# Patient Record
Sex: FEMALE | Race: WHITE | Employment: OTHER | URBAN - METROPOLITAN AREA
[De-identification: names, ages, dates, MRNs, and addresses within clinical notes are randomized per-mention and may not be internally consistent; named-entity substitution may affect disease eponyms.]

---

## 2017-01-04 ENCOUNTER — GENERIC CONVERSION - ENCOUNTER (OUTPATIENT)
Dept: OTHER | Facility: OTHER | Age: 77
End: 2017-01-04

## 2017-03-28 ENCOUNTER — GENERIC CONVERSION - ENCOUNTER (OUTPATIENT)
Dept: OTHER | Facility: OTHER | Age: 77
End: 2017-03-28

## 2018-01-15 NOTE — MISCELLANEOUS
Chief Complaint  Chief Complaint Free Text Note Form: MELA      History of Present Illness  TCM Communication [facility]: The patient is being contacted for follow-up after hospitalization  She was hospitalized at [facility]  The date of admission: 16, date of discharge: 16  Diagnosis: a-fib  She was discharged to home  Medications were not reviewed today  The patient is currently asymptomatic  Counseling was provided to the patient  Communication performed and completed by Gabriela Cruz  Active Problems    1  Atrial fibrillation (427 31) (I48 91)   2  Cardiac arrhythmia (427 9) (I49 9)   3  Carpal tunnel syndrome, unspecified laterality (354 0) (G56 00)   4  Depression (311) (F32 9)   5  Disc disorder of cervical region (722 91) (M50 90)   6  Gout (274 9) (M10 9)   7  Hospital-acquired pneumonia (486) (J18 9)   8  Hyperlipidemia (272 4) (E78 5)   9  Hypertension (401 9) (I10)   10  Insomnia (780 52) (G47 00)   11  Osteoarthritis (715 90) (M19 90)   12  Pain syndrome, chronic (338 4) (G89 4)   13  Pancreatitis (577 0)    Surgical History    1  History of Knee Replacement   2  History of Shoulder Surgery   3  History of Tonsillectomy   4  History of Total Abdominal Hysterectomy With Removal Of Both Ovaries    Family History  Father    1  Family history of Father  At Age ___  Maternal Grandmother    2  Family history of Coronary Artery Disease (V17 49)  Paternal Grandmother    3  Family history of Colon Cancer (V16 0)   4  Family history of Diabetes Mellitus (V18 0)   5  Family history of Father  At Age 68  Paternal Aunt    10  Family history of Breast Cancer (V16 3)  Paternal Uncle    9  Family history of Colon Cancer (V16 0)    Social History    · Caffeine Use   · Denied: History of Former Smoker   · Smoker, current status unknown (305 1) (F17 200)   · Stopped Drinking Alcohol    Current Meds   1  Amitriptyline HCl - 25 MG Oral Tablet; Take 1 tablet by mouth at bedtime;    Therapy: 00PMA0504 to (Evaluate:13Jan2016); Last Rx:17Jun2015 Ordered   2  Atorvastatin Calcium 20 MG Oral Tablet; TAKE 1 TABLET DAILY; Therapy: 35Ing5860 to (Evaluate:63Gar4999)  Requested for: 05SQK0993; Last   Rx:17Jun2015 Ordered   3  Buffered Vitamin C CAPS; Therapy: (Recorded:16Nov2012) to Recorded   4  Calcium + D TABS; Therapy: (Recorded:16Nov2012) to Recorded   5  [employer] Daily Multiple Plus Iron TABS; Therapy: (Recorded:16Nov2012) to Recorded   6  [employer] Fish Oil 1200 MG Oral Capsule; Therapy: (Recorded:16Nov2012) to Recorded   7  DULoxetine HCl - 30 MG Oral Capsule Delayed Release Particles; TAKE 1 CAPSULE   DAILY; Therapy: 85JQL5049 to (Evaluate:15Oct2015); Last Rx:17Jun2015 Ordered   8  Metoprolol Tartrate 50 MG Oral Tablet; TAKE 1 TABLET TWICE DAILY; Therapy: 80FUK7725 to (Evaluate:46Icu4051) Recorded   9  OxyCODONE HCl - 15 MG Oral Tablet; Therapy: (Recorded:17Jun2015) to Recorded    Allergies    1  No Known Drug Allergies    Health Management  Hyperlipidemia   (1) HEPATIC FUNCTION PANEL; every 6 months; Last 32KHQ3631; Next Due: 39FLM2346; Overdue  (1) LIPID PANEL, FASTING; every 1 year; Last 49NEB4639; Next Due: 53TAK8061;  Overdue    Signatures   Electronically signed by : Amy Felix DO; Jun 24 2016  2:51PM EST                       (Author)

## 2018-01-17 NOTE — MISCELLANEOUS
History of Present Illness  TCM Communication [facility]: She was hospitalized at [facility]  The dates of hospitalization:, 2016 through 2016  Diagnosis: A-Fib  She was discharged to home  Medications were not reviewed today  She did not schedule a follow up appointment  Follow-up appointments with other specialists: unable to reach the patient  Topics counseled included importance of compliance with treatment  Communication performed and completed by Cuong Client      Active Problems    1  Atrial fibrillation (427 31) (I48 91)   2  Cardiac arrhythmia (427 9) (I49 9)   3  Carpal tunnel syndrome, unspecified laterality (354 0) (G56 00)   4  CHF (congestive heart failure) (428 0) (I50 9)   5  Depression (311) (F32 9)   6  Disc disorder of cervical region (722 91) (M50 90)   7  Gout (274 9) (M10 9)   8  Hospital-acquired pneumonia (486) (J18 9)   9  Hyperlipidemia (272 4) (E78 5)   10  Hypertension (401 9) (I10)   11  Insomnia (780 52) (G47 00)   12  Osteoarthritis (715 90) (M19 90)   13  Pain syndrome, chronic (338 4) (G89 4)   14  Pancreatitis (577 0)    Past Medical History    1  History of atrial fibrillation (V12 59) (Z86 79)    Surgical History    1  History of Knee Replacement   2  History of Shoulder Surgery   3  History of Tonsillectomy   4  History of Total Abdominal Hysterectomy With Removal Of Both Ovaries    Family History  Father    1  Family history of Father  At Age ___  Maternal Grandmother    2  Family history of Coronary Artery Disease (V17 49)  Paternal Grandmother    3  Family history of Colon Cancer (V16 0)   4  Family history of Diabetes Mellitus (V18 0)   5  Family history of Father  At Age 68  Paternal Aunt    10  Family history of Breast Cancer (V16 3)  Paternal Uncle    9   Family history of Colon Cancer (V16 0)    Social History    · Caffeine Use   · Denied: History of Former Smoker   · Smoker, current status unknown (305 1) (F17 200)   · Stopped Drinking Alcohol    Current Meds   1  Amitriptyline HCl - 25 MG Oral Tablet; Take 1 tablet by mouth at bedtime; Therapy: 48QEQ0425 to (Evaluate:20Jan2017); Last Rx:24Jun2016 Ordered   2  Eliquis 5 MG Oral Tablet; Therapy: (Recorded:24Jun2016) to Recorded   3  FLUoxetine HCl - 20 MG Oral Capsule; TAKE 1 CAPSULE DAILY; Therapy: 91XTT2702 to (Evaluate:26Oct2016); Last Rx:28Jun2016 Ordered   4  Metoprolol Tartrate 100 MG Oral Tablet; Therapy: (Recorded:24Jun2016) to Recorded   5  OxyCODONE HCl - 15 MG Oral Tablet; Therapy: (Recorded:17Jun2015) to Recorded    Allergies    1  No Known Drug Allergies    Health Management  Hyperlipidemia   (1) HEPATIC FUNCTION PANEL; every 6 months; Last 94JTF2646; Next Due: 21XMF5850; Overdue  (1) LIPID PANEL, FASTING; every 1 year; Last 24UGT8507; Next Due: 72UFF0592;  Overdue    Future Appointments    Date/Time Provider Specialty Site   09/28/2016 12:00 PM Janny Solis, [address] [place] [address] 12     Signatures   Electronically signed by : Holden Viramontes DO; Jul 19 2016 12:31PM EST                       (Author)

## 2018-05-09 ENCOUNTER — TELEPHONE (OUTPATIENT)
Dept: FAMILY MEDICINE CLINIC | Facility: CLINIC | Age: 78
End: 2018-05-09

## 2018-05-13 ENCOUNTER — HOSPITAL ENCOUNTER (INPATIENT)
Facility: HOSPITAL | Age: 78
LOS: 7 days | Discharge: HOME HEALTH | DRG: 919 | End: 2018-05-21
Attending: EMERGENCY MEDICINE | Admitting: HOSPITALIST
Payer: MEDICARE

## 2018-05-13 ENCOUNTER — APPOINTMENT (EMERGENCY)
Dept: RADIOLOGY | Facility: HOSPITAL | Age: 78
DRG: 919 | End: 2018-05-13
Payer: MEDICARE

## 2018-05-13 DIAGNOSIS — T85.590A: ICD-10-CM

## 2018-05-13 DIAGNOSIS — R79.89 ELEVATED BRAIN NATRIURETIC PEPTIDE (BNP) LEVEL: ICD-10-CM

## 2018-05-13 DIAGNOSIS — N39.0 UTI (URINARY TRACT INFECTION): ICD-10-CM

## 2018-05-13 DIAGNOSIS — F32.89 OTHER DEPRESSION: Chronic | ICD-10-CM

## 2018-05-13 DIAGNOSIS — A41.9 SEVERE SEPSIS (HCC): ICD-10-CM

## 2018-05-13 DIAGNOSIS — I50.22 CHRONIC SYSTOLIC HEART FAILURE (HCC): Chronic | ICD-10-CM

## 2018-05-13 DIAGNOSIS — K92.1 MELENA: ICD-10-CM

## 2018-05-13 DIAGNOSIS — R93.5 ABNORMAL CT OF THE ABDOMEN: ICD-10-CM

## 2018-05-13 DIAGNOSIS — H54.7 DECREASED VISION: ICD-10-CM

## 2018-05-13 DIAGNOSIS — R77.8 ELEVATED TROPONIN: ICD-10-CM

## 2018-05-13 DIAGNOSIS — R65.20 SEVERE SEPSIS (HCC): ICD-10-CM
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
## 2018-05-13 DIAGNOSIS — R74.8 ELEVATED LIVER ENZYMES: ICD-10-CM

## 2018-05-13 DIAGNOSIS — A41.9 SEPSIS (HCC): Primary | ICD-10-CM

## 2018-05-13 DIAGNOSIS — D72.829 LEUKOCYTOSIS: ICD-10-CM

## 2018-05-13 DIAGNOSIS — Q84.6 ANOMALIES OF NAILS: Chronic | ICD-10-CM

## 2018-05-13 LAB
ABO GROUP BLD: NORMAL
ALBUMIN SERPL BCP-MCNC: 2.9 G/DL (ref 3.5–5)
ALP SERPL-CCNC: 202 U/L (ref 46–116)
ALT SERPL W P-5'-P-CCNC: 692 U/L (ref 12–78)
ANION GAP SERPL CALCULATED.3IONS-SCNC: 7 MMOL/L (ref 4–13)
APTT PPP: 30 SECONDS (ref 23–35)
AST SERPL W P-5'-P-CCNC: 658 U/L (ref 5–45)
BACTERIA UR QL AUTO: ABNORMAL /HPF
BASOPHILS # BLD MANUAL: 0 THOUSAND/UL (ref 0–0.1)
BASOPHILS NFR MAR MANUAL: 0 % (ref 0–1)
BILIRUB SERPL-MCNC: 3.48 MG/DL (ref 0.2–1)
BILIRUB UR QL STRIP: ABNORMAL
BLD GP AB SCN SERPL QL: NEGATIVE
BUN SERPL-MCNC: 14 MG/DL (ref 5–25)
CALCIUM SERPL-MCNC: 7.8 MG/DL (ref 8.3–10.1)
CHLORIDE SERPL-SCNC: 109 MMOL/L (ref 100–108)
CK SERPL-CCNC: 76 U/L (ref 26–192)
CLARITY UR: ABNORMAL
CO2 SERPL-SCNC: 26 MMOL/L (ref 21–32)
COLOR UR: ABNORMAL
CREAT SERPL-MCNC: 0.75 MG/DL (ref 0.6–1.3)
EOSINOPHIL # BLD MANUAL: 0 THOUSAND/UL (ref 0–0.4)
EOSINOPHIL NFR BLD MANUAL: 0 % (ref 0–6)
ERYTHROCYTE [DISTWIDTH] IN BLOOD BY AUTOMATED COUNT: 15.4 % (ref 11.6–15.1)
GFR SERPL CREATININE-BSD FRML MDRD: 77 ML/MIN/1.73SQ M
GLUCOSE SERPL-MCNC: 116 MG/DL (ref 65–140)
GLUCOSE UR STRIP-MCNC: NEGATIVE MG/DL
HCT VFR BLD AUTO: 41.6 % (ref 34.8–46.1)
HGB BLD-MCNC: 13.6 G/DL (ref 11.5–15.4)
HGB UR QL STRIP.AUTO: ABNORMAL
INR PPP: 1.54 (ref 0.86–1.16)
KETONES UR STRIP-MCNC: NEGATIVE MG/DL
LACTATE SERPL-SCNC: 2.7 MMOL/L (ref 0.5–2)
LACTATE SERPL-SCNC: 3.5 MMOL/L (ref 0.5–2)
LEUKOCYTE ESTERASE UR QL STRIP: ABNORMAL
LYMPHOCYTES # BLD AUTO: 0.48 THOUSAND/UL (ref 0.6–4.47)
LYMPHOCYTES # BLD AUTO: 2 % (ref 14–44)
MCH RBC QN AUTO: 30.9 PG (ref 26.8–34.3)
MCHC RBC AUTO-ENTMCNC: 32.7 G/DL (ref 31.4–37.4)
MCV RBC AUTO: 95 FL (ref 82–98)
MONOCYTES # BLD AUTO: 0.24 THOUSAND/UL (ref 0–1.22)
MONOCYTES NFR BLD: 1 % (ref 4–12)
NEUTROPHILS # BLD MANUAL: 23.06 THOUSAND/UL (ref 1.85–7.62)
NEUTS BAND NFR BLD MANUAL: 4 % (ref 0–8)
NEUTS SEG NFR BLD AUTO: 93 % (ref 43–75)
NITRITE UR QL STRIP: POSITIVE
NON-SQ EPI CELLS URNS QL MICRO: ABNORMAL /HPF
NRBC BLD AUTO-RTO: 0 /100 WBCS
NT-PROBNP SERPL-MCNC: ABNORMAL PG/ML
PH UR STRIP.AUTO: 6 [PH] (ref 4.5–8)
PLATELET # BLD AUTO: 204 THOUSANDS/UL (ref 149–390)
PLATELET BLD QL SMEAR: ADEQUATE
PMV BLD AUTO: 10.4 FL (ref 8.9–12.7)
POTASSIUM SERPL-SCNC: 3.7 MMOL/L (ref 3.5–5.3)
PROT SERPL-MCNC: 5.9 G/DL (ref 6.4–8.2)
PROT UR STRIP-MCNC: ABNORMAL MG/DL
PROTHROMBIN TIME: 18.6 SECONDS (ref 12.1–14.4)
RBC # BLD AUTO: 4.4 MILLION/UL (ref 3.81–5.12)
RBC #/AREA URNS AUTO: ABNORMAL /HPF
RBC MORPH BLD: NORMAL
RH BLD: POSITIVE
SODIUM SERPL-SCNC: 142 MMOL/L (ref 136–145)
SP GR UR STRIP.AUTO: 1.03 (ref 1–1.03)
SPECIMEN EXPIRATION DATE: NORMAL
TROPONIN I SERPL-MCNC: 0.08 NG/ML
TROPONIN I SERPL-MCNC: 0.09 NG/ML
UROBILINOGEN UR QL STRIP.AUTO: 4 E.U./DL
WBC # BLD AUTO: 23.77 THOUSAND/UL (ref 4.31–10.16)
WBC #/AREA URNS AUTO: ABNORMAL /HPF

## 2018-05-13 PROCEDURE — 70450 CT HEAD/BRAIN W/O DYE: CPT

## 2018-05-13 PROCEDURE — 83605 ASSAY OF LACTIC ACID: CPT | Performed by: EMERGENCY MEDICINE

## 2018-05-13 PROCEDURE — 84484 ASSAY OF TROPONIN QUANT: CPT | Performed by: EMERGENCY MEDICINE

## 2018-05-13 PROCEDURE — 74178 CT ABD&PLV WO CNTR FLWD CNTR: CPT

## 2018-05-13 PROCEDURE — 87040 BLOOD CULTURE FOR BACTERIA: CPT | Performed by: STUDENT IN AN ORGANIZED HEALTH CARE EDUCATION/TRAINING PROGRAM

## 2018-05-13 PROCEDURE — 85610 PROTHROMBIN TIME: CPT | Performed by: EMERGENCY MEDICINE

## 2018-05-13 PROCEDURE — 85027 COMPLETE CBC AUTOMATED: CPT | Performed by: EMERGENCY MEDICINE

## 2018-05-13 PROCEDURE — 86901 BLOOD TYPING SEROLOGIC RH(D): CPT | Performed by: EMERGENCY MEDICINE

## 2018-05-13 PROCEDURE — 81001 URINALYSIS AUTO W/SCOPE: CPT | Performed by: EMERGENCY MEDICINE

## 2018-05-13 PROCEDURE — 96361 HYDRATE IV INFUSION ADD-ON: CPT

## 2018-05-13 PROCEDURE — 85007 BL SMEAR W/DIFF WBC COUNT: CPT | Performed by: EMERGENCY MEDICINE

## 2018-05-13 PROCEDURE — 96365 THER/PROPH/DIAG IV INF INIT: CPT

## 2018-05-13 PROCEDURE — 83880 ASSAY OF NATRIURETIC PEPTIDE: CPT | Performed by: EMERGENCY MEDICINE

## 2018-05-13 PROCEDURE — 87147 CULTURE TYPE IMMUNOLOGIC: CPT | Performed by: STUDENT IN AN ORGANIZED HEALTH CARE EDUCATION/TRAINING PROGRAM

## 2018-05-13 PROCEDURE — 72125 CT NECK SPINE W/O DYE: CPT

## 2018-05-13 PROCEDURE — 93005 ELECTROCARDIOGRAM TRACING: CPT

## 2018-05-13 PROCEDURE — 86900 BLOOD TYPING SEROLOGIC ABO: CPT | Performed by: EMERGENCY MEDICINE

## 2018-05-13 PROCEDURE — 71046 X-RAY EXAM CHEST 2 VIEWS: CPT

## 2018-05-13 PROCEDURE — 36415 COLL VENOUS BLD VENIPUNCTURE: CPT

## 2018-05-13 PROCEDURE — 96375 TX/PRO/DX INJ NEW DRUG ADDON: CPT

## 2018-05-13 PROCEDURE — 99203 OFFICE O/P NEW LOW 30 MIN: CPT | Performed by: SURGERY

## 2018-05-13 PROCEDURE — 80053 COMPREHEN METABOLIC PANEL: CPT | Performed by: EMERGENCY MEDICINE

## 2018-05-13 PROCEDURE — 82550 ASSAY OF CK (CPK): CPT | Performed by: EMERGENCY MEDICINE

## 2018-05-13 PROCEDURE — 85730 THROMBOPLASTIN TIME PARTIAL: CPT | Performed by: EMERGENCY MEDICINE

## 2018-05-13 PROCEDURE — 86850 RBC ANTIBODY SCREEN: CPT | Performed by: EMERGENCY MEDICINE

## 2018-05-13 RX ORDER — POLYETHYLENE GLYCOL 3350 17 G/17G
17 POWDER, FOR SOLUTION ORAL DAILY
COMMUNITY
End: 2019-01-12

## 2018-05-13 RX ORDER — LISINOPRIL 5 MG/1
5 TABLET ORAL DAILY
COMMUNITY
End: 2018-06-05 | Stop reason: SDUPTHER

## 2018-05-13 RX ORDER — OXYCODONE HYDROCHLORIDE 15 MG/1
10 TABLET, FILM COATED, EXTENDED RELEASE ORAL EVERY 6 HOURS PRN
Status: ON HOLD | COMMUNITY
End: 2018-05-21

## 2018-05-13 RX ORDER — METHOCARBAMOL 500 MG/1
500 TABLET, FILM COATED ORAL
COMMUNITY
End: 2018-07-24

## 2018-05-13 RX ORDER — ALPRAZOLAM 0.25 MG/1
0.25 TABLET, ORALLY DISINTEGRATING ORAL
COMMUNITY
End: 2018-07-25 | Stop reason: SDUPTHER

## 2018-05-13 RX ORDER — MORPHINE SULFATE 10 MG/ML
6 INJECTION, SOLUTION INTRAMUSCULAR; INTRAVENOUS ONCE
Status: COMPLETED | OUTPATIENT
Start: 2018-05-13 | End: 2018-05-13

## 2018-05-13 RX ORDER — ASPIRIN 81 MG/1
81 TABLET ORAL DAILY
COMMUNITY
End: 2018-09-01 | Stop reason: HOSPADM

## 2018-05-13 RX ORDER — POTASSIUM CHLORIDE 750 MG/1
20 CAPSULE, EXTENDED RELEASE ORAL 2 TIMES DAILY
COMMUNITY
End: 2018-05-24 | Stop reason: HOSPADM

## 2018-05-13 RX ORDER — SODIUM CHLORIDE, SODIUM GLUCONATE, SODIUM ACETATE, POTASSIUM CHLORIDE, MAGNESIUM CHLORIDE, SODIUM PHOSPHATE, DIBASIC, AND POTASSIUM PHOSPHATE .53; .5; .37; .037; .03; .012; .00082 G/100ML; G/100ML; G/100ML; G/100ML; G/100ML; G/100ML; G/100ML
1000 INJECTION, SOLUTION INTRAVENOUS ONCE
Status: COMPLETED | OUTPATIENT
Start: 2018-05-13 | End: 2018-05-14

## 2018-05-13 RX ORDER — METOPROLOL TARTRATE 100 MG/1
100 TABLET ORAL EVERY 12 HOURS SCHEDULED
COMMUNITY
End: 2018-06-05 | Stop reason: SDUPTHER

## 2018-05-13 RX ADMIN — CEFTRIAXONE 1000 MG: 1 INJECTION, SOLUTION INTRAVENOUS at 23:06

## 2018-05-13 RX ADMIN — SODIUM CHLORIDE, SODIUM GLUCONATE, SODIUM ACETATE, POTASSIUM CHLORIDE, MAGNESIUM CHLORIDE, SODIUM PHOSPHATE, DIBASIC, AND POTASSIUM PHOSPHATE 1000 ML: .53; .5; .37; .037; .03; .012; .00082 INJECTION, SOLUTION INTRAVENOUS at 23:09

## 2018-05-13 RX ADMIN — SODIUM CHLORIDE 1000 ML: 0.9 INJECTION, SOLUTION INTRAVENOUS at 20:30

## 2018-05-13 RX ADMIN — MORPHINE SULFATE 6 MG: 10 INJECTION, SOLUTION INTRAMUSCULAR; INTRAVENOUS at 23:12

## 2018-05-13 RX ADMIN — METRONIDAZOLE 500 MG: 500 INJECTION, SOLUTION INTRAVENOUS at 23:54

## 2018-05-13 RX ADMIN — IOHEXOL 100 ML: 350 INJECTION, SOLUTION INTRAVENOUS at 21:27

## 2018-05-14 ENCOUNTER — ANESTHESIA EVENT (INPATIENT)
Dept: PERIOP | Facility: HOSPITAL | Age: 78
DRG: 919 | End: 2018-05-14
Payer: MEDICARE

## 2018-05-14 ENCOUNTER — APPOINTMENT (INPATIENT)
Dept: RADIOLOGY | Facility: HOSPITAL | Age: 78
DRG: 919 | End: 2018-05-14
Payer: MEDICARE

## 2018-05-14 ENCOUNTER — ANESTHESIA EVENT (OUTPATIENT)
Dept: GASTROENTEROLOGY | Facility: HOSPITAL | Age: 78
End: 2018-05-14

## 2018-05-14 ENCOUNTER — ANESTHESIA (INPATIENT)
Dept: PERIOP | Facility: HOSPITAL | Age: 78
DRG: 919 | End: 2018-05-14
Payer: MEDICARE
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
## 2018-05-14 PROBLEM — I50.22 CHRONIC SYSTOLIC HEART FAILURE (HCC): Status: ACTIVE | Noted: 2018-05-14

## 2018-05-14 PROBLEM — I48.91 ATRIAL FIBRILLATION (HCC): Status: ACTIVE | Noted: 2018-05-14

## 2018-05-14 PROBLEM — R65.20 SEVERE SEPSIS (HCC): Status: ACTIVE | Noted: 2018-05-14

## 2018-05-14 PROBLEM — Z79.01 CHRONIC ANTICOAGULATION: Status: ACTIVE | Noted: 2018-05-14

## 2018-05-14 PROBLEM — Z94.4: Status: ACTIVE | Noted: 2018-05-14

## 2018-05-14 PROBLEM — R74.8 ELEVATED LIVER ENZYMES: Status: ACTIVE | Noted: 2018-05-14

## 2018-05-14 PROBLEM — I50.22 CHRONIC SYSTOLIC HEART FAILURE (HCC): Chronic | Status: ACTIVE | Noted: 2018-05-14

## 2018-05-14 PROBLEM — R93.5 ABNORMAL CT OF THE ABDOMEN: Status: ACTIVE | Noted: 2018-05-14

## 2018-05-14 PROBLEM — A41.9 SEPSIS (HCC): Status: ACTIVE | Noted: 2018-05-14

## 2018-05-14 PROBLEM — R77.8 ELEVATED TROPONIN: Status: ACTIVE | Noted: 2018-05-14

## 2018-05-14 PROBLEM — Z98.890: Status: ACTIVE | Noted: 2018-05-14

## 2018-05-14 PROBLEM — I48.91 ATRIAL FIBRILLATION (HCC): Chronic | Status: ACTIVE | Noted: 2018-05-14

## 2018-05-14 PROBLEM — A41.9 SEVERE SEPSIS (HCC): Status: ACTIVE | Noted: 2018-05-14

## 2018-05-14 PROBLEM — Z79.01 CHRONIC ANTICOAGULATION: Chronic | Status: ACTIVE | Noted: 2018-05-14

## 2018-05-14 PROBLEM — T85.590A: Status: ACTIVE | Noted: 2018-05-13

## 2018-05-14 LAB
ALBUMIN SERPL BCP-MCNC: 2.9 G/DL (ref 3.5–5)
ALP SERPL-CCNC: 195 U/L (ref 46–116)
ALT SERPL W P-5'-P-CCNC: 597 U/L (ref 12–78)
ANION GAP SERPL CALCULATED.3IONS-SCNC: 5 MMOL/L (ref 4–13)
APTT PPP: 34 SECONDS (ref 23–35)
APTT PPP: 56 SECONDS (ref 23–35)
AST SERPL W P-5'-P-CCNC: 416 U/L (ref 5–45)
ATRIAL RATE: 144 BPM
ATRIAL RATE: 89 BPM
BILIRUB SERPL-MCNC: 5.76 MG/DL (ref 0.2–1)
BUN SERPL-MCNC: 12 MG/DL (ref 5–25)
CALCIUM SERPL-MCNC: 8.3 MG/DL (ref 8.3–10.1)
CHLORIDE SERPL-SCNC: 110 MMOL/L (ref 100–108)
CO2 SERPL-SCNC: 27 MMOL/L (ref 21–32)
CREAT SERPL-MCNC: 0.73 MG/DL (ref 0.6–1.3)
ERYTHROCYTE [DISTWIDTH] IN BLOOD BY AUTOMATED COUNT: 15.8 % (ref 11.6–15.1)
ERYTHROCYTE [DISTWIDTH] IN BLOOD BY AUTOMATED COUNT: 15.9 % (ref 11.6–15.1)
GFR SERPL CREATININE-BSD FRML MDRD: 80 ML/MIN/1.73SQ M
GLUCOSE SERPL-MCNC: 113 MG/DL (ref 65–140)
HCT VFR BLD AUTO: 40 % (ref 34.8–46.1)
HCT VFR BLD AUTO: 42.7 % (ref 34.8–46.1)
HGB BLD-MCNC: 12.6 G/DL (ref 11.5–15.4)
HGB BLD-MCNC: 14.1 G/DL (ref 11.5–15.4)
INR PPP: 1.44 (ref 0.86–1.16)
LACTATE SERPL-SCNC: 1.8 MMOL/L (ref 0.5–2)
LACTATE SERPL-SCNC: 2 MMOL/L (ref 0.5–2)
MAGNESIUM SERPL-MCNC: 2.5 MG/DL (ref 1.6–2.6)
MCH RBC QN AUTO: 30.2 PG (ref 26.8–34.3)
MCH RBC QN AUTO: 31.1 PG (ref 26.8–34.3)
MCHC RBC AUTO-ENTMCNC: 31.5 G/DL (ref 31.4–37.4)
MCHC RBC AUTO-ENTMCNC: 33 G/DL (ref 31.4–37.4)
MCV RBC AUTO: 94 FL (ref 82–98)
MCV RBC AUTO: 96 FL (ref 82–98)
PLATELET # BLD AUTO: 168 THOUSANDS/UL (ref 149–390)
PLATELET # BLD AUTO: 201 THOUSANDS/UL (ref 149–390)
PMV BLD AUTO: 10.2 FL (ref 8.9–12.7)
PMV BLD AUTO: 10.7 FL (ref 8.9–12.7)
POTASSIUM SERPL-SCNC: 3.9 MMOL/L (ref 3.5–5.3)
PROT SERPL-MCNC: 6.1 G/DL (ref 6.4–8.2)
PROTHROMBIN TIME: 17.6 SECONDS (ref 12.1–14.4)
QRS AXIS: 34 DEGREES
QRS AXIS: 5 DEGREES
QRSD INTERVAL: 104 MS
QRSD INTERVAL: 92 MS
QT INTERVAL: 382 MS
QT INTERVAL: 384 MS
QTC INTERVAL: 472 MS
QTC INTERVAL: 474 MS
RBC # BLD AUTO: 4.17 MILLION/UL (ref 3.81–5.12)
RBC # BLD AUTO: 4.53 MILLION/UL (ref 3.81–5.12)
SODIUM SERPL-SCNC: 142 MMOL/L (ref 136–145)
T WAVE AXIS: 196 DEGREES
T WAVE AXIS: 216 DEGREES
TROPONIN I SERPL-MCNC: 0.08 NG/ML
VENTRICULAR RATE: 92 BPM
VENTRICULAR RATE: 92 BPM
WBC # BLD AUTO: 16.52 THOUSAND/UL (ref 4.31–10.16)
WBC # BLD AUTO: 9.95 THOUSAND/UL (ref 4.31–10.16)

## 2018-05-14 PROCEDURE — 43264 ERCP REMOVE DUCT CALCULI: CPT | Performed by: INTERNAL MEDICINE

## 2018-05-14 PROCEDURE — 0FC98ZZ EXTIRPATION OF MATTER FROM COMMON BILE DUCT, VIA NATURAL OR ARTIFICIAL OPENING ENDOSCOPIC: ICD-10-PCS | Performed by: INTERNAL MEDICINE

## 2018-05-14 PROCEDURE — 85730 THROMBOPLASTIN TIME PARTIAL: CPT | Performed by: GENERAL PRACTICE

## 2018-05-14 PROCEDURE — 36415 COLL VENOUS BLD VENIPUNCTURE: CPT | Performed by: EMERGENCY MEDICINE

## 2018-05-14 PROCEDURE — 99232 SBSQ HOSP IP/OBS MODERATE 35: CPT | Performed by: SURGERY

## 2018-05-14 PROCEDURE — C9113 INJ PANTOPRAZOLE SODIUM, VIA: HCPCS | Performed by: HOSPITALIST

## 2018-05-14 PROCEDURE — 93010 ELECTROCARDIOGRAM REPORT: CPT | Performed by: INTERNAL MEDICINE

## 2018-05-14 PROCEDURE — 85027 COMPLETE CBC AUTOMATED: CPT | Performed by: HOSPITALIST

## 2018-05-14 PROCEDURE — C1769 GUIDE WIRE: HCPCS | Performed by: INTERNAL MEDICINE

## 2018-05-14 PROCEDURE — G8979 MOBILITY GOAL STATUS: HCPCS

## 2018-05-14 PROCEDURE — 76705 ECHO EXAM OF ABDOMEN: CPT

## 2018-05-14 PROCEDURE — 99223 1ST HOSP IP/OBS HIGH 75: CPT | Performed by: HOSPITALIST

## 2018-05-14 PROCEDURE — 93005 ELECTROCARDIOGRAM TRACING: CPT

## 2018-05-14 PROCEDURE — 80053 COMPREHEN METABOLIC PANEL: CPT | Performed by: HOSPITALIST

## 2018-05-14 PROCEDURE — 74328 X-RAY BILE DUCT ENDOSCOPY: CPT

## 2018-05-14 PROCEDURE — C2625 STENT, NON-COR, TEM W/DEL SY: HCPCS | Performed by: INTERNAL MEDICINE

## 2018-05-14 PROCEDURE — 85610 PROTHROMBIN TIME: CPT | Performed by: HOSPITALIST

## 2018-05-14 PROCEDURE — 99285 EMERGENCY DEPT VISIT HI MDM: CPT

## 2018-05-14 PROCEDURE — 97167 OT EVAL HIGH COMPLEX 60 MIN: CPT

## 2018-05-14 PROCEDURE — 83605 ASSAY OF LACTIC ACID: CPT | Performed by: EMERGENCY MEDICINE

## 2018-05-14 PROCEDURE — 99223 1ST HOSP IP/OBS HIGH 75: CPT | Performed by: INTERNAL MEDICINE

## 2018-05-14 PROCEDURE — 85027 COMPLETE CBC AUTOMATED: CPT | Performed by: PHYSICIAN ASSISTANT

## 2018-05-14 PROCEDURE — G8978 MOBILITY CURRENT STATUS: HCPCS

## 2018-05-14 PROCEDURE — 97163 PT EVAL HIGH COMPLEX 45 MIN: CPT

## 2018-05-14 PROCEDURE — 83605 ASSAY OF LACTIC ACID: CPT | Performed by: HOSPITALIST

## 2018-05-14 PROCEDURE — 43276 ERCP STENT EXCHANGE W/DILATE: CPT | Performed by: INTERNAL MEDICINE

## 2018-05-14 PROCEDURE — C2617 STENT, NON-COR, TEM W/O DEL: HCPCS | Performed by: INTERNAL MEDICINE

## 2018-05-14 PROCEDURE — 0FPB8DZ REMOVAL OF INTRALUMINAL DEVICE FROM HEPATOBILIARY DUCT, VIA NATURAL OR ARTIFICIAL OPENING ENDOSCOPIC: ICD-10-PCS | Performed by: INTERNAL MEDICINE

## 2018-05-14 PROCEDURE — 0F798DZ DILATION OF COMMON BILE DUCT WITH INTRALUMINAL DEVICE, VIA NATURAL OR ARTIFICIAL OPENING ENDOSCOPIC: ICD-10-PCS | Performed by: INTERNAL MEDICINE

## 2018-05-14 PROCEDURE — G8987 SELF CARE CURRENT STATUS: HCPCS

## 2018-05-14 PROCEDURE — 83735 ASSAY OF MAGNESIUM: CPT | Performed by: HOSPITALIST

## 2018-05-14 PROCEDURE — 99231 SBSQ HOSP IP/OBS SF/LOW 25: CPT | Performed by: INTERNAL MEDICINE

## 2018-05-14 PROCEDURE — G8988 SELF CARE GOAL STATUS: HCPCS

## 2018-05-14 PROCEDURE — 99221 1ST HOSP IP/OBS SF/LOW 40: CPT | Performed by: INTERNAL MEDICINE

## 2018-05-14 PROCEDURE — 84484 ASSAY OF TROPONIN QUANT: CPT | Performed by: HOSPITALIST

## 2018-05-14 DEVICE — BILIARY STENT
Type: IMPLANTABLE DEVICE | Site: BILE DUCT | Status: NON-FUNCTIONAL
Brand: ADVANIX™ BILIARY
Removed: 2018-08-28

## 2018-05-14 RX ORDER — FENTANYL CITRATE 50 UG/ML
INJECTION, SOLUTION INTRAMUSCULAR; INTRAVENOUS AS NEEDED
Status: DISCONTINUED | OUTPATIENT
Start: 2018-05-14 | End: 2018-05-14 | Stop reason: SURG

## 2018-05-14 RX ORDER — METOPROLOL TARTRATE 5 MG/5ML
INJECTION INTRAVENOUS AS NEEDED
Status: DISCONTINUED | OUTPATIENT
Start: 2018-05-14 | End: 2018-05-14 | Stop reason: SURG

## 2018-05-14 RX ORDER — FUROSEMIDE 10 MG/ML
20 INJECTION INTRAMUSCULAR; INTRAVENOUS ONCE
Status: COMPLETED | OUTPATIENT
Start: 2018-05-14 | End: 2018-05-14

## 2018-05-14 RX ORDER — PANTOPRAZOLE SODIUM 40 MG/1
40 INJECTION, POWDER, FOR SOLUTION INTRAVENOUS
Status: DISCONTINUED | OUTPATIENT
Start: 2018-05-14 | End: 2018-05-15

## 2018-05-14 RX ORDER — METHOCARBAMOL 500 MG/1
500 TABLET, FILM COATED ORAL
Status: DISCONTINUED | OUTPATIENT
Start: 2018-05-14 | End: 2018-05-21 | Stop reason: HOSPADM

## 2018-05-14 RX ORDER — ROCURONIUM BROMIDE 10 MG/ML
INJECTION, SOLUTION INTRAVENOUS AS NEEDED
Status: DISCONTINUED | OUTPATIENT
Start: 2018-05-14 | End: 2018-05-14 | Stop reason: SURG

## 2018-05-14 RX ORDER — LISINOPRIL 2.5 MG/1
5 TABLET ORAL DAILY
Status: DISCONTINUED | OUTPATIENT
Start: 2018-05-14 | End: 2018-05-21 | Stop reason: HOSPADM

## 2018-05-14 RX ORDER — METOPROLOL TARTRATE 50 MG/1
150 TABLET, FILM COATED ORAL EVERY 12 HOURS SCHEDULED
Status: DISCONTINUED | OUTPATIENT
Start: 2018-05-14 | End: 2018-05-21 | Stop reason: HOSPADM

## 2018-05-14 RX ORDER — CALCIUM CARBONATE 200(500)MG
500 TABLET,CHEWABLE ORAL DAILY PRN
Status: DISCONTINUED | OUTPATIENT
Start: 2018-05-14 | End: 2018-05-18

## 2018-05-14 RX ORDER — SODIUM CHLORIDE, SODIUM LACTATE, POTASSIUM CHLORIDE, CALCIUM CHLORIDE 600; 310; 30; 20 MG/100ML; MG/100ML; MG/100ML; MG/100ML
INJECTION, SOLUTION INTRAVENOUS CONTINUOUS PRN
Status: DISCONTINUED | OUTPATIENT
Start: 2018-05-14 | End: 2018-05-14

## 2018-05-14 RX ORDER — ONDANSETRON 2 MG/ML
INJECTION INTRAMUSCULAR; INTRAVENOUS AS NEEDED
Status: DISCONTINUED | OUTPATIENT
Start: 2018-05-14 | End: 2018-05-14 | Stop reason: SURG

## 2018-05-14 RX ORDER — EPHEDRINE SULFATE 50 MG/ML
INJECTION, SOLUTION INTRAVENOUS AS NEEDED
Status: DISCONTINUED | OUTPATIENT
Start: 2018-05-14 | End: 2018-05-14 | Stop reason: SURG

## 2018-05-14 RX ORDER — METOPROLOL TARTRATE 5 MG/5ML
5 INJECTION INTRAVENOUS
Status: DISCONTINUED | OUTPATIENT
Start: 2018-05-14 | End: 2018-05-14 | Stop reason: HOSPADM

## 2018-05-14 RX ORDER — PROPOFOL 10 MG/ML
INJECTION, EMULSION INTRAVENOUS AS NEEDED
Status: DISCONTINUED | OUTPATIENT
Start: 2018-05-14 | End: 2018-05-14 | Stop reason: SURG

## 2018-05-14 RX ORDER — DIPHENHYDRAMINE HYDROCHLORIDE 50 MG/ML
12.5 INJECTION INTRAMUSCULAR; INTRAVENOUS ONCE AS NEEDED
Status: DISCONTINUED | OUTPATIENT
Start: 2018-05-14 | End: 2018-05-14 | Stop reason: HOSPADM

## 2018-05-14 RX ORDER — SODIUM CHLORIDE 9 MG/ML
60 INJECTION, SOLUTION INTRAVENOUS CONTINUOUS
Status: DISPENSED | OUTPATIENT
Start: 2018-05-14 | End: 2018-05-14

## 2018-05-14 RX ORDER — ACETAMINOPHEN 325 MG/1
650 TABLET ORAL EVERY 6 HOURS PRN
Status: DISCONTINUED | OUTPATIENT
Start: 2018-05-14 | End: 2018-05-21 | Stop reason: HOSPADM

## 2018-05-14 RX ORDER — HEPARIN SODIUM 10000 [USP'U]/100ML
3-20 INJECTION, SOLUTION INTRAVENOUS
Status: DISCONTINUED | OUTPATIENT
Start: 2018-05-14 | End: 2018-05-14

## 2018-05-14 RX ORDER — SODIUM CHLORIDE 9 MG/ML
INJECTION, SOLUTION INTRAVENOUS CONTINUOUS PRN
Status: DISCONTINUED | OUTPATIENT
Start: 2018-05-14 | End: 2018-05-14

## 2018-05-14 RX ORDER — POTASSIUM CHLORIDE 20 MEQ/1
20 TABLET, EXTENDED RELEASE ORAL ONCE
Status: COMPLETED | OUTPATIENT
Start: 2018-05-14 | End: 2018-05-14

## 2018-05-14 RX ORDER — ONDANSETRON 2 MG/ML
4 INJECTION INTRAMUSCULAR; INTRAVENOUS EVERY 6 HOURS PRN
Status: DISCONTINUED | OUTPATIENT
Start: 2018-05-14 | End: 2018-05-21 | Stop reason: HOSPADM

## 2018-05-14 RX ORDER — FENTANYL CITRATE/PF 50 MCG/ML
25 SYRINGE (ML) INJECTION
Status: DISCONTINUED | OUTPATIENT
Start: 2018-05-14 | End: 2018-05-14 | Stop reason: HOSPADM

## 2018-05-14 RX ORDER — ESMOLOL HYDROCHLORIDE 10 MG/ML
INJECTION INTRAVENOUS AS NEEDED
Status: DISCONTINUED | OUTPATIENT
Start: 2018-05-14 | End: 2018-05-14 | Stop reason: SURG

## 2018-05-14 RX ORDER — GLYCOPYRROLATE 0.2 MG/ML
INJECTION INTRAMUSCULAR; INTRAVENOUS AS NEEDED
Status: DISCONTINUED | OUTPATIENT
Start: 2018-05-14 | End: 2018-05-14 | Stop reason: SURG

## 2018-05-14 RX ORDER — OXYCODONE HYDROCHLORIDE 5 MG/1
5 TABLET ORAL EVERY 6 HOURS PRN
Status: DISCONTINUED | OUTPATIENT
Start: 2018-05-14 | End: 2018-05-14

## 2018-05-14 RX ORDER — SODIUM CHLORIDE, SODIUM LACTATE, POTASSIUM CHLORIDE, CALCIUM CHLORIDE 600; 310; 30; 20 MG/100ML; MG/100ML; MG/100ML; MG/100ML
50 INJECTION, SOLUTION INTRAVENOUS CONTINUOUS
Status: DISCONTINUED | OUTPATIENT
Start: 2018-05-14 | End: 2018-05-18

## 2018-05-14 RX ORDER — ONDANSETRON 2 MG/ML
4 INJECTION INTRAMUSCULAR; INTRAVENOUS ONCE AS NEEDED
Status: DISCONTINUED | OUTPATIENT
Start: 2018-05-14 | End: 2018-05-14 | Stop reason: HOSPADM

## 2018-05-14 RX ADMIN — PANTOPRAZOLE SODIUM 40 MG: 40 INJECTION, POWDER, FOR SOLUTION INTRAVENOUS at 05:57

## 2018-05-14 RX ADMIN — PIPERACILLIN SODIUM AND TAZOBACTAM SODIUM 3.38 G: 36; 4.5 INJECTION, POWDER, FOR SOLUTION INTRAVENOUS at 00:32

## 2018-05-14 RX ADMIN — APIXABAN 5 MG: 5 TABLET, FILM COATED ORAL at 22:30

## 2018-05-14 RX ADMIN — EPHEDRINE SULFATE 5 MG: 50 INJECTION, SOLUTION INTRAMUSCULAR; INTRAVENOUS; SUBCUTANEOUS at 18:46

## 2018-05-14 RX ADMIN — POTASSIUM CHLORIDE 20 MEQ: 1500 TABLET, EXTENDED RELEASE ORAL at 13:30

## 2018-05-14 RX ADMIN — ESMOLOL HYDROCHLORIDE 50 MG: 100 INJECTION, SOLUTION INTRAVENOUS at 19:15

## 2018-05-14 RX ADMIN — PIPERACILLIN SODIUM AND TAZOBACTAM SODIUM 3.38 G: 36; 4.5 INJECTION, POWDER, FOR SOLUTION INTRAVENOUS at 12:45

## 2018-05-14 RX ADMIN — Medication 500 MG: at 07:38

## 2018-05-14 RX ADMIN — HEPARIN SODIUM AND DEXTROSE 12 UNITS/KG/HR: 10000; 5 INJECTION INTRAVENOUS at 10:43

## 2018-05-14 RX ADMIN — OXYCODONE HYDROCHLORIDE 15 MG: 10 TABLET ORAL at 13:44

## 2018-05-14 RX ADMIN — HYDROMORPHONE HYDROCHLORIDE 0.5 MG: 1 INJECTION, SOLUTION INTRAMUSCULAR; INTRAVENOUS; SUBCUTANEOUS at 04:32

## 2018-05-14 RX ADMIN — METOPROLOL TARTRATE 5 MG: 5 INJECTION, SOLUTION INTRAVENOUS at 20:21

## 2018-05-14 RX ADMIN — NEOSTIGMINE METHYLSULFATE 3 MG: 1 INJECTION, SOLUTION INTRAMUSCULAR; INTRAVENOUS; SUBCUTANEOUS at 19:57

## 2018-05-14 RX ADMIN — EPHEDRINE SULFATE 5 MG: 50 INJECTION, SOLUTION INTRAMUSCULAR; INTRAVENOUS; SUBCUTANEOUS at 18:51

## 2018-05-14 RX ADMIN — FUROSEMIDE 20 MG: 10 INJECTION, SOLUTION INTRAVENOUS at 03:15

## 2018-05-14 RX ADMIN — PIPERACILLIN SODIUM AND TAZOBACTAM SODIUM 3.38 G: 36; 4.5 INJECTION, POWDER, FOR SOLUTION INTRAVENOUS at 05:55

## 2018-05-14 RX ADMIN — SODIUM CHLORIDE 60 ML/HR: 0.9 INJECTION, SOLUTION INTRAVENOUS at 03:13

## 2018-05-14 RX ADMIN — OXYCODONE HYDROCHLORIDE 15 MG: 5 TABLET ORAL at 08:44

## 2018-05-14 RX ADMIN — FENTANYL CITRATE 100 MCG: 50 INJECTION, SOLUTION INTRAMUSCULAR; INTRAVENOUS at 18:35

## 2018-05-14 RX ADMIN — VANCOMYCIN HYDROCHLORIDE 1500 MG: 10 INJECTION, POWDER, LYOPHILIZED, FOR SOLUTION INTRAVENOUS at 01:20

## 2018-05-14 RX ADMIN — SODIUM CHLORIDE, SODIUM LACTATE, POTASSIUM CHLORIDE, AND CALCIUM CHLORIDE: .6; .31; .03; .02 INJECTION, SOLUTION INTRAVENOUS at 18:30

## 2018-05-14 RX ADMIN — SODIUM CHLORIDE: 0.9 INJECTION, SOLUTION INTRAVENOUS at 18:55

## 2018-05-14 RX ADMIN — ACETAMINOPHEN 650 MG: 325 TABLET, FILM COATED ORAL at 06:53

## 2018-05-14 RX ADMIN — EPHEDRINE SULFATE 5 MG: 50 INJECTION, SOLUTION INTRAMUSCULAR; INTRAVENOUS; SUBCUTANEOUS at 18:48

## 2018-05-14 RX ADMIN — LISINOPRIL 5 MG: 5 TABLET ORAL at 08:44

## 2018-05-14 RX ADMIN — METOPROLOL TARTRATE 2.5 MG: 1 INJECTION, SOLUTION INTRAVENOUS at 20:00

## 2018-05-14 RX ADMIN — ROCURONIUM BROMIDE 50 MG: 10 INJECTION INTRAVENOUS at 18:46

## 2018-05-14 RX ADMIN — GLYCOPYRROLATE 0.4 MG: 0.2 INJECTION, SOLUTION INTRAMUSCULAR; INTRAVENOUS at 19:57

## 2018-05-14 RX ADMIN — METOPROLOL TARTRATE 2.5 MG: 1 INJECTION, SOLUTION INTRAVENOUS at 20:14

## 2018-05-14 RX ADMIN — ONDANSETRON 4 MG: 2 INJECTION INTRAMUSCULAR; INTRAVENOUS at 20:17

## 2018-05-14 RX ADMIN — METOPROLOL TARTRATE 100 MG: 25 TABLET, FILM COATED ORAL at 08:44

## 2018-05-14 RX ADMIN — PIPERACILLIN SODIUM AND TAZOBACTAM SODIUM 3.38 G: 36; 4.5 INJECTION, POWDER, FOR SOLUTION INTRAVENOUS at 23:32

## 2018-05-14 RX ADMIN — HYDROMORPHONE HYDROCHLORIDE 0.5 MG: 1 INJECTION, SOLUTION INTRAMUSCULAR; INTRAVENOUS; SUBCUTANEOUS at 00:32

## 2018-05-14 RX ADMIN — METOPROLOL TARTRATE 100 MG: 25 TABLET, FILM COATED ORAL at 02:25

## 2018-05-14 RX ADMIN — OXYCODONE HYDROCHLORIDE 15 MG: 10 TABLET ORAL at 22:36

## 2018-05-14 RX ADMIN — PROPOFOL 80 MG: 10 INJECTION, EMULSION INTRAVENOUS at 18:46

## 2018-05-14 NOTE — ED ATTENDING ATTESTATION
Christianne Girard MD, saw and evaluated the patient  All available labs and X-rays were ordered by me or the resident and have been reviewed by myself  I discussed the patient with the resident / non-physician and agree with the resident's / non-physician practitioner's findings and plan as documented in the resident's / non-physician practicitioner's note, except where noted  At this point, I agree with the current assessment done in the ED  Chief Complaint   Patient presents with    Fall     Per EMS, patient fell earlier today or last night and doesn't remember the fall  Patient came to sometime this afternoon, believes she spent approx half of the day trying to get herself off of the floor  Patient c/o back pain, patient takes eliquis  This is a 69year old female presenting for fall  The patient is a poor historian  She doesn't remember why she fell but was found down throughout the day  She had a cell phone with her but for unclear reasons she didn't call for help  Patient, of note, is on chronic narcotics + benzos + muscle relaxants + blood thinner  Unclear if she hit her head  No reported fevers, chills  No nausea/vomiting  Of note, covered in poop + urine  Unclear why she called the family later in the evening  PMH:  - HTN  - Afib  - HF  - Narcotic dependence  PSH:  - ? Former smoker  No alcohol/drugs  PE:  Vitals:    05/16/18 0254 05/16/18 0600 05/16/18 0800 05/16/18 1150   BP: 122/80  134/65 119/84   BP Location: Right arm  Right arm Left arm   Pulse: 88  97 94   Resp: 18  18 20   Temp: 97 8 °F (36 6 °C)  97 5 °F (36 4 °C) 97 9 °F (36 6 °C)   TempSrc: Oral  Oral Oral   SpO2: 98%  95% 94%   Weight:  83 2 kg (183 lb 6 8 oz)     Height:       General: VS reviewed  Appears ill awake, alert  Well-nourished, well-developed  Appears stated age  Head: Normocephalic, atraumatic, nontender  Eyes: PERRL, EOM-I  No diplopia  No hyphema  No subconjunctival hemorrhages    Symmetrical lids    ENT: Atraumatic external nose and ears  Dry MM  No malocclusion  No stridor  Normal phonation  No drooling  Normal swallowing  Neck: Symmetric, trachea midline  No JVD  CV: Irregular but not very tachycardic  +S1/S2  No murmurs or gallops  Peripheral pulses +2 throughout  No chest wall tenderness  Lungs:   Unlabored No retractions  CTAB, lungs sounds equal bilateral    No crepitus  No tachypnea  No paradoxical motion  Abd: +BS, soft, diffuse tenderness  +peritonitis  ND   +guarding  No rigidity  No rebound  No hepatosplenomegaly noted  +peritoneal signs  Hemoccult positive, melena  MSK:   FROM   No lower extremity edema  Back:   No CVAT  Skin: Dry, intact  Neuro: Awake but altered, slightly confused  GCS 15, CN II-XII grossly intact  Motor grossly intact  Psychiatric/Behavioral: Appropriate mood and affect   Exam: deferred  A:  - Fall  - downtime  - hemoccult positive  P:  - CPK  - cardiac workup  - Seen immediately b/c her EKG is very concerning in appearance  - Admit  - Needs geriatric consult; she needs to get off of all of these medications that are sedatives, especially b/c she lives alone    - CTH for bleed, imaging of abdomen given how tender she is  - 13 point ROS was performed and all are normal unless stated in the history above  - Nursing note reviewed  Vitals reviewed  - Orders placed by myself and/or advanced practitioner / resident     - Previous chart was reviewed  - No language barrier    - History obtained from patient  - There are limitations to the history obtained  Reasons ROS could not be obtained: AMS  - Critical care time: 33 minutes  - Critical care time was exclusive of seperately bilable procedures and treating other patients as well as teaching time  - Critical care was necessary to treat or prevent imminent or life-threatening deterioration of the following condition: cardiac strain, down time, encephalopathy    - Critical care time was spent personally by me on the following activities as well as the above as per the ED course and rest of chart: blood draw for specimens, obtaining history from patient / surrogate, developement of a treatment plan, discussions with consultants, evaluation of patient's response to the treatment, examination of the patient, ordering/performing treatements and interventions, re-evaluation of the patient's condition, review of old charts, ordering/reviewing laboratory studies, ordering/reviewing of radiographic studies    Final Diagnosis:  1  Sepsis ([facility] )    2  Biliary stent obstruction, initial encounter    3  UTI (urinary tract infection)    4  Leukocytosis    5  Elevated brain natriuretic peptide (BNP) level    6  Melena    7  Chronic systolic heart failure ([facility] )    8  Elevated troponin    9  Abnormal CT of the abdomen    10  Elevated liver enzymes    11  Severe [facility] ([facility] )    12  Decreased vision    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      ED Course as of May 16 1324   Sun May 13, 2018   2054 Troponin I: (!) 0 08   2054 INR: (!) 1 54   2054 AST: (!) 658   2054 ALT: (!) 692   2054 Alkaline Phosphatase: (!) 202   2055 WBC: (!) 23 77   2206 LACTIC ACID: (!!) 3 5   2206 NT-proBNP: (!) 22,029   2206 "CBD stent is in place however there is intra and extra hepatic biliary ductal dilatation and gallbladder hydrops" --> consult GI for stent change out      2208 NT-proBNP: (!) 22,029   2208 LACTIC ACID: (!!) 3 5   2305 Bacteria, UA: (!) Innumerable   2305 WBC, UA: (!) 2-4   2305 Leukocytes, UA: (!) Small   2305 Nitrite, UA: (!) Positive     Medications   piperacillin-tazobactam (ZOSYN) 3 375 g in sodium chloride 0 9 % 50 mL IVPB (3 375 g Intravenous New Bag 5/16/18 1209)   lisinopril (ZESTRIL) tablet 5 mg (5 mg Oral Given 5/16/18 0940)   methocarbamol (ROBAXIN) tablet 500 mg (not administered)   sodium chloride 0 9 % infusion (60 mL/hr Intravenous Rate/Dose Verify 5/14/18 0600)   ondansetron (ZOFRAN) injection 4 mg ( Intravenous MAR Unhold 5/14/18 2108)   acetaminophen (TYLENOL) tablet 650 mg ( Oral MAR Unhold 5/14/18 2108)   calcium carbonate (TUMS) chewable tablet 500 mg ( Oral MAR Unhold 5/14/18 2108)   HYDROmorphone (DILAUDID) injection 1 mg ( Intravenous MAR Unhold 5/14/18 2108)   metoprolol tartrate (LOPRESSOR) tablet 150 mg (150 mg Oral Given 5/16/18 0939)   lactated ringers infusion (0 mL/hr Intravenous Stopped 5/14/18 2100)   apixaban (ELIQUIS) tablet 5 mg (5 mg Oral Given 5/16/18 0939)   pantoprazole (PROTONIX) EC tablet 40 mg (40 mg Oral Given 5/16/18 0500)   melatonin tablet 3 mg (3 mg Oral Given 5/15/18 2105)   oxyCODONE (ROXICODONE) IR tablet 15 mg (15 mg Oral Given 5/16/18 1207)   potassium phosphate 12 mmol in sodium chloride 0 9 % 250 mL infusion (not administered)   potassium-sodium phosphateS (K-PHOS,PHOSPHA 250) -250 mg tablet 1 tablet (1 tablet Oral Given 5/16/18 1207)   sodium chloride 0 9 % bolus 1,000 mL (0 mL Intravenous Stopped 5/13/18 2130)   iohexol (OMNIPAQUE) 350 MG/ML injection (MULTI-DOSE) 100 mL (100 mL Intravenous Given 5/13/18 2127)   multi-electrolyte (ISOLYTE-S PH 7 4) bolus 1,000 mL (0 mL Intravenous Stopped 5/14/18 0009)   cefTRIAXone (ROCEPHIN) IVPB (premix) 1,000 mg (0 mg Intravenous Stopped 5/13/18 2336)   metroNIDAZOLE (FLAGYL) IVPB (premix) 500 mg (0 mg Intravenous Stopped 5/14/18 0024)   morphine (PF) 10 mg/mL injection 6 mg (6 mg Intravenous Given 5/13/18 2312)   vancomycin (VANCOCIN) 1,500 mg in sodium chloride 0 9 % 250 mL IVPB (0 mg Intravenous Stopped 5/14/18 0313)   HYDROmorphone (DILAUDID) injection 0 5 mg (0 5 mg Intravenous Given 5/14/18 0032)   furosemide (LASIX) injection 20 mg (20 mg Intravenous Given 5/14/18 0315)   oxyCODONE (ROXICODONE) IR tablet 15 mg (15 mg Oral Given 5/15/18 1722)   potassium chloride (K-DUR,KLOR-CON) CR tablet 20 mEq (20 mEq Oral Given 5/14/18 1330)   potassium chloride (K-DUR,KLOR-CON) CR tablet 40 mEq (40 mEq Oral Given 5/15/18 1356)   potassium phosphate 9 mmol in sodium chloride 0 9 % 100 mL Infusion (9 mmol Intravenous New Bag 5/15/18 1352)   ALPRAZolam (XANAX) tablet 0 25 mg (0 25 mg Oral Given 5/16/18 0230)     FL ERCP biliary only   Final Result      Choledocholithiasis with stent exchange though the stent is not well visualized on the final image  Workstation performed: [id number]         US gallbladder   Final Result      Biliary dilatation and gallbladder distention again identified as seen on previous CT  Cholelithiasis and gallbladder sludge  Workstation performed: [id number]         XR chest 2 views   ED Interpretation   The CXR was ordered by resident and interpreted by me independently  On my read, it appears:    - no pna   - grossly normal      Final Result      No acute cardiopulmonary disease  Workstation performed: [id number]         CT head without contrast   ED Interpretation   CT ordered by my resident and the report from radiologist has been reviewed  Final Result      No acute intracranial abnormality  Workstation performed: [id number]         CT cervical spine without contrast   ED Interpretation   CT ordered by my resident and the report from radiologist has been reviewed  Final Result      No cervical spine fracture or traumatic malalignment  Workstation performed: [id number]         CT high volume lower GI bleed   ED Interpretation   Abnormal   CT ordered by my resident and the report from radiologist has been reviewed  Final Result   CBD stent is in place however there is intra and extra hepatic biliary ductal dilatation and gallbladder hydrops  A stone is seen in the gallbladder  I suspect the stent is obstructed  Recommend GI consultation  No active GI bleed visualized however consider nuclear medicine GI bleeding scan if indicated  Other nonacute findings as above        Workstation performed: [id number]           Orders Placed This Encounter Procedures    ED ECG Documentation Only    Blood culture    Blood culture    Occult blood 1-3, stool    XR chest 2 views    CT cervical spine without contrast    CT head without contrast    CT high volume lower GI bleed    US gallbladder    FL ERCP biliary only    Comprehensive metabolic panel    CBC and differential    Troponin I    Protime-INR    APTT    CK (with reflex to MB)    Lactic acid, plasma    UA w Reflex to Microscopic w Reflex to Culture    B-type natriuretic peptide    Lactic acid, plasma    Urine Microscopic    Troponin I    Lactic acid, plasma    Comprehensive metabolic panel    Magnesium    CBC (With Platelets)    Protime-INR    Lactic acid, plasma    Troponin I    CBC    Comprehensive metabolic panel    Magnesium    Phosphorus    APTT six (6) hours after Heparin bolus/drip initiation or dosing change    APTT    APTT    CBC    APTT    CBC    Basic metabolic panel    Hepatic function panel    Phosphorus    Comprehensive metabolic panel    Phosphorus    Hepatic function panel    Diet GI; Lo Fat    Insert peripheral IV    Continuous cardiac monitoring    Continuous pulse oximetry    Nursing communcation Continue IV as ordered     [place] January Notify admitting physician    Notify admitting physician on arrival    Vital Signs per unit routine    Cardio-Pulmonary Monitoring (Critical Care & Step Down Only)    Up with assistance    Nasal cannula oxygen    Place sequential compression device    Intake and Output    Daily weights    Heparin Infusion and Platelet Monitoring Per Protocol    Heparin Infusion and Platelet Monitoring Per Protocol    Discontinue arterial line    Level 2- DNAR (Do Not Attempt Resuscitation) but accepts endotracheal intubation    Consult to critical care    Inpatient consult to gastroenterology    Consult to surgery general    Inpatient consult to Cardiology    Inpatient consult to gastroenterology    Inpatient consult to Case Management    Inpatient consult to Infectious Diseases    Inpatient consult to Ophthalmology    Inpatient consult to Podiatry    OT eval and treat    PT eval and treat    EKG RESULTS    ECG 12 lead    ECG 12 lead    ECG 12 lead    ECG 12 lead    Echo complete with contrast if indicated    Type and screen    Inpatient Admission (expected length of stay for this patient is greater than two midnights)    Transfer patient     Labs Reviewed   COMPREHENSIVE METABOLIC PANEL - Abnormal        Result Value Ref Range Status    Sodium 142  136 - 145 mmol/L Final    Potassium 3 7  3 5 - 5 3 mmol/L Final    Chloride 109 (*) 100 - 108 mmol/L Final    CO2 26  21 - 32 mmol/L Final    Anion Gap 7  4 - 13 mmol/L Final    BUN 14  5 - 25 mg/dL Final    Creatinine 0 75  0 60 - 1 30 mg/dL Final    Comment: Standardized to IDMS reference method    Glucose 116  65 - 140 mg/dL Final    Comment:   If the patient is fasting, the ADA then defines impaired fasting glucose as > 100 mg/dL and diabetes as > or equal to 123 mg/dL  Specimen collection should occur prior to Sulfasalazine administration due to the potential for falsely depressed results  Specimen collection should occur prior to Sulfapyridine administration due to the potential for falsely elevated results  Calcium 7 8 (*) 8 3 - 10 1 mg/dL Final     (*) 5 - 45 U/L Final    Comment:   Specimen collection should occur prior to Sulfasalazine administration due to the potential for falsely depressed results   (*) 12 - 78 U/L Final    Comment:   Specimen collection should occur prior to Sulfasalazine and/or Sulfapyridine administration due to the potential for falsely depressed results       Alkaline Phosphatase 202 (*) 46 - 116 U/L Final    Total Protein 5 9 (*) 6 4 - 8 2 g/dL Final    Albumin 2 9 (*) 3 5 - 5 0 g/dL Final    Total Bilirubin 3 48 (*) 0 20 - 1 00 mg/dL Final    eGFR 77  ml/min/1 73sq m Final    Narrative:     National Kidney Disease Education Program recommendations are as follows:  GFR calculation is accurate only with a steady state creatinine  Chronic Kidney disease less than 60 ml/min/1 73 sq  meters  Kidney failure less than 15 ml/min/1 73 sq  meters  CBC AND DIFFERENTIAL - Abnormal     WBC 23 77 (*) 4 31 - 10 16 Thousand/uL Final    RBC 4 40  3 81 - 5 12 Million/uL Final    Hemoglobin 13 6  11 5 - 15 4 g/dL Final    Hematocrit 41 6  34 8 - 46 1 % Final    MCV 95  82 - 98 fL Final    MCH 30 9  26 8 - 34 3 pg Final    MCHC 32 7  31 4 - 37 4 g/dL Final    RDW 15 4 (*) 11 6 - 15 1 % Final    MPV 10 4  8 9 - 12 7 fL Final    Platelets 387  057 - 390 Thousands/uL Final    nRBC 0  /100 WBCs Final    Comment: This is an appended report  These results have been appended to a previously preliminary verified report  Narrative: This is an appended report  These results have been appended to a previously verified report  TROPONIN I - Abnormal     Troponin I 0 08 (*) <=0 04 ng/mL Final    Comment:  Results indicate test should be repeated on new specimen collected within 4-6 hours of the original     Narrative:     [employer] Chemistry analyzer 99% cutoff is > 0 04 ng/mL in network labs    o cTnI 99% cutoff is useful only when applied to patients in the clinical setting of myocardial ischemia  o cTnI 99% cutoff should be interpreted in the context of clinical history, ECG findings and possibly cardiac imaging to establish correct diagnosis  o cTnI 99% cutoff may be suggestive but clearly not indicative of a coronary event without the clinical setting of myocardial ischemia  PROTIME-INR - Abnormal     Protime 18 6 (*) 12 1 - 14 4 seconds Final    INR 1 54 (*) 0 86 - 1 16 Final   LACTIC ACID, PLASMA - Abnormal     LACTIC ACID 3 5 (*) 0 5 - 2 0 mmol/L Final    Narrative:     Result may be elevated if tourniquet was used during collection     UA W REFLEX TO MICROSCOPIC WITH REFLEX TO CULTURE - Abnormal     Color, UA Jillian   Final    Clarity, UA Cloudy   Final    Specific Gravity, UA 1 028  1 003 - 1 030 Final    pH, UA 6 0  4 5 - 8 0 Final    Leukocytes, UA Small (*) Negative Final    Nitrite, UA Positive (*) Negative Final    Protein,  (2+) (*) Negative mg/dl Final    Glucose, UA Negative  Negative mg/dl Final    Ketones, UA Negative  Negative mg/dl Final    Urobilinogen, UA 4 0 (*) 0 2, 1 0 E U /dl E U /dl Final    Bilirubin, UA Interference- unable to analyze (*) Negative Final    Comment: The dipstick result may be falsely positive do to interfering substances  We recommend reliance upon serum bilirubin, liver & kidney function tests to guide patient care if clinically indicated  Blood, UA Moderate (*) Negative Final   NT-BNP PRO (BRAIN NATRIURETIC PEPTIDE) - Abnormal     NT-proBNP 22,029 (*) <450 pg/mL Final   LACTIC ACID, PLASMA - Abnormal     LACTIC ACID 2 7 (*) 0 5 - 2 0 mmol/L Final    Narrative:     Result may be elevated if tourniquet was used during collection  URINE MICROSCOPIC - Abnormal     RBC, UA None Seen  None Seen, 0-5 /hpf Final    WBC, UA 2-4 (*) None Seen, 0-5, 5-55, 5-65 /hpf Final    Epithelial Cells Occasional  None Seen, Occasional /hpf Final    Bacteria, UA Innumerable (*) None Seen, Occasional /hpf Final   TROPONIN I - Abnormal     Troponin I 0 09 (*) <=0 04 ng/mL Final    Comment:  Results indicate test should be repeated on new specimen collected within 4-6 hours of the original     Narrative:     [employer] Chemistry analyzer 99% cutoff is > 0 04 ng/mL in network labs    o cTnI 99% cutoff is useful only when applied to patients in the clinical setting of myocardial ischemia  o cTnI 99% cutoff should be interpreted in the context of clinical history, ECG findings and possibly cardiac imaging to establish correct diagnosis  o cTnI 99% cutoff may be suggestive but clearly not indicative of a coronary event without the clinical setting of myocardial ischemia     MANUAL DIFFERENTIAL(PHLEBS DO NOT ORDER) - Abnormal Segmented % 93 (*) 43 - 75 % Final    Bands % 4  0 - 8 % Final    Lymphocytes % 2 (*) 14 - 44 % Final    Monocytes % 1 (*) 4 - 12 % Final    Eosinophils % 0  0 - 6 % Final    Basophils % 0  0 - 1 % Final    Absolute Neutrophils 23 06 (*) 1 85 - 7 62 Thousand/uL Final    Lymphocytes Absolute 0 48 (*) 0 60 - 4 47 Thousand/uL Final    Monocytes Absolute 0 24  0 00 - 1 22 Thousand/uL Final    Eosinophils Absolute 0 00  0 00 - 0 40 Thousand/uL Final    Basophils Absolute 0 00  0 00 - 0 10 Thousand/uL Final    Total Counted     Final    RBC Morphology Normal   Final    Platelet Estimate Adequate  Adequate Final   APTT - Normal    PTT 30  23 - 35 seconds Final    Comment: Therapeutic Heparin Range =  60-90 seconds   CK - Normal    Total CK 76  26 - 192 U/L Final   LACTIC ACID, PLASMA - Normal    LACTIC ACID 2 0  0 5 - 2 0 mmol/L Final    Narrative:     Result may be elevated if tourniquet was used during collection     OCCULT BLOOD 1-3, STOOL (DIAGNOSTIC)   TYPE AND SCREEN    ABO Grouping O   Final    Rh Factor Positive   Final    Antibody Screen Negative   Final    Specimen Expiration Date [id number]   Final     Time reflects when diagnosis was documented in both MDM as applicable and the Disposition within this note     Time User Action Codes Description Comment    5/14/2018 12:05 AM Midland Pen Add [A41 9] Sepsis ([place] [address] )     5/14/2018 12:05 AM Midland Pen Add [U80 398T] Biliary stent obstruction, initial encounter     5/14/2018 12:06 AM Midland Pen Add [N39 0] UTI (urinary tract infection)     5/14/2018 12:06 AM Midland Pen Add [D72 829] Leukocytosis     5/14/2018 12:06 AM Midland Pen Add [R79 89] Elevated brain natriuretic peptide (BNP) level     5/14/2018 12:06 AM Midland Pen Add [K92 1] Melena     5/14/2018 12:49 AM Mike Zuniga Add [N07 27] Chronic systolic heart failure ([facility] )     5/14/2018 12:49 AM Erum Larry Modify [N73 91] Chronic systolic heart failure ([place] [address] )     5/14/2018 12:49 AM Erum Larry Modify [M32 03] [facility] systolic [facility] ([facility] )     5/14/2018 12:49 AM Katwinsome Davidson Add [R74 8] Elevated troponin     5/14/2018 12:49 AM Sarah Garcia Modify [R74 8] Elevated troponin     5/14/2018 12:49 AM Hawa Jimenez Add [R93 5] Abnormal CT of the abdomen     5/14/2018 12:49 AM KevJaspreet mcclureenia Modify [R93 5] Abnormal CT of the abdomen     5/14/2018 12:49 AM Kathye Stapler Add [R74 8] Elevated liver enzymes     5/14/2018 12:49 AM Kathye Stapler Modify [R74 8] Elevated liver enzymes     5/14/2018 12:49 AM KevSarah mcclure Add [A41 9,  R65 20] Severe sepsis ([facility] )     5/14/2018 12:49 AM Kathye Stapler Modify [A41 9,  R65 20] Severe sepsis ([facility] )     5/15/2018 11:41 AM Wanna Donato Add [H54 7] Decreased vision     5/15/2018 11:41 AM Carmine Monet Remove [H54 7] Decreased vision     5/15/2018 11:41 AM Wanna Donato Add [H54 7] Decreased vision     5/15/2018 11:41 AM Carmine Monet Add [Q84 6] Anomalies of nails     5/15/2018 11:41 AM Wanna Donato Modify [Q84 6] Anomalies of nails       ED Disposition     ED Disposition Condition Comment    Admit  Case was discussed with [facility] admitting attending and the patient's admission status was agreed to be Admission Status: inpatient status to the service of Dr Alexey Perry           Follow-up Information     Follow up With Specialties Details Why Contact Info    Kim Wylie DPM Podiatry Follow up  [address] [place] [address] [address] [place] [address] Drive  [phone]          Current Discharge Medication List      CONTINUE these medications which have NOT CHANGED    Details   ALPRAZolam (NIRAVAM) 0 25 MG dissolvable tablet Take 0 25 mg by mouth daily at bedtime as needed for anxiety      apixaban (ELIQUIS) 5 mg Take 5 mg by mouth 2 (two) times a day      aspirin (ECOTRIN LOW STRENGTH) 81 mg EC tablet Take 81 mg by mouth daily      lisinopril (ZESTRIL) 5 mg tablet Take 5 mg by mouth daily      methocarbamol (ROBAXIN) 500 mg tablet Take 500 mg by mouth daily at bedtime as needed for muscle spasms      metoprolol tartrate (LOPRESSOR) 100 mg tablet Take 100 mg by mouth every 12 (twelve) hours      oxyCODONE (OxyCONTIN) 15 mg 12 hr tablet Take 10 mg by mouth every 6 (six) hours as needed for moderate pain      polyethylene glycol (MIRALAX) 17 g packet Take 17 g by mouth daily      potassium chloride (MICRO-K) 10 MEQ CR capsule Take 20 mEq by mouth 2 (two) times a day           No discharge procedures on file  Prior to Admission Medications   Prescriptions Last Dose Informant Patient Reported? Taking? ALPRAZolam (NIRAVAM) 0 25 MG dissolvable tablet   Yes Yes   Sig: Take 0 25 mg by mouth daily at bedtime as needed for anxiety   apixaban (ELIQUIS) 5 mg   Yes Yes   Sig: Take 5 mg by mouth 2 (two) times a day   aspirin (ECOTRIN LOW STRENGTH) 81 mg EC tablet   Yes Yes   Sig: Take 81 mg by mouth daily   lisinopril (ZESTRIL) 5 mg tablet   Yes Yes   Sig: Take 5 mg by mouth daily   methocarbamol (ROBAXIN) 500 mg tablet   Yes Yes   Sig: Take 500 mg by mouth daily at bedtime as needed for muscle spasms   metoprolol tartrate (LOPRESSOR) 100 mg tablet   Yes Yes   Sig: Take 100 mg by mouth every 12 (twelve) hours   oxyCODONE (OxyCONTIN) 15 mg 12 hr tablet   Yes Yes   Sig: Take 10 mg by mouth every 6 (six) hours as needed for moderate pain   polyethylene glycol (MIRALAX) 17 g packet   Yes Yes   Sig: Take 17 g by mouth daily   potassium chloride (MICRO-K) 10 MEQ CR capsule   Yes Yes   Sig: Take 20 mEq by mouth 2 (two) times a day      Facility-Administered Medications: None       Portions of the record may have been created with voice recognition software  Occasional wrong word or "sound a like" substitutions may have occurred due to the inherent limitations of voice recognition software  Read the chart carefully and recognize, using context, where substitutions have occurred      Electronically signed by:  Dread Gallardo

## 2018-05-14 NOTE — RESTORATIVE TECHNICIAN NOTE
Restorative Specialist Mobility Note       Activity: Ambulate in agee, Ambulate in room     Assistive Device: Front wheel walker

## 2018-05-14 NOTE — PLAN OF CARE
Problem: DISCHARGE PLANNING - CARE MANAGEMENT  Goal: Discharge to post-acute care or home with appropriate resources  INTERVENTIONS:  - Conduct assessment to determine patient/family and health care team treatment goals, and need for post-acute services based on payer coverage, community resources, and patient preferences, and barriers to discharge  - Address psychosocial, clinical, and financial barriers to discharge as identified in assessment in conjunction with the patient/family and health care team  - Arrange appropriate level of post-acute services according to patient's   needs and preference and payer coverage in collaboration with the physician and health care team  - Communicate with and update the patient/family, physician, and health care team regarding progress on the discharge plan  - Arrange appropriate transportation to post-acute venues  - To rehab when medically cleared  Outcome: Progressing

## 2018-05-14 NOTE — CONSULTS
Consultation - [address] [facility]  Kearny Brayan 69 y o  female MRN: [id number]  Unit/Bed#: [facility] 422-01 Encounter: [id number]    ADDENDUM 2:17PM heparin was started later this morning by SLIM, recommend discontinuing this in light of possible procedure  Called SUSANNAH Marvin to let her know  Will check PTT prior to procedure  ADDENDUM: Diet changed to NPO in case of need for biliary drain placed by IR today vs ERCP  Will discuss case with Dr Makenzie Josue  She last had clear liquids at 12:30pm per SUSANNAH Marvin    ASSESSMENT/PLAN:   1  Severe sepsis secondary to cholangitis   -she was found down at home for an unspecified period of time, she was found to have elevated LFTs in a mixed pattern, leukocytosis, abdominal pain and possible altered mental status  She appears to be a poor historian but is unclear if this is her baseline or if this represents acute worsening due to infection    -she had ERCP with sphincterotomy and stent placement in 2014 for biliary dilatation, no stones or sludge were noted on this and malignancy was considered however brush things were negative for malignancy and CA 19 9 was within normal limits  She reports states that she was told to have the stent taken out and she is unsure why it was not    -she had a CT on admission that showed intra and extrahepatic biliary dilatation, the hepatic duct measuring 2 centimeters and gallbladder hydrops  Biliary stent was noted but findings are suspicious for stent obstruction    -Ultrasound is pending   -Recommend ERCP for further evaluation  She reports that she took her Eliquis yesterday morning  Currently she is stable so would recommend ERCP tomorrow to decrease bleeding risk    Her LFTs also appear to be down trending, her total bilirubin trended up from 3 48 to 5 76 but this may represent lag    - continue antibiotics with vancomycin and Zosyn   -continue to monitor vitals and mental status closely    -clear liquid diet for now in case of need for urgent procedure  NPO after midnight  Inpatient consult to gastroenterology  Consult performed by: Santos Laird  Consult ordered by: Candy Ro    Inpatient consult to gastroenterology  Consult performed by: Santos Laird  Consult ordered by: Luis Armando Rosado          Reason for Consult / Principal Problem: Severe sepsis [facility])    HPI: Enzo Stoll is a 69y o  year old female with a PMH of AFib on Eliquis, CHF, biliary dilatation status post ERCP with sphincterotomy and stent placement in  presented to the hospital after a fall  She does not recall why she fell and does not know how long she was down for  She states that when EMS arrived she asked him to bring her medication and so she took her last dose of Eliquis yesterday  She reports that she has abdominal pain and nausea but denies any fevers, chills, difficulty swallowing, change in bowel habits, hematochezia, melena, diarrhea, constipation, weight loss or change in appetite  She states the abdominal pain is primarily epigastric  She noticed that her urine was dark but believes this has been going on for more than few weeks  She is somewhat of a poor historian due to underlying memory impairment  Review of the chart shows that she had an ERCP at [facility], no sludge or stones were noted within the bile duct however stent was placed and brushings were negative for malignancy, CA 19 9 was normal at that time  She states that she was supposed to have the stent out but is unsure why she never did  Review of Systems: as per HPI  Review of Systems   Constitutional: Negative for activity change, appetite change, chills, fatigue, fever and unexpected weight change  HENT: Negative for mouth sores, sore throat and trouble swallowing  Respiratory: Negative for shortness of breath  Cardiovascular: Negative for chest pain  Gastrointestinal: Positive for abdominal pain   Negative for abdominal distention, blood in stool, constipation, diarrhea, nausea and vomiting  Skin: Negative for color change, pallor, rash and wound  Neurological: Negative for tremors and syncope  All other systems reviewed and are negative  Historical Information   Past Medical History:   Diagnosis Date    A-fib [facility])     Acute respiratory disease     CHF (congestive heart failure) (HCC)     Chronic pain     Heart failure (HCC)     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[facility] [address] )     Hypertension     Narcotic dependence ([facility] )      History reviewed  No pertinent surgical history  Social History   History   Alcohol Use No     History   Drug Use No     History   Smoking Status    Former Smoker   Smokeless Tobacco    Never Used     History reviewed  No pertinent family history      Meds/Allergies     Prescriptions Prior to Admission   Medication    ALPRAZolam (NIRAVAM) 0 25 MG dissolvable tablet    apixaban (ELIQUIS) 5 mg    aspirin (ECOTRIN LOW STRENGTH) 81 mg EC tablet    lisinopril (ZESTRIL) 5 mg tablet    methocarbamol (ROBAXIN) 500 mg tablet    metoprolol tartrate (LOPRESSOR) 100 mg tablet    oxyCODONE (OxyCONTIN) 15 mg 12 hr tablet    polyethylene glycol (MIRALAX) 17 g packet    potassium chloride (MICRO-K) 10 MEQ CR capsule     Current Facility-Administered Medications   Medication Dose Route Frequency    acetaminophen (TYLENOL) tablet 650 mg  650 mg Oral Q6H PRN    calcium carbonate (TUMS) chewable tablet 500 mg  500 mg Oral Daily PRN    heparin (porcine) 25,000 units in 250 mL infusion (premix)  3-20 Units/kg/hr (Order-Specific) Intravenous Titrated    HYDROmorphone (DILAUDID) injection 1 mg  1 mg Intravenous Q3H PRN    lisinopril (ZESTRIL) tablet 5 mg  5 mg Oral Daily    methocarbamol (ROBAXIN) tablet 500 mg  500 mg Oral HS PRN    metoprolol tartrate (LOPRESSOR) tablet 150 mg  150 mg Oral Q12H KARELY    ondansetron (ZOFRAN) injection 4 mg  4 mg Intravenous Q6H PRN    oxyCODONE (ROXICODONE) IR tablet 15 mg  15 mg Oral Q6H PRN    pantoprazole (PROTONIX) injection 40 mg  40 mg Intravenous Q24H KARELY    piperacillin-tazobactam (ZOSYN) 3 375 g in sodium chloride 0 9 % 50 mL IVPB  3 375 g Intravenous Q6H    potassium chloride (K-DUR,KLOR-CON) CR tablet 20 mEq  20 mEq Oral Once    sodium chloride 0 9 % infusion  60 mL/hr Intravenous Continuous       No Known Allergies    Objective     Blood pressure 144/79, pulse 97, temperature 98 3 °F (36 8 °C), temperature source Oral, resp  rate 18, height 5' 5" (1 651 m), weight 81 5 kg (179 lb 10 8 oz), SpO2 98 %  Intake/Output Summary (Last 24 hours) at 05/14/18 1133  Last data filed at 05/14/18 0639   Gross per 24 hour   Intake             3317 ml   Output             1141 ml   Net             2176 ml       PHYSICAL EXAM     Physical Exam    Lab Results:   CBC: Lab Results   Component Value Date    WBC 16 52 (H) 05/14/2018    HGB 14 1 05/14/2018    HCT 42 7 05/14/2018    MCV 94 05/14/2018     05/14/2018    MCH 31 1 05/14/2018    MCHC 33 0 05/14/2018    RDW 15 8 (H) 05/14/2018    MPV 10 7 05/14/2018    NRBC 0 05/13/2018   ,   CMP: Lab Results   Component Value Date     05/14/2018    K 3 9 05/14/2018     (H) 05/14/2018    CO2 27 05/14/2018    ANIONGAP 5 05/14/2018    BUN 12 05/14/2018    CREATININE 0 73 05/14/2018    GLUCOSE 113 05/14/2018    CALCIUM 8 3 05/14/2018     (H) 05/14/2018     (H) 05/14/2018    ALKPHOS 195 (H) 05/14/2018    PROT 6 1 (L) 05/14/2018    BILITOT 5 76 (H) 05/14/2018    EGFR 80 05/14/2018   ,   Lipase: No results found for: LIPASE,  PT/INR:   Lab Results   Component Value Date    INR 1 54 (H) 05/13/2018   ,   Troponin:   Lab Results   Component Value Date    TROPONINI 0 08 (H) 05/14/2018   Imaging Studies: I have personally reviewed pertinent reports  CT ABDOMEN AND PELVIS - WITHOUT AND WITH IV CONTRAST     INDICATION:   Peritonitis, GI bleed    Melena      COMPARISON: None      TECHNIQUE: CT examination of the abdomen and pelvis was performed both prior to and after the administration of intravenous contrast  Axial, sagittal, and coronal 2D reformatted images were created from the source data and submitted for interpretation      Radiation dose length product (DLP) for this visit:  8425 mGy-cm   This examination, like all CT scans performed in the [facility], was performed utilizing techniques to minimize radiation dose exposure, including the use of iterative   reconstruction and automated exposure control      IV Contrast:  100 mL of iohexol (OMNIPAQUE)  Enteric Contrast:  Enteric contrast was not administered      FINDINGS:     ABDOMEN     BOWEL:  There is no active extravasation of intravenous contrast into the lumen of stomach, small bowel, or large bowel loops  There is no abnormal bowel wall thickening or pathologic bowel wall enhancement       Diverticulosis without evidence for diverticulitis  Small to moderate sliding-type hiatal hernia         LOWER CHEST:  No significant abnormality in the lung bases      LIVER/BILIARY TREE:  There are one or more hepatic simple cyst(s) present  No CT evidence of suspicious solid hepatic mass  Normal hepatic contours  No biliary dilatation  There is intrahepatic and extrahepatic biliary ductal dilatation with the   maximum diameter of the hepatic duct measured 2 cm  A stent is present however its patency is not assessed; recommend GI consultation        GALLBLADDER:  There are gallstone(s) within the gallbladder, without pericholecystic inflammatory changes      SPLEEN:  Unremarkable      PANCREAS:  Unremarkable      ADRENAL GLANDS:  Unremarkable      KIDNEYS/URETERS:  Unremarkable   No hydronephrosis      PELVIS     REPRODUCTIVE ORGANS:  Patient is status post hysterectomy      URINARY BLADDER:  Unremarkable      APPENDIX: No findings to suggest appendicitis      ADDITIONAL ABDOMINAL AND PELVIC STRUCTURES     ABDOMINOPELVIC CAVITY: No ascites or free intraperitoneal air  No lymphadenopathy  ABDOMINAL WALL/INGUINAL REGIONS:  Unremarkable      OSSEOUS STRUCTURES:  No acute fracture or destructive osseous lesion      IMPRESSION:  CBD stent is in place however there is intra and extra hepatic biliary ductal dilatation and gallbladder hydrops  A stone is seen in the gallbladder  I suspect the stent is obstructed  Recommend GI consultation      No active GI bleed visualized however consider nuclear medicine GI bleeding scan if indicated      Other nonacute findings as above  Patient was seen and examined by Dr Ardie Lesches  All troncoso medical decisions were made by Dr Ardie Lesches  Thank you for allowing us to participate in the care of this present patient  We will follow-up with you closely

## 2018-05-14 NOTE — CASE MANAGEMENT
Initial Clinical Review    ED  TREATMENT  TIME    5/13  @     1950    Admission: Date/Time/Statement: 5/14/18 @ 0005     Orders Placed This Encounter   Procedures    Inpatient Admission (expected length of stay for this patient is greater than two midnights)     Standing Status:   Standing     Number of Occurrences:   1     Order Specific Question:   Admitting Physician     Answer:   Guy Lesch     Order Specific Question:   Level of Care     Answer:   Level 1 Stepdown [13]     Order Specific Question:   Estimated length of stay     Answer:   More than 2 Midnights     Order Specific Question:   Certification     Answer:   I certify that inpatient services are medically necessary for this patient for a duration of greater than two midnights  See H&P and MD Progress Notes for additional information about the patient's course of treatment  ED: Date/Time/Mode of Arrival:   ED Arrival Information     Expected Arrival Acuity Means of Arrival Escorted By Service Admission Type    - 5/13/2018 19:47 Emergent Ambulance [address] Emergency    Arrival Complaint    fall          Chief Complaint:   Chief Complaint   Patient presents with    Fall     Per EMS, patient fell earlier today or last night and doesn't remember the fall  Patient came to sometime this afternoon, believes she spent approx half of the day trying to get herself off of the floor  Patient c/o back pain, patient takes eliquis  History of Illness:    Ryder Stapleton is a 69 y o  female with history of atrial fibrillation on Eliquis ,systolic heart failure ejection fraction 40%, biliary dilatation status post ERCP with sphincterectomy and stent placement in 2014 who presented from home status post fall and being on ground X ?10 hrs   Patient is not a good historian due to underlying memory impairment    She reported ER resident that she fell at home, denied loss of consciousness or head injury, she called her family members and landlord who apparently found her laying down and brought her to the ER  Upon my assessment she mostly complained on epigastric nonradiating aching abdominal pain, chills and nausea   denies diarrhea hematemesis ,chest pain, significant weight loss  Further workup in the emergency room revealed lactic acid 3 5 ,WBC 97972, troponin 0 08--->0  09  Significantly elevated AST/ ALT ,TB 3 5  ProBNP 07875, Urinalysis was grossly positive for bacteriuria nitrates   Patient had a dark stool in the ER  CT high volume low GI bleed reported"CBD stent is in place however there is intra and extra hepatic biliary ductal dilatation and gallbladder hydrops   A stone is seen in the gallbladder   Stent obstruction suspected   No active GI bleed visualized"  CT of the head C-spine negative for acute intracranial pathology or fracture  Patient remains afebrile, hemodynamically stable, given criteria of severe sepsis and lab abnormalities patient admitted to step-down 1     ED Vital Signs:   ED Triage Vitals [05/13/18 1951]   Temperature Pulse Respirations Blood Pressure SpO2   97 6 °F (36 4 °C) 84 20 125/66 98 %      Temp Source Heart Rate Source Patient Position - Orthostatic VS BP Location FiO2 (%)   Oral Monitor Sitting Right arm --      Pain Score       8        Wt Readings from Last 1 Encounters:   05/14/18 81 5 kg (179 lb 10 8 oz)       Vital Signs (abnormal):    above    Abnormal Labs/Diagnostic Test Results:    Lactic  Acid   3 5  BNP    22,029  WBC   23 77  Troponin  0 08  PT    18 6       INR    1 54  AST    658  ALT     692  Alk phos   202  Albumin   2 9  Total  Bili     3 48  Ct  c/spine:    No fracture  Ct  Head:    No acute intracranial abnormality  CXR:  NAD  u/A     sm leukocyte     2+ protein     +  Nitrite      4 0  urobilinogen    ED Treatment:   Medication Administration from 05/13/2018 1947 to 05/14/2018 0149       Date/Time Order Dose Route Action Action by Comments     05/13/2018 9484 sodium chloride 0 9 % bolus 1,000 mL 0 mL Intravenous Stopped Jeff Zaragoza RN      05/13/2018 2030 sodium chloride 0 9 % bolus 1,000 mL 1,000 mL Intravenous [address] Jeff Zaragoza, RN      05/13/2018 2127 iohexol (OMNIPAQUE) 350 MG/ML injection (MULTI-DOSE) 100 mL 100 mL Intravenous Given Jeramie Camejo      05/14/2018 0009 multi-electrolyte (ISOLYTE-S PH 7 4) bolus 1,000 mL 0 mL Intravenous Stopped Jeff Zaragoza RN      05/13/2018 2309 multi-electrolyte (ISOLYTE-S PH 7 4) bolus 1,000 mL 1,000 mL Intravenous [address] Jeff Zaragoza, RN      05/13/2018 2336 cefTRIAXone (ROCEPHIN) IVPB (premix) 1,000 mg 0 mg Intravenous Stopped Jeff Zaragoza RN      05/13/2018 2306 cefTRIAXone (ROCEPHIN) IVPB (premix) 1,000 mg 1,000 mg Intravenous [address] [facility] Jeff Zaragoza RN      05/14/2018 0024 metroNIDAZOLE (FLAGYL) IVPB (premix) 500 mg 0 mg Intravenous Stopped Jeff Zaragoza RN      05/13/2018 2354 metroNIDAZOLE (FLAGYL) IVPB (premix) 500 mg 500 mg Intravenous [address] Jeff Zaragoza RN      05/13/2018 2312 morphine (PF) 10 mg/mL injection 6 mg 6 mg Intravenous Given Jeff Zaragoza RN      05/14/2018 0032 piperacillin-tazobactam (ZOSYN) 3 375 g in sodium chloride 0 9 % 50 mL IVPB 3 375 g Intravenous [address] Jeff Zaragoza RN      05/14/2018 0120 vancomycin (VANCOCIN) 1,500 mg in sodium chloride 0 9 % 250 mL IVPB 1,500 mg Intravenous [address] Jeff Zaragoza RN      05/14/2018 0032 HYDROmorphone (DILAUDID) injection 0 5 mg 0 5 mg Intravenous Given Jeff Zaragoza RN           Past Medical/Surgical History:    Active Ambulatory Problems     Diagnosis Date Noted    No Active Ambulatory Problems     Resolved Ambulatory Problems     Diagnosis Date Noted    No Resolved Ambulatory Problems     Past Medical History:   Diagnosis Date    [facility]     Acute respiratory disease     CHF (congestive heart failure) (HCC)     Chronic pain     Heart failure (HCC)     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[place] [address] )     Hypertension     Narcotic dependence ([place] [address] )        Admitting Diagnosis: Chronic systolic heart failure (HCC) [I50 22]  Melena [K92 1]  Leukocytosis [D72 829]  UTI (urinary tract infection) [N39 0]  Elevated liver enzymes [R74 8]  Abnormal CT of the abdomen [R93 5]  Elevated troponin [R74 8]  Elevated brain natriuretic peptide (BNP) level [R79 89]  Biliary stent obstruction, initial encounter [T85 590A]  Sepsis ([place] [address] ) [A41 9]  Severe sepsis ([place] [address] ) [A41 9, R65 20]  Unspecified multiple injuries, initial encounter [T07  XXXA]    Age/Sex: 69 y o  female    Assessment/Plan:      Severe sepsis ([place] [address] )   Assessment & Plan     Patient presented status post fall and found significant CT scan of the abdomen findings of intrahepatic and extrahepatic biliary ducts dilatation , lab abnormalities: WBC 03615 ,significantly elevated lactic acid and liver enzymes on admission  Cholangitis and UTI most possible sources of infection  General surgeon will follow  GI consult requested for possible ERCP and reported melanotic stool by ER resident  Check FOBT, monitor hemoglobin/hematocrit, transfuse as needed  Hold Eliquis for possible intervention in a m  and episode of melanotic stool   ICU attending revised the case and recommended step-down 1 level  The patient p o  gentle IV hydration to avoid fluid overload ( LVEF 40-45%)  Trend lactic acid  Follow-up urine and blood culture final report, adjust antibiotic accordingly  Continue broad-spectrum antibiotic including gram-negative coverage  Consider ID evaluation  Monitor temperature WBC  Antiemetic ppi Tylenol        Abnormal CT of the abdomen   Assessment & Plan     CT of abdomen impression"CBD stent is in place however there is intra and extra hepatic biliary ductal dilatation and gallbladder hydrops   A stone is seen in the gallbladder     possible stent obstruction  No active GI bleed visualized however consider nuclear medicine GI bleeding scan if indicated "  Management as above          Elevated liver enzymes   Assessment & Plan     Secondary to #2  Management as above  Monitor liver enzymes  NPO          Elevated troponin   Assessment & Plan     Most likely due to chronic systolic heart failure  Will monitor trend patient admitted to step-down 1  Will continue gentle IV hydration  Cardiology consult      Chronic systolic heart failure ([address] )   Assessment & Plan     Management as above  Daily weigh   intake output          Atrial fibrillation (HCC)   Assessment & Plan     Continue metoprolol  Hold Eliquis          Chronic anticoagulation   Assessment & Plan     Hold Eliquis          Fall   Assessment & Plan     Secondary to # 1  General deconditioning  Management as above  PT OT evaluation, discharge planning     Anticipated Length of Stay:  Patient will be admitted on an Inpatient basis with an anticipated length of stay of  >2 midnights     Justification for Hospital Stay:  IV antibiotic GI Cardiology general surgeon consult        Admission Orders:    IP     5/14   @   0005  Scheduled Meds:   Current Facility-Administered Medications:  acetaminophen 650 mg Oral Q6H PRN Farrah Page MD    calcium carbonate 500 mg Oral Daily PRN CHANDRIKA Dawn    HYDROmorphone 1 mg Intravenous Q3H PRN Rusty Johnson,     lisinopril 5 mg Oral Daily Farrah Page MD    methocarbamol 500 mg Oral HS PRN Farrah Page MD    metoprolol tartrate 150 mg Oral Q12H [facility] Neftali Mar MD    ondansetron 4 mg Intravenous Q6H PRN Farrah Page MD    oxyCODONE 15 mg Oral Q6H PRN Rusty Johnson DO    pantoprazole 40 mg Intravenous Q24H [facility] Farrah Page MD    piperacillin-tazobactam 3 375 g Intravenous Q6H Eric Macdonald MD Last Rate: 3 375 g (05/14/18 1245)   sodium chloride 60 mL/hr Intravenous Continuous Farrah Page MD Last Rate: 60 mL/hr (05/14/18 0600)     Continuous Infusions:   sodium chloride 60 mL/hr Last Rate: 60 mL/hr (05/14/18 0600) PRN Meds:   acetaminophen    calcium carbonate    HYDROmorphone    methocarbamol    ondansetron    oxyCODONE     Cons  GI  Cons   Critical care  Cons general surgery  Cons cardiology  O2  2L  BC  PT/OT  2 DE  Cons  ID

## 2018-05-14 NOTE — ASSESSMENT & PLAN NOTE
Patient presented status post fall and found significant CT scan of the abdomen findings of intrahepatic and extrahepatic biliary ducts dilatation , lab abnormalities: WBC 18805 ,significantly elevated lactic acid and liver enzymes on admission  Cholangitis and UTI most possible sources of infection  General surgeon will follow  GI consult requested for possible ERCP and reported melanotic stool by ER resident  Check FOBT, monitor hemoglobin/hematocrit, transfuse as needed  Hold Eliquis for possible intervention in a m  and episode of melanotic stool   ICU attending revised the case and recommended step-down 1 level  The patient p o  gentle IV hydration to avoid fluid overload ( LVEF 40-45%)  Trend lactic acid  Follow-up urine and blood culture final report, adjust antibiotic accordingly    Continue broad-spectrum antibiotic including gram-negative coverage  Consider ID evaluation  Monitor temperature WBC  Antiemetic ppi Tylenol

## 2018-05-14 NOTE — ED NOTES
Home medications taken to pharmacy at this time  No  [id number] and No  [id number]       Alisa Faust RN  05/14/18 6891

## 2018-05-14 NOTE — H&P
H&P- Dilshad Matthews [birth year], 69 y o  female MRN: [id number]    Unit/Bed#: [facility] 422-01 Encounter: [id number]    Primary Care Provider: Myriam Campa DO   Date and time admitted to hospital: 5/13/2018  7:47 PM        * Severe sepsis [facility])   Assessment & Plan    Patient presented status post fall and found significant CT scan of the abdomen findings of intrahepatic and extrahepatic biliary ducts dilatation , lab abnormalities: WBC 42399 ,significantly elevated lactic acid and liver enzymes on admission  Cholangitis and UTI most possible sources of infection  General surgeon will follow  GI consult requested for possible ERCP and reported melanotic stool by ER resident  Check FOBT, monitor hemoglobin/hematocrit, transfuse as needed  Hold Eliquis for possible intervention in a m  and episode of melanotic stool   ICU attending revised the case and recommended step-down 1 level  The patient p o  gentle IV hydration to avoid fluid overload ( LVEF 40-45%)  Trend lactic acid  Follow-up urine and blood culture final report, adjust antibiotic accordingly  Continue broad-spectrum antibiotic including gram-negative coverage  Consider ID evaluation  Monitor temperature WBC  Antiemetic ppi Tylenol        Abnormal CT of the abdomen   Assessment & Plan    CT of abdomen impression"CBD stent is in place however there is intra and extra hepatic biliary ductal dilatation and gallbladder hydrops  A stone is seen in the gallbladder  possible stent obstruction  No active GI bleed visualized however consider nuclear medicine GI bleeding scan if indicated "  Management as above        Elevated liver enzymes   Assessment & Plan    Secondary to #2  Management as above  Monitor liver enzymes  NPO        Elevated troponin   Assessment & Plan    Most likely due to chronic systolic heart failure  Will monitor trend patient admitted to step-down 1  Will continue gentle IV hydration  Cardiology consult         Chronic systolic heart failure [facility])   Assessment & Plan    Management as above  Daily weigh   intake output        Atrial fibrillation (HCC)   Assessment & Plan    Continue metoprolol  Hold Eliquis        Chronic anticoagulation   Assessment & Plan    Hold Eliquis        Fall   Assessment & Plan    Secondary to # 1  General deconditioning  Management as above  PT OT evaluation, discharge planning              VTE Prophylaxis: Apixaban (Eliquis)  / sequential compression device   Code Status: DNR, accepts intubation  POLST: There is no POLST form on file for this patient (pre-hospital)      Anticipated Length of Stay:  Patient will be admitted on an Inpatient basis with an anticipated length of stay of  >2 midnights  Justification for Hospital Stay:  IV antibiotic GI Cardiology general surgeon consult    Total Time for Visit, including Counseling / Coordination of Care: 45 minutes  Greater than 50% of this total time spent on direct patient counseling and coordination of care  Chief Complaint:   Epigastric pain, chills    History of Present Illness:    Christin Johnston is a 69 y o  female with history of atrial fibrillation on Eliquis ,systolic heart failure ejection fraction 40%, biliary dilatation status post ERCP with sphincterectomy and stent placement in 2014 who presented from home status post fall and being on ground X ?10 hrs   Patient is not a good historian due to underlying memory impairment  She reported ER resident that she fell at home, denied loss of consciousness or head injury, she called her family members and landlord who apparently found her laying down and brought her to the ER  Upon my assessment she mostly complained on epigastric nonradiating aching abdominal pain, chills and nausea   denies diarrhea hematemesis ,chest pain, significant weight loss  Further workup in the emergency room revealed lactic acid 3 5 ,WBC 48817, troponin 0 08--->0  09  Significantly elevated AST/ ALT ,TB 3 5    ProBNP 46520, Urinalysis was grossly positive for bacteriuria nitrates  Patient had a dark stool in the ER  CT high volume low GI bleed reported"CBD stent is in place however there is intra and extra hepatic biliary ductal dilatation and gallbladder hydrops  A stone is seen in the gallbladder  Stent obstruction suspected    No active GI bleed visualized"  CT of the head C-spine negative for acute intracranial pathology or fracture  Patient remains afebrile, hemodynamically stable, given criteria of severe sepsis and lab abnormalities patient admitted to step-down 1       Review of Systems:    Review of Systems   Constitutional: Positive for activity change and chills  Gastrointestinal: Positive for abdominal pain  Endocrine: Negative for polyphagia  Past Medical and Surgical History:     Past Medical History:   Diagnosis Date    A-fib [facility])     Acute respiratory disease     [facility]congestive [facility] failure) [facility])     Chronic pain     Heart failure ([facility] )     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  Authorizing Provider   ALPRAZolam (NIRAVAM) 0 25 MG dissolvable tablet Take 0 25 mg by mouth daily at bedtime as needed for anxiety   Yes Historical Provider, MD   apixaban (ELIQUIS) 5 mg Take 5 mg by mouth 2 (two) times a day   Yes Historical Provider, MD   aspirin (ECOTRIN LOW STRENGTH) 81 mg EC tablet Take 81 mg by mouth daily   Yes Historical Provider, MD   lisinopril (ZESTRIL) 5 mg tablet Take 5 mg by mouth daily   Yes Historical Provider, MD   methocarbamol (ROBAXIN) 500 mg tablet Take 500 mg by mouth daily at bedtime as needed for muscle spasms   Yes Historical Provider, MD   metoprolol tartrate (LOPRESSOR) 100 mg tablet Take 100 mg by mouth every 12 (twelve) hours   Yes Historical Provider, MD   oxyCODONE (OxyCONTIN) 15 mg 12 hr tablet Take 10 mg by mouth every 6 (six) hours as needed for moderate pain   Yes Historical Provider, MD   polyethylene glycol (MIRALAX) 17 g packet Take 17 g by mouth daily   Yes Historical Provider, MD   potassium chloride (MICRO-K) 10 MEQ CR capsule Take 20 mEq by mouth 2 (two) times a day   Yes Historical Provider, MD     I have reviewed home medications with a medical source (PCP, Pharmacy, other)  Allergies: No Known Allergies    Social History:     Marital Status:    Occupation: none  Patient Pre-hospital Living Situation: home  Patient Pre-hospital Level of Mobility: reg  Patient Pre-hospital Diet Restrictions: reg  Substance Use History:   History   Alcohol Use No     History   Smoking Status    Former Smoker   Smokeless Tobacco    Never Used     History   Drug Use No       Family History:    non-contributory    Physical Exam:     Vitals:   Blood Pressure: 122/77 (05/14/18 0222)  Pulse: 103 (05/14/18 0222)  Temperature: 98 5 °F (36 9 °C) (05/14/18 0222)  Temp Source: Oral (05/14/18 0222)  Respirations: 18 (05/14/18 0222)  Height: 5' 5" (165 1 cm) (05/14/18 0222)  Weight - Scale: 81 5 kg (179 lb 10 8 oz) (05/14/18 0222)  SpO2: 97 % (05/14/18 0222)    Physical Exam   Constitutional: No distress  HENT:   Head: Normocephalic  Eyes: Pupils are equal, round, and reactive to light  Cardiovascular:    irregular heart rate   Pulmonary/Chest: She has rales  Abdominal: She exhibits distension  Epigastric tenderness   Musculoskeletal: She exhibits edema  Neurological: She is alert  Skin: Skin is warm  Psychiatric: She has a normal mood and affect  Additional Data:     Lab Results: I have personally reviewed pertinent reports          Results from last 7 days  Lab Units 05/13/18 2009   WBC Thousand/uL 23 77*   HEMOGLOBIN g/dL 13 6   HEMATOCRIT % 41 6   PLATELETS Thousands/uL 204   LYMPHO PCT % 2*   MONO PCT MAN % 1*   EOSINO PCT MANUAL % 0 Results from last 7 days  Lab Units 05/13/18 2009   SODIUM mmol/L 142   POTASSIUM mmol/L 3 7   CHLORIDE mmol/L 109*   CO2 mmol/L 26   BUN mg/dL 14   CREATININE mg/dL 0 75   CALCIUM mg/dL 7 8*   TOTAL PROTEIN g/dL 5 9*   BILIRUBIN TOTAL mg/dL 3 48*   ALK PHOS U/L 202*   ALT U/L 692*   AST U/L 658*   GLUCOSE RANDOM mg/dL 116       Results from last 7 days  Lab Units 05/13/18 2009   INR  1 54*               Imaging: I have personally reviewed pertinent reports  XR chest 2 views   ED Interpretation by Krista Garza MD (05/13 2208)   The CXR was ordered by resident and interpreted by me independently  On my read, it appears:    - no pna   - grossly normal      CT head without contrast   ED Interpretation by Krista Garza MD (05/13 2207)   CT ordered by my resident and the report from radiologist has been reviewed  Final Result by Symone Wright MD (05/13 2138)      No acute intracranial abnormality  Workstation performed: [id number]         CT cervical spine without contrast   ED Interpretation by Krista Garza MD (05/13 2207)   CT ordered by my resident and the report from radiologist has been reviewed  Final Result by Symone Wright MD (05/13 2143)      No cervical spine fracture or traumatic malalignment  Workstation performed: [id number]         CT high volume lower GI bleed   ED Interpretation by Krista Garza MD (05/13 2207)   Abnormal   CT ordered by my resident and the report from radiologist has been reviewed  Final Result by Symone Wright MD (05/13 2156)   CBD stent is in place however there is intra and extra hepatic biliary ductal dilatation and gallbladder hydrops  A stone is seen in the gallbladder  I suspect the stent is obstructed  Recommend GI consultation  No active GI bleed visualized however consider nuclear medicine GI bleeding scan if indicated  Other nonacute findings as above        Workstation performed: [id number]                 [facility] Records Reviewed: Yes     ** Please Note: This note has been constructed using a voice recognition system   **

## 2018-05-14 NOTE — PHYSICAL THERAPY NOTE
Physical Therapy Evaluation     Patient's Name: Iza Ball    Admitting Diagnosis  Chronic systolic heart failure ([facility] ) [I50 22]  Melena [K92 1]  Leukocytosis [D72 829]  UTI (urinary tract infection) [N39 0]  Elevated liver enzymes [R74 8]  Abnormal CT of the abdomen [R93 5]  Elevated troponin [R74 8]  Elevated brain natriuretic peptide (BNP) level [R79 89]  Biliary stent obstruction, initial encounter [T85 590A]  Sepsis ([facility] ) [A41 9]  Severe sepsis ([facility] ) [A41 9, R65 20]  Unspecified multiple injuries, initial encounter [T07  XXXA]    Problem List  Patient Active Problem List   Diagnosis    Severe sepsis (HCC)    Chronic systolic heart failure (HCC)    Elevated liver enzymes    Abnormal CT of the abdomen    Elevated troponin    Atrial fibrillation (HCC)    Chronic anticoagulation    Fall       Past Medical History  Past Medical History:   Diagnosis Date    [facility]fib [facility])     Acute respiratory disease     [facility]     Chronic pain     [facility]     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 )     Hypertension     [facility]otic dependence ([facility] )        Past Surgical History  History reviewed  No pertinent surgical history  05/14/18 0850   Pain Assessment   Pain Assessment 0-10   Pain Score 9   Pain Type Acute pain   Pain Location Back;Generalized  (RN is aware)   Pain Descriptors Aching   Pain Frequency Intermittent   Pain Onset Ongoing   Clinical Progression Not changed   Effect of Pain on Daily Activities min guarding but no increased discomfort during the session   Patient's Stated Pain Goal No pain   Hospital Pain Intervention(s) Medication (See MAR); Repositioned; Ambulation/increased activity; Distraction; Emotional support   Response to Interventions appears to be more comfortable post session   Pain Rating: FLACC (Rest) - Face 0   Pain Rating: FLACC (Rest) - Legs 0   Pain Rating: FLACC (Rest) - Activity 0   Pain Rating: FLACC (Rest) - Cry 0 Pain Rating: FLACC (Rest) - Consolability 0   Score: FLACC (Rest) 0   Pain Rating: FLACC (Activity) - Face 1   Pain Rating: FLACC (Activity) - Legs 0   Pain Rating: FLACC (Activity) - Activity 0   Pain Rating: FLACC (Activity) - Cry 0   Pain Rating: FLACC (Activity) - Consolability 1   Score: FLACC (Activity) 2   Home Living   Type of Home House   Home Layout Two level  (1st floor set-up w/ 2 CASSIA w/ hand rail)   Home Equipment Walker;Cane   Prior Function   Level of [place] Independent with ADLs and functional mobility  (amb w/o AD in the house; rw outside depending on distances)   Lives With Alone   Restrictions/Precautions   Braces or Orthoses (denies)   Other Precautions Telemetry; Fall Risk;Pain  (chair alarm activated at the end of session)   General   Additional Pertinent History cleared for assessment (spoke to nsg)   Cognition   Overall Cognitive Status [facility]   Arousal/Participation Alert   Orientation Level Oriented to person;Oriented to place;Oriented to situation   Memory Decreased recall of recent events   Following Commands Follows one step commands without difficulty   Comments Pt is observed in bed; somewhat flat affect; agreeable and eager to mobilize OOB; reports persistent back discomfort since the fall; also reports generalized discomfort; states that the pain was so severe at some point she "did not want to live"; RN, CM and MD informed;   RUE Assessment   RUE Assessment WFL  (AROM)   LUE Assessment   LUE Assessment WFL  (AROM)   RLE Assessment   RLE Assessment WFL  (AROM)   Strength RLE   RLE Overall Strength (fair +/ good - (grossly))   LLE Assessment   LLE Assessment WFL  (AROM)   Strength LLE   LLE Overall Strength (fair +/ good - (grossly))   Bed Mobility   Supine to Sit 5  Supervision   Additional items Assist x 1;Verbal cues   Transfers   Sit to Stand 4  Minimal assistance   Additional items Assist x 1;Verbal cues   Stand to Sit 4  Minimal assistance   Additional items Assist x 1;Verbal cues   Stand pivot 4  Minimal assistance   Additional items Assist x 1;Verbal cues  (took a few steps from bed to chair to the (R) side)   Additional Comments Pillows placed for back and UE support; SCDs kept off to air out skin (RN informed); call bell placed w/in reach; chair alarm on;   Ambulation/Elevation   Gait pattern Not appropriate; Not tested  (due to general discomfort and additional procedures pending)   Assistive Device None  (r/o rw next visit)   Balance   Static Sitting Fair +   Static Standing Fair -   Dynamic Standing Poor +   Activity Tolerance   Activity Tolerance Patient limited by fatigue;Patient limited by pain;Treatment limited secondary to medical complications (Comment)   Medical Staff Made Aware sopke to Dr Salvador Floyd spoke to [facility], RN  (spoke to Shaw [place])   Assessment   Prognosis Good   Problem List Decreased strength;Decreased endurance; Impaired balance;Decreased mobility;Pain   Assessment Pt is 69 y o  admitted with hx of fall at home and c/o aching abdominal pain, chills and nausea  Impressions include: aevere sepsis, elevated troponin, cholangitis and UTI  Pt 's comorbidities affecting POC include: a-fib, CHF, HTN, narcotic dependence and personal factors of: living alone, CASSIA  Pt's clinical presentation is currently  unstable/unpredictable which is evident in abn lab values being monitored/trending, telem monitoring, additional consults/procedures/testing pending (GI)  Pt presents w/ back and generalized pain w/ associated min guarding, generalized weakness incl decreased overall LE strength, decreased functional endurance, min impaired standing balance, inability to progress further w/ mobilization at this time and fall risk  Will cont to follow pt in PT for progressive mobilization to address above functional deficits and to max level of (I), endurance, and safety  Currently recommend rehab upon D/C when medically cleared  Will cont to follow until then  Barriers to Discharge Inaccessible home environment;Decreased caregiver support   Goals   Patient Goals to have something for pain   LTG Expiration Date 05/28/18   Long Term Goal #1 10-14 days  Pt will amb 2x 150 ft w/ rw <--> SPC, mod (I)  Pt will negotiate 2 steps w/ SPC, mod (I)  Pt will achieve (I) level w/ bed mob  Pt will perform transfers w/ mod (I)  Plan   Treatment/Interventions Functional transfer training;LE strengthening/ROM; Elevations; Therapeutic exercise; Endurance training;Bed mobility;Gait training;Equipment eval/education;Spoke to nursing;Spoke to MD;Spoke to case management   PT Frequency 5x/wk   Recommendation   Recommendation Post acute IP rehab   Equipment Recommended (TBD)   Modified Gray Scale   Modified Gray Scale 4   Barthel Index   Feeding 0  (NPO)   Bathing 5   Grooming Score 0   Dressing Score 10   Bladder Score 10   Bowels Score 10   Toilet Use Score 10   Transfers (Bed/Chair) Score 10   Mobility (Level Surface) Score 0   Stairs Score 0   Barthel Index Score 55     Andrew Vila, PT

## 2018-05-14 NOTE — SOCIAL WORK
Met w pt today & explained Cm role  Pt lives alone in 2 st home w 2ste & stays on 1st flr only  Has bedroom & bath on 1st flr  Was IPTA w help from friends & family as needed  DME cane, rw  Does not drive  Friends & family transport or bring things to her  No h/o vna or rhb  Denies any MH, D&A tx  Uses [place] [facility]  PCP Dr Francine Law  No POA  Emergency contact, sister Kash Och [phone]  S/w PT whom is recommending rehab  Discussed w pt & provided Snf list  Pt requested referral to [address] will review list for additional choices  Patient/caregiver received discharge checklist  Content reviewed  Patient/caregiver encouraged to participate in discharge plan of care prior to discharge home  CM reviewed d/c planning process including the following: identifying help at home, patient preference for d/c planning needs, Discharge Lounge, [facility] Meds to Bed program, availability of treatment team to discuss questions or concerns patient and/or family may have regarding understanding medications and recognizing signs and symptoms once discharged  CM also encouraged patient to follow up with all recommended appointments after discharge  Patient advised of importance for patient and family to participate in managing patients medical well being

## 2018-05-14 NOTE — PLAN OF CARE
GASTROINTESTINAL - ADULT     Minimal or absence of nausea and/or vomiting Progressing     Maintains or returns to baseline bowel function Progressing     Maintains adequate nutritional intake Progressing        INFECTION - ADULT     Absence or prevention of progression during hospitalization Progressing     Absence of fever/infection during neutropenic period Progressing        METABOLIC, FLUID AND ELECTROLYTES - ADULT     Fluid balance maintained Progressing        Nutrition/Hydration-ADULT     Nutrient/Hydration intake appropriate for improving, restoring or maintaining nutritional needs Progressing        PAIN - ADULT     Verbalizes/displays adequate comfort level or baseline comfort level Progressing        Potential for Falls     Patient will remain free of falls Progressing        Prexisting or High Potential for Compromised Skin Integrity     Skin integrity is maintained or improved Progressing        SAFETY ADULT     Maintain or return to baseline ADL function Progressing     Maintain or return mobility status to optimal level Progressing

## 2018-05-14 NOTE — PLAN OF CARE
Problem: OCCUPATIONAL THERAPY ADULT  Goal: Performs self-care activities at highest level of function for planned discharge setting  See evaluation for individualized goals  Treatment Interventions: ADL retraining, Functional transfer training, Endurance training, Cognitive reorientation, Equipment evaluation/education, Patient/family training, Compensatory technique education, Energy conservation          See flowsheet documentation for full assessment, interventions and recommendations  Limitation: Decreased ADL status, Decreased Safe judgement during ADL, Decreased endurance, Decreased self-care trans, Decreased high-level ADLs, Decreased cognition  Prognosis: Fair  Assessment: Pt is a 69 y o  female who was admitted to [facility] on 5/13/2018 with Severe sepsis ([facility] )  Pt s/p a fall at home- she was able to call her landlord who found her laying down and brought her to the ER  Pt denies LOC and CT of head and C-spine (-) for acute intracranial pathology or fx  Pt's active problems upon admission include abnormal labs, CHF, AFIB, and chronic anticoagulation  Pt's PMH significant for cog/memory impairment, ERCP with sphincterectomy and stent placement in 2014, chronic pain, HTN, narcotic dependence, and atrial thrombus  At baseline pt was I with ADLs and IADLs  Reports that a HHA was supposed to start this week  Pt lives alone in 2 story home w/ 1st floor s/u  Currently pt requires min A for UB ADLs, max A for LB ADLs, and min/mod A for functional mobility/transfers w/ RW  Pt's limited by pain t/o session and required vc for safety of lines  Pt currently presents with impairments in the following categories -steps to enter environment, limited home support, difficulty performing ADLS, difficulty performing IADLS, limited insight into deficits, activity tolerance, endurance, standing balance/tolerance, sitting balance/tolerance, memory, insight, safety  and judgement   These impairments, as well as pt's fatigue, pain, impulsivity, decreased caregiver support and risk for falls limit pt's ability to safely engage in all baseline areas of occupation, including grooming, bathing, dressing, toileting, functional mobility/transfers, house maintenance, medication management and leisure activities  From OT standpoint, recommend inpatient rehab upon D/C  OT will continue to follow to address the below stated goals        OT Discharge Recommendation: Short Term Rehab  OT - OK to Discharge: Yes (when medically stable to inpatient rehab)

## 2018-05-14 NOTE — ASSESSMENT & PLAN NOTE
Most likely due to chronic systolic heart failure  Will monitor trend patient admitted to step-down 1  Will continue gentle IV hydration  Cardiology consult

## 2018-05-14 NOTE — PROGRESS NOTES
Post-admit check  Pt c/o LBP and abd pain  CP resolved  RUQ TTP  Pos Desai's sign  A/P: Severe sepsis 2/2 cholangitis/obstructed CBD stent: On Zosyn  Given 1 dose Vanco   ID and GI consults pending  US pending  Chronic Afib w/ hx atrial clot and TIA: Heparin GTT bridge ordered  CHADS-VASc 7 and hx TIA and atrial clot  NSTEMI: trops indeterminate  Likely type 2  Cardio consult  Check echo to assess EF (last EF 40-45% in April) and for RWMA  Chronic pain: per PDMP: pt takes oxy IR 15 mg - q6h prn  Ordered 1 mg IV dilaudid intractable pain - pt is opioid tolerant    If this does not help with pain, would consult APS

## 2018-05-14 NOTE — CONSULTS
Consultation - Infectious Disease   Mary Jo Shelton 69 y o  female MRN: [id number]  Unit/Bed#: [facility] 422-01 Encounter: [id number]      IMPRESSION & RECOMMENDATIONS:   1  Sepsis-POA  Leukocytosis and tachycardia with lactic acidosis  Suspect secondary to cholangitis  No other clear sources appreciated  Consideration for the possibility of bacteremia  The patient remains hemodynamically stable despite her systemic illness  She seems to be tolerating the antibiotics without difficulty thus far  The white cell count has begun to fall, and a lactic acidosis seems to have resolved  -continue Zosyn for now at current dose  -follow-up blood cultures  -monitor CBC with diff and CMP  -supportive care    2  Presumptive cholangitis-in the setting of a previous ERCP and stent placement  The patient does have right upper quadrant tenderness on exam, liver function test abnormalities, and biliary ductal dilatation noted on imaging   -antibiotics as above  -await repeat ERCP which will be done tomorrow  -monitor liver function test  -close GI follow-up  -await right upper quadrant ultrasound    3  Abnormal liver function tests-likely secondary to biliary obstruction  The total bilirubin continues to increase although the transaminases have decreased slightly  No other clear source appreciated  -monitor liver function test  -await ERCP  -follow-up ultrasound resolved  -GI follow-up  -no additional ID workup for now    4  Atrial fibrillation-with mild tachycardia  Relatively asymptomatic  -holding anticoagulation anticipation of ERCP  -rate control    HISTORY OF PRESENT ILLNESS:  Reason for Consult:  Sepsis  HPI: Mary Jo Shelton is a 69y o  year old female with a history of atrial fibrillation and congestive heart failure as well as a previous history of ERCP/sphincterotomy and stent placement 2014 admitted with global weakness after being found down, who I am asked to assist with management of sepsis    Patient apparently had undergone ERCP at [facility] in 2014 and at that time was found to have no sludge or stones  She was supposed to have the stent removed at some point after the ERCP, however this apparently was never done  She has suffered from global chronic pains and dyspnea on exertion  However in recent days she has become more globally weak and developed some right-sided abdominal pain  She apparently became so weak that she fell and was unable to get up and she was on the ground for quite some time  She was eventually able to call for help and was brought to the ER via EMS for further evaluation  In the emergency department the patient was found to be afebrile and hemodynamically stable but with a brisk leukocytosis and lactic acidosis  She was also found to have significant liver function test abnormalities  CT of the abdomen and pelvis that revealed biliary ductal dilatation  She had blood cultures obtained, was given a dose of vancomycin, ceftriaxone, metronidazole, and admitted for further management  Her antibiotics were changed to piperacillin tazobactam   She has continued to have some right-sided abdominal pain and global weakness  She denies any headache or stiff neck, denies any sore throat or rhinorrhea or nasal congestion, denies any cough or any worsening of her baseline dyspnea on exertion, denies any dysuria or hematuria, denies any new rash or skin lesions, denies any new joint or muscle pains other than her chronic pains  REVIEW OF SYSTEMS:  A complete 12 point system-based review of systems is otherwise negative  PAST MEDICAL HISTORY:  Past Medical History:   Diagnosis Date    A-fib 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[facility] [address] )     Hypertension     [facility] )      History reviewed   No pertinent surgical history  FAMILY HISTORY:  Non-contributory    SOCIAL HISTORY:  Social History   History   Alcohol Use No     History   Drug Use No     History   Smoking Status    Former Smoker   Smokeless Tobacco    Never Used       ALLERGIES:  No Known Allergies    MEDICATIONS:  All current active medications have been reviewed  Antibiotics:  Zosyn 2    PHYSICAL EXAM:  HR:  [] 97  Resp:  [14-20] 18  BP: (112-144)/(63-81) 144/79  SpO2:  [94 %-98 %] 98 %  Temp (24hrs), Av 1 °F (36 7 °C), Min:97 6 °F (36 4 °C), Max:98 5 °F (36 9 °C)  Current: Temperature: 98 3 °F (36 8 °C)    Intake/Output Summary (Last 24 hours) at 18 1225  Last data filed at 18 0850   Gross per 24 hour   Intake             3317 ml   Output             1141 ml   Net             2176 ml       General Appearance:  Appearing well, nontoxic, and in no distress   Head:  Normocephalic, without obvious abnormality, atraumatic   Eyes:  Conjunctiva pale and sclera icteric, both eyes   Nose: Nares normal, mucosa normal, no drainage   Throat: Oropharynx moist without lesions   Neck: Supple, symmetrical, no adenopathy, no tenderness/mass/nodules   Back:   Symmetric, no curvature, ROM normal, no CVA tenderness   Lungs:   Decreased breath sounds bilaterally, respirations unlabored   Chest Wall:  No tenderness or deformity   Heart:  Tachycardic; no murmur, rub or gallop   Abdomen:   Soft, right upper quadrant tenderness to palpation, non-distended, positive bowel sounds    Extremities: No cyanosis, clubbing or edema   Skin: No rashes or lesions  No draining wounds noted  Lymph nodes: Cervical, supraclavicular nodes normal   Neurologic: Alert, answer simple questions appropriately, able to move all 4 extremities       LABS, IMAGING, & OTHER STUDIES:  Lab Results:  I have personally reviewed pertinent labs      Results from last 7 days  Lab Units 18  0440 18   WBC Thousand/uL 16 52* 23 77*   HEMOGLOBIN g/dL 14 1 13 6   PLATELETS Thousands/uL 201 204       Results from last 7 days  Lab Units 05/14/18  0440 05/13/18 2009   SODIUM mmol/L 142 142   POTASSIUM mmol/L 3 9 3 7   CHLORIDE mmol/L 110* 109*   CO2 mmol/L 27 26   ANION GAP mmol/L 5 7   BUN mg/dL 12 14   CREATININE mg/dL 0 73 0 75   EGFR ml/min/1 73sq m 80 77   GLUCOSE RANDOM mg/dL 113 116   CALCIUM mg/dL 8 3 7 8*   AST U/L 416* 658*   ALT U/L 597* 692*   ALK PHOS U/L 195* 202*   TOTAL PROTEIN g/dL 6 1* 5 9*   BILIRUBIN TOTAL mg/dL 5 76* 3 48*        blood cultures x2 sets pending    Imaging Studies:   I have personally reviewed pertinent imaging study reports and images in PACS  CT abdomen and pelvis-CBD stent is in place however there is intra and extra hepatic biliary ductal dilatation and gallbladder hydrops   A stone is seen in the gallbladder   I suspect the stent is obstructed   Recommend GI consultation  No active GI bleed visualized however consider nuclear medicine GI bleeding scan if indicated  Other nonacute findings as above      Chest x-ray-no acute cardiopulmonary disease

## 2018-05-14 NOTE — CONSULTS
Consultation - General Surgery   Fly Warren 69 y o  female MRN: [id number]  Unit/Bed#: [facility] Encounter: [id number]    Assessment/Plan     Assessment:  68 yo F with history of heart failure, a fib, atrial clot presentign found down with   1  Cholangitis   2  UTI     Plan:  1  Cholangitis   - continue resuscitation, given heart fialure and elevated BNP need to be mindful of fluid resuscitation and fluid overload   - broad spectrum antibiotics: vanc/zosyn   - HOLD A/C for now given need for ERCP, biliary interrogation possible stent replacement   - GI consult   - blood cultures   - type and screen    2  UTI   - broad spectrum antibiotics     3  Sepsis   - resuscitate  Check q2 hour lactics til resolves    4  History of A fib   - care per medical team   - keep rate controlled   - hold A/C for now given need for ERCP     5  Elevated LFTs  Related to #1    6  Luekoctysosis   - likely related to #1 and #2  - will trend     7  Acute on chronic CHF   - monitor fluid resuscitation and overload   - recent echo with evidence of EF 40%    8  Atrial clot   - hold Eliquis until ERCP completed   - then will likely require heparin gtt or placement back on eliquis     9  GI Bleed   - trend hemoglobins   - type and screen   - gi consult   - hold eliquis     History of Present Illness     HPI:  Fly Warren is a 69 y o  female who presents after a fall at home  Patient is a poor historian although she is oriented to person place and time  She reports that she fell at home, does remember fall, did not hit head but was unable to get up  She did have her phone with and called her family rauls and miranda who eventualy found her and took her to ED  Patient with complex past medical history including chronic CHF, a fib, atrial thrombus, and in 2014 dilated biliary duct s/p ERCP sphincertotomy and stent placement  CA 19-9 was completed at that time and was negative (7)  She has had her stent in place since that time   This data comes from the chart as the patient is unabel to provide history regarding this care  When asked directly what she has had done for her at the [facility] she says "those interns treat me like a lab rat, I leave worse than I came in "     Currently patients biggest complaint is back pain, which she does admit to having chronically but is worse today  Denies any fever or chills and denies any dysuria         Consults    Review of Systems   Constitutional: Negative for chills and fever  Respiratory: Negative for cough and shortness of breath  Cardiovascular: Positive for chest pain  Gastrointestinal: Positive for abdominal pain  Negative for constipation and diarrhea  Genitourinary: Negative for dysuria  Musculoskeletal: Positive for back pain  Neurological: Negative for numbness  Historical Information   Past Medical History:   Diagnosis Date    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 [address] )     Hypertension     Narcotic dependence ([facility] )      History reviewed  No pertinent surgical history    Social History   History   Alcohol Use No     History   Drug Use No     History   Smoking Status    Former Smoker   Smokeless Tobacco    Never Used     Family History: non-contributory    Meds/Allergies   all current active meds have been reviewed and current meds:   Current Facility-Administered Medications   Medication Dose Route Frequency    metroNIDAZOLE (FLAGYL) IVPB (premix) 500 mg  500 mg Intravenous Once    multi-electrolyte (ISOLYTE-S PH 7 4) bolus 1,000 mL  1,000 mL Intravenous Once    piperacillin-tazobactam (ZOSYN) 3 375 g in sodium chloride 0 9 % 50 mL IVPB  3 375 g Intravenous Q6H    vancomycin (VANCOCIN) 1,500 mg in sodium chloride 0 9 % 250 mL IVPB  20 mg/kg Intravenous Once     No Known Allergies    Objective   First Vitals:   Blood Pressure: 125/66 (05/13/18 1951)  Pulse: 84 (05/13/18 1951)  Temperature: 97 6 °F (36 4 °C) (05/13/18 1951)  Temp Source: Oral (05/13/18 1951)  Respirations: 20 (05/13/18 1951)  Height: 5' 5" (165 1 cm) (05/13/18 1951)  Weight - Scale: 74 8 kg (165 lb) (05/13/18 1951)  SpO2: 98 % (05/13/18 1951)    Current Vitals:   Blood Pressure: 122/72 (05/13/18 2308)  Pulse: 99 (05/13/18 2308)  Temperature: 97 6 °F (36 4 °C) (05/13/18 1951)  Temp Source: Oral (05/13/18 1951)  Respirations: 18 (05/13/18 2308)  Height: 5' 5" (165 1 cm) (05/13/18 1951)  Weight - Scale: 74 8 kg (165 lb) (05/13/18 1951)  SpO2: 98 % (05/13/18 2308)      Intake/Output Summary (Last 24 hours) at 05/13/18 2342  Last data filed at 05/13/18 2336   Gross per 24 hour   Intake             2000 ml   Output                0 ml   Net             2000 ml       Invasive Devices     Peripheral Intravenous Line            Peripheral IV 05/13/18 Left Antecubital less than 1 day    Peripheral IV 05/13/18 Right Forearm less than 1 day                Physical Exam   Constitutional: She appears well-developed and well-nourished  HENT:   Head: Normocephalic and atraumatic  Eyes: Pupils are equal, round, and reactive to light  Neck: Normal range of motion  Neck supple  Cardiovascular: Normal rate and regular rhythm  Pulmonary/Chest: Effort normal    Abdominal: Soft  When distratcted abdomen soft, nontender to deep palpation  NO peritonitis  When patient is paying attention voluntary guarding in upper quadrants  Neurological: She is alert  Skin: Skin is warm and dry  Lab Results:   I have personally reviewed pertinent lab results    , CBC:   Lab Results   Component Value Date    WBC 23 77 (H) 05/13/2018    HGB 13 6 05/13/2018    HCT 41 6 05/13/2018    MCV 95 05/13/2018     05/13/2018    MCH 30 9 05/13/2018    MCHC 32 7 05/13/2018    RDW 15 4 (H) 05/13/2018    MPV 10 4 05/13/2018    NRBC 0 05/13/2018   , CMP:   Lab Results   Component Value Date     05/13/2018    K 3 7 05/13/2018     (H) 05/13/2018    CO2 26 05/13/2018    ANIONGAP 7 05/13/2018    BUN 14 05/13/2018    CREATININE 0 75 05/13/2018    GLUCOSE 116 05/13/2018    CALCIUM 7 8 (L) 05/13/2018     (H) 05/13/2018     (H) 05/13/2018    ALKPHOS 202 (H) 05/13/2018    PROT 5 9 (L) 05/13/2018    BILITOT 3 48 (H) 05/13/2018    EGFR 77 05/13/2018   , Coagulation:   Lab Results   Component Value Date    INR 1 54 (H) 05/13/2018     Imaging: I have personally reviewed pertinent reports  EKG, Pathology, and Other Studies: I have personally reviewed pertinent reports  Counseling / Coordination of Care  Total floor / unit time spent today 30 minutes  Greater than 50% of total time was spent with the patient and / or family counseling and / or coordination of care  A description of the counseling / coordination of care: 30

## 2018-05-14 NOTE — SOCIAL WORK
MCG Guide Used for Initial Round: gallbladder and duct inflammation   Optimal GLOS: 2  Hospital Day: 0 days  DC Readiness:   Goal Length of Stay: Ambulatory or 2 days   Note: Goal Length of Stay assumes optimal recovery, decision making, and care  Patients may be discharged to a lower level of care (either later than or sooner than the goal) when it is appropriate for their clinical status and care needs  Discharge Readiness  Return to top of Gallbladder or Bile Duct Inflammation or Stone RRG - ISC   Discharge readiness is indicated by patient meeting Recovery Milestones, including ALL of the following:   Pain absent or managed   Afebrile or temperature acceptable for next level of care   Hemodynamic stability   Nausea and vomiting absent or controlled and acceptable for next level of care   Laboratory test results normal or acceptable for next level of care   Oral hydration, medications, and diet   Antibiotics absent or administration arranged for next level of care   Ambulatory[L]   Discharge plans and education understood    Identified Barriers: hx ERCP and has stent, IV abx, abnormal LFTs, atrial fib  Sepsis, hx CHF, s/p fall    Percutaneous, ERCP-guided, or endoscopic ultrasound-guided cholecystostomy or gallbladder drainage procedure   Nonsurgical gallbladder drainage is indicated for patient with acute cholecystitis who has medical conditions precluding surgical intervention  Anticipate interval cholecystectomy  Expect moderate stay extension  Arrhythmia   Extended stay beyond goal length of stay for primary condition may be needed until ALL of the following are present:   Hemodynamic stability   Arrhythmia evaluation completed   Reversible causes of arrhythmia eliminated or mitigated   Specific antiarrhythmia treatment initiated as appropriate   Anticoagulation requirements addressed (or anticoagulation not required)   See Anticoagulation Requirement: Common Complications and ConditionsISC for further information     Medical comorbidities manageable at lower level of care    Discussion Date (Time): 05/14/18 with Dr Carl Garcia

## 2018-05-14 NOTE — ASSESSMENT & PLAN NOTE
CT of abdomen impression"CBD stent is in place however there is intra and extra hepatic biliary ductal dilatation and gallbladder hydrops  A stone is seen in the gallbladder  possible stent obstruction  No active GI bleed visualized however consider nuclear medicine GI bleeding scan if indicated "  Management as above

## 2018-05-14 NOTE — ASSESSMENT & PLAN NOTE
Secondary to # 1    General deconditioning  Management as above  PT OT evaluation, discharge planning

## 2018-05-14 NOTE — ANESTHESIA PREPROCEDURE EVALUATION
Review of Systems/Medical History      No history of anesthetic complications     Cardiovascular  Hypertension , CHF ,   Comment: Per [facility] echo 4/2018: ef 40, RV with reduced systolic function  afib,  Pulmonary       GI/Hepatic  Negative GI/hepatic ROS            Comment: Biliary sludge     Endo/Other  Negative endo/other ROS      GYN       Hematology   Musculoskeletal       Neurology   Psychology           Physical Exam    Airway    Mallampati score: II  TM Distance: >3 FB  Neck ROM: full     Dental   upper dentures,     Cardiovascular      Pulmonary      Other Findings  Missing most of the lower teeth, none loose      Anesthesia Plan  ASA Score- 4 Emergent    Anesthesia Type- general with ASA Monitors  Additional Monitors: arterial line  Airway Plan: ETT  Comment: General anesthesia, endotracheal tube; standard ASA monitors  Risks and benefits discussed with patient; patient consented and agrees to proceed  I saw and evaluated the patient  If seen with CRNA, we have discussed the anesthetic plan and I am in agreement that the plan is appropriate for the patient  Prone  [place] pre-induction  Plan Factors-    Induction- intravenous  Postoperative Plan- Plan for postoperative opioid use  Planned trial extubation    Informed Consent- Anesthetic plan and risks discussed with patient  I personally reviewed this patient with the CRNA  Discussed and agreed on the Anesthesia Plan with the CRNA  Primo Mantilla

## 2018-05-14 NOTE — CONSULTS
Consultation - Cardiology   Radha Marie 69 y o  female MRN: [id number]  Unit/Bed#: [place] 782-78 Encounter: [id number]         Inpatient consult to Cardiology     Performed by  King Reeder by Javad Schaefer                Physician Requesting Consult: Lucy Knight DO  Reason for Consult / Principal Problem: Elevated troponin    Assessment:  Principal Problem:    Severe sepsis [facility])  Active Problems:    Chronic systolic heart failure (HCC)    Elevated liver enzymes    Abnormal CT of the abdomen    Elevated troponin    Atrial fibrillation (HCC)    Chronic anticoagulation    Fall      Plan  1  NSTEMI Type 2 sec to sepsis sec to possible  Cholangitis, obstructed CBD stent  Troponin stable at 0 08    2  Hx chronic systolic heart failure, tachycardia mediated cardiomyopathy - patient states leg swelling at baseline  Lungs are clear  Be careful with IV hydration  Received a dose of 20 mg IV Lasix yesterday  Currently on some IV fluids as pt NPO  Give IV lasix as needed  Last echo as per [facility] records in April 2018 showed LVEF 40-44%, LAD regional wall motion abnormality with anterior and anteroseptal wall hypokinesis, enlarged right ventricular size, reduced right ventricular function, moderately dilated left atrium and dilated right atria  Mild MR, mild TR  Recommend an outpatient stress test  Keep K>4, Mag>2  3  Chronic atrial fibrillation - on Eliquis at home  Was started on heparin drip given her history of atrial clot, TIA as patient is planned for possible GI procedure  Unclear if patient had an episode of melanotic stool, hemoglobin stable  Continue Lopressor  Patient is supposed to be on Lopressor 150 mg b i d  as per [facility] records      History of Present Illness   HPI: Radha Marie is a 69y o  year old female who has a history of chronic systolic heart failure, chronic atrial fibrillation on Eliquis, tachycardia mediated cardiomyopathy, hypertension, history of atrial clot in past, TIA, history of biliary dilation status post ERCP with sphincterotomy and biliary stent placement in 2014 follows with cardiologist at [facility]  Patient is not a good historian due to underlying memory impairment  She reported to the emergency department that she had a fall at home, denies any loss of consciousness, but she is not clear about her events  She said she was on the floor for ? unknown duration  She has been having complaints of generalized weakness, she said when she woke up on the floor she found it difficult to stand up and was lying there for a while  Patient was found to have elevated LFTs, leukocytosis, lactic acidosis  Patient was found to be in severe sepsis likely secondary to obstructed biliary stent  As per ER records patient also had a dark stool in the emergency department  Patient denies any abdominal pain, nausea, vomiting, diarrhea, hematemesis, melena  Cardiology consulted for elevated troponins  Patient denies any worsening shortness of breath from her baseline  Can only do her normal activities at home  She does have complaints of intermittent episodes of chest pain, not related with exertion and these have been occuring for a long while now  She was admitted a week ago at [facility] for fluid overload secondary to atrial fibrillation with RVR  She was diuresed and rate controlled and was discharged  Review of Systems: as per HPI      Historical Information   Past Medical History:   Diagnosis Date    [facility]fib [facility])     Acute respiratory disease     [facility]     Chronic pain     [facility] [facility]     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[facility] [address] )     Hypertension     [facility]      History reviewed  No pertinent surgical history    History   Alcohol Use No     History   Drug Use No     History   Smoking Status    Former Smoker   Smokeless Tobacco    Never Used     Family History: History reviewed  No pertinent family history  Meds/Allergies   current meds:   Current Facility-Administered Medications   Medication Dose Route Frequency    acetaminophen (TYLENOL) tablet 650 mg  650 mg Oral Q6H PRN    calcium carbonate (TUMS) chewable tablet 500 mg  500 mg Oral Daily PRN    heparin (porcine) 25,000 units in 250 mL infusion (premix)  3-20 Units/kg/hr (Order-Specific) Intravenous Titrated    HYDROmorphone (DILAUDID) injection 1 mg  1 mg Intravenous Q3H PRN    lisinopril (ZESTRIL) tablet 5 mg  5 mg Oral Daily    methocarbamol (ROBAXIN) tablet 500 mg  500 mg Oral HS PRN    metoprolol tartrate (LOPRESSOR) tablet 150 mg  150 mg Oral Q12H [facility]    ondansetron (ZOFRAN) injection 4 mg  4 mg Intravenous Q6H PRN    oxyCODONE (ROXICODONE) IR tablet 15 mg  15 mg Oral Q6H PRN    pantoprazole (PROTONIX) injection 40 mg  40 mg Intravenous Q24H KARELY    piperacillin-tazobactam (ZOSYN) 3 375 g in sodium chloride 0 9 % 50 mL IVPB  3 375 g Intravenous Q6H    sodium chloride 0 9 % infusion  60 mL/hr Intravenous Continuous       heparin (porcine) 3-20 Units/kg/hr (Order-Specific)    sodium chloride 60 mL/hr Last Rate: 60 mL/hr (05/14/18 0600)       No Known Allergies    Objective   Vitals: Blood pressure 144/79, pulse 97, temperature 98 3 °F (36 8 °C), temperature source Oral, resp  rate 18, height 5' 5" (1 651 m), weight 81 5 kg (179 lb 10 8 oz), SpO2 98 %  , Body mass index is 29 9 kg/m² , Orthostatic Blood Pressures      Most Recent Value   Blood Pressure  144/79 [[id number]] filed at 05/14/2018 0724   Patient Position - Orthostatic VS  Sitting filed at 05/14/2018 4765        Systolic (25DAV), PJB:434 , Min:112 , PLD:768     Diastolic (07CCN), BOY:17, Min:63, Max:81      Intake/Output Summary (Last 24 hours) at 05/14/18 1018  Last data filed at 05/14/18 0639   Gross per 24 hour   Intake             3317 ml   Output             1141 ml   Net             2176 ml Weight (last 2 days)     Date/Time   Weight    05/14/18 0222  81 5 (179 68)    05/13/18 1951  74 8 (165)              Invasive Devices     Peripheral Intravenous Line            Peripheral IV 05/13/18 Left Antecubital less than 1 day                  Physical Exam   Constitutional: She is oriented to person, place, and time  She appears well-developed  No distress  HENT:   Head: Normocephalic and atraumatic  Eyes: EOM are normal  Pupils are equal, round, and reactive to light  Neck: No thyromegaly present  Cardiovascular: Normal rate and regular rhythm  No murmur heard  Pulmonary/Chest: She has no wheezes  She has no rales  Abdominal: Soft  Musculoskeletal: She exhibits edema  She exhibits no tenderness  1+ edema   Neurological: She is alert and oriented to person, place, and time  Skin: She is not diaphoretic  Laboratory Results:    Results from last 7 days  Lab Units 05/14/18  0440 05/13/18  2305 05/13/18 2009   CK TOTAL U/L  --   --  76   TROPONIN I ng/mL 0 08* 0 09* 0 08*       CBC with diff:   Results from last 7 days  Lab Units 05/14/18 0440 05/13/18 2009   WBC Thousand/uL 16 52* 23 77*   HEMOGLOBIN g/dL 14 1 13 6   HEMATOCRIT % 42 7 41 6   MCV fL 94 95   PLATELETS Thousands/uL 201 204   MCH pg 31 1 30 9   MCHC g/dL 33 0 32 7   RDW % 15 8* 15 4*   MPV fL 10 7 10 4   NRBC AUTO /100 WBCs  --  0         CMP:  Results from last 7 days  Lab Units 05/14/18 0440 05/13/18 2009   SODIUM mmol/L 142 142   POTASSIUM mmol/L 3 9 3 7   CHLORIDE mmol/L 110* 109*   CO2 mmol/L 27 26   ANION GAP mmol/L 5 7   BUN mg/dL 12 14   CREATININE mg/dL 0 73 0 75   GLUCOSE RANDOM mg/dL 113 116   CALCIUM mg/dL 8 3 7 8*   AST U/L 416* 658*   ALT U/L 597* 692*   ALK PHOS U/L 195* 202*   TOTAL PROTEIN g/dL 6 1* 5 9*   BILIRUBIN TOTAL mg/dL 5 76* 3 48*   EGFR ml/min/1 73sq m 80 77         BNP:  No results for input(s): BNP in the last 72 hours      Magnesium:   Results from last 7 days  Lab Units 05/14/18  0440   MAGNESIUM mg/dL 2 5       Coags:   Results from last 7 days  Lab Units 05/13/18 2009   PTT seconds 30   INR  1 54*       TSH: No results found for: TSH  No results found for: TSH, Y3KBCDB, E3VCAWU, THYROIDAB  Hemoglobin A1C       Lipid Profile:   Lab Results   Component Value Date    CHOL 218 06/22/2015    CHOL 151 03/17/2014    CHOL 144 08/22/2013     Lab Results   Component Value Date    HDL 47 06/22/2015    HDL 41 03/17/2014    HDL 48 08/22/2013     Lab Results   Component Value Date    LDLCALC 140 (H) 06/22/2015    LDLCALC 87 03/17/2014    LDLCALC 73 08/22/2013     Lab Results   Component Value Date    TRIG 155 06/22/2015    TRIG 117 03/17/2014    TRIG 115 08/22/2013     No components found for: CHOLHDL    Cardiac testing:               Imaging: I have personally reviewed pertinent reports  Xr Chest 2 Views    Result Date: 5/14/2018  Narrative: CHEST INDICATION:   Status post fall  COMPARISON:  4/25/2013 EXAM PERFORMED/VIEWS:  XR CHEST PA & LATERAL FINDINGS: Cardiomediastinal silhouette appears unremarkable  No infiltrates  There is left basilar subsegmental atelectasis or scarring  No pneumothorax or pleural effusion  Osseous structures appear within normal limits for patient age  Impression: No acute cardiopulmonary disease  Workstation performed: [id number]     Ct Head Without Contrast    Result Date: 5/13/2018  Narrative: CT BRAIN - WITHOUT CONTRAST INDICATION:   Status post fall  COMPARISON:  None  TECHNIQUE:  CT examination of the brain was performed  In addition to axial images, coronal 2D reformatted images were created and submitted for interpretation  Radiation dose length product (DLP) for this visit:  974 mGy-cm   This examination, like all CT scans performed in the [facility], was performed utilizing techniques to minimize radiation dose exposure, including the use of iterative reconstruction and automated exposure control  IMAGE QUALITY:  Diagnostic  FINDINGS: PARENCHYMA: Decreased attenuation is noted in periventricular and subcortical white matter demonstrating an appearance that is statistically most likely to represent mild microangiopathic change  No CT signs of acute infarction  No intracranial mass, mass effect or midline shift  No acute parenchymal hemorrhage  VENTRICLES AND EXTRA-AXIAL SPACES:  Normal for the patient's age  VISUALIZED ORBITS AND PARANASAL SINUSES:  Unremarkable  CALVARIUM AND EXTRACRANIAL SOFT TISSUES:  Normal      Impression: No acute intracranial abnormality  Workstation performed: [id number]     Ct Cervical Spine Without Contrast    Result Date: 5/13/2018  Narrative: CT CERVICAL SPINE - WITHOUT CONTRAST INDICATION:   Status post fall  COMPARISON: None  TECHNIQUE:  CT examination of the cervical spine was performed without intravenous contrast   Contiguous axial images were obtained  Sagittal and coronal reconstructions were performed  Radiation dose length product (DLP) for this visit:  275 mGy-cm   This examination, like all CT scans performed in the [facility], was performed utilizing techniques to minimize radiation dose exposure, including the use of iterative reconstruction and automated exposure control  IMAGE QUALITY:  Diagnostic  FINDINGS: ALIGNMENT:  There is reversal of normal cervical lordosis  There is no significant subluxation  No compression deformity  VERTEBRAL BODIES:  No fracture  DEGENERATIVE CHANGES: C3-C5 facet fusion degenerative in origin  Mild multilevel cervical degenerative changes are noted without critical central canal stenosis  PREVERTEBRAL AND PARASPINAL SOFT TISSUES:  Unremarkable  THORACIC INLET:  Normal      Impression: No cervical spine fracture or traumatic malalignment  Workstation performed: [id number]     Ct High Volume Lower Gi Bleed    Result Date: 5/13/2018  Narrative: CT ABDOMEN AND PELVIS - WITHOUT AND WITH IV CONTRAST INDICATION:   Peritonitis, GI bleed  Melena  COMPARISON: None  TECHNIQUE:  CT examination of the abdomen and pelvis was performed both prior to and after the administration of intravenous contrast  Axial, sagittal, and coronal 2D reformatted images were created from the source data and submitted for interpretation  Radiation dose length product (DLP) for this visit:  2912 mGy-cm   This examination, like all CT scans performed in the [facility], was performed utilizing techniques to minimize radiation dose exposure, including the use of iterative reconstruction and automated exposure control  IV Contrast:  100 mL of iohexol (OMNIPAQUE) Enteric Contrast:  Enteric contrast was not administered  FINDINGS: ABDOMEN  BOWEL:  There is no active extravasation of intravenous contrast into the lumen of stomach, small bowel, or large bowel loops  There is no abnormal bowel wall thickening or pathologic bowel wall enhancement  Diverticulosis without evidence for diverticulitis  Small to moderate sliding-type hiatal hernia  LOWER CHEST:  No significant abnormality in the lung bases  LIVER/BILIARY TREE:  There are one or more hepatic simple cyst(s) present  No CT evidence of suspicious solid hepatic mass  Normal hepatic contours  No biliary dilatation  There is intrahepatic and extrahepatic biliary ductal dilatation with the maximum diameter of the hepatic duct measured 2 cm  A stent is present however its patency is not assessed; recommend GI consultation  Luis Fernando Purdy GALLBLADDER:  There are gallstone(s) within the gallbladder, without pericholecystic inflammatory changes  SPLEEN:  Unremarkable  PANCREAS:  Unremarkable  ADRENAL GLANDS:  Unremarkable  KIDNEYS/URETERS:  Unremarkable  No hydronephrosis  PELVIS REPRODUCTIVE ORGANS:  Patient is status post hysterectomy  URINARY BLADDER:  Unremarkable  APPENDIX: No findings to suggest appendicitis  ADDITIONAL ABDOMINAL AND PELVIC STRUCTURES ABDOMINOPELVIC CAVITY:  No ascites or free intraperitoneal air   No lymphadenopathy  ABDOMINAL WALL/INGUINAL REGIONS:  Unremarkable  OSSEOUS STRUCTURES:  No acute fracture or destructive osseous lesion  Impression: CBD stent is in place however there is intra and extra hepatic biliary ductal dilatation and gallbladder hydrops  A stone is seen in the gallbladder  I suspect the stent is obstructed  Recommend GI consultation  No active GI bleed visualized however consider nuclear medicine GI bleeding scan if indicated  Other nonacute findings as above  Workstation performed: [id number]       EKG reviewed personally: Afib w/ PVCs  T-wave inversions in inferolateral leads  Possible septal infarct  His EKG report from [facility] also showed the same changes  Telemetry reviewed personally: Afib, PVCs, short runs of NSVT overnight  Counseling / Coordination of Care  Total floor / unit time spent today 45 minutes  Greater than 50% of total time was spent with the patient and / or family counseling and / or coordination of care

## 2018-05-14 NOTE — ANESTHESIA PROCEDURE NOTES
Arterial Line Insertion  Date/Time: 5/14/2018 6:44 PM  Performed by: Felicita Emmanuel  Authorized by: Felicita Emmanuel   Consent: Verbal consent obtained  Written consent obtained  Risks and benefits: risks, benefits and alternatives were discussed  Consent given by: patient  Patient understanding: patient states understanding of the procedure being performed  Patient consent: the patient's understanding of the procedure matches consent given  Required items: required blood products, implants, devices, and special equipment available  Patient identity confirmed: verbally with patient and arm band  Time out: Immediately prior to procedure a "time out" was called to verify the correct patient, procedure, equipment, support staff and site/side marked as required  Preparation: Patient was prepped and draped in the usual sterile fashion    Indications: hemodynamic monitoring  Orientation:  RightLocation: radial artery  Anesthesia: local infiltration    Anesthesia:  Local Anesthetic: lidocaine 1% without epinephrine  Needle gauge: 20  Seldinger technique: Seldinger technique used  Number of attempts: 1  Post-procedure: dressing applied  Post-procedure CNS: normal and unchanged  Patient tolerance: Patient tolerated the procedure well with no immediate complications  Comments: [id number]  Pre-induction

## 2018-05-14 NOTE — PROGRESS NOTES
Patient having complaints of 10/10 CP and a headache  Vital signs are stable with a HR of 93 and /70  Patient is sitting up in the chair and frequently burping  States that pain is residing  Gave tylenol for headache  Spoke to Kate Florian  EKG performed and will administer tums  Will continue to monitor the patient

## 2018-05-14 NOTE — PHYSICIAN ADVISOR
Current patient class: Inpatient  The patient is currently on Hospital Day: 2      The patient was admitted to the hospital at [address] [facility] on 5/14/18 for the following diagnosis:  Chronic systolic heart failure ([address] ) [I50 22]  Melena [K92 1]  Leukocytosis [D72 829]  UTI (urinary tract infection) [N39 0]  Elevated liver enzymes [R74 8]  Abnormal CT of the abdomen [R93 5]  Elevated troponin [R74 8]  Elevated brain natriuretic peptide (BNP) level [R79 89]  Biliary stent obstruction, initial encounter [T85 590A]  Sepsis ([address] ) [A41 9]  Severe sepsis ([facility] ) [A41 9, R65 20]  Unspecified multiple injuries, initial encounter [T07  XXXA]       There is documentation in the medical record of an expected length of stay of at least 2 midnights  The patient is therefore expected to satisfy the 2 midnight benchmark and given the 2 midnight presumption is appropriate for INPATIENT ADMISSION  Given this expectation of a satisfying stay, CMS instructs us that the patient is most often appropriate for inpatient admission under part A provided medical necessity is documented in the chart  After review of the relevant documentation, labs, vital signs and test results, the patient is appropriate for INPATIENT ADMISSION  Admission to the hospital as an inpatient is a complex decision making process which requires the practitioner to consider the patients presenting complaint, history and physical examination and all relevant testing  With this in mind, in this case, the patient was deemed appropriate for INPATIENT ADMISSION  After review of the documentation and testing available at the time of the admission I concur with this clinical determination of medical necessity  Rationale is as follows: The patient is a 69 yrs old Female who presented to the ED at 5/13/2018  7:47 PM with a chief complaint of Fall (Per EMS, patient fell earlier today or last night and doesn't remember the fall   Patient came to sometime this afternoon, believes she spent approx half of the day trying to get herself off of the floor  Patient c/o back pain, patient takes eliquis  )     The patient will continue to remain hospitalized for evaluation management of sepsis, with leukocytosis, lactic acidosis, and concern for cholangitis  The patient is to continue on IV antibiotics, and does have cultures ordered  The the of present time the patient is expected to remain hospitalized at least 2 midnights, and will satisfy the 2 midnight benchmark  Given the need for further hospitalization, and along with the documentation of medical necessity present in the chart, the patient is appropriate for inpatient admission  The patient is expected to satisfy the 2 midnight benchmark, and will require further acute medical care  The patient does have comorbid conditions which increases the risk for significant adverse outcome  Given this the patient is appropriate for inpatient admission  The patients vitals on arrival were ED Triage Vitals [05/13/18 1951]   Temperature Pulse Respirations Blood Pressure SpO2   97 6 °F (36 4 °C) 84 20 125/66 98 %      Temp Source Heart Rate Source Patient Position - Orthostatic VS BP Location FiO2 (%)   Oral Monitor Sitting Right arm --      Pain Score       8           Past Medical History:   Diagnosis Date    A-fib (HCC)     Acute respiratory disease     CHF (congestive heart failure) (HCC)     Chronic pain     Heart failure (HCC)     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 [address] [facility]     Hypertension     Narcotic dependence ([facility] [address] )      History reviewed  No pertinent surgical history          Consults have been placed to:   IP CONSULT TO MEDICAL CRITICAL CARE  IP CONSULT TO GASTROENTEROLOGY  IP CONSULT TO ACUTE CARE SURGERY  IP CONSULT TO CARDIOLOGY  IP CONSULT TO GASTROENTEROLOGY  IP CONSULT TO CASE MANAGEMENT  IP CONSULT TO INFECTIOUS DISEASES    Vitals:    05/14/18 0222 05/14/18 4764 05/14/18 0753 05/14/18 1639 BP: 122/77 144/70 144/79 130/68   BP Location: Left arm Left arm Right arm Left arm   Pulse: 103 93 97 104   Resp: 18 18 18   Temp: 98 5 °F (36 9 °C)  98 3 °F (36 8 °C) 98 °F (36 7 °C)   TempSrc: Oral  Oral Oral   SpO2: 97% 98% 98% 96%   Weight: 81 5 kg (179 lb 10 8 oz)      Height: 5' 5" (1 651 m)          Most recent labs:    Recent Labs      05/13/18 2009 05/14/18   0440   WBC  23 77*   --   16 52*   HGB  13 6   --   14 1   HCT  41 6   --   42 7   PLT  204   --   201   K  3 7   --   3 9   NA  142   --   142   CALCIUM  7 8*   --   8 3   BUN  14   --   12   CREATININE  0 75   --   0 73   INR  1 54*   --    --    TROPONINI  0 08*   < >  0 08*   CKTOTAL  76   --    --    AST  658*   --   416*   ALT  692*   --   597*   ALKPHOS  202*   --   195*   BILITOT  3 48*   --   5 76*    < > = values in this interval not displayed         Scheduled Meds:  Current Facility-Administered Medications:  [MAR Hold] acetaminophen 650 mg Oral Q6H PRN Lauro Osler, MD    [facility] Hold] calcium carbonate 500 mg Oral Daily PRN CHANDRIKA Pink    [facility] Hold] HYDROmorphone 1 mg Intravenous Q3H PRN [facility],     lisinopril 5 mg Oral Daily Lauro Osler, MD    methocarbamol 500 mg Oral HS PRN Lauro Osler, MD    [facility] Hold] metoprolol tartrate 150 mg Oral Q12H Meredith Forde MD    [facility] Hold] ondansetron 4 mg Intravenous Q6H PRN Lauro Osler, MD    [facility] Hold] oxyCODONE 15 mg Oral Q6H PRN [facility]] pantoprazole 40 mg Intravenous Q24H [address] Lauro Osler, MD    [facility] Hold] piperacillin-tazobactam 3 375 g Intravenous Christin Hecrules MD Last Rate: 3 375 g (05/14/18 1245)     Continuous Infusions:   PRN Meds:   [MAR Hold] acetaminophen    [MAR Hold] calcium carbonate    [MAR Hold] HYDROmorphone    methocarbamol    [MAR Hold] ondansetron    [MAR Hold] oxyCODONE    Surgical procedures (if appropriate):  Procedure(s):  ENDOSCOPIC RETROGRADE CHOLANGIOPANCREATOGRAPHY (ERCP)

## 2018-05-14 NOTE — PLAN OF CARE
Problem: PHYSICAL THERAPY ADULT  Goal: Performs mobility at highest level of function for planned discharge setting  See evaluation for individualized goals  Treatment/Interventions: Functional transfer training, LE strengthening/ROM, Elevations, Therapeutic exercise, Endurance training, Bed mobility, Gait training, Equipment eval/education, Spoke to nursing, Spoke to MD, Spoke to case management  Equipment Recommended:  (TBD)       See flowsheet documentation for full assessment, interventions and recommendations  Prognosis: Good  Problem List: Decreased strength, Decreased endurance, Impaired balance, Decreased mobility, Pain  Assessment: Pt is 69 y o  admitted with hx of fall at home and c/o aching abdominal pain, chills and nausea  Impressions include: aevere sepsis, elevated troponin, cholangitis and UTI  Pt 's comorbidities affecting POC include: a-fib, CHF, HTN, narcotic dependence and personal factors of: living alone, CASSIA  Pt's clinical presentation is currently  unstable/unpredictable which is evident in abn lab values being monitored/trending, telem monitoring, additional consults/procedures/testing pending (GI)  Pt presents w/ back and generalized pain w/ associated min guarding, generalized weakness incl decreased overall LE strength, decreased functional endurance, min impaired standing balance, inability to progress further w/ mobilization at this time and fall risk  Will cont to follow pt in PT for progressive mobilization to address above functional deficits and to max level of (I), endurance, and safety  Currently recommend rehab upon D/C when medically cleared  Will cont to follow until then  Barriers to Discharge: Inaccessible home environment, Decreased caregiver support     Recommendation: Post acute IP rehab          See flowsheet documentation for full assessment

## 2018-05-14 NOTE — PROGRESS NOTES
Progress Note - General Surgery   Iza Ball 69 y o  female MRN: [id number]  Unit/Bed#: [facility] 422-01 Encounter: [id number]    Assessment:  68 yo F with history of heart failure, a fib, atrial clot presenting found down with cholangitis and UTI/    Plan:  - NPO  - GI consult for ERCP today  - hold eliquis  - victor manuel/zosyn  - trend LFT's  - monitor hemodynamics      Subjective/Objective   Subjective: mostly just back pain    Objective:    Blood pressure 122/77, pulse 103, temperature 98 5 °F (36 9 °C), temperature source Oral, resp  rate 18, height 5' 5" (1 651 m), weight 81 5 kg (179 lb 10 8 oz), SpO2 97 %  ,Body mass index is 29 9 kg/m²  I/O last 24 hours:   In: 6921 [I V :2067; IV Piggyback:1250]  Out: 1141 [Urine:1141]    Invasive Devices     Peripheral Intravenous Line            Peripheral IV 05/13/18 Right Forearm 1 day    Peripheral IV 05/13/18 Left Antecubital less than 1 day                Physical Exam:   NAD  Norm resp effort on 1L NC  Mildly tachycardic  Abd soft, min tenderness BUQ, ND  -c/c/e    Lab, Imaging and other studies:  Lab Results   Component Value Date    WBC 16 52 (H) 05/14/2018    HGB 14 1 05/14/2018    HCT 42 7 05/14/2018    MCV 94 05/14/2018     05/14/2018      Lab Results   Component Value Date    GLUCOSE 113 05/14/2018    CALCIUM 8 3 05/14/2018     05/14/2018    K 3 9 05/14/2018    CO2 27 05/14/2018     (H) 05/14/2018    BUN 12 05/14/2018    CREATININE 0 73 05/14/2018       VTE Pharmacologic Prophylaxis: Heparin  VTE Mechanical Prophylaxis: sequential compression device

## 2018-05-14 NOTE — ANESTHESIA PREPROCEDURE EVALUATION
Review of Systems/Medical History          Cardiovascular  EKG reviewed, Hypertension , Dysrhythmias , atrial fibrillation, CHF ,    Pulmonary  Smoker ex-smoker  ,        GI/Hepatic            Endo/Other     GYN       Hematology   Musculoskeletal       Neurology   Psychology           Lab Results   Component Value Date    GLUCOSE 113 05/14/2018     (H) 05/14/2018     (H) 05/14/2018    BUN 12 05/14/2018    CALCIUM 8 3 05/14/2018     (H) 05/14/2018    CHOL 218 06/22/2015    CO2 27 05/14/2018    CREATININE 0 73 05/14/2018    HDL 47 06/22/2015    INR 1 54 (H) 05/13/2018    HCT 42 7 05/14/2018    HGB 14 1 05/14/2018    PROT 6 1 (L) 05/14/2018    MG 2 5 05/14/2018     05/14/2018    K 3 9 05/14/2018     05/14/2018    TRIG 155 06/22/2015    WBC 16 52 (H) 05/14/2018            Anesthesia Plan  ASA Score- 3     Anesthesia Type- general with ASA Monitors  Additional Monitors:   Airway Plan: ETT  Plan Factors-    Induction- intravenous  Postoperative Plan-     Informed Consent- Anesthetic plan and risks discussed with patient  I personally reviewed this patient with the CRNA  Discussed and agreed on the Anesthesia Plan with the CRNA  Aleyda Zhou

## 2018-05-14 NOTE — OCCUPATIONAL THERAPY NOTE
[address]  Evaluation     Patient Name: Broderick Siddiqui  NUBMN'Y Date: 5/14/2018  Problem List  Patient Active Problem List   Diagnosis    Severe sepsis ([email or web address] 75 )    Chronic systolic heart failure (HCC)    Elevated liver enzymes    Abnormal CT of the abdomen    Elevated troponin    Atrial fibrillation (HCC)    Chronic anticoagulation    Fall    Biliary stent obstruction, initial encounter     Past Medical History  Past Medical History:   Diagnosis Date    A-fib ([place] [address] )     Acute respiratory disease     CHF (congestive heart failure) ([place] [address] )     Chronic pain     Heart failure ([place] [address] )     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[place] [address] )     Hypertension     Narcotic dependence ([place] [address] )       05/14/18 1340   Note Type   Note type Eval/Treat   Restrictions/Precautions   Weight Bearing Precautions Per Order No   Other Precautions Cognitive; Bed Alarm; Fall Risk;Pain;Multiple lines   Pain Assessment   Pain Assessment 0-10   Pain Score Worst Possible Pain   Hospital Pain Intervention(s) Ambulation/increased activity; Emotional support   Response to Interventions TOLERATED   Home Living   Type of [address] Two level; Able to live on main level with bedroom/bathroom; Performs ADLs on one level;Stairs to enter with rails  (2 CASSIA)   Bathroom Shower/Tub Tub/shower unit   Bathroom Toilet Standard   Bathroom Equipment Shower chair   Bathroom Accessibility Accessible   Home Equipment Walker;Cane   Prior Function   Level of [place] Independent with ADLs and functional mobility   Lives With Alone   ADL Assistance Independent   IADLs Independent   Falls in the last 6 months 1 to 4   Vocational Retired   Lifestyle   Autonomy Pt I with ADLs and IADLs  She reports HHA was supposed to start this week  She reports that she does not change her clothes often or do much activity 2* increasing weakness at home      Reciprocal Relationships Pt lives alone and reports having limited family/friends support   Service to Others Retired   Intrinsic Gratification She states she cannot do much anymore 2* weakness  Psychosocial   Psychosocial (WDL) WDL   ADL   Where Assessed Edge of bed   Eating Assistance 5  Supervision/Setup   Eating Deficit Setup   Grooming Assistance 4  Minimal Assistance   UB Bathing Assistance 4  Minimal Assistance   LB Bathing Assistance 2  Maximal Assistance   UB Dressing Assistance 4  Minimal Assistance   LB Dressing Assistance 2  Maximal [address]  4  Minimal Assistance   Functional Assistance 3  Moderate Assistance   Bed Mobility   Sit to Supine 3  Moderate assistance   Additional items LE management   Transfers   Sit to Stand 4  Minimal assistance   Additional items Assist x 1   Stand to Sit 4  Minimal assistance   Additional items Assist x 1   Stand pivot (2nd person for line mngmt)   Toilet transfer 3  Moderate assistance   Additional items Assist x 1; Increased time required   Functional Mobility   Additional items Rolling walker   Balance   Static Sitting Fair +   Dynamic Sitting Fair   Static Standing Poor +   Dynamic Standing Poor   Activity Tolerance   Activity Tolerance Patient limited by fatigue;Patient limited by pain   Medical Staff Made Aware Restorative- Chelsi   Nurse Made Aware Okay to see per RN   RUE Assessment   RUE Assessment WFL   LUE Assessment   LUE Assessment WFL   Hand Function   Gross Motor Coordination Functional   Fine Motor Coordination Functional   Cognition   Overall Cognitive Status Impaired   Arousal/Participation Alert; Responsive; Cooperative   Attention Attends with cues to redirect   Orientation Level Oriented to person;Oriented to place;Oriented to situation   Memory Decreased recall of precautions;Decreased recall of recent events   Following Commands Follows one step commands without difficulty   Comments Pt pleasant and cooperative  She reports having limited home support   Pt requires vc for safety awareness during transfers and is slightly impulsive  Assessment   Limitation Decreased ADL status; Decreased Safe judgement during ADL;Decreased endurance;Decreased self-care trans;Decreased high-level ADLs; Decreased cognition   Prognosis Fair   Assessment Pt is a 69 y o  female who was admitted to [facility] on 5/13/2018 with Severe sepsis ([address] )  Pt s/p a fall at home- she was able to call her landlord who found her laying down and brought her to the ER  Pt denies LOC and CT of head and C-spine (-) for acute intracranial pathology or fx  Pt's active problems upon admission include abnormal labs, CHF, AFIB, and chronic anticoagulation  Pt's PMH significant for cog/memory impairment, ERCP with sphincterectomy and stent placement in 2014, chronic pain, HTN, narcotic dependence, and atrial thrombus  At baseline pt was I with ADLs and IADLs  Reports that a HHA was supposed to start this week  Pt lives alone in 2 story home w/ 1st floor s/u  Currently pt requires min A for UB ADLs, max A for LB ADLs, and min/mod A for functional mobility/transfers w/ RW  Pt's limited by pain t/o session and required vc for safety of lines  Pt currently presents with impairments in the following categories -steps to enter environment, limited home support, difficulty performing ADLS, difficulty performing IADLS, limited insight into deficits, activity tolerance, endurance, standing balance/tolerance, sitting balance/tolerance, memory, insight, safety  and judgement  These impairments, as well as pt's fatigue, pain, impulsivity, decreased caregiver support and risk for falls limit pt's ability to safely engage in all baseline areas of occupation, including grooming, bathing, dressing, toileting, functional mobility/transfers, house maintenance, medication management and leisure activities  From OT standpoint, recommend inpatient rehab upon D/C  OT will continue to follow to address the below stated goals      Goals   Patient Goals to have less pain LTG Time Frame 10-14   Long Term Goal see below goals    Plan   Treatment Interventions ADL retraining;Functional transfer training; Endurance training;Cognitive reorientation;Equipment evaluation/education;Patient/family training; Compensatory technique education; Energy conservation   Goal Expiration Date 05/28/18   OT Frequency 3-5x/wk   Recommendation   OT Discharge Recommendation Short Term Rehab   OT - OK to Discharge Yes  (when medically stable to inpatient rehab)   Barthel Index   Feeding 10   Bathing 0   Grooming Score 0   Dressing Score 5   Bladder Score 10   Bowels Score 10   Toilet Use Score 5   Transfers (Bed/Chair) Score 10   Mobility (Level Surface) Score 0   Stairs Score 0   Barthel Index Score 50   Modified [place] Scale   Modified [place] Scale 4     LTG (10-14 DAYS)  Pt will perform all ADL's at mod I level with G balance and DME/AE as needed      Pt will perform functional transfers, including toilet transfer, at mod I level w/ use of DME/AE as needed       Pt will perform functional mobility at a mod I level w/ use of DME and G balance      Pt will demonstrate good carry over of RW safety and energy conservation techniques      Pt will demonstrate good carry over of pt/family education and training w/ 100% attention to task to assist w/ safe d/c planning      Pt will improve activity tolerance to G for 30 min treatment session      Pt will improve standing balance to G for 8-10 minutes of purposeful activity w/ G endurance  Pt will participate in further/ongoing cog assessment w/ 100% attention to task to assist w/ safe d/c planning          Miquel Colindres OTR/L

## 2018-05-14 NOTE — ED PROVIDER NOTES
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%, presents status post an unwitnessed fall versus syncopal episode in the bathroom, after an unknown down time, but only since this morning  The patient was found with a normal mental status, but unable to stand up, and covered in stool and urine  Upon arrival, the patient had significant back pain, but no other complaints, and appeared to have a normal mental status, stating that she does have did have the strength to lift herself up from the floor  On exam, she is hemodynamically stable, but does appear chronically ill  She has significant tenderness to palpation of the abdomen, with rebound tenderness, distention, hypertympany  She does not have any external signs of trauma  She also did have melanotic stools which were guaiac positive  -initial lab work significant of a leukocytosis of 23, lactate was 3 5 which trended down to 2 7 after crystalloid infusion, as well as elevated LFTs, which in the setting of abdominal pain, is concerning for a biliary process     -Her urinalysis was also consistent for UTI  Elevated troponin likely secondary to type 2 NSTEMI  -The patient's BNP is also elevated, which is significantly elevated past her baseline, concerning for heart failure  However, based on her physical exam, she does not have any JVD, crackles in her lungs, or edema, and does not appear to be volume overloaded at this time, and instead does appear to be volume depleted  -CT scan significant for dilatation of the biliary duct, as well as a common bile duct stent that is in place, concerning for stent obstruction    I did discuss this with the patient, and she does not seem to recall that she has a stent in all, and has no idea when it was placed or what it was placed for  -patient was given 2 L of crystalloid, as well as started on Rocephin and Flagyl for likely abdominal sepsis  Differential diagnosis also includes urosepsis  -due to the patient's suspected syncope, versus fall, the patient did have a head CT, neck CT, chest x-ray performed, all of which were negative for acute injury   -I discussed this case with the gastroenterologist on call, as well as the imaging findings and laboratory findings  The patient is currently anticoagulated on Eliquis, so will hold this medication, the patient will likely have ERCP tomorrow morning for further evaluation   -surgery was also consulted, who will follow the patient   -the patient will be admitted to the medicine service, as step-down level 1 of care  I discussed this case with medicine admitting attending, as well as the critical care team, who endorse agreement with disposition  I discussed these imaging and laboratory findings with the patient, who endorse understanding  She also wishes to be DNR level 2, and would accept intubation if required during her hospitalization  History  Chief Complaint   Patient presents with    Fall     Per EMS, patient fell earlier today or last night and doesn't remember the fall  Patient came to sometime this afternoon, believes she spent approx half of the day trying to get herself off of the floor  Patient c/o back pain, patient takes eliquis  This is a 81-year-old female with a history of hypertension, Atrial fibrillation for which she takes Eliquis, hypertension, cardiomyopathy with ejection fraction of 44% in 4/18, anxiety, presents status post unwitnessed fall, chronic pain  The patient presents after a syncopal episode versus fall  The patient states that she has no recollection of what happened, but woke up this morning on the bathroom floor, sprawled out on her back    She was unable to get up, and also noted that she was incontinent full stool in urine after falling while she was lying on the ground, but is unsure whether not she was incontinent due to the fall  Patient apparently had her phone with her, and was in contact with her family as well as her landlord, who eventually came over tonight and found her on the floor  For this reason, 9 was called, the patient brought to the emergency department for further evaluation  Patient is currently complaining of back pain, as well as vague abdominal pain, has no other complaints  She denies any fevers, chills, nausea, vomiting, diarrhea, chest pain, shortness of breath, urinary symptoms  She has no recent travel, recent sick contacts  The patient seeks most her medical care at [facility], and does not appear aware of all for medical problems  She states she recently started seeing [facility] physicians because [facility] time I leave [facility], I am worse  She has a history of a common bile duct stent, but is unable to recall what medical problems she had which required a stent, or when it was placed  She does have a history of poor LV function the past, it has had prior documented ejection fractions of the 20s  She lives alone, and has had progressive deterioration, and feels she is unable to care for herself any longer and their current living situation  Prior to Admission Medications   Prescriptions Last Dose Informant Patient Reported? Taking?    ALPRAZolam (NIRAVAM) 0 25 MG dissolvable tablet   Yes Yes   Sig: Take 0 25 mg by mouth daily at bedtime as needed for anxiety   apixaban (ELIQUIS) 5 mg   Yes Yes   Sig: Take 5 mg by mouth 2 (two) times a day   aspirin (ECOTRIN LOW STRENGTH) 81 mg EC tablet   Yes Yes   Sig: Take 81 mg by mouth daily   lisinopril (ZESTRIL) 5 mg tablet   Yes Yes   Sig: Take 5 mg by mouth daily   methocarbamol (ROBAXIN) 500 mg tablet   Yes Yes   Sig: Take 500 mg by mouth daily at bedtime as needed for muscle spasms   metoprolol tartrate (LOPRESSOR) 100 mg tablet   Yes Yes   Sig: Take 100 mg by mouth every 12 (twelve) hours   oxyCODONE (OxyCONTIN) 15 mg 12 hr tablet   Yes Yes   Sig: Take 10 mg by mouth every 6 (six) hours as needed for moderate pain   polyethylene glycol (MIRALAX) 17 g packet   Yes Yes   Sig: Take 17 g by mouth daily   potassium chloride (MICRO-K) 10 MEQ CR capsule   Yes Yes   Sig: Take 20 mEq by mouth 2 (two) times a day      Facility-Administered Medications: None       Past Medical History:   Diagnosis Date    A-fib ([place] [address] )     Acute respiratory disease     [facility])     Chronic pain     Heart failure ([place] [address] )     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[place] [address] )     Hypertension     Narcotic dependence ([place] [address] )        Past Surgical History:   Procedure Laterality Date    ERCP N/A 5/14/2018    Procedure: ENDOSCOPIC RETROGRADE CHOLANGIOPANCREATOGRAPHY (ERCP); Surgeon: Daphne Payne MD;  Location: [facility] OR;  Service: Gastroenterology       History reviewed  No pertinent family history  I have reviewed and agree with the history as documented  Social History   Substance Use Topics    Smoking status: Former Smoker    Smokeless tobacco: Never Used    Alcohol use No        Review of Systems   Constitutional: Negative for chills and fever  HENT: Negative for congestion and sinus pain  Eyes: Negative for photophobia and visual disturbance  Respiratory: Negative for cough and shortness of breath  Cardiovascular: Negative for chest pain and palpitations  Gastrointestinal: Positive for abdominal pain  Negative for diarrhea, nausea and vomiting  Endocrine: Negative for polyphagia and polyuria  Genitourinary: Negative for dysuria and hematuria  Musculoskeletal: Negative for neck pain and neck stiffness  Skin: Negative for pallor and rash  Neurological: Negative for light-headedness and headaches         Physical Exam  ED Triage Vitals [05/13/18 1951]   Temperature Pulse Respirations Blood Pressure SpO2   97 6 °F (36 4 °C) 84 20 125/66 98 %      Temp Source Heart Rate Source Patient Position - Orthostatic VS BP Location FiO2 (%)   Oral Monitor Sitting Right arm --      Pain Score       8           Orthostatic Vital Signs  Vitals:    05/15/18 1916 05/15/18 2250 05/16/18 0254 05/16/18 0800   BP: 131/71 129/88 122/80 134/65   Pulse: 90 80 88 97   Patient Position - Orthostatic VS: Lying Lying Lying Sitting       Physical Exam   Constitutional: She is oriented to person, place, and time  Awake and alert, chronically ill-appearing  Uncomfortable appearing  No acute distress   HENT:   Head: Normocephalic  Mouth/Throat: Oropharynx is clear and moist  No oropharyngeal exudate  Eyes: Pupils are equal, round, and reactive to light  No scleral icterus  Neck: Normal range of motion  No JVD present  Tenderness to palpation of the C-spine, over the T9-T10 distribution  there is also paraspinal tenderness bilaterally   Cardiovascular: Normal rate, regular rhythm and normal heart sounds  No murmur heard  Pulmonary/Chest: Effort normal  No respiratory distress  She has no wheezes  She has no rales  Abdominal:   Abdomen is distended  There is marked tenderness to palpation in all abdominal fields, with involuntary guarding, rebound tenderness,, but without rigidity  Bowel sounds are decreased in all 4 quadrants  Musculoskeletal: Normal range of motion  She exhibits no edema or tenderness  Neurological: She is alert and oriented to person, place, and time  She exhibits normal muscle tone  Skin: No rash noted  No pallor     Cool, diaphoretic       ED Medications  Medications   piperacillin-tazobactam (ZOSYN) 3 375 g in sodium chloride 0 9 % 50 mL IVPB (3 375 g Intravenous New Bag 5/16/18 0456)   lisinopril (ZESTRIL) tablet 5 mg (5 mg Oral Given 5/15/18 0849)   methocarbamol (ROBAXIN) tablet 500 mg (not administered)   sodium chloride 0 9 % infusion (60 mL/hr Intravenous Rate/Dose Verify 5/14/18 0600)   ondansetron (ZOFRAN) injection 4 mg ( Intravenous MAR Unhold 5/14/18 2108)   acetaminophen (TYLENOL) tablet 650 mg ( Oral MAR Unhold 5/14/18 2108)   calcium carbonate (TUMS) chewable tablet 500 mg ( Oral MAR Unhold 5/14/18 2108)   HYDROmorphone (DILAUDID) injection 1 mg ( Intravenous MAR Unhold 5/14/18 2108)   metoprolol tartrate (LOPRESSOR) tablet 150 mg (150 mg Oral Given 5/15/18 2105)   lactated ringers infusion (0 mL/hr Intravenous Stopped 5/14/18 2100)   apixaban (ELIQUIS) tablet 5 mg (5 mg Oral Given 5/15/18 1722)   pantoprazole (PROTONIX) EC tablet 40 mg (40 mg Oral Given 5/16/18 0500)   melatonin tablet 3 mg (3 mg Oral Given 5/15/18 2105)   oxyCODONE (ROXICODONE) IR tablet 15 mg (15 mg Oral Given 5/16/18 0528)   sodium chloride 0 9 % bolus 1,000 mL (0 mL Intravenous Stopped 5/13/18 2130)   iohexol (OMNIPAQUE) 350 MG/ML injection (MULTI-DOSE) 100 mL (100 mL Intravenous Given 5/13/18 2127)   multi-electrolyte (ISOLYTE-S PH 7 4) bolus 1,000 mL (0 mL Intravenous Stopped 5/14/18 0009)   cefTRIAXone (ROCEPHIN) IVPB (premix) 1,000 mg (0 mg Intravenous Stopped 5/13/18 2336)   metroNIDAZOLE (FLAGYL) IVPB (premix) 500 mg (0 mg Intravenous Stopped 5/14/18 0024)   morphine (PF) 10 mg/mL injection 6 mg (6 mg Intravenous Given 5/13/18 2312)   vancomycin (VANCOCIN) 1,500 mg in sodium chloride 0 9 % 250 mL IVPB (0 mg Intravenous Stopped 5/14/18 0313)   HYDROmorphone (DILAUDID) injection 0 5 mg (0 5 mg Intravenous Given 5/14/18 0032)   furosemide (LASIX) injection 20 mg (20 mg Intravenous Given 5/14/18 0315)   oxyCODONE (ROXICODONE) IR tablet 15 mg (15 mg Oral Given 5/15/18 1722)   potassium chloride (K-DUR,KLOR-CON) CR tablet 20 mEq (20 mEq Oral Given 5/14/18 1330)   potassium chloride (K-DUR,KLOR-CON) CR tablet 40 mEq (40 mEq Oral Given 5/15/18 1356)   potassium phosphate 9 mmol in sodium chloride 0 9 % 100 mL Infusion (9 mmol Intravenous New Bag 5/15/18 1352)   ALPRAZolam (XANAX) tablet 0 25 mg (0 25 mg Oral Given 5/16/18 0230)       Diagnostic Studies  Results Reviewed     Procedure Component Value Units Date/Time    Blood culture [[id number]] Collected:  05/13/18 2300    Lab Status:  Preliminary result Specimen:  Blood from Arm, Right Updated:  05/16/18 0701     Blood Culture No Growth at 48 hrs  Blood culture [[id number]] Collected:  05/13/18 2300    Lab Status:  Preliminary result Specimen:  Blood from Arm, Left Updated:  05/15/18 0809     Gram Stain Result Gram positive cocci in clusters    Protime-INR [[id number]]  (Abnormal) Collected:  05/14/18 2136    Lab Status:  Final result Specimen:  Blood from Line, Arterial Updated:  05/14/18 2225     Protime 17 6 (H) seconds      INR 1 44 (H)    Comprehensive metabolic panel [[id number]]  (Abnormal) Collected:  05/14/18 0440    Lab Status:  Final result Specimen:  Blood from Arm, Right Updated:  05/14/18 0517     Sodium 142 mmol/L      Potassium 3 9 mmol/L      Chloride 110 (H) mmol/L      CO2 27 mmol/L      Anion Gap 5 mmol/L      BUN 12 mg/dL      Creatinine 0 73 mg/dL      Glucose 113 mg/dL      Calcium 8 3 mg/dL       (H) U/L       (H) U/L      Alkaline Phosphatase 195 (H) U/L      Total Protein 6 1 (L) g/dL      Albumin 2 9 (L) g/dL      Total Bilirubin 5 76 (H) mg/dL      eGFR 80 ml/min/1 73sq m     Narrative:         National Kidney Disease Education Program recommendations are as follows:  GFR calculation is accurate only with a steady state creatinine  Chronic Kidney disease less than 60 ml/min/1 73 sq  meters  Kidney failure less than 15 ml/min/1 73 sq  meters  Magnesium [[id number]]  (Normal) Collected:  05/14/18 0440    Lab Status:  Final result Specimen:  Blood from Arm, Right Updated:  05/14/18 0517     Magnesium 2 5 mg/dL     Lactic acid, plasma [[id number]]  (Normal) Collected:  05/14/18 0440    Lab Status:  Final result Specimen:  Blood from Arm, Right Updated:  05/14/18 0515     LACTIC ACID 1 8 mmol/L     Narrative:         Result may be elevated if tourniquet was used during collection      Troponin I [[id number]] (Abnormal) Collected:  05/14/18 0440    Lab Status:  Final result Specimen:  Blood from Arm, Right Updated:  05/14/18 0515     Troponin I 0 08 (H) ng/mL     Narrative:         [employer] Chemistry analyzer 99% cutoff is > 0 04 ng/mL in network labs    o cTnI 99% cutoff is useful only when applied to patients in the clinical setting of myocardial ischemia  o cTnI 99% cutoff should be interpreted in the context of clinical history, ECG findings and possibly cardiac imaging to establish correct diagnosis  o cTnI 99% cutoff may be suggestive but clearly not indicative of a coronary event without the clinical setting of myocardial ischemia  CBC (With Platelets) [[id number]]  (Abnormal) Collected:  05/14/18 0440    Lab Status:  Final result Specimen:  Blood from Arm, Right Updated:  05/14/18 0502     WBC 16 52 (H) Thousand/uL      RBC 4 53 Million/uL      Hemoglobin 14 1 g/dL      Hematocrit 42 7 %      MCV 94 fL      MCH 31 1 pg      MCHC 33 0 g/dL      RDW 15 8 (H) %      Platelets 494 Thousands/uL      MPV 10 7 fL     Lactic acid, plasma [[id number]]  (Normal) Collected:  05/14/18 0032    Lab Status:  Final result Specimen:  Blood from Arm, Left Updated:  05/14/18 0057     LACTIC ACID 2 0 mmol/L     Narrative:         Result may be elevated if tourniquet was used during collection  Occult blood 1-3, stool [[id number]]     Lab Status:  No result Specimen:  Stool     Troponin I [[id number]]  (Abnormal) Collected:  05/13/18 2305    Lab Status:  Final result Specimen:  Blood from Arm, Right Updated:  05/13/18 2339     Troponin I 0 09 (H) ng/mL     Narrative:         [employer] Chemistry analyzer 99% cutoff is > 0 04 ng/mL in network labs    o cTnI 99% cutoff is useful only when applied to patients in the clinical setting of myocardial ischemia  o cTnI 99% cutoff should be interpreted in the context of clinical history, ECG findings and possibly cardiac imaging to establish correct diagnosis    o cTnI 99% cutoff may be suggestive but clearly not indicative of a coronary event without the clinical setting of myocardial ischemia  Lactic acid, plasma [[id number]]  (Abnormal) Collected:  05/13/18 2237    Lab Status:  Final result Specimen:  Blood from Arm, Left Updated:  05/13/18 2330     LACTIC ACID 2 7 (HH) mmol/L     Narrative:         Result may be elevated if tourniquet was used during collection  Urine Microscopic [[id number]]  (Abnormal) Collected:  05/13/18 2154    Lab Status:  Final result Specimen:  Urine from Urine, Clean Catch Updated:  05/13/18 2239     RBC, UA None Seen /hpf      WBC, UA 2-4 (A) /hpf      Epithelial Cells Occasional /hpf      Bacteria, UA Innumerable (A) /hpf     UA w Reflex to Microscopic w Reflex to Culture [[id number]]  (Abnormal) Collected:  05/13/18 2154    Lab Status:  Final result Specimen:  Urine from Urine, Clean Catch Updated:  05/13/18 2235     Color, UA Jillian     Clarity, UA Cloudy     Specific Gravity, UA 1 028     pH, UA 6 0     Leukocytes, UA Small (A)     Nitrite, UA Positive (A)     Protein,  (2+) (A) mg/dl      Glucose, UA Negative mg/dl      Ketones, UA Negative mg/dl      Urobilinogen, UA 4 0 (A) E U /dl      Bilirubin, UA Interference- unable to analyze (A)     Blood, UA Moderate (A)    Lactic acid, plasma [[id number]]  (Abnormal) Collected:  05/13/18 2027    Lab Status:  Final result Specimen:  Blood from Arm, Right Updated:  05/13/18 2108     LACTIC ACID 3 5 (HH) mmol/L     Narrative:         Result may be elevated if tourniquet was used during collection      B-type natriuretic peptide [[id number]]  (Abnormal) Collected:  05/13/18 2009    Lab Status:  Final result Specimen:  Blood from Arm, Right Updated:  05/13/18 2100     NT-proBNP 22,029 (H) pg/mL     CBC and differential [[id number]]  (Abnormal) Collected:  05/13/18 2009    Lab Status:  Final result Specimen:  Blood from Arm, Right Updated:  05/13/18 2054     WBC 23 77 (H) Thousand/uL      RBC 4 40 Million/uL      Hemoglobin 13 6 g/dL Hematocrit 41 6 %      MCV 95 fL      MCH 30 9 pg      MCHC 32 7 g/dL      RDW 15 4 (H) %      MPV 10 4 fL      Platelets 950 Thousands/uL      nRBC 0 /100 WBCs     Narrative: This is an appended report  These results have been appended to a previously verified report  Protime-INR [[id number]]  (Abnormal) Collected:  05/13/18 2009    Lab Status:  Final result Specimen:  Blood from Arm, Right Updated:  05/13/18 2045     Protime 18 6 (H) seconds      INR 1 54 (H)    APTT [[id number]]  (Normal) Collected:  05/13/18 2009    Lab Status:  Final result Specimen:  Blood from Arm, Right Updated:  05/13/18 2045     PTT 30 seconds     Troponin I [[id number]]  (Abnormal) Collected:  05/13/18 2009    Lab Status:  Final result Specimen:  Blood from Arm, Right Updated:  05/13/18 2043     Troponin I 0 08 (H) ng/mL     Narrative:         [employer] Chemistry analyzer 99% cutoff is > 0 04 ng/mL in network labs    o cTnI 99% cutoff is useful only when applied to patients in the clinical setting of myocardial ischemia  o cTnI 99% cutoff should be interpreted in the context of clinical history, ECG findings and possibly cardiac imaging to establish correct diagnosis  o cTnI 99% cutoff may be suggestive but clearly not indicative of a coronary event without the clinical setting of myocardial ischemia      Comprehensive metabolic panel [[id number]]  (Abnormal) Collected:  05/13/18 2009    Lab Status:  Final result Specimen:  Blood from Arm, Right Updated:  05/13/18 2043     Sodium 142 mmol/L      Potassium 3 7 mmol/L      Chloride 109 (H) mmol/L      CO2 26 mmol/L      Anion Gap 7 mmol/L      BUN 14 mg/dL      Creatinine 0 75 mg/dL      Glucose 116 mg/dL      Calcium 7 8 (L) mg/dL       (H) U/L       (H) U/L      Alkaline Phosphatase 202 (H) U/L      Total Protein 5 9 (L) g/dL      Albumin 2 9 (L) g/dL      Total Bilirubin 3 48 (H) mg/dL      eGFR 77 ml/min/1 73sq m     Narrative:         National Kidney Disease Education Program recommendations are as follows:  GFR calculation is accurate only with a steady state creatinine  Chronic Kidney disease less than 60 ml/min/1 73 sq  meters  Kidney failure less than 15 ml/min/1 73 sq  meters  CK (with reflex to MB) [[id number]]  (Normal) Collected:  05/13/18 2009    Lab Status:  Final result Specimen:  Blood from Arm, Right Updated:  05/13/18 2043     Total CK 76 U/L                  FL ERCP biliary only   Final Result by Dirk Wang MD (05/15 0007)      Choledocholithiasis with stent exchange though the stent is not well visualized on the final image  Workstation performed: [id number]         US gallbladder   Final Result by Melissa Stahl MD (05/14 1476)      Biliary dilatation and gallbladder distention again identified as seen on previous CT  Cholelithiasis and gallbladder sludge  Workstation performed: [id number]         XR chest 2 views   ED Interpretation by Evelyn Hewitt MD (05/13 2208)   The CXR was ordered by resident and interpreted by me independently  On my read, it appears:    - no pna   - grossly normal      Final Result by Serge Olsen MD (05/14 9385)      No acute cardiopulmonary disease  Workstation performed: [id number]         CT head without contrast   ED Interpretation by Evelyn Hewitt MD (05/13 2207)   CT ordered by my resident and the report from radiologist has been reviewed  Final Result by Donell Menezes MD (05/13 2138)      No acute intracranial abnormality  Workstation performed: [id number]         CT cervical spine without contrast   ED Interpretation by Evelyn Hewitt MD (05/13 2207)   CT ordered by my resident and the report from radiologist has been reviewed  Final Result by Donell Menezes MD (05/13 2143)      No cervical spine fracture or traumatic malalignment                     Workstation performed: [id number]         CT high volume lower GI bleed   ED Interpretation by [facility] Manny Kim MD (05/13 2207)   Abnormal   CT ordered by my resident and the report from radiologist has been reviewed  Final Result by Darcie Ruggiero MD (05/13 8876)   CBD stent is in place however there is intra and extra hepatic biliary ductal dilatation and gallbladder hydrops  A stone is seen in the gallbladder  I suspect the stent is obstructed  Recommend GI consultation  No active GI bleed visualized however consider nuclear medicine GI bleeding scan if indicated  Other nonacute findings as above  Workstation performed: [id number]               Procedures  ECG 12 Lead Documentation  Date/Time: 5/14/2018 2:22 AM  Performed by: Ninoska Lyon  Authorized by: Alethea Otto     ECG reviewed by me, the ED Provider: yes    Patient location:  ED  Previous ECG:     Previous ECG:  Unavailable    Comparison to cardiac monitor: Yes    Interpretation:     Interpretation: abnormal    Comments:      Atrial fibrillation with a heart rate of 92  Diffuse T-wave inversions, with less than 1 mm ST depressions, with less than 1 mm ST-elevation in AVR  Axis normal, intervals normal  His EKG does not meet STEMI criteria  There are no prior EKGs for comparison, however based on the official read of her last EKG at [facility], it appears that there are baseline ST/T-wave abnormalities diffusely at baseline  Phone Consults  ED Phone Contact    ED Course           Identification of Seniors at Risk      Most Recent Value   (ISAR) Identification of Seniors at Risk   Before the illness or injury that brought you to the Emergency, did you need someone to help you on a regular basis? 0 Filed at: 05/13/2018 1957   In the last 24 hours, have you needed more help than usual?  0 Filed at: 05/13/2018 8493   Have you been hospitalized for one or more nights during the past 6 months?   1 Filed at: 05/13/2018 1957   In general, do you see well?  0 Filed at: 05/13/2018 1957   In general, do you have serious problems with your memory? 0 Filed at: 05/13/2018 1957   Do you take more than three different medications every day?   1 Filed at: 05/13/2018 1957   ISAR Score  2 Filed at: 05/13/2018 1957                          [facility]  CritCare Time    Disposition  Final diagnoses:   Sepsis ([facility] [address] )   Biliary stent obstruction, initial encounter   UTI (urinary tract infection)   Leukocytosis   Elevated brain natriuretic peptide (BNP) level   Melena     Time reflects when diagnosis was documented in both MDM as applicable and the Disposition within this note     Time User Action Codes Description Comment    5/14/2018 12:05 AM Roman Marianna Add [A41 9] Sepsis ([place] [facility] )     5/14/2018 12:05 AM Roman Marianna Add [T85 590A] Biliary stent obstruction, initial encounter     5/14/2018 12:06 AM Roman Marianna Add [N39 0] UTI (urinary tract infection)     5/14/2018 12:06 AM Roman Marianna Add [D72 829] Leukocytosis     5/14/2018 12:06 AM Roman Marianna Add [R79 89] Elevated brain natriuretic peptide (BNP) level     5/14/2018 12:06 AM Roman Marianna Add [K92 1] Melena     5/14/2018 12:49 AM Tiarra Glimpse Add [C85 34] Chronic systolic heart failure ([facility] [address] )     5/14/2018 12:49 AM Tiarra Glimpse Modify [S14 50] [facility]     5/14/2018 12:49 AM Tiarra Glimpse Modify [R43 30] Chronic systolic heart failure ([facility] )     5/14/2018 12:49 AM Tiarra Glimpse Add [R74 8] Elevated troponin     5/14/2018 12:49 AM Tiarra Glimpse Modify [R74 8] Elevated troponin     5/14/2018 12:49 AM Tiarra Glimpse Add [R93 5] Abnormal CT of the abdomen     5/14/2018 12:49 AM Tiarra Glimpse Modify [R93 5] Abnormal CT of the abdomen     5/14/2018 12:49 AM Tiarra Glimpse Add [R74 8] Elevated liver enzymes     5/14/2018 12:49 AM Tiarra Glimpse Modify [R74 8] Elevated liver enzymes     5/14/2018 12:49 AM Tiarra Glimpse Add [A41 9,  R65 20] Severe sepsis ([facility] )     5/14/2018 12:49 AM Tiarra Glimpse Modify [A41 9,  R65 20] Severe sepsis ([address] )     5/15/2018 11:41 AM Rm Kast Add [H54 7] Decreased vision     5/15/2018 11:41 AM Rm Kast Remove [H54 7] Decreased vision     5/15/2018 11:41 AM Rm Kast Add [H54 7] Decreased vision     5/15/2018 11:41 AM Tony Trimble Add [Q84 6] Anomalies of nails     5/15/2018 11:41 AM [place] Kast Modify [Q84 6] Anomalies of nails       ED Disposition     ED Disposition Condition Comment    Admit  Case was discussed with [address] [place] [address] admitting attending and the patient's admission status was agreed to be Admission Status: inpatient status to the service of Dr Margot Askew   Follow-up Information     Follow up With Specialties Details Why Contact Info    Juliano Maguire DPM Podiatry Follow up  [address] [place] [address] [id number] [email or web address] [email or web address]  [phone]          Current Discharge Medication List      CONTINUE these medications which have NOT CHANGED    Details   ALPRAZolam (NIRAVAM) 0 25 MG dissolvable tablet Take 0 25 mg by mouth daily at bedtime as needed for anxiety      apixaban (ELIQUIS) 5 mg Take 5 mg by mouth 2 (two) times a day      aspirin (ECOTRIN LOW STRENGTH) 81 mg EC tablet Take 81 mg by mouth daily      lisinopril (ZESTRIL) 5 mg tablet Take 5 mg by mouth daily      methocarbamol (ROBAXIN) 500 mg tablet Take 500 mg by mouth daily at bedtime as needed for muscle spasms      metoprolol tartrate (LOPRESSOR) 100 mg tablet Take 100 mg by mouth every 12 (twelve) hours      oxyCODONE (OxyCONTIN) 15 mg 12 hr tablet Take 10 mg by mouth every 6 (six) hours as needed for moderate pain      polyethylene glycol (MIRALAX) 17 g packet Take 17 g by mouth daily      potassium chloride (MICRO-K) 10 MEQ CR capsule Take 20 mEq by mouth 2 (two) times a day           No discharge procedures on file  ED Provider  Attending physically available and evaluated Jon Robless  I managed the patient along with the ED Attending      Electronically Signed by         Kyle Mckeon MD  05/16/18 0561

## 2018-05-15 ENCOUNTER — ANESTHESIA (OUTPATIENT)
Dept: GASTROENTEROLOGY | Facility: HOSPITAL | Age: 78
End: 2018-05-15

## 2018-05-15 ENCOUNTER — TELEPHONE (OUTPATIENT)
Dept: GASTROENTEROLOGY | Facility: CLINIC | Age: 78
End: 2018-05-15

## 2018-05-15 ENCOUNTER — APPOINTMENT (INPATIENT)
Dept: NON INVASIVE DIAGNOSTICS | Facility: HOSPITAL | Age: 78
DRG: 919 | End: 2018-05-15
Payer: MEDICARE

## 2018-05-15 PROBLEM — H54.7 DECREASED VISION: Status: ACTIVE | Noted: 2018-05-15

## 2018-05-15 PROBLEM — Q84.6 ANOMALIES OF NAILS: Chronic | Status: ACTIVE | Noted: 2018-05-15

## 2018-05-15 LAB
ALBUMIN SERPL BCP-MCNC: 2.3 G/DL (ref 3.5–5)
ALP SERPL-CCNC: 162 U/L (ref 46–116)
ALT SERPL W P-5'-P-CCNC: 342 U/L (ref 12–78)
ANION GAP SERPL CALCULATED.3IONS-SCNC: 8 MMOL/L (ref 4–13)
AST SERPL W P-5'-P-CCNC: 119 U/L (ref 5–45)
BILIRUB SERPL-MCNC: 2.32 MG/DL (ref 0.2–1)
BUN SERPL-MCNC: 12 MG/DL (ref 5–25)
CALCIUM SERPL-MCNC: 8 MG/DL (ref 8.3–10.1)
CHLORIDE SERPL-SCNC: 110 MMOL/L (ref 100–108)
CO2 SERPL-SCNC: 23 MMOL/L (ref 21–32)
CREAT SERPL-MCNC: 0.75 MG/DL (ref 0.6–1.3)
ERYTHROCYTE [DISTWIDTH] IN BLOOD BY AUTOMATED COUNT: 16 % (ref 11.6–15.1)
GFR SERPL CREATININE-BSD FRML MDRD: 77 ML/MIN/1.73SQ M
GLUCOSE SERPL-MCNC: 156 MG/DL (ref 65–140)
HCT VFR BLD AUTO: 38 % (ref 34.8–46.1)
HGB BLD-MCNC: 12.2 G/DL (ref 11.5–15.4)
MAGNESIUM SERPL-MCNC: 2.3 MG/DL (ref 1.6–2.6)
MCH RBC QN AUTO: 30.1 PG (ref 26.8–34.3)
MCHC RBC AUTO-ENTMCNC: 32.1 G/DL (ref 31.4–37.4)
MCV RBC AUTO: 94 FL (ref 82–98)
PHOSPHATE SERPL-MCNC: 1.8 MG/DL (ref 2.3–4.1)
PLATELET # BLD AUTO: 173 THOUSANDS/UL (ref 149–390)
PMV BLD AUTO: 11 FL (ref 8.9–12.7)
POTASSIUM SERPL-SCNC: 3.5 MMOL/L (ref 3.5–5.3)
PROT SERPL-MCNC: 5.5 G/DL (ref 6.4–8.2)
RBC # BLD AUTO: 4.05 MILLION/UL (ref 3.81–5.12)
SODIUM SERPL-SCNC: 141 MMOL/L (ref 136–145)
WBC # BLD AUTO: 7.88 THOUSAND/UL (ref 4.31–10.16)

## 2018-05-15 PROCEDURE — 84100 ASSAY OF PHOSPHORUS: CPT | Performed by: GENERAL PRACTICE

## 2018-05-15 PROCEDURE — 0HBRXZZ EXCISION OF TOE NAIL, EXTERNAL APPROACH: ICD-10-PCS | Performed by: PODIATRIST

## 2018-05-15 PROCEDURE — 99232 SBSQ HOSP IP/OBS MODERATE 35: CPT | Performed by: INTERNAL MEDICINE

## 2018-05-15 PROCEDURE — 99232 SBSQ HOSP IP/OBS MODERATE 35: CPT | Performed by: SURGERY

## 2018-05-15 PROCEDURE — 80053 COMPREHEN METABOLIC PANEL: CPT | Performed by: GENERAL PRACTICE

## 2018-05-15 PROCEDURE — 93306 TTE W/DOPPLER COMPLETE: CPT

## 2018-05-15 PROCEDURE — 97535 SELF CARE MNGMENT TRAINING: CPT

## 2018-05-15 PROCEDURE — 93306 TTE W/DOPPLER COMPLETE: CPT | Performed by: INTERNAL MEDICINE

## 2018-05-15 PROCEDURE — 83735 ASSAY OF MAGNESIUM: CPT | Performed by: GENERAL PRACTICE

## 2018-05-15 PROCEDURE — C9113 INJ PANTOPRAZOLE SODIUM, VIA: HCPCS | Performed by: HOSPITALIST

## 2018-05-15 PROCEDURE — 85027 COMPLETE CBC AUTOMATED: CPT | Performed by: GENERAL PRACTICE

## 2018-05-15 RX ORDER — OXYCODONE HYDROCHLORIDE 5 MG/1
15 TABLET ORAL EVERY 6 HOURS PRN
Status: DISCONTINUED | OUTPATIENT
Start: 2018-05-15 | End: 2018-05-21 | Stop reason: HOSPADM

## 2018-05-15 RX ORDER — PANTOPRAZOLE SODIUM 40 MG/1
40 TABLET, DELAYED RELEASE ORAL
Status: DISCONTINUED | OUTPATIENT
Start: 2018-05-16 | End: 2018-05-21 | Stop reason: HOSPADM

## 2018-05-15 RX ORDER — LANOLIN ALCOHOL/MO/W.PET/CERES
3 CREAM (GRAM) TOPICAL
Status: DISCONTINUED | OUTPATIENT
Start: 2018-05-15 | End: 2018-05-21 | Stop reason: HOSPADM

## 2018-05-15 RX ORDER — POTASSIUM CHLORIDE 20 MEQ/1
40 TABLET, EXTENDED RELEASE ORAL ONCE
Status: COMPLETED | OUTPATIENT
Start: 2018-05-15 | End: 2018-05-15

## 2018-05-15 RX ADMIN — APIXABAN 5 MG: 5 TABLET, FILM COATED ORAL at 08:50

## 2018-05-15 RX ADMIN — OXYCODONE HYDROCHLORIDE 15 MG: 10 TABLET ORAL at 23:28

## 2018-05-15 RX ADMIN — OXYCODONE HYDROCHLORIDE 15 MG: 10 TABLET ORAL at 10:57

## 2018-05-15 RX ADMIN — POTASSIUM PHOSPHATE, MONOBASIC AND POTASSIUM PHOSPHATE, DIBASIC 9 MMOL: 224; 236 INJECTION, SOLUTION INTRAVENOUS at 13:52

## 2018-05-15 RX ADMIN — PIPERACILLIN SODIUM AND TAZOBACTAM SODIUM 3.38 G: 36; 4.5 INJECTION, POWDER, FOR SOLUTION INTRAVENOUS at 11:02

## 2018-05-15 RX ADMIN — LISINOPRIL 5 MG: 5 TABLET ORAL at 08:49

## 2018-05-15 RX ADMIN — METOPROLOL TARTRATE 150 MG: 50 TABLET ORAL at 08:50

## 2018-05-15 RX ADMIN — PIPERACILLIN SODIUM AND TAZOBACTAM SODIUM 3.38 G: 36; 4.5 INJECTION, POWDER, FOR SOLUTION INTRAVENOUS at 23:10

## 2018-05-15 RX ADMIN — OXYCODONE HYDROCHLORIDE 15 MG: 10 TABLET ORAL at 17:22

## 2018-05-15 RX ADMIN — POTASSIUM CHLORIDE 40 MEQ: 1500 TABLET, EXTENDED RELEASE ORAL at 13:56

## 2018-05-15 RX ADMIN — PANTOPRAZOLE SODIUM 40 MG: 40 INJECTION, POWDER, FOR SOLUTION INTRAVENOUS at 05:27

## 2018-05-15 RX ADMIN — OXYCODONE HYDROCHLORIDE 15 MG: 10 TABLET ORAL at 04:44

## 2018-05-15 RX ADMIN — MELATONIN TAB 3 MG 3 MG: 3 TAB at 21:05

## 2018-05-15 RX ADMIN — METOPROLOL TARTRATE 150 MG: 50 TABLET ORAL at 21:05

## 2018-05-15 RX ADMIN — APIXABAN 5 MG: 5 TABLET, FILM COATED ORAL at 17:22

## 2018-05-15 RX ADMIN — PIPERACILLIN SODIUM AND TAZOBACTAM SODIUM 3.38 G: 36; 4.5 INJECTION, POWDER, FOR SOLUTION INTRAVENOUS at 17:22

## 2018-05-15 RX ADMIN — PIPERACILLIN SODIUM AND TAZOBACTAM SODIUM 3.38 G: 36; 4.5 INJECTION, POWDER, FOR SOLUTION INTRAVENOUS at 05:32

## 2018-05-15 NOTE — OCCUPATIONAL THERAPY NOTE
Occupational Therapy Treatment Note:       05/15/18 1506   Pain Assessment   Pain Assessment 0-10   Pain Score 9   Pain Type Acute pain   Pain Location Back   ADL   Where Assessed Chair   Grooming Assistance 5  Supervision/Setup   Grooming Deficit Setup;Verbal cueing; Increased time to complete   UB Bathing Assistance 4  Minimal Assistance   UB Bathing Deficit Setup;Verbal cueing;Supervision/safety; Increased time to complete   UB Bathing Comments (assist with back and vc's for technique)   LB Bathing Assistance 2  Maximal Assistance   LB Bathing Deficit Setup;Verbal cueing; Increased time to complete;Right lower leg including foot; Left lower leg including foot   Bed Mobility   Supine to Sit 5  Supervision   Additional items Increased time required;Verbal cues   Sit to Supine 3  Moderate assistance   Additional items Assist x 1; Increased time required;Verbal cues;LE management   Cognition   Overall Cognitive Status Impaired   Arousal/Participation Alert; Cooperative   Attention Attends with cues to redirect   Orientation Level Oriented to person;Oriented to place;Oriented to time   Memory Decreased recall of recent events;Decreased recall of precautions  ("I am getting frustrated with not remembering")   Following Commands Follows one step commands with increased time or repetition   Comments some decreased insight her functional deficits   Activity Tolerance   Activity Tolerance Patient limited by fatigue;Patient limited by pain   Medical Staff Made Aware ok to see per RN   Assessment   Assessment Patient participated in skilled OT  Patient presents supine in bed receptive to O T  intervention  Patient reports that she "hasn't slept in years" and "I need to sleep to feel better and I am so tired"  Patient presents supine in bed requiring assist for bed mobility and bathing as noted above   Patient requires hands on assist for higher level dynamic functional mobility to insure no LOB, to provide education in compensatory techniques to increase safety and decrease fatigue  Patient reports that she has "sponge bathed" for some time now and verbalizes throughout the need to sleep and the need to get stronger  Patient reports wanting to discharge home, however, recommend short term rehab at this time to increase functional strrength and to increase independence with ADL skills for carryover into her own home  Patient reports that "a HHA was to start today" for assistance in her home, however, was cancelled secondary to this admission  Plan   Treatment Interventions ADL retraining; Endurance training; Compensatory technique education   Goal Expiration Date 05/28/18   Treatment Day 1   OT Frequency 3-5x/wk   Recommendation   OT Discharge Recommendation Short Term Rehab   OT - OK to Discharge Yes  (when medically cleared)   Barthel Index   Feeding 10   Bathing 0   Grooming Score 0   Dressing Score 5   Bladder Score 10   Bowels Score 10   Toilet Use Score 5   Transfers (Bed/Chair) Score 10   Mobility (Level Surface) Score 0   Stairs Score 0   Barthel Index Score 50   Modified Glenn Scale   Modified Glenn Scale 4   JOSE Holland

## 2018-05-15 NOTE — PLAN OF CARE
Problem: OCCUPATIONAL THERAPY ADULT  Goal: Performs self-care activities at highest level of function for planned discharge setting  See evaluation for individualized goals  Treatment Interventions: ADL retraining, Functional transfer training, Endurance training, Cognitive reorientation, Equipment evaluation/education, Patient/family training, Compensatory technique education, Energy conservation          See flowsheet documentation for full assessment, interventions and recommendations  Outcome: Progressing  Limitation: Decreased ADL status, Decreased Safe judgement during ADL, Decreased endurance, Decreased self-care trans, Decreased high-level ADLs, Decreased cognition  Prognosis: Fair  Assessment: Patient participated in skilled OT  Patient presents supine in bed receptive to O T  intervention  Patient reports that she "hasn't slept in years" and "I need to sleep to feel better and I am so tired"  Patient presents supine in bed requiring assist for bed mobility and bathing as noted above  Patient requires hands on assist for higher level dynamic functional mobility to insure no LOB, to provide education in compensatory techniques to increase safety and decrease fatigue  Patient reports that she has "sponge bathed" for some time now and verbalizes throughout the need to sleep and the need to get stronger  Patient reports wanting to discharge home, however, recommend short term rehab at this time to increase functional strrength and to increase independence with ADL skills for carryover into her own home  Patient reports that "a HHA was to start today" for assistance in her home, however, was cancelled secondary to this admission       OT Discharge Recommendation: Short Term Rehab  OT - OK to Discharge: Yes (when medically cleared)   Miguelina Rosario

## 2018-05-15 NOTE — CONSULTS
This 78-year-old woman is admitted for evaluation of possible sepsis  She complains of problems with her eyesight, gradual onset over the last 8 years  She notes no acute changes  She has had no eye exams for the last 40 years  On examination visual acuity is 20/200 in the right and 20/100 in the left  Pupils are equal round and reactive to light  There is no afferent defect  Intra-ocular pressures are 13 and 15  The external examination is unremarkable  The media reveals mild nuclear sclerotic cataract  The disc macula and vessels are unremarkable  Impression is cataracts both eyes no sign of and ophthalmitis  There is no sign of any acute pathology  Plan please re-consult as needed as symptoms warrant

## 2018-05-15 NOTE — CONSULTS
Consult - Podiatry   Jon Harrison 69 y o  female MRN: [id number]  Unit/Bed#: [facility] 422-01 Encounter: [id number]    Assessment/Plan     1  Onychogryphosis with onychomycosis  2  Plantar callus x4    -trimmed toenails times 10 using nail nippers without incident   -trend plantar calluses x4 using sterile 10 blade down to epithelial tissue without incident  -podiatry to sign off, please re-consult as necessary  -follow-up provider placed in chart    History of Present Illness     HPI:  Jon Harrison is a 69 y o  female who presents with elongated incurvated toenails that have not been cut in many years  She states she has not been able to wear normal shoes anymore because she is not able to cut them  She states she is not diabetic  She has never been to a foot doctor but would like to start  She states her nails become painful even while walking  She denies any nausea, vomiting, fevers, chills, shortness of breath today  Inpatient consult to Podiatry     Date/Time 5/15/2018 1:14 PM     Performed by  Tigre Mccauley     Authorized by Edd Nieto            Review of Systems   Constitutional: Negative  HENT: Negative  Eyes: Negative  Respiratory: Negative  Cardiovascular: Negative  Gastrointestinal: Negative  Musculoskeletal:  Negative   Skin:  Elongated toenails   Neurological: Negative  Psych: negative  Historical Information   Past Medical History:   Diagnosis Date    [facility]fib [facility])     Acute respiratory disease     CHF (congestive heart failure) (HCC)     Chronic pain     Heart failure (HCC)     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[facility] [address] )     Hypertension     Narcotic dependence ([facility] [address] )      Past Surgical History:   Procedure Laterality Date    ERCP N/A 5/14/2018    Procedure: ENDOSCOPIC RETROGRADE CHOLANGIOPANCREATOGRAPHY (ERCP);   Surgeon: Shiva Juarez MD;  Location: [facility];  Service: Gastroenterology     Social History   History   Alcohol Use No     History   Drug Use No     History   Smoking Status    Former Smoker   Smokeless Tobacco    Never Used     Family History: History reviewed  No pertinent family history      Meds/Allergies   Prescriptions Prior to Admission   Medication    ALPRAZolam (NIRAVAM) 0 25 MG dissolvable tablet    apixaban (ELIQUIS) 5 mg    aspirin (ECOTRIN LOW STRENGTH) 81 mg EC tablet    lisinopril (ZESTRIL) 5 mg tablet    methocarbamol (ROBAXIN) 500 mg tablet    metoprolol tartrate (LOPRESSOR) 100 mg tablet    oxyCODONE (OxyCONTIN) 15 mg 12 hr tablet    polyethylene glycol (MIRALAX) 17 g packet    potassium chloride (MICRO-K) 10 MEQ CR capsule     No Known Allergies    Objective   First Vitals:   Blood Pressure: 125/66 (05/13/18 1951)  Pulse: 84 (05/13/18 1951)  Temperature: 97 6 °F (36 4 °C) (05/13/18 1951)  Temp Source: Oral (05/13/18 1951)  Respirations: 20 (05/13/18 1951)  Height: 5' 5" (165 1 cm) (05/13/18 1951)  Weight - Scale: 74 8 kg (165 lb) (05/13/18 1951)  SpO2: 98 % (05/13/18 1951)    Current Vitals:   Blood Pressure: 141/72 (Map 95) (05/15/18 1138)  Pulse: 92 (05/15/18 1138)  Temperature: 98 5 °F (36 9 °C) (05/15/18 1138)  Temp Source: Oral (05/15/18 1138)  Respirations: 16 (05/15/18 1138)  Height: 5' 5" (165 1 cm) (05/14/18 0222)  Weight - Scale: 82 kg (180 lb 12 4 oz) (05/15/18 0600)  SpO2: 93 % (05/15/18 1138)        /72 (BP Location: Left arm) Comment: Map 95  Pulse 92   Temp 98 5 °F (36 9 °C) (Oral)   Resp 16   Ht 5' 5" (1 651 m)   Wt 82 kg (180 lb 12 4 oz)   SpO2 93%   BMI 30 08 kg/m²      General Appearance:    Alert, cooperative, no distress   Head:    Normocephalic, without obvious abnormality, atraumatic   Eyes:    PERRL, conjunctiva/corneas clear, EOM's intact        Nose:   Moist mucous membranes   Neck:   Supple, symmetrical, trachea midline   Back:     Symmetric   Lungs:     Respirations unlabored   Heart:    Regular rate and rhythm, S1 and S2 normal, no murmur, rub   or gallop Abdomen:     Soft, non-tender   Extremities:   No calf tenderness to palpation bilateral   Digital range of motion intact  Bunion deformity bilateral   Pulses:   Bilateral DP/PT pulses are 2/4, pedal hair is absent, capillary refill time is less than 3 sec   Skin:   Plantar callus bilateral sub met 1 and sub met 5  Elongated and thickened toenails times 10 with subungual debris and significant incurvation  Neurologic:   Gross sensation is intact  Protective sensation is intact             Lab Results:   Admission on 05/13/2018   Component Date Value    Sodium 05/13/2018 142     Potassium 05/13/2018 3 7     Chloride 05/13/2018 109*    CO2 05/13/2018 26     Anion Gap 05/13/2018 7     BUN 05/13/2018 14     Creatinine 05/13/2018 0 75     Glucose 05/13/2018 116     Calcium 05/13/2018 7 8*    AST 05/13/2018 658*    ALT 05/13/2018 692*    Alkaline Phosphatase 05/13/2018 202*    Total Protein 05/13/2018 5 9*    Albumin 05/13/2018 2 9*    Total Bilirubin 05/13/2018 3 48*    eGFR 05/13/2018 77     WBC 05/13/2018 23 77*    RBC 05/13/2018 4 40     Hemoglobin 05/13/2018 13 6     Hematocrit 05/13/2018 41 6     MCV 05/13/2018 95     MCH 05/13/2018 30 9     MCHC 05/13/2018 32 7     RDW 05/13/2018 15 4*    MPV 05/13/2018 10 4     Platelets 20/39/5679 204     nRBC 05/13/2018 0     Troponin I 05/13/2018 0 08*    Protime 05/13/2018 18 6*    INR 05/13/2018 1 54*    PTT 05/13/2018 30     Total CK 05/13/2018 76     LACTIC ACID 05/13/2018 3 5*    ABO Grouping 05/13/2018 O     Rh Factor 05/13/2018 Positive     Antibody Screen 05/13/2018 Negative     Specimen Expiration Date 05/13/2018 [id number]     Color, UA 05/13/2018 Jillian     Clarity, UA 05/13/2018 Cloudy     Specific Gravity, UA 05/13/2018 1 028     pH, UA 05/13/2018 6 0     Leukocytes, UA 05/13/2018 Small*    Nitrite, UA 05/13/2018 Positive*    Protein, UA 05/13/2018 100 (2+)*    Glucose, UA 05/13/2018 Negative     Ketones, UA 05/13/2018 Negative     Urobilinogen, UA 05/13/2018 4 0*    Bilirubin, UA 05/13/2018 Interference- unable to analyze*    Blood, UA 05/13/2018 Moderate*    NT-proBNP 05/13/2018 54084*    Segmented % 05/13/2018 93*    Bands % 05/13/2018 4     Lymphocytes % 05/13/2018 2*    Monocytes % 05/13/2018 1*    Eosinophils % 05/13/2018 0     Basophils % 05/13/2018 0     Absolute Neutrophils 05/13/2018 23 06*    Lymphocytes Absolute 05/13/2018 0 48*    Monocytes Absolute 05/13/2018 0 24     Eosinophils Absolute 05/13/2018 0 00     Basophils Absolute 05/13/2018 0 00     RBC Morphology 05/13/2018 Normal     Platelet Estimate 03/03/4535 Adequate     LACTIC ACID 05/13/2018 2 7*    RBC, UA 05/13/2018 None Seen     WBC, UA 05/13/2018 2-4*    Epithelial Cells 05/13/2018 Occasional     Bacteria, UA 05/13/2018 Innumerable*    Troponin I 05/13/2018 0 09*    Blood Culture 05/13/2018 No Growth at 24 hrs       Gram Stain Result 05/13/2018 Gram positive cocci in clusters     LACTIC ACID 05/14/2018 2 0     Sodium 05/14/2018 142     Potassium 05/14/2018 3 9     Chloride 05/14/2018 110*    CO2 05/14/2018 27     Anion Gap 05/14/2018 5     BUN 05/14/2018 12     Creatinine 05/14/2018 0 73     Glucose 05/14/2018 113     Calcium 05/14/2018 8 3     AST 05/14/2018 416*    ALT 05/14/2018 597*    Alkaline Phosphatase 05/14/2018 195*    Total Protein 05/14/2018 6 1*    Albumin 05/14/2018 2 9*    Total Bilirubin 05/14/2018 5 76*    eGFR 05/14/2018 80     Magnesium 05/14/2018 2 5     WBC 05/14/2018 16 52*    RBC 05/14/2018 4 53     Hemoglobin 05/14/2018 14 1     Hematocrit 05/14/2018 42 7     MCV 05/14/2018 94     MCH 05/14/2018 31 1     MCHC 05/14/2018 33 0     RDW 05/14/2018 15 8*    Platelets 09/01/2480 201     MPV 05/14/2018 10 7     LACTIC ACID 05/14/2018 1 8     Troponin I 05/14/2018 0 08*    Ventricular Rate 05/14/2018 92     Atrial Rate 05/14/2018 89     QRSD Interval 05/14/2018 104     QT Interval 05/14/2018 384     QTC Interval 05/14/2018 474     QRS Axis 05/14/2018 5     T Wave Axis 05/14/2018 196     PTT 05/14/2018 56*    Ventricular Rate 05/13/2018 92     Atrial Rate 05/13/2018 144     QRSD Interval 05/13/2018 92     QT Interval 05/13/2018 382     QTC Interval 05/13/2018 472     QRS Axis 05/13/2018 34     T Wave Axis 05/13/2018 216     PTT 05/14/2018 34     Protime 05/14/2018 17 6*    INR 05/14/2018 1 44*    WBC 05/14/2018 9 95     RBC 05/14/2018 4 17     Hemoglobin 05/14/2018 12 6     Hematocrit 05/14/2018 40 0     MCV 05/14/2018 96     MCH 05/14/2018 30 2     MCHC 05/14/2018 31 5     RDW 05/14/2018 15 9*    Platelets 31/03/1467 168     MPV 05/14/2018 10 2     WBC 05/15/2018 7 88     RBC 05/15/2018 4 05     Hemoglobin 05/15/2018 12 2     Hematocrit 05/15/2018 38 0     MCV 05/15/2018 94     MCH 05/15/2018 30 1     MCHC 05/15/2018 32 1     RDW 05/15/2018 16 0*    Platelets 49/79/2859 173     MPV 05/15/2018 11 0     Sodium 05/15/2018 141     Potassium 05/15/2018 3 5     Chloride 05/15/2018 110*    CO2 05/15/2018 23     Anion Gap 05/15/2018 8     BUN 05/15/2018 12     Creatinine 05/15/2018 0 75     Glucose 05/15/2018 156*    Calcium 05/15/2018 8 0*    AST 05/15/2018 119*    ALT 05/15/2018 342*    Alkaline Phosphatase 05/15/2018 162*    Total Protein 05/15/2018 5 5*    Albumin 05/15/2018 2 3*    Total Bilirubin 05/15/2018 2 32*    eGFR 05/15/2018 77     Magnesium 05/15/2018 2 3     Phosphorus 05/15/2018 1 8*         Results from last 7 days  Lab Units 05/13/18  2300   GRAM STAIN RESULT  Gram positive cocci in clusters         Results from last 7 days  Lab Units 05/13/18  2300   BLOOD CULTURE  No Growth at 24 hrs  Invalid input(s): LABAEARO            Imaging: I have personally reviewed pertinent films in PACS  EKG, Pathology, and Other Studies: I have personally reviewed pertinent reports        Code Status: Level 2 - DNAR: but accepts endotracheal intubation  Advance Directive and Living Will:      Power of :    POLST:

## 2018-05-15 NOTE — SOCIAL WORK
[facility] has no current beds, informed pt not yet ready for discharge and will keep them informed  Requested other snf rehab choices from pt and she states she is going home  CM following

## 2018-05-15 NOTE — PROGRESS NOTES
Patient came back to the floor from the OR around 2100  Patient sleeping/easy to arouse with no complaints at this time  Currently in afib (not new) with frequent PVCs, bigeminy, and trigeminy  Patient had a 9 beat run of vtach at 2144  Spoke to SANIYA and cardiology to update them  No new orders at this time  Will continue to monitor

## 2018-05-15 NOTE — PROGRESS NOTES
Cardiology Progress Note - Luis Armando Meza 69 y o  female MRN: [id number]    Unit/Bed#: [facility] 422-01 Encounter: [id number]      Assessment:  Principal Problem:    Severe sepsis ([address] )  Active Problems:    Chronic systolic heart failure (HCC)    Elevated liver enzymes    Abnormal CT of the abdomen    Elevated troponin    Atrial fibrillation (HCC)    Chronic anticoagulation    Fall        Plan  1  NSTEMI Type 2 sec to sepsis sec to possible  Cholangitis, obstructed CBD stent  Troponin stable at 0 08    2  Hx chronic systolic heart failure, tachycardia mediated cardiomyopathy - patient states leg swelling at baseline  Lungs are clear  Be careful with IV hydration  Received a dose of 20 mg IV Lasix on 5/13/18  Give IV lasix as needed  Last echo as per [facility] records in April 2018 showed LVEF 40-44%, LAD regional wall motion abnormality with anterior and anteroseptal wall hypokinesis, enlarged right ventricular size, reduced right ventricular function, moderately dilated left atrium and dilated right atria  Mild MR, mild TR  Recommend an outpatient stress test  Keep K>4, Mag>2  Asked medical team to replete phos  3   Chronic atrial fibrillation - Restarted Eliquis  History of atrial clot, TIA in past  c/w Lopressor 150 mg b i d    CArdiology will sign off  Please call with questions  Subjective:   Patient seen and examined  Denies chest pain, palpitations  No worsening sob or leg swelling from baseline    Objective:     Vitals: Blood pressure 141/72, pulse 92, temperature 98 5 °F (36 9 °C), temperature source Oral, resp  rate 16, height 5' 5" (1 651 m), weight 82 kg (180 lb 12 4 oz), SpO2 93 %  , Body mass index is 30 08 kg/m² , Orthostatic Blood Pressures      Most Recent Value   Blood Pressure  141/72 [Map 95] filed at 05/15/2018 1138   Patient Position - Orthostatic VS  Lying filed at 05/15/2018 1138            Intake/Output Summary (Last 24 hours) at 05/15/18 1245  Last data filed at 05/15/18 7773 Gross per 24 hour   Intake           684 24 ml   Output              300 ml   Net           384 24 ml       TELE: Afib  HR in the 90s to 100s   PVCs in bigeminy or trigeminy in AM  NSVT in AM     Physical Exam:    GEN: Fly Warren appears well, alert and oriented x 3, pleasant and cooperative   HEENT: pupils equal, round, and reactive to light; extraocular muscles intact  NECK: supple, no carotid bruits   HEART: regular rhythm, normal S1 and S2, no murmurs  LUNGS: no wheezes, rales, or rhonchi   ABDOMEN: normal bowel sounds, soft, no tenderness  EXTREMITIES: 1+ pitting edema  NEURO: no focal findings     Medications:      Current Facility-Administered Medications:     acetaminophen (TYLENOL) tablet 650 mg, 650 mg, Oral, Q6H PRN, Humera Yuan MD, 650 mg at 05/14/18 8407    apixaban (ELIQUIS) tablet 5 mg, 5 mg, Oral, BID, Vicente Andrews DO, 5 mg at 05/15/18 0850    calcium carbonate (TUMS) chewable tablet 500 mg, 500 mg, Oral, Daily PRN, CHANDRIKA Verdugo, 500 mg at 05/14/18 4206    HYDROmorphone (DILAUDID) injection 1 mg, 1 mg, Intravenous, Q3H PRN, Vicente Andrews DO    lactated ringers infusion, 50 mL/hr, Intravenous, Continuous, Simon Pitt CRNA, Stopped at 05/14/18 2100    lisinopril (ZESTRIL) tablet 5 mg, 5 mg, Oral, Daily, Humera Yuan MD, 5 mg at 05/15/18 0849    methocarbamol (ROBAXIN) tablet 500 mg, 500 mg, Oral, HS PRN, Humera Yuan MD    metoprolol tartrate (LOPRESSOR) tablet 150 mg, 150 mg, Oral, Q12H [address], Roland Talbert MD, 150 mg at 05/15/18 0850    ondansetron (ZOFRAN) injection 4 mg, 4 mg, Intravenous, Q6H PRN, Humera Yuan MD    oxyCODONE (ROXICODONE) IR tablet 15 mg, 15 mg, Oral, Q6H PRN, Vicente Andrews DO, 15 mg at 05/15/18 1057    [START ON 5/16/2018] pantoprazole (PROTONIX) EC tablet 40 mg, 40 mg, Oral, Early Morning, Sam Rios MD    piperacillin-tazobactam (ZOSYN) 3 375 g in sodium chloride 0 9 % 50 mL IVPB, 3 375 g, Intravenous, Q6H, Shelly Alvarado, MD, Last Rate: 100 mL/hr at 05/15/18 1102, 3 375 g at 05/15/18 1102    potassium chloride (K-DUR,KLOR-CON) CR tablet 40 mEq, 40 mEq, Oral, Once, Elly Cullen MD     Labs & Results:      Results from last 7 days  Lab Units 05/14/18  0440 05/13/18  2305 05/13/18 2009   CK TOTAL U/L  --   --  76   TROPONIN I ng/mL 0 08* 0 09* 0 08*       Results from last 7 days  Lab Units 05/15/18  0448 05/14/18  2327 05/14/18 0440   WBC Thousand/uL 7 88 9 95 16 52*   HEMOGLOBIN g/dL 12 2 12 6 14 1   HEMATOCRIT % 38 0 40 0 42 7   PLATELETS Thousands/uL 173 168 201           Results from last 7 days  Lab Units 05/15/18  0448 05/14/18  0440 05/13/18 2009   SODIUM mmol/L 141 142 142   POTASSIUM mmol/L 3 5 3 9 3 7   CHLORIDE mmol/L 110* 110* 109*   CO2 mmol/L 23 27 26   BUN mg/dL 12 12 14   CREATININE mg/dL 0 75 0 73 0 75   CALCIUM mg/dL 8 0* 8 3 7 8*   TOTAL PROTEIN g/dL 5 5* 6 1* 5 9*   BILIRUBIN TOTAL mg/dL 2 32* 5 76* 3 48*   ALK PHOS U/L 162* 195* 202*   ALT U/L 342* 597* 692*   AST U/L 119* 416* 658*   GLUCOSE RANDOM mg/dL 156* 113 116       Results from last 7 days  Lab Units 05/14/18  2136 05/14/18  1433 05/13/18 2009   INR  1 44*  --  1 54*   PTT seconds 34 56* 30       Results from last 7 days  Lab Units 05/15/18  0448 05/14/18 0440   MAGNESIUM mg/dL 2 3 2 5       EKG personally reviewed by Elly Cullen MD      Counseling / Coordination of Care  Total floor / unit time spent today 30 minutes  Greater than 50% of total time was spent with the patient and / or family counseling and / or coordination of care

## 2018-05-15 NOTE — ASSESSMENT & PLAN NOTE
Present on admission  Secondary to cholangitis  Appreciate GI, general surgery, Infectious Disease input  s/p ERCP 5/14/18 with stone removal and stent replacement  She denies worsening abdominal pain, abdominal exam reveals tenderness in the right upper quadrant but no left upper quadrant tenderness  Her LFTs are trending down from yesterday with total bilirubin of 2 32 today  Continue current antibiotics as per ID  Advance diet as tolerated  Repeat ERCP in 4/6 weeks  Blood culture x1 -- Gram-positive cocci

## 2018-05-15 NOTE — PROGRESS NOTES
Progress Note - Infectious Disease   Jose L Fox 69 y o  female MRN: [id number]  Unit/Bed#: [facility] 422-01 Encounter: [id number]      Impression/Plan:  1  Sepsis-POA  Leukocytosis and tachycardia with lactic acidosis  Suspect secondary to cholangitis  No other clear sources appreciated  Consideration for the possibility of bacteremia  The patient remains hemodynamically stable despite her systemic illness  She seems to be tolerating the antibiotics without difficulty thus far  The white cell count has begun to fall, and a lactic acidosis seems to have resolved  -continue Zosyn for now at current dose  -follow-up blood cultures  -monitor CBC with diff and CMP  -supportive care  -if patient without true bacteremia, will likely transition to oral antibiotics the next 24-48 hours      2   Presumptive cholangitis-in the setting of a previous ERCP and stent placement  Patient is now status post ERCP with removal of stones and stent revision  She has clinically improved   -antibiotics as above  -monitor liver function test  -close GI follow-up    3  Gram-positive bacteremia-suspect contaminant with only 1 of 2 blood cultures positive  Consideration for the possibility of a true bacteremia   -antibiotics as above  -follow up identification and sensitivities  -followup 2nd blood culture  -follow-up echocardiogram     4  Abnormal liver function tests-likely secondary to biliary obstruction  The liver function tests have improved after the stone removal and stent revision     No other clear source appreciated  -monitor liver function test  -GI follow-up  -no additional ID workup for now     5  Atrial fibrillation-with mild tachycardia  Relatively asymptomatic  -anticoagulation as per the primary service  -rate control    Antibiotics:  Zosyn 3    Subjective:  Patient has no fever, chills, sweats; no nausea, vomiting, diarrhea; no cough, shortness of breath; no increased pain  No new symptoms    The patient underwent ERCP with stone extraction and stent placement yesterday  Objective:  Vitals:  HR:  [] 102  Resp:  [12-18] 18  BP: (118-158)/(64-97) 151/64  SpO2:  [92 %-100 %] 93 %  Temp (24hrs), Av 8 °F (36 6 °C), Min:97 2 °F (36 2 °C), Max:98 2 °F (36 8 °C)  Current: Temperature: 98 2 °F (36 8 °C)    Physical Exam:   General Appearance:  Alert, interactive, nontoxic, no acute distress  Throat: Oropharynx moist without lesions  Lungs:   Clear to auscultation bilaterally; no wheezes, rhonchi or rales; respirations unlabored   Heart:  Tachycardic; no murmur, rub or gallop   Abdomen:   Soft, non-tender, non-distended, positive bowel sounds  Extremities: No clubbing, cyanosis or edema   Skin: No new rashes or lesions  No draining wounds noted  Labs, Imaging, & Other studies:   All pertinent labs and imaging studies were personally reviewed    Results from last 7 days  Lab Units 05/15/18  0448 05/14/18  2327 05/14/18  0440   WBC Thousand/uL 7 88 9 95 16 52*   HEMOGLOBIN g/dL 12 2 12 6 14 1   PLATELETS Thousands/uL 173 168 201       Results from last 7 days  Lab Units 05/15/18  0448 05/14/18  0440 05/13/18  2009   SODIUM mmol/L 141 142 142   POTASSIUM mmol/L 3 5 3 9 3 7   CHLORIDE mmol/L 110* 110* 109*   CO2 mmol/L 23 27 26   ANION GAP mmol/L 8 5 7   BUN mg/dL 12 12 14   CREATININE mg/dL 0 75 0 73 0 75   EGFR ml/min/1 73sq m 77 80 77   GLUCOSE RANDOM mg/dL 156* 113 116   CALCIUM mg/dL 8 0* 8 3 7 8*   AST U/L 119* 416* 658*   ALT U/L 342* 597* 692*   ALK PHOS U/L 162* 195* 202*   TOTAL PROTEIN g/dL 5 5* 6 1* 5 9*   BILIRUBIN TOTAL mg/dL 2 32* 5 76* 3 48*       Results from last 7 days  Lab Units 18  2300   BLOOD CULTURE  No Growth at 24 hrs     GRAM STAIN RESULT  Gram positive cocci in clusters

## 2018-05-15 NOTE — PROGRESS NOTES
Progress Note - Jorge Flatten [birth year], 69 y o  female MRN: [id number]    Unit/Bed#: [facility] 422-01 Encounter: [id number]    Primary Care Provider: Mouna Richards DO   Date and time admitted to hospital: 5/13/2018  7:47 PM        * Severe sepsis [facility])   Assessment & Plan    Present on admission  Secondary to cholangitis  Appreciate GI, general surgery, Infectious Disease input  s/p ERCP 5/14/18 with stone removal and stent replacement  She denies worsening abdominal pain, abdominal exam reveals tenderness in the right upper quadrant but no left upper quadrant tenderness  Her LFTs are trending down from yesterday with total bilirubin of 2 32 today  Continue current antibiotics as per ID  Advance diet as tolerated  Repeat ERCP in 4/6 weeks  Blood culture x1 -- Gram-positive cocci  Biliary stent obstruction, initial encounter   Assessment & Plan    Secondary to cholangitis  Appreciate GI, general surgery, Infectious Disease input  s/p ERCP 5/14/18 with stone removal and stent replacement  She denies worsening abdominal pain, abdominal exam reveals tenderness in the right upper quadrant but no left upper quadrant tenderness  Her LFTs are trending down from yesterday with total bilirubin of 2 32 today  Continue current antibiotics as per ID  Advance diet as tolerated  Repeat ERCP in 4/6 weeks  Blood culture x1 -- Gram-positive cocci  Atrial fibrillation ([address] )   Assessment & Plan    Continue metoprolol, Eliquis resume last evening  Chronic systolic heart failure (HCC)   Assessment & Plan    Continue with Lopressor, lisinopril  Daily weights, intake output  Elevated troponin   Assessment & Plan    Most likely secondary to NSTEMI Type 2 sec to sepsis  History of chronic systolic congestive heart failure  Will monitor trend patient admitted to step-down 1  Will continue gentle IV hydration  Cardiology consult appreciated          Decreased vision   Assessment & Plan Complaining of decreasing vision in the last couple of months  Consult Ophthalmology  Fall   Assessment & Plan    Secondary to # 1  General deconditioning  Management as above  PT OT evaluation, discharge planning        Anomalies of nails   Assessment & Plan    Very poor foot hygiene  Consult Podiatry for nail clipping         _____________________________________________________________________    SUBJECTIVE:   Patient seen and examined  States that the abdominal discomfort is much improved  Tolerating liquid diet  Complaining of decreased vision over the last couple of weeks  OBJECTIVE:     Vitals:   HR:  [] 102  Resp:  [12-18] 18  BP: (118-158)/(64-97) 151/64  SpO2:  [92 %-100 %] 93 %  Temp (24hrs), Av 8 °F (36 6 °C), Min:97 2 °F (36 2 °C), Max:98 2 °F (36 8 °C)  Current: Temperature: 98 2 °F (36 8 °C)    Intake/Output Summary (Last 24 hours) at 05/15/18 1046  Last data filed at 05/15/18 0647   Gross per 24 hour   Intake           684 24 ml   Output              300 ml   Net           384 24 ml       Physical Exam:   GENERAL: AAO x 3  HEENT: atraumatic, normocephalic  Oral mucosa moist, no icterus, pallor  PERRLA +  Neck supple, no JVD, no lymphadenopathy, no thryomegaly  CHEST: B/L breath sounds heard, occasional wheezing  CVS: S1, S2   ABDOMEN: Soft/flabby/JONN TENDERNESS  BOWEL SOUNDS HEARD  NEUROLOGICAL: CN II -XI grossly intact  No focal motor or sensory deficits  No signs of meningeal irritation or cerebellar dysfunction  EXTREMITIES:  Bilateral pitting pedal edema  Poor foot hygiene      LABS:  Results for Cleve Colt (MRN [id number]) as of 5/15/2018 10:37   5/15/2018 04:48   eGFR 77   Sodium 141   Potassium 3 5   Chloride 110 (H)   CO2 23   Anion Gap 8   BUN 12   Creatinine 0 75   Glucose 156 (H)   Calcium 8 0 (L)    (H)    (H)   Alkaline Phosphatase 162 (H)   Total Protein 5 5 (L)   Albumin 2 3 (L)   Total Bilirubin 2 32 (H)   Phosphorus 1 8 (L)   Magnesium 2  3   WBC 7 88   RBC 4 05   Hemoglobin 12 2   Hematocrit 38 0   MCV 94   MCH 30 1   MCHC 32 1   RDW 16 0 (H)   Platelets 173   MPV 83 7     Scheduled Meds:    Current Facility-Administered Medications:  acetaminophen 650 mg Oral Q6H PRN Jose Fuentes MD    apixaban 5 mg Oral BID Luis Daniel Cheese, DO    calcium carbonate 500 mg Oral Daily PRN Dena Baas, CRNP    HYDROmorphone 1 mg Intravenous Q3H PRN Luis Daniel Cheese, DO    lactated ringers 50 mL/hr Intravenous Continuous Edmond Deleon CRNA Last Rate: Stopped (05/14/18 2100)   lisinopril 5 mg Oral Daily Jose Fuentes MD    methocarbamol 500 mg Oral HS PRN Jose Fuentes MD    metoprolol tartrate 150 mg Oral Q12H [facility] Wei Silva MD    ondansetron 4 mg Intravenous Q6H PRN Jose Fuentes MD    oxyCODONE 15 mg Oral Q6H PRN Luis Daniel Cheese, DO    [START ON 5/16/2018] pantoprazole 40 mg Oral Early Morning Alessandra Becker MD    piperacillin-tazobactam 3 375 g Intravenous Q6H Sade Linn MD Last Rate: 3 375 g (05/15/18 0532)       Continuous Infusions:    lactated ringers 50 mL/hr Last Rate: Stopped (05/14/18 2100)       PRN Meds:    acetaminophen    calcium carbonate    HYDROmorphone    methocarbamol    ondansetron    oxyCODONE      VTE Prophylaxis:  Eliquis  CODE STATUS: FULL    Patient Centered Rounds: I have discussed today's plans with nursing staff today  DISPOSITION: Continue inpatient status  Medically not stable for discharge  PTOT evaluation  Most likely will require placement upon medical stability  Time Spent for Care: 20 minutes   More than 50% of total time spent on counseling and coordination of care as described above  Family will be updated  Jaswinder Eller MD  HOSPITALIST SERVICES  5/15/2018    PLEASE NOTE:  This encounter was completed utilizing the [employer] Direct Speech Voice Recognition Software   Grammatical errors, random word insertions, pronoun errors and incomplete sentences are occasional consequences of the system due to software limitations, ambient noise and hardware issues  These may be missed by proof reading prior to affixing electronic signature  Any questions or concerns about the content, text or information contained within the body of this dictation should be directly addressed to the physician for clarification  Please do not hesitate to call me directly if you have any any questions or concerns

## 2018-05-15 NOTE — OP NOTE
**** GI/ENDOSCOPY REPORT ****     PATIENT NAME: LISA FERGUSON - VISIT ID:  Patient ID: [id number] YOB: 1940     INTRODUCTION: Endoscopic Retrograde Cholangiopancreatography - A 69  year-old female patient presents for an inpatient Endoscopic Retrograde   Cholangiopancreatography at [address] [place] [address]  INDICATIONS: Cholangitis  CONSENT:  The benefits, risks, and alternatives to the procedure were   discussed and informed consent was obtained from the patient  PREPARATION:  EKG, pulse, pulse oximetry and blood pressure were monitored   throughout the procedure  ASA Classification: Class 4 - Patient has severe   systemic disturbance that is life threatening with or without surgery  MEDICATIONS: Anesthesia-check records     PROCEDURE:  The endoscope was passed with ease through the mouth under   direct visualization and advanced to the duodenum  The scope was withdrawn   and the mucosa was carefully examined  FINDINGS:  The ampulla was visualized  There was a stent seen  The stent   retrieval using a snare was performed with success  The stent was   retrieved using a snare  The ampulla was post sphincterotomy  The first   attempt at cannulation (via the major papilla) of the bile duct was   performed and entered deeply with a sphincterotome  A wire was advanced to   the intrahepatic ducts  Then using a 9-12mm extraction balloon multiple   stones were removed successfully using a balloon  A cholangiogram at the   end still showed some filling defects  The duct measured approximately 12   - 13mm  Then finally a [id number] plastic double pigtail stent was placed in   the bile duct wtih good drainage  COMPLICATIONS: There were no complications  IMPRESSIONS:  Cholangitis due to stent occlusion and choledocholithiasis  Successful stent removal and removal of multiple stones/ debris  A stent   was placed  RECOMMENDATIONS:  Continue antibiotics    Diet as tolerated  Repeat ERCP   in 4 - 6 weeks  ESTIMATED BLOOD LOSS:     PROCEDURE CODES:     ICD-9 Codes:     ICD-10 Codes:     PERFORMED BY: LOPEZ Metzger  on 05/14/2018  Version 1, electronically signed by LOPEZ Metzger  on 05/14/2018 at   20:17

## 2018-05-15 NOTE — TELEPHONE ENCOUNTER
----- Message from Rosemarie Rubio MD sent at 5/14/2018  8:21 PM EDT -----  Please schedule ercp in 4 -6 weeks  Thanks

## 2018-05-15 NOTE — PROGRESS NOTES
[facility]  Progress Note - Lemuel Clark 69 y o  female MRN: [id number]    Unit/Bed#: [facility] 422-01 Encounter: [id number]    Assessment/Plan:  1  Severe sepsis secondary to cholangitis due to obstructed biliary stent              -s/p ERCP 5/14/18 with stone removal and stent replacement               -she denies worsening abdominal pain, abdominal exam reveals tenderness in the right upper quadrant but no left upper quadrant tenderness               -Continue supportive care              -Her LFTs are trending down from yesterday with total bilirubin of 2 32 today               -continue antibiotics with vancomycin and Zosyn              -continue to monitor vitals and mental status  -Advance diet as tolerated   -Repeat ERCP in 4/6 weeks    Subjective:   She reports that she continues to have abdominal pain primarily in the right upper quadrant radiating to her back however she states it is somewhat improved from yesterday and she denies any nausea or vomiting  Objective:     Vitals: Blood pressure 151/64, pulse 102, temperature 98 2 °F (36 8 °C), temperature source Oral, resp  rate 18, height 5' 5" (1 651 m), weight 82 kg (180 lb 12 4 oz), SpO2 93 %  ,Body mass index is 30 08 kg/m²  Intake/Output Summary (Last 24 hours) at 05/15/18 0959  Last data filed at 05/15/18 0647   Gross per 24 hour   Intake           684 24 ml   Output              300 ml   Net           384 24 ml       Review of Systems: as per HPI  Review of Systems   Constitutional: Positive for fatigue  Negative for activity change, appetite change, chills, fever and unexpected weight change  HENT: Negative for mouth sores, sore throat and trouble swallowing  Respiratory: Negative for shortness of breath  Cardiovascular: Negative for chest pain  Gastrointestinal: Positive for abdominal pain  Negative for abdominal distention, blood in stool, constipation, diarrhea, nausea and vomiting     Skin: Negative for color change, pallor, rash and wound  Neurological: Negative for tremors and syncope  All other systems reviewed and are negative  Physical Exam:     Physical Exam   Constitutional: She is oriented to person, place, and time  She appears well-developed and well-nourished  No distress  HENT:   Head: Normocephalic and atraumatic  Eyes: Right eye exhibits no discharge  Left eye exhibits no discharge  No scleral icterus  Neck: Neck supple  No tracheal deviation present  Cardiovascular: Normal rate, regular rhythm and normal heart sounds  Exam reveals no gallop and no friction rub  No murmur heard  Pulmonary/Chest: Effort normal and breath sounds normal  No respiratory distress  She has no wheezes  She has no rales  Abdominal: Soft  Bowel sounds are normal  She exhibits no distension and no mass  There is tenderness (RUQ)  There is no rebound and no guarding  Neurological: She is alert and oriented to person, place, and time  Skin: Skin is warm and dry  Psychiatric: She has a normal mood and affect           Invasive Devices     Peripheral Intravenous Line            Peripheral IV 05/13/18 Left Antecubital 1 day    Peripheral IV 05/14/18 Left Arm less than 1 day          Arterial Line            Arterial Line 05/14/18 Right Radial less than 1 day                        CBC: Lab Results   Component Value Date    WBC 7 88 05/15/2018    HGB 12 2 05/15/2018    HCT 38 0 05/15/2018    MCV 94 05/15/2018     05/15/2018    MCH 30 1 05/15/2018    MCHC 32 1 05/15/2018    RDW 16 0 (H) 05/15/2018    MPV 11 0 05/15/2018   ,   CMP: Lab Results   Component Value Date     05/15/2018    K 3 5 05/15/2018     (H) 05/15/2018    CO2 23 05/15/2018    ANIONGAP 8 05/15/2018    BUN 12 05/15/2018    CREATININE 0 75 05/15/2018    GLUCOSE 156 (H) 05/15/2018    CALCIUM 8 0 (L) 05/15/2018     (H) 05/15/2018     (H) 05/15/2018    ALKPHOS 162 (H) 05/15/2018    PROT 5 5 (L) 05/15/2018 BILITOT 2 32 (H) 05/15/2018    EGFR 77 05/15/2018   ,   Lipase: No results found for: LIPASE,  PT/INR:   Lab Results   Component Value Date    INR 1 44 (H) 05/14/2018   ,   Troponin: No results found for: TROPONINI,   Magnesium: No results found for: MAG,   Phosphorous:   Lab Results   Component Value Date    PHOS 1 8 (L) 05/15/2018

## 2018-05-15 NOTE — PROGRESS NOTES
The pantoprazole has / have been converted to Oral per [facility] for Adults as approved by the Pharmacy and Therapeutics Committee  The patient met all eligible criteria:  3 Age = 25years old   2) Received at least one dose of the IV form   3) Receiving at least one other scheduled oral/enteral medication   4) Tolerating an oral/enteral diet   and did not have any exclusions:   1) Critical care patient   2) Active GI bleed (IF assessing H2RAs or PPIs)   3) Continuous tube feeding (IF assessing cipro, doxycycline, levofloxacin, minocycline, rifampin, or voriconazole)   4) Receiving PO vancomycin (IF assessing metronidazole)   5) Persistent nausea and/or vomiting   6) Ileus or gastrointestinal obstruction   7) Dona/nasogastric tube set for continuous suction   8) Specific order not to automatically convert to PO (in the order's comments or if discussed in the most recent Infectious Disease or primary team's progress notes)

## 2018-05-15 NOTE — PERIOPERATIVE NURSING NOTE
AS PER DR Taiwo Trejo MD, RE-START HEPARIN GTT AT PREVIOUS RATE ON THE FLOOR  WILL CONTINUE TO MONITOR

## 2018-05-15 NOTE — ASSESSMENT & PLAN NOTE
Most likely secondary to NSTEMI Type 2 sec to sepsis  History of chronic systolic congestive heart failure  Will monitor trend patient admitted to step-down 1  Will continue gentle IV hydration  Cardiology consult appreciated

## 2018-05-15 NOTE — ANESTHESIA POSTPROCEDURE EVALUATION
Post-Op Assessment Note      CV Status:  Stable    Mental Status:  Awake    Hydration Status:  Stable    PONV Controlled:  None    Airway Patency:  Patent    Post Op Vitals Reviewed: Yes          Staff: Anesthesiologist, CRNA           BP      Temp 97 5 °F (36 4 °C) (05/14/18 2010)    Pulse (!) 116 (05/14/18 2010)   Resp 18 (05/14/18 2010)    SpO2 99 % (05/14/18 2010)

## 2018-05-15 NOTE — ASSESSMENT & PLAN NOTE
Secondary to cholangitis  Appreciate GI, general surgery, Infectious Disease input  s/p ERCP 5/14/18 with stone removal and stent replacement  She denies worsening abdominal pain, abdominal exam reveals tenderness in the right upper quadrant but no left upper quadrant tenderness  Her LFTs are trending down from yesterday with total bilirubin of 2 32 today  Continue current antibiotics as per ID  Advance diet as tolerated  Repeat ERCP in 4/6 weeks  Blood culture x1 -- Gram-positive cocci

## 2018-05-15 NOTE — PROGRESS NOTES
Progress Note - General Surgery   Marcelina Moreno 69 y o  female MRN: [id number]  Unit/Bed#: [facility] 422-01 Encounter: [id number]    Assessment:  70 yo F with history of heart failure, a fib, atrial clot presenting found down with cholangitis and UTI  S/P ERCP yesterday w/ stent exchange    Plan:  F/U AM T bili, LFTs  Management A fib per SLIM, cardiology  Can likely D/C Arterial line as pressures stable, causing discomfort- will defer to primary  GI low fat diet  Zosyn per ID    Subjective/Objective   Chief Complaint: None    Subjective: Complaints of pain from right wrist A-line  Underwent ERCP last night  Denies abdominal pain, no N/V, tolerating diet  Had nonsustained V tach last night, cards and SLIM aware  Asymptomatic  Objective:     Blood pressure 118/65, pulse 105, temperature 98 °F (36 7 °C), temperature source Oral, resp  rate 18, height 5' 5" (1 651 m), weight 81 5 kg (179 lb 10 8 oz), SpO2 95 %  ,Body mass index is 29 9 kg/m²        Intake/Output Summary (Last 24 hours) at 05/15/18 0551  Last data filed at 05/15/18 0133   Gross per 24 hour   Intake           851 24 ml   Output              691 ml   Net           160 24 ml       Invasive Devices     Peripheral Intravenous Line            Peripheral IV 05/13/18 Left Antecubital 1 day    Peripheral IV 05/14/18 Left Arm less than 1 day          Arterial Line            Arterial Line 05/14/18 Right Radial less than 1 day                Physical Exam:   Gen: A&O, NAD  Cardio: RRR  Lungs: CTAB  Abd: Soft, non distended, non tender    Lab, Imaging and other studies:  CBC:   Lab Results   Component Value Date    WBC 7 88 05/15/2018    HGB 12 2 05/15/2018    HCT 38 0 05/15/2018    MCV 94 05/15/2018     05/15/2018    MCH 30 1 05/15/2018    MCHC 32 1 05/15/2018    RDW 16 0 (H) 05/15/2018    MPV 11 0 05/15/2018   , CMP: No results found for: NA, K, CL, CO2, ANIONGAP, BUN, CREATININE, GLUCOSE, CALCIUM, AST, ALT, ALKPHOS, PROT, ALBUMIN, BILITOT, EGFR  VTE Pharmacologic Prophylaxis: Reason for no pharmacologic prophylaxis Eliquis  VTE Mechanical Prophylaxis: sequential compression device

## 2018-05-16 LAB
ALBUMIN SERPL BCP-MCNC: 2.3 G/DL (ref 3.5–5)
ALP SERPL-CCNC: 140 U/L (ref 46–116)
ALT SERPL W P-5'-P-CCNC: 246 U/L (ref 12–78)
ANION GAP SERPL CALCULATED.3IONS-SCNC: 7 MMOL/L (ref 4–13)
AST SERPL W P-5'-P-CCNC: 45 U/L (ref 5–45)
BILIRUB DIRECT SERPL-MCNC: 0.71 MG/DL (ref 0–0.2)
BILIRUB SERPL-MCNC: 1.17 MG/DL (ref 0.2–1)
BUN SERPL-MCNC: 11 MG/DL (ref 5–25)
CALCIUM SERPL-MCNC: 8 MG/DL (ref 8.3–10.1)
CHLORIDE SERPL-SCNC: 108 MMOL/L (ref 100–108)
CO2 SERPL-SCNC: 23 MMOL/L (ref 21–32)
CREAT SERPL-MCNC: 0.68 MG/DL (ref 0.6–1.3)
ERYTHROCYTE [DISTWIDTH] IN BLOOD BY AUTOMATED COUNT: 16.1 % (ref 11.6–15.1)
GFR SERPL CREATININE-BSD FRML MDRD: 85 ML/MIN/1.73SQ M
GLUCOSE SERPL-MCNC: 107 MG/DL (ref 65–140)
HCT VFR BLD AUTO: 37.4 % (ref 34.8–46.1)
HGB BLD-MCNC: 12.1 G/DL (ref 11.5–15.4)
MCH RBC QN AUTO: 30.8 PG (ref 26.8–34.3)
MCHC RBC AUTO-ENTMCNC: 32.4 G/DL (ref 31.4–37.4)
MCV RBC AUTO: 95 FL (ref 82–98)
PHOSPHATE SERPL-MCNC: 2 MG/DL (ref 2.3–4.1)
PLATELET # BLD AUTO: 157 THOUSANDS/UL (ref 149–390)
PMV BLD AUTO: 11.3 FL (ref 8.9–12.7)
POTASSIUM SERPL-SCNC: 4.2 MMOL/L (ref 3.5–5.3)
PROT SERPL-MCNC: 5.7 G/DL (ref 6.4–8.2)
RBC # BLD AUTO: 3.93 MILLION/UL (ref 3.81–5.12)
SODIUM SERPL-SCNC: 138 MMOL/L (ref 136–145)
WBC # BLD AUTO: 7.22 THOUSAND/UL (ref 4.31–10.16)

## 2018-05-16 PROCEDURE — 97530 THERAPEUTIC ACTIVITIES: CPT

## 2018-05-16 PROCEDURE — 80048 BASIC METABOLIC PNL TOTAL CA: CPT | Performed by: INTERNAL MEDICINE

## 2018-05-16 PROCEDURE — 85027 COMPLETE CBC AUTOMATED: CPT | Performed by: INTERNAL MEDICINE

## 2018-05-16 PROCEDURE — 99232 SBSQ HOSP IP/OBS MODERATE 35: CPT | Performed by: INTERNAL MEDICINE

## 2018-05-16 PROCEDURE — 80076 HEPATIC FUNCTION PANEL: CPT | Performed by: INTERNAL MEDICINE

## 2018-05-16 PROCEDURE — 84100 ASSAY OF PHOSPHORUS: CPT | Performed by: INTERNAL MEDICINE

## 2018-05-16 RX ORDER — ALPRAZOLAM 0.25 MG/1
0.25 TABLET ORAL ONCE
Status: COMPLETED | OUTPATIENT
Start: 2018-05-16 | End: 2018-05-16

## 2018-05-16 RX ORDER — ALPRAZOLAM 0.25 MG/1
0.25 TABLET ORAL
Status: DISCONTINUED | OUTPATIENT
Start: 2018-05-16 | End: 2018-05-21 | Stop reason: HOSPADM

## 2018-05-16 RX ORDER — LIDOCAINE 50 MG/G
1 PATCH TOPICAL DAILY
Status: DISCONTINUED | OUTPATIENT
Start: 2018-05-17 | End: 2018-05-21 | Stop reason: HOSPADM

## 2018-05-16 RX ADMIN — METOPROLOL TARTRATE 150 MG: 50 TABLET ORAL at 21:34

## 2018-05-16 RX ADMIN — DIBASIC SODIUM PHOSPHATE, MONOBASIC POTASSIUM PHOSPHATE AND MONOBASIC SODIUM PHOSPHATE 1 TABLET: 852; 155; 130 TABLET ORAL at 17:31

## 2018-05-16 RX ADMIN — ALPRAZOLAM 0.25 MG: 0.25 TABLET ORAL at 02:30

## 2018-05-16 RX ADMIN — PANTOPRAZOLE SODIUM 40 MG: 40 TABLET, DELAYED RELEASE ORAL at 05:00

## 2018-05-16 RX ADMIN — MELATONIN TAB 3 MG 3 MG: 3 TAB at 21:34

## 2018-05-16 RX ADMIN — APIXABAN 5 MG: 5 TABLET, FILM COATED ORAL at 17:31

## 2018-05-16 RX ADMIN — LISINOPRIL 5 MG: 5 TABLET ORAL at 09:40

## 2018-05-16 RX ADMIN — LIDOCAINE 1 PATCH: 50 PATCH CUTANEOUS at 23:48

## 2018-05-16 RX ADMIN — OXYCODONE HYDROCHLORIDE 15 MG: 10 TABLET ORAL at 18:12

## 2018-05-16 RX ADMIN — PIPERACILLIN SODIUM AND TAZOBACTAM SODIUM 3.38 G: 36; 4.5 INJECTION, POWDER, FOR SOLUTION INTRAVENOUS at 23:11

## 2018-05-16 RX ADMIN — PIPERACILLIN SODIUM AND TAZOBACTAM SODIUM 3.38 G: 36; 4.5 INJECTION, POWDER, FOR SOLUTION INTRAVENOUS at 12:09

## 2018-05-16 RX ADMIN — OXYCODONE HYDROCHLORIDE 15 MG: 10 TABLET ORAL at 12:07

## 2018-05-16 RX ADMIN — APIXABAN 5 MG: 5 TABLET, FILM COATED ORAL at 09:39

## 2018-05-16 RX ADMIN — METOPROLOL TARTRATE 150 MG: 50 TABLET ORAL at 09:39

## 2018-05-16 RX ADMIN — DIBASIC SODIUM PHOSPHATE, MONOBASIC POTASSIUM PHOSPHATE AND MONOBASIC SODIUM PHOSPHATE 1 TABLET: 852; 155; 130 TABLET ORAL at 12:07

## 2018-05-16 RX ADMIN — ALPRAZOLAM 0.25 MG: 0.25 TABLET ORAL at 23:12

## 2018-05-16 RX ADMIN — OXYCODONE HYDROCHLORIDE 15 MG: 10 TABLET ORAL at 05:28

## 2018-05-16 RX ADMIN — POTASSIUM PHOSPHATE, MONOBASIC AND POTASSIUM PHOSPHATE, DIBASIC 12 MMOL: 224; 236 INJECTION, SOLUTION INTRAVENOUS at 14:13

## 2018-05-16 RX ADMIN — PIPERACILLIN SODIUM AND TAZOBACTAM SODIUM 3.38 G: 36; 4.5 INJECTION, POWDER, FOR SOLUTION INTRAVENOUS at 04:56

## 2018-05-16 RX ADMIN — HYDROMORPHONE HYDROCHLORIDE 1 MG: 1 INJECTION, SOLUTION INTRAMUSCULAR; INTRAVENOUS; SUBCUTANEOUS at 23:45

## 2018-05-16 RX ADMIN — PIPERACILLIN SODIUM AND TAZOBACTAM SODIUM 3.38 G: 36; 4.5 INJECTION, POWDER, FOR SOLUTION INTRAVENOUS at 18:28

## 2018-05-16 NOTE — ASSESSMENT & PLAN NOTE
· Present on admission  · Secondary to cholangitis  · Appreciate GI, general surgery, Infectious Disease input  · s/p ERCP 5/14/18 with stone removal and stent replacement  · She denies worsening abdominal pain, abdominal exam reveals tenderness in the right upper quadrant but no left upper quadrant tenderness  · Her LFTs are trending down from yesterday with total bilirubin of 2 32 today  · Continue current antibiotics as per ID  · Advance diet as tolerated  · Repeat ERCP in 4/6 weeks  · Blood culture x1 -- Gram-positive cocci -- most likely contaminant

## 2018-05-16 NOTE — PLAN OF CARE
Problem: PHYSICAL THERAPY ADULT  Goal: Performs mobility at highest level of function for planned discharge setting  See evaluation for individualized goals  Treatment/Interventions: Functional transfer training, LE strengthening/ROM, Elevations, Therapeutic exercise, Endurance training, Bed mobility, Gait training, Equipment eval/education, Spoke to nursing, Spoke to MD, Spoke to case management  Equipment Recommended:  (TBD)       See flowsheet documentation for full assessment, interventions and recommendations  Outcome: Progressing  Prognosis: Good  Problem List: Decreased strength, Decreased endurance, Impaired balance, Decreased mobility  Assessment: Pt demonstrated overall improvement in mobility skills progressing to amb trial w/ rw; min (A)x1 is still requires w/ all aspects of OOB mobility and pt remains to be generally weak and deconditioned; based on above, cont to recommend rehab upon D/C when medically cleared; will follow  Barriers to Discharge: Inaccessible home environment, Decreased caregiver support     Recommendation: Post acute IP rehab          See flowsheet documentation for full assessment

## 2018-05-16 NOTE — PROGRESS NOTES
Progress Note - Zee Boys [birth year], 69 y o  female MRN: [id number]    Unit/Bed#: [facility] 422-01 Encounter: [id number]    Primary Care Provider: Abdelrahman Perales DO   Date and time admitted to hospital: 5/13/2018  7:47 PM        * Severe sepsis [facility])   Assessment & Plan      · Present on admission  · Secondary to cholangitis  · Appreciate GI, general surgery, Infectious Disease input  · s/p ERCP 5/14/18 with stone removal and stent replacement  · She denies worsening abdominal pain, abdominal exam reveals tenderness in the right upper quadrant but no left upper quadrant tenderness  · Her LFTs are trending down from yesterday with total bilirubin of 2 32 today  · Continue current antibiotics as per ID  · Advance diet as tolerated  · Repeat ERCP in 4/6 weeks  · Blood culture x1 -- Gram-positive cocci -- most likely contaminant  Biliary stent obstruction, initial encounter   Assessment & Plan    · Secondary to cholangitis  · Appreciate GI, general surgery, Infectious Disease input  · s/p ERCP 5/14/18 with stone removal and stent replacement  · She denies worsening abdominal pain, abdominal exam reveals tenderness in the right upper quadrant but no left upper quadrant tenderness  · Her LFTs are trending down from yesterday with total bilirubin of 2 32 today  · Continue current antibiotics as per ID  · Advance diet as tolerated  · Repeat ERCP in 4/6 weeks  · Blood culture x1 -- Gram-positive cocci -- most likely contaminant  Atrial fibrillation (HCC)   Assessment & Plan    · Continue metoprolol, Eliquis  Chronic systolic heart failure (HCC)   Assessment & Plan    · Continue with Lopressor, lisinopril  · Daily weights, intake output  Elevated troponin   Assessment & Plan    · Most likely secondary to NSTEMI Type 2 sec to sepsis  · History of chronic systolic congestive heart failure  · Cardiology consult appreciated          Decreased vision   Assessment & Plan Appreciate input from Ophthalmology  No acute interventions for now  Fall   Assessment & Plan    · Secondary to # 1     · General deconditioning  · Management as above  · PT OT evaluation, discharge planning        Anomalies of nails   Assessment & Plan    · Very poor foot hygiene  · Appreciate input from Podiatry  Hypophosphatemia  Replaced with IV K-Phos and oral phosphate supplements  Recheck phosphorus level in a m  SUBJECTIVE:   Patient seen and examined  Abdominal pain subsided but complains of continued generalized weakness  She is refusing any placement for now  Tolerating p o  diet  No stools for the last 2 days  Requesting if arterial line can be taken out as it is limiting her mobility  OBJECTIVE:   Vitals:    05/16/18 0800   BP: 134/65   Pulse: 97   Resp: 18   Temp: 97 5 °F (36 4 °C)   SpO2: 95%       Intake/Output Summary (Last 24 hours) at 05/16/18 1105  Last data filed at 05/16/18 0829   Gross per 24 hour   Intake              720 ml   Output              700 ml   Net               20 ml       Physical Exam:   GENERAL: AAO x 3  HEENT: atraumatic, normocephalic  Oral mucosa moist, no icterus, pallor  PERRLA +  Neck supple, no JVD, no lymphadenopathy, no thryomegaly  CHEST: B/L breath sounds heard, occasional wheezing  CVS: S1, S2   ABDOMEN: Soft/flabby/minimal tenderness on palpation right upper quadrant  Bowel sounds heard  NEUROLOGICAL: CN II -XI grossly intact  No focal motor or sensory deficits  No signs of meningeal irritation or cerebellar dysfunction  EXTREMITIES:  Bilateral pitting pedal edema   Poor foot hygiene      LABS:  Results for Chika Courser (MRN [id number]) as of 5/16/2018 10:57   5/16/2018 04:53   eGFR 85   Sodium 138   Potassium 4 2   Chloride 108   CO2 23   Anion Gap 7   BUN 11   Creatinine 0 68   Glucose 107   Calcium 8 0 (L)   AST 45    (H)   Alkaline Phosphatase 140 (H)   Total Protein 5 7 (L)   Albumin 2 3 (L)   Total Bilirubin 1 17 (H) Phosphorus 2 0 (L)   BILIRUBIN DIRECT 0 71 (H)   WBC 7 22   RBC 3 93   Hemoglobin 12 1   MCV 95   MCH 30 8   MCHC 32 4   RDW 16 1 (H)   Platelets 897   MPV 18 5     Scheduled Meds:  Current Facility-Administered Medications:  acetaminophen 650 mg Oral Q6H PRN Christie Sher MD    apixaban 5 mg Oral BID Kia Garcia,     calcium carbonate 500 mg Oral Daily PRN CHANDRIKA Barcenas    HYDROmorphone 1 mg Intravenous Q3H PRN Kia Garcia, DO    lactated ringers 50 mL/hr Intravenous Continuous Saira Velez CRNA Last Rate: Stopped (05/14/18 2100)   lisinopril 5 mg Oral Daily Christie Sher MD    melatonin 3 mg Oral HS Jean Santoyo MD    methocarbamol 500 mg Oral HS PRN Christie Sher MD    metoprolol tartrate 150 mg Oral Q12H [facility] Ria Salvador MD    ondansetron 4 mg Intravenous Q6H PRN Christie Sher MD    oxyCODONE 15 mg Oral Q6H PRN Shahid Rodgers PA-C    pantoprazole 40 mg Oral Early Morning Rollo Bolus, MD    piperacillin-tazobactam 3 375 g Intravenous Q6H Ramon Tamez MD Last Rate: 3 375 g (05/16/18 0456)   potassium phosphate 12 mmol Intravenous Once Jane Marcus MD    potassium-sodium phosphateS 1 tablet Oral TID With Meals Jane Marcus MD      Continuous Infusions:  lactated ringers 50 mL/hr Last Rate: Stopped (05/14/18 2100)     PRN Meds:   acetaminophen    calcium carbonate    HYDROmorphone    methocarbamol    ondansetron    oxyCODONE    VTE Prophylaxis:  Eliquis      CODE STATUS: FULL     Patient Centered Rounds: I have discussed today's plans with nursing staff today      DISPOSITION:   · Continue inpatient status  Discontinue arterial line  · Transfer out to regular med surge floor  · Medically not stable for discharge  · PTOT evaluation  · Most likely will require placement upon medical stability      Time Spent for Care: 20 minutes   More than 50% of total time spent on counseling and coordination of care as described above   Family will be updated      Janee Casanova MD  HOSPITALIST SERVICES  5/16/2018     PLEASE NOTE:  This encounter was completed utilizing the [employer] Direct Speech Voice Recognition Software  Grammatical errors, random word insertions, pronoun errors and incomplete sentences are occasional consequences of the system due to software limitations, ambient noise and hardware issues  These may be missed by proof reading prior to affixing electronic signature  Any questions or concerns about the content, text or information contained within the body of this dictation should be directly addressed to the physician for clarification  Please do not hesitate to call me directly

## 2018-05-16 NOTE — ASSESSMENT & PLAN NOTE
· Secondary to # 1     · General deconditioning  · Management as above  · PT OT evaluation, discharge planning

## 2018-05-16 NOTE — ASSESSMENT & PLAN NOTE
· Most likely secondary to NSTEMI Type 2 sec to sepsis  · History of chronic systolic congestive heart failure  · Cardiology consult appreciated

## 2018-05-16 NOTE — PHYSICAL THERAPY NOTE
PHYSICAL THERAPY NOTE          Patient Name: Denisha ROMANO Date: 5/16/2018 05/16/18 5885   Pain Assessment   Pain Assessment FLACC   Pain Type Chronic pain   Pain Location Back   Pain Descriptors Aching   Pain Frequency Intermittent   Pain Onset Ongoing   Clinical Progression Gradually improving   Effect of Pain on Daily Activities occasional c/o during mobilization   Patient's Stated Pain Goal No pain   Hospital Pain Intervention(s) Repositioned; Ambulation/increased activity; Distraction; Emotional support   Response to Interventions feels more comfortable at the end of session   Pain Rating: FLACC (Rest) - Face 0   Pain Rating: FLACC (Rest) - Legs 0   Pain Rating: FLACC (Rest) - Activity 0   Pain Rating: FLACC (Rest) - Cry 0   Pain Rating: FLACC (Rest) - Consolability 0   Score: FLACC (Rest) 0   Pain Rating: FLACC (Activity) - Face 1   Pain Rating: FLACC (Activity) - Legs 0   Pain Rating: FLACC (Activity) - Activity 0   Pain Rating: FLACC (Activity) - Cry 1   Pain Rating: FLACC (Activity) - Consolability 0   Score: FLACC (Activity) 2   Restrictions/Precautions   Other Precautions Fall Risk;Telemetry;Multiple lines;Cognitive  (chair alarm activated at the end of session)   General   Chart Reviewed Yes   Additional Pertinent History cleared for Tx session (spoke to nsg)   Cognition   Overall Cognitive Status Impaired   Arousal/Participation Alert   Attention Attends with cues to redirect   Orientation Level Oriented to person;Oriented to place;Oriented to situation   Memory Decreased recall of recent events   Following Commands Follows one step commands without difficulty   Subjective   Subjective Pt is observed in bed; agreeable to try mobilization; reports she is not sure how she is going to walk and wishes to use a walker; states she has been mobilizing to the BR   Bed Mobility   Supine to Sit 5  Supervision   Additional items Assist x 1;HOB elevated;Verbal cues   Transfers   Sit to Stand 4  Minimal assistance   Additional items Assist x 1;Verbal cues  (2 trials)   Stand to Sit 4  Minimal assistance   Additional items Assist x 1;Verbal cues  (2 trials)   Stand pivot 4  Minimal assistance   Additional items Verbal cues  (bed to chair to the (R) side; stand by (A) of 2nd for lines)   Ambulation/Elevation   Gait pattern Excessively slow; Short stride; Inconsistent annie; Foward flexed   Gait Assistance 4  Minimal assist   Additional items Assist x 1;Verbal cues; Tactile cues   Assistive Device Rolling walker   Distance 2 x 30 ft w/ brief standing stop in between   Balance   Static Sitting Fair +   Static Standing Fair -   Dynamic Standing Poor +   Ambulatory Poor +   Activity Tolerance   Activity Tolerance Patient limited by fatigue  (O2 sat at 100 % post mobilization)   Nurse Made Aware spoke to Andrea Grossman RN   Exercises   Hip Abduction Sitting;10 reps;AAROM; Bilateral  (2 sets)   Knee AROM Long Arc Quad Sitting;10 reps;AROM; Bilateral  (2 sets)   Ankle Pumps Sitting;10 reps;AROM; Bilateral  (2 sets)   Equipment Use   Comments Pillows placed for (B) UE support; call bell w/in reach   Assessment   Prognosis Good   Problem List Decreased strength;Decreased endurance; Impaired balance;Decreased mobility   Assessment Pt demonstrated overall improvement in mobility skills progressing to amb trial w/ rw; min (A)x1 is still requires w/ all aspects of OOB mobility and pt remains to be generally weak and deconditioned; based on above, cont to recommend rehab upon D/C when medically cleared; will follow  Barriers to Discharge Inaccessible home environment;Decreased caregiver support   Goals   Patient Goals to move better   LTG Expiration Date 05/28/18   Treatment Day 1   Plan   Treatment/Interventions Functional transfer training;LE strengthening/ROM; Elevations; Therapeutic exercise; Bed mobility;Gait training;Spoke to nursing   Progress Progressing toward goals   PT Frequency 5x/wk   Recommendation   Recommendation Post acute IP rehab   Equipment Recommended Mary Sosa, PT

## 2018-05-16 NOTE — PROGRESS NOTES
Progress Note - Infectious Disease   Marcelina Moreno 69 y o  female MRN: [id number]  Unit/Bed#: [facility] 422-01 Encounter: [id number]      Impression/Plan:  1   Sepsis-POA   Leukocytosis and tachycardia with lactic acidosis   Suspect secondary to cholangitis   No other clear sources appreciated   Consideration for the possibility of bacteremia   The patient remains hemodynamically stable despite her systemic illness   She seems to be tolerating the antibiotics without difficulty thus far   The white cell count has come down, and a lactic acidosis seems to have resolved  -continue Zosyn for now at current dose  -likely to oral antibiotics tomorrow if continues to do well  -follow-up blood cultures  -monitor CBC with diff and CMP  -supportive care     2   Presumptive cholangitis-in the setting of a previous ERCP and stent placement  Patient is now status post ERCP with removal of stones and stent revision  She has clinically improved   -antibiotics as above  -monitor liver function test  -close GI follow-up     3  Gram-positive bacteremia-suspect contaminant with only 1 of 2 blood cultures positive  Consideration for the possibility of a true bacteremia  Echocardiogram with no reported valvular vegetation  -antibiotics as above  -follow up identification and sensitivities  -followup 2nd blood culture     4   Abnormal liver function tests-likely secondary to biliary obstruction  The liver function tests have improved after the stone removal and stent revision    No other clear source appreciated  -recheck CMP tomorrow  -GI follow-up  -no additional ID workup for now     5   Atrial fibrillation-with mild tachycardia   Relatively asymptomatic  -anticoagulation as per the primary service  -rate control    Antibiotics:  Zosyn 4    Subjective:  Patient has no fever, chills, sweats; no nausea, vomiting, diarrhea; no cough, shortness of breath; no pain  No new symptoms  She is eating better      Objective:  Vitals:  HR:  [80-97] 97  Resp:  [16-18] 18  BP: (122-141)/(65-88) 134/65  SpO2:  [93 %-98 %] 95 %  Temp (24hrs), Av 3 °F (36 8 °C), Min:97 5 °F (36 4 °C), Max:98 9 °F (37 2 °C)  Current: Temperature: 97 5 °F (36 4 °C)    Physical Exam:   General Appearance:  Alert, interactive, nontoxic, no acute distress  Throat: Oropharynx moist without lesions  Lungs:   Decreased breath sounds bilaterally; no wheezes, rhonchi or rales; respirations unlabored   Heart:  Tachycardic; no murmur, rub or gallop   Abdomen:   Soft, non-tender, non-distended, positive bowel sounds  Extremities: No clubbing, cyanosis or edema   Skin: No new rashes or lesions  No draining wounds noted  Labs, Imaging, & Other studies:   All pertinent labs and imaging studies were personally reviewed    Results from last 7 days  Lab Units 18  0453 05/15/18  0448 18  2327   WBC Thousand/uL 7 22 7 88 9 95   HEMOGLOBIN g/dL 12 1 12 2 12 6   PLATELETS Thousands/uL 157 173 168       Results from last 7 days  Lab Units 18  0453 05/15/18  0448 18  0440   SODIUM mmol/L 138 141 142   POTASSIUM mmol/L 4 2 3 5 3 9   CHLORIDE mmol/L 108 110* 110*   CO2 mmol/L 23 23 27   ANION GAP mmol/L 7 8 5   BUN mg/dL 11 12 12   CREATININE mg/dL 0 68 0 75 0 73   EGFR ml/min/1 73sq m 85 77 80   GLUCOSE RANDOM mg/dL 107 156* 113   CALCIUM mg/dL 8 0* 8 0* 8 3   AST U/L 45 119* 416*   ALT U/L 246* 342* 597*   ALK PHOS U/L 140* 162* 195*   TOTAL PROTEIN g/dL 5 7* 5 5* 6 1*   BILIRUBIN TOTAL mg/dL 1 17* 2 32* 5 76*       Results from last 7 days  Lab Units 18  2300   BLOOD CULTURE  No Growth at 48 hrs     GRAM STAIN RESULT  Gram positive cocci in clusters

## 2018-05-16 NOTE — ASSESSMENT & PLAN NOTE
· Secondary to cholangitis  · Appreciate GI, general surgery, Infectious Disease input  · s/p ERCP 5/14/18 with stone removal and stent replacement  · She denies worsening abdominal pain, abdominal exam reveals tenderness in the right upper quadrant but no left upper quadrant tenderness  · Her LFTs are trending down from yesterday with total bilirubin of 2 32 today  · Continue current antibiotics as per ID  · Advance diet as tolerated  · Repeat ERCP in 4/6 weeks  · Blood culture x1 -- Gram-positive cocci -- most likely contaminant

## 2018-05-16 NOTE — PROGRESS NOTES
[facility]  Progress Note - Laura Estrella 69 y o  female MRN: [id number]    Unit/Bed#: [facility] 422-01 Encounter: [id number]    Assessment/Plan:  1   Severe sepsis secondary to cholangitis due to obstructed biliary stent              -s/p ERCP 5/14/18 with stone removal and stent replacement               -Her abdominal pain has resolved, she is tolerating a low fat diet well               -Her LFTs are trending down from yesterday with total bilirubin of 1 17 today               -continue antibiotics as per infectious disease              -Repeat ERCP in 4-6 weeks   -GI will sign off at this time but please call us with any questions or new concerns  Subjective:   She reports that her abdominal pain has resolved, she complains of wrist pain today  She denies any nausea or vomiting and is tolerating her diet well  Objective:     Vitals: Blood pressure 134/65, pulse 97, temperature 97 5 °F (36 4 °C), temperature source Oral, resp  rate 18, height 5' 5" (1 651 m), weight 83 2 kg (183 lb 6 8 oz), SpO2 95 %  ,Body mass index is 30 52 kg/m²  Intake/Output Summary (Last 24 hours) at 05/16/18 1051  Last data filed at 05/16/18 0829   Gross per 24 hour   Intake              720 ml   Output              700 ml   Net               20 ml       Review of Systems: as per HPI  Review of Systems   Constitutional: Negative for activity change, appetite change, chills, fatigue, fever and unexpected weight change  HENT: Negative for mouth sores, sore throat and trouble swallowing  Respiratory: Negative for shortness of breath  Cardiovascular: Negative for chest pain  Gastrointestinal: Negative for abdominal distention, abdominal pain, blood in stool, constipation, diarrhea, nausea and vomiting  Musculoskeletal: Positive for arthralgias  Skin: Negative for color change, pallor, rash and wound  Neurological: Negative for tremors and syncope     All other systems reviewed and are negative  Physical Exam:     Physical Exam   Constitutional: She is oriented to person, place, and time  She is cooperative  No distress  HENT:   Head: Normocephalic and atraumatic  Eyes: Right eye exhibits no discharge  Left eye exhibits no discharge  No scleral icterus  Neck: Neck supple  No tracheal deviation present  Cardiovascular: Normal rate, regular rhythm and normal heart sounds  Exam reveals no gallop and no friction rub  No murmur heard  Pulmonary/Chest: Effort normal and breath sounds normal  No respiratory distress  She has no wheezes  She has no rales  Abdominal: Soft  Bowel sounds are normal  She exhibits no distension and no mass  There is no tenderness  There is no rebound and no guarding  Neurological: She is alert and oriented to person, place, and time  Skin: Skin is warm and dry  Psychiatric: She has a normal mood and affect           Invasive Devices     Peripheral Intravenous Line            Peripheral IV 05/14/18 Left Arm 1 day          Arterial Line            Arterial Line 05/14/18 Right Radial 1 day                        CBC: Lab Results   Component Value Date    WBC 7 22 05/16/2018    HGB 12 1 05/16/2018    HCT 37 4 05/16/2018    MCV 95 05/16/2018     05/16/2018    MCH 30 8 05/16/2018    MCHC 32 4 05/16/2018    RDW 16 1 (H) 05/16/2018    MPV 11 3 05/16/2018   ,   CMP: Lab Results   Component Value Date     05/16/2018    K 4 2 05/16/2018     05/16/2018    CO2 23 05/16/2018    ANIONGAP 7 05/16/2018    BUN 11 05/16/2018    CREATININE 0 68 05/16/2018    GLUCOSE 107 05/16/2018    CALCIUM 8 0 (L) 05/16/2018    AST 45 05/16/2018     (H) 05/16/2018    ALKPHOS 140 (H) 05/16/2018    PROT 5 7 (L) 05/16/2018    BILITOT 1 17 (H) 05/16/2018    EGFR 85 05/16/2018   ,   Lipase: No results found for: LIPASE,  PT/INR: No results found for: PT, INR,   Troponin: No results found for: TROPONINI,   Magnesium: No results found for: MAG,   Phosphorous:   Lab Results   Component Value Date    PHOS 2 0 (L) 05/16/2018

## 2018-05-17 LAB
ALBUMIN SERPL BCP-MCNC: 2.1 G/DL (ref 3.5–5)
ALP SERPL-CCNC: 138 U/L (ref 46–116)
ALT SERPL W P-5'-P-CCNC: 167 U/L (ref 12–78)
ANION GAP SERPL CALCULATED.3IONS-SCNC: 5 MMOL/L (ref 4–13)
AST SERPL W P-5'-P-CCNC: 27 U/L (ref 5–45)
BACTERIA BLD CULT: ABNORMAL
BILIRUB SERPL-MCNC: 0.84 MG/DL (ref 0.2–1)
BUN SERPL-MCNC: 10 MG/DL (ref 5–25)
CALCIUM SERPL-MCNC: 8.1 MG/DL (ref 8.3–10.1)
CHLORIDE SERPL-SCNC: 108 MMOL/L (ref 100–108)
CO2 SERPL-SCNC: 27 MMOL/L (ref 21–32)
CREAT SERPL-MCNC: 0.85 MG/DL (ref 0.6–1.3)
GFR SERPL CREATININE-BSD FRML MDRD: 66 ML/MIN/1.73SQ M
GLUCOSE SERPL-MCNC: 128 MG/DL (ref 65–140)
GRAM STN SPEC: ABNORMAL
PHOSPHATE SERPL-MCNC: 2.7 MG/DL (ref 2.3–4.1)
POTASSIUM SERPL-SCNC: 4.1 MMOL/L (ref 3.5–5.3)
PROT SERPL-MCNC: 5.6 G/DL (ref 6.4–8.2)
SODIUM SERPL-SCNC: 140 MMOL/L (ref 136–145)

## 2018-05-17 PROCEDURE — 99232 SBSQ HOSP IP/OBS MODERATE 35: CPT | Performed by: INTERNAL MEDICINE

## 2018-05-17 PROCEDURE — 84100 ASSAY OF PHOSPHORUS: CPT | Performed by: INTERNAL MEDICINE

## 2018-05-17 PROCEDURE — 97535 SELF CARE MNGMENT TRAINING: CPT

## 2018-05-17 PROCEDURE — 80053 COMPREHEN METABOLIC PANEL: CPT | Performed by: INTERNAL MEDICINE

## 2018-05-17 RX ADMIN — DIBASIC SODIUM PHOSPHATE, MONOBASIC POTASSIUM PHOSPHATE AND MONOBASIC SODIUM PHOSPHATE 1 TABLET: 852; 155; 130 TABLET ORAL at 09:58

## 2018-05-17 RX ADMIN — METOPROLOL TARTRATE 150 MG: 50 TABLET ORAL at 21:03

## 2018-05-17 RX ADMIN — PIPERACILLIN SODIUM AND TAZOBACTAM SODIUM 3.38 G: 36; 4.5 INJECTION, POWDER, FOR SOLUTION INTRAVENOUS at 12:08

## 2018-05-17 RX ADMIN — PIPERACILLIN SODIUM AND TAZOBACTAM SODIUM 3.38 G: 36; 4.5 INJECTION, POWDER, FOR SOLUTION INTRAVENOUS at 05:05

## 2018-05-17 RX ADMIN — OXYCODONE HYDROCHLORIDE 15 MG: 10 TABLET ORAL at 22:38

## 2018-05-17 RX ADMIN — PIPERACILLIN SODIUM AND TAZOBACTAM SODIUM 3.38 G: 36; 4.5 INJECTION, POWDER, FOR SOLUTION INTRAVENOUS at 17:13

## 2018-05-17 RX ADMIN — APIXABAN 5 MG: 5 TABLET, FILM COATED ORAL at 17:12

## 2018-05-17 RX ADMIN — METOPROLOL TARTRATE 150 MG: 50 TABLET ORAL at 09:58

## 2018-05-17 RX ADMIN — HYDROMORPHONE HYDROCHLORIDE 1 MG: 1 INJECTION, SOLUTION INTRAMUSCULAR; INTRAVENOUS; SUBCUTANEOUS at 05:07

## 2018-05-17 RX ADMIN — APIXABAN 5 MG: 5 TABLET, FILM COATED ORAL at 09:59

## 2018-05-17 RX ADMIN — METHOCARBAMOL 500 MG: 500 TABLET ORAL at 23:57

## 2018-05-17 RX ADMIN — MELATONIN TAB 3 MG 3 MG: 3 TAB at 21:04

## 2018-05-17 RX ADMIN — OXYCODONE HYDROCHLORIDE 15 MG: 10 TABLET ORAL at 15:56

## 2018-05-17 RX ADMIN — DIBASIC SODIUM PHOSPHATE, MONOBASIC POTASSIUM PHOSPHATE AND MONOBASIC SODIUM PHOSPHATE 1 TABLET: 852; 155; 130 TABLET ORAL at 12:09

## 2018-05-17 RX ADMIN — DIBASIC SODIUM PHOSPHATE, MONOBASIC POTASSIUM PHOSPHATE AND MONOBASIC SODIUM PHOSPHATE 1 TABLET: 852; 155; 130 TABLET ORAL at 15:56

## 2018-05-17 RX ADMIN — PANTOPRAZOLE SODIUM 40 MG: 40 TABLET, DELAYED RELEASE ORAL at 05:05

## 2018-05-17 RX ADMIN — ALPRAZOLAM 0.25 MG: 0.25 TABLET ORAL at 21:04

## 2018-05-17 RX ADMIN — OXYCODONE HYDROCHLORIDE 15 MG: 10 TABLET ORAL at 09:59

## 2018-05-17 RX ADMIN — LISINOPRIL 5 MG: 5 TABLET ORAL at 09:59

## 2018-05-17 RX ADMIN — LIDOCAINE 1 PATCH: 50 PATCH CUTANEOUS at 22:49

## 2018-05-17 RX ADMIN — PIPERACILLIN SODIUM AND TAZOBACTAM SODIUM 3.38 G: 36; 4.5 INJECTION, POWDER, FOR SOLUTION INTRAVENOUS at 22:49

## 2018-05-17 NOTE — PROGRESS NOTES
Progress Note - Infectious Disease   Broderick Siddiqui 69 y o  female MRN: [id number]  Unit/Bed#: [facility] 422-01 Encounter: [id number]      Impression/Plan:  1   Sepsis-POA   Leukocytosis and tachycardia with lactic acidosis   Suspect secondary to cholangitis   No other clear sources appreciated   Consideration for the possibility of bacteremia   The patient remains hemodynamically stable despite her systemic illness   She seems to be tolerating the antibiotics without difficulty thus far   The white cell count has come down, and a lactic acidosis seems to have resolved  -continue Zosyn for now at current dose  -check procalcitonin level tomorrow  -will either discontinue the antibiotics if the procalcitonin level has normalized, or change to oral antibiotics tomorrow  -follow-up blood cultures  -monitor CBC with diff and CMP  -supportive care     2   Presumptive cholangitis-in the setting of a previous ERCP and stent placement   Patient is now status post ERCP with removal of stones and stent revision   She has clinically improved   -antibiotics as above  -monitor liver function test  -close GI follow-up     3   Coagulase-negative Staphylococcus bacteremia-suspect contaminant with only 1 of 2 blood cultures positive   Consideration for the possibility of a true bacteremia  Echocardiogram with no reported valvular vegetation  -antibiotics as above  -followup 2nd blood culture     4   Abnormal liver function tests-likely secondary to biliary obstruction   The liver function tests have improved after the stone removal and stent revision    No other clear source appreciated  -recheck CMP tomorrow  -GI follow-up  -no additional ID workup for now     5   Atrial fibrillation-with mild tachycardia   Relatively asymptomatic  -anticoagulation as per the primary service  -rate control    Antibiotics:  Zosyn 5    Subjective:  Patient has no fever, chills, sweats; no nausea, vomiting, diarrhea; no cough, shortness of breath; no pain   No new symptoms  Feeling better overall    Objective:  Vitals:  HR:  [] 83  Resp:  [18-21] 18  BP: (119-144)/(77-99) 124/91  SpO2:  [94 %-96 %] 95 %  Temp (24hrs), Av 9 °F (36 6 °C), Min:97 6 °F (36 4 °C), Max:98 2 °F (36 8 °C)  Current: Temperature: 97 7 °F (36 5 °C)    Physical Exam:   General Appearance:  Alert, interactive, nontoxic, no acute distress  Throat: Oropharynx moist without lesions  Lungs:   Clear to auscultation bilaterally; no wheezes, rhonchi or rales; respirations unlabored   Heart:  RRR; no murmur, rub or gallop   Abdomen:   Soft, non-tender, non-distended, positive bowel sounds  Extremities: No clubbing, cyanosis or edema   Skin: No new rashes or lesions  No draining wounds noted  Labs, Imaging, & Other studies:   All pertinent labs and imaging studies were personally reviewed    Results from last 7 days  Lab Units 18  0453 05/15/18  0448 18  2327   WBC Thousand/uL 7 22 7 88 9 95   HEMOGLOBIN g/dL 12 1 12 2 12 6   PLATELETS Thousands/uL 157 173 168       Results from last 7 days  Lab Units 18  0510 18  0453 05/15/18  0448   SODIUM mmol/L 140 138 141   POTASSIUM mmol/L 4 1 4 2 3 5   CHLORIDE mmol/L 108 108 110*   CO2 mmol/L 27 23 23   ANION GAP mmol/L 5 7 8   BUN mg/dL 10 11 12   CREATININE mg/dL 0 85 0 68 0 75   EGFR ml/min/1 73sq m 66 85 77   GLUCOSE RANDOM mg/dL 128 107 156*   CALCIUM mg/dL 8 1* 8 0* 8 0*   AST U/L 27 45 119*   ALT U/L 167* 246* 342*   ALK PHOS U/L 138* 140* 162*   TOTAL PROTEIN g/dL 5 6* 5 7* 5 5*   BILIRUBIN TOTAL mg/dL 0 84 1 17* 2 32*       Results from last 7 days  Lab Units 18  2300   BLOOD CULTURE  Staphylococcus coagulase negative*  No Growth at 72 hrs     GRAM STAIN RESULT  Gram positive cocci in clusters

## 2018-05-17 NOTE — OCCUPATIONAL THERAPY NOTE
Occupational Therapy Treatment Note:       05/17/18 1090   Pain Assessment   Pain Assessment 0-10   Pain Score 9   Pain Type Chronic pain   Pain Location Generalized  ("mostly back right now")   ADL   Where Assessed Other (Comment)  (long sit position)   Grooming Assistance 6  Modified Independent   Grooming Deficit Setup   UB Bathing Assistance 5  Supervision/Setup   UB Bathing Deficit Setup; Increased time to complete   LB Bathing Assistance 3  Moderate Assistance   LB Bathing Deficit Setup;Verbal cueing; Increased time to complete; Buttocks   LB Dressing Assistance 5  Supervision/Setup   LB Dressing Deficit Setup;Verbal cueing;Supervision/safety; Increased time to complete; Don/doff R sock; Don/doff L sock   Toileting Assistance  4  Minimal Assistance   Toileting Deficit Steadying;Verbal cueing;Supervison/safety; Increased time to complete   Bed Mobility   Supine to Sit 5  Supervision   Additional items Assist x 1; Increased time required;Verbal cues   Sit to Supine 4  Minimal assistance   Additional items Assist x 1; Increased time required;Verbal cues   Transfers   Sit to Stand 4  Minimal assistance   Additional items Assist x 1;Verbal cues   Stand to Sit 4  Minimal assistance   Additional items Assist x 1;Verbal cues   Stand pivot 4  Minimal assistance   Additional items Assist x 1;Verbal cues   Cognition   Overall Cognitive Status Impaired   Arousal/Participation Alert   Attention Attends with cues to redirect   Orientation Level Oriented X4   Memory Decreased recall of recent events   Following Commands Follows one step commands with increased time or repetition   Comments Patient verbalizes increased frustrations when alone in room and sometimes contradicts statements  This is noted when outside pts' room documenting   Patient admits that "forgetting things at times is a problem"   Activity Tolerance   Activity Tolerance Patient tolerated treatment well   Medical Staff Made Aware ok to see per RN    Assessment Assessment Patient participated in skilled OT with focus on bed mobility, dressing, bathing, functional mobility, adaptive techniques, toileting  Patient presents supine with HOB raised with one leg on floor  Patient reports "getting back to bed about 1 hour ago"  Patient receptive to therapy despite reports of high pain levels particularly in back  Patient reports that chronic pain is baseline for her and "weather also affects this"  Patient's functional balance bedside is good and pt performs some components of LB dressing with legs on bed in LSP (long sit position)  Patient is receptive to learning new techniques, however, states that she "needs to go home first to take care of things, landlord will help her, and then I will probably go to short term rehab for awhile"  Patient educated in awareness of functional mobility deficits and adaptive techniques, however, recommend STR prior to discharge home  Plan   Treatment Interventions ADL retraining;Functional transfer training; Compensatory technique education   Goal Expiration Date 05/28/18   Treatment Day 2   OT Frequency 3-5x/wk   Recommendation   OT Discharge Recommendation Short Term Rehab  (vs home with [facility]; pt states, "I am going home")   OT - OK to Discharge Yes  (when medically cleared)   Barthel Index   Feeding 10   Bathing 0   Grooming Score 5   Dressing Score 5   Bladder Score 10   Bowels Score 10   Toilet Use Score 5   Transfers (Bed/Chair) Score 10   Mobility (Level Surface) Score 10   Stairs Score 0   Barthel Index Score 65   Modified Gray Scale   Modified Gray Scale 4   JOSE Hurst

## 2018-05-17 NOTE — ASSESSMENT & PLAN NOTE
· Secondary to cholangitis  · Appreciate GI, general surgery, Infectious Disease input  · s/p ERCP 5/14/18 with stone removal and stent replacement  · She denies worsening abdominal pain, abdominal exam reveals tenderness in the right upper quadrant but no left upper quadrant tenderness  · Her LFTs are trending down from yesterday with total bilirubin of 0 84 today  · Continue current antibiotics as per ID  · Advance diet as tolerated  · Repeat ERCP in 4/6 weeks  · Blood culture x1 -- CNS-- most likely contaminant

## 2018-05-17 NOTE — ASSESSMENT & PLAN NOTE
· Present on admission -- this has resolved  · Secondary to cholangitis  · Appreciate GI, general surgery, Infectious Disease input  · s/p ERCP 5/14/18 with stone removal and stent replacement  · Her LFTs are trending down from yesterday with total bilirubin of 0 84 today  · Continue current antibiotics as per ID  · Advance diet as tolerated  · Repeat ERCP in 4/6 weeks  · Blood culture x1 -- CNS, this is a contaminant

## 2018-05-17 NOTE — CONSULTS
Inpatient consult to Acute Pain Service  Consult performed by: Cain Goel ordered by: Mitcheal Goodpasture        Consultation - Anesthesia Acute Pain Management   Lemuel Clark 69 y o  female MRN: [id number]  Unit/Bed#: [facility] 422-01 Encounter: [id number]               Consult Time:  39 minutes    [address]; Patient aged 72 years & older:  [address] was not discussed  Assessment/Plan     Assessment  69year old female having acute pain RT cholangitis S/P ERCP with stent removal, stone extraction, and stent placement  HX of generalized chronic pain and opioid dependence  Plan: PDMP REVIEWED   1  ACUTE PAIN -Patient states she has generalized pain  She is unable to pinpoint a specific area  States "maybe my knuckles"  States she never had any belly pain  States the reason she is in the hospital is because she was found on the floor at home  She is unable to remember what happened that she ended up on the floor  States she is weak  Denies taking more pain medicine than prescribed  Informs that she currently is in the process of being weaned off of her pain medicine  Her goal is to completely stop taking oxycodone IR PO  I agree with this plan, and also would recommend geriatric dosing    Offers the name Rohan Peters as her pain specialist   PDMP correlates  States she was getting ready to decrease her oxy IR 15 mg to Oxy IR 10 mg, when asked if she would like me to do this she said no, my pain doctor will  Discontinue   Tylenol - elevated LFTS, trending down   Dilaudid IV - procedure over, no belly pain  Generalized pain is chronic  Change   Robaxin 250 mg PO Q 6 HRS PRN     Continue   Lidoderm patch 5 %, 1 patch topical daily  Oxycodone IR 15 mg PO Q 6 HRS PRN pain- home meds    2  Follow up with her pain specialist at discharge  According to [address] patient may need minimal script to hold her until appointment        3  Plan of care - I spoke to primary team to discuss plans  Discontinue IV dilaudid  Patient denies acute pain  Patient is to follow up with her pain specialist at discharge to continue her oxycodone wean  4  [facility] TO SIGN OFF   thank you for the consult  Please call [phone] with questions between [phone]  History of Present Illness    Admit Date:  5/13/2018  Hospital Day:  3 days  Primary Service:  Family Medicine  Attending Provider:  Rogers Chavez MD  Physician Requesting Consult: Rogers hCavez MD  Reason for Consult / Principal Problem: ACUTE PAIN    Review of Systems   Constitutional: Negative for activity change, chills and fever  Respiratory: Negative for apnea, cough, choking, chest tightness, shortness of breath and wheezing  Cardiovascular: Negative for chest pain and leg swelling  Gastrointestinal: Negative for abdominal distention and abdominal pain  Genitourinary: Negative for difficulty urinating  Musculoskeletal: Negative for arthralgias, back pain, gait problem and joint swelling  Neurological: Positive for weakness  Negative for dizziness, seizures, facial asymmetry, numbness and headaches  Psychiatric/Behavioral: Negative for agitation, behavioral problems, confusion and hallucinations  The patient is not nervous/anxious  HPI: Zee Frederick is a 69y o  year old female who presents with generalized chronic pain  Patient is unable to locate her pain  States "maybe my knuckles"  Denies any belly pain  States she wants to stop taking the oxycodone  States she had an appointment with her pain specialist today to discuss decreasing her dose from 15mg to 10mg  However, she missed the appointment from being hospitalized  She is requesting that I don't make any changes to her pain medication, she would like to follow up with her pain doctor after discharge  States she never had any belly pain  She was found on floor at home and brought to the hospital where"they found my belly problem"    With no acute pain I discussed stopping the IV dilaudid and patient agreed to the plan  Physical Exam   Constitutional: She is oriented to person, place, and time  She appears well-developed and well-nourished  No distress  HENT:   Head: Normocephalic  Eyes: Pupils are equal, round, and reactive to light  Neck: Normal range of motion  Cardiovascular: Normal rate  Pulmonary/Chest: Effort normal and breath sounds normal  No respiratory distress  She has no wheezes  She has no rales  Abdominal: Bowel sounds are normal  She exhibits no distension  There is no tenderness  Musculoskeletal: Normal range of motion  She exhibits no edema, tenderness or deformity  Neurological: She is alert and oriented to person, place, and time  Skin: Skin is warm  She is not diaphoretic  Psychiatric: She has a normal mood and affect  Her behavior is normal  Judgment and thought content normal      Pain History:  Pain History: generalized pain   Current pain location(s): knuckles  Pain Scale:   Unable to rate  Severity:  Unable to rate  Treatment History: OXYCODONE IR 15MG PO Q 6HRS PRN     Historical Information   Past Medical History:   Diagnosis Date    [facility]fib ([facility] )     Acute respiratory disease     [facility]est[facility] failure[facility])     Chronic pain     Heart failure (HCC)     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[facility] )     Hypertension     Narcotic dependence ([facility] )      Past Surgical History:   Procedure Laterality Date    ERCP N/A 5/14/2018    Procedure: ENDOSCOPIC RETROGRADE CHOLANGIOPANCREATOGRAPHY (ERCP);   Surgeon: Gaby Starks MD;  Location: [facility];  Service: Gastroenterology     Social History   History   Alcohol Use No     History   Drug Use No     History   Smoking Status    Former Smoker   Smokeless Tobacco    Never Used     Family History: non-contributory    Meds/Allergies   Current Facility-Administered Medications   Medication Dose Route Frequency    acetaminophen (TYLENOL) tablet 650 mg  650 mg Oral Q6H PRN    ALPRAZolam (XANAX) tablet 0 25 mg  0 25 mg Oral HS PRN    apixaban (ELIQUIS) tablet 5 mg  5 mg Oral BID    calcium carbonate (TUMS) chewable tablet 500 mg  500 mg Oral Daily PRN    HYDROmorphone (DILAUDID) injection 1 mg  1 mg Intravenous Q3H PRN    lactated ringers infusion  50 mL/hr Intravenous Continuous    lidocaine (LIDODERM) 5 % patch 1 patch  1 patch Transdermal Daily    lisinopril (ZESTRIL) tablet 5 mg  5 mg Oral Daily    melatonin tablet 3 mg  3 mg Oral HS    methocarbamol (ROBAXIN) tablet 500 mg  500 mg Oral HS PRN    metoprolol tartrate (LOPRESSOR) tablet 150 mg  150 mg Oral Q12H KARELY    ondansetron (ZOFRAN) injection 4 mg  4 mg Intravenous Q6H PRN    oxyCODONE (ROXICODONE) IR tablet 15 mg  15 mg Oral Q6H PRN    pantoprazole (PROTONIX) EC tablet 40 mg  40 mg Oral Early Morning    piperacillin-tazobactam (ZOSYN) 3 375 g in sodium chloride 0 9 % 50 mL IVPB  3 375 g Intravenous Q6H    potassium-sodium phosphateS (K-PHOS,PHOSPHA 250) -250 mg tablet 1 tablet  1 tablet Oral TID With Meals     Prescriptions Prior to Admission   Medication    ALPRAZolam (NIRAVAM) 0 25 MG dissolvable tablet    apixaban (ELIQUIS) 5 mg    aspirin (ECOTRIN LOW STRENGTH) 81 mg EC tablet    lisinopril (ZESTRIL) 5 mg tablet    methocarbamol (ROBAXIN) 500 mg tablet    metoprolol tartrate (LOPRESSOR) 100 mg tablet    oxyCODONE (OxyCONTIN) 15 mg 12 hr tablet    polyethylene glycol (MIRALAX) 17 g packet    potassium chloride (MICRO-K) 10 MEQ CR capsule         No Known Allergies    Objective   Temp:  [97 6 °F (36 4 °C)-98 2 °F (36 8 °C)] 97 7 °F (36 5 °C)  HR:  [] 83  Resp:  [18-21] 18  BP: (119-144)/(77-99) 124/91    Intake/Output Summary (Last 24 hours) at 05/17/18 0853  Last data filed at 05/17/18 3833   Gross per 24 hour   Intake              850 ml   Output             1675 ml   Net             -825 ml         Lab Results: I have personally reviewed pertinent labs ELEVATED LFTS  Imaging Studies: I have personally reviewed pertinent reports  EKG, Pathology, and Other Studies: I have personally reviewed pertinent reports  Counseling / Coordination of Care  Total floor / unit time spent today 45 minutes minutes  Greater than 50% of total time was spent with the patient and / or family counseling and / or coordination of care  Please note that the APS provides consultative services regarding pain management only  With the exception of ketamine and epidural infusions and except when indicated, final decisions regarding starting or changing doses of analgesic medications are at the discretion of the consulting service  Off hours consultation and/or medication management is generally not available  Lonza CHANDRIKA Langley  May 17, 2018  8:53 AM            Review of Systems   Constitutional: Negative for activity change, chills and fever  Respiratory: Negative for apnea, cough, choking, chest tightness, shortness of breath and wheezing  Cardiovascular: Negative for chest pain and leg swelling  Gastrointestinal: Negative for abdominal distention and abdominal pain  Genitourinary: Negative for difficulty urinating  Musculoskeletal: Negative for arthralgias, back pain, gait problem and joint swelling  Neurological: Positive for weakness  Negative for dizziness, seizures, facial asymmetry, numbness and headaches  Psychiatric/Behavioral: Negative for agitation, behavioral problems, confusion and hallucinations  The patient is not nervous/anxious  Physical Exam   Constitutional: She is oriented to person, place, and time  She appears well-developed and well-nourished  No distress  HENT:   Head: Normocephalic  Eyes: Pupils are equal, round, and reactive to light  Neck: Normal range of motion  Cardiovascular: Normal rate  Pulmonary/Chest: Effort normal and breath sounds normal  No respiratory distress  She has no wheezes   She has no rales    Abdominal: Bowel sounds are normal  She exhibits no distension  There is no tenderness  Musculoskeletal: Normal range of motion  She exhibits no edema, tenderness or deformity  Neurological: She is alert and oriented to person, place, and time  Skin: Skin is warm  She is not diaphoretic  Psychiatric: She has a normal mood and affect   Her behavior is normal  Judgment and thought content normal

## 2018-05-17 NOTE — PROGRESS NOTES
Progress Note - Fantasma Najera [birth year], 69 y o  female MRN: [id number]    Unit/Bed#: [facility] 422-01 Encounter: [id number]    Primary Care Provider: Soo Willis DO   Date and time admitted to hospital: 5/13/2018  7:47 PM        * Severe sepsis [facility])   Assessment & Plan      · Present on admission -- this has resolved  · Secondary to cholangitis  · Appreciate GI, general surgery, Infectious Disease input  · s/p ERCP 5/14/18 with stone removal and stent replacement  · Her LFTs are trending down from yesterday with total bilirubin of 0 84 today  · Continue current antibiotics as per ID  · Advance diet as tolerated  · Repeat ERCP in 4/6 weeks  · Blood culture x1 -- CNS, this is a contaminant  Biliary stent obstruction, initial encounter   Assessment & Plan    · Secondary to cholangitis  · Appreciate GI, general surgery, Infectious Disease input  · s/p ERCP 5/14/18 with stone removal and stent replacement  · She denies worsening abdominal pain, abdominal exam reveals tenderness in the right upper quadrant but no left upper quadrant tenderness  · Her LFTs are trending down from yesterday with total bilirubin of 0 84 today  · Continue current antibiotics as per ID  · Advance diet as tolerated  · Repeat ERCP in 4/6 weeks  · Blood culture x1 -- CNS-- most likely contaminant  Atrial fibrillation (HCC)   Assessment & Plan    · Continue metoprolol, Eliquis  Chronic systolic heart failure (HCC)   Assessment & Plan    · Continue with Lopressor, lisinopril  · Daily weights, intake output  Decreased vision   Assessment & Plan    Appreciate input from Ophthalmology  No acute interventions for now  Anomalies of nails   Assessment & Plan    · Very poor foot hygiene  · Appreciate input from Podiatry  SUBJECTIVE:   Patient seen and examined  She continues to improve  Tolerating p o  diet  Generalized weakness persists    Patient adamant that she does not want to go to rehab/skilled nursing facility  No acute events overnight  OBJECTIVE:   Vitals:    05/17/18 0715   BP: 124/91   Pulse: 83   Resp: 18   Temp: 97 7 °F (36 5 °C)   SpO2: 95%       Intake/Output Summary (Last 24 hours) at 05/17/18 1008  Last data filed at 05/17/18 0937   Gross per 24 hour   Intake              850 ml   Output             1900 ml   Net            -1050 ml          Physical Exam:   GENERAL: AAO x 3  HEENT: atraumatic, normocephalic  Oral mucosa moist, no icterus, pallor  PERRLA +  Neck supple, no JVD, no lymphadenopathy, no thryomegaly  CHEST: B/L breath sounds heard, occasional wheezing  CVS: S1, S2   ABDOMEN: Soft/flabby/minimal tenderness on palpation right upper quadrant  Bowel sounds heard  NEUROLOGICAL: CN II -XI grossly intact  No focal motor or sensory deficits  No signs of meningeal irritation or cerebellar dysfunction  EXTREMITIES:  Bilateral pitting pedal edema   Poor foot hygiene      LABS:  Results for Jessica Alston (MRN [id number]) as of 5/17/2018 10:04   5/17/2018 05:10   Sodium 140   Potassium 4 1   Chloride 108   CO2 27   Anion Gap 5   BUN 10   Creatinine 0 85   Glucose 128   Calcium 8 1 (L)   AST 27    (H)   Alkaline Phosphatase 138 (H)   Total Protein 5 6 (L)   Albumin 2 1 (L)   Total Bilirubin 0 84   Phosphorus 2 7     Scheduled Meds:  Current Facility-Administered Medications:  acetaminophen 650 mg Oral Q6H PRN Tracy Connolly MD    ALPRAZolam 0 25 mg Oral HS PRN Deni Rosario MD    apixaban 5 mg Oral BID Omkar Mcgregor,     calcium carbonate 500 mg Oral Daily PRN Loree Krabbe, CRNP    HYDROmorphone 1 mg Intravenous Q3H PRN Omkar Mcgregor DO    lactated ringers 50 mL/hr Intravenous Continuous Steven Endow, CRNA Last Rate: Stopped (05/14/18 2100)   lidocaine 1 patch Transdermal Daily Loree Krabbe, CRNP    lisinopril 5 mg Oral Daily Tracy Connolly MD    melatonin 3 mg Oral HS Jean Harris MD    methocarbamol 500 mg Oral HS PRN Tracy Connolly MD metoprolol tartrate 150 mg Oral Q12H Anupama Russell MD    ondansetron 4 mg Intravenous Q6H PRN Davina Marin MD    oxyCODONE 15 mg Oral Q6H PRN Juancho Coon PA-C    pantoprazole 40 mg Oral Early Morning Graciela Freitas MD    piperacillin-tazobactam 3 375 g Intravenous Q6H Carmen Guerrero MD Last Rate: 3 375 g (05/17/18 0505)   potassium-sodium phosphateS 1 tablet Oral TID With Meals Iman Gonsalez MD      Continuous Infusions:  lactated ringers 50 mL/hr Last Rate: Stopped (05/14/18 2100)     PRN Meds:  acetaminophen    ALPRAZolam    calcium carbonate    HYDROmorphone    methocarbamol    ondansetron    oxyCODONE       VTE Prophylaxis:  Eliquis      CODE STATUS: FULL     Patient Centered Rounds: I have discussed today's plans with nursing staff today  Updated patient's sister Hailee Bergeron      DISPOSITION:   · Continue inpatient status  · PATIENT REFUSING any placement/rehab for now  · PT OT to continue      Time Spent for Care: 20 minutes   More than 50% of total time spent on counseling and coordination of care as described above  Family will be updated      Meghann Chavis MD  HOSPITALIST SERVICES  5/17/2018     PLEASE NOTE:  This encounter was completed utilizing the [employer] Direct Speech Voice Recognition Software  Grammatical errors, random word insertions, pronoun errors and incomplete sentences are occasional consequences of the system due to software limitations, ambient noise and hardware issues  These may be missed by proof reading prior to affixing electronic signature  Any questions or concerns about the content, text or information contained within the body of this dictation should be directly addressed to the physician for clarification   Please do not hesitate to call me directly

## 2018-05-17 NOTE — SOCIAL WORK
CM met w/ pt to follow up on obtaining more SNF choices to make referrals to, as pt remains recommended for short-tem skilled rehab placement for her aftercare plan  A previously made referral to [address] [place] [address] SNF was declined due to lack of bed availability  Pt reported she was no longer agreeable to SNF placement for her aftercare, regardless if [facility] SNF has beds or not  CM held discussion w/ pt about risks of not pursuing SNF placement as her recommended level of aftercare  After long discussion, pt still remains opposed to SNF placement and stated that she will return home upon d/c  CM suggested pt at least permit [facility] services into her home to provide in-home care  Pt reported she was agreeable to this  CM provided pt w/ freedom of choice  Pt requested referral to [facility]  CM made Ecin referral to [facility]  CM to follow

## 2018-05-17 NOTE — PLAN OF CARE
Problem: OCCUPATIONAL THERAPY ADULT  Goal: Performs self-care activities at highest level of function for planned discharge setting  See evaluation for individualized goals  Treatment Interventions: ADL retraining, Functional transfer training, Endurance training, Cognitive reorientation, Equipment evaluation/education, Patient/family training, Compensatory technique education, Energy conservation          See flowsheet documentation for full assessment, interventions and recommendations  Outcome: Progressing  Limitation: Decreased ADL status, Decreased Safe judgement during ADL, Decreased endurance, Decreased self-care trans, Decreased high-level ADLs, Decreased cognition  Prognosis: Fair  Assessment: Patient participated in skilled OT with focus on bed mobility, dressing, bathing, functional mobility, adaptive techniques, toileting  Patient presents supine with HOB raised with one leg on floor  Patient reports "getting back to bed about 1 hour ago"  Patient receptive to therapy despite reports of high pain levels particularly in back  Patient reports that chronic pain is baseline for her and "weather also affects this"  Patient's functional balance bedside is good and pt performs some components of LB dressing with legs on bed in LSP (long sit position)  Patient is receptive to learning new techniques, however, states that she "needs to go home first to take care of things, landlord will help her, and then I will probably go to short term rehab for awhile"  Patient educated in awareness of functional mobility deficits and adaptive techniques, however, recommend STR prior to discharge home       OT Discharge Recommendation: Short Term Rehab (vs home with HHA; pt states, "I am going home")  OT - OK to Discharge: Yes (when medically cleared)   JOSE Fuentes

## 2018-05-18 PROBLEM — H54.7 DECREASED VISION: Status: RESOLVED | Noted: 2018-05-15 | Resolved: 2018-05-18

## 2018-05-18 PROBLEM — Q84.6 ANOMALIES OF NAILS: Chronic | Status: RESOLVED | Noted: 2018-05-15 | Resolved: 2018-05-18

## 2018-05-18 PROBLEM — F32.A DEPRESSION: Chronic | Status: ACTIVE | Noted: 2018-05-18

## 2018-05-18 LAB
ALBUMIN SERPL BCP-MCNC: 2.3 G/DL (ref 3.5–5)
ALP SERPL-CCNC: 137 U/L (ref 46–116)
ALT SERPL W P-5'-P-CCNC: 131 U/L (ref 12–78)
ANION GAP SERPL CALCULATED.3IONS-SCNC: 8 MMOL/L (ref 4–13)
AST SERPL W P-5'-P-CCNC: 21 U/L (ref 5–45)
BILIRUB SERPL-MCNC: 0.83 MG/DL (ref 0.2–1)
BUN SERPL-MCNC: 9 MG/DL (ref 5–25)
CALCIUM SERPL-MCNC: 8.4 MG/DL (ref 8.3–10.1)
CHLORIDE SERPL-SCNC: 106 MMOL/L (ref 100–108)
CO2 SERPL-SCNC: 26 MMOL/L (ref 21–32)
CREAT SERPL-MCNC: 0.86 MG/DL (ref 0.6–1.3)
ERYTHROCYTE [DISTWIDTH] IN BLOOD BY AUTOMATED COUNT: 15.8 % (ref 11.6–15.1)
GFR SERPL CREATININE-BSD FRML MDRD: 65 ML/MIN/1.73SQ M
GLUCOSE SERPL-MCNC: 129 MG/DL (ref 65–140)
HCT VFR BLD AUTO: 39.7 % (ref 34.8–46.1)
HGB BLD-MCNC: 12.6 G/DL (ref 11.5–15.4)
MCH RBC QN AUTO: 30.6 PG (ref 26.8–34.3)
MCHC RBC AUTO-ENTMCNC: 31.7 G/DL (ref 31.4–37.4)
MCV RBC AUTO: 96 FL (ref 82–98)
PLATELET # BLD AUTO: 174 THOUSANDS/UL (ref 149–390)
PMV BLD AUTO: 11.1 FL (ref 8.9–12.7)
POTASSIUM SERPL-SCNC: 3.7 MMOL/L (ref 3.5–5.3)
PROCALCITONIN SERPL-MCNC: 0.28 NG/ML
PROT SERPL-MCNC: 5.9 G/DL (ref 6.4–8.2)
RBC # BLD AUTO: 4.12 MILLION/UL (ref 3.81–5.12)
SODIUM SERPL-SCNC: 140 MMOL/L (ref 136–145)
WBC # BLD AUTO: 8.97 THOUSAND/UL (ref 4.31–10.16)

## 2018-05-18 PROCEDURE — 99232 SBSQ HOSP IP/OBS MODERATE 35: CPT | Performed by: INTERNAL MEDICINE

## 2018-05-18 PROCEDURE — 80053 COMPREHEN METABOLIC PANEL: CPT | Performed by: INTERNAL MEDICINE

## 2018-05-18 PROCEDURE — 99233 SBSQ HOSP IP/OBS HIGH 50: CPT | Performed by: INTERNAL MEDICINE

## 2018-05-18 PROCEDURE — 97110 THERAPEUTIC EXERCISES: CPT

## 2018-05-18 PROCEDURE — 97116 GAIT TRAINING THERAPY: CPT

## 2018-05-18 PROCEDURE — 85027 COMPLETE CBC AUTOMATED: CPT | Performed by: INTERNAL MEDICINE

## 2018-05-18 PROCEDURE — 84145 PROCALCITONIN (PCT): CPT | Performed by: INTERNAL MEDICINE

## 2018-05-18 RX ORDER — CIPROFLOXACIN 750 MG/1
750 TABLET, FILM COATED ORAL EVERY 12 HOURS SCHEDULED
Status: DISCONTINUED | OUTPATIENT
Start: 2018-05-18 | End: 2018-05-21

## 2018-05-18 RX ORDER — AMOXICILLIN AND CLAVULANATE POTASSIUM 875; 125 MG/1; MG/1
1 TABLET, FILM COATED ORAL EVERY 12 HOURS SCHEDULED
Status: DISCONTINUED | OUTPATIENT
Start: 2018-05-18 | End: 2018-05-21

## 2018-05-18 RX ADMIN — DIBASIC SODIUM PHOSPHATE, MONOBASIC POTASSIUM PHOSPHATE AND MONOBASIC SODIUM PHOSPHATE 1 TABLET: 852; 155; 130 TABLET ORAL at 19:39

## 2018-05-18 RX ADMIN — APIXABAN 5 MG: 5 TABLET, FILM COATED ORAL at 09:29

## 2018-05-18 RX ADMIN — OXYCODONE HYDROCHLORIDE 15 MG: 10 TABLET ORAL at 09:29

## 2018-05-18 RX ADMIN — METOPROLOL TARTRATE 150 MG: 50 TABLET ORAL at 09:29

## 2018-05-18 RX ADMIN — MELATONIN TAB 3 MG 3 MG: 3 TAB at 21:44

## 2018-05-18 RX ADMIN — DIBASIC SODIUM PHOSPHATE, MONOBASIC POTASSIUM PHOSPHATE AND MONOBASIC SODIUM PHOSPHATE 1 TABLET: 852; 155; 130 TABLET ORAL at 14:06

## 2018-05-18 RX ADMIN — PIPERACILLIN SODIUM AND TAZOBACTAM SODIUM 3.38 G: 36; 4.5 INJECTION, POWDER, FOR SOLUTION INTRAVENOUS at 04:50

## 2018-05-18 RX ADMIN — APIXABAN 5 MG: 5 TABLET, FILM COATED ORAL at 19:38

## 2018-05-18 RX ADMIN — DIBASIC SODIUM PHOSPHATE, MONOBASIC POTASSIUM PHOSPHATE AND MONOBASIC SODIUM PHOSPHATE 1 TABLET: 852; 155; 130 TABLET ORAL at 08:30

## 2018-05-18 RX ADMIN — OXYCODONE HYDROCHLORIDE 15 MG: 10 TABLET ORAL at 15:53

## 2018-05-18 RX ADMIN — CIPROFLOXACIN HYDROCHLORIDE 750 MG: 750 TABLET, FILM COATED ORAL at 14:07

## 2018-05-18 RX ADMIN — PANTOPRAZOLE SODIUM 40 MG: 40 TABLET, DELAYED RELEASE ORAL at 05:02

## 2018-05-18 RX ADMIN — AMOXICILLIN AND CLAVULANATE POTASSIUM 1 TABLET: 875; 125 TABLET, FILM COATED ORAL at 14:06

## 2018-05-18 RX ADMIN — OXYCODONE HYDROCHLORIDE 15 MG: 10 TABLET ORAL at 04:18

## 2018-05-18 RX ADMIN — OXYCODONE HYDROCHLORIDE 15 MG: 10 TABLET ORAL at 21:44

## 2018-05-18 RX ADMIN — LISINOPRIL 5 MG: 5 TABLET ORAL at 09:29

## 2018-05-18 RX ADMIN — METOPROLOL TARTRATE 150 MG: 50 TABLET ORAL at 21:44

## 2018-05-18 RX ADMIN — AMOXICILLIN AND CLAVULANATE POTASSIUM 1 TABLET: 875; 125 TABLET, FILM COATED ORAL at 21:47

## 2018-05-18 RX ADMIN — CIPROFLOXACIN HYDROCHLORIDE 750 MG: 750 TABLET, FILM COATED ORAL at 21:46

## 2018-05-18 NOTE — PROGRESS NOTES
Progress Note - Infectious Disease   Amanda Beltre 69 y o  female MRN: [id number]  Unit/Bed#: [facility] 422-01 Encounter: [id number]      Impression/Plan:  1   Sepsis-POA   Leukocytosis and tachycardia with lactic acidosis   Suspect secondary to cholangitis   No other clear sources appreciated  The patient was not bacteremic with any true pathogen   The patient remains hemodynamically stable despite her systemic illness  Mono Alves seems to be tolerating the antibiotics without difficulty thus far   The white cell count has come down, and a lactic acidosis seems to have resolved  The procalcitonin level is almost normal   -discontinue Zosyn  -Augmentin 875 mg p o  q 12 hours, and Cipro 750 mg p o  q 12 hours through 5/19/2018 to complete 7 days of antibiotics total  -follow-up blood cultures  -monitor CBC with diff and CMP as needed  -supportive care     2   Presumptive cholangitis-in the setting of a previous ERCP and stent placement   Patient is now status post ERCP with removal of stones and stent revision   She has clinically improved   -antibiotics as above  -monitor liver function test  -close GI follow-up     3   Coagulase-negative Staphylococcus bacteremia-suspect contaminant with only 1 of 2 blood cultures positive   Consideration for the possibility of a true bacteremia   Echocardiogram with no reported valvular vegetation  -no directed antibiotic treatment needed  -followup 2nd blood culture     4   Abnormal liver function tests-likely secondary to biliary obstruction   The liver function tests have improved after the stone removal and stent revision    No other clear source appreciated  -GI follow-up  -no additional ID workup for now     5   Atrial fibrillation-with mild tachycardia   Relatively asymptomatic  -anticoagulation as per the primary service  -rate control    6  Disposition-okay to discharge from infectious disease standpoint  We will see the patient again 5/21/2018 if not discharged    Please call if questions  Antibiotics:  Zosyn 6    Subjective:  Patient has no fever, chills, sweats; no nausea, vomiting, diarrhea; no cough, shortness of breath; no pain  No new symptoms  She feels better overall  Objective:  Vitals:  HR:  [] 95  Resp:  [18] 18  BP: (120-155)/(61-91) 135/91  SpO2:  [96 %-98 %] 96 %  Temp (24hrs), Av °F (36 7 °C), Min:97 5 °F (36 4 °C), Max:98 4 °F (36 9 °C)  Current: Temperature: 97 5 °F (36 4 °C)    Physical Exam:   General Appearance:  Alert, interactive, nontoxic, no acute distress  Eyes: Conjuctiva pink, sclera anicteric   Throat: Oropharynx moist without lesions  Neck: Supple without SONNY   Lungs:   Clear to auscultation bilaterally; no wheezes, rhonchi or rales; respirations unlabored   Chest wall : No deformity, nontender   Heart:  RRR; no murmur, rub or gallop   Abdomen:   Soft, non-tender, non-distended, positive bowel sounds  Extremities: No clubbing, cyanosis or edema   Neuro: Alert, answers questions appropriately   Skin: No new rashes or lesions  No draining wounds noted         Labs, Imaging, & Other studies:   All pertinent labs and imaging studies were personally reviewed    Results from last 7 days  Lab Units 18  0458 18  0453 05/15/18  0448   WBC Thousand/uL 8 97 7 22 7 88   HEMOGLOBIN g/dL 12 6 12 1 12 2   PLATELETS Thousands/uL 174 157 173       Results from last 7 days  Lab Units 18  0458 18  0510 18  0453   SODIUM mmol/L 140 140 138   POTASSIUM mmol/L 3 7 4 1 4 2   CHLORIDE mmol/L 106 108 108   CO2 mmol/L 26 27 23   ANION GAP mmol/L 8 5 7   BUN mg/dL 9 10 11   CREATININE mg/dL 0 86 0 85 0 68   EGFR ml/min/1 73sq m 65 66 85   GLUCOSE RANDOM mg/dL 129 128 107   CALCIUM mg/dL 8 4 8 1* 8 0*   AST U/L 21 27 45   ALT U/L 131* 167* 246*   ALK PHOS U/L 137* 138* 140*   TOTAL PROTEIN g/dL 5 9* 5 6* 5 7*   BILIRUBIN TOTAL mg/dL 0 83 0 84 1 17*     Procalcitonin 0 28    Results from last 7 days  Lab Units 18  2300   BLOOD CULTURE  No Growth After 4 Days    Staphylococcus coagulase negative*   GRAM STAIN RESULT  Gram positive cocci in clusters

## 2018-05-18 NOTE — ASSESSMENT & PLAN NOTE
· Secondary to cholangitis  · Appreciate GI, general surgery, Infectious Disease input  · s/p ERCP 5/14/18 with stone removal and stent replacement  · She denies worsening abdominal pain, abdominal exam reveals tenderness in the right upper quadrant but no left upper quadrant tenderness  · Her LFTs are trending down from yesterday with total bilirubin of 0 83 today  · Continue current antibiotics as per ID  · Advance diet as tolerated  · Repeat ERCP in 4/6 weeks  · Blood culture x1 -- CNS-- most likely contaminant

## 2018-05-18 NOTE — ASSESSMENT & PLAN NOTE
· Present on admission -- this has resolved  · Secondary to cholangitis  · Appreciate GI, general surgery, Infectious Disease input  · s/p ERCP 5/14/18 with stone removal and stent replacement  · Her LFTs are trending down from yesterday with total bilirubin of 0 83 today  · Continue current antibiotics as per ID -- most likely transition to orals today  · Advance diet as tolerated  · Repeat ERCP in 4/6 weeks  · Blood culture x1 -- CNS, this is a contaminant

## 2018-05-18 NOTE — PHYSICAL THERAPY NOTE
Physical Therapy Progress Note     05/18/18 1343   Pain Assessment   Pain Assessment FLACC   Pain Type Chronic pain   Pain Location Neck;Back   Restrictions/Precautions   Weight Bearing Precautions Per Order No   Other Precautions Fall Risk;Pain;Bed Alarm;Cognitive   General   Chart Reviewed Yes   Family/Caregiver Present No   Subjective   Subjective Pt  in bed supine upon entry  Reported R knee swelling, back pain, neck pain which is chronic  No pain number given  Moaned with mobility  Bed Mobility   Supine to Sit 5  Supervision   Additional items Assist x 1   Sit to Supine 5  Supervision   Additional items Assist x 1   Transfers   Sit to Stand 4  Minimal assistance   Additional items Assist x 1; Increased time required;Armrests; Verbal cues   Stand to Sit 5  Supervision   Additional items Assist x 1;Verbal cues   Stand pivot 5  Supervision   Ambulation/Elevation   Gait pattern Excessively slow; Short stride; Foward flexed; Inconsistent annie;Decreased foot clearance; Forward Flexion   Gait Assistance 4  Minimal assist   Additional items Assist x 1;Verbal cues   Assistive Device Rolling walker   Distance 140 ft x 2   Balance   Static Sitting Good   Static Standing Fair   Ambulatory Fair -   Activity Tolerance   Activity Tolerance Patient tolerated treatment well   Nurse Made Aware yes   Exercises   TKR Supine;Sitting;Bilateral;AROM;10 reps   Assessment   Prognosis Good   Problem List Decreased strength;Decreased range of motion;Decreased endurance; Impaired balance;Decreased mobility; Decreased cognition; Impaired judgement;Decreased safety awareness;Pain   Assessment Pt  able to robert  PT session with therapeutic activities, exercises and gait training  Pt  performed supine and seated LE TE's  reported increased back pain with supine TE's  performed all tansfers with S except with first STS transfer  Cues given 100% for hand placement for stand to sit transfer  Pt  needed seated rest between ambulation   Will continue to follow during the stay to address the mobility deficits  pt  continues to be functioning below baseline  Goals   Patient Goals I need to do this more and get my strength back     LTG Expiration Date 05/28/18   Treatment Day 2   Recommendation   Recommendation Post acute IP rehab   Equipment Recommended Lisa Krueger, PTA

## 2018-05-18 NOTE — CASE MANAGEMENT
Continued Stay Review    Date: 05/18/2018    Vital Signs: /91 (BP Location: Left arm) Comment: [id number]  Pulse 95   Temp 97 5 °F (36 4 °C) (Oral)   Resp 18   Ht 5' 5" (1 651 m)   Wt 81 2 kg (179 lb 0 2 oz)   SpO2 96%   BMI 29 79 kg/m²     Medications:   Scheduled Meds:   Current Facility-Administered Medications:  acetaminophen 650 mg Oral Q6H PRN   ALPRAZolam 0 25 mg Oral HS PRN   amoxicillin-clavulanate 1 tablet Oral Q12H [address]   apixaban 5 mg Oral BID   ciprofloxacin 750 mg Oral Q12H KARELY   lidocaine 1 patch Transdermal Daily   lisinopril 5 mg Oral Daily   melatonin 3 mg Oral HS   methocarbamol 500 mg Oral HS PRN   metoprolol tartrate 150 mg Oral Q12H KARELY   ondansetron 4 mg Intravenous Q6H PRN   oxyCODONE 15 mg Oral Q6H PRN   pantoprazole 40 mg Oral Early Morning   potassium-sodium phosphateS 1 tablet Oral TID With Meals     Abnormal Labs/Diagnostic Results:     Age/Sex: 69 y o  female     Assessment/Plan:   * Severe sepsis (HCC)   Assessment & Plan        · Present on admission -- this has resolved  · Secondary to cholangitis  · Appreciate GI, general surgery, Infectious Disease input  · s/p ERCP 5/14/18 with stone removal and stent replacement  · Her LFTs are trending down from yesterday with total bilirubin of 0 83 today  · Continue current antibiotics as per ID -- most likely transition to orals today  · Advance diet as tolerated  · Repeat ERCP in 4/6 weeks  · Blood culture x1 -- CNS, this is a contaminant           Biliary stent obstruction, initial encounter   Assessment & Plan     · Secondary to cholangitis  · Appreciate GI, general surgery, Infectious Disease input  · s/p ERCP 5/14/18 with stone removal and stent replacement  · She denies worsening abdominal pain, abdominal exam reveals tenderness in the right upper quadrant but no left upper quadrant tenderness  · Her LFTs are trending down from yesterday with total bilirubin of 0 83 today    · Continue current antibiotics as per ID   · Advance diet as tolerated  · Repeat ERCP in 4/6 weeks  · Blood culture x1 -- CNS-- most likely contaminant           Atrial fibrillation (HCC)   Assessment & Plan     · Continue metoprolol, Eliquis           Chronic systolic heart failure (HCC)   Assessment & Plan     · Continue with Lopressor, lisinopril  · Daily weights, intake output           Elevated troponin   Assessment & Plan     · Most likely secondary to NSTEMI Type 2 sec to sepsis  · History of chronic systolic congestive heart failure  · Cardiology consult appreciated           Anomalies of nailsresolved as of 5/18/2018   Assessment & Plan     · Very poor foot hygiene  · Appreciate input from Podiatry           Depression   Assessment & Plan     Patient states that she has been depressed over the last few weeks to months  Consult Psychiatry           Chronic anticoagulation   Assessment & Plan     Continue with Eliquis     DISPOSITION:  Most likely transition to oral antibiotic  Discontinue IV fluids  Transfer to [facility] floor    Anticipate discharge over the weekend      Discharge Plan: LYLE

## 2018-05-18 NOTE — PLAN OF CARE
Problem: PHYSICAL THERAPY ADULT  Goal: Performs mobility at highest level of function for planned discharge setting  See evaluation for individualized goals  Treatment/Interventions: Functional transfer training, LE strengthening/ROM, Elevations, Therapeutic exercise, Endurance training, Bed mobility, Gait training, Equipment eval/education, Spoke to nursing, Spoke to MD, Spoke to case management  Equipment Recommended:  (TBD)       See flowsheet documentation for full assessment, interventions and recommendations  Outcome: Progressing  Prognosis: Good  Problem List: Decreased strength, Decreased range of motion, Decreased endurance, Impaired balance, Decreased mobility, Decreased cognition, Impaired judgement, Decreased safety awareness, Pain  Assessment: Pt  able to robert  PT session with therapeutic activities, exercises and gait training  Pt  performed supine and seated LE TE's  reported increased back pain with supine TE's  performed all tansfers with S except with first STS transfer  Cues given 100% for hand placement for stand to sit transfer  Pt  needed seated rest between ambulation  Will continue to follow during the stay to address the mobility deficits  pt  continues to be functioning below baseline  Barriers to Discharge: Inaccessible home environment, Decreased caregiver support     Recommendation: Post acute IP rehab          See flowsheet documentation for full assessment

## 2018-05-18 NOTE — PROGRESS NOTES
Progress Note - Iza Ball [birth year], 69 y o  female MRN: [id number]    Unit/Bed#: [facility] 422-01 Encounter: [id number]    Primary Care Provider: Holden Viramontes DO   Date and time admitted to hospital: 5/13/2018  7:47 PM        * Severe sepsis [facility])   Assessment & Plan      · Present on admission -- this has resolved  · Secondary to cholangitis  · Appreciate GI, general surgery, Infectious Disease input  · s/p ERCP 5/14/18 with stone removal and stent replacement  · Her LFTs are trending down from yesterday with total bilirubin of 0 83 today  · Continue current antibiotics as per ID -- most likely transition to orals today  · Advance diet as tolerated  · Repeat ERCP in 4/6 weeks  · Blood culture x1 -- CNS, this is a contaminant  Biliary stent obstruction, initial encounter   Assessment & Plan    · Secondary to cholangitis  · Appreciate GI, general surgery, Infectious Disease input  · s/p ERCP 5/14/18 with stone removal and stent replacement  · She denies worsening abdominal pain, abdominal exam reveals tenderness in the right upper quadrant but no left upper quadrant tenderness  · Her LFTs are trending down from yesterday with total bilirubin of 0 83 today  · Continue current antibiotics as per ID  · Advance diet as tolerated  · Repeat ERCP in 4/6 weeks  · Blood culture x1 -- CNS-- most likely contaminant  Atrial fibrillation (HCC)   Assessment & Plan    · Continue metoprolol, Eliquis  Chronic systolic heart failure (HCC)   Assessment & Plan    · Continue with Lopressor, lisinopril  · Daily weights, intake output  Elevated troponin   Assessment & Plan    · Most likely secondary to NSTEMI Type 2 sec to sepsis  · History of chronic systolic congestive heart failure  · Cardiology consult appreciated  Anomalies of nailsresolved as of 5/18/2018   Assessment & Plan    · Very poor foot hygiene  · Appreciate input from Podiatry          Depression   Assessment & Plan Patient states that she has been depressed over the last few weeks to months  Consult Psychiatry  Chronic anticoagulation   Assessment & Plan    Continue with Eliquis          ______________________________________________________________________    SUBJECTIVE:   Patient seen and examined  She appears comfortable but come to use her generalized weakness  She she is adamant that she wants to be discharged home upon medical stability  She is refusing all placement/rehab  Patient has been complaining of episodes of depression  Patient has the capacity to make her decisions  Anticipate discharge in 1-2 days  OBJECTIVE:     Vitals:   HR:  [] 95  Resp:  [18] 18  BP: (120-155)/(61-98) 135/91  SpO2:  [96 %-98 %] 96 %  Temp (24hrs), Av °F (36 7 °C), Min:97 5 °F (36 4 °C), Max:98 4 °F (36 9 °C)  Current: Temperature: 97 5 °F (36 4 °C)    Intake/Output Summary (Last 24 hours) at 18 0904  Last data filed at 18 0847   Gross per 24 hour   Intake             1840 ml   Output             3975 ml   Net            -2135 ml       Physical Exam:   GENERAL: AAO x 3  HEENT: atraumatic, normocephalic  Oral mucosa moist, no icterus, pallor  PERRLA +  Neck supple, no JVD, no lymphadenopathy, no thryomegaly  CHEST: B/L breath sounds heard, occasional wheezing  CVS: S1, S2   ABDOMEN: Soft/flabby/minimal tenderness in right upper quadrant  Bowel sounds heard  NEUROLOGICAL: CN II -XI grossly intact  No focal motor or sensory deficits  No signs of meningeal irritation or cerebellar dysfunction    EXTREMITIES:  Mild bilateral pitting pedal edema    LABS:  Results for Janice Jurado (MRN [id number]) as of 2018 09:01   2018 04:58   eGFR 65   Sodium 140   Potassium 3 7   Chloride 106   CO2 26   Anion Gap 8   BUN 9   Creatinine 0 86   Glucose 129   Calcium 8 4   AST 21    (H)   Alkaline Phosphatase 137 (H)   Total Protein 5 9 (L)   Albumin 2 3 (L)   Total Bilirubin 0 83   WBC 8 97   RBC 4 12 Hemoglobin 12 6   Hematocrit 39 7   MCV 96   MCHC 31 7   RDW 15 8 (H)   Platelets 584   MPV 47 5   Procalcitonin 0 28 (H)     Scheduled Meds:    Current Facility-Administered Medications:  acetaminophen 650 mg Oral Q6H PRN Camron Vidal MD    ALPRAZolam 0 25 mg Oral HS PRN Llewellyn Crigler, MD    apixaban 5 mg Oral BID Mortimer Bayley, DO    calcium carbonate 500 mg Oral Daily PRN CHANDRIKA Barroso    lactated ringers 50 mL/hr Intravenous Continuous Audie Mcgrath CRNA Last Rate: Stopped (05/14/18 2100)   lidocaine 1 patch Transdermal Daily CHANDRIKA Barroso    lisinopril 5 mg Oral Daily Camron Vidal MD    melatonin 3 mg Oral HS Jean Jaeger MD    methocarbamol 500 mg Oral HS PRN Camron Vidal MD    metoprolol tartrate 150 mg Oral Q12H [address] Brina Addison MD    ondansetron 4 mg Intravenous Q6H PRN Camron Vidal MD    oxyCODONE 15 mg Oral Q6H PRN Vasile Aguirre PA-C    pantoprazole 40 mg Oral Early Morning Emilia Fothergill, MD    piperacillin-tazobactam 3 375 g Intravenous Q6H David Zhang MD Last Rate: 3 375 g (05/18/18 0450)   potassium-sodium phosphateS 1 tablet Oral TID With Meals Llewellyn Crigler, MD        Continuous Infusions:    lactated ringers 50 mL/hr Last Rate: Stopped (05/14/18 2100)       PRN Meds:    acetaminophen    ALPRAZolam    calcium carbonate    methocarbamol    ondansetron    oxyCODONE      VTE Prophylaxis:  Eliquis  CODE STATUS: FULL      Patient Centered Rounds: I have discussed today's plans with nursing staff today  DISPOSITION:  Most likely transition to oral antibiotic  Discontinue IV fluids  Transfer to med surge floor  Anticipate discharge over the weekend  Time Spent for Care: 20 minutes   More than 50% of total time spent on counseling and coordination of care as described above  Family will be updated      Leticia Copeland MD  HOSPITALIST SERVICES  5/18/2018    PLEASE NOTE:  This encounter was completed utilizing the - Modal/Fluency Direct Speech Voice Recognition Software  Grammatical errors, random word insertions, pronoun errors and incomplete sentences are occasional consequences of the system due to software limitations, ambient noise and hardware issues  These may be missed by proof reading prior to affixing electronic signature  Any questions or concerns about the content, text or information contained within the body of this dictation should be directly addressed to the physician for clarification  Please do not hesitate to call me directly if you have any any questions or concerns

## 2018-05-19 LAB — BACTERIA BLD CULT: NORMAL

## 2018-05-19 PROCEDURE — 99232 SBSQ HOSP IP/OBS MODERATE 35: CPT | Performed by: INTERNAL MEDICINE

## 2018-05-19 PROCEDURE — 99221 1ST HOSP IP/OBS SF/LOW 40: CPT | Performed by: PSYCHIATRY & NEUROLOGY

## 2018-05-19 RX ORDER — AMITRIPTYLINE HYDROCHLORIDE 25 MG/1
25 TABLET, FILM COATED ORAL
Status: DISCONTINUED | OUTPATIENT
Start: 2018-05-19 | End: 2018-05-21 | Stop reason: HOSPADM

## 2018-05-19 RX ORDER — FUROSEMIDE 10 MG/ML
40 INJECTION INTRAMUSCULAR; INTRAVENOUS ONCE
Status: COMPLETED | OUTPATIENT
Start: 2018-05-19 | End: 2018-05-19

## 2018-05-19 RX ORDER — DULOXETIN HYDROCHLORIDE 30 MG/1
30 CAPSULE, DELAYED RELEASE ORAL DAILY
Status: DISCONTINUED | OUTPATIENT
Start: 2018-05-19 | End: 2018-05-21 | Stop reason: HOSPADM

## 2018-05-19 RX ADMIN — OXYCODONE HYDROCHLORIDE 15 MG: 10 TABLET ORAL at 10:46

## 2018-05-19 RX ADMIN — DIBASIC SODIUM PHOSPHATE, MONOBASIC POTASSIUM PHOSPHATE AND MONOBASIC SODIUM PHOSPHATE 1 TABLET: 852; 155; 130 TABLET ORAL at 08:14

## 2018-05-19 RX ADMIN — METOPROLOL TARTRATE 150 MG: 50 TABLET ORAL at 08:14

## 2018-05-19 RX ADMIN — DULOXETINE HYDROCHLORIDE 30 MG: 30 CAPSULE, DELAYED RELEASE ORAL at 12:34

## 2018-05-19 RX ADMIN — LIDOCAINE 1 PATCH: 50 PATCH CUTANEOUS at 22:55

## 2018-05-19 RX ADMIN — CIPROFLOXACIN HYDROCHLORIDE 750 MG: 750 TABLET, FILM COATED ORAL at 08:14

## 2018-05-19 RX ADMIN — LIDOCAINE 1 PATCH: 50 PATCH CUTANEOUS at 00:12

## 2018-05-19 RX ADMIN — OXYCODONE HYDROCHLORIDE 15 MG: 10 TABLET ORAL at 22:53

## 2018-05-19 RX ADMIN — MELATONIN TAB 3 MG 3 MG: 3 TAB at 21:13

## 2018-05-19 RX ADMIN — PANTOPRAZOLE SODIUM 40 MG: 40 TABLET, DELAYED RELEASE ORAL at 06:07

## 2018-05-19 RX ADMIN — AMOXICILLIN AND CLAVULANATE POTASSIUM 1 TABLET: 875; 125 TABLET, FILM COATED ORAL at 21:13

## 2018-05-19 RX ADMIN — APIXABAN 5 MG: 5 TABLET, FILM COATED ORAL at 08:13

## 2018-05-19 RX ADMIN — METOPROLOL TARTRATE 150 MG: 50 TABLET ORAL at 21:13

## 2018-05-19 RX ADMIN — OXYCODONE HYDROCHLORIDE 15 MG: 10 TABLET ORAL at 17:03

## 2018-05-19 RX ADMIN — AMOXICILLIN AND CLAVULANATE POTASSIUM 1 TABLET: 875; 125 TABLET, FILM COATED ORAL at 08:14

## 2018-05-19 RX ADMIN — FUROSEMIDE 40 MG: 10 INJECTION, SOLUTION INTRAMUSCULAR; INTRAVENOUS at 10:39

## 2018-05-19 RX ADMIN — OXYCODONE HYDROCHLORIDE 15 MG: 10 TABLET ORAL at 04:17

## 2018-05-19 RX ADMIN — AMITRIPTYLINE HYDROCHLORIDE 25 MG: 25 TABLET, FILM COATED ORAL at 21:13

## 2018-05-19 RX ADMIN — LISINOPRIL 5 MG: 5 TABLET ORAL at 08:13

## 2018-05-19 RX ADMIN — CIPROFLOXACIN HYDROCHLORIDE 750 MG: 750 TABLET, FILM COATED ORAL at 21:13

## 2018-05-19 RX ADMIN — APIXABAN 5 MG: 5 TABLET, FILM COATED ORAL at 17:03

## 2018-05-19 NOTE — PROGRESS NOTES
Progress Note - Tomeka Chanel [birth year], 69 y o  female MRN: [id number]    Unit/Bed#: [facility] 422-01 Encounter: [id number]    Primary Care Provider: Andria Nichols DO   Date and time admitted to hospital: 5/13/2018  7:47 PM        * Severe sepsis [facility])   Assessment & Plan    · Present on admission -- this has resolved  · Secondary to cholangitis  · Appreciate GI, general surgery, Infectious Disease input  · s/p ERCP 5/14/18 with stone removal and stent replacement  · Her LFTs are trending down from yesterday with total bilirubin of 0 83 today  · Continue current antibiotics as per ID -- Augmentin 875 mg p o  q 12 hours, and Cipro 750 mg p o  q 12 hours through 5/19/2018 to complete 7 days of antibiotics total   · Advance diet as tolerated  · Repeat ERCP in 4/6 weeks  · Blood culture x1 -- CNS, this is a contaminant  Biliary stent obstruction, initial encounter   Assessment & Plan    · Secondary to cholangitis  · Appreciate GI, general surgery, Infectious Disease input  · s/p ERCP 5/14/18 with stone removal and stent replacement  · She denies worsening abdominal pain, abdominal exam reveals tenderness in the right upper quadrant but no left upper quadrant tenderness  · Her LFTs are trending down from yesterday with total bilirubin of 0 83 today  · Continue current antibiotics as per ID -- Augmentin 875 mg p o  q 12 hours, and Cipro 750 mg p o  q 12 hours through 5/19/2018 to complete 7 days of antibiotics total   · Advance diet as tolerated  · Repeat ERCP in 4/6 weeks  · Blood culture x1 -- CNS-- most likely contaminant  Atrial fibrillation (HCC)   Assessment & Plan    · Continue metoprolol, Eliquis  Chronic systolic heart failure (HCC)   Assessment & Plan    · Continue with Lopressor, lisinopril  · Will give IV Lasix as patient  volume overloaded  · Weight = 80 6 KG (Admission weight = 74 8 kg)          Elevated troponin   Assessment & Plan    · Most likely secondary to NSTEMI Type 2 sec to sepsis  · History of chronic systolic congestive heart failure  · Cardiology consult appreciated  Fall   Assessment & Plan    · Secondary to # 1     · General deconditioning  · Management as above  · PT OT recommended inpatient rehab however patient is absolutely refusing placement  · Continue PTOT while in hospital         Depression   Assessment & Plan    · Patient states that she has been depressed over the last few weeks to months  · Consult Psychiatry  · Patient states she was taking Elavil many years ago with she discontinued herself as she was not following up with her primary care physician  · Restarted Elavil 25 mg once daily          SUBJECTIVE:   Patient seen and examined  Complaining of swelling of bilateral extremities  Ongoing chronic back and neck pain  States she is depressed and wanting to see Psychiatry  Awaiting formal consultation by Psychiatry  Will start Elavil low-dose today  Updated patient's sister Ruthie Cloud      OBJECTIVE:   Vitals:    05/19/18 0817   BP: 157/88   Pulse: 86   Resp: 18   Temp: 98 1 °F (36 7 °C)   SpO2: 95%       Intake/Output Summary (Last 24 hours) at 05/19/18 1046  Last data filed at 05/19/18 0835   Gross per 24 hour   Intake             1835 ml   Output             1550 ml   Net              285 ml       Physical Exam:   GENERAL: AAO x 3  HEENT: atraumatic, normocephalic  Oral mucosa moist, no icterus, pallor  PERRLA +  Neck supple, no JVD, no lymphadenopathy, no thryomegaly  CHEST: B/L breath sounds heard, occasional wheezing  CVS: S1, S2   ABDOMEN: Soft/flabby/minimal tenderness in right upper quadrant  Bowel sounds heard  NEUROLOGICAL: CN II -XI grossly intact  No focal motor or sensory deficits  No signs of meningeal irritation or cerebellar dysfunction    EXTREMITIES: Worsening bilateral pitting pedal edema     LABS:  Results for Anthony Garcia (MRN [id number]) as of 5/19/2018 10:45   5/18/2018 04:58   eGFR 65   Sodium 140   Potassium 3 7   Chloride 106   CO2 26   Anion Gap 8   BUN 9   Creatinine 0 86   Glucose 129   Calcium 8 4   AST 21    (H)   Alkaline Phosphatase 137 (H)   Total Protein 5 9 (L)   Albumin 2 3 (L)   Total Bilirubin 0 83   WBC 8 97   RBC 4 12   Hemoglobin 12 6   Hematocrit 39 7   MCV 96   MCH 30 6   RDW 15 8 (H)   Platelets 119   MPV 14 8   Procalcitonin 0 28 (H)     Scheduled Meds:  Current Facility-Administered Medications:  acetaminophen 650 mg Oral Q6H PRN Mark Ch MD   ALPRAZolam 0 25 mg Oral HS PRN Keshawn Bhagat MD   amitriptyline 25 mg Oral HS Jean Teran MD   amoxicillin-clavulanate 1 tablet Oral Q12H [address] Rodrigo Space, MD   apixaban 5 mg Oral BID Lo Ellis DO   ciprofloxacin 750 mg Oral Q12H [address] [facility] Space, MD   lidocaine 1 patch Transdermal Daily CHANDRIKA Vigil   lisinopril 5 mg Oral Daily Mark Ch MD   melatonin 3 mg Oral HS Jean Teran MD   methocarbamol 500 mg Oral HS PRN Mark Ch MD   metoprolol tartrate 150 mg Oral Q12H Lianne Moreno MD   ondansetron 4 mg Intravenous Q6H PRN Mark Ch MD   oxyCODONE 15 mg Oral Q6H PRN Paris Baron PA-C   pantoprazole 40 mg Oral Early Morning Kathe Malhotra MD     Continuous Infusions:   PRN Meds:   acetaminophen    ALPRAZolam    methocarbamol    ondansetron    oxyCODONE          VTE Prophylaxis:  Eliquis      CODE STATUS: FULL     Patient Centered Rounds: I have discussed today's plans with nursing staff today      DISPOSITION: Transfer to [facility] surge floor  Anticipate discharge over the weekend  PT recommending inpatient rehab although patient absolutely refusing      Time Spent for Care: 20 minutes   More than 50% of total time spent on counseling and coordination of care as described above  Family will be updated      Jv Kessler MD  HOSPITALIST SERVICES  5/19/2018     PLEASE NOTE:  This encounter was completed utilizing the M- Modal/Fluency Direct Speech Voice Recognition Software  Grammatical errors, random word insertions, pronoun errors and incomplete sentences are occasional consequences of the system due to software limitations, ambient noise and hardware issues  These may be missed by proof reading prior to affixing electronic signature  Any questions or concerns about the content, text or information contained within the body of this dictation should be directly addressed to the physician for clarification  Please do not hesitate to call me directly if you have any any questions or concerns

## 2018-05-19 NOTE — ASSESSMENT & PLAN NOTE
· Patient states that she has been depressed over the last few weeks to months  · Consult Psychiatry  · Patient states she was taking Elavil many years ago with she discontinued herself as she was not following up with her primary care physician    · Restarted Elavil 25 mg once daily

## 2018-05-19 NOTE — ASSESSMENT & PLAN NOTE
· Continue with Lopressor, lisinopril  · Will give IV Lasix as patient  volume overloaded  · Weight = 80 6 KG (Admission weight = 74 8 kg)

## 2018-05-19 NOTE — ASSESSMENT & PLAN NOTE
· Present on admission -- this has resolved  · Secondary to cholangitis  · Appreciate GI, general surgery, Infectious Disease input  · s/p ERCP 5/14/18 with stone removal and stent replacement  · Her LFTs are trending down from yesterday with total bilirubin of 0 83 today  · Continue current antibiotics as per ID -- Augmentin 875 mg p o  q 12 hours, and Cipro 750 mg p o  q 12 hours through 5/19/2018 to complete 7 days of antibiotics total   · Advance diet as tolerated  · Repeat ERCP in 4/6 weeks  · Blood culture x1 -- CNS, this is a contaminant

## 2018-05-19 NOTE — ASSESSMENT & PLAN NOTE
· Secondary to cholangitis  · Appreciate GI, general surgery, Infectious Disease input  · s/p ERCP 5/14/18 with stone removal and stent replacement  · She denies worsening abdominal pain, abdominal exam reveals tenderness in the right upper quadrant but no left upper quadrant tenderness  · Her LFTs are trending down from yesterday with total bilirubin of 0 83 today  · Continue current antibiotics as per ID -- Augmentin 875 mg p o  q 12 hours, and Cipro 750 mg p o  q 12 hours through 5/19/2018 to complete 7 days of antibiotics total   · Advance diet as tolerated  · Repeat ERCP in 4/6 weeks  · Blood culture x1 -- CNS-- most likely contaminant
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
## 2018-05-19 NOTE — ASSESSMENT & PLAN NOTE
· Secondary to # 1     · General deconditioning  · Management as above  · PT OT recommended inpatient rehab however patient is absolutely refusing placement    · Continue PTOT while in hospital

## 2018-05-20 LAB
ANION GAP SERPL CALCULATED.3IONS-SCNC: 4 MMOL/L (ref 4–13)
BUN SERPL-MCNC: 10 MG/DL (ref 5–25)
CALCIUM SERPL-MCNC: 8.8 MG/DL (ref 8.3–10.1)
CHLORIDE SERPL-SCNC: 103 MMOL/L (ref 100–108)
CO2 SERPL-SCNC: 31 MMOL/L (ref 21–32)
CREAT SERPL-MCNC: 0.71 MG/DL (ref 0.6–1.3)
ERYTHROCYTE [DISTWIDTH] IN BLOOD BY AUTOMATED COUNT: 15.9 % (ref 11.6–15.1)
GFR SERPL CREATININE-BSD FRML MDRD: 82 ML/MIN/1.73SQ M
GLUCOSE SERPL-MCNC: 96 MG/DL (ref 65–140)
HCT VFR BLD AUTO: 43 % (ref 34.8–46.1)
HGB BLD-MCNC: 13.8 G/DL (ref 11.5–15.4)
MCH RBC QN AUTO: 31 PG (ref 26.8–34.3)
MCHC RBC AUTO-ENTMCNC: 32.1 G/DL (ref 31.4–37.4)
MCV RBC AUTO: 97 FL (ref 82–98)
PLATELET # BLD AUTO: 203 THOUSANDS/UL (ref 149–390)
PMV BLD AUTO: 10.8 FL (ref 8.9–12.7)
POTASSIUM SERPL-SCNC: 4 MMOL/L (ref 3.5–5.3)
RBC # BLD AUTO: 4.45 MILLION/UL (ref 3.81–5.12)
SODIUM SERPL-SCNC: 138 MMOL/L (ref 136–145)
WBC # BLD AUTO: 9.63 THOUSAND/UL (ref 4.31–10.16)

## 2018-05-20 PROCEDURE — 99232 SBSQ HOSP IP/OBS MODERATE 35: CPT | Performed by: INTERNAL MEDICINE

## 2018-05-20 PROCEDURE — 85027 COMPLETE CBC AUTOMATED: CPT | Performed by: INTERNAL MEDICINE

## 2018-05-20 PROCEDURE — 80048 BASIC METABOLIC PNL TOTAL CA: CPT | Performed by: INTERNAL MEDICINE

## 2018-05-20 PROCEDURE — 99233 SBSQ HOSP IP/OBS HIGH 50: CPT | Performed by: PSYCHIATRY & NEUROLOGY

## 2018-05-20 RX ORDER — FUROSEMIDE 10 MG/ML
40 INJECTION INTRAMUSCULAR; INTRAVENOUS DAILY
Status: DISCONTINUED | OUTPATIENT
Start: 2018-05-20 | End: 2018-05-21 | Stop reason: HOSPADM

## 2018-05-20 RX ADMIN — CIPROFLOXACIN HYDROCHLORIDE 750 MG: 750 TABLET, FILM COATED ORAL at 10:20

## 2018-05-20 RX ADMIN — METHOCARBAMOL 500 MG: 500 TABLET ORAL at 21:14

## 2018-05-20 RX ADMIN — AMITRIPTYLINE HYDROCHLORIDE 25 MG: 25 TABLET, FILM COATED ORAL at 21:14

## 2018-05-20 RX ADMIN — METOPROLOL TARTRATE 150 MG: 50 TABLET ORAL at 21:14

## 2018-05-20 RX ADMIN — METOPROLOL TARTRATE 150 MG: 50 TABLET ORAL at 10:20

## 2018-05-20 RX ADMIN — APIXABAN 5 MG: 5 TABLET, FILM COATED ORAL at 10:20

## 2018-05-20 RX ADMIN — ALPRAZOLAM 0.25 MG: 0.25 TABLET ORAL at 21:13

## 2018-05-20 RX ADMIN — LISINOPRIL 5 MG: 5 TABLET ORAL at 10:20

## 2018-05-20 RX ADMIN — AMOXICILLIN AND CLAVULANATE POTASSIUM 1 TABLET: 875; 125 TABLET, FILM COATED ORAL at 10:20

## 2018-05-20 RX ADMIN — FUROSEMIDE 40 MG: 10 INJECTION, SOLUTION INTRAMUSCULAR; INTRAVENOUS at 12:24

## 2018-05-20 RX ADMIN — DULOXETINE HYDROCHLORIDE 30 MG: 30 CAPSULE, DELAYED RELEASE ORAL at 10:20

## 2018-05-20 RX ADMIN — MELATONIN TAB 3 MG 3 MG: 3 TAB at 21:14

## 2018-05-20 RX ADMIN — APIXABAN 5 MG: 5 TABLET, FILM COATED ORAL at 17:48

## 2018-05-20 RX ADMIN — OXYCODONE HYDROCHLORIDE 15 MG: 10 TABLET ORAL at 18:34

## 2018-05-20 RX ADMIN — OXYCODONE HYDROCHLORIDE 15 MG: 10 TABLET ORAL at 05:52

## 2018-05-20 RX ADMIN — PANTOPRAZOLE SODIUM 40 MG: 40 TABLET, DELAYED RELEASE ORAL at 05:52

## 2018-05-20 RX ADMIN — CIPROFLOXACIN HYDROCHLORIDE 750 MG: 750 TABLET, FILM COATED ORAL at 21:14

## 2018-05-20 RX ADMIN — AMOXICILLIN AND CLAVULANATE POTASSIUM 1 TABLET: 875; 125 TABLET, FILM COATED ORAL at 21:14

## 2018-05-20 RX ADMIN — LIDOCAINE 1 PATCH: 50 PATCH CUTANEOUS at 23:01

## 2018-05-20 RX ADMIN — OXYCODONE HYDROCHLORIDE 15 MG: 10 TABLET ORAL at 12:28

## 2018-05-20 NOTE — RESTORATIVE TECHNICIAN NOTE
Restorative Specialist Mobility Note       Activity: Bathroom privileges (back to bed; declined amb)     Assistive Device: Front wheel walker

## 2018-05-20 NOTE — ASSESSMENT & PLAN NOTE
· Continue with Lopressor, lisinopril  · Will give additional IV Lasix as patient volume overloaded  · Weight = 78 3 KG (Admission weight = 74 8 kg)

## 2018-05-20 NOTE — ASSESSMENT & PLAN NOTE
· Patient states that she has been depressed over the last few weeks to months  · Appreciate input from Psychiatry  · Patient states she was taking Elavil many years ago with she discontinued herself as she was not following up with her primary care physician  · Restarted Elavil 25 mg once daily,Cymbalta added by Psychiatry

## 2018-05-20 NOTE — PROGRESS NOTES
Progress Note - Bunny Lock [birth year], 69 y o  female MRN: [id number]    Unit/Bed#: [facility] 422-01 Encounter: [id number]    Primary Care Provider: Ethan Caicedo DO   Date and time admitted to hospital: 5/13/2018  7:47 PM        * Severe sepsis [facility])   Assessment & Plan    · Present on admission -- this has resolved  · Secondary to cholangitis  · Appreciate GI, general surgery, Infectious Disease input  · s/p ERCP 5/14/18 with stone removal and stent replacement  · Her LFTs are trending down from yesterday with total bilirubin of 0 83 today  · Continue current antibiotics as per ID -- Augmentin 875 mg p o  q 12 hours, and Cipro 750 mg p o  q 12 hours through 5/19/2018 to complete 7 days of antibiotics total   · Advance diet as tolerated  · Repeat ERCP in 4/6 weeks  · Blood culture x1 -- CNS, this is a contaminant  Biliary stent obstruction, initial encounter   Assessment & Plan    · Secondary to cholangitis  · Appreciate GI, general surgery, Infectious Disease input  · s/p ERCP 5/14/18 with stone removal and stent replacement  · She denies worsening abdominal pain, abdominal exam reveals tenderness in the right upper quadrant but no left upper quadrant tenderness  · Her LFTs are trending down from yesterday with total bilirubin of 0 83 today  · Continue current antibiotics as per ID -- Augmentin 875 mg p o  q 12 hours, and Cipro 750 mg p o  q 12 hours through 5/19/2018 to complete 7 days of antibiotics total   · Advance diet as tolerated  · Repeat ERCP in 4/6 weeks  · Blood culture x1 -- CNS-- most likely contaminant  Atrial fibrillation (HCC)   Assessment & Plan    · Continue metoprolol, Eliquis  Chronic systolic heart failure (HCC)   Assessment & Plan    · Continue with Lopressor, lisinopril  · Will give additional IV Lasix as patient volume overloaded  · Weight = 78 3 KG (Admission weight = 74 8 kg)          Elevated troponin   Assessment & Plan    · Most likely secondary to NSTEMI Type 2 sec to sepsis  · History of chronic systolic congestive heart failure  · Cardiology consult appreciated  Fall   Assessment & Plan    · Secondary to # 1     · General deconditioning  · Management as above  · PT OT recommended inpatient rehab however patient is absolutely refusing placement  · Continue PTOT while in hospital         Depression   Assessment & Plan    · Patient states that she has been depressed over the last few weeks to months  · Appreciate input from Psychiatry  · Patient states she was taking Elavil many years ago with she discontinued herself as she was not following up with her primary care physician  · Restarted Elavil 25 mg once daily,Cymbalta added by Psychiatry  _____________________________________________________________________    SUBJECTIVE:   Patient seen and examined  Generalized weakness persists but overall she states she feels a lot better  I have been talking to regarding consideration of rehab  She stated that she will think about it  She tells me she will need to speak with housing department before she can make a decision  From the medical standpoint she is very stable, however, ambulation is poor  PT recommending inpatient rehab  Follow up PT progress  OBJECTIVE:     Vitals:   HR:  [65-96] 96  Resp:  [18-20] 18  BP: (130-159)/(74-93) 130/74  SpO2:  [93 %-98 %] 93 %  Temp (24hrs), Av 2 °F (36 8 °C), Min:98 °F (36 7 °C), Max:98 3 °F (36 8 °C)  Current: Temperature: 98 3 °F (36 8 °C)    Intake/Output Summary (Last 24 hours) at 18 1042  Last data filed at 18 0830   Gross per 24 hour   Intake              740 ml   Output             3100 ml   Net            -2360 ml     Physical Exam:   GENERAL: AAO x 3  HEENT: atraumatic, normocephalic  Oral mucosa moist, no icterus, pallor  PERRLA +  Neck supple, no JVD, no lymphadenopathy, no thryomegaly  CHEST: B/L breath sounds heard, occasional wheezing    CVS: S1, S2   ABDOMEN: Soft/obese/NT/BS heard  NEUROLOGICAL: CN II -XI grossly intact  No focal motor or sensory deficits  No signs of meningeal irritation or cerebellar dysfunction  EXTREMITIES:  Bilateral pitting pedal edema  LABS:  Results for Chika Worrlel (MRN [id number]) as of 5/20/2018 10:36   5/20/2018 04:57   eGFR 82   Sodium 138   Potassium 4 0   Chloride 103   CO2 31   Anion Gap 4   BUN 10   Creatinine 0 71   Glucose 96   Calcium 8 8   WBC 9 63   RBC 4 45   Hemoglobin 13 8   Hematocrit 43 0   MCV 97   MCH 31 0   RDW 15 9 (H)   Platelets 389   MPV 23 9     Scheduled Meds:    Current Facility-Administered Medications:  acetaminophen 650 mg Oral Q6H PRN Heather Jaimes MD   ALPRAZolam 0 25 mg Oral HS PRN Catarina Brooks MD   amitriptyline 25 mg Oral HS Jean Montano MD   amoxicillin-clavulanate 1 tablet Oral Q12H [address] Prudencio Rascon MD   apixaban 5 mg Oral BID Leatha Diane DO   ciprofloxacin 750 mg Oral Q12H [address] Prudencio Rascon MD   DULoxetine 30 mg Oral Daily Wayne Monet MD   lidocaine 1 patch Transdermal Daily CHANDRIKA De La Torre   lisinopril 5 mg Oral Daily Heather Jaimes MD   melatonin 3 mg Oral HS Jean Montano MD   methocarbamol 500 mg Oral HS PRN Heather Jaimes MD   metoprolol tartrate 150 mg Oral Q12H Emma Jeter MD   ondansetron 4 mg Intravenous Q6H PRN Heather Jaimes MD   oxyCODONE 15 mg Oral Q6H PRN Aiden Aguiar PA-C   pantoprazole 40 mg Oral Early Morning Ayah Pearce MD       PRN Meds:    acetaminophen    ALPRAZolam    methocarbamol    ondansetron    oxyCODONE      VTE Prophylaxis:Apixaban    Education and Discussions with Family / Patient:   Updated patient's sister Justine Rivera       Current Length of Stay: 6 days      Current Patient Status: Inpatient     Certification Statement: The patient will continue to require additional inpatient hospital stay due to ongoing goals of care discussion      Discharge Plan / Estimated Discharge Date:   Anticipate discharge in 1-2 days   PT recommending inpatient rehab however patient is refusing  High risk of falls if discharged home  Home VNA has been set up  She stated that she is thinking about going to rehab      Code Status: Level 1 - Full Code    Time Spent for Care: 20 minutes   More than 50% of total time spent on counseling and coordination of care as described above  Kristyn Cruz MD  HOSPITALIST SERVICES  5/20/2018    PLEASE NOTE:  This encounter was completed utilizing the [employer] Direct Speech Voice Recognition Software  Grammatical errors, random word insertions, pronoun errors and incomplete sentences are occasional consequences of the system due to software limitations, ambient noise and hardware issues  These may be missed by proof reading prior to affixing electronic signature  Any questions or concerns about the content, text or information contained within the body of this dictation should be directly addressed to the physician for clarification  Please do not hesitate to call me directly if you have any any questions or concerns

## 2018-05-21 VITALS
DIASTOLIC BLOOD PRESSURE: 79 MMHG | TEMPERATURE: 97.4 F | HEIGHT: 65 IN | HEART RATE: 93 BPM | BODY MASS INDEX: 27.95 KG/M2 | WEIGHT: 167.77 LBS | RESPIRATION RATE: 18 BRPM | OXYGEN SATURATION: 94 % | SYSTOLIC BLOOD PRESSURE: 138 MMHG

## 2018-05-21 LAB
ALBUMIN SERPL BCP-MCNC: 2.7 G/DL (ref 3.5–5)
ALP SERPL-CCNC: 119 U/L (ref 46–116)
ALT SERPL W P-5'-P-CCNC: 71 U/L (ref 12–78)
AST SERPL W P-5'-P-CCNC: 19 U/L (ref 5–45)
BILIRUB DIRECT SERPL-MCNC: 0.4 MG/DL (ref 0–0.2)
BILIRUB SERPL-MCNC: 0.65 MG/DL (ref 0.2–1)
PROT SERPL-MCNC: 6.4 G/DL (ref 6.4–8.2)

## 2018-05-21 PROCEDURE — 99239 HOSP IP/OBS DSCHRG MGMT >30: CPT | Performed by: INTERNAL MEDICINE

## 2018-05-21 PROCEDURE — 99233 SBSQ HOSP IP/OBS HIGH 50: CPT | Performed by: INTERNAL MEDICINE

## 2018-05-21 PROCEDURE — 80076 HEPATIC FUNCTION PANEL: CPT | Performed by: INTERNAL MEDICINE

## 2018-05-21 RX ORDER — OXYCODONE HCL 10 MG/1
10 TABLET, FILM COATED, EXTENDED RELEASE ORAL EVERY 12 HOURS PRN
Qty: 20 TABLET | Refills: 0 | Status: SHIPPED | OUTPATIENT
Start: 2018-05-21 | End: 2018-05-24 | Stop reason: HOSPADM

## 2018-05-21 RX ORDER — DULOXETIN HYDROCHLORIDE 30 MG/1
30 CAPSULE, DELAYED RELEASE ORAL DAILY
Qty: 30 CAPSULE | Refills: 0 | Status: SHIPPED | OUTPATIENT
Start: 2018-05-21 | End: 2018-06-05 | Stop reason: SDUPTHER

## 2018-05-21 RX ORDER — CIPROFLOXACIN 750 MG/1
750 TABLET, FILM COATED ORAL EVERY 12 HOURS SCHEDULED
Qty: 10 TABLET | Refills: 0 | Status: SHIPPED | OUTPATIENT
Start: 2018-05-21 | End: 2018-05-21 | Stop reason: HOSPADM

## 2018-05-21 RX ORDER — AMITRIPTYLINE HYDROCHLORIDE 25 MG/1
25 TABLET, FILM COATED ORAL
Qty: 30 TABLET | Refills: 0 | Status: SHIPPED | OUTPATIENT
Start: 2018-05-21 | End: 2018-06-05 | Stop reason: SDUPTHER

## 2018-05-21 RX ORDER — FUROSEMIDE 40 MG/1
40 TABLET ORAL DAILY
Qty: 30 TABLET | Refills: 0 | Status: SHIPPED | OUTPATIENT
Start: 2018-05-21 | End: 2018-06-05 | Stop reason: SDUPTHER

## 2018-05-21 RX ORDER — AMOXICILLIN AND CLAVULANATE POTASSIUM 875; 125 MG/1; MG/1
1 TABLET, FILM COATED ORAL EVERY 12 HOURS SCHEDULED
Qty: 10 TABLET | Refills: 0 | Status: SHIPPED | OUTPATIENT
Start: 2018-05-21 | End: 2018-05-21 | Stop reason: HOSPADM

## 2018-05-21 RX ADMIN — DULOXETINE HYDROCHLORIDE 30 MG: 30 CAPSULE, DELAYED RELEASE ORAL at 09:34

## 2018-05-21 RX ADMIN — AMOXICILLIN AND CLAVULANATE POTASSIUM 1 TABLET: 875; 125 TABLET, FILM COATED ORAL at 09:34

## 2018-05-21 RX ADMIN — LISINOPRIL 5 MG: 5 TABLET ORAL at 09:34

## 2018-05-21 RX ADMIN — PANTOPRAZOLE SODIUM 40 MG: 40 TABLET, DELAYED RELEASE ORAL at 05:16

## 2018-05-21 RX ADMIN — CIPROFLOXACIN HYDROCHLORIDE 750 MG: 750 TABLET, FILM COATED ORAL at 09:34

## 2018-05-21 RX ADMIN — FUROSEMIDE 40 MG: 10 INJECTION, SOLUTION INTRAMUSCULAR; INTRAVENOUS at 09:34

## 2018-05-21 RX ADMIN — OXYCODONE HYDROCHLORIDE 15 MG: 10 TABLET ORAL at 10:22

## 2018-05-21 RX ADMIN — OXYCODONE HYDROCHLORIDE 15 MG: 10 TABLET ORAL at 02:29

## 2018-05-21 RX ADMIN — METOPROLOL TARTRATE 150 MG: 50 TABLET ORAL at 09:34

## 2018-05-21 RX ADMIN — APIXABAN 5 MG: 5 TABLET, FILM COATED ORAL at 09:34

## 2018-05-21 NOTE — PROGRESS NOTES
Attempted to have conversation with pt about the benefits of rehab upon discharge since it was recommended by therapy  After reviewing her perceived barriers to discharge, it became clear pt did not want help overcoming them and just wanted to go home   Emotional support offered

## 2018-05-21 NOTE — PROGRESS NOTES
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  F44  Posttraumatic stress disorder    PLAN:   1) Continue  Elavil 25 mg p  o  q h s 2) Continue  Cymbalta 30 mg p o  daily for depression  3) please make sure that patient either continue with her PCP for the follow-up with psych medication or follow-up with [facility]  4) will also be helpful to evaluate patient's current living condition and see if she can get help with some home health  5) Communicated with patients' RN  Interval history:  Patient was sitting comfortably in bed she was more withdrawn today  She said she slept better last night  She said she still feels down however she has taken in medication one day and not notice any side effects she had  She denied any hallucinations or delusions denied any suicidal ideas  She said she is looking forward to her surgery and then she is looking forward to go home and follow up outpatient  She is willing to give this medication a try  She said she feels a little more positive today this writer did not notice any side effects from the one dose of Cymbalta that she has been given today    Scheduled Meds:    Current Facility-Administered Medications:  acetaminophen 650 mg Oral Q6H PRN Chloe Glover MD   ALPRAZolam 0 25 mg Oral HS PRN Lorena Reeder MD   amitriptyline 25 mg Oral HS Jean Sequeira MD   amoxicillin-clavulanate 1 tablet Oral Q12H [facility] Nishant Jimenez MD   apixaban 5 mg Oral BID Marilee Florence DO   ciprofloxacin 750 mg Oral Q12H [facility] Nishant Jimenez MD   DULoxetine 30 mg Oral Daily Vennie Oppenheim, MD   furosemide 40 mg Intravenous Daily Lorena Reeder MD   lidocaine 1 patch Transdermal Daily Estrellitaor CHANDRIKA Sosa   lisinopril 5 mg Oral Daily Chloe Glover MD   melatonin 3 mg Oral HS Jean Sequeira MD   methocarbamol 500 mg Oral HS PRN Chloe Glover MD   metoprolol tartrate 150 mg Oral Q12H Iesha Crowley MD   ondansetron 4 mg Intravenous Q6H PRN Lidia Foster MD   oxyCODONE 15 mg Oral Q6H PRN Cheyenne Madsen PA-C   pantoprazole 40 mg Oral Early Morning Magnolia Granado MD     Continuous Infusions:   PRN Meds:   acetaminophen    ALPRAZolam    methocarbamol    ondansetron    oxyCODONE     No Known Allergies     Vitals:    05/20/18 2113   BP: 141/82   Pulse: 94   Resp:    Temp:    SpO2:              Mental Status Evaluation:  Appearance:   appeared of age and had good hygiene    Behavior:   behavior was cooperative    Speech:   speech is normal goal directed    Mood:  Depressed   Affect improved      Language: naming objects and Goal directed  Thought Process:   logical and linear    Thought Content:  Normal   Perceptual Disturbances:  denying any auditory and visual hallucinations  Risk Potential: The patient is denying any suicidal ideas but says she feels hopeless and she feels the that she would be better off if God take her life  Sensorium:  person, place, time/date, situation, day of week, month of year and year   Cognition:  grossly intact   Consciousness:   alert, awake, and oriented ×3    Attention: Good attention span   Intellect: Normal   Fund of Knowledge: awareness of current events: normal    Insight:  Good   Judgment: Good   Muscle Strength and Tone: Normal    Gait/Station:  gait was not assessed    Motor Activity: no abnormal movements     Lab Results: I have personally reviewed pertinent lab results  NOTE:  Total of 25 minutes were spent in talking to patient completing this medical record reviewing medical chart medical decision making    Erum Thrasher MD

## 2018-05-21 NOTE — SOCIAL WORK
Pt is cleared for d/c  Pt is accepted for services by [facility] for her aftercare  The pt and her sister Rah Mesa were both informed of d/c  Sister will transport pt home later this day, pickup time TBD  IMM signed  Medicare Bundle Letter given  No chart copy required  CM to follow

## 2018-05-21 NOTE — DISCHARGE INSTRUCTIONS
1   Your dose of oxycodone has been decreased  2   Complete antibiotics for another 5 days  3   You have been started on amitriptyline and duloxetine for depression  4   Follow-up with primary care physician in 1 week, and Dr Von Markham, gastroenterology 4 weeks  5   You  will need a repeat ERCP in 4-6 weeks time -- to be coordinated by primary care physician and GI as outpatient

## 2018-05-21 NOTE — PROGRESS NOTES
Progress Note - Infectious Disease   Iza Ball 69 y o  female MRN: [id number]  Unit/Bed#: [facility] 422-01 Encounter: [id number]      Impression/Plan:  1   Sepsis-POA   Leukocytosis and tachycardia with lactic acidosis   Suspect secondary to cholangitis   No other clear sources appreciated  The patient was not bacteremic with any true pathogen   The patient remains hemodynamically stable despite her systemic illness  Gutierrez Law seems to be tolerating the antibiotics without difficulty thus far  The patient is much improved and has completed more than a week of antibiotics   -discontinue Augmentin and ciprofloxacin  -no additional antibiotics  -monitor CBC with diff and CMP as needed  -supportive care     2   Presumptive cholangitis-in the setting of a previous ERCP and stent placement   Patient is now status post ERCP with removal of stones and stent revision   She has clinically improved   -discontinue antibiotics as above  -monitor liver function test  -close GI follow-up     3   Coagulase-negative Staphylococcus bacteremia-suspect contaminant with only 1 of 2 blood cultures positive   Consideration for the possibility of a true bacteremia   Echocardiogram with no reported valvular vegetation  -no directed antibiotic treatment needed  -followup 2nd blood culture     4   Abnormal liver function tests-likely secondary to biliary obstruction   The liver function tests have essentially normalized after the stone removal and stent revision    No other clear source appreciated  -GI follow-up  -no additional ID workup for now     5   Atrial fibrillation-with mild tachycardia   Relatively asymptomatic  -anticoagulation as per the primary service  -rate control     6  Disposition-okay to discharge from infectious disease standpoint  Antibiotics:  Cipro 4  Augmentin 4  Antibiotics 9    Subjective:  Patient has no fever, chills, sweats; no nausea, vomiting, diarrhea; no cough, shortness of breath; no increased pain   No new symptoms  Objective:  Vitals:  HR:  [73-94] 93  Resp:  [18-20] 18  BP: (103-141)/(59-86) 138/79  SpO2:  [92 %-96 %] 94 %  Temp (24hrs), Av 9 °F (36 6 °C), Min:97 4 °F (36 3 °C), Max:98 4 °F (36 9 °C)  Current: Temperature: (!) 97 4 °F (36 3 °C)    Physical Exam:   General Appearance:  Alert, interactive, nontoxic, no acute distress  Throat: Oropharynx moist without lesions  Lungs:   Clear to auscultation bilaterally; no wheezes, rhonchi or rales; respirations unlabored   Heart:  RRR; no murmur, rub or gallop   Abdomen:   Soft, non-tender, non-distended, positive bowel sounds  Extremities: No clubbing, cyanosis or edema   Skin: No new rashes or lesions  No draining wounds noted         Labs, Imaging, & Other studies:   All pertinent labs and imaging studies were personally reviewed    Results from last 7 days  Lab Units 18  0457 18  0458 18  0453   WBC Thousand/uL 9 63 8 97 7 22   HEMOGLOBIN g/dL 13 8 12 6 12 1   PLATELETS Thousands/uL 203 174 157       Results from last 7 days  Lab Units 18  0445 18  0457 18  0458 18  0510   SODIUM mmol/L  --  138 140 140   POTASSIUM mmol/L  --  4 0 3 7 4 1   CHLORIDE mmol/L  --  103 106 108   CO2 mmol/L  --  31 26 27   ANION GAP mmol/L  --  4 8 5   BUN mg/dL  --  10 9 10   CREATININE mg/dL  --  0 71 0 86 0 85   EGFR ml/min/1 73sq m  --  82 65 66   GLUCOSE RANDOM mg/dL  --  96 129 128   CALCIUM mg/dL  --  8 8 8 4 8 1*   AST U/L   --  21 27   ALT U/L 71  --  131* 167*   ALK PHOS U/L 119*  --  137* 138*   TOTAL PROTEIN g/dL 6 4  --  5 9* 5 6*   BILIRUBIN TOTAL mg/dL 0 65  --  0 83 0 84

## 2018-05-21 NOTE — DISCHARGE SUMMARY
DISCHARGE SUMMARY                     [address], [place]     NAME: Cyrus Beal  AGE: 69 y o  SEX: female   MRN: [id number]   Encounter: [id number]   Unit/Bed: [place] [address]     Admitting Provider:  Prisca Stevenson MD  Discharge Provider:  Kendra Arreola MD  Admission Date: 5/13/2018       Discharge Date: 05/21/18   LOS: 7  Primary Care Physician at Discharge: Sathya Gonsalez, [address] [place] [address]  · SEVERE SEPSIS SECONDARY TO CHOLANGITIS  · CHOLANGITIS WITH BILIARY STENT OBSTRUCTION  · CHRONIC ATRIAL FIBRILLATION ON ANTICOAGULATION  · CHRONIC SYSTOLIC CONGESTIVE HEART FAILURE  · ELEVATED TROPONIN MOST LIKELY SECONDARY TO NSTEMI TYPE 2 SECONDARY TO SEPSIS  · STATUS POST MECHANICAL FALL  · [address] COURSE:  Cyrus Beal is a 69 y o  female with history of atrial fibrillation on Eliquis ,systolic heart failure ejection fraction 40%, biliary dilatation status post ERCP with sphincterectomy and stent placement in 2014 who presented from home status post fall and being on ground X ?10 hrs   Patient is not a good historian due to underlying memory impairment  She reported ER resident that she fell at home, denied loss of consciousness or head injury, she called her family members and landlord who apparently found her laying down and brought her to the ER  Upon Dr Farida Sethi she mostly complained of epigastric nonradiating aching abdominal pain, chills and nausea denies diarrhea hematemesis ,chest pain, significant weight loss  Further workup in the emergency room revealed lactic acid 3 5 ,WBC 30941, troponin 0 08--->0  09  Significantly elevated AST/ ALT ,TB 3 5  ProBNP 81856, Urinalysis was grossly positive for bacteriuria nitrates   Patient had a dark stool in the ER  CT high volume low GI bleed reported"CBD stent is in place however there is intra and extra hepatic biliary ductal dilatation and gallbladder hydrops   A stone is seen in the gallbladder   Stent obstruction suspected   No active GI bleed visualized"  CT of the head C-spine negative for acute intracranial pathology or fracture  Patient remains afebrile, hemodynamically stable, given criteria of severe sepsis and lab abnormalities patient admitted to step-down 1  Below is the short summary of hospital stay:    Severe sepsis [facility])   Assessment & Plan     · Present on admission -- this has resolved  · Secondary to cholangitis  · Appreciate GI, general surgery, Infectious Disease input  · s/p ERCP 5/14/18 with stone removal and stent replacement  · Her LFTs are trending down from yesterday with total bilirubin of 0 4 today  · As per ID -- No antibiotics needed  I have called patient's pharmacy and canceled the antibiotics  · Tolerating regular diet  · Repeat ERCP in 4/6 weeks  · Blood culture x1 -- CNS, this is a contaminant       Biliary stent obstruction, initial encounter   Assessment & Plan     · Secondary to cholangitis  · Appreciate GI, general surgery, Infectious Disease input  · s/p ERCP 5/14/18 with stone removal and stent replacement  · She denies worsening abdominal pain, abdominal exam reveals tenderness in the right upper quadrant but no left upper quadrant tenderness  · Her LFTs are trending down from yesterday with total bilirubin of 0 83 today  · As per ID -- no antibiotics needed upon discharge  I have called patient's pharmacy and  canceled antibiotics that were initially prescribed  · Tolerating regular diet  · Repeat ERCP in 4/6 weeks  · Blood culture x1 -- CNS-- most likely contaminant           Atrial fibrillation (HCC)   Assessment & Plan     · Continue metoprolol, Eliquis           Chronic systolic heart failure (HCC)   Assessment & Plan     · Continue with Lopressor, lisinopril  · Will send patient on Lasix 40 mg once daily    · Weight on day of discharge is 76 1 KG (Admission weight = 74 8 kg)          Elevated troponin   Assessment & Plan     · Most likely secondary to NSTEMI Type 2 sec to sepsis  · History of chronic systolic congestive heart failure  · Cardiology consult appreciated           Fall   Assessment & Plan     · Secondary to # 1     · General deconditioning  · Management as above  · PT OT recommended inpatient rehab however patient is absolutely refusing placement  · Home VNA has been set up as patient absolutely refusing rehab  · Patient is high risk for readmission due to ambulatory dysfunction and patient refusing inpatient rehab       Depression   Assessment & Plan     · Patient states that she has been depressed over the last few weeks to months  · Appreciate input from Psychiatry  · Patient has tolerated amitriptyline and duloxetine, which will be continued upon discharge  CONSULTING PROVIDERS   · Gastroenterology  · Podiatry  · Ophthalmology  · Infectious Diseases  · Behavioral health  · General surgery    PROCEDURES PERFORMED  Xr Chest 2 Views    Result Date: 5/14/2018  Narrative: CHEST INDICATION:   Status post fall  COMPARISON:  4/25/2013 EXAM PERFORMED/VIEWS:  XR CHEST PA & LATERAL FINDINGS: Cardiomediastinal silhouette appears unremarkable  No infiltrates  There is left basilar subsegmental atelectasis or scarring  No pneumothorax or pleural effusion  Osseous structures appear within normal limits for patient age  Impression: No acute cardiopulmonary disease  Workstation performed: [id number]     Ct Head Without Contrast    Result Date: 5/13/2018  Narrative: CT BRAIN - WITHOUT CONTRAST INDICATION:   Status post fall  COMPARISON:  None  TECHNIQUE:  CT examination of the brain was performed  In addition to axial images, coronal 2D reformatted images were created and submitted for interpretation  Radiation dose length product (DLP) for this visit:  974 mGy-cm     This examination, like all CT scans performed in the [facility], was performed utilizing techniques to minimize radiation dose exposure, including the use of iterative reconstruction and automated exposure control  IMAGE QUALITY:  Diagnostic  FINDINGS: PARENCHYMA: Decreased attenuation is noted in periventricular and subcortical white matter demonstrating an appearance that is statistically most likely to represent mild microangiopathic change  No CT signs of acute infarction  No intracranial mass, mass effect or midline shift  No acute parenchymal hemorrhage  VENTRICLES AND EXTRA-AXIAL SPACES:  Normal for the patient's age  VISUALIZED ORBITS AND PARANASAL SINUSES:  Unremarkable  CALVARIUM AND EXTRACRANIAL SOFT TISSUES:  Normal      Impression: No acute intracranial abnormality  Workstation performed: [id number]     Ct Cervical Spine Without Contrast    Result Date: 5/13/2018  Narrative: CT CERVICAL SPINE - WITHOUT CONTRAST INDICATION:   Status post fall  COMPARISON: None  TECHNIQUE:  CT examination of the cervical spine was performed without intravenous contrast   Contiguous axial images were obtained  Sagittal and coronal reconstructions were performed  Radiation dose length product (DLP) for this visit:  275 mGy-cm   This examination, like all CT scans performed in the [facility], was performed utilizing techniques to minimize radiation dose exposure, including the use of iterative reconstruction and automated exposure control  IMAGE QUALITY:  Diagnostic  FINDINGS: ALIGNMENT:  There is reversal of normal cervical lordosis  There is no significant subluxation  No compression deformity  VERTEBRAL BODIES:  No fracture  DEGENERATIVE CHANGES: C3-C5 facet fusion degenerative in origin  Mild multilevel cervical degenerative changes are noted without critical central canal stenosis  PREVERTEBRAL AND PARASPINAL SOFT TISSUES:  Unremarkable  THORACIC INLET:  Normal      Impression: No cervical spine fracture or traumatic malalignment    Workstation performed: [id number]     Us Gallbladder    Result Date: 5/14/2018  Narrative: RIGHT UPPER QUADRANT ULTRASOUND INDICATION:     Cholelithiasis  COMPARISON:  CT previous day  TECHNIQUE:   Real-time ultrasound of the right upper quadrant was performed with a curvilinear transducer with both volumetric sweeps and still imaging techniques  FINDINGS: PANCREAS:  Visualized portions of the pancreas are within normal limits  AORTA AND IVC:  Visualized portions are normal for patient age  LIVER: Size:  Enlarged  The liver measures 18 7 cm in the midclavicular line  Contour:  Surface contour is smooth  Parenchyma:  Echogenicity and echotexture are within normal limits  No evidence of suspicious mass  10 mm hepatic cyst noted  Limited imaging of the main portal vein shows it to be patent and hepatopetal   BILIARY: Cholelithiasis and gallbladder sludge noted  Gallbladder is prominently distended  Intra and extrahepatic biliary dilatation is noted  CBD measures 18 mm  No choledocholithiasis  KIDNEY: Right kidney measures 11 3 x 4 0 cm  Within normal limits  ASCITES:   None  Impression: Biliary dilatation and gallbladder distention again identified as seen on previous CT  Cholelithiasis and gallbladder sludge  Workstation performed: [id number]     Fl Ercp Biliary Only    Result Date: 5/15/2018  Narrative: ERCP INDICATION:  Cholangitis, stent exchange COMPARISON:  Ultrasound from 5/14/2018 IMAGES:  10 FLUOROSCOPY TIME:   3 mft CONTRAST: 4 mL of iohexol (OMNIPAQUE) FINDINGS: Fluoroscopic guidance was provided for performance of ERCP  BILIARY: A stent is seen on the initial image  This was removed and opacification of the common bile duct was performed  The common bile duct was mildly dilated  There are small filling defects consistent with stones  Stent is not well visualized on the final  image  Impression: Choledocholithiasis with stent exchange though the stent is not well visualized on the final image   Workstation performed: [id number]     Ct High Volume Lower Gi Bleed    Result Date: 5/13/2018  Narrative: CT ABDOMEN AND PELVIS - WITHOUT AND WITH IV CONTRAST INDICATION:   Peritonitis, GI bleed  Melena  COMPARISON: None  TECHNIQUE:  CT examination of the abdomen and pelvis was performed both prior to and after the administration of intravenous contrast  Axial, sagittal, and coronal 2D reformatted images were created from the source data and submitted for interpretation  Radiation dose length product (DLP) for this visit:  2912 mGy-cm   This examination, like all CT scans performed in the [facility], was performed utilizing techniques to minimize radiation dose exposure, including the use of iterative reconstruction and automated exposure control  IV Contrast:  100 mL of iohexol (OMNIPAQUE) Enteric Contrast:  Enteric contrast was not administered  FINDINGS: ABDOMEN  BOWEL:  There is no active extravasation of intravenous contrast into the lumen of stomach, small bowel, or large bowel loops  There is no abnormal bowel wall thickening or pathologic bowel wall enhancement  Diverticulosis without evidence for diverticulitis  Small to moderate sliding-type hiatal hernia  LOWER CHEST:  No significant abnormality in the lung bases  LIVER/BILIARY TREE:  There are one or more hepatic simple cyst(s) present  No CT evidence of suspicious solid hepatic mass  Normal hepatic contours  No biliary dilatation  There is intrahepatic and extrahepatic biliary ductal dilatation with the maximum diameter of the hepatic duct measured 2 cm  A stent is present however its patency is not assessed; recommend GI consultation  [place] GALLBLADDER:  There are gallstone(s) within the gallbladder, without pericholecystic inflammatory changes  SPLEEN:  Unremarkable  PANCREAS:  Unremarkable  ADRENAL GLANDS:  Unremarkable  KIDNEYS/URETERS:  Unremarkable  No hydronephrosis  PELVIS REPRODUCTIVE ORGANS:  Patient is status post hysterectomy  URINARY BLADDER:  Unremarkable  APPENDIX: No findings to suggest appendicitis  ADDITIONAL ABDOMINAL AND PELVIC STRUCTURES ABDOMINOPELVIC CAVITY:  No ascites or free intraperitoneal air  No lymphadenopathy  ABDOMINAL WALL/INGUINAL REGIONS:  Unremarkable  OSSEOUS STRUCTURES:  No acute fracture or destructive osseous lesion  Impression: CBD stent is in place however there is intra and extra hepatic biliary ductal dilatation and gallbladder hydrops  A stone is seen in the gallbladder  I suspect the stent is obstructed  Recommend GI consultation  No active GI bleed visualized however consider nuclear medicine GI bleeding scan if indicated  Other nonacute findings as above  Workstation performed: [id number]       PERTINENT LAB DATAS  Results for Anthony Garcia (MRN [id number]) as of 5/21/2018 09:00   5/20/2018 04:57 5/21/2018 04:45   eGFR 82    Sodium 138    Potassium 4 0    Chloride 103    CO2 31    Anion Gap 4    BUN 10    Creatinine 0 71    Glucose 96    Calcium 8 8    AST  19   ALT  71   Alkaline Phosphatase  119 (H)   Total Protein  6 4   Albumin  2 7 (L)   Total Bilirubin  0 65   BILIRUBIN DIRECT  0 40 (H)   WBC 9 63    RBC 4 45    Hemoglobin 13 8    Hematocrit 43 0    MCV 97    MCHC 32 1    RDW 15 9 (H)    Platelets 643    MPV 10 8      CULTURES  Blood culture x1 -- coagulase negative Staphylococcus which is a contaminant  PHYSICAL EXAM:  Vitals:   Blood Pressure: 138/79 (Map 99) (05/21/18 0724)  Pulse: 93 (05/21/18 0724)  Temperature: (!) 97 4 °F (36 3 °C) (05/21/18 0724)  Temp Source: Oral (05/21/18 0724)  Respirations: 18 (05/21/18 0724)  Height: 5' 5" (165 1 cm) (05/14/18 0222)  Weight - Scale: 76 1 kg (167 lb 12 3 oz) (05/21/18 0635)  SpO2: 94 % (05/21/18 0724)    GENERAL: AAO x 3  HEENT: atraumatic, normocephalic  Oral mucosa moist, no icterus, pallor  PERRLA +  Neck supple, no JVD, no lymphadenopathy, no thryomegaly  CHEST: B/L breath sounds heard, occasional wheezing  CVS: S1, S2   No cyanosis/clubbing or edema  ABDOMEN: Soft/obese/NT/BS heard  NEUROLOGICAL: CN II -XI grossly intact  No focal motor or sensory deficits  No signs of meningeal irritation or cerebellar dysfunction  EXTREMITIES:  Mild bilateral pitting pedal edema  Discharge Disposition:   Home with home health    Test Results Pending at Discharge: NA    Medications   Please see After Visit Summary for reconciled discharge medications provided to patient and family  Diet restrictions:        Diet Orders            Start     Ordered    05/16/18 1541  Diet GI; Lo Fat; Sodium 4 GM (BARI)  Diet effective now     Question Answer Comment   Diet Type GI    GI Lo Fat    Other Restriction(s): Sodium 4 GM (BARI)    RD to adjust diet per protocol? Yes        05/16/18 1540        Activity restrictions: No strenuous activity  Discharge Condition: good    Outpatient Follow-Up  1  Kristy Askew, [facility] / [address] [place] [address] Dr Sanchez [phone] - follow-up within one week  2  Your dose of oxycodone has been decreased  3   Complete antibiotics for another 5 days  4   You have been started on amitriptyline and duloxetine for depression, and lasix for history of congestive heart failure  5   Follow-up with primary care physician in 1 week, and Dr Lilian Isidro, gastroenterology 4 weeks  6   You  will need a repeat ERCP in 4-6 weeks time -- to be coordinated by primary care physician and GI as outpatient  Code Status: Level 2 - DNAR: but accepts endotracheal intubation    Discharge Statement   I spent 33 minutes discharging the patient  This time was spent on the day of discharge  Greater than 50% of total time was spent with the patient and / or family counseling and / or coordination of care  Leslee Wiley MD  HOSPITALIST SERVICES  5/21/2018    PLEASE NOTE:  This encounter was completed utilizing the [employer] Direct Speech Voice Recognition Software   Grammatical errors, random word insertions, pronoun errors and incomplete sentences are occasional consequences of the system due to software limitations, ambient noise and hardware issues  These may be missed by proof reading prior to affixing electronic signature  Any questions or concerns about the content, text or information contained within the body of this dictation should be directly addressed to the physician for clarification  Please do not hesitate to call me directly if you have any any questions or concerns

## 2018-05-21 NOTE — RESTORATIVE TECHNICIAN NOTE
Restorative Specialist Mobility Note       Activity: Bathroom privileges     Assistive Device: Front wheel walker

## 2018-05-22 ENCOUNTER — HOSPITAL ENCOUNTER (INPATIENT)
Facility: HOSPITAL | Age: 78
LOS: 1 days | Discharge: HOME HEALTH | DRG: 309 | End: 2018-05-24
Attending: EMERGENCY MEDICINE | Admitting: INTERNAL MEDICINE
Payer: MEDICARE

## 2018-05-22 ENCOUNTER — TELEPHONE (OUTPATIENT)
Dept: FAMILY MEDICINE CLINIC | Facility: CLINIC | Age: 78
End: 2018-05-22

## 2018-05-22 DIAGNOSIS — Z91.89 AT RISK FOR SELF CARE DEFICIT: ICD-10-CM

## 2018-05-22 DIAGNOSIS — G89.29 OTHER CHRONIC PAIN: ICD-10-CM

## 2018-05-22 DIAGNOSIS — R53.81 MALAISE: Primary | ICD-10-CM

## 2018-05-22 DIAGNOSIS — R53.1 WEAKNESS: ICD-10-CM

## 2018-05-22 PROBLEM — A41.9 SEVERE SEPSIS (HCC): Status: RESOLVED | Noted: 2018-05-14 | Resolved: 2018-05-22

## 2018-05-22 PROBLEM — R65.20 SEVERE SEPSIS (HCC): Status: RESOLVED | Noted: 2018-05-14 | Resolved: 2018-05-22

## 2018-05-22 LAB
ALBUMIN SERPL BCP-MCNC: 3 G/DL (ref 3.5–5)
ALP SERPL-CCNC: 119 U/L (ref 46–116)
ALT SERPL W P-5'-P-CCNC: 56 U/L (ref 12–78)
ANION GAP SERPL CALCULATED.3IONS-SCNC: 7 MMOL/L (ref 4–13)
AST SERPL W P-5'-P-CCNC: 18 U/L (ref 5–45)
ATRIAL RATE: 111 BPM
BASOPHILS # BLD AUTO: 0.05 THOUSANDS/ΜL (ref 0–0.1)
BASOPHILS NFR BLD AUTO: 1 % (ref 0–1)
BILIRUB SERPL-MCNC: 0.92 MG/DL (ref 0.2–1)
BUN SERPL-MCNC: 14 MG/DL (ref 5–25)
CALCIUM SERPL-MCNC: 9.1 MG/DL (ref 8.3–10.1)
CHLORIDE SERPL-SCNC: 104 MMOL/L (ref 100–108)
CO2 SERPL-SCNC: 27 MMOL/L (ref 21–32)
CREAT SERPL-MCNC: 0.91 MG/DL (ref 0.6–1.3)
EOSINOPHIL # BLD AUTO: 0.13 THOUSAND/ΜL (ref 0–0.61)
EOSINOPHIL NFR BLD AUTO: 2 % (ref 0–6)
ERYTHROCYTE [DISTWIDTH] IN BLOOD BY AUTOMATED COUNT: 15.4 % (ref 11.6–15.1)
GFR SERPL CREATININE-BSD FRML MDRD: 61 ML/MIN/1.73SQ M
GLUCOSE SERPL-MCNC: 95 MG/DL (ref 65–140)
HCT VFR BLD AUTO: 42.9 % (ref 34.8–46.1)
HGB BLD-MCNC: 13.7 G/DL (ref 11.5–15.4)
LIPASE SERPL-CCNC: 89 U/L (ref 73–393)
LYMPHOCYTES # BLD AUTO: 1.46 THOUSANDS/ΜL (ref 0.6–4.47)
LYMPHOCYTES NFR BLD AUTO: 18 % (ref 14–44)
MCH RBC QN AUTO: 30.9 PG (ref 26.8–34.3)
MCHC RBC AUTO-ENTMCNC: 31.9 G/DL (ref 31.4–37.4)
MCV RBC AUTO: 97 FL (ref 82–98)
MONOCYTES # BLD AUTO: 0.76 THOUSAND/ΜL (ref 0.17–1.22)
MONOCYTES NFR BLD AUTO: 9 % (ref 4–12)
NEUTROPHILS # BLD AUTO: 5.74 THOUSANDS/ΜL (ref 1.85–7.62)
NEUTS SEG NFR BLD AUTO: 70 % (ref 43–75)
NRBC BLD AUTO-RTO: 0 /100 WBCS
PLATELET # BLD AUTO: 235 THOUSANDS/UL (ref 149–390)
PMV BLD AUTO: 9.9 FL (ref 8.9–12.7)
POTASSIUM SERPL-SCNC: 4 MMOL/L (ref 3.5–5.3)
PROT SERPL-MCNC: 6.8 G/DL (ref 6.4–8.2)
QRS AXIS: 19 DEGREES
QRSD INTERVAL: 96 MS
QT INTERVAL: 342 MS
QTC INTERVAL: 479 MS
RBC # BLD AUTO: 4.43 MILLION/UL (ref 3.81–5.12)
SODIUM SERPL-SCNC: 138 MMOL/L (ref 136–145)
T WAVE AXIS: 239 DEGREES
TROPONIN I SERPL-MCNC: 0.04 NG/ML
VENTRICULAR RATE: 118 BPM
WBC # BLD AUTO: 8.18 THOUSAND/UL (ref 4.31–10.16)

## 2018-05-22 PROCEDURE — 99285 EMERGENCY DEPT VISIT HI MDM: CPT

## 2018-05-22 PROCEDURE — 83690 ASSAY OF LIPASE: CPT | Performed by: EMERGENCY MEDICINE

## 2018-05-22 PROCEDURE — 36415 COLL VENOUS BLD VENIPUNCTURE: CPT | Performed by: EMERGENCY MEDICINE

## 2018-05-22 PROCEDURE — 80053 COMPREHEN METABOLIC PANEL: CPT | Performed by: EMERGENCY MEDICINE

## 2018-05-22 PROCEDURE — 99219 PR INITIAL OBSERVATION CARE/DAY 50 MINUTES: CPT | Performed by: INTERNAL MEDICINE

## 2018-05-22 PROCEDURE — 93005 ELECTROCARDIOGRAM TRACING: CPT

## 2018-05-22 PROCEDURE — 93010 ELECTROCARDIOGRAM REPORT: CPT | Performed by: INTERNAL MEDICINE

## 2018-05-22 PROCEDURE — 96360 HYDRATION IV INFUSION INIT: CPT

## 2018-05-22 PROCEDURE — 85025 COMPLETE CBC W/AUTO DIFF WBC: CPT | Performed by: EMERGENCY MEDICINE

## 2018-05-22 PROCEDURE — 84484 ASSAY OF TROPONIN QUANT: CPT | Performed by: EMERGENCY MEDICINE

## 2018-05-22 RX ORDER — OXYCODONE HYDROCHLORIDE 5 MG/1
5 TABLET ORAL EVERY 6 HOURS PRN
Status: CANCELLED | OUTPATIENT
Start: 2018-05-22

## 2018-05-22 RX ORDER — FUROSEMIDE 40 MG/1
40 TABLET ORAL DAILY
Status: DISCONTINUED | OUTPATIENT
Start: 2018-05-23 | End: 2018-05-24 | Stop reason: HOSPADM

## 2018-05-22 RX ORDER — POLYETHYLENE GLYCOL 3350 17 G/17G
17 POWDER, FOR SOLUTION ORAL DAILY
Status: DISCONTINUED | OUTPATIENT
Start: 2018-05-23 | End: 2018-05-24 | Stop reason: HOSPADM

## 2018-05-22 RX ORDER — METOPROLOL TARTRATE 100 MG/1
100 TABLET ORAL EVERY 12 HOURS SCHEDULED
Status: DISCONTINUED | OUTPATIENT
Start: 2018-05-22 | End: 2018-05-24 | Stop reason: HOSPADM

## 2018-05-22 RX ORDER — ALPRAZOLAM 0.25 MG/1
0.25 TABLET ORAL
Status: DISCONTINUED | OUTPATIENT
Start: 2018-05-22 | End: 2018-05-24 | Stop reason: HOSPADM

## 2018-05-22 RX ORDER — OXYCODONE HCL 10 MG/1
10 TABLET, FILM COATED, EXTENDED RELEASE ORAL EVERY 12 HOURS PRN
Status: DISCONTINUED | OUTPATIENT
Start: 2018-05-22 | End: 2018-05-23

## 2018-05-22 RX ORDER — METHOCARBAMOL 500 MG/1
500 TABLET, FILM COATED ORAL
Status: DISCONTINUED | OUTPATIENT
Start: 2018-05-22 | End: 2018-05-24 | Stop reason: HOSPADM

## 2018-05-22 RX ORDER — LISINOPRIL 5 MG/1
5 TABLET ORAL DAILY
Status: DISCONTINUED | OUTPATIENT
Start: 2018-05-23 | End: 2018-05-24 | Stop reason: HOSPADM

## 2018-05-22 RX ORDER — POTASSIUM CHLORIDE 750 MG/1
10 TABLET, EXTENDED RELEASE ORAL 2 TIMES DAILY
Status: DISCONTINUED | OUTPATIENT
Start: 2018-05-23 | End: 2018-05-24 | Stop reason: HOSPADM

## 2018-05-22 RX ORDER — ACETAMINOPHEN 325 MG/1
650 TABLET ORAL EVERY 6 HOURS PRN
Status: DISCONTINUED | OUTPATIENT
Start: 2018-05-22 | End: 2018-05-24 | Stop reason: HOSPADM

## 2018-05-22 RX ORDER — AMITRIPTYLINE HYDROCHLORIDE 25 MG/1
25 TABLET, FILM COATED ORAL
Status: DISCONTINUED | OUTPATIENT
Start: 2018-05-22 | End: 2018-05-24 | Stop reason: HOSPADM

## 2018-05-22 RX ORDER — ASPIRIN 81 MG/1
81 TABLET ORAL DAILY
Status: DISCONTINUED | OUTPATIENT
Start: 2018-05-23 | End: 2018-05-24 | Stop reason: HOSPADM

## 2018-05-22 RX ORDER — DULOXETIN HYDROCHLORIDE 30 MG/1
30 CAPSULE, DELAYED RELEASE ORAL DAILY
Status: DISCONTINUED | OUTPATIENT
Start: 2018-05-23 | End: 2018-05-24 | Stop reason: HOSPADM

## 2018-05-22 RX ORDER — CALCIUM CARBONATE 200(500)MG
1000 TABLET,CHEWABLE ORAL DAILY PRN
Status: DISCONTINUED | OUTPATIENT
Start: 2018-05-22 | End: 2018-05-24 | Stop reason: HOSPADM

## 2018-05-22 RX ORDER — ONDANSETRON 2 MG/ML
4 INJECTION INTRAMUSCULAR; INTRAVENOUS EVERY 6 HOURS PRN
Status: DISCONTINUED | OUTPATIENT
Start: 2018-05-22 | End: 2018-05-24 | Stop reason: HOSPADM

## 2018-05-22 RX ADMIN — METHOCARBAMOL 500 MG: 500 TABLET ORAL at 20:38

## 2018-05-22 RX ADMIN — APIXABAN 5 MG: 5 TABLET, FILM COATED ORAL at 20:39

## 2018-05-22 RX ADMIN — METOPROLOL TARTRATE 100 MG: 25 TABLET ORAL at 15:44

## 2018-05-22 RX ADMIN — SODIUM CHLORIDE 1000 ML: 0.9 INJECTION, SOLUTION INTRAVENOUS at 15:38

## 2018-05-22 RX ADMIN — OXYCODONE HYDROCHLORIDE 10 MG: 10 TABLET, FILM COATED, EXTENDED RELEASE ORAL at 20:38

## 2018-05-22 RX ADMIN — METOPROLOL TARTRATE 100 MG: 100 TABLET ORAL at 20:38

## 2018-05-22 RX ADMIN — AMITRIPTYLINE HYDROCHLORIDE 25 MG: 25 TABLET, FILM COATED ORAL at 22:26

## 2018-05-22 NOTE — H&P
H&P- Iza Ball [birth year], 69 y o  female MRN: [id number]    Unit/Bed#: [facility] Encounter: [id number]    Primary Care Provider: Holden Viramontes DO   Date and time admitted to hospital: 5/22/2018  2:36 PM        * Weakness   Assessment & Plan    - STR was recommended on discharge from hospital yesterday, after 9 day hospitalization for severe sepsis 2/2 cholangitis, but pt refused stating she wants to go home with visiting nurse  States after she got home she realized that she is too weak to care for herself safely  Now agreeable to STR   - Will keep in hospital under observation while arrangements are made for STR  - PT/OT eval and treat            Atrial fibrillation ([facility] [address] )   Assessment & Plan    - Chronic  - Afib with RVR noted on arrival to ED  Likely 2/2 2 missed doses of Lopressor after discharged from hospital yesterday  States forget to retrieve her home meds from [address] on discharge, so had no meds available at home   - Resume home dose Lopressor  - Continue home dose Eliquis        Other chronic pain   Assessment & Plan    - With opioid dependence  - Continue home dose oxycodone ER PRN        Depression   Assessment & Plan    - No acute symptoms  - Continue home dose Elavil and Cymbalta        Chronic anticoagulation   Assessment & Plan    - 2/2 chronic atrial fibrillation  - Continue home dose Eliquis        Chronic systolic heart failure (HCC)   Assessment & Plan    - Appears euvolemic presently  - Continue home dose Lasix            VTE Prophylaxis: Apixaban (Eliquis)  / sequential compression device   Code Status: Level 3 DNR/DNI  POLST: There is no POLST form on file for this patient (pre-hospital)  Discussion with family: NA    Anticipated Length of Stay:  Patient will be admitted on an Observation basis with an anticipated length of stay of  Less than 2 midnights  Justification for Hospital Stay: See AP above   STR placement    Total Time for Visit, including Counseling / Coordination of Care: 30 minutes  Greater than 50% of this total time spent on direct patient counseling and coordination of care  Chief Complaint:   Weakness  Increased HR    History of Present Illness:    Amanda Beltre is a 69 y o  female with PMH of HTN, CHF, atrial fib, and chronic pain who presents to ED after evaluation by [facility] nurse for HR sustained 120-130  Pt also states after being discharged home from hospital yesterday she realized that she is too weak to care for herself safely  Per records STR was recommended prior to discharge but pt refused, requesting to go home instead  Per pt she forgot to retrieve her home meds from [address] prior to discharge home yesterday, so she had no meds available and therefore missed her last 2 doses of Lopressor, which would account for her Afib w/ RVR  Pt discharge from [facility] on [facility]/21/2018 after 9 day stay for severe sepsis 2/2 cholangitis with subsequent stent placement and resolution of sepsis  Denies any current URI s/s  Denies CHP/palpitation  Denies any fever/chills  Denies any N/V/D  Review of Systems:    Review of Systems   Constitutional: Negative for appetite change and chills  HENT: Negative for congestion, rhinorrhea, sinus pressure and sore throat  Eyes: Negative for pain and redness  Respiratory: Negative for cough, chest tightness, shortness of breath and wheezing  Cardiovascular: Negative for chest pain, palpitations and leg swelling  Gastrointestinal: Negative for abdominal pain, constipation, diarrhea, nausea and vomiting  Genitourinary: Negative for dysuria and urgency  Musculoskeletal: Positive for arthralgias, back pain and myalgias  Skin: Negative for rash  Neurological: Positive for weakness  Negative for dizziness, syncope and light-headedness         Past Medical and Surgical History:     Past Medical History:   Diagnosis Date    [facility]fib [facility])     Acute respiratory disease     CHF [facility]congestive [facility] failure) [facility] [address] [facility]     Chronic pain     Heart failure (HCC)     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  Authorizing Provider   ALPRAZolam (NIRAVAM) 0 25 MG dissolvable tablet Take 0 25 mg by mouth daily at bedtime as needed for anxiety   Yes Historical Provider, MD   amitriptyline (ELAVIL) 25 mg tablet Take 1 tablet (25 mg total) by mouth daily at bedtime 5/21/18  Yes José Maldonado MD   apixaban (ELIQUIS) 5 mg Take 5 mg by mouth 2 (two) times a day   Yes Historical Provider, MD   aspirin (ECOTRIN LOW STRENGTH) 81 mg EC tablet Take 81 mg by mouth daily   Yes Historical Provider, MD   DULoxetine (CYMBALTA) 30 mg delayed release capsule Take 1 capsule (30 mg total) by mouth daily 5/21/18  Yes José Maldonado MD   furosemide (LASIX) 40 mg tablet Take 1 tablet (40 mg total) by mouth daily 5/21/18  Yes José Maldonado MD   lisinopril (ZESTRIL) 5 mg tablet Take 5 mg by mouth daily   Yes Historical Provider, MD   methocarbamol (ROBAXIN) 500 mg tablet Take 500 mg by mouth daily at bedtime as needed for muscle spasms   Yes Historical Provider, MD   metoprolol tartrate (LOPRESSOR) 100 mg tablet Take 100 mg by mouth every 12 (twelve) hours   Yes Historical Provider, MD   oxyCODONE (OxyCONTIN) 10 mg 12 hr tablet Take 1 tablet (10 mg total) by mouth every 12 (twelve) hours as needed for moderate pain for up to 10 days Max Daily Amount: 20 mg 5/21/18 5/31/18 Yes José Maldonado MD   polyethylene glycol (MIRALAX) 17 g packet Take 17 g by mouth daily   Yes Historical Provider, MD   potassium chloride (MICRO-K) 10 MEQ CR capsule Take 20 mEq by mouth 2 (two) times a day   Yes Historical Provider, MD     I have reviewed home medications with patient personally  Allergies: No Known Allergies    Social History:     Marital Status:    Patient Pre-hospital Living Situation: Lives at home by self  Patient Pre-hospital Level of Mobility: Ambulatory  Patient Pre-hospital Diet Restrictions: None  Substance Use History:   History   Alcohol Use No     History   Smoking Status    Former Smoker   Smokeless Tobacco    Never Used     History   Drug Use No       Family History:    History reviewed  No pertinent family history  Physical Exam:     Vitals:   Blood Pressure: 146/96 (05/22/18 1725)  Pulse: (!) 110 (05/22/18 1725)  Temperature: 98 °F (36 7 °C) (05/22/18 1445)  Temp Source: Oral (05/22/18 1445)  Respirations: 18 (05/22/18 1725)  Height: 5' 5" (165 1 cm) (05/22/18 1445)  Weight - Scale: 76 1 kg (167 lb 12 3 oz) (05/22/18 1445)  SpO2: 97 % (05/22/18 1725)    Physical Exam   Constitutional: She is oriented to person, place, and time  She appears well-developed and well-nourished  No distress  HENT:   Head: Normocephalic and atraumatic  Eyes: Conjunctivae are normal  Pupils are equal, round, and reactive to light  Neck: Normal range of motion  Neck supple  Cardiovascular: Normal heart sounds and intact distal pulses  An irregularly irregular rhythm present  Tachycardia present  Exam reveals no gallop and no friction rub  No murmur heard  Pulmonary/Chest: Effort normal and breath sounds normal  No respiratory distress  She has no wheezes  She has no rales  Abdominal: Soft  Bowel sounds are normal  There is no tenderness  There is no rebound and no guarding  Musculoskeletal: Normal range of motion  She exhibits no edema  Neurological: She is alert and oriented to person, place, and time  Skin: Skin is warm and dry  Additional Data:     Lab Results: I have personally reviewed pertinent reports          Results from last 7 days  Lab Units 05/22/18  1539   WBC Thousand/uL 8 18   HEMOGLOBIN g/dL 13 7   HEMATOCRIT % 42 9   PLATELETS Thousands/uL 235   NEUTROS PCT % 70   LYMPHS PCT % 18   MONOS PCT % 9   EOS PCT % 2       Results from last 7 days  Lab Units 05/22/18  1539   SODIUM mmol/L 138   POTASSIUM mmol/L 4 0   CHLORIDE mmol/L 104   CO2 mmol/L 27   BUN mg/dL 14   CREATININE mg/dL 0 91   CALCIUM mg/dL 9 1   TOTAL PROTEIN g/dL 6 8   BILIRUBIN TOTAL mg/dL 0 92   ALK PHOS U/L 119*   ALT U/L 56   AST U/L 18   GLUCOSE RANDOM mg/dL 95                   Imaging: I have personally reviewed pertinent reports  No orders to display       EKG, Pathology, and Other Studies Reviewed on Admission:   · EKG: Not reviewed    [facility]: Yes     ** Please Note: This note has been constructed using a voice recognition system   **

## 2018-05-22 NOTE — ED PROVIDER NOTES
History  Chief Complaint   Patient presents with    Irregular Heart Beat     Patient brought in via ambulance after visiting nurse saw patient and found patient to have irregular heart beat with it reaching up to 120-130bpm  Patient only complains of back pain  56-year-old female presenting from home for evaluation of generalized malaise and tachycardia  Patient was recently admitted to the hospital for sepsis secondary to cholangitis, status post ERCP with stone removal and stent placement  She was discharged to home yesterday  She reports that since being discharged, she has felt generally weak and malaise  Her visiting nurse went to her house today, this was the 1st visit and they noted that she was tachycardic and therefore wanted her to go to the ED for evaluation  Patient reports that on discharge yesterday, they did not give her back her medications, therefore she did not have any of her daily medications to take last night and this morning  Patient denies any fevers, chills, CP, SOB, palpitations, cough, abdominal pain, nausea, vomiting, changes in stool or urinary complaints  Patient does report a decreased appetite and eating less since being discharged  History of atrial fibrillation on Eliquis  Patient was recommended for rehab upon discharge yesterday, however she was concerned that she would lose her current housing situation, therefore opted to go home  She admits that at this point she is not comfortable being at home and would like to go to rehab    A/P:  56-year-old female with malaise- will get EKG to assess for ischemic changes, troponin to evaluate for ischemia, CBC to assess for leukocytosis/anemia, CMP to assess liver/renal function/electrolytes, lipase to rule out pancreatitis, UA to rule out UTI, discussed with case management, possible admission             5/13- 5/21 Admitted with sepsis 2/2 cholangitis  - 5/14 ERCP with stone removal and stent replacement      Prior to Admission Medications   Prescriptions Last Dose Informant Patient Reported? Taking? ALPRAZolam (NIRAVAM) 0 25 MG dissolvable tablet   Yes Yes   Sig: Take 0 25 mg by mouth daily at bedtime as needed for anxiety   DULoxetine (CYMBALTA) 30 mg delayed release capsule   No Yes   Sig: Take 1 capsule (30 mg total) by mouth daily   amitriptyline (ELAVIL) 25 mg tablet   No Yes   Sig: Take 1 tablet (25 mg total) by mouth daily at bedtime   apixaban (ELIQUIS) 5 mg   Yes Yes   Sig: Take 5 mg by mouth 2 (two) times a day   aspirin (ECOTRIN LOW STRENGTH) 81 mg EC tablet   Yes Yes   Sig: Take 81 mg by mouth daily   furosemide (LASIX) 40 mg tablet   No Yes   Sig: Take 1 tablet (40 mg total) by mouth daily   lisinopril (ZESTRIL) 5 mg tablet   Yes Yes   Sig: Take 5 mg by mouth daily   methocarbamol (ROBAXIN) 500 mg tablet   Yes Yes   Sig: Take 500 mg by mouth daily at bedtime as needed for muscle spasms   metoprolol tartrate (LOPRESSOR) 100 mg tablet   Yes Yes   Sig: Take 100 mg by mouth every 12 (twelve) hours   oxyCODONE (OxyCONTIN) 10 mg 12 hr tablet   No Yes   Sig: Take 1 tablet (10 mg total) by mouth every 12 (twelve) hours as needed for moderate pain for up to 10 days Max Daily Amount: 20 mg   polyethylene glycol (MIRALAX) 17 g packet   Yes Yes   Sig: Take 17 g by mouth daily   potassium chloride (MICRO-K) 10 MEQ CR capsule   Yes Yes   Sig: Take 20 mEq by mouth 2 (two) times a day      Facility-Administered Medications: None       Past Medical History:   Diagnosis Date    [facility]     Acute respiratory disease     CHF (congestive heart failure) (HCC)     Chronic pain     Heart failure (HCC)     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[facility] [address] )     Hypertension     [facility]otic dependence [facility]        Past Surgical History:   Procedure Laterality Date    ERCP N/A 5/14/2018    Procedure: ENDOSCOPIC RETROGRADE CHOLANGIOPANCREATOGRAPHY (ERCP);   Surgeon: Sheryle Congress, MD;  Location[facility];  Service: Gastroenterology       History reviewed  No pertinent family history  I have reviewed and agree with the history as documented  Social History   Substance Use Topics    Smoking status: Former Smoker    Smokeless tobacco: Never Used    Alcohol use No        Review of Systems   Constitutional: Negative for chills, fever and unexpected weight change  HENT: Negative for ear pain, rhinorrhea and sore throat  Eyes: Negative for pain and visual disturbance  Respiratory: Negative for cough and shortness of breath  Cardiovascular: Negative for chest pain and leg swelling  Gastrointestinal: Negative for abdominal pain, constipation, diarrhea, nausea and vomiting  Endocrine: Negative for polydipsia, polyphagia and polyuria  Genitourinary: Negative for dysuria, frequency, hematuria and urgency  Musculoskeletal: Negative for back pain, myalgias and neck pain  Skin: Negative for color change and rash  Allergic/Immunologic: Negative for environmental allergies and immunocompromised state  Neurological: Positive for weakness  Negative for dizziness, light-headedness, numbness and headaches  Hematological: Negative for adenopathy  Does not bruise/bleed easily  Psychiatric/Behavioral: Negative for agitation and confusion  All other systems reviewed and are negative  Physical Exam  ED Triage Vitals [05/22/18 1445]   Temperature Pulse Respirations Blood Pressure SpO2   98 °F (36 7 °C) 99 18 149/82 96 %      Temp Source Heart Rate Source Patient Position - Orthostatic VS BP Location FiO2 (%)   Oral Monitor Lying Right arm --      Pain Score       9           Orthostatic Vital Signs  Vitals:    05/22/18 1445 05/22/18 1544 05/22/18 1725   BP: 149/82 128/74 146/96   Pulse: 99 (!) 119 (!) 110   Patient Position - Orthostatic VS: Lying Lying Lying       Physical Exam   Constitutional: She is oriented to person, place, and time  She appears well-developed and well-nourished     HENT:   Head: Normocephalic and atraumatic  Mouth/Throat: Oropharynx is clear and moist    Eyes: Conjunctivae and EOM are normal    Neck: Normal range of motion  Neck supple  Cardiovascular: Normal rate, regular rhythm, normal heart sounds and intact distal pulses  Pulmonary/Chest: Effort normal and breath sounds normal  No stridor  No respiratory distress  She has no wheezes  She has no rales  Abdominal: Soft  She exhibits no distension  There is no tenderness  No back/CVA ttp   Musculoskeletal: She exhibits no edema or deformity  Neurological: She is alert and oriented to person, place, and time  She exhibits normal muscle tone  Coordination normal    Skin: Skin is warm and dry  No rash noted  Psychiatric: She has a normal mood and affect  Thought content normal    Nursing note and vitals reviewed  ED Medications  Medications   sodium chloride 0 9 % bolus 1,000 mL (1,000 mL Intravenous New Bag 5/22/18 1538)   metoprolol tartrate (LOPRESSOR) tablet 100 mg (100 mg Oral Given 5/22/18 1544)       Diagnostic Studies  Results Reviewed     Procedure Component Value Units Date/Time    Comprehensive metabolic panel [[id number]]  (Abnormal) Collected:  05/22/18 1539    Lab Status:  Final result Specimen:  Blood from Arm, Right Updated:  05/22/18 1641     Sodium 138 mmol/L      Potassium 4 0 mmol/L      Chloride 104 mmol/L      CO2 27 mmol/L      Anion Gap 7 mmol/L      BUN 14 mg/dL      Creatinine 0 91 mg/dL      Glucose 95 mg/dL      Calcium 9 1 mg/dL      AST 18 U/L      ALT 56 U/L      Alkaline Phosphatase 119 (H) U/L      Total Protein 6 8 g/dL      Albumin 3 0 (L) g/dL      Total Bilirubin 0 92 mg/dL      eGFR 61 ml/min/1 73sq m     Narrative:         National Kidney Disease Education Program recommendations are as follows:  GFR calculation is accurate only with a steady state creatinine  Chronic Kidney disease less than 60 ml/min/1 73 sq  meters  Kidney failure less than 15 ml/min/1 73 sq  meters      Troponin I [[id number]]  (Normal) Collected:  05/22/18 1539    Lab Status:  Final result Specimen:  Blood from Arm, Right Updated:  05/22/18 1619     Troponin I 0 04 ng/mL     Narrative:         [employer] Chemistry analyzer 99% cutoff is > 0 04 ng/mL in network labs    o cTnI 99% cutoff is useful only when applied to patients in the clinical setting of myocardial ischemia  o cTnI 99% cutoff should be interpreted in the context of clinical history, ECG findings and possibly cardiac imaging to establish correct diagnosis  o cTnI 99% cutoff may be suggestive but clearly not indicative of a coronary event without the clinical setting of myocardial ischemia      Lipase [[id number]]  (Normal) Collected:  05/22/18 1539    Lab Status:  Final result Specimen:  Blood from Arm, Right Updated:  05/22/18 1612     Lipase 89 u/L     CBC and differential [[id number]]  (Abnormal) Collected:  05/22/18 1539    Lab Status:  Final result Specimen:  Blood from Arm, Right Updated:  05/22/18 1550     WBC 8 18 Thousand/uL      RBC 4 43 Million/uL      Hemoglobin 13 7 g/dL      Hematocrit 42 9 %      MCV 97 fL      MCH 30 9 pg      MCHC 31 9 g/dL      RDW 15 4 (H) %      MPV 9 9 fL      Platelets 974 Thousands/uL      nRBC 0 /100 WBCs      Neutrophils Relative 70 %      Lymphocytes Relative 18 %      Monocytes Relative 9 %      Eosinophils Relative 2 %      Basophils Relative 1 %      Neutrophils Absolute 5 74 Thousands/µL      Lymphocytes Absolute 1 46 Thousands/µL      Monocytes Absolute 0 76 Thousand/µL      Eosinophils Absolute 0 13 Thousand/µL      Basophils Absolute 0 05 Thousands/µL     POCT urinalysis dipstick [[id number]]     Lab Status:  No result                  No orders to display         Procedures  ECG 12 Lead Documentation  Date/Time: 5/22/2018 4:53 PM  Performed by: Jas FAYE  Authorized by: Baldemar Gordon     Indications / Diagnosis:  Malaise  ECG reviewed by me, the ED Provider: yes    Patient location:  ED  Rate:     ECG rate: 118  Rhythm:     Rhythm: atrial fibrillation    QRS:     QRS axis:  Normal  ST segments:     ST segments:  Non-specific  T waves:     T waves: inverted      Inverted:  I, III, II, aVF, V3, V4, V5 and V6          Phone Consults  ED Phone Contact    ED Course           Identification of Seniors at Risk      Most Recent Value   (ISAR) Identification of Seniors at Risk   Before the illness or injury that brought you to the Emergency, did you need someone to help you on a regular basis? 0 Filed at: 05/22/2018 1449   In the last 24 hours, have you needed more help than usual?  0 Filed at: 05/22/2018 1449   Have you been hospitalized for one or more nights during the past 6 months? 1 Filed at: 05/22/2018 1449   In general, do you see well?  0 Filed at: 05/22/2018 1449   In general, do you have serious problems with your memory? 0 Filed at: 05/22/2018 1449   Do you take more than three different medications every day? 1 Filed at: 05/22/2018 1449   ISAR Score  2 Filed at: 05/22/2018 1449                          MDM  Number of Diagnoses or Management Options  Malaise:   Diagnosis management comments: 68 yo F with malaise/fatigue, unable to care for self at home- needs to be sent to a rehab facility         Amount and/or Complexity of Data Reviewed  Clinical lab tests: ordered and reviewed  Tests in the medicine section of CPT®: ordered and reviewed  Review and summarize past medical records: yes      CritCare Time    Disposition  Final diagnoses:   Malaise   At risk for self care deficit     Time reflects when diagnosis was documented in both MDM as applicable and the Disposition within this note     Time User Action Codes Description Comment    5/22/2018  4:51 PM Veneda Horse Add [R53 81] [address]     5/22/2018  5:33 PM Veneda Horse Add [Z91 89] At risk for self care deficit       ED Disposition     ED Disposition Condition Comment    Admit  Case was discussed with ELDER and the patient's admission status was agreed to be Admission Status: observation status to the service of Dr Shaylee Lovett   Follow-up Information    None         Patient's Medications   Discharge Prescriptions    No medications on file     No discharge procedures on file  ED Provider  Attending physically available and evaluated Luis Armando Meza I managed the patient along with the ED Attending      Electronically Signed by         Lindie Kocher, DO  05/22/18 7589

## 2018-05-22 NOTE — ASSESSMENT & PLAN NOTE
- STR was recommended on discharge from hospital yesterday, after 9 day hospitalization for severe sepsis 2/2 cholangitis, but pt refused stating she wants to go home with visiting nurse  States after she got home she realized that she is too weak to care for herself safely   Now agreeable to STR   - Will keep in hospital under observation while arrangements are made for STR  - PT/OT eval and treat

## 2018-05-22 NOTE — ASSESSMENT & PLAN NOTE
- Chronic  - Afib with RVR noted on arrival to ED  Likely 2/2 2 missed doses of Lopressor after discharged from hospital yesterday   States forget to retrieve her home meds from [address] on discharge, so had no meds available at home   - Resume home dose Lopressor  - Continue home dose Eliquis

## 2018-05-22 NOTE — ED ATTENDING ATTESTATION
Edmond Ferrari DO, saw and evaluated the patient  I have discussed the patient with the resident/non-physician practitioner and agree with the resident's/non-physician practitioner's findings, Plan of Care, and MDM as documented in the resident's/non-physician practitioner's note, except where noted  All available labs and Radiology studies were reviewed  At this point I agree with the current assessment done in the Emergency Department  I have conducted an independent evaluation of this patient a history and physical is as follows:    78F from home with generalized weakness and tachycardia  Recently, she was discharged with rehab recommended after biliary stent placed for sepsis 2/2 to cholangitis  She states she feels much worse now and would ultimately like rehab    Past Medical History:   Diagnosis Date    [facility])     Acute respiratory disease     [facility]     Chronic pain     [facility] [address] [facility]     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[facility] [address] )     Hypertension     [facility]      Past Surgical History:   Procedure Laterality Date    ERCP N/A 5/14/2018    Procedure: ENDOSCOPIC RETROGRADE CHOLANGIOPANCREATOGRAPHY (ERCP);   Surgeon: Rui Suggs MD;  Location[facility];  Service: Gastroenterology     /82 (BP Location: Right arm)   Pulse 99   Temp 98 °F (36 7 °C) (Oral)   Resp 18   Ht 5' 5" (1 651 m)   Wt 76 1 kg (167 lb 12 3 oz)   SpO2 96%   BMI 27 92 kg/m²   Generally weak and mildly ill appearing  A&Ox4  MMM  CTA  irreg irreg  abd NT  Ext w/o edema    ECG with AF RVR and worsening diffuse t wave inversions    Will reevaluate and recommend observation, primarily based on worsened generalized weakness and likely demand ischemia due to uncontrolled AF    Dx  AF RVR  ECG changes  Generalized weakness     Critical Care Time  CritCare Time    Procedures

## 2018-05-22 NOTE — TELEPHONE ENCOUNTER
Call patient and visiting nurse and recommend patient resume all her medications as per hospital discharge including metoprolol and Eliquis

## 2018-05-23 PROBLEM — K80.31 CHOLANGITIS DUE TO BILE DUCT CALCULUS WITH OBSTRUCTION: Status: ACTIVE | Noted: 2018-05-23

## 2018-05-23 LAB
ANION GAP SERPL CALCULATED.3IONS-SCNC: 6 MMOL/L (ref 4–13)
BASOPHILS # BLD AUTO: 0.04 THOUSANDS/ΜL (ref 0–0.1)
BASOPHILS NFR BLD AUTO: 1 % (ref 0–1)
BUN SERPL-MCNC: 12 MG/DL (ref 5–25)
CALCIUM SERPL-MCNC: 9.3 MG/DL (ref 8.3–10.1)
CHLORIDE SERPL-SCNC: 107 MMOL/L (ref 100–108)
CO2 SERPL-SCNC: 27 MMOL/L (ref 21–32)
CREAT SERPL-MCNC: 0.83 MG/DL (ref 0.6–1.3)
EOSINOPHIL # BLD AUTO: 0.18 THOUSAND/ΜL (ref 0–0.61)
EOSINOPHIL NFR BLD AUTO: 3 % (ref 0–6)
ERYTHROCYTE [DISTWIDTH] IN BLOOD BY AUTOMATED COUNT: 15.6 % (ref 11.6–15.1)
GFR SERPL CREATININE-BSD FRML MDRD: 68 ML/MIN/1.73SQ M
GLUCOSE SERPL-MCNC: 94 MG/DL (ref 65–140)
HCT VFR BLD AUTO: 43.3 % (ref 34.8–46.1)
HGB BLD-MCNC: 13.5 G/DL (ref 11.5–15.4)
LYMPHOCYTES # BLD AUTO: 1.78 THOUSANDS/ΜL (ref 0.6–4.47)
LYMPHOCYTES NFR BLD AUTO: 25 % (ref 14–44)
MAGNESIUM SERPL-MCNC: 2.6 MG/DL (ref 1.6–2.6)
MCH RBC QN AUTO: 30.4 PG (ref 26.8–34.3)
MCHC RBC AUTO-ENTMCNC: 31.2 G/DL (ref 31.4–37.4)
MCV RBC AUTO: 98 FL (ref 82–98)
MONOCYTES # BLD AUTO: 0.64 THOUSAND/ΜL (ref 0.17–1.22)
MONOCYTES NFR BLD AUTO: 9 % (ref 4–12)
NEUTROPHILS # BLD AUTO: 4.33 THOUSANDS/ΜL (ref 1.85–7.62)
NEUTS SEG NFR BLD AUTO: 62 % (ref 43–75)
NRBC BLD AUTO-RTO: 0 /100 WBCS
PLATELET # BLD AUTO: 253 THOUSANDS/UL (ref 149–390)
PMV BLD AUTO: 10.1 FL (ref 8.9–12.7)
POTASSIUM SERPL-SCNC: 4.4 MMOL/L (ref 3.5–5.3)
RBC # BLD AUTO: 4.44 MILLION/UL (ref 3.81–5.12)
SODIUM SERPL-SCNC: 140 MMOL/L (ref 136–145)
WBC # BLD AUTO: 7.02 THOUSAND/UL (ref 4.31–10.16)

## 2018-05-23 PROCEDURE — G8979 MOBILITY GOAL STATUS: HCPCS

## 2018-05-23 PROCEDURE — 99232 SBSQ HOSP IP/OBS MODERATE 35: CPT | Performed by: NURSE PRACTITIONER

## 2018-05-23 PROCEDURE — 97167 OT EVAL HIGH COMPLEX 60 MIN: CPT

## 2018-05-23 PROCEDURE — 85025 COMPLETE CBC W/AUTO DIFF WBC: CPT | Performed by: PHYSICIAN ASSISTANT

## 2018-05-23 PROCEDURE — 80048 BASIC METABOLIC PNL TOTAL CA: CPT | Performed by: PHYSICIAN ASSISTANT

## 2018-05-23 PROCEDURE — 83735 ASSAY OF MAGNESIUM: CPT | Performed by: PHYSICIAN ASSISTANT

## 2018-05-23 PROCEDURE — 97163 PT EVAL HIGH COMPLEX 45 MIN: CPT

## 2018-05-23 PROCEDURE — G8987 SELF CARE CURRENT STATUS: HCPCS

## 2018-05-23 PROCEDURE — G8978 MOBILITY CURRENT STATUS: HCPCS

## 2018-05-23 PROCEDURE — G8988 SELF CARE GOAL STATUS: HCPCS

## 2018-05-23 RX ADMIN — METOPROLOL TARTRATE 100 MG: 100 TABLET ORAL at 09:09

## 2018-05-23 RX ADMIN — AMITRIPTYLINE HYDROCHLORIDE 25 MG: 25 TABLET, FILM COATED ORAL at 21:43

## 2018-05-23 RX ADMIN — ASPIRIN 81 MG: 81 TABLET, COATED ORAL at 09:09

## 2018-05-23 RX ADMIN — ACETAMINOPHEN 650 MG: 325 TABLET, FILM COATED ORAL at 06:50

## 2018-05-23 RX ADMIN — FUROSEMIDE 40 MG: 40 TABLET ORAL at 09:09

## 2018-05-23 RX ADMIN — METOPROLOL TARTRATE 100 MG: 100 TABLET ORAL at 21:43

## 2018-05-23 RX ADMIN — OXYCODONE HYDROCHLORIDE 15 MG: 10 TABLET ORAL at 14:27

## 2018-05-23 RX ADMIN — OXYCODONE HYDROCHLORIDE 15 MG: 10 TABLET ORAL at 23:49

## 2018-05-23 RX ADMIN — ALPRAZOLAM 0.25 MG: 0.25 TABLET ORAL at 01:05

## 2018-05-23 RX ADMIN — APIXABAN 5 MG: 5 TABLET, FILM COATED ORAL at 17:33

## 2018-05-23 RX ADMIN — LISINOPRIL 5 MG: 5 TABLET ORAL at 09:09

## 2018-05-23 RX ADMIN — DULOXETINE HYDROCHLORIDE 30 MG: 30 CAPSULE, DELAYED RELEASE ORAL at 09:10

## 2018-05-23 RX ADMIN — APIXABAN 5 MG: 5 TABLET, FILM COATED ORAL at 09:09

## 2018-05-23 RX ADMIN — POTASSIUM CHLORIDE 10 MEQ: 750 TABLET, EXTENDED RELEASE ORAL at 17:33

## 2018-05-23 RX ADMIN — POTASSIUM CHLORIDE 10 MEQ: 750 TABLET, EXTENDED RELEASE ORAL at 09:09

## 2018-05-23 RX ADMIN — OXYCODONE HYDROCHLORIDE 10 MG: 10 TABLET, FILM COATED, EXTENDED RELEASE ORAL at 09:08

## 2018-05-23 NOTE — SOCIAL WORK
CM met with patient and discuss PT recommendations for inpt rehab  Pt states at this time she cannot go to rehab as she has to take care of a situation with her home, otherwise she will lose her apartment and be homeless  Pt reports she is willing to do Home PT or Outpt PT  CM informed pt of the possible safety risk of her going home alone, and the dangers of falling  Pt stated she still needs home PT or outpt PT  CM asked pt if she would be able to secure a ride to Outpt PT, and pt reports " she should be able to get a ride"   CM sent a referral to  [facility] for home PT

## 2018-05-23 NOTE — ASSESSMENT & PLAN NOTE
· Suspect patient is deconditioned from recent 8 day hospital stay where she spent time being treated for sepsis due to presumed cholangitis with biliary obstruction status post ERCP with stent removal, stone removal and stent replacement  · She was recommended ST are at time of discharge but declined and returned with goal to be discharged to a short-term rehab  · PT OT consulted current evaluations are recommending short-term rehab  · Patient has history of psychiatric diagnosis of depression therefore will be and options/target patient case management aware and is working on the proper paperwork and referral   · Given that she is a target/option and will require rehab will change patient to inpatient admission as she is unsafe to discharge home

## 2018-05-23 NOTE — CASE MANAGEMENT
Initial Clinical Review    Admission: Date/Time/Statement: 05/22/18 1652    Orders Placed This Encounter   Procedures    Place in Observation (expected length of stay for this patient is less than two midnights)     Standing Status:   Standing     Number of Occurrences:   1     Order Specific Question:   Admitting Physician     Answer:   Latha Naqvi [[id number]]     Order Specific Question:   Level of Care     Answer:   Med Surg [16]         ED: Date/Time/Mode of Arrival:   ED Arrival Information     Expected Arrival Acuity Means of Arrival Escorted By Service Admission Type    - 5/22/2018 14:35 Urgent Ambulance [facility] Urgent    Arrival Complaint    weakness          Chief Complaint:   Chief Complaint   Patient presents with    Irregular Heart Beat     Patient brought in via ambulance after visiting nurse saw patient and found patient to have irregular heart beat with it reaching up to 120-130bpm  Patient only complains of back pain  History of Illness:        Елена Vargas is a 69 y o  female with PMH of HTN, CHF, atrial fib, and chronic pain who presents to ED after evaluation by [facility] nurse for HR sustained 120-130  Pt also states after being discharged home from hospital yesterday she realized that she is too weak to care for herself safely  Per records STR was recommended prior to discharge but pt refused, requesting to go home instead  Per pt she forgot to retrieve her home meds from [address] prior to discharge home yesterday, so she had no meds available and therefore missed her last 2 doses of Lopressor, which would account for her Afib w/ RVR  Pt discharge from [facility] on 5/21/2018 after 9 day stay for severe sepsis 2/2 cholangitis with subsequent stent placement and resolution of sepsis  Denies any current URI s/s  Denies CHP/palpitation  Denies any fever/chills   Denies any N/V/D      ED Vital Signs:   ED Triage Vitals [05/22/18 1445]   Temperature Pulse Respirations Blood Pressure SpO2   98 °F (36 7 °C) 99 18 149/82 96 %      Pain Score       9        Wt Readings from Last 1 Encounters:   05/22/18 75 8 kg (167 lb)       Vital Signs (abnormal):     Date/Time  Temp  Pulse  Resp  BP  SpO2  O2 Device  Patient Position - Orthostatic VS   05/23/18 0733  98 8 °F (37 1 °C)  99  18  151/95  92 %  None (Room air)  Sitting   05/22/18 2342  98 1 °F (36 7 °C)  104  18  141/78  95 %  None (Room air)  --   05/22/18 1900  98 °F (36 7 °C)  100  18  137/91  95 %  None (Room air)  Lying   05/22/18 1725  --   110  18  146/96  97 %  None (Room air)  Lying   05/22/18 1544  --   119  18  128/74  96 %  None (Room air)  Lying         Abnormal Labs/Diagnostic Test Results:       ED Treatment:   Medication Administration from 05/22/2018 1435 to 05/22/2018 1924       Date/Time Order Dose Route     05/22/2018 1538 sodium chloride 0 9 % bolus 1,000 mL 1,000 mL Intravenous     05/22/2018 1544 metoprolol tartrate (LOPRESSOR) tablet 100 mg 100 mg Oral          Past Medical/Surgical History:    Active Ambulatory Problems     Diagnosis Date Noted    Chronic systolic heart failure (HCC) 05/14/2018    Elevated liver enzymes 05/14/2018    Abnormal CT of the abdomen 05/14/2018    Elevated troponin 05/14/2018    Atrial fibrillation ([facility] [address] ) 05/14/2018    Chronic anticoagulation 05/14/2018    Fall 05/14/2018    Biliary stent obstruction, initial encounter 05/13/2018    Depression 05/18/2018     Resolved Ambulatory Problems     Diagnosis Date Noted    Severe sepsis ([facility] [address] ) 05/14/2018    Decreased vision 05/15/2018    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     Past Medical History:    A-fib ([facility] [address] )     Acute respiratory disease     CHF (congestive heart failure) (HCC)     Chronic pain     Heart failure (HCC)     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[facility] [address] )     Hypertension     Narcotic dependence ([facility] [address] )        Admitting Diagnosis: Malaise [R53 81]  Weakness [R53 1]  At risk for self care deficit [Z91 89]    Age/Sex: 69 y o  female    Assessment/Plan:                * Weakness   Assessment & Plan     - STR was recommended on discharge from hospital yesterday, after 9 day hospitalization for severe sepsis 2/2 cholangitis, but pt refused stating she wants to go home with visiting nurse  States after she got home she realized that she is too weak to care for herself safely  Now agreeable to STR   - Will keep in hospital under observation while arrangements are made for STR  - PT/OT eval and treat                Atrial fibrillation ([facility] [address] )   Assessment & Plan     - Chronic  - Afib with RVR noted on arrival to ED  Likely 2/2 2 missed doses of Lopressor after discharged from hospital yesterday   States forget to retrieve her home meds from [address] on discharge, so had no meds available at home   - Resume home dose Lopressor  - Continue home dose Eliquis          Other chronic pain   Assessment & Plan     - With opioid dependence  - Continue home dose oxycodone ER PRN          Depression   Assessment & Plan     - No acute symptoms  - Continue home dose Elavil and Cymbalta          Chronic anticoagulation   Assessment & Plan     - 2/2 chronic atrial fibrillation  - Continue home dose Eliquis          Chronic systolic heart failure (HCC)   Assessment & Plan     - Appears euvolemic presently  - Continue home dose Lasix          Admission Orders:    PT ANDO T EVAL AND TREAT  CONSULT CASE MANAGEMENT        Scheduled Meds:   Current Facility-Administered Medications:  acetaminophen 650 mg Oral Q6H PRN   ALPRAZolam 0 25 mg Oral HS PRN   amitriptyline 25 mg Oral HS   apixaban 5 mg Oral BID   aspirin 81 mg Oral Daily   calcium carbonate 1,000 mg Oral Daily PRN   DULoxetine 30 mg Oral Daily   furosemide 40 mg Oral Daily   lisinopril 5 mg Oral Daily   methocarbamol 500 mg Oral HS PRN   metoprolol tartrate 100 mg Oral Q12H KARELY   ondansetron 4 mg Intravenous Q6H PRN   oxyCODONE 15 mg Oral Q12H PRN polyethylene glycol 17 g Oral Daily   potassium chloride 10 mEq Oral BID     Continuous Infusions:    PRN Meds:   acetaminophen    ALPRAZolam    calcium carbonate    methocarbamol    ondansetron    oxyCODONE

## 2018-05-23 NOTE — PLAN OF CARE
Problem: DISCHARGE PLANNING - CARE MANAGEMENT  Goal: Discharge to post-acute care or home with appropriate resources  INTERVENTIONS:  - Conduct assessment to determine patient/family and health care team treatment goals, and need for post-acute services based on payer coverage, community resources, and patient preferences, and barriers to discharge  - Address psychosocial, clinical, and financial barriers to discharge as identified in assessment in conjunction with the patient/family and health care team  - Arrange appropriate level of post-acute services according to patient's   needs and preference and payer coverage in collaboration with the physician and health care team  - Communicate with and update the patient/family, physician, and health care team regarding progress on the discharge plan  - Arrange appropriate transportation to post-acute venues  - Discharge to a facility when medically cleared    Outcome: Progressing

## 2018-05-23 NOTE — PHYSICAL THERAPY NOTE
Physical Therapy Evaluation    Patient's Name: Rohan He    Admitting Diagnosis  Malaise [R53 81]  Weakness [R53 1]  At risk for self care deficit [Z91 89]    Problem List  Patient Active Problem List   Diagnosis    Chronic systolic heart failure (HCC)    Elevated liver enzymes    Abnormal CT of the abdomen    Elevated troponin    Atrial fibrillation (HCC)    Chronic anticoagulation    Fall    Biliary stent obstruction, initial encounter    Depression    Weakness    Other chronic pain       Past Medical History  Past Medical History:   Diagnosis Date    A-fib ([place] [address] )     Acute respiratory disease     CHF (congestive heart failure) ([place] [address] )     Chronic pain     Heart failure ([place] [address] )     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[place] [address] )     Hypertension     Narcotic dependence ([place] [address] )        Past Surgical History  Past Surgical History:   Procedure Laterality Date    ERCP N/A 5/14/2018    Procedure: ENDOSCOPIC RETROGRADE CHOLANGIOPANCREATOGRAPHY (ERCP); Surgeon: Greyson Sheikh MD;  Location: [facility];  Service: Gastroenterology        05/23/18 8280   Note Type   Note type Eval only   Pain Assessment   Pain Assessment 0-10   Pain Score Worst Possible Pain   Pain Type Acute pain;Chronic pain   Pain Location Back   Patient's Stated Pain Goal No pain   Hospital Pain Intervention(s) Ambulation/increased activity   Response to Interventions unchanged   Home Living   Additional Comments Resides alone in home w/ stairs to basement and upstairs which she does not do, and has 2 CASSIA  Indep PTA but reports recent decline in ability to perform self care and homemaking  Had visting nurses p last recent admit  ambulates w/ out device normally  Before recent admits, was able to drive   Prior Function   Falls in the last 6 months 1 to 4   Restrictions/Precautions   Weight Bearing Precautions Per Order No   Other Precautions Cognitive; Chair Alarm; Bed Alarm; Fall Risk;Pain   General   Family/Caregiver Present No   Cognition   Overall Cognitive Status Impaired   Arousal/Participation Responsive   Attention Attends with cues to redirect   Orientation Level Oriented X4   Memory Unable to assess   Following Commands Follows one step commands without difficulty   RLE Assessment   RLE Assessment (ROM WFL, strength grossly 4-/5, assessed w/ mobility)   LLE Assessment   LLE Assessment (ROM WFL, strength grossly 4-/5, assessed w/ mobiltiy)   Coordination   Movements are Fluid and Coordinated 1   Bed Mobility   Supine to Sit 5  Supervision   Additional items Assist x 1   Transfers   Sit to Stand 4  Minimal assistance   Additional items Assist x 1   Stand to Sit 4  Minimal assistance   Additional items Assist x 1   Ambulation/Elevation   Gait Assistance 4  Minimal assist   Additional items Assist x 1   Assistive Device Rolling walker   Distance 120', brief standing rest 1/2 way   Balance   Static Sitting Good   Dynamic Sitting Fair   Static Standing Fair   Dynamic Standing Fair -   Ambulatory Fair -   Endurance Deficit   Endurance Deficit Yes   Endurance Deficit Description generalized weakness and fatigue   Activity Tolerance   Activity Tolerance Patient limited by fatigue;Treatment limited secondary to medical complications (Comment)   Nurse Made Aware yes   Assessment   Prognosis Fair   Problem List Decreased strength;Decreased endurance; Impaired balance;Decreased mobility; Decreased cognition; Impaired judgement;Decreased safety awareness;Pain   Assessment Pt seen for high complexity physical therapy evaluation  Pt is a 70 y/o female w/ history/comorbidities of recent admit w/ sepsis/cholangitis w/ ERCP/stone extraction, a-fib, CHF, chronic pain, HTN and depression who is now admitted 1 day after being d/c home after noted by visiting nurse to be tachycardic  On admit, found to be in a-fib w/ RVR   c/o generalized weakness and fatigue  rehab was recommended on last admit, but pt had refused    Now interested in going to rehab, provided her housing situation can be addressed  Given multiple recent admits w/ decline in ability to care for herself, acute medical issues, fall risk, and pain, note unstable clinical picture  PT consulted to assess mobility, d/c needs  Pt presents w/ decreased functional mob, standing balance, endurance, B LE strength, barriers at home  will benefit from skilled PT to correct for the above problems  Recommend rehab at d/c   Goals   Patient Goals none stated   STG Expiration Date 06/06/18   Short Term Goal #1 1-2 wks : Bed mob and transfers w/ indep, standing balance to good/normal dynamically, ambulate 200 ft w/ RW vs least restrictive device and mod I, increase b LE strength by 1/2 -1 grade, ambulate 2 CASSIA w/ S   Treatment Day 0   Plan   Treatment/Interventions Functional transfer training;LE strengthening/ROM; Therapeutic exercise; Endurance training;Bed mobility;Gait training;Equipment eval/education;Patient/family training   PT Frequency (3-5x/wk)   Recommendation   Recommendation (recommend rehab at d/c)   Equipment Recommended Beau Letters   PT - OK to Discharge Yes  (when stable to rehab)   Modified Ogle Scale   Modified Ogle Scale 4   Barthel Index   Feeding 10   Bathing 0   Grooming Score 5   Dressing Score 5   Bladder Score 10   Bowels Score 10   Toilet Use Score 5   Transfers (Bed/Chair) Score 10   Mobility (Level Surface) Score 0   Stairs Score 0   Barthel Index Score 55           [facility] PT, DPT, CSRS

## 2018-05-23 NOTE — SOCIAL WORK
Pt is readmit  CM met with pt and explained cm role  Pt alert and oriented  Pt lives alone in 2 sty home w 2ste & stays on 1st flr only  Has bedroom & bath on 1st flr  Was IPTA w help from friends & family as needed  DME cane, rw  Does not drive  Friends & family transport or bring things to her  No h/o vna or rhb  Denies any MH, D&A tx  Uses [facility]  PCP Dr Surjit Ortiz  No POA  Emergency contact, sister Rah Mesa [phone]  CM spoke with PT whom is recommending rehab  Discussed w/pt & provided SNF list for choices      Patient/caregiver received discharge checklist  Content reviewed  Patient/caregiver encouraged to participate in discharge plan of care prior to discharge home  Pt denies hx/admission for drug/etoh and psych/mental health  CM reviewed d/c planning process including the following: identifying help at home, patient preference for d/c planning needs, Discharge Lounge, [facility] Meds to Bed program, availability of treatment team to discuss questions or concerns patient and/or family may have regarding understanding medications and recognizing signs and symptoms once discharged  CM also encouraged patient to follow up with all recommended appointments after discharge  Patient advised of importance for patient and family to participate in managing patients medical well being

## 2018-05-23 NOTE — CASE MANAGEMENT
Continued Stay Review    Observation 05/22/2018 @ [phone], converted to [address] Admission 05/23/2018 @ 1542, due to further diagnostic workup, requiring at least 2 midnight stay  Date: 05/23/2018 05/23/18 1542  Inpatient Admission Once     Transfer Service: General Medicine    Expected Discharge Date: 05/23/18       Question Answer Comment   Admitting Physician Sandro Bottom    Level of Care Med Surg    Estimated length of stay More than 2 Midnights    Certification I certify that inpatient services are medically necessary for this patient for a duration of greater than two midnights  See H&P and MD Progress Notes for additional information about the patient's course of treatment                Vital Signs: /69 (BP Location: Left arm)   Pulse 99   Temp 97 8 °F (36 6 °C) (Oral)   Resp 14   Ht 5' 5" (1 651 m)   Wt 75 8 kg (167 lb)   SpO2 95%   BMI 27 79 kg/m²     Medications:   Scheduled Meds:   Current Facility-Administered Medications:  acetaminophen 650 mg Oral Q6H PRN Ardean Shaggy, PA-C   ALPRAZolam 0 25 mg Oral HS PRN Ardean Shaggy, PA-C   amitriptyline 25 mg Oral HS Ardean Sycamore, PA-C   apixaban 5 mg Oral BID Kylie Casper, PA-C   aspirin 81 mg Oral Daily Ardean Shaggy, PA-C   calcium carbonate 1,000 mg Oral Daily PRN Ardean Shaggy, PA-C   DULoxetine 30 mg Oral Daily Ardean Shaggy, PA-C   furosemide 40 mg Oral Daily Kylie Casper, PA-C   lisinopril 5 mg Oral Daily Ardean Sycamore, PA-C   methocarbamol 500 mg Oral HS PRN Ardean Shaggy, PA-C   metoprolol tartrate 100 mg Oral Q12H [address] Kylie Casper, PA-C   ondansetron 4 mg Intravenous Q6H PRN Ardean Sycamore, PA-C   oxyCODONE 15 mg Oral Q8H PRN [place] CHANDRIKA Gan   polyethylene glycol 17 g Oral Daily Ardean Shaggy, PA-C   potassium chloride 10 mEq Oral BID Ardean Shaggy, PA-C       Abnormal Labs/Diagnostic Results:     Age/Sex: 69 y o  female     Assessment/Plan:    * Weakness/physical deconditioning   Assessment & Plan     · Suspect patient is deconditioned from recent 8 day hospital stay where she spent time being treated for sepsis due to presumed cholangitis with biliary obstruction status post ERCP with stent removal, stone removal and stent replacement  · She was recommended ST are at time of discharge but declined and returned with goal to be discharged to a short-term rehab  · PT OT consulted current evaluations are recommending short-term rehab  · Patient has history of psychiatric diagnosis of depression therefore will be and options/target patient case management aware and is working on the proper paperwork and referral   · Given that she is a target/option and will require rehab will change patient to inpatient admission as she is unsafe to discharge home           Atrial fibrillation ([facility] [address] )   Assessment & Plan     · Chronic  · Clinical improvement noted  · Noted to be in Afib with RVR noted on arrival to ED  Suspected secondary to 2 missed doses of Lopressor after discharged from hospital yesterday  States forgot to retrieve her home meds from [address] on discharge, so had no meds available at home  · Continue Lopressor and Eliquis          Recent history of Cholangitis due to bile duct calculus with obstruction   Assessment & Plan     · Patient recently hospitalized for 8 days due to sepsis secondary to presumed cholangitis with biliary obstruction, status post antibiotic course  Also status post ERCP with stent removal, stone removal and stent replacement    · Needs outpatient follow-up with GI after discharge needs ERCP with stent removal in 4-6 weeks          Other chronic pain   Assessment & Plan     · Patient states she was taking Oxy IR 15 mg every 6 hr p r n  at home, she states she is in the process along with collaboration with her PCP of tapering this medication with goal to discontinue it  · She states she is not taking extended-release oxycodone therefore will discontinue this, and maintain the 15 mg every 8 hr for today given that she received a 10 mg extended release and will change to every 6 hr p r n  tomorrow          Depression   Assessment & Plan     · Appears stable with no acute symptoms  · Continue Elavil and Cymbalta  · Continue p r n   Xanax          Chronic anticoagulation   Assessment & Plan     · 2/2 chronic atrial fibrillation  · Continue home dose Eliquis          Chronic systolic heart failure (HCC)   Assessment & Plan     · Without acute exacerbation,  Appears euvolemic on exam  · Continue home dose Lasix       Discharge Plan: TBD

## 2018-05-23 NOTE — PLAN OF CARE
Problem: OCCUPATIONAL THERAPY ADULT  Goal: Performs self-care activities at highest level of function for planned discharge setting  See evaluation for individualized goals  Treatment Interventions: ADL retraining, Functional transfer training, Endurance training, Cognitive reorientation, Patient/family training, Equipment evaluation/education, Compensatory technique education, Continued evaluation, Energy conservation, Activityengagement          See flowsheet documentation for full assessment, interventions and recommendations  Limitation: Decreased ADL status, Decreased Safe judgement during ADL, Decreased cognition, Decreased endurance, Decreased self-care trans, Decreased high-level ADLs  Prognosis: Fair  Assessment: Pt is a 68 y/o female seen for OT eval s/p adm to [facility] w/ generalized malaise and tachycardia  Pt was D/C'd from [facility] to home on 5/21/18 2* severe sepsis s/p fall  Pt was recommended to D/C to STR from therapy, however pt D/C'd home  Comorbidities include a h/o CHF, HTN, a-fib, chronic pain, narcotic dependence, acute respiratory distress and ERCP  Pt with active OT orders and up with assistance orders  Pt lives alone in a 2 story home w/ 2 CASSIA  Pt was I w/ ADLS however reporting significant decline and trouble completing at home  Pt reports recently requiring A w/ IADLS and inability to drive since most recent fall  Pt declines use of DME at baseline  Pt is currently demonstrating the following occupational deficits: min A ADLS, min A functional transfers and min A functional mobility using rw  Pt with deficits and limitations in all baseline areas of occupation 2* significant pain, decreased endurance/activity tolerance, decreased functional forward reach, decreased ADL status, impaired balance, decreased mobility status, deconditioning/generalized weakness, decreased caregiver support, inaccessible home environment, decreased safety awareness/judgement and decreased insight   The following Occupational Performance Areas to address include: bathing/shower, toilet hygiene, dressing, functional mobility, community mobility, clothing management, meal prep and household maintenance  Pt scored overall 55/100 on the Barthel Index  Based on the aforementioned OT evaluation, functional performance deficits, and assessments, pt has been identified as a high complexity evaluation  Recommend STR upon D/C  Pt to continue to benefit from acute immediate OT services to address the following goals 3-5x/wk to  w/in 10-14 days:     OT Discharge Recommendation: Short Term Rehab  OT - OK to Discharge:  Yes        Tova Domínguez MS, OTR/L

## 2018-05-23 NOTE — OCCUPATIONAL THERAPY NOTE
[address] Evaluation     Patient Name: Michael RUSSELL Date: 5/23/2018  Problem List  Patient Active Problem List   Diagnosis    Chronic systolic heart failure (HCC)    Elevated liver enzymes    Abnormal CT of the abdomen    Elevated troponin    Atrial fibrillation (HCC)    Chronic anticoagulation    Fall    Biliary stent obstruction, initial encounter    Depression    Weakness    Other chronic pain     Past Medical History  Past Medical History:   Diagnosis Date    [facility]-fib ([facility] )     Acute respiratory disease     CHF (congestive heart failure) (HCC)     Chronic pain     Heart failure ([facility] )     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[place] [address] )     Hypertension     Narcotic dependence ([place] [address] )      Past Surgical History  Past Surgical History:   Procedure Laterality Date    ERCP N/A 5/14/2018    Procedure: ENDOSCOPIC RETROGRADE CHOLANGIOPANCREATOGRAPHY (ERCP); Surgeon: Joanne Harding MD;  [facility];  Service: Gastroenterology         05/23/18 3495   Note Type   Note type Eval/Treat   Restrictions/Precautions   Weight Bearing Precautions Per Order No   Other Precautions Cognitive; Bed Alarm; Fall Risk;Pain  (Bed alarm on at end of therapy session )   Pain Assessment   Pain Assessment 0-10   Pain Score Worst Possible Pain   Pain Type Acute pain;Chronic pain   Pain Location Back;Generalized   Pain Orientation Lower   Hospital Pain Intervention(s) Ambulation/increased activity;Repositioned   Response to Interventions tolerated   Home Living   Type of Home House   Home Layout Multi-level   Bathroom Shower/Tub Tub/shower unit   Bathroom Toilet Standard   Bathroom Accessibility Accessible   Home Equipment Walker   Additional Comments Pt lives alone in a 2 story home w/ 2 CASSIA   Prior Function   Level of [place] Independent with ADLs and functional mobility; Needs assistance with IADLs   Lives With Alone   Receives Help From Home health; Family   ADL Assistance Independent   IADLs Needs assistance   Falls in the last 6 months 1 to 4   Vocational On disability   Comments Pt was I w/ ADLS however reporting significant decline and trouble completing at home  Pt reports recently requiring A w/ IADLS and inability to drive since most recent fall  Pt declines use of DME at baseline   Lifestyle   Autonomy Pt reports independence in ADLS however reports difficulty performing them fully  Pt reports A from miranda and sister for cleaning and microwavable meals    Reciprocal Relationships Pt lives alone but has some support from miranda, sister and [facility]   Service to Others Pt is on disability   Intrinsic Gratification Pt mostly sedentary PTA   Psychosocial   Psychosocial (WDL) WDL   ADL   Eating Assistance 7  Independent   Grooming Assistance 7  Independent   [facility] 4  [address] 4  Minimal Assistance   Bed Mobility   Supine to [address]   Additional items HOB elevated; Increased time required   Transfers   Sit to Stand 4  Minimal assistance   Additional items Assist x 1; Increased time required;Verbal cues   Stand to Sit 4  Minimal assistance   Additional items Assist x 1; Increased time required;Verbal cues   Functional Mobility   Functional Mobility 4  Minimal assistance   Additional items Rolling walker   Balance   Static Sitting Fair +   Dynamic Sitting Fair   Static Standing Fair   Dynamic Standing Fair -   Ambulatory Poor +   Activity Tolerance   Activity Tolerance Patient limited by fatigue;Patient limited by pain   Medical Staff Made Aware PT [address]     Nurse Made Aware yes   RUE Assessment   RUE Assessment X  (baseline impairments in ROM/strength   3-/5 grossly)   LUE Assessment   LUE Assessment WFL   Hand Function   Gross Motor Coordination Functional   Fine Motor Coordination Functional   Cognition   Overall Cognitive Status Impaired   Arousal/Participation Alert; Responsive; Cooperative   Attention Attends with cues to redirect   Orientation Level Oriented X4   Memory Within functional limits   Following Commands Follows one step commands with increased time or repetition   Comments Pt is alert and oriented x4 w/ increased time required for time  Pt relatively pleasant but flat  Pt presents w/ decreased safety awareness/judgement and poor insight  Assessment   Limitation Decreased ADL status; Decreased Safe judgement during ADL;Decreased cognition;Decreased endurance;Decreased self-care trans;Decreased high-level ADLs   Prognosis Fair   Assessment Pt is a 70 y/o female seen for OT eval s/p adm to B w/ generalized malaise and tachycardia  Pt was D/C'd from B to home on 5/21/18 2* severe sepsis s/p fall  Pt was recommended to D/C to STR from therapy, however pt D/C'd home  Comorbidities include a h/o CHF, HTN, a-fib, chronic pain, narcotic dependence, acute respiratory distress and ERCP  Pt with active OT orders and up with assistance orders  Pt lives alone in a 2 story home w/ 2 CASSIA  Pt was I w/ ADLS however reporting significant decline and trouble completing at home  Pt reports recently requiring A w/ IADLS and inability to drive since most recent fall  Pt declines use of DME at baseline  Pt is currently demonstrating the following occupational deficits: min A ADLS, min A functional transfers and min A functional mobility using rw  Pt with deficits and limitations in all baseline areas of occupation 2* significant pain, decreased endurance/activity tolerance, decreased functional forward reach, decreased ADL status, impaired balance, decreased mobility status, deconditioning/generalized weakness, decreased caregiver support, inaccessible home environment, decreased safety awareness/judgement and decreased insight   The following Occupational Performance Areas to address include: bathing/shower, toilet hygiene, dressing, functional mobility, community mobility, clothing management, meal prep and household maintenance  Pt scored overall 55/100 on the Barthel Index  Based on the aforementioned OT evaluation, functional performance deficits, and assessments, pt has been identified as a high complexity evaluation  Recommend STR upon D/C  Pt to continue to benefit from acute immediate OT services to address the following goals 3-5x/wk to  w/in 10-14 days:   Goals   Patient Goals to get better but not loose her housing-- CM notified   Plan   Treatment Interventions ADL retraining;Functional transfer training; Endurance training;Cognitive reorientation;Patient/family training;Equipment evaluation/education; Compensatory technique education;Continued evaluation; Energy conservation; Activityengagement   Goal Expiration Date 18   OT Frequency 3-5x/wk   Recommendation   OT Discharge Recommendation Short Term Rehab   OT - OK to Discharge Yes   Barthel Index   Feeding 10   Bathing 0   Grooming Score 5   Dressing Score 5   Bladder Score 10   Bowels Score 10   Toilet Use Score 5   Transfers (Bed/Chair) Score 10   Mobility (Level Surface) Score 0   Stairs Score 0   Barthel Index Score 55   Modified [place] Scale   Modified Gray Scale 4         GOALS:    1) Pt will improve activity tolerance to G for min 30 min txment sessions  2) Pt will complete ADLs/self care w/ mod I w/ G hyiene/thoroughness w/ min cues fro cog support  3) Pt will complete toileting w/ mod I w/ G hygiene/thoroughness using DME as needed  4) Pt will improve functional transfers on/off all surfaces using DME as needed w/ G balance/safety including toileting w/ mod I  5) Pt will improve functional mobility during ADL/IADL/leisure tasks using DME as needed w/ g balance/safety w/ mod I  6) Pt will demonstrate G carryover of pt/caregiver education and training as appropriate w/ mod I w/o cues w/ G tolerance  7) Pt will demonstrate 100% carryover of learned E C  techniques s/p skilled education w/o cues t/o functional ADL/ IADL/leisure interest tasks w/ mod I  8) Pt will engage in depression screen/leisure interest checklist w/ mod I and G participation to monitor s/s depression and ID 3 positive coping strategies to A w/ emotional regulation and management  9)  Pt will engage in ongoing functional cognitive assessment w/ G participation, G safety awareness, and G judgement t/o ADL/IADL tasks   10) Pt will participate in simulated IADL tasks (I e  Kitchen mobility, item retrieval, medication management) w/ G balance, G safety awareness and A judgement w/ mod I           Baudilio Kunz MS, OTR/L

## 2018-05-23 NOTE — ASSESSMENT & PLAN NOTE
· Patient recently hospitalized for 8 days due to sepsis secondary to presumed cholangitis with biliary obstruction, status post antibiotic course  Also status post ERCP with stent removal, stone removal and stent replacement    · Needs outpatient follow-up with GI after discharge needs ERCP with stent removal in 4-6 weeks

## 2018-05-23 NOTE — PLAN OF CARE
Problem: PHYSICAL THERAPY ADULT  Goal: Performs mobility at highest level of function for planned discharge setting  See evaluation for individualized goals  Treatment/Interventions: Functional transfer training, LE strengthening/ROM, Therapeutic exercise, Endurance training, Bed mobility, Gait training, Equipment eval/education, Patient/family training  Equipment Recommended: Claudia Hatch       See flowsheet documentation for full assessment, interventions and recommendations  Prognosis: Fair  Problem List: Decreased strength, Decreased endurance, Impaired balance, Decreased mobility, Decreased cognition, Impaired judgement, Decreased safety awareness, Pain  Assessment: Pt seen for high complexity physical therapy evaluation  Pt is a 70 y/o female w/ history/comorbidities of recent admit w/ sepsis/cholangitis w/ ERCP/stone extraction, a-fib, CHF, chronic pain, HTN and depression who is now admitted 1 day after being d/c home after noted by visiting nurse to be tachycardic  On admit, found to be in a-fib w/ RVR   c/o generalized weakness and fatigue  rehab was recommended on last admit, but pt had refused  Now interested in going to rehab, provided her housing situation can be addressed  Given multiple recent admits w/ decline in ability to care for herself, acute medical issues, fall risk, and pain, note unstable clinical picture  PT consulted to assess mobility, d/c needs  Pt presents w/ decreased functional mob, standing balance, endurance, B LE strength, barriers at home  will benefit from skilled PT to correct for the above problems  Recommend rehab at d/c        Recommendation:  (recommend rehab at d/c)     PT - OK to Discharge: (S) Yes (when stable to rehab)    See flowsheet documentation for full assessment

## 2018-05-23 NOTE — ASSESSMENT & PLAN NOTE
· Patient states she was taking Oxy IR 15 mg every 6 hr p r n  at home, she states she is in the process along with collaboration with her PCP of tapering this medication with goal to discontinue it  · She states she is not taking extended-release oxycodone therefore will discontinue this, and maintain the 15 mg every 8 hr for today given that she received a 10 mg extended release and will change to every 6 hr p r n  tomorrow

## 2018-05-23 NOTE — ASSESSMENT & PLAN NOTE
· Chronic  · Clinical improvement noted  · Noted to be in Afib with RVR noted on arrival to ED  Suspected secondary to 2 missed doses of Lopressor after discharged from hospital yesterday  States forgot to retrieve her home meds from [facility] [address] on discharge, so had no meds available at home    · Continue Lopressor and Eliquis

## 2018-05-24 VITALS
RESPIRATION RATE: 18 BRPM | WEIGHT: 167 LBS | HEIGHT: 65 IN | HEART RATE: 98 BPM | DIASTOLIC BLOOD PRESSURE: 84 MMHG | BODY MASS INDEX: 27.82 KG/M2 | TEMPERATURE: 97.9 F | SYSTOLIC BLOOD PRESSURE: 127 MMHG | OXYGEN SATURATION: 94 %

## 2018-05-24 PROBLEM — R93.5 ABNORMAL CT OF THE ABDOMEN: Status: RESOLVED | Noted: 2018-05-14 | Resolved: 2018-05-24

## 2018-05-24 PROCEDURE — 99239 HOSP IP/OBS DSCHRG MGMT >30: CPT | Performed by: NURSE PRACTITIONER

## 2018-05-24 RX ORDER — OXYCODONE HYDROCHLORIDE 15 MG/1
15 TABLET ORAL EVERY 6 HOURS PRN
Qty: 36 TABLET | Refills: 0
Start: 2018-05-24 | End: 2018-05-27

## 2018-05-24 RX ADMIN — FUROSEMIDE 40 MG: 40 TABLET ORAL at 08:27

## 2018-05-24 RX ADMIN — POTASSIUM CHLORIDE 10 MEQ: 750 TABLET, EXTENDED RELEASE ORAL at 08:27

## 2018-05-24 RX ADMIN — OXYCODONE HYDROCHLORIDE 15 MG: 10 TABLET ORAL at 08:26

## 2018-05-24 RX ADMIN — DULOXETINE HYDROCHLORIDE 30 MG: 30 CAPSULE, DELAYED RELEASE ORAL at 08:27

## 2018-05-24 RX ADMIN — LISINOPRIL 5 MG: 5 TABLET ORAL at 08:27

## 2018-05-24 RX ADMIN — APIXABAN 5 MG: 5 TABLET, FILM COATED ORAL at 08:27

## 2018-05-24 RX ADMIN — METOPROLOL TARTRATE 100 MG: 100 TABLET ORAL at 08:27

## 2018-05-24 RX ADMIN — ASPIRIN 81 MG: 81 TABLET, COATED ORAL at 08:27

## 2018-05-24 NOTE — DISCHARGE INSTRUCTIONS
Physical therapy and occupational therapy currently recommend that you go to inpatient short-term rehab  This is to help you get stronger and work on you being able to complete activities of daily living, you have declined the services which can be potentially harmful to your health as your condition may progress and you may become weaker and more deconditioned and require increased level of care  Continue medications as prescribed prior to hospital admission    Follow-up with your family doctor as scheduled we recommend a visit within 5-7 days after hospital discharge    This pertains to your prior admission you need to follow-up with GI Dr Lay Leahy and have an ERCP performed and have stent removal done in 4 weeks    Your medication list states that your taking Robaxin, Cymbalta, and Elavil, when your discharged May 24th these were not included on your discharge instructions, and you did not bring these to the hospital, please discuss with your family doctor if you should still be taking these medications if they should be still included an your medication reconciliation form  If the you need did continue these medications these all can be prescribed by your family doctor I would recommend you calling them today or as early as possible tomorrow to get this taken care of      Your being prescribed to 3 days of your pain medication I would suggest that you call the prescriber of this medication as soon as possible see you can get her refill

## 2018-05-24 NOTE — ASSESSMENT & PLAN NOTE
· Suspect patient is deconditioned from recent 8 day hospital stay where she spent time being treated for sepsis due to presumed cholangitis with biliary obstruction status post ERCP with stent removal, stone removal and stent replacement  · She was recommended STR at time of her prior discharge discharge but declined and once again it is recommended that she go to short-term rehab but she is declining services  She wants to go home with home physical and occupational therapy  She states she has things that she needs to take care of 1 of which is her home that she may lose if she does and addressed her personal issues  · Given that she is declining short-term rehab will discuss with case management get [facility] on board and have home physical and occupational therapy if possible    · Will continue to monitor and encourage her to go to short-term rehab but it is unlikely she will change her mind

## 2018-05-24 NOTE — PHYSICIAN ADVISOR
Current patient class: Inpatient  The patient is currently on Hospital Day: 2      The patient was admitted to the hospital at [phone] on 5/23/18 for the following diagnosis:  Malaise [R53 81]  Weakness [R53 1]  At risk for self care deficit [Z91 89]       There is documentation in the medical record of an expected length of stay of at least 2 midnights  The patient is therefore expected to satisfy the 2 midnight benchmark and given the 2 midnight presumption is appropriate for INPATIENT ADMISSION  Given this expectation of a satisfying stay, CMS instructs us that the patient is most often appropriate for inpatient admission under part A provided medical necessity is documented in the chart  After review of the relevant documentation, labs, vital signs and test results, the patient is appropriate for INPATIENT ADMISSION  This admission is RELATED to the patient's prior admission  Admission to the hospital as an inpatient is a complex decision making process which requires the practitioner to consider the patients presenting complaint, history and physical examination and all relevant testing  With this in mind, in this case, the patient was deemed appropriate for INPATIENT ADMISSION  After review of the documentation and testing available at the time of the admission I concur with this clinical determination of medical necessity  Rationale is as follows: The patient is a 69 yrs old Female who presented to the ED at 5/22/2018  2:36 PM with a chief complaint of Irregular Heart Beat (Patient brought in via ambulance after visiting nurse saw patient and found patient to have irregular heart beat with it reaching up to 120-130bpm  Patient only complains of back pain  )      The patient had a prior admission from 5/13-5/21 for ERCP with stone removal and stent replacement  The patient presented on this admission with weakness and increased heart rate   The patient felt she was too weak to care for herself safely  The patient is being admitted with weakness and rapid atrial fibrillation  The plan of care includes PT/OT consultation, telemetry monitoring , lopressor  This patient is RELATED to the patient's prior admission as she was readmitted within 24 hours  The patients vitals on arrival were ED Triage Vitals [05/22/18 1445]   Temperature Pulse Respirations Blood Pressure SpO2   98 °F (36 7 °C) 99 18 149/82 96 %      Temp Source Heart Rate Source Patient Position - Orthostatic VS BP Location FiO2 (%)   Oral Monitor Lying Right arm --      Pain Score       9           Past Medical History:   Diagnosis Date    A-fib (HCC)     Acute respiratory disease     CHF (congestive heart failure) (HCC)     Chronic pain     Heart failure (HCC)     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     Hypertension     Narcotic dependence ([facility] )      Past Surgical History:   Procedure Laterality Date    ERCP N/A 5/14/2018    Procedure: ENDOSCOPIC RETROGRADE CHOLANGIOPANCREATOGRAPHY (ERCP);   Surgeon: Mirella Cote MD;  Location: [facility];  Service: Gastroenterology           Consults have been placed to:   IP CONSULT TO CASE MANAGEMENT    Vitals:    05/22/18 1900 05/22/18 2342 05/23/18 0733 05/23/18 1535   BP: 137/91 141/78 151/95 112/69   BP Location: Left arm   Left arm   Pulse: 100 104 99 99   Resp: 18 18 18 14   Temp: 98 °F (36 7 °C) 98 1 °F (36 7 °C) 98 8 °F (37 1 °C) 97 8 °F (36 6 °C)   TempSrc: Oral Oral Oral Oral   SpO2: 95% 95% 92% 95%   Weight: 75 8 kg (167 lb)      Height: 5' 5" (1 651 m)          Most recent labs:    Recent Labs      05/21/18   0445   05/22/18   1539  05/23/18   0427   WBC   --    < >  8 18  7 02   HGB   --    < >  13 7  13 5   HCT   --    < >  42 9  43 3   PLT   --    < >  235  253   K   --    < >  4 0  4 4   NA   --    < >  138  140   CALCIUM   --    < >  9 1  9 3   BUN   --    < >  14  12   CREATININE   --    < >  0 91  0 83   LIPASE   --    --   89   --    TROPONINI --    --   0 04   --    AST  19   --   18   --    ALT  71   --   56   --    ALKPHOS  119*   --   119*   --    BILITOT  0 65   --   0 92   --     < > = values in this interval not displayed         Scheduled Meds:  Current Facility-Administered Medications:  acetaminophen 650 mg Oral Q6H PRN Joellen Greenwood, WILLIAM   ALPRAZolam 0 25 mg Oral HS PRN Joellen Greenwood, WILLIAM   amitriptyline 25 mg Oral HS Joellen Gabrielle, PA-C   apixaban 5 mg Oral BID Kylie Casper PA-C   aspirin 81 mg Oral Daily Joellen Gabrielle, WILLIAM   calcium carbonate 1,000 mg Oral Daily PRN Joellen Gabrielle, WILLIAM   DULoxetine 30 mg Oral Daily Joellen Gabrielle, PA-SHARI   furosemide 40 mg Oral Daily Joellen Greenwood, PA-SHARI   lisinopril 5 mg Oral Daily Joellen Gabrielle, PA-SHARI   methocarbamol 500 mg Oral HS PRN Joellen Greenwood, WILLIAM   metoprolol tartrate 100 mg Oral Q12H [address] Joellen Gabrielle, PA-SHARI   ondansetron 4 mg Intravenous Q6H PRN Joellen Gabrielle, WILLIAM   oxyCODONE 15 mg Oral Q8H PRN CHANDRIKA Crowley   polyethylene glycol 17 g Oral Daily Joellen Greenwood, WILLIAM   potassium chloride 10 mEq Oral BID Joellen Greenwood, PA-C     Continuous Infusions:   PRN Meds:   acetaminophen    ALPRAZolam    calcium carbonate    methocarbamol    ondansetron    oxyCODONE    Surgical procedures (if appropriate):

## 2018-05-24 NOTE — ASSESSMENT & PLAN NOTE
· Chronic  · Heart rate controlled, continue beta-blocker  · Continue Eliquis  · Outpatient follow-up  · Patient's meds retrieved from pharmacy for her to take home with her today

## 2018-05-24 NOTE — DISCHARGE SUMMARY
Discharge- Michael Kelly [birth year], 69 y o  female MRN: [id number]    Unit/Bed#: CW2 210-02 Encounter: [id number]    Primary Care Provider: Linda Ramírez DO   Date and time admitted to hospital: 5/22/2018  2:36 PM        * Weakness/physical deconditioning   Assessment & Plan    · Suspect patient is deconditioned from recent 8 day hospital stay where she spent time being treated for sepsis due to presumed cholangitis with biliary obstruction status post ERCP with stent removal, stone removal and stent replacement  · She was recommended STR at time of her prior discharge discharge but declined and once again it is recommended that she go to short-term rehab but she is declining services  She wants to go home with home physical and occupational therapy  She states she has things that she needs to take care of 1 of which is her home that she may lose if she does and addressed her personal issues  · Given that she is declining short-term rehab will discuss with case management get VNA on board and have home physical and occupational therapy if possible  · Will continue to monitor and encourage her to go to short-term rehab but it is unlikely she will change her mind          Atrial fibrillation (HCC)   Assessment & Plan    · Chronic  · Heart rate controlled, continue beta-blocker  · Continue Eliquis  · Outpatient follow-up  · Patient's meds retrieved from pharmacy for her to take home with her today        Recent history of Cholangitis due to bile duct calculus with obstruction   Assessment & Plan    · Patient recently hospitalized for 8 days due to sepsis secondary to presumed cholangitis with biliary obstruction, status post antibiotic course  Also status post ERCP with stent removal, stone removal and stent replacement    · Needs outpatient follow-up with GI after discharge needs ERCP with stent removal in 4-6 weeks        Other chronic pain   Assessment & Plan    · Patient states she has been taking Oxy IR 15 mg every 6 hours p r n     Admits that she is in the process of tapering this medication with goal to discontinue eventually  Pain is controlled she is not reporting any increased pain  Depression   Assessment & Plan    · Appears stable with no acute symptoms  · Continue Elavil and Cymbalta  · Continue p r n  Xanax        Chronic anticoagulation   Assessment & Plan    · Secondary to chronic atrial fibrillation  · Continue home dose Eliquis        Chronic systolic heart failure ([address] )   Assessment & Plan    · Without acute exacerbation,  Appears euvolemic on exam  · Continue home dose Lasix            Discharging Physician / Practitioner: CHANDRIKA Castellanos  PCP: Rhonda Diaz DO  Admission Date:   Admission Orders     Ordered        05/23/18 1542  Inpatient Admission  Once         05/22/18 1652  Place in Observation (expected length of stay for this patient is less than two midnights)  Once             Discharge Date: 05/24/18    Resolved Problems  Date Reviewed: 5/24/2018    None          Consultations During Hospital Stay:  · PT/OT    Procedures Performed:     · none    Significant Findings / Test Results:    · none     Incidental Findings:   · none     Test Results Pending at Discharge (will require follow up):   · none     Outpatient Tests Requested:  · none    Complications:  none    Reason for Admission: weakness, increased HR    Hospital Course:     Ferdinand Story is a 69 y o  female patient with a PMH afib, CHF, HTN, chronic pain who originally presented to the hospital on 5/22/2018 due to increased HR and weakness  She was evaluated by the VNA on day of admission and found to have increased -130  In addition she was weak and noted to not be able to care for herself safely  She was discharged the day prior to admission after a 9 day hospital stay when she was treated for severe sepsis secondary to cholangitis with subsequent stent placement   At time if discharge STR was recommended but she declined and was discharged home  Also at time if discharge she left her home meds at the [address] [facility] and therefore missed 2 doses of her lopressor  She was admitted for weakness, deconditioning, uncontrolled afib, and for case mgmt referral for STR placement  Patient was noted to be in afib with RVR, suspected secondary to missing 2 doses of lopressor after being discharged home the day prior and not having her home meds  Her lopressor was resumed and her HR regained control  PT/OT evals were completed and once again STR was recommended  Patient was initially agreeable to go but changed her mind  She felt stronger by day 2 of hospital stay and stated she had important things to take care of at her home or she would lose her home so she requested to be discharged home with [facility] services  She was therefore discharged home with VNA  Her meds were retrieved from [address] [facility] and given to her at time of discharge  She was given 3 day supply of her pain medication oxyIR as she was due for refills and her bottles were empty  She was advised to call her PCP regarding taking Cymbalta, robaxin, and elavil  These medications were ordered during her brief hospital stay but not in the bag of meds she gave to the pharmacy and she could not say whether she was taking these meds on a daily basis  Review of her discharge med rec from the recent [facility] admission have these meds included in the med rec but patient did not have them included in her bag of home meds nor were they listed on medical records from a recent hospital stay at [address] [place] [address] [address] (Robaxin was listed)  She verbalized understanding  Please see above list of diagnoses and related plan for additional information  Condition at Discharge: stable     Discharge Day Visit / Exam:     Subjective:  Denies CP, SON, HA, dizziness  Would like to go home and get therapy from home     Vitals: Blood Pressure: 127/84 (05/24/18 0700)  Pulse: 98 (05/24/18 0700)  Temperature: 97 9 °F (36 6 °C) (05/24/18 0700)  Temp Source: Oral (05/24/18 0700)  Respirations: 18 (05/24/18 0700)  Height: 5' 5" (165 1 cm) (05/22/18 1900)  Weight - Scale: 75 8 kg (167 lb) (05/22/18 1900)  SpO2: 94 % (05/24/18 0700)  Exam:   Physical Exam  Constitutional: She is oriented to person, place, and time  She appears well-developed  No distress  Thin appearing   HENT:   Head: Normocephalic  Neck: Neck supple  Cardiovascular: Normal rate  An irregular rhythm present  Pulmonary/Chest: Effort normal and breath sounds normal  No respiratory distress  Abdominal: Soft  Bowel sounds are normal  She exhibits no distension  There is no tenderness  Musculoskeletal: Normal range of motion  She exhibits no edema  Neurological: She is alert and oriented to person, place, and time  Skin: Skin is warm and dry  Psychiatric: She has a normal mood and affect  Her behavior is normal    Vitals reviewed  Discussion with Family: n/a    Discharge instructions/Information to patient and family:   See after visit summary for information provided to patient and family  Provisions for Follow-Up Care:  See after visit summary for information related to follow-up care and any pertinent home health orders  Disposition:     Home with VNA Services (Reminder: Complete face to face encounter)    For Discharges to [facility] SNF:   · Not Applicable to this Patient - Not Applicable to this Patient    Planned Readmission: no     Discharge Statement:  I spent 40 minutes discharging the patient  This time was spent on the day of discharge  I had direct contact with the patient on the day of discharge  Greater than 50% of the total time was spent examining patient, answering all patient questions, arranging and discussing plan of care with patient as well as directly providing post-discharge instructions  Additional time then spent on discharge activities      Discharge Medications:  See after visit summary for reconciled discharge medications provided to patient and family        ** Please Note: This note has been constructed using a voice recognition system **

## 2018-05-24 NOTE — ASSESSMENT & PLAN NOTE
· Patient states she has been taking Oxy IR 15 mg every 6 hours p r n     Admits that she is in the process of tapering this medication with goal to discontinue eventually  Pain is controlled she is not reporting any increased pain

## 2018-05-24 NOTE — SOCIAL WORK
CM rec'd call from Brianne Miller at [facility], tel# [phone], option# 3, then#4; fax# [phone]  Brianne Miller reports pt is active client and was receiving skilled nursing services  Brianne Miller reports able to provide additional services if needed; requesting resumption of care orders upon d/c  CM lvm for C  Martine, [address] notifying of above info

## 2018-05-24 NOTE — SOCIAL WORK
CM met with patient with regards to home PT/OT referral  CM explained to patient that [facility] is stating she was active with them  Pt reports she does not remember ever being seen by  HC, and that if she was she does not want to use them any longer  Pt reports that she would like to only use [facility]  Pt gave authorization for cm to contact [facility], which was witnessed by RN  CM contacted [facility], spoke to Valery Manual, cm explained pt does not recall receiving  their services and does not want to continue with them

## 2018-05-31 ENCOUNTER — TRANSITIONAL CARE MANAGEMENT (OUTPATIENT)
Dept: FAMILY MEDICINE CLINIC | Facility: CLINIC | Age: 78
End: 2018-05-31

## 2018-05-31 ENCOUNTER — TELEPHONE (OUTPATIENT)
Dept: FAMILY MEDICINE CLINIC | Facility: CLINIC | Age: 78
End: 2018-05-31

## 2018-05-31 NOTE — TELEPHONE ENCOUNTER
Magdalene Carr from [place] [address] [facility] called to request a verbal order for a  to see Didier Tolentino to discuss any help she may need or any community resources that could be available for her       CB is [phone]

## 2018-05-31 NOTE — TELEPHONE ENCOUNTER
Amalia Chance from [facility] physical therapy called asking if you could give a verbal ok for them to follow pt for weakness and fall prevention therapy she has been hospitalized 3 times in last 2 months due to falling, if it is ok she will send you orders and you can sign off on them   Please advise and call Amalia Chance [phone]

## 2018-06-01 NOTE — TELEPHONE ENCOUNTER
Spoke with Vickie Mansfield from [facility] to give her a verbal order, she will send the paper work to our office

## 2018-06-03 PROBLEM — R00.0 TACHYCARDIA INDUCED CARDIOMYOPATHY (HCC): Status: ACTIVE | Noted: 2018-05-05

## 2018-06-03 PROBLEM — I43 TACHYCARDIA INDUCED CARDIOMYOPATHY (HCC): Status: ACTIVE | Noted: 2018-05-05

## 2018-06-03 PROBLEM — G89.29 OTHER CHRONIC PAIN: Status: RESOLVED | Noted: 2018-05-22 | Resolved: 2018-06-03

## 2018-06-03 PROBLEM — R06.89 ACUTE RESPIRATORY INSUFFICIENCY: Status: ACTIVE | Noted: 2018-05-05

## 2018-06-03 PROBLEM — F34.1 DYSTHYMIC DISORDER: Chronic | Status: ACTIVE | Noted: 2018-05-18

## 2018-06-03 PROBLEM — F11.20 NARCOTIC DEPENDENCE (HCC): Status: ACTIVE | Noted: 2018-05-05

## 2018-06-05 DIAGNOSIS — I10 ESSENTIAL HYPERTENSION: ICD-10-CM

## 2018-06-05 DIAGNOSIS — I50.22 CHRONIC SYSTOLIC HEART FAILURE (HCC): Chronic | ICD-10-CM

## 2018-06-05 DIAGNOSIS — F32.89 OTHER DEPRESSION: Chronic | ICD-10-CM

## 2018-06-05 DIAGNOSIS — F41.9 ANXIETY: Primary | ICD-10-CM

## 2018-06-05 RX ORDER — OXYCODONE HYDROCHLORIDE 5 MG/1
TABLET ORAL
Refills: 0 | COMMUNITY
Start: 2018-05-24 | End: 2018-07-20 | Stop reason: HOSPADM

## 2018-06-05 RX ORDER — LISINOPRIL 5 MG/1
5 TABLET ORAL DAILY
Qty: 30 TABLET | Refills: 0 | Status: SHIPPED | OUTPATIENT
Start: 2018-06-05 | End: 2018-07-02 | Stop reason: SDUPTHER

## 2018-06-05 RX ORDER — POTASSIUM CHLORIDE 20 MEQ/1
20 TABLET, EXTENDED RELEASE ORAL
COMMUNITY
Start: 2018-05-09 | End: 2018-07-25 | Stop reason: SDUPTHER

## 2018-06-05 RX ORDER — FUROSEMIDE 40 MG/1
40 TABLET ORAL DAILY
Qty: 30 TABLET | Refills: 0 | Status: SHIPPED | OUTPATIENT
Start: 2018-06-05 | End: 2018-07-25 | Stop reason: SDUPTHER

## 2018-06-05 RX ORDER — FLUOXETINE 20 MG/1
1 TABLET, FILM COATED ORAL DAILY
COMMUNITY
Start: 2016-06-28 | End: 2018-07-24

## 2018-06-05 RX ORDER — AMITRIPTYLINE HYDROCHLORIDE 25 MG/1
25 TABLET, FILM COATED ORAL
Qty: 30 TABLET | Refills: 0 | Status: ON HOLD | OUTPATIENT
Start: 2018-06-05 | End: 2018-07-13 | Stop reason: SDUPTHER

## 2018-06-05 RX ORDER — METOPROLOL TARTRATE 100 MG/1
100 TABLET ORAL EVERY 12 HOURS SCHEDULED
Qty: 60 TABLET | Refills: 0 | Status: SHIPPED | OUTPATIENT
Start: 2018-06-05 | End: 2018-07-02 | Stop reason: SDUPTHER

## 2018-06-05 RX ORDER — DULOXETIN HYDROCHLORIDE 30 MG/1
30 CAPSULE, DELAYED RELEASE ORAL DAILY
Qty: 30 CAPSULE | Refills: 0 | Status: ON HOLD | OUTPATIENT
Start: 2018-06-05 | End: 2018-07-13 | Stop reason: SDUPTHER

## 2018-06-05 RX ORDER — POLYETHYLENE GLYCOL 3350 17 G/17G
17 POWDER, FOR SOLUTION ORAL
COMMUNITY
Start: 2018-04-13 | End: 2018-07-24

## 2018-06-05 RX ORDER — OXYCODONE HYDROCHLORIDE 15 MG/1
15 TABLET ORAL EVERY 6 HOURS
Status: ON HOLD | COMMUNITY
Start: 2018-05-09 | End: 2018-08-06

## 2018-06-05 RX ORDER — ALPRAZOLAM 0.25 MG/1
0.25 TABLET ORAL DAILY
Qty: 30 TABLET | Refills: 0 | Status: SHIPPED | OUTPATIENT
Start: 2018-06-05 | End: 2018-07-20 | Stop reason: HOSPADM

## 2018-06-05 RX ORDER — ALPRAZOLAM 0.25 MG/1
TABLET ORAL
Refills: 0 | COMMUNITY
Start: 2018-04-13 | End: 2018-06-05 | Stop reason: SDUPTHER

## 2018-06-05 NOTE — TELEPHONE ENCOUNTER
Taylor Smith from [address] [place] [address] states pt needs a refill on these meds   Pt will be rescheduling her future f/u appt

## 2018-06-12 ENCOUNTER — TELEPHONE (OUTPATIENT)
Dept: FAMILY MEDICINE CLINIC | Facility: CLINIC | Age: 78
End: 2018-06-12

## 2018-06-12 NOTE — TELEPHONE ENCOUNTER
Order Faxed  Left message on Hugo CARUSO and also contacted Home Health and spoke with Meagan Gusman to make them aware

## 2018-06-12 NOTE — TELEPHONE ENCOUNTER
Fabiola Lance called to ask for an order to have home health aids come out to Denisse's house to help her with her ADL's       Please fax order to [phone]  Any questions please contact Fabiola Lnace at [phone]

## 2018-06-22 ENCOUNTER — TELEPHONE (OUTPATIENT)
Dept: FAMILY MEDICINE CLINIC | Facility: CLINIC | Age: 78
End: 2018-06-22

## 2018-06-22 NOTE — TELEPHONE ENCOUNTER
Jana José, visiting nurse, wanted to state that pt has refused pt, nursing, and home care services all week   She states that pt has had "bizarre" reasons behind the refusal  She just wants to make you aware that pt has not been seen by any care member all week and if the refusal continues they will discharge her on Tuesday

## 2018-07-02 DIAGNOSIS — I10 ESSENTIAL HYPERTENSION: ICD-10-CM

## 2018-07-02 RX ORDER — METOPROLOL TARTRATE 100 MG/1
100 TABLET ORAL EVERY 12 HOURS SCHEDULED
Qty: 60 TABLET | Refills: 0 | Status: SHIPPED | OUTPATIENT
Start: 2018-07-02 | End: 2018-07-20 | Stop reason: HOSPADM

## 2018-07-02 RX ORDER — LISINOPRIL 5 MG/1
5 TABLET ORAL DAILY
Qty: 30 TABLET | Refills: 0 | Status: SHIPPED | OUTPATIENT
Start: 2018-07-02 | End: 2018-09-02 | Stop reason: SDUPTHER

## 2018-07-07 ENCOUNTER — HOSPITAL ENCOUNTER (INPATIENT)
Facility: HOSPITAL | Age: 78
LOS: 13 days | Discharge: HOME HEALTH | DRG: 273 | End: 2018-07-20
Attending: EMERGENCY MEDICINE | Admitting: GENERAL PRACTICE
Payer: MEDICARE

## 2018-07-07 ENCOUNTER — APPOINTMENT (EMERGENCY)
Dept: RADIOLOGY | Facility: HOSPITAL | Age: 78
DRG: 273 | End: 2018-07-07
Payer: MEDICARE

## 2018-07-07 DIAGNOSIS — G47.00 INSOMNIA, UNSPECIFIED TYPE: ICD-10-CM

## 2018-07-07 DIAGNOSIS — I42.9 CONGESTIVE HEART FAILURE WITH CARDIOMYOPATHY (HCC): ICD-10-CM

## 2018-07-07 DIAGNOSIS — I48.91 ATRIAL FIBRILLATION WITH RAPID VENTRICULAR RESPONSE (HCC): Primary | ICD-10-CM

## 2018-07-07 DIAGNOSIS — I50.23 ACUTE ON CHRONIC SYSTOLIC CONGESTIVE HEART FAILURE (HCC): ICD-10-CM

## 2018-07-07 DIAGNOSIS — I50.9 CONGESTIVE HEART FAILURE WITH CARDIOMYOPATHY (HCC): ICD-10-CM

## 2018-07-07 DIAGNOSIS — I48.91 ATRIAL FIBRILLATION WITH RVR (HCC): Chronic | ICD-10-CM

## 2018-07-07 DIAGNOSIS — I47.1 AVNRT (AV NODAL RE-ENTRY TACHYCARDIA) (HCC): ICD-10-CM

## 2018-07-07 DIAGNOSIS — I50.22 CHRONIC SYSTOLIC HEART FAILURE (HCC): Chronic | ICD-10-CM

## 2018-07-07 PROBLEM — R06.00 DYSPNEA ON EXERTION: Status: ACTIVE | Noted: 2018-07-07

## 2018-07-07 LAB
ALBUMIN SERPL BCP-MCNC: 3.4 G/DL (ref 3.5–5)
ALP SERPL-CCNC: 78 U/L (ref 46–116)
ALT SERPL W P-5'-P-CCNC: 25 U/L (ref 12–78)
ANION GAP SERPL CALCULATED.3IONS-SCNC: 7 MMOL/L (ref 4–13)
APTT PPP: 34 SECONDS (ref 24–36)
AST SERPL W P-5'-P-CCNC: 20 U/L (ref 5–45)
ATRIAL RATE: 220 BPM
BACTERIA UR QL AUTO: ABNORMAL /HPF
BASOPHILS # BLD AUTO: 0.04 THOUSANDS/ΜL (ref 0–0.1)
BASOPHILS NFR BLD AUTO: 0 % (ref 0–1)
BILIRUB SERPL-MCNC: 0.86 MG/DL (ref 0.2–1)
BILIRUB UR QL STRIP: NEGATIVE
BUN SERPL-MCNC: 19 MG/DL (ref 5–25)
CALCIUM SERPL-MCNC: 9.3 MG/DL (ref 8.3–10.1)
CHLORIDE SERPL-SCNC: 106 MMOL/L (ref 100–108)
CLARITY UR: CLEAR
CLARITY, POC: CLEAR
CO2 SERPL-SCNC: 24 MMOL/L (ref 21–32)
COLOR UR: YELLOW
COLOR, POC: YELLOW
CREAT SERPL-MCNC: 0.83 MG/DL (ref 0.6–1.3)
EOSINOPHIL # BLD AUTO: 0.09 THOUSAND/ΜL (ref 0–0.61)
EOSINOPHIL NFR BLD AUTO: 1 % (ref 0–6)
ERYTHROCYTE [DISTWIDTH] IN BLOOD BY AUTOMATED COUNT: 15.3 % (ref 11.6–15.1)
GFR SERPL CREATININE-BSD FRML MDRD: 68 ML/MIN/1.73SQ M
GLUCOSE SERPL-MCNC: 115 MG/DL (ref 65–140)
GLUCOSE UR STRIP-MCNC: NEGATIVE MG/DL
HCT VFR BLD AUTO: 37.6 % (ref 34.8–46.1)
HGB BLD-MCNC: 11.9 G/DL (ref 11.5–15.4)
HGB UR QL STRIP.AUTO: NEGATIVE
HYALINE CASTS #/AREA URNS LPF: ABNORMAL /LPF
IMM GRANULOCYTES # BLD AUTO: 0.06 THOUSAND/UL (ref 0–0.2)
IMM GRANULOCYTES NFR BLD AUTO: 1 % (ref 0–2)
INR PPP: 1.27 (ref 0.86–1.17)
KETONES UR STRIP-MCNC: NEGATIVE MG/DL
LEUKOCYTE ESTERASE UR QL STRIP: ABNORMAL
LYMPHOCYTES # BLD AUTO: 1.23 THOUSANDS/ΜL (ref 0.6–4.47)
LYMPHOCYTES NFR BLD AUTO: 11 % (ref 14–44)
MCH RBC QN AUTO: 30.4 PG (ref 26.8–34.3)
MCHC RBC AUTO-ENTMCNC: 31.6 G/DL (ref 31.4–37.4)
MCV RBC AUTO: 96 FL (ref 82–98)
MONOCYTES # BLD AUTO: 0.87 THOUSAND/ΜL (ref 0.17–1.22)
MONOCYTES NFR BLD AUTO: 8 % (ref 4–12)
NEUTROPHILS # BLD AUTO: 8.94 THOUSANDS/ΜL (ref 1.85–7.62)
NEUTS SEG NFR BLD AUTO: 79 % (ref 43–75)
NITRITE UR QL STRIP: NEGATIVE
NON-SQ EPI CELLS URNS QL MICRO: ABNORMAL /HPF
NRBC BLD AUTO-RTO: 0 /100 WBCS
NT-PROBNP SERPL-MCNC: ABNORMAL PG/ML
PH UR STRIP.AUTO: 7.5 [PH] (ref 4.5–8)
PLATELET # BLD AUTO: 196 THOUSANDS/UL (ref 149–390)
PMV BLD AUTO: 10.1 FL (ref 8.9–12.7)
POTASSIUM SERPL-SCNC: 4.3 MMOL/L (ref 3.5–5.3)
PROT SERPL-MCNC: 7.3 G/DL (ref 6.4–8.2)
PROT UR STRIP-MCNC: ABNORMAL MG/DL
PROTHROMBIN TIME: 16 SECONDS (ref 11.8–14.2)
QRS AXIS: 22 DEGREES
QRSD INTERVAL: 86 MS
QT INTERVAL: 274 MS
QTC INTERVAL: 430 MS
RBC # BLD AUTO: 3.92 MILLION/UL (ref 3.81–5.12)
RBC #/AREA URNS AUTO: ABNORMAL /HPF
SODIUM SERPL-SCNC: 137 MMOL/L (ref 136–145)
SP GR UR STRIP.AUTO: 1.01 (ref 1–1.03)
T WAVE AXIS: 250 DEGREES
TROPONIN I SERPL-MCNC: <0.02 NG/ML
UROBILINOGEN UR QL STRIP.AUTO: 0.2 E.U./DL
VENTRICULAR RATE: 148 BPM
WBC # BLD AUTO: 11.23 THOUSAND/UL (ref 4.31–10.16)
WBC #/AREA URNS AUTO: ABNORMAL /HPF

## 2018-07-07 PROCEDURE — 96365 THER/PROPH/DIAG IV INF INIT: CPT

## 2018-07-07 PROCEDURE — 71046 X-RAY EXAM CHEST 2 VIEWS: CPT

## 2018-07-07 PROCEDURE — 83880 ASSAY OF NATRIURETIC PEPTIDE: CPT | Performed by: EMERGENCY MEDICINE

## 2018-07-07 PROCEDURE — 80053 COMPREHEN METABOLIC PANEL: CPT | Performed by: EMERGENCY MEDICINE

## 2018-07-07 PROCEDURE — 96366 THER/PROPH/DIAG IV INF ADDON: CPT

## 2018-07-07 PROCEDURE — 84484 ASSAY OF TROPONIN QUANT: CPT | Performed by: EMERGENCY MEDICINE

## 2018-07-07 PROCEDURE — 99223 1ST HOSP IP/OBS HIGH 75: CPT | Performed by: GENERAL PRACTICE

## 2018-07-07 PROCEDURE — 85730 THROMBOPLASTIN TIME PARTIAL: CPT | Performed by: EMERGENCY MEDICINE

## 2018-07-07 PROCEDURE — 99285 EMERGENCY DEPT VISIT HI MDM: CPT

## 2018-07-07 PROCEDURE — 93010 ELECTROCARDIOGRAM REPORT: CPT | Performed by: INTERNAL MEDICINE

## 2018-07-07 PROCEDURE — 96376 TX/PRO/DX INJ SAME DRUG ADON: CPT

## 2018-07-07 PROCEDURE — 93005 ELECTROCARDIOGRAM TRACING: CPT

## 2018-07-07 PROCEDURE — 81001 URINALYSIS AUTO W/SCOPE: CPT

## 2018-07-07 PROCEDURE — 85610 PROTHROMBIN TIME: CPT | Performed by: EMERGENCY MEDICINE

## 2018-07-07 PROCEDURE — 85025 COMPLETE CBC W/AUTO DIFF WBC: CPT | Performed by: EMERGENCY MEDICINE

## 2018-07-07 PROCEDURE — 36415 COLL VENOUS BLD VENIPUNCTURE: CPT

## 2018-07-07 RX ORDER — ALPRAZOLAM 0.25 MG/1
0.25 TABLET ORAL DAILY
Status: DISCONTINUED | OUTPATIENT
Start: 2018-07-08 | End: 2018-07-10

## 2018-07-07 RX ORDER — POLYETHYLENE GLYCOL 3350 17 G/17G
17 POWDER, FOR SOLUTION ORAL DAILY
Status: DISCONTINUED | OUTPATIENT
Start: 2018-07-08 | End: 2018-07-20 | Stop reason: HOSPADM

## 2018-07-07 RX ORDER — AMITRIPTYLINE HYDROCHLORIDE 25 MG/1
25 TABLET, FILM COATED ORAL
Status: DISCONTINUED | OUTPATIENT
Start: 2018-07-07 | End: 2018-07-10

## 2018-07-07 RX ORDER — DULOXETIN HYDROCHLORIDE 30 MG/1
30 CAPSULE, DELAYED RELEASE ORAL DAILY
Status: DISCONTINUED | OUTPATIENT
Start: 2018-07-08 | End: 2018-07-10

## 2018-07-07 RX ORDER — OXYCODONE HYDROCHLORIDE AND ACETAMINOPHEN 5; 325 MG/1; MG/1
1 TABLET ORAL ONCE
Status: COMPLETED | OUTPATIENT
Start: 2018-07-07 | End: 2018-07-07

## 2018-07-07 RX ORDER — ACETAMINOPHEN 325 MG/1
975 TABLET ORAL EVERY 8 HOURS SCHEDULED
Status: DISCONTINUED | OUTPATIENT
Start: 2018-07-07 | End: 2018-07-20 | Stop reason: HOSPADM

## 2018-07-07 RX ORDER — ONDANSETRON 2 MG/ML
4 INJECTION INTRAMUSCULAR; INTRAVENOUS EVERY 6 HOURS PRN
Status: DISCONTINUED | OUTPATIENT
Start: 2018-07-07 | End: 2018-07-08

## 2018-07-07 RX ORDER — ASPIRIN 81 MG/1
81 TABLET ORAL DAILY
Status: DISCONTINUED | OUTPATIENT
Start: 2018-07-08 | End: 2018-07-17

## 2018-07-07 RX ORDER — OXYCODONE HYDROCHLORIDE 5 MG/1
5 TABLET ORAL EVERY 6 HOURS PRN
Status: DISCONTINUED | OUTPATIENT
Start: 2018-07-07 | End: 2018-07-07

## 2018-07-07 RX ORDER — FUROSEMIDE 40 MG/1
40 TABLET ORAL DAILY
Status: DISCONTINUED | OUTPATIENT
Start: 2018-07-08 | End: 2018-07-20 | Stop reason: HOSPADM

## 2018-07-07 RX ORDER — METHOCARBAMOL 500 MG/1
500 TABLET, FILM COATED ORAL
Status: DISCONTINUED | OUTPATIENT
Start: 2018-07-07 | End: 2018-07-20 | Stop reason: HOSPADM

## 2018-07-07 RX ORDER — DILTIAZEM HYDROCHLORIDE 5 MG/ML
15 INJECTION INTRAVENOUS ONCE
Status: COMPLETED | OUTPATIENT
Start: 2018-07-07 | End: 2018-07-07

## 2018-07-07 RX ORDER — METOPROLOL TARTRATE 50 MG/1
100 TABLET, FILM COATED ORAL EVERY 12 HOURS SCHEDULED
Status: DISCONTINUED | OUTPATIENT
Start: 2018-07-07 | End: 2018-07-08

## 2018-07-07 RX ADMIN — DILTIAZEM HYDROCHLORIDE 15 MG: 5 INJECTION INTRAVENOUS at 09:43

## 2018-07-07 RX ADMIN — OXYCODONE HYDROCHLORIDE 15 MG: 5 TABLET ORAL at 20:25

## 2018-07-07 RX ADMIN — DILTIAZEM HYDROCHLORIDE 5 MG/HR: 5 INJECTION INTRAVENOUS at 11:28

## 2018-07-07 RX ADMIN — OXYCODONE HYDROCHLORIDE 5 MG: 5 TABLET ORAL at 18:59

## 2018-07-07 RX ADMIN — OXYCODONE HYDROCHLORIDE AND ACETAMINOPHEN 1 TABLET: 5; 325 TABLET ORAL at 12:08

## 2018-07-07 RX ADMIN — AMITRIPTYLINE HYDROCHLORIDE 25 MG: 25 TABLET, FILM COATED ORAL at 21:32

## 2018-07-07 RX ADMIN — APIXABAN 5 MG: 5 TABLET, FILM COATED ORAL at 17:14

## 2018-07-07 RX ADMIN — METOPROLOL TARTRATE 100 MG: 50 TABLET ORAL at 14:10

## 2018-07-07 RX ADMIN — METOPROLOL TARTRATE 100 MG: 50 TABLET ORAL at 20:30

## 2018-07-07 RX ADMIN — ACETAMINOPHEN 975 MG: 325 TABLET, FILM COATED ORAL at 17:14

## 2018-07-07 NOTE — ASSESSMENT & PLAN NOTE
Pt says she has not slept in over a year  C/w home xanax, Elavil, and robaxin  Can add melatonin  Hopefully w/ control of Afib and pain pt will sleep

## 2018-07-07 NOTE — H&P
H&P- Cristiane Cade [birth year], 69 y o  female MRN: [id number]    Unit/Bed#: ED 11 Encounter: [id number]    Primary Care Provider: Andrade Amador DO   Date and time admitted to hospital: 7/7/2018  9:08 AM        * Atrial fibrillation with RVR ([address] )   Assessment & Plan    Pt may need increase in her lopressor dose - however, I am unsure pt is complaint with meds  She was refusing VNA in late June  On Cardizem GTT at this time  Will give scheduled Am lopressor  AC w/ Apixaban        Dyspnea on exertion   Assessment & Plan    2/2 rapid afiib and deconditioning  PT/OT - pt at this time agreeable to STR        Insomnia   Assessment & Plan    Pt says she has not slept in over a year  C/w home xanax, Elavil, and robaxin  Can add melatonin  Hopefully w/ control of Afib and pain pt will sleep          Chronic pain disorder   Assessment & Plan    Pt used to be on oxy 15, but has not filled this since May 1st   Will give oxy 5 prn while inpt along w/ tylenol RTC and robaxin qhs PRN  Also on Cymbalta  Can titrate oxy up if needed to her previous 15 mg dose        Chronic diastolic congestive heart failure (HCC)   Assessment & Plan    No exac  C/w home lasix  Echo in May showed EF 50%          VTE Prophylaxis: Apixaban (Eliquis)  / sequential compression device   Code Status: DNR/DNI  POLST: POLST form is not discussed and not completed at this time  Discussion with family: no    Anticipated Length of Stay:  Patient will be admitted on an Inpatient basis with an anticipated length of stay of  At least 2 midnights  Justification for Hospital Stay: need to control Afib and SOB and place in STR    Total Time for Visit, including Counseling / Coordination of Care: 45 minutes  Greater than 50% of this total time spent on direct patient counseling and coordination of care      Chief Complaint:   Sob and palpitation    History of Present Illness:    Cristiane Cade is a 69 y o  female w/ chronic Afib and chronic DHF who presents with palpitations and MYERS this AM   Pt was not able to walk w/o SOB today and went to ER  PT denies any CP or n/v   Of note, pt lives alone and was supposed to have home health care, but appears to have been declining it since late June  Pt says she has not slept in over a year  She has chronic pain and per PDMP, has not had her oxy 15 filled since May 1 2018  Pt states she took her meds last night but not this AM       Review of Systems:    Review of Systems   Constitutional: Positive for fatigue  HENT: Negative  Eyes: Negative  Respiratory: Positive for shortness of breath  Cardiovascular: Positive for palpitations  Gastrointestinal: Negative  Endocrine: Negative  Genitourinary: Negative  Musculoskeletal: Negative  Skin: Negative  Allergic/Immunologic: Negative  Neurological: Positive for dizziness  Hematological: Negative  Psychiatric/Behavioral: Negative  Past Medical and Surgical History:     Past Medical History:   Diagnosis Date    A-fib [facility])     Acute respiratory disease     CHF (congestive heart failure) (HCC)     Chronic pain     Heart failure (HCC)     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  Authorizing Provider   ALPRAZolam (NIRAVAM) 0 25 MG dissolvable tablet Take 0 25 mg by mouth daily at bedtime as needed for anxiety    Historical Provider, MD   ALPRAZolam Genesis Juan) 0 25 mg tablet Take 1 tablet (0 25 mg total) by mouth daily 6/5/18   Mouna Richards DO   amitriptyline (ELAVIL) 25 mg tablet Take 1 tablet (25 mg total) by mouth daily at bedtime 6/5/18   Butler Lanes Georgiana Rico, DO   apixaban (ELIQUIS) 5 mg Take 5 mg by mouth 2 (two) times a day    Historical Provider, MD   aspirin (ECOTRIN LOW STRENGTH) 81 mg EC tablet Take 81 mg by mouth daily    Historical Provider, MD   DULoxetine (CYMBALTA) 30 mg delayed release capsule Take 1 capsule (30 mg total) by mouth daily 6/5/18   Sathya Gonsalez DO   FLUoxetine (PROzac) 20 MG tablet Take 1 capsule by mouth daily 6/28/16   Historical Provider, MD   furosemide (LASIX) 40 mg tablet Take 1 tablet (40 mg total) by mouth daily 6/5/18   Sathya Gonsalez DO   lisinopril (ZESTRIL) 5 mg tablet TAKE 1 TABLET (5 MG TOTAL) BY MOUTH DAILY 7/2/18   Miko Tapia DO   methocarbamol (ROBAXIN) 500 mg tablet Take 500 mg by mouth daily at bedtime as needed for muscle spasms    Historical Provider, MD   metoprolol tartrate (LOPRESSOR) 100 mg tablet TAKE 1 TABLET (100 MG TOTAL) BY MOUTH EVERY 12 (TWELVE) HOURS 7/2/18   Miko Tapia DO   oxyCODONE (ROXICODONE) 15 mg immediate release tablet Take 15 mg by mouth every 6 (six) hours 5/9/18   Historical Provider, MD   oxyCODONE (ROXICODONE) 5 mg immediate release tablet  5/24/18   Historical Provider, MD   polyethylene glycol (MIRALAX) 17 g packet Take 17 g by mouth daily    Historical Provider, MD   polyethylene glycol (MIRALAX) 17 g packet Take 17 g by mouth 4/13/18   Historical Provider, MD   potassium chloride (K-DUR,KLOR-CON) 20 mEq tablet Take 20 mEq by mouth 5/9/18   Historical Provider, MD     I have reviewed home medications using allscripts  Allergies: No Known Allergies    Social History:     Marital Status:      Substance Use History:   History   Alcohol Use No     History   Smoking Status    Former Smoker   Smokeless Tobacco    Never Used     History   Drug Use No       Family History:    History reviewed  No pertinent family history      Physical Exam:     Vitals:   Blood Pressure: 141/81 (07/07/18 1256)  Pulse: (!) 120 (07/07/18 1256)  Temperature: 99 3 °F (37 4 °C) (07/07/18 1642)  Temp Source: Tympanic (07/07/18 0916)  Respirations: 18 (07/07/18 1256)  Height: 5' 5" (165 1 cm) (07/07/18 0911)  Weight - Scale: 67 6 kg (149 lb) (07/07/18 0911)  SpO2: 97 % (07/07/18 1256)    Physical Exam   Constitutional: She is oriented to person, place, and time  No distress  HENT:   Head: Normocephalic and atraumatic  Eyes: Conjunctivae and EOM are normal    Neck: Normal range of motion  Neck supple  Cardiovascular:   irreg irreg   Pulmonary/Chest: Effort normal and breath sounds normal  She has no wheezes  She has no rales  Abdominal: Soft  Bowel sounds are normal  She exhibits no distension  There is no tenderness  Musculoskeletal: Normal range of motion  She exhibits no edema  Neurological: She is alert and oriented to person, place, and time  Skin: Skin is warm and dry  She is not diaphoretic  Additional Data:     Lab Results: I have personally reviewed pertinent reports  Results from last 7 days  Lab Units 07/07/18  0917   WBC Thousand/uL 11 23*   HEMOGLOBIN g/dL 11 9   HEMATOCRIT % 37 6   PLATELETS Thousands/uL 196   NEUTROS PCT % 79*   LYMPHS PCT % 11*   MONOS PCT % 8   EOS PCT % 1       Results from last 7 days  Lab Units 07/07/18  0917   SODIUM mmol/L 137   POTASSIUM mmol/L 4 3   CHLORIDE mmol/L 106   CO2 mmol/L 24   BUN mg/dL 19   CREATININE mg/dL 0 83   CALCIUM mg/dL 9 3   TOTAL PROTEIN g/dL 7 3   BILIRUBIN TOTAL mg/dL 0 86   ALK PHOS U/L 78   ALT U/L 25   AST U/L 20   GLUCOSE RANDOM mg/dL 115       Results from last 7 days  Lab Units 07/07/18  0917   INR  1 27*               Imaging: I have personally reviewed pertinent reports        X-ray chest 2 views   ED Interpretation by Meg Schrader DO (07/07 1036)   Today's study compared to study from 05/18   Increased cephalization of vessels; pulmonary vascular congestion    Increased haziness on on PA; increased haziness lateral view retrocardiac space when compared last CXR          EKG, Pathology, and Other Studies Reviewed on Admission:   · EKG: Afib w/ RVR    Allscripts / Epic Records Reviewed: Yes     ** Please Note: This note has been constructed using a voice recognition system   **

## 2018-07-07 NOTE — ASSESSMENT & PLAN NOTE
Pt used to be on oxy 15, but has not filled this since May 1st   Will give oxy 5 prn while inpt along w/ tylenol RTC and robaxin qhs PRN  Also on Cymbalta  Can titrate oxy up if needed to her previous 15 mg dose

## 2018-07-07 NOTE — ED PROVIDER NOTES
History  Chief Complaint   Patient presents with    Shortness of Breath     Patient states progressively worse with SOB for last 7+ weeks  Last 2 days she couldn't breathe when she lays down or ambulates  Pt is 69yo F presenting w/ dyspnea on exertion for past several days  Admits to PND, orthopnea, difficulty sleeping  She has pmhx afib takes Eliquis for anticoagulation, ASA; hx stroke x2, cholangitis, biliary stent obstruction  States she was told she had an EF of 20%  No recent med changes  Denies chest pain, N/V; complains of occasional sharp pain in LLQ, otherwise, no acute abdominal pain  Denies melena, diarrhea, hematochezia, fever, chills, weight gain/loss, peripheral edema, dizziness, headache, vision changes, weakness  Prior to Admission Medications   Prescriptions Last Dose Informant Patient Reported? Taking?    ALPRAZolam (NIRAVAM) 0 25 MG dissolvable tablet   Yes No   Sig: Take 0 25 mg by mouth daily at bedtime as needed for anxiety   ALPRAZolam (XANAX) 0 25 mg tablet   No No   Sig: Take 1 tablet (0 25 mg total) by mouth daily   DULoxetine (CYMBALTA) 30 mg delayed release capsule   No No   Sig: Take 1 capsule (30 mg total) by mouth daily   FLUoxetine (PROzac) 20 MG tablet   Yes No   Sig: Take 1 capsule by mouth daily   amitriptyline (ELAVIL) 25 mg tablet   No No   Sig: Take 1 tablet (25 mg total) by mouth daily at bedtime   apixaban (ELIQUIS) 5 mg   Yes No   Sig: Take 5 mg by mouth 2 (two) times a day   aspirin (ECOTRIN LOW STRENGTH) 81 mg EC tablet   Yes No   Sig: Take 81 mg by mouth daily   furosemide (LASIX) 40 mg tablet   No No   Sig: Take 1 tablet (40 mg total) by mouth daily   lisinopril (ZESTRIL) 5 mg tablet   No No   Sig: TAKE 1 TABLET (5 MG TOTAL) BY MOUTH DAILY   methocarbamol (ROBAXIN) 500 mg tablet   Yes No   Sig: Take 500 mg by mouth daily at bedtime as needed for muscle spasms   metoprolol tartrate (LOPRESSOR) 100 mg tablet   No No   Sig: TAKE 1 TABLET (100 MG TOTAL) BY MOUTH EVERY 12 (TWELVE) HOURS   oxyCODONE (ROXICODONE) 15 mg immediate release tablet   Yes No   Sig: Take 15 mg by mouth every 6 (six) hours   oxyCODONE (ROXICODONE) 5 mg immediate release tablet   Yes No   polyethylene glycol (MIRALAX) 17 g packet   Yes No   Sig: Take 17 g by mouth daily   polyethylene glycol (MIRALAX) 17 g packet   Yes No   Sig: Take 17 g by mouth   potassium chloride (K-DUR,KLOR-CON) 20 mEq tablet   Yes No   Sig: Take 20 mEq by mouth      Facility-Administered Medications: None       Past Medical History:   Diagnosis Date    A-fib ([place] [address] )     Acute respiratory disease     CHF (congestive heart failure) (HCC)     Chronic pain     Heart failure (HCC)     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[place] [address] )     Hypertension     Narcotic dependence ([place] [address] )        Past Surgical History:   Procedure Laterality Date    ERCP N/A 5/14/2018    Procedure: ENDOSCOPIC RETROGRADE CHOLANGIOPANCREATOGRAPHY (ERCP); Surgeon: Daphne Payne MD;  Location: [facility];  Service: Gastroenterology       History reviewed  No pertinent family history  I have reviewed and agree with the history as documented  Social History   Substance Use Topics    Smoking status: Former Smoker    Smokeless tobacco: Never Used    Alcohol use No        Review of Systems   Constitutional: Positive for fatigue  Negative for chills, fever and unexpected weight change  HENT: Negative for congestion, sore throat and trouble swallowing  Eyes: Negative for photophobia and visual disturbance  Respiratory: Positive for shortness of breath  Negative for cough, wheezing and stridor  Cardiovascular: Positive for palpitations  Negative for chest pain and leg swelling  Gastrointestinal: Positive for abdominal pain  Negative for blood in stool, constipation, diarrhea, nausea and vomiting  Genitourinary: Negative for difficulty urinating, dysuria, enuresis, flank pain and frequency     Neurological: Negative for dizziness, weakness, light-headedness and headaches  Psychiatric/Behavioral: Negative for agitation, behavioral problems and confusion  Physical Exam  ED Triage Vitals   Temperature Pulse Respirations Blood Pressure SpO2   07/07/18 0916 07/07/18 0911 07/07/18 0911 07/07/18 0911 07/07/18 0911   99 3 °F (37 4 °C) (!) 144 18 149/100 96 %      Temp Source Heart Rate Source Patient Position - Orthostatic VS BP Location FiO2 (%)   07/07/18 0916 07/07/18 0911 07/07/18 0911 07/07/18 0911 --   Tympanic Monitor Lying Right arm       Pain Score       07/07/18 0911       Worst Possible Pain           Orthostatic Vital Signs  Vitals:    07/07/18 1345 07/07/18 1410 07/07/18 1547 07/07/18 1609   BP: 134/81 136/56 154/67 121/70   Pulse: (!) 118 (!) 123 (!) 108 89   Patient Position - Orthostatic VS: Lying  Sitting        Physical Exam   Constitutional: She is oriented to person, place, and time  She appears well-developed and well-nourished  No distress  She is not intubated  HENT:   Head: Normocephalic and atraumatic  Mouth/Throat: Oropharynx is clear and moist and mucous membranes are normal    Eyes: Conjunctivae and EOM are normal  Pupils are equal, round, and reactive to light  Neck: No tracheal deviation present  Cardiovascular: Normal heart sounds and intact distal pulses  An irregularly irregular rhythm present  Exam reveals no gallop and no friction rub  No murmur heard  Pulmonary/Chest: Effort normal  No accessory muscle usage  No apnea, no tachypnea and no bradypnea  She is not intubated  No respiratory distress  She has no decreased breath sounds  She has rales in the right lower field and the left lower field  Abdominal: Soft  Normal appearance and bowel sounds are normal  There is no hepatosplenomegaly  There is no tenderness  There is no rigidity, no rebound, no guarding and no CVA tenderness  Musculoskeletal: She exhibits no edema, tenderness or deformity     Neurological: She is alert and oriented to person, place, and time  Skin: Skin is warm and dry  She is not diaphoretic  Psychiatric: She has a normal mood and affect   Her behavior is normal        ED Medications  Medications   ALPRAZolam (XANAX) tablet 0 25 mg (not administered)   amitriptyline (ELAVIL) tablet 25 mg (not administered)   apixaban (ELIQUIS) tablet 5 mg (not administered)   aspirin (ECOTRIN LOW STRENGTH) EC tablet 81 mg (not administered)   DULoxetine (CYMBALTA) delayed release capsule 30 mg (not administered)   furosemide (LASIX) tablet 40 mg (not administered)   methocarbamol (ROBAXIN) tablet 500 mg (not administered)   metoprolol tartrate (LOPRESSOR) tablet 100 mg (100 mg Oral Given 7/7/18 1410)   oxyCODONE (ROXICODONE) IR tablet 5 mg (not administered)   polyethylene glycol (MIRALAX) packet 17 g (not administered)   ondansetron (ZOFRAN) injection 4 mg (not administered)   acetaminophen (TYLENOL) tablet 975 mg (not administered)   diltiazem (CARDIZEM) injection 15 mg (15 mg Intravenous Given 7/7/18 0943)   diltiazem (CARDIZEM) 125 mg in sodium chloride 0 9 % 125 mL infusion (0 mg/hr Intravenous Stopped 7/7/18 1613)   oxyCODONE-acetaminophen (PERCOCET) 5-325 mg per tablet 1 tablet (1 tablet Oral Given 7/7/18 1208)       Diagnostic Studies  Results Reviewed     Procedure Component Value Units Date/Time    Urine Microscopic [[id number]]  (Abnormal) Collected:  07/07/18 1013    Lab Status:  Final result Specimen:  Urine from Urine, Other Updated:  07/07/18 1016     RBC, UA 2-4 (A) /hpf      WBC, UA 2-4 (A) /hpf      Epithelial Cells None Seen /hpf      Bacteria, UA None Seen /hpf      Hyaline Casts, UA None Seen /lpf     POCT urinalysis dipstick [[id number]]  (Normal) Resulted:  07/07/18 1005    Lab Status:  Final result Specimen:  Urine Updated:  07/07/18 1005     Color, UA yellow     Clarity, UA clear    ED Urine Macroscopic [[id number]]  (Abnormal) Collected:  07/07/18 1013    Lab Status:  Final result Specimen:  Urine Updated: 07/07/18 1004     Color, UA Yellow     Clarity, UA Clear     pH, UA 7 5     Leukocytes, UA Trace (A)     Nitrite, UA Negative     Protein,  (2+) (A) mg/dl      Glucose, UA Negative mg/dl      Ketones, UA Negative mg/dl      Urobilinogen, UA 0 2 E U /dl      Bilirubin, UA Negative     Blood, UA Negative     Specific Gravity, UA 1 015    Narrative:       CLINITEK RESULT    Comprehensive metabolic panel [[id number]]  (Abnormal) Collected:  07/07/18 0917    Lab Status:  Final result Specimen:  Blood from Arm, Left Updated:  07/07/18 1002     Sodium 137 mmol/L      Potassium 4 3 mmol/L      Chloride 106 mmol/L      CO2 24 mmol/L      Anion Gap 7 mmol/L      BUN 19 mg/dL      Creatinine 0 83 mg/dL      Glucose 115 mg/dL      Calcium 9 3 mg/dL      AST 20 U/L      ALT 25 U/L      Alkaline Phosphatase 78 U/L      Total Protein 7 3 g/dL      Albumin 3 4 (L) g/dL      Total Bilirubin 0 86 mg/dL      eGFR 68 ml/min/1 73sq m     Narrative:         National Kidney Disease Education Program recommendations are as follows:  GFR calculation is accurate only with a steady state creatinine  Chronic Kidney disease less than 60 ml/min/1 73 sq  meters  Kidney failure less than 15 ml/min/1 73 sq  meters      BNP [[id number]]  (Abnormal) Collected:  07/07/18 0917    Lab Status:  Final result Specimen:  Blood from Arm, Left Updated:  07/07/18 1002     NT-proBNP 21,054 (H) pg/mL     Troponin I [[id number]]  (Normal) Collected:  07/07/18 0917    Lab Status:  Final result Specimen:  Blood from Arm, Left Updated:  07/07/18 0945     Troponin I <0 02 ng/mL     Protime-INR [[id number]]  (Abnormal) Collected:  07/07/18 0917    Lab Status:  Final result Specimen:  Blood from Arm, Left Updated:  07/07/18 0940     Protime 16 0 (H) seconds      INR 1 27 (H)    APTT [[id number]]  (Normal) Collected:  07/07/18 0917    Lab Status:  Final result Specimen:  Blood from Arm, Left Updated:  07/07/18 0940     PTT 34 seconds     CBC and differential [[id number]] (Abnormal) Collected:  07/07/18 0917    Lab Status:  Final result Specimen:  Blood from Arm, Left Updated:  07/07/18 0928     WBC 11 23 (H) Thousand/uL      RBC 3 92 Million/uL      Hemoglobin 11 9 g/dL      Hematocrit 37 6 %      MCV 96 fL      MCH 30 4 pg      MCHC 31 6 g/dL      RDW 15 3 (H) %      MPV 10 1 fL      Platelets 065 Thousands/uL      nRBC 0 /100 WBCs      Neutrophils Relative 79 (H) %      Immat GRANS % 1 %      Lymphocytes Relative 11 (L) %      Monocytes Relative 8 %      Eosinophils Relative 1 %      Basophils Relative 0 %      Neutrophils Absolute 8 94 (H) Thousands/µL      Immature Grans Absolute 0 06 Thousand/uL      Lymphocytes Absolute 1 23 Thousands/µL      Monocytes Absolute 0 87 Thousand/µL      Eosinophils Absolute 0 09 Thousand/µL      Basophils Absolute 0 04 Thousands/µL                  X-ray chest 2 views   ED Interpretation by Marisa Borja DO (07/07 1036)   Today's study compared to study from 05/18   Increased cephalization of vessels; pulmonary vascular congestion    Increased haziness on on PA; increased haziness lateral view retrocardiac space when compared last CXR            Procedures  ECG 12 Lead Documentation  Date/Time: 7/7/2018 10:08 AM  Performed by: Michael Morales by: Gaby Tabor     ECG reviewed by me, the ED Provider: yes    Patient location:  ED  Previous ECG:     Previous ECG:  Compared to current    Similarity:  No change  Interpretation:     Interpretation: abnormal    Rate:     ECG rate:  148    ECG rate assessment: tachycardic    Rhythm:     Rhythm: atrial fibrillation      Rhythm comment:  Afib w/ AVR  QRS:     QRS axis:  Normal  ST segments:     ST segments:  Normal  T waves:     T waves: normal                  Phone Consults  ED Phone Contact    ED Course  ED Course as of Jul 07 1615   Sat Jul 07, 2018   1037 Patient reassessed after 15mg IV Cardizem; rate slowed to 105, decrease from 148; HR fell by >20% w/ initial dose Cardizem-will not repeat dose at this time  Patient's O2 sat dropped to upper 80s, NC 2L O2 on now, saturating 92%    1047 Reassessed  HR now 120-130  Start Cardizem drip 5mg/hr, titrate until target HR <110  VSS,  Identification of Seniors at Risk      Most Recent Value   (ISAR) Identification of Seniors at Risk   Before the illness or injury that brought you to the Emergency, did you need someone to help you on a regular basis? 1 Filed at: 07/07/2018 0913   In the last 24 hours, have you needed more help than usual?  0 Filed at: 07/07/2018 3352   Have you been hospitalized for one or more nights during the past 6 months? 0 Filed at: 07/07/2018 0913   In general, do you see well?  0 Filed at: 07/07/2018 0913   In general, do you have serious problems with your memory? 0 Filed at: 07/07/2018 6643   Do you take more than three different medications every day?   1 Filed at: 07/07/2018 0913   ISAR Score  2 Filed at: 07/07/2018 8955             Results Reviewed     Procedure Component Value Units Date/Time    Urine Microscopic [[id number]]  (Abnormal) Collected:  07/07/18 1013    Lab Status:  Final result Specimen:  Urine from Urine, Other Updated:  07/07/18 1016     RBC, UA 2-4 (A) /hpf      WBC, UA 2-4 (A) /hpf      Epithelial Cells None Seen /hpf      Bacteria, UA None Seen /hpf      Hyaline Casts, UA None Seen /lpf     POCT urinalysis dipstick [[id number]]  (Normal) Resulted:  07/07/18 1005    Lab Status:  Final result Specimen:  Urine Updated:  07/07/18 1005     Color, UA yellow     Clarity, UA clear    ED Urine Macroscopic [[id number]]  (Abnormal) Collected:  07/07/18 1013    Lab Status:  Final result Specimen:  Urine Updated:  07/07/18 1004     Color, UA Yellow     Clarity, UA Clear     pH, UA 7 5     Leukocytes, UA Trace (A)     Nitrite, UA Negative     Protein,  (2+) (A) mg/dl      Glucose, UA Negative mg/dl      Ketones, UA Negative mg/dl      Urobilinogen, UA 0 2 E U /dl      Bilirubin, UA Negative Blood, UA Negative     Specific Gravity, UA 1 015    Narrative:       CLINITEK RESULT    Comprehensive metabolic panel [[id number]]  (Abnormal) Collected:  07/07/18 0917    Lab Status:  Final result Specimen:  Blood from Arm, Left Updated:  07/07/18 1002     Sodium 137 mmol/L      Potassium 4 3 mmol/L      Chloride 106 mmol/L      CO2 24 mmol/L      Anion Gap 7 mmol/L      BUN 19 mg/dL      Creatinine 0 83 mg/dL      Glucose 115 mg/dL      Calcium 9 3 mg/dL      AST 20 U/L      ALT 25 U/L      Alkaline Phosphatase 78 U/L      Total Protein 7 3 g/dL      Albumin 3 4 (L) g/dL      Total Bilirubin 0 86 mg/dL      eGFR 68 ml/min/1 73sq m     Narrative:         National Kidney Disease Education Program recommendations are as follows:  GFR calculation is accurate only with a steady state creatinine  Chronic Kidney disease less than 60 ml/min/1 73 sq  meters  Kidney failure less than 15 ml/min/1 73 sq  meters      BNP [[id number]]  (Abnormal) Collected:  07/07/18 0917    Lab Status:  Final result Specimen:  Blood from Arm, Left Updated:  07/07/18 1002     NT-proBNP 21,054 (H) pg/mL     Troponin I [[id number]]  (Normal) Collected:  07/07/18 0917    Lab Status:  Final result Specimen:  Blood from Arm, Left Updated:  07/07/18 0945     Troponin I <0 02 ng/mL     Protime-INR [[id number]]  (Abnormal) Collected:  07/07/18 0917    Lab Status:  Final result Specimen:  Blood from Arm, Left Updated:  07/07/18 0940     Protime 16 0 (H) seconds      INR 1 27 (H)    APTT [[id number]]  (Normal) Collected:  07/07/18 0917    Lab Status:  Final result Specimen:  Blood from Arm, Left Updated:  07/07/18 0940     PTT 34 seconds     CBC and differential [[id number]]  (Abnormal) Collected:  07/07/18 0917    Lab Status:  Final result Specimen:  Blood from Arm, Left Updated:  07/07/18 0928     WBC 11 23 (H) Thousand/uL      RBC 3 92 Million/uL      Hemoglobin 11 9 g/dL      Hematocrit 37 6 %      MCV 96 fL      MCH 30 4 pg      MCHC 31 6 g/dL      RDW 15 3 (H) %      MPV 10 1 fL      Platelets 731 Thousands/uL      nRBC 0 /100 WBCs      Neutrophils Relative 79 (H) %      Immat GRANS % 1 %      Lymphocytes Relative 11 (L) %      Monocytes Relative 8 %      Eosinophils Relative 1 %      Basophils Relative 0 %      Neutrophils Absolute 8 94 (H) Thousands/µL      Immature Grans Absolute 0 06 Thousand/uL      Lymphocytes Absolute 1 23 Thousands/µL      Monocytes Absolute 0 87 Thousand/µL      Eosinophils Absolute 0 09 Thousand/µL      Basophils Absolute 0 04 Thousands/µL              X-ray chest 2 views   ED Interpretation by Wale Wick DO (07/07 1036)   Today's study compared to study from 05/18   Increased cephalization of vessels; pulmonary vascular congestion    Increased haziness on on PA; increased haziness lateral view retrocardiac space when compared last CXR                    MDM  Number of Diagnoses or Management Options  Atrial fibrillation with rapid ventricular response [facility]):   Congestive heart failure with cardiomyopathy [facility]):   Diagnosis management comments: 1  Patient in afib w/ RVR w/ a known hx of afib  15mg bolus Cardizem given, HR down to 105  Patient on Cardizem drip 5mg/hr to titrate and maintain HR <110    2  CHF additionally w/ dyspnea on exertion; CXR shows increased haziness  3  UA-no evidence infection  CBC, CMP-unremarkable  BNP 21,000--last BNP was 22,000  PT/PTT/INR--therapeutic INR  4  Patient admitted to inpatient [address] IM-Dr Polo Santos saw patient and agrees to inpatient monitoring       CritCare Time    Disposition  Final diagnoses:   Atrial fibrillation with rapid ventricular response ([address] )   Congestive heart failure with cardiomyopathy ([facility] [address] )     Time reflects when diagnosis was documented in both MDM as applicable and the Disposition within this note     Time User Action Codes Description Comment    7/7/2018  1:15 PM Miller Raines Add [I48 91] Atrial fibrillation with rapid ventricular response ([address] )     7/7/2018  1:16 PM Nulex Pakistani [I50 9,  I42 9] Congestive heart failure with cardiomyopathy [facility])       ED Disposition     ED Disposition Condition Comment    Admit  Case was discussed with Dr Carmen Restrepo and the patient's admission status was agreed to be Admission Status: inpatient status to the service of Dr Carmen Restrepo           Follow-up Information    None         Current Discharge Medication List      CONTINUE these medications which have NOT CHANGED    Details   ALPRAZolam (NIRAVAM) 0 25 MG dissolvable tablet Take 0 25 mg by mouth daily at bedtime as needed for anxiety      ALPRAZolam (XANAX) 0 25 mg tablet Take 1 tablet (0 25 mg total) by mouth daily  Qty: 30 tablet, Refills: 0    Associated Diagnoses: Anxiety      amitriptyline (ELAVIL) 25 mg tablet Take 1 tablet (25 mg total) by mouth daily at bedtime  Qty: 30 tablet, Refills: 0    Associated Diagnoses: Other depression      apixaban (ELIQUIS) 5 mg Take 5 mg by mouth 2 (two) times a day      aspirin (ECOTRIN LOW STRENGTH) 81 mg EC tablet Take 81 mg by mouth daily      DULoxetine (CYMBALTA) 30 mg delayed release capsule Take 1 capsule (30 mg total) by mouth daily  Qty: 30 capsule, Refills: 0    Associated Diagnoses: Other depression      FLUoxetine (PROzac) 20 MG tablet Take 1 capsule by mouth daily      furosemide (LASIX) 40 mg tablet Take 1 tablet (40 mg total) by mouth daily  Qty: 30 tablet, Refills: 0    Associated Diagnoses: Chronic systolic heart failure (HCC)      lisinopril (ZESTRIL) 5 mg tablet TAKE 1 TABLET (5 MG TOTAL) BY MOUTH DAILY  Qty: 30 tablet, Refills: 0    Associated Diagnoses: Essential hypertension      methocarbamol (ROBAXIN) 500 mg tablet Take 500 mg by mouth daily at bedtime as needed for muscle spasms      metoprolol tartrate (LOPRESSOR) 100 mg tablet TAKE 1 TABLET (100 MG TOTAL) BY MOUTH EVERY 12 (TWELVE) HOURS  Qty: 60 tablet, Refills: 0    Associated Diagnoses: Essential hypertension      !! oxyCODONE (ROXICODONE) 15 mg immediate release tablet Take 15 mg by mouth every 6 (six) hours      !! oxyCODONE (ROXICODONE) 5 mg immediate release tablet Refills: 0      !! polyethylene glycol (MIRALAX) 17 g packet Take 17 g by mouth daily      ! ! polyethylene glycol (MIRALAX) 17 g packet Take 17 g by mouth      potassium chloride (K-DUR,KLOR-CON) 20 mEq tablet Take 20 mEq by mouth       !! - Potential duplicate medications found  Please discuss with provider  No discharge procedures on file  ED Provider  Attending physically available and evaluated Amanda Beltre I managed the patient along with the ED Attending      Electronically Signed by         Arielle Cleaning DO  07/07/18 6156

## 2018-07-07 NOTE — ASSESSMENT & PLAN NOTE
Pt may need increase in her lopressor dose - however, I am unsure pt is complaint with meds  She was refusing VNA in late June    On Cardizem GTT at this time  Will give scheduled Am lopressor  AC w/ Apixaban

## 2018-07-07 NOTE — PROGRESS NOTES
Patient complaining of 10/10 pain  Given 5mg oxy  Patient states she is prescribed 15mg at home  Called [facility] to notify them  Farhat ordered 15mg oxy every 6 hrs PRN  Was told it was "ok to give it now"

## 2018-07-07 NOTE — ED ATTENDING ATTESTATION
Chung Phan DO, saw and evaluated the patient  I have discussed the patient with the resident/non-physician practitioner and agree with the resident's/non-physician practitioner's findings, Plan of Care, and MDM as documented in the resident's/non-physician practitioner's note, except where noted  All available labs and Radiology studies were reviewed  At this point I agree with the current assessment done in the Emergency Department  I have conducted an independent evaluation of this patient a history and physical is as follows:      67-year-old female complaining of shortness of breath, dyspnea on exertion, orthopnea, paroxysmal nocturnal dyspnea  Patient has history of CHF as well as atrial fibrillation  Patient is in atrial fibrillation with rapid ventricular response at this time  Patient states only minimal exertion is causing her to be symptomatic short of breath  Denies fevers, chills, chest pain, nausea, vomiting, diaphoresis, headache, otherwise negative review of systems  On physical exam, patient is afebrile tachycardic with irregularly irregular  Rhythm  There is no calf tenderness or asymmetry  Patient is anticoagulated  Will continue with cook cardiac evaluation, congestive heart failure evaluation, diltiazem bolus and drip for atrial fibrillation with rapid ventricular response  Admit  Critical Care Time  The patient presented with a condition in which there was a high probability of imminent or life-threatening deterioration, and critical care services (excluding separately billable procedures) totalled 30-74 minutes          Procedures

## 2018-07-08 PROBLEM — I43 TACHYCARDIA INDUCED CARDIOMYOPATHY (HCC): Chronic | Status: ACTIVE | Noted: 2018-05-05

## 2018-07-08 PROBLEM — R00.0 TACHYCARDIA INDUCED CARDIOMYOPATHY (HCC): Chronic | Status: ACTIVE | Noted: 2018-05-05

## 2018-07-08 LAB
ANION GAP SERPL CALCULATED.3IONS-SCNC: 4 MMOL/L (ref 4–13)
BUN SERPL-MCNC: 18 MG/DL (ref 5–25)
CALCIUM SERPL-MCNC: 8.5 MG/DL (ref 8.3–10.1)
CHLORIDE SERPL-SCNC: 107 MMOL/L (ref 100–108)
CO2 SERPL-SCNC: 27 MMOL/L (ref 21–32)
CREAT SERPL-MCNC: 0.83 MG/DL (ref 0.6–1.3)
ERYTHROCYTE [DISTWIDTH] IN BLOOD BY AUTOMATED COUNT: 15.4 % (ref 11.6–15.1)
GFR SERPL CREATININE-BSD FRML MDRD: 68 ML/MIN/1.73SQ M
GLUCOSE SERPL-MCNC: 102 MG/DL (ref 65–140)
HCT VFR BLD AUTO: 35.9 % (ref 34.8–46.1)
HGB BLD-MCNC: 11.1 G/DL (ref 11.5–15.4)
MAGNESIUM SERPL-MCNC: 2.5 MG/DL (ref 1.6–2.6)
MCH RBC QN AUTO: 30.3 PG (ref 26.8–34.3)
MCHC RBC AUTO-ENTMCNC: 30.9 G/DL (ref 31.4–37.4)
MCV RBC AUTO: 98 FL (ref 82–98)
PHOSPHATE SERPL-MCNC: 3.1 MG/DL (ref 2.3–4.1)
PLATELET # BLD AUTO: 193 THOUSANDS/UL (ref 149–390)
PMV BLD AUTO: 10.9 FL (ref 8.9–12.7)
POTASSIUM SERPL-SCNC: 4 MMOL/L (ref 3.5–5.3)
RBC # BLD AUTO: 3.66 MILLION/UL (ref 3.81–5.12)
SODIUM SERPL-SCNC: 138 MMOL/L (ref 136–145)
T4 FREE SERPL-MCNC: 1.36 NG/DL (ref 0.76–1.46)
TSH SERPL DL<=0.05 MIU/L-ACNC: 4.76 UIU/ML (ref 0.36–3.74)
WBC # BLD AUTO: 7.41 THOUSAND/UL (ref 4.31–10.16)

## 2018-07-08 PROCEDURE — 84439 ASSAY OF FREE THYROXINE: CPT | Performed by: GENERAL PRACTICE

## 2018-07-08 PROCEDURE — 80048 BASIC METABOLIC PNL TOTAL CA: CPT | Performed by: GENERAL PRACTICE

## 2018-07-08 PROCEDURE — 83735 ASSAY OF MAGNESIUM: CPT | Performed by: GENERAL PRACTICE

## 2018-07-08 PROCEDURE — 84443 ASSAY THYROID STIM HORMONE: CPT | Performed by: GENERAL PRACTICE

## 2018-07-08 PROCEDURE — 99222 1ST HOSP IP/OBS MODERATE 55: CPT | Performed by: INTERNAL MEDICINE

## 2018-07-08 PROCEDURE — 84100 ASSAY OF PHOSPHORUS: CPT | Performed by: GENERAL PRACTICE

## 2018-07-08 PROCEDURE — 99232 SBSQ HOSP IP/OBS MODERATE 35: CPT | Performed by: INTERNAL MEDICINE

## 2018-07-08 PROCEDURE — 85027 COMPLETE CBC AUTOMATED: CPT | Performed by: GENERAL PRACTICE

## 2018-07-08 RX ORDER — METOPROLOL SUCCINATE 100 MG/1
100 TABLET, EXTENDED RELEASE ORAL EVERY 12 HOURS SCHEDULED
Status: DISCONTINUED | OUTPATIENT
Start: 2018-07-08 | End: 2018-07-13

## 2018-07-08 RX ADMIN — DILTIAZEM HYDROCHLORIDE 2.5 MG/HR: 5 INJECTION INTRAVENOUS at 13:34

## 2018-07-08 RX ADMIN — METOPROLOL SUCCINATE 100 MG: 100 TABLET, EXTENDED RELEASE ORAL at 21:34

## 2018-07-08 RX ADMIN — OXYCODONE HYDROCHLORIDE 15 MG: 5 TABLET ORAL at 23:33

## 2018-07-08 RX ADMIN — ACETAMINOPHEN 975 MG: 325 TABLET, FILM COATED ORAL at 21:34

## 2018-07-08 RX ADMIN — OXYCODONE HYDROCHLORIDE 15 MG: 5 TABLET ORAL at 12:29

## 2018-07-08 RX ADMIN — OXYCODONE HYDROCHLORIDE 15 MG: 5 TABLET ORAL at 05:30

## 2018-07-08 RX ADMIN — APIXABAN 5 MG: 5 TABLET, FILM COATED ORAL at 17:14

## 2018-07-08 RX ADMIN — FUROSEMIDE 40 MG: 40 TABLET ORAL at 09:25

## 2018-07-08 RX ADMIN — AMITRIPTYLINE HYDROCHLORIDE 25 MG: 25 TABLET, FILM COATED ORAL at 21:34

## 2018-07-08 RX ADMIN — ACETAMINOPHEN 975 MG: 325 TABLET, FILM COATED ORAL at 05:27

## 2018-07-08 RX ADMIN — ALPRAZOLAM 0.25 MG: 0.25 TABLET ORAL at 09:25

## 2018-07-08 RX ADMIN — METOPROLOL TARTRATE 100 MG: 50 TABLET ORAL at 09:25

## 2018-07-08 RX ADMIN — ASPIRIN 81 MG: 81 TABLET, COATED ORAL at 09:26

## 2018-07-08 RX ADMIN — ACETAMINOPHEN 975 MG: 325 TABLET, FILM COATED ORAL at 14:46

## 2018-07-08 RX ADMIN — APIXABAN 5 MG: 5 TABLET, FILM COATED ORAL at 09:26

## 2018-07-08 RX ADMIN — POLYETHYLENE GLYCOL 3350 17 G: 17 POWDER, FOR SOLUTION ORAL at 09:25

## 2018-07-08 RX ADMIN — DULOXETINE HYDROCHLORIDE 30 MG: 30 CAPSULE, DELAYED RELEASE ORAL at 09:26

## 2018-07-08 NOTE — PROGRESS NOTES
07/08/18 7331   Clinical Encounter Type   Visited With Patient   Routine Visit Introduction   Continue Visiting No   Referral From Nurse   Referral To    Patient Spiritual Encounters   Spiritual Assessment 4   Suffering Severity 5   Coping 4

## 2018-07-08 NOTE — PLAN OF CARE
Problem: DISCHARGE PLANNING - CARE MANAGEMENT  Goal: Discharge to post-acute care or home with appropriate resources  INTERVENTIONS:  - Conduct assessment to determine patient/family and health care team treatment goals, and need for post-acute services based on payer coverage, community resources, and patient preferences, and barriers to discharge  - Address psychosocial, clinical, and financial barriers to discharge as identified in assessment in conjunction with the patient/family and health care team  - Arrange appropriate level of post-acute services according to patient's   needs and preference and payer coverage in collaboration with the physician and health care team  - Communicate with and update the patient/family, physician, and health care team regarding progress on the discharge plan  - Arrange appropriate transportation to post-acute venues  - Nephew to drive home @ d/c   Outcome: Progressing

## 2018-07-08 NOTE — PROGRESS NOTES
Progress Note - Mary Jo Shelton , 69 y o  female MRN: [id number]    Unit/Bed#: [facility] 509-01 Encounter: [id number]    Primary Care Provider: Micky Khan DO   Date and time admitted to hospital: 2018  9:08 AM        * Atrial fibrillation with RVR ([address] )   Assessment & Plan    Pt may need increase in her lopressor dose -but may have issues with compliance  On Cardizem GTT at this time  Will give scheduled Am lopressor  TRISTAR [facility] w/ Apixaban        Dyspnea on exertion   Assessment & Plan    2/2 rapid afiib and deconditioning  PT/OT - pt at this time agreeable to STR        Chronic pain disorder   Assessment & Plan    Pt used to be on oxy 15, but has not filled this since May 1st   Will give oxy 5 prn while inpt along w/ tylenol RTC and robaxin qhs PRN  Also on Cymbalta  Can titrate oxy up if needed to her previous 15 mg dose        Chronic diastolic congestive heart failure (HCC)   Assessment & Plan    No exac  C/w home lasix  Echo in May showed EF 50%          VTE Pharmacologic Prophylaxis:   Pharmacologic: Apixaban (Eliquis)  Mechanical VTE Prophylaxis in Place: No    Patient Centered Rounds: I have performed bedside rounds with nursing staff today  Time Spent for Care: 15 minutes  More than 50% of total time spent on counseling and coordination of care as described above  Current Length of Stay: 1 day(s)    Current Patient Status: Inpatient   Certification Statement: The patient will continue to require additional inpatient hospital stay due to Need to monitor symptoms        Code Status: Level 3 - DNAR and DNI      Subjective:   nad    Objective:     Vitals:   Temp (24hrs), Av °F (36 7 °C), Min:97 5 °F (36 4 °C), Max:99 3 °F (37 4 °C)    HR:  [] 110  Resp:  [17-18] 18  BP: (102-154)/() 118/80  SpO2:  [92 %-97 %] 93 %  Body mass index is 25 28 kg/m²  Input and Output Summary (last 24 hours):        Intake/Output Summary (Last 24 hours) at 18 0855  Last data filed at 07/08/18 0600   Gross per 24 hour   Intake           443 75 ml   Output              800 ml   Net          -356 25 ml       Physical Exam:     Physical Exam   Constitutional: She is oriented to person, place, and time  HENT:   Head: Normocephalic and atraumatic  Pulmonary/Chest: Effort normal and breath sounds normal  No respiratory distress  Abdominal: Soft  Bowel sounds are normal    Musculoskeletal: She exhibits no edema  Neurological: She is alert and oriented to person, place, and time  Skin: Skin is warm and dry  Psychiatric: She has a normal mood and affect  Additional Data:     Labs:      Results from last 7 days  Lab Units 07/08/18  0459 07/07/18  0917   WBC Thousand/uL 7 41 11 23*   HEMOGLOBIN g/dL 11 1* 11 9   HEMATOCRIT % 35 9 37 6   PLATELETS Thousands/uL 193 196   NEUTROS PCT %  --  79*   LYMPHS PCT %  --  11*   MONOS PCT %  --  8   EOS PCT %  --  1       Results from last 7 days  Lab Units 07/08/18  0459 07/07/18  0917   SODIUM mmol/L 138 137   POTASSIUM mmol/L 4 0 4 3   CHLORIDE mmol/L 107 106   CO2 mmol/L 27 24   BUN mg/dL 18 19   CREATININE mg/dL 0 83 0 83   CALCIUM mg/dL 8 5 9 3   TOTAL PROTEIN g/dL  --  7 3   BILIRUBIN TOTAL mg/dL  --  0 86   ALK PHOS U/L  --  78   ALT U/L  --  25   AST U/L  --  20   GLUCOSE RANDOM mg/dL 102 115       Results from last 7 days  Lab Units 07/07/18  0917   INR  1 27*       * I Have Reviewed All Lab Data Listed Above  * Additional Pertinent Lab Tests Reviewed:  All Labs Within Last 24 Hours Reviewed        Recent Cultures (last 7 days):           Last 24 Hours Medication List:     Current Facility-Administered Medications:  acetaminophen 975 mg Oral Q8H [address] Raul Purdy, DO   ALPRAZolam 0 25 mg Oral Daily [place], DO   amitriptyline 25 mg Oral HS [place], DO   apixaban 5 mg Oral BID [place], DO   aspirin 81 mg Oral Daily [place], DO   DULoxetine 30 mg Oral Daily [place], DO   furosemide 40 mg Oral Daily [place], DO   methocarbamol 500 mg Oral HS PRN Raymond Ogden DO   metoprolol tartrate 100 mg Oral Q12H [address] Raul Purdy,    ondansetron 4 mg Intravenous Q6H PRN Raymond Ogden DO   oxyCODONE 15 mg Oral Q6H PRN Elvira Head MD   polyethylene glycol 17 g Oral Daily Raymond Ogden DO        Today, Patient Was Seen By: Lilia Carter DO    ** Please Note: Dictation voice to text software may have been used in the creation of this document   **

## 2018-07-08 NOTE — CONSULTS
Consultation - Cardiology   Aundria Lesches 69 y o  female MRN: [id number]  Unit/Bed#: [facility] 509-01 Encounter: [id number]      Assessment:  Principal Problem:    Atrial fibrillation with RVR ([address] )  Active Problems:    Chronic diastolic congestive heart failure (HCC)    Chronic pain disorder    Insomnia    Dyspnea on exertion      Plan:  1  Persistent Atrial fibrillation with RVR  - patient has a history of AFib diagnosed in June 2016 at which time she underwent TIFFANIE cardioversion but reverted back into AFib within a month and admitted in July 2016  - She then had admissions in August and September 2016 for AFib with RVR and there was a question of EMELY thrombus however on MRI was thought to be invagination of the atrial wall versus thrombus at which time her EF was found to be severely reduced at 20%  She was on rate control strategy  She refused further cardiac workup with a cardiac catheterization for the reduced EF during that time  She then had an admission in 02/2017 for AFib with RVR when EF was 35% and underwent TIFFANIE cardioversion with 200 joules to sinus rhythm and was started on amiodarone with which she had prolonged QTC due to combination of Amio with amitriptyline and Zofran  Amitriptyline and Zofran were discontinued and a QTC at discharge during that time was 500  This seemed to keep her stable for a year  She then had an admission in April 2018 for AFib with RVR when amiodarone was discontinued, echo showed EF of 45% with regional wall motion and was recommended stress test as an outpatient  Then subsequently had 2 admissions in May 2018 for AFib with RVR which were both controlled with rate control strategy with beta-blockers  - She now presents back with Afib with RVR  - echo-May 2018 reviewed-LVEF mildly reduced at 45% with severe hypokinesis of the basal to mid inferior and basal to mid inferolateral walls  Dilated RV with low-normal function  Moderately dilated left and right atrium    Moderate tricuspid regurgitation with PA systolic pressure of 55   - Change Metoprolol tartrate to succinate 100mg twice daily   - Given recurrent admissions for heart failure with history of tachycardia mediated cardiomyopathy, will favor rhythm control  Poor candidate for Sotalol and Flecainide given reduced LVEF, will consider Dofetilide vs Amiodarone however given that she has depression with use of SSRI/SNRI will prefer Amiodarone  - Her most recent TIFFANIE in 2017 showed no EMELY thrombus and she reports being compliant with medications but given her history, we will schedule the patient for TIFFANIE cardioversion to try to convert her to sinus and then load her with Amiodarone  May consider Ablation to decrease in admissions for heart failure and RVR/mortality benefit in patients with reduced EF in the future  Will have EP evaluate the patient tomorrow  Will need daily EKG to monitor QTc if loaded with Amiodarone due to her history of prolonged QTc in the past   - Overall patient has poor insight into her medical condition with memory loss  Will need placement vs home support system as she lives alone  - anticoagulation with Eliquis 5 mg twice daily   - TSH-4 76 with FT4-1 36    2  Stage C- HFrEF- presumed tachycardia mediated- most recent echo with LVEF-45% with NYHA Class III symptoms-LVIDd-5 17  - clinically euvolemic and warm  - Continue furosemide 40 mg daily  - GDMT with lisinopril 5 mg daily, will change metoprolol tartrate to succinate 100 mg twice daily  3  H/o anxiety/depression     4   Chronic pain syndrome    History of Present Illness   Physician Requesting Consult: Elston Sever, DO  Reason for Consult / Principal Problem:  AFib with RVR  HPI: Enzo Stoll is a 69y o  year old female with past medical history significant for systolic heart failure- presumed tachycardia mediated, chronic atrial fibrillation on anticoagulation with Eliquis, history of questionable EMELY thrombus in the past, TIA, biliary dilatation status post ERCP with sphincterotomy and stent placement in 2014  Patient presented to the hospital with a chief complaint of worsening shortness of breath, orthopnea and PND  At baseline, patient reports symptoms of shortness of breath with more than normal exertion however in the past 2 days she has noted symptoms with very minimal exertion and even at rest   Associated with orthopnea and PND  No swelling of the lower extremities  Reports that she is not on Lasix however as per discharge recommendations in May 2018, patient was discharged on Lasix 40 mg daily  Does not monitor her weight at home  Compliant with low salt diet, poor insight into her medical condition and lives alone  NT proBNP-21,054 from 22,029 on 5/13  In the ED was found to be in AFib with RVR and was started on IV Cardizem drip, clear chest x-ray  Patient was diagnosed with atrial fibrillation in June 2016 underwent TIFFANIE cardioversion and started on anticoagulation with Eliquis, rate control strategy with beta-blocker and calcium channel blocker  Her EF-45% at that time, however presented back in July 2016 for AFib with RVR and recommended Amiodarone for rhythm control however this was not started  Then admitted in August 2016 for Afib and heart failure  Sep 2016 for Afib with RVR, AMS, volume overload  Rhythm control strategy was considered but then due to concern of questionable LA clot she was on rate control with metoprolol, digoxin and Cardizem- underwent cardiac MRI EMELY mass was thought to be invagination of the left atrial wall vs thrombus, EF was found to be 20% with severely dilated left atrium  She then had an admission in Feb 2017 for Afib with RVR when EF-35%, digoxin was stopped and underwent TIFFANIE cardioversion with 200Jx1 to sinus rhythm, started on Amiodarone, also had prolonged QTc thought to be a combination of use of Amiodarone, Amitriptyline and Zofran so Amitriptyline and Zofran were stopped with QTc-500  She refused cath as an outpatient  She was then admitted back to the hospital in April 2018 for AFib with RVR and heart failure exacerbation  Amiodarone was discontinued and she was controlled on rate control strategy with beta-blockers  A stress test was recommended as an outpatient  Then admitted in May 2018 for heart failure exacerbation and Afib with RVR, repeat echo showed LVEF-45%- uptitrated on betablockers and loaded with Digoxin, however was not continued on discharge  She was seen by us in May 2018 for NSTEMI type 2 in the setting of sepsis from cholangitis  Patient presented to the hospital yesterday with a chief complaint of dyspnea on exertion with associated orthopnea and PND  Echo in March 2018-LVEF 40-45% with hypokinesis of the anterior and anteroseptal walls  Enlarged RV with reduced RV function  Moderately dilated left and right atrium  Mild MR  Mild TR   TIFFANIE in February 2017 showed no evidence of thrombus and she underwent cardioversion  Used to follow with Dr Tammy Puri of [address] [place] [address] Drive, still needs to establish care with [facility] [facility]  EKG-07/07/2018-AFib with RVR  Rate related ST T wave changes  Telemetry-AFib with RVR with rates of 110-120  Inpatient consult to Cardiology     Performed by  Priscilla Langley     Authorized by Sandor Patten              Review of Systems:  Review of Systems   Constitutional: Positive for activity change, appetite change and fatigue  Negative for chills, diaphoresis and fever  HENT: Negative for congestion  Respiratory: Positive for chest tightness and shortness of breath  Negative for cough and wheezing  Cardiovascular: Positive for palpitations  Negative for chest pain and leg swelling  Gastrointestinal: Negative for abdominal pain, diarrhea, nausea and vomiting  Genitourinary: Negative for difficulty urinating and dysuria  Musculoskeletal: Negative for back pain     Skin: Negative for color change and rash  Neurological: Negative for dizziness, syncope, facial asymmetry, weakness, light-headedness, numbness and headaches  Psychiatric/Behavioral: Negative for confusion  14 systems reviewed and negative with the exception of the above and the following    Historical Information   Past Medical History:   Diagnosis Date    A-fib [facility])     Acute respiratory disease     [facility])     Chronic pain     [facility]     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[facility] )     Hypertension     Narcotic dependence ([place] [id number] )      Past Surgical History:   Procedure Laterality Date    ERCP N/A 5/14/2018    Procedure: ENDOSCOPIC RETROGRADE CHOLANGIOPANCREATOGRAPHY (ERCP); Surgeon: Rui Suggs MD;  Location: [facility];  Service: Gastroenterology     History   Alcohol Use No     History   Drug Use No     History   Smoking Status    Former Smoker   Smokeless Tobacco    Never Used     Family History: non-contributory    Meds/Allergies   all current active meds have been reviewed  No Known Allergies    Objective   Vitals: Blood pressure 118/80, pulse (!) 110, temperature 97 7 °F (36 5 °C), temperature source Oral, resp  rate 18, height 5' 5" (1 651 m), weight 68 9 kg (151 lb 14 4 oz), SpO2 93 %  , Body mass index is 25 28 kg/m² , Orthostatic Blood Pressures      Most Recent Value   Blood Pressure  118/80 filed at 07/08/2018 0703   Patient Position - Orthostatic VS  Sitting filed at 07/08/2018 0703            Intake/Output Summary (Last 24 hours) at 07/08/18 0931  Last data filed at 07/08/18 0600   Gross per 24 hour   Intake           443 75 ml   Output              800 ml   Net          -356 25 ml       Invasive Devices     Peripheral Intravenous Line            Peripheral IV 07/07/18 Left Antecubital 1 day                    Physical Exam:  Physical Exam   Constitutional: She is oriented to person, place, and time  No distress     HENT:   Head: Normocephalic and atraumatic  Eyes: Conjunctivae are normal  No scleral icterus  Neck: JVD present  Cardiovascular: Exam reveals no gallop and no friction rub  Murmur heard  Irregular rhythm, tachycardic   Pulmonary/Chest: Effort normal  She has no wheezes  She has no rales  Diminished at the bases   Abdominal: Soft  Bowel sounds are normal    Musculoskeletal: Normal range of motion  She exhibits no edema  Neurological: She is alert and oriented to person, place, and time  Skin: Skin is warm and dry  Psychiatric: She has a normal mood and affect  Nursing note and vitals reviewed            Lab Results:     Lab Results   Component Value Date    CKTOTAL 76 05/13/2018    TROPONINI <0 02 07/07/2018    TROPONINI 0 04 05/22/2018    TROPONINI 0 08 (H) 05/14/2018       Lab Results   Component Value Date    GLUCOSE 102 07/08/2018    CALCIUM 8 5 07/08/2018     07/08/2018    K 4 0 07/08/2018    CO2 27 07/08/2018     07/08/2018    BUN 18 07/08/2018    CREATININE 0 83 07/08/2018       Lab Results   Component Value Date    WBC 7 41 07/08/2018    HGB 11 1 (L) 07/08/2018    HCT 35 9 07/08/2018    MCV 98 07/08/2018     07/08/2018       Lab Results   Component Value Date    CHOL 218 06/22/2015    CHOL 151 03/17/2014    CHOL 144 08/22/2013     Lab Results   Component Value Date    HDL 47 06/22/2015    HDL 41 03/17/2014    HDL 48 08/22/2013     Lab Results   Component Value Date    LDLCALC 140 (H) 06/22/2015    LDLCALC 87 03/17/2014    LDLCALC 73 08/22/2013     Lab Results   Component Value Date    TRIG 155 06/22/2015    TRIG 117 03/17/2014    TRIG 115 08/22/2013       Lab Results   Component Value Date    ALT 25 07/07/2018    AST 20 07/07/2018         Results from last 7 days  Lab Units 07/07/18  0917   INR  1 27*       Cardiac testing:   Results for orders placed during the hospital encounter of 05/13/18   Echo complete with contrast if indicated    Narrative [address]  [address] [facility]  [phone]    Transthoracic Echocardiogram  2D, M-mode, Doppler, and Color Doppler    Study date:      Patient: Lee Gaitan  MR number: [id number]  Account number: [de-identified]  : 1940  Age: 69 years  Gender: Female  Status: Inpatient  Location: Bedside  Height: 65 in  Weight: 178 6 lb  BP: 117/ 81 mmHg    Indications: Assess left ventricular function  Diagnoses: I24 9 - Acute ischemic heart disease, unspecified    Sonographer:  Violeta Gerardo RDCS  Primary Physician:  Tess Richardson DO  Referring Physician:  Debora Solorzano MD  Group:  [facility]  Cardiology Fellow:  Mimi Hahn MD  Interpreting Physician:  Diego Tyler DO    SUMMARY    LEFT VENTRICLE:  Systolic function was mildly reduced  Ejection fraction was estimated to be 50 %  There was severe hypokinesis of the basal-mid inferior and basal-mid inferolateral wall(s)  The changes were consistent with concentric remodeling (increased wall thickness with normal wall mass)  Doppler parameters were consistent with elevated mean left atrial filling pressure  RIGHT VENTRICLE:  The ventricle was mildly dilated  Systolic function was normal   Wall thickness was increased  LEFT ATRIUM:  The atrium was mildly dilated  RIGHT ATRIUM:  The atrium was moderately dilated  MITRAL VALVE:  There was moderate annular calcification  There was mild regurgitation  TRICUSPID VALVE:  There was mild to moderate regurgitation  Estimated peak PA pressure was 55 mmHg  The findings suggest moderate pulmonary hypertension  IVC, HEPATIC VEINS:  The inferior vena cava was mildly dilated  Respirophasic changes were blunted (less than 50% variation)  HISTORY: PRIOR HISTORY: systolic heart failure, afib, sepsis, chronic anticoagulation    PROCEDURE: The procedure was performed at the bedside  This was a routine study  The transthoracic approach was used   The study included complete 2D imaging, M-mode, complete spectral Doppler, and color Doppler  The heart rate was 87 bpm,  at the start of the study  Images were obtained from the parasternal, apical, subcostal, and suprasternal notch acoustic windows  Image quality was adequate  LEFT VENTRICLE: Size was normal  Systolic function was mildly reduced  Ejection fraction was estimated to be 50 %  There was severe hypokinesis of the basal-mid inferior and basal-mid inferolateral wall(s)  Wall thickness was mildly  increased  The changes were consistent with concentric remodeling (increased wall thickness with normal wall mass)  DOPPLER: Transmitral flow pattern: atrial fibrillation  Doppler parameters were consistent with elevated mean left atrial  filling pressure  RIGHT VENTRICLE: The ventricle was mildly dilated  Systolic function was normal  Wall thickness was increased  LEFT ATRIUM: The atrium was mildly dilated  RIGHT ATRIUM: The atrium was moderately dilated  MITRAL VALVE: There was moderate annular calcification  Valve structure was normal  There was mild calcification of the valve  There was normal leaflet separation  DOPPLER: The transmitral velocity was within the normal range  There was no  evidence for stenosis  There was mild regurgitation  AORTIC VALVE: The valve was trileaflet  Leaflets exhibited moderately increased thickness, moderate calcification, and normal cuspal separation  DOPPLER: Transaortic velocity was within the normal range  There was no evidence for stenosis  There was no regurgitation  TRICUSPID VALVE: The valve structure was normal  There was normal leaflet separation  DOPPLER: The transtricuspid velocity was within the normal range  There was no evidence for stenosis  There was mild to moderate regurgitation  Pulmonary  artery systolic pressure was moderately increased  Estimated peak PA pressure was 55 mmHg  The findings suggest moderate pulmonary hypertension      PULMONIC VALVE: Leaflets exhibited normal thickness, no calcification, and normal cuspal separation  DOPPLER: The transpulmonic velocity was within the normal range  There was trace regurgitation  PERICARDIUM: There was no pericardial effusion  The pericardium was normal in appearance  AORTA: The root exhibited normal size  SYSTEMIC VEINS: IVC: The inferior vena cava was mildly dilated  Respirophasic changes were blunted (less than 50% variation)  SYSTEM MEASUREMENT TABLES    2D  %FS: 23 31 %  Ao Diam: 2 84 cm  EDV(Teich): 127 84 ml  EF(Teich): 46 35 %  ESV(Teich): 68 58 ml  IVSd: 1 14 cm  LA Area: 21 35 cm2  LA Diam: 3 89 cm  LVEDV MOD A4C: 125 04 ml  LVEF MOD A4C: 61 7 %  LVESV MOD A4C: 47 9 ml  LVIDd: 5 17 cm  LVIDs: 3 97 cm  LVLd A4C: 8 04 cm  LVLs A4C: 7 66 cm  LVPWd: 1 09 cm  RA Area: 23 54 cm2  RVIDd: 4 27 cm  SV MOD A4C: 77 15 ml  SV(Teich): 59 26 ml    CW  TR Vmax: 3 26 m/s  TR maxP 59 mmHg    MM  TAPSE: 2 89 cm    PW  AVC: 377 63 ms  E': 0 07 m/s  E/E': 11 01  MV A Ge: 0 21 m/s  MV Dec [place]: 4 94 m/s2  MV DecT: 153 92 ms  MV E Ge: 0 76 m/s  MV E/A Ratio: 3 58  MV PHT: 44 64 ms  MVA By PHT: 4 93 cm2    [facility] Accredited Echocardiography Laboratory    Prepared and electronically signed by    Genaro North DO  Signed 15-May-2018 14:00:03       No results found for this or any previous visit  No procedure found  No results found for this or any previous visit  Imaging: I have personally reviewed pertinent reports  X-ray Chest 2 Views    Result Date: 2018  Narrative: CHEST INDICATION:   Chest pain  COMPARISON:  2018 EXAM PERFORMED/VIEWS:  XR CHEST PA & LATERAL  The frontal view was performed utilizing dual energy radiographic technique  FINDINGS:  The patient is rotated to the left  Cardiomediastinal silhouette appears stable  Minimal linear atelectasis or scarring right lung base  The lungs are otherwise clear  No pneumothorax or pleural effusion  Dextroscoliosis and degenerative changes thoracic spine with osteoarthritis of the left shoulder also noted  Impression: No acute cardiopulmonary disease   Workstation performed: [id number]

## 2018-07-08 NOTE — SOCIAL WORK
Pt stated she lives alone in [facility]; 1st floor setup  Independent, not driving PTA with family support  Pt has cane, rolling walker  Hx LVHC; no hx rehab  Denies MH, D&A inpatient treatment  Preferred pharmacy is [facility]  Pt does not have POA  Preferred contact is sister Edgar Rehman [phone]  Reviewed d/c checklist with copy left with patient  Pt encouraged to participate in d/c planning  CM reviewed d/c planning process including the following: identifying help at home, patient preference for d/c planning needs, Discharge Lounge, [facility] Meds to Bed program, availability of treatment team to discuss questions or concerns patient and/or family may have regarding understanding medications and recognizing signs and symptoms once discharged  CM also encouraged patient to follow up with all recommended appointments after discharge  Patient advised of importance for patient and family to participate in managing patients medical well being

## 2018-07-08 NOTE — PROGRESS NOTES
07/08/18 170 Rajesh Staley Leader Aware/Contacted Leader not contacted (Comment)  (Does not want )   Spiritual Beliefs/Perceptions   Concept of God Accepting   God's Role in Disease Natural   Relationship with God Acceptance of status   Support Systems Family members   Stress Factors   Patient Stress Factors Strained family relationships   Coping Responses   Patient Coping Open/discussion

## 2018-07-08 NOTE — PLAN OF CARE
CARDIOVASCULAR - ADULT     Maintains optimal cardiac output and hemodynamic stability Progressing     Absence of cardiac dysrhythmias or at baseline rhythm Progressing        DISCHARGE PLANNING     Discharge to home or other facility with appropriate resources Progressing        INFECTION - ADULT     Absence or prevention of progression during hospitalization Progressing     Absence of fever/infection during neutropenic period Progressing        Knowledge Deficit     Patient/family/caregiver demonstrates understanding of disease process, treatment plan, medications, and discharge instructions Progressing        MUSCULOSKELETAL - ADULT     Maintain or return mobility to safest level of function Progressing     Maintain proper alignment of affected body part Progressing        PAIN - ADULT     Verbalizes/displays adequate comfort level or baseline comfort level Progressing        Potential for Falls     Patient will remain free of falls Progressing        RESPIRATORY - ADULT     Achieves optimal ventilation and oxygenation Progressing        SAFETY ADULT     Maintain or return to baseline ADL function Progressing     Maintain or return mobility status to optimal level Progressing

## 2018-07-08 NOTE — ASSESSMENT & PLAN NOTE
Pt may need increase in her lopressor dose -but may have issues with compliance    On Cardizem GTT at this time  Will give scheduled Am lopressor  AC w/ Apixaban

## 2018-07-09 ENCOUNTER — APPOINTMENT (INPATIENT)
Dept: NON INVASIVE DIAGNOSTICS | Facility: HOSPITAL | Age: 78
DRG: 273 | End: 2018-07-09
Payer: MEDICARE

## 2018-07-09 ENCOUNTER — APPOINTMENT (INPATIENT)
Dept: RADIOLOGY | Facility: HOSPITAL | Age: 78
DRG: 273 | End: 2018-07-09
Payer: MEDICARE

## 2018-07-09 LAB
ATRIAL RATE: 153 BPM
ATRIAL RATE: 63 BPM
P AXIS: 27 DEGREES
P AXIS: 83 DEGREES
PR INTERVAL: 124 MS
PR INTERVAL: 174 MS
QRS AXIS: 14 DEGREES
QRS AXIS: 25 DEGREES
QRSD INTERVAL: 86 MS
QRSD INTERVAL: 90 MS
QT INTERVAL: 226 MS
QT INTERVAL: 448 MS
QTC INTERVAL: 363 MS
QTC INTERVAL: 458 MS
T WAVE AXIS: 227 DEGREES
T WAVE AXIS: 68 DEGREES
VENTRICULAR RATE: 155 BPM
VENTRICULAR RATE: 63 BPM

## 2018-07-09 PROCEDURE — G8988 SELF CARE GOAL STATUS: HCPCS

## 2018-07-09 PROCEDURE — 75572 CT HRT W/3D IMAGE: CPT

## 2018-07-09 PROCEDURE — 97167 OT EVAL HIGH COMPLEX 60 MIN: CPT

## 2018-07-09 PROCEDURE — 93312 ECHO TRANSESOPHAGEAL: CPT

## 2018-07-09 PROCEDURE — 99232 SBSQ HOSP IP/OBS MODERATE 35: CPT | Performed by: INTERNAL MEDICINE

## 2018-07-09 PROCEDURE — G8987 SELF CARE CURRENT STATUS: HCPCS

## 2018-07-09 PROCEDURE — 97535 SELF CARE MNGMENT TRAINING: CPT

## 2018-07-09 PROCEDURE — 92960 CARDIOVERSION ELECTRIC EXT: CPT | Performed by: INTERNAL MEDICINE

## 2018-07-09 PROCEDURE — 99222 1ST HOSP IP/OBS MODERATE 55: CPT | Performed by: INTERNAL MEDICINE

## 2018-07-09 PROCEDURE — 92960 CARDIOVERSION ELECTRIC EXT: CPT | Performed by: STUDENT IN AN ORGANIZED HEALTH CARE EDUCATION/TRAINING PROGRAM

## 2018-07-09 PROCEDURE — 93005 ELECTROCARDIOGRAM TRACING: CPT

## 2018-07-09 RX ORDER — AMIODARONE HYDROCHLORIDE 50 MG/ML
INJECTION, SOLUTION INTRAVENOUS CODE/TRAUMA/SEDATION MEDICATION
Status: COMPLETED | OUTPATIENT
Start: 2018-07-09 | End: 2018-07-09

## 2018-07-09 RX ORDER — SODIUM CHLORIDE 9 MG/ML
INJECTION, SOLUTION INTRAVENOUS CONTINUOUS PRN
Status: DISCONTINUED | OUTPATIENT
Start: 2018-07-09 | End: 2018-07-09 | Stop reason: SURG

## 2018-07-09 RX ORDER — FENTANYL CITRATE 50 UG/ML
INJECTION, SOLUTION INTRAMUSCULAR; INTRAVENOUS AS NEEDED
Status: DISCONTINUED | OUTPATIENT
Start: 2018-07-09 | End: 2018-07-09 | Stop reason: SURG

## 2018-07-09 RX ORDER — METOPROLOL TARTRATE 5 MG/5ML
2.5 INJECTION INTRAVENOUS EVERY 6 HOURS PRN
Status: DISCONTINUED | OUTPATIENT
Start: 2018-07-09 | End: 2018-07-20 | Stop reason: HOSPADM

## 2018-07-09 RX ORDER — PROPOFOL 10 MG/ML
INJECTION, EMULSION INTRAVENOUS AS NEEDED
Status: DISCONTINUED | OUTPATIENT
Start: 2018-07-09 | End: 2018-07-09 | Stop reason: SURG

## 2018-07-09 RX ORDER — SODIUM CHLORIDE 9 MG/ML
70 INJECTION, SOLUTION INTRAVENOUS ONCE
Status: COMPLETED | OUTPATIENT
Start: 2018-07-09 | End: 2018-07-09

## 2018-07-09 RX ADMIN — APIXABAN 5 MG: 5 TABLET, FILM COATED ORAL at 18:13

## 2018-07-09 RX ADMIN — FUROSEMIDE 40 MG: 40 TABLET ORAL at 08:33

## 2018-07-09 RX ADMIN — IODIXANOL 100 ML: 320 INJECTION, SOLUTION INTRAVASCULAR at 18:34

## 2018-07-09 RX ADMIN — PROPOFOL 20 MG: 10 INJECTION, EMULSION INTRAVENOUS at 11:07

## 2018-07-09 RX ADMIN — METOPROLOL SUCCINATE 100 MG: 100 TABLET, EXTENDED RELEASE ORAL at 21:33

## 2018-07-09 RX ADMIN — ACETAMINOPHEN 975 MG: 325 TABLET, FILM COATED ORAL at 14:09

## 2018-07-09 RX ADMIN — OXYCODONE HYDROCHLORIDE 15 MG: 5 TABLET ORAL at 21:40

## 2018-07-09 RX ADMIN — PROPOFOL 50 MG: 10 INJECTION, EMULSION INTRAVENOUS at 11:02

## 2018-07-09 RX ADMIN — PROPOFOL 20 MG: 10 INJECTION, EMULSION INTRAVENOUS at 11:14

## 2018-07-09 RX ADMIN — DILTIAZEM HYDROCHLORIDE 2.5 MG/HR: 5 INJECTION INTRAVENOUS at 09:25

## 2018-07-09 RX ADMIN — SODIUM CHLORIDE: 0.9 INJECTION, SOLUTION INTRAVENOUS at 10:20

## 2018-07-09 RX ADMIN — OXYCODONE HYDROCHLORIDE 15 MG: 5 TABLET ORAL at 05:39

## 2018-07-09 RX ADMIN — ALPRAZOLAM 0.25 MG: 0.25 TABLET ORAL at 08:33

## 2018-07-09 RX ADMIN — METOPROLOL SUCCINATE 100 MG: 100 TABLET, EXTENDED RELEASE ORAL at 08:32

## 2018-07-09 RX ADMIN — DULOXETINE HYDROCHLORIDE 30 MG: 30 CAPSULE, DELAYED RELEASE ORAL at 08:33

## 2018-07-09 RX ADMIN — ACETAMINOPHEN 975 MG: 325 TABLET, FILM COATED ORAL at 05:39

## 2018-07-09 RX ADMIN — PROPOFOL 20 MG: 10 INJECTION, EMULSION INTRAVENOUS at 11:36

## 2018-07-09 RX ADMIN — TOPICAL ANESTHETIC 1 SPRAY: 200 SPRAY DENTAL; PERIODONTAL at 11:00

## 2018-07-09 RX ADMIN — AMIODARONE HYDROCHLORIDE 1 MG/MIN: 50 INJECTION, SOLUTION INTRAVENOUS at 12:29

## 2018-07-09 RX ADMIN — AMITRIPTYLINE HYDROCHLORIDE 25 MG: 25 TABLET, FILM COATED ORAL at 21:37

## 2018-07-09 RX ADMIN — AMIODARONE HYDROCHLORIDE 0.5 MG/MIN: 50 INJECTION, SOLUTION INTRAVENOUS at 18:12

## 2018-07-09 RX ADMIN — OXYCODONE HYDROCHLORIDE 15 MG: 5 TABLET ORAL at 14:09

## 2018-07-09 RX ADMIN — APIXABAN 5 MG: 5 TABLET, FILM COATED ORAL at 08:32

## 2018-07-09 RX ADMIN — ASPIRIN 81 MG: 81 TABLET, COATED ORAL at 08:32

## 2018-07-09 RX ADMIN — FENTANYL CITRATE 25 MCG: 50 INJECTION, SOLUTION INTRAMUSCULAR; INTRAVENOUS at 10:55

## 2018-07-09 RX ADMIN — ACETAMINOPHEN 975 MG: 325 TABLET, FILM COATED ORAL at 21:33

## 2018-07-09 RX ADMIN — AMIODARONE HYDROCHLORIDE 150 MG: 50 INJECTION, SOLUTION INTRAVENOUS at 11:43

## 2018-07-09 NOTE — PROGRESS NOTES
Pt returend to room 509 s/p cardioversion and TIFFANIE  Spoke with Dr Baylee Damon MD with cardiology  Orders placed to start amiodarone gtt  NSS hanging at 70ml/hr per Cath lab  Clarified orders with physician, notified of SBP of 89  Was told to run NSS at 70ml/hr until the bag is finished and then to d/c  Was told that pt should wait 3 hours before eating d/t throat spray  Per Dr Baylee Damon no orders needed for bedrest or for frequent VS and to follow unit protocol for VS  PT HR in NSR in the 60s  EP consulted for heart arrhythmia  Will continue to monitor

## 2018-07-09 NOTE — PROGRESS NOTES
SBP 80  No s/s  Cardizem placed on hold  Repeat SBP 90  Drip remains on hold  HR 80-90's  Will attempt to give routine Toprol XL if SBP rises above hold parameters

## 2018-07-09 NOTE — ASSESSMENT & PLAN NOTE
Continue with Cardizem drip  Eliquis therapy  Plan will be for transesophageal echocardiogram with cardioversion  With transitioned to amiodarone therapy post procedure

## 2018-07-09 NOTE — ASSESSMENT & PLAN NOTE
A: Currently in sinus rhythm on amiodarone       Plan:    - Can continue amiodarone for now  - It is safe to use amio despite QT prolongation   - Do not use other QT prolonging medications  - Will hold off on invasive rx and attempt to first elucidate the cause of vtach

## 2018-07-09 NOTE — ASSESSMENT & PLAN NOTE
- Continue to monitor I/O  - daily weights  - Continue daily lasix  - Wean off O2 as tolerated  - Ct beta blocker  - Add ACEI as BP tolerates

## 2018-07-09 NOTE — PROGRESS NOTES
Fly Wraren is a 69 y o  y o  female who is here for TIFFANIE cardioversion  The patient was identified in the cath lab  A time out procedure was performed  The patient was sedated by the anesthesia service with Propofol and Fentanyl  After adequate sedation, a TIFFANIE was performed  Please see separate report for full details  Briefly, the LV function was reduced, and there was no left atrial or left atrial appendage thrombus identified  Biatrial enlargement with spontaneous echo contrast     After the TIFFANIE, adequate sedation was once again confirmed  The patient was cardioverted to sinus rhythm with a 200J biphasic shock  The patient cardioverted to sinus rhythm with a delay and then she subsequently went to SVT- at a rate of 150, was given 150mg Amiodarone after a delay converted to sinus rhythm  However she reverted again to SVT- 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 at a rate of 150 vs Atrial tachycardia with prolonged 1 AV block and spontaneously to sinus rhythm with no further intervention  Patient will be started on an Amiodarone drip and EP is consulted to see the patient  Given dilated atria she has multiple pathways, given recurrent admissions and reduced LVEF she will benefit from ablation  Patient left the lab in sinus rhythm at a rate of 60bpm       There were no complications     The patient was monitored for the appropriate time interval in the holding area, and was transferred back to the medical floor in stable condition      Pauline Owen MD

## 2018-07-09 NOTE — PROGRESS NOTES
Pt going into HR in the 140s-150s in [facility] looks like an SVT  Pt breaking out of it on her own after about 1 to 2 minutes  Since pt returned from cath lab aprroximately 1200 pt has done this about 3 times  Per report from cath lab pt did this twice after cardioversion in cath lab  Paged Dr Jesse Olmos to notify  Pt now back in NSR in the 60s -70s pt asymptomatic  Will await call back

## 2018-07-09 NOTE — PLAN OF CARE
Problem: OCCUPATIONAL THERAPY ADULT  Goal: Performs self-care activities at highest level of function for planned discharge setting  See evaluation for individualized goals  Treatment Interventions: ADL retraining, Functional transfer training, UE strengthening/ROM, Endurance training, Patient/family training, Equipment evaluation/education, Compensatory technique education, Energy conservation  Equipment Recommended:  (GB throughout bathroom)       See flowsheet documentation for full assessment, interventions and recommendations  Limitation: Decreased ADL status, Decreased UE strength, Decreased Safe judgement during ADL, Decreased endurance, Decreased self-care trans, Decreased high-level ADLs     Assessment: Pt is a 69 y o  female seen for OT evaluation s/p admit to [facility] on 7/7/2018 w/ Atrial fibrillation with RVR ([facility] [address] )  Adm with SOB, weakness  S/p cardiac conversion 7/8  On tele with RN noting increased HR with bedside activity  Noted multiple hospital visits since April  Seen by OT for evaluation May admission with recommendation for rehab  Pt D/C to home despite recommendation  Pt reports weakness, 3 falls since last admit, difficulty caring for self  Other comorbidities affecting pt's functional performance at time of assessment include: HTN and CHF, chronic afib on anticoagulation, hx falls, OA with chronic generalized pain, narcotic dependence  Personal factors affecting pt at time of IE include:steps to enter environment, limited home support, difficulty performing ADLS, difficulty performing IADLS , limited insight into deficits, compliance, financial barriers and health management   Prior to admission, pt was living alone, MI ADL with RW but gradual decline in abilty to care for self x 3 years, worse past few months  Orders out "chinese mostly"   Does not drive, limited access groceries (sister used to help but has no longer medically able) , reports no more clean clothes as unable to safely access laundry in basement  Mostly homebound, sedentary  Upon evaluation: Pt requires min a ADL, functional transfers, limited endurance, deconditioned, hx falls and is currently a fall risk 2* the following deficits impacting occupational performance: weakness, decreased strength, decreased balance, decreased tolerance, impaired attention, impaired memory, impaired problem solving, decreased safety awareness and increased pain  Pt to benefit from continued skilled OT tx while in the hospital to address deficits as defined above and maximize level of functional independence w ADL's and functional mobility  Occupational Performance areas to address include: bathing/shower, toilet hygiene, dressing, health maintenance, functional mobility, community mobility, meal prep, household maintenance and social participation  From OT standpoint, recommendation at time of d/c would be STR   Will follow 3-5x/wk x 2 weeks for OT goals     Recommendation: PT consult  OT Discharge Recommendation: Short Term Rehab

## 2018-07-09 NOTE — CONSULTS
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), dilated left atrium, questionable medication compliance and possible cognitive dysfunction who presented with rapid afib and underwent TIFFANIE cardioversion today  She is currently receiving metoprolol succinate 100 mg BID, amiodarone load IV and eliquis for AC  She is in persistent atrial fibrillation with rapid heart rate  Plan;    >> Permanent nonvalvular atrial fibrillation with rapid ventricular rate    - Ct amiodarone load, switch to 400mg bid after IV load completed  - Ct telemetry  - TSH slightly elevated  - Ct metoprolol  - Will likely benefit from AV node ablation and permanent PM- will d/w Dr Tu Pizano, would need to hold [facility] for 48h prior to this  - If persistently tachycardic will consider esmolol or cardizem gtt      >> Acute on chronic congestive heart failure Stage C, euvolemic and well perfused:  - Continue to monitor I/O  - daily weights  - Continue daily lasix  - Wean off O2 as tolerated  - Ct beta blocker  - Add ACEI as BP tolerates        History of Present Illness   Physician Requesting Consult: Leatha Diane DO  Reason for Consult / Principal Problem:   HPI: Jon Harrison is a 69y o  year old female with past medical history significant for systolic heart failure- presumed tachycardia mediated, chronic atrial fibrillation on anticoagulation with Eliquis, history of questionable EMELY thrombus in the past (ruled out with MRI), TIA, biliary dilatation status post ERCP with sphincterotomy and stent placement in 2014  Patient presented to the hospital with a chief complaint of worsening shortness of breath, orthopnea and PND    At baseline, patient reports symptoms of shortness of breath with more than normal exertion however in the past 2 days she has noted symptoms with very minimal exertion and even at rest   Associated with orthopnea and PND  No swelling of the lower extremities  Reports that she is not on Lasix however as per discharge recommendations in May 2018, patient was discharged on Lasix 40 mg daily  Does not monitor her weight at home  Compliant with low salt diet, poor insight into her medical condition and lives alone  NT proBNP-21,054 from 22,029 on 5/13  In the ED was found to be in AFib with RVR and was started on IV Cardizem drip, clear chest x-ray      Patient was diagnosed with atrial fibrillation in June 2016 underwent TIFFANIE cardioversion and started on anticoagulation with Eliquis, rate control strategy with beta-blocker and calcium channel blocker  Her EF-45% at that time, however presented back in July 2016 for AFib with RVR and recommended Amiodarone for rhythm control however this was not started  Then admitted in August 2016 for Afib and heart failure  Sep 2016 for Afib with RVR, AMS, volume overload  Rhythm control strategy was considered but then due to concern of questionable LA clot she was on rate control with metoprolol, digoxin and Cardizem- underwent cardiac MRI EMELY mass was thought to be invagination of the left atrial wall vs thrombus, EF was found to be 20% with severely dilated left atrium  She then had an admission in Feb 2017 for Afib with RVR when EF-35%, digoxin was stopped and underwent TIFFANIE cardioversion with 200Jx1 to sinus rhythm, started on Amiodarone, also had prolonged QTc thought to be a combination of use of Amiodarone, Amitriptyline and Zofran so Amitriptyline and Zofran were stopped with QTc-500  She refused cath as an outpatient  She was then admitted back to the hospital in April 2018 for AFib with RVR and heart failure exacerbation  Amiodarone was discontinued and she was controlled on rate control strategy with beta-blockers    A stress test was recommended as an outpatient  Then admitted in May 2018 for heart failure exacerbation and Afib with RVR, repeat echo showed LVEF-45%- uptitrated on betablockers and loaded with Digoxin, however was not continued on discharge  She was seen by us in May 2018 for NSTEMI type 2 in the setting of sepsis from cholangitis  Patient presented to the hospital yesterday with a chief complaint of dyspnea on exertion with associated orthopnea and PND  Echo in March 2018-LVEF 40-45% with hypokinesis of the anterior and anteroseptal walls  Enlarged RV with reduced RV function  Moderately dilated left and right atrium  Mild MR  Mild TR   TIFFANIE in February 2017 showed no evidence of thrombus and she underwent cardioversion      Used to follow with Dr Merrill Isbell of [facility], still needs to establish care with [facility]  Currently patient has no complaints other than the fact that she is struggling to urinate  She denies chest pain, shortness of breath, cought, orthopnea, PND, palpitations  Consults    Review of Systems   Respiratory: Negative for cough, chest tightness and shortness of breath  Cardiovascular: Negative for chest pain and palpitations  Historical Information   Past Medical History:   Diagnosis Date    A-fib [facility])     Acute respiratory disease     CHF (congestive heart failure) (HCC)     Chronic pain     Heart failure (HCC)     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[facility] [address] )     Hypertension     Narcotic dependence ([facility] [address] )      Past Surgical History:   Procedure Laterality Date    ERCP N/A 5/14/2018    Procedure: ENDOSCOPIC RETROGRADE CHOLANGIOPANCREATOGRAPHY (ERCP);   Surgeon: Enrico Galdamez MD;  Location: [facility];  Service: Gastroenterology     History   Alcohol Use No     History   Drug Use No     History   Smoking Status    Former Smoker   Smokeless Tobacco    Never Used     Family History: non-contributory    Meds/Allergies   current meds:   Current Facility-Administered Medications   Medication Dose Route Frequency    acetaminophen (TYLENOL) tablet 975 mg  975 mg Oral Q8H [facility]    ALPRAZolam (XANAX) tablet 0 25 mg  0 25 mg Oral Daily    amiodarone (CORDARONE) 900 mg in dextrose 5 % 500 mL infusion  1 mg/min Intravenous Continuous    amiodarone (CORDARONE) 900 mg in dextrose 5 % 500 mL infusion  0 5 mg/min Intravenous Continuous    amitriptyline (ELAVIL) tablet 25 mg  25 mg Oral HS    apixaban (ELIQUIS) tablet 5 mg  5 mg Oral BID    aspirin (ECOTRIN LOW STRENGTH) EC tablet 81 mg  81 mg Oral Daily    DULoxetine (CYMBALTA) delayed release capsule 30 mg  30 mg Oral Daily    furosemide (LASIX) tablet 40 mg  40 mg Oral Daily    methocarbamol (ROBAXIN) tablet 500 mg  500 mg Oral HS PRN    metoprolol succinate (TOPROL-XL) 24 hr tablet 100 mg  100 mg Oral Q12H KARELY    oxyCODONE (ROXICODONE) IR tablet 15 mg  15 mg Oral Q6H PRN    polyethylene glycol (MIRALAX) packet 17 g  17 g Oral Daily     No Known Allergies    Objective   Vitals: Blood pressure 96/54, pulse 65, temperature (!) 97 4 °F (36 3 °C), temperature source Oral, resp  rate 20, height 5' 5" (1 651 m), weight 68 kg (149 lb 14 6 oz), SpO2 96 %  Orthostatic Blood Pressures      Most Recent Value   Blood Pressure  96/54 filed at 07/09/2018 1235   Patient Position - Orthostatic VS  Lying filed at 07/09/2018 0248            Intake/Output Summary (Last 24 hours) at 07/09/18 1301  Last data filed at 07/09/18 1229   Gross per 24 hour   Intake           628 38 ml   Output             1250 ml   Net          -621 62 ml       Invasive Devices     Peripheral Intravenous Line            Peripheral IV 07/07/18 Left Antecubital 2 days                Physical Exam   Neck: No JVD present  Cardiovascular: Exam reveals no gallop  No murmur heard  Irregular rhythm, tachycardic   Pulmonary/Chest: She has no wheezes  She has no rales  She exhibits no tenderness     Abdominal: She exhibits no distension  There is no tenderness  Skin: Skin is warm  Lab Results: I have personally reviewed pertinent lab results  Imaging: I have personally reviewed pertinent reports  EKG: atrial fibrillation w rvr and non specific st- t changes    Code Status: Level 3 - DNAR and DNI  Advance Directive and Living Will:      Power of :    POLST:      Counseling / Coordination of Care  Total floor / unit time spent today 45 minutes  Greater than 50% of total time was spent with the patient and / or family counseling and / or coordination of care

## 2018-07-09 NOTE — CASE MANAGEMENT
Initial Clinical Review    Admission: Date/Time/Statement:   7/7/18 AT 1320      Orders Placed This Encounter   Procedures    Inpatient Admission (expected length of stay for this patient is greater than two midnights)     Standing Status:   Standing     Number of Occurrences:   1     Order Specific Question:   Admitting Physician     Answer:   Marta Clark [[id number]]     Order Specific Question:   Level of Care     Answer:   Med Surg [[id number]]     Order Specific Question:   Estimated length of stay     Answer:   More than 2 Midnights     Order Specific Question:   Certification     Answer:   I certify that inpatient services are medically necessary for this patient for a duration of greater than two midnights  See H&P and MD Progress Notes for additional information about the patient's course of treatment  ED: Date/Time/Mode of Arrival:   ED Arrival Information     Expected Arrival Acuity Means of Arrival Escorted By Service Admission Type    - 7/7/2018 09:08 Emergent Ambulance [address] [facility] Emergency    Arrival Complaint    sob          Chief Complaint:   Chief Complaint   Patient presents with    Shortness of Breath     Patient states progressively worse with SOB for last 7+ weeks  Last 2 days she couldn't breathe when she lays down or ambulates  Chief Complaint:   Sob and palpitation / Uncontrolled Atrial Fib     History of Present Illness:   Lemuel Clark is a 69 y o  female w/ chronic Afib and chronic DHF who presents with palpitations and MYERS this AM   Pt was not able to walk w/o SOB today and went to ER  PT denies any CP or n/v   Of note, pt lives alone and was supposed to have home health care, but appears to have been declining it since late June  Pt says she has not slept in over a year  She has chronic pain and per PDMP, has not had her oxy 15 filled since May 1 2018    Pt states she took her meds last night but not this AM        Review of Systems:   Constitutional: Positive for fatigue  Respiratory: Positive for shortness of breath  Cardiovascular: Positive for palpitations  Gastrointestinal: Negative  Endocrine: Negative          ED Vital Signs:   ED Triage Vitals   Temperature Pulse Respirations Blood Pressure SpO2   07/07/18 0916 07/07/18 0911 07/07/18 0911 07/07/18 0911 07/07/18 0911   99 3 °F (37 4 °C) (!) 144 18 149/100 96 %      Temp Source Heart Rate Source Patient Position - Orthostatic VS BP Location FiO2 (%)   07/07/18 0916 07/07/18 0911 07/07/18 0911 07/07/18 0911 --   Tympanic Monitor Lying Right arm       Pain Score       07/07/18 0911       Worst Possible Pain        Wt Readings from Last 1 Encounters:   07/09/18 68 kg (149 lb 14 6 oz)      07/08 0701 07/09 0700 07/09 0701 07/09 1522      Temperature (°F) 97 498 1 97 8      Pulse 123 6065      Respirations 1820 1820      Blood Pressure 80/54 89/53116/64      SpO2 (%) 90100 9698            LABS/Diagnostic Test Results:   CBC  Results from last 7 days  Lab Units 07/07/18  0917   WBC Thousand/uL 11 23*   HEMOGLOBIN g/dL 11 9   HEMATOCRIT % 37 6   PLATELETS Thousands/uL 196   NEUTROS PCT % 79*   LYMPHS PCT % 11*   MONOS PCT % 8   EOS PCT % 1       Results from last 7 days  Lab Units 07/07/18  0917   SODIUM mmol/L 137   POTASSIUM mmol/L 4 3   CHLORIDE mmol/L 106   CO2 mmol/L 24   BUN mg/dL 19   CREATININE mg/dL 0 83   CALCIUM mg/dL 9 3   TOTAL PROTEIN g/dL 7 3   BILIRUBIN TOTAL mg/dL 0 86   ALK PHOS U/L 78   ALT U/L 25   AST U/L 20   GLUCOSE RANDOM mg/dL 115       Results from last 7 days  Lab Units 07/07/18  0917   INR   1 27*            CHEST X RAY -  Today's study compared to study from 05/18   Increased cephalization of vessels; pulmonary vascular congestion    Increased haziness on on PA; increased haziness lateral view retrocardiac space when compared last CXR             EKG (Per H+P):   Atrial Fib with RVR         ED Treatment:   Medication Administration from 07/07/2018 0908 to 07/07/2018 1555       Date/Time Order Dose Route Action Action by Comments     07/07/2018 0943 diltiazem (CARDIZEM) injection 15 mg 15 mg Intravenous Given Cristina Spatz, RN      07/07/2018 1128 diltiazem (CARDIZEM) 125 mg in sodium chloride 0 9 % 125 mL infusion 5 mg/hr Intravenous [facility] Lucrecia Donis RN      07/07/2018 1208 oxyCODONE-acetaminophen (PERCOCET) 5-325 mg per tablet 1 tablet 1 tablet Oral Given Brigido Chamberlain RN      07/07/2018 1410 metoprolol tartrate (LOPRESSOR) tablet 100 mg 100 mg Oral Given Brigido Chamberlain RN           Past Medical/Surgical History:    Active Ambulatory Problems     Diagnosis Date Noted    [facility] 05/14/2018    Elevated liver enzymes 05/14/2018    Elevated troponin 05/14/2018    Atrial fibrillation with RVR ([facility] ) 05/14/2018    Chronic anticoagulation 05/14/2018    Fall 05/14/2018    Biliary stent obstruction, initial encounter 05/13/2018    Dysthymic disorder 05/18/2018    Weakness/physical deconditioning 05/22/2018    Recent history of Cholangitis due to bile duct calculus with obstruction 05/23/2018    Acute respiratory insufficiency 05/05/2018    Brain aneurysm 09/04/2016    Cardiac arrhythmia 11/16/2012    Carpal tunnel syndrome 07/26/2013    Acute on chronic systolic congestive heart failure ([facility] ) 06/24/2016    Chronic pain disorder 09/07/2011    Disc disorder of cervical region 11/16/2012    Disorder of bone and cartilage 09/07/2011    Essential hypertension 05/23/2011    Gout 02/12/2013    Hospital-acquired pneumonia 01/27/2014    Hyperlipidemia 11/16/2012    Insomnia 11/16/2012    Mitral regurgitation 09/04/2016    Narcotic dependence ([facility] ) 05/05/2018    Osteoarthritis 11/16/2012    Acute pancreatitis 01/27/2014    Small vessel disease 09/04/2016    Tachycardia induced cardiomyopathy ([facility] ) 05/05/2018     Resolved Ambulatory Problems     Diagnosis Date Noted    Severe sepsis ([facility] ) 05/14/2018    Abnormal CT of the abdomen 05/14/2018    Decreased vision 05/15/2018    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)     Acute respiratory disease     [facility]est[facility]     Chronic pain     Heart failure ([facility])     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[facility] )     Hypertension     [facility] dependence ([facility]        Admitting Diagnosis: SOB (shortness of breath) [R06 02]  Atrial fibrillation with rapid ventricular response ([facility]) [I48 91]  Congestive heart failure with cardiomyopathy ([facility] ) [I50 9, I42 9]    Age/Sex: 69 y o  female      Assessment/Plan:   * Atrial fibrillation with RVR ([facility] )   Assessment & Plan     Pt may need increase in her lopressor dose - however, I am unsure pt is complaint with meds  She was refusing VNA in late June  On Cardizem GTT at this time  Will give scheduled Am lopressor  AC w/ Apixaban          Dyspnea on exertion   Assessment & Plan     2/2 rapid afiib and deconditioning  PT/OT - pt at this time agreeable to STR          Insomnia   Assessment & Plan     Pt says she has not slept in over a year  C/w home xanax, Elavil, and robaxin  Can add melatonin  Hopefully w/ control of Afib and pain pt will sleep             Chronic pain disorder   Assessment & Plan     Pt used to be on oxy 15, but has not filled this since May 1st   Will give oxy 5 prn while inpt along w/ tylenol RTC and robaxin qhs PRN  Also on Cymbalta  Can titrate oxy up if needed to her previous 15 mg dose          Chronic diastolic congestive heart failure (HCC)   Assessment & Plan     No exac  C/w home lasix  Echo in May showed EF 50%             VTE Prophylaxis: Apixaban (Eliquis)  / sequential compression device   Code Status: DNR/DNI  POLST: POLST form is not discussed and not completed at this time    Discussion with family: no     Anticipated Length of Stay:  Patient will be admitted on an Inpatient basis with an anticipated length of stay of  At least 2 midnights  Justification for Hospital Stay: need to control Afib and SOB          Admission Orders:   7/7/18 AT 1320    ADMIT INPATIENT  TELEMETRY  VS Q4HRS    Up + OOB as Tolerated  SCD    Diet Cardiac; Cardiac Step 1; Sodium 2 Gm       Continuous IV Infusions:   IVF  diltiazem (CARDIZEM) 125 mg in sodium chloride 0 9 % 125 mL infusion TITRATE HR < 110      Amiodarone  START 7/8/18 1 mg/min Last Rate: 1 mg/min (07/09/18 1229)       Scheduled Meds:   Current Facility-Administered Medications:  acetaminophen 975 mg Oral Q8H [address] Raul Purdy,     ALPRAZolam 0 25 mg Oral Daily Elfrieda Eaves, DO    amiodarone 1 mg/min Intravenous Continuous Ari Nam MD Last Rate: 1 mg/min (07/09/18 1229)   amiodarone 0 5 mg/min Intravenous Continuous Ari Nam MD    amitriptyline 25 mg Oral HS Elfrieda Eaves, DO    apixaban 5 mg Oral BID Elfrieda Eaves, DO    aspirin 81 mg Oral Daily Elfrieda Eaves, DO    DULoxetine 30 mg Oral Daily Elfrieda Eaves, DO    furosemide 40 mg Oral Daily Elfrieda Eaves, DO    methocarbamol 500 mg Oral HS PRN Elfrieda Eaves, DO    metoprolol 2 5 mg Intravenous Q6H PRN Prashanth Nunes PA-C    metoprolol succinate 100 mg Oral Q12H [address] Ari Nam MD    oxyCODONE 15 mg Oral Q6H PRN Madeline Oleary MD    polyethylene glycol 17 g Oral Daily Elfrieda Eaves, DO        PRN Meds:      methocarbamol    Metoprolol    IV ondansetron 4 mg q6hrs prn given x 4/ 24 hrs    oxyCODONE 5 mg q6hrs prn given x 1/ 24 hrs     CONSULT CARD       Cardiology  Consults Date of Service: 7/8/2018  9:31 AM      Assessment:  Principal Problem:    Atrial fibrillation with RVR (HCC)  Active Problems:    Chronic diastolic congestive heart failure (HCC)    Chronic pain disorder    Insomnia    Dyspnea on exertion      Plan:  1   Persistent Atrial fibrillation with RVR  - patient has a history of AFib diagnosed in June 2016 at which time she underwent TIFFANIE cardioversion but reverted back into AFib within a month and admitted in July 2016  - She then had admissions in August and September 2016 for AFib with RVR and there was a question of EMELY thrombus however on MRI was thought to be invagination of the atrial wall versus thrombus at which time her EF was found to be severely reduced at 20%  She was on rate control strategy  She refused further cardiac workup with a cardiac catheterization for the reduced EF during that time  She then had an admission in 02/2017 for AFib with RVR when EF was 35% and underwent TIFFANIE cardioversion with 200 joules to sinus rhythm and was started on amiodarone with which she had prolonged QTC due to combination of Amio with amitriptyline and Zofran  Amitriptyline and Zofran were discontinued and a QTC at discharge during that time was 500  This seemed to keep her stable for a year  She then had an admission in April 2018 for AFib with RVR when amiodarone was discontinued, echo showed EF of 45% with regional wall motion and was recommended stress test as an outpatient  Then subsequently had 2 admissions in May 2018 for AFib with RVR which were both controlled with rate control strategy with beta-blockers  - She now presents back with Afib with RVR  - echo-May 2018 reviewed-LVEF mildly reduced at 45% with severe hypokinesis of the basal to mid inferior and basal to mid inferolateral walls  Dilated RV with low-normal function  Moderately dilated left and right atrium  Moderate tricuspid regurgitation with PA systolic pressure of 55   - Change Metoprolol tartrate to succinate 100mg twice daily   - Given recurrent admissions for heart failure with history of tachycardia mediated cardiomyopathy, will favor rhythm control  Poor candidate for Sotalol and Flecainide given reduced LVEF, will consider Dofetilide vs Amiodarone however given that she has depression with use of SSRI/SNRI will prefer Amiodarone    - Her most recent TIFFANIE in Feb 2017 showed no EMELY thrombus and she reports being compliant with medications but given her history, we will schedule the patient for TIFFANIE cardioversion to try to convert her to sinus and then load her with Amiodarone  May consider Ablation to decrease in admissions for heart failure and RVR/mortality benefit in patients with reduced EF in the future  Will have EP evaluate the patient tomorrow  Will need daily EKG to monitor QTc if loaded with Amiodarone due to her history of prolonged QTc in the past   - Overall patient has poor insight into her medical condition with memory loss  Will need placement vs home support system as she lives alone  - anticoagulation with Eliquis 5 mg twice daily   - TSH-4 76 with FT4-1 36     2  Stage C- HFrEF- presumed tachycardia mediated- most recent echo with LVEF-45% with NYHA Class III symptoms-LVIDd-5 17  - clinically euvolemic and warm  - Continue furosemide 40 mg daily  - GDMT with lisinopril 5 mg daily, will change metoprolol tartrate to succinate 100 mg twice daily      3  H/o anxiety/depression      4   Chronic pain syndrome

## 2018-07-09 NOTE — PHYSICAL THERAPY NOTE
Physical Therapy Cancellation Note    PT order received  Chart review performed  At this time, PT evaluation cancelled as pt unavailable, off floor for TIFFANIE  Discussed with RN Michael Cerna  PT will follow and evaluate as able/medically appropriate      Rosetta Smith, PT, DPT

## 2018-07-09 NOTE — ANESTHESIA PREPROCEDURE EVALUATION
Review of Systems/Medical History  Patient summary reviewed  Chart reviewed  No history of anesthetic complications     Cardiovascular  Hyperlipidemia, Hypertension , CHF (Chronic diastolic HF) , MYERS,   Comment: Hx PAF--s/p cardioversion 2017--reconverted to AF    Cardiomyopathy EF 45%,  Pulmonary  Smoker ex-smoker  , Shortness of breath,        GI/Hepatic    Pancreatic problem (Hx pancreatitis),   Comment: Hx cholangitis/Bile duct calculus--s/p biliary stent     Negative  ROS        Endo/Other  Negative endo/other ROS      GYN  Negative gynecology ROS          Hematology  Negative hematology ROS      Musculoskeletal  Osteoarthritis,        Neurology    TIA,   Comment: Memory loss Psychology   Anxiety, Depression ,   Chronic opioid dependence Chronic pain (Neck/Back/Hands),            Physical Exam    Airway    Mallampati score: II  TM Distance: >3 FB       Dental   Comment: Few lower teeth--decayed, upper dentures,     Cardiovascular  Rhythm: irregular,     Pulmonary  Breath sounds clear to auscultation,     Other Findings        Anesthesia Plan  ASA Score- 3     Anesthesia Type- IV sedation with anesthesia with ASA Monitors  Additional Monitors:   Airway Plan:         Plan Factors-    Induction- intravenous  Postoperative Plan-     Informed Consent- Anesthetic plan and risks discussed with patient  I personally reviewed this patient with the CRNA  Discussed and agreed on the Anesthesia Plan with the CRNA  Audie Sanders

## 2018-07-09 NOTE — ANESTHESIA POSTPROCEDURE EVALUATION
Post-Op Assessment Note      CV Status:  Stable    Mental Status:  Alert and awake    Hydration Status:  Stable    PONV Controlled:  None    Airway Patency:  Patent    Post Op Vitals Reviewed: Yes          Staff: CRNA       Comments: hr 70, O2 sat 98%, bp 97/61, report to rn and cardiology          BP      Temp     Pulse    Resp      SpO2

## 2018-07-09 NOTE — PROGRESS NOTES
Progress Note - [place] [birth year], 69 y o  female MRN: [id number]    Unit/Bed#: [facility] 509-01 Encounter: [id number]    Primary Care Provider: Jearlean Rinne, DO   Date and time admitted to hospital: 7/7/2018  9:08 AM        * Atrial fibrillation with RVR ([address] )   Assessment & Plan    Continue with Cardizem drip  Eliquis therapy  Plan will be for transesophageal echocardiogram with cardioversion  With transitioned to amiodarone therapy post procedure  Dyspnea on exertion   Assessment & Plan    2/2 rapid afiib and deconditioning  PT/OT - pt at this time agreeable to STR        Insomnia   Assessment & Plan    Pt says she has not slept in over a year  C/w home xanax, Elavil, and robaxin  Can add melatonin  Hopefully w/ control of Afib and pain pt will sleep          Chronic pain disorder   Assessment & Plan    Pt used to be on oxy 15, but has not filled this since May 1st   Will give oxy 5 prn while inpt along w/ tylenol RTC and robaxin qhs PRN  Also on Cymbalta  Can titrate oxy up if needed to her previous 15 mg dose        Chronic diastolic congestive heart failure (HCC)   Assessment & Plan    No exac  C/w home lasix  Echo in May showed EF 50%            VTE Pharmacologic Prophylaxis:   Pharmacologic: Apixaban (Eliquis)  Mechanical VTE Prophylaxis in Place: No    Patient Centered Rounds: I have performed bedside rounds with nursing staff today  Time Spent for Care: 15 minutes  More than 50% of total time spent on counseling and coordination of care as described above  Current Length of Stay: 2 day(s)    Current Patient Status: Inpatient   Certification Statement: The patient will continue to require additional inpatient hospital stay due to Need to monitor symptoms of atrial fibrillation  Plan as above    Will need to reassess post transesophageal echo with cardioversion    Discharge Plan:  Reassess after cardioversion    Code Status: Level 3 - DNAR and DNI      Subjective:   Patient for cardiac intervention today  Objective:     Vitals:   Temp (24hrs), Av 7 °F (36 5 °C), Min:97 4 °F (36 3 °C), Max:98 1 °F (36 7 °C)    HR:  [] 110  Resp:  [18-20] 20  BP: ()/(50-80) 110/80  SpO2:  [90 %-100 %] 90 %  Body mass index is 24 95 kg/m²  Input and Output Summary (last 24 hours): Intake/Output Summary (Last 24 hours) at 18 0829  Last data filed at 18 6523   Gross per 24 hour   Intake           628 38 ml   Output             2200 ml   Net         -1571 62 ml       Physical Exam:     Physical Exam   Constitutional: She is oriented to person, place, and time  She appears well-developed and well-nourished  HENT:   Head: Normocephalic and atraumatic  Eyes: Conjunctivae are normal  Pupils are equal, round, and reactive to light  Neck: Normal range of motion  Neck supple  Cardiovascular: Normal rate and regular rhythm  No murmur heard  Pulmonary/Chest: Effort normal and breath sounds normal  No respiratory distress  She has no wheezes  Abdominal: Soft  Bowel sounds are normal  She exhibits no distension  There is no tenderness  Musculoskeletal: Normal range of motion  She exhibits no edema  Neurological: She is alert and oriented to person, place, and time  Skin: Skin is warm and dry  No erythema  Psychiatric: She has a normal mood and affect         Additional Data:     Labs:      Results from last 7 days  Lab Units 18  04518  0917   WBC Thousand/uL 7 41 11 23*   HEMOGLOBIN g/dL 11 1* 11 9   HEMATOCRIT % 35 9 37 6   PLATELETS Thousands/uL 193 196   NEUTROS PCT %  --  79*   LYMPHS PCT %  --  11*   MONOS PCT %  --  8   EOS PCT %  --  1       Results from last 7 days  Lab Units 18  0459 18  0917   SODIUM mmol/L 138 137   POTASSIUM mmol/L 4 0 4 3   CHLORIDE mmol/L 107 106   CO2 mmol/L 27 24   BUN mg/dL 18 19   CREATININE mg/dL 0 83 0 83   CALCIUM mg/dL 8 5 9 3   TOTAL PROTEIN g/dL  --  7 3   BILIRUBIN TOTAL mg/dL  --  0 86   ALK PHOS U/L  --  78   ALT U/L  --  25   AST U/L  --  20   GLUCOSE RANDOM mg/dL 102 115       Results from last 7 days  Lab Units 07/07/18  0917   INR  1 27*       * I Have Reviewed All Lab Data Listed Above  * Additional Pertinent Lab Tests Reviewed: All Labs Within Last 24 Hours Reviewed        Recent Cultures (last 7 days):           Last 24 Hours Medication List:     Current Facility-Administered Medications:  acetaminophen 975 mg Oral Q8H [facility] Raul Purdy DO    ALPRAZolam 0 25 mg Oral Daily Clint Webb,     amitriptyline 25 mg Oral HS Clint Webb, DO    apixaban 5 mg Oral BID Clint Webb, DO    aspirin 81 mg Oral Daily Clint Webb, DO    diltiazem 1-15 mg/hr Intravenous Titrated Alli Villegas MD Last Rate: Stopped (07/08/18 2139)   DULoxetine 30 mg Oral Daily Clint Webb, DO    furosemide 40 mg Oral Daily Clint Webb, DO    methocarbamol 500 mg Oral HS PRN Clint Webb,     metoprolol succinate 100 mg Oral Q12H [facility] Alli Villegas MD    oxyCODONE 15 mg Oral Q6H PRN Nicole Reeves MD    polyethylene glycol 17 g Oral Daily Clint Webb DO         Today, Patient Was Seen By: Tasneem Wells DO    ** Please Note: Dictation voice to text software may have been used in the creation of this document   **

## 2018-07-09 NOTE — OCCUPATIONAL THERAPY NOTE
[address]  Evaluation     Patient Name: Ara Cooney  FHGMN'W Date: 7/9/2018  Problem List  Patient Active Problem List   Diagnosis    Chronic systolic heart failure (HCC)    Elevated liver enzymes    Elevated troponin    Atrial fibrillation with RVR (HCC)    Chronic anticoagulation    Fall    Biliary stent obstruction, initial encounter    Dysthymic disorder    Weakness/physical deconditioning    Recent history of Cholangitis due to bile duct calculus with obstruction    Acute respiratory insufficiency    Brain aneurysm    Cardiac arrhythmia    Carpal tunnel syndrome    Chronic diastolic congestive heart failure (HCC)    Chronic pain disorder    Disc disorder of cervical region    Disorder of bone and cartilage    Essential hypertension    Gout    Hospital-acquired pneumonia    Hyperlipidemia    Insomnia    Mitral regurgitation    Narcotic dependence (HCC)    Osteoarthritis    Acute pancreatitis    Small vessel disease    Tachycardia induced cardiomyopathy (HCC)    Dyspnea on exertion     Past Medical History  Past Medical History:   Diagnosis Date    [facility]     Acute respiratory disease     CHF (congestive heart failure) (HCC)     Chronic pain     Heart failure (HCC)     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[facility] )     Hypertension     Narcotic dependence ([facility] )      Past Surgical History  Past Surgical History:   Procedure Laterality Date    ERCP N/A 5/14/2018    Procedure: ENDOSCOPIC RETROGRADE CHOLANGIOPANCREATOGRAPHY (ERCP); Surgeon: Gaby Starks MD;  [facility];  Service: Gastroenterology         07/09/18 0900   Note Type   Note type Eval/Treat   Restrictions/Precautions   Other Precautions Cardiac/sternal;Agitated;Cognitive; Bed Alarm;Telemetry; Fall Risk;Pain   Pain Assessment   Pain Type Chronic pain   Pain Location Back;Neck;Generalized   Pain Descriptors Aching   Pain Frequency Constant/continuous   Pain Onset Ongoing Clinical Progression Not changed   Patient's Stated Pain Goal No pain   Hospital Pain Intervention(s) Repositioned; Ambulation/increased activity; Distraction; Emotional support   Response to Interventions tolerates    Pain Rating: FLACC (Rest) - Face 0   Pain Rating: FLACC (Rest) - Legs 0   Pain Rating: FLACC (Rest) - Activity 0   Pain Rating: FLACC (Rest) - Cry 0   Pain Rating: FLACC (Rest) - Consolability 0   Score: FLACC (Rest) 0   Pain Rating: FLACC (Activity) - Face 1   Pain Rating: FLACC (Activity) - Legs 0   Pain Rating: FLACC (Activity) - Activity 0   Pain Rating: FLACC (Activity) - Cry 1   Pain Rating: FLACC (Activity) - Consolability 0   Score: FLACC (Activity) 2   Home Living   Type of Home House   Home Layout Multi-level; Laundry in basement;Able to live on main level with bedroom/bathroom  (2 CASSIA)   Bathroom Shower/Tub Tub/shower unit   Bathroom Toilet Standard   Bathroom Equipment Shower chair  (does not use )   [address] Walker;Cane   Additional Comments sits in tub for bath, does not " trust self to stand"    Prior Function   Level of [place] Independent with ADLs and functional mobility   Lives With Alone   Receives Help From (landlord)   ADL Assistance Independent   IADLs Needs assistance   Falls in the last 6 months 1 to 4  (3 FALLS since last admit, ambulance assist return to stand)   Comments reports landlord can transport to appts if necessary  Lifestyle   Autonomy Lives alone, MI ADL with RW but gradual decline in abilty to care for self x 3 years, worse past few months  Orders out "chinese mostly"  Does not drive, limited access groceries (sister used to help but has no longer medically able) , reports no more clean clothes as unable to safely access laundry in basement  Mostly homebound, sedentary  Psychosocial   Psychosocial (WDL) X   Patient Behaviors/Mood Irritable; Cooperative   Subjective   Subjective " shut up" under her breath with direction safe transfer tech   ADL   Where Assessed Edge of bed   Eating Assistance 5  Supervision/Setup   Eating Deficit NPO   Grooming Assistance 5  Supervision/Setup   Grooming Deficit Increased time to complete   UB Bathing Assistance 4  Minimal Assistance   LB Bathing Assistance 4  Minimal Assistance   [address]  4  Minimal Assistance   Additional Comments seated activity, assist to complete due to limited endurance, general weakness   Bed Mobility   Supine to Sit 5  Supervision   Additional items HOB elevated; Bedrails   Sit to Supine 5  Supervision   Additional items HOB elevated; Bedrails   Transfers   Sit to Stand 4  Minimal assistance   Additional items Verbal cues   Stand to Sit 5  Supervision   Additional items Verbal cues   Stand pivot 4  Minimal assistance   Additional items Verbal cues   Additional Comments RW    Balance   Static Sitting Good   Dynamic Sitting Fair +   Static Standing Fair -   Dynamic Standing Poor +   Activity Tolerance   Activity Tolerance Patient limited by fatigue;Treatment limited secondary to medical complications (Comment)  (RN reports increased HR)   Medical Staff Made Aware tech updated   Nurse Made Aware RN consents    RUE Assessment   RUE Assessment X  (baseline shoulder limitiations ROM/strength, remainder 3+/5)   LUE Assessment   LUE Assessment WFL  (3+/5)   Hand Function   Gross Motor Coordination Functional   Fine Motor Coordination Functional   Vision-Basic Assessment   Current Vision Wears glasses only for reading   Patient Visual Report (general recent decline in acuity, poor access eye MD)   Cognition   Overall Cognitive Status Impaired   Arousal/Participation Alert; Cooperative   Attention Attends with cues to redirect   Orientation Level Oriented X4   Memory Decreased recall of recent events;Decreased recall of precautions  (reports recent forgetfulness) Following Commands Follows one step commands with increased time or repetition   Comments Irritable but cooperative  Poor insight, judgement, safety  Cue to maintain focus on task  Assessment   Limitation Decreased ADL status; Decreased UE strength;Decreased Safe judgement during ADL;Decreased endurance;Decreased self-care trans;Decreased high-level ADLs   Assessment Pt is a 69 y o  female seen for OT evaluation s/p admit to [facility] on 7/7/2018 w/ Atrial fibrillation with RVR ([address] )  Adm with SOB, weakness  S/p cardiac conversion 7/8  On tele with RN noting increased HR with bedside activity  Noted multiple hospital visits since April  Seen by OT for evaluation May admission with recommendation for rehab  Pt D/C to home despite recommendation  Pt reports weakness, 3 falls since last admit, difficulty caring for self  Other comorbidities affecting pt's functional performance at time of assessment include: HTN and CHF, chronic afib on anticoagulation, hx falls, OA with chronic generalized pain, narcotic dependence  Personal factors affecting pt at time of IE include:steps to enter environment, limited home support, difficulty performing ADLS, difficulty performing IADLS , limited insight into deficits, compliance, financial barriers and health management   Prior to admission, pt was living alone, MI ADL with RW but gradual decline in abilty to care for self x 3 years, worse past few months  Orders out "chinese mostly"  Does not drive, limited access groceries (sister used to help but has no longer medically able) , reports no more clean clothes as unable to safely access laundry in basement  Mostly homebound, sedentary   Upon evaluation: Pt requires min a ADL, functional transfers, limited endurance, deconditioned, hx falls and is currently a fall risk 2* the following deficits impacting occupational performance: weakness, decreased strength, decreased balance, decreased tolerance, impaired attention, impaired memory, impaired problem solving, decreased safety awareness and increased pain  Pt to benefit from continued skilled OT tx while in the hospital to address deficits as defined above and maximize level of functional independence w ADL's and functional mobility  Occupational Performance areas to address include: bathing/shower, toilet hygiene, dressing, health maintenance, functional mobility, community mobility, meal prep, household maintenance and social participation  From OT standpoint, recommendation at time of d/c would be STR  Will follow 3-5x/wk x 2 weeks for OT goals      Goals   Patient Goals to get stronger, " I may go to rehab this time"    LTG Time Frame 10-14   Long Term Goal #1 see below   Plan   Treatment Interventions ADL retraining;Functional transfer training;UE strengthening/ROM; Endurance training;Patient/family training;Equipment evaluation/education; Compensatory technique education; Energy conservation   Goal Expiration Date 07/23/18   Treatment Day 1   OT Frequency 3-5x/wk   Additional Treatment Session   Start Time 0920   End Time 0932   Treatment Assessment Pt seen for OT treatment alongside evaluation  Decreased safety with functional transfers  Training in safe transfer tech, hand placement, standing balance using AD as steadying support for ADL  Not receptive to training, decreased carryover  Engaged in bathing activity at EOB, assist to complete  Limited by fatigue  Educated shower chair use over sit in tub with risk unable to exit due to weakness    Encouraged ambulation to BR with RW (provided) and nursing assist     Additional Treatment Day 1   Recommendation   Recommendation PT consult   OT Discharge Recommendation Short Term Rehab   Equipment Recommended (GB throughout bathroom)   Barthel Index   Feeding 10   Bathing 0   Grooming Score 5   Dressing Score 5   Bladder Score 10   Bowels Score 10   Toilet Use Score 5   Transfers (Bed/Chair) Score 10   Mobility (Level Surface) Score 0   Stairs Score 0   Barthel Index Score 55   Modified [place] Scale   Modified Gray Scale 4     OT Goals:    - Pt will demonstrate Good safety awareness for usual ADL routine  - Pt will demonstrate Good understanding fall prevention strategies for home environment  - Pt will be S for ADL tasks with use of AD, AE  - Pt will be S for ADL transfers with use of appropriate AD  - Pt will demonstrate  Fair + supported stand for ADL tasks x 10 min, stable vitals  - Pt will tolerate 30 min ADL routine with I use ECT  - Pt will be S functional mobilty with AD, light ADL setup/cleanup, item transport with [employer]    [facility], MOTR/L

## 2018-07-09 NOTE — PROGRESS NOTES
Notified Dr Grace Philip that pt PVR after urinated 450ml was 530ml  Was told orders will be placed for the urinary retention protocol

## 2018-07-10 PROBLEM — R26.2 AMBULATORY DYSFUNCTION: Status: ACTIVE | Noted: 2018-07-10

## 2018-07-10 PROBLEM — Z74.09 IMPAIRED MOBILITY AND ADLS: Status: ACTIVE | Noted: 2018-07-10

## 2018-07-10 PROBLEM — Z78.9 IMPAIRED MOBILITY AND ADLS: Status: ACTIVE | Noted: 2018-07-10

## 2018-07-10 PROBLEM — Z79.899 POLYPHARMACY: Status: ACTIVE | Noted: 2018-07-10

## 2018-07-10 PROBLEM — R41.3 MEMORY LOSS: Status: ACTIVE | Noted: 2018-07-10

## 2018-07-10 PROCEDURE — G8979 MOBILITY GOAL STATUS: HCPCS

## 2018-07-10 PROCEDURE — G8978 MOBILITY CURRENT STATUS: HCPCS

## 2018-07-10 PROCEDURE — 99222 1ST HOSP IP/OBS MODERATE 55: CPT | Performed by: PHYSICIAN ASSISTANT

## 2018-07-10 PROCEDURE — 99232 SBSQ HOSP IP/OBS MODERATE 35: CPT | Performed by: INTERNAL MEDICINE

## 2018-07-10 PROCEDURE — 97163 PT EVAL HIGH COMPLEX 45 MIN: CPT

## 2018-07-10 RX ORDER — AMITRIPTYLINE HYDROCHLORIDE 10 MG/1
10 TABLET, FILM COATED ORAL
Status: DISCONTINUED | OUTPATIENT
Start: 2018-07-10 | End: 2018-07-11

## 2018-07-10 RX ORDER — ALPRAZOLAM 0.25 MG/1
0.25 TABLET ORAL
Status: DISCONTINUED | OUTPATIENT
Start: 2018-07-10 | End: 2018-07-20 | Stop reason: HOSPADM

## 2018-07-10 RX ADMIN — DULOXETINE HYDROCHLORIDE 30 MG: 30 CAPSULE, DELAYED RELEASE ORAL at 08:49

## 2018-07-10 RX ADMIN — FUROSEMIDE 40 MG: 40 TABLET ORAL at 08:48

## 2018-07-10 RX ADMIN — OXYCODONE HYDROCHLORIDE 15 MG: 5 TABLET ORAL at 18:33

## 2018-07-10 RX ADMIN — AMIODARONE HYDROCHLORIDE 0.5 MG/MIN: 50 INJECTION, SOLUTION INTRAVENOUS at 11:58

## 2018-07-10 RX ADMIN — ALPRAZOLAM 0.25 MG: 0.25 TABLET ORAL at 21:01

## 2018-07-10 RX ADMIN — ALPRAZOLAM 0.25 MG: 0.25 TABLET ORAL at 08:48

## 2018-07-10 RX ADMIN — ACETAMINOPHEN 975 MG: 325 TABLET, FILM COATED ORAL at 05:14

## 2018-07-10 RX ADMIN — METOPROLOL SUCCINATE 100 MG: 100 TABLET, EXTENDED RELEASE ORAL at 08:48

## 2018-07-10 RX ADMIN — ACETAMINOPHEN 975 MG: 325 TABLET, FILM COATED ORAL at 21:01

## 2018-07-10 RX ADMIN — OXYCODONE HYDROCHLORIDE 15 MG: 5 TABLET ORAL at 04:36

## 2018-07-10 RX ADMIN — METOPROLOL SUCCINATE 100 MG: 100 TABLET, EXTENDED RELEASE ORAL at 20:59

## 2018-07-10 RX ADMIN — ASPIRIN 81 MG: 81 TABLET, COATED ORAL at 13:32

## 2018-07-10 RX ADMIN — ACETAMINOPHEN 975 MG: 325 TABLET, FILM COATED ORAL at 13:31

## 2018-07-10 RX ADMIN — AMITRIPTYLINE HYDROCHLORIDE 10 MG: 10 TABLET, FILM COATED ORAL at 21:01

## 2018-07-10 RX ADMIN — APIXABAN 5 MG: 5 TABLET, FILM COATED ORAL at 08:48

## 2018-07-10 RX ADMIN — APIXABAN 5 MG: 5 TABLET, FILM COATED ORAL at 17:21

## 2018-07-10 NOTE — PHYSICAL THERAPY NOTE
Physical Therapy Evaluation     Patient's Name: Laura Estrella    Admitting Diagnosis  SOB (shortness of breath) [R06 02]  Atrial fibrillation with [facility] ventricular response ([facility]) [I48 91]  Congestive heart failure with cardiomyopathy ([facility] ) [I50 9, I42 9]    Problem List  Patient Active Problem List   Diagnosis    Chronic systolic heart failure (HCC)    Elevated liver enzymes    Elevated troponin    Atrial fibrillation with RVR ([facility] )    Chronic anticoagulation    Fall    Biliary stent obstruction, initial encounter    Dysthymic disorder    Weakness/physical deconditioning    Recent history of Cholangitis due to bile duct calculus with obstruction    Acute respiratory insufficiency    Brain aneurysm    Cardiac arrhythmia    Carpal tunnel syndrome    Acute on chronic systolic congestive heart failure (HCC)    Chronic pain disorder    Disc disorder of cervical region    Disorder of bone and cartilage    Essential hypertension    Gout    Hospital-acquired pneumonia    Hyperlipidemia    Insomnia    Mitral regurgitation    Narcotic dependence (HCC)    Osteoarthritis    Acute pancreatitis    Small vessel disease    Tachycardia induced cardiomyopathy (HCC)    Dyspnea on exertion       Past Medical History  Past Medical History:   Diagnosis Date    A-fib ([facility] )     Acute respiratory disease     CHF (congestive heart failure) (HCC)     Chronic pain     Heart failure (HCC)     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[facility] )     Hypertension     [facility]        Past Surgical History  Past Surgical History:   Procedure Laterality Date    ERCP N/A 5/14/2018    Procedure: ENDOSCOPIC RETROGRADE CHOLANGIOPANCREATOGRAPHY (ERCP);   Surgeon: Jono Castanon MD;  [facility];  Service: Gastroenterology        07/10/18 1100   Note Type   Note type Eval/Treat   Pain Assessment   Pain Assessment 0-10   Pain Score No Pain   Home Living   Type of [address] Multi-level; Able to live on main level with bedroom/bathroom; Laundry in basement;Stairs to enter with rails  (3 [address])   Bathroom Shower/Tub Tub/shower unit   Home Equipment Cane;Walker   Prior Function   Level of [place] Other (Comment)  (pt reports minimal ability to complete household duties)   Lives With [address] [place] Help From (landlord and dtr occasionally)   Falls in the last 6 months (3)   Comments pt reports she does not have clean clothes and has only been able to order food out recently from places that deliver   Restrictions/Precautions   Other Precautions Cardiac/sternal;Fall Risk;Multiple lines   General   Family/Caregiver Present No   Cognition   Orientation Level Oriented X4   RLE Assessment   RLE Assessment WFL   LLE Assessment   LLE Assessment WFL   Coordination   Movements are Fluid and Coordinated 1   Bed Mobility   Supine to Sit 5  Supervision   Sit to Supine 5  Supervision   Transfers   Sit to Stand 4  Minimal assistance   Additional items Increased time required;Verbal cues   Stand to Sit 5  Supervision   Additional items Verbal cues; Increased time required   Ambulation/Elevation   Gait pattern Short stride; Inconsistent annie   Gait Assistance 4  Minimal assist   Additional items Assist x 1   Assistive Device Rolling walker   Distance 80x2   Balance   Static Sitting Good   Dynamic Sitting Good   Static Standing Fair   Dynamic Standing Fair -   Endurance Deficit   Endurance Deficit Yes   Endurance Deficit Description fatigue with reports of SOB   Activity Tolerance   Activity Tolerance Patient limited by fatigue;Patient limited by pain   Assessment   Prognosis Good   Problem List Decreased endurance; Impaired balance;Decreased safety awareness;Decreased mobility   Assessment Pt is 69 y o  female seen for PT evaluation s/p admit to [facility] on 7/7/2018 w/ Atrial fibrillation with RVR ([address] )  PT consulted to assess pt's functional mobility and d/c needs   Order placed for PT eval and tx  Comorbidities affecting pt's physical performance at time of assessment include: MARCOS, HTN, HF, SOB  PTA, pt was I living alone although pt reports since previous admit limited ability to maintain home  Inability to get down to basement for laundry  Inability to prepare meals for self  Personal factors affecting pt at time of IE include: inaccessible home environment, ambulating w/ assistive device, limited home support, positive fall history, unable to perform physical activity and inability to perform IADLs  Please find objective findings from PT assessment regarding body systems outlined above with impairments and limitations including impaired balance, decreased endurance, gait deviations, pain, decreased activity tolerance, decreased functional mobility tolerance, decreased safety awareness, impaired judgement, fall risk and SOB upon exertion  Pt demonstrated ability to complete bed mobility with increased time although without requiring increased A  Ambulated with slow although overall steady gait  Poor ability to self pace with increased WOB and SOB  Pt is at risk for additional falls with decreased awareness of current limitations  The following objective measures performed on IE also reveal limitations: Barthel Index: 65/100  Pt's clinical presentation is currently evolving seen in pt's presentation of SOB, A-fib  Pt to benefit from continued PT tx to address deficits as defined above and maximize level of functional independent mobility and consistency  From PT/mobility standpoint, recommendation at time of d/c would be IP rehab pending progress in order to facilitate return to PLOF  Goals   Patient Goals To improve my ability to manage at home   STG Expiration Date 07/20/18   Short Term Goal #1 1  Complete bed mobility and transfer Mod I  2  Ambulate 200' with RW Mod I to safely get around home and community  3  Improve balance to good to decrease risk of additional falls   4  Pt have ability to self pace without instruction throughout mobility and ADLs to decrease risk of falls    Plan   Treatment/Interventions OT; Spoke to case management;Gait training;Bed mobility; Patient/family training; Endurance training;Functional transfer training; Therapeutic exercise   PT Frequency (3-5x/week)   Recommendation   Recommendation (rehab)   PT - OK to Discharge (to rehab when medically stable)   Barthel Index   Feeding 10   Bathing 0   Grooming Score 5   Dressing Score 5   Bladder Score 10   Bowels Score 10   Toilet Use Score 5   Transfers (Bed/Chair) Score 10   Mobility (Level Surface) Score 10   Stairs Score 0   Barthel Index Score 65           Albert Scheuermann, PT

## 2018-07-10 NOTE — PLAN OF CARE
CARDIOVASCULAR - ADULT     Maintains optimal cardiac output and hemodynamic stability Progressing     Absence of cardiac dysrhythmias or at baseline rhythm Progressing        DISCHARGE PLANNING     Discharge to home or other facility with appropriate resources Progressing        DISCHARGE PLANNING - CARE MANAGEMENT     Discharge to post-acute care or home with appropriate resources Progressing        INFECTION - ADULT     Absence or prevention of progression during hospitalization Progressing     Absence of fever/infection during neutropenic period Progressing        Knowledge Deficit     Patient/family/caregiver demonstrates understanding of disease process, treatment plan, medications, and discharge instructions Progressing        MUSCULOSKELETAL - ADULT     Maintain or return mobility to safest level of function Progressing     Maintain proper alignment of affected body part Progressing        PAIN - ADULT     Verbalizes/displays adequate comfort level or baseline comfort level Progressing        Potential for Falls     Patient will remain free of falls Progressing        RESPIRATORY - ADULT     Achieves optimal ventilation and oxygenation Progressing        SAFETY ADULT     Maintain or return to baseline ADL function Progressing     Maintain or return mobility status to optimal level Progressing

## 2018-07-10 NOTE — RESTORATIVE TECHNICIAN NOTE
Restorative Specialist Mobility Note       Activity: Bedrest, Dangle, Stand at bedside, Turn, Ambulate in room, Ambulate in agee     Assistive Device: Front wheel walker     Ambulation Response:  Tolerated fairly well  Repositioned: Supine           Range of Motion: Active, Right leg, Left leg

## 2018-07-10 NOTE — PLAN OF CARE
Problem: PHYSICAL THERAPY ADULT  Goal: Performs mobility at highest level of function for planned discharge setting  See evaluation for individualized goals  Treatment/Interventions: OT, Spoke to case management, Gait training, Bed mobility, Patient/family training, Endurance training, Functional transfer training, Therapeutic exercise          See flowsheet documentation for full assessment, interventions and recommendations  Prognosis: Good  Problem List: Decreased endurance, Impaired balance, Decreased safety awareness, Decreased mobility  Assessment: Pt is 69 y o  female seen for PT evaluation s/p admit to [facility] on 7/7/2018 w/ Atrial fibrillation with RVR ([address] )  PT consulted to assess pt's functional mobility and d/c needs  Order placed for PT eval and tx  Comorbidities affecting pt's physical performance at time of assessment include: MARCOS, HTN, HF, SOB  PTA, pt was I living alone although pt reports since previous admit limited ability to maintain home  Inability to get down to basement for laundry  Inability to prepare meals for self  Personal factors affecting pt at time of IE include: inaccessible home environment, ambulating w/ assistive device, limited home support, positive fall history, unable to perform physical activity and inability to perform IADLs  Please find objective findings from PT assessment regarding body systems outlined above with impairments and limitations including impaired balance, decreased endurance, gait deviations, pain, decreased activity tolerance, decreased functional mobility tolerance, decreased safety awareness, impaired judgement, fall risk and SOB upon exertion  Pt demonstrated ability to complete bed mobility with increased time although without requiring increased A  Ambulated with slow although overall steady gait  Poor ability to self pace with increased WOB and SOB  Pt is at risk for additional falls with decreased awareness of current limitations   The following objective measures performed on IE also reveal limitations: Barthel Index: 65/100  Pt's clinical presentation is currently evolving seen in pt's presentation of SOB, A-fib  Pt to benefit from continued PT tx to address deficits as defined above and maximize level of functional independent mobility and consistency  From PT/mobility standpoint, recommendation at time of d/c would be IP rehab pending progress in order to facilitate return to PLOF  Recommendation:  (rehab)     PT - OK to Discharge:  (to rehab when medically stable)    See flowsheet documentation for full assessment

## 2018-07-10 NOTE — CONSULTS
Consultation - Geriatrics   Krystal Rose 69 y o  female MRN: [id number]  Unit/Bed#: [facility] 509-01 Encounter: [id number]      Assessment/Plan  1  Polypharmacy  Although patient's home med list includes multiple psychiatric medications, patient reports only medication she is currently taking in regards to these medications is her Elavil  Patient reports that she did take Xanax for sleep previously, and that this medication did work for her, but her daughter stole this medication from her  Will discontinue Cymbalta as patient reports she does not take this medication  Patient also does not take Prozac which is also listed on home med list  Will change Xanax to 0 25 mg q h s  Will reduce Elavil to 10 mg daily for next 5 days, then Elavil 10 mg every other day for 5 days, then stop  Goal would be to put patient on something such as mirtazapine 7 5 mg q h s  to help with sleep, with goal to stop the Xanax    2  Memory loss  Patient reports difficulty with her short-term memory, scores 3/3 on delayed recall, unable to perform clock draw secondary to visual impairment, difficulty using hands  Patient's TSH slightly elevated, free T4 normal  Will draw a B12, folate to rule out reversible causes  Patient will need formal cognitive assessment, this [facility] done at [facility] upon discharge    3  Difficulty with mobility/performing ADLs  Patient is essentially homebound, has recurrent falls  Patient is interested in having home visits, this can be done through [facility]  Continue supportive care, PT, OT recommending rehab, agree  Ideally patient would be an assisted living facility, as patient has difficulty cooking for herself as well, etc    4  Insomnia  See 1    Will restart Xanax at 0 25 mg q h s , patient reports this is the only thing she has ever taken that has helped her sleep  Ideally patient will not be on this medication, 1st need to taper off of Elavil, see 1     5   Ambulatory dysfunction  Patient ambulates with assistive device at baseline  Consider adding vitamin D3 1000 international units daily patient's medication regimen  PT, OT    6  Delirium precautions  Agree with Tylenol 975 mg Q 8  Avoid Robaxin as can cause confusion in the elderly  Add Lidoderm patch if applicable  Consider reducing oxycodone to 5 mg p r n  Q 4  Continue with MiraLax daily for bowel regimen  Monitor for urinary retention, bladder scan/straight cath as needed  Redirect unwanted patient behaviors as first line tx  Reorient patient frequently  Avoid deliriogenic meds including tramadol, benzodiazepines, benadryl  Good sleep hygiene important, limit night time interruptions  Encourage patient to stay awake during the day  Ensure adequate hydration/nutrition  Mobilize often     7   AFib with RVR  Patient underwent cardioversion yesterday, but went back to SVT, may possibly undergo ablation  EP following        History of Present Illness   Physician Requesting Consult: Malia Wong DO  Reason for Consult / Principal Problem: insomnia  Hx and PE limited by: n/a  HPI: Aundria Lesches is a 69y o  year old female with past medical history chronic pain disorder, insomnia, CHF, narcotic dependence, who presents to [facility] with chief complaint of shortness of breath  Patient also hads history of recurrent falls  Home meds include Xanax, Cymbalta, Prozac, Elavil  Patient was admitted under [facility] for atrial fibrillation with RVR, dyspnea on exertion, insomnia  Prior to arrival patient lives alone  She is independent with ADLs  Does not have much assistance with IADLs  She reports daughter is a drug addict, who she should not/does not often see  Patient reports she is unable to cook herself any meals, she usually gets take out, her landlord helps her get the food/order the food    She reports she does not drive, has difficulty getting to appointments, has difficulty getting refills for her medications  Reports she does have insomnia, reports that this has been going on for 1 year and 4 months  Reports only thing that helped was Xanax, but daughter stool this medication from her  The patient has had recurrent falls  Has a cane and rolled walker at home  Patient reports having difficulty with her short-term memory  She patient shows no signs of delirium upon exam     Inpatient consult to Gerontology  Consult performed by: Nathalia Mercado ordered by: Giulia Henao          Review of Systems   Constitutional: Negative for fatigue  Eyes: Positive for visual disturbance  Respiratory: Negative for chest tightness and shortness of breath  Cardiovascular: Negative for chest pain and palpitations  Gastrointestinal: Negative for abdominal distention, abdominal pain, constipation, diarrhea, nausea and vomiting  Genitourinary: Negative for difficulty urinating  Musculoskeletal: Positive for gait problem and myalgias  Psychiatric/Behavioral: Positive for confusion and sleep disturbance  The patient is nervous/anxious  All other systems reviewed and are negative  Historical Information   Past Medical History:   Diagnosis Date    A-fib 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[facility] [address] )     Hypertension     Narcotic dependence ([facility] )      Past Surgical History:   Procedure Laterality Date    ERCP N/A 5/14/2018    Procedure: ENDOSCOPIC RETROGRADE CHOLANGIOPANCREATOGRAPHY (ERCP);   Surgeon: Ally Cheney MD;  Location:  MAIN OR;  Service: Gastroenterology     Social History   History   Alcohol Use No     History   Drug Use No     History   Smoking Status    Former Smoker   Smokeless Tobacco    Never Used         Family History: non-contributory    Meds/Allergies   Current meds:   Current Facility-Administered Medications Medication Dose Route Frequency    acetaminophen (TYLENOL) tablet 975 mg  975 mg Oral Q8H [address]    ALPRAZolam (XANAX) tablet 0 25 mg  0 25 mg Oral Daily    amiodarone (CORDARONE) 900 mg in dextrose 5 % 500 mL infusion  0 5 mg/min Intravenous Continuous    amitriptyline (ELAVIL) tablet 25 mg  25 mg Oral HS    apixaban (ELIQUIS) tablet 5 mg  5 mg Oral BID    aspirin (ECOTRIN LOW STRENGTH) EC tablet 81 mg  81 mg Oral Daily    DULoxetine (CYMBALTA) delayed release capsule 30 mg  30 mg Oral Daily    furosemide (LASIX) tablet 40 mg  40 mg Oral Daily    methocarbamol (ROBAXIN) tablet 500 mg  500 mg Oral HS PRN    metoprolol (LOPRESSOR) injection 2 5 mg  2 5 mg Intravenous Q6H PRN    metoprolol succinate (TOPROL-XL) 24 hr tablet 100 mg  100 mg Oral Q12H KARELY    oxyCODONE (ROXICODONE) IR tablet 15 mg  15 mg Oral Q6H PRN    polyethylene glycol (MIRALAX) packet 17 g  17 g Oral Daily      Current PTA meds:  Prescriptions Prior to Admission   Medication    ALPRAZolam (NIRAVAM) 0 25 MG dissolvable tablet    ALPRAZolam (XANAX) 0 25 mg tablet    amitriptyline (ELAVIL) 25 mg tablet    apixaban (ELIQUIS) 5 mg    aspirin (ECOTRIN LOW STRENGTH) 81 mg EC tablet    DULoxetine (CYMBALTA) 30 mg delayed release capsule    FLUoxetine (PROzac) 20 MG tablet    furosemide (LASIX) 40 mg tablet    lisinopril (ZESTRIL) 5 mg tablet    methocarbamol (ROBAXIN) 500 mg tablet    metoprolol tartrate (LOPRESSOR) 100 mg tablet    oxyCODONE (ROXICODONE) 15 mg immediate release tablet    oxyCODONE (ROXICODONE) 5 mg immediate release tablet    polyethylene glycol (MIRALAX) 17 g packet    polyethylene glycol (MIRALAX) 17 g packet    potassium chloride (K-DUR,KLOR-CON) 20 mEq tablet        No Known Allergies    Objective   Vitals: Blood pressure 107/62, pulse 63, temperature 97 5 °F (36 4 °C), temperature source Oral, resp  rate 18, height 5' 5" (1 651 m), weight 69 1 kg (152 lb 5 4 oz), SpO2 90 %  ,Body mass index is 25 35 kg/m²  Physical Exam   Constitutional: She is oriented to person, place, and time  She appears well-developed and well-nourished  No distress  HENT:   Head: Normocephalic and atraumatic  Mouth/Throat: No oropharyngeal exudate  Eyes: Conjunctivae and EOM are normal  No scleral icterus  Neck: Neck supple  Cardiovascular: Normal rate  Pulmonary/Chest: Effort normal and breath sounds normal  She has no wheezes  She has no rales  Abdominal: Soft  Bowel sounds are normal  She exhibits no distension  Musculoskeletal: She exhibits no edema  Neurological: She is alert and oriented to person, place, and time  Skin: Skin is warm and dry  Psychiatric: She is not agitated and not actively hallucinating  Cognition and memory are impaired  She exhibits a depressed mood  She exhibits abnormal recent memory  Patient alert oriented to person, place, month, year  Patient reports difficulty with short-term memory  Patient does score 3/3 on delayed recall  Patient is unable to perform clock draw secondary to poor movement in dominant hand, also poor vision  Patient shows no signs of delirium at this time  Patient admits to feeling depressed, and wanting to die  Patient is not suicidal and does not have a plan  Nursing note and vitals reviewed  Lab Results:   Results from last 7 days  Lab Units 07/08/18  0459   WBC Thousand/uL 7 41   HEMOGLOBIN g/dL 11 1*   HEMATOCRIT % 35 9   PLATELETS Thousands/uL 193        Results from last 7 days  Lab Units 07/08/18  0459 07/07/18  0917   SODIUM mmol/L 138 137   POTASSIUM mmol/L 4 0 4 3   CHLORIDE mmol/L 107 106   CO2 mmol/L 27 24   BUN mg/dL 18 19   CREATININE mg/dL 0 83 0 83   CALCIUM mg/dL 8 5 9 3   TOTAL PROTEIN g/dL  --  7 3   BILIRUBIN TOTAL mg/dL  --  0 86   ALK PHOS U/L  --  78   ALT U/L  --  25   AST U/L  --  20   GLUCOSE RANDOM mg/dL 102 115       Imaging Studies: I have personally reviewed pertinent reports      EKG, Pathology, and Other Studies: I have personally reviewed pertinent reports  VTE Prophylaxis: Sequential compression device Negin Garcia     Code Status: Level 3 - DNAR and DNI      Counseling/Coordination of Care: Total floor / unit time spent today 25 minutes  Greater than 50% of total time was spent with the patient and / or family counseling and / or coordination of care  A description of the counseling / coordination of care: Assessing examine the patient, reviewing EMR meds, speaking to nursing staff, speaking to hospitalist team, assessing cognition, talking to patient about depression anxiety

## 2018-07-10 NOTE — ASSESSMENT & PLAN NOTE
Patient reports chronic insomnia, will request Geriatric inputs   Sleep hygiene reorientation out of bed as able

## 2018-07-10 NOTE — PROGRESS NOTES
Progress Note - Lemuel Clark [birth year], 69 y o  female MRN: [id number]    Unit/Bed#: [facility] 509-01 Encounter: [id number]    Primary Care Provider: Amy Felix DO   Date and time admitted to hospital: 2018  9:08 AM        * Atrial fibrillation with RVR ([facility] [address] )   Assessment & Plan    Presently on amiodarone drip, on Eliquis for anticoagulation, continue metoprolol cardiology following  EP input noted          Acute on chronic systolic congestive heart failure ([facility] [address] )   Assessment & Plan    On p o  Lasix, less than 2 g salt diet, monitor I/O  Cardiology following        Dyspnea on exertion   Assessment & Plan    Multifactorial monitor        Insomnia   Assessment & Plan    Patient reports chronic insomnia, will request Geriatric inputs   Sleep hygiene reorientation out of bed as able          Chronic pain disorder   Assessment & Plan    Continue opioid analgesics            VTE Pharmacologic Prophylaxis:   Pharmacologic: Apixaban (Eliquis)  Mechanical VTE Prophylaxis in Place: Yes    Patient Centered Rounds: I have performed bedside rounds with nursing staff today  Discussions with Specialists or Other Care Team Provider:     Education and Discussions with Family / Patient: pt, brother-in-law over phone     Time Spent for Care: 30 minutes  More than 50% of total time spent on counseling and coordination of care as described above      Current Length of Stay: 3 day(s)    Current Patient Status: Inpatient   Certification Statement: The patient will continue to require additional inpatient hospital stay due to as mentioned     Discharge Plan: afib on IV amiodarone, EP following    Code Status: Level 3 - DNAR and DNI      Subjective:     Reports feeling fatigued and tired  Reports chronic insomnia  History chart labs medications reviewed    Objective:     Vitals:   Temp (24hrs), Av 8 °F (36 6 °C), Min:97 5 °F (36 4 °C), Max:98 °F (36 7 °C)    HR:  [63-71] 63  Resp:  [18-20] 18  BP: (105-116)/(58-66) 107/62  SpO2:  [90 %-98 %] 90 %  Body mass index is 25 35 kg/m²  Input and Output Summary (last 24 hours): Intake/Output Summary (Last 24 hours) at 07/10/18 1343  Last data filed at 07/10/18 0929   Gross per 24 hour   Intake              420 ml   Output             1384 ml   Net             -964 ml       Physical Exam:     Physical Exam    Comfortably lying in bed  Neck supple  Lungs diminished breath sounds at bases  Heart sounds S1-S2 noted, irregular  Abdomen soft nontender  Pulses present  Awake obeys simple commands  No rash    Additional Data:     Labs:      Results from last 7 days  Lab Units 07/08/18  0459 07/07/18  0917   WBC Thousand/uL 7 41 11 23*   HEMOGLOBIN g/dL 11 1* 11 9   HEMATOCRIT % 35 9 37 6   PLATELETS Thousands/uL 193 196   NEUTROS PCT %  --  79*   LYMPHS PCT %  --  11*   MONOS PCT %  --  8   EOS PCT %  --  1       Results from last 7 days  Lab Units 07/08/18  0459 07/07/18  0917   SODIUM mmol/L 138 137   POTASSIUM mmol/L 4 0 4 3   CHLORIDE mmol/L 107 106   CO2 mmol/L 27 24   BUN mg/dL 18 19   CREATININE mg/dL 0 83 0 83   CALCIUM mg/dL 8 5 9 3   TOTAL PROTEIN g/dL  --  7 3   BILIRUBIN TOTAL mg/dL  --  0 86   ALK PHOS U/L  --  78   ALT U/L  --  25   AST U/L  --  20   GLUCOSE RANDOM mg/dL 102 115       Results from last 7 days  Lab Units 07/07/18  0917   INR  1 27*                 * I Have Reviewed All Lab Data Listed Above  * Additional Pertinent Lab Tests Reviewed:  [address] Admission Reviewed    Imaging:    Imaging Reports Reviewed Today Include:   Imaging Personally Reviewed by Myself Includes:    Recent Cultures (last 7 days):           Last 24 Hours Medication List:     Current Facility-Administered Medications:  acetaminophen 975 mg Oral Q8H [address], DO    ALPRAZolam 0 25 mg Oral Daily Delcia Part, DO    amiodarone 0 5 mg/min Intravenous Continuous Aldo Franco MD Last Rate: 0 5 mg/min (07/10/18 1158)   amitriptyline 25 mg Oral HS Lucy Dues, DO    apixaban 5 mg Oral BID Lucy Dues, DO    aspirin 81 mg Oral Daily Lucy Dues, DO    DULoxetine 30 mg Oral Daily Lucy Dues, DO    furosemide 40 mg Oral Daily Lucy Dues, DO    methocarbamol 500 mg Oral HS PRN Lucy Dues, DO    metoprolol 2 5 mg Intravenous Q6H PRN Naheed Joaquin PA-C    metoprolol succinate 100 mg Oral Q12H Kraig Rivas MD    oxyCODONE 15 mg Oral Q6H PRN Kristi Montesinos MD    polyethylene glycol 17 g Oral Daily Lucy Dues, DO         Today, Patient Was Seen By: Hugo Nelson MD    ** Please Note: Dictation voice to text software may have been used in the creation of this document   **

## 2018-07-10 NOTE — ASSESSMENT & PLAN NOTE
Presently on amiodarone drip, on Eliquis for anticoagulation, continue metoprolol cardiology following  EP input noted

## 2018-07-11 PROBLEM — I47.2 MONOMORPHIC VENTRICULAR TACHYCARDIA (HCC): Status: ACTIVE | Noted: 2018-07-11

## 2018-07-11 LAB
ANION GAP SERPL CALCULATED.3IONS-SCNC: 2 MMOL/L (ref 4–13)
APTT PPP: 32 SECONDS (ref 24–36)
BASOPHILS # BLD AUTO: 0.04 THOUSANDS/ΜL (ref 0–0.1)
BASOPHILS NFR BLD AUTO: 1 % (ref 0–1)
BUN SERPL-MCNC: 20 MG/DL (ref 5–25)
CALCIUM SERPL-MCNC: 8.4 MG/DL (ref 8.3–10.1)
CHLORIDE SERPL-SCNC: 104 MMOL/L (ref 100–108)
CO2 SERPL-SCNC: 31 MMOL/L (ref 21–32)
CREAT SERPL-MCNC: 0.96 MG/DL (ref 0.6–1.3)
EOSINOPHIL # BLD AUTO: 0.41 THOUSAND/ΜL (ref 0–0.61)
EOSINOPHIL NFR BLD AUTO: 6 % (ref 0–6)
ERYTHROCYTE [DISTWIDTH] IN BLOOD BY AUTOMATED COUNT: 15.4 % (ref 11.6–15.1)
FOLATE SERPL-MCNC: 15.9 NG/ML (ref 3.1–17.5)
GFR SERPL CREATININE-BSD FRML MDRD: 57 ML/MIN/1.73SQ M
GLUCOSE SERPL-MCNC: 82 MG/DL (ref 65–140)
HCT VFR BLD AUTO: 36.8 % (ref 34.8–46.1)
HGB BLD-MCNC: 11.4 G/DL (ref 11.5–15.4)
IMM GRANULOCYTES # BLD AUTO: 0.04 THOUSAND/UL (ref 0–0.2)
IMM GRANULOCYTES NFR BLD AUTO: 1 % (ref 0–2)
INR PPP: 1.24 (ref 0.86–1.17)
LYMPHOCYTES # BLD AUTO: 1.69 THOUSANDS/ΜL (ref 0.6–4.47)
LYMPHOCYTES NFR BLD AUTO: 25 % (ref 14–44)
MAGNESIUM SERPL-MCNC: 2.1 MG/DL (ref 1.6–2.6)
MCH RBC QN AUTO: 30.7 PG (ref 26.8–34.3)
MCHC RBC AUTO-ENTMCNC: 31 G/DL (ref 31.4–37.4)
MCV RBC AUTO: 99 FL (ref 82–98)
MONOCYTES # BLD AUTO: 0.66 THOUSAND/ΜL (ref 0.17–1.22)
MONOCYTES NFR BLD AUTO: 10 % (ref 4–12)
NEUTROPHILS # BLD AUTO: 3.88 THOUSANDS/ΜL (ref 1.85–7.62)
NEUTS SEG NFR BLD AUTO: 57 % (ref 43–75)
NRBC BLD AUTO-RTO: 0 /100 WBCS
PHOSPHATE SERPL-MCNC: 2.7 MG/DL (ref 2.3–4.1)
PLATELET # BLD AUTO: 212 THOUSANDS/UL (ref 149–390)
PMV BLD AUTO: 9.7 FL (ref 8.9–12.7)
POTASSIUM SERPL-SCNC: 4 MMOL/L (ref 3.5–5.3)
PROTHROMBIN TIME: 15.7 SECONDS (ref 11.8–14.2)
RBC # BLD AUTO: 3.71 MILLION/UL (ref 3.81–5.12)
SODIUM SERPL-SCNC: 137 MMOL/L (ref 136–145)
VIT B12 SERPL-MCNC: 458 PG/ML (ref 100–900)
WBC # BLD AUTO: 6.72 THOUSAND/UL (ref 4.31–10.16)

## 2018-07-11 PROCEDURE — 84100 ASSAY OF PHOSPHORUS: CPT | Performed by: INTERNAL MEDICINE

## 2018-07-11 PROCEDURE — 83735 ASSAY OF MAGNESIUM: CPT | Performed by: INTERNAL MEDICINE

## 2018-07-11 PROCEDURE — 97116 GAIT TRAINING THERAPY: CPT

## 2018-07-11 PROCEDURE — 99232 SBSQ HOSP IP/OBS MODERATE 35: CPT | Performed by: PHYSICIAN ASSISTANT

## 2018-07-11 PROCEDURE — 99232 SBSQ HOSP IP/OBS MODERATE 35: CPT | Performed by: INTERNAL MEDICINE

## 2018-07-11 PROCEDURE — 82607 VITAMIN B-12: CPT | Performed by: GENERAL PRACTICE

## 2018-07-11 PROCEDURE — 93005 ELECTROCARDIOGRAM TRACING: CPT

## 2018-07-11 PROCEDURE — 97535 SELF CARE MNGMENT TRAINING: CPT

## 2018-07-11 PROCEDURE — 80048 BASIC METABOLIC PNL TOTAL CA: CPT | Performed by: INTERNAL MEDICINE

## 2018-07-11 PROCEDURE — 82746 ASSAY OF FOLIC ACID SERUM: CPT | Performed by: GENERAL PRACTICE

## 2018-07-11 PROCEDURE — 97530 THERAPEUTIC ACTIVITIES: CPT

## 2018-07-11 PROCEDURE — 85730 THROMBOPLASTIN TIME PARTIAL: CPT | Performed by: INTERNAL MEDICINE

## 2018-07-11 PROCEDURE — 85610 PROTHROMBIN TIME: CPT | Performed by: INTERNAL MEDICINE

## 2018-07-11 PROCEDURE — 85025 COMPLETE CBC W/AUTO DIFF WBC: CPT | Performed by: INTERNAL MEDICINE

## 2018-07-11 RX ORDER — METOPROLOL TARTRATE 5 MG/5ML
5 INJECTION INTRAVENOUS ONCE
Status: COMPLETED | OUTPATIENT
Start: 2018-07-11 | End: 2018-07-11

## 2018-07-11 RX ORDER — MAGNESIUM SULFATE HEPTAHYDRATE 40 MG/ML
INJECTION, SOLUTION INTRAVENOUS
Status: DISPENSED
Start: 2018-07-11 | End: 2018-07-11

## 2018-07-11 RX ORDER — MAGNESIUM SULFATE HEPTAHYDRATE 40 MG/ML
2 INJECTION, SOLUTION INTRAVENOUS ONCE
Status: COMPLETED | OUTPATIENT
Start: 2018-07-11 | End: 2018-07-11

## 2018-07-11 RX ADMIN — POLYETHYLENE GLYCOL 3350 17 G: 17 POWDER, FOR SOLUTION ORAL at 08:32

## 2018-07-11 RX ADMIN — AMIODARONE HYDROCHLORIDE 0.5 MG/MIN: 50 INJECTION, SOLUTION INTRAVENOUS at 13:52

## 2018-07-11 RX ADMIN — APIXABAN 5 MG: 5 TABLET, FILM COATED ORAL at 08:32

## 2018-07-11 RX ADMIN — ALPRAZOLAM 0.25 MG: 0.25 TABLET ORAL at 21:20

## 2018-07-11 RX ADMIN — ACETAMINOPHEN 975 MG: 325 TABLET, FILM COATED ORAL at 13:52

## 2018-07-11 RX ADMIN — ACETAMINOPHEN 975 MG: 325 TABLET, FILM COATED ORAL at 21:20

## 2018-07-11 RX ADMIN — OXYCODONE HYDROCHLORIDE 15 MG: 5 TABLET ORAL at 16:14

## 2018-07-11 RX ADMIN — FUROSEMIDE 40 MG: 40 TABLET ORAL at 08:32

## 2018-07-11 RX ADMIN — ASPIRIN 81 MG: 81 TABLET, COATED ORAL at 08:32

## 2018-07-11 RX ADMIN — ACETAMINOPHEN 975 MG: 325 TABLET, FILM COATED ORAL at 06:35

## 2018-07-11 RX ADMIN — MAGNESIUM SULFATE HEPTAHYDRATE 2 G: 40 INJECTION, SOLUTION INTRAVENOUS at 04:38

## 2018-07-11 RX ADMIN — METOPROLOL SUCCINATE 100 MG: 100 TABLET, EXTENDED RELEASE ORAL at 21:20

## 2018-07-11 RX ADMIN — METOPROLOL TARTRATE 5 MG: 5 INJECTION, SOLUTION INTRAVENOUS at 04:38

## 2018-07-11 NOTE — PROGRESS NOTES
2mg mag ordered along with 5mg lopressor  /77, HR 77  Will administer medication and continue to monitor

## 2018-07-11 NOTE — CASE MANAGEMENT
Continued Stay Review    Date:   18 ACUTE MED SURG LEVEL OF CARE    Vital Signs: /58   Pulse 64   Temp 97 8 °F (36 6 °C) (Oral)   Resp 20   Ht 5' 5" (1 651 m)   Wt 69 1 kg (152 lb 5 4 oz)   SpO2 93%   BMI 25 35 kg/m²     Vitals:   Temp (24hrs), Av 8 °F (36 6 °C), Min:97 4 °F (36 3 °C), Max:98 1 °F (36 7 °C)   HR:  [55-67] 55  Resp:  [17-22] 20  BP: (115-136)/(52-73) 132/67  SpO2:  [93 %-95 %] 93 %  Body mass index is 25 35 kg/m²       Input and Output Summary (last 24 hours):   Last data filed at 18 1201    Gross per 24 hour   Intake           622 24 ml   Output             2500 ml   Net         -1877 76 ml       Diet Cardiac; Cardiac Step 1      Continuous IV Infusions:   amiodarone 0 5 mg/min Last Rate: 0 5 mg/min (18 1352)       Medications:   Scheduled Meds:   Current Facility-Administered Medications:  acetaminophen 975 mg Oral Q8H [address] Raul Purdy,     ALPRAZolam 0 25 mg Oral HS Susie WILLIAM Diallo    amiodarone 0 5 mg/min Intravenous Continuous José Miguel Lozano MD Last Rate: 0 5 mg/min (18 1352)   aspirin 81 mg Oral Daily Mennie Loft, DO    furosemide 40 mg Oral Daily Mennie Loft, DO    methocarbamol 500 mg Oral HS PRN Mennie Erlindaft, DO    metoprolol 2 5 mg Intravenous Q6H PRN Celsa Ayala PA-C    metoprolol succinate 100 mg Oral Q12H [address] Yovany Ervin MD    oxyCODONE 15 mg Oral Q6H PRN Ruth Almaraz MD    polyethylene glycol 17 g Oral Daily Mennie Loft, DO        PRN Meds:      methocarbamol    metoprolol    oxyCODONE 15 mg q6hrs prn given x 2/ 24hrs      LABS/Diagnostic Results:   CBC  and differential [[id number]] (Abnormal) Collected: 18 0251   Lab Status: Final result Specimen: Blood from Arm, Left Updated: 18 030    WBC 6 72 4 31 - 10 16 Thousand/uL     RBC 3 71 (L) 3 81 - 5 12 Million/uL     Hemoglobin 11 4 (L) 11 5 - 15 4 g/dL     Hematocrit 36 8 34 8 - 46 1 %     MCV 99 (H) 82 - 98 fL     MCH 30 7 26 8 - 34 3 pg     MCHC 31 0 (L) 31 4 - 37 4 g/dL     RDW 15 4 (H) 11 6 - 15 1 %     MPV 9 7 8 9 - 12 7 fL     Platelets 336 238 - 307 Thousands/uL     nRBC 0 /100 WBCs     Neutrophils Relative 57 43 - 75 %     Immat GRANS % 1 0 - 2 %     Lymphocytes Relative 25 14 - 44 %     Monocytes Relative 10 4 - 12 %     Eosinophils Relative 6 0 - 6 %     Basophils Relative 1 0 - 1 %     Neutrophils Absolute 3 88 1 85 - 7 62 Thousands/µL     Immature Grans Absolute 0 04 0 00 - 0 20 Thousand/uL     Lymphocytes Absolute 1 69 0 60 - 4 47 Thousands/µL     Monocytes Absolute 0 66 0 17 - 1 22 Thousand/µL     Eosinophils Absolute 0 41 0 00 - 0 61 Thousand/µL     Basophils Absolute 0 04 0 00 - 0 10 Thousands/µL    Basic metabolic panel [[id number]] (Abnormal) Collected: 07/11/18 0251   Lab Status: Final result Specimen: Blood from Arm, Left Updated: 07/11/18 0312    Sodium 137 136 - 145 mmol/L     Potassium 4 0 3 5 - 5 3 mmol/L     Chloride 104 100 - 108 mmol/L     CO2 31 21 - 32 mmol/L     Anion Gap 2 (L) 4 - 13 mmol/L     BUN 20 5 - 25 mg/dL     Creatinine 0 96 0 60 - 1 30 mg/dL     Comment: Standardized to IDMS reference method       Glucose 82 65 - 140 mg/dL             Calcium 8 4 8 3 - 10 1 mg/dL     eGFR 57 ml/min/1 73sq m        CHEST XRAY -  No acute cardiopulmonary disease        Age/Sex: 69 y o  female       Assessment/Plan:   Monomorphic ventricular tachycardia (HCC)   Assessment & Plan     Overnight patient with episodes of V-tach, continue amiodarone drip, monitor electrolytes, cardiology input noted for possible cardiac catheterization likely tomorrow          * Atrial fibrillation with RVR (HCC)   Assessment & Plan     Presently on amiodarone drip, on Eliquis for anticoagulation, EP following             Acute on chronic systolic congestive heart failure (HCC)   Assessment & Plan     Continue Lasix, monitor I/O, less than 2 g salt diet, cardiology following          Ambulatory dysfunction   Assessment & Plan     Multifactorial  Safe ambulation fall precautions  Physical therapy          Memory loss   Assessment & Plan     Outpatient follow-up with Geriatrics  Monitor for delirium, sleep hygiene, reorientation          Polypharmacy   Assessment & Plan     Geriatric input noted          Insomnia   Assessment & Plan     Geriatric input noted, now on Xanax, amitriptyline being tapered               Chronic pain disorder   Assessment & Plan     Continue opioid analgesics                 VTE Pharmacologic Prophylaxis:   Pharmacologic: Apixaban (Eliquis) on hold for cardiac catheterization per Cardiology  Mechanical VTE Prophylaxis in Place: Yes      Current Length of Stay: 4 day(s)     Current Patient Status: Inpatient   Certification Statement: The patient will continue to require additional inpatient hospital stay           Discharge Plan:   ANTICIPATE DISCHARGE TO INPATIENT REHAB WHEN MEDICALLY CLEARED    *PER PT  Plan   Treatment/Interventions Functional transfer training; Therapeutic exercise; Endurance training;Patient/family training;Equipment eval/education; Bed mobility;Gait training   Progress Progressing toward goals   PT Frequency (3-5x/week)   Recommendation   Recommendation Post acute IP rehab       CASE MANAGEMENT FOLLOWING CLOSELY FOR ALL DISCHARGE NEEDS

## 2018-07-11 NOTE — PLAN OF CARE
Problem: PHYSICAL THERAPY ADULT  Goal: Performs mobility at highest level of function for planned discharge setting  See evaluation for individualized goals  Treatment/Interventions: OT, Spoke to case management, Gait training, Bed mobility, Patient/family training, Endurance training, Functional transfer training, Therapeutic exercise          See flowsheet documentation for full assessment, interventions and recommendations  Outcome: Progressing  Prognosis: Good  Problem List: Decreased range of motion, Decreased endurance, Impaired balance, Decreased mobility, Impaired judgement, Decreased safety awareness, Pain  Assessment: Pt performed transfers without assist today, but did require instructions for hand placement with sit to stand  Pt ambulated household distances with min A for safety and instructions for trunk extension and slower pacing to improve safety and decrease UE support  Pt demonstrated fair carryover  1 standing and 2 extended seated breaks required to recover during ambulation  Discussed POC and importance of physical activity with pt  Pt verbalized understanding  Pt will continue to benefit from therapy services to improve activity tolerance, balance, gait training, and safety awareness to maximize independence  Recommendation: Post acute IP rehab     PT - OK to Discharge:  (to rehab when medically stable)    See flowsheet documentation for full assessment

## 2018-07-11 NOTE — PROGRESS NOTES
Cardiac Electrphysiology    Progress Note - Ferdinand Story [birth year], 69 y o  female MRN: [id number]    Unit/Bed#: [facility] 509-01 Encounter: [id number]    Primary Care Provider: Rhonda Diaz DO   Date and time admitted to hospital: 7/7/2018  9:08 AM        Monomorphic ventricular tachycardia [facility])   Assessment & Plan    A: Vtach due to uncertain etiology  R on T seen but does not have the appearance of torsades  P:    - Will order C to r/o CAD/scar causing tachycardia  - Hold Eliquis for [facility] tomorrow  - NPO PM  - Keep Mg>2, K>4  - Continue amiodarone  - Discontinue amitriptyline        Acute on chronic systolic congestive heart failure (HCC)   Assessment & Plan    A: currently euvolemic and well perfused, last echo had an EF of 50%, thought this was in may    P:    Estimate EF w ventriculogram on cath tomorrow  Continue lasix  Monitor electrolytes          * Atrial fibrillation with RVR ([facility])   Assessment & Plan    A: Currently in sinus rhythm on amiodarone  Plan:    - Can continue amiodarone for now  - It is safe to use amio despite QT prolongation   - Do not use other QT prolonging medications  - Will hold off on invasive rx and attempt to first elucidate the cause of vtach                    Subjective/Objective        Subjective: Had 2 runs of 30 seconds of Vtach last night   Was asymptomatic throughout both episodes, currently asymptomatic as well         Vitals: /67   Pulse 55   Temp 97 6 °F (36 4 °C) (Oral)   Resp 20   Ht 5' 5" (1 651 m)   Wt 69 1 kg (152 lb 5 4 oz)   SpO2 95%   BMI 25 35 kg/m²   Vitals:    07/09/18 0518 07/10/18 0442   Weight: 68 kg (149 lb 14 6 oz) 69 1 kg (152 lb 5 4 oz)     Orthostatic Blood Pressures      Most Recent Value   Blood Pressure  129/67 filed at 07/11/2018 0725   Patient Position - Orthostatic VS  Lying filed at 07/10/2018 1859            Intake/Output Summary (Last 24 hours) at 07/11/18 1201  Last data filed at 07/11/18 0904   Gross per 24 hour   Intake 444 82 ml   Output             1900 ml   Net         -1455 18 ml       Invasive Devices     Peripheral Intravenous Line            Peripheral IV 07/11/18 Right Antecubital less than 1 day                Review of Systems:   Cardiovascular: no chest pain, shortness of breath, palpitations, orthopnea, PND  Pulmonary: no dyspnea, cough  GI: no anorexia, abdominal pain, weight loss  Endocrine: no polyuria, polydypspsia, polyuria  : no dysuria, urinary retention, decreased urine output  Neuro: No focal weakness, tremors, imbalance, loss of sensation  Musculoskeletal: no joint pains, joint swelling  HEENT: no visual changes, swallowing difficulty, hearing loss  Skin: No rash  Psych: no delusions, hallucinations, sx of depression           Physical Exam:   General appearance: alert and oriented, in no acute distress  Head: Normocephalic, without obvious abnormality, atraumatic  Eyes: conjunctivae/corneas clear  PERRL, EOM's intact  Fundi benign  Ears: normal TM's and external ear canals both ears  Nose: Nares normal  Septum midline  Mucosa normal  No drainage or sinus tenderness  Throat: lips, mucosa, and tongue normal; teeth and gums normal  Neck: no adenopathy, no carotid bruit, no JVD, supple, symmetrical, trachea midline and thyroid not enlarged, symmetric, no tenderness/mass/nodules  Back: symmetric, no curvature  ROM normal  No CVA tenderness  Lungs: clear to auscultation bilaterally  Heart: regular rate and rhythm, S1, S2 normal, no murmur, click, rub or gallop  Abdomen: soft, non-tender; bowel sounds normal; no masses,  no organomegaly  Extremities: extremities normal, warm and well-perfused; no cyanosis, clubbing, or edema  Pulses: 2+ and symmetric    Lab Results: I have personally reviewed pertinent lab results  Imaging: I have personally reviewed pertinent reports      EKG: pending from today ordered by me

## 2018-07-11 NOTE — RESTORATIVE TECHNICIAN NOTE
Restorative Specialist Mobility Note       Activity: Bedrest, Dangle, Stand at bedside, Turn, Ambulate in room, Ambulate in agee     Assistive Device: Front wheel walker     Ambulation Response:  Tolerated fairly well  Repositioned: Sitting           Range of Motion: Active, Right leg, Left leg

## 2018-07-11 NOTE — PROGRESS NOTES
Patient went back into v-tach  Dr Janell Powers with Cardiology at bedside at this time  Patient able to break out of it on own  All lab work within normal limits  EKG completed  Will wait for further orders

## 2018-07-11 NOTE — SOCIAL WORK
Met with the patient to discuss discharge needs and recommendation for snf rehab  Patient agrees she has been having trouble managing at home but is uncertain about going to rehab  She consents to CM calling her sister, Mark Tatum, who is older than she but "in better shape and understands more"  Left snf list in room for patient to review  Called sister, [phone] and left message for her to all CM

## 2018-07-11 NOTE — RAPID RESPONSE
Progress Note - Rapid Response   Bunny Artis 69 y o  female MRN: [id number]    Time Called ( Time): 2:39  Date Called: 7/11/18  Room#: 80  Arrival Time ( Time): 2:41  Event End Time ( Time): 3:05  Primary reason for call: Acute change in HR  Interventions:  Airway/Breathing:  O2 Mask/Nasal  Circulation: EKG  Other Treatments: N/A       Assessment:   1  Sustained V-tach- asymptomatic  2  ROS was negative     Plan:   · Contacted cardiology  · Contacted primary team  · Will check electrolytes  · Increased amiodarone drip to 1        HPI/Chief Complaint (Background/Situation):   Bunny Artis is a 69y o  year old female with h/o Afib and chronic DHF  Patient is on amiodarone and eliquis for the Afib  At 2:40 am patient had asymptomatic sustained ventricular tachycardia and a rapid was called  On arrival patient was asymptomatic  Her electrolytes, CBC, INR will be rechecked  Historical Information   Past Medical History:   Diagnosis Date    A-fib [facility])     Acute respiratory disease     CHF (congestive heart failure) (HCC)     Chronic pain     Heart failure (HCC)     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[facility] [address] )     Hypertension     Narcotic dependence ([facility] [address] )      Past Surgical History:   Procedure Laterality Date    ERCP N/A 5/14/2018    Procedure: ENDOSCOPIC RETROGRADE CHOLANGIOPANCREATOGRAPHY (ERCP);   Surgeon: Daphne Payne MD;  Location:  MAIN OR;  Service: Gastroenterology     Social History   History   Alcohol Use No     History   Drug Use No     History   Smoking Status    Former Smoker   Smokeless Tobacco    Never Used         Meds/Allergies     Current Facility-Administered Medications:  acetaminophen 975 mg Oral Q8H [address] Raul Purdy DO    ALPRAZolam 0 25 mg Oral HS Carlos Bahena PA-C    amiodarone 1 mg/min Intravenous Continuous Montserrat Mosher DO Last Rate: 1 mg/min (07/11/18 0300)   amitriptyline 10 mg Oral HS Marika Schulz PA-C    apixaban 5 mg Oral BID Dick Etienne, DO    aspirin 81 mg Oral Daily Dick Etienne, DO    furosemide 40 mg Oral Daily Dick Etienne, DO    methocarbamol 500 mg Oral HS PRN Dick Etienne, DO    metoprolol 2 5 mg Intravenous Q6H PRN Emile Galdamez PA-C    metoprolol succinate 100 mg Oral Q12H [facility] Thania Eng MD    oxyCODONE 15 mg Oral Q6H PRN Wade Huston MD    polyethylene glycol 17 g Oral Daily Dick Etienne, DO          amiodarone 1 mg/min Last Rate: 1 mg/min (07/11/18 0300)       No Known Allergies    ROS: negative    Physical Exam   Constitutional: She is oriented to person, place, and time  She appears well-developed  HENT:   Head: Normocephalic and atraumatic  Eyes: Pupils are equal, round, and reactive to light  Neck: Normal range of motion  Cardiovascular: Regular rhythm  Tachycardia present  Pulmonary/Chest: Breath sounds normal    Abdominal: Soft  Bowel sounds are normal    Neurological: She is alert and oriented to person, place, and time  Skin: Skin is warm  Psychiatric: She has a normal mood and affect  Intake/Output Summary (Last 24 hours) at 07/11/18 0317  Last data filed at 07/10/18 2300   Gross per 24 hour   Intake            596 9 ml   Output             1400 ml   Net           -803 1 ml       Respiratory    Lab Data (Last 4 hours)    None         O2/Vent Data (Last 4 hours)    None              Invasive Devices     Peripheral Intravenous Line            Peripheral IV 07/11/18 Right Antecubital less than 1 day                DIAGNOSTIC DATA:    Lab: I have personally reviewed pertinent lab results     CBC:     Results from last 7 days  Lab Units 07/11/18  0251   WBC Thousand/uL 6 72   HEMOGLOBIN g/dL 11 4*   HEMATOCRIT % 36 8   PLATELETS Thousands/uL 212     CMP:     Results from last 7 days  Lab Units 07/11/18  0251 07/08/18  0459 07/07/18  0917   SODIUM mmol/L 137 138 137   POTASSIUM mmol/L 4 0 4 0 4 3   CHLORIDE mmol/L 104 107 106   CO2 mmol/L 31 27 24   BUN mg/dL 20 18 19   CREATININE mg/dL 0 96 0 83 0 83   CALCIUM mg/dL 8 4 8 5 9 3   TOTAL PROTEIN g/dL  --   --  7 3   BILIRUBIN TOTAL mg/dL  --   --  0 86   ALK PHOS U/L  --   --  78   ALT U/L  --   --  25   AST U/L  --   --  20   GLUCOSE RANDOM mg/dL 82 102 115     PT/INR:   No results found for: PT, INR,   Magnesium: No results found for: MAG,   Phosphorous:   Lab Results   Component Value Date    PHOS 2 7 07/11/2018       Microbiology:  Lab Results   Component Value Date    BLOODCX No Growth After 5 Days  05/13/2018    BLOODCX Staphylococcus coagulase negative (A) 05/13/2018         OUTCOME:   Stayed in room   Family member contacted: Cardiology will contact   Code Status: Level 3 - DNAR and DNI  Critical Care Time: Total Critical Care time spent 30 minutes excluding procedures, teaching and family updates

## 2018-07-11 NOTE — PROGRESS NOTES
Rounding completed with Dr Bella Andrews  Plan of care discussed with patient  At this time, patient will remain NPO until EP see's patient  Will continue to monitor

## 2018-07-11 NOTE — PHYSICAL THERAPY NOTE
Physical Therapy Progress Note     07/11/18 7045   Pain Assessment   Pain Assessment 0-10   Pain Score 9   Pain Rating: FLACC (Rest) - Face 0   Pain Rating: FLACC (Rest) - Legs 0   Pain Rating: FLACC (Rest) - Activity 0   Pain Rating: FLACC (Rest) - Cry 0   Pain Rating: FLACC (Rest) - Consolability 0   Score: FLACC (Rest) 0   Pain Rating: FLACC (Activity) - Face 1   Pain Rating: FLACC (Activity) - Legs 1   Pain Rating: FLACC (Activity) - Activity 0   Pain Rating: FLACC (Activity) - Cry 1   Pain Rating: FLACC (Activity) - Consolability 1   Score: FLACC (Activity) 4   Restrictions/Precautions   Other Precautions Cardiac/sternal;Fall Risk   General   Chart Reviewed Yes   Family/Caregiver Present No   Subjective   Subjective Pt presented to therapy supine in bed, calm & agreeable to treat  Pt complained of feeling achy all over today  Pt discussed thoughts of suicide and problems with home life throughout session  Nursing informed  Bed Mobility   Supine to Sit 5  Supervision   Additional items Assist x 1   Transfers   Sit to Stand 5  Supervision   Additional items Assist x 1; Increased time required   Stand to Sit 5  Supervision   Additional items Assist x 1; Increased time required   Ambulation/Elevation   Gait pattern Forward Flexion; Short stride  (quick pace)   Gait Assistance 4  Minimal assist   Additional items Assist x 1   Assistive Device Rolling walker   Distance 150', 50', 100'   Balance   Static Sitting Good   Static Standing Fair   Ambulatory Fair -   Endurance Deficit   Endurance Deficit Yes   Endurance Deficit Description fatigue, breaks required during ambulation   Activity Tolerance   Activity Tolerance Patient limited by fatigue;Patient limited by pain   Nurse Don Lino RN   Assessment   Prognosis Good   Problem List Decreased range of motion;Decreased endurance; Impaired balance;Decreased mobility; Impaired judgement;Decreased safety awareness;Pain   Assessment Pt performed transfers without assist today, but did require instructions for hand placement with sit to stand  Pt ambulated household distances with min A for safety and instructions for trunk extension and slower pacing to improve safety and decrease UE support  Pt demonstrated fair carryover  1 standing and 2 extended seated breaks required to recover during ambulation  Discussed POC and importance of physical activity with pt  Pt verbalized understanding  Pt will continue to benefit from therapy services to improve activity tolerance, balance, gait training, and safety awareness to maximize independence  Goals   Patient Goals to feel stronger and do more at home   STG Expiration Date 07/20/18   Treatment Day 1   Plan   Treatment/Interventions Functional transfer training; Therapeutic exercise; Endurance training;Patient/family training;Equipment eval/education; Bed mobility;Gait training   Progress Progressing toward goals   PT Frequency (3-5x/week)   Recommendation   Recommendation Post acute IP rehab   Equipment Recommended Conrad Forbes PTA

## 2018-07-11 NOTE — PROGRESS NOTES
Discussed with patient     Want to proceed with amiodarone for now  Then will think of other invasive procedures

## 2018-07-11 NOTE — SOCIAL WORK
Call received from sister, Jeimy Meadows, [phone] to discuss discharge needs  She agrees that patient needs snf rehab and has been trying to convince patient to no longer live alone  Sister would suggest [facility] or another snf in the [facility] area but decision needs to be up to patient  Sister reports patient has no history of inpatient psychiatric care

## 2018-07-11 NOTE — ASSESSMENT & PLAN NOTE
Overnight patient with episodes of V-tach, continue amiodarone drip, monitor electrolytes, cardiology input noted for possible cardiac catheterization likely tomorrow

## 2018-07-11 NOTE — PROGRESS NOTES
Progress Note - Dilshad Matthews [birth year], 69 y o  female MRN: [id number]    Unit/Bed#: [facility] 509-01 Encounter: [id number]    Primary Care Provider: Myriam Campa DO   Date and time admitted to hospital: 7/7/2018  9:08 AM        Monomorphic ventricular tachycardia (HCC)   Assessment & Plan    Overnight patient with episodes of V-tach, continue amiodarone drip, monitor electrolytes, cardiology input noted for possible cardiac catheterization likely tomorrow        * Atrial fibrillation with RVR ([address] )   Assessment & Plan    Presently on amiodarone drip, on Eliquis for anticoagulation, EP following          Acute on chronic systolic congestive heart failure (HCC)   Assessment & Plan    Continue Lasix, monitor I/O, less than 2 g salt diet, cardiology following        Ambulatory dysfunction   Assessment & Plan    Multifactorial  Safe ambulation fall precautions  Physical therapy        Memory loss   Assessment & Plan    Outpatient follow-up with Geriatrics  Monitor for delirium, sleep hygiene, reorientation        Polypharmacy   Assessment & Plan    Geriatric input noted        Insomnia   Assessment & Plan    Geriatric input noted, now on Xanax, amitriptyline being tapered            Chronic pain disorder   Assessment & Plan    Continue opioid analgesics              VTE Pharmacologic Prophylaxis:   Pharmacologic: Apixaban (Eliquis) on hold for cardiac catheterization per Cardiology  Mechanical VTE Prophylaxis in Place: Yes    Patient Centered Rounds: I have performed bedside rounds with nursing staff today  Discussions with Specialists or Other Care Team Provider:     Education and Discussions with Family / Patient: pt, called and left a voice message for sister Elise Powers over phone    Time Spent for Care: 30 minutes  More than 50% of total time spent on counseling and coordination of care as described above  Current Length of Stay: 4 day(s)    Current Patient Status: Inpatient   Certification Statement:  The patient will continue to require additional inpatient hospital stay due to As mentioned    Discharge Plan:     Code Status: Level 3 - DNAR and DNI      Subjective:     Reports feeling tired and fatigued  Overnight events noted    Objective:     Vitals:   Temp (24hrs), Av 8 °F (36 6 °C), Min:97 4 °F (36 3 °C), Max:98 1 °F (36 7 °C)    HR:  [55-67] 55  Resp:  [17-22] 20  BP: (115-136)/(52-73) 132/67  SpO2:  [93 %-95 %] 93 %  Body mass index is 25 35 kg/m²  Input and Output Summary (last 24 hours): Intake/Output Summary (Last 24 hours) at 18 1318  Last data filed at 18 1201   Gross per 24 hour   Intake           622 24 ml   Output             2500 ml   Net         -1877 76 ml       Physical Exam:     Physical Exam    Comfortably lying in bed  Neck supple  Lungs diminished breath sounds at bases  Heart sounds S1-S2 noted, irregular  Abdomen soft nontender  Awake obeys simple commands  No rash    Additional Data:     Labs:      Results from last 7 days  Lab Units 18  0251   WBC Thousand/uL 6 72   HEMOGLOBIN g/dL 11 4*   HEMATOCRIT % 36 8   PLATELETS Thousands/uL 212   NEUTROS PCT % 57   LYMPHS PCT % 25   MONOS PCT % 10   EOS PCT % 6       Results from last 7 days  Lab Units 18  0251  18  0917   SODIUM mmol/L 137  < > 137   POTASSIUM mmol/L 4 0  < > 4 3   CHLORIDE mmol/L 104  < > 106   CO2 mmol/L 31  < > 24   BUN mg/dL 20  < > 19   CREATININE mg/dL 0 96  < > 0 83   CALCIUM mg/dL 8 4  < > 9 3   TOTAL PROTEIN g/dL  --   --  7 3   BILIRUBIN TOTAL mg/dL  --   --  0 86   ALK PHOS U/L  --   --  78   ALT U/L  --   --  25   AST U/L  --   --  20   GLUCOSE RANDOM mg/dL 82  < > 115   < > = values in this interval not displayed  Results from last 7 days  Lab Units 18  0251   INR  1 24*                 * I Have Reviewed All Lab Data Listed Above  * Additional Pertinent Lab Tests Reviewed:  All Labs Within Last 24 Hours Reviewed    Imaging:    Imaging Reports Reviewed Today Include: Imaging Personally Reviewed by Myself Includes:     Recent Cultures (last 7 days):           Last 24 Hours Medication List:     Current Facility-Administered Medications:  acetaminophen 975 mg Oral Q8H Wilfredo Rapp DO    ALPRAZolam 0 25 mg Oral HS Ron Medley PA-C    amiodarone 0 5 mg/min Intravenous Continuous Phillip Delgado MD Last Rate: 0 5 mg/min (07/11/18 0437)   aspirin 81 mg Oral Daily Vicente Juliana, DO    furosemide 40 mg Oral Daily Vicente Juliana, DO    magnesium sulfate        methocarbamol 500 mg Oral HS PRN Vicente Andrews, DO    metoprolol 2 5 mg Intravenous Q6H PRN Parminder Ware PA-C    metoprolol succinate 100 mg Oral Q12H Yonis Rao MD    oxyCODONE 15 mg Oral Q6H PRN Xander Story MD    polyethylene glycol 17 g Oral Daily Vicente Andrews DO         Today, Patient Was Seen By: Waylon Lisa MD    ** Please Note: Dictation voice to text software may have been used in the creation of this document   **

## 2018-07-11 NOTE — PROGRESS NOTES
Progress Note - Bunny Artis 69 y o  female MRN: [id number]    Unit/Bed#: [facility] 509-01 Encounter: [id number]      Assessment/Plan  1  Polypharmacy  Patient's Elavil being held secondary to episode of V-tach  If benefits of stopping Elavil without taper outweigh risks of stopping Elavil without taper, proceed as ordered, will defer to Cardiology  Ideally patient would be tapered off of Elavil as abrupt discontinuation can cause withdrawal symptoms -- see taper suggestions below  Continue with Xanax to 0 25 mg q h s  Will reduce Elavil to 10 mg daily for next 5 days, then Elavil 10 mg every other day for 5 days, then stop  Goal would be to put patient on something such as mirtazapine 7 5 mg q h s  to help with sleep, with goal to stop the Xanax     2  Memory loss  Patient continues to be alert oriented x4  Patient reports difficulty with her short-term memory, scores 3/3 on delayed recall, unable to perform clock draw secondary to visual impairment, difficulty using hands  B12, folate unremarkable  Patient's TSH slightly elevated, free T4 normal  Patient will need formal cognitive assessment, this be done at [facility] for positive aging upon discharge     3  Difficulty with mobility/performing ADLs  Patient is essentially homebound, has recurrent falls  Spoke with [facility] about setting up home visits as outpatient, will need to set up after patient goes to rehab  Continue supportive care, PT, OT recommending rehab, agree  Ideally patient would be an assisted living facility, as patient has difficulty cooking for herself as well, etc     4  Insomnia  Recommendations remain the same  See 1  Will restart Xanax at 0 25 mg q h s , patient reports this is the only thing she has ever taken that has helped her sleep  Ideally patient will not be on this medication, 1st need to taper off of Elavil, see 1      5    Ambulatory dysfunction  Recommendations remain the same  Patient ambulates with assistive device at baseline  Consider adding vitamin D3 1000 international units daily patient's medication regimen  PT, OT     6   Delirium precautions  Agree with Tylenol 975 mg Q 8  Avoid Robaxin as can cause confusion in the elderly  Add Lidoderm patch if applicable  Consider reducing oxycodone to 5 mg p r n  Q 4  Continue with MiraLax daily for bowel regimen  Monitor for urinary retention, bladder scan/straight cath as needed  Redirect unwanted patient behaviors as first line tx  Reorient patient frequently  Avoid deliriogenic meds including tramadol, benzodiazepines, benadryl  Good sleep hygiene important, limit night time interruptions  Encourage patient to stay awake during the day  Ensure adequate hydration/nutrition  Mobilize often      7   AFib with RVR  Patient underwent cardioversion yesterday, but went back to SVT, may possibly undergo ablation  EP following    8  V-tach  Episode of V-tach overnight, Elavil discontinued by Cardiology  Patient agreeable to ablation, EP following        Subjective:   Episode of V-tach overnight  Patient reports she did not feel it  Reports she did not get much sleep as people were in out of her room  Spoke to patient about her medication adjustments that were made since the episode of V-tach, she understands  Patient is alert oriented x4  Patient denies fatigue, headaches, vision changes, chest pain, sob, N/V/D, constipation, difficulty with urination, muscle pain,  confusion  Objective:     Vitals: Blood pressure 129/67, pulse 55, temperature 97 6 °F (36 4 °C), temperature source Oral, resp  rate 20, height 5' 5" (1 651 m), weight 69 1 kg (152 lb 5 4 oz), SpO2 95 %  ,Body mass index is 25 35 kg/m²  Intake/Output Summary (Last 24 hours) at 07/11/18 1105  Last data filed at 07/11/18 0904   Gross per 24 hour   Intake           684 82 ml   Output             1900 ml   Net         -1215 18 ml       Physical Exam: General : WD in NAD  HEENT : MMM no erythema or exudates   EOMI, sclera anicteric  Heart : Normal rate  Lungs : CTA  Abdomen : Soft, NT/ND, BS auscultated in all 4 quads  No organomegaly  Ext :  Pulses +2/4 B/L  Neg edema B/L  Neg calf swelling B/L  Skin : Pink, warm, dry, age appropriate turgor and mobility  Neuro : Nonfocal  Psych : A * O x 4       Invasive Devices     Peripheral Intravenous Line            Peripheral IV 07/11/18 Right Antecubital less than 1 day                Lab, Imaging and other studies: I have personally reviewed pertinent reports      VTE Pharmacologic Prophylaxis: Sequential compression device (Venodyne)   VTE Mechanical Prophylaxis: sequential compression device

## 2018-07-11 NOTE — PROGRESS NOTES
Patient had 2 episodes of sustained V-tach overnight, with both episodes lasting approximately 30 seconds  Rapid response was called by the nurse the first time, patient was asymptomatic and hemodynamically stable during both of these episodes  The second episode  led to a burst of SVT upon termination  Both episodes of monomorphic Vtach were Initiated by R on T,were self-limited  Patient's amiodarone was initially increased to 1 mg/min  A subsequent EKG revealed QT/ QTc of 480/492 at a heart rate of 63 BPM, amiodarone was then brought down to 0 5 mg/min,  2 G magnesium sulfate was given, electrolytes checked prior to repletion revealed potassium of 4, magnesium of 2 1    -gave metoprolol 5 mg IV to suppress the PVCs  -patient received amitriptyline at 9:00 p m  and duloxetine in the a m Yesterday  Would hold them both for now to avoid further QT prolongation    -continue amiodarone at 0 5 mg/minutes  -Replete to keep K > 4, Mg> 2    -She 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

## 2018-07-11 NOTE — OCCUPATIONAL THERAPY NOTE
Occupational Therapy Treatment Note       07/11/18 1228   Restrictions/Precautions   Other Precautions Cardiac/sternal   Lifestyle   Autonomy Lives alone, MI ADL with RW but gradual decline in abilty to care for self x 3 years, worse past few months  Orders out "chinese mostly"  Does not drive, limited access groceries (sister used to help but has no longer medically able) , reports no more clean clothes as unable to safely access laundry in basement  Mostly homebound, sedentary  Pain Assessment   Pain Assessment 0-10   Pain Score No Pain   ADL   Where Assessed Standing at sink   Grooming Assistance 5  Supervision/Setup   Grooming Deficit Increased time to complete;Setup   LB Dressing Assistance 4  Minimal Assistance   LB Dressing Deficit Thread RLE into pants; Thread LLE into pants   Transfers   Sit to Stand 4  Minimal assistance   Additional items Assist x 1; Increased time required   Stand to Sit 5  Supervision   Functional Mobility   Functional Mobility 5  Supervision   Cognition   Overall Cognitive Status Impaired   Arousal/Participation Alert; Cooperative   Attention Attends with cues to redirect   Orientation Level Oriented X4   Memory Decreased recall of recent events;Decreased recall of precautions  (reports recent forgetfulness)   Following Commands Follows one step commands with increased time or repetition   Assessment   Assessment Pt participated in occupational therapy with focus on activity tolerance, standing balance and tolerance for pt engagement in functional self-care task/grooming and LB self-care/dressing  Pt cleared by RN for pt participation in occupational therapy  Pt received sitting edge of pt bed  and agreeable to therapy following pt Identifiers confirmed  Pt reported lives alone and goal is to get stronger to be able to take care of herself  Pt noted to fatigue with grooming in stance and required one seated rest break  Pt however recovered well   Pt will benefit from in-pt rehab to continue to address pt noted deficits with decreased overall strength and activity tolerance which currently impair pt ADL and functional mob     Plan   Treatment Interventions ADL retraining   Goal Expiration Date 07/23/18   Treatment Day 2   OT Frequency 3-5x/wk   Recommendation   OT Discharge Recommendation Short Term Rehab   Barthel Index   Feeding 10   Bathing 0   Grooming Score 5   Dressing Score 5   Bladder Score 10   Bowels Score 10   Toilet Use Score 5   Transfers (Bed/Chair) Score 10   Mobility (Level Surface) Score 10   Stairs Score 0   Barthel Index Score 65   Modified [place] Scale   Modified [place] Scale 4     Halima Burgos  GARCIA/L

## 2018-07-12 ENCOUNTER — APPOINTMENT (INPATIENT)
Dept: NON INVASIVE DIAGNOSTICS | Facility: HOSPITAL | Age: 78
DRG: 273 | End: 2018-07-12
Payer: MEDICARE

## 2018-07-12 DIAGNOSIS — F32.89 OTHER DEPRESSION: Chronic | ICD-10-CM

## 2018-07-12 LAB
ATRIAL RATE: 59 BPM
ATRIAL RATE: 63 BPM
KCT BLD-ACNC: 316 SEC (ref 89–137)
P AXIS: 66 DEGREES
P AXIS: 87 DEGREES
PR INTERVAL: 172 MS
PR INTERVAL: 178 MS
QRS AXIS: 27 DEGREES
QRS AXIS: 9 DEGREES
QRSD INTERVAL: 104 MS
QRSD INTERVAL: 92 MS
QT INTERVAL: 522 MS
QT INTERVAL: 524 MS
QTC INTERVAL: 516 MS
QTC INTERVAL: 536 MS
SPECIMEN SOURCE: ABNORMAL
T WAVE AXIS: 20 DEGREES
T WAVE AXIS: 42 DEGREES
VENTRICULAR RATE: 59 BPM
VENTRICULAR RATE: 63 BPM

## 2018-07-12 PROCEDURE — C9600 PERC DRUG-EL COR STENT SING: HCPCS | Performed by: PHYSICIAN ASSISTANT

## 2018-07-12 PROCEDURE — C1887 CATHETER, GUIDING: HCPCS | Performed by: PHYSICIAN ASSISTANT

## 2018-07-12 PROCEDURE — C1769 GUIDE WIRE: HCPCS | Performed by: PHYSICIAN ASSISTANT

## 2018-07-12 PROCEDURE — 93458 L HRT ARTERY/VENTRICLE ANGIO: CPT | Performed by: INTERNAL MEDICINE

## 2018-07-12 PROCEDURE — 85347 COAGULATION TIME ACTIVATED: CPT

## 2018-07-12 PROCEDURE — C1894 INTRO/SHEATH, NON-LASER: HCPCS | Performed by: PHYSICIAN ASSISTANT

## 2018-07-12 PROCEDURE — 93010 ELECTROCARDIOGRAM REPORT: CPT | Performed by: INTERNAL MEDICINE

## 2018-07-12 PROCEDURE — 99232 SBSQ HOSP IP/OBS MODERATE 35: CPT | Performed by: PHYSICIAN ASSISTANT

## 2018-07-12 PROCEDURE — 99232 SBSQ HOSP IP/OBS MODERATE 35: CPT | Performed by: INTERNAL MEDICINE

## 2018-07-12 PROCEDURE — 99152 MOD SED SAME PHYS/QHP 5/>YRS: CPT | Performed by: PHYSICIAN ASSISTANT

## 2018-07-12 PROCEDURE — 027034Z DILATION OF CORONARY ARTERY, ONE ARTERY WITH DRUG-ELUTING INTRALUMINAL DEVICE, PERCUTANEOUS APPROACH: ICD-10-PCS | Performed by: INTERNAL MEDICINE

## 2018-07-12 PROCEDURE — C1725 CATH, TRANSLUMIN NON-LASER: HCPCS | Performed by: PHYSICIAN ASSISTANT

## 2018-07-12 PROCEDURE — C1874 STENT, COATED/COV W/DEL SYS: HCPCS

## 2018-07-12 PROCEDURE — B2111ZZ FLUOROSCOPY OF MULTIPLE CORONARY ARTERIES USING LOW OSMOLAR CONTRAST: ICD-10-PCS | Performed by: INTERNAL MEDICINE

## 2018-07-12 PROCEDURE — C1760 CLOSURE DEV, VASC: HCPCS | Performed by: PHYSICIAN ASSISTANT

## 2018-07-12 PROCEDURE — 4A023N7 MEASUREMENT OF CARDIAC SAMPLING AND PRESSURE, LEFT HEART, PERCUTANEOUS APPROACH: ICD-10-PCS | Performed by: INTERNAL MEDICINE

## 2018-07-12 PROCEDURE — 99153 MOD SED SAME PHYS/QHP EA: CPT | Performed by: PHYSICIAN ASSISTANT

## 2018-07-12 PROCEDURE — 99152 MOD SED SAME PHYS/QHP 5/>YRS: CPT | Performed by: INTERNAL MEDICINE

## 2018-07-12 PROCEDURE — 92928 PRQ TCAT PLMT NTRAC ST 1 LES: CPT | Performed by: INTERNAL MEDICINE

## 2018-07-12 PROCEDURE — 93458 L HRT ARTERY/VENTRICLE ANGIO: CPT | Performed by: PHYSICIAN ASSISTANT

## 2018-07-12 RX ORDER — LIDOCAINE HYDROCHLORIDE 10 MG/ML
INJECTION, SOLUTION INFILTRATION; PERINEURAL CODE/TRAUMA/SEDATION MEDICATION
Status: COMPLETED | OUTPATIENT
Start: 2018-07-12 | End: 2018-07-12

## 2018-07-12 RX ORDER — LACTULOSE 20 G/30ML
20 SOLUTION ORAL 2 TIMES DAILY
Status: DISCONTINUED | OUTPATIENT
Start: 2018-07-12 | End: 2018-07-20 | Stop reason: HOSPADM

## 2018-07-12 RX ORDER — DULOXETIN HYDROCHLORIDE 30 MG/1
CAPSULE, DELAYED RELEASE ORAL
Qty: 30 CAPSULE | Refills: 0 | OUTPATIENT
Start: 2018-07-12

## 2018-07-12 RX ORDER — NITROGLYCERIN 0.4 MG/1
0.4 TABLET SUBLINGUAL
Status: DISCONTINUED | OUTPATIENT
Start: 2018-07-12 | End: 2018-07-20 | Stop reason: HOSPADM

## 2018-07-12 RX ORDER — AMOXICILLIN 250 MG
1 CAPSULE ORAL
Status: DISCONTINUED | OUTPATIENT
Start: 2018-07-12 | End: 2018-07-20 | Stop reason: HOSPADM

## 2018-07-12 RX ORDER — ASPIRIN 81 MG/1
TABLET, CHEWABLE ORAL CODE/TRAUMA/SEDATION MEDICATION
Status: COMPLETED | OUTPATIENT
Start: 2018-07-12 | End: 2018-07-12

## 2018-07-12 RX ORDER — PRASUGREL 10 MG/1
10 TABLET, FILM COATED ORAL DAILY
Status: DISCONTINUED | OUTPATIENT
Start: 2018-07-13 | End: 2018-07-17

## 2018-07-12 RX ORDER — NITROGLYCERIN 20 MG/100ML
INJECTION INTRAVENOUS CODE/TRAUMA/SEDATION MEDICATION
Status: COMPLETED | OUTPATIENT
Start: 2018-07-12 | End: 2018-07-12

## 2018-07-12 RX ORDER — HEPARIN SODIUM 1000 [USP'U]/ML
INJECTION, SOLUTION INTRAVENOUS; SUBCUTANEOUS CODE/TRAUMA/SEDATION MEDICATION
Status: COMPLETED | OUTPATIENT
Start: 2018-07-12 | End: 2018-07-12

## 2018-07-12 RX ORDER — MIRTAZAPINE 15 MG/1
7.5 TABLET, FILM COATED ORAL
Status: DISCONTINUED | OUTPATIENT
Start: 2018-07-12 | End: 2018-07-13

## 2018-07-12 RX ORDER — MIDAZOLAM HYDROCHLORIDE 1 MG/ML
INJECTION INTRAMUSCULAR; INTRAVENOUS CODE/TRAUMA/SEDATION MEDICATION
Status: COMPLETED | OUTPATIENT
Start: 2018-07-12 | End: 2018-07-12

## 2018-07-12 RX ORDER — AMIODARONE HYDROCHLORIDE 200 MG/1
200 TABLET ORAL
Status: DISCONTINUED | OUTPATIENT
Start: 2018-07-12 | End: 2018-07-13

## 2018-07-12 RX ORDER — SODIUM CHLORIDE 9 MG/ML
INJECTION, SOLUTION INTRAVENOUS
Status: COMPLETED | OUTPATIENT
Start: 2018-07-12 | End: 2018-07-12

## 2018-07-12 RX ORDER — SODIUM CHLORIDE 9 MG/ML
100 INJECTION, SOLUTION INTRAVENOUS CONTINUOUS
Status: DISPENSED | OUTPATIENT
Start: 2018-07-12 | End: 2018-07-12

## 2018-07-12 RX ORDER — PRASUGREL 10 MG/1
TABLET, FILM COATED ORAL CODE/TRAUMA/SEDATION MEDICATION
Status: COMPLETED | OUTPATIENT
Start: 2018-07-12 | End: 2018-07-12

## 2018-07-12 RX ORDER — FENTANYL CITRATE 50 UG/ML
INJECTION, SOLUTION INTRAMUSCULAR; INTRAVENOUS CODE/TRAUMA/SEDATION MEDICATION
Status: COMPLETED | OUTPATIENT
Start: 2018-07-12 | End: 2018-07-12

## 2018-07-12 RX ORDER — AMITRIPTYLINE HYDROCHLORIDE 25 MG/1
TABLET, FILM COATED ORAL
Qty: 30 TABLET | Refills: 0 | OUTPATIENT
Start: 2018-07-12

## 2018-07-12 RX ADMIN — FUROSEMIDE 40 MG: 40 TABLET ORAL at 11:48

## 2018-07-12 RX ADMIN — SENNOSIDES AND DOCUSATE SODIUM 1 TABLET: 8.6; 5 TABLET ORAL at 21:04

## 2018-07-12 RX ADMIN — MIRTAZAPINE 7.5 MG: 15 TABLET, FILM COATED ORAL at 21:04

## 2018-07-12 RX ADMIN — LACTULOSE 20 G: 20 SOLUTION ORAL at 11:48

## 2018-07-12 RX ADMIN — FENTANYL CITRATE 50 MCG: 50 INJECTION, SOLUTION INTRAMUSCULAR; INTRAVENOUS at 08:40

## 2018-07-12 RX ADMIN — ACETAMINOPHEN 975 MG: 325 TABLET, FILM COATED ORAL at 21:02

## 2018-07-12 RX ADMIN — MIDAZOLAM 1 MG: 1 INJECTION INTRAMUSCULAR; INTRAVENOUS at 08:40

## 2018-07-12 RX ADMIN — LIDOCAINE HYDROCHLORIDE 1 ML: 10 INJECTION, SOLUTION INFILTRATION; PERINEURAL at 08:40

## 2018-07-12 RX ADMIN — FENTANYL CITRATE 50 MCG: 50 INJECTION, SOLUTION INTRAMUSCULAR; INTRAVENOUS at 08:51

## 2018-07-12 RX ADMIN — AMIODARONE HYDROCHLORIDE 200 MG: 200 TABLET ORAL at 17:42

## 2018-07-12 RX ADMIN — OXYCODONE HYDROCHLORIDE 15 MG: 5 TABLET ORAL at 05:36

## 2018-07-12 RX ADMIN — HEPARIN SODIUM 7000 UNITS: 1000 INJECTION INTRAVENOUS; SUBCUTANEOUS at 09:00

## 2018-07-12 RX ADMIN — NITROGLYCERIN 1 INCH: 20 OINTMENT TOPICAL at 09:05

## 2018-07-12 RX ADMIN — OXYCODONE HYDROCHLORIDE 15 MG: 5 TABLET ORAL at 19:36

## 2018-07-12 RX ADMIN — ASPIRIN 324 MG: 81 TABLET, CHEWABLE ORAL at 08:22

## 2018-07-12 RX ADMIN — MIDAZOLAM 1 MG: 1 INJECTION INTRAMUSCULAR; INTRAVENOUS at 08:34

## 2018-07-12 RX ADMIN — SODIUM CHLORIDE 100 ML/HR: 0.9 INJECTION, SOLUTION INTRAVENOUS at 08:21

## 2018-07-12 RX ADMIN — LACTULOSE 20 G: 20 SOLUTION ORAL at 17:42

## 2018-07-12 RX ADMIN — FENTANYL CITRATE 50 MCG: 50 INJECTION, SOLUTION INTRAMUSCULAR; INTRAVENOUS at 08:33

## 2018-07-12 RX ADMIN — LIDOCAINE HYDROCHLORIDE 9 ML: 10 INJECTION, SOLUTION INFILTRATION; PERINEURAL at 08:51

## 2018-07-12 RX ADMIN — ACETAMINOPHEN 975 MG: 325 TABLET, FILM COATED ORAL at 14:44

## 2018-07-12 RX ADMIN — METOPROLOL SUCCINATE 100 MG: 100 TABLET, EXTENDED RELEASE ORAL at 20:57

## 2018-07-12 RX ADMIN — METOPROLOL SUCCINATE 100 MG: 100 TABLET, EXTENDED RELEASE ORAL at 11:48

## 2018-07-12 RX ADMIN — MIDAZOLAM 1 MG: 1 INJECTION INTRAMUSCULAR; INTRAVENOUS at 08:51

## 2018-07-12 RX ADMIN — ALPRAZOLAM 0.25 MG: 0.25 TABLET ORAL at 21:01

## 2018-07-12 RX ADMIN — FENTANYL CITRATE 50 MCG: 50 INJECTION, SOLUTION INTRAMUSCULAR; INTRAVENOUS at 09:31

## 2018-07-12 RX ADMIN — PRASUGREL HYDROCHLORIDE 60 MG: 10 TABLET, FILM COATED ORAL at 09:01

## 2018-07-12 RX ADMIN — NITROGLYCERIN 200 MCG: 20 INJECTION INTRAVENOUS at 09:10

## 2018-07-12 RX ADMIN — IOHEXOL 125 ML: 350 INJECTION, SOLUTION INTRAVENOUS at 09:34

## 2018-07-12 RX ADMIN — POLYETHYLENE GLYCOL 3350 17 G: 17 POWDER, FOR SOLUTION ORAL at 11:49

## 2018-07-12 NOTE — PHYSICAL THERAPY NOTE
Physical Therapy Cancellation Note- pt currently on bedrest  S/p cardiac cath this AM  Will follow   Reji Sargent, PT

## 2018-07-12 NOTE — PROGRESS NOTES
07/12/18 1010   Clinical Encounter Type   Visited With Patient   Routine Visit Follow-up   Patient Spiritual Encounters   Spiritual Assessment 4   Coping 4

## 2018-07-12 NOTE — PROGRESS NOTES
07/12/18 [address] witness   Spiritual Beliefs/Perceptions   Concept of God Accepting   God's Role in Disease God's will   Relationship with God Close   Support Systems Friends/neighbors   Coping Responses   Patient Coping Accepting   Plan of Care   Comments listened empathetically emotional resources utilized   Assessment Completed by: Unit visit

## 2018-07-12 NOTE — PROGRESS NOTES
Progress Note - Fantasma Najera [birth year], 69 y o  female MRN: [id number]    Unit/Bed#: [facility] 509-01 Encounter: [id number]    Primary Care Provider: Soo Willis DO   Date and time admitted to hospital: 7/7/2018  9:08 AM        Monomorphic ventricular tachycardia [facility])   Assessment & Plan    Cardiology following, monitor        * Atrial fibrillation with RVR ([address] )   Assessment & Plan    Continue metoprolol  Eliquis on hold due to cardiac catheterization  EP following          Acute on chronic systolic congestive heart failure (HCC)   Assessment & Plan    Continue Lasix, monitor I/O, less than 2 g salt diet, cardiology following        Insomnia   Assessment & Plan    Geriatric input noted, now on Xanax, amitriptyline discontinued          Ambulatory dysfunction   Assessment & Plan    Multifactorial  Safe ambulation fall precautions  Physical therapy        Memory loss   Assessment & Plan    Outpatient follow-up with Geriatrics  Monitor for delirium, sleep hygiene, reorientation        Polypharmacy   Assessment & Plan    Geriatric input noted        Chronic pain disorder   Assessment & Plan    Continue opioid analgesics              VTE Pharmacologic Prophylaxis:   Pharmacologic: On hold for cardiac procedure  Mechanical VTE Prophylaxis in Place: Yes    Patient Centered Rounds: I have performed bedside rounds with nursing staff today  Discussions with Specialists or Other Care Team Provider:     Education and Discussions with Family / Patient:  Discussed with the patient    Time Spent for Care: 30 minutes  More than 50% of total time spent on counseling and coordination of care as described above      Current Length of Stay: 5 day(s)    Current Patient Status: Inpatient   Certification Statement: The patient will continue to require additional inpatient hospital stay due to As mentioned    Discharge Plan:  CHF AFib V-tach per Cardiology    Code Status: Level 3 - DNAR and DNI      Subjective:     Reports feeling ruperto  Reports constipation    Objective:     Vitals:   Temp (24hrs), Av 7 °F (36 5 °C), Min:97 6 °F (36 4 °C), Max:97 8 °F (36 6 °C)    HR:  [51-66] 66  Resp:  [18-20] 20  BP: (103-152)/(55-91) 120/91  SpO2:  [90 %-96 %] 90 %  Body mass index is 25 35 kg/m²  Input and Output Summary (last 24 hours): Intake/Output Summary (Last 24 hours) at 18 1438  Last data filed at 18 1236   Gross per 24 hour   Intake            691 8 ml   Output             1900 ml   Net          -1208 2 ml       Physical Exam:     Physical Exam    Comfortably sitting up in chair  Neck supple  Lungs diminished breath sounds at bases  Heart sounds S1 and S2 noted  Abdomen soft  Awake obeys simple commands  No rash    Additional Data:     Labs:      Results from last 7 days  Lab Units 18  0251   WBC Thousand/uL 6 72   HEMOGLOBIN g/dL 11 4*   HEMATOCRIT % 36 8   PLATELETS Thousands/uL 212   NEUTROS PCT % 57   LYMPHS PCT % 25   MONOS PCT % 10   EOS PCT % 6       Results from last 7 days  Lab Units 18  0251  18  0917   SODIUM mmol/L 137  < > 137   POTASSIUM mmol/L 4 0  < > 4 3   CHLORIDE mmol/L 104  < > 106   CO2 mmol/L 31  < > 24   BUN mg/dL 20  < > 19   CREATININE mg/dL 0 96  < > 0 83   CALCIUM mg/dL 8 4  < > 9 3   TOTAL PROTEIN g/dL  --   --  7 3   BILIRUBIN TOTAL mg/dL  --   --  0 86   ALK PHOS U/L  --   --  78   ALT U/L  --   --  25   AST U/L  --   --  20   GLUCOSE RANDOM mg/dL 82  < > 115   < > = values in this interval not displayed  Results from last 7 days  Lab Units 18  0251   INR  1 24*                 * I Have Reviewed All Lab Data Listed Above  * Additional Pertinent Lab Tests Reviewed:  All Labs Within Last 24 Hours Reviewed    Imaging:    Imaging Reports Reviewed Today Include:   Imaging Personally Reviewed by Myself Includes:     Recent Cultures (last 7 days):           Last 24 Hours Medication List:     Current Facility-Administered Medications:  acetaminophen 975 mg Oral [facility] Piper Howard,     ALPRAZolam 0 25 mg Oral HS Chepe Wei PA-C    amiodarone 0 5 mg/min Intravenous Continuous Mazin Franks MD Last Rate: 0 5 mg/min (07/11/18 1352)   aspirin 81 mg Oral Daily Piper Howard, DO    furosemide 40 mg Oral Daily Pipergal Howard, DO    lactulose 20 g Oral BID Gustabo Nava MD    methocarbamol 500 mg Oral HS PRN Piper Howard,     metoprolol 2 5 mg Intravenous Q6H PRN Reinaldo Lyles PA-C    metoprolol succinate 100 mg Oral Q12H Rodney Bonds MD    mirtazapine 7 5 mg Oral HS Chepe Wei PA-C    nitroglycerin 0 4 mg Sublingual Q5 Min PRN Ulises Rose MD    oxyCODONE 15 mg Oral Q6H PRN Kenneth Drew MD    polyethylene glycol 17 g Oral Daily Piper Howard DO    [START ON 7/13/2018] prasugrel 10 mg Oral Daily Ulises Rose MD    senna-docusate sodium 1 tablet Oral HS Chepe Wei PA-C    sodium chloride 100 mL/hr Intravenous Continuous Ulises Rose MD         Today, Patient Was Seen By: Gustabo Nava MD    ** Please Note: Dictation voice to text software may have been used in the creation of this document   **

## 2018-07-12 NOTE — ASSESSMENT & PLAN NOTE
A: currently euvolemic and well perfused, last echo had an EF of 50%, thought this was in may    P:    Estimate EF w ventriculogram on cath today  Continue lasix  Monitor electrolytes

## 2018-07-12 NOTE — RESTORATIVE TECHNICIAN NOTE
Restorative Specialist Mobility Note       Activity: Bedrest, Dangle, Stand at bedside, Turn, Chair     Assistive Device: None     Ambulation Response:  Tolerated fairly well  Repositioned: Sitting, Up in chair           Range of Motion: Active, Right leg, Left leg

## 2018-07-12 NOTE — ASSESSMENT & PLAN NOTE
A: Currently in sinus rhythm on amiodarone       Plan:    - Can continue amiodarone for now  - It is safe to use amio despite QT prolongation   - Do not use other QT prolonging medications  - Will hold off on invasive rx and attempt to first elucidate the cause of vtach  - for left heart catheterization today at the

## 2018-07-12 NOTE — PROGRESS NOTES
Cardiac Electrphysiology    Progress Note - rAa Cooney [birth year], 69 y o  female MRN: [id number]    Unit/Bed#: [facility] 509-01 Encounter: [id number]    Primary Care Provider: Raul Stanley DO   Date and time admitted to hospital: 7/7/2018  9:08 AM        Monomorphic ventricular tachycardia [facility])   Assessment & Plan    A: Vtach due to uncertain etiology  R on T seen but does not have the appearance of torsades  P:    - Will order LHC to r/o CAD/scar causing tachycardia  - Hold Eliquis for LHC today, then resume post catheterization   -resume diet post catheterization  - Keep Mg>2, K>4  - Continue amiodarone-can switch to oral  - Discontinue amitriptyline        Acute on chronic systolic congestive heart failure (HCC)   Assessment & Plan    A: currently euvolemic and well perfused, last echo had an EF of 50%, thought this was in may    P:    Estimate EF w ventriculogram on cath today  Continue lasix  Monitor electrolytes          * Atrial fibrillation with RVR ([facility])   Assessment & Plan    A: Currently in sinus rhythm on amiodarone  Plan:    - Can continue amiodarone for now  - It is safe to use amio despite QT prolongation   - Do not use other QT prolonging medications  - Will hold off on invasive rx and attempt to first elucidate the cause of vtach  - for left heart catheterization today at the                    Subjective/Objective        Subjective: Had 2 runs of 30 seconds of Vtach the night before last  Was asymptomatic throughout both episodes, currently asymptomatic as well  Had a run of wide complex tachycardia last night as well  However yesterday is rhythm appeared to be atrial fibrillation with aberrancy           Vitals: /67   Pulse 56   Temp 97 7 °F (36 5 °C) (Oral)   Resp 20   Ht 5' 5" (1 651 m)   Wt 69 1 kg (152 lb 5 4 oz)   SpO2 96%   BMI 25 35 kg/m²   Vitals:    07/09/18 0518 07/10/18 0442   Weight: 68 kg (149 lb 14 6 oz) 69 1 kg (152 lb 5 4 oz)     Orthostatic Blood Pressures Most Recent Value   Blood Pressure  144/67 filed at 07/12/2018 0700   Patient Position - Orthostatic VS  Lying filed at 07/12/2018 6090            Intake/Output Summary (Last 24 hours) at 07/12/18 1011  Last data filed at 07/12/18 0601   Gross per 24 hour   Intake           849 22 ml   Output             2500 ml   Net         -1650 78 ml       Invasive Devices     Peripheral Intravenous Line            Peripheral IV 07/11/18 Right Forearm less than 1 day    Peripheral IV 07/12/18 Left Arm less than 1 day                Review of Systems:   Cardiovascular: no chest pain, shortness of breath, palpitations, orthopnea, PND  Pulmonary: no dyspnea, cough  GI: no anorexia, abdominal pain, weight loss  Endocrine: no polyuria, polydypspsia, polyuria  : no dysuria, urinary retention, decreased urine output  Neuro: No focal weakness, tremors, imbalance, loss of sensation  Musculoskeletal: no joint pains, joint swelling  HEENT: no visual changes, swallowing difficulty, hearing loss  Skin: No rash  Psych: no delusions, hallucinations, sx of depression           Physical Exam:   General appearance: alert and oriented, in no acute distress  Head: Normocephalic, without obvious abnormality, atraumatic  Eyes: conjunctivae/corneas clear  PERRL, EOM's intact  Fundi benign  Ears: normal TM's and external ear canals both ears  Nose: Nares normal  Septum midline  Mucosa normal  No drainage or sinus tenderness  Throat: lips, mucosa, and tongue normal; teeth and gums normal  Neck: no adenopathy, no carotid bruit, no JVD, supple, symmetrical, trachea midline and thyroid not enlarged, symmetric, no tenderness/mass/nodules  Back: symmetric, no curvature  ROM normal  No CVA tenderness    Lungs: clear to auscultation bilaterally  Heart: regular rate and rhythm, S1, S2 normal, no murmur, click, rub or gallop  Abdomen: soft, non-tender; bowel sounds normal; no masses,  no organomegaly  Extremities: extremities normal, warm and well-perfused; no cyanosis, clubbing, or edema  Pulses: 2+ and symmetric    Lab Results: I have personally reviewed pertinent lab results  Imaging: I have personally reviewed pertinent reports      EKG: pending from today ordered by me

## 2018-07-12 NOTE — PROGRESS NOTES
Progress Note - Rebecca Manzano 69 y o  female MRN: [id number]    Unit/Bed#: [facility] 509-01 Encounter: [id number]      Assessment/Plan  1  Polypharmacy  Elavil stopped  Continue with Xanax to 0 25 mg q h s      2   Memory loss  Patient continues to be alert oriented x4, no signs of delirium, converses appropriately, just got back from cath  Patient reports difficulty with her short-term memory, scores 3/3 on delayed recall, unable to perform clock draw secondary to visual impairment, difficulty using hands  B12, folate unremarkable  Patient's TSH slightly elevated, free T4 normal  Patient will need formal cognitive assessment, this be done at [facility] upon discharge     3  Difficulty with mobility/performing ADLs  Patient is essentially homebound, has recurrent falls  [facility] with [facility] about setting up home visits as outpatient, will need to set up after patient goes to rehab  Continue supportive care, PT, OT recommending rehab, agree  Ideally patient would be an assisted living facility, as patient has difficulty cooking for herself as well, etc     4  Insomnia  Will add mirtazapine 7 5 mg q h s  See 1  Continue with Xanax at 0 25 mg q h s , patient reports this is the only thing she has ever taken that has helped her sleep  Ideally patient will not be on this medication, would encourage p r n  usage after 1 week with mirtazapine on board     5  Ambulatory dysfunction  Recommendations remain the same  Patient ambulates with assistive device at baseline  Consider adding vitamin D3 1000 international units daily patient's medication regimen  PT, OT recommending rehab, agree, case management working with sister on rehab placement     6    Delirium precautions  Agree with Tylenol 975 mg Q 8  Avoid Robaxin as can cause confusion in the elderly  Add Lidoderm patch if applicable  Consider reducing oxycodone to 5 mg p r n  Q 4  Continue with MiraLax daily for bowel regimen  Monitor for urinary retention, bladder scan/straight cath as needed  Redirect unwanted patient behaviors as first line tx  Reorient patient frequently  Avoid deliriogenic meds including tramadol, benzodiazepines, benadryl  Good sleep hygiene important, limit night time interruptions  Encourage patient to stay awake during the day  Ensure adequate hydration/nutrition  Mobilize often      7   AFib with RVR  Patient underwent cardioversion yesterday, but went back to SVT, may possibly undergo ablation  EP following    8  V-tach  Episode of V-tach overnight, Elavil discontinued by Cardiology  Patient agreeable to ablation, EP following  Cardiac cath today (7/12)     9   Constipation  Continue with MiraLax daily  Will add Senokot S daily     Subjective:   Patient just got back from cath lab  Patient is alert oriented x4  Patient reports constipation  Patient reports she got 2 hrs of interrupted sleep last night which she was excited about  Patient denies fatigue, headaches, vision changes, chest pain, sob, N/V/D,difficulty with urination, muscle pain, anxiousness, depression, confusion  Objective:     Vitals: Blood pressure 144/67, pulse 56, temperature 97 7 °F (36 5 °C), temperature source Oral, resp  rate 20, height 5' 5" (1 651 m), weight 69 1 kg (152 lb 5 4 oz), SpO2 96 %  ,Body mass index is 25 35 kg/m²  Intake/Output Summary (Last 24 hours) at 07/12/18 1009  Last data filed at 07/12/18 0601   Gross per 24 hour   Intake           849 22 ml   Output             2500 ml   Net         -1650 78 ml       Physical Exam: General : WD in NAD  HEENT : MMM no erythema or exudates  EOMI, sclera anicteric  Heart : Normal rate  Lungs : CTA  Abdomen : Soft, NT/ND, BS auscultated in all 4 quads  No organomegaly  Ext :  Pulses +2/4 B/L  Neg edema B/L   Neg calf swelling B/L  Skin : Pink, warm, dry, age appropriate turgor and mobility  Neuro : Nonfocal  Psych : A * O x 4, no signs of delirium s/p cath       Invasive Devices Peripheral Intravenous Line            Peripheral IV 07/11/18 Right Forearm less than 1 day    Peripheral IV 07/12/18 Left Arm less than 1 day                Lab, Imaging and other studies: I have personally reviewed pertinent reports      VTE Pharmacologic Prophylaxis: Sequential compression device (Venodyne)   VTE Mechanical Prophylaxis: sequential compression device

## 2018-07-12 NOTE — ASSESSMENT & PLAN NOTE
A: Vtach due to uncertain etiology  R on T seen but does not have the appearance of torsades       P:    - Will order LHC to r/o CAD/scar causing tachycardia  - Hold Eliquis for LHC today, then resume post catheterization   -resume diet post catheterization  - Keep Mg>2, K>4  - Continue amiodarone-can switch to oral  - Discontinue amitriptyline

## 2018-07-13 DIAGNOSIS — F32.89 OTHER DEPRESSION: Chronic | ICD-10-CM

## 2018-07-13 PROBLEM — R94.31 PROLONGED Q-T INTERVAL ON ECG: Status: ACTIVE | Noted: 2018-07-13

## 2018-07-13 PROBLEM — I48.92 ATRIAL FLUTTER (HCC): Status: ACTIVE | Noted: 2018-07-13

## 2018-07-13 LAB
ANION GAP SERPL CALCULATED.3IONS-SCNC: 6 MMOL/L (ref 4–13)
BUN SERPL-MCNC: 11 MG/DL (ref 5–25)
CALCIUM SERPL-MCNC: 8.8 MG/DL (ref 8.3–10.1)
CHLORIDE SERPL-SCNC: 107 MMOL/L (ref 100–108)
CO2 SERPL-SCNC: 26 MMOL/L (ref 21–32)
CREAT SERPL-MCNC: 0.89 MG/DL (ref 0.6–1.3)
ERYTHROCYTE [DISTWIDTH] IN BLOOD BY AUTOMATED COUNT: 15.8 % (ref 11.6–15.1)
GFR SERPL CREATININE-BSD FRML MDRD: 63 ML/MIN/1.73SQ M
GLUCOSE SERPL-MCNC: 93 MG/DL (ref 65–140)
HCT VFR BLD AUTO: 36.2 % (ref 34.8–46.1)
HGB BLD-MCNC: 11 G/DL (ref 11.5–15.4)
MAGNESIUM SERPL-MCNC: 2.1 MG/DL (ref 1.6–2.6)
MAGNESIUM SERPL-MCNC: 2.5 MG/DL (ref 1.6–2.6)
MCH RBC QN AUTO: 30.6 PG (ref 26.8–34.3)
MCHC RBC AUTO-ENTMCNC: 30.4 G/DL (ref 31.4–37.4)
MCV RBC AUTO: 101 FL (ref 82–98)
PLATELET # BLD AUTO: 225 THOUSANDS/UL (ref 149–390)
PMV BLD AUTO: 10.1 FL (ref 8.9–12.7)
POTASSIUM SERPL-SCNC: 3.6 MMOL/L (ref 3.5–5.3)
POTASSIUM SERPL-SCNC: 3.8 MMOL/L (ref 3.5–5.3)
RBC # BLD AUTO: 3.59 MILLION/UL (ref 3.81–5.12)
SODIUM SERPL-SCNC: 139 MMOL/L (ref 136–145)
WBC # BLD AUTO: 6.47 THOUSAND/UL (ref 4.31–10.16)

## 2018-07-13 PROCEDURE — 83735 ASSAY OF MAGNESIUM: CPT | Performed by: INTERNAL MEDICINE

## 2018-07-13 PROCEDURE — 99232 SBSQ HOSP IP/OBS MODERATE 35: CPT | Performed by: INTERNAL MEDICINE

## 2018-07-13 PROCEDURE — 97535 SELF CARE MNGMENT TRAINING: CPT

## 2018-07-13 PROCEDURE — 84132 ASSAY OF SERUM POTASSIUM: CPT | Performed by: INTERNAL MEDICINE

## 2018-07-13 PROCEDURE — 99232 SBSQ HOSP IP/OBS MODERATE 35: CPT | Performed by: NURSE PRACTITIONER

## 2018-07-13 PROCEDURE — 93005 ELECTROCARDIOGRAM TRACING: CPT

## 2018-07-13 PROCEDURE — 80048 BASIC METABOLIC PNL TOTAL CA: CPT | Performed by: INTERNAL MEDICINE

## 2018-07-13 PROCEDURE — 85027 COMPLETE CBC AUTOMATED: CPT | Performed by: INTERNAL MEDICINE

## 2018-07-13 RX ORDER — AMIODARONE HYDROCHLORIDE 200 MG/1
200 TABLET ORAL 2 TIMES DAILY WITH MEALS
Status: DISCONTINUED | OUTPATIENT
Start: 2018-07-14 | End: 2018-07-20 | Stop reason: HOSPADM

## 2018-07-13 RX ORDER — AMITRIPTYLINE HYDROCHLORIDE 25 MG/1
TABLET, FILM COATED ORAL
Qty: 30 TABLET | Refills: 0 | Status: SHIPPED | OUTPATIENT
Start: 2018-07-13 | End: 2018-07-25 | Stop reason: SDUPTHER

## 2018-07-13 RX ORDER — LIDOCAINE 50 MG/G
1 PATCH TOPICAL DAILY
Status: DISCONTINUED | OUTPATIENT
Start: 2018-07-13 | End: 2018-07-13

## 2018-07-13 RX ORDER — DULOXETIN HYDROCHLORIDE 30 MG/1
CAPSULE, DELAYED RELEASE ORAL
Qty: 30 CAPSULE | Refills: 0 | Status: SHIPPED | OUTPATIENT
Start: 2018-07-13 | End: 2018-07-25 | Stop reason: SDUPTHER

## 2018-07-13 RX ORDER — MELATONIN
1000 DAILY
Status: DISCONTINUED | OUTPATIENT
Start: 2018-07-13 | End: 2018-07-20 | Stop reason: HOSPADM

## 2018-07-13 RX ORDER — POTASSIUM CHLORIDE 20 MEQ/1
40 TABLET, EXTENDED RELEASE ORAL ONCE
Status: COMPLETED | OUTPATIENT
Start: 2018-07-13 | End: 2018-07-13

## 2018-07-13 RX ORDER — METOPROLOL SUCCINATE 50 MG/1
50 TABLET, EXTENDED RELEASE ORAL EVERY 12 HOURS SCHEDULED
Status: DISCONTINUED | OUTPATIENT
Start: 2018-07-13 | End: 2018-07-20 | Stop reason: HOSPADM

## 2018-07-13 RX ORDER — LIDOCAINE 50 MG/G
1 PATCH TOPICAL DAILY
Status: DISCONTINUED | OUTPATIENT
Start: 2018-07-14 | End: 2018-07-16

## 2018-07-13 RX ADMIN — PRASUGREL HYDROCHLORIDE 10 MG: 10 TABLET, FILM COATED ORAL at 08:10

## 2018-07-13 RX ADMIN — METOPROLOL SUCCINATE 100 MG: 100 TABLET, EXTENDED RELEASE ORAL at 08:10

## 2018-07-13 RX ADMIN — LIDOCAINE 1 PATCH: 50 PATCH CUTANEOUS at 15:53

## 2018-07-13 RX ADMIN — AMIODARONE HYDROCHLORIDE 200 MG: 200 TABLET ORAL at 08:11

## 2018-07-13 RX ADMIN — ASPIRIN 81 MG: 81 TABLET, COATED ORAL at 08:11

## 2018-07-13 RX ADMIN — CHOLECALCIFEROL TAB 25 MCG (1000 UNIT) 1000 UNITS: 25 TAB at 17:31

## 2018-07-13 RX ADMIN — ACETAMINOPHEN 975 MG: 325 TABLET, FILM COATED ORAL at 21:16

## 2018-07-13 RX ADMIN — AMIODARONE HYDROCHLORIDE 200 MG: 200 TABLET ORAL at 12:29

## 2018-07-13 RX ADMIN — OXYCODONE HYDROCHLORIDE 15 MG: 5 TABLET ORAL at 12:36

## 2018-07-13 RX ADMIN — METOPROLOL SUCCINATE 50 MG: 50 TABLET, EXTENDED RELEASE ORAL at 21:17

## 2018-07-13 RX ADMIN — AMIODARONE HYDROCHLORIDE 200 MG: 200 TABLET ORAL at 17:31

## 2018-07-13 RX ADMIN — SENNOSIDES AND DOCUSATE SODIUM 1 TABLET: 8.6; 5 TABLET ORAL at 21:17

## 2018-07-13 RX ADMIN — FUROSEMIDE 40 MG: 40 TABLET ORAL at 08:10

## 2018-07-13 RX ADMIN — ACETAMINOPHEN 975 MG: 325 TABLET, FILM COATED ORAL at 05:35

## 2018-07-13 RX ADMIN — OXYCODONE HYDROCHLORIDE 15 MG: 5 TABLET ORAL at 20:00

## 2018-07-13 RX ADMIN — LACTULOSE 20 G: 20 SOLUTION ORAL at 17:31

## 2018-07-13 RX ADMIN — POTASSIUM CHLORIDE 40 MEQ: 1500 TABLET, EXTENDED RELEASE ORAL at 20:00

## 2018-07-13 RX ADMIN — ALPRAZOLAM 0.25 MG: 0.25 TABLET ORAL at 21:17

## 2018-07-13 RX ADMIN — ACETAMINOPHEN 975 MG: 325 TABLET, FILM COATED ORAL at 12:29

## 2018-07-13 RX ADMIN — OXYCODONE HYDROCHLORIDE 15 MG: 5 TABLET ORAL at 02:56

## 2018-07-13 NOTE — ASSESSMENT & PLAN NOTE
A: Currently in sinus rhythm on amiodarone, is now having new runs of atrial flutter       Plan:    - Can continue amiodarone for now  - It is safe to use amio despite QT prolongation   - Do not use other QT prolonging medications

## 2018-07-13 NOTE — PLAN OF CARE
Problem: Potential for Falls  Goal: Patient will remain free of falls  INTERVENTIONS:  - Assess patient frequently for physical needs  -  Identify cognitive and physical deficits and behaviors that affect risk of falls    -  [place] fall precautions as indicated by assessment   - Educate patient/family on patient safety including physical limitations  - Instruct patient to call for assistance with activity based on assessment  - Modify environment to reduce risk of injury  - Consider OT/PT consult to assist with strengthening/mobility   Outcome: Progressing      Problem: PAIN - ADULT  Goal: Verbalizes/displays adequate comfort level or baseline comfort level  Interventions:  - Encourage patient to monitor pain and request assistance  - Assess pain using appropriate pain scale  - Administer analgesics based on type and severity of pain and evaluate response  - Implement non-pharmacological measures as appropriate and evaluate response  - Consider cultural and social influences on pain and pain management  - Notify physician/advanced practitioner if interventions unsuccessful or patient reports new pain   Outcome: Progressing      Problem: INFECTION - ADULT  Goal: Absence or prevention of progression during hospitalization  INTERVENTIONS:  - Assess and monitor for signs and symptoms of infection  - Monitor lab/diagnostic results  - Monitor all insertion sites, i e  indwelling lines, tubes, and drains  - Monitor endotracheal (as able) and nasal secretions for changes in amount and color  - [place] appropriate cooling/warming therapies per order  - Administer medications as ordered  - Instruct and encourage patient and family to use good hand hygiene technique  - Identify and instruct in appropriate isolation precautions for identified infection/condition   Outcome: Progressing    Goal: Absence of fever/infection during neutropenic period  INTERVENTIONS:  - Monitor WBC  - Implement neutropenic guidelines   Outcome: Progressing      Problem: SAFETY ADULT  Goal: Maintain or return to baseline ADL function  INTERVENTIONS:  -  Assess patient's ability to carry out ADLs; assess patient's baseline for ADL function and identify physical deficits which impact ability to perform ADLs (bathing, care of mouth/teeth, toileting, grooming, dressing, etc )  - Assess/evaluate cause of self-care deficits   - Assess range of motion  - Assess patient's mobility; develop plan if impaired  - Assess patient's need for assistive devices and provide as appropriate  - Encourage maximum independence but intervene and supervise when necessary  ¯ Involve family in performance of ADLs  ¯ Assess for home care needs following discharge   ¯ Request OT consult to assist with ADL evaluation and planning for discharge  ¯ Provide patient education as appropriate   Outcome: Progressing    Goal: Maintain or return mobility status to optimal level  INTERVENTIONS:  - Assess patient's baseline mobility status (ambulation, transfers, stairs, etc )    - Identify cognitive and physical deficits and behaviors that affect mobility  - Identify mobility aids required to assist with transfers and/or ambulation (gait belt, sit-to-stand, lift, walker, cane, etc )  - [place] fall precautions as indicated by assessment  - Record patient progress and toleration of activity level on Mobility SBAR; progress patient to next Phase/Stage  - Instruct patient to call for assistance with activity based on assessment  - Request Rehabilitation consult to assist with strengthening/weightbearing, etc    Outcome: Progressing      Problem: DISCHARGE PLANNING  Goal: Discharge to home or other facility with appropriate resources  INTERVENTIONS:  - Identify barriers to discharge w/patient and caregiver  - Arrange for needed discharge resources and transportation as appropriate  - Identify discharge learning needs (meds, wound care, etc )  - Arrange for interpretive services to assist at discharge as needed  - Refer to Case Management Department for coordinating discharge planning if the patient needs post-hospital services based on physician/advanced practitioner order or complex needs related to functional status, cognitive ability, or social support system   Outcome: Progressing      Problem: Knowledge Deficit  Goal: Patient/family/caregiver demonstrates understanding of disease process, treatment plan, medications, and discharge instructions  Complete learning assessment and assess knowledge base  Interventions:  - Provide teaching at level of understanding  - Provide teaching via preferred learning methods   Outcome: Progressing      Problem: CARDIOVASCULAR - ADULT  Goal: Maintains optimal cardiac output and hemodynamic stability  INTERVENTIONS:  - Monitor I/O, vital signs and rhythm  - Monitor for S/S and trends of decreased cardiac output i e  bleeding, hypotension  - Administer and titrate ordered vasoactive medications to optimize hemodynamic stability  - Assess quality of pulses, skin color and temperature  - Assess for signs of decreased coronary artery perfusion - ex   Angina  - Instruct patient to report change in severity of symptoms   Outcome: Progressing    Goal: Absence of cardiac dysrhythmias or at baseline rhythm  INTERVENTIONS:  - Continuous cardiac monitoring, monitor vital signs, obtain 12 lead EKG if indicated  - Administer antiarrhythmic and heart rate control medications as ordered  - Monitor electrolytes and administer replacement therapy as ordered   Outcome: Progressing      Problem: RESPIRATORY - ADULT  Goal: Achieves optimal ventilation and oxygenation  INTERVENTIONS:  - Assess for changes in respiratory status  - Assess for changes in mentation and behavior  - Position to facilitate oxygenation and minimize respiratory effort  - Oxygen administration by appropriate delivery method based on oxygen saturation (per order) or ABGs  - Initiate smoking cessation education as indicated  - Encourage broncho-pulmonary hygiene including cough, deep breathe, Incentive Spirometry  - Assess the need for suctioning and aspirate as needed  - Assess and instruct to report SOB or any respiratory difficulty  - Respiratory Therapy support as indicated   Outcome: Progressing      Problem: MUSCULOSKELETAL - ADULT  Goal: Maintain or return mobility to safest level of function  INTERVENTIONS:  - Assess patient's ability to carry out ADLs; assess patient's baseline for ADL function and identify physical deficits which impact ability to perform ADLs (bathing, care of mouth/teeth, toileting, grooming, dressing, etc )  - Assess/evaluate cause of self-care deficits   - Assess range of motion  - Assess patient's mobility; develop plan if impaired  - Assess patient's need for assistive devices and provide as appropriate  - Encourage maximum independence but intervene and supervise when necessary  - Involve family in performance of ADLs  - Assess for home care needs following discharge   - Request OT consult to assist with ADL evaluation and planning for discharge  - Provide patient education as appropriate   Outcome: Progressing    Goal: Maintain proper alignment of affected body part  INTERVENTIONS:  - Support, maintain and protect limb and body alignment  - Provide pt/fam with appropriate education   Outcome: Progressing      Problem: DISCHARGE PLANNING - CARE MANAGEMENT  Goal: Discharge to post-acute care or home with appropriate resources  INTERVENTIONS:  - Conduct assessment to determine patient/family and health care team treatment goals, and need for post-acute services based on payer coverage, community resources, and patient preferences, and barriers to discharge  - Address psychosocial, clinical, and financial barriers to discharge as identified in assessment in conjunction with the patient/family and health care team  - Arrange appropriate level of post-acute services according to patient's   needs and preference and payer coverage in collaboration with the physician and health care team  - Communicate with and update the patient/family, physician, and health care team regarding progress on the discharge plan  - Arrange appropriate transportation to [address] [place] [address] to drive home @ d/c    Outcome: Progressing

## 2018-07-13 NOTE — ASSESSMENT & PLAN NOTE
A: currently euvolemic and well perfused, last echo had an EF of 50%, thought this was in may    P:    Will order echo- ventriculogram not done yesterday  Continue lasix  Monitor electrolytes

## 2018-07-13 NOTE — ASSESSMENT & PLAN NOTE
A: Vtach due to uncertain etiology  R on T seen but does not have the appearance of torsades       P:    - Had RCA stent placed   - OK to resume eliquis  - Keep Mg>2, K>4  - Continue PO amio  - Discontinue amitriptyline

## 2018-07-13 NOTE — OCCUPATIONAL THERAPY NOTE
Occupational Therapy Treatment Note:       07/13/18 1057   Pain Assessment   Pain Assessment 0-10   Pain Score 5   Pain Type Chronic pain   Pain Location Back   ADL   Where Assessed Other (Comment)  (bed;commode; standing at sink; chair)   Grooming Assistance 5  Supervision/Setup   Grooming Deficit Setup;Verbal cueing;Supervision/safety; Increased time to complete;Denture care   Toileting Assistance  4  Minimal Assistance   Toileting Deficit Setup;Verbal cueing;Supervison/safety; Increased time to complete;Grab bar use   Toileting Comments (intermittent CGA;increased heart rate with transfer)   Functional Standing Tolerance   Time 4-5 minutes   Activity washing hands; denture care   Comments close supervision   Bed Mobility   Rolling R 6  Modified independent   Additional items Bedrails   Supine to Sit 5  Supervision   Additional items HOB elevated; Bedrails;Verbal cues  (min impulsivity)   Transfers   Sit to Stand 5  Supervision   Additional items Verbal cues   Stand to Sit 5  Supervision   Additional items Verbal cues   Stand pivot 4  Minimal assistance   Additional items Assist x 1; Increased time required;Verbal cues  (slight impulsivity)   Toilet transfer 4  Minimal assistance   Additional items Assist x 1; Increased time required;Verbal cues;Standard toilet   Functional Mobility   Functional Mobility 5  Supervision   Additional Comments min vc's with technique   Additional items Rolling walker   Toilet Transfers   Toilet Transfer From [place] Type To and from   Toilet Transfer to [employer] Transfers Minimal assistance   Cognition   Overall Cognitive Status Impaired   Arousal/Participation Alert   Attention Within functional limits   Orientation Level Oriented to person;Oriented to place;Oriented to time   Memory Decreased recall of recent events;Decreased recall of precautions   Following Commands Follows one step commands without difficulty Comments min impulsivity and initial irritability  (slightly impulsive)   Activity Tolerance   Activity Tolerance Other (Comment)  (limited secondary to increased heart rate)   Medical Staff Made Aware nursing informed treating staff during therapy; ok to see per RN Baljinder Loza   Assessment   Assessment Patient participated in skilled OT with focus on ADL skill training, toileting, transfer skill training, activity endurance  Patient presents supine in bed requesting to "use bathroom"  Patient slightly impulsive initially which did decrease as session progressed  While patient was seated on commode, nursing informed therapy that after this activity is completed, patient is to return to chair secondary to "heart rate is up"  Patient required assist levels as documented with some min decreased insight into deficit areas  Patient more receptive to intervention as session progressed  Patient would benefit from short term rehab with focus on increasing functional strength and independence skills for carryover into her daily routine  Plan   Treatment Interventions ADL retraining;Functional transfer training; Endurance training   Goal Expiration Date 07/23/18   Treatment Day 3   OT Frequency 3-5x/wk   Recommendation   OT Discharge Recommendation Short Term Rehab   OT - OK to Discharge (when medically cleared)   Barthel Index   Feeding 10   Bathing 0   Grooming Score 5   Dressing Score 5   Bladder Score 10   Bowels Score 10   Toilet Use Score 5   Transfers (Bed/Chair) Score 10   Mobility (Level Surface) Score 10   Stairs Score 0   Barthel Index Score 65   Modified [place] Scale   Modified Gray Scale 4   Ladon Boxer, COTA

## 2018-07-13 NOTE — PLAN OF CARE
Problem: OCCUPATIONAL THERAPY ADULT  Goal: Performs self-care activities at highest level of function for planned discharge setting  See evaluation for individualized goals  Treatment Interventions: ADL retraining, Functional transfer training, UE strengthening/ROM, Endurance training, Patient/family training, Equipment evaluation/education, Compensatory technique education, Energy conservation  Equipment Recommended:  (GB throughout bathroom)       See flowsheet documentation for full assessment, interventions and recommendations  Outcome: Progressing  Limitation: Decreased ADL status, Decreased UE strength, Decreased Safe judgement during ADL, Decreased endurance, Decreased self-care trans, Decreased high-level ADLs     Assessment: Patient participated in skilled OT with focus on ADL skill training, toileting, transfer skill training, activity endurance  Patient presents supine in bed requesting to "use bathroom"  Patient slightly impulsive initially which did decrease as session progressed  While patient was seated on commode, nursing informed therapy that after this activity is completed, patient is to return to chair secondary to "heart rate is up"  Patient required assist levels as documented with some min decreased insight into deficit areas  Patient more receptive to intervention as session progressed  Patient would benefit from short term rehab with focus on increasing functional strength and independence skills for carryover into her daily routine     Recommendation: PT consult  OT Discharge Recommendation: Short Term Rehab  OT - OK to Discharge:  (when medically cleared)  Maritza Abarca

## 2018-07-13 NOTE — PROGRESS NOTES
Cardiac Electrphysiology    Progress Note - Iza Ball [birth year], 69 y o  female MRN: [id number]    Unit/Bed#: [facility] 509-01 Encounter: [id number]    Primary Care Provider: Holden Viramontes DO   Date and time admitted to hospital: 7/7/2018  9:08 AM        Monomorphic ventricular tachycardia [facility])   Assessment & Plan    A: Vtach due to uncertain etiology  R on T seen but does not have the appearance of torsades  P:    - Had RCA stent placed   - OK to resume eliquis  - Keep Mg>2, K>4  - Continue PO amio  - Discontinue amitriptyline        Acute on chronic systolic congestive heart failure (HCC)   Assessment & Plan    A: currently euvolemic and well perfused, last echo had an EF of 50%, thought this was in may    P:    Will order echo- ventriculogram not done yesterday  Continue lasix  Monitor electrolytes          * Atrial fibrillation with RVR ([facility])   Assessment & Plan    A: Currently in sinus rhythm on amiodarone, is now having new runs of atrial flutter  Plan:    - Can continue amiodarone for now  - It is safe to use amio despite QT prolongation   - Do not use other QT prolonging medications            Prolonged QT on EKG    A: Could be due to amiodarone, is also on mirtazapine    Plan:    - QT from amiodarone is unlikely to cause torsades  - Will decrease amiodarone to 200mg bid  - Will decrease metoprolol to 50 mg bid today - bradycardia will prolong qt  -  Check electrolytes again, keep K, mg > 4 and 2 5  - Monitor on telemetry  - Repeat ECG in am            Subjective/Objective        Subjective: Had 2 runs of 30 seconds of Vtach the night before last  Was asymptomatic throughout both episodes, currently asymptomatic as well  Had a run of wide complex tachycardia last night as well  However yesterday is rhythm appeared to be atrial fibrillation with aberrancy           Vitals: BP (!) 171/66   Pulse 73   Temp 98 1 °F (36 7 °C) (Oral)   Resp 18   Ht 5' 5" (1 651 m)   Wt 68 9 kg (151 lb 14 4 oz) SpO2 93%   BMI 25 28 kg/m²   Vitals:    07/10/18 0442 07/13/18 0535   Weight: 69 1 kg (152 lb 5 4 oz) 68 9 kg (151 lb 14 4 oz)     Orthostatic Blood Pressures      Most Recent Value   Blood Pressure   171/66 filed at 07/13/2018 1141   Patient Position - Orthostatic VS  Sitting filed at 07/13/2018 0235            Intake/Output Summary (Last 24 hours) at 07/13/18 1213  Last data filed at 07/13/18 0201   Gross per 24 hour   Intake          1394 96 ml   Output             1575 ml   Net          -180 04 ml       Invasive Devices     Peripheral Intravenous Line            Peripheral IV 07/12/18 Left Arm 1 day                Review of Systems:   Cardiovascular: no chest pain, shortness of breath, palpitations, orthopnea, PND  Pulmonary: no dyspnea, cough  GI: no anorexia, abdominal pain, weight loss  Endocrine: no polyuria, polydypspsia, polyuria  : no dysuria, urinary retention, decreased urine output  Neuro: No focal weakness, tremors, imbalance, loss of sensation  Musculoskeletal: no joint pains, joint swelling  HEENT: no visual changes, swallowing difficulty, hearing loss  Skin: No rash  Psych: no delusions, hallucinations, sx of depression           Physical Exam:   General appearance: alert and oriented, in no acute distress  Head: Normocephalic, without obvious abnormality, atraumatic  Eyes: conjunctivae/corneas clear  PERRL, EOM's intact  Fundi benign  Ears: normal TM's and external ear canals both ears  Nose: Nares normal  Septum midline  Mucosa normal  No drainage or sinus tenderness  Throat: lips, mucosa, and tongue normal; teeth and gums normal  Neck: no adenopathy, no carotid bruit, no JVD, supple, symmetrical, trachea midline and thyroid not enlarged, symmetric, no tenderness/mass/nodules  Back: symmetric, no curvature  ROM normal  No CVA tenderness    Lungs: clear to auscultation bilaterally  Heart: regular rate and rhythm, S1, S2 normal, no murmur, click, rub or gallop  Abdomen: soft, non-tender; bowel sounds normal; no masses,  no organomegaly  Extremities: extremities normal, warm and well-perfused; no cyanosis, clubbing, or edema  Pulses: 2+ and symmetric    Lab Results: I have personally reviewed pertinent lab results  Imaging: I have personally reviewed pertinent reports      EKG: pending from today ordered by me    Telemetry:

## 2018-07-13 NOTE — RESTORATIVE TECHNICIAN NOTE
Restorative Specialist Mobility Note         Pt refusing OOB/ambulation attempt at this time  Pt noted she just wants to sleep  Will continue to follow up daily        Lorre Schaumann Restorative Technician BS

## 2018-07-13 NOTE — PROGRESS NOTES
Progress Note - Michael Kelly [birth year], 69 y o  female MRN: [id number]    Unit/Bed#: [facility] 509-01 Encounter: [id number]    Primary Care Provider: Linda Ramírez DO   Date and time admitted to hospital: 7/7/2018  9:08 AM        Monomorphic ventricular tachycardia [facility])   Assessment & Plan    Cardiology following, monitor        * Atrial fibrillation with RVR ([address] )   Assessment & Plan    Continue metoprolol  Anticoagulation per Cardiology  EP following          Acute on chronic systolic congestive heart failure (HCC)   Assessment & Plan    Continue Lasix, monitor I/O, less than 2 g salt diet, cardiology following        Insomnia   Assessment & Plan    On Xanax, geriatrics following  Amitriptyline discontinued          Ambulatory dysfunction   Assessment & Plan    Multifactorial  Safe ambulation fall precautions  Physical therapy        Memory loss   Assessment & Plan    Outpatient follow-up with Geriatrics  Monitor for delirium, sleep hygiene, reorientation        Polypharmacy   Assessment & Plan    Geriatric input noted        Chronic pain disorder   Assessment & Plan    Continue opioid analgesics            VTE Pharmacologic Prophylaxis:   Pharmacologic: Per EP  Mechanical VTE Prophylaxis in Place: Yes    Patient Centered Rounds: I have performed bedside rounds with nursing staff today  Discussions with Specialists or Other Care Team Provider:     Education and Discussions with Family / Patient: pt, called and left a message for sister Howard Ward over phone    Time Spent for Care: 30 minutes  More than 50% of total time spent on counseling and coordination of care as described above      Current Length of Stay: 6 day(s)    Current Patient Status: Inpatient   Certification Statement: The patient will continue to require additional inpatient hospital stay due to As mentioned    Discharge Plan:  When cleared by Cardiology    Code Status: Level 3 - DNAR and DNI      Subjective:     Comfortably sitting up in bed  Reports feeling tired and fatigued    Objective:     Vitals:   Temp (24hrs), Av °F (36 7 °C), Min:97 8 °F (36 6 °C), Max:98 1 °F (36 7 °C)    HR:  [55-74] 74  Resp:  [18-20] 18  BP: (110-171)/(56-75) 140/62  SpO2:  [91 %-96 %] 93 %  Body mass index is 25 28 kg/m²  Input and Output Summary (last 24 hours): Intake/Output Summary (Last 24 hours) at 18 1338  Last data filed at 18 1141   Gross per 24 hour   Intake          1049 96 ml   Output             2175 ml   Net         -1125 04 ml       Physical Exam:     Physical Exam    Comfortably sitting up in bed  Neck supple  Lungs diminished breath sounds at bases  Heart sounds S1-S2 noted  Abdomen soft  Awake obeys simple commands  No rash    Additional Data:     Labs:      Results from last 7 days  Lab Units 18  0503 18  0251   WBC Thousand/uL 6 47 6 72   HEMOGLOBIN g/dL 11 0* 11 4*   HEMATOCRIT % 36 2 36 8   PLATELETS Thousands/uL 225 212   NEUTROS PCT %  --  57   LYMPHS PCT %  --  25   MONOS PCT %  --  10   EOS PCT %  --  6       Results from last 7 days  Lab Units 18  0503  18  0917   SODIUM mmol/L 139  < > 137   POTASSIUM mmol/L 3 8  < > 4 3   CHLORIDE mmol/L 107  < > 106   CO2 mmol/L 26  < > 24   BUN mg/dL 11  < > 19   CREATININE mg/dL 0 89  < > 0 83   CALCIUM mg/dL 8 8  < > 9 3   TOTAL PROTEIN g/dL  --   --  7 3   BILIRUBIN TOTAL mg/dL  --   --  0 86   ALK PHOS U/L  --   --  78   ALT U/L  --   --  25   AST U/L  --   --  20   GLUCOSE RANDOM mg/dL 93  < > 115   < > = values in this interval not displayed  Results from last 7 days  Lab Units 18  0251   INR  1 24*                 * I Have Reviewed All Lab Data Listed Above  * Additional Pertinent Lab Tests Reviewed:  All Labs Within Last 24 Hours Reviewed    Imaging:    Imaging Reports Reviewed Today Include:   Imaging Personally Reviewed by Myself Includes:     Recent Cultures (last 7 days):           Last 24 Hours Medication List:     Current Facility-Administered Medications:  acetaminophen 975 mg Oral Replaced by [facility] Alec Turner, DO   ALPRAZolam 0 25 mg Oral HS Tuan Jerome PA-C   amiodarone 200 mg Oral TID With Meals Fernanda Woods PA-C   aspirin 81 mg Oral Daily Bernis Johnny, DO   furosemide 40 mg Oral Daily Alec Turner, DO   lactulose 20 g Oral BID Jean Cash MD   methocarbamol 500 mg Oral HS PRN Alec Turner, DO   metoprolol 2 5 mg Intravenous Q6H PRN Chadd Suarez PA-C   metoprolol succinate 100 mg Oral Q12H Miguel Bray MD   mirtazapine 7 5 mg Oral HS Tuan Jerome PA-C   nitroglycerin 0 4 mg Sublingual Q5 Min PRN Jodee Patterson MD   oxyCODONE 15 mg Oral Q6H PRN Heather Koroma MD   polyethylene glycol 17 g Oral Daily Alec Turner, DO   prasugrel 10 mg Oral Daily Jodee Patterson MD   senna-docusate sodium 1 tablet Oral HS Tuan Jerome PA-C        Today, Patient Was Seen By: Jean Cash MD    ** Please Note: Dictation voice to text software may have been used in the creation of this document   **

## 2018-07-13 NOTE — PHYSICAL THERAPY NOTE
Physical Therapy Cancellation Note    Attempted to see patient this afternoon however informed by RN pt with recent episode of sustained vtach  Will hold skilled therapy services at this time until patient is more medically appropriate for PT      Lexx Guy PT

## 2018-07-13 NOTE — SOCIAL WORK
CM met with pt to review SNF list  Pt [facility] be [facility] to [facility], [facility]  Referrals in [facility]

## 2018-07-14 LAB
ANION GAP SERPL CALCULATED.3IONS-SCNC: 8 MMOL/L (ref 4–13)
BASOPHILS # BLD AUTO: 0.02 THOUSANDS/ΜL (ref 0–0.1)
BASOPHILS NFR BLD AUTO: 0 % (ref 0–1)
BUN SERPL-MCNC: 18 MG/DL (ref 5–25)
CALCIUM SERPL-MCNC: 9.4 MG/DL (ref 8.3–10.1)
CHLORIDE SERPL-SCNC: 101 MMOL/L (ref 100–108)
CO2 SERPL-SCNC: 28 MMOL/L (ref 21–32)
CREAT SERPL-MCNC: 1.22 MG/DL (ref 0.6–1.3)
EOSINOPHIL # BLD AUTO: 0.24 THOUSAND/ΜL (ref 0–0.61)
EOSINOPHIL NFR BLD AUTO: 2 % (ref 0–6)
ERYTHROCYTE [DISTWIDTH] IN BLOOD BY AUTOMATED COUNT: 16.2 % (ref 11.6–15.1)
GFR SERPL CREATININE-BSD FRML MDRD: 43 ML/MIN/1.73SQ M
GLUCOSE SERPL-MCNC: 111 MG/DL (ref 65–140)
HCT VFR BLD AUTO: 39.2 % (ref 34.8–46.1)
HGB BLD-MCNC: 12.3 G/DL (ref 11.5–15.4)
IMM GRANULOCYTES # BLD AUTO: 0.09 THOUSAND/UL (ref 0–0.2)
IMM GRANULOCYTES NFR BLD AUTO: 1 % (ref 0–2)
LYMPHOCYTES # BLD AUTO: 1.23 THOUSANDS/ΜL (ref 0.6–4.47)
LYMPHOCYTES NFR BLD AUTO: 10 % (ref 14–44)
MAGNESIUM SERPL-MCNC: 2.1 MG/DL (ref 1.6–2.6)
MCH RBC QN AUTO: 30.9 PG (ref 26.8–34.3)
MCHC RBC AUTO-ENTMCNC: 31.4 G/DL (ref 31.4–37.4)
MCV RBC AUTO: 99 FL (ref 82–98)
MONOCYTES # BLD AUTO: 0.87 THOUSAND/ΜL (ref 0.17–1.22)
MONOCYTES NFR BLD AUTO: 7 % (ref 4–12)
NEUTROPHILS # BLD AUTO: 10.2 THOUSANDS/ΜL (ref 1.85–7.62)
NEUTS SEG NFR BLD AUTO: 80 % (ref 43–75)
NRBC BLD AUTO-RTO: 0 /100 WBCS
PLATELET # BLD AUTO: 274 THOUSANDS/UL (ref 149–390)
PMV BLD AUTO: 9.6 FL (ref 8.9–12.7)
POTASSIUM SERPL-SCNC: 4.1 MMOL/L (ref 3.5–5.3)
RBC # BLD AUTO: 3.98 MILLION/UL (ref 3.81–5.12)
SODIUM SERPL-SCNC: 137 MMOL/L (ref 136–145)
WBC # BLD AUTO: 12.65 THOUSAND/UL (ref 4.31–10.16)

## 2018-07-14 PROCEDURE — 85025 COMPLETE CBC W/AUTO DIFF WBC: CPT | Performed by: PHYSICIAN ASSISTANT

## 2018-07-14 PROCEDURE — 99232 SBSQ HOSP IP/OBS MODERATE 35: CPT | Performed by: INTERNAL MEDICINE

## 2018-07-14 PROCEDURE — 93005 ELECTROCARDIOGRAM TRACING: CPT

## 2018-07-14 PROCEDURE — 83735 ASSAY OF MAGNESIUM: CPT | Performed by: STUDENT IN AN ORGANIZED HEALTH CARE EDUCATION/TRAINING PROGRAM

## 2018-07-14 PROCEDURE — 80048 BASIC METABOLIC PNL TOTAL CA: CPT | Performed by: STUDENT IN AN ORGANIZED HEALTH CARE EDUCATION/TRAINING PROGRAM

## 2018-07-14 RX ORDER — SODIUM PHOSPHATE, DIBASIC AND SODIUM PHOSPHATE, MONOBASIC 7; 19 G/133ML; G/133ML
1 ENEMA RECTAL DAILY PRN
Status: DISCONTINUED | OUTPATIENT
Start: 2018-07-14 | End: 2018-07-20 | Stop reason: HOSPADM

## 2018-07-14 RX ORDER — TRAMADOL HYDROCHLORIDE 50 MG/1
50 TABLET ORAL ONCE
Status: COMPLETED | OUTPATIENT
Start: 2018-07-14 | End: 2018-07-14

## 2018-07-14 RX ORDER — HEPARIN SODIUM 5000 [USP'U]/ML
5000 INJECTION, SOLUTION INTRAVENOUS; SUBCUTANEOUS EVERY 8 HOURS SCHEDULED
Status: DISPENSED | OUTPATIENT
Start: 2018-07-14 | End: 2018-07-18

## 2018-07-14 RX ADMIN — AMIODARONE HYDROCHLORIDE 200 MG: 200 TABLET ORAL at 16:06

## 2018-07-14 RX ADMIN — FUROSEMIDE 40 MG: 40 TABLET ORAL at 08:23

## 2018-07-14 RX ADMIN — SODIUM PHOSPHATE 1 ENEMA: 7; 19 ENEMA RECTAL at 22:43

## 2018-07-14 RX ADMIN — ACETAMINOPHEN 975 MG: 325 TABLET, FILM COATED ORAL at 22:12

## 2018-07-14 RX ADMIN — OXYCODONE HYDROCHLORIDE 15 MG: 5 TABLET ORAL at 13:28

## 2018-07-14 RX ADMIN — ACETAMINOPHEN 975 MG: 325 TABLET, FILM COATED ORAL at 13:28

## 2018-07-14 RX ADMIN — TRAMADOL HYDROCHLORIDE 50 MG: 50 TABLET, FILM COATED ORAL at 22:43

## 2018-07-14 RX ADMIN — ASPIRIN 81 MG: 81 TABLET, COATED ORAL at 08:23

## 2018-07-14 RX ADMIN — ALPRAZOLAM 0.25 MG: 0.25 TABLET ORAL at 22:12

## 2018-07-14 RX ADMIN — AMIODARONE HYDROCHLORIDE 200 MG: 200 TABLET ORAL at 08:23

## 2018-07-14 RX ADMIN — LACTULOSE 20 G: 20 SOLUTION ORAL at 16:06

## 2018-07-14 RX ADMIN — OXYCODONE HYDROCHLORIDE 15 MG: 5 TABLET ORAL at 19:50

## 2018-07-14 RX ADMIN — METOPROLOL SUCCINATE 50 MG: 50 TABLET, EXTENDED RELEASE ORAL at 08:23

## 2018-07-14 RX ADMIN — LIDOCAINE 1 PATCH: 50 PATCH CUTANEOUS at 16:06

## 2018-07-14 RX ADMIN — PRASUGREL HYDROCHLORIDE 10 MG: 10 TABLET, FILM COATED ORAL at 08:23

## 2018-07-14 RX ADMIN — ACETAMINOPHEN 975 MG: 325 TABLET, FILM COATED ORAL at 05:26

## 2018-07-14 RX ADMIN — SENNOSIDES AND DOCUSATE SODIUM 1 TABLET: 8.6; 5 TABLET ORAL at 22:12

## 2018-07-14 RX ADMIN — HEPARIN SODIUM 5000 UNITS: 5000 INJECTION, SOLUTION INTRAVENOUS; SUBCUTANEOUS at 13:27

## 2018-07-14 RX ADMIN — METOPROLOL SUCCINATE 50 MG: 50 TABLET, EXTENDED RELEASE ORAL at 22:12

## 2018-07-14 RX ADMIN — HEPARIN SODIUM 5000 UNITS: 5000 INJECTION, SOLUTION INTRAVENOUS; SUBCUTANEOUS at 22:12

## 2018-07-14 RX ADMIN — CHOLECALCIFEROL TAB 25 MCG (1000 UNIT) 1000 UNITS: 25 TAB at 08:23

## 2018-07-14 RX ADMIN — METOPROLOL TARTRATE 2.5 MG: 5 INJECTION, SOLUTION INTRAVENOUS at 23:46

## 2018-07-14 NOTE — PROGRESS NOTES
Progress Note - Adrienneplacido Alyssia 5/09/2013, 69 y o  female MRN: [id number]    Unit/Bed#: [facility] 509-01 Encounter: [id number]    Primary Care Provider: Andrade Amador DO   Date and time admitted to hospital: 7/7/2018  9:08 AM        Monomorphic ventricular tachycardia [facility])   Assessment & Plan    Cardiology following, monitor        * Atrial fibrillation with RVR ([address] )   Assessment & Plan    Continue metoprolol  Anticoagulation per Cardiology  EP following          Acute on chronic systolic congestive heart failure (HCC)   Assessment & Plan    Continue Lasix, monitor I/O, less than 2 g salt diet, cardiology following        Insomnia   Assessment & Plan    On Xanax, geriatrics following  Amitriptyline discontinued          Ambulatory dysfunction   Assessment & Plan    Multifactorial  Safe ambulation fall precautions  Physical therapy        Memory loss   Assessment & Plan    Outpatient follow-up with Geriatrics  Monitor for delirium, sleep hygiene, reorientation        Dyspnea on exertion   Assessment & Plan    Multifactorial monitor        Chronic pain disorder   Assessment & Plan    Continue opioid analgesics            VTE Pharmacologic Prophylaxis:   Pharmacologic: Heparin  Mechanical VTE Prophylaxis in Place: Yes    Patient Centered Rounds: I have performed bedside rounds with nursing staff today  Discussions with Specialists or Other Care Team Provider:     Education and Discussions with Family / Patient: pt    Time Spent for Care: 20 minutes  More than 50% of total time spent on counseling and coordination of care as described above      Current Length of Stay: 7 day(s)    Current Patient Status: Inpatient   Certification Statement: The patient will continue to require additional inpatient hospital stay due to As mentioned    Discharge Plan:  Awaiting Cardiology clearance may need placement on discharge discussed with case management    Code Status: Level 3 - DNAR and DNI      Subjective:     Comfortably lying in bed  Reports feeling tired    Objective:     Vitals:   Temp (24hrs), Av 2 °F (36 8 °C), Min:97 4 °F (36 3 °C), Max:99 1 °F (37 3 °C)    HR:  [62-74] 65  Resp:  [18-20] 20  BP: ()/(54-84) 136/64  SpO2:  [92 %-98 %] 98 %  Body mass index is 24 84 kg/m²  Input and Output Summary (last 24 hours): Intake/Output Summary (Last 24 hours) at 18 1145  Last data filed at 18 1113   Gross per 24 hour   Intake              610 ml   Output             1925 ml   Net            -1315 ml       Physical Exam:     Physical Exam    Comfortably lying in bed  Neck supple  Lungs diminished breath sounds both bases  Heart sounds S1 and S2 noted  Abdomen soft  Awake obeys simple commands  Pulses noted  No rash    Additional Data:     Labs:      Results from last 7 days  Lab Units 18  0503 18  0251   WBC Thousand/uL 6 47 6 72   HEMOGLOBIN g/dL 11 0* 11 4*   HEMATOCRIT % 36 2 36 8   PLATELETS Thousands/uL 225 212   NEUTROS PCT %  --  57   LYMPHS PCT %  --  25   MONOS PCT %  --  10   EOS PCT %  --  6       Results from last 7 days  Lab Units 18  1921 18  0503   SODIUM mmol/L  --  139   POTASSIUM mmol/L 3 6 3 8   CHLORIDE mmol/L  --  107   CO2 mmol/L  --  26   BUN mg/dL  --  11   CREATININE mg/dL  --  0 89   CALCIUM mg/dL  --  8 8   GLUCOSE RANDOM mg/dL  --  93       Results from last 7 days  Lab Units 18  0251   INR  1 24*                 * I Have Reviewed All Lab Data Listed Above  * Additional Pertinent Lab Tests Reviewed:  All Labs Within Last 24 Hours Reviewed    Imaging:    Imaging Reports Reviewed Today Include:   Imaging Personally Reviewed by Myself Includes:     Recent Cultures (last 7 days):           Last 24 Hours Medication List:     Current Facility-Administered Medications:  acetaminophen 975 mg Oral Q8H [facility] Raul Purdy DO   ALPRAZolam 0 25 mg Oral HS Marika Schulz PA-C   amiodarone 200 mg Oral BID With Meals Naseem Simon MD   aspirin 81 mg Oral Daily Raul Bernard Evans,    cholecalciferol 1,000 Units Oral Daily Sisto Ordaz, CRNP   furosemide 40 mg Oral Daily Laddie Blight, DO   heparin (porcine) 5,000 Units Subcutaneous [facility] Tate Kang MD   lactulose 20 g Oral BID Tate Kang MD   lidocaine 1 patch Transdermal Daily Sisto Ordaz, CRNP   methocarbamol 500 mg Oral HS PRN Laddie Blight, DO   metoprolol 2 5 mg Intravenous Q6H PRN Mau Colby PA-C   metoprolol succinate 50 mg Oral Q12H [facility] Matthew Hollis MD   nitroglycerin 0 4 mg Sublingual Q5 Min PRN Carisa Moreno MD   oxyCODONE 15 mg Oral Q6H PRN Josse Benitez MD   polyethylene glycol 17 g Oral Daily Laddie Blight, DO   prasugrel 10 mg Oral Daily Carisa Moreno MD   senna-docusate sodium 1 tablet Oral HS Rakesh Thayer PA-C        Today, Patient Was Seen By: Tate Kang MD    ** Please Note: Dictation voice to text software may have been used in the creation of this document   **

## 2018-07-14 NOTE — PROGRESS NOTES
Order obtained from [facility] fellow, Dr Raina Ortiz, for BMP and MG since wasn't check this AM and requires strict electrolyte management d/t VT  Reported backed results  No new orders at this time

## 2018-07-14 NOTE — PROGRESS NOTES
Cardiology Progress Note - Florance Mini 69 y o  female MRN: [id number]    Unit/Bed#: [facility] 509-01 Encounter: [id number]        Subjective:    No significant events overnight  No chest pain  WCT last night was asymptomatic  ROS    Objective:   Vitals: Blood pressure 115/57, pulse 64, temperature (!) 97 4 °F (36 3 °C), temperature source Oral, resp  rate 18, height 5' 5" (1 651 m), weight 67 7 kg (149 lb 4 oz), SpO2 94 %  , Body mass index is 24 84 kg/m² , Orthostatic Blood Pressures      Most Recent Value   Blood Pressure  115/57 filed at 07/14/2018 0700   Patient Position - Orthostatic VS  Lying filed at 07/14/2018 2376         Systolic (13GDG), TYP:405 , Min:98 , CHARBEL:268     Diastolic (56AOX), JRW:74, Min:54, Max:84      Intake/Output Summary (Last 24 hours) at 07/14/18 0905  Last data filed at 07/14/18 0851   Gross per 24 hour   Intake              610 ml   Output             1925 ml   Net            -1315 ml     Weight (last 2 days)     Date/Time   Weight    07/14/18 0557  67 7 (149 25)    07/13/18 0535  68 9 (151 9)                Telemetry Review: No significant arrhythmias seen on telemetry review  NSR  Prolong QT  WCT      Physical Exam   Constitutional: She is oriented to person, place, and time  No distress  HENT:   Mouth/Throat: No oropharyngeal exudate  Eyes: No scleral icterus  Neck: No JVD present  Cardiovascular: Normal rate and regular rhythm  No murmur heard  Pulmonary/Chest: Effort normal and breath sounds normal  No respiratory distress  She has no wheezes  She has no rales  Abdominal: Soft  Bowel sounds are normal  She exhibits no distension  There is no tenderness  There is no rebound  Musculoskeletal: She exhibits no edema  Neurological: She is alert and oriented to person, place, and time  Skin: Skin is warm  She is not diaphoretic  Psychiatric: She has a normal mood and affect   Her behavior is normal          Laboratory Results:    Results from last 7 days  Lab Units 07/07/18  0917   TROPONIN I ng/mL <0 02       CBC with diff:   Results from last 7 days  Lab Units 07/13/18  0503 07/11/18  0251 07/08/18 0459 07/07/18  0917   WBC Thousand/uL 6 47 6 72 7 41 11 23*   HEMOGLOBIN g/dL 11 0* 11 4* 11 1* 11 9   HEMATOCRIT % 36 2 36 8 35 9 37 6   MCV fL 101* 99* 98 96   PLATELETS Thousands/uL 225 212 193 196   MCH pg 30 6 30 7 30 3 30 4   MCHC g/dL 30 4* 31 0* 30 9* 31 6   RDW % 15 8* 15 4* 15 4* 15 3*   MPV fL 10 1 9 7 10 9 10 1   NRBC AUTO /100 WBCs  --  0  --  0         CMP:  Results from last 7 days  Lab Units 07/13/18 1921 07/13/18 0503 07/11/18 0251 07/08/18 0459 07/07/18  0917   SODIUM mmol/L  --  139 137 138 137   POTASSIUM mmol/L 3 6 3 8 4 0 4 0 4 3   CHLORIDE mmol/L  --  107 104 107 106   CO2 mmol/L  --  26 31 27 24   ANION GAP mmol/L  --  6 2* 4 7   BUN mg/dL  --  11 20 18 19   CREATININE mg/dL  --  0 89 0 96 0 83 0 83   GLUCOSE RANDOM mg/dL  --  93 82 102 115   CALCIUM mg/dL  --  8 8 8 4 8 5 9 3   AST U/L  --   --   --   --  20   ALT U/L  --   --   --   --  25   ALK PHOS U/L  --   --   --   --  78   TOTAL PROTEIN g/dL  --   --   --   --  7 3   BILIRUBIN TOTAL mg/dL  --   --   --   --  0 86   EGFR ml/min/1 73sq m  --  63 57 68 68         BMP:  Results from last 7 days  Lab Units 07/13/18 1921 07/13/18 0503 07/11/18 0251 07/08/18 0459 07/07/18  0917   SODIUM mmol/L  --  139 137 138 137   POTASSIUM mmol/L 3 6 3 8 4 0 4 0 4 3   CHLORIDE mmol/L  --  107 104 107 106   CO2 mmol/L  --  26 31 27 24   BUN mg/dL  --  11 20 18 19   CREATININE mg/dL  --  0 89 0 96 0 83 0 83   GLUCOSE RANDOM mg/dL  --  93 82 102 115   CALCIUM mg/dL  --  8 8 8 4 8 5 9 3       BNP: No results for input(s): BNP in the last 72 hours      Magnesium:   Results from last 7 days  Lab Units 07/13/18  1921 07/13/18  0503 07/11/18  0251 07/08/18  0459   MAGNESIUM mg/dL 2 1 2 5 2 1 2 5       Coags:   Results from last 7 days  Lab Units 07/11/18  0251 07/07/18  0917   PTT seconds 32 34   INR  1 24* 1 27* TSH: No results found for: TSH    Hemoglobin A1C       Lipid Profile:       Cardiac testing:   Results for orders placed during the hospital encounter of 18   Echo complete with contrast if indicated    Narrative [address]  [facility], [address] [facility]  [phone]    Transthoracic Echocardiogram  2D, M-mode, Doppler, and Color Doppler    Study date:      Patient: Sal Galvan  MR number: [id number]  Account number: [de-identified]  : 1940  Age: 69 years  Gender: Female  Status: Inpatient  Location: Bedside  Height: 65 in  Weight: 178 6 lb  BP: 117/ 81 mmHg    Indications: Assess left ventricular function  Diagnoses: I24 9 - Acute ischemic heart disease, unspecified    Sonographer:  Brianne Kingsley RDCS  Primary Physician:  Shea Desouza DO  Referring Physician:  Holli Duran MD  Group:  [facility]  Cardiology Fellow:  Kendra Moses MD  Interpreting Physician:  Shaylee Mendes DO    SUMMARY    LEFT VENTRICLE:  Systolic function was mildly reduced  Ejection fraction was estimated to be 50 %  There was severe hypokinesis of the basal-mid inferior and basal-mid inferolateral wall(s)  The changes were consistent with concentric remodeling (increased wall thickness with normal wall mass)  Doppler parameters were consistent with elevated mean left atrial filling pressure  RIGHT VENTRICLE:  The ventricle was mildly dilated  Systolic function was normal   Wall thickness was increased  LEFT ATRIUM:  The atrium was mildly dilated  RIGHT ATRIUM:  The atrium was moderately dilated  MITRAL VALVE:  There was moderate annular calcification  There was mild regurgitation  TRICUSPID VALVE:  There was mild to moderate regurgitation  Estimated peak PA pressure was 55 mmHg  The findings suggest moderate pulmonary hypertension      IVC, HEPATIC VEINS:  The inferior vena cava was mildly dilated  Respirophasic changes were blunted (less than 50% variation)  HISTORY: PRIOR HISTORY: systolic heart failure, afib, sepsis, chronic anticoagulation    PROCEDURE: The procedure was performed at the bedside  This was a routine study  The transthoracic approach was used  The study included complete 2D imaging, M-mode, complete spectral Doppler, and color Doppler  The heart rate was 87 bpm,  at the start of the study  Images were obtained from the parasternal, apical, subcostal, and suprasternal notch acoustic windows  Image quality was adequate  LEFT VENTRICLE: Size was normal  Systolic function was mildly reduced  Ejection fraction was estimated to be 50 %  There was severe hypokinesis of the basal-mid inferior and basal-mid inferolateral wall(s)  Wall thickness was mildly  increased  The changes were consistent with concentric remodeling (increased wall thickness with normal wall mass)  DOPPLER: Transmitral flow pattern: atrial fibrillation  Doppler parameters were consistent with elevated mean left atrial  filling pressure  RIGHT VENTRICLE: The ventricle was mildly dilated  Systolic function was normal  Wall thickness was increased  LEFT ATRIUM: The atrium was mildly dilated  RIGHT ATRIUM: The atrium was moderately dilated  MITRAL VALVE: There was moderate annular calcification  Valve structure was normal  There was mild calcification of the valve  There was normal leaflet separation  DOPPLER: The transmitral velocity was within the normal range  There was no  evidence for stenosis  There was mild regurgitation  AORTIC VALVE: The valve was trileaflet  Leaflets exhibited moderately increased thickness, moderate calcification, and normal cuspal separation  DOPPLER: Transaortic velocity was within the normal range  There was no evidence for stenosis  There was no regurgitation  TRICUSPID VALVE: The valve structure was normal  There was normal leaflet separation   DOPPLER: The transtricuspid velocity was within the normal range  There was no evidence for stenosis  There was mild to moderate regurgitation  Pulmonary  artery systolic pressure was moderately increased  Estimated peak PA pressure was 55 mmHg  The findings suggest moderate pulmonary hypertension  PULMONIC VALVE: Leaflets exhibited normal thickness, no calcification, and normal cuspal separation  DOPPLER: The transpulmonic velocity was within the normal range  There was trace regurgitation  PERICARDIUM: There was no pericardial effusion  The pericardium was normal in appearance  AORTA: The root exhibited normal size  SYSTEMIC VEINS: IVC: The inferior vena cava was mildly dilated  Respirophasic changes were blunted (less than 50% variation)      SYSTEM MEASUREMENT TABLES    2D  %FS: 23 31 %  Ao Diam: 2 84 cm  EDV(Teich): 127 84 ml  EF(Teich): 46 35 %  ESV(Teich): 68 58 ml  IVSd: 1 14 cm  LA Area: 21 35 cm2  LA Diam: 3 89 cm  LVEDV MOD A4C: 125 04 ml  LVEF MOD A4C: 61 7 %  LVESV MOD A4C: 47 9 ml  LVIDd: 5 17 cm  LVIDs: 3 97 cm  LVLd A4C: 8 04 cm  LVLs A4C: 7 66 cm  LVPWd: 1 09 cm  RA Area: 23 54 cm2  RVIDd: 4 27 cm  SV MOD A4C: 77 15 ml  SV(Teich): 59 26 ml    CW  TR Vmax: 3 26 m/s  TR maxP 59 mmHg    MM  TAPSE: 2 89 cm    PW  AVC: 377 63 ms  E': 0 07 m/s  E/E': 11 01  MV A Ge: 0 21 m/s  MV Dec [place]: 4 94 m/s2  MV DecT: 153 92 ms  MV E Ge: 0 76 m/s  MV E/A Ratio: 3 58  MV PHT: 44 64 ms  MVA By PHT: 4 93 cm2    [employer] Accredited Echocardiography Laboratory    Prepared and electronically signed by    Macrina Garcia DO  Signed 15-May-2018 14:00:03       Results for orders placed during the hospital encounter of 18   TIFFANIE    Narrative [address]  [facility] [place], [address] [facility]  [phone]    Transesophageal Echocardiogram  2D, Doppler, and Color Doppler    Study date:      Patient: Vu Rogers  MR number: [id number]  Account number: [id number]  : 1940  Age: 69 years  Gender: Female  Status: Inpatient  Location: Cath lab  Height: 65 in  Weight: 149 lb  BP: 79/ 52 mmHg    Indications: Atrial fibrillation  Diagnoses: I48 0 - Atrial fibrillation    Sonographer:  Aiden Alonzo RDCS  Interpreting Physician:  Jeison Mata MD  Primary Physician:  Glenn Melchor DO  Referring Physician:  Oriana Maradiaga MD  Group:  [facility]  Cardiology Fellow:  Oriana Maradiaga MD    SUMMARY    LEFT VENTRICLE:  Systolic function was markedly reduced  Ejection fraction was estimated to be 35 %  Beat to beat variability and tachycardia limit an accurate assessment of ejection fraction  There was severe diffuse hypokinesis with regional variations  RIGHT VENTRICLE:  The ventricle was mildly dilated  Systolic function was reduced  LEFT ATRIUM:  The atrium was moderately dilated  No thrombus was identified  There was evidence of spontaneous echo contrast ("smoke")  LEFT ATRIAL APPENDAGE:  The appendage was dilated  The function was moderately reduced (moderately reduced emptying velocity)  No thrombus was identified  There was evidence of spontaneous echo contrast ("smoke") in the appendage  ATRIAL SEPTUM:  No defect or patent foramen ovale was identified  RIGHT ATRIUM:  The atrium was moderately dilated  There was evidence of continuous spontaneous echo contrast ("smoke")  MITRAL VALVE:  There was moderate regurgitation  Regurgitation grade was 3+ on a scale of 0 to 4+  AORTIC VALVE:  There was trace regurgitation  TRICUSPID VALVE:  There was mild regurgitation  PULMONIC VALVE:  There was trace regurgitation  HISTORY: PRIOR HISTORY: Heart failure, Atrial fibrillation, Sepsis  PROCEDURE: The procedure was performed in the catheterization laboratory  This was a routine study   The risks and alternatives of the procedure were explained to the patient and informed consent was obtained  The transesophageal approach  was used  The study included complete 2D imaging, complete spectral Doppler, and color Doppler  The heart rate was 108 bpm, at the start of the study  An adult omniplane probe was inserted by the cardiology fellow under direct supervision  of the attending cardiologist  Intubated with ease  One intubation attempt(s)  There was no blood detected on the probe  Image quality was adequate  There were no complications during the procedure  MEDICATIONS: Benzocaine spray, topical  to oropharynx, prior to procedure  Anesthesia administered by anesthesia team     LEFT VENTRICLE: Size was normal  Systolic function was markedly reduced  Ejection fraction was estimated to be 35 %  Beat to beat variability and tachycardia limit an accurate assessment of ejection fraction  There was severe diffuse  hypokinesis with regional variations  Wall thickness was increased  DOPPLER: The study was not technically sufficient to allow evaluation of LV diastolic function  RIGHT VENTRICLE: The ventricle was mildly dilated  Systolic function was reduced  LEFT ATRIUM: The atrium was moderately dilated  No thrombus was identified  There was evidence of spontaneous echo contrast ("smoke")  APPENDAGE: The appendage was dilated  No thrombus was identified  There was evidence of spontaneous echo  contrast ("smoke") in the appendage  DOPPLER: The function was moderately reduced (moderately reduced emptying velocity)  ATRIAL SEPTUM: No defect or patent foramen ovale was identified  RIGHT ATRIUM: The atrium was moderately dilated  No thrombus was identified  There was evidence of continuous spontaneous echo contrast ("smoke")  MITRAL VALVE: There was mild annular calcification  There was mildly reduced leaflet separation  DOPPLER: There was moderate regurgitation  Regurgitation grade was 3+ on a scale of 0 to 4+  The regurgitant jet was centrally directed  AORTIC VALVE: The valve was trileaflet  Leaflets exhibited normal cuspal separation and sclerosis  DOPPLER: There was trace regurgitation  TRICUSPID VALVE: The valve structure was normal  There was normal leaflet separation  DOPPLER: There was mild regurgitation  PULMONIC VALVE: Not well visualized  DOPPLER: There was trace regurgitation  PERICARDIUM: There was no pericardial effusion  The pericardium was normal in appearance  AORTA: The root exhibited normal size  There was no atheroma  There was no evidence for dissection  There was no evidence for aneurysm  PULMONARY VEINS: DOPPLER: There was systolic blunting in the pulmonary vein(s)  [address] Accredited Echocardiography Laboratory    Prepared and electronically signed by    Jeison Mata MD  Signed 12-Jul-2018 13:57:07       No results found for this or any previous visit  No results found for this or any previous visit      Meds/Allergies   current meds:   Current Facility-Administered Medications   Medication Dose Route Frequency    acetaminophen (TYLENOL) tablet 975 mg  975 mg Oral Q8H [address]    ALPRAZolam (XANAX) tablet 0 25 mg  0 25 mg Oral HS    amiodarone tablet 200 mg  200 mg Oral BID With Meals    aspirin (ECOTRIN LOW STRENGTH) EC tablet 81 mg  81 mg Oral Daily    cholecalciferol (VITAMIN D3) tablet 1,000 Units  1,000 Units Oral Daily    furosemide (LASIX) tablet 40 mg  40 mg Oral Daily    heparin (porcine) subcutaneous injection 5,000 Units  5,000 Units Subcutaneous Q8H [address]    lactulose 20 g/30 mL oral solution 20 g  20 g Oral BID    lidocaine (LIDODERM) 5 % patch 1 patch  1 patch Transdermal Daily    methocarbamol (ROBAXIN) tablet 500 mg  500 mg Oral HS PRN    metoprolol (LOPRESSOR) injection 2 5 mg  2 5 mg Intravenous Q6H PRN    metoprolol succinate (TOPROL-XL) 24 hr tablet 50 mg  50 mg Oral Q12H K[facility]ELY    nitroglycerin (NITROSTAT) SL tablet 0 4 mg  0 4 mg Sublingual Q5 Min PRN    oxyCODONE (ROXICODONE) IR tablet 15 mg  15 mg Oral Q6H PRN    polyethylene glycol (MIRALAX) packet 17 g  17 g Oral Daily    prasugrel (EFFIENT) tablet 10 mg  10 mg Oral Daily    senna-docusate sodium (SENOKOT S) 8 6-50 mg per tablet 1 tablet  1 tablet Oral HS     Prescriptions Prior to Admission   Medication    ALPRAZolam (NIRAVAM) 0 25 MG dissolvable tablet    ALPRAZolam (XANAX) 0 25 mg tablet    apixaban (ELIQUIS) 5 mg    aspirin (ECOTRIN LOW STRENGTH) 81 mg EC tablet    FLUoxetine (PROzac) 20 MG tablet    furosemide (LASIX) 40 mg tablet    lisinopril (ZESTRIL) 5 mg tablet    methocarbamol (ROBAXIN) 500 mg tablet    metoprolol tartrate (LOPRESSOR) 100 mg tablet    oxyCODONE (ROXICODONE) 15 mg immediate release tablet    oxyCODONE (ROXICODONE) 5 mg immediate release tablet    polyethylene glycol (MIRALAX) 17 g packet    polyethylene glycol (MIRALAX) 17 g packet    potassium chloride (K-DUR,KLOR-CON) 20 mEq tablet          Assessment:  Principal Problem:    Atrial fibrillation with RVR (HCC)  Active Problems:    Acute on chronic systolic congestive heart failure (HCC)    Chronic pain disorder    Insomnia    Dyspnea on exertion    Polypharmacy    Memory loss    Impaired mobility and ADLs    Ambulatory dysfunction    Monomorphic ventricular tachycardia (HCC)    Atrial flutter (HCC)    Prolonged Q-T interval on ECG    Plan:    Monomorphic VT: Continue amiodarone  WCT on tele  Some of this could be aberrant SVT  Persistent AF: Continue amiodarone load  Counseling / Coordination of Care  Total floor / unit time spent today 25 minutes  Greater than 50% of total time was spent with the patient and / or family counseling and / or coordination of care  A description of the counseling / coordination of care

## 2018-07-15 LAB
ANION GAP SERPL CALCULATED.3IONS-SCNC: 4 MMOL/L (ref 4–13)
BUN SERPL-MCNC: 21 MG/DL (ref 5–25)
CALCIUM SERPL-MCNC: 9.1 MG/DL (ref 8.3–10.1)
CHLORIDE SERPL-SCNC: 101 MMOL/L (ref 100–108)
CO2 SERPL-SCNC: 33 MMOL/L (ref 21–32)
CREAT SERPL-MCNC: 1.15 MG/DL (ref 0.6–1.3)
GFR SERPL CREATININE-BSD FRML MDRD: 46 ML/MIN/1.73SQ M
GLUCOSE SERPL-MCNC: 111 MG/DL (ref 65–140)
MAGNESIUM SERPL-MCNC: 2.1 MG/DL (ref 1.6–2.6)
POTASSIUM SERPL-SCNC: 3.6 MMOL/L (ref 3.5–5.3)
SODIUM SERPL-SCNC: 138 MMOL/L (ref 136–145)

## 2018-07-15 PROCEDURE — 93005 ELECTROCARDIOGRAM TRACING: CPT

## 2018-07-15 PROCEDURE — 99232 SBSQ HOSP IP/OBS MODERATE 35: CPT | Performed by: INTERNAL MEDICINE

## 2018-07-15 PROCEDURE — 80048 BASIC METABOLIC PNL TOTAL CA: CPT | Performed by: STUDENT IN AN ORGANIZED HEALTH CARE EDUCATION/TRAINING PROGRAM

## 2018-07-15 PROCEDURE — 83735 ASSAY OF MAGNESIUM: CPT | Performed by: STUDENT IN AN ORGANIZED HEALTH CARE EDUCATION/TRAINING PROGRAM

## 2018-07-15 RX ORDER — POTASSIUM CHLORIDE 20 MEQ/1
20 TABLET, EXTENDED RELEASE ORAL ONCE
Status: COMPLETED | OUTPATIENT
Start: 2018-07-16 | End: 2018-07-16

## 2018-07-15 RX ORDER — POTASSIUM CHLORIDE 20 MEQ/1
40 TABLET, EXTENDED RELEASE ORAL ONCE
Status: COMPLETED | OUTPATIENT
Start: 2018-07-15 | End: 2018-07-15

## 2018-07-15 RX ORDER — MAGNESIUM SULFATE 1 G/100ML
1 INJECTION INTRAVENOUS ONCE
Status: COMPLETED | OUTPATIENT
Start: 2018-07-15 | End: 2018-07-15

## 2018-07-15 RX ADMIN — SENNOSIDES AND DOCUSATE SODIUM 1 TABLET: 8.6; 5 TABLET ORAL at 21:11

## 2018-07-15 RX ADMIN — LACTULOSE 20 G: 20 SOLUTION ORAL at 18:07

## 2018-07-15 RX ADMIN — METOPROLOL SUCCINATE 50 MG: 50 TABLET, EXTENDED RELEASE ORAL at 08:47

## 2018-07-15 RX ADMIN — ASPIRIN 81 MG: 81 TABLET, COATED ORAL at 08:48

## 2018-07-15 RX ADMIN — OXYCODONE HYDROCHLORIDE 15 MG: 5 TABLET ORAL at 12:30

## 2018-07-15 RX ADMIN — HEPARIN SODIUM 5000 UNITS: 5000 INJECTION, SOLUTION INTRAVENOUS; SUBCUTANEOUS at 05:09

## 2018-07-15 RX ADMIN — AMIODARONE HYDROCHLORIDE 200 MG: 200 TABLET ORAL at 08:48

## 2018-07-15 RX ADMIN — PRASUGREL HYDROCHLORIDE 10 MG: 10 TABLET, FILM COATED ORAL at 08:48

## 2018-07-15 RX ADMIN — HEPARIN SODIUM 5000 UNITS: 5000 INJECTION, SOLUTION INTRAVENOUS; SUBCUTANEOUS at 13:15

## 2018-07-15 RX ADMIN — LIDOCAINE 1 PATCH: 50 PATCH CUTANEOUS at 14:37

## 2018-07-15 RX ADMIN — CHOLECALCIFEROL TAB 25 MCG (1000 UNIT) 1000 UNITS: 25 TAB at 08:47

## 2018-07-15 RX ADMIN — ACETAMINOPHEN 975 MG: 325 TABLET, FILM COATED ORAL at 21:11

## 2018-07-15 RX ADMIN — POTASSIUM CHLORIDE 40 MEQ: 1500 TABLET, EXTENDED RELEASE ORAL at 21:11

## 2018-07-15 RX ADMIN — METOPROLOL SUCCINATE 50 MG: 50 TABLET, EXTENDED RELEASE ORAL at 21:10

## 2018-07-15 RX ADMIN — MAGNESIUM SULFATE HEPTAHYDRATE 1 G: 1 INJECTION, SOLUTION INTRAVENOUS at 21:53

## 2018-07-15 RX ADMIN — ACETAMINOPHEN 975 MG: 325 TABLET, FILM COATED ORAL at 05:08

## 2018-07-15 RX ADMIN — OXYCODONE HYDROCHLORIDE 15 MG: 5 TABLET ORAL at 21:15

## 2018-07-15 RX ADMIN — AMIODARONE HYDROCHLORIDE 200 MG: 200 TABLET ORAL at 16:12

## 2018-07-15 RX ADMIN — ALPRAZOLAM 0.25 MG: 0.25 TABLET ORAL at 21:11

## 2018-07-15 RX ADMIN — HEPARIN SODIUM 5000 UNITS: 5000 INJECTION, SOLUTION INTRAVENOUS; SUBCUTANEOUS at 21:11

## 2018-07-15 RX ADMIN — FUROSEMIDE 40 MG: 40 TABLET ORAL at 08:47

## 2018-07-15 RX ADMIN — OXYCODONE HYDROCHLORIDE 15 MG: 5 TABLET ORAL at 02:34

## 2018-07-15 RX ADMIN — LACTULOSE 20 G: 20 SOLUTION ORAL at 08:48

## 2018-07-15 RX ADMIN — POLYETHYLENE GLYCOL 3350 17 G: 17 POWDER, FOR SOLUTION ORAL at 08:48

## 2018-07-15 RX ADMIN — METOPROLOL TARTRATE 2.5 MG: 5 INJECTION, SOLUTION INTRAVENOUS at 19:00

## 2018-07-15 RX ADMIN — ACETAMINOPHEN 975 MG: 325 TABLET, FILM COATED ORAL at 13:15

## 2018-07-15 NOTE — PROGRESS NOTES
Progress Note - Laura Meter [birth year], 69 y o  female MRN: [id number]    Unit/Bed#: [facility] 509-01 Encounter: [id number]    Primary Care Provider: Randi Rosen DO   Date and time admitted to hospital: 7/7/2018  9:08 AM        Monomorphic ventricular tachycardia [facility])   Assessment & Plan    Cardiology following, monitor        * Atrial fibrillation with RVR ([address] )   Assessment & Plan    Continue metoprolol  Anticoagulation per Cardiology  EP following          Acute on chronic systolic congestive heart failure (HCC)   Assessment & Plan    Continue Lasix, monitor I/O, less than 2 g salt diet, cardiology following        Insomnia   Assessment & Plan    On Xanax, geriatrics following  Amitriptyline discontinued          Ambulatory dysfunction   Assessment & Plan    Multifactorial  Safe ambulation fall precautions  Physical therapy        Memory loss   Assessment & Plan    Outpatient follow-up with Geriatrics  Monitor for delirium, sleep hygiene, reorientation        Polypharmacy   Assessment & Plan    Geriatric input noted        Dyspnea on exertion   Assessment & Plan    Multifactorial monitor        Chronic pain disorder   Assessment & Plan    Continue opioid analgesics              VTE Pharmacologic Prophylaxis:   Pharmacologic: Heparin  Mechanical VTE Prophylaxis in Place: Yes    Patient Centered Rounds: I have performed bedside rounds with nursing staff today  Discussions with Specialists or Other Care Team Provider:     Education and Discussions with Family / Patient:  Discussed with the patient    Time Spent for Care: 20 minutes  More than 50% of total time spent on counseling and coordination of care as described above      Current Length of Stay: 8 day(s)    Current Patient Status: Inpatient   Certification Statement: The patient will continue to require additional inpatient hospital stay due to As mentioned    Discharge Plan:  Pending cardiology clearance, may need placement discussed with case management    Code Status: Level 3 - DNAR and DNI      Subjective:     Comfortably lying in bed  Reports fatigue  Appetite fair    Objective:     Vitals:   Temp (24hrs), Av 4 °F (36 9 °C), Min:97 8 °F (36 6 °C), Max:98 9 °F (37 2 °C)    HR:  [58-87] 64  Resp:  [17-18] 18  BP: ()/(54-78) 99/57  SpO2:  [90 %-97 %] 94 %  Body mass index is 24 73 kg/m²  Input and Output Summary (last 24 hours): Intake/Output Summary (Last 24 hours) at 07/15/18 1148  Last data filed at 07/15/18 0823   Gross per 24 hour   Intake             1250 ml   Output              400 ml   Net              850 ml       Physical Exam:     Physical Exam    Comfortably lying in bed  Neck supple  Lungs diminished breath sounds bases  Heart sounds S1 and S2 noted  Abdomen soft  Awake obeys simple commands  No rash    Additional Data:     Labs:      Results from last 7 days  Lab Units 18  2249   WBC Thousand/uL 12 65*   HEMOGLOBIN g/dL 12 3   HEMATOCRIT % 39 2   PLATELETS Thousands/uL 274   NEUTROS PCT % 80*   LYMPHS PCT % 10*   MONOS PCT % 7   EOS PCT % 2       Results from last 7 days  Lab Units 18  1645   SODIUM mmol/L 137   POTASSIUM mmol/L 4 1   CHLORIDE mmol/L 101   CO2 mmol/L 28   BUN mg/dL 18   CREATININE mg/dL 1 22   CALCIUM mg/dL 9 4   GLUCOSE RANDOM mg/dL 111       Results from last 7 days  Lab Units 18  0251   INR  1 24*                 * I Have Reviewed All Lab Data Listed Above  * Additional Pertinent Lab Tests Reviewed:  All Labs Within Last 24 Hours Reviewed    Imaging:    Imaging Reports Reviewed Today Include:   Imaging Personally Reviewed by Myself Includes:     Recent Cultures (last 7 days):           Last 24 Hours Medication List:     Current Facility-Administered Medications:  acetaminophen 975 mg Oral Q8H [facility] Raul Purdy DO   ALPRAZolam 0 25 mg Oral HS John Kovacs PA-C   amiodarone 200 mg Oral BID With Meals Landon Kwong MD   aspirin 81 mg Oral Daily Malia Wong DO   cholecalciferol 1,000 Units Oral Daily Rufina Shepherd CRTRAVON   furosemide 40 mg Oral Daily Lo Ellis DO   heparin (porcine) 5,000 Units Subcutaneous [facility] Arielle Daniels MD   lactulose 20 g Oral BID Arielle Daniels MD   lidocaine 1 patch Transdermal Daily Rufinaalex Shepherd, CRNP   methocarbamol 500 mg Oral HS PRN Lo Ellis DO   metoprolol 2 5 mg Intravenous Q6H PRN Monique Lepe PA-C   metoprolol succinate 50 mg Oral Q12H [facility] Matthew Hollis MD   nitroglycerin 0 4 mg Sublingual Q5 Min PRN Charmaine Holloway MD   oxyCODONE 15 mg Oral Q6H PRN Netta Amador MD   polyethylene glycol 17 g Oral Daily Lo Ellis DO   prasugrel 10 mg Oral Daily Charmaine Holloway MD   senna-docusate sodium 1 tablet Oral HS Maycol Trivedi PA-C   sodium phosphate-biphosphate 1 enema Rectal Daily PRN Paris Baron PA-C        Today, Patient Was Seen By: Arielle Daniels MD    ** Please Note: Dictation voice to text software may have been used in the creation of this document   **

## 2018-07-15 NOTE — PROGRESS NOTES
Pt c/o 10/10 RLQ abdominal pain  Very tender to touch  Passing a lot of gas  Has been refusing stool softeners  Did agree to Lactulose this evening and given  Spoke with Sonam Davis from [facility]  Will watch for orders

## 2018-07-15 NOTE — PROGRESS NOTES
Pt in/out of SVT  Breaks for about 30 secs and then goes right back in  No S/S  VSS  Spoke with Israel Goss fellow, Dr Baylee Damon, and made aware  Told to give PRN IV Metoprolol as ordered

## 2018-07-15 NOTE — PROGRESS NOTES
Cardiology Progress Note - Ryder Stapleton 69 y o  female MRN: [id number]    Unit/Bed#: [facility] 509-01 Encounter: [id number]        Subjective:    No significant events overnight  SVT over night  Treated with IV metoprolol  No chest pain or palps  ROS    Objective:   Vitals: Blood pressure 136/60, pulse 74, temperature 97 8 °F (36 6 °C), temperature source Oral, resp  rate 18, height 5' 5" (1 651 m), weight 67 4 kg (148 lb 9 4 oz), SpO2 93 %  , Body mass index is 24 73 kg/m² , Orthostatic Blood Pressures      Most Recent Value   Blood Pressure  136/60 filed at 07/15/2018 0700   Patient Position - Orthostatic VS  Lying filed at 07/14/2018 7756         Systolic (00OUE), BJU:479 , Min:95 , CNJ:991     Diastolic (77QIM), IKR:53, Min:54, Max:78      Intake/Output Summary (Last 24 hours) at 07/15/18 1007  Last data filed at 07/15/18 0823   Gross per 24 hour   Intake             1250 ml   Output             1000 ml   Net              250 ml     Weight (last 2 days)     Date/Time   Weight    07/15/18 0516  67 4 (148 59)    07/14/18 0557  67 7 (149 25)    07/13/18 0535  68 9 (151 9)                Telemetry Review: No significant arrhythmias seen on telemetry review  NSR  PSVT      Physical Exam   Constitutional: She is oriented to person, place, and time  No distress  HENT:   Mouth/Throat: No oropharyngeal exudate  Eyes: No scleral icterus  Neck: No JVD present  Cardiovascular: Normal rate and regular rhythm  No murmur heard  Pulmonary/Chest: Effort normal and breath sounds normal  No respiratory distress  She has no wheezes  She has no rales  Abdominal: Soft  Bowel sounds are normal  She exhibits no distension  There is no tenderness  There is no rebound  Musculoskeletal: She exhibits no edema  Neurological: She is alert and oriented to person, place, and time  Skin: Skin is warm and dry  She is not diaphoretic  Psychiatric: She has a normal mood and affect   Her behavior is normal          Laboratory Results:        CBC with diff:   Results from last 7 days  Lab Units 07/14/18  2249 07/13/18  0503 07/11/18  0251   WBC Thousand/uL 12 65* 6 47 6 72   HEMOGLOBIN g/dL 12 3 11 0* 11 4*   HEMATOCRIT % 39 2 36 2 36 8   MCV fL 99* 101* 99*   PLATELETS Thousands/uL 274 225 212   MCH pg 30 9 30 6 30 7   MCHC g/dL 31 4 30 4* 31 0*   RDW % 16 2* 15 8* 15 4*   MPV fL 9 6 10 1 9 7   NRBC AUTO /100 WBCs 0  --  0         CMP:  Results from last 7 days  Lab Units 07/14/18 1645 07/13/18  1921 07/13/18  0503 07/11/18  0251   SODIUM mmol/L 137  --  139 137   POTASSIUM mmol/L 4 1 3 6 3 8 4 0   CHLORIDE mmol/L 101  --  107 104   CO2 mmol/L 28  --  26 31   ANION GAP mmol/L 8  --  6 2*   BUN mg/dL 18  --  11 20   CREATININE mg/dL 1 22  --  0 89 0 96   GLUCOSE RANDOM mg/dL 111  --  93 82   CALCIUM mg/dL 9 4  --  8 8 8 4   EGFR ml/min/1 73sq m 43  --  63 57         BMP:  Results from last 7 days  Lab Units 07/14/18 1645 07/13/18 1921 07/13/18  0503 07/11/18  0251   SODIUM mmol/L 137  --  139 137   POTASSIUM mmol/L 4 1 3 6 3 8 4 0   CHLORIDE mmol/L 101  --  107 104   CO2 mmol/L 28  --  26 31   BUN mg/dL 18  --  11 20   CREATININE mg/dL 1 22  --  0 89 0 96   GLUCOSE RANDOM mg/dL 111  --  93 82   CALCIUM mg/dL 9 4  --  8 8 8 4       BNP: No results for input(s): BNP in the last 72 hours      Magnesium:   Results from last 7 days  Lab Units 07/14/18 1645 07/13/18 1921 07/13/18  0503 07/11/18  0251   MAGNESIUM mg/dL 2 1 2 1 2 5 2 1       Coags:   Results from last 7 days  Lab Units 07/11/18  0251   PTT seconds 32   INR  1 24*       TSH: No results found for: TSH    Hemoglobin A1C       Lipid Profile:       Cardiac testing:   Results for orders placed during the hospital encounter of 05/13/18   Echo complete with contrast if indicated    Narrative [address]  [facility], [address] [facility]  [phone]    Transthoracic Echocardiogram  2D, M-mode, Doppler, and Color Doppler    Study date: [id number]    Patient: Cindy Ayala  MR number: [id number]  Account number: [de-identified]  : 1940  Age: 69 years  Gender: Female  Status: Inpatient  Location: Bedside  Height: 65 in  Weight: 178 6 lb  BP: 117/ 81 mmHg    Indications: Assess left ventricular function  Diagnoses: I24 9 - Acute ischemic heart disease, unspecified    Sonographer:  Britney Alva RDCS  Primary Physician:  Kati Erwin DO  Referring Physician:  Eri Dejesus MD  Group:  [facility]  Cardiology Fellow:  Gustabo Stone MD  Interpreting Physician:  Julio Hilliard DO    SUMMARY    LEFT VENTRICLE:  Systolic function was mildly reduced  Ejection fraction was estimated to be 50 %  There was severe hypokinesis of the basal-mid inferior and basal-mid inferolateral wall(s)  The changes were consistent with concentric remodeling (increased wall thickness with normal wall mass)  Doppler parameters were consistent with elevated mean left atrial filling pressure  RIGHT VENTRICLE:  The ventricle was mildly dilated  Systolic function was normal   Wall thickness was increased  LEFT ATRIUM:  The atrium was mildly dilated  RIGHT ATRIUM:  The atrium was moderately dilated  MITRAL VALVE:  There was moderate annular calcification  There was mild regurgitation  TRICUSPID VALVE:  There was mild to moderate regurgitation  Estimated peak PA pressure was 55 mmHg  The findings suggest moderate pulmonary hypertension  IVC, HEPATIC VEINS:  The inferior vena cava was mildly dilated  Respirophasic changes were blunted (less than 50% variation)  HISTORY: PRIOR HISTORY: systolic heart failure, afib, sepsis, chronic anticoagulation    PROCEDURE: The procedure was performed at the bedside  This was a routine study  The transthoracic approach was used  The study included complete 2D imaging, M-mode, complete spectral Doppler, and color Doppler   The heart rate was 87 bpm,  at the start of the study  Images were obtained from the parasternal, apical, subcostal, and suprasternal notch acoustic windows  Image quality was adequate  LEFT VENTRICLE: Size was normal  Systolic function was mildly reduced  Ejection fraction was estimated to be 50 %  There was severe hypokinesis of the basal-mid inferior and basal-mid inferolateral wall(s)  Wall thickness was mildly  increased  The changes were consistent with concentric remodeling (increased wall thickness with normal wall mass)  DOPPLER: Transmitral flow pattern: atrial fibrillation  Doppler parameters were consistent with elevated mean left atrial  filling pressure  RIGHT VENTRICLE: The ventricle was mildly dilated  Systolic function was normal  Wall thickness was increased  LEFT ATRIUM: The atrium was mildly dilated  RIGHT ATRIUM: The atrium was moderately dilated  MITRAL VALVE: There was moderate annular calcification  Valve structure was normal  There was mild calcification of the valve  There was normal leaflet separation  DOPPLER: The transmitral velocity was within the normal range  There was no  evidence for stenosis  There was mild regurgitation  AORTIC VALVE: The valve was trileaflet  Leaflets exhibited moderately increased thickness, moderate calcification, and normal cuspal separation  DOPPLER: Transaortic velocity was within the normal range  There was no evidence for stenosis  There was no regurgitation  TRICUSPID VALVE: The valve structure was normal  There was normal leaflet separation  DOPPLER: The transtricuspid velocity was within the normal range  There was no evidence for stenosis  There was mild to moderate regurgitation  Pulmonary  artery systolic pressure was moderately increased  Estimated peak PA pressure was 55 mmHg  The findings suggest moderate pulmonary hypertension  PULMONIC VALVE: Leaflets exhibited normal thickness, no calcification, and normal cuspal separation   DOPPLER: The transpulmonic velocity was within the normal range  There was trace regurgitation  PERICARDIUM: There was no pericardial effusion  The pericardium was normal in appearance  AORTA: The root exhibited normal size  SYSTEMIC VEINS: IVC: The inferior vena cava was mildly dilated  Respirophasic changes were blunted (less than 50% variation)  SYSTEM MEASUREMENT TABLES    2D  %FS: 23 31 %  Ao Diam: 2 84 cm  EDV(Teich): 127 84 ml  EF(Teich): 46 35 %  ESV(Teich): 68 58 ml  IVSd: 1 14 cm  LA Area: 21 35 cm2  LA Diam: 3 89 cm  LVEDV MOD A4C: 125 04 ml  LVEF MOD A4C: 61 7 %  LVESV MOD A4C: 47 9 ml  LVIDd: 5 17 cm  LVIDs: 3 97 cm  LVLd A4C: 8 04 cm  LVLs A4C: 7 66 cm  LVPWd: 1 09 cm  RA Area: 23 54 cm2  RVIDd: 4 27 cm  SV MOD A4C: 77 15 ml  SV(Teich): 59 26 ml    CW  TR Vmax: 3 26 m/s  TR maxP 59 mmHg    MM  TAPSE: 2 89 cm    PW  AVC: 377 63 ms  E': 0 07 m/s  E/E': 11 01  MV A Ge: 0 21 m/s  MV Dec [place]: 4 94 m/s2  MV DecT: 153 92 ms  MV E Ge: 0 76 m/s  MV E/A Ratio: 3 58  MV PHT: 44 64 ms  MVA By PHT: 4 93 cm2    [facility] Accredited Echocardiography Laboratory    Prepared and electronically signed by    Laila Nunez DO  Signed 15-May-2018 14:00:03       Results for orders placed during the hospital encounter of 18   TIFFANIE    Narrative [facility]  [facility], [address] [facility]  [phone]    Transesophageal Echocardiogram  2D, Doppler, and Color Doppler    Study date:      Patient: Jaleel Antonio  MR number: [id number]  Account number: [de-identified]  : 1940  Age: 69 years  Gender: Female  Status: Inpatient  Location: Cath lab  Height: 65 in  Weight: 149 lb  BP: 79/ 52 mmHg    Indications: Atrial fibrillation      Diagnoses: I48 0 - Atrial fibrillation    Sonographer:  Aiden Alonzo RDCS  Interpreting Physician:  Jeison Mata MD  Primary Physician:  Glenn Melchor DO  Referring Physician:  Oriana Maradiaga MD  Group:  [id number]  Cardiology Fellow:  Dori Webster MD    SUMMARY    LEFT VENTRICLE:  Systolic function was markedly reduced  Ejection fraction was estimated to be 35 %  Beat to beat variability and tachycardia limit an accurate assessment of ejection fraction  There was severe diffuse hypokinesis with regional variations  RIGHT VENTRICLE:  The ventricle was mildly dilated  Systolic function was reduced  LEFT ATRIUM:  The atrium was moderately dilated  No thrombus was identified  There was evidence of spontaneous echo contrast ("smoke")  LEFT ATRIAL APPENDAGE:  The appendage was dilated  The function was moderately reduced (moderately reduced emptying velocity)  No thrombus was identified  There was evidence of spontaneous echo contrast ("smoke") in the appendage  ATRIAL SEPTUM:  No defect or patent foramen ovale was identified  RIGHT ATRIUM:  The atrium was moderately dilated  There was evidence of continuous spontaneous echo contrast ("smoke")  MITRAL VALVE:  There was moderate regurgitation  Regurgitation grade was 3+ on a scale of 0 to 4+  AORTIC VALVE:  There was trace regurgitation  TRICUSPID VALVE:  There was mild regurgitation  PULMONIC VALVE:  There was trace regurgitation  HISTORY: PRIOR HISTORY: Heart failure, Atrial fibrillation, Sepsis  PROCEDURE: The procedure was performed in the catheterization laboratory  This was a routine study  The risks and alternatives of the procedure were explained to the patient and informed consent was obtained  The transesophageal approach  was used  The study included complete 2D imaging, complete spectral Doppler, and color Doppler  The heart rate was 108 bpm, at the start of the study  An adult omniplane probe was inserted by the cardiology fellow under direct supervision  of the attending cardiologist  Intubated with ease  One intubation attempt(s)  There was no blood detected on the probe  Image quality was adequate  There were no complications during the procedure  MEDICATIONS: Benzocaine spray, topical  to oropharynx, prior to procedure  Anesthesia administered by anesthesia team     LEFT VENTRICLE: Size was normal  Systolic function was markedly reduced  Ejection fraction was estimated to be 35 %  Beat to beat variability and tachycardia limit an accurate assessment of ejection fraction  There was severe diffuse  hypokinesis with regional variations  Wall thickness was increased  DOPPLER: The study was not technically sufficient to allow evaluation of LV diastolic function  RIGHT VENTRICLE: The ventricle was mildly dilated  Systolic function was reduced  LEFT ATRIUM: The atrium was moderately dilated  No thrombus was identified  There was evidence of spontaneous echo contrast ("smoke")  APPENDAGE: The appendage was dilated  No thrombus was identified  There was evidence of spontaneous echo  contrast ("smoke") in the appendage  DOPPLER: The function was moderately reduced (moderately reduced emptying velocity)  ATRIAL SEPTUM: No defect or patent foramen ovale was identified  RIGHT ATRIUM: The atrium was moderately dilated  No thrombus was identified  There was evidence of continuous spontaneous echo contrast ("smoke")  MITRAL VALVE: There was mild annular calcification  There was mildly reduced leaflet separation  DOPPLER: There was moderate regurgitation  Regurgitation grade was 3+ on a scale of 0 to 4+  The regurgitant jet was centrally directed  AORTIC VALVE: The valve was trileaflet  Leaflets exhibited normal cuspal separation and sclerosis  DOPPLER: There was trace regurgitation  TRICUSPID VALVE: The valve structure was normal  There was normal leaflet separation  DOPPLER: There was mild regurgitation  PULMONIC VALVE: Not well visualized  DOPPLER: There was trace regurgitation  PERICARDIUM: There was no pericardial effusion  The pericardium was normal in appearance      AORTA: The root exhibited normal size  There was no atheroma  There was no evidence for dissection  There was no evidence for aneurysm  PULMONARY VEINS: DOPPLER: There was systolic blunting in the pulmonary vein(s)  [address] Accredited Echocardiography Laboratory    Prepared and electronically signed by    Valentino Hosteller, MD  Signed 12-Jul-2018 13:57:07       No results found for this or any previous visit  No results found for this or any previous visit      Meds/Allergies   current meds:   Current Facility-Administered Medications   Medication Dose Route Frequency    acetaminophen (TYLENOL) tablet 975 mg  975 mg Oral Q8H [facility]    ALPRAZolam (XANAX) tablet 0 25 mg  0 25 mg Oral HS    amiodarone tablet 200 mg  200 mg Oral BID With Meals    aspirin (ECOTRIN LOW STRENGTH) EC tablet 81 mg  81 mg Oral Daily    cholecalciferol (VITAMIN D3) tablet 1,000 Units  1,000 Units Oral Daily    furosemide (LASIX) tablet 40 mg  40 mg Oral Daily    heparin (porcine) subcutaneous injection 5,000 Units  5,000 Units Subcutaneous Q8H [facility]    lactulose 20 g/30 mL oral solution 20 g  20 g Oral BID    lidocaine (LIDODERM) 5 % patch 1 patch  1 patch Transdermal Daily    methocarbamol (ROBAXIN) tablet 500 mg  500 mg Oral HS PRN    metoprolol (LOPRESSOR) injection 2 5 mg  2 5 mg Intravenous Q6H PRN    metoprolol succinate (TOPROL-XL) 24 hr tablet 50 mg  50 mg Oral Q12H [facility]    nitroglycerin (NITROSTAT) SL tablet 0 4 mg  0 4 mg Sublingual Q5 Min PRN    oxyCODONE (ROXICODONE) IR tablet 15 mg  15 mg Oral Q6H PRN    polyethylene glycol (MIRALAX) packet 17 g  17 g Oral Daily    prasugrel (EFFIENT) tablet 10 mg  10 mg Oral Daily    senna-docusate sodium (SENOKOT S) 8 6-50 mg per tablet 1 tablet  1 tablet Oral HS    sodium phosphate-biphosphate (FLEET) enema 1 enema  1 enema Rectal Daily PRN     Prescriptions Prior to Admission   Medication    ALPRAZolam (NIRAVAM) 0 25 MG dissolvable tablet    ALPRAZolam (XANAX) 0 25 mg tablet    apixaban (ELIQUIS) 5 mg    aspirin (ECOTRIN LOW STRENGTH) 81 mg EC tablet    FLUoxetine (PROzac) 20 MG tablet    furosemide (LASIX) 40 mg tablet    lisinopril (ZESTRIL) 5 mg tablet    methocarbamol (ROBAXIN) 500 mg tablet    metoprolol tartrate (LOPRESSOR) 100 mg tablet    oxyCODONE (ROXICODONE) 15 mg immediate release tablet    oxyCODONE (ROXICODONE) 5 mg immediate release tablet    polyethylene glycol (MIRALAX) 17 g packet    polyethylene glycol (MIRALAX) 17 g packet    potassium chloride (K-DUR,KLOR-CON) 20 mEq tablet          Assessment:  Principal Problem:    Atrial fibrillation with RVR (HCC)  Active Problems:    Acute on chronic systolic congestive heart failure (HCC)    Chronic pain disorder    Insomnia    Dyspnea on exertion    Polypharmacy    Memory loss    Impaired mobility and ADLs    Ambulatory dysfunction    Monomorphic ventricular tachycardia (HCC)    Atrial flutter (HCC)    Prolonged Q-T interval on ECG    Plan:     Monomorphic VT: Continue amiodarone  PSVT last night as well       Persistent AF: Continue amiodarone load  Counseling / Coordination of Care  Total floor / unit time spent today 25 minutes  Greater than 50% of total time was spent with the patient and / or family counseling and / or coordination of care  A description of the counseling / coordination of care

## 2018-07-15 NOTE — PROGRESS NOTES
PRN IV Metoprolol given per order  Pt continues mainly in SVT in the 160's with breaks lasting seconds of NSR/Bigeminal PAC's  Spoke with Dr Malena Morris, told to continue to monitor the patient for the next 30mins to see if Metoprolol helps

## 2018-07-15 NOTE — PROGRESS NOTES
Pt had moderate sized formed/brown BM after enema  States she does have some relief  Pain not as bad and feels more like a dull soreness now

## 2018-07-15 NOTE — PROGRESS NOTES
Pt has been in/out of SVT in the 160's  Then went into a wide complex tachycardia that sustained for about 1 5 mins  Pt asymptomatic  /85  PRN IV Lopressor given  Pt has remained in NSR--HR 60's since Lopressor dose at 1900  Spoke with Dr Conner Ocnonor from Cardiology and made aware  Orders placed for labs and EKG  Requested images to be texted of rhythm, which nursing is unable to do  Then requested I call [facility], and have them evaluate and get back to cardiology

## 2018-07-16 ENCOUNTER — ANESTHESIA (INPATIENT)
Dept: NON INVASIVE DIAGNOSTICS | Facility: HOSPITAL | Age: 78
DRG: 273 | End: 2018-07-16
Payer: MEDICARE

## 2018-07-16 LAB
ANION GAP SERPL CALCULATED.3IONS-SCNC: 6 MMOL/L (ref 4–13)
ATRIAL RATE: 59 BPM
ATRIAL RATE: 66 BPM
ATRIAL RATE: 66 BPM
ATRIAL RATE: 85 BPM
BUN SERPL-MCNC: 18 MG/DL (ref 5–25)
CALCIUM SERPL-MCNC: 9.4 MG/DL (ref 8.3–10.1)
CHLORIDE SERPL-SCNC: 103 MMOL/L (ref 100–108)
CO2 SERPL-SCNC: 29 MMOL/L (ref 21–32)
CREAT SERPL-MCNC: 1 MG/DL (ref 0.6–1.3)
GFR SERPL CREATININE-BSD FRML MDRD: 54 ML/MIN/1.73SQ M
GLUCOSE SERPL-MCNC: 103 MG/DL (ref 65–140)
P AXIS: 69 DEGREES
P AXIS: 75 DEGREES
P AXIS: 79 DEGREES
P AXIS: 84 DEGREES
POTASSIUM SERPL-SCNC: 4.2 MMOL/L (ref 3.5–5.3)
PR INTERVAL: 160 MS
PR INTERVAL: 162 MS
PR INTERVAL: 166 MS
PR INTERVAL: 174 MS
QRS AXIS: -3 DEGREES
QRS AXIS: 3 DEGREES
QRS AXIS: 5 DEGREES
QRS AXIS: 5 DEGREES
QRSD INTERVAL: 102 MS
QRSD INTERVAL: 88 MS
QRSD INTERVAL: 98 MS
QRSD INTERVAL: 98 MS
QT INTERVAL: 462 MS
QT INTERVAL: 556 MS
QT INTERVAL: 566 MS
QT INTERVAL: 590 MS
QTC INTERVAL: 484 MS
QTC INTERVAL: 582 MS
QTC INTERVAL: 584 MS
QTC INTERVAL: 673 MS
SODIUM SERPL-SCNC: 138 MMOL/L (ref 136–145)
T WAVE AXIS: 32 DEGREES
T WAVE AXIS: 54 DEGREES
T WAVE AXIS: 56 DEGREES
T WAVE AXIS: 76 DEGREES
VENTRICULAR RATE: 59 BPM
VENTRICULAR RATE: 66 BPM
VENTRICULAR RATE: 66 BPM
VENTRICULAR RATE: 85 BPM

## 2018-07-16 PROCEDURE — 99232 SBSQ HOSP IP/OBS MODERATE 35: CPT | Performed by: INTERNAL MEDICINE

## 2018-07-16 PROCEDURE — 93653 COMPRE EP EVAL TX SVT: CPT | Performed by: PHYSICIAN ASSISTANT

## 2018-07-16 PROCEDURE — C1730 CATH, EP, 19 OR FEW ELECT: HCPCS | Performed by: PHYSICIAN ASSISTANT

## 2018-07-16 PROCEDURE — 93010 ELECTROCARDIOGRAM REPORT: CPT | Performed by: INTERNAL MEDICINE

## 2018-07-16 PROCEDURE — 80048 BASIC METABOLIC PNL TOTAL CA: CPT | Performed by: INTERNAL MEDICINE

## 2018-07-16 PROCEDURE — 93623 PRGRMD STIMJ&PACG IV RX NFS: CPT | Performed by: PHYSICIAN ASSISTANT

## 2018-07-16 PROCEDURE — C1894 INTRO/SHEATH, NON-LASER: HCPCS | Performed by: PHYSICIAN ASSISTANT

## 2018-07-16 PROCEDURE — C1733 CATH, EP, OTHR THAN COOL-TIP: HCPCS | Performed by: PHYSICIAN ASSISTANT

## 2018-07-16 PROCEDURE — 4A023FZ MEASUREMENT OF CARDIAC RHYTHM, PERCUTANEOUS APPROACH: ICD-10-PCS | Performed by: INTERNAL MEDICINE

## 2018-07-16 PROCEDURE — 02K83ZZ MAP CONDUCTION MECHANISM, PERCUTANEOUS APPROACH: ICD-10-PCS | Performed by: INTERNAL MEDICINE

## 2018-07-16 PROCEDURE — 93005 ELECTROCARDIOGRAM TRACING: CPT

## 2018-07-16 PROCEDURE — C1893 INTRO/SHEATH, FIXED,NON-PEEL: HCPCS | Performed by: PHYSICIAN ASSISTANT

## 2018-07-16 PROCEDURE — 93613 INTRACARDIAC EPHYS 3D MAPG: CPT | Performed by: PHYSICIAN ASSISTANT

## 2018-07-16 PROCEDURE — 93621 COMP EP EVL L PAC&REC C SINS: CPT | Performed by: INTERNAL MEDICINE

## 2018-07-16 PROCEDURE — 4A0234Z MEASUREMENT OF CARDIAC ELECTRICAL ACTIVITY, PERCUTANEOUS APPROACH: ICD-10-PCS | Performed by: INTERNAL MEDICINE

## 2018-07-16 PROCEDURE — 93613 INTRACARDIAC EPHYS 3D MAPG: CPT | Performed by: INTERNAL MEDICINE

## 2018-07-16 PROCEDURE — 93621 COMP EP EVL L PAC&REC C SINS: CPT | Performed by: PHYSICIAN ASSISTANT

## 2018-07-16 PROCEDURE — 02583ZZ DESTRUCTION OF CONDUCTION MECHANISM, PERCUTANEOUS APPROACH: ICD-10-PCS | Performed by: INTERNAL MEDICINE

## 2018-07-16 PROCEDURE — 93653 COMPRE EP EVAL TX SVT: CPT | Performed by: INTERNAL MEDICINE

## 2018-07-16 RX ORDER — FENTANYL CITRATE 50 UG/ML
INJECTION, SOLUTION INTRAMUSCULAR; INTRAVENOUS AS NEEDED
Status: DISCONTINUED | OUTPATIENT
Start: 2018-07-16 | End: 2018-07-16 | Stop reason: SURG

## 2018-07-16 RX ORDER — PROPOFOL 10 MG/ML
INJECTION, EMULSION INTRAVENOUS CONTINUOUS PRN
Status: DISCONTINUED | OUTPATIENT
Start: 2018-07-16 | End: 2018-07-16 | Stop reason: SURG

## 2018-07-16 RX ORDER — LIDOCAINE 50 MG/G
1 PATCH TOPICAL DAILY
Status: DISCONTINUED | OUTPATIENT
Start: 2018-07-16 | End: 2018-07-20 | Stop reason: HOSPADM

## 2018-07-16 RX ORDER — LIDOCAINE HYDROCHLORIDE 10 MG/ML
INJECTION, SOLUTION INFILTRATION; PERINEURAL CODE/TRAUMA/SEDATION MEDICATION
Status: COMPLETED | OUTPATIENT
Start: 2018-07-16 | End: 2018-07-16

## 2018-07-16 RX ORDER — MIDAZOLAM HYDROCHLORIDE 1 MG/ML
INJECTION INTRAMUSCULAR; INTRAVENOUS AS NEEDED
Status: DISCONTINUED | OUTPATIENT
Start: 2018-07-16 | End: 2018-07-16 | Stop reason: SURG

## 2018-07-16 RX ORDER — SODIUM CHLORIDE 9 MG/ML
INJECTION, SOLUTION INTRAVENOUS CONTINUOUS PRN
Status: DISCONTINUED | OUTPATIENT
Start: 2018-07-16 | End: 2018-07-16 | Stop reason: SURG

## 2018-07-16 RX ORDER — DEXTROSE MONOHYDRATE 50 MG/ML
INJECTION, SOLUTION INTRAVENOUS CONTINUOUS PRN
Status: DISCONTINUED | OUTPATIENT
Start: 2018-07-16 | End: 2018-07-16 | Stop reason: SURG

## 2018-07-16 RX ADMIN — SODIUM CHLORIDE: 0.9 INJECTION, SOLUTION INTRAVENOUS at 13:48

## 2018-07-16 RX ADMIN — FUROSEMIDE 40 MG: 40 TABLET ORAL at 08:41

## 2018-07-16 RX ADMIN — LIDOCAINE 1 PATCH: 50 PATCH CUTANEOUS at 08:42

## 2018-07-16 RX ADMIN — ACETAMINOPHEN 975 MG: 325 TABLET, FILM COATED ORAL at 05:44

## 2018-07-16 RX ADMIN — LIDOCAINE HYDROCHLORIDE 6 ML: 10 INJECTION, SOLUTION INFILTRATION; PERINEURAL at 13:51

## 2018-07-16 RX ADMIN — FENTANYL CITRATE 50 MCG: 50 INJECTION, SOLUTION INTRAMUSCULAR; INTRAVENOUS at 13:33

## 2018-07-16 RX ADMIN — AMIODARONE HYDROCHLORIDE 200 MG: 200 TABLET ORAL at 17:44

## 2018-07-16 RX ADMIN — POTASSIUM CHLORIDE 20 MEQ: 1500 TABLET, EXTENDED RELEASE ORAL at 00:35

## 2018-07-16 RX ADMIN — ASPIRIN 81 MG: 81 TABLET, COATED ORAL at 08:41

## 2018-07-16 RX ADMIN — HEPARIN SODIUM 5000 UNITS: 5000 INJECTION, SOLUTION INTRAVENOUS; SUBCUTANEOUS at 21:29

## 2018-07-16 RX ADMIN — ISOPROTERENOL HYDROCHLORIDE 2 MCG/MIN: 0.2 INJECTION, SOLUTION INTRAMUSCULAR; INTRAVENOUS at 14:13

## 2018-07-16 RX ADMIN — POLYETHYLENE GLYCOL 3350 17 G: 17 POWDER, FOR SOLUTION ORAL at 08:42

## 2018-07-16 RX ADMIN — PROPOFOL 50 MCG/KG/MIN: 10 INJECTION, EMULSION INTRAVENOUS at 13:46

## 2018-07-16 RX ADMIN — LACTULOSE 20 G: 20 SOLUTION ORAL at 17:44

## 2018-07-16 RX ADMIN — CHOLECALCIFEROL TAB 25 MCG (1000 UNIT) 1000 UNITS: 25 TAB at 08:41

## 2018-07-16 RX ADMIN — ALPRAZOLAM 0.25 MG: 0.25 TABLET ORAL at 21:29

## 2018-07-16 RX ADMIN — MIDAZOLAM 1 MG: 1 INJECTION INTRAMUSCULAR; INTRAVENOUS at 13:24

## 2018-07-16 RX ADMIN — LACTULOSE 20 G: 20 SOLUTION ORAL at 08:41

## 2018-07-16 RX ADMIN — DEXTROSE: 5 SOLUTION INTRAVENOUS at 13:47

## 2018-07-16 RX ADMIN — OXYCODONE HYDROCHLORIDE 15 MG: 5 TABLET ORAL at 19:12

## 2018-07-16 RX ADMIN — AMIODARONE HYDROCHLORIDE 200 MG: 200 TABLET ORAL at 08:41

## 2018-07-16 RX ADMIN — SODIUM CHLORIDE: 0.9 INJECTION, SOLUTION INTRAVENOUS at 12:46

## 2018-07-16 RX ADMIN — MIDAZOLAM 1 MG: 1 INJECTION INTRAMUSCULAR; INTRAVENOUS at 13:42

## 2018-07-16 RX ADMIN — METOPROLOL SUCCINATE 50 MG: 50 TABLET, EXTENDED RELEASE ORAL at 21:29

## 2018-07-16 RX ADMIN — SENNOSIDES AND DOCUSATE SODIUM 1 TABLET: 8.6; 5 TABLET ORAL at 21:29

## 2018-07-16 RX ADMIN — PRASUGREL HYDROCHLORIDE 10 MG: 10 TABLET, FILM COATED ORAL at 08:41

## 2018-07-16 RX ADMIN — ACETAMINOPHEN 975 MG: 325 TABLET, FILM COATED ORAL at 21:29

## 2018-07-16 RX ADMIN — VANCOMYCIN HYDROCHLORIDE 1000 MG: 1 INJECTION, POWDER, LYOPHILIZED, FOR SOLUTION INTRAVENOUS at 13:47

## 2018-07-16 RX ADMIN — LIDOCAINE HYDROCHLORIDE 10 ML: 10 INJECTION, SOLUTION INFILTRATION; PERINEURAL at 13:50

## 2018-07-16 RX ADMIN — METOPROLOL SUCCINATE 50 MG: 50 TABLET, EXTENDED RELEASE ORAL at 08:41

## 2018-07-16 RX ADMIN — OXYCODONE HYDROCHLORIDE 15 MG: 5 TABLET ORAL at 11:40

## 2018-07-16 RX ADMIN — FENTANYL CITRATE 50 MCG: 50 INJECTION, SOLUTION INTRAMUSCULAR; INTRAVENOUS at 13:45

## 2018-07-16 NOTE — PHYSICAL THERAPY NOTE
Physical Therapy Cancellation Note    Pt off the floor in cardiac cath lab when attempted this PM   Will continue to follow and treat as appropriate      Ashley Irving PTA

## 2018-07-16 NOTE — ASSESSMENT & PLAN NOTE
A: Vtach due to uncertain etiology  R on T seen but does not have the appearance of torsades  P:    - Had RCA stent placed   - OK to resume eliquis  - Keep Mg>2, K>4  - Continue PO amio  - avoid QT prolonging drugs  - for EP study today, to help differentiate between ventricular tachycardia and atrial flutter with aberrancy, if she is having runs of V-tach may benefit from implantable defibrillator

## 2018-07-16 NOTE — ANESTHESIA POSTPROCEDURE EVALUATION
Post-Op Assessment Note      CV Status:  Stable    Mental Status:  Alert    PONV Controlled:  None    Airway Patency:  Patent        Staff: Anesthesiologist, CRNA           BP      Temp      Pulse     Resp      SpO2

## 2018-07-16 NOTE — ANESTHESIA PREPROCEDURE EVALUATION
Review of Systems/Medical History  Patient summary reviewed        Cardiovascular  Hyperlipidemia, Hypertension , Dysrhythmias , atrial fibrillation, CHF ,   Comment:    Study date:       Patient: Shana Lyons  MR number: [id number]  Account number: [de-identified]  : 1940  Age: 69 years  Gender: Female  Status: Inpatient  Location: Bedside  Height: 65 in  Weight: 178 6 lb  BP: 117/ 81 mmHg     Indications: Assess left ventricular function      Diagnoses: I24 9 - Acute ischemic heart disease, unspecified     Sonographer:  Toni Williamson RDCS  Primary Physician:  Baron Elvin DO  Referring Physician:  Madison Lugo MD  Group:  [id number]  Cardiology Fellow:  Nevaeh Carballo MD  Interpreting Physician:  Genaro North DO     SUMMARY     LEFT VENTRICLE:  Systolic function was mildly reduced  Ejection fraction was estimated to be 50 %  There was severe hypokinesis of the basal-mid inferior and basal-mid inferolateral wall(s)  The changes were consistent with concentric remodeling (increased wall thickness with normal wall mass)  Doppler parameters were consistent with elevated mean left atrial filling pressure      RIGHT VENTRICLE:  The ventricle was mildly dilated  Systolic function was normal   Wall thickness was increased      LEFT ATRIUM:  The atrium was mildly dilated      RIGHT ATRIUM:  The atrium was moderately dilated      MITRAL VALVE:  There was moderate annular calcification  There was mild regurgitation      TRICUSPID VALVE:  There was mild to moderate regurgitation  Estimated peak PA pressure was 55 mmHg  The findings suggest moderate pulmonary hypertension      IVC, HEPATIC VEINS:  The inferior vena cava was mildly dilated  Respirophasic changes were blunted (less than 50% variation)      HISTORY: PRIOR HISTORY: systolic heart failure, afib, sepsis, chronic anticoagulation     PROCEDURE: The procedure was performed at the bedside   This was a routine study  The transthoracic approach was used  The study included complete 2D imaging, M-mode, complete spectral Doppler, and color Doppler  The heart rate was 87 bpm,  at the start of the study  Images were obtained from the parasternal, apical, subcostal, and suprasternal notch acoustic windows  Image quality was adequate      LEFT VENTRICLE: Size was normal  Systolic function was mildly reduced  Ejection fraction was estimated to be 50 %  There was severe hypokinesis of the basal-mid inferior and basal-mid inferolateral wall(s)  Wall thickness was mildly  increased  The changes were consistent with concentric remodeling (increased wall thickness with normal wall mass)  DOPPLER: Transmitral flow pattern: atrial fibrillation  Doppler parameters were consistent with elevated mean left atrial  filling pressure      RIGHT VENTRICLE: The ventricle was mildly dilated  Systolic function was normal  Wall thickness was increased      LEFT ATRIUM: The atrium was mildly dilated      RIGHT ATRIUM: The atrium was moderately dilated      MITRAL VALVE: There was moderate annular calcification  Valve structure was normal  There was mild calcification of the valve  There was normal leaflet separation  DOPPLER: The transmitral velocity was within the normal range  There was no  evidence for stenosis  There was mild regurgitation      AORTIC VALVE: The valve was trileaflet  Leaflets exhibited moderately increased thickness, moderate calcification, and normal cuspal separation  DOPPLER: Transaortic velocity was within the normal range  There was no evidence for stenosis  There was no regurgitation      TRICUSPID VALVE: The valve structure was normal  There was normal leaflet separation  DOPPLER: The transtricuspid velocity was within the normal range  There was no evidence for stenosis  There was mild to moderate regurgitation  Pulmonary  artery systolic pressure was moderately increased  Estimated peak PA pressure was 55 mmHg  The findings suggest moderate pulmonary hypertension      PULMONIC VALVE: Leaflets exhibited normal thickness, no calcification, and normal cuspal separation  DOPPLER: The transpulmonic velocity was within the normal range  There was trace regurgitation  , Pulmonary hypertension Pulmonary  Pneumonia, Shortness of breath,        GI/Hepatic       Chronic kidney disease stage 2,        Endo/Other     GYN       Hematology   Musculoskeletal    Arthritis     Neurology   Psychology   Depression ,              Physical Exam    Airway    Mallampati score: II  TM Distance: >3 FB  Neck ROM: full     Dental       Cardiovascular      Pulmonary      Other Findings        Anesthesia Plan  ASA Score- 3     Anesthesia Type- IV sedation with anesthesia with ASA Monitors  Additional Monitors:   Airway Plan:         Plan Factors-    Induction- intravenous  Postoperative Plan-     Informed Consent- Anesthetic plan and risks discussed with patient

## 2018-07-16 NOTE — ASSESSMENT & PLAN NOTE
A:  Patient has been slipping in and out of atrial flutter this morning  She has been completely asymptomatic  Currently in sinus rhythm  Plan:    -continue amiodarone and metoprolol   -for EP study today to determine if flutter can be ablated  Aleyda Zhou

## 2018-07-16 NOTE — PROGRESS NOTES
Cardiac electrophysiology  Progress Note - Jorge Mehta [birth year], 69 y o  female MRN: [id number]    Unit/Bed#: [facility] 509-01 Encounter: [id number]    Primary Care Provider: Mouna Richards DO   Date and time admitted to hospital: 7/7/2018  9:08 AM        Prolonged Q-T interval on ECG   Assessment & Plan    Assessment:  Likely due to QT prolonging medications  Plan:  The latest QTC is less than 500  Atrial flutter ([address] )   Assessment & Plan    A:  Patient has been slipping in and out of atrial flutter this morning  She has been completely asymptomatic  Currently in sinus rhythm  Plan:    -continue amiodarone and metoprolol   -for EP study today to determine if flutter can be ablated           Monomorphic ventricular tachycardia (HCC)   Assessment & Plan    A: Vtach due to uncertain etiology  R on T seen but does not have the appearance of torsades  P:    - Had RCA stent placed   - OK to resume eliquis  - Keep Mg>2, K>4  - Continue PO amio  - avoid QT prolonging drugs  - for EP study today, to help differentiate between ventricular tachycardia and atrial flutter with aberrancy, if she is having runs of V-tach may benefit from implantable defibrillator  Subjective/Objective        Subjective:  Patient has no complaints  Still having runs of wide complex tachycardia-either supraventricular tachycardia with aberrancy versus ventricular tachycardia      Vitals: /69   Pulse 69   Temp 97 7 °F (36 5 °C) (Oral)   Resp 20   Ht 5' 5" (1 651 m)   Wt 66 kg (145 lb 8 1 oz)   SpO2 91%   BMI 24 21 kg/m²   Vitals:    07/15/18 0516 07/16/18 0600   Weight: 67 4 kg (148 lb 9 4 oz) 66 kg (145 lb 8 1 oz)     Orthostatic Blood Pressures      Most Recent Value   Blood Pressure  164/69 filed at 07/16/2018 1137   Patient Position - Orthostatic VS  Lying filed at 07/14/2018 1445            Intake/Output Summary (Last 24 hours) at 07/16/18 1209  Last data filed at 07/16/18 0515   Gross per 24 hour Intake              770 ml   Output             1800 ml   Net            -1030 ml       Invasive Devices     Peripheral Intravenous Line            Peripheral IV 07/15/18 Right Arm less than 1 day                Review of Systems:   Cardiovascular: no chest pain, shortness of breath, palpitations, orthopnea, PND  Pulmonary: no dyspnea, cough  GI: no anorexia, abdominal pain, weight loss  Endocrine: no polyuria, polydypspsia, polyuria  : no dysuria, urinary retention, decreased urine output  Neuro: No focal weakness, tremors, imbalance, loss of sensation  Musculoskeletal: no joint pains, joint swelling  HEENT: no visual changes, swallowing difficulty, hearing loss  Skin: No rash  Psych: no delusions, hallucinations, sx of depression           Physical Exam:   General appearance: alert and oriented, in no acute distress  Head: Normocephalic, without obvious abnormality, atraumatic  Eyes: conjunctivae/corneas clear  PERRL, EOM's intact  Fundi benign  Ears: normal TM's and external ear canals both ears  Nose: Nares normal  Septum midline  Mucosa normal  No drainage or sinus tenderness  Throat: lips, mucosa, and tongue normal; teeth and gums normal  Neck: no adenopathy, no carotid bruit, no JVD, supple, symmetrical, trachea midline and thyroid not enlarged, symmetric, no tenderness/mass/nodules  Back: symmetric, no curvature  ROM normal  No CVA tenderness  Lungs: clear to auscultation bilaterally  Heart: regular rate and rhythm, S1, S2 normal, no murmur, click, rub or gallop  Abdomen: soft, non-tender; bowel sounds normal; no masses,  no organomegaly  Extremities: extremities normal, warm and well-perfused; no cyanosis, clubbing, or edema  Pulses: 2+ and symmetric    Lab Results: I have personally reviewed pertinent lab results  Imaging: I have personally reviewed pertinent reports      EKG: pending from today ordered by me

## 2018-07-16 NOTE — RESTORATIVE TECHNICIAN NOTE
Restorative Specialist Mobility Note       Activity: Bedrest, Dangle, Stand at bedside, Turn, Ambulate in room, Bathroom privileges, Ambulate in agee     Assistive Device: Front wheel walker     Ambulation Response:  Tolerated fairly well  Repositioned: Sitting, Up in chair           Range of Motion: Active, Right leg, Left leg

## 2018-07-16 NOTE — CASE MANAGEMENT
Continued Stay Review    Date: 07/15/18    Vital Signs: /69   Pulse 69   Temp 97 7 °F (36 5 °C) (Oral)   Resp 20   Ht 5' 5" (1 651 m)   Wt 66 kg (145 lb 8 1 oz)   SpO2 91%   BMI 24 21 kg/m²     Medications:   Scheduled Meds:   Current Facility-Administered Medications:  acetaminophen 975 mg Oral Q8H [facility]   ALPRAZolam 0 25 mg Oral HS   amiodarone 200 mg Oral BID With Meals   aspirin 81 mg Oral Daily   cholecalciferol 1,000 Units Oral Daily   furosemide 40 mg Oral Daily   heparin (porcine) 5,000 Units Subcutaneous Q8H [facility]   lactulose 20 g Oral BID   lidocaine 1 patch Transdermal Daily   lidocaine   Code/Trauma/Sedation Med   lidocaine   Code/Trauma/Sedation Med   methocarbamol 500 mg Oral HS PRN   metoprolol 2 5 mg Intravenous Q6H PRN   metoprolol succinate 50 mg Oral Q12H [facility]   nitroglycerin 0 4 mg Sublingual Q5 Min PRN   oxyCODONE 15 mg Oral Q6H PRN   polyethylene glycol 17 g Oral Daily   prasugrel 10 mg Oral Daily   senna-docusate sodium 1 tablet Oral HS   sodium phosphate-biphosphate 1 enema Rectal Daily PRN     Facility-Administered Medications Ordered in Other Encounters:  fentanyl citrate (PF)  Intravenous PRN Jearline Jubilee, CRNA   midazolam  Intravenous PRN Jearline Jubilee, CRNA   sodium chloride   Continuous PRN Jearline Jubilee, CRNA     Continuous Infusions:    PRN Meds: lidocaine    lidocaine    methocarbamol    metoprolol    nitroglycerin    oxyCODONE    sodium phosphate-biphosphate    Abnormal Labs/Diagnostic Results:     Lab 07/14/18  2249   WBC 12 65*   NEUTROS PCT 80*   LYMPHS PCT 10*     Age/Sex: 69 y o  female     Assessment/Plan:   Monomorphic ventricular tachycardia ([address] )   Assessment & Plan     Cardiology following, monitor          * Atrial fibrillation with RVR (HCC)   Assessment & Plan     Continue metoprolol  Anticoagulation per Cardiology  EP following             Acute on chronic systolic congestive heart failure (HCC)   Assessment & Plan     Continue Lasix, monitor I/O, less than 2 g salt diet, cardiology following          Insomnia   Assessment & Plan     On Xanax, geriatrics following  Amitriptyline discontinued             Ambulatory dysfunction   Assessment & Plan     Multifactorial  Safe ambulation fall precautions  Physical therapy          Memory loss   Assessment & Plan     Outpatient follow-up with Geriatrics  Monitor for delirium, sleep hygiene, reorientation          Polypharmacy   Assessment & Plan     Geriatric input noted          Dyspnea on exertion   Assessment & Plan     Multifactorial monitor          Chronic pain disorder   Assessment & Plan     Continue opioid analgesics                VTE Pharmacologic Prophylaxis:   Pharmacologic: Heparin  Mechanical VTE Prophylaxis in Place: Yes    Current Patient Status: Inpatient   Certification Statement: The patient will continue to require additional inpatient hospital stay due to As mentioned     Discharge Plan:  Pending cardiology clearance, may need placement discussed with case management      Thank you,  Abimael Aqq  [facility] Utilization Review Department  Phone: [phone]; Fax [phone]  ATTENTION: The Network Utilization Review Department is now centralized for our 9 Facilities  Make a note that we have a new phone and fax numbers for our Department  Please call with any questions or concerns to [phone] and carefully follow the prompts so that you are directed to the right person  All voicemails are confidential  Fax any determinations, approvals, denials, and requests for initial or continue stay review clinical to [phone]  Due to HIGH CALL volume, it would be easier if you could please send faxed requests to expedite your requests and in part, help us provide discharge notifications faster

## 2018-07-16 NOTE — PROGRESS NOTES
Progress Note - Zee Frederick [birth year], 69 y o  female MRN: [id number]    Unit/Bed#: [facility] 509-01 Encounter: [id number]    Primary Care Provider: Abdelrahman Perales DO   Date and time admitted to hospital: 7/7/2018  9:08 AM        Monomorphic ventricular tachycardia ([address] )   Assessment & Plan    EP input noted, for EP studies today        * Atrial fibrillation with RVR (HCC)   Assessment & Plan    Continue metoprolol  Anticoagulation per Cardiology  EP following          Acute on chronic systolic congestive heart failure (HCC)   Assessment & Plan    Continue Lasix, monitor I/O, less than 2 g salt diet, cardiology following        Insomnia   Assessment & Plan    On Xanax, geriatrics following  Amitriptyline discontinued          Ambulatory dysfunction   Assessment & Plan    Multifactorial  Safe ambulation fall precautions  Physical therapy        Memory loss   Assessment & Plan    Outpatient follow-up with Geriatrics  Monitor for delirium, sleep hygiene, reorientation        Polypharmacy   Assessment & Plan    Geriatric input noted        Dyspnea on exertion   Assessment & Plan    Multifactorial monitor        Chronic pain disorder   Assessment & Plan    Continue opioid analgesics              VTE Pharmacologic Prophylaxis:   Pharmacologic: Heparin  Mechanical VTE Prophylaxis in Place: Yes    Patient Centered Rounds: I have performed bedside rounds with nursing staff today  Discussions with Specialists or Other Care Team Provider:     Education and Discussions with Family / Patient:  Discussed with the patient, offered to call daughter she declined, called and updated elizabeth over phone    Time Spent for Care: 30 minutes  More than 50% of total time spent on counseling and coordination of care as described above      Current Length of Stay: 9 day(s)    Current Patient Status: Inpatient   Certification Statement: The patient will continue to require additional inpatient hospital stay due to As mentioned    Discharge Plan:  For EP evaluation and studies later today    Code Status: Level 3 - DNAR and DNI      Subjective:     Reports feeling tired and fatigued      Objective:     Vitals:   Temp (24hrs), Av 9 °F (36 6 °C), Min:97 6 °F (36 4 °C), Max:98 5 °F (36 9 °C)    HR:  [] 69  Resp:  [18-20] 20  BP: (100-164)/(55-85) 164/69  SpO2:  [90 %-97 %] 91 %  Body mass index is 24 21 kg/m²  Input and Output Summary (last 24 hours): Intake/Output Summary (Last 24 hours) at 18 1243  Last data filed at 18 1201   Gross per 24 hour   Intake              720 ml   Output             1900 ml   Net            -1180 ml       Physical Exam:     Physical Exam    Comfortably lying in bed  Neck supple  Lungs diminished breath sounds bases  Heart sounds S1 and S2 noted  Abdomen soft  Awake obeys simple commands  No rash      Additional Data:     Labs:      Results from last 7 days  Lab Units 18  2249   WBC Thousand/uL 12 65*   HEMOGLOBIN g/dL 12 3   HEMATOCRIT % 39 2   PLATELETS Thousands/uL 274   NEUTROS PCT % 80*   LYMPHS PCT % 10*   MONOS PCT % 7   EOS PCT % 2       Results from last 7 days  Lab Units 18  0500   SODIUM mmol/L 138   POTASSIUM mmol/L 4 2   CHLORIDE mmol/L 103   CO2 mmol/L 29   BUN mg/dL 18   CREATININE mg/dL 1 00   CALCIUM mg/dL 9 4   GLUCOSE RANDOM mg/dL 103       Results from last 7 days  Lab Units 18  0251   INR  1 24*                 * I Have Reviewed All Lab Data Listed Above  * Additional Pertinent Lab Tests Reviewed:  All Labs Within Last 24 Hours Reviewed    Imaging:    Imaging Reports Reviewed Today Include:   Imaging Personally Reviewed by Myself Includes:     Recent Cultures (last 7 days):           Last 24 Hours Medication List:     Current Facility-Administered Medications:  acetaminophen 975 mg Oral Q8H [facility] Raul Purdy DO   ALPRAZolam 0 25 mg Oral  Marika Schulz PA-C   amiodarone 200 mg Oral BID With Meals Elijah Richardson MD   aspirin 81 mg Oral Daily Raul Pina Smith DO   cholecalciferol 1,000 Units Oral Daily CHANDRIKA Floyd   furosemide 40 mg Oral Daily Raymond Ogden DO   heparin (porcine) 5,000 Units Subcutaneous [facility] Jen Hillman MD   lactulose 20 g Oral BID Jen Hillman MD   lidocaine 1 patch Transdermal Daily CHANDRIKA Floyd   methocarbamol 500 mg Oral HS PRN Raymond Ogden DO   metoprolol 2 5 mg Intravenous Q6H PRN Cayetano Yarbrough PA-C   metoprolol succinate 50 mg Oral Q12H [address] Matthew Hollis MD   nitroglycerin 0 4 mg Sublingual Q5 Min PRN Meliza Monday, MD   oxyCODONE 15 mg Oral Q6H PRN Elvira Head MD   polyethylene glycol 17 g Oral Daily Raymond Ogden DO   prasugrel 10 mg Oral Daily Meliza Monday, MD   senna-docusate sodium 1 tablet Oral HS Junior Beth PA-C   sodium phosphate-biphosphate 1 enema Rectal Daily PRN Steven Thacker PA-C        Today, Patient Was Seen By: Jen Hillman MD    ** Please Note: Dictation voice to text software may have been used in the creation of this document   **

## 2018-07-16 NOTE — PROGRESS NOTES
I was paged by the nurse as Patient had a 1 min 30 sec run of wide complex tachycardia which appears to start with R on T  Patient was asymptomatic and hemodynamically stable  Reviewed the strips  WCT could be Monomorphic VT or Atrial Flutter with abberancy with 1:1 conduction  Very difficult to differentiate between these two as patient had recent RCA stent and could have a scar causing Monomorphic VT and patient has Hx Afib/flutter and being on amiodarone, patient can have aflutter with 1:1 conduction  Definitive diagnosis could only be made with an EP study  As patient continues to have these episodes of this WCT, patient will benefit from EP study  Will keep patient NPO post midnight  Discussed with EP Dr Saleem Bending  C/w oral loading dose of amiodarone for now  Will consider EP study tomorrow and based on the results of EP study, we can plan aflutter ablation or ICD if inducible VT seen  Hold apixaban for now till EP evaluates and make their final recommendations in AM  F/u electrolytes  Keep K 4 5, Mag 2 5  Repeat EKG done after the episode showed QTc 480 which is improved from before  Continue to avoid QT prolonging medications

## 2018-07-16 NOTE — DISCHARGE INSTRUCTIONS
No heavy lifting or strenuous activity for one week  No soaking in a bath tub/hot tub/swimming pool for one week or until groin heals  If you notice ongoing bleeding, swelling, or firm lumps in groin near ablation incision, please contact Dr Toni Partida office - [phone]

## 2018-07-17 PROBLEM — I48.92 ATRIAL FLUTTER (HCC): Status: RESOLVED | Noted: 2018-07-13 | Resolved: 2018-07-17

## 2018-07-17 PROBLEM — I47.1 AVNRT (AV NODAL RE-ENTRY TACHYCARDIA) (HCC): Status: ACTIVE | Noted: 2018-07-17

## 2018-07-17 LAB
ANION GAP SERPL CALCULATED.3IONS-SCNC: 7 MMOL/L (ref 4–13)
ATRIAL RATE: 64 BPM
BUN SERPL-MCNC: 18 MG/DL (ref 5–25)
CALCIUM SERPL-MCNC: 9.1 MG/DL (ref 8.3–10.1)
CHLORIDE SERPL-SCNC: 104 MMOL/L (ref 100–108)
CO2 SERPL-SCNC: 27 MMOL/L (ref 21–32)
CREAT SERPL-MCNC: 0.97 MG/DL (ref 0.6–1.3)
ERYTHROCYTE [DISTWIDTH] IN BLOOD BY AUTOMATED COUNT: 16.3 % (ref 11.6–15.1)
GFR SERPL CREATININE-BSD FRML MDRD: 57 ML/MIN/1.73SQ M
GLUCOSE SERPL-MCNC: 89 MG/DL (ref 65–140)
HCT VFR BLD AUTO: 37.9 % (ref 34.8–46.1)
HGB BLD-MCNC: 11.4 G/DL (ref 11.5–15.4)
MCH RBC QN AUTO: 30.4 PG (ref 26.8–34.3)
MCHC RBC AUTO-ENTMCNC: 30.1 G/DL (ref 31.4–37.4)
MCV RBC AUTO: 101 FL (ref 82–98)
P AXIS: 88 DEGREES
PLATELET # BLD AUTO: 321 THOUSANDS/UL (ref 149–390)
PMV BLD AUTO: 9.5 FL (ref 8.9–12.7)
POTASSIUM SERPL-SCNC: 3.9 MMOL/L (ref 3.5–5.3)
PR INTERVAL: 168 MS
QRS AXIS: 33 DEGREES
QRSD INTERVAL: 104 MS
QT INTERVAL: 448 MS
QTC INTERVAL: 462 MS
RBC # BLD AUTO: 3.75 MILLION/UL (ref 3.81–5.12)
SODIUM SERPL-SCNC: 138 MMOL/L (ref 136–145)
T WAVE AXIS: 70 DEGREES
VENTRICULAR RATE: 64 BPM
WBC # BLD AUTO: 7.17 THOUSAND/UL (ref 4.31–10.16)

## 2018-07-17 PROCEDURE — 99231 SBSQ HOSP IP/OBS SF/LOW 25: CPT | Performed by: INTERNAL MEDICINE

## 2018-07-17 PROCEDURE — 80048 BASIC METABOLIC PNL TOTAL CA: CPT | Performed by: PHYSICIAN ASSISTANT

## 2018-07-17 PROCEDURE — G8979 MOBILITY GOAL STATUS: HCPCS

## 2018-07-17 PROCEDURE — 97164 PT RE-EVAL EST PLAN CARE: CPT

## 2018-07-17 PROCEDURE — 99232 SBSQ HOSP IP/OBS MODERATE 35: CPT | Performed by: INTERNAL MEDICINE

## 2018-07-17 PROCEDURE — 97535 SELF CARE MNGMENT TRAINING: CPT | Performed by: STUDENT IN AN ORGANIZED HEALTH CARE EDUCATION/TRAINING PROGRAM

## 2018-07-17 PROCEDURE — 93010 ELECTROCARDIOGRAM REPORT: CPT | Performed by: INTERNAL MEDICINE

## 2018-07-17 PROCEDURE — 85027 COMPLETE CBC AUTOMATED: CPT | Performed by: PHYSICIAN ASSISTANT

## 2018-07-17 PROCEDURE — 97530 THERAPEUTIC ACTIVITIES: CPT | Performed by: STUDENT IN AN ORGANIZED HEALTH CARE EDUCATION/TRAINING PROGRAM

## 2018-07-17 PROCEDURE — G8978 MOBILITY CURRENT STATUS: HCPCS

## 2018-07-17 RX ORDER — CLOPIDOGREL BISULFATE 75 MG/1
75 TABLET ORAL DAILY
Status: DISCONTINUED | OUTPATIENT
Start: 2018-07-18 | End: 2018-07-20 | Stop reason: HOSPADM

## 2018-07-17 RX ORDER — LANOLIN ALCOHOL/MO/W.PET/CERES
6 CREAM (GRAM) TOPICAL
Status: DISCONTINUED | OUTPATIENT
Start: 2018-07-17 | End: 2018-07-20 | Stop reason: HOSPADM

## 2018-07-17 RX ADMIN — LIDOCAINE 1 PATCH: 50 PATCH CUTANEOUS at 09:38

## 2018-07-17 RX ADMIN — HEPARIN SODIUM 5000 UNITS: 5000 INJECTION, SOLUTION INTRAVENOUS; SUBCUTANEOUS at 21:09

## 2018-07-17 RX ADMIN — FUROSEMIDE 40 MG: 40 TABLET ORAL at 09:38

## 2018-07-17 RX ADMIN — AMIODARONE HYDROCHLORIDE 200 MG: 200 TABLET ORAL at 17:15

## 2018-07-17 RX ADMIN — HEPARIN SODIUM 5000 UNITS: 5000 INJECTION, SOLUTION INTRAVENOUS; SUBCUTANEOUS at 05:14

## 2018-07-17 RX ADMIN — CHOLECALCIFEROL TAB 25 MCG (1000 UNIT) 1000 UNITS: 25 TAB at 09:38

## 2018-07-17 RX ADMIN — METOPROLOL SUCCINATE 50 MG: 50 TABLET, EXTENDED RELEASE ORAL at 21:10

## 2018-07-17 RX ADMIN — PRASUGREL HYDROCHLORIDE 10 MG: 10 TABLET, FILM COATED ORAL at 09:38

## 2018-07-17 RX ADMIN — LACTULOSE 20 G: 20 SOLUTION ORAL at 17:15

## 2018-07-17 RX ADMIN — ACETAMINOPHEN 975 MG: 325 TABLET, FILM COATED ORAL at 13:28

## 2018-07-17 RX ADMIN — MELATONIN TAB 3 MG 6 MG: 3 TAB at 21:09

## 2018-07-17 RX ADMIN — ACETAMINOPHEN 975 MG: 325 TABLET, FILM COATED ORAL at 21:09

## 2018-07-17 RX ADMIN — OXYCODONE HYDROCHLORIDE 15 MG: 5 TABLET ORAL at 20:03

## 2018-07-17 RX ADMIN — METOPROLOL SUCCINATE 50 MG: 50 TABLET, EXTENDED RELEASE ORAL at 09:38

## 2018-07-17 RX ADMIN — ASPIRIN 81 MG: 81 TABLET, COATED ORAL at 09:38

## 2018-07-17 RX ADMIN — AMIODARONE HYDROCHLORIDE 200 MG: 200 TABLET ORAL at 09:38

## 2018-07-17 RX ADMIN — ACETAMINOPHEN 975 MG: 325 TABLET, FILM COATED ORAL at 05:14

## 2018-07-17 RX ADMIN — LACTULOSE 20 G: 20 SOLUTION ORAL at 09:38

## 2018-07-17 RX ADMIN — ALPRAZOLAM 0.25 MG: 0.25 TABLET ORAL at 21:10

## 2018-07-17 RX ADMIN — OXYCODONE HYDROCHLORIDE 15 MG: 5 TABLET ORAL at 09:39

## 2018-07-17 RX ADMIN — HEPARIN SODIUM 5000 UNITS: 5000 INJECTION, SOLUTION INTRAVENOUS; SUBCUTANEOUS at 13:28

## 2018-07-17 RX ADMIN — OXYCODONE HYDROCHLORIDE 15 MG: 5 TABLET ORAL at 01:18

## 2018-07-17 NOTE — PROGRESS NOTES
Progress Note - Jose L Fox , 69 y o  female MRN: [id number]    Unit/Bed#: [facility] 509-01 Encounter: [id number]    Primary Care Provider: Yan Fish DO   Date and time admitted to hospital: 2018  9:08 AM        * Atrial fibrillation with RVR [facility])   Assessment & Plan    Continue metoprolol  Anticoagulation per Cardiology  EP following status post cath lab intervention yesterday  Will review findings with Cardiology  Monomorphic ventricular tachycardia (HCC)   Assessment & Plan    V-tach less likely  Will discuss with Cardiology  Memory loss   Assessment & Plan    Outpatient follow-up with Geriatrics  Monitor for delirium, sleep hygiene, reorientation        Polypharmacy   Assessment & Plan    Geriatric input noted        Dyspnea on exertion   Assessment & Plan    Multifactorial monitor        Insomnia   Assessment & Plan    On Xanax, geriatrics following  Amitriptyline discontinued          Chronic pain disorder   Assessment & Plan    Continue opioid analgesics          Acute on chronic systolic congestive heart failure (HCC)   Assessment & Plan    Continue Lasix, monitor I/O, less than 2 g salt diet, cardiology following            VTE Pharmacologic Prophylaxis:   Pharmacologic: Heparin  Mechanical VTE Prophylaxis in Place: No    Patient Centered Rounds: I have performed bedside rounds with nursing staff today  Time Spent for Care: 15 minutes  More than 50% of total time spent on counseling and coordination of care as described above  Current Length of Stay: 10 day(s)    Current Patient Status: Inpatient   Certification Statement: The patient will continue to require additional inpatient hospital stay due to Need to monitor cardiac symptoms  Discharge Plan: Will discuss with Cardiology  Code Status: Level 3 - DNAR and DNI      Subjective:   Patient comfortable      Objective:     Vitals:   Temp (24hrs), Av 8 °F (36 6 °C), Min:97 4 °F (36 3 °C), Max:98 2 °F (36 8 °C)    HR:  [63-79] 63  Resp:  [15-20] 20  BP: (106-164)/(55-74) 118/67  SpO2:  [91 %-96 %] 93 %  Body mass index is 24 95 kg/m²  Input and Output Summary (last 24 hours): Intake/Output Summary (Last 24 hours) at 07/17/18 0853  Last data filed at 07/17/18 0331   Gross per 24 hour   Intake              800 ml   Output             1950 ml   Net            -1150 ml       Physical Exam:     Physical Exam   Constitutional: She is oriented to person, place, and time  She appears well-developed and well-nourished  HENT:   Head: Normocephalic and atraumatic  Eyes: Pupils are equal, round, and reactive to light  Neck: Normal range of motion  Neck supple  No tracheal deviation present  No thyromegaly present  Cardiovascular: Normal rate and regular rhythm  Pulmonary/Chest: Effort normal and breath sounds normal  No respiratory distress  Musculoskeletal: Normal range of motion  She exhibits no edema  Neurological: She is alert and oriented to person, place, and time  No cranial nerve deficit  Skin: Skin is warm and dry  Psychiatric: She has a normal mood and affect  Additional Data:     Labs:      Results from last 7 days  Lab Units 07/17/18  0501 07/14/18  2249   WBC Thousand/uL 7 17 12 65*   HEMOGLOBIN g/dL 11 4* 12 3   HEMATOCRIT % 37 9 39 2   PLATELETS Thousands/uL 321 274   NEUTROS PCT %  --  80*   LYMPHS PCT %  --  10*   MONOS PCT %  --  7   EOS PCT %  --  2       Results from last 7 days  Lab Units 07/17/18  0501   SODIUM mmol/L 138   POTASSIUM mmol/L 3 9   CHLORIDE mmol/L 104   CO2 mmol/L 27   BUN mg/dL 18   CREATININE mg/dL 0 97   CALCIUM mg/dL 9 1   GLUCOSE RANDOM mg/dL 89       Results from last 7 days  Lab Units 07/11/18  0251   INR  1 24*       * I Have Reviewed All Lab Data Listed Above  * Additional Pertinent Lab Tests Reviewed:  All Labs Within Last 24 Hours Reviewed        Recent Cultures (last 7 days):           Last 24 Hours Medication List:     Current Facility-Administered Medications:  acetaminophen 975 mg Oral Q8H [facility] HOME [facility], DO   ALPRAZolam 0 25 mg Oral HS Mendez Mirza PA-C   amiodarone 200 mg Oral BID With Meals Elijah Richardson MD   aspirin 81 mg Oral Daily Aydin Japanese, DO   cholecalciferol 1,000 Units Oral Daily Sharyon Nones, CRNP   furosemide 40 mg Oral Daily [facility], DO   heparin (porcine) 5,000 Units Subcutaneous [facility] Leila Reynoso MD   lactulose 20 g Oral BID Leila Reynoso MD   lidocaine 1 patch Transdermal Daily Sharyon Nones, CRNP   methocarbamol 500 mg Oral HS PRN [facility], DO   metoprolol 2 5 mg Intravenous Q6H PRN Joe Galloway PA-C   metoprolol succinate 50 mg Oral Q12H [facility] HOME Matthew Hollis MD   nitroglycerin 0 4 mg Sublingual Q5 Min PRN Priscila Moore MD   oxyCODONE 15 mg Oral Q6H PRN Myles Robbins MD   polyethylene glycol 17 g Oral Daily [facility], DO   prasugrel 10 mg Oral Daily Priscila Moore MD   senna-docusate sodium 1 tablet Oral HS Mendez Mirza PA-C   sodium phosphate-biphosphate 1 enema Rectal Daily PRN Hansel Henry PA-C        Today, Patient Was Seen By: Jonathan Ceballos DO    ** Please Note: Dictation voice to text software may have been used in the creation of this document   **

## 2018-07-17 NOTE — PLAN OF CARE
Problem: OCCUPATIONAL THERAPY ADULT  Goal: Performs self-care activities at highest level of function for planned discharge setting  See evaluation for individualized goals  Treatment Interventions: ADL retraining, Functional transfer training, UE strengthening/ROM, Endurance training, Patient/family training, Equipment evaluation/education, Compensatory technique education, Energy conservation  Equipment Recommended:  (GB throughout bathroom)       See flowsheet documentation for full assessment, interventions and recommendations  Outcome: Progressing  Limitation: Decreased ADL status, Decreased UE strength, Decreased Safe judgement during ADL, Decreased endurance, Decreased self-care trans, Decreased high-level ADLs     Assessment: Pt participates in OT session with focus on LB dressing, toileting, activity tolerance, and transfers to increase I for d/c  Pt setup LB dressing to don/doff socks while seated in bedside chair  Pt CGA toilet transfer with RW support from chair to toilet  Pt requires extra time on toilet for BM and bladder management  Pt supervision toileting and pt able to manage toilet hygiene  Pt supervision sit to supine from EOB with bed railing support  Pt will continue to benefit from activity tolerance, adls, and transfers    Recommendation: PT consult  OT Discharge Recommendation: Short Term Rehab  OT - OK to Discharge:  (when medically cleared)

## 2018-07-17 NOTE — PROGRESS NOTES
07/17/18 1100   Clinical Encounter Type   Visited With Patient   Routine Visit Follow-up   Patient Spiritual Encounters   Spiritual Assessment 4

## 2018-07-17 NOTE — OCCUPATIONAL THERAPY NOTE
07/17/18 1350   Restrictions/Precautions   Weight Bearing Precautions Per Order No   Other Precautions Cardiac/sternal;Fall Risk;Pain   Pain Assessment   Pain Assessment 0-10   Pain Score 5   ADL   Where Assessed Chair   LB Dressing Assistance 5  Supervision/Setup   LB Dressing Deficit Setup   LB Dressing Comments don/doff socks   Toileting Assistance  5  Supervision/Setup   Toileting Deficit Supervison/safety   Toileting Comments BM and bladder management   Bed Mobility   Sit to Supine 5  Supervision   Additional items HOB elevated   Transfers   Sit to Stand 5  Supervision   Stand to Sit 5  Supervision   Stand pivot 4  Minimal assistance   Additional items Assist x 1   Toilet transfer 4  Minimal assistance   Additional items Assist x 1   Cognition   Overall Cognitive Status WFL   Arousal/Participation Alert   Attention Within functional limits   Orientation Level Oriented X4   Memory Decreased short term memory   Following Commands Follows one step commands without difficulty   Activity Tolerance   Activity Tolerance Patient tolerated treatment well   Assessment   Assessment Pt participates in OT session with focus on LB dressing, toileting, activity tolerance, and transfers to increase I for d/c  Pt setup LB dressing to don/doff socks while seated in bedside chair  Pt CGA toilet transfer with RW support from chair to toilet  Pt requires extra time on toilet for BM and bladder management  Pt supervision toileting and pt able to manage toilet hygiene  Pt supervision sit to supine from EOB with bed railing support  Pt will continue to benefit from activity tolerance, adls, and transfers  Plan   Treatment Interventions ADL retraining;Functional transfer training; Endurance training; Activityengagement   Goal Expiration Date 07/23/18   Treatment Day 4   OT Frequency 3-5x/wk   Recommendation   OT Discharge Recommendation Short Term Rehab

## 2018-07-17 NOTE — ASSESSMENT & PLAN NOTE
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

## 2018-07-17 NOTE — PROGRESS NOTES
Progress Note - Juwan Knowles [birth year], 69 y o  female MRN: [id number]    Unit/Bed#: [facility] 509-01 Encounter: [id number]    Primary Care Provider: Nael Wilhelm DO   Date and time admitted to hospital: 7/7/2018  9:08 AM        AVNRT (AV johana re-entry tachycardia) [facility])   Assessment & Plan    Assessment: This was likely responsible for her runs of wide complex tachycardia with aberrancy  Plan:    aberrant pathway successfully ablated           Monomorphic ventricular tachycardia (HCC)   Assessment & Plan    A:  On EP study the appearance appear to be more like AVNRT with aberrancy, V-tach could not be induced  P:    - Had RCA stent placed   - Keep Mg>2, K>4  - Continue PO amio  - avoid QT prolonging drugs, mirtazapine is okay -though please recheck QTC after initiating  * Atrial fibrillation with RVR (HCC)   Assessment & Plan    A: Currently in sinus rhythm on amiodarone, 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    Plan:    - Can continue amiodarone for now, continue 200 mg b i d  with food for 10 days and then 200 mg daily  - in terms on anticoagulation she is at risk of bleeding with prasugrel, will switch to clopidogrel (for her stent) and Xarelto 15 mg a day   - check with case management if she can get Xarelto through her insurance  - can decrease metoprolol to once a day if heart rate is dipping below 60                      Subjective/Objective        Subjective:  No arrhythmias overnight    Vitals: /65   Pulse 73   Temp 97 5 °F (36 4 °C) (Oral)   Resp 20   Ht 5' 5" (1 651 m)   Wt 68 kg (149 lb 14 6 oz)   SpO2 94%   BMI 24 95 kg/m²   Vitals:    07/16/18 0600 07/17/18 0514   Weight: 66 kg (145 lb 8 1 oz) 68 kg (149 lb 14 6 oz)     Orthostatic Blood Pressures      Most Recent Value   Blood Pressure  121/65 filed at 07/17/2018 1100   Patient Position - Orthostatic VS  Lying filed at 07/17/2018 0341            Intake/Output Summary (Last 24 hours) at 07/17/18 1344  Last data filed at 07/17/18 1301   Gross per 24 hour   Intake             1145 ml   Output             2300 ml   Net            -1155 ml       Invasive Devices     Peripheral Intravenous Line            Peripheral IV 07/15/18 Right Arm 1 day    Peripheral IV 07/16/18 Left Antecubital 1 day                Review of Systems:   Cardiovascular: no chest pain, shortness of breath, palpitations, orthopnea, PND  Pulmonary: no dyspnea, cough  GI: no anorexia, abdominal pain, weight loss  Endocrine: no polyuria, polydypspsia, polyuria  : no dysuria, urinary retention, decreased urine output  Neuro: No focal weakness, tremors, imbalance, loss of sensation  Musculoskeletal: no joint pains, joint swelling  HEENT: no visual changes, swallowing difficulty, hearing loss  Skin: No rash  Psych: no delusions, hallucinations, sx of depression           Physical Exam:   General appearance: alert and oriented, in no acute distress  Head: Normocephalic, without obvious abnormality, atraumatic  Eyes: conjunctivae/corneas clear  PERRL, EOM's intact  Ears: normal TM's and external ear canals both ears  Nose: Nares normal  Septum midline  Mucosa normal  No drainage or sinus tenderness  Throat: lips, mucosa, and tongue normal; teeth and gums normal  Neck: no adenopathy, no carotid bruit, no JVD, supple, symmetrical, trachea midline and thyroid not enlarged, symmetric, no tenderness/mass/nodules  Back: symmetric, no curvature  ROM normal  No CVA tenderness  Lungs: clear to auscultation bilaterally  Heart: regular rate and rhythm, S1, S2 normal, no murmur, click, rub or gallop  Abdomen: soft, non-tender; bowel sounds normal; no masses,  no organomegaly  Extremities: extremities normal, warm and well-perfused; no cyanosis, clubbing, or edema  Pulses: 2+ and symmetric    Lab Results: I have personally reviewed pertinent lab results  Imaging: I have personally reviewed pertinent reports      EKG:  Not done today, no arrhythmias on telemetry

## 2018-07-17 NOTE — ASSESSMENT & PLAN NOTE
A:  On EP study the appearance appear to be more like AVNRT with aberrancy, V-tach could not be induced  P:    - Had RCA stent placed   - Keep Mg>2, K>4  - Continue PO amio  - avoid QT prolonging drugs, mirtazapine is okay -though please recheck QTC after initiating

## 2018-07-17 NOTE — RESTORATIVE TECHNICIAN NOTE
Restorative Specialist Mobility Note       Activity: Bedrest, Dangle, Stand at bedside, Turn, Ambulate in room, Bathroom privileges     Assistive Device: Front wheel walker     Ambulation Response:  Tolerated fairly well  Repositioned: Sitting, Up in chair           Range of Motion: Active, Right leg, Left leg

## 2018-07-17 NOTE — ASSESSMENT & PLAN NOTE
Assessment: This was likely responsible for her runs of wide complex tachycardia with aberrancy      Plan:    aberrant pathway successfully ablated

## 2018-07-17 NOTE — PROGRESS NOTES
07/17/18 1015   Stress Factors   Patient Stress Factors Exhausted; Health changes   Coping Responses   Patient Coping Accepting;Open/discussion   Plan of Care   Comments normalized experience   Assessment Completed by: Unit visit

## 2018-07-17 NOTE — PLAN OF CARE
Problem: PHYSICAL THERAPY ADULT  Goal: Performs mobility at highest level of function for planned discharge setting  See evaluation for individualized goals  Treatment/Interventions: OT, Spoke to case management, Gait training, Bed mobility, Patient/family training, Endurance training, Functional transfer training, Therapeutic exercise          See flowsheet documentation for full assessment, interventions and recommendations  Outcome: Progressing  Prognosis: Good  Problem List: Decreased endurance, Impaired balance, Decreased mobility, Decreased safety awareness  Assessment: Pt seen for physical therapy re-evaluation after cardioversion procedure yesterday  Pt presented with being fully oriented and motivated to participate but also with limited endurance, balance deficits and with the need for close supervision for all mobility activities with use of RW for upright activities  Pt reported feeling extremely tired and always feeling as though she can never get enough sleep to function well  Pt lives alone and has had 3 falls in the past 6 months  Today pt did not feel as though she had sufficient endurance for a trial of ambulation on a few stair steps( as she has 4 steps entering her home   )  Since pt remains below her baseline level of mobility, recommendation is for inpatient rehab  Plan is  for trial of ambulation on steps in future session(s) as per pt tolerance  Barriers to Discharge Comments: (S) Pt lives alone  Recommendation: Post acute IP rehab     PT - OK to Discharge: Yes    See flowsheet documentation for full assessment

## 2018-07-17 NOTE — PHYSICAL THERAPY NOTE
Physical Therapy Re-Evaluation  Shira Goncalves, PT   07/17/18 1224   Note Type   Note type Re-eval   Pain Assessment   Pain Assessment No/denies pain   Pain Score No Pain   Home Living   Type of Home House   Home Layout Multi-level;Performs ADLs on one level; Able to live on main level with bedroom/bathroom;Stairs to enter with rails   Bathroom Shower/Tub Tub/shower unit   [address] [place] [address] Dr gonzalez   [address] Cane;Walker   Additional Comments sits in tub to bathe  Prior Function   Level of [place] Independent with ADLs and functional mobility; Needs assistance with IADLs   Lives With Alone   ADL Assistance Independent   IADLs Needs assistance   Falls in the last 6 months 1 to 4   Comments " I don't know if I can do rehab because I have public housing and they might take it away if I'm gone past 30 days " as per pt report  Restrictions/Precautions   Weight Bearing Precautions Per Order No   Braces or Orthoses Other (Comment)  (none)   Other Precautions Cardiac/sternal;Fall Risk   General   Additional Pertinent History Pt underwent cardioversion procedure yesterday  Currently on telemetry  Family/Caregiver Present No   Cognition   Overall Cognitive Status WFL  (Pt does report decline in short term memory  )   Arousal/Participation Alert   Attention Within functional limits   Orientation Level Oriented X4   Memory Decreased short term memory   Following Commands Follows one step commands with increased time or repetition   Comments Good safety awareness noted  RLE Assessment   RLE Assessment WFL   LLE Assessment   LLE Assessment WFL   Coordination   Movements are Fluid and Coordinated 1   Sensation WFL   Bed Mobility   Rolling R (Pt received sitting in recliner chair   )   Additional Comments Pt returned to sitting in recliner chair upon completion of PT re-evaluation        Transfers   Sit to Stand 5  Supervision   Additional items Verbal cues   Stand to Sit 5  Supervision   Additional items Assist x 1;Verbal cues; Increased time required   Additional Comments RW used   Ambulation/Elevation   Gait pattern Forward Flexion; Short stride; Excessively slow  (vc's to keep body within confines of RW  )   Gait Assistance 5  Supervision   Additional items Assist x 1;Verbal cues   Assistive Device Rolling walker   Distance 175'   Stair Management Assistance Not tested  (" I just don't have the energy to do stairs " as per pt   )   Balance   Static Sitting Good   Dynamic Sitting Fair +   Static Standing Fair   Dynamic Standing Fair -   Ambulatory Fair -   Endurance Deficit   Endurance Deficit Yes   Endurance Deficit Description fatigue  Activity Tolerance   Activity Tolerance Patient limited by fatigue   Medical Staff Made Aware RN consented to allow pt to participate in PT re-evaluation   Nurse Made Aware yes   Assessment   Prognosis Good   Problem List Decreased endurance; Impaired balance;Decreased mobility; Decreased safety awareness   Assessment Pt seen for physical therapy re-evaluation after cardioversion procedure yesterday  Pt presented with being fully oriented and motivated to participate but also with limited endurance, balance deficits and with the need for close supervision for all mobility activities with use of RW for upright activities  Pt reported feeling extremely tired and always feeling as though she can never get enough sleep to function well  Pt lives alone and has had 3 falls in the past 6 months  Today pt did not feel as though she had sufficient endurance for a trial of ambulation on a few stair steps( as she has 4 steps entering her home   )  Since pt remains below her baseline level of mobility, recommendation is for inpatient rehab  Plan is  for trial of ambulation on steps in future session(s) as per pt tolerance  Barriers to Discharge Comments Pt lives alone      Goals   Patient Goals " I'd like them to figure out why I am always so tired before I get discharged  " as per pt  STG Expiration Date 07/27/18   Short Term Goal #1 Pt completes bed mobility activities independently without use of electronic bed features  Pt completes transfers independently with RW and good safety noted  Pt ambulates 200' independently with RW with improved posture( less forward flexion of trunk) and good safety  Pt ambulates up and down 4 steps independently with good safety awareness noted  Balance during ambulation improves to fair+  Treatment Day 2   Plan   Treatment/Interventions Functional transfer training;LE strengthening/ROM; Patient/family training;Equipment eval/education; Bed mobility;Gait training;Spoke to nursing;Spoke to case management   PT Frequency Other (Comment)  (3-5x/wk)   Recommendation   Recommendation Post acute IP rehab   Equipment Recommended Walker  (Pt has her own RW  )   PT - OK to Discharge Yes   Additional Comments yes if d/c to inpatient rehab when medically ready for d/c      Modified Gray Scale   Modified Gray Scale 4   Barthel Index   Feeding 10   Bathing 0   Grooming Score 5   Dressing Score 5   Bladder Score 10   Bowels Score 10   Toilet Use Score 5   Transfers (Bed/Chair) Score 10   Mobility (Level Surface) Score 10   Stairs Score 0   Barthel Index Score 65

## 2018-07-17 NOTE — ASSESSMENT & PLAN NOTE
Continue metoprolol  Anticoagulation per Cardiology  EP following status post cath lab intervention yesterday  Will review findings with Cardiology

## 2018-07-17 NOTE — DISCHARGE INSTR - DIET
Limit processed foods  If necessary to have easy/prepared meals on hand, buy "low sodium" and "reduced sodium" options  Check meal delivery options available: for example [employer]

## 2018-07-18 LAB
ANION GAP SERPL CALCULATED.3IONS-SCNC: 7 MMOL/L (ref 4–13)
BUN SERPL-MCNC: 17 MG/DL (ref 5–25)
CALCIUM SERPL-MCNC: 9.5 MG/DL (ref 8.3–10.1)
CHLORIDE SERPL-SCNC: 103 MMOL/L (ref 100–108)
CO2 SERPL-SCNC: 29 MMOL/L (ref 21–32)
CREAT SERPL-MCNC: 0.97 MG/DL (ref 0.6–1.3)
GFR SERPL CREATININE-BSD FRML MDRD: 57 ML/MIN/1.73SQ M
GLUCOSE SERPL-MCNC: 95 MG/DL (ref 65–140)
POTASSIUM SERPL-SCNC: 3.9 MMOL/L (ref 3.5–5.3)
SODIUM SERPL-SCNC: 139 MMOL/L (ref 136–145)

## 2018-07-18 PROCEDURE — 97116 GAIT TRAINING THERAPY: CPT

## 2018-07-18 PROCEDURE — 97110 THERAPEUTIC EXERCISES: CPT

## 2018-07-18 PROCEDURE — 80048 BASIC METABOLIC PNL TOTAL CA: CPT | Performed by: PHYSICIAN ASSISTANT

## 2018-07-18 PROCEDURE — 97530 THERAPEUTIC ACTIVITIES: CPT

## 2018-07-18 PROCEDURE — 99232 SBSQ HOSP IP/OBS MODERATE 35: CPT | Performed by: INTERNAL MEDICINE

## 2018-07-18 RX ORDER — MIRTAZAPINE 15 MG/1
7.5 TABLET, FILM COATED ORAL
Status: DISCONTINUED | OUTPATIENT
Start: 2018-07-18 | End: 2018-07-20 | Stop reason: HOSPADM

## 2018-07-18 RX ADMIN — METOPROLOL SUCCINATE 50 MG: 50 TABLET, EXTENDED RELEASE ORAL at 21:15

## 2018-07-18 RX ADMIN — ACETAMINOPHEN 975 MG: 325 TABLET, FILM COATED ORAL at 13:42

## 2018-07-18 RX ADMIN — FUROSEMIDE 40 MG: 40 TABLET ORAL at 08:12

## 2018-07-18 RX ADMIN — LIDOCAINE 1 PATCH: 50 PATCH CUTANEOUS at 08:12

## 2018-07-18 RX ADMIN — CHOLECALCIFEROL TAB 25 MCG (1000 UNIT) 1000 UNITS: 25 TAB at 08:12

## 2018-07-18 RX ADMIN — CLOPIDOGREL BISULFATE 75 MG: 75 TABLET, FILM COATED ORAL at 08:12

## 2018-07-18 RX ADMIN — ACETAMINOPHEN 975 MG: 325 TABLET, FILM COATED ORAL at 06:29

## 2018-07-18 RX ADMIN — ACETAMINOPHEN 975 MG: 325 TABLET, FILM COATED ORAL at 21:15

## 2018-07-18 RX ADMIN — OXYCODONE HYDROCHLORIDE 15 MG: 5 TABLET ORAL at 21:15

## 2018-07-18 RX ADMIN — AMIODARONE HYDROCHLORIDE 200 MG: 200 TABLET ORAL at 17:13

## 2018-07-18 RX ADMIN — LACTULOSE 20 G: 20 SOLUTION ORAL at 08:12

## 2018-07-18 RX ADMIN — LACTULOSE 20 G: 20 SOLUTION ORAL at 17:13

## 2018-07-18 RX ADMIN — RIVAROXABAN 15 MG: 15 TABLET, FILM COATED ORAL at 17:13

## 2018-07-18 RX ADMIN — METOPROLOL SUCCINATE 50 MG: 50 TABLET, EXTENDED RELEASE ORAL at 08:12

## 2018-07-18 RX ADMIN — MIRTAZAPINE 7.5 MG: 15 TABLET, FILM COATED ORAL at 21:16

## 2018-07-18 RX ADMIN — HEPARIN SODIUM 5000 UNITS: 5000 INJECTION, SOLUTION INTRAVENOUS; SUBCUTANEOUS at 06:29

## 2018-07-18 RX ADMIN — OXYCODONE HYDROCHLORIDE 15 MG: 5 TABLET ORAL at 11:46

## 2018-07-18 RX ADMIN — SENNOSIDES AND DOCUSATE SODIUM 1 TABLET: 8.6; 5 TABLET ORAL at 21:19

## 2018-07-18 RX ADMIN — MELATONIN TAB 3 MG 6 MG: 3 TAB at 21:16

## 2018-07-18 RX ADMIN — OXYCODONE HYDROCHLORIDE 15 MG: 5 TABLET ORAL at 02:40

## 2018-07-18 RX ADMIN — ALPRAZOLAM 0.25 MG: 0.25 TABLET ORAL at 21:15

## 2018-07-18 RX ADMIN — AMIODARONE HYDROCHLORIDE 200 MG: 200 TABLET ORAL at 08:12

## 2018-07-18 NOTE — PROGRESS NOTES
Progress Note - Krystal Rose , 69 y o  female MRN: [id number]    Unit/Bed#: [facility] 509-01 Encounter: [id number]    Primary Care Provider: Shubham Gregorio DO   Date and time admitted to hospital: 2018  9:08 AM        * Atrial fibrillation with RVR [facility])   Assessment & Plan    Continue metoprolol  Anticoagulation per Cardiology  EP following status post cath lab intervention yesterday  Will review findings with Cardiology  Monomorphic ventricular tachycardia (HCC)   Assessment & Plan    V-tach less likely  Will discuss with Cardiology  Continue with Xarelto and Plavix therapy        Ambulatory dysfunction   Assessment & Plan    Multifactorial  Safe ambulation fall precautions  Physical therapy        Polypharmacy   Assessment & Plan    Geriatric input noted        Dyspnea on exertion   Assessment & Plan    Multifactorial monitor        Insomnia   Assessment & Plan    On Xanax, geriatrics following  Amitriptyline discontinued          Chronic pain disorder   Assessment & Plan    Continue opioid analgesics          Acute on chronic systolic congestive heart failure (HCC)   Assessment & Plan    Continue Lasix, monitor I/O, less than 2 g salt diet, cardiology following            VTE Pharmacologic Prophylaxis:   Pharmacologic: Heparin  Mechanical VTE Prophylaxis in Place: No    Patient Centered Rounds: I have performed bedside rounds with nursing staff today  Time Spent for Care: 15 minutes  More than 50% of total time spent on counseling and coordination of care as described above  Current Length of Stay: 11 day(s)    Current Patient Status: Inpatient   Certification Statement: The patient will continue to require additional inpatient hospital stay due to Needs further monitoring of cardiac status      Discharge Plan:  Possible discharge in next 24 to 48 hr    Code Status: Level 3 - DNAR and DNI      Subjective:   Patient comfortable    Objective:     Vitals:   Temp (24hrs), Av 6 °F (36 4 °C), Min:97 4 °F (36 3 °C), Max:97 7 °F (36 5 °C)    HR:  [61-72] 61  Resp:  [18-20] 20  BP: ()/(53-64) 120/60  SpO2:  [93 %-96 %] 94 %  Body mass index is 24 87 kg/m²  Input and Output Summary (last 24 hours): Intake/Output Summary (Last 24 hours) at 07/18/18 1108  Last data filed at 07/18/18 0827   Gross per 24 hour   Intake              525 ml   Output             1700 ml   Net            -1175 ml       Physical Exam:     Physical Exam   Constitutional: She is oriented to person, place, and time  She appears well-developed and well-nourished  HENT:   Head: Normocephalic and atraumatic  Eyes: Conjunctivae are normal  Pupils are equal, round, and reactive to light  Neck: Normal range of motion  Neck supple  Cardiovascular: Normal rate and regular rhythm  Pulmonary/Chest: Effort normal and breath sounds normal  She has no wheezes  Abdominal: Soft  Bowel sounds are normal  She exhibits no distension  There is no tenderness  Musculoskeletal: Normal range of motion  Neurological: She is alert and oriented to person, place, and time  No cranial nerve deficit  Skin: Skin is warm and dry  Psychiatric: She has a normal mood and affect  Additional Data:     Labs:      Results from last 7 days  Lab Units 07/17/18  0501 07/14/18  2249   WBC Thousand/uL 7 17 12 65*   HEMOGLOBIN g/dL 11 4* 12 3   HEMATOCRIT % 37 9 39 2   PLATELETS Thousands/uL 321 274   NEUTROS PCT %  --  80*   LYMPHS PCT %  --  10*   MONOS PCT %  --  7   EOS PCT %  --  2       Results from last 7 days  Lab Units 07/18/18  0443   SODIUM mmol/L 139   POTASSIUM mmol/L 3 9   CHLORIDE mmol/L 103   CO2 mmol/L 29   BUN mg/dL 17   CREATININE mg/dL 0 97   CALCIUM mg/dL 9 5   GLUCOSE RANDOM mg/dL 95           * I Have Reviewed All Lab Data Listed Above  * Additional Pertinent Lab Tests Reviewed:  All Labs Within Last 24 Hours Reviewed        Recent Cultures (last 7 days):           Last 24 Hours Medication List: Current Facility-Administered Medications:  acetaminophen 975 mg Oral [facility] Lai Velazquez, DO   ALPRAZolam 0 25 mg Oral HS Susie Yoel, WILLIAM   amiodarone 200 mg Oral BID With Meals Gilberto Dixon MD   cholecalciferol 1,000 Units Oral Daily Glorietta Jason, CRNP   clopidogrel 75 mg Oral Daily Gilberto Dixon MD   furosemide 40 mg Oral Daily Lai Velazquez, DO   heparin (porcine) 5,000 Units Subcutaneous [facility] Gilberto Dixon MD   lactulose 20 g Oral BID Guy Mendez MD   lidocaine 1 patch Transdermal Daily Glorietta Ripa, CRNP   melatonin 6 mg Oral HS Livier Todd PA-C   methocarbamol 500 mg Oral HS PRN Lai Velazquez DO   metoprolol 2 5 mg Intravenous Q6H PRN Celsa Ayala PA-C   metoprolol succinate 50 mg Oral Q12H [facility] Matthew Hollis MD   mirtazapine 7 5 mg Oral HS Susie Yoel, WILLIAM   nitroglycerin 0 4 mg Sublingual Q5 Min PRN Luis Eduardo Becerra MD   oxyCODONE 15 mg Oral Q6H PRN Ruth Almaraz MD   polyethylene glycol 17 g Oral Daily Lai Velazquez DO   rivaroxaban 15 mg Oral Daily With Grace Langley MD   senna-docusate sodium 1 tablet Oral HS Susie Yoel, WILLIAM   sodium phosphate-biphosphate 1 enema Rectal Daily PRN Livier Todd PA-C        Today, Patient Was Seen By: Rufus Santos DO    ** Please Note: Dictation voice to text software may have been used in the creation of this document   **

## 2018-07-18 NOTE — PLAN OF CARE
INFECTION - ADULT     Absence of fever/infection during neutropenic period Completed          CARDIOVASCULAR - ADULT     Maintains optimal cardiac output and hemodynamic stability Progressing     Absence of cardiac dysrhythmias or at baseline rhythm Progressing        DISCHARGE PLANNING     Discharge to home or other facility with appropriate resources Progressing        DISCHARGE PLANNING - CARE MANAGEMENT     Discharge to post-acute care or home with appropriate resources Progressing        INFECTION - ADULT     Absence or prevention of progression during hospitalization Progressing        Knowledge Deficit     Patient/family/caregiver demonstrates understanding of disease process, treatment plan, medications, and discharge instructions Progressing        MUSCULOSKELETAL - ADULT     Maintain or return mobility to safest level of function Progressing     Maintain proper alignment of affected body part Progressing        PAIN - ADULT     Verbalizes/displays adequate comfort level or baseline comfort level Progressing        Potential for Falls     Patient will remain free of falls Progressing        RESPIRATORY - ADULT     Achieves optimal ventilation and oxygenation Progressing        SAFETY ADULT     Maintain or return to baseline ADL function Progressing     Maintain or return mobility status to optimal level Progressing

## 2018-07-18 NOTE — PROGRESS NOTES
Patient exam deferred as patient is sleeping, and has issues with insomnia  Spoke with EP, okay to restart mirtazapine  Will restart mirtazapine at 7 5 mg q h s  SLIM aware  Please discharge on this medication

## 2018-07-18 NOTE — RESTORATIVE TECHNICIAN NOTE
Restorative Specialist Mobility Note       Activity: Bedrest, Dangle, Stand at bedside, Turn, Ambulate in room, Ambulate in agee     Assistive Device: Front wheel walker     Ambulation Response:  Tolerated fairly well  Repositioned: Sitting, Up in chair           Range of Motion: Active, Right leg, Left leg

## 2018-07-18 NOTE — SOCIAL WORK
Discussed patient with Simon Yanez PTA  Although she has made improvements he would still recommend snf rehab at discharge  Sent [facility] message to Son asking if they will have bed available tomorrow  Met with patient to discuss discharge needs and she now says she is unsure she wants to go  CM will follow up with patient and MD tomorrow

## 2018-07-18 NOTE — PLAN OF CARE
Problem: PHYSICAL THERAPY ADULT  Goal: Performs mobility at highest level of function for planned discharge setting  See evaluation for individualized goals  Treatment/Interventions: OT, Spoke to case management, Gait training, Bed mobility, Patient/family training, Endurance training, Functional transfer training, Therapeutic exercise          See flowsheet documentation for full assessment, interventions and recommendations  Outcome: Progressing  Prognosis: Good  Problem List: Decreased strength, Decreased endurance, Impaired balance, Decreased mobility, Decreased cognition, Impaired judgement, Decreased safety awareness, Orthopedic restrictions  Assessment: Pt demonstrated improved functional mobility today, performing all transfers without assist and ambulating beyond household distances without increased pain or LOB  Pt did demonstrate poor safety awareness with transfers & required instructions for proper hand placement and sequencing for all transfers, and was unable to recall later in session without further instruction  Pt ambulated on steps safely without assist, but stated it hurt her feet, and she spontaneously chainged gait pattern from reciprocal to step-to pattern during descent  Pt also demonstrated decreased safety awareness with RW managment during ambulation and frequently abandons it during transfers & when grabbing railings at steps  Pt instructed in & performed HEP exercises for LE strength to improve functional mobility and activity tolerance  Due to these impairments, cognitive deficits, and the pt living alone, pt will continue to benefit from inpatient therapy services to maximize mobility, strength, safety awareness and activity tolerance to ensure safe return home  Barriers to Discharge: Decreased caregiver support  Barriers to Discharge Comments: (S) Pt lives alone     Recommendation: Post acute IP rehab     PT - OK to Discharge: Yes    See flowsheet documentation for full assessment

## 2018-07-19 PROCEDURE — 99232 SBSQ HOSP IP/OBS MODERATE 35: CPT | Performed by: INTERNAL MEDICINE

## 2018-07-19 RX ADMIN — ACETAMINOPHEN 975 MG: 325 TABLET, FILM COATED ORAL at 05:39

## 2018-07-19 RX ADMIN — AMIODARONE HYDROCHLORIDE 200 MG: 200 TABLET ORAL at 08:34

## 2018-07-19 RX ADMIN — ACETAMINOPHEN 975 MG: 325 TABLET, FILM COATED ORAL at 13:19

## 2018-07-19 RX ADMIN — LACTULOSE 20 G: 20 SOLUTION ORAL at 08:33

## 2018-07-19 RX ADMIN — ACETAMINOPHEN 975 MG: 325 TABLET, FILM COATED ORAL at 21:51

## 2018-07-19 RX ADMIN — OXYCODONE HYDROCHLORIDE 15 MG: 5 TABLET ORAL at 03:40

## 2018-07-19 RX ADMIN — METOPROLOL SUCCINATE 50 MG: 50 TABLET, EXTENDED RELEASE ORAL at 21:51

## 2018-07-19 RX ADMIN — CHOLECALCIFEROL TAB 25 MCG (1000 UNIT) 1000 UNITS: 25 TAB at 08:34

## 2018-07-19 RX ADMIN — ALPRAZOLAM 0.25 MG: 0.25 TABLET ORAL at 21:50

## 2018-07-19 RX ADMIN — SENNOSIDES AND DOCUSATE SODIUM 1 TABLET: 8.6; 5 TABLET ORAL at 21:51

## 2018-07-19 RX ADMIN — MIRTAZAPINE 7.5 MG: 15 TABLET, FILM COATED ORAL at 21:50

## 2018-07-19 RX ADMIN — FUROSEMIDE 40 MG: 40 TABLET ORAL at 08:34

## 2018-07-19 RX ADMIN — LIDOCAINE 1 PATCH: 50 PATCH CUTANEOUS at 08:39

## 2018-07-19 RX ADMIN — OXYCODONE HYDROCHLORIDE 15 MG: 5 TABLET ORAL at 13:19

## 2018-07-19 RX ADMIN — CLOPIDOGREL BISULFATE 75 MG: 75 TABLET, FILM COATED ORAL at 08:34

## 2018-07-19 RX ADMIN — AMIODARONE HYDROCHLORIDE 200 MG: 200 TABLET ORAL at 17:25

## 2018-07-19 RX ADMIN — MELATONIN TAB 3 MG 6 MG: 3 TAB at 21:51

## 2018-07-19 RX ADMIN — RIVAROXABAN 15 MG: 15 TABLET, FILM COATED ORAL at 17:25

## 2018-07-19 RX ADMIN — METOPROLOL SUCCINATE 50 MG: 50 TABLET, EXTENDED RELEASE ORAL at 08:33

## 2018-07-19 RX ADMIN — OXYCODONE HYDROCHLORIDE 15 MG: 5 TABLET ORAL at 21:50

## 2018-07-19 NOTE — RESTORATIVE TECHNICIAN NOTE
Restorative Specialist Mobility Note       Activity: Chair, Dangle, Stand at bedside, Turn, Ambulate in room, Ambulate in agee     Assistive Device: Front wheel walker     Ambulation Response:  Tolerated fairly well  Repositioned: Sitting, Up in chair

## 2018-07-19 NOTE — PROGRESS NOTES
Asked pt is she wanted to take a bath  Pt has already been asked to bath by Olegario Martin to which she refused  Pt says that she does not want to take a bath until this evening  Again asked and again pt refused until this evening

## 2018-07-19 NOTE — ASSESSMENT & PLAN NOTE
Continue metoprolol  Anticoagulation per Cardiology  EP following  Will review findings with Cardiology

## 2018-07-19 NOTE — CASE MANAGEMENT
Continued Stay Review    Date: 7/19/2018    Vital Signs: /65 (BP Location: Right arm)   Pulse 65   Temp 97 8 °F (36 6 °C) (Oral)   Resp 16   Ht 5' 5" (1 651 m)   Wt 67 7 kg (149 lb 4 oz)   SpO2 96%   BMI 24 84 kg/m²     Medications:   Scheduled Meds:   Current Facility-Administered Medications:  acetaminophen 975 mg Oral Q8H [address] Raul Purdy DO   ALPRAZolam 0 25 mg Oral HS Carlos Bahena PA-C   amiodarone 200 mg Oral BID With Meals Molly Bacon MD   cholecalciferol 1,000 Units Oral Daily CHANDRIKA Marin   clopidogrel 75 mg Oral Daily Molly Bacon MD   furosemide 40 mg Oral Daily Mortimer Bayley, DO   lactulose 20 g Oral BID Sara Hugo MD   lidocaine 1 patch Transdermal Daily CHANDRIKA Marin   melatonin 6 mg Oral HS Vasile Aguirre PA-C   methocarbamol 500 mg Oral HS PRN Mortimer Bayley, DO   metoprolol 2 5 mg Intravenous Q6H PRN Suzan Moreno PA-C   metoprolol succinate 50 mg Oral Q12H [address] Matthew Hollis MD   mirtazapine 7 5 mg Oral HS Carlos Bahena PA-C   nitroglycerin 0 4 mg Sublingual Q5 Min PRN Cristy Nassar MD   oxyCODONE 15 mg Oral Q6H PRN Kev Chavarria MD   polyethylene glycol 17 g Oral Daily Mortimer Bayley, DO   rivaroxaban 15 mg Oral Daily With Norberto Alarcon MD   senna-docusate sodium 1 tablet Oral HS Carlos Bahena PA-C   sodium phosphate-biphosphate 1 enema Rectal Daily PRN Vasile Aguirre PA-C     Continuous Infusions:    PRN Meds: methocarbamol    metoprolol    nitroglycerin    oxyCODONE    sodium phosphate-biphosphate    Abnormal Labs/Diagnostic Results:     Age/Sex: 69 y o  female     Assessment/Plan:   Atrial fibrillation with RVR (HCC)   Assessment & Plan     Continue metoprolol  Anticoagulation per Cardiology  EP following  Will review findings with Cardiology           Monomorphic ventricular tachycardia (HCC)   Assessment & Plan     V-tach less likely  Will discuss with Cardiology    Continue with Xarelto and Plavix therapy          Ambulatory dysfunction   Assessment & Plan     Multifactorial  Safe ambulation fall precautions  Physical therapy          Memory loss   Assessment & Plan     Outpatient follow-up with Geriatrics  Monitor for delirium, sleep hygiene, reorientation          Polypharmacy   Assessment & Plan     Geriatric input noted          Dyspnea on exertion   Assessment & Plan     Multifactorial monitor          Insomnia   Assessment & Plan     On Xanax, geriatrics following  Amitriptyline discontinued             Chronic pain disorder   Assessment & Plan     Continue opioid analgesics             Acute on chronic systolic congestive heart failure (HCC)   Assessment & Plan     Continue Lasix, monitor I/O, less than 2 g salt diet, cardiology following            Discharge Plan: recommending snf rehab

## 2018-07-19 NOTE — SOCIAL WORK
Informed by Dr Consuelo Bowling that the patient was agreeable to short stay at rehab  Met with patient to discuss discharge plan  She does not want to go to rehab and again says she wants to go directly home  Patient requests Charla Bedolla and RUDDY referral was made to [facility]  Discussed with Dr Consuelo Bowling  Script received for Xarelto and was sent to [address] for cost     Call received from [address] [place] [address] is $3 19

## 2018-07-19 NOTE — SOCIAL WORK
Patient identified as HRR per criteria  Call made to DC appointment hotline with information as required for CM support follow up  Called outpatient care coordinator intake line and left message requesting follow up

## 2018-07-19 NOTE — PROGRESS NOTES
Progress Note - Farzad Acevedo [birth year], 69 y o  female MRN: [id number]    Unit/Bed#: [facility] 509-01 Encounter: [id number]    Primary Care Provider: Jearlean Rinne, DO   Date and time admitted to hospital: 7/7/2018  9:08 AM        * Atrial fibrillation with RVR [facility])   Assessment & Plan    Continue metoprolol  Anticoagulation per Cardiology  EP following  Will review findings with Cardiology  Monomorphic ventricular tachycardia (HCC)   Assessment & Plan    V-tach less likely  Will discuss with Cardiology  Continue with Xarelto and Plavix therapy        Ambulatory dysfunction   Assessment & Plan    Multifactorial  Safe ambulation fall precautions  Physical therapy        Memory loss   Assessment & Plan    Outpatient follow-up with Geriatrics  Monitor for delirium, sleep hygiene, reorientation        Polypharmacy   Assessment & Plan    Geriatric input noted        Dyspnea on exertion   Assessment & Plan    Multifactorial monitor        Insomnia   Assessment & Plan    On Xanax, geriatrics following  Amitriptyline discontinued          Chronic pain disorder   Assessment & Plan    Continue opioid analgesics          Acute on chronic systolic congestive heart failure (HCC)   Assessment & Plan    Continue Lasix, monitor I/O, less than 2 g salt diet, cardiology following            VTE Pharmacologic Prophylaxis:   Pharmacologic: Rivaroxaban (Xarelto)  Mechanical VTE Prophylaxis in Place: No    Patient Centered Rounds: I have performed bedside rounds with nursing staff today  Time Spent for Care: 15 minutes  More than 50% of total time spent on counseling and coordination of care as described above  Current Length of Stay: 12 day(s)    Current Patient Status: Inpatient   Certification Statement: The patient will continue to require additional inpatient hospital stay due to Patient very comfortable  Discharge Plan:  Await decision regarding rehab from patient    Patient appears agreeable to [facility] if it is an option  Code Status: Level 3 - DNAR and DNI      Subjective:   No acute distress  Patient very fatigued  Objective:     Vitals:   Temp (24hrs), Av 9 °F (36 6 °C), Min:97 8 °F (36 6 °C), Max:98 °F (36 7 °C)    HR:  [59-82] 59  Resp:  [16-20] 18  BP: ()/(53-74) 112/57  SpO2:  [90 %-96 %] 95 %  Body mass index is 24 84 kg/m²  Input and Output Summary (last 24 hours): Intake/Output Summary (Last 24 hours) at 18 0831  Last data filed at 18 0801   Gross per 24 hour   Intake              422 ml   Output             1150 ml   Net             -728 ml       Physical Exam:     Physical Exam   Constitutional: She is oriented to person, place, and time  She appears well-developed and well-nourished  HENT:   Head: Normocephalic and atraumatic  Eyes: Conjunctivae are normal  Pupils are equal, round, and reactive to light  Neck: Normal range of motion  Neck supple  No thyromegaly present  Cardiovascular: Normal rate and regular rhythm  No murmur heard  Pulmonary/Chest: Effort normal and breath sounds normal  No respiratory distress  She has no wheezes  Abdominal: Soft  Bowel sounds are normal  She exhibits no distension  There is no tenderness  Neurological: She is alert and oriented to person, place, and time  No cranial nerve deficit  Skin: Skin is warm and dry  Psychiatric: She has a normal mood and affect         Additional Data:     Labs:      Results from last 7 days  Lab Units 18  0501 18  2249   WBC Thousand/uL 7 17 12 65*   HEMOGLOBIN g/dL 11 4* 12 3   HEMATOCRIT % 37 9 39 2   PLATELETS Thousands/uL 321 274   NEUTROS PCT %  --  80*   LYMPHS PCT %  --  10*   MONOS PCT %  --  7   EOS PCT %  --  2       Results from last 7 days  Lab Units 18  0443   SODIUM mmol/L 139   POTASSIUM mmol/L 3 9   CHLORIDE mmol/L 103   CO2 mmol/L 29   BUN mg/dL 17   CREATININE mg/dL 0 97   CALCIUM mg/dL 9 5   GLUCOSE RANDOM mg/dL 95           * I Have Reviewed All Lab Data Listed Above  * Additional Pertinent Lab Tests Reviewed: All Labs Within Last 24 Hours Reviewed      Recent Cultures (last 7 days):           Last 24 Hours Medication List:     Current Facility-Administered Medications:  acetaminophen 975 mg Oral Q8H [address] Raul Purdy DO   ALPRAZolam 0 25 mg Oral HS Mendez Mirza PA-C   amiodarone 200 mg Oral BID With Meals Elijah Richardson MD   cholecalciferol 1,000 Units Oral Daily CHANDRIKA Bill   clopidogrel 75 mg Oral Daily Elijah Richardson MD   furosemide 40 mg Oral Daily Aydin [facility], DO   lactulose 20 g Oral BID Leila Reynoso MD   lidocaine 1 patch Transdermal Daily CHANDRIKA Bill   melatonin 6 mg Oral HS Hansel Henry PA-C   methocarbamol 500 mg Oral HS PRN Aydin [facility], DO   metoprolol 2 5 mg Intravenous Q6H PRN Joe Galloway PA-C   metoprolol succinate 50 mg Oral Q12H [address] Matthew Hollis MD   mirtazapine 7 5 mg Oral HS Mendez Mirza PA-C   nitroglycerin 0 4 mg Sublingual Q5 Min PRN Priscila Moore MD   oxyCODONE 15 mg Oral Q6H PRN Myles Robbins MD   polyethylene glycol 17 g Oral Daily [facility],    rivaroxaban 15 mg Oral Daily With Allan Tadeo MD   senna-docusate sodium 1 tablet Oral HS Mendez Mirza PA-C   sodium phosphate-biphosphate 1 enema Rectal Daily PRN Hansel Henry PA-C        Today, Patient Was Seen By: Jonathan Ceballos DO    ** Please Note: Dictation voice to text software may have been used in the creation of this document   **

## 2018-07-20 ENCOUNTER — TRANSITIONAL CARE MANAGEMENT (OUTPATIENT)
Dept: FAMILY MEDICINE CLINIC | Facility: CLINIC | Age: 78
End: 2018-07-20

## 2018-07-20 VITALS
BODY MASS INDEX: 24.98 KG/M2 | WEIGHT: 149.91 LBS | HEIGHT: 65 IN | TEMPERATURE: 98 F | DIASTOLIC BLOOD PRESSURE: 63 MMHG | HEART RATE: 63 BPM | SYSTOLIC BLOOD PRESSURE: 112 MMHG | OXYGEN SATURATION: 94 % | RESPIRATION RATE: 16 BRPM

## 2018-07-20 PROCEDURE — 99238 HOSP IP/OBS DSCHRG MGMT 30/<: CPT | Performed by: INTERNAL MEDICINE

## 2018-07-20 RX ORDER — CLOPIDOGREL BISULFATE 75 MG/1
75 TABLET ORAL DAILY
Qty: 30 TABLET | Refills: 0 | Status: SHIPPED | OUTPATIENT
Start: 2018-07-21 | End: 2018-09-02 | Stop reason: SDUPTHER

## 2018-07-20 RX ORDER — LANOLIN ALCOHOL/MO/W.PET/CERES
6 CREAM (GRAM) TOPICAL
Qty: 30 TABLET | Refills: 0 | Status: ON HOLD | OUTPATIENT
Start: 2018-07-20 | End: 2019-07-16 | Stop reason: SDUPTHER

## 2018-07-20 RX ORDER — METOPROLOL SUCCINATE 50 MG/1
50 TABLET, EXTENDED RELEASE ORAL EVERY 12 HOURS SCHEDULED
Qty: 60 TABLET | Refills: 0 | Status: SHIPPED | OUTPATIENT
Start: 2018-07-20 | End: 2018-09-02 | Stop reason: SDUPTHER

## 2018-07-20 RX ORDER — MIRTAZAPINE 7.5 MG/1
7.5 TABLET, FILM COATED ORAL
Qty: 30 TABLET | Refills: 0 | Status: SHIPPED | OUTPATIENT
Start: 2018-07-20 | End: 2018-09-02 | Stop reason: SDUPTHER

## 2018-07-20 RX ORDER — LIDOCAINE 50 MG/G
1 PATCH TOPICAL DAILY
Qty: 30 PATCH | Refills: 0 | Status: SHIPPED | OUTPATIENT
Start: 2018-07-21 | End: 2018-09-01 | Stop reason: HOSPADM

## 2018-07-20 RX ORDER — AMIODARONE HYDROCHLORIDE 200 MG/1
200 TABLET ORAL 2 TIMES DAILY WITH MEALS
Qty: 38 TABLET | Refills: 0 | Status: ON HOLD | OUTPATIENT
Start: 2018-07-20 | End: 2018-09-01

## 2018-07-20 RX ADMIN — CLOPIDOGREL BISULFATE 75 MG: 75 TABLET, FILM COATED ORAL at 08:06

## 2018-07-20 RX ADMIN — CHOLECALCIFEROL TAB 25 MCG (1000 UNIT) 1000 UNITS: 25 TAB at 08:06

## 2018-07-20 RX ADMIN — ACETAMINOPHEN 975 MG: 325 TABLET, FILM COATED ORAL at 05:46

## 2018-07-20 RX ADMIN — FUROSEMIDE 40 MG: 40 TABLET ORAL at 08:06

## 2018-07-20 RX ADMIN — OXYCODONE HYDROCHLORIDE 15 MG: 5 TABLET ORAL at 05:46

## 2018-07-20 RX ADMIN — LIDOCAINE 1 PATCH: 50 PATCH CUTANEOUS at 08:06

## 2018-07-20 RX ADMIN — AMIODARONE HYDROCHLORIDE 200 MG: 200 TABLET ORAL at 08:06

## 2018-07-20 NOTE — SOCIAL WORK
Call from [facility], [address] that additional scripts were sent down  Melatonin is OTC but Lidocane patch requires preauthorization  MD to call [phone], patient's ID # is [id number]  The other 4 scripts cost $4 29  Called sisterManuel, and left message to call CM about transport home

## 2018-07-20 NOTE — SOCIAL WORK
Met with the patient to discuss discharge needs  Patient does not want dc to snf and will discharge home today  She will call her sister to transport  Message was sent to [facility] and to outpatient CC, Candy Ibarra about discharge  Patient identified as HRR per criteria  Call made to DC appointment hotline with information as required for CM support follow up

## 2018-07-20 NOTE — DISCHARGE SUMMARY
Discharge- [facility] [birth year], 69 y o  female MRN: [id number]    Unit/Bed#: [facility] 509-01 Encounter: [id number]    Primary Care Provider: Sathya Gonsalez DO   Date and time admitted to hospital: 7/7/2018  9:08 AM        * Atrial fibrillation with RVR [facility])   Assessment & Plan    Continue metoprolol  Anticoagulation per Cardiology  EP following  Will review findings with Cardiology  Memory loss   Assessment & Plan    Outpatient follow-up with Geriatrics  Monitor for delirium, sleep hygiene, reorientation        Polypharmacy   Assessment & Plan    Geriatric input noted        Dyspnea on exertion   Assessment & Plan    Multifactorial monitor        Insomnia   Assessment & Plan    On Xanax, geriatrics following  Amitriptyline discontinued          Chronic pain disorder   Assessment & Plan    Continue opioid analgesics          Acute on chronic systolic congestive heart failure (HCC)   Assessment & Plan    Continue Lasix, monitor I/O, less than 2 g salt diet, cardiology following                    Resolved Problems  Date Reviewed: 7/16/2018          Resolved    Atrial flutter ([facility] [address] ) 7/17/2018     Resolved by  Eliecer Jurado    Overview Deleted 7/16/2018 12:09 PM by Eliecer Jurado                  Admission Date:   Admission Orders     Ordered        07/07/18 1320  Inpatient Admission (expected length of stay for this patient is greater than two midnights)  Once               Admitting Diagnosis: SOB (shortness of breath) [R06 02]  Atrial fibrillation with rapid ventricular response (HCC) [I48 91]  Congestive heart failure with cardiomyopathy ([address] ) [I50 9, I42 9]        Procedures Performed:   Orders Placed This Encounter   Procedures    Cardiac cardioversion (cath lab / OR only)    Cardiac catheterization    Cardiac eps/svt ablation    ED ECG Documentation Only       Summary of Hospital Course:   This is a 45-year-old female with chronic atrial fibrillation and chronic diastolic heart failure who presents to the hospital with symptoms of palpitations and dyspnea on exertion  The patient presents with increasing respiratory effort  Patient denies any chest pain or nausea vomiting  Patient presents for further workup evaluation  She was ultimately seen by EP  She was evaluated for a wide complex tachycardia  The patient presenting with wide complex tachycardia but this was attributed primarily secondary to AV johana reentry tachycardia and not ventricular tachycardia  The patient subsequently had the aberant pathway successfully ablated  The patient is to be discharged on amiodarone due to history of persistent atrial fibrillation status post direct cardioversion  continue with Plavix 75 mg and Xarelto therapy  Of note the patient had a recent cardiac stent placement by Dr Delon Felix a prior to this hospitalization  The patient is to be discharged on a 2 week load of amiodarone and then transition to 200 daily        Condition at Discharge: fair         Discharge instructions/Information to patient and family:   See after visit summary for information provided to patient and family  Provisions for Follow-Up Care:  See after visit summary for information related to follow-up care and any pertinent home health orders  PCP: Romi Alston DO    Disposition: Home    Planned Readmission: No    Discharge Statement   I spent 35 minutes discharging the patient  This time was spent on the day of discharge  I had direct contact with the patient on the day of discharge  Additional documentation is required if more than 30 minutes were spent on discharge  Discharge Medications:  See after visit summary for reconciled discharge medications provided to patient and family

## 2018-07-24 ENCOUNTER — OFFICE VISIT (OUTPATIENT)
Dept: FAMILY MEDICINE CLINIC | Facility: CLINIC | Age: 78
End: 2018-07-24
Payer: MEDICARE

## 2018-07-24 VITALS — DIASTOLIC BLOOD PRESSURE: 60 MMHG | HEART RATE: 72 BPM | RESPIRATION RATE: 16 BRPM | SYSTOLIC BLOOD PRESSURE: 100 MMHG

## 2018-07-24 DIAGNOSIS — I48.91 ATRIAL FIBRILLATION WITH RVR (HCC): Primary | Chronic | ICD-10-CM

## 2018-07-24 DIAGNOSIS — M15.9 PRIMARY OSTEOARTHRITIS INVOLVING MULTIPLE JOINTS: ICD-10-CM

## 2018-07-24 DIAGNOSIS — R41.3 MEMORY LOSS: ICD-10-CM

## 2018-07-24 DIAGNOSIS — G89.4 CHRONIC PAIN DISORDER: ICD-10-CM

## 2018-07-24 DIAGNOSIS — F34.1 DYSTHYMIC DISORDER: Chronic | ICD-10-CM

## 2018-07-24 DIAGNOSIS — I50.22 CHRONIC SYSTOLIC HEART FAILURE (HCC): Chronic | ICD-10-CM

## 2018-07-24 DIAGNOSIS — F32.89 OTHER DEPRESSION: Chronic | ICD-10-CM

## 2018-07-24 PROCEDURE — 99496 TRANSJ CARE MGMT HIGH F2F 7D: CPT | Performed by: FAMILY MEDICINE

## 2018-07-24 RX ORDER — DULOXETIN HYDROCHLORIDE 30 MG/1
30 CAPSULE, DELAYED RELEASE ORAL DAILY
Qty: 30 CAPSULE | Refills: 0 | Status: CANCELLED | OUTPATIENT
Start: 2018-07-24

## 2018-07-24 RX ORDER — FUROSEMIDE 40 MG/1
40 TABLET ORAL DAILY
Qty: 30 TABLET | Refills: 0 | Status: CANCELLED | OUTPATIENT
Start: 2018-07-24

## 2018-07-24 RX ORDER — AMITRIPTYLINE HYDROCHLORIDE 25 MG/1
25 TABLET, FILM COATED ORAL
Qty: 30 TABLET | Refills: 0 | Status: CANCELLED | OUTPATIENT
Start: 2018-07-24

## 2018-07-24 RX ORDER — POTASSIUM CHLORIDE 20 MEQ/1
20 TABLET, EXTENDED RELEASE ORAL DAILY
Qty: 30 TABLET | Refills: 0 | Status: CANCELLED | OUTPATIENT
Start: 2018-07-24

## 2018-07-24 RX ORDER — LIDOCAINE 50 MG/G
1 PATCH TOPICAL DAILY
Qty: 30 PATCH | Refills: 0 | Status: CANCELLED | OUTPATIENT
Start: 2018-07-24

## 2018-07-24 RX ORDER — ALPRAZOLAM 0.25 MG/1
0.25 TABLET, ORALLY DISINTEGRATING ORAL
Qty: 30 TABLET | Refills: 0 | Status: CANCELLED | OUTPATIENT
Start: 2018-07-24

## 2018-07-24 NOTE — PROGRESS NOTES
Assessment/Plan:  Date and time hospital follow up call was made:  7/20/2018 11:40 AM  Hospital care reviewed:  Records not available  Patient was hopsitalized at:  [facility]  Date of admission:  7/7/18  Date of discharge:  7/20/17  Diagnosis:  A Fib  Disposition:  Home  Were the patients medicaitons reviewed and updated:  No  Scheduled for follow up?:  Yes  I have advised the patient to call PCP with any new or worsening symptoms (please type in name along with any credentials):  Gildardo Burgos       Patient to continue present treatment and discussed importance of medication compliance with patient and sister  Continue with visiting nurses  Follow up with Cardiology as scheduled  Schedule appointment with geriatric medicine and Rheumatology  Discussed living arrangements and considering nursing home in the near future  Return to the office in 3 months  Chronic systolic heart failure (HCC)  Stable  Continue present treatment and follow up with Cardiology as scheduled  Atrial fibrillation with RVR (HCC)  Appears to be in normal sinus rhythm  Continue present treatment and follow up with Cardiology as scheduled  Osteoarthritis  Symptomatic  Continue present treatment and follow up with Rheumatology  Diagnoses and all orders for this visit:    Atrial fibrillation with RVR ([facility] [address] )    Chronic systolic heart failure ([facility] [address] )    Other depression  -     Ambulatory referral to Geriatrics; Future    Dysthymic disorder  -     Ambulatory referral to Geriatrics; Future    Primary osteoarthritis involving multiple joints    Chronic pain disorder  -     Ambulatory referral to Geriatrics; Future    Memory loss    Other orders  -     Cancel: ALPRAZolam (NIRAVAM) 0 25 MG dissolvable tablet; Take 1 tablet (0 25 mg total) by mouth daily at bedtime as needed for anxiety  -     Cancel: amitriptyline (ELAVIL) 25 mg tablet;  Take 1 tablet (25 mg total) by mouth daily at bedtime  -     Cancel: DULoxetine (CYMBALTA) 30 mg delayed release capsule; Take 1 capsule (30 mg total) by mouth daily  -     Cancel: furosemide (LASIX) 40 mg tablet; Take 1 tablet (40 mg total) by mouth daily  -     Cancel: potassium chloride (K-DUR,KLOR-CON) 20 mEq tablet; Take 1 tablet (20 mEq total) by mouth daily  -     Cancel: lidocaine (LIDODERM) 5 %; Place 1 patch on the skin daily Remove & Discard patch within 12 hours or as directed by MD          Subjective:      Patient ID: Leonardo De La Torre is a 69 y o  female  Patient is being seen in follow-up from recent hospitalization at [facility] from 07/07/2018 until 07/20/2018 for AFib and CHF  Patient had ablation with resolution of the AFib  Patient was discharged home with visiting nurses as she again refused rehab facility  Patient has a follow-up appointment with Dr Mela Knight, cardiologist on 08/14/2018  Patient did not schedule appointment with geriatric medicine yet and is encouraged to do so  Patient needs to reschedule follow-up appointment with Dr Yousuf Stock, rheumatologist   Patient is being seen with her sister in attendance  The following portions of the patient's history were reviewed and updated as appropriate: allergies, current medications, past family history, past medical history, past social history, past surgical history and problem list     Review of Systems   Constitutional: Positive for activity change, appetite change and fatigue  Negative for unexpected weight change  HENT: Negative  Eyes: Positive for visual disturbance  Respiratory: Positive for shortness of breath  Negative for cough, chest tightness and wheezing  Cardiovascular: Negative for chest pain, palpitations and leg swelling  Gastrointestinal: Positive for nausea  Negative for abdominal pain, blood in stool, constipation, diarrhea and vomiting  Genitourinary: Negative for difficulty urinating, dysuria, frequency, hematuria and urgency     Musculoskeletal: Positive for arthralgias, back pain, gait problem, joint swelling, myalgias, neck pain and neck stiffness  Skin: Negative  Neurological: Positive for weakness  Negative for dizziness, syncope, light-headedness and headaches  Hematological: Negative for adenopathy  Bruises/bleeds easily  Psychiatric/Behavioral: Positive for dysphoric mood and sleep disturbance  Negative for suicidal ideas  The patient is nervous/anxious  Objective:      /60   Pulse 72   Resp 16          Physical Exam   Constitutional: She is oriented to person, place, and time  She appears well-developed and well-nourished  No distress  HENT:   Head: Normocephalic  Mouth/Throat: Oropharynx is clear and moist    Eyes: Conjunctivae are normal  No scleral icterus  Neck: Neck supple  Cardiovascular: Normal rate and regular rhythm  Pulmonary/Chest: Effort normal and breath sounds normal    Abdominal: Soft  There is no tenderness  Musculoskeletal: She exhibits tenderness  She exhibits no edema  Lymphadenopathy:     She has no cervical adenopathy  Neurological: She is alert and oriented to person, place, and time  Skin: Skin is warm and dry  Psychiatric:   Appears somewhat depressed

## 2018-07-24 NOTE — ASSESSMENT & PLAN NOTE
Appears to be in normal sinus rhythm  Continue present treatment and follow up with Cardiology as scheduled

## 2018-07-25 ENCOUNTER — HOSPITAL ENCOUNTER (INPATIENT)
Facility: HOSPITAL | Age: 78
LOS: 12 days | Discharge: NURSING, REHAB OR LONG-TERM CARE | DRG: 812 | End: 2018-08-06
Attending: EMERGENCY MEDICINE | Admitting: INTERNAL MEDICINE
Payer: MEDICARE

## 2018-07-25 ENCOUNTER — APPOINTMENT (EMERGENCY)
Dept: RADIOLOGY | Facility: HOSPITAL | Age: 78
DRG: 812 | End: 2018-07-25
Payer: MEDICARE

## 2018-07-25 DIAGNOSIS — D73.5 SUBCAPSULAR HEMORRHAGE OF SPLEEN: ICD-10-CM

## 2018-07-25 DIAGNOSIS — I25.10 CORONARY ARTERY DISEASE: ICD-10-CM

## 2018-07-25 DIAGNOSIS — I50.22 CHRONIC SYSTOLIC HEART FAILURE (HCC): Chronic | ICD-10-CM

## 2018-07-25 DIAGNOSIS — Z01.89 ENCOUNTER FOR COMPETENCY EVALUATION: ICD-10-CM

## 2018-07-25 DIAGNOSIS — D62 ACUTE BLOOD LOSS ANEMIA: ICD-10-CM

## 2018-07-25 DIAGNOSIS — R74.8 ELEVATED LIVER ENZYMES: ICD-10-CM

## 2018-07-25 DIAGNOSIS — Z79.01 CHRONIC ANTICOAGULATION: Chronic | ICD-10-CM

## 2018-07-25 DIAGNOSIS — I48.91 ATRIAL FIBRILLATION WITH RVR (HCC): Chronic | ICD-10-CM

## 2018-07-25 DIAGNOSIS — R77.8 ELEVATED TROPONIN: ICD-10-CM

## 2018-07-25 DIAGNOSIS — F32.89 OTHER DEPRESSION: Chronic | ICD-10-CM

## 2018-07-25 DIAGNOSIS — K92.2 GASTROINTESTINAL HEMORRHAGE, UNSPECIFIED GASTROINTESTINAL HEMORRHAGE TYPE: Primary | ICD-10-CM

## 2018-07-25 PROBLEM — Z87.19: Status: ACTIVE | Noted: 2018-07-25

## 2018-07-25 LAB
ABO GROUP BLD: NORMAL
ALBUMIN SERPL BCP-MCNC: 2.6 G/DL (ref 3.5–5)
ALP SERPL-CCNC: 54 U/L (ref 46–116)
ALT SERPL W P-5'-P-CCNC: 20 U/L (ref 12–78)
ANION GAP SERPL CALCULATED.3IONS-SCNC: 6 MMOL/L (ref 4–13)
ANISOCYTOSIS BLD QL SMEAR: PRESENT
APTT PPP: 31 SECONDS (ref 24–36)
AST SERPL W P-5'-P-CCNC: 15 U/L (ref 5–45)
ATRIAL RATE: 357 BPM
BASOPHILS # BLD MANUAL: 0 THOUSAND/UL (ref 0–0.1)
BASOPHILS NFR MAR MANUAL: 0 % (ref 0–1)
BILIRUB SERPL-MCNC: 0.29 MG/DL (ref 0.2–1)
BLD GP AB SCN SERPL QL: NEGATIVE
BUN SERPL-MCNC: 41 MG/DL (ref 5–25)
CALCIUM SERPL-MCNC: 8.2 MG/DL (ref 8.3–10.1)
CHLORIDE SERPL-SCNC: 106 MMOL/L (ref 100–108)
CO2 SERPL-SCNC: 26 MMOL/L (ref 21–32)
CREAT SERPL-MCNC: 0.89 MG/DL (ref 0.6–1.3)
EOSINOPHIL # BLD MANUAL: 0.13 THOUSAND/UL (ref 0–0.4)
EOSINOPHIL NFR BLD MANUAL: 1 % (ref 0–6)
ERYTHROCYTE [DISTWIDTH] IN BLOOD BY AUTOMATED COUNT: 17.7 % (ref 11.6–15.1)
GFR SERPL CREATININE-BSD FRML MDRD: 63 ML/MIN/1.73SQ M
GIANT PLATELETS BLD QL SMEAR: PRESENT
GLUCOSE SERPL-MCNC: 110 MG/DL (ref 65–140)
HCT VFR BLD AUTO: 18.2 % (ref 34.8–46.1)
HCT VFR BLD AUTO: 20.1 % (ref 34.8–46.1)
HGB BLD-MCNC: 5.7 G/DL (ref 11.5–15.4)
HGB BLD-MCNC: 6.1 G/DL (ref 11.5–15.4)
INR PPP: 2.16 (ref 0.86–1.17)
LYMPHOCYTES # BLD AUTO: 1.54 THOUSAND/UL (ref 0.6–4.47)
LYMPHOCYTES # BLD AUTO: 12 % (ref 14–44)
MACROCYTES BLD QL AUTO: PRESENT
MCH RBC QN AUTO: 32 PG (ref 26.8–34.3)
MCHC RBC AUTO-ENTMCNC: 31.3 G/DL (ref 31.4–37.4)
MCV RBC AUTO: 102 FL (ref 82–98)
METAMYELOCYTES NFR BLD MANUAL: 1 % (ref 0–1)
MONOCYTES # BLD AUTO: 0.26 THOUSAND/UL (ref 0–1.22)
MONOCYTES NFR BLD: 2 % (ref 4–12)
NEUTROPHILS # BLD MANUAL: 10.66 THOUSAND/UL (ref 1.85–7.62)
NEUTS BAND NFR BLD MANUAL: 1 % (ref 0–8)
NEUTS SEG NFR BLD AUTO: 82 % (ref 43–75)
NRBC BLD AUTO-RTO: 1 /100 WBCS
NT-PROBNP SERPL-MCNC: 860 PG/ML
PLATELET # BLD AUTO: 323 THOUSANDS/UL (ref 149–390)
PLATELET BLD QL SMEAR: ADEQUATE
PMV BLD AUTO: 9.8 FL (ref 8.9–12.7)
POIKILOCYTOSIS BLD QL SMEAR: PRESENT
POLYCHROMASIA BLD QL SMEAR: PRESENT
POTASSIUM SERPL-SCNC: 4.4 MMOL/L (ref 3.5–5.3)
PROT SERPL-MCNC: 5.6 G/DL (ref 6.4–8.2)
PROTHROMBIN TIME: 24.2 SECONDS (ref 11.8–14.2)
QRS AXIS: 33 DEGREES
QRSD INTERVAL: 90 MS
QT INTERVAL: 486 MS
QTC INTERVAL: 528 MS
RBC # BLD AUTO: 1.78 MILLION/UL (ref 3.81–5.12)
RBC MORPH BLD: PRESENT
RH BLD: POSITIVE
SMUDGE CELLS BLD QL SMEAR: PRESENT
SODIUM SERPL-SCNC: 138 MMOL/L (ref 136–145)
SPECIMEN EXPIRATION DATE: NORMAL
T WAVE AXIS: 42 DEGREES
TROPONIN I SERPL-MCNC: 0.02 NG/ML
VARIANT LYMPHS # BLD AUTO: 1 %
VENTRICULAR RATE: 71 BPM
WBC # BLD AUTO: 12.84 THOUSAND/UL (ref 4.31–10.16)

## 2018-07-25 PROCEDURE — P9021 RED BLOOD CELLS UNIT: HCPCS

## 2018-07-25 PROCEDURE — 86923 COMPATIBILITY TEST ELECTRIC: CPT

## 2018-07-25 PROCEDURE — 85610 PROTHROMBIN TIME: CPT | Performed by: EMERGENCY MEDICINE

## 2018-07-25 PROCEDURE — 70450 CT HEAD/BRAIN W/O DYE: CPT

## 2018-07-25 PROCEDURE — 85730 THROMBOPLASTIN TIME PARTIAL: CPT | Performed by: EMERGENCY MEDICINE

## 2018-07-25 PROCEDURE — 36415 COLL VENOUS BLD VENIPUNCTURE: CPT | Performed by: EMERGENCY MEDICINE

## 2018-07-25 PROCEDURE — 85027 COMPLETE CBC AUTOMATED: CPT | Performed by: EMERGENCY MEDICINE

## 2018-07-25 PROCEDURE — 96374 THER/PROPH/DIAG INJ IV PUSH: CPT

## 2018-07-25 PROCEDURE — 99285 EMERGENCY DEPT VISIT HI MDM: CPT

## 2018-07-25 PROCEDURE — C9113 INJ PANTOPRAZOLE SODIUM, VIA: HCPCS | Performed by: PHYSICIAN ASSISTANT

## 2018-07-25 PROCEDURE — 36430 TRANSFUSION BLD/BLD COMPNT: CPT

## 2018-07-25 PROCEDURE — 96372 THER/PROPH/DIAG INJ SC/IM: CPT

## 2018-07-25 PROCEDURE — 86900 BLOOD TYPING SEROLOGIC ABO: CPT | Performed by: EMERGENCY MEDICINE

## 2018-07-25 PROCEDURE — 86901 BLOOD TYPING SEROLOGIC RH(D): CPT | Performed by: EMERGENCY MEDICINE

## 2018-07-25 PROCEDURE — 84484 ASSAY OF TROPONIN QUANT: CPT | Performed by: EMERGENCY MEDICINE

## 2018-07-25 PROCEDURE — 99222 1ST HOSP IP/OBS MODERATE 55: CPT | Performed by: INTERNAL MEDICINE

## 2018-07-25 PROCEDURE — 96361 HYDRATE IV INFUSION ADD-ON: CPT

## 2018-07-25 PROCEDURE — 93005 ELECTROCARDIOGRAM TRACING: CPT

## 2018-07-25 PROCEDURE — 85014 HEMATOCRIT: CPT | Performed by: HOSPITALIST

## 2018-07-25 PROCEDURE — 85007 BL SMEAR W/DIFF WBC COUNT: CPT | Performed by: EMERGENCY MEDICINE

## 2018-07-25 PROCEDURE — 86850 RBC ANTIBODY SCREEN: CPT | Performed by: EMERGENCY MEDICINE

## 2018-07-25 PROCEDURE — 93010 ELECTROCARDIOGRAM REPORT: CPT | Performed by: INTERNAL MEDICINE

## 2018-07-25 PROCEDURE — 83880 ASSAY OF NATRIURETIC PEPTIDE: CPT | Performed by: EMERGENCY MEDICINE

## 2018-07-25 PROCEDURE — 30233N1 TRANSFUSION OF NONAUTOLOGOUS RED BLOOD CELLS INTO PERIPHERAL VEIN, PERCUTANEOUS APPROACH: ICD-10-PCS | Performed by: INTERNAL MEDICINE

## 2018-07-25 PROCEDURE — 99223 1ST HOSP IP/OBS HIGH 75: CPT | Performed by: HOSPITALIST

## 2018-07-25 PROCEDURE — 85018 HEMOGLOBIN: CPT | Performed by: HOSPITALIST

## 2018-07-25 PROCEDURE — 80053 COMPREHEN METABOLIC PANEL: CPT | Performed by: EMERGENCY MEDICINE

## 2018-07-25 PROCEDURE — 71046 X-RAY EXAM CHEST 2 VIEWS: CPT

## 2018-07-25 RX ORDER — LANOLIN ALCOHOL/MO/W.PET/CERES
6 CREAM (GRAM) TOPICAL
Status: DISCONTINUED | OUTPATIENT
Start: 2018-07-25 | End: 2018-08-06 | Stop reason: HOSPADM

## 2018-07-25 RX ORDER — ALPRAZOLAM 0.25 MG/1
0.25 TABLET ORAL
Status: DISCONTINUED | OUTPATIENT
Start: 2018-07-25 | End: 2018-08-06 | Stop reason: HOSPADM

## 2018-07-25 RX ORDER — LIDOCAINE 50 MG/G
1 PATCH TOPICAL DAILY
Status: DISCONTINUED | OUTPATIENT
Start: 2018-07-25 | End: 2018-08-06 | Stop reason: HOSPADM

## 2018-07-25 RX ORDER — CLOPIDOGREL BISULFATE 75 MG/1
75 TABLET ORAL DAILY
Status: DISCONTINUED | OUTPATIENT
Start: 2018-07-25 | End: 2018-08-06 | Stop reason: HOSPADM

## 2018-07-25 RX ORDER — AMITRIPTYLINE HYDROCHLORIDE 25 MG/1
25 TABLET, FILM COATED ORAL
Status: DISCONTINUED | OUTPATIENT
Start: 2018-07-25 | End: 2018-08-06 | Stop reason: HOSPADM

## 2018-07-25 RX ORDER — ASPIRIN 81 MG/1
81 TABLET, CHEWABLE ORAL DAILY
Status: DISCONTINUED | OUTPATIENT
Start: 2018-07-25 | End: 2018-08-06 | Stop reason: HOSPADM

## 2018-07-25 RX ORDER — ALPRAZOLAM 0.25 MG/1
0.25 TABLET, ORALLY DISINTEGRATING ORAL
Qty: 30 TABLET | Refills: 3 | Status: SHIPPED | OUTPATIENT
Start: 2018-07-25 | End: 2018-09-14 | Stop reason: SDUPTHER

## 2018-07-25 RX ORDER — FUROSEMIDE 40 MG/1
40 TABLET ORAL DAILY
Qty: 30 TABLET | Refills: 3 | Status: SHIPPED | OUTPATIENT
Start: 2018-07-25 | End: 2018-09-14 | Stop reason: SDUPTHER

## 2018-07-25 RX ORDER — DULOXETIN HYDROCHLORIDE 30 MG/1
30 CAPSULE, DELAYED RELEASE ORAL DAILY
Qty: 30 CAPSULE | Refills: 3 | Status: SHIPPED | OUTPATIENT
Start: 2018-07-25 | End: 2018-10-02 | Stop reason: SDUPTHER

## 2018-07-25 RX ORDER — DULOXETIN HYDROCHLORIDE 30 MG/1
30 CAPSULE, DELAYED RELEASE ORAL DAILY
Status: DISCONTINUED | OUTPATIENT
Start: 2018-07-26 | End: 2018-08-06 | Stop reason: HOSPADM

## 2018-07-25 RX ORDER — MIRTAZAPINE 15 MG/1
7.5 TABLET, FILM COATED ORAL
Status: DISCONTINUED | OUTPATIENT
Start: 2018-07-25 | End: 2018-08-06 | Stop reason: HOSPADM

## 2018-07-25 RX ORDER — AMIODARONE HYDROCHLORIDE 200 MG/1
200 TABLET ORAL
Status: DISCONTINUED | OUTPATIENT
Start: 2018-07-30 | End: 2018-08-06 | Stop reason: HOSPADM

## 2018-07-25 RX ORDER — BENZTROPINE MESYLATE 1 MG/ML
1 INJECTION INTRAMUSCULAR; INTRAVENOUS ONCE
Status: COMPLETED | OUTPATIENT
Start: 2018-07-25 | End: 2018-07-25

## 2018-07-25 RX ORDER — AMITRIPTYLINE HYDROCHLORIDE 25 MG/1
25 TABLET, FILM COATED ORAL
Qty: 30 TABLET | Refills: 5 | Status: SHIPPED | OUTPATIENT
Start: 2018-07-25 | End: 2018-09-01 | Stop reason: HOSPADM

## 2018-07-25 RX ORDER — METOCLOPRAMIDE HYDROCHLORIDE 5 MG/ML
10 INJECTION INTRAMUSCULAR; INTRAVENOUS ONCE
Status: COMPLETED | OUTPATIENT
Start: 2018-07-25 | End: 2018-07-25

## 2018-07-25 RX ORDER — AMIODARONE HYDROCHLORIDE 200 MG/1
200 TABLET ORAL 2 TIMES DAILY WITH MEALS
Status: COMPLETED | OUTPATIENT
Start: 2018-07-25 | End: 2018-07-29

## 2018-07-25 RX ORDER — OXYCODONE HYDROCHLORIDE 10 MG/1
10 TABLET ORAL EVERY 6 HOURS
Status: DISCONTINUED | OUTPATIENT
Start: 2018-07-25 | End: 2018-08-06 | Stop reason: HOSPADM

## 2018-07-25 RX ORDER — POTASSIUM CHLORIDE 20 MEQ/1
20 TABLET, EXTENDED RELEASE ORAL DAILY
Qty: 30 TABLET | Refills: 3 | Status: SHIPPED | OUTPATIENT
Start: 2018-07-25 | End: 2018-10-02 | Stop reason: SDUPTHER

## 2018-07-25 RX ORDER — PANTOPRAZOLE SODIUM 40 MG/1
40 INJECTION, POWDER, FOR SOLUTION INTRAVENOUS EVERY 12 HOURS SCHEDULED
Status: DISCONTINUED | OUTPATIENT
Start: 2018-07-25 | End: 2018-07-26

## 2018-07-25 RX ORDER — ONDANSETRON 2 MG/ML
4 INJECTION INTRAMUSCULAR; INTRAVENOUS EVERY 6 HOURS PRN
Status: DISCONTINUED | OUTPATIENT
Start: 2018-07-25 | End: 2018-08-06 | Stop reason: HOSPADM

## 2018-07-25 RX ADMIN — MIRTAZAPINE 7.5 MG: 15 TABLET, FILM COATED ORAL at 21:42

## 2018-07-25 RX ADMIN — MELATONIN 6 MG: at 21:42

## 2018-07-25 RX ADMIN — AMITRIPTYLINE HYDROCHLORIDE 25 MG: 25 TABLET, FILM COATED ORAL at 21:42

## 2018-07-25 RX ADMIN — ASPIRIN 81 MG 81 MG: 81 TABLET ORAL at 18:14

## 2018-07-25 RX ADMIN — AMIODARONE HYDROCHLORIDE 200 MG: 200 TABLET ORAL at 18:14

## 2018-07-25 RX ADMIN — SODIUM CHLORIDE 500 ML: 0.9 INJECTION, SOLUTION INTRAVENOUS at 13:15

## 2018-07-25 RX ADMIN — METOCLOPRAMIDE 10 MG: 5 INJECTION, SOLUTION INTRAMUSCULAR; INTRAVENOUS at 13:14

## 2018-07-25 RX ADMIN — CLOPIDOGREL BISULFATE 75 MG: 75 TABLET, FILM COATED ORAL at 18:14

## 2018-07-25 RX ADMIN — BENZTROPINE MESYLATE 1 MG: 1 INJECTION INTRAMUSCULAR; INTRAVENOUS at 14:26

## 2018-07-25 RX ADMIN — LIDOCAINE 1 PATCH: 50 PATCH CUTANEOUS at 18:15

## 2018-07-25 RX ADMIN — OXYCODONE HYDROCHLORIDE 10 MG: 10 TABLET ORAL at 18:14

## 2018-07-25 RX ADMIN — PANTOPRAZOLE SODIUM 40 MG: 40 INJECTION, POWDER, FOR SOLUTION INTRAVENOUS at 15:41

## 2018-07-25 NOTE — ASSESSMENT & PLAN NOTE
· Suspect secondary ischemic cardiomyopathy  · LVEF of 35%  · Evaluated by EP last admission  · Will hold Lasix 40 mg daily at present due to low blood pressure and anemia    Monitor volume status while getting PRBC  · Also holding potassium supplements with the Lasix  · Currently appears euvolemic

## 2018-07-25 NOTE — ASSESSMENT & PLAN NOTE
· Will hold metoprolol XL 50 mg at present with low BP, monitor HR which is stable  · Hold AC with bleed

## 2018-07-25 NOTE — ED NOTES
Pt with agreement to have blood transfusion and signed consent awaiting blood product to be ready      Aniya Galicia RN  07/25/18 3012

## 2018-07-25 NOTE — SOCIAL WORK
CM consulted for placement of Pt  Pt was previously in the hospital from 7/7-7/20 and was [facility] to go to [facility], but declined SNF when d/c  Pt now back and is reporting she cannot take care of herself at home  CM met with Pt at bedside who is agreeable to send referral back to [facility], and [address] [place] [address] Place  [facility] is able to accept Pt  Pt now needs to be admitted  CM contacted [facility] to update  [facility] reported they will be able to accept Pt when she is medically stable to d/c

## 2018-07-25 NOTE — ASSESSMENT & PLAN NOTE
· Hb 5 7 (was 11 a week ago)  · 2 unit PRBC ordered & is pending, repeat H&H q 6 hour  · Monitor for signs of active bleed  · Level 2 SD  · Hold xarelto/eliquis (unclear which one patient was taking or both as her discharge med list has both of them)  · Will have to continue aspirin & plavix with recent NGOZI to RCA

## 2018-07-25 NOTE — ASSESSMENT & PLAN NOTE
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

## 2018-07-25 NOTE — H&P
H&P- Marcelina Harder [birth year], 69 y o  female MRN: [id number]    Unit/Bed#: [facility] 715-01 Encounter: [id number]    Primary Care Provider: Terrence Herman DO   Date and time admitted to hospital: 7/25/2018 11:53 AM        * Acute blood loss anemia   Assessment & Plan    · Hb 5 7 (was 11 a week ago)  · 2 unit PRBC ordered & is pending, repeat H&H q 6 hour  · Monitor for signs of active bleed  · Level 2 SD  · Hold xarelto/eliquis (unclear which one patient was taking or both as her discharge med list has both of them)  · Will have to continue aspirin & plavix with recent NGOZI to RCA        Coronary artery disease   Assessment & Plan    · Patient with shortness of breath last admission, found to be in CHF with AFib RVR and also 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  · Hence she underwent cardiac catheterization followed by NGOZI to RCA - hence will have to continue aspirin and Plavix  · Cardiology to be consulted given recent stent placement and the new bleed and anemia  · Per the last EP note before discharge seems like they recommended combination of plavix & xarelto to avoid bleeding risk with triple therapy - will leave it upto to cardio to decide        GI bleed   Assessment & Plan    · Stool guaic positive in ED  · GI consulted, PPI IV BID, keep NPO        H/O cholangitis   Assessment & Plan    · Patient recently admitted in May 2018 with sepsis suspected secondary to cholangitis, underwent ERCP with stone removal and biliary stent placement  · Patient was supposed to follow up with GI in 6 weeks but she did not hear from them    She is due for her stent removal as well  · Follow up GI recommendations        Atrial fibrillation with RVR (HCC)   Assessment & Plan    · Will hold metoprolol XL 50 mg at present with low BP, monitor HR which is stable  · Hold AC with bleed        Chronic systolic heart failure (HCC)   Assessment & Plan    · Suspect secondary ischemic cardiomyopathy  · LVEF of 35%  · Evaluated by EP last admission  · Will hold Lasix 40 mg daily at present due to low blood pressure and anemia  Monitor volume status while getting PRBC  · Also holding potassium supplements with the Lasix  · Currently appears euvolemic            History and Physical - [facility]    Patient Information: Елена Vargas 69 y o  female MRN: [id number]  Unit/Bed#: [facility] 184-68 Encounter: [id number]  Admitting Physician: Wayne Harkins MD  PCP: Wicho Reyna DO  Date of Admission:  07/25/18    Assessment/Plan:    Hospital Problem List:     Principal Problem:    Acute blood loss anemia  Active Problems:    GI bleed    Coronary artery disease    Chronic systolic heart failure (HCC)    Atrial fibrillation with RVR ([facility] [address] )    H/O cholangitis      VTE Prophylaxis: Pharmacologic VTE Prophylaxis contraindicated due to bleed  / sequential compression device   Code Status: DNR  POLST: There is no POLST form on file for this patient (pre-hospital)    Anticipated Length of Stay:  Patient will be admitted on an Inpatient basis with an anticipated length of stay of  > 2 midnights  Justification for Hospital Stay: GI bleed    Total Time for Visit, including Counseling / Coordination of Care: 45 minutes  Greater than 50% of this total time spent on direct patient counseling and coordination of care  Chief Complaint:   dizziness    History of Present Illness:    Елена Vargas is a 69 y o  female who presents with and dizziness/lightheadedness, worsening fatigue and inability to walk around  Patient was just admitted from 07/07/2018 to 07/20/2018 at which time she presented with shortness of breath and palpitations are found to be in AFib RVR  At that time she was on seen by Cardiology, failed TIFFANIE cardioversion, hence was seen by electrophysiology - and given her low EF with AFib RVR and recurrent CHF, she underwent ablation    She was also found to have monomorphic ventricular tachycardia during that admission, underwent cardiac catheterization followed by drug-eluting stent placed to RCA on 07/12/2018  She was a commended to be discharged to rehab but patient denied that and she is feeling better hence she was discharged home with [facility]  Patient states she was feeling very well for 2 days, after which she started feeling worse, get really getting weaker dizzy year, he felt like she was going to fall down even while using her walker hence she did not get out of her bed since last night  She called her sister this morning and ended up coming to the ER via ambulance  Patient denies any chest pain, had some palpitations/heartbeat like feeling occasionally, had some shortness of breath  Denies any abdominal pain, had some vomiting couple of days ago but it was not black or bloody  She had an episode of black stool during her last hospitalization but none at home  Denies any maame red blood in the stools  Denies any history of GI bleeding in the past   Never had an endoscopy done in the past    She does not know which medications exactly she was taking at home, visiting nurses were arranging everything for her    Review of Systems:    Review of Systems   Constitutional: Positive for appetite change  Negative for activity change, chills and fever  HENT: Negative for rhinorrhea and sore throat  Respiratory: Negative for cough, shortness of breath and wheezing  Cardiovascular: Negative for chest pain, palpitations and leg swelling  Gastrointestinal: Negative for abdominal pain, diarrhea, nausea and vomiting  Genitourinary: Negative for difficulty urinating and dysuria  Skin: Positive for pallor  Neurological: Positive for dizziness and light-headedness  All other systems reviewed and are negative        Past Medical and Surgical History:     Past Medical History:   Diagnosis Date    A-fib 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  Authorizing Provider   amiodarone 200 mg tablet Take 1 tablet (200 mg total) by mouth 2 (two) times a day with meals for 8 days Then decrease to 1 tablet daily 7/20/18 7/28/18 Yes Michael Miranda, DO   apixaban (ELIQUIS) 5 mg Take 5 mg by mouth 2 (two) times a day   Yes Historical Provider, MD   aspirin (ECOTRIN LOW STRENGTH) 81 mg EC tablet Take 81 mg by mouth daily   Yes Historical Provider, MD   clopidogrel (PLAVIX) 75 mg tablet Take 1 tablet (75 mg total) by mouth daily 7/21/18  Yes Michael Miranda DO   lidocaine (LIDODERM) 5 % Place 1 patch on the skin daily Remove & Discard patch within 12 hours or as directed by MD 7/21/18  Yes Michael Miranda DO   lisinopril (ZESTRIL) 5 mg tablet TAKE 1 TABLET (5 MG TOTAL) BY MOUTH DAILY 7/2/18  Yes Pal Shah, DO   melatonin 3 mg Take 2 tablets (6 mg total) by mouth daily at bedtime 7/20/18  Yes Michael Miranda DO   metoprolol succinate (TOPROL-XL) 50 mg 24 hr tablet Take 1 tablet (50 mg total) by mouth every 12 (twelve) hours 7/20/18  Yes Michael Miranda DO   mirtazapine (REMERON) 7 5 MG tablet Take 1 tablet (7 5 mg total) by mouth daily at bedtime 7/20/18  Yes Michael Miranda DO   oxyCODONE (ROXICODONE) 15 mg immediate release tablet Take 15 mg by mouth every 6 (six) hours 5/9/18  Yes Historical Provider, MD   polyethylene glycol (MIRALAX) 17 g packet Take 17 g by mouth daily   Yes Historical Provider, MD   rivaroxaban (XARELTO) 15 mg tablet Take 1 tablet (15 mg total) by mouth daily with dinner 7/20/18  Yes Michael Miranda DO   ALPRAZolam (NIRAVAM) 0 25 MG dissolvable tablet Take 0 25 mg by mouth daily at bedtime as needed for anxiety  7/25/18 Yes Historical Provider, MD   amitriptyline (ELAVIL) 25 mg tablet TAKE ONE TABLET AT BEDTIME 7/13/18 7/25/18 Yes Romi Alston DO   DULoxetine (CYMBALTA) 30 mg delayed release capsule TAKE ONE CAPSULE EVERY DAY 7/13/18 7/25/18 Yes Romi Alston DO   furosemide (LASIX) 40 mg tablet Take 1 tablet (40 mg total) by mouth daily 6/5/18 7/25/18 Yes Romi Alston DO   potassium chloride (K-DUR,KLOR-CON) 20 mEq tablet Take 20 mEq by mouth 5/9/18 7/25/18 Yes Historical Provider, MD   ALPRAZolam (NIRAVAM) 0 25 MG dissolvable tablet Take 1 tablet (0 25 mg total) by mouth daily at bedtime as needed for anxiety 7/25/18   Romi Alston DO   amitriptyline (ELAVIL) 25 mg tablet Take 1 tablet (25 mg total) by mouth daily at bedtime 7/25/18   Romi Alston DO   DULoxetine (CYMBALTA) 30 mg delayed release capsule Take 1 capsule (30 mg total) by mouth daily 7/25/18   Romi Alstno DO   furosemide (LASIX) 40 mg tablet Take 1 tablet (40 mg total) by mouth daily 7/25/18   Romi Alston DO   potassium chloride (K-DUR,KLOR-CON) 20 mEq tablet Take 1 tablet (20 mEq total) by mouth daily 7/25/18   Romi Alston DO     I have reviewed home medications using allscripts      Allergies: No Known Allergies    Social History:     Marital Status:    Occupation: unempolyued  Patient Pre-hospital Living Situation: alone  Patient Pre-hospital Level of Mobility: walker  Patient Pre-hospital Diet Restrictions: none  Substance Use History:   History   Alcohol Use No     History   Smoking Status    Former Smoker   Smokeless Tobacco    Never Used     History   Drug Use No       Family History:    non-contributory    Physical Exam:     Vitals:   Blood Pressure: 118/57 (07/25/18 1552)  Pulse: 74 (07/25/18 1552)  Temperature: 97 8 °F (36 6 °C) (07/25/18 1552)  Respirations: 18 (07/25/18 1552)  Height: 5' 5" (165 1 cm) (07/25/18 1154)  Weight - Scale: 68 kg (149 lb 14 6 oz) (07/25/18 1154)  SpO2: 95 % (07/25/18 1500)    Physical Exam   Constitutional: She is oriented to person, place, and time  She appears well-developed and well-nourished  No distress  HENT:   Head: Normocephalic and atraumatic  Mouth/Throat: Oropharynx is clear and moist    Eyes:   pallor   Cardiovascular: Normal rate, regular rhythm and normal heart sounds  Exam reveals no gallop and no friction rub  No murmur heard  Pulmonary/Chest: Effort normal and breath sounds normal  No respiratory distress  She has no wheezes  She has no rales  Abdominal: Soft  She exhibits no distension  There is no tenderness  Musculoskeletal: She exhibits no edema  Neurological: She is alert and oriented to person, place, and time  Skin: She is not diaphoretic  Vitals reviewed  Additional Data:     Lab Results: I have personally reviewed pertinent reports  Results from last 7 days  Lab Units 07/25/18  1213   WBC Thousand/uL 12 84*   HEMOGLOBIN g/dL 5 7*   HEMATOCRIT % 18 2*   PLATELETS Thousands/uL 323   LYMPHO PCT % 12*   MONO PCT MAN % 2*   EOSINO PCT MANUAL % 1       Results from last 7 days  Lab Units 07/25/18  1213   SODIUM mmol/L 138   POTASSIUM mmol/L 4 4   CHLORIDE mmol/L 106   CO2 mmol/L 26   BUN mg/dL 41*   CREATININE mg/dL 0 89   CALCIUM mg/dL 8 2*   TOTAL PROTEIN g/dL 5 6*   BILIRUBIN TOTAL mg/dL 0 29   ALK PHOS U/L 54   ALT U/L 20   AST U/L 15   GLUCOSE RANDOM mg/dL 110       Results from last 7 days  Lab Units 07/25/18  1213   INR  2 16*       Imaging: I have personally reviewed pertinent reports  X-ray Chest 2 Views    Result Date: 7/25/2018  Narrative: CHEST INDICATION:   chest pain  COMPARISON:  7/7/2018 EXAM PERFORMED/VIEWS:  XR CHEST PA & LATERAL FINDINGS: Cardiomediastinal silhouette appears unremarkable  The lungs are clear  No pneumothorax or pleural effusion  Osseous structures appear within normal limits for patient age       Impression: No acute cardiopulmonary disease  Workstation performed: [id number]     X-ray Chest 2 Views    Result Date: 7/8/2018  Narrative: CHEST INDICATION:   Chest pain  COMPARISON:  5/13/2018 EXAM PERFORMED/VIEWS:  XR CHEST PA & LATERAL  The frontal view was performed utilizing dual energy radiographic technique  FINDINGS:  The patient is rotated to the left  Cardiomediastinal silhouette appears stable  Minimal linear atelectasis or scarring right lung base  The lungs are otherwise clear  No pneumothorax or pleural effusion  Dextroscoliosis and degenerative changes thoracic spine with osteoarthritis of the left shoulder also noted  Impression: No acute cardiopulmonary disease  Workstation performed: [id number]     Ct Head Without Contrast    Result Date: 7/25/2018  Narrative: CT BRAIN - WITHOUT CONTRAST INDICATION:   headache  COMPARISON:  5/13/2018  TECHNIQUE:  CT examination of the brain was performed  In addition to axial images, coronal 2D reformatted images were created and submitted for interpretation  Radiation dose length product (DLP) for this visit:  1031 39 mGy-cm   This examination, like all CT scans performed in the [facility], was performed utilizing techniques to minimize radiation dose exposure, including the use of iterative reconstruction and automated exposure control  IMAGE QUALITY:  Diagnostic  FINDINGS: PARENCHYMA:  No intracranial mass, mass effect or midline shift  No CT signs of acute infarction  No acute parenchymal hemorrhage  VENTRICLES AND EXTRA-AXIAL SPACES:  Normal for the patient's age  VISUALIZED ORBITS AND PARANASAL SINUSES:  Unremarkable  CALVARIUM AND EXTRACRANIAL SOFT TISSUES:  Normal      Impression: No acute intracranial abnormality  Workstation performed: [id number]     Ct Cardiac W Pulmonary Vein Mapping    Result Date: 7/12/2018  Narrative: CARDIAC CT ANGIOGRAPHY - PULMONARY VEIN MAPPING INDICATION:   pre ablation  Atrial fibrillation   TECHNIQUE:  Noncontrast axial localizing images of the heart were obtained  Thin-section cardiac gated CT angiography of the heart and coronary vasculature was performed  3D reformatted images and volume rendering were performed on an independent workstation  Reformatted images were created in multiple planes  Radiation dose length product (DLP) for this visit:  814 mGy-cm   This examination, like all CT scans performed in the [facility], was performed utilizing techniques to minimize radiation dose exposure, including the use of iterative reconstruction and automated exposure control  IV Contrast:  100 mL of iodixanol (VISIPAQUE) FINDINGS: There is a common trunk for the left pulmonary veins  Right-sided pulmonary vein anatomy is typical, with 2 pulmonary veins identified  The right superior pulmonary vein ostium measures approximately 22 x 18 mm  The right inferior pulmonary vein ostium measures approximately 19 x 17 mm  The ostium of the common trunk for the left pulmonary veins measures approximately 30 x 25 mm  The heart is enlarged  There is no evidence of pericardial effusion  Coronary artery calcifications noted  Subsegmental atelectasis noted in the lingula and bilateral lower lobes  Visualized osseous structures appear within normal limits for the patient's age  Mild pneumobilia is noted in the visualized liver, presumably related to history of biliary stent placement  Impression: Pulmonary venous anatomy as described above  Workstation performed: [id number]       EKG, Pathology, and Other Studies Reviewed on Admission:   · EKG: NSR    Allscripts / Epic Records Reviewed: Yes     ** Please Note: This note has been constructed using a voice recognition system   **

## 2018-07-25 NOTE — ED ATTENDING ATTESTATION
Kasandra Laird MD, saw and evaluated the patient  I have discussed the patient with the resident/non-physician practitioner and agree with the resident's/non-physician practitioner's findings, Plan of Care, and MDM as documented in the resident's/non-physician practitioner's note, except where noted  All available labs and Radiology studies were reviewed  At this point I agree with the current assessment done in the Emergency Department  I have conducted an independent evaluation of this patient a history and physical is as follows:     this 28-year-old female presents today with weakness and fatigue  Patient was just discharged the hospital last week and states that she went home she was feeling much better  Patient states however beginning yesterday she suddenly felt much weaker was unable to get around  Patient states that she had a hard time completing tasks  Patient states she could not go to the grocery store or get food  Patient states if she had food she slow would have the energy or ability to cook food  Patient denies any chest pain  Patient denies any cough  Patient denies any fevers  Patient denies any new falls or injuries  Of note patient was last admitted she was offered skilled rehab but refused  Patient does live alone  On exam patient is well appearing in no obvious distress  Patient does have some mild hypotension with of blood pressure of 98/50 however her baseline is not much more than this  Patient is in normal sinus rhythm  Patient has mildly dry mucous membranes although states she has not really had anything to eat today  Patient has regular heart sounds without murmur  Patient has clear lung sounds bilaterally  Patient with soft nontender abdomen  Patient has no extremity edema  Patient's blood work was very significant for hemoglobin of 5 7, prior hemoglobin a week ago with 11  A subsequent the rectal exam was performed which was guaiac positive    Patient was consented for blood and 2 units was ordered  Patient is having a GI bleed  Patient however denies seen any blood in her stool  Patient has had no vomiting  Patient has no history of GI bleed  Patient is on a novel anticoagulant  Will give IV fluids  Will feed patient  Will admit  Patient stable at this time  Of note patient was also complaining of a headache  Patient given Reglan and fluids for this  Patient states headache is improved  I suspect patient was dehydrated    Critical Care Time  CritCare Time    Procedures

## 2018-07-25 NOTE — ASSESSMENT & PLAN NOTE
· Patient recently admitted in May 2018 with sepsis suspected secondary to cholangitis, underwent ERCP with stone removal and biliary stent placement  · Patient was supposed to follow up with GI in 6 weeks but she did not hear from them    She is due for her stent removal as well  · Follow up GI recommendations

## 2018-07-25 NOTE — PLAN OF CARE
DISCHARGE PLANNING     Discharge to home or other facility with appropriate resources Progressing        GASTROINTESTINAL - ADULT     Maintains or returns to baseline bowel function Progressing     Establish and maintain optimal ostomy function Progressing        HEMATOLOGIC - ADULT     Maintains hematologic stability Progressing        Knowledge Deficit     Patient/family/caregiver demonstrates understanding of disease process, treatment plan, medications, and discharge instructions Progressing        PAIN - ADULT     Verbalizes/displays adequate comfort level or baseline comfort level Progressing        Potential for Falls     Patient will remain free of falls Progressing        SAFETY ADULT     Maintain or return mobility status to optimal level Progressing

## 2018-07-25 NOTE — CONSULTS
Consultation - [facility]  Ferdinand Story 69 y o  female MRN: [id number]  Unit/Bed#: ED 13 Encounter: [id number]        Inpatient consult to gastroenterology  Consult performed by: Miguelito Johnson ordered by: Karyle Jack    Inpatient consult to gastroenterology  Consult performed by: Miguelito Johnson ordered by: Diamond Gómez          ASSESSMENT/ PLAN:    70 yo female with pmh CHF, HTN a-fib s/p ablation on 7/16, CAD 7/12 s/p stent placement on plavix being evaluated for acute blood loss anemia    1  Acute blood loss anemia: Baseline Hgb around 11, 11 4 on 7/17 decreased to 5 7 today on admission  She admits to SOB and fatigue  She had recent cardiac cath with stent placement on 7/12 and ablation on 7/16 for chronic a-fib  She was discharged on plavix and xarelto? She denies NSAID use and denies melena or hematochezia  No stool in rectal vault on exam, per ED heme occult +  BUN is elevated to 41 and was 17 on last admission  This could be secondary to PUD, gastric or small bowel AVMs  With recent stent placement she is unlikely able to hold A/C, plan for diagnostic EGD tomorrow for further evaluation  INR 2 16  -agree with transfusion 2 units   -monitor Hgb and VS  -Transfuse as necessary  -start IV PPI BID  -clear liquid diet  -NPO after midnight   -diagnostic EGD tomorrow 7/26    2  Biliary Stent: recent admission 5/2018 with cholangitis secondary to stent occlusion and choledocholithiasis from previous stent placement in 2014 for biliary dilation   She was recommended to have repeat ERCP for stent removal in 4-6 weeks  -outpatient follow up  -repeat ERCP as outpatient once able to hold a/c  -monitor LFTs    Discussed with SLIM      Reason for Consult / Principal Problem: acute blood loss anemia    HPI: Ferdinand Story is a 70 yo female pmh CHF with EF 50% from 5/2018, a-fib with RVR s/p ablation 7/16, hx of cardiac cath with stent placement 7/12 who presented to the ED with SOB since last night as well as generalized fatigue  She states last night she became short of breath while trying to lay flat  GI was consulted for anemia  Patient has a baseline around 11, it was 11 4 on 7/17 and decreased to 5 7 today  She denies any GI complaints of abdominal pain, diarrhea, constipation, melena or hematochezia  She does admit to an episode of non-bloody nausea/vomiting and diarrhea a few days ago  Patient admits to colonoscopy about 10 years ago that was negative  She underwent ERCP in 5/2018 for cholangitis secondary to stent occlusion and choledocholithiasis  REVIEW OF SYSTEMS:     CONSTITUTIONAL: Denies any fever, chills, or rigors  +weakness/fatigue  HEENT: No earache or tinnitus  Denies hearing loss or visual disturbances  CARDIOVASCULAR: No chest pain or palpitations  RESPIRATORY: Denies any cough, hemoptysis  +SOB  GASTROINTESTINAL: As noted in the History of Present Illness  GENITOURINARY: No problems with urination  Denies any hematuria or dysuria  NEUROLOGIC: No dizziness or vertigo, denies headaches  +confusion  MUSCULOSKELETAL: Denies any muscle or joint pain  SKIN: Denies skin rashes or itching  ENDOCRINE: Denies excessive thirst  Denies intolerance to heat or cold  PSYCHOSOCIAL: Denies depression or anxiety  Denies any recent memory loss  Historical Information   Past Medical History:   Diagnosis Date    [facility]-fib [facility])     Acute respiratory disease     CHF (congestive heart failure) (HCC)     Chronic pain     Heart failure (HCC)     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[facility] [address] )     Hypertension     Narcotic dependence ([facility] [address] )      Past Surgical History:   Procedure Laterality Date    ERCP N/A 5/14/2018    Procedure: ENDOSCOPIC RETROGRADE CHOLANGIOPANCREATOGRAPHY (ERCP);   Surgeon: Enrico Galdamez MD;  Location:  MAIN OR;  Service: Gastroenterology     Social History   History   Alcohol Use No     History   Drug Use No     History Smoking Status    Former Smoker   Smokeless Tobacco    Never Used     History reviewed  No pertinent family history  Meds/Allergies       (Not in a hospital admission)  No current facility-administered medications for this encounter  No Known Allergies        Objective     Blood pressure 98/51, pulse 70, temperature 98 6 °F (37 °C), resp  rate 16, height 5' 5" (1 651 m), weight 68 kg (149 lb 14 6 oz), SpO2 97 %  No intake or output data in the 24 hours ending 07/25/18 1516      PHYSICAL EXAM:      General Appearance:   A&Ox3, cooperative, mild distress, appears stated age    HEENT:   Normocephalic, atraumatic      Neck:  Supple, symmetrical, trachea midline   Lungs:   Clear to auscultation bilaterally; no rales, rhonchi or wheezing    Heart[de-identified]   S1 and S2 normal; regular rate and rhythm; no murmur, rub, or gallop     Abdomen:   Soft, non-tender, non-distended; normal bowel sounds; no masses, no organomegaly    Genitalia:   Deferred    Rectal:   No stool in rectal vault    Extremities:  No cyanosis, clubbing or edema        Skin:  Skin color, texture, turgor normal, no rashes or lesions          Lab Results:   Admission on 07/25/2018   Component Date Value    Sodium 07/25/2018 138     Potassium 07/25/2018 4 4     Chloride 07/25/2018 106     CO2 07/25/2018 26     Anion Gap 07/25/2018 6     BUN 07/25/2018 41*    Creatinine 07/25/2018 0 89     Glucose 07/25/2018 110     Calcium 07/25/2018 8 2*    AST 07/25/2018 15     ALT 07/25/2018 20     Alkaline Phosphatase 07/25/2018 54     Total Protein 07/25/2018 5 6*    Albumin 07/25/2018 2 6*    Total Bilirubin 07/25/2018 0 29     eGFR 07/25/2018 63     WBC 07/25/2018 12 84*    RBC 07/25/2018 1 78*    Hemoglobin 07/25/2018 5 7*    Hematocrit 07/25/2018 18 2*    MCV 07/25/2018 102*    MCH 07/25/2018 32 0     MCHC 07/25/2018 31 3*    RDW 07/25/2018 17 7*    MPV 07/25/2018 9 8     Platelets 41/56/4907 323     nRBC 07/25/2018 1     Troponin I 07/25/2018 0 02     Protime 07/25/2018 24 2*    INR 07/25/2018 2 16*    PTT 07/25/2018 31     NT-proBNP 07/25/2018 860*    Segmented % 07/25/2018 82*    Bands % 07/25/2018 1     Lymphocytes % 07/25/2018 12*    Monocytes % 07/25/2018 2*    Eosinophils % 07/25/2018 1     Basophils % 07/25/2018 0     Metamyelocytes% 07/25/2018 1     Atypical Lymphocytes % 07/25/2018 1*    Absolute Neutrophils 07/25/2018 10 66*    Lymphocytes Absolute 07/25/2018 1 54     Monocytes Absolute 07/25/2018 0 26     Eosinophils Absolute 07/25/2018 0 13     Basophils Absolute 07/25/2018 0 00     Smudge Cells 07/25/2018 Present     RBC Morphology 07/25/2018 Present     Anisocytosis 07/25/2018 Present     Macrocytes 07/25/2018 Present     Poikilocytes 07/25/2018 Present     Polychromasia 07/25/2018 Present     Platelet Estimate 58/48/2074 Adequate     Giant PLTs 07/25/2018 Present     Ventricular Rate 07/25/2018 71     Atrial Rate 07/25/2018 357     QRSD Interval 07/25/2018 90     QT Interval 07/25/2018 486     QTC Interval 07/25/2018 528     QRS Axis 07/25/2018 33     T Wave Axis 07/25/2018 42        Imaging Studies: I have personally reviewed pertinent imaging studies  X-ray Chest 2 Views    Result Date: 7/25/2018  Impression: No acute cardiopulmonary disease  Ct Head Without Contrast    Result Date: 7/25/2018  Impression: No acute intracranial abnormality  This patient was seen and examined by Dr Mike Wolfe  All troncoso medical decisions were made by Dr Mike Wolfe  Thank you for allowing us to participate in the care of this patient  We will follow up closely with you

## 2018-07-25 NOTE — ED PROVIDER NOTES
History  Chief Complaint   Patient presents with    Weakness - Generalized     pt with weakness, shortness of breath and some periods of confusion from home has a history of a recent ablation at [address]  Patient is a 81-year-old female with significant past medical history for systolic congestive heart failure ( echo back in May found ejection fraction 50 %), Afib with RVR for which she had ablation 9 days ago, hypertension, chronic pain, who was recently discharged from this facility on July 20 ( she was admitted on the 7th) for cardiac catheterization and an ablation for Afib with RVR  Presenting to the ED for  Shortness of breath which she noted last night started when she was trying to lay down to sleep and she was unable to lay flat, she is also complaining of generalized weakness,  generalized headache,  dizziness and per the patient she states she is unable to care for herself at home anymore (she was lives herself) due to mild confusion which waxes and wanes, had 1 episode of painful mildly bloody stool a few days ago since then patient states has not had any black/tarry or bloody stools  Had nausea /vomiting  2 days ago and has had decreased appetite since then, no longer having nausea /vomiting  Denies fever, sore throat, cough,  chest pain, abdominal pain, diarrhea/constipation, dysuria, leg/calf swelling, focal neurologic deficit, any falls/trauma or syncopal episodes  History provided by:  Patient   used: No        Prior to Admission Medications   Prescriptions Last Dose Informant Patient Reported? Taking?    ALPRAZolam (NIRAVAM) 0 25 MG dissolvable tablet   No No   Sig: Take 1 tablet (0 25 mg total) by mouth daily at bedtime as needed for anxiety   DULoxetine (CYMBALTA) 30 mg delayed release capsule   No No   Sig: Take 1 capsule (30 mg total) by mouth daily   amiodarone 200 mg tablet Past Week at Unknown time  No Yes   Sig: Take 1 tablet (200 mg total) by mouth 2 (two) times a day with meals for 8 days Then decrease to 1 tablet daily   amitriptyline (ELAVIL) 25 mg tablet   No No   Sig: Take 1 tablet (25 mg total) by mouth daily at bedtime   apixaban (ELIQUIS) 5 mg Past Week at Unknown time  Yes Yes   Sig: Take 5 mg by mouth 2 (two) times a day   aspirin (ECOTRIN LOW STRENGTH) 81 mg EC tablet Past Week at Unknown time  Yes Yes   Sig: Take 81 mg by mouth daily   clopidogrel (PLAVIX) 75 mg tablet Past Week at Unknown time  No Yes   Sig: Take 1 tablet (75 mg total) by mouth daily   furosemide (LASIX) 40 mg tablet   No No   Sig: Take 1 tablet (40 mg total) by mouth daily   lidocaine (LIDODERM) 5 % Past Week at Unknown time  No Yes   Sig: Place 1 patch on the skin daily Remove & Discard patch within 12 hours or as directed by MD   lisinopril (ZESTRIL) 5 mg tablet Past Week at Unknown time  No Yes   Sig: TAKE 1 TABLET (5 MG TOTAL) BY MOUTH DAILY   melatonin 3 mg Past Week at Unknown time  No Yes   Sig: Take 2 tablets (6 mg total) by mouth daily at bedtime   metoprolol succinate (TOPROL-XL) 50 mg 24 hr tablet Past Week at Unknown time  No Yes   Sig: Take 1 tablet (50 mg total) by mouth every 12 (twelve) hours   mirtazapine (REMERON) 7 5 MG tablet Past Week at Unknown time  No Yes   Sig: Take 1 tablet (7 5 mg total) by mouth daily at bedtime   oxyCODONE (ROXICODONE) 15 mg immediate release tablet Past Week at Unknown time  Yes Yes   Sig: Take 15 mg by mouth every 6 (six) hours   polyethylene glycol (MIRALAX) 17 g packet Past Week at Unknown time  Yes Yes   Sig: Take 17 g by mouth daily   potassium chloride (K-DUR,KLOR-CON) 20 mEq tablet   No No   Sig: Take 1 tablet (20 mEq total) by mouth daily   rivaroxaban (XARELTO) 15 mg tablet Past Week at Unknown time  No Yes   Sig: Take 1 tablet (15 mg total) by mouth daily with dinner      Facility-Administered Medications: None       Past Medical History:   Diagnosis Date    [facility]fib ([facility] )     Acute respiratory disease     CHF (congestive heart failure) (HCC)     Chronic pain     Heart failure (HCC)     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[facility] )     Hypertension     Narcotic dependence ([facility] )        Past Surgical History:   Procedure Laterality Date    ERCP N/A 5/14/2018    Procedure: ENDOSCOPIC RETROGRADE CHOLANGIOPANCREATOGRAPHY (ERCP); Surgeon: Rui Suggs MD;  Location: [facility];  Service: Gastroenterology       History reviewed  No pertinent family history  I have reviewed and agree with the history as documented  Social History   Substance Use Topics    Smoking status: Former Smoker    Smokeless tobacco: Never Used    Alcohol use No        Review of Systems   Constitutional: Positive for appetite change and fatigue  Negative for chills, diaphoresis and fever  HENT: Negative for congestion, facial swelling, mouth sores, nosebleeds, postnasal drip, rhinorrhea, sinus pain, sinus pressure, sneezing and sore throat  Eyes: Negative for photophobia and visual disturbance  Respiratory: Positive for shortness of breath  Negative for cough, chest tightness, wheezing and stridor  Cardiovascular: Negative for chest pain, palpitations and leg swelling  Gastrointestinal: Negative for abdominal distention, abdominal pain, anal bleeding, blood in stool, constipation, diarrhea, nausea and vomiting  Genitourinary: Negative for difficulty urinating, dysuria, flank pain, frequency, hematuria and urgency  Musculoskeletal: Positive for back pain (Chronic) and neck pain ( chronic)  Negative for arthralgias, gait problem, joint swelling, myalgias and neck stiffness  Skin: Negative for color change, pallor and rash  Neurological: Positive for weakness ( generalized), light-headedness and headaches  Negative for dizziness, syncope and numbness  Psychiatric/Behavioral: Positive for confusion ( patient states has been ongoing intermittently)  Negative for behavioral problems         Physical Exam  ED Triage Vitals   Temperature Pulse Respirations Blood Pressure SpO2   07/25/18 1202 07/25/18 1200 07/25/18 1200 07/25/18 1200 07/25/18 1200   98 6 °F (37 °C) 68 18 102/51 97 %      Temp Source Heart Rate Source Patient Position - Orthostatic VS BP Location FiO2 (%)   07/25/18 1620 07/25/18 1200 07/25/18 1200 07/25/18 1200 --   Oral Monitor Lying Right arm       Pain Score       07/25/18 1159       8           Orthostatic Vital Signs  Vitals:    07/25/18 1400 07/25/18 1500 07/25/18 1552 07/25/18 1620   BP: 98/51 92/54 118/57 114/70   Pulse: 70 74 74 77   Patient Position - Orthostatic VS:    Lying       Physical Exam   Constitutional: She is oriented to person, place, and time  Vital signs are normal  She appears well-developed and well-nourished  No distress  HENT:   Head: Normocephalic and atraumatic  Right Ear: External ear normal    Left Ear: External ear normal    Nose: Nose normal    Mouth/Throat: Oropharynx is clear and moist  No oropharyngeal exudate  Eyes: Conjunctivae and EOM are normal  Pupils are equal, round, and reactive to light  Right eye exhibits no discharge and no exudate  Left eye exhibits no discharge and no exudate  No scleral icterus  Neck: Normal range of motion  Neck supple  No JVD present  Muscular tenderness (Patient states chronic) present  No spinous process tenderness present  No neck rigidity  No tracheal deviation, no edema and normal range of motion present  Cardiovascular: Normal rate, regular rhythm, normal heart sounds and intact distal pulses  Exam reveals no gallop and no friction rub  No murmur heard  Pulmonary/Chest: Effort normal  No stridor  No apnea, no tachypnea and no bradypnea  No respiratory distress  She has no wheezes  She has no rales  She exhibits no tenderness  Abdominal: Soft  Bowel sounds are normal  She exhibits no distension and no mass  There is no tenderness  There is no rebound and no guarding     Genitourinary: Rectal exam shows guaiac positive stool  Musculoskeletal: Normal range of motion  She exhibits no edema, tenderness or deformity  Neurological: She is alert and oriented to person, place, and time  She has normal strength  She is not disoriented  She displays no atrophy and no tremor  No cranial nerve deficit or sensory deficit  She exhibits normal muscle tone  Skin: Skin is warm and dry  No rash noted  She is not diaphoretic  No erythema  There is pallor  Psychiatric: She has a normal mood and affect  Her speech is normal and behavior is normal  Judgment and thought content normal  She is not actively hallucinating  She is attentive         ED Medications  Medications   pantoprazole (PROTONIX) injection 40 mg (40 mg Intravenous Given 7/25/18 1541)   ALPRAZolam (XANAX) tablet 0 25 mg (not administered)   amiodarone tablet 200 mg (not administered)   amitriptyline (ELAVIL) tablet 25 mg (not administered)   aspirin chewable tablet 81 mg (not administered)   clopidogrel (PLAVIX) tablet 75 mg (not administered)   DULoxetine (CYMBALTA) delayed release capsule 30 mg (not administered)   lidocaine (LIDODERM) 5 % patch 1 patch (not administered)   melatonin tablet 6 mg (not administered)   mirtazapine (REMERON) tablet 7 5 mg (not administered)   oxyCODONE (ROXICODONE) immediate release tablet 10 mg (not administered)   ondansetron (ZOFRAN) injection 4 mg (not administered)   amiodarone tablet 200 mg (not administered)   metoclopramide (REGLAN) injection 10 mg (10 mg Intravenous Given 7/25/18 1314)   sodium chloride 0 9 % bolus 500 mL (500 mL Intravenous New Bag 7/25/18 1315)   benztropine (COGENTIN) injection 1 mg (1 mg Intramuscular Given 7/25/18 1426)       Diagnostic Studies  Results Reviewed     Procedure Component Value Units Date/Time    Hemoglobin and hematocrit, blood [[id number]]     Lab Status:  No result Specimen:  Blood     Occult blood 1-3, stool [[id number]]     Lab Status:  No result Specimen:  Stool     CBC and differential [[id number]]  (Abnormal) Collected:  07/25/18 1213    Lab Status:  Final result Specimen:  Blood from Arm, Left Updated:  07/25/18 1332     WBC 12 84 (H) Thousand/uL      RBC 1 78 (L) Million/uL      Hemoglobin 5 7 (LL) g/dL      Hematocrit 18 2 (L) %       (H) fL      MCH 32 0 pg      MCHC 31 3 (L) g/dL      RDW 17 7 (H) %      MPV 9 8 fL      Platelets 614 Thousands/uL      nRBC 1 /100 WBCs     Narrative:        No Clots  This is an appended report  These results have been appended to a previously verified report  Comprehensive metabolic panel [[id number]]  (Abnormal) Collected:  07/25/18 1213    Lab Status:  Final result Specimen:  Blood from Arm, Left Updated:  07/25/18 1241     Sodium 138 mmol/L      Potassium 4 4 mmol/L      Chloride 106 mmol/L      CO2 26 mmol/L      Anion Gap 6 mmol/L      BUN 41 (H) mg/dL      Creatinine 0 89 mg/dL      Glucose 110 mg/dL      Calcium 8 2 (L) mg/dL      AST 15 U/L      ALT 20 U/L      Alkaline Phosphatase 54 U/L      Total Protein 5 6 (L) g/dL      Albumin 2 6 (L) g/dL      Total Bilirubin 0 29 mg/dL      eGFR 63 ml/min/1 73sq m     Narrative:         National Kidney Disease Education Program recommendations are as follows:  GFR calculation is accurate only with a steady state creatinine  Chronic Kidney disease less than 60 ml/min/1 73 sq  meters  Kidney failure less than 15 ml/min/1 73 sq  meters      B-type natriuretic peptide [[id number]]  (Abnormal) Collected:  07/25/18 1213    Lab Status:  Final result Specimen:  Blood from Arm, Left Updated:  07/25/18 1241     NT-proBNP 860 (H) pg/mL     Troponin I [[id number]]  (Normal) Collected:  07/25/18 1213    Lab Status:  Final result Specimen:  Blood from Arm, Left Updated:  07/25/18 1240     Troponin I 0 02 ng/mL     Protime-INR [[id number]]  (Abnormal) Collected:  07/25/18 1213    Lab Status:  Final result Specimen:  Blood from Arm, Left Updated:  07/25/18 1236     Protime 24 2 (H) seconds      INR 2 16 (H)    APTT [[id number]] (Normal) Collected:  07/25/18 1213    Lab Status:  Final result Specimen:  Blood from Arm, Left Updated:  07/25/18 1236     PTT 31 seconds                  CT head without contrast   Final Result by Lucian Hernandez MD (07/25 1402)      No acute intracranial abnormality  Workstation performed: [id number]         X-ray chest 2 views   Final Result by Boyd Chung MD (07/25 3797)      No acute cardiopulmonary disease  Workstation performed: [id number]               Procedures  Procedures      Phone Consults  ED Phone Contact    ED Course           Identification of Seniors at Risk      Most Recent Value   (ISAR) Identification of Seniors at Risk   Before the illness or injury that brought you to the Emergency, did you need someone to help you on a regular basis? 1 Filed at: 07/25/2018 1200   In the last 24 hours, have you needed more help than usual?  1 Filed at: 07/25/2018 1200   Have you been hospitalized for one or more nights during the past 6 months? 1 Filed at: 07/25/2018 1200   In general, do you see well?  0 Filed at: 07/25/2018 1200   In general, do you have serious problems with your memory? 1 Filed at: 07/25/2018 1200   Do you take more than three different medications every day? 1 Filed at: 07/25/2018 1200   ISAR Score  5 Filed at: 07/25/2018 1200                          MDM  Number of Diagnoses or Management Options     Amount and/or Complexity of Data Reviewed  Clinical lab tests: ordered and reviewed  Tests in the radiology section of CPT®: ordered and reviewed  Tests in the medicine section of CPT®: ordered and reviewed    Risk of Complications, Morbidity, and/or Mortality  Presenting problems: high  Diagnostic procedures: high  Management options: high  General comments: Patient presenting to the ED for weakness, headache, painful/black stool a few days ago    On examination in the ED patient found to have critical lab value of a hemoglobin of 5 7 patient was 11 4   7 days ago, patient is guaiac positive on exam     Patient is a documented was Zoroastrian in the chart, and upon talking to the patient and explaining the risks and benefits of both receiving and declining blood products patient is weight-bearing but at the current time is declining receiving blood products even though we explained to her that our medical opinion would be that she should receive blood products  She understands that we are suggesting blood products due to her current medical condition(GI bleed) but is still declining treatment at this time  Spoke with patient again about her case with Dr Tootie Taylor, and that we strongly suggest that she stepped blood products due to her condition as it stands  Patient willing to accept blood at this time due to not feeling well        CritCare Time    Disposition  Final diagnoses:   Gastrointestinal hemorrhage, unspecified gastrointestinal hemorrhage type     Time reflects when diagnosis was documented in both MDM as applicable and the Disposition within this note     Time User Action Codes Description Comment    7/25/2018  2:45 PM Harshad Matias Add [K92 2] Gastrointestinal hemorrhage, unspecified gastrointestinal hemorrhage type     7/25/2018  2:48 PM Dumaswala, Unique B Add [D62] Acute blood loss anemia     7/25/2018  2:48 PM Dumaswala, Unique B Modify [D62] Acute blood loss anemia     7/25/2018  2:48 PM Dumaswala, Unique B Add [I48 91] Atrial fibrillation with RVR ([address] )     7/25/2018  2:48 PM Dumaswala, Unique B Modify [I48 91] Atrial fibrillation with RVR ([address] )     7/25/2018  2:48 PM Chrystal Clayton [I48 91] Atrial fibrillation with RVR ([address] )     7/25/2018  2:48 PM Soraida Ruby Add [I25 10] Coronary artery disease     7/25/2018  2:48 PM Dumaswala, Unique B Modify [I25 10] Coronary artery disease       ED Disposition     ED Disposition Condition Comment    Admit  Case was discussed with Dr Naveed Pollard and the patient's admission status was agreed to be Admission Status: inpatient status to the service of Dr Raul Aguilar           Follow-up Information    None         Current Discharge Medication List      START taking these medications    Details   ALPRAZolam (NIRAVAM) 0 25 MG dissolvable tablet Take 1 tablet (0 25 mg total) by mouth daily at bedtime as needed for anxiety  Qty: 30 tablet, Refills: 3    Associated Diagnoses: Other depression      amitriptyline (ELAVIL) 25 mg tablet Take 1 tablet (25 mg total) by mouth daily at bedtime  Qty: 30 tablet, Refills: 5    Associated Diagnoses: Other depression      DULoxetine (CYMBALTA) 30 mg delayed release capsule Take 1 capsule (30 mg total) by mouth daily  Qty: 30 capsule, Refills: 3    Associated Diagnoses: Other depression      furosemide (LASIX) 40 mg tablet Take 1 tablet (40 mg total) by mouth daily  Qty: 30 tablet, Refills: 3    Associated Diagnoses: Chronic systolic heart failure (HCC)      potassium chloride (K-DUR,KLOR-CON) 20 mEq tablet Take 1 tablet (20 mEq total) by mouth daily  Qty: 30 tablet, Refills: 3    Associated Diagnoses: Chronic systolic heart failure (HCC)         CONTINUE these medications which have NOT CHANGED    Details   amiodarone 200 mg tablet Take 1 tablet (200 mg total) by mouth 2 (two) times a day with meals for 8 days Then decrease to 1 tablet daily  Qty: 38 tablet, Refills: 0    Associated Diagnoses: Atrial fibrillation with RVR (HCC)      apixaban (ELIQUIS) 5 mg Take 5 mg by mouth 2 (two) times a day      aspirin (ECOTRIN LOW STRENGTH) 81 mg EC tablet Take 81 mg by mouth daily      clopidogrel (PLAVIX) 75 mg tablet Take 1 tablet (75 mg total) by mouth daily  Qty: 30 tablet, Refills: 0    Associated Diagnoses: Atrial fibrillation with RVR (HCC)      lidocaine (LIDODERM) 5 % Place 1 patch on the skin daily Remove & Discard patch within 12 hours or as directed by MD  Qty: 30 patch, Refills: 0    Associated Diagnoses: Atrial fibrillation with RVR (HCC)      lisinopril (ZESTRIL) 5 mg tablet TAKE 1 TABLET (5 MG TOTAL) BY MOUTH DAILY  Qty: 30 tablet, Refills: 0    Associated Diagnoses: Essential hypertension      melatonin 3 mg Take 2 tablets (6 mg total) by mouth daily at bedtime  Qty: 30 tablet, Refills: 0    Associated Diagnoses: Atrial fibrillation with RVR (HCC)      metoprolol succinate (TOPROL-XL) 50 mg 24 hr tablet Take 1 tablet (50 mg total) by mouth every 12 (twelve) hours  Qty: 60 tablet, Refills: 0    Associated Diagnoses: Atrial fibrillation with RVR (HCC)      mirtazapine (REMERON) 7 5 MG tablet Take 1 tablet (7 5 mg total) by mouth daily at bedtime  Qty: 30 tablet, Refills: 0    Associated Diagnoses: Atrial fibrillation with RVR (HCC)      oxyCODONE (ROXICODONE) 15 mg immediate release tablet Take 15 mg by mouth every 6 (six) hours      polyethylene glycol (MIRALAX) 17 g packet Take 17 g by mouth daily      rivaroxaban (XARELTO) 15 mg tablet Take 1 tablet (15 mg total) by mouth daily with dinner  Qty: 30 tablet, Refills: 0    Associated Diagnoses: Atrial fibrillation with RVR (HCC)           No discharge procedures on file  ED Provider  Attending physically available and evaluated Jorge Mehta I managed the patient along with the ED Attending      Electronically Signed by         Berkley Nava DO  07/25/18 3771

## 2018-07-26 ENCOUNTER — ANESTHESIA (INPATIENT)
Dept: GASTROENTEROLOGY | Facility: HOSPITAL | Age: 78
DRG: 812 | End: 2018-07-26
Payer: MEDICARE

## 2018-07-26 ENCOUNTER — ANESTHESIA EVENT (INPATIENT)
Dept: GASTROENTEROLOGY | Facility: HOSPITAL | Age: 78
DRG: 812 | End: 2018-07-26
Payer: MEDICARE

## 2018-07-26 PROBLEM — K92.2 GASTROINTESTINAL HEMORRHAGE: Status: ACTIVE | Noted: 2018-07-25

## 2018-07-26 LAB
ABO GROUP BLD BPU: NORMAL
ABO GROUP BLD BPU: NORMAL
ANION GAP SERPL CALCULATED.3IONS-SCNC: 5 MMOL/L (ref 4–13)
BASOPHILS # BLD AUTO: 0.02 THOUSANDS/ΜL (ref 0–0.1)
BASOPHILS NFR BLD AUTO: 0 % (ref 0–1)
BPU ID: NORMAL
BPU ID: NORMAL
BUN SERPL-MCNC: 29 MG/DL (ref 5–25)
CALCIUM SERPL-MCNC: 8.2 MG/DL (ref 8.3–10.1)
CHLORIDE SERPL-SCNC: 109 MMOL/L (ref 100–108)
CO2 SERPL-SCNC: 26 MMOL/L (ref 21–32)
CREAT SERPL-MCNC: 0.74 MG/DL (ref 0.6–1.3)
EOSINOPHIL # BLD AUTO: 0.27 THOUSAND/ΜL (ref 0–0.61)
EOSINOPHIL NFR BLD AUTO: 2 % (ref 0–6)
ERYTHROCYTE [DISTWIDTH] IN BLOOD BY AUTOMATED COUNT: 17.3 % (ref 11.6–15.1)
ERYTHROCYTE [DISTWIDTH] IN BLOOD BY AUTOMATED COUNT: 17.5 % (ref 11.6–15.1)
GFR SERPL CREATININE-BSD FRML MDRD: 78 ML/MIN/1.73SQ M
GLUCOSE SERPL-MCNC: 119 MG/DL (ref 65–140)
GLUCOSE SERPL-MCNC: 73 MG/DL (ref 65–140)
HCT VFR BLD AUTO: 22.7 % (ref 34.8–46.1)
HCT VFR BLD AUTO: 23.9 % (ref 34.8–46.1)
HCT VFR BLD AUTO: 24.1 % (ref 34.8–46.1)
HGB BLD-MCNC: 7.4 G/DL (ref 11.5–15.4)
HGB BLD-MCNC: 7.6 G/DL (ref 11.5–15.4)
HGB BLD-MCNC: 7.6 G/DL (ref 11.5–15.4)
IMM GRANULOCYTES # BLD AUTO: 0.2 THOUSAND/UL (ref 0–0.2)
IMM GRANULOCYTES NFR BLD AUTO: 2 % (ref 0–2)
INR PPP: 1.35 (ref 0.86–1.17)
LYMPHOCYTES # BLD AUTO: 1.89 THOUSANDS/ΜL (ref 0.6–4.47)
LYMPHOCYTES NFR BLD AUTO: 16 % (ref 14–44)
MAGNESIUM SERPL-MCNC: 2.5 MG/DL (ref 1.6–2.6)
MCH RBC QN AUTO: 30.3 PG (ref 26.8–34.3)
MCH RBC QN AUTO: 31.2 PG (ref 26.8–34.3)
MCHC RBC AUTO-ENTMCNC: 31.5 G/DL (ref 31.4–37.4)
MCHC RBC AUTO-ENTMCNC: 32.6 G/DL (ref 31.4–37.4)
MCV RBC AUTO: 96 FL (ref 82–98)
MCV RBC AUTO: 96 FL (ref 82–98)
MONOCYTES # BLD AUTO: 0.88 THOUSAND/ΜL (ref 0.17–1.22)
MONOCYTES NFR BLD AUTO: 7 % (ref 4–12)
NEUTROPHILS # BLD AUTO: 8.67 THOUSANDS/ΜL (ref 1.85–7.62)
NEUTS SEG NFR BLD AUTO: 73 % (ref 43–75)
NRBC BLD AUTO-RTO: 1 /100 WBCS
PLATELET # BLD AUTO: 256 THOUSANDS/UL (ref 149–390)
PLATELET # BLD AUTO: 267 THOUSANDS/UL (ref 149–390)
PMV BLD AUTO: 9.6 FL (ref 8.9–12.7)
PMV BLD AUTO: 9.7 FL (ref 8.9–12.7)
POTASSIUM SERPL-SCNC: 4.2 MMOL/L (ref 3.5–5.3)
PROTHROMBIN TIME: 16.8 SECONDS (ref 11.8–14.2)
RBC # BLD AUTO: 2.37 MILLION/UL (ref 3.81–5.12)
RBC # BLD AUTO: 2.51 MILLION/UL (ref 3.81–5.12)
SODIUM SERPL-SCNC: 140 MMOL/L (ref 136–145)
UNIT DISPENSE STATUS: NORMAL
UNIT DISPENSE STATUS: NORMAL
UNIT PRODUCT CODE: NORMAL
UNIT PRODUCT CODE: NORMAL
UNIT RH: NORMAL
UNIT RH: NORMAL
WBC # BLD AUTO: 11.19 THOUSAND/UL (ref 4.31–10.16)
WBC # BLD AUTO: 11.93 THOUSAND/UL (ref 4.31–10.16)

## 2018-07-26 PROCEDURE — 85610 PROTHROMBIN TIME: CPT | Performed by: PHYSICIAN ASSISTANT

## 2018-07-26 PROCEDURE — C9113 INJ PANTOPRAZOLE SODIUM, VIA: HCPCS | Performed by: PHYSICIAN ASSISTANT

## 2018-07-26 PROCEDURE — 0DJ08ZZ INSPECTION OF UPPER INTESTINAL TRACT, VIA NATURAL OR ARTIFICIAL OPENING ENDOSCOPIC: ICD-10-PCS | Performed by: INTERNAL MEDICINE

## 2018-07-26 PROCEDURE — 80048 BASIC METABOLIC PNL TOTAL CA: CPT | Performed by: HOSPITALIST

## 2018-07-26 PROCEDURE — 99232 SBSQ HOSP IP/OBS MODERATE 35: CPT | Performed by: INTERNAL MEDICINE

## 2018-07-26 PROCEDURE — 82948 REAGENT STRIP/BLOOD GLUCOSE: CPT

## 2018-07-26 PROCEDURE — 43235 EGD DIAGNOSTIC BRUSH WASH: CPT | Performed by: INTERNAL MEDICINE

## 2018-07-26 PROCEDURE — 83735 ASSAY OF MAGNESIUM: CPT | Performed by: NURSE PRACTITIONER

## 2018-07-26 PROCEDURE — 85014 HEMATOCRIT: CPT | Performed by: INTERNAL MEDICINE

## 2018-07-26 PROCEDURE — 99222 1ST HOSP IP/OBS MODERATE 55: CPT | Performed by: INTERNAL MEDICINE

## 2018-07-26 PROCEDURE — 85027 COMPLETE CBC AUTOMATED: CPT | Performed by: HOSPITALIST

## 2018-07-26 PROCEDURE — 85018 HEMOGLOBIN: CPT | Performed by: INTERNAL MEDICINE

## 2018-07-26 PROCEDURE — 85025 COMPLETE CBC W/AUTO DIFF WBC: CPT | Performed by: HOSPITALIST

## 2018-07-26 RX ORDER — PROPOFOL 10 MG/ML
INJECTION, EMULSION INTRAVENOUS AS NEEDED
Status: DISCONTINUED | OUTPATIENT
Start: 2018-07-26 | End: 2018-07-26 | Stop reason: SURG

## 2018-07-26 RX ORDER — ONDANSETRON 2 MG/ML
4 INJECTION INTRAMUSCULAR; INTRAVENOUS ONCE AS NEEDED
Status: COMPLETED | OUTPATIENT
Start: 2018-07-26 | End: 2018-07-27

## 2018-07-26 RX ORDER — EPHEDRINE SULFATE 50 MG/ML
INJECTION, SOLUTION INTRAVENOUS AS NEEDED
Status: DISCONTINUED | OUTPATIENT
Start: 2018-07-26 | End: 2018-07-26 | Stop reason: SURG

## 2018-07-26 RX ORDER — ALBUTEROL SULFATE 2.5 MG/3ML
2.5 SOLUTION RESPIRATORY (INHALATION) ONCE AS NEEDED
Status: DISCONTINUED | OUTPATIENT
Start: 2018-07-26 | End: 2018-07-26

## 2018-07-26 RX ORDER — MEPERIDINE HYDROCHLORIDE 25 MG/ML
12.5 INJECTION INTRAMUSCULAR; INTRAVENOUS; SUBCUTANEOUS AS NEEDED
Status: DISCONTINUED | OUTPATIENT
Start: 2018-07-26 | End: 2018-07-27 | Stop reason: HOSPADM

## 2018-07-26 RX ORDER — SODIUM CHLORIDE 9 MG/ML
INJECTION, SOLUTION INTRAVENOUS CONTINUOUS PRN
Status: DISCONTINUED | OUTPATIENT
Start: 2018-07-26 | End: 2018-07-26 | Stop reason: SURG

## 2018-07-26 RX ORDER — PANTOPRAZOLE SODIUM 40 MG/1
40 TABLET, DELAYED RELEASE ORAL
Status: DISCONTINUED | OUTPATIENT
Start: 2018-07-27 | End: 2018-08-06 | Stop reason: HOSPADM

## 2018-07-26 RX ORDER — LIDOCAINE HYDROCHLORIDE 10 MG/ML
INJECTION, SOLUTION INFILTRATION; PERINEURAL AS NEEDED
Status: DISCONTINUED | OUTPATIENT
Start: 2018-07-26 | End: 2018-07-26 | Stop reason: SURG

## 2018-07-26 RX ADMIN — DULOXETINE HYDROCHLORIDE 30 MG: 30 CAPSULE, DELAYED RELEASE ORAL at 08:16

## 2018-07-26 RX ADMIN — POLYETHYLENE GLYCOL 3350, SODIUM SULFATE ANHYDROUS, SODIUM BICARBONATE, SODIUM CHLORIDE, POTASSIUM CHLORIDE 4000 ML: 236; 22.74; 6.74; 5.86; 2.97 POWDER, FOR SOLUTION ORAL at 16:59

## 2018-07-26 RX ADMIN — PROPOFOL 30 MG: 10 INJECTION, EMULSION INTRAVENOUS at 13:34

## 2018-07-26 RX ADMIN — OXYCODONE HYDROCHLORIDE 10 MG: 10 TABLET ORAL at 00:18

## 2018-07-26 RX ADMIN — AMIODARONE HYDROCHLORIDE 200 MG: 200 TABLET ORAL at 16:59

## 2018-07-26 RX ADMIN — CLOPIDOGREL BISULFATE 75 MG: 75 TABLET, FILM COATED ORAL at 08:16

## 2018-07-26 RX ADMIN — ASPIRIN 81 MG 81 MG: 81 TABLET ORAL at 08:16

## 2018-07-26 RX ADMIN — OXYCODONE HYDROCHLORIDE 10 MG: 10 TABLET ORAL at 11:57

## 2018-07-26 RX ADMIN — PANTOPRAZOLE SODIUM 40 MG: 40 INJECTION, POWDER, FOR SOLUTION INTRAVENOUS at 08:15

## 2018-07-26 RX ADMIN — MIRTAZAPINE 7.5 MG: 15 TABLET, FILM COATED ORAL at 21:09

## 2018-07-26 RX ADMIN — LIDOCAINE 1 PATCH: 50 PATCH CUTANEOUS at 08:15

## 2018-07-26 RX ADMIN — AMITRIPTYLINE HYDROCHLORIDE 25 MG: 25 TABLET, FILM COATED ORAL at 21:09

## 2018-07-26 RX ADMIN — EPHEDRINE SULFATE 10 MG: 50 INJECTION, SOLUTION INTRAMUSCULAR; INTRAVENOUS; SUBCUTANEOUS at 13:28

## 2018-07-26 RX ADMIN — AMIODARONE HYDROCHLORIDE 200 MG: 200 TABLET ORAL at 08:16

## 2018-07-26 RX ADMIN — PROPOFOL 100 MG: 10 INJECTION, EMULSION INTRAVENOUS at 13:22

## 2018-07-26 RX ADMIN — MELATONIN 6 MG: at 21:09

## 2018-07-26 RX ADMIN — OXYCODONE HYDROCHLORIDE 10 MG: 10 TABLET ORAL at 17:00

## 2018-07-26 RX ADMIN — OXYCODONE HYDROCHLORIDE 10 MG: 10 TABLET ORAL at 05:41

## 2018-07-26 RX ADMIN — LIDOCAINE HYDROCHLORIDE 50 MG: 10 INJECTION, SOLUTION INFILTRATION; PERINEURAL at 13:22

## 2018-07-26 RX ADMIN — PROPOFOL 50 MG: 10 INJECTION, EMULSION INTRAVENOUS at 13:29

## 2018-07-26 RX ADMIN — SODIUM CHLORIDE: 0.9 INJECTION, SOLUTION INTRAVENOUS at 13:06

## 2018-07-26 RX ADMIN — PROPOFOL 50 MG: 10 INJECTION, EMULSION INTRAVENOUS at 13:32

## 2018-07-26 RX ADMIN — PROPOFOL 50 MG: 10 INJECTION, EMULSION INTRAVENOUS at 13:25

## 2018-07-26 NOTE — CASE MANAGEMENT
Initial Clinical Review    Admission: Date/Time/Statement: 7/25/18 @ 1450     Orders Placed This Encounter   Procedures    Inpatient Admission (expected length of stay for this patient is greater than two midnights)     Standing Status:   Standing     Number of Occurrences:   1     Order Specific Question:   Admitting Physician     Answer:   Amairani Ross [[id number]]     Order Specific Question:   Level of Care     Answer:   Level 2 Stepdown / HOT [14]     Order Specific Question:   Estimated length of stay     Answer:   More than 2 Midnights     Order Specific Question:   Certification     Answer:   I certify that inpatient services are medically necessary for this patient for a duration of greater than two midnights  See H&P and MD Progress Notes for additional information about the patient's course of treatment  ED: Date/Time/Mode of Arrival:   ED Arrival Information     Expected Arrival Acuity Means of Arrival Escorted By Service Admission Type    - 7/25/2018 11:53 Emergent Ambulance [address] [facility] Emergency    Arrival Complaint    sob        Chief Complaint:   Chief Complaint   Patient presents with    Weakness - Generalized     pt with weakness, shortness of breath and some periods of confusion from home has a history of a recent ablation at [facility]  History of Illness: Adeel Montiel is a 69 y o  female who presents with and dizziness/lightheadedness, worsening fatigue and inability to walk around  Patient was just admitted from 07/07/2018 to 07/20/2018 at which time she presented with shortness of breath and palpitations are found to be in AFib RVR  At that time she was on seen by Cardiology, failed TIFFANIE cardioversion, hence was seen by electrophysiology - and given her low EF with AFib RVR and recurrent CHF, she underwent ablation    She was also found to have monomorphic ventricular tachycardia during that admission, underwent cardiac catheterization followed by drug-eluting stent placed to RCA on 07/12/2018  She was recommended to be discharged to rehab but patient denied that and she is feeling better hence she was discharged home with VNA  Patient states she was feeling very well for 2 days, after which she started feeling worse, get really getting weaker dizzy year, he felt like she was going to fall down even while using her walker hence she did not get out of her bed since last night  She called her sister this morning and ended up coming to the ER via ambulance  Patient denies any chest pain, had some palpitations/heartbeat like feeling occasionally, had some shortness of breath  Denies any abdominal pain, had some vomiting couple of days ago but it was not black or bloody  She had an episode of black stool during her last hospitalization but none at home  Denies any maame red blood in the stools    Denies any history of GI bleeding in the past   Never had an endoscopy done in the past    ED Vital Signs:   ED Triage Vitals   Temperature Pulse Respirations Blood Pressure SpO2   07/25/18 1202 07/25/18 1200 07/25/18 1200 07/25/18 1200 07/25/18 1200   98 6 °F (37 °C) 68 18 102/51 97 %      Temp Source Heart Rate Source Patient Position - Orthostatic VS BP Location FiO2 (%)   07/25/18 1620 07/25/18 1200 07/25/18 1200 07/25/18 1200 07/26/18 1401   Oral Monitor Lying Right arm 96      Pain Score       07/25/18 1159       8        Wt Readings from Last 1 Encounters:   07/26/18 68 4 kg (150 lb 12 7 oz)     Vital Signs (abnormal):   07/26/18 0715   97 4 °F (36 3 °C)  69  18  103/52  94 %  None (Room air)  Lying   07/25/18 1930  99 3 °F (37 4 °C)  80  16   110/40  92 %  None (Room air)  Lying   07/25/18 1844  98 5 °F (36 9 °C)  76  16   92/43  92 %  None (Room air)  Lying   07/25/18 1253  --  69  22   98/48  97 %  None (Room air)  --     Abnormal Labs:  Cl 109  BUN 41-29  Lukas 8 2  Total protein 5 6  Alb 2 6    PT/INR 24 2/2 16, 16 8/1 35    TRANSFUSED WITH 2 U PRBCs  7/25 7/25 7/26    WBC 12 84     11 93 11 19   RBC 1 78  2 37 2 51   Hemoglobin 5 7 6 1 7 4 7 6   Hematocrit 18 2 20 1 22 7 24 1     96 96   MCHC 31 3  32 6 31 5   RDW 17 7  17 3 17 5     Diagnostic Test Results:     7/25 EKG - Normal sinus rhythm  Prolonged QT  Abnormal ECG  When compared with ECG of 16-JUL-2018 17:18,  QT has lengthened    ED Treatment:   Medication Administration from 07/25/2018 1153 to 07/25/2018 1611    Date/Time Order Dose Route Action   07/25/2018 1314 metoclopramide (REGLAN) injection 10 mg 10 mg Intravenous Given   07/25/2018 1315 sodium chloride 0 9 % bolus 500 mL 500 mL Intravenous New Bag   07/25/2018 1426 benztropine (COGENTIN) injection 1 mg 1 mg Intramuscular Given   07/25/2018 1541 pantoprazole (PROTONIX) injection 40 mg 40 mg Intravenous Given        Past Medical/Surgical History:    Active Ambulatory Problems     Diagnosis Date Noted    Chronic systolic heart failure ([facility] ) 05/14/2018    Elevated liver enzymes 05/14/2018    Elevated troponin 05/14/2018    Atrial fibrillation with RVR ([facility] ) 05/14/2018    Chronic anticoagulation 05/14/2018    Fall 05/14/2018    Biliary stent obstruction, initial encounter 05/13/2018    Dysthymic disorder 05/18/2018    Weakness/physical deconditioning 05/22/2018    Recent history of Cholangitis due to bile duct calculus with obstruction 05/23/2018    Acute respiratory insufficiency 05/05/2018    Brain aneurysm 09/04/2016    Cardiac arrhythmia 11/16/2012    Carpal tunnel syndrome 07/26/2013    Acute on chronic systolic congestive heart failure ([facility] ) 06/24/2016    Chronic pain disorder 09/07/2011    Disc disorder of cervical region 11/16/2012    Disorder of bone and cartilage 09/07/2011    Essential hypertension 05/23/2011    Gout 02/12/2013    Hospital-acquired pneumonia 01/27/2014    Hyperlipidemia 11/16/2012    Insomnia 11/16/2012    Mitral regurgitation 09/04/2016    Opioid dependence ([facility] ) 05/05/2018    Osteoarthritis 11/16/2012    Acute pancreatitis 01/27/2014    Small vessel disease 09/04/2016    Tachycardia induced cardiomyopathy [facility] ) 05/05/2018    Dyspnea on exertion 07/07/2018    Polypharmacy 07/10/2018    Memory loss 07/10/2018    Impaired mobility and ADLs 07/10/2018    Ambulatory dysfunction 07/10/2018    Monomorphic ventricular tachycardia (HCC) 07/11/2018    Prolonged Q-T interval on ECG 07/13/2018    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[facility] ) 07/17/2018     Resolved Ambulatory Problems     Diagnosis Date Noted    Severe sepsis ([facility] ) 05/14/2018    Abnormal CT of the abdomen 05/14/2018    Decreased vision 05/15/2018    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)     Acute respiratory disease     CHF (congestive heart failure) (HCC)     Chronic pain     Heart failure (HCC)     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[facility] )     Hypertension     Narcotic dependence ([facility] )      Admitting Diagnosis: Coronary artery disease [I25 10]  Acute blood loss anemia [D62]  SOB (shortness of breath) [R06 02]  Atrial fibrillation with RVR (HCC) [I48 91]  Gastrointestinal hemorrhage, unspecified gastrointestinal hemorrhage type [K92 2]    Age/Sex: 69 y o  female    Assessment/Plan:   * Acute blood loss anemia   Assessment & Plan     · Hb 5 7 (was 11 a week ago)  · 2 unit PRBC ordered & is pending, repeat H&H q 6 hour  · Monitor for signs of active bleed  · Level 2 SD  · Hold xarelto/eliquis (unclear which one patient was taking or both as her discharge med list has both of them)  · Will have to continue aspirin & plavix with recent NGOZI to RCA          Coronary artery disease   Assessment & Plan     · Patient with shortness of breath last admission, found to be in CHF with AFib RVR and also 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  · Hence she underwent cardiac catheterization followed by NGOZI to RCA - hence will have to continue aspirin and Plavix  · Cardiology to be consulted given recent stent placement and the new bleed and anemia  · Per the last EP note before discharge seems like they recommended combination of plavix & xarelto to avoid bleeding risk with triple therapy - will leave it upto to cardio to decide          GI bleed   Assessment & Plan     · Stool guaic positive in ED  · GI consulted, PPI IV BID, keep NPO          H/O cholangitis   Assessment & Plan     · Patient recently admitted in May 2018 with sepsis suspected secondary to cholangitis, underwent ERCP with stone removal and biliary stent placement  · Patient was supposed to follow up with GI in 6 weeks but she did not hear from them  She is due for her stent removal as well  · Follow up GI recommendations          Atrial fibrillation with RVR (HCC)   Assessment & Plan     · Will hold metoprolol XL 50 mg at present with low BP, monitor HR which is stable  · Hold AC with bleed          Chronic systolic heart failure (HCC)   Assessment & Plan     · Suspect secondary ischemic cardiomyopathy  · LVEF of 35%  · Evaluated by EP last admission  · Will hold Lasix 40 mg daily at present due to low blood pressure and anemia    Monitor volume status while getting PRBC  · Also holding potassium supplements with the Lasix  · Currently appears euvolemic        Admission Orders:  Scheduled Meds:   Current Facility-Administered Medications:  ALPRAZolam 0 25 mg Oral HS PRN   amiodarone 200 mg Oral BID With Meals   [START ON 7/30/2018] amiodarone 200 mg Oral Daily With Breakfast   amitriptyline 25 mg Oral HS   aspirin 81 mg Oral Daily   clopidogrel 75 mg Oral Daily   DULoxetine 30 mg Oral Daily   lidocaine 1 patch Transdermal Daily   melatonin 6 mg Oral HS   meperidine 12 5 mg Intravenous PRN   mirtazapine 7 5 mg Oral HS   ondansetron 4 mg Intravenous Q6H PRN   ondansetron 4 mg Intravenous Once PRN   oxyCODONE 10 mg Oral Q6H   [START ON 7/27/2018] pantoprazole 40 mg Oral Early Morning   polyethylene glycol 4,000 mL Oral Once     Continuous Infusions:  0 9% NSS continuous until stop 7/26 @ 1337    PRN Meds:   ALPRAZolam    meperidine    ondansetron    ondansetron    Tele   SCDs  POC glucose ac/hs   Daily wt  Transfusion protocol   H/H q 12 hr   Diet regular, then changed to NPO then cl liq, now NPO w/ sips   Cons GI   Cons Cardiology   ______________________________  7/25 GI Consult   1  Acute blood loss anemia: Baseline Hgb around 11, 11 4 on 7/17 decreased to 5 7 today on admission  She admits to SOB and fatigue  She had recent cardiac cath with stent placement on 7/12 and ablation on 7/16 for chronic a-fib  She was discharged on plavix and xarelto? She denies NSAID use and denies melena or hematochezia  No stool in rectal vault on exam, per ED heme occult +  BUN is elevated to 41 and was 17 on last admission  This could be secondary to PUD, gastric or small bowel AVMs  With recent stent placement she is unlikely able to hold A/C, plan for diagnostic EGD tomorrow for further evaluation  INR 2 16  -agree with transfusion 2 units   -monitor Hgb and VS  -Transfuse as necessary  -start IV PPI BID  -clear liquid diet  -NPO after midnight   -diagnostic EGD tomorrow 7/26     2  Biliary Stent: recent admission 5/2018 with cholangitis secondary to stent occlusion and choledocholithiasis from previous stent placement in 2014 for biliary dilation   She was recommended to have repeat ERCP for stent removal in 4-6 weeks  -outpatient follow up  -repeat ERCP as outpatient once able to hold a/c  -monitor LFTs  ____________________  7/26 Cardiology Consult   Acute blood loss anemia  -GI has been consulted closely following; planned EGD today; PT is currently NPO  -continue to hold Xarelto for now pending GI evaluation  -continue DAPT with aspirin and Plavix in the setting of recent NGOZI to RCA on 07/12/2018  -received 2 units of PRBC's yesterday for hemoglobin 5 7; hemoglobin 7 6 this a m   -transfuse for hemoglobin less than 7 0; hemoglobin/hematocrit checks q 6 hours     History of atrial fibrillation with RVR  -currently in normal sinus rhythm  -recommend holding amiodarone given prolonged QTC interval  -Toprol XL currently on hold in the setting of hypotension this admission; recommend to start at low-dose as BP allows  -replete electrolytes as needed; keep K + plus greater than 4, Mag greater than 2, calcium greater than 7     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 on 07/16/2018  -currently in normal sinus rhythm; no ectopy/arrhythmias noted on telemetry  -recommend holding amiodarone given prolonged QTC interval  -obtain 12 lead EKG in the a m      CAD status post drug-eluting stent to the mid RCA on 07/12/2018  -currently stable; denies chest pain; no ST segment changes on 12 lead EKG; troponins x2 negative  -continue DAPT with aspirin and Plavix      Chronic systolic congestive heart failure LV EF 35%  -appears euvolemic on exam  -chest x-ray with out acute cardiopulmonary disease  -home furosemide currently on hold setting of recent acute blood loss anemia and hypotension  -vigilant assessment of volume status this admission; patient may need IV Lasix status post future blood transfusions  -continue to assess strict I&O  -perform daily standing weights  ____________________  7/26 EGD Procedure Note  IMPRESSIONS: Mild esophagitis seen  Mild gastritis suspicious for mild   GAVE  Normal 1st portion of the duodenum and 2nd portion of the duodenum  Pigtail biliary stent visualized extending out of ampula  RECOMMENDATIONS: Return to floor  Can change to oral PPI once daily  Clear liquid diet  Plan for colonoscopy tomorrow    Elective ERCP with   stent removal and duct clearnace

## 2018-07-26 NOTE — SOCIAL WORK
Met with pt and explained Cm role  Pt lives alone in a 2 story house with 2 CASSIA and 1st floor setup  Pt was independent PTA and does not drive  Pt's family provides her with transportation  Pt's PCP is Dr Shubham Gregorio  Pt has a cane, RW, and shower chair at home  Pt is open to  [facility]NA for RN and PT  No hx of rehab  No hx of mental health issues and drug or alcohol use  Pt has a prescription plan and uses [facility]  Primary contact is pt's sister Meagan Tong, contact # [phone]  Pt's sister will provide pt with transportation home upon discharge  Pt does not have a POA  Discussed inpt rehab with pt and [facility]  She stated that she would like to discuss inpt rehab with her sister prior to fully committing to this option  Pt is a readmission and she stated that the RN from [address] [place] [address] instructed pt to be evaluated in the ER

## 2018-07-26 NOTE — PROGRESS NOTES
Progress Note - Tomeka Chanel [birth year], 69 y o  female MRN: [id number]    Unit/Bed#: ENDO POOL Encounter: [id number]    Primary Care Provider: Andria Nichols DO   Date and time admitted to hospital: 7/25/2018 11:53 AM        GI bleed   Assessment & Plan    Likely GI bleed for EGD today  Continue pantoprazole, monitor hemoglobin        * Acute blood loss anemia   Assessment & Plan    Likely due to GI blood loss status post transfusion  Monitor hemoglobin closely  GI bleed likely precipitated due to anticoagulation as well as being on Plavix and aspirin, oral anticoagulation on hold        Atrial fibrillation with RVR (HCC)   Assessment & Plan    Monitor heart rate, anticoagulation on hold  Cardiology consulted          Chronic systolic heart failure (HCC)   Assessment & Plan    · EF 35%  ·  Diuretics on hold, monitor patient clinically  · Cardiology consulted        Opioid dependence ([facility] )   Assessment & Plan    On oxycodone monitor            VTE Pharmacologic Prophylaxis:   Pharmacologic: Severe anemia with GI bleed  Mechanical VTE Prophylaxis in Place: Yes    Patient Centered Rounds: I have performed bedside rounds with nursing staff today  Discussions with Specialists or Other Care Team Provider:     Education and Discussions with Family / Patient:  Discussed with the patient, called and updated sister Dexter Muñoz over phone    Time Spent for Care: 30 minutes  More than 50% of total time spent on counseling and coordination of care as described above      Current Length of Stay: 1 day(s)    Current Patient Status: Inpatient   Certification Statement: The patient will continue to require additional inpatient hospital stay due to As mentioned    Discharge Plan:  GI bleed severe anemia requiring further evaluation and os copy close monitoring    Code Status: Level 3 - DNAR and DNI      Subjective:     Comfortably lying in bed  History chart labs medications reviewed    Objective:     Vitals:   Temp (24hrs), Av 4 °F (36 9 °C), Min:97 4 °F (36 3 °C), Max:99 3 °F (37 4 °C)    HR:  [62-80] 67  Resp:  [16-19] 18  BP: ()/(40-70) 122/70  SpO2:  [92 %-98 %] 97 %  Body mass index is 25 09 kg/m²  Input and Output Summary (last 24 hours): Intake/Output Summary (Last 24 hours) at 18 1333  Last data filed at 18 1103   Gross per 24 hour   Intake              820 ml   Output             1700 ml   Net             -880 ml       Physical Exam:     Physical Exam    Comfortably lying in bed  Neck supple  Lungs diminished breath sounds bilaterally  Heart sounds S1-S2 noted  Abdomen soft  Awake obeys simple commands  No rash  Pulses noted    Additional Data:     Labs:      Results from last 7 days  Lab Units 18  0542 18  0108   WBC Thousand/uL 11 19* 11 93*   HEMOGLOBIN g/dL 7 6* 7 4*   HEMATOCRIT % 24 1* 22 7*   PLATELETS Thousands/uL 267 256   NEUTROS PCT %  --  73   LYMPHS PCT %  --  16   MONOS PCT %  --  7   EOS PCT %  --  2       Results from last 7 days  Lab Units 18  0542 18  1213   SODIUM mmol/L 140 138   POTASSIUM mmol/L 4 2 4 4   CHLORIDE mmol/L 109* 106   CO2 mmol/L 26 26   BUN mg/dL 29* 41*   CREATININE mg/dL 0 74 0 89   CALCIUM mg/dL 8 2* 8 2*   TOTAL PROTEIN g/dL  --  5 6*   BILIRUBIN TOTAL mg/dL  --  0 29   ALK PHOS U/L  --  54   ALT U/L  --  20   AST U/L  --  15   GLUCOSE RANDOM mg/dL 73 110       Results from last 7 days  Lab Units 18  0542   INR  1 35*                 * I Have Reviewed All Lab Data Listed Above  * Additional Pertinent Lab Tests Reviewed:  Stu 66 Admission Reviewed    Imaging:    Imaging Reports Reviewed Today Include:  Imaging studies reviewed  Imaging Personally Reviewed by Myself Includes:     Recent Cultures (last 7 days):           Last 24 Hours Medication List:     Current Facility-Administered Medications:  ALPRAZolam 0 25 mg Oral HS PRN Randi Linn MD   amiodarone 200 mg Oral BID With Meals Unique Celaya MD   [START ON 7/30/2018] amiodarone 200 mg Oral Daily With Breakfast Wayne Harkins MD   amitriptyline 25 mg Oral HS Wayne Harkins MD   aspirin 81 mg Oral Daily Wayne Harkins MD   clopidogrel 75 mg Oral Daily Wayne Harkins MD   DULoxetine 30 mg Oral Daily Wayne Harkins MD   lidocaine 1 patch Transdermal Daily Wayne Harkins MD   melatonin 6 mg Oral HS Wayne Harkins MD   mirtazapine 7 5 mg Oral HS Unique Celaya MD   ondansetron 4 mg Intravenous Q6H PRN Unique Celaya MD   oxyCODONE 10 mg Oral Q6H Unique Celaya MD   pantoprazole 40 mg Intravenous Q12H [address] Jessica Rubio PA-C     Facility-Administered Medications Ordered in Other Encounters:  ePHEDrine   PRN Jordi Thomas CRNA   lidocaine   PRN Jordi Thomas CRNA   propofol  Intravenous PRN Jordi Thomas CRNA        Today, Patient Was Seen By: Fabián East MD    ** Please Note: Dictation voice to text software may have been used in the creation of this document   **

## 2018-07-26 NOTE — ANESTHESIA POSTPROCEDURE EVALUATION
Post-Op Assessment Note      CV Status:  Stable    Mental Status:  Alert and awake    Hydration Status:  Euvolemic    PONV Controlled:  Controlled    Airway Patency:  Patent    Post Op Vitals Reviewed: Yes          Staff: CRNA           BP   101/54   Temp      Pulse  72   Resp 18   SpO2   98

## 2018-07-26 NOTE — ASSESSMENT & PLAN NOTE
Likely due to GI blood loss status post transfusion  Monitor hemoglobin closely  GI bleed likely precipitated due to anticoagulation as well as being on Plavix and aspirin, oral anticoagulation on hold

## 2018-07-26 NOTE — OP NOTE
**** GI/ENDOSCOPY REPORT ****     PATIENT NAME: LISA FERGUSON - VISIT ID:  Patient ID: [id number] YOB: 1940     INTRODUCTION: Esophagogastroduodenoscopy - A 69 female patient presents   for an inpatient Esophagogastroduodenoscopy at [facility]  INDICATIONS: Low hematocrit anemia  CONSENT: The benefits, risks, and alternatives to the procedure were   discussed and informed consent was obtained from the patient  PREPARATION:  EKG, pulse, pulse oximetry and blood pressure were monitored   throughout the procedure  MEDICATIONS: See anesthesia report  PROCEDURE:  The endoscope was passed without difficulty through the mouth   under direct visualization and advanced to the 2nd portion of the   duodenum  The scope was withdrawn and the mucosa was carefully examined  FINDINGS:   Esophagus: Mild esophagitis was found in the esophagus  It was   not bleeding  Stomach: Mild gastritis suspicious  for mild GAVE  Otherwise normal appearance including on retroflexed views  Duodenum: The   1st portion of the duodenum and 2nd portion of the duodenum appeared to be   normal  Pig tail biliary stent noted  COMPLICATIONS: There were no complications  IMPRESSIONS: Mild esophagitis seen  Mild gastritis suspicious for mild   GAVE  Normal 1st portion of the duodenum and 2nd portion of the duodenum  Pigtail biliary stent visualized extending out of ampula  RECOMMENDATIONS: Return to floor  Can change to oral PPI once daily  Clear liquid diet  Plan for colonoscopy tomorrow    Elective ERCP with   stent removal and duct clearnace     ESTIMATED BLOOD LOSS:     PATHOLOGY SPECIMENS:     PROCEDURE CODES:     ICD-9 Codes: 280 0 Iron deficiency anemia secondary to blood loss   (chronic) 530 10 Esophagitis, unspecified     ICD-10 Codes: D64 9 Anemia, unspecified K20 9 Esophagitis, unspecified     PERFORMED BY: LOPEZ Shepherd Dr  LOPEZ Garay  (GI   Fellow)on 07/26/2018  Version 1, electronically signed by LOPEZ Downing  on 07/26/2018   at 15:26

## 2018-07-26 NOTE — ANESTHESIA PREPROCEDURE EVALUATION
Review of Systems/Medical History  Patient summary reviewed        Cardiovascular  Hyperlipidemia, Hypertension , CAD , Dysrhythmias , atrial fibrillation, CHF ,   Comment:    Study date:       Patient: Ana Cristina Knight  MR number: [id number]  Account number: [de-identified]  : 1940  Age: 69 years  Gender: Female  Status: Inpatient  Location: Bedside  Height: 65 in  Weight: 178 6 lb  BP: 117/ 81 mmHg     Indications: Assess left ventricular function      Diagnoses: I24 9 - Acute ischemic heart disease, unspecified     Sonographer:  Shanice Valerio RDCS  Primary Physician:  Jessica Munoz DO  Referring Physician:  Gabby Dias MD  Group:  [id number]  Cardiology Fellow:  Neetu Quigley MD  Interpreting Physician:  June Erwin DO     SUMMARY     LEFT VENTRICLE:  Systolic function was mildly reduced  Ejection fraction was estimated to be 50 %  There was severe hypokinesis of the basal-mid inferior and basal-mid inferolateral wall(s)  The changes were consistent with concentric remodeling (increased wall thickness with normal wall mass)  Doppler parameters were consistent with elevated mean left atrial filling pressure      RIGHT VENTRICLE:  The ventricle was mildly dilated  Systolic function was normal   Wall thickness was increased      LEFT ATRIUM:  The atrium was mildly dilated      RIGHT ATRIUM:  The atrium was moderately dilated      MITRAL VALVE:  There was moderate annular calcification  There was mild regurgitation      TRICUSPID VALVE:  There was mild to moderate regurgitation  Estimated peak PA pressure was 55 mmHg  The findings suggest moderate pulmonary hypertension      IVC, HEPATIC VEINS:  The inferior vena cava was mildly dilated  Respirophasic changes were blunted (less than 50% variation)      HISTORY: PRIOR HISTORY: systolic heart failure, afib, sepsis, chronic anticoagulation     PROCEDURE: The procedure was performed at the bedside   This was a routine study  The transthoracic approach was used  The study included complete 2D imaging, M-mode, complete spectral Doppler, and color Doppler  The heart rate was 87 bpm,  at the start of the study  Images were obtained from the parasternal, apical, subcostal, and suprasternal notch acoustic windows  Image quality was adequate      LEFT VENTRICLE: Size was normal  Systolic function was mildly reduced  Ejection fraction was estimated to be 50 %  There was severe hypokinesis of the basal-mid inferior and basal-mid inferolateral wall(s)  Wall thickness was mildly  increased  The changes were consistent with concentric remodeling (increased wall thickness with normal wall mass)  DOPPLER: Transmitral flow pattern: atrial fibrillation  Doppler parameters were consistent with elevated mean left atrial  filling pressure      RIGHT VENTRICLE: The ventricle was mildly dilated  Systolic function was normal  Wall thickness was increased      LEFT ATRIUM: The atrium was mildly dilated      RIGHT ATRIUM: The atrium was moderately dilated      MITRAL VALVE: There was moderate annular calcification  Valve structure was normal  There was mild calcification of the valve  There was normal leaflet separation  DOPPLER: The transmitral velocity was within the normal range  There was no  evidence for stenosis  There was mild regurgitation      AORTIC VALVE: The valve was trileaflet  Leaflets exhibited moderately increased thickness, moderate calcification, and normal cuspal separation  DOPPLER: Transaortic velocity was within the normal range  There was no evidence for stenosis  There was no regurgitation      TRICUSPID VALVE: The valve structure was normal  There was normal leaflet separation  DOPPLER: The transtricuspid velocity was within the normal range  There was no evidence for stenosis  There was mild to moderate regurgitation  Pulmonary  artery systolic pressure was moderately increased   Estimated peak PA pressure was 55 mmHg  The findings suggest moderate pulmonary hypertension      PULMONIC VALVE: Leaflets exhibited normal thickness, no calcification, and normal cuspal separation  DOPPLER: The transpulmonic velocity was within the normal range  There was trace regurgitation  , Pulmonary hypertension Pulmonary  Pneumonia, Shortness of breath,        GI/Hepatic       Chronic kidney disease stage 2,        Endo/Other     GYN       Hematology  Anemia ,     Musculoskeletal    Arthritis     Neurology   Psychology   Depression ,              Physical Exam    Airway    Mallampati score: II  TM Distance: >3 FB  Neck ROM: full     Dental       Cardiovascular      Pulmonary      Other Findings        Anesthesia Plan  ASA Score- 3     Anesthesia Type- IV sedation with anesthesia with ASA Monitors  Additional Monitors:   Airway Plan:     Comment: I have seen the patient and reviewed the history  Patient to receive IV sedation with full ASA monitors  Risks discussed with the patient, consent signed        Plan Factors-    Induction- intravenous  Postoperative Plan-     Informed Consent- Anesthetic plan and risks discussed with patient

## 2018-07-26 NOTE — CONSULTS
Cardiology   Laura Meter 69 y o  female MRN: [id number]  Unit/Bed#: [facility] 715-01 Encounter: [id number]      Reason for Consult / Principal Problem:  History of AFib on anticoagulation/acute blood loss anemia/GI bleed    Physician Requesting Consult: Keegan Sanchez MD    Cardiologist:  Pancho Tillman MD    PCP: Francine Amador DO    Assessment/Plan:  Acute blood loss anemia  -GI has been consulted closely following; planned EGD today; PT is currently NPO  -continue to hold Xarelto for now pending GI evaluation  -continue DAPT with aspirin and Plavix in the setting of recent NGOZI to RCA on 07/12/2018  -received 2 units of PRBC's yesterday for hemoglobin 5 7; hemoglobin 7 6 this a m   -transfuse for hemoglobin less than 7 0; hemoglobin/hematocrit checks q 6 hours    History of atrial fibrillation with RVR  -currently in normal sinus rhythm  -recommend holding amiodarone given prolonged QTC interval  -Toprol XL currently on hold in the setting of hypotension this admission; recommend to start at low-dose as BP allows  -replete electrolytes as needed; keep K + plus greater than 4, Mag greater than 2, calcium greater than 7    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 on 07/16/2018  -currently in normal sinus rhythm; no ectopy/arrhythmias noted on telemetry  -recommend holding amiodarone given prolonged QTC interval  -obtain 12 lead EKG in the a m      CAD status post drug-eluting stent to the mid RCA on 07/12/2018  -currently stable; denies chest pain; no ST segment changes on 12 lead EKG; troponins x2 negative  -continue DAPT with aspirin and Plavix     Chronic systolic congestive heart failure LV EF 35%  -appears euvolemic on exam  -chest x-ray with out acute cardiopulmonary disease  -home furosemide currently on hold setting of recent acute blood loss anemia and hypotension  -vigilant assessment of volume status this admission; patient may need IV Lasix status post future blood transfusions  -continue to assess strict I&O  -perform daily standing weights    HPI: Yennifer Tolliver 69y o  year old female with a past medical history of chronic atrial fibrillation with RVR in the past on Xarelto, previously on Eliquis, CAD status post drug-eluting stent to mid RCA, previous TIFFANIE/cardioversion on 07/09/2018, hypertension, moderate mitral regurgitation, mild tricuspid regurgitation, chronic systolic congestive heart failure, sleep apnea, and chronic pain    Patient routinely follows with [facility] and was last seen in the outpatient office on 07/24/2018 for routine follow-up  At that time the patient did not have any further adjustments in her cardiac medication  Patient's current cardiac meds include amiodarone 200 mg b i d x2 more days, aspirin 81 mg daily, Plavix 75 mg daily, Lasix 40 mg daily, lisinopril 5 mg daily, Toprol XL 50 mg daily, and Xarelto 15 mg daily  Patient was [facility] on 07/07 2018 complaints of palpitations and MYERS  Patient was in AFib with RVR at that time  She subsequently underwent a TIFFANIE guided cardioversion on 07/09/2018  Status post cardioversion the patient developed SVT and WCT  Patient was seen by EP, evaluated for wide complex tachycardia initially was thought to be monomorphic VT  Patient underwent cardiac catheterization on 7/12/2018 with Dr Buck Kaba; there was a 90% stenosis of the mid RCA, A [employer] [place] Rx 3 0 x 28mm drug-eluting stent was placed across this lesion; also noted to be 40% mid LAD stenosis, 50% stenosis of D1, 50% stenosis mid circumflex, and 40% stenosis of OM 1  During this previous hospitalization patient also underwent a cardiac ablation on 07/16/2018 and had resolution of her atrial fibrillation  Per note by Dr Tre Cardenas, wide complex tachycardia coming from left bundle abberrency    Patient was discharged on 07/20/2018, on amiodarone (2 week load) /then to transition to 200 mg daily, Plavix 75 mg daily, and Xarelto 15 mg daily  Patient was noted to be in normal sinus rhythm at that time  Most recent TIFFANIE from 07/09/2018 revealed EF of 35%, severe diffuse hypokinesis, RV mildly dilated, systolic function was reduced; LA moderately dilated, RA was moderately dilated, moderate MR, trace aortic valve regurgitation, mild TR  Patient now presents to the ED on 07/25 2018, with the shortness of breath and fatigue  Patient denies chest pain, palpitations  She admitted to feeling very dizzy, weak, and nausea yesterday; she admits to melena, denies hematochezia or hematemesis  A visiting nurse had come to evaluate the patient and called EMS, given her reported symptoms  In the ED, hemoglobin found to be 5 7, baseline 110-11 4  Patient was transfused with 2 units of packed red blood cells  GI consulted plan for EGD today  Hemodynamics on arrival temp 98 6°, heart rate 68 in normal sinus rhythm, respiratory rate 18, /51, satting 97% on room air  Labs on arrival to the ED as follows sodium 138 K + 4 4 CO2 26, BUN 41, creatinine 0 89, calcium 8 2, normal LFTs, albumin 2 6, WBC 12 84, hemoglobin 5 7, platelet count 784  Cardiac enzymes include troponin x1 0 02, proBNP 860  Initial 12 lead EKG:  Normal sinus rhythm, with prolonged QT interval  Chest x-ray:  No acute pulmonary disease  CT of the head without contrast:  No acute intracranial abnormality    Family History: History reviewed  No pertinent family history  Historical Information   Past Medical History:   Diagnosis Date    A-fib [facility])     Acute respiratory disease     CHF (congestive heart failure) (HCC)     Chronic pain     Heart failure (HCC)     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[facility] [address] )     Hypertension     Narcotic dependence ([facility] [address] )      Past Surgical History:   Procedure Laterality Date    ERCP N/A 5/14/2018    Procedure: ENDOSCOPIC RETROGRADE CHOLANGIOPANCREATOGRAPHY (ERCP);   Surgeon: Pham Lord MD; Location: [facility];  Service: Gastroenterology     Social History   History   Alcohol Use No     History   Drug Use No     History   Smoking Status    Former Smoker   Smokeless Tobacco    Never Used     Family History: History reviewed  No pertinent family history  Review of Systems:  Review of Systems   Constitutional: Positive for fatigue  Eyes: Negative for visual disturbance  Respiratory: Negative for apnea, cough, chest tightness and shortness of breath  Cardiovascular: Negative for chest pain, palpitations and leg swelling  Gastrointestinal: Negative for abdominal distention, abdominal pain, constipation, diarrhea, nausea, rectal pain and vomiting  Dark stool   Genitourinary: Negative for dysuria  Musculoskeletal: Negative for arthralgias  Neurological: Negative for light-headedness and headaches  Psychiatric/Behavioral: Positive for confusion  All other systems reviewed and are negative        Scheduled Meds:  Current Facility-Administered Medications:  ALPRAZolam 0 25 mg Oral HS PRN Uma Solis MD   amiodarone 200 mg Oral BID With Meals Uma Solis MD   [START ON 7/30/2018] amiodarone 200 mg Oral Daily With Breakfast Uma Solis MD   amitriptyline 25 mg Oral HS Uma Solis MD   aspirin 81 mg Oral Daily Uma Solis MD   clopidogrel 75 mg Oral Daily Uma Solis MD   DULoxetine 30 mg Oral Daily Uma Solis MD   lidocaine 1 patch Transdermal Daily Uma Solis MD   melatonin 6 mg Oral HS Uma Solis MD   mirtazapine 7 5 mg Oral HS Unique Celaya MD   ondansetron 4 mg Intravenous Q6H PRN Uma Solis MD   oxyCODONE 10 mg Oral Q6H Uma Solis MD   pantoprazole 40 mg Intravenous Q12H [facility] Bladimir Hoffman PA-C     Continuous Infusions:   PRN Meds:   ALPRAZolam    ondansetron  all current active meds have been reviewed, current meds:   Current Facility-Administered Medications   Medication Dose Route Frequency    ALPRAZolam (XANAX) tablet 0 25 mg  0 25 mg Oral HS PRN    amiodarone tablet 200 mg  200 mg Oral BID With Meals    [START ON 7/30/2018] amiodarone tablet 200 mg  200 mg Oral Daily With Breakfast    amitriptyline (ELAVIL) tablet 25 mg  25 mg Oral HS    aspirin chewable tablet 81 mg  81 mg Oral Daily    clopidogrel (PLAVIX) tablet 75 mg  75 mg Oral Daily    DULoxetine (CYMBALTA) delayed release capsule 30 mg  30 mg Oral Daily    lidocaine (LIDODERM) 5 % patch 1 patch  1 patch Transdermal Daily    melatonin tablet 6 mg  6 mg Oral HS    mirtazapine (REMERON) tablet 7 5 mg  7 5 mg Oral HS    ondansetron (ZOFRAN) injection 4 mg  4 mg Intravenous Q6H PRN    oxyCODONE (ROXICODONE) immediate release tablet 10 mg  10 mg Oral Q6H    pantoprazole (PROTONIX) injection 40 mg  40 mg Intravenous Q12H [facility]    and PTA meds:   Prior to Admission Medications   Prescriptions Last Dose Informant Patient Reported? Taking?    ALPRAZolam (NIRAVAM) 0 25 MG dissolvable tablet   No No   Sig: Take 1 tablet (0 25 mg total) by mouth daily at bedtime as needed for anxiety   DULoxetine (CYMBALTA) 30 mg delayed release capsule   No No   Sig: Take 1 capsule (30 mg total) by mouth daily   amiodarone 200 mg tablet Past Week at Unknown time  No Yes   Sig: Take 1 tablet (200 mg total) by mouth 2 (two) times a day with meals for 8 days Then decrease to 1 tablet daily   amitriptyline (ELAVIL) 25 mg tablet   No No   Sig: Take 1 tablet (25 mg total) by mouth daily at bedtime   apixaban (ELIQUIS) 5 mg Past Week at Unknown time  Yes Yes   Sig: Take 5 mg by mouth 2 (two) times a day   aspirin (ECOTRIN LOW STRENGTH) 81 mg EC tablet Past Week at Unknown time  Yes Yes   Sig: Take 81 mg by mouth daily   clopidogrel (PLAVIX) 75 mg tablet Past Week at Unknown time  No Yes   Sig: Take 1 tablet (75 mg total) by mouth daily   furosemide (LASIX) 40 mg tablet   No No   Sig: Take 1 tablet (40 mg total) by mouth daily   lidocaine (LIDODERM) 5 % Past Week at Unknown time  No Yes   Sig: Place 1 patch on the skin daily Remove & Discard patch within 12 hours or as directed by MD   lisinopril (ZESTRIL) 5 mg tablet Past Week at Unknown time  No Yes   Sig: TAKE 1 TABLET (5 MG TOTAL) BY MOUTH DAILY   melatonin 3 mg Past Week at Unknown time  No Yes   Sig: Take 2 tablets (6 mg total) by mouth daily at bedtime   metoprolol succinate (TOPROL-XL) 50 mg 24 hr tablet Past Week at Unknown time  No Yes   Sig: Take 1 tablet (50 mg total) by mouth every 12 (twelve) hours   mirtazapine (REMERON) 7 5 MG tablet Past Week at Unknown time  No Yes   Sig: Take 1 tablet (7 5 mg total) by mouth daily at bedtime   oxyCODONE (ROXICODONE) 15 mg immediate release tablet Past Week at Unknown time  Yes Yes   Sig: Take 15 mg by mouth every 6 (six) hours   polyethylene glycol (MIRALAX) 17 g packet Past Week at Unknown time  Yes Yes   Sig: Take 17 g by mouth daily   potassium chloride (K-DUR,KLOR-CON) 20 mEq tablet   No No   Sig: Take 1 tablet (20 mEq total) by mouth daily   rivaroxaban (XARELTO) 15 mg tablet Past Week at Unknown time  No Yes   Sig: Take 1 tablet (15 mg total) by mouth daily with dinner      Facility-Administered Medications: None       No Known Allergies    Objective   Vitals: Blood pressure 107/53, pulse 72, temperature 97 9 °F (36 6 °C), temperature source Oral, resp  rate 18, height 5' 5" (1 651 m), weight 68 4 kg (150 lb 12 7 oz), SpO2 94 %  , Body mass index is 25 09 kg/m² , Orthostatic Blood Pressures      Most Recent Value   Blood Pressure  107/53 filed at 07/26/2018 0358   Patient Position - Orthostatic VS  Lying filed at 07/26/2018 0358            Intake/Output Summary (Last 24 hours) at 07/26/18 0830  Last data filed at 07/26/18 0358   Gross per 24 hour   Intake              820 ml   Output             1600 ml   Net             -780 ml       Invasive Devices     Peripheral Intravenous Line            Peripheral IV 07/25/18 Left Wrist less than 1 day    Peripheral IV 07/26/18 Right Forearm less than 1 day                Physical Exam:  Physical Exam   Constitutional: She appears well-developed and well-nourished  No distress  HENT:   Head: Normocephalic and atraumatic  Mouth/Throat: Oropharynx is clear and moist  No oropharyngeal exudate  Eyes: Conjunctivae are normal  Pupils are equal, round, and reactive to light  No scleral icterus  Neck: Normal range of motion  Neck supple  No JVD present  Cardiovascular: Normal rate, regular rhythm, normal heart sounds and intact distal pulses  Exam reveals no gallop and no friction rub  No murmur heard  Pulmonary/Chest: Effort normal and breath sounds normal  No respiratory distress  She has no wheezes  She has no rales  She exhibits no tenderness  Abdominal: Soft  Bowel sounds are normal  She exhibits no distension  There is no tenderness  Musculoskeletal: Normal range of motion  She exhibits no edema or deformity  Lymphadenopathy:     She has no cervical adenopathy  Neurological: She is alert  Skin: Skin is warm and dry  She is not diaphoretic  Psychiatric: She has a normal mood and affect       Lab Results:      Recent Results (from the past 24 hour(s))   ECG 12 lead    Collection Time: 07/25/18 12:00 PM   Result Value Ref Range    Ventricular Rate 71 BPM    Atrial Rate 357 BPM    TN Interval  ms    QRSD Interval 90 ms    QT Interval 486 ms    QTC Interval 528 ms    P Axis  degrees    QRS Axis 33 degrees    T Wave Axis 42 degrees   Comprehensive metabolic panel    Collection Time: 07/25/18 12:13 PM   Result Value Ref Range    Sodium 138 136 - 145 mmol/L    Potassium 4 4 3 5 - 5 3 mmol/L    Chloride 106 100 - 108 mmol/L    CO2 26 21 - 32 mmol/L    Anion Gap 6 4 - 13 mmol/L    BUN 41 (H) 5 - 25 mg/dL    Creatinine 0 89 0 60 - 1 30 mg/dL    Glucose 110 65 - 140 mg/dL    Calcium 8 2 (L) 8 3 - 10 1 mg/dL    AST 15 5 - 45 U/L    ALT 20 12 - 78 U/L    Alkaline Phosphatase 54 46 - 116 U/L    Total Protein 5 6 (L) 6 4 - 8 2 g/dL    Albumin 2 6 (L) 3 5 - 5 0 g/dL    Total Bilirubin 0 29 0 20 - 1 00 mg/dL    eGFR 63 ml/min/1 73sq m   CBC and differential    Collection Time: 07/25/18 12:13 PM   Result Value Ref Range    WBC 12 84 (H) 4 31 - 10 16 Thousand/uL    RBC 1 78 (L) 3 81 - 5 12 Million/uL    Hemoglobin 5 7 (LL) 11 5 - 15 4 g/dL    Hematocrit 18 2 (L) 34 8 - 46 1 %     (H) 82 - 98 fL    MCH 32 0 26 8 - 34 3 pg    MCHC 31 3 (L) 31 4 - 37 4 g/dL    RDW 17 7 (H) 11 6 - 15 1 %    MPV 9 8 8 9 - 12 7 fL    Platelets 717 164 - 310 Thousands/uL    nRBC 1 /100 WBCs   Troponin I    Collection Time: 07/25/18 12:13 PM   Result Value Ref Range    Troponin I 0 02 <=0 04 ng/mL   Protime-INR    Collection Time: 07/25/18 12:13 PM   Result Value Ref Range    Protime 24 2 (H) 11 8 - 14 2 seconds    INR 2 16 (H) 0 86 - 1 17   APTT    Collection Time: 07/25/18 12:13 PM   Result Value Ref Range    PTT 31 24 - 36 seconds   B-type natriuretic peptide    Collection Time: 07/25/18 12:13 PM   Result Value Ref Range    NT-proBNP 860 (H) <450 pg/mL   Manual Differential(PHLEBS Do Not Order)    Collection Time: 07/25/18 12:13 PM   Result Value Ref Range    Segmented % 82 (H) 43 - 75 %    Bands % 1 0 - 8 %    Lymphocytes % 12 (L) 14 - 44 %    Monocytes % 2 (L) 4 - 12 %    Eosinophils % 1 0 - 6 %    Basophils % 0 0 - 1 %    Metamyelocytes% 1 0 - 1 %    Atypical Lymphocytes % 1 (H) <=0 %    Absolute Neutrophils 10 66 (H) 1 85 - 7 62 Thousand/uL    Lymphocytes Absolute 1 54 0 60 - 4 47 Thousand/uL    Monocytes Absolute 0 26 0 00 - 1 22 Thousand/uL    Eosinophils Absolute 0 13 0 00 - 0 40 Thousand/uL    Basophils Absolute 0 00 0 00 - 0 10 Thousand/uL    Total Counted      Smudge Cells Present     RBC Morphology Present     Anisocytosis Present     Macrocytes Present     Poikilocytes Present     Polychromasia Present     Platelet Estimate Adequate Adequate    Giant PLTs Present    Type and screen    Collection Time: 07/25/18  2:18 PM   Result Value Ref Range    ABO Grouping O     Rh Factor Positive     Antibody Screen Negative     Specimen Expiration Date [id number]    Hemoglobin and hematocrit, blood    Collection Time: 07/25/18  5:41 PM   Result Value Ref Range    Hemoglobin 6 1 (LL) 11 5 - 15 4 g/dL    Hematocrit 20 1 (L) 34 8 - 46 1 %   CBC and differential    Collection Time: 07/26/18  1:08 AM   Result Value Ref Range    WBC 11 93 (H) 4 31 - 10 16 Thousand/uL    RBC 2 37 (L) 3 81 - 5 12 Million/uL    Hemoglobin 7 4 (L) 11 5 - 15 4 g/dL    Hematocrit 22 7 (L) 34 8 - 46 1 %    MCV 96 82 - 98 fL    MCH 31 2 26 8 - 34 3 pg    MCHC 32 6 31 4 - 37 4 g/dL    RDW 17 3 (H) 11 6 - 15 1 %    MPV 9 7 8 9 - 12 7 fL    Platelets 374 146 - 498 Thousands/uL    nRBC 1 /100 WBCs    Neutrophils Relative 73 43 - 75 %    Immat GRANS % 2 0 - 2 %    Lymphocytes Relative 16 14 - 44 %    Monocytes Relative 7 4 - 12 %    Eosinophils Relative 2 0 - 6 %    Basophils Relative 0 0 - 1 %    Neutrophils Absolute 8 67 (H) 1 85 - 7 62 Thousands/µL    Immature Grans Absolute 0 20 0 00 - 0 20 Thousand/uL    Lymphocytes Absolute 1 89 0 60 - 4 47 Thousands/µL    Monocytes Absolute 0 88 0 17 - 1 22 Thousand/µL    Eosinophils Absolute 0 27 0 00 - 0 61 Thousand/µL    Basophils Absolute 0 02 0 00 - 0 10 Thousands/µL   Protime-INR    Collection Time: 07/26/18  5:42 AM   Result Value Ref Range    Protime 16 8 (H) 11 8 - 14 2 seconds    INR 1 35 (H) 0 86 - 0 71   Basic metabolic panel    Collection Time: 07/26/18  5:42 AM   Result Value Ref Range    Sodium 140 136 - 145 mmol/L    Potassium 4 2 3 5 - 5 3 mmol/L    Chloride 109 (H) 100 - 108 mmol/L    CO2 26 21 - 32 mmol/L    Anion Gap 5 4 - 13 mmol/L    BUN 29 (H) 5 - 25 mg/dL    Creatinine 0 74 0 60 - 1 30 mg/dL    Glucose 73 65 - 140 mg/dL    Calcium 8 2 (L) 8 3 - 10 1 mg/dL    eGFR 78 ml/min/1 73sq m   CBC (With Platelets)    Collection Time: 07/26/18  5:42 AM   Result Value Ref Range    WBC 11 19 (H) 4 31 - 10 16 Thousand/uL    RBC 2 51 (L) 3 81 - 5 12 Million/uL    Hemoglobin 7 6 (L) 11 5 - 15 4 g/dL    Hematocrit 24 1 (L) 34 8 - 46 1 %    MCV 96 82 - 98 fL    MCH 30 3 26 8 - 34 3 pg    MCHC 31 5 31 4 - 37 4 g/dL    RDW 17 5 (H) 11 6 - 15 1 %    Platelets 982 879 - 079 Thousands/uL    MPV 9 6 8 9 - 12 7 fL   Prepare RBC:Special Requirements: None; Has consent been obtained? Yes; Where is the Surgery Scheduled? [facility], 2 Units    Collection Time: 07/26/18  5:55 AM   Result Value Ref Range    Unit Product Code V8393D93     Unit Number [id number]     Unit ABO O     Unit [facility] POS     Unit Dispense Status Presumed Trans     Unit Product Code M3160C72     Unit Number [id number]     Unit ABO O     Unit [facility] POS     Unit Dispense Status Presumed Trans    ]      Imaging: I have personally reviewed pertinent films in PACS  EKG:  Normal sinus rhythm, prolonged QT interval  Vent  rate 71 bpm  QRS duration 90 ms  QT/QTc 486/528 ms    CHEST X-RAY: No active cardiopulmonary disease    Code Status:  Level 3 DNR DNI      Counseling / Coordination of Care  Total floor / unit time spent today 45 minutes  Greater than 50% of total time was spent with the patient and / or family counseling and / or coordination of care

## 2018-07-27 ENCOUNTER — ANESTHESIA (INPATIENT)
Dept: GASTROENTEROLOGY | Facility: HOSPITAL | Age: 78
DRG: 812 | End: 2018-07-27
Payer: MEDICARE

## 2018-07-27 ENCOUNTER — ANESTHESIA EVENT (INPATIENT)
Dept: GASTROENTEROLOGY | Facility: HOSPITAL | Age: 78
DRG: 812 | End: 2018-07-27
Payer: MEDICARE

## 2018-07-27 PROBLEM — G31.84 MILD COGNITIVE IMPAIRMENT: Status: ACTIVE | Noted: 2018-07-27

## 2018-07-27 LAB
ATRIAL RATE: 62 BPM
HCT VFR BLD AUTO: 22.6 % (ref 34.8–46.1)
HCT VFR BLD AUTO: 23.1 % (ref 34.8–46.1)
HGB BLD-MCNC: 7.1 G/DL (ref 11.5–15.4)
HGB BLD-MCNC: 7.2 G/DL (ref 11.5–15.4)
P AXIS: 85 DEGREES
PR INTERVAL: 180 MS
QRS AXIS: 25 DEGREES
QRSD INTERVAL: 102 MS
QT INTERVAL: 482 MS
QTC INTERVAL: 489 MS
T WAVE AXIS: 19 DEGREES
VENTRICULAR RATE: 62 BPM

## 2018-07-27 PROCEDURE — 0DJD8ZZ INSPECTION OF LOWER INTESTINAL TRACT, VIA NATURAL OR ARTIFICIAL OPENING ENDOSCOPIC: ICD-10-PCS | Performed by: INTERNAL MEDICINE

## 2018-07-27 PROCEDURE — 88342 IMHCHEM/IMCYTCHM 1ST ANTB: CPT | Performed by: PATHOLOGY

## 2018-07-27 PROCEDURE — 88341 IMHCHEM/IMCYTCHM EA ADD ANTB: CPT | Performed by: PATHOLOGY

## 2018-07-27 PROCEDURE — 85018 HEMOGLOBIN: CPT | Performed by: INTERNAL MEDICINE

## 2018-07-27 PROCEDURE — 45380 COLONOSCOPY AND BIOPSY: CPT | Performed by: INTERNAL MEDICINE

## 2018-07-27 PROCEDURE — 88305 TISSUE EXAM BY PATHOLOGIST: CPT | Performed by: PATHOLOGY

## 2018-07-27 PROCEDURE — 93010 ELECTROCARDIOGRAM REPORT: CPT | Performed by: INTERNAL MEDICINE

## 2018-07-27 PROCEDURE — 99232 SBSQ HOSP IP/OBS MODERATE 35: CPT | Performed by: INTERNAL MEDICINE

## 2018-07-27 PROCEDURE — 93005 ELECTROCARDIOGRAM TRACING: CPT

## 2018-07-27 PROCEDURE — 85014 HEMATOCRIT: CPT | Performed by: INTERNAL MEDICINE

## 2018-07-27 RX ORDER — EPHEDRINE SULFATE 50 MG/ML
INJECTION, SOLUTION INTRAVENOUS AS NEEDED
Status: DISCONTINUED | OUTPATIENT
Start: 2018-07-27 | End: 2018-07-27 | Stop reason: SURG

## 2018-07-27 RX ORDER — SODIUM CHLORIDE 9 MG/ML
INJECTION, SOLUTION INTRAVENOUS CONTINUOUS PRN
Status: DISCONTINUED | OUTPATIENT
Start: 2018-07-27 | End: 2018-07-27 | Stop reason: SURG

## 2018-07-27 RX ORDER — LIDOCAINE HYDROCHLORIDE 10 MG/ML
INJECTION, SOLUTION INFILTRATION; PERINEURAL AS NEEDED
Status: DISCONTINUED | OUTPATIENT
Start: 2018-07-27 | End: 2018-07-27 | Stop reason: SURG

## 2018-07-27 RX ORDER — PROPOFOL 10 MG/ML
INJECTION, EMULSION INTRAVENOUS AS NEEDED
Status: DISCONTINUED | OUTPATIENT
Start: 2018-07-27 | End: 2018-07-27 | Stop reason: SURG

## 2018-07-27 RX ADMIN — PROPOFOL 20 MG: 10 INJECTION, EMULSION INTRAVENOUS at 11:44

## 2018-07-27 RX ADMIN — MIRTAZAPINE 7.5 MG: 15 TABLET, FILM COATED ORAL at 21:42

## 2018-07-27 RX ADMIN — PROPOFOL 20 MG: 10 INJECTION, EMULSION INTRAVENOUS at 11:46

## 2018-07-27 RX ADMIN — OXYCODONE HYDROCHLORIDE 10 MG: 10 TABLET ORAL at 06:16

## 2018-07-27 RX ADMIN — PROPOFOL 30 MG: 10 INJECTION, EMULSION INTRAVENOUS at 11:41

## 2018-07-27 RX ADMIN — PROPOFOL 130 MG: 10 INJECTION, EMULSION INTRAVENOUS at 11:36

## 2018-07-27 RX ADMIN — AMITRIPTYLINE HYDROCHLORIDE 25 MG: 25 TABLET, FILM COATED ORAL at 21:41

## 2018-07-27 RX ADMIN — MELATONIN 6 MG: at 21:42

## 2018-07-27 RX ADMIN — LIDOCAINE HYDROCHLORIDE 30 MG: 10 INJECTION, SOLUTION INFILTRATION; PERINEURAL at 11:36

## 2018-07-27 RX ADMIN — OXYCODONE HYDROCHLORIDE 10 MG: 10 TABLET ORAL at 00:28

## 2018-07-27 RX ADMIN — EPHEDRINE SULFATE 10 MG: 50 INJECTION, SOLUTION INTRAMUSCULAR; INTRAVENOUS; SUBCUTANEOUS at 11:58

## 2018-07-27 RX ADMIN — OXYCODONE HYDROCHLORIDE 10 MG: 10 TABLET ORAL at 13:29

## 2018-07-27 RX ADMIN — SODIUM CHLORIDE: 0.9 INJECTION, SOLUTION INTRAVENOUS at 10:30

## 2018-07-27 RX ADMIN — PROPOFOL 30 MG: 10 INJECTION, EMULSION INTRAVENOUS at 11:53

## 2018-07-27 RX ADMIN — ONDANSETRON 4 MG: 2 INJECTION, SOLUTION INTRAMUSCULAR; INTRAVENOUS at 05:14

## 2018-07-27 RX ADMIN — PANTOPRAZOLE SODIUM 40 MG: 40 TABLET, DELAYED RELEASE ORAL at 06:16

## 2018-07-27 RX ADMIN — PROPOFOL 30 MG: 10 INJECTION, EMULSION INTRAVENOUS at 11:38

## 2018-07-27 RX ADMIN — PROPOFOL 30 MG: 10 INJECTION, EMULSION INTRAVENOUS at 12:03

## 2018-07-27 RX ADMIN — ONDANSETRON 4 MG: 2 INJECTION INTRAMUSCULAR; INTRAVENOUS at 05:13

## 2018-07-27 RX ADMIN — OXYCODONE HYDROCHLORIDE 10 MG: 10 TABLET ORAL at 17:09

## 2018-07-27 RX ADMIN — AMIODARONE HYDROCHLORIDE 200 MG: 200 TABLET ORAL at 17:09

## 2018-07-27 RX ADMIN — CLOPIDOGREL BISULFATE 75 MG: 75 TABLET, FILM COATED ORAL at 09:21

## 2018-07-27 RX ADMIN — PROPOFOL 30 MG: 10 INJECTION, EMULSION INTRAVENOUS at 11:58

## 2018-07-27 RX ADMIN — PROPOFOL 30 MG: 10 INJECTION, EMULSION INTRAVENOUS at 11:49

## 2018-07-27 RX ADMIN — PROPOFOL 30 MG: 10 INJECTION, EMULSION INTRAVENOUS at 12:07

## 2018-07-27 RX ADMIN — LIDOCAINE 1 PATCH: 50 PATCH CUTANEOUS at 09:21

## 2018-07-27 RX ADMIN — ASPIRIN 81 MG 81 MG: 81 TABLET ORAL at 09:21

## 2018-07-27 RX ADMIN — EPHEDRINE SULFATE 10 MG: 50 INJECTION, SOLUTION INTRAMUSCULAR; INTRAVENOUS; SUBCUTANEOUS at 11:40

## 2018-07-27 RX ADMIN — AMIODARONE HYDROCHLORIDE 200 MG: 200 TABLET ORAL at 09:21

## 2018-07-27 RX ADMIN — DULOXETINE HYDROCHLORIDE 30 MG: 30 CAPSULE, DELAYED RELEASE ORAL at 09:21

## 2018-07-27 NOTE — PROGRESS NOTES
07/27/18 1320   Clinical Encounter Type   Visited With Patient   Routine Visit Follow-up   Patient Spiritual Encounters   Spiritual Assessment 3

## 2018-07-27 NOTE — PHYSICIAN ADVISOR
Current patient class: Inpatient  The patient is currently on Hospital Day: 3 at [facility]        The patient was admitted to the hospital  on 7/25/18 at 1450 for the following diagnosis:  Coronary artery disease [I25 10]  Acute blood loss anemia [D62]  SOB (shortness of breath) [R06 02]  Atrial fibrillation with RVR ([address] ) [I48 91]  Gastrointestinal hemorrhage, unspecified gastrointestinal hemorrhage type [K92 2]       There is documentation in the medical record of an expected length of stay of at least 2 midnights  The patient is therefore expected to satisfy the 2 midnight benchmark and given the 2 midnight presumption is appropriate for INPATIENT ADMISSION  Given this expectation of a satisfying stay, CMS instructs us that the patient is most often appropriate for inpatient admission under part A provided medical necessity is documented in the chart  After review of the relevant documentation, labs, vital signs and test results, the patient is appropriate for INPATIENT ADMISSION  Admission to the hospital as an inpatient is a complex decision making process which requires the practitioner to consider the patients presenting complaint, history and physical examination and all relevant testing  With this in mind, in this case, the patient was deemed appropriate for INPATIENT ADMISSION  After review of the documentation and testing available at the time of the admission I concur with this clinical determination of medical necessity  The patient does have an inpatient admission within the previous 30 days  The patient was admitted on 7/7/18 and discharged on 7/20/18 as an inpatient  The patient therefore required readmission review  In this case the patient should be considered a SEPARATE and UNRELATED INPATIENT ADMISSION  The patient had been discharged in stable condition with a completed care plan   There were no unresolved acute medical issues at the time of discharged which would have reasonably been expected to prompt this readmission  Rationale is as follows: The patient is a 69 yrs   Female who presented to the ED at 7/25/2018 11:53 AM with a chief complaint of Weakness - Generalized (pt with weakness, shortness of breath and some periods of confusion from home has a history of a recent ablation at [employer] )     The patient had a prior admission from 7/7-7/20 due to rapid atrial fibrillation and acute on chronic CHF  The patient is being admitted with dizziness, lightheadedness, increased fatigue and decreased ability to ambulate  The patient is being admitted with acute blood loss anemia with a hgb of 5 7  The plan of care includes holding anticoagulation, transfusion of packed red cells, repeat labs, GI consultation  This patient is appropriate for INPATIENT admission; continued hospitalization is necessary to ensure stabilization of her clinical status  This admission is UNRELATED to her prior admission as she was treated and discharged in stable condition       The patients vitals on arrival were ED Triage Vitals   Temperature Pulse Respirations Blood Pressure SpO2   07/25/18 1202 07/25/18 1200 07/25/18 1200 07/25/18 1200 07/25/18 1200   98 6 °F (37 °C) 68 18 102/51 97 %      Temp Source Heart Rate Source Patient Position - Orthostatic VS BP Location FiO2 (%)   07/25/18 1620 07/25/18 1200 07/25/18 1200 07/25/18 1200 07/26/18 1401   Oral Monitor Lying Right arm 96      Pain Score       07/25/18 1159       8           Past Medical History:   Diagnosis Date    A-fib ([place] [address] )     Acute respiratory disease     Anemia     Arthritis     CHF (congestive heart failure) (HCC)     Chronic pain     Heart failure (HCC)     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[facility] )     History of colon polyps     Hx of long term use of blood thinners     Hypertension     Irregular heart beat     Narcotic dependence (HCC)     Rectal bleeding     Stroke ([facility] [address] )     mild no deficiets/ memory loss    Uses walker      Past Surgical History:   Procedure Laterality Date    COLONOSCOPY      CORONARY STENT PLACEMENT      ERCP N/A 5/14/2018    Procedure: ENDOSCOPIC RETROGRADE CHOLANGIOPANCREATOGRAPHY (ERCP); Surgeon: Reba Henderson MD;  [facility];  Service: Gastroenterology    ESOPHAGOGASTRODUODENOSCOPY N/A 7/26/2018    Procedure: ESOPHAGOGASTRODUODENOSCOPY (EGD); Surgeon: Ebenezer Bauman MD;  Location: [facility]; Service: Gastroenterology    JOINT REPLACEMENT Right     knee           Consults have been placed to:   IP CONSULT TO CASE MANAGEMENT  IP CONSULT TO GASTROENTEROLOGY  IP CONSULT TO GASTROENTEROLOGY  IP CONSULT TO CARDIOLOGY  IP CONSULT TO CASE MANAGEMENT  IP CONSULT TO NEUROPSYCHOLOGY    Vitals:    07/27/18 1102 07/27/18 1218 07/27/18 1233 07/27/18 1537   BP: 141/64 99/57 118/56 127/71   BP Location:    Right arm   Pulse: 65 78 70 74   Resp: 15 16 16 18   Temp: 97 5 °F (36 4 °C)   97 9 °F (36 6 °C)   TempSrc: Tympanic   Oral   SpO2: 97%   93%   Weight: 68 kg (150 lb)      Height: 5' 5" (1 651 m)          Most recent labs:    Recent Labs      07/25/18   1213   07/26/18   0542   07/27/18   0809   WBC  12 84*   < >  11 19*   --    --    HGB  5 7*   < >  7 6*   < >  7 2*   HCT  18 2*   < >  24 1*   < >  23 1*   PLT  323   < >  267   --    --    K  4 4   --   4 2   --    --    NA  138   --   140   --    --    CALCIUM  8 2*   --   8 2*   --    --    BUN  41*   --   29*   --    --    CREATININE  0 89   --   0 74   --    --    INR  2 16*   --   1 35*   --    --    TROPONINI  0 02   --    --    --    --    AST  15   --    --    --    --    ALT  20   --    --    --    --    ALKPHOS  54   --    --    --    --    BILITOT  0 29   --    --    --    --     < > = values in this interval not displayed         Scheduled Meds:  Current Facility-Administered Medications:  ALPRAZolam 0 25 mg Oral HS PRN Anvik Gardner, MD   amiodarone 200 mg Oral BID With Meals Guille Tobin MD   [START ON 7/30/2018] amiodarone 200 mg Oral Daily With Breakfast Guille Tobin MD   amitriptyline 25 mg Oral HS Guille Tobin MD   aspirin 81 mg Oral Daily Guille Tobin MD   clopidogrel 75 mg Oral Daily Guille Tobin MD   DULoxetine 30 mg Oral Daily Guille Tobin MD   lidocaine 1 patch Transdermal Daily Guille Tobin MD   melatonin 6 mg Oral HS Guille Tobin MD   mirtazapine 7 5 mg Oral HS Unique Celaya MD   ondansetron 4 mg Intravenous Q6H PRN Guille Tboin MD   oxyCODONE 10 mg Oral Q6H Guille Tobin MD   pantoprazole 40 mg Oral Early Morning Brendon Brito MD     Continuous Infusions:   PRN Meds:   ALPRAZolam    ondansetron    Surgical procedures (if appropriate):  Procedure(s):  COLONOSCOPY

## 2018-07-27 NOTE — PLAN OF CARE
DISCHARGE PLANNING     Discharge to home or other facility with appropriate resources Progressing        DISCHARGE PLANNING - CARE MANAGEMENT     Discharge to post-acute care or home with appropriate resources Progressing        GASTROINTESTINAL - ADULT     Maintains or returns to baseline bowel function Progressing     Establish and maintain optimal ostomy function Progressing        HEMATOLOGIC - ADULT     Maintains hematologic stability Progressing        Knowledge Deficit     Patient/family/caregiver demonstrates understanding of disease process, treatment plan, medications, and discharge instructions Progressing        PAIN - ADULT     Verbalizes/displays adequate comfort level or baseline comfort level Progressing        Potential for Falls     Patient will remain free of falls Progressing        Prexisting or High Potential for Compromised Skin Integrity     Skin integrity is maintained or improved Progressing        SAFETY ADULT     Maintain or return mobility status to optimal level Progressing

## 2018-07-27 NOTE — PROGRESS NOTES
Progress Note - Amandalyssa Beltre [birth year], 69 y o  female MRN: [id number]    Unit/Bed#: [facility] 715-01 Encounter: [id number]    Primary Care Provider: Kristy Askew DO   Date and time admitted to hospital: 7/25/2018 11:53 AM        GI bleed   Assessment & Plan    EGD revealed mild esophagitis and gastritis, continue pantoprazole  Colonoscopy revealed findings suggestive of ischemic colitis  Monitor hemoglobin closely  GI following        * Acute blood loss anemia   Assessment & Plan    Due to GI blood loss, status post transfusion  Monitor hemoglobin closely   Off oral anticoagulation          Atrial fibrillation with RVR (HCC)   Assessment & Plan    Oral anticoagulation on hold  Cardiology following           Opioid dependence ([facility] [address] )   Assessment & Plan    On oxycodone        Mild cognitive impairment   Assessment & Plan    Likely with mild cognitive impairment, will request neuropsychology for competency evaluation, patient is agreeable        Coronary artery disease   Assessment & Plan    Recent cardiac catheterization with drug-eluting stent in place continue aspirin and Plavix  Cardiology following             VTE Pharmacologic Prophylaxis:   Pharmacologic: GI bleed  Mechanical VTE Prophylaxis in Place: Yes    Patient Centered Rounds: I have performed bedside rounds with nursing staff today  Discussions with Specialists or Other Care Team Provider:     Education and Discussions with Family / Patient:  Discussed with the patient    Time Spent for Care: 30 minutes  More than 50% of total time spent on counseling and coordination of care as described above      Current Length of Stay: 2 day(s)    Current Patient Status: Inpatient   Certification Statement: The patient will continue to require additional inpatient hospital stay due to As outlined    Discharge Plan:  GI bleed requiring close monitoring as outlined    Code Status: Level 3 - DNAR and DNI      Subjective:     Sitting up in chair  Reports feeling ruperto  Agreeable to neuropsychology evaluation for medical competency    Objective:     Vitals:   Temp (24hrs), Av 6 °F (36 4 °C), Min:97 5 °F (36 4 °C), Max:97 8 °F (36 6 °C)    HR:  [65-78] 70  Resp:  [15-18] 16  BP: ()/(54-64) 118/56  SpO2:  [90 %-100 %] 97 %  Body mass index is 24 96 kg/m²  Input and Output Summary (last 24 hours): Intake/Output Summary (Last 24 hours) at 18 1308  Last data filed at 18 1208   Gross per 24 hour   Intake              910 ml   Output             1150 ml   Net             -240 ml       Physical Exam:     Physical Exam    Comfortably sitting up in chair  Neck supple  Lungs diminished breath sounds at the bases  Heart sounds S1 and S2 noted  Abdomen soft  Awake obeys simple commands  No rash  Pulses noted    Additional Data:     Labs:      Results from last 7 days  Lab Units 18  0809  18  0542 18  0108   WBC Thousand/uL  --   --  11 19* 11 93*   HEMOGLOBIN g/dL 7 2*  < > 7 6* 7 4*   HEMATOCRIT % 23 1*  < > 24 1* 22 7*   PLATELETS Thousands/uL  --   --  267 256   NEUTROS PCT %  --   --   --  73   LYMPHS PCT %  --   --   --  16   MONOS PCT %  --   --   --  7   EOS PCT %  --   --   --  2   < > = values in this interval not displayed  Results from last 7 days  Lab Units 18  0542 18  1213   SODIUM mmol/L 140 138   POTASSIUM mmol/L 4 2 4 4   CHLORIDE mmol/L 109* 106   CO2 mmol/L 26 26   BUN mg/dL 29* 41*   CREATININE mg/dL 0 74 0 89   CALCIUM mg/dL 8 2* 8 2*   TOTAL PROTEIN g/dL  --  5 6*   BILIRUBIN TOTAL mg/dL  --  0 29   ALK PHOS U/L  --  54   ALT U/L  --  20   AST U/L  --  15   GLUCOSE RANDOM mg/dL 73 110       Results from last 7 days  Lab Units 18  0542   INR  1 35*       Results from last 7 days  Lab Units 18  1624   POC GLUCOSE mg/dl 119             * I Have Reviewed All Lab Data Listed Above  * Additional Pertinent Lab Tests Reviewed:  All Labs Within Last 24 Hours Reviewed    Imaging:    Imaging Reports Reviewed Today Include:   Imaging Personally Reviewed by Myself Includes:     Recent Cultures (last 7 days):           Last 24 Hours Medication List:     Current Facility-Administered Medications:  ALPRAZolam 0 25 mg Oral HS PRN Wagner Lei MD   amiodarone 200 mg Oral BID With Meals Wagner Lei MD   [START ON 7/30/2018] amiodarone 200 mg Oral Daily With Breakfast Wagner Lei MD   amitriptyline 25 mg Oral HS Wagner Lei MD   aspirin 81 mg Oral Daily Wagner Lei MD   clopidogrel 75 mg Oral Daily Wagner Lei MD   DULoxetine 30 mg Oral Daily Wagner Lei MD   lidocaine 1 patch Transdermal Daily Wagner Lei MD   melatonin 6 mg Oral HS Wagner Lei MD   mirtazapine 7 5 mg Oral HS Unique Celaya MD   ondansetron 4 mg Intravenous Q6H PRN Wagner Lei MD   oxyCODONE 10 mg Oral Q6H Wagner Lei MD   pantoprazole 40 mg Oral Early Morning Jd Rand MD        Today, Patient Was Seen By: Tate Kang MD    ** Please Note: Dictation voice to text software may have been used in the creation of this document   **

## 2018-07-27 NOTE — ASSESSMENT & PLAN NOTE
Likely with mild cognitive impairment, will request neuropsychology for competency evaluation, patient is agreeable

## 2018-07-27 NOTE — PROGRESS NOTES
Pt has literally only taken about 1/3 of her golytely at this hour and at this point should be NPO, has not yet moved her bowels at all for colonoscopy at 11 am, called GI to let them know, no return call as of yet, also let SLIM know as well

## 2018-07-27 NOTE — ANESTHESIA PREPROCEDURE EVALUATION
Review of Systems/Medical History  Patient summary reviewed    No history of anesthetic complications     Cardiovascular  Exercise tolerance (METS): <4,  Hyperlipidemia, Hypertension , CAD , Cardiac stents (NGOZI to RCA 7/12/18) < 6 months and drug eluting Dysrhythmias , atrial fibrillation, CHF ,   Comment: Denies angina or dyspnea since insertion of NGOZI, Pulmonary hypertension Pulmonary  Pneumonia, Shortness of breath,        GI/Hepatic      Comment: Confirmed NPO appropriate     Chronic kidney disease stage 2,        Endo/Other     GYN       Hematology  Anemia ,     Musculoskeletal    Arthritis     Neurology    CVA , no residual symptoms,    Psychology   Depression ,              Physical Exam    Airway    Mallampati score: II  TM Distance: >3 FB       Dental   Comment: Only teeth remaining are four front lower with chipped tooth to either side,     Cardiovascular  Rhythm: regular, No murmur,     Pulmonary  Breath sounds clear to auscultation,     Other Findings    7/12/18 LHC:  CORONARY CIRCULATION:  1st diagonal: There was a discrete 50 % stenosis at the ostium of the vessel segment  Mid circumflex: There was a discrete 50 % stenosis just after OM1  1st obtuse marginal: There was a 40 % stenosis  Mid RCA: There was a discrete 90 % stenosis  This is a likely culprit for the patient's clinical presentation  An intervention was performed      1ST LESION INTERVENTIONS:  A balloon angioplasty procedure was performed on the lesion in the mid RCA  A Xience Nikia Rx 3 0 x 28mm drug-eluting stent was placed across the lesion and deployed at a maximum inflation pressure of 14 adam  5/15/18 TTE:  LEFT VENTRICLE:  Systolic function was mildly reduced  Ejection fraction was estimated to be 50 %  There was severe hypokinesis of the basal-mid inferior and basal-mid inferolateral wall(s)  The changes were consistent with concentric remodeling (increased wall thickness with normal wall mass)    Doppler parameters were consistent with elevated mean left atrial filling pressure      RIGHT VENTRICLE:  The ventricle was mildly dilated  Systolic function was normal   Wall thickness was increased      LEFT ATRIUM:  The atrium was mildly dilated      RIGHT ATRIUM:  The atrium was moderately dilated      MITRAL VALVE:  There was moderate annular calcification  There was mild regurgitation      TRICUSPID VALVE:  There was mild to moderate regurgitation  Estimated peak PA pressure was 55 mmHg  The findings suggest moderate pulmonary hypertension      IVC, HEPATIC VEINS:  The inferior vena cava was mildly dilated  Respirophasic changes were blunted (less than 50% variation)  Anesthesia Plan  ASA Score- 4     Anesthesia Type- IV sedation with anesthesia with ASA Monitors  Additional Monitors:   Airway Plan:         Plan Factors-    Induction- intravenous  Postoperative Plan-     Informed Consent- Anesthetic plan and risks discussed with patient

## 2018-07-27 NOTE — ASSESSMENT & PLAN NOTE
EGD revealed mild esophagitis and gastritis, continue pantoprazole  Colonoscopy revealed findings suggestive of ischemic colitis  Monitor hemoglobin closely  GI following

## 2018-07-27 NOTE — ASSESSMENT & PLAN NOTE
Due to GI blood loss, status post transfusion  Monitor hemoglobin closely   Off oral anticoagulation

## 2018-07-27 NOTE — OP NOTE
**** GI/ENDOSCOPY REPORT ****     PATIENT NAME: LISA FERGUSON ------ VISIT ID:  Patient ID: [id number]   YOB: 1940     INTRODUCTION: Colonoscopy - A 69 female patient presents for an inpatient   Colonoscopy at [address] [place] [address]  PREVIOUS COLONOSCOPY:     INDICATIONS: Surveillance  Recent melena  CONSENT:  The benefits, risks, and alternatives to the procedure were   discussed and informed consent was obtained from the patient  PREPARATION: EKG, pulse, pulse oximetry and blood pressure were monitored   throughout the procedure  The patient was identified by myself both   verbally and by visual inspection of ID band  Airway Assessment   Classification: Airway class 2 - Visualization of the soft palate, fauces   and uvula  ASA Classification: Class 2 - Patient has mild to moderate   systemic disturbance that may or may not be related to the disorder   requiring surgery  MEDICATIONS: Anesthesia-check records     PROCEDURE:  The endoscope was passed without difficulty through the anus   under direct visualization and advanced to the cecum, confirmed by   appendiceal orifice and ileocecal valve  The scope was withdrawn and the   mucosa was carefully examined  The quality of the preparation was  Cecal   Intubation Time: Minute(s) Scope Withdrawal Time: Minute(s)     RECTAL EXAM: Normal rectal exam      FINDINGS:  There were multiple irregular clean base ulcers in the cecum   and ascending colon  They were not bleeding, mild oozing with water flush  The largest ulcer measured about 10 mm  Biopsies were taken from the   ulcers in the cecum  The transverse colon, descending colon, and sigmoid   colon appeared to be normal  Hemorrhoids were found  COMPLICATIONS: There were no complications  IMPRESSIONS: Superficial Ulcers found in the cecum and ascending colon   likely ischemic colitis  Biopsy taken   Normal transverse colon, descending   colon, and sigmoid colon  Hemorrhoids  RECOMMENDATIONS: Continue to hold anticoagulation until her hemoglobin   stablizes  Contive plavix  Monitor Hb  Resume regular diet as tolerated  Follow-up on the results of the biopsy specimens  Repeat colonoscopy in   3-6 months to confirm healing of the ulcer  ESTIMATED BLOOD LOSS:     PATHOLOGY SPECIMENS: Biopsy taken from cecum  Associated finding: Ulcer  PROCEDURE CODES:     ICD-9 Codes: 578 1 Blood in stool 569 82 Ulceration of intestine     ICD-10 Codes: K92 1 Melena K63 3 Ulcer of intestine K64 9 Unspecified   hemorrhoids     PERFORMED BY: LOPEZ Acosta  on 07/27/2018  Version 1, electronically signed by LOPEZ Savage  on 07/27/2018   at 12:23

## 2018-07-27 NOTE — PROGRESS NOTES
Pt now seems to be drinking her golytely with a tiny bit of applejuice per cupful, has tried to have a BM but to no avail yet, await GI response

## 2018-07-27 NOTE — PROGRESS NOTES
07/27/18 1320   Stress Factors   Patient Stress Factors Strained family relationships   Coping Responses   Patient Coping Open/discussion   Plan of Care   Comments (encouraged focus on present)   Assessment Completed by: Unit visit

## 2018-07-27 NOTE — CONSULTS
Consultation - Neuropsychology/Psychology Department  Radha Marie 69 y o  female MRN: [id number]  Unit/Bed#: [facility] 714-01 Encounter: [id number]        BACKGROUND:  Radha Marie is a 69y o  year old female referred for neuropsychological testing to assess cognitive, functioning and comment on capacity to make informed medical and self care decisions  History of Present Illness :Patient presented to the hospital with dizziness, lightheadedness, and worsening fatigue  Physician Requesting Consult: Rekha Hoang MD      Consults      Historical Information   Past Medical History:   Diagnosis Date    A-fib [facility])     Acute respiratory disease     Anemia     Arthritis     [facility]est[facility]     Chronic pain     [facility] failure [facility]     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     History of colon polyps     Hx of long term use of blood thinners     Hypertension     Irregular heart beat     Narcotic dependence ([facility]     Rectal bleeding     Stroke [facility]     mild no deficiets/ memory loss    Uses walker      Past Surgical History:   Procedure Laterality Date    COLONOSCOPY      CORONARY STENT PLACEMENT      ERCP N/A 5/14/2018    Procedure: ENDOSCOPIC RETROGRADE CHOLANGIOPANCREATOGRAPHY (ERCP); Surgeon: Radha Liz MD;  Location: [facility];  Service: Gastroenterology    ESOPHAGOGASTRODUODENOSCOPY N/A 7/26/2018    Procedure: ESOPHAGOGASTRODUODENOSCOPY (EGD); Surgeon: Ruben Mckeon MD;  Location: [facility];   Service: Gastroenterology    JOINT REPLACEMENT Right     knee     Social History   History   Alcohol Use No     History   Drug Use No     History   Smoking Status    Former Smoker   Smokeless Tobacco    Never Used     Family History: See chart    Meds/Allergies   See chart    No Known Allergies    Imaging Studies:       Family and Social Support:   Living Arrangements: Alone  Support Systems: Family members  Assistance Needed: independent  Type of Current Residence: Private residence  [address]: No  Discharge planning discussed with[de-identified] pt  Freedom of Choice: Yes  IMM Given (Date):: 07/27/18 (Pt initialed IMM letter  )  IMM Given to[de-identified] Patient    Behavioral observations: Alert, oriented, cooperative  Patient was noted to become fatigued towards the end of the assessment, stating "I can't think anymore " ; pleasant affect; denied depressed mood and anxiety; thoughts coherent and logical; attention was variable due to increasing fatigue; speech was tangential at times; memory was impaired (e g , asked who ordered these tests several times)  Patient was able to describe reason for hospitalization  Cognitive Examination    General Cognitive Functioning ® MMSE = 27/28 Average with deficits in recall; General Fund of Information =Borderline    Attention/Concertration ® Auditory Selective Attention = Average; Information Processing Speed = appears adequate    Frontal Systems/Executive Functioning ® Mental Flexibility/Cognitive Control = Borderline/ Low Average; Working Memory = Average; Abstract Reasoning = Average; Generative Ability = Low Average    Language Functioning ® Confrontation naming = Average; Phonemic Fluency = Average; Semantic Retrieval = Average; Comprehension of Complex Ideational Material = Average; Praxis = WNL's; Repetition = WNL'S; Basic Reading = WNL's; Following Commands = WNL's    Memory Functioning ® Narrative Recall - Short Delay = Borderline; Long Delay Narrative Recall = Impaired; Visual Recognition = Impaired    Visuo-Spatial Abilities Unable to assess  Functional Knowledge ® Health & Safety Knowledge = Within Normal Limits    Emotional Functioning: Endorses history of depression; denies current depressed mood and anxiety  Summary/Impression: Results of neuropsychological testing revealed deficits in narrative recall and visual recognition, with weaknesses in immediate recall for narrative information   She demonstrated adequate confrontation naming, phonemic fluency, semantic retrieval, comprehension of complex ideational material, praxis, basic reading, and following commands  She also demonstrated average working memory, abstract reasoning, generative ability, auditory selective attention, and processing speed with a relative weakness in mental flexibility  On a measure assessing awareness of personal health status and ability to evaluate health problems; handle medical emergencies and take precautions, patient performed within normal limits  Patient is aware that she requires assistance and that her memory is poor  Patient reported that she is willing to consider rehab because she knows it will improve her functioning, but she is very concerned about her landlord coming into her house and going through her things while she is away, therefore she would prefer in-home services  When asked if she would go to rehab if the landlord was not an issue (e g , something she did not have to worry about) she replied that she would  At this time, patient appears to have capacity to make informed self care and medical decisions  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

## 2018-07-27 NOTE — ASSESSMENT & PLAN NOTE
Recent cardiac catheterization with drug-eluting stent in place continue aspirin and Plavix  Cardiology following

## 2018-07-27 NOTE — ANESTHESIA POSTPROCEDURE EVALUATION
Post-Op Assessment Note      CV Status:  Stable    Mental Status:  Alert and awake    Hydration Status:  Euvolemic    PONV Controlled:  Controlled    Airway Patency:  Patent and adequate    Post Op Vitals Reviewed: Yes          Staff: CRNA           BP   115/57   Temp      Pulse  71   Resp      SpO2   93%

## 2018-07-28 LAB
ANION GAP SERPL CALCULATED.3IONS-SCNC: 4 MMOL/L (ref 4–13)
BASOPHILS # BLD AUTO: 0.02 THOUSANDS/ΜL (ref 0–0.1)
BASOPHILS NFR BLD AUTO: 0 % (ref 0–1)
BUN SERPL-MCNC: 8 MG/DL (ref 5–25)
CALCIUM SERPL-MCNC: 8.1 MG/DL (ref 8.3–10.1)
CHLORIDE SERPL-SCNC: 109 MMOL/L (ref 100–108)
CO2 SERPL-SCNC: 29 MMOL/L (ref 21–32)
CREAT SERPL-MCNC: 0.68 MG/DL (ref 0.6–1.3)
EOSINOPHIL # BLD AUTO: 0.33 THOUSAND/ΜL (ref 0–0.61)
EOSINOPHIL NFR BLD AUTO: 7 % (ref 0–6)
ERYTHROCYTE [DISTWIDTH] IN BLOOD BY AUTOMATED COUNT: 18.8 % (ref 11.6–15.1)
GFR SERPL CREATININE-BSD FRML MDRD: 85 ML/MIN/1.73SQ M
GLUCOSE SERPL-MCNC: 86 MG/DL (ref 65–140)
HCT VFR BLD AUTO: 21.6 % (ref 34.8–46.1)
HCT VFR BLD AUTO: 25.7 % (ref 34.8–46.1)
HGB BLD-MCNC: 6.7 G/DL (ref 11.5–15.4)
HGB BLD-MCNC: 8.1 G/DL (ref 11.5–15.4)
IMM GRANULOCYTES # BLD AUTO: 0.03 THOUSAND/UL (ref 0–0.2)
IMM GRANULOCYTES NFR BLD AUTO: 1 % (ref 0–2)
LYMPHOCYTES # BLD AUTO: 0.65 THOUSANDS/ΜL (ref 0.6–4.47)
LYMPHOCYTES NFR BLD AUTO: 13 % (ref 14–44)
MCH RBC QN AUTO: 30.9 PG (ref 26.8–34.3)
MCHC RBC AUTO-ENTMCNC: 31 G/DL (ref 31.4–37.4)
MCV RBC AUTO: 100 FL (ref 82–98)
MONOCYTES # BLD AUTO: 0.52 THOUSAND/ΜL (ref 0.17–1.22)
MONOCYTES NFR BLD AUTO: 11 % (ref 4–12)
NEUTROPHILS # BLD AUTO: 3.29 THOUSANDS/ΜL (ref 1.85–7.62)
NEUTS SEG NFR BLD AUTO: 68 % (ref 43–75)
NRBC BLD AUTO-RTO: 1 /100 WBCS
PLATELET # BLD AUTO: 246 THOUSANDS/UL (ref 149–390)
PMV BLD AUTO: 8.8 FL (ref 8.9–12.7)
POTASSIUM SERPL-SCNC: 3.9 MMOL/L (ref 3.5–5.3)
RBC # BLD AUTO: 2.17 MILLION/UL (ref 3.81–5.12)
SODIUM SERPL-SCNC: 142 MMOL/L (ref 136–145)
WBC # BLD AUTO: 4.84 THOUSAND/UL (ref 4.31–10.16)

## 2018-07-28 PROCEDURE — 85018 HEMOGLOBIN: CPT | Performed by: HOSPITALIST

## 2018-07-28 PROCEDURE — 99232 SBSQ HOSP IP/OBS MODERATE 35: CPT | Performed by: INTERNAL MEDICINE

## 2018-07-28 PROCEDURE — 85025 COMPLETE CBC W/AUTO DIFF WBC: CPT | Performed by: INTERNAL MEDICINE

## 2018-07-28 PROCEDURE — P9016 RBC LEUKOCYTES REDUCED: HCPCS

## 2018-07-28 PROCEDURE — 80048 BASIC METABOLIC PNL TOTAL CA: CPT | Performed by: INTERNAL MEDICINE

## 2018-07-28 PROCEDURE — 85014 HEMATOCRIT: CPT | Performed by: HOSPITALIST

## 2018-07-28 RX ADMIN — OXYCODONE HYDROCHLORIDE 10 MG: 10 TABLET ORAL at 17:05

## 2018-07-28 RX ADMIN — AMIODARONE HYDROCHLORIDE 200 MG: 200 TABLET ORAL at 08:49

## 2018-07-28 RX ADMIN — OXYCODONE HYDROCHLORIDE 10 MG: 10 TABLET ORAL at 07:00

## 2018-07-28 RX ADMIN — AMIODARONE HYDROCHLORIDE 200 MG: 200 TABLET ORAL at 17:05

## 2018-07-28 RX ADMIN — ASPIRIN 81 MG 81 MG: 81 TABLET ORAL at 08:50

## 2018-07-28 RX ADMIN — PANTOPRAZOLE SODIUM 40 MG: 40 TABLET, DELAYED RELEASE ORAL at 07:00

## 2018-07-28 RX ADMIN — LIDOCAINE 1 PATCH: 50 PATCH CUTANEOUS at 08:48

## 2018-07-28 RX ADMIN — OXYCODONE HYDROCHLORIDE 10 MG: 10 TABLET ORAL at 22:26

## 2018-07-28 RX ADMIN — AMITRIPTYLINE HYDROCHLORIDE 25 MG: 25 TABLET, FILM COATED ORAL at 22:26

## 2018-07-28 RX ADMIN — DULOXETINE HYDROCHLORIDE 30 MG: 30 CAPSULE, DELAYED RELEASE ORAL at 08:49

## 2018-07-28 RX ADMIN — MIRTAZAPINE 7.5 MG: 15 TABLET, FILM COATED ORAL at 22:26

## 2018-07-28 RX ADMIN — MELATONIN 6 MG: at 22:26

## 2018-07-28 RX ADMIN — CLOPIDOGREL BISULFATE 75 MG: 75 TABLET, FILM COATED ORAL at 08:50

## 2018-07-28 RX ADMIN — OXYCODONE HYDROCHLORIDE 10 MG: 10 TABLET ORAL at 00:01

## 2018-07-28 NOTE — ASSESSMENT & PLAN NOTE
EGD revealed esophagitis, gastritis  Colonoscopy revealed ischemic colitis  Continue pantoprazole  Monitor hemoglobin closely  GI following

## 2018-07-28 NOTE — ASSESSMENT & PLAN NOTE
Recent cardiac catheterization with drug-eluting stent in place on aspirin and Plavix  Cardiology input noted

## 2018-07-28 NOTE — PROGRESS NOTES
The    Progress Note - Ferdinand Story [birth year], 69 y o  female MRN: [id number]    Unit/Bed#: [facility] 714-01 Encounter: [id number]    Primary Care Provider: Rhonda Diaz DO   Date and time admitted to hospital: 7/25/2018 11:53 AM        GI bleed   Assessment & Plan    EGD revealed esophagitis, gastritis  Colonoscopy revealed ischemic colitis  Continue pantoprazole  Monitor hemoglobin closely  GI following        * Acute blood loss anemia   Assessment & Plan    Due to GI blood loss, patient receiving a unit of packed cells today, monitor hemoglobin closely  Off oral anticoagulation        Atrial fibrillation with RVR (HCC)   Assessment & Plan    Continue to hold oral anticoagulation  Cardiology following          [facility] ([facility]   Assessment & Plan    EF 35%  Cardiology following         Opioid dependence ([facility] )   Assessment & Plan    On oxycodone        Mild cognitive impairment   Assessment & Plan    Likely mild cognitive impairment, neuropsychology input noted  Patient is deemed competent to make medical decisions for herself        Coronary artery disease   Assessment & Plan    Recent cardiac catheterization with drug-eluting stent in place on aspirin and Plavix  Cardiology input noted            VTE Pharmacologic Prophylaxis:   Pharmacologic: GI bleed  Mechanical VTE Prophylaxis in Place: Yes    Patient Centered Rounds: I have performed bedside rounds with nursing staff today  Discussions with Specialists or Other Care Team Provider:     Education and Discussions with Family / Patient:  Discussed with the patient    Time Spent for Care: 30 minutes  More than 50% of total time spent on counseling and coordination of care as described above      Current Length of Stay: 3 day(s)    Current Patient Status: Inpatient   Certification Statement: The patient will continue to require additional inpatient hospital stay due to As mentioned    Discharge Plan:  GI bleed requiring transfusion today, monitoring hemoglobin closely      Code Status: Level 3 - DNAR and DNI      Subjective:     Reports feeling tired and fatigued  Appetite fair  Patient receiving packed cells  Patient is agreeable to placement to rehab if needed    Objective:     Vitals:   Temp (24hrs), Av 8 °F (36 6 °C), Min:97 5 °F (36 4 °C), Max:98 1 °F (36 7 °C)    HR:  [66-74] 68  Resp:  [18] 18  BP: ()/(50-71) 120/54  SpO2:  [93 %-95 %] 95 %  Body mass index is 25 17 kg/m²  Input and Output Summary (last 24 hours): Intake/Output Summary (Last 24 hours) at 18 1350  Last data filed at 18 1300   Gross per 24 hour   Intake             1160 ml   Output              550 ml   Net              610 ml       Physical Exam:     Physical Exam    Comfortably sitting up in bed  Neck supple  Lungs diminished breath sounds bases  Heart sounds S1-S2 noted  Abdomen soft  Awake alert obeys simple commands  No rash  Pulses present    Additional Data:     Labs:      Results from last 7 days  Lab Units 18  0534   WBC Thousand/uL 4 84   HEMOGLOBIN g/dL 6 7*   HEMATOCRIT % 21 6*   PLATELETS Thousands/uL 246   NEUTROS PCT % 68   LYMPHS PCT % 13*   MONOS PCT % 11   EOS PCT % 7*       Results from last 7 days  Lab Units 18  0534  18  1213   SODIUM mmol/L 142  < > 138   POTASSIUM mmol/L 3 9  < > 4 4   CHLORIDE mmol/L 109*  < > 106   CO2 mmol/L 29  < > 26   BUN mg/dL 8  < > 41*   CREATININE mg/dL 0 68  < > 0 89   CALCIUM mg/dL 8 1*  < > 8 2*   TOTAL PROTEIN g/dL  --   --  5 6*   BILIRUBIN TOTAL mg/dL  --   --  0 29   ALK PHOS U/L  --   --  54   ALT U/L  --   --  20   AST U/L  --   --  15   GLUCOSE RANDOM mg/dL 86  < > 110   < > = values in this interval not displayed  Results from last 7 days  Lab Units 18  0542   INR  1 35*       Results from last 7 days  Lab Units 18  1624   POC GLUCOSE mg/dl 119             * I Have Reviewed All Lab Data Listed Above  * Additional Pertinent Lab Tests Reviewed:  All Labs Within Last 24 Hours Reviewed    Imaging:    Imaging Reports Reviewed Today Include:    Imaging Personally Reviewed by Myself Includes:     Recent Cultures (last 7 days):           Last 24 Hours Medication List:     Current Facility-Administered Medications:  ALPRAZolam 0 25 mg Oral HS PRN Brandon Carr MD   amiodarone 200 mg Oral BID With Meals Brandon Carr MD   [START ON 7/30/2018] amiodarone 200 mg Oral Daily With Breakfast Brandon Carr MD   amitriptyline 25 mg Oral HS Brandon Carr MD   aspirin 81 mg Oral Daily Brandon Carr MD   clopidogrel 75 mg Oral Daily Brandon Carr MD   DULoxetine 30 mg Oral Daily Brandon Carr MD   lidocaine 1 patch Transdermal Daily Brandon Carr MD   melatonin 6 mg Oral HS Brandon Carr MD   mirtazapine 7 5 mg Oral HS Unique Celaya MD   ondansetron 4 mg Intravenous Q6H PRN Brandon Carr MD   oxyCODONE 10 mg Oral Q6H Brandon Carr MD   pantoprazole 40 mg Oral Early Morning Sarmad Pro MD        Today, Patient Was Seen By: Bal Hoffman MD    ** Please Note: Dictation voice to text software may have been used in the creation of this document   **

## 2018-07-28 NOTE — PROGRESS NOTES
General Cardiology   Progress Note -  Team One   Katrina Chase 69 y o  female MRN: [id number]    Unit/Bed#: [facility] 714-01 Encounter: [id number]        Subjective: Pt seen for follow up  No events overnight  She had colonoscopy yesterday that showed superficial ulcers in the cecum and ascending colon consistent with ischemic colitis  Hemoglobin dropped today and is getting of a unit of blood  Patient denies any hemoptysis, hematemesis or rectal bleeding  The time my exam patient reports feeling tired and weak  She is not experiencing any chest pain, palpitations or shortness of breath  Review of Systems   Constitution: Positive for weakness and malaise/fatigue  Negative for decreased appetite and fever  Cardiovascular: Negative for chest pain, dyspnea on exertion, irregular heartbeat, leg swelling and orthopnea  Respiratory: Negative for cough and shortness of breath  Musculoskeletal: Positive for back pain (Chronic)  Gastrointestinal: Negative for abdominal pain, nausea and vomiting  Genitourinary: Negative for dysuria  Neurological: Negative for dizziness and light-headedness  Psychiatric/Behavioral: Negative for altered mental status  All other systems reviewed and are negative  Objective:   Vitals: Blood pressure 118/54, pulse 68, temperature 97 8 °F (36 6 °C), temperature source Oral, resp  rate 18, height 5' 5" (1 651 m), weight 68 6 kg (151 lb 3 8 oz), SpO2 95 %  ,     Body mass index is 25 17 kg/m²  ,     Systolic (46LOX), TMC:982 , Min:95 , BRAULIO:597     Diastolic (16QVN), VRK:70, Min:50, Max:71      Intake/Output Summary (Last 24 hours) at 07/28/18 1221  Last data filed at 07/28/18 0802   Gross per 24 hour   Intake              680 ml   Output              700 ml   Net              -20 ml     Weight (last 2 days)     Date/Time   Weight    07/28/18 0600  68 6 (151 24)    07/27/18 1102  68 (150)    07/26/18 0600  68 4 (150 79)            Telemetry Review: No significant arrhythmias seen on telemetry review  No telemetry    Physical Exam   Constitutional: No distress  Patient is sitting up at side of bed in NAD alert and cooperative   HENT:   Head: Normocephalic and atraumatic  Neck: No JVD present  Cardiovascular: Normal rate, regular rhythm, S1 normal and S2 normal     No murmur heard  No lower extremity edema   Pulmonary/Chest: Effort normal and breath sounds normal  No respiratory distress  She has no wheezes  She has no rales  Genitourinary: No vaginal discharge found  Musculoskeletal: She exhibits no edema  Neurological: She is alert  Short term memory loss noted   Skin: She is not diaphoretic  Psychiatric:   Anxious   Nursing note and vitals reviewed      LABORATORY RESULTS    Results from last 7 days  Lab Units 07/25/18  1213   TROPONIN I ng/mL 0 02     CBC with diff:   Results from last 7 days  Lab Units 07/28/18  0534 07/27/18  2052 07/27/18  0809 07/26/18  2032 07/26/18  0542 07/26/18  0108 07/25/18  1741 07/25/18  1213   WBC Thousand/uL 4 84  --   --   --  11 19* 11 93*  --  12 84*   HEMOGLOBIN g/dL 6 7* 7 1* 7 2* 7 6* 7 6* 7 4* 6 1* 5 7*   HEMATOCRIT % 21 6* 22 6* 23 1* 23 9* 24 1* 22 7* 20 1* 18 2*   MCV fL 100*  --   --   --  96 96  --  102*   PLATELETS Thousands/uL 246  --   --   --  267 256  --  323   MCH pg 30 9  --   --   --  30 3 31 2  --  32 0   MCHC g/dL 31 0*  --   --   --  31 5 32 6  --  31 3*   RDW % 18 8*  --   --   --  17 5* 17 3*  --  17 7*   MPV fL 8 8*  --   --   --  9 6 9 7  --  9 8   NRBC AUTO /100 WBCs 1  --   --   --   --  1  --  1       CMP:  Results from last 7 days  Lab Units 07/28/18  0534 07/26/18  0542 07/25/18  1213   SODIUM mmol/L 142 140 138   POTASSIUM mmol/L 3 9 4 2 4 4   CHLORIDE mmol/L 109* 109* 106   CO2 mmol/L 29 26 26   ANION GAP mmol/L 4 5 6   BUN mg/dL 8 29* 41*   CREATININE mg/dL 0 68 0 74 0 89   GLUCOSE RANDOM mg/dL 86 73 110   CALCIUM mg/dL 8 1* 8 2* 8 2*   AST U/L  --   --  15   ALT U/L  --   --  20   ALK PHOS U/L  --   --  54 TOTAL PROTEIN g/dL  --   --  5 6*   BILIRUBIN TOTAL mg/dL  --   --  0 29   EGFR ml/min/1 73sq m 85 78 63       BMP:  Results from last 7 days  Lab Units 18  0534 18  0542 18  1213   SODIUM mmol/L 142 140 138   POTASSIUM mmol/L 3 9 4 2 4 4   CHLORIDE mmol/L 109* 109* 106   CO2 mmol/L 29 26 26   BUN mg/dL 8 29* 41*   CREATININE mg/dL 0 68 0 74 0 89   GLUCOSE RANDOM mg/dL 86 73 110   CALCIUM mg/dL 8 1* 8 2* 8 2*       Lab Results   Component Value Date    NTBNP 860 (H) 2018    NTBNP 21,054 (H) 2018    NTBNP 22,029 (H) 2018             Results from last 7 days  Lab Units 18  0542   MAGNESIUM mg/dL 2 5                       Results from last 7 days  Lab Units 18  0542 18  1213   INR  1 35* 2 16*       Lipid Profile:   Lab Results   Component Value Date    CHOL 218 2015    CHOL 151 2014    CHOL 144 2013     Lab Results   Component Value Date    HDL 47 2015    HDL 41 2014    HDL 48 2013     Lab Results   Component Value Date    LDLCALC 140 (H) 2015    LDLCALC 87 2014    LDLCALC 73 2013     Lab Results   Component Value Date    TRIG 155 2015    TRIG 117 2014    TRIG 115 2013       Cardiac testing:   Results for orders placed during the hospital encounter of 18   Echo complete with contrast if indicated    [address]  [address]  [phone]    Transthoracic Echocardiogram  2D, M-mode, Doppler, and Color Doppler    Study date:      Patient: Kelle John  MR number: [id number]  Account number: [de-identified]  : 1940  Age: 69 years  Gender: Female  Status: Inpatient  Location: Bedside  Height: 65 in  Weight: 178 6 lb  BP: 117/ 81 mmHg    Indications: Assess left ventricular function      Diagnoses: I24 9 - Acute ischemic heart disease, unspecified    Sonographer:  Juan Francisco Sroia RDCS  Primary Physician:  Santos Mitchell Angelica Ruano DO  Referring Physician:  Bhavesh Artis MD  Group:  [facility]  Cardiology Fellow:  Mikhail Moreau MD  Interpreting Physician:  Gita Lu DO    SUMMARY    LEFT VENTRICLE:  Systolic function was mildly reduced  Ejection fraction was estimated to be 50 %  There was severe hypokinesis of the basal-mid inferior and basal-mid inferolateral wall(s)  The changes were consistent with concentric remodeling (increased wall thickness with normal wall mass)  Doppler parameters were consistent with elevated mean left atrial filling pressure  RIGHT VENTRICLE:  The ventricle was mildly dilated  Systolic function was normal   Wall thickness was increased  LEFT ATRIUM:  The atrium was mildly dilated  RIGHT ATRIUM:  The atrium was moderately dilated  MITRAL VALVE:  There was moderate annular calcification  There was mild regurgitation  TRICUSPID VALVE:  There was mild to moderate regurgitation  Estimated peak PA pressure was 55 mmHg  The findings suggest moderate pulmonary hypertension  IVC, HEPATIC VEINS:  The inferior vena cava was mildly dilated  Respirophasic changes were blunted (less than 50% variation)  HISTORY: PRIOR HISTORY: systolic heart failure, afib, sepsis, chronic anticoagulation    PROCEDURE: The procedure was performed at the bedside  This was a routine study  The transthoracic approach was used  The study included complete 2D imaging, M-mode, complete spectral Doppler, and color Doppler  The heart rate was 87 bpm,  at the start of the study  Images were obtained from the parasternal, apical, subcostal, and suprasternal notch acoustic windows  Image quality was adequate  LEFT VENTRICLE: Size was normal  Systolic function was mildly reduced  Ejection fraction was estimated to be 50 %  There was severe hypokinesis of the basal-mid inferior and basal-mid inferolateral wall(s)  Wall thickness was mildly  increased   The changes were consistent with concentric remodeling (increased wall thickness with normal wall mass)  DOPPLER: Transmitral flow pattern: atrial fibrillation  Doppler parameters were consistent with elevated mean left atrial  filling pressure  RIGHT VENTRICLE: The ventricle was mildly dilated  Systolic function was normal  Wall thickness was increased  LEFT ATRIUM: The atrium was mildly dilated  RIGHT ATRIUM: The atrium was moderately dilated  MITRAL VALVE: There was moderate annular calcification  Valve structure was normal  There was mild calcification of the valve  There was normal leaflet separation  DOPPLER: The transmitral velocity was within the normal range  There was no  evidence for stenosis  There was mild regurgitation  AORTIC VALVE: The valve was trileaflet  Leaflets exhibited moderately increased thickness, moderate calcification, and normal cuspal separation  DOPPLER: Transaortic velocity was within the normal range  There was no evidence for stenosis  There was no regurgitation  TRICUSPID VALVE: The valve structure was normal  There was normal leaflet separation  DOPPLER: The transtricuspid velocity was within the normal range  There was no evidence for stenosis  There was mild to moderate regurgitation  Pulmonary  artery systolic pressure was moderately increased  Estimated peak PA pressure was 55 mmHg  The findings suggest moderate pulmonary hypertension  PULMONIC VALVE: Leaflets exhibited normal thickness, no calcification, and normal cuspal separation  DOPPLER: The transpulmonic velocity was within the normal range  There was trace regurgitation  PERICARDIUM: There was no pericardial effusion  The pericardium was normal in appearance  AORTA: The root exhibited normal size  SYSTEMIC VEINS: IVC: The inferior vena cava was mildly dilated  Respirophasic changes were blunted (less than 50% variation)      SYSTEM MEASUREMENT TABLES    2D  %FS: 23 31 %  Ao Diam: 2 84 cm  EDV(Teich): 127 84 ml  EF(Teich): 46 35 %  ESV(Teich): 68 58 ml  IVSd: 1 14 cm  LA Area: 21 35 cm2  LA Diam: 3 89 cm  LVEDV MOD A4C: 125 04 ml  LVEF MOD A4C: 61 7 %  LVESV MOD A4C: 47 9 ml  LVIDd: 5 17 cm  LVIDs: 3 97 cm  LVLd A4C: 8 04 cm  LVLs A4C: 7 66 cm  LVPWd: 1 09 cm  RA Area: 23 54 cm2  RVIDd: 4 27 cm  SV MOD A4C: 77 15 ml  SV(Teich): 59 26 ml    CW  TR Vmax: 3 26 m/s  TR maxP 59 mmHg    MM  TAPSE: 2 89 cm    PW  AVC: 377 63 ms  E': 0 07 m/s  E/E': 11 01  MV A Ge: 0 21 m/s  MV Dec [place]: 4 94 m/s2  MV DecT: 153 92 ms  MV E Ge: 0 76 m/s  MV E/A Ratio: 3 58  MV PHT: 44 64 ms  MVA By PHT: 4 93 cm2    [facility] Accredited Echocardiography Laboratory    Prepared and electronically signed by    Sally Enriquez DO  Signed 15-May-2018 14:00:03       Results for orders placed during the hospital encounter of 18   TIFFANIE    Narrative [facility]  [facility], [address] [facility]  [phone]    Transesophageal Echocardiogram  2D, Doppler, and Color Doppler    Study date:      Patient: Lázaro Torres  MR number: [id number]  Account number: [de-identified]  : 1940  Age: 69 years  Gender: Female  Status: Inpatient  Location: Cath lab  Height: 65 in  Weight: 149 lb  BP: 79/ 52 mmHg    Indications: Atrial fibrillation  Diagnoses: I48 0 - Atrial fibrillation    Sonographer:  Christoph Ching RDCS  Interpreting Physician:  Dominik Biswas MD  Primary Physician:  Kalee Geiger DO  Referring Physician:  Pauline Owen MD  Group:  [facility]  Cardiology Fellow:  Pauline Owen MD    SUMMARY    LEFT VENTRICLE:  Systolic function was markedly reduced  Ejection fraction was estimated to be 35 %  Beat to beat variability and tachycardia limit an accurate assessment of ejection fraction  There was severe diffuse hypokinesis with regional variations  RIGHT VENTRICLE:  The ventricle was mildly dilated    Systolic function was reduced  LEFT ATRIUM:  The atrium was moderately dilated  No thrombus was identified  There was evidence of spontaneous echo contrast ("smoke")  LEFT ATRIAL APPENDAGE:  The appendage was dilated  The function was moderately reduced (moderately reduced emptying velocity)  No thrombus was identified  There was evidence of spontaneous echo contrast ("smoke") in the appendage  ATRIAL SEPTUM:  No defect or patent foramen ovale was identified  RIGHT ATRIUM:  The atrium was moderately dilated  There was evidence of continuous spontaneous echo contrast ("smoke")  MITRAL VALVE:  There was moderate regurgitation  Regurgitation grade was 3+ on a scale of 0 to 4+  AORTIC VALVE:  There was trace regurgitation  TRICUSPID VALVE:  There was mild regurgitation  PULMONIC VALVE:  There was trace regurgitation  HISTORY: PRIOR HISTORY: Heart failure, Atrial fibrillation, Sepsis  PROCEDURE: The procedure was performed in the catheterization laboratory  This was a routine study  The risks and alternatives of the procedure were explained to the patient and informed consent was obtained  The transesophageal approach  was used  The study included complete 2D imaging, complete spectral Doppler, and color Doppler  The heart rate was 108 bpm, at the start of the study  An adult omniplane probe was inserted by the cardiology fellow under direct supervision  of the attending cardiologist  Intubated with ease  One intubation attempt(s)  There was no blood detected on the probe  Image quality was adequate  There were no complications during the procedure  MEDICATIONS: Benzocaine spray, topical  to oropharynx, prior to procedure  Anesthesia administered by anesthesia team     LEFT VENTRICLE: Size was normal  Systolic function was markedly reduced  Ejection fraction was estimated to be 35 %  Beat to beat variability and tachycardia limit an accurate assessment of ejection fraction   There was severe diffuse  hypokinesis with regional variations  Wall thickness was increased  DOPPLER: The study was not technically sufficient to allow evaluation of LV diastolic function  RIGHT VENTRICLE: The ventricle was mildly dilated  Systolic function was reduced  LEFT ATRIUM: The atrium was moderately dilated  No thrombus was identified  There was evidence of spontaneous echo contrast ("smoke")  APPENDAGE: The appendage was dilated  No thrombus was identified  There was evidence of spontaneous echo  contrast ("smoke") in the appendage  DOPPLER: The function was moderately reduced (moderately reduced emptying velocity)  ATRIAL SEPTUM: No defect or patent foramen ovale was identified  RIGHT ATRIUM: The atrium was moderately dilated  No thrombus was identified  There was evidence of continuous spontaneous echo contrast ("smoke")  MITRAL VALVE: There was mild annular calcification  There was mildly reduced leaflet separation  DOPPLER: There was moderate regurgitation  Regurgitation grade was 3+ on a scale of 0 to 4+  The regurgitant jet was centrally directed  AORTIC VALVE: The valve was trileaflet  Leaflets exhibited normal cuspal separation and sclerosis  DOPPLER: There was trace regurgitation  TRICUSPID VALVE: The valve structure was normal  There was normal leaflet separation  DOPPLER: There was mild regurgitation  PULMONIC VALVE: Not well visualized  DOPPLER: There was trace regurgitation  PERICARDIUM: There was no pericardial effusion  The pericardium was normal in appearance  AORTA: The root exhibited normal size  There was no atheroma  There was no evidence for dissection  There was no evidence for aneurysm  PULMONARY VEINS: DOPPLER: There was systolic blunting in the pulmonary vein(s)      [address] Accredited Echocardiography Laboratory    Prepared and electronically signed by    Jeison Mata MD  Signed 12-Jul-2018 13:57:07       Meds/Allergies   current meds:   Current Facility-Administered Medications   Medication Dose Route Frequency    ALPRAZolam (XANAX) tablet 0 25 mg  0 25 mg Oral HS PRN    amiodarone tablet 200 mg  200 mg Oral BID With Meals    [START ON 7/30/2018] amiodarone tablet 200 mg  200 mg Oral Daily With Breakfast    amitriptyline (ELAVIL) tablet 25 mg  25 mg Oral HS    aspirin chewable tablet 81 mg  81 mg Oral Daily    clopidogrel (PLAVIX) tablet 75 mg  75 mg Oral Daily    DULoxetine (CYMBALTA) delayed release capsule 30 mg  30 mg Oral Daily    lidocaine (LIDODERM) 5 % patch 1 patch  1 patch Transdermal Daily    melatonin tablet 6 mg  6 mg Oral HS    mirtazapine (REMERON) tablet 7 5 mg  7 5 mg Oral HS    ondansetron (ZOFRAN) injection 4 mg  4 mg Intravenous Q6H PRN    oxyCODONE (ROXICODONE) immediate release tablet 10 mg  10 mg Oral Q6H    pantoprazole (PROTONIX) EC tablet 40 mg  40 mg Oral Early Morning     Prescriptions Prior to Admission   Medication    amiodarone 200 mg tablet    apixaban (ELIQUIS) 5 mg    aspirin (ECOTRIN LOW STRENGTH) 81 mg EC tablet    clopidogrel (PLAVIX) 75 mg tablet    lidocaine (LIDODERM) 5 %    lisinopril (ZESTRIL) 5 mg tablet    melatonin 3 mg    metoprolol succinate (TOPROL-XL) 50 mg 24 hr tablet    mirtazapine (REMERON) 7 5 MG tablet    oxyCODONE (ROXICODONE) 15 mg immediate release tablet    polyethylene glycol (MIRALAX) 17 g packet    rivaroxaban (XARELTO) 15 mg tablet    ALPRAZolam (NIRAVAM) 0 25 MG dissolvable tablet    amitriptyline (ELAVIL) 25 mg tablet    DULoxetine (CYMBALTA) 30 mg delayed release capsule    furosemide (LASIX) 40 mg tablet    potassium chloride (K-DUR,KLOR-CON) 20 mEq tablet     Assessment:  Principal Problem:    Acute blood loss anemia  Active Problems:    Chronic systolic heart failure (HCC)    Atrial fibrillation with RVR (HCC)    Opioid dependence (HCC)    GI bleed    Coronary artery disease    H/O cholangitis    Gastrointestinal hemorrhage    Mild cognitive impairment      Assessment/ Plan :    Acute blood loss anemia  GI bleed:  Had a colonoscopy yesterday that showed superficial ulcers in the cecum and ascending colon consistent with ischemic colitis; GI recommending holding anticoagulation until her hemoglobin stabilizes  She remains on ASA and plavix due to recent cardiac intervention  History of atrial fibrillation with RVR:  Patient was in sinus rhythm on admission, not currently on telemetry but appears to be maintaining sinus rhythm on exam   Recommending resuming her beta-blocker once blood pressure stabilizes  Her AC is still being held given her GIB; need hgb to stabilize prior to restarting  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; remains on amiodarone taper 200mg BID  QTc on ekg yesterday AM 488ms    CAD s/p NGOZI to mid RCA 7/12/2018  No anginal symptoms; remains on DAPT with ASA/plavix; cannot stop due to recent stent    Chronic systolic CHF  Ischemic cardiomyopathy LVEF 35%  Well compensated on exam; monitor with transfusions; potential for volume overload; may need intermittent lasix but no indicated at this time  Resume BB when BP tolerates  Counseling / Coordination of Care  Total floor / unit time spent today 20 minutes  Greater than 50% of total time was spent with the patient and / or family counseling and / or coordination of care  ** Please Note: Dragon 360 Dictation voice to text software may have been used in the creation of this document   **

## 2018-07-28 NOTE — ASSESSMENT & PLAN NOTE
Due to GI blood loss, patient receiving a unit of packed cells today, monitor hemoglobin closely  Off oral anticoagulation

## 2018-07-28 NOTE — ASSESSMENT & PLAN NOTE
Likely mild cognitive impairment, neuropsychology input noted  Patient is deemed competent to make medical decisions for herself

## 2018-07-29 DIAGNOSIS — I10 ESSENTIAL HYPERTENSION: ICD-10-CM

## 2018-07-29 LAB
ABO GROUP BLD BPU: NORMAL
ABO GROUP BLD: NORMAL
ANION GAP SERPL CALCULATED.3IONS-SCNC: 6 MMOL/L (ref 4–13)
BASOPHILS # BLD AUTO: 0.04 THOUSANDS/ΜL (ref 0–0.1)
BASOPHILS NFR BLD AUTO: 1 % (ref 0–1)
BLD GP AB SCN SERPL QL: NEGATIVE
BPU ID: NORMAL
BUN SERPL-MCNC: 4 MG/DL (ref 5–25)
CALCIUM SERPL-MCNC: 8.3 MG/DL (ref 8.3–10.1)
CHLORIDE SERPL-SCNC: 108 MMOL/L (ref 100–108)
CO2 SERPL-SCNC: 28 MMOL/L (ref 21–32)
CREAT SERPL-MCNC: 0.73 MG/DL (ref 0.6–1.3)
EOSINOPHIL # BLD AUTO: 0.42 THOUSAND/ΜL (ref 0–0.61)
EOSINOPHIL NFR BLD AUTO: 9 % (ref 0–6)
ERYTHROCYTE [DISTWIDTH] IN BLOOD BY AUTOMATED COUNT: 18.3 % (ref 11.6–15.1)
GFR SERPL CREATININE-BSD FRML MDRD: 80 ML/MIN/1.73SQ M
GLUCOSE SERPL-MCNC: 72 MG/DL (ref 65–140)
HCT VFR BLD AUTO: 27 % (ref 34.8–46.1)
HCT VFR BLD AUTO: 28.9 % (ref 34.8–46.1)
HCT VFR BLD AUTO: 29.9 % (ref 34.8–46.1)
HGB BLD-MCNC: 8.4 G/DL (ref 11.5–15.4)
HGB BLD-MCNC: 9 G/DL (ref 11.5–15.4)
HGB BLD-MCNC: 9.2 G/DL (ref 11.5–15.4)
IMM GRANULOCYTES # BLD AUTO: 0.02 THOUSAND/UL (ref 0–0.2)
IMM GRANULOCYTES NFR BLD AUTO: 0 % (ref 0–2)
LYMPHOCYTES # BLD AUTO: 0.68 THOUSANDS/ΜL (ref 0.6–4.47)
LYMPHOCYTES NFR BLD AUTO: 14 % (ref 14–44)
MCH RBC QN AUTO: 30.7 PG (ref 26.8–34.3)
MCHC RBC AUTO-ENTMCNC: 31.1 G/DL (ref 31.4–37.4)
MCV RBC AUTO: 99 FL (ref 82–98)
MONOCYTES # BLD AUTO: 0.5 THOUSAND/ΜL (ref 0.17–1.22)
MONOCYTES NFR BLD AUTO: 10 % (ref 4–12)
NEUTROPHILS # BLD AUTO: 3.2 THOUSANDS/ΜL (ref 1.85–7.62)
NEUTS SEG NFR BLD AUTO: 66 % (ref 43–75)
NRBC BLD AUTO-RTO: 0 /100 WBCS
PLATELET # BLD AUTO: 263 THOUSANDS/UL (ref 149–390)
PMV BLD AUTO: 9.3 FL (ref 8.9–12.7)
POTASSIUM SERPL-SCNC: 3.9 MMOL/L (ref 3.5–5.3)
RBC # BLD AUTO: 2.74 MILLION/UL (ref 3.81–5.12)
RH BLD: POSITIVE
SODIUM SERPL-SCNC: 142 MMOL/L (ref 136–145)
SPECIMEN EXPIRATION DATE: NORMAL
UNIT DISPENSE STATUS: NORMAL
UNIT PRODUCT CODE: NORMAL
UNIT RH: NORMAL
WBC # BLD AUTO: 4.86 THOUSAND/UL (ref 4.31–10.16)

## 2018-07-29 PROCEDURE — 86901 BLOOD TYPING SEROLOGIC RH(D): CPT | Performed by: PHYSICIAN ASSISTANT

## 2018-07-29 PROCEDURE — 85018 HEMOGLOBIN: CPT | Performed by: HOSPITALIST

## 2018-07-29 PROCEDURE — 99232 SBSQ HOSP IP/OBS MODERATE 35: CPT | Performed by: INTERNAL MEDICINE

## 2018-07-29 PROCEDURE — 85025 COMPLETE CBC W/AUTO DIFF WBC: CPT | Performed by: INTERNAL MEDICINE

## 2018-07-29 PROCEDURE — 86900 BLOOD TYPING SEROLOGIC ABO: CPT | Performed by: PHYSICIAN ASSISTANT

## 2018-07-29 PROCEDURE — 85014 HEMATOCRIT: CPT | Performed by: HOSPITALIST

## 2018-07-29 PROCEDURE — 80048 BASIC METABOLIC PNL TOTAL CA: CPT | Performed by: INTERNAL MEDICINE

## 2018-07-29 PROCEDURE — 86850 RBC ANTIBODY SCREEN: CPT | Performed by: PHYSICIAN ASSISTANT

## 2018-07-29 RX ORDER — METOPROLOL SUCCINATE 25 MG/1
25 TABLET, EXTENDED RELEASE ORAL DAILY
Status: DISCONTINUED | OUTPATIENT
Start: 2018-07-29 | End: 2018-07-30

## 2018-07-29 RX ADMIN — DULOXETINE HYDROCHLORIDE 30 MG: 30 CAPSULE, DELAYED RELEASE ORAL at 09:46

## 2018-07-29 RX ADMIN — CLOPIDOGREL BISULFATE 75 MG: 75 TABLET, FILM COATED ORAL at 09:46

## 2018-07-29 RX ADMIN — OXYCODONE HYDROCHLORIDE 10 MG: 10 TABLET ORAL at 12:24

## 2018-07-29 RX ADMIN — OXYCODONE HYDROCHLORIDE 10 MG: 10 TABLET ORAL at 22:53

## 2018-07-29 RX ADMIN — OXYCODONE HYDROCHLORIDE 10 MG: 10 TABLET ORAL at 17:39

## 2018-07-29 RX ADMIN — MELATONIN 6 MG: at 22:53

## 2018-07-29 RX ADMIN — ASPIRIN 81 MG 81 MG: 81 TABLET ORAL at 09:46

## 2018-07-29 RX ADMIN — APIXABAN 2.5 MG: 2.5 TABLET, FILM COATED ORAL at 17:39

## 2018-07-29 RX ADMIN — MIRTAZAPINE 7.5 MG: 15 TABLET, FILM COATED ORAL at 22:53

## 2018-07-29 RX ADMIN — APIXABAN 5 MG: 5 TABLET, FILM COATED ORAL at 12:24

## 2018-07-29 RX ADMIN — AMITRIPTYLINE HYDROCHLORIDE 25 MG: 25 TABLET, FILM COATED ORAL at 22:53

## 2018-07-29 RX ADMIN — OXYCODONE HYDROCHLORIDE 10 MG: 10 TABLET ORAL at 05:49

## 2018-07-29 RX ADMIN — PANTOPRAZOLE SODIUM 40 MG: 40 TABLET, DELAYED RELEASE ORAL at 05:49

## 2018-07-29 RX ADMIN — AMIODARONE HYDROCHLORIDE 200 MG: 200 TABLET ORAL at 09:46

## 2018-07-29 RX ADMIN — METOPROLOL SUCCINATE 25 MG: 25 TABLET, EXTENDED RELEASE ORAL at 12:24

## 2018-07-29 NOTE — ASSESSMENT & PLAN NOTE
Recent cardiac catheterization with drug-eluting stent in place continue aspirin Plavix, beta-blocker resumed  Cardiology following

## 2018-07-29 NOTE — PROGRESS NOTES
General Cardiology   Progress Note -  [employer]   Елена Vargas 69 y o  female MRN: [id number]    [facility] Encounter: [id number]        Subjective: pt seen for follow up  No events overnight  She is feeling somewhat better today  Denies any rectal bleeding  No cardiac complaints including chest pain, palpitations or SOB  Is asking for her solid foods  Review of Systems   Constitution: Positive for malaise/fatigue  Negative for decreased appetite, fever and weakness  Cardiovascular: Negative for chest pain, irregular heartbeat, leg swelling, orthopnea, palpitations and syncope  Respiratory: Negative for cough and shortness of breath  Gastrointestinal: Negative for abdominal pain, constipation, diarrhea, hematemesis, melena, nausea and vomiting  All other systems reviewed and are negative  Objective:   Vitals: Blood pressure 154/73, pulse 75, temperature 97 8 °F (36 6 °C), temperature source Oral, resp  rate 18, height 5' 5" (1 651 m), weight 68 kg (149 lb 14 6 oz), SpO2 93 %  ,     Body mass index is 24 95 kg/m²  ,     Systolic (24KZJ), QSH:340 , Min:102 , ZTJ:818     Diastolic (12ESX), STJ:37, Min:51, Max:73      Intake/Output Summary (Last 24 hours) at 07/29/18 1048  Last data filed at 07/29/18 0803   Gross per 24 hour   Intake             1620 ml   Output             1900 ml   Net             -280 ml     Weight (last 2 days)     Date/Time   Weight    07/29/18 0552  68 (149 91)    07/28/18 0600  68 6 (151 24)    07/27/18 1102  68 (150)            Telemetry Review:   No telemetry    Physical Exam   Constitutional: She is oriented to person, place, and time  No distress  Pt sitting up in bed in NAD, alert and cooperative   Cardiovascular: Normal rate, regular rhythm, S1 normal and S2 normal     No murmur heard  No LE edema     Pulmonary/Chest: Effort normal and breath sounds normal  No respiratory distress  She has no wheezes  On RA   Musculoskeletal: She exhibits no edema  Neurological: She is alert and oriented to person, place, and time  Skin: Skin is warm  She is not diaphoretic  Psychiatric: She has a normal mood and affect  Her behavior is normal    Nursing note and vitals reviewed  LABORATORY RESULTS    Results from last 7 days  Lab Units 07/25/18  1213   TROPONIN I ng/mL 0 02     CBC with diff:   Results from last 7 days  Lab Units 07/29/18  0926 07/29/18  0458 07/28/18  1545 07/28/18  0534 07/27/18 2052 07/27/18  0809 07/26/18  2032 07/26/18  0542 07/26/18  0108  07/25/18  1213   WBC Thousand/uL  --  4 86  --  4 84  --   --   --  11 19* 11 93*  --  12 84*   HEMOGLOBIN g/dL 9 2* 8 4* 8 1* 6 7* 7 1* 7 2* 7 6* 7 6* 7 4*  < > 5 7*   HEMATOCRIT % 29 9* 27 0* 25 7* 21 6* 22 6* 23 1* 23 9* 24 1* 22 7*  < > 18 2*   MCV fL  --  99*  --  100*  --   --   --  96 96  --  102*   PLATELETS Thousands/uL  --  263  --  246  --   --   --  267 256  --  323   MCH pg  --  30 7  --  30 9  --   --   --  30 3 31 2  --  32 0   MCHC g/dL  --  31 1*  --  31 0*  --   --   --  31 5 32 6  --  31 3*   RDW %  --  18 3*  --  18 8*  --   --   --  17 5* 17 3*  --  17 7*   MPV fL  --  9 3  --  8 8*  --   --   --  9 6 9 7  --  9 8   NRBC AUTO /100 WBCs  --  0  --  1  --   --   --   --  1  --  1   < > = values in this interval not displayed      CMP:  Results from last 7 days  Lab Units 07/29/18  0458 07/28/18  0534 07/26/18  0542 07/25/18  1213   SODIUM mmol/L 142 142 140 138   POTASSIUM mmol/L 3 9 3 9 4 2 4 4   CHLORIDE mmol/L 108 109* 109* 106   CO2 mmol/L 28 29 26 26   ANION GAP mmol/L 6 4 5 6   BUN mg/dL 4* 8 29* 41*   CREATININE mg/dL 0 73 0 68 0 74 0 89   GLUCOSE RANDOM mg/dL 72 86 73 110   CALCIUM mg/dL 8 3 8 1* 8 2* 8 2*   AST U/L  --   --   --  15   ALT U/L  --   --   --  20   ALK PHOS U/L  --   --   --  54   TOTAL PROTEIN g/dL  --   --   --  5 6*   BILIRUBIN TOTAL mg/dL  --   --   --  0 29   EGFR ml/min/1 73sq m 80 85 78 63     BMP:  Results from last 7 days  Lab Units 07/29/18  0451 18  0534 18  0542 18  1213   SODIUM mmol/L 142 142 140 138   POTASSIUM mmol/L 3 9 3 9 4 2 4 4   CHLORIDE mmol/L 108 109* 109* 106   CO2 mmol/L 28 29 26 26   BUN mg/dL 4* 8 29* 41*   CREATININE mg/dL 0 73 0 68 0 74 0 89   GLUCOSE RANDOM mg/dL 72 86 73 110   CALCIUM mg/dL 8 3 8 1* 8 2* 8 2*     Lab Results   Component Value Date    NTBNP 860 (H) 2018    NTBNP 21,054 (H) 2018    NTBNP 22,029 (H) 2018        Results from last 7 days  Lab Units 18  0542   MAGNESIUM mg/dL 2 5     Results from last 7 days  Lab Units 18  0542 18  1213   INR  1 35* 2 16*     Lipid Profile:   Lab Results   Component Value Date    CHOL 218 2015    CHOL 151 2014    CHOL 144 2013     Lab Results   Component Value Date    HDL 47 2015    HDL 41 2014    HDL 48 2013     Lab Results   Component Value Date    LDLCALC 140 (H) 2015    LDLCALC 87 2014    LDLCALC 73 2013     Lab Results   Component Value Date    TRIG 155 2015    TRIG 117 2014    TRIG 115 2013     Cardiac testing:   Results for orders placed during the hospital encounter of 18   Echo complete with contrast if indicated    Narrative [address]  [address] [place] [address]  [phone]    Transthoracic Echocardiogram  2D, M-mode, Doppler, and Color Doppler    Study date:      Patient: Gabriele Dickey  MR number: [id number]  Account number: [de-identified]  : 1940  Age: 69 years  Gender: Female  Status: Inpatient  Location: Bedside  Height: 65 in  Weight: 178 6 lb  BP: 117/ 81 mmHg    Indications: Assess left ventricular function      Diagnoses: I24 9 - Acute ischemic heart disease, unspecified    Sonographer:  Letitia Bernal RDCS  Primary Physician:  Kike Germain DO  Referring Physician:  Luci Murdock MD  Group:  [facility]  Cardiology Fellow:  Scout Rivero MD  Interpreting Physician:  DO KATERINA Tom    LEFT VENTRICLE:  Systolic function was mildly reduced  Ejection fraction was estimated to be 50 %  There was severe hypokinesis of the basal-mid inferior and basal-mid inferolateral wall(s)  The changes were consistent with concentric remodeling (increased wall thickness with normal wall mass)  Doppler parameters were consistent with elevated mean left atrial filling pressure  RIGHT VENTRICLE:  The ventricle was mildly dilated  Systolic function was normal   Wall thickness was increased  LEFT ATRIUM:  The atrium was mildly dilated  RIGHT ATRIUM:  The atrium was moderately dilated  MITRAL VALVE:  There was moderate annular calcification  There was mild regurgitation  TRICUSPID VALVE:  There was mild to moderate regurgitation  Estimated peak PA pressure was 55 mmHg  The findings suggest moderate pulmonary hypertension  IVC, HEPATIC VEINS:  The inferior vena cava was mildly dilated  Respirophasic changes were blunted (less than 50% variation)  HISTORY: PRIOR HISTORY: systolic heart failure, afib, sepsis, chronic anticoagulation    PROCEDURE: The procedure was performed at the bedside  This was a routine study  The transthoracic approach was used  The study included complete 2D imaging, M-mode, complete spectral Doppler, and color Doppler  The heart rate was 87 bpm,  at the start of the study  Images were obtained from the parasternal, apical, subcostal, and suprasternal notch acoustic windows  Image quality was adequate  LEFT VENTRICLE: Size was normal  Systolic function was mildly reduced  Ejection fraction was estimated to be 50 %  There was severe hypokinesis of the basal-mid inferior and basal-mid inferolateral wall(s)  Wall thickness was mildly  increased  The changes were consistent with concentric remodeling (increased wall thickness with normal wall mass)  DOPPLER: Transmitral flow pattern: atrial fibrillation   Doppler parameters were consistent with elevated mean left atrial  filling pressure  RIGHT VENTRICLE: The ventricle was mildly dilated  Systolic function was normal  Wall thickness was increased  LEFT ATRIUM: The atrium was mildly dilated  RIGHT ATRIUM: The atrium was moderately dilated  MITRAL VALVE: There was moderate annular calcification  Valve structure was normal  There was mild calcification of the valve  There was normal leaflet separation  DOPPLER: The transmitral velocity was within the normal range  There was no  evidence for stenosis  There was mild regurgitation  AORTIC VALVE: The valve was trileaflet  Leaflets exhibited moderately increased thickness, moderate calcification, and normal cuspal separation  DOPPLER: Transaortic velocity was within the normal range  There was no evidence for stenosis  There was no regurgitation  TRICUSPID VALVE: The valve structure was normal  There was normal leaflet separation  DOPPLER: The transtricuspid velocity was within the normal range  There was no evidence for stenosis  There was mild to moderate regurgitation  Pulmonary  artery systolic pressure was moderately increased  Estimated peak PA pressure was 55 mmHg  The findings suggest moderate pulmonary hypertension  PULMONIC VALVE: Leaflets exhibited normal thickness, no calcification, and normal cuspal separation  DOPPLER: The transpulmonic velocity was within the normal range  There was trace regurgitation  PERICARDIUM: There was no pericardial effusion  The pericardium was normal in appearance  AORTA: The root exhibited normal size  SYSTEMIC VEINS: IVC: The inferior vena cava was mildly dilated  Respirophasic changes were blunted (less than 50% variation)      SYSTEM MEASUREMENT TABLES    2D  %FS: 23 31 %  Ao Diam: 2 84 cm  EDV(Teich): 127 84 ml  EF(Teich): 46 35 %  ESV(Teich): 68 58 ml  IVSd: 1 14 cm  LA Area: 21 35 cm2  LA Diam: 3 89 cm  LVEDV MOD A4C: 125 04 ml  LVEF MOD A4C: 61 7 %  LVESV MOD A4C: 47 9 ml  LVIDd: 5 17 cm  LVIDs: 3 97 cm  LVLd A4C: 8 04 cm  LVLs A4C: 7 66 cm  LVPWd: 1 09 cm  RA Area: 23 54 cm2  RVIDd: 4 27 cm  SV MOD A4C: 77 15 ml  SV(Teich): 59 26 ml    CW  TR Vmax: 3 26 m/s  TR maxP 59 mmHg    MM  TAPSE: 2 89 cm    PW  AVC: 377 63 ms  E': 0 07 m/s  E/E': 11 01  MV A Ge: 0 21 m/s  MV Dec Harding: 4 94 m/s2  MV DecT: 153 92 ms  MV E Ge: 0 76 m/s  MV E/A Ratio: 3 58  MV PHT: 44 64 ms  MVA By PHT: 4 93 cm2    [facility] Accredited Echocardiography Laboratory    Prepared and electronically signed by    Shaylee Mendes DO  Signed 15-May-2018 14:00:03       Results for orders placed during the hospital encounter of 18   TIFFANIE    N[facility]ative [facility]  [facility], [address] [place] [address]  [phone]    Transesophageal Echocardiogram  2D, Doppler, and Color Doppler    Study date:      Patient: Sal Galvan  MR number: [id number]  Account number: [de-identified]  : 1940  Age: 69 years  Gender: Female  Status: Inpatient  Location: Cath lab  Height: 65 in  Weight: 149 lb  BP: 79/ 52 mmHg    Indications: Atrial fibrillation  Diagnoses: I48 0 - Atrial fibrillation    Sonographer:  Lizandro Burleson RDCS  Interpreting Physician:  Juan R Parrish MD  Primary Physician:  Shea Desouza DO  Referring Physician:  Yamil Dixon MD  Group:  [id number]  Cardiology Fellow:  Yamil Dixon MD    SUMMARY    LEFT VENTRICLE:  Systolic function was markedly reduced  Ejection fraction was estimated to be 35 %  Beat to beat variability and tachycardia limit an accurate assessment of ejection fraction  There was severe diffuse hypokinesis with regional variations  RIGHT VENTRICLE:  The ventricle was mildly dilated  Systolic function was reduced  LEFT ATRIUM:  The atrium was moderately dilated  No thrombus was identified  There was evidence of spontaneous echo contrast ("smoke")      LEFT ATRIAL APPENDAGE:  The appendage was dilated  The function was moderately reduced (moderately reduced emptying velocity)  No thrombus was identified  There was evidence of spontaneous echo contrast ("smoke") in the appendage  ATRIAL SEPTUM:  No defect or patent foramen ovale was identified  RIGHT ATRIUM:  The atrium was moderately dilated  There was evidence of continuous spontaneous echo contrast ("smoke")  MITRAL VALVE:  There was moderate regurgitation  Regurgitation grade was 3+ on a scale of 0 to 4+  AORTIC VALVE:  There was trace regurgitation  TRICUSPID VALVE:  There was mild regurgitation  PULMONIC VALVE:  There was trace regurgitation  HISTORY: PRIOR HISTORY: Heart failure, Atrial fibrillation, Sepsis  PROCEDURE: The procedure was performed in the catheterization laboratory  This was a routine study  The risks and alternatives of the procedure were explained to the patient and informed consent was obtained  The transesophageal approach  was used  The study included complete 2D imaging, complete spectral Doppler, and color Doppler  The heart rate was 108 bpm, at the start of the study  An adult omniplane probe was inserted by the cardiology fellow under direct supervision  of the attending cardiologist  Intubated with ease  One intubation attempt(s)  There was no blood detected on the probe  Image quality was adequate  There were no complications during the procedure  MEDICATIONS: Benzocaine spray, topical  to oropharynx, prior to procedure  Anesthesia administered by anesthesia team     LEFT VENTRICLE: Size was normal  Systolic function was markedly reduced  Ejection fraction was estimated to be 35 %  Beat to beat variability and tachycardia limit an accurate assessment of ejection fraction  There was severe diffuse  hypokinesis with regional variations  Wall thickness was increased   DOPPLER: The study was not technically sufficient to allow evaluation of LV diastolic function  RIGHT VENTRICLE: The ventricle was mildly dilated  Systolic function was reduced  LEFT ATRIUM: The atrium was moderately dilated  No thrombus was identified  There was evidence of spontaneous echo contrast ("smoke")  APPENDAGE: The appendage was dilated  No thrombus was identified  There was evidence of spontaneous echo  contrast ("smoke") in the appendage  DOPPLER: The function was moderately reduced (moderately reduced emptying velocity)  ATRIAL SEPTUM: No defect or patent foramen ovale was identified  RIGHT ATRIUM: The atrium was moderately dilated  No thrombus was identified  There was evidence of continuous spontaneous echo contrast ("smoke")  MITRAL VALVE: There was mild annular calcification  There was mildly reduced leaflet separation  DOPPLER: There was moderate regurgitation  Regurgitation grade was 3+ on a scale of 0 to 4+  The regurgitant jet was centrally directed  AORTIC VALVE: The valve was trileaflet  Leaflets exhibited normal cuspal separation and sclerosis  DOPPLER: There was trace regurgitation  TRICUSPID VALVE: The valve structure was normal  There was normal leaflet separation  DOPPLER: There was mild regurgitation  PULMONIC VALVE: Not well visualized  DOPPLER: There was trace regurgitation  PERICARDIUM: There was no pericardial effusion  The pericardium was normal in appearance  AORTA: The root exhibited normal size  There was no atheroma  There was no evidence for dissection  There was no evidence for aneurysm  PULMONARY VEINS: DOPPLER: There was systolic blunting in the pulmonary vein(s)      [address] Accredited Echocardiography Laboratory    Prepared and electronically signed by    Malika Damon MD  Signed 12-Jul-2018 13:57:07       Meds/Allergies   current meds:   Current Facility-Administered Medications   Medication Dose Route Frequency    ALPRAZolam (XANAX) tablet 0 25 mg  0 25 mg Oral HS PRN    [START ON 7/30/2018] amiodarone tablet 200 mg  200 mg Oral Daily With Breakfast    amitriptyline (ELAVIL) tablet 25 mg  25 mg Oral HS    aspirin chewable tablet 81 mg  81 mg Oral Daily    clopidogrel (PLAVIX) tablet 75 mg  75 mg Oral Daily    DULoxetine (CYMBALTA) delayed release capsule 30 mg  30 mg Oral Daily    lidocaine (LIDODERM) 5 % patch 1 patch  1 patch Transdermal Daily    melatonin tablet 6 mg  6 mg Oral HS    mirtazapine (REMERON) tablet 7 5 mg  7 5 mg Oral HS    ondansetron (ZOFRAN) injection 4 mg  4 mg Intravenous Q6H PRN    oxyCODONE (ROXICODONE) immediate release tablet 10 mg  10 mg Oral Q6H    pantoprazole (PROTONIX) EC tablet 40 mg  40 mg Oral Early Morning     Prescriptions Prior to Admission   Medication    amiodarone 200 mg tablet    apixaban (ELIQUIS) 5 mg    aspirin (ECOTRIN LOW STRENGTH) 81 mg EC tablet    clopidogrel (PLAVIX) 75 mg tablet    lidocaine (LIDODERM) 5 %    lisinopril (ZESTRIL) 5 mg tablet    melatonin 3 mg    metoprolol succinate (TOPROL-XL) 50 mg 24 hr tablet    mirtazapine (REMERON) 7 5 MG tablet    oxyCODONE (ROXICODONE) 15 mg immediate release tablet    polyethylene glycol (MIRALAX) 17 g packet    rivaroxaban (XARELTO) 15 mg tablet    ALPRAZolam (NIRAVAM) 0 25 MG dissolvable tablet    amitriptyline (ELAVIL) 25 mg tablet    DULoxetine (CYMBALTA) 30 mg delayed release capsule    furosemide (LASIX) 40 mg tablet    potassium chloride (K-DUR,KLOR-CON) 20 mEq tablet     Assessment:  Principal Problem:    Acute blood loss anemia  Active Problems:    Chronic systolic heart failure (HCC)    Atrial fibrillation with RVR (HCC)    Opioid dependence (HCC)    GI bleed    Coronary artery disease    H/O cholangitis    Gastrointestinal hemorrhage    Mild cognitive impairment      Assessment/ Plan:    Acute blood loss anemia  GI bleed:  Had a colonoscopy yesterday that showed superficial ulcers in the cecum and ascending colon consistent with ischemic colitis   Hgb stable last 3 checks; eliquis is being restarted today and will monitor h/h  Continue ASA and plavix due to recent cardiac intervention       History of atrial fibrillation with RVR:  Patient was in sinus rhythm on admission, not currently on telemetry but appears to be maintaining sinus rhythm on exam   Recommending resuming her beta-blocker once blood pressure stabilizes  Her AC is still being held given her GIB; need hgb to stabilize prior to restarting       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; remains on amiodarone taper 200mg BID  QTc on ekg yesterday AM 488ms  Resume oral beta blocker today but at lower dose; was being held for mild hypotension     CAD s/p NGOZI to mid RCA 7/12/2018  No anginal symptoms; remains on DAPT with ASA/plavix; cannot stop due to recent stent     Chronic systolic CHF  Ischemic cardiomyopathy LVEF 35%  Well compensated on exam; monitor with transfusions; potential for volume overload; may need intermittent lasix but no indicated at this time  Will restart beta blocker today at lower dose; toprol xl 25mg daily  Counseling / Coordination of Care  Total floor / unit time spent today 30 minutes  Greater than 50% of total time was spent with the patient and / or family counseling and / or coordination of care  ** Please Note: Dragon 360 Dictation voice to text software may have been used in the creation of this document   **

## 2018-07-29 NOTE — ASSESSMENT & PLAN NOTE
EGD revealed esophagitis, gastritis continue pantoprazole  Colonoscopy revealed ischemic colitis  Discussed with GI physician assistant, okay to resume oral anticoagulation  Monitor hemoglobin closely

## 2018-07-29 NOTE — PROGRESS NOTES
Progress Note - Farzad Acevedo [birth year], 69 y o  female MRN: [id number]    Unit/Bed#: [facility] 714-01 Encounter: [id number]    Primary Care Provider: Jearlean Rinne, DO   Date and time admitted to hospital: 7/25/2018 11:53 AM        GI bleed   Assessment & Plan    EGD revealed esophagitis, gastritis continue pantoprazole  Colonoscopy revealed ischemic colitis  Discussed with GI physician assistant, okay to resume oral anticoagulation  Monitor hemoglobin closely         * Acute blood loss anemia   Assessment & Plan    Due to GI blood loss, patient status post transfusion packed cells  Monitor hemoglobin  Oral anticoagulation resumed        Atrial fibrillation with RVR ([facility] [address] )   Assessment & Plan    Resume Eliquis discussed with Cardiology physician assistant  Beta-blocker being resumed          Opioid dependence ([facility] )   Assessment & Plan    Oxycodone        Mild cognitive impairment   Assessment & Plan    Monitor for delirium  Neuropsychology input noted patient is deemed competent        Coronary artery disease   Assessment & Plan    Recent cardiac catheterization with drug-eluting stent in place continue aspirin Plavix, beta-blocker resumed  Cardiology following          VTE Pharmacologic Prophylaxis:   Pharmacologic: Apixaban (Eliquis)  Mechanical VTE Prophylaxis in Place: Yes    Patient Centered Rounds: I have performed bedside rounds with nursing staff today  Discussions with Specialists or Other Care Team Provider:  Cardiology physician, GI physician assistant    Education and Discussions with Family / Patient: pt    Time Spent for Care: 30 minutes  More than 50% of total time spent on counseling and coordination of care as described above      Current Length of Stay: 4 day(s)    Current Patient Status: Inpatient   Certification Statement: The patient will continue to require additional inpatient hospital stay due to As mentioned    Discharge Plan:  Likely discharge next 24-48 hours, pending placement discussed with     Code Status: Level 3 - DNAR and DNI      Subjective:     Reports feeling tired and fatigued  Appetite fair  Overall reports feeling okay    Objective:     Vitals:   Temp (24hrs), Av 8 °F (36 6 °C), Min:97 5 °F (36 4 °C), Max:98 °F (36 7 °C)    HR:  [69-75] 75  Resp:  [18] 18  BP: (102-154)/(51-73) 154/73  SpO2:  [91 %-94 %] 93 %  Body mass index is 24 95 kg/m²  Input and Output Summary (last 24 hours): Intake/Output Summary (Last 24 hours) at 18 1414  Last data filed at 18 1210   Gross per 24 hour   Intake             1200 ml   Output             2000 ml   Net             -800 ml       Physical Exam:     Physical Exam    Comfortably in bed  Neck supple  Lungs diminished breath sounds bases  Heart sounds S1 and S2 noted  Abdomen soft  Awake obeys simple commands  No rash  Pulses noted      Additional Data:     Labs:      Results from last 7 days  Lab Units 18  0926 18  0458   WBC Thousand/uL  --  4 86   HEMOGLOBIN g/dL 9 2* 8 4*   HEMATOCRIT % 29 9* 27 0*   PLATELETS Thousands/uL  --  263   NEUTROS PCT %  --  66   LYMPHS PCT %  --  14   MONOS PCT %  --  10   EOS PCT %  --  9*       Results from last 7 days  Lab Units 18  0458  18  1213   SODIUM mmol/L 142  < > 138   POTASSIUM mmol/L 3 9  < > 4 4   CHLORIDE mmol/L 108  < > 106   CO2 mmol/L 28  < > 26   BUN mg/dL 4*  < > 41*   CREATININE mg/dL 0 73  < > 0 89   CALCIUM mg/dL 8 3  < > 8 2*   TOTAL PROTEIN g/dL  --   --  5 6*   BILIRUBIN TOTAL mg/dL  --   --  0 29   ALK PHOS U/L  --   --  54   ALT U/L  --   --  20   AST U/L  --   --  15   GLUCOSE RANDOM mg/dL 72  < > 110   < > = values in this interval not displayed  Results from last 7 days  Lab Units 18  0542   INR  1 35*       Results from last 7 days  Lab Units 18  1624   POC GLUCOSE mg/dl 119             * I Have Reviewed All Lab Data Listed Above  * Additional Pertinent Lab Tests Reviewed:  All Labs Within Last 24 Hours Reviewed    Imaging:    Imaging Reports Reviewed Today Include:   Imaging Personally Reviewed by Myself Includes:     Recent Cultures (last 7 days):           Last 24 Hours Medication List:     Current Facility-Administered Medications:  ALPRAZolam 0 25 mg Oral HS PRN Rekha Hoang MD   [START ON 7/30/2018] amiodarone 200 mg Oral Daily With Breakfast Rekha Hoang MD   amitriptyline 25 mg Oral HS Rekha Hoang MD   apixaban 2 5 mg Oral BID CHANDRIKA Castellano   aspirin 81 mg Oral Daily Rekha Hoang MD   clopidogrel 75 mg Oral Daily Rekha Hoang MD   DULoxetine 30 mg Oral Daily Rekha Hoang MD   lidocaine 1 patch Transdermal Daily Rekha Hoang MD   melatonin 6 mg Oral HS Unique Celaya MD   metoprolol succinate 25 mg Oral Daily CHANDRIKA Castellano   mirtazapine 7 5 mg Oral HS Unique Celaya MD   ondansetron 4 mg Intravenous Q6H PRN Rekha Hoang MD   oxyCODONE 10 mg Oral Q6H Rekha Hoang MD   pantoprazole 40 mg Oral Early Morning Ruben Mckeon MD        Today, Patient Was Seen By: Hugo Nelson MD    ** Please Note: Dictation voice to text software may have been used in the creation of this document   **

## 2018-07-29 NOTE — ASSESSMENT & PLAN NOTE
Due to GI blood loss, patient status post transfusion packed cells  Monitor hemoglobin  Oral anticoagulation resumed

## 2018-07-29 NOTE — PROGRESS NOTES
Rounded with Dr Parth Mims from [address] [place] [address]  See provider orders and progress notes for updates  Will continue to monitor

## 2018-07-30 ENCOUNTER — APPOINTMENT (INPATIENT)
Dept: RADIOLOGY | Facility: HOSPITAL | Age: 78
DRG: 812 | End: 2018-07-30
Attending: INTERNAL MEDICINE
Payer: MEDICARE

## 2018-07-30 PROBLEM — M54.50 LOW BACK PAIN: Status: ACTIVE | Noted: 2018-07-30

## 2018-07-30 PROBLEM — R10.32 LEFT LOWER QUADRANT PAIN: Status: ACTIVE | Noted: 2018-07-30

## 2018-07-30 LAB
HCT VFR BLD AUTO: 28.4 % (ref 34.8–46.1)
HCT VFR BLD AUTO: 29.9 % (ref 34.8–46.1)
HGB BLD-MCNC: 8.8 G/DL (ref 11.5–15.4)
HGB BLD-MCNC: 9.3 G/DL (ref 11.5–15.4)

## 2018-07-30 PROCEDURE — 74022 RADEX COMPL AQT ABD SERIES: CPT

## 2018-07-30 PROCEDURE — 85014 HEMATOCRIT: CPT | Performed by: HOSPITALIST

## 2018-07-30 PROCEDURE — 99233 SBSQ HOSP IP/OBS HIGH 50: CPT | Performed by: INTERNAL MEDICINE

## 2018-07-30 PROCEDURE — 85018 HEMOGLOBIN: CPT | Performed by: HOSPITALIST

## 2018-07-30 PROCEDURE — 99232 SBSQ HOSP IP/OBS MODERATE 35: CPT | Performed by: INTERNAL MEDICINE

## 2018-07-30 RX ORDER — LISINOPRIL 2.5 MG/1
2.5 TABLET ORAL DAILY
Status: DISCONTINUED | OUTPATIENT
Start: 2018-07-30 | End: 2018-08-06 | Stop reason: HOSPADM

## 2018-07-30 RX ORDER — METOPROLOL SUCCINATE 50 MG/1
50 TABLET, EXTENDED RELEASE ORAL DAILY
Status: DISCONTINUED | OUTPATIENT
Start: 2018-07-31 | End: 2018-08-06 | Stop reason: HOSPADM

## 2018-07-30 RX ORDER — METOPROLOL TARTRATE 100 MG/1
100 TABLET ORAL EVERY 12 HOURS SCHEDULED
Qty: 60 TABLET | Refills: 0 | OUTPATIENT
Start: 2018-07-30

## 2018-07-30 RX ORDER — METOPROLOL SUCCINATE 25 MG/1
25 TABLET, EXTENDED RELEASE ORAL ONCE
Status: COMPLETED | OUTPATIENT
Start: 2018-07-30 | End: 2018-07-30

## 2018-07-30 RX ORDER — LISINOPRIL 5 MG/1
5 TABLET ORAL DAILY
Qty: 30 TABLET | Refills: 0 | OUTPATIENT
Start: 2018-07-30

## 2018-07-30 RX ADMIN — AMIODARONE HYDROCHLORIDE 200 MG: 200 TABLET ORAL at 07:56

## 2018-07-30 RX ADMIN — ASPIRIN 81 MG 81 MG: 81 TABLET ORAL at 07:57

## 2018-07-30 RX ADMIN — MIRTAZAPINE 7.5 MG: 15 TABLET, FILM COATED ORAL at 21:07

## 2018-07-30 RX ADMIN — AMITRIPTYLINE HYDROCHLORIDE 25 MG: 25 TABLET, FILM COATED ORAL at 21:08

## 2018-07-30 RX ADMIN — METOPROLOL SUCCINATE 25 MG: 25 TABLET, EXTENDED RELEASE ORAL at 11:16

## 2018-07-30 RX ADMIN — APIXABAN 5 MG: 5 TABLET, FILM COATED ORAL at 17:19

## 2018-07-30 RX ADMIN — CLOPIDOGREL BISULFATE 75 MG: 75 TABLET, FILM COATED ORAL at 07:57

## 2018-07-30 RX ADMIN — LISINOPRIL 2.5 MG: 2.5 TABLET ORAL at 11:16

## 2018-07-30 RX ADMIN — PANTOPRAZOLE SODIUM 40 MG: 40 TABLET, DELAYED RELEASE ORAL at 05:16

## 2018-07-30 RX ADMIN — OXYCODONE HYDROCHLORIDE 10 MG: 10 TABLET ORAL at 05:16

## 2018-07-30 RX ADMIN — METOPROLOL SUCCINATE 25 MG: 25 TABLET, EXTENDED RELEASE ORAL at 07:57

## 2018-07-30 RX ADMIN — DULOXETINE HYDROCHLORIDE 30 MG: 30 CAPSULE, DELAYED RELEASE ORAL at 07:56

## 2018-07-30 RX ADMIN — OXYCODONE HYDROCHLORIDE 10 MG: 10 TABLET ORAL at 17:19

## 2018-07-30 RX ADMIN — APIXABAN 2.5 MG: 2.5 TABLET, FILM COATED ORAL at 07:57

## 2018-07-30 RX ADMIN — MELATONIN 6 MG: at 21:08

## 2018-07-30 RX ADMIN — OXYCODONE HYDROCHLORIDE 10 MG: 10 TABLET ORAL at 11:32

## 2018-07-30 RX ADMIN — LIDOCAINE 1 PATCH: 50 PATCH CUTANEOUS at 07:57

## 2018-07-30 NOTE — PROGRESS NOTES
Progress Note - Aundria Lesches [birth year], 69 y o  female MRN: [id number]    Unit/Bed#: [facility] 714-01 Encounter: [id number]    Primary Care Provider: Stefani Leach DO   Date and time admitted to hospital: 7/25/2018 11:53 AM        GI bleed   Assessment & Plan    EGD revealed esophagitis, gastritis on pantoprazole  Colonoscopy revealed ischemic colitis  Monitor hemoglobin  Oral anticoagulation has been resumed           * Acute blood loss anemia   Assessment & Plan    Due to GI blood loss, patient has received packed cells  Monitor hemoglobin        Atrial fibrillation with RVR ([facility])   Assessment & Plan    Continue beta-blocker, amiodarone  Eliquis has been resumed, discussed with Cardiology physician assistant         Opioid dependence [facility])   Assessment & Plan    Chronic arthritis on oxycodone         Low back pain   Assessment & Plan    Chronic with history of osteoarthritis on chronic opioid use  Continue to monitor, supportive care analgesics  aqua K-pad        Left lower quadrant pain   Assessment & Plan    Will obtain abdominal radiograph  Likely due to constipation continue close monitoring        Coronary artery disease   Assessment & Plan    Recent cardiac catheterization status post drug-eluting stent continue Plavix aspirin  Cardiology follow-up            VTE Pharmacologic Prophylaxis:   Pharmacologic: Apixaban (Eliquis)  Mechanical VTE Prophylaxis in Place: Yes    Patient Centered Rounds: I have performed bedside rounds with nursing staff today  Discussions with Specialists or Other Care Team Provider:  Cardiology physician assistant    Education and Discussions with Family / Patient:  Discussed with the patient, called and updated sister melena over phone in detail    Time Spent for Care: 30 minutes  More than 50% of total time spent on counseling and coordination of care as described above  Current Length of Stay: 5 day(s)    Current Patient Status: Inpatient   Certification Statement:  The patient will continue to require additional inpatient hospital stay due to As mentioned    Discharge Plan:  Likely discharge next 24-48 hours with clinical and symptomatic improvement    Code Status: Level 3 - DNAR and DNI      Subjective:     Patient complains of left lower quadrant abdominal pain denies nausea vomiting or diarrhea  No more episodes of hematemesis melena hematochezia  She also reports low back pain on the right side, she is unable to stand up out of the bed or ambulate    She is reluctant to go to rehab, she has been deemed competent by neuropsychology hence respecting her wishes patient could be discharged home with VNA discussed with case management  Patient understands or limitations of ability to take care of herself and remains at high risk for readmissions she verbalized understanding, discussed in detail with system Kush Elliott over phone    Objective:     Vitals:   Temp (24hrs), Av 9 °F (36 6 °C), Min:97 8 °F (36 6 °C), Max:98 2 °F (36 8 °C)    HR:  [61-70] 62  Resp:  [18] 18  BP: ()/(56-70) 144/70  SpO2:  [93 %-96 %] 94 %  Body mass index is 26 12 kg/m²  Input and Output Summary (last 24 hours):        Intake/Output Summary (Last 24 hours) at 18 1431  Last data filed at 18 1100   Gross per 24 hour   Intake             1620 ml   Output             2300 ml   Net             -680 ml       Physical Exam:     Physical Exam    Comfortably lying in bed  Neck supple  Lungs diminished breath sounds bases  Heart sounds S1-S2 noted  Abdomen soft left lower quadrant tenderness noted, no organs palpable  Awake alert obeys simple commands  Low back pain with tenderness right sacral iliac joint  Pulses noted  No rash    Additional Data:     Labs:      Results from last 7 days  Lab Units 18  0957  18  0458   WBC Thousand/uL  --   --  4 86   HEMOGLOBIN g/dL 9 3*  < > 8 4*   HEMATOCRIT % 29 9*  < > 27 0*   PLATELETS Thousands/uL  --   --  263   NEUTROS PCT %  --   --  66 LYMPHS PCT %  --   --  14   MONOS PCT %  --   --  10   EOS PCT %  --   --  9*   < > = values in this interval not displayed  Results from last 7 days  Lab Units 07/29/18  0458  07/25/18  1213   SODIUM mmol/L 142  < > 138   POTASSIUM mmol/L 3 9  < > 4 4   CHLORIDE mmol/L 108  < > 106   CO2 mmol/L 28  < > 26   BUN mg/dL 4*  < > 41*   CREATININE mg/dL 0 73  < > 0 89   CALCIUM mg/dL 8 3  < > 8 2*   TOTAL PROTEIN g/dL  --   --  5 6*   BILIRUBIN TOTAL mg/dL  --   --  0 29   ALK PHOS U/L  --   --  54   ALT U/L  --   --  20   AST U/L  --   --  15   GLUCOSE RANDOM mg/dL 72  < > 110   < > = values in this interval not displayed  Results from last 7 days  Lab Units 07/26/18  0542   INR  1 35*       Results from last 7 days  Lab Units 07/26/18  1624   POC GLUCOSE mg/dl 119             * I Have Reviewed All Lab Data Listed Above  * Additional Pertinent Lab Tests Reviewed:  All Labs Within Last 24 Hours Reviewed    Imaging:    Imaging Reports Reviewed Today Include:   Imaging Personally Reviewed by Myself Includes:     Recent Cultures (last 7 days):           Last 24 Hours Medication List:     Current Facility-Administered Medications:  ALPRAZolam 0 25 mg Oral HS PRN Cecelia Zambrano MD   amiodarone 200 mg Oral Daily With Breakfast Unique Celaya MD   amitriptyline 25 mg Oral HS Cecelia Zambrano MD   apixaban 5 mg Oral BID CHANDRIKA Crouch   aspirin 81 mg Oral Daily Cecelia Zambrano MD   clopidogrel 75 mg Oral Daily Cecelia Zambrano MD   DULoxetine 30 mg Oral Daily Cecelia Zambrano MD   lidocaine 1 patch Transdermal Daily Cecelia Zambrano MD   lisinopril 2 5 mg Oral Daily CHANDRIKA Crouch   melatonin 6 mg Oral HS Cecelia Zambrano MD   [START ON 7/31/2018] metoprolol succinate 50 mg Oral Daily CHANDRIKA Crouch   mirtazapine 7 5 mg Oral HS Unique Celaya MD   ondansetron 4 mg Intravenous Q6H PRN Cecelia Zambrano MD   oxyCODONE 10 mg Oral Q6H Cecelia Zambrano MD   pantoprazole 40 mg Oral Early Morning Joi Nova MD        Today, Patient Was Seen By: Leila Reynoso MD    ** Please Note: Dictation voice to text software may have been used in the creation of this document   **

## 2018-07-30 NOTE — ASSESSMENT & PLAN NOTE
EGD revealed esophagitis, gastritis on pantoprazole  Colonoscopy revealed ischemic colitis  Monitor hemoglobin  Oral anticoagulation has been resumed

## 2018-07-30 NOTE — ASSESSMENT & PLAN NOTE
Continue beta-blocker, amiodarone  Eliquis has been resumed, discussed with Cardiology physician assistant

## 2018-07-30 NOTE — PROGRESS NOTES
General Cardiology   Progress Note -  Team One   Amanda Beltre 69 y o  female MRN: [id number]    Unit/Bed#: [facility] 714-01 Encounter: [id number]        Subjective: Pt seen for follow up  No events overnight; she has complaints of back pain  No chest pain, palpitations or SOB  Eliquis was started yesterday; got 2 doses; hgb stable as of last night  Review of Systems   Constitution: Negative for decreased appetite, fever and weakness  Cardiovascular: Negative for chest pain, dyspnea on exertion, leg swelling, near-syncope and palpitations  Respiratory: Negative for cough and shortness of breath  Musculoskeletal: Positive for back pain (with radiation to left LLQ abd and groin)  Gastrointestinal: Negative for abdominal pain, nausea and vomiting  Genitourinary: Negative for dysuria  Neurological: Negative for dizziness and light-headedness  Psychiatric/Behavioral: Negative for altered mental status  All other systems reviewed and are negative  Objective:   Vitals: Blood pressure 144/70, pulse 62, temperature 98 2 °F (36 8 °C), temperature source Oral, resp  rate 18, height 5' 5" (1 651 m), weight 71 2 kg (156 lb 15 5 oz), SpO2 94 %  ,     Body mass index is 26 12 kg/m²  ,     Systolic (67UCP), SXW:903 , Min:98 , ALHAJI:177     Diastolic (58YFU), SL, Min:56, Max:70      Intake/Output Summary (Last 24 hours) at 18 0938  Last data filed at 18 8827   Gross per 24 hour   Intake             1560 ml   Output             2250 ml   Net             -690 ml     Weight (last 2 days)     Date/Time   Weight    18 0600  71 2 (156 97)    18 0552  68 (149 91)    18 0600  68 6 (151 24)            Telemetry Review:   No telemetry    Physical Exam   Constitutional: She is oriented to person, place, and time  No distress  Patient is sitting in bed in NAD   HENT:   Head: Normocephalic and atraumatic  Neck: No JVD present     Cardiovascular: Normal rate, regular rhythm, S1 normal and S2 normal     No murmur heard  No lower extremity edema  Bilateral groin soft nontender no hematoma   Pulmonary/Chest: Effort normal and breath sounds normal  No respiratory distress  She has no wheezes  She has no rales  Abdominal: Soft  Musculoskeletal: She exhibits no edema  Neurological: She is alert and oriented to person, place, and time  Skin: Skin is warm  She is not diaphoretic  Psychiatric: She has a normal mood and affect  Her behavior is normal    Nursing note and vitals reviewed  LABORATORY RESULTS    Results from last 7 days  Lab Units 07/25/18  1213   TROPONIN I ng/mL 0 02     CBC with diff:   Results from last 7 days  Lab Units 07/29/18  2028 07/29/18  0926 07/29/18  0458 07/28/18  1545 07/28/18  0534 07/27/18  2052 07/27/18  0809  07/26/18  0542 07/26/18  0108  07/25/18  1213   WBC Thousand/uL  --   --  4 86  --  4 84  --   --   --  11 19* 11 93*  --  12 84*   HEMOGLOBIN g/dL 9 0* 9 2* 8 4* 8 1* 6 7* 7 1* 7 2*  < > 7 6* 7 4*  < > 5 7*   HEMATOCRIT % 28 9* 29 9* 27 0* 25 7* 21 6* 22 6* 23 1*  < > 24 1* 22 7*  < > 18 2*   MCV fL  --   --  99*  --  100*  --   --   --  96 96  --  102*   PLATELETS Thousands/uL  --   --  263  --  246  --   --   --  267 256  --  323   MCH pg  --   --  30 7  --  30 9  --   --   --  30 3 31 2  --  32 0   MCHC g/dL  --   --  31 1*  --  31 0*  --   --   --  31 5 32 6  --  31 3*   RDW %  --   --  18 3*  --  18 8*  --   --   --  17 5* 17 3*  --  17 7*   MPV fL  --   --  9 3  --  8 8*  --   --   --  9 6 9 7  --  9 8   NRBC AUTO /100 WBCs  --   --  0  --  1  --   --   --   --  1  --  1   < > = values in this interval not displayed      CMP:  Results from last 7 days  Lab Units 07/29/18  0458 07/28/18  0534 07/26/18  0542 07/25/18  1213   SODIUM mmol/L 142 142 140 138   POTASSIUM mmol/L 3 9 3 9 4 2 4 4   CHLORIDE mmol/L 108 109* 109* 106   CO2 mmol/L 28 29 26 26   ANION GAP mmol/L 6 4 5 6   BUN mg/dL 4* 8 29* 41*   CREATININE mg/dL 0 73 0 68 0 74 0 89   GLUCOSE RANDOM mg/dL 72 86 73 110   CALCIUM mg/dL 8 3 8 1* 8 2* 8 2*   AST U/L  --   --   --  15   ALT U/L  --   --   --  20   ALK PHOS U/L  --   --   --  54   TOTAL PROTEIN g/dL  --   --   --  5 6*   BILIRUBIN TOTAL mg/dL  --   --   --  0 29   EGFR ml/min/1 73sq m 80 85 78 63     BMP:  Results from last 7 days  Lab Units 18  0458 18  0534 18  0542 18  1213   SODIUM mmol/L 142 142 140 138   POTASSIUM mmol/L 3 9 3 9 4 2 4 4   CHLORIDE mmol/L 108 109* 109* 106   CO2 mmol/L 28 29 26 26   BUN mg/dL 4* 8 29* 41*   CREATININE mg/dL 0 73 0 68 0 74 0 89   GLUCOSE RANDOM mg/dL 72 86 73 110   CALCIUM mg/dL 8 3 8 1* 8 2* 8 2*     Lab Results   Component Value Date    NTBNP 860 (H) 2018    NTBNP 21,054 (H) 2018    NTBNP 22,029 (H) 2018      Results from last 7 days  Lab Units 18  0542   MAGNESIUM mg/dL 2 5       Results from last 7 days  Lab Units 18  0542 18  1213   INR  1 35* 2 16*     Lipid Profile:   Lab Results   Component Value Date    CHOL 218 2015    CHOL 151 2014    CHOL 144 2013     Lab Results   Component Value Date    HDL 47 2015    HDL 41 2014    HDL 48 2013     Lab Results   Component Value Date    LDLCALC 140 (H) 2015    LDLCALC 87 2014    LDLCALC 73 2013     Lab Results   Component Value Date    TRIG 155 2015    TRIG 117 2014    TRIG 115 2013     Cardiac testing:   Results for orders placed during the hospital encounter of 18   Echo complete with contrast if indicated    [facility] [address]  [address]  [phone]    Transthoracic Echocardiogram  2D, M-mode, Doppler, and Color Doppler    Study date:      Patient: Cindy Ayala  MR number: [id number]  Account number: [de-identified]  : 1940  Age: 69 years  Gender: Female  Status: Inpatient  Location: Bedside  Height: 65 in  Weight: 178 6 lb  BP: 117/ 81 mmHg    Indications: Assess left ventricular function  Diagnoses: I24 9 - Acute ischemic heart disease, unspecified    Sonographer:  Letitia Bernal RDCS  Primary Physician:  Kike Germain DO  Referring Physician:  Luci Murdock MD  Group:  [id number]  Cardiology Fellow:  Scout Rivero MD  Interpreting Physician:  Jesus Jara DO    SUMMARY    LEFT VENTRICLE:  Systolic function was mildly reduced  Ejection fraction was estimated to be 50 %  There was severe hypokinesis of the basal-mid inferior and basal-mid inferolateral wall(s)  The changes were consistent with concentric remodeling (increased wall thickness with normal wall mass)  Doppler parameters were consistent with elevated mean left atrial filling pressure  RIGHT VENTRICLE:  The ventricle was mildly dilated  Systolic function was normal   Wall thickness was increased  LEFT ATRIUM:  The atrium was mildly dilated  RIGHT ATRIUM:  The atrium was moderately dilated  MITRAL VALVE:  There was moderate annular calcification  There was mild regurgitation  TRICUSPID VALVE:  There was mild to moderate regurgitation  Estimated peak PA pressure was 55 mmHg  The findings suggest moderate pulmonary hypertension  IVC, HEPATIC VEINS:  The inferior vena cava was mildly dilated  Respirophasic changes were blunted (less than 50% variation)  HISTORY: PRIOR HISTORY: systolic heart failure, afib, sepsis, chronic anticoagulation    PROCEDURE: The procedure was performed at the bedside  This was a routine study  The transthoracic approach was used  The study included complete 2D imaging, M-mode, complete spectral Doppler, and color Doppler  The heart rate was 87 bpm,  at the start of the study  Images were obtained from the parasternal, apical, subcostal, and suprasternal notch acoustic windows  Image quality was adequate  LEFT VENTRICLE: Size was normal  Systolic function was mildly reduced   Ejection fraction was estimated to be 50 %  There was severe hypokinesis of the basal-mid inferior and basal-mid inferolateral wall(s)  Wall thickness was mildly  increased  The changes were consistent with concentric remodeling (increased wall thickness with normal wall mass)  DOPPLER: Transmitral flow pattern: atrial fibrillation  Doppler parameters were consistent with elevated mean left atrial  filling pressure  RIGHT VENTRICLE: The ventricle was mildly dilated  Systolic function was normal  Wall thickness was increased  LEFT ATRIUM: The atrium was mildly dilated  RIGHT ATRIUM: The atrium was moderately dilated  MITRAL VALVE: There was moderate annular calcification  Valve structure was normal  There was mild calcification of the valve  There was normal leaflet separation  DOPPLER: The transmitral velocity was within the normal range  There was no  evidence for stenosis  There was mild regurgitation  AORTIC VALVE: The valve was trileaflet  Leaflets exhibited moderately increased thickness, moderate calcification, and normal cuspal separation  DOPPLER: Transaortic velocity was within the normal range  There was no evidence for stenosis  There was no regurgitation  TRICUSPID VALVE: The valve structure was normal  There was normal leaflet separation  DOPPLER: The transtricuspid velocity was within the normal range  There was no evidence for stenosis  There was mild to moderate regurgitation  Pulmonary  artery systolic pressure was moderately increased  Estimated peak PA pressure was 55 mmHg  The findings suggest moderate pulmonary hypertension  PULMONIC VALVE: Leaflets exhibited normal thickness, no calcification, and normal cuspal separation  DOPPLER: The transpulmonic velocity was within the normal range  There was trace regurgitation  PERICARDIUM: There was no pericardial effusion  The pericardium was normal in appearance  AORTA: The root exhibited normal size      SYSTEMIC VEINS: IVC: The inferior vena cava was mildly dilated  Respirophasic changes were blunted (less than 50% variation)  SYSTEM MEASUREMENT TABLES    2D  %FS: 23 31 %  Ao Diam: 2 84 cm  EDV(Teich): 127 84 ml  EF(Teich): 46 35 %  ESV(Teich): 68 58 ml  IVSd: 1 14 cm  LA Area: 21 35 cm2  LA Diam: 3 89 cm  LVEDV MOD A4C: 125 04 ml  LVEF MOD A4C: 61 7 %  LVESV MOD A4C: 47 9 ml  LVIDd: 5 17 cm  LVIDs: 3 97 cm  LVLd A4C: 8 04 cm  LVLs A4C: 7 66 cm  LVPWd: 1 09 cm  RA Area: 23 54 cm2  RVIDd: 4 27 cm  SV MOD A4C: 77 15 ml  SV(Teich): 59 26 ml    CW  TR Vmax: 3 26 m/s  TR maxP 59 mmHg    MM  TAPSE: 2 89 cm    PW  AVC: 377 63 ms  E': 0 07 m/s  E/E': 11 01  MV A Ge: 0 21 m/s  MV Dec Erath: 4 94 m/s2  MV DecT: 153 92 ms  MV E Ge: 0 76 m/s  MV E/A Ratio: 3 58  MV PHT: 44 64 ms  MVA By PHT: 4 93 cm2    [facility]    Prepared and electronically signed by    June Erwin DO  Signed 15-May-2018 14:00:03       Results for orders placed during the hospital encounter of 18   TIFFANIE    Narrative [facility]  [facility], [address] [place] [address]  [phone]    Transesophageal Echocardiogram  2D, Doppler, and Color Doppler    Study date:      Patient: Ana Cristina Knight  MR number: [id number]  Account number: [de-identified]  : 1940  Age: 69 years  Gender: Female  Status: Inpatient  Location: Cath lab  Height: 65 in  Weight: 149 lb  BP: 79/ 52 mmHg    Indications: Atrial fibrillation  Diagnoses: I48 0 - Atrial fibrillation    Sonographer:  Jose De Jesus Wade RDCS  Interpreting Physician:  Ham Kovacs MD  Primary Physician:  Jessica Munoz DO  Referring Physician:  Thania Eng MD  Group:  [id number]  Cardiology Fellow:  Thania Eng MD    SUMMARY    LEFT VENTRICLE:  Systolic function was markedly reduced  Ejection fraction was estimated to be 35 %   Beat to beat variability and tachycardia limit an accurate assessment of ejection fraction  There was severe diffuse hypokinesis with regional variations  RIGHT VENTRICLE:  The ventricle was mildly dilated  Systolic function was reduced  LEFT ATRIUM:  The atrium was moderately dilated  No thrombus was identified  There was evidence of spontaneous echo contrast ("smoke")  LEFT ATRIAL APPENDAGE:  The appendage was dilated  The function was moderately reduced (moderately reduced emptying velocity)  No thrombus was identified  There was evidence of spontaneous echo contrast ("smoke") in the appendage  ATRIAL SEPTUM:  No defect or patent foramen ovale was identified  RIGHT ATRIUM:  The atrium was moderately dilated  There was evidence of continuous spontaneous echo contrast ("smoke")  MITRAL VALVE:  There was moderate regurgitation  Regurgitation grade was 3+ on a scale of 0 to 4+  AORTIC VALVE:  There was trace regurgitation  TRICUSPID VALVE:  There was mild regurgitation  PULMONIC VALVE:  There was trace regurgitation  HISTORY: PRIOR HISTORY: Heart failure, Atrial fibrillation, Sepsis  PROCEDURE: The procedure was performed in the catheterization laboratory  This was a routine study  The risks and alternatives of the procedure were explained to the patient and informed consent was obtained  The transesophageal approach  was used  The study included complete 2D imaging, complete spectral Doppler, and color Doppler  The heart rate was 108 bpm, at the start of the study  An adult omniplane probe was inserted by the cardiology fellow under direct supervision  of the attending cardiologist  Intubated with ease  One intubation attempt(s)  There was no blood detected on the probe  Image quality was adequate  There were no complications during the procedure  MEDICATIONS: Benzocaine spray, topical  to oropharynx, prior to procedure   Anesthesia administered by anesthesia team     LEFT VENTRICLE: Size was normal  Systolic function was markedly reduced  Ejection fraction was estimated to be 35 %  Beat to beat variability and tachycardia limit an accurate assessment of ejection fraction  There was severe diffuse  hypokinesis with regional variations  Wall thickness was increased  DOPPLER: The study was not technically sufficient to allow evaluation of LV diastolic function  RIGHT VENTRICLE: The ventricle was mildly dilated  Systolic function was reduced  LEFT ATRIUM: The atrium was moderately dilated  No thrombus was identified  There was evidence of spontaneous echo contrast ("smoke")  APPENDAGE: The appendage was dilated  No thrombus was identified  There was evidence of spontaneous echo  contrast ("smoke") in the appendage  DOPPLER: The function was moderately reduced (moderately reduced emptying velocity)  ATRIAL SEPTUM: No defect or patent foramen ovale was identified  RIGHT ATRIUM: The atrium was moderately dilated  No thrombus was identified  There was evidence of continuous spontaneous echo contrast ("smoke")  MITRAL VALVE: There was mild annular calcification  There was mildly reduced leaflet separation  DOPPLER: There was moderate regurgitation  Regurgitation grade was 3+ on a scale of 0 to 4+  The regurgitant jet was centrally directed  AORTIC VALVE: The valve was trileaflet  Leaflets exhibited normal cuspal separation and sclerosis  DOPPLER: There was trace regurgitation  TRICUSPID VALVE: The valve structure was normal  There was normal leaflet separation  DOPPLER: There was mild regurgitation  PULMONIC VALVE: Not well visualized  DOPPLER: There was trace regurgitation  PERICARDIUM: There was no pericardial effusion  The pericardium was normal in appearance  AORTA: The root exhibited normal size  There was no atheroma  There was no evidence for dissection  There was no evidence for aneurysm  PULMONARY VEINS: DOPPLER: There was systolic blunting in the pulmonary vein(s)      [employer] Accredited Echocardiography Laboratory    Prepared and electronically signed by    Lurdes Mckinney MD  Signed 12-Jul-2018 13:57:07       Meds/Allergies   current meds:   Current Facility-Administered Medications   Medication Dose Route Frequency    ALPRAZolam (XANAX) tablet 0 25 mg  0 25 mg Oral HS PRN    amiodarone tablet 200 mg  200 mg Oral Daily With Breakfast    amitriptyline (ELAVIL) tablet 25 mg  25 mg Oral HS    apixaban (ELIQUIS) tablet 2 5 mg  2 5 mg Oral BID    aspirin chewable tablet 81 mg  81 mg Oral Daily    clopidogrel (PLAVIX) tablet 75 mg  75 mg Oral Daily    DULoxetine (CYMBALTA) delayed release capsule 30 mg  30 mg Oral Daily    lidocaine (LIDODERM) 5 % patch 1 patch  1 patch Transdermal Daily    melatonin tablet 6 mg  6 mg Oral HS    metoprolol succinate (TOPROL-XL) 24 hr tablet 25 mg  25 mg Oral Once    [START ON 7/31/2018] metoprolol succinate (TOPROL-XL) 24 hr tablet 50 mg  50 mg Oral Daily    mirtazapine (REMERON) tablet 7 5 mg  7 5 mg Oral HS    ondansetron (ZOFRAN) injection 4 mg  4 mg Intravenous Q6H PRN    oxyCODONE (ROXICODONE) immediate release tablet 10 mg  10 mg Oral Q6H    pantoprazole (PROTONIX) EC tablet 40 mg  40 mg Oral Early Morning     Prescriptions Prior to Admission   Medication    amiodarone 200 mg tablet    apixaban (ELIQUIS) 5 mg    aspirin (ECOTRIN LOW STRENGTH) 81 mg EC tablet    clopidogrel (PLAVIX) 75 mg tablet    lidocaine (LIDODERM) 5 %    lisinopril (ZESTRIL) 5 mg tablet    melatonin 3 mg    metoprolol succinate (TOPROL-XL) 50 mg 24 hr tablet    mirtazapine (REMERON) 7 5 MG tablet    oxyCODONE (ROXICODONE) 15 mg immediate release tablet    polyethylene glycol (MIRALAX) 17 g packet    rivaroxaban (XARELTO) 15 mg tablet    ALPRAZolam (NIRAVAM) 0 25 MG dissolvable tablet    amitriptyline (ELAVIL) 25 mg tablet    DULoxetine (CYMBALTA) 30 mg delayed release capsule    furosemide (LASIX) 40 mg tablet    potassium chloride (K-DUR,KLOR-CON) 20 mEq tablet     Assessment:  Principal Problem:    Acute blood loss anemia  Active Problems:    Chronic systolic heart failure (HCC)    Atrial fibrillation with RVR (HCC)    Opioid dependence (HCC)    GI bleed    Coronary artery disease    H/O cholangitis    Gastrointestinal hemorrhage    Mild cognitive impairment    Assessment/ Plan:    Acute blood loss anemia  GI bleed:  Had a colonoscopy that showed superficial ulcers in the cecum and ascending colon consistent with ischemic colitis; hgb stabilized s/p transfusions  AC was restarted with eliquis 2 5mg; got 2 doses yesterday; recheck Hgb this AM; plan to increase eliquis to 5mg eventually as long as hgb stable  Continue ASA and plavix due to recent cardiac intervention  Consult case management to check coverage eliquis     History of atrial fibrillation with RVR:  Patient was in sinus rhythm on admission, not currently on telemetry but appears to be maintaining sinus rhythm on exam   AC restarted as above; back on home dose metoprolol and amiodarone taper       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; has follow up with Dr Mela Knight on 8/14/18 and Dr Kennedy Zelaya on 8/24/18     Counseling / Coordination of Care  Total floor / unit time spent today 30 minutes  Greater than 50% of total time was spent with the patient and / or family counseling and / or coordination of care        ** Please Note: Dragon 360 Dictation voice to text software may have been used in the creation of this document   **

## 2018-07-30 NOTE — ASSESSMENT & PLAN NOTE
Recent cardiac catheterization status post drug-eluting stent continue Plavix aspirin  Cardiology follow-up

## 2018-07-30 NOTE — ASSESSMENT & PLAN NOTE
Chronic with history of osteoarthritis on chronic opioid use  Continue to monitor, supportive care analgesics  aqua K-pad

## 2018-07-30 NOTE — RESTORATIVE TECHNICIAN NOTE
Restorative Specialist Mobility Note       Activity: Ambulate in agee, Ambulate in room, Bathroom privileges, Dangle, Stand at bedside (Educated/encouraged pt to ambulate with assistance 3-4 x's/day  Bed alarm on   Pt callbell, phone/tray within reach )     Assistive Device: Front wheel walker          [employer] BS, Restorative Technician, [employer]

## 2018-07-31 ENCOUNTER — APPOINTMENT (INPATIENT)
Dept: RADIOLOGY | Facility: HOSPITAL | Age: 78
DRG: 812 | End: 2018-07-31
Payer: MEDICARE

## 2018-07-31 DIAGNOSIS — I10 ESSENTIAL HYPERTENSION: ICD-10-CM

## 2018-07-31 LAB
BACTERIA UR QL AUTO: ABNORMAL /HPF
BILIRUB UR QL STRIP: NEGATIVE
CLARITY UR: CLEAR
COLOR UR: ABNORMAL
GLUCOSE UR STRIP-MCNC: NEGATIVE MG/DL
HCT VFR BLD AUTO: 26.3 % (ref 34.8–46.1)
HCT VFR BLD AUTO: 29.8 % (ref 34.8–46.1)
HGB BLD-MCNC: 8.2 G/DL (ref 11.5–15.4)
HGB BLD-MCNC: 9 G/DL (ref 11.5–15.4)
HGB UR QL STRIP.AUTO: NEGATIVE
HYALINE CASTS #/AREA URNS LPF: ABNORMAL /LPF
KETONES UR STRIP-MCNC: NEGATIVE MG/DL
LEUKOCYTE ESTERASE UR QL STRIP: ABNORMAL
NITRITE UR QL STRIP: NEGATIVE
NON-SQ EPI CELLS URNS QL MICRO: ABNORMAL /HPF
PH UR STRIP.AUTO: 6 [PH] (ref 4.5–8)
PROT UR STRIP-MCNC: NEGATIVE MG/DL
RBC #/AREA URNS AUTO: ABNORMAL /HPF
SP GR UR STRIP.AUTO: 1.02 (ref 1–1.03)
UROBILINOGEN UR QL STRIP.AUTO: 1 E.U./DL
WBC #/AREA URNS AUTO: ABNORMAL /HPF

## 2018-07-31 PROCEDURE — 76770 US EXAM ABDO BACK WALL COMP: CPT

## 2018-07-31 PROCEDURE — 81001 URINALYSIS AUTO W/SCOPE: CPT | Performed by: HOSPITALIST

## 2018-07-31 PROCEDURE — 99232 SBSQ HOSP IP/OBS MODERATE 35: CPT | Performed by: HOSPITALIST

## 2018-07-31 PROCEDURE — 85018 HEMOGLOBIN: CPT | Performed by: HOSPITALIST

## 2018-07-31 PROCEDURE — 85014 HEMATOCRIT: CPT | Performed by: HOSPITALIST

## 2018-07-31 PROCEDURE — 99232 SBSQ HOSP IP/OBS MODERATE 35: CPT | Performed by: INTERNAL MEDICINE

## 2018-07-31 RX ORDER — AMOXICILLIN 250 MG
1 CAPSULE ORAL
Status: DISCONTINUED | OUTPATIENT
Start: 2018-07-31 | End: 2018-08-06 | Stop reason: HOSPADM

## 2018-07-31 RX ADMIN — LIDOCAINE 1 PATCH: 50 PATCH CUTANEOUS at 08:08

## 2018-07-31 RX ADMIN — MIRTAZAPINE 7.5 MG: 15 TABLET, FILM COATED ORAL at 21:10

## 2018-07-31 RX ADMIN — MELATONIN 6 MG: at 21:11

## 2018-07-31 RX ADMIN — METOPROLOL SUCCINATE 50 MG: 50 TABLET, EXTENDED RELEASE ORAL at 08:09

## 2018-07-31 RX ADMIN — CLOPIDOGREL BISULFATE 75 MG: 75 TABLET, FILM COATED ORAL at 08:08

## 2018-07-31 RX ADMIN — CEFTRIAXONE SODIUM 1000 MG: 10 INJECTION, POWDER, FOR SOLUTION INTRAVENOUS at 17:51

## 2018-07-31 RX ADMIN — AMITRIPTYLINE HYDROCHLORIDE 25 MG: 25 TABLET, FILM COATED ORAL at 21:12

## 2018-07-31 RX ADMIN — LISINOPRIL 2.5 MG: 2.5 TABLET ORAL at 08:07

## 2018-07-31 RX ADMIN — ASPIRIN 81 MG 81 MG: 81 TABLET ORAL at 08:08

## 2018-07-31 RX ADMIN — APIXABAN 5 MG: 5 TABLET, FILM COATED ORAL at 17:33

## 2018-07-31 RX ADMIN — PANTOPRAZOLE SODIUM 40 MG: 40 TABLET, DELAYED RELEASE ORAL at 05:22

## 2018-07-31 RX ADMIN — HYDROMORPHONE HYDROCHLORIDE 1 MG: 1 INJECTION, SOLUTION INTRAMUSCULAR; INTRAVENOUS; SUBCUTANEOUS at 17:51

## 2018-07-31 RX ADMIN — APIXABAN 5 MG: 5 TABLET, FILM COATED ORAL at 08:08

## 2018-07-31 RX ADMIN — OXYCODONE HYDROCHLORIDE 10 MG: 10 TABLET ORAL at 00:06

## 2018-07-31 RX ADMIN — OXYCODONE HYDROCHLORIDE 10 MG: 10 TABLET ORAL at 11:58

## 2018-07-31 RX ADMIN — OXYCODONE HYDROCHLORIDE 10 MG: 10 TABLET ORAL at 17:33

## 2018-07-31 RX ADMIN — DULOXETINE HYDROCHLORIDE 30 MG: 30 CAPSULE, DELAYED RELEASE ORAL at 08:07

## 2018-07-31 RX ADMIN — AMIODARONE HYDROCHLORIDE 200 MG: 200 TABLET ORAL at 08:08

## 2018-07-31 RX ADMIN — OXYCODONE HYDROCHLORIDE 10 MG: 10 TABLET ORAL at 05:22

## 2018-07-31 RX ADMIN — Medication 1 TABLET: at 21:12

## 2018-07-31 NOTE — ASSESSMENT & PLAN NOTE
Negative obstruction series  Clinical exam significant for left CVA tenderness  Retro-peritoneal ultrasound to evaluate left kidney, possibility of left pyelonephritis  Pain control  Start ceftriaxone IV  Urinalysis pending

## 2018-07-31 NOTE — PROGRESS NOTES
Progress Note - Broderick Siddiqui [birth year], 69 y o  female MRN: [id number]    Unit/Bed#: [facility] [place] 714-01 Encounter: [id number]    Primary Care Provider: Randel Jeans, DO   Date and time admitted to hospital: 7/25/2018 11:53 AM        Low back pain   Assessment & Plan    Chronic with history of osteoarthritis on chronic opioid use  Continue to monitor, supportive care analgesics  aqua K-pad  Chronic         Left lower quadrant pain   Assessment & Plan    Negative obstruction series  Clinical exam significant for left CVA tenderness  Retro-peritoneal ultrasound to evaluate left kidney, possibility of left pyelonephritis  Pain control  Start ceftriaxone IV  Urinalysis pending         Mild cognitive impairment   Assessment & Plan    Has memory difficulties  Neuropsychology input noted patient is deemed competent        Gastrointestinal hemorrhage   Assessment & Plan    Stable        H/O cholangitis   Assessment & Plan    · Patient recently admitted in May 2018 with sepsis suspected secondary to cholangitis, underwent ERCP with stone removal and biliary stent placement  · Patient was supposed to follow up with GI in 6 weeks but she did not hear from them    She is due for her stent removal as well  · Follow up GI recommendations        Coronary artery disease   Assessment & Plan    Recent cardiac catheterization status post drug-eluting stent continue Plavix aspirin  Cardiology follow-up        GI bleed   Assessment & Plan    EGD revealed esophagitis, gastritis on pantoprazole  Colonoscopy revealed ischemic colitis  Monitor hemoglobin  Resume Eliquis          Opioid dependence ([facility] )   Assessment & Plan    Chronic arthritis on oxycodone         Atrial fibrillation with RVR (HCC)   Assessment & Plan    Continue beta-blocker, amiodarone, Eliquis        Chronic systolic heart failure (HCC)   Assessment & Plan    EF 35%  Continue meds  Appreciate Cardiology input        * Acute blood loss anemia   Assessment & Plan    Status post 2 units PRBC transfusion  Monitor hemoglobin            VTE Pharmacologic Prophylaxis:   Pharmacologic: Apixaban (Eliquis)  Mechanical VTE Prophylaxis in Place: Yes    Patient Centered Rounds: I have performed bedside rounds with nursing staff today  Discussions with Specialists or Other Care Team Provider: yes    Education and Discussions with Family / Patient:yes    Time Spent for Care: 45 minutes  More than 50% of total time spent on counseling and coordination of care as described above  Current Length of Stay: 6 day(s)    Current Patient Status: Inpatient   Certification Statement: The patient will continue to require additional inpatient hospital stay due to med mgt of acute cvat, ?pyelonephritis/nephrolithiasis    Discharge Plan: home when medically stable    Code Status: Level 3 - DNAR and DNI      Subjective:   Reported left lower quadrant pain radiating from left flank  10/10 pain, worsened with movement  Review of systems negative otherwise    Objective:     Vitals:   Temp (24hrs), Av 4 °F (36 9 °C), Min:98 1 °F (36 7 °C), Max:98 8 °F (37 1 °C)    HR:  [60-63] 63  Resp:  [16-17] 16  BP: (116-131)/(61-64) 131/64  SpO2:  [92 %-97 %] 97 %  Body mass index is 27 55 kg/m²  Input and Output Summary (last 24 hours): Intake/Output Summary (Last 24 hours) at 18 1716  Last data filed at 18 1100   Gross per 24 hour   Intake             1202 ml   Output             1200 ml   Net                2 ml       Physical Exam:     Physical Exam   Constitutional: She is oriented to person, place, and time  She appears distressed  HENT:   Head: Normocephalic and atraumatic  Mouth/Throat: Oropharynx is clear and moist  No oropharyngeal exudate  Eyes: Conjunctivae and EOM are normal  Pupils are equal, round, and reactive to light  No scleral icterus  Neck: Normal range of motion  Neck supple  No JVD present  Cardiovascular: Normal rate and normal heart sounds    Exam reveals no gallop and no friction rub  No murmur heard  Pulmonary/Chest: Effort normal and breath sounds normal  No respiratory distress  She has no wheezes  She has no rales  Abdominal: Soft  Bowel sounds are normal  She exhibits no distension  There is tenderness  There is no rebound and no guarding  Left CVAT   Musculoskeletal: Normal range of motion  She exhibits no edema  Neurological: She is alert and oriented to person, place, and time  She displays normal reflexes  No cranial nerve deficit  She exhibits normal muscle tone  Coordination normal    Skin: Skin is warm  There is erythema  Psychiatric: She has a normal mood and affect  Additional Data:     Labs:      Results from last 7 days  Lab Units 07/31/18  0826  07/29/18  0458   WBC Thousand/uL  --   --  4 86   HEMOGLOBIN g/dL 9 0*  < > 8 4*   HEMATOCRIT % 29 8*  < > 27 0*   PLATELETS Thousands/uL  --   --  263   NEUTROS PCT %  --   --  66   LYMPHS PCT %  --   --  14   MONOS PCT %  --   --  10   EOS PCT %  --   --  9*   < > = values in this interval not displayed  Results from last 7 days  Lab Units 07/29/18  0458  07/25/18  1213   SODIUM mmol/L 142  < > 138   POTASSIUM mmol/L 3 9  < > 4 4   CHLORIDE mmol/L 108  < > 106   CO2 mmol/L 28  < > 26   BUN mg/dL 4*  < > 41*   CREATININE mg/dL 0 73  < > 0 89   CALCIUM mg/dL 8 3  < > 8 2*   TOTAL PROTEIN g/dL  --   --  5 6*   BILIRUBIN TOTAL mg/dL  --   --  0 29   ALK PHOS U/L  --   --  54   ALT U/L  --   --  20   AST U/L  --   --  15   GLUCOSE RANDOM mg/dL 72  < > 110   < > = values in this interval not displayed  Results from last 7 days  Lab Units 07/26/18  0542   INR  1 35*       Results from last 7 days  Lab Units 07/26/18  1624   POC GLUCOSE mg/dl 119             * I Have Reviewed All Lab Data Listed Above  * Additional Pertinent Lab Tests Reviewed:  Stu 66 Admission Reviewed    Imaging:    Imaging Reports Reviewed Today Recent Cultures (last 7 days):           Last 24 Hours Medication List:     Current Facility-Administered Medications:  ALPRAZolam 0 25 mg Oral HS PRN Shahid Smith MD   amiodarone 200 mg Oral Daily With Breakfast Unique Celaya MD   amitriptyline 25 mg Oral HS Unique Celaya MD   apixaban 5 mg Oral BID CHANDRIKA Hunter   aspirin 81 mg Oral Daily Shahid Smith MD   clopidogrel 75 mg Oral Daily Unique Celaya MD   DULoxetine 30 mg Oral Daily Unique Celaya MD   HYDROmorphone 1 mg Intravenous Q6H PRN Daquan Douglas MD   lidocaine 1 patch Transdermal Daily Shahid Smith MD   lisinopril 2 5 mg Oral Daily CHANDRIKA Hunter   melatonin 6 mg Oral HS Shahid Smith MD   metoprolol succinate 50 mg Oral Daily CHANDRIKA Hunter   mirtazapine 7 5 mg Oral HS Unique Celaya MD   ondansetron 4 mg Intravenous Q6H PRN Shahid Smith MD   oxyCODONE 10 mg Oral Q6H Shahid Smith MD   pantoprazole 40 mg Oral Early Morning Magdalene Reed MD   senna-docusate sodium 1 tablet Oral HS Daquan Douglas MD        Today, Patient Was Seen By: Daquan Douglas MD    ** Please Note: Dictation voice to text software may have been used in the creation of this document   **

## 2018-07-31 NOTE — RESTORATIVE TECHNICIAN NOTE
Restorative Specialist Mobility Note       Activity: Ambulate in agee, Ambulate in room, Bathroom privileges, Chair, Dangle, Stand at bedside (Educated/encouraged pt to ambulate with assistance 3-4 x's/day  Bed alarm on   Pt callbell, phone/tray within reach )     Assistive Device: Front wheel walker          Kristi BOJORQUEZ, Restorative Technician, [employer] [facility]

## 2018-07-31 NOTE — ASSESSMENT & PLAN NOTE
Chronic with history of osteoarthritis on chronic opioid use  Continue to monitor, supportive care analgesics  aqua K-pad  Chronic

## 2018-07-31 NOTE — ASSESSMENT & PLAN NOTE
EGD revealed esophagitis, gastritis on pantoprazole  Colonoscopy revealed ischemic colitis  Monitor hemoglobin  Resume Eliquis

## 2018-07-31 NOTE — CASE MANAGEMENT
Continued Stay Review    Date: 18 - ACUTE MED SURG LEVEL OF CARE    Vital Signs:   Vitals:   Temp (24hrs), Av 8 °F (36 6 °C), Min:97 5 °F (36 4 °C), Max:98 °F (36 7 °C)   HR:  [69-75] 75  Resp:  [18] 18  BP: (102-154)/(51-73) 154/73  SpO2:  [91 %-94 %] 93 %  Body mass index is 24 95 kg/m²         Input and Output Summary (last 24 hours):   Last data filed at 18 1210    Gross per 24 hour   Intake             1200 ml   Output             2000 ml   Net             -800 ml       Diet Regular; Regular House;  Sodium 2 Gm, Fluid Restriction 1800 ML         IV ACCESS      Medications:   Scheduled Meds:   Current Facility-Administered Medications:  ALPRAZolam 0 25 mg Oral HS PRN Harriett Brito MD   amiodarone 200 mg Oral Daily With Breakfast Unique Celaya MD   amitriptyline 25 mg Oral HS Unique Celaya MD   apixaban 5 mg Oral BID CHANDRIKA Way   aspirin 81 mg Oral Daily Harriett Brito MD   clopidogrel 75 mg Oral Daily Unique Celaya MD   DULoxetine 30 mg Oral Daily Harriett Brito MD   lidocaine 1 patch Transdermal Daily Harriett Brito MD   lisinopril 2 5 mg Oral Daily CHANDRIKA Way   melatonin 6 mg Oral HS Unique Celaya MD   metoprolol succinate 50 mg Oral Daily CHANDRIKA Way   mirtazapine 7 5 mg Oral HS Unique Celaya MD   ondansetron 4 mg Intravenous Q6H PRN Unique Celaya MD   oxyCODONE 10 mg Oral Q6H Unique Miguel MD   pantoprazole 40 mg Oral Early Morning Marycruz Blue MD       PRN Meds:     ALPRAZolam    ondansetron      LABS/Diagnostic Results:   CBC  Results from last 7 days  Lab Units 18  0926 18  0458   WBC Thousand/uL  --  4 86   HEMOGLOBIN g/dL 9 2* 8 4*   HEMATOCRIT % 29 9* 27 0*   PLATELETS Thousands/uL  --  263   NEUTROS PCT %  --  66   LYMPHS PCT %  --  14   MONOS PCT %  --  10   EOS PCT %  --  9*       CMP  Results from last 7 days  Lab Units 18  0458   18  1213   SODIUM mmol/L 142  < > 138   POTASSIUM mmol/L 3 9  < > 4 4   CHLORIDE mmol/L 108  < > 106   CO2 mmol/L 28  < > 26   BUN mg/dL 4*  < > 41*   CREATININE mg/dL 0 73  < > 0 89   CALCIUM mg/dL 8 3  < > 8 2*   TOTAL PROTEIN g/dL  --   --  5 6*   BILIRUBIN TOTAL mg/dL  --   --  0 29   ALK PHOS U/L  --   --  54   ALT U/L  --   --  20   AST U/L  --   --  15   GLUCOSE RANDOM mg/dL 72  < > 110      Results from last 7 days  Lab Units 07/26/18  0542   INR   1 35*       Age/Sex: 69 y o  female       Assessment/Plan:   GI bleed   Assessment & Plan     EGD revealed esophagitis, gastritis continue pantoprazole  Colonoscopy revealed ischemic colitis  Discussed with GI physician assistant, okay to resume oral anticoagulation  Monitor hemoglobin closely           * Acute blood loss anemia   Assessment & Plan     Due to GI blood loss, patient status post transfusion packed cells  Monitor hemoglobin  Oral anticoagulation resumed          Atrial fibrillation with RVR ([facility] )   Assessment & Plan     Resume Eliquis discussed with Cardiology physician assistant  Beta-blocker being resumed             Opioid dependence ([facility] )   Assessment & Plan     Oxycodone          Mild cognitive impairment   Assessment & Plan     Monitor for delirium  Neuropsychology input noted patient is deemed competent          Coronary artery disease   Assessment & Plan     Recent cardiac catheterization with drug-eluting stent in place continue aspirin Plavix, beta-blocker resumed  Cardiology following             VTE Pharmacologic Prophylaxis:   Pharmacologic: Apixaban (Eliquis)  Mechanical VTE Prophylaxis in Place:  Yes      Current Length of Stay: 4 day(s)     Current Patient Status: Inpatient   Certification Statement: The patient will continue to require additional inpatient hospital stay           Discharge Plan:   [address] [facility] CLEARED    CASE MANAGEMENT FOLLOWING CLOSELY FOR ALL DISCHARGE NEEDS

## 2018-07-31 NOTE — PROGRESS NOTES
Cardiology Progress Note - Enzo Gage 69 y o  female MRN: [id number]    Unit/Bed#: [facility] 714-01 Encounter: [id number]      Assessment:  1  Acute blood loss anemia secondary to GI bleed  2  Atrial fibrillation s/p recent cardioversion  3  AVNRT s/p ablation  4  CAD s/p NGOZI to RCA  5  Chronic combined CHF  6  Ischemic cardiomyopathy - EF 35%  7  Dyslipidemia     Plan:  1  Off telemetry - appears to be maintaining NSR  2  Continue DAPT given recent stent,  Eliquis given recent cardioversion  3  Continue beta-blocker and amiodarone  4  Stable on oral diuretic regimen - continue at current dose   5  Hemoglobin stable   6  SL[facility] will be available - follow-up arrange, please call with questions     Subjective:   Patient seen and examined  No significant events overnight  Objective:     Vitals: Blood pressure 116/63, pulse 60, temperature 98 3 °F (36 8 °C), temperature source Oral, resp  rate 17, height 5' 5" (1 651 m), weight 75 1 kg (165 lb 9 1 oz), SpO2 92 %  , Body mass index is 27 55 kg/m² , Orthostatic Blood Pressures      Most Recent Value   Blood Pressure  116/63 filed at 2018 0755   Patient Position - Orthostatic VS  Lying filed at 2018 0755            Intake/Output Summary (Last 24 hours) at 18 1058  Last data filed at 18 0754   Gross per 24 hour   Intake             1200 ml   Output             1250 ml   Net              -50 ml           Physical Exam:    GEN: Enzo Gage appears well, alert and oriented x 3, pleasant and cooperative   HEENT: pupils equal, round, and reactive to light; extraocular muscles intact  NECK: supple, no carotid bruits   HEART: regular rhythm, normal S1 and S2, no murmurs, clicks, gallops or rubs   LUNGS: clear to auscultation bilaterally; no wheezes, rales, or rhonchi   ABDOMEN: normal bowel sounds, soft   EXTREMITIES: peripheral pulses normal; no clubbing, cyanosis, or edema  NEURO: no focal findings   SKIN: normal without suspicious lesions on exposed skin    Medications:      Current Facility-Administered Medications:     ALPRAZolam (XANAX) tablet 0 25 mg, 0 25 mg, Oral, HS PRN, Jessenia Muniz MD    amiodarone tablet 200 mg, 200 mg, Oral, Daily With Breakfast, Jessenia Muniz MD, 200 mg at 07/31/18 0808    amitriptyline (ELAVIL) tablet 25 mg, 25 mg, Oral, HS, Unique Celaya MD, 25 mg at 07/30/18 2108    apixaban (ELIQUIS) tablet 5 mg, 5 mg, Oral, BID, CHANDRIKA Wesley, 5 mg at 07/31/18 0808    aspirin chewable tablet 81 mg, 81 mg, Oral, Daily, Unique Celaya MD, 81 mg at 07/31/18 0808    clopidogrel (PLAVIX) tablet 75 mg, 75 mg, Oral, Daily, Unique Celaya MD, 75 mg at 07/31/18 0808    DULoxetine (CYMBALTA) delayed release capsule 30 mg, 30 mg, Oral, Daily, Unique Celaya MD, 30 mg at 07/31/18 0807    lidocaine (LIDODERM) 5 % patch 1 patch, 1 patch, Transdermal, Daily, Unique Celaya MD, 1 patch at 07/31/18 0808    lisinopril (ZESTRIL) tablet 2 5 mg, 2 5 mg, Oral, Daily, CHANDRIKA Wesley, 2 5 mg at 07/31/18 0807    melatonin tablet 6 mg, 6 mg, Oral, HS, Unique Celaya MD, 6 mg at 07/30/18 2108    metoprolol succinate (TOPROL-XL) 24 hr tablet 50 mg, 50 mg, Oral, Daily, CHANDRIKA Wseley, 50 mg at 07/31/18 0809    mirtazapine (REMERON) tablet 7 5 mg, 7 5 mg, Oral, HS, Jessenia Muniz MD, 7 5 mg at 07/30/18 2107    ondansetron (ZOFRAN) injection 4 mg, 4 mg, Intravenous, Q6H PRN, Jessenia Muniz MD, 4 mg at 07/27/18 0514    oxyCODONE (ROXICODONE) immediate release tablet 10 mg, 10 mg, Oral, Q6H, Unique Celaya MD, 10 mg at 07/31/18 0522    pantoprazole (PROTONIX) EC tablet 40 mg, 40 mg, Oral, Early Morning, Sage Merritt MD, 40 mg at 07/31/18 0522     Labs & Results:      Results from last 7 days  Lab Units 07/25/18  1213   TROPONIN I ng/mL 0 02       Results from last 7 days  Lab Units 07/31/18  0826 07/30/18  2023 07/30/18  0957  07/29/18  0458  07/28/18  0534  07/26/18  0542   WBC Thousand/uL  --   --   --   --  4 86  --  4 84  --  11 19*   HEMOGLOBIN g/dL 9 0* 8 8* 9 3*  < > 8 4*  < > 6 7*  < > 7 6*   HEMATOCRIT % 29 8* 28 4* 29 9*  < > 27 0*  < > 21 6*  < > 24 1*   PLATELETS Thousands/uL  --   --   --   --  263  --  246  --  267   < > = values in this interval not displayed  Results from last 7 days  Lab Units 07/29/18  0458 07/28/18  0534 07/26/18  0542 07/25/18  1213   SODIUM mmol/L 142 142 140 138   POTASSIUM mmol/L 3 9 3 9 4 2 4 4   CHLORIDE mmol/L 108 109* 109* 106   CO2 mmol/L 28 29 26 26   BUN mg/dL 4* 8 29* 41*   CREATININE mg/dL 0 73 0 68 0 74 0 89   CALCIUM mg/dL 8 3 8 1* 8 2* 8 2*   TOTAL PROTEIN g/dL  --   --   --  5 6*   BILIRUBIN TOTAL mg/dL  --   --   --  0 29   ALK PHOS U/L  --   --   --  54   ALT U/L  --   --   --  20   AST U/L  --   --   --  15   GLUCOSE RANDOM mg/dL 72 86 73 110       Results from last 7 days  Lab Units 07/26/18  0542 07/25/18  1213   INR  1 35* 2 16*   PTT seconds  --  31       Results from last 7 days  Lab Units 07/26/18  0542   MAGNESIUM mg/dL 2 5       Counseling / Coordination of Care  Total floor / unit time spent today 20  minutes  Greater than 50% of total time was spent with the patient and / or family counseling and / or coordination of care

## 2018-08-01 ENCOUNTER — APPOINTMENT (INPATIENT)
Dept: RADIOLOGY | Facility: HOSPITAL | Age: 78
DRG: 812 | End: 2018-08-01
Payer: MEDICARE

## 2018-08-01 LAB
ALBUMIN SERPL BCP-MCNC: 2.2 G/DL (ref 3.5–5)
ALP SERPL-CCNC: 50 U/L (ref 46–116)
ALT SERPL W P-5'-P-CCNC: 10 U/L (ref 12–78)
ANION GAP SERPL CALCULATED.3IONS-SCNC: 5 MMOL/L (ref 4–13)
AST SERPL W P-5'-P-CCNC: 8 U/L (ref 5–45)
BASOPHILS # BLD AUTO: 0.04 THOUSANDS/ΜL (ref 0–0.1)
BASOPHILS NFR BLD AUTO: 1 % (ref 0–1)
BILIRUB SERPL-MCNC: 0.34 MG/DL (ref 0.2–1)
BUN SERPL-MCNC: 8 MG/DL (ref 5–25)
CALCIUM SERPL-MCNC: 8.1 MG/DL (ref 8.3–10.1)
CHLORIDE SERPL-SCNC: 107 MMOL/L (ref 100–108)
CO2 SERPL-SCNC: 27 MMOL/L (ref 21–32)
CREAT SERPL-MCNC: 0.67 MG/DL (ref 0.6–1.3)
EOSINOPHIL # BLD AUTO: 0.37 THOUSAND/ΜL (ref 0–0.61)
EOSINOPHIL NFR BLD AUTO: 6 % (ref 0–6)
ERYTHROCYTE [DISTWIDTH] IN BLOOD BY AUTOMATED COUNT: 17.7 % (ref 11.6–15.1)
GFR SERPL CREATININE-BSD FRML MDRD: 85 ML/MIN/1.73SQ M
GLUCOSE SERPL-MCNC: 95 MG/DL (ref 65–140)
HCT VFR BLD AUTO: 25.7 % (ref 34.8–46.1)
HCT VFR BLD AUTO: 28.1 % (ref 34.8–46.1)
HGB BLD-MCNC: 7.9 G/DL (ref 11.5–15.4)
HGB BLD-MCNC: 8.7 G/DL (ref 11.5–15.4)
IMM GRANULOCYTES # BLD AUTO: 0.04 THOUSAND/UL (ref 0–0.2)
IMM GRANULOCYTES NFR BLD AUTO: 1 % (ref 0–2)
LYMPHOCYTES # BLD AUTO: 1.21 THOUSANDS/ΜL (ref 0.6–4.47)
LYMPHOCYTES NFR BLD AUTO: 20 % (ref 14–44)
MCH RBC QN AUTO: 30.7 PG (ref 26.8–34.3)
MCHC RBC AUTO-ENTMCNC: 30.7 G/DL (ref 31.4–37.4)
MCV RBC AUTO: 100 FL (ref 82–98)
MONOCYTES # BLD AUTO: 0.77 THOUSAND/ΜL (ref 0.17–1.22)
MONOCYTES NFR BLD AUTO: 13 % (ref 4–12)
NEUTROPHILS # BLD AUTO: 3.56 THOUSANDS/ΜL (ref 1.85–7.62)
NEUTS SEG NFR BLD AUTO: 59 % (ref 43–75)
NRBC BLD AUTO-RTO: 0 /100 WBCS
PLATELET # BLD AUTO: 243 THOUSANDS/UL (ref 149–390)
PMV BLD AUTO: 9.3 FL (ref 8.9–12.7)
POTASSIUM SERPL-SCNC: 3.6 MMOL/L (ref 3.5–5.3)
PROT SERPL-MCNC: 5.2 G/DL (ref 6.4–8.2)
RBC # BLD AUTO: 2.57 MILLION/UL (ref 3.81–5.12)
SODIUM SERPL-SCNC: 139 MMOL/L (ref 136–145)
WBC # BLD AUTO: 5.99 THOUSAND/UL (ref 4.31–10.16)

## 2018-08-01 PROCEDURE — 99232 SBSQ HOSP IP/OBS MODERATE 35: CPT | Performed by: INTERNAL MEDICINE

## 2018-08-01 PROCEDURE — 99232 SBSQ HOSP IP/OBS MODERATE 35: CPT | Performed by: HOSPITALIST

## 2018-08-01 PROCEDURE — 99221 1ST HOSP IP/OBS SF/LOW 40: CPT | Performed by: SURGERY

## 2018-08-01 PROCEDURE — 85014 HEMATOCRIT: CPT | Performed by: HOSPITALIST

## 2018-08-01 PROCEDURE — 74176 CT ABD & PELVIS W/O CONTRAST: CPT

## 2018-08-01 PROCEDURE — 85018 HEMOGLOBIN: CPT | Performed by: HOSPITALIST

## 2018-08-01 PROCEDURE — 85025 COMPLETE CBC W/AUTO DIFF WBC: CPT | Performed by: HOSPITALIST

## 2018-08-01 PROCEDURE — 80053 COMPREHEN METABOLIC PANEL: CPT | Performed by: HOSPITALIST

## 2018-08-01 RX ORDER — DOCUSATE SODIUM 100 MG/1
100 CAPSULE, LIQUID FILLED ORAL 2 TIMES DAILY
Status: DISCONTINUED | OUTPATIENT
Start: 2018-08-01 | End: 2018-08-01

## 2018-08-01 RX ORDER — SODIUM PHOSPHATE, DIBASIC AND SODIUM PHOSPHATE, MONOBASIC 7; 19 G/133ML; G/133ML
1 ENEMA RECTAL ONCE
Status: DISCONTINUED | OUTPATIENT
Start: 2018-08-01 | End: 2018-08-01

## 2018-08-01 RX ORDER — ACETAMINOPHEN 325 MG/1
650 TABLET ORAL EVERY 8 HOURS SCHEDULED
Status: DISCONTINUED | OUTPATIENT
Start: 2018-08-01 | End: 2018-08-06 | Stop reason: HOSPADM

## 2018-08-01 RX ADMIN — HYDROMORPHONE HYDROCHLORIDE 1 MG: 1 INJECTION, SOLUTION INTRAMUSCULAR; INTRAVENOUS; SUBCUTANEOUS at 21:59

## 2018-08-01 RX ADMIN — PANTOPRAZOLE SODIUM 40 MG: 40 TABLET, DELAYED RELEASE ORAL at 05:15

## 2018-08-01 RX ADMIN — AMIODARONE HYDROCHLORIDE 200 MG: 200 TABLET ORAL at 08:16

## 2018-08-01 RX ADMIN — OXYCODONE HYDROCHLORIDE 10 MG: 10 TABLET ORAL at 05:15

## 2018-08-01 RX ADMIN — Medication 1 TABLET: at 21:59

## 2018-08-01 RX ADMIN — HYDROMORPHONE HYDROCHLORIDE 1 MG: 1 INJECTION, SOLUTION INTRAMUSCULAR; INTRAVENOUS; SUBCUTANEOUS at 08:17

## 2018-08-01 RX ADMIN — OXYCODONE HYDROCHLORIDE 10 MG: 10 TABLET ORAL at 11:43

## 2018-08-01 RX ADMIN — CEFTRIAXONE SODIUM 1000 MG: 10 INJECTION, POWDER, FOR SOLUTION INTRAVENOUS at 17:48

## 2018-08-01 RX ADMIN — AMITRIPTYLINE HYDROCHLORIDE 25 MG: 25 TABLET, FILM COATED ORAL at 21:59

## 2018-08-01 RX ADMIN — LIDOCAINE 1 PATCH: 50 PATCH CUTANEOUS at 08:17

## 2018-08-01 RX ADMIN — APIXABAN 5 MG: 5 TABLET, FILM COATED ORAL at 17:48

## 2018-08-01 RX ADMIN — ALPRAZOLAM 0.25 MG: 0.25 TABLET ORAL at 01:31

## 2018-08-01 RX ADMIN — OXYCODONE HYDROCHLORIDE 10 MG: 10 TABLET ORAL at 00:22

## 2018-08-01 RX ADMIN — APIXABAN 5 MG: 5 TABLET, FILM COATED ORAL at 08:16

## 2018-08-01 RX ADMIN — DOCUSATE SODIUM 100 MG: 100 CAPSULE, LIQUID FILLED ORAL at 21:58

## 2018-08-01 RX ADMIN — OXYCODONE HYDROCHLORIDE 10 MG: 10 TABLET ORAL at 17:48

## 2018-08-01 RX ADMIN — ASPIRIN 81 MG 81 MG: 81 TABLET ORAL at 08:17

## 2018-08-01 RX ADMIN — MIRTAZAPINE 7.5 MG: 15 TABLET, FILM COATED ORAL at 21:59

## 2018-08-01 RX ADMIN — MELATONIN 6 MG: at 21:58

## 2018-08-01 RX ADMIN — ALPRAZOLAM 0.25 MG: 0.25 TABLET ORAL at 21:59

## 2018-08-01 RX ADMIN — CLOPIDOGREL BISULFATE 75 MG: 75 TABLET, FILM COATED ORAL at 08:17

## 2018-08-01 RX ADMIN — DULOXETINE HYDROCHLORIDE 30 MG: 30 CAPSULE, DELAYED RELEASE ORAL at 08:16

## 2018-08-01 NOTE — RESTORATIVE TECHNICIAN NOTE
Restorative Specialist Mobility Note       Activity: Ambulate in agee, Ambulate in room, Bathroom privileges, Chair, Dangle, Stand at bedside (Educated/encouraged pt to ambulate with assistance 3-4 x's/day  Bed alarm on   Pt callbell, phone/tray within reach )     Assistive Device: Front wheel walker       Ursula BOJORQUEZ, Restorative Technician, [employer] [facility]

## 2018-08-01 NOTE — PROGRESS NOTES
Gastroenterology Progress Note   - Leonardo Blade 69 y o  female MRN: [id number]    Unit/Bed#: [facility] 714-01 Encounter: [id number]      Assessment and Plan:      GI bleed   Patient presented with GI bleeding, EGD showed esophagitis with gastritis, colonoscopy showed signs of ischemic colitis, patient has been stable, anticoagulation was restarted by Cardiology, so far no more episodes of GI bleeding, patient has been tolerating the p o  route, no recent bowel movement  -hemoglobin remained stable 9>8 2>7 9, goal for hemoglobin more than 7  -continue PPI  -avoid NSAIDs  -outpatient follow-up with Dr Lachelle Carrero, will sign off, please call if you have any questions    History of cholangitis  -status post stent placement 2014, recent episode of cholangitis in May 2018, currently LFTs are normal, patient will need removal of stent as outpatient      Subjective:     Patient seen and examined at the bed, denies any events overnight, currently is tolerating PO route, denies nausea or vomiting, is passing gases but not having bowel movements, denies chest pain or shortness of breath, mild abdominal pain  Objective:     Vitals: Blood pressure 92/55, pulse 64, temperature 97 8 °F (36 6 °C), temperature source Oral, resp  rate 18, height 5' 5" (1 651 m), weight 75 6 kg (166 lb 10 7 oz), SpO2 92 %  ,Body mass index is 27 73 kg/m²        Intake/Output Summary (Last 24 hours) at 08/01/18 0934  Last data filed at 08/01/18 9933   Gross per 24 hour   Intake              782 ml   Output             1400 ml   Net             -618 ml       Physical Exam:     General Appearance: Alert, appears stated age and cooperative  Lungs: Clear to auscultation bilaterally, no rales or rhonchi, no labored breathing/accessory muscle use  Heart: Regular rate and rhythm, S1, S2 normal, no murmur, click, rub or gallop  Abdomen: Soft, non-tender, non-distended; bowel sounds normal; no masses or no organomegaly  Extremities: No cyanosis, edema    Invasive Devices Peripheral Intravenous Line            Peripheral IV 07/31/18 Right Wrist less than 1 day                Lab Results:    Results from last 7 days  Lab Units 08/01/18  0457   WBC Thousand/uL 5 99   HEMOGLOBIN g/dL 7 9*   HEMATOCRIT % 25 7*   PLATELETS Thousands/uL 243   NEUTROS PCT % 59   LYMPHS PCT % 20   MONOS PCT % 13*   EOS PCT % 6       Results from last 7 days  Lab Units 08/01/18  0457   SODIUM mmol/L 139   POTASSIUM mmol/L 3 6   CHLORIDE mmol/L 107   CO2 mmol/L 27   BUN mg/dL 8   CREATININE mg/dL 0 67   CALCIUM mg/dL 8 1*   TOTAL PROTEIN g/dL 5 2*   BILIRUBIN TOTAL mg/dL 0 34   ALK PHOS U/L 50   ALT U/L 10*   AST U/L 8   GLUCOSE RANDOM mg/dL 95       Results from last 7 days  Lab Units 07/26/18  0542   INR  1 35*           Imaging Studies: I have personally reviewed pertinent imaging studies  X-ray Chest 2 Views    Result Date: 7/25/2018  Impression: No acute cardiopulmonary disease  Workstation performed: [id number]     Xr Abdomen Obstruction Series    Result Date: 7/30/2018  Impression: Nonobstructive bowel gas pattern  Workstation performed: [id number]     Ct Head Without Contrast    Result Date: 7/25/2018  Impression: No acute intracranial abnormality   Workstation performed: [id number]

## 2018-08-01 NOTE — PROGRESS NOTES
Progress Note - Laura Meter [birth year], 69 y o  female MRN: [id number]    Unit/Bed#: [facility] 714-01 Encounter: [id number]    Primary Care Provider: Ranid Rosen DO   Date and time admitted to hospital: 7/25/2018 11:53 AM        Low back pain   Assessment & Plan    Chronic with history of osteoarthritis on chronic opioid use  Continue to monitor, supportive care analgesics  aqua K-pad  Chronic         Left lower quadrant pain   Assessment & Plan    Negative obstruction series  Clinical exam significant for left CVA tenderness, persistent  Retro-peritoneal ultrasound - unremarkable for  Nephrolithiasis, perinephric stranding  Pain control  C/w ceftriaxone IV  Day 2  Urinalysis  pyuria        Mild cognitive impairment   Assessment & Plan    Has memory difficulties  Neuropsychology input noted patient is deemed competent        Gastrointestinal hemorrhage   Assessment & Plan    Stable        H/O cholangitis   Assessment & Plan    · Patient recently admitted in May 2018 with sepsis suspected secondary to cholangitis, underwent ERCP with stone removal and biliary stent placement  · Patient was supposed to follow up with GI in 6 weeks but she did not hear from them    She is due for her stent removal as well  · Follow up GI recommendations        Coronary artery disease   Assessment & Plan    Recent cardiac catheterization status post drug-eluting stent continue Plavix aspirin  Cardiology follow-up        GI bleed   Assessment & Plan    EGD revealed esophagitis, gastritis on pantoprazole  Colonoscopy revealed ischemic colitis  Monitor hemoglobin  Resume Eliquis          Opioid dependence (HCC)   Assessment & Plan    Chronic arthritis on oxycodone         Atrial fibrillation with RVR (HCC)   Assessment & Plan    Continue beta-blocker, amiodarone, Eliquis        Chronic systolic heart failure ([address] )   Assessment & Plan    EF 35%  Continue meds  Appreciate Cardiology input        * Acute blood loss anemia   Assessment & Plan Status post 2 units PRBC transfusion  Monitor hemoglobin            VTE Pharmacologic Prophylaxis:   Pharmacologic: Apixaban (Eliquis)  Mechanical VTE Prophylaxis in Place: Yes    Patient Centered Rounds: I have performed bedside rounds with nursing staff today  Discussions with Specialists or Other Care Team Provider:  yes    Education and Discussions with Family / Patient:  yes    Time Spent for Care: 45 minutes  More than 50% of total time spent on counseling and coordination of care as described above  Current Length of Stay: 7 day(s)    Current Patient Status: Inpatient   Certification Statement: The patient will continue to require additional inpatient hospital stay due to Acute pyelonephritis    Discharge Plan:  Home tomorrow    Code Status: Level 3 - DNAR and DNI      Subjective:    patient reported left flank pain continued radiation to the groin   chronic sacral pain   review of systems negative otherwise    Objective:     Vitals:   Temp (24hrs), Av 2 °F (36 8 °C), Min:97 8 °F (36 6 °C), Max:98 8 °F (37 1 °C)    HR:  [59-64] 64  Resp:  [16-18] 18  BP: ()/(42-64) 92/55  SpO2:  [92 %-97 %] 92 %  Body mass index is 27 73 kg/m²  Input and Output Summary (last 24 hours): Intake/Output Summary (Last 24 hours) at 18 1414  Last data filed at 18 1300   Gross per 24 hour   Intake              785 ml   Output             1600 ml   Net             -815 ml       Physical Exam:     Physical Exam   Constitutional: She is oriented to person, place, and time  No distress  HENT:   Head: Normocephalic and atraumatic  Mouth/Throat: Oropharynx is clear and moist  No oropharyngeal exudate  Eyes: Conjunctivae and EOM are normal  Pupils are equal, round, and reactive to light  Neck: Normal range of motion  Neck supple  No JVD present  Cardiovascular: Normal rate, normal heart sounds and intact distal pulses  Exam reveals no gallop and no friction rub      No murmur heard   Pulmonary/Chest: Effort normal and breath sounds normal  No respiratory distress  She has no wheezes  She has no rales  Abdominal: Soft  Bowel sounds are normal  She exhibits no distension  There is tenderness  There is no rebound and no guarding  Left CVA tenderness   Musculoskeletal: Normal range of motion  She exhibits no edema, tenderness or deformity  Neurological: She is alert and oriented to person, place, and time  She has normal reflexes  She displays normal reflexes  No cranial nerve deficit  She exhibits normal muscle tone  Coordination normal    Skin: Skin is warm  No erythema  Psychiatric: She has a normal mood and affect  Additional Data:     Labs:      Results from last 7 days  Lab Units 08/01/18  0457   WBC Thousand/uL 5 99   HEMOGLOBIN g/dL 7 9*   HEMATOCRIT % 25 7*   PLATELETS Thousands/uL 243   NEUTROS PCT % 59   LYMPHS PCT % 20   MONOS PCT % 13*   EOS PCT % 6       Results from last 7 days  Lab Units 08/01/18  0457   SODIUM mmol/L 139   POTASSIUM mmol/L 3 6   CHLORIDE mmol/L 107   CO2 mmol/L 27   BUN mg/dL 8   CREATININE mg/dL 0 67   CALCIUM mg/dL 8 1*   TOTAL PROTEIN g/dL 5 2*   BILIRUBIN TOTAL mg/dL 0 34   ALK PHOS U/L 50   ALT U/L 10*   AST U/L 8   GLUCOSE RANDOM mg/dL 95       Results from last 7 days  Lab Units 07/26/18  0542   INR  1 35*       Results from last 7 days  Lab Units 07/26/18  1624   POC GLUCOSE mg/dl 119             * I Have Reviewed All Lab Data Listed Above  * Additional Pertinent Lab Tests Reviewed:  Stu [facility] Admission Reviewed    Imaging:    Imaging Reports Reviewed Today     Recent Cultures (last 7 days):           Last 24 Hours Medication List:     Current Facility-Administered Medications:  ALPRAZolam 0 25 mg Oral HS PRN Jon Zapata MD    amiodarone 200 mg Oral Daily With Breakfast Jon Zapata MD    amitriptyline 25 mg Oral HS Unique Celaya MD    apixaban 5 mg Oral BID CHANDRIKA Evans    aspirin 81 mg Oral Daily Chery Baldwin MD    cefTRIAXone 1,000 mg Intravenous Q24H Taylor Cutler MD Last Rate: 1,000 mg (07/31/18 4861)   clopidogrel 75 mg Oral Daily Unique Celaya MD    DULoxetine 30 mg Oral Daily Unique Celaya MD    HYDROmorphone 1 mg Intravenous Q6H PRN Taylor Cutler MD    lidocaine 1 patch Transdermal Daily Chery Baldwin MD    lisinopril 2 5 mg Oral Daily Annita Goodwin, CHANDRIKA    melatonin 6 mg Oral HS Unique Celaya MD    metoprolol succinate 50 mg Oral Daily Annita Goodwin, CRNP    mirtazapine 7 5 mg Oral HS Unique Celaya MD    ondansetron 4 mg Intravenous Q6H PRN Chery Baldwin MD    oxyCODONE 10 mg Oral Q6H Chery Baldwin MD    pantoprazole 40 mg Oral Early Morning Barby Read MD    senna-docusate sodium 1 tablet Oral HS Taylor Cutler MD         Today, Patient Was Seen By: Taylor Cutler MD    ** Please Note: Dictation voice to text software may have been used in the creation of this document   **

## 2018-08-01 NOTE — ASSESSMENT & PLAN NOTE
Negative obstruction series  Clinical exam significant for left CVA tenderness, persistent  Retro-peritoneal ultrasound - unremarkable for  Nephrolithiasis, perinephric stranding  Pain control  C/w ceftriaxone IV  Day 2  Urinalysis  pyuria

## 2018-08-01 NOTE — TELEPHONE ENCOUNTER
[address] [place] [address] is requesting refill on lisinopril 5 mg and also metoporol 100 mg - we do not have pt taking this dose   Please call pt in the morning to confirm if she needs these refills and also to confirm her dosage

## 2018-08-02 PROBLEM — D73.5 SPLENIC HEMORRHAGE: Status: ACTIVE | Noted: 2018-07-30

## 2018-08-02 LAB
ABO GROUP BLD: NORMAL
ALBUMIN SERPL BCP-MCNC: 2.1 G/DL (ref 3.5–5)
ALP SERPL-CCNC: 55 U/L (ref 46–116)
ALT SERPL W P-5'-P-CCNC: 9 U/L (ref 12–78)
ANION GAP SERPL CALCULATED.3IONS-SCNC: 4 MMOL/L (ref 4–13)
AST SERPL W P-5'-P-CCNC: 12 U/L (ref 5–45)
BASOPHILS # BLD AUTO: 0.03 THOUSANDS/ΜL (ref 0–0.1)
BASOPHILS NFR BLD AUTO: 1 % (ref 0–1)
BILIRUB SERPL-MCNC: 0.25 MG/DL (ref 0.2–1)
BLD GP AB SCN SERPL QL: NEGATIVE
BUN SERPL-MCNC: 8 MG/DL (ref 5–25)
CALCIUM SERPL-MCNC: 8.2 MG/DL (ref 8.3–10.1)
CHLORIDE SERPL-SCNC: 110 MMOL/L (ref 100–108)
CO2 SERPL-SCNC: 27 MMOL/L (ref 21–32)
CREAT SERPL-MCNC: 0.65 MG/DL (ref 0.6–1.3)
EOSINOPHIL # BLD AUTO: 0.42 THOUSAND/ΜL (ref 0–0.61)
EOSINOPHIL NFR BLD AUTO: 7 % (ref 0–6)
ERYTHROCYTE [DISTWIDTH] IN BLOOD BY AUTOMATED COUNT: 17.3 % (ref 11.6–15.1)
GFR SERPL CREATININE-BSD FRML MDRD: 86 ML/MIN/1.73SQ M
GLUCOSE SERPL-MCNC: 88 MG/DL (ref 65–140)
HCT VFR BLD AUTO: 26.2 % (ref 34.8–46.1)
HCT VFR BLD AUTO: 27.6 % (ref 34.8–46.1)
HCT VFR BLD AUTO: 28.1 % (ref 34.8–46.1)
HGB BLD-MCNC: 8 G/DL (ref 11.5–15.4)
HGB BLD-MCNC: 8.4 G/DL (ref 11.5–15.4)
HGB BLD-MCNC: 8.5 G/DL (ref 11.5–15.4)
IMM GRANULOCYTES # BLD AUTO: 0.03 THOUSAND/UL (ref 0–0.2)
IMM GRANULOCYTES NFR BLD AUTO: 1 % (ref 0–2)
LYMPHOCYTES # BLD AUTO: 1.28 THOUSANDS/ΜL (ref 0.6–4.47)
LYMPHOCYTES NFR BLD AUTO: 20 % (ref 14–44)
MCH RBC QN AUTO: 30.7 PG (ref 26.8–34.3)
MCHC RBC AUTO-ENTMCNC: 30.5 G/DL (ref 31.4–37.4)
MCV RBC AUTO: 100 FL (ref 82–98)
MONOCYTES # BLD AUTO: 0.68 THOUSAND/ΜL (ref 0.17–1.22)
MONOCYTES NFR BLD AUTO: 11 % (ref 4–12)
NEUTROPHILS # BLD AUTO: 3.86 THOUSANDS/ΜL (ref 1.85–7.62)
NEUTS SEG NFR BLD AUTO: 60 % (ref 43–75)
NRBC BLD AUTO-RTO: 0 /100 WBCS
PLATELET # BLD AUTO: 263 THOUSANDS/UL (ref 149–390)
PMV BLD AUTO: 9.4 FL (ref 8.9–12.7)
POTASSIUM SERPL-SCNC: 3.8 MMOL/L (ref 3.5–5.3)
PROT SERPL-MCNC: 5.3 G/DL (ref 6.4–8.2)
RBC # BLD AUTO: 2.61 MILLION/UL (ref 3.81–5.12)
RH BLD: POSITIVE
SODIUM SERPL-SCNC: 141 MMOL/L (ref 136–145)
SPECIMEN EXPIRATION DATE: NORMAL
WBC # BLD AUTO: 6.3 THOUSAND/UL (ref 4.31–10.16)

## 2018-08-02 PROCEDURE — 85014 HEMATOCRIT: CPT | Performed by: HOSPITALIST

## 2018-08-02 PROCEDURE — 99232 SBSQ HOSP IP/OBS MODERATE 35: CPT | Performed by: HOSPITALIST

## 2018-08-02 PROCEDURE — 85014 HEMATOCRIT: CPT | Performed by: NURSE PRACTITIONER

## 2018-08-02 PROCEDURE — 86900 BLOOD TYPING SEROLOGIC ABO: CPT | Performed by: NURSE PRACTITIONER

## 2018-08-02 PROCEDURE — 80053 COMPREHEN METABOLIC PANEL: CPT | Performed by: HOSPITALIST

## 2018-08-02 PROCEDURE — 99232 SBSQ HOSP IP/OBS MODERATE 35: CPT | Performed by: SURGERY

## 2018-08-02 PROCEDURE — 86901 BLOOD TYPING SEROLOGIC RH(D): CPT | Performed by: NURSE PRACTITIONER

## 2018-08-02 PROCEDURE — 86850 RBC ANTIBODY SCREEN: CPT | Performed by: NURSE PRACTITIONER

## 2018-08-02 PROCEDURE — 85025 COMPLETE CBC W/AUTO DIFF WBC: CPT | Performed by: HOSPITALIST

## 2018-08-02 PROCEDURE — 85018 HEMOGLOBIN: CPT | Performed by: NURSE PRACTITIONER

## 2018-08-02 PROCEDURE — 85018 HEMOGLOBIN: CPT | Performed by: HOSPITALIST

## 2018-08-02 RX ADMIN — OXYCODONE HYDROCHLORIDE 10 MG: 10 TABLET ORAL at 23:01

## 2018-08-02 RX ADMIN — OXYCODONE HYDROCHLORIDE 10 MG: 10 TABLET ORAL at 12:03

## 2018-08-02 RX ADMIN — MIRTAZAPINE 7.5 MG: 15 TABLET, FILM COATED ORAL at 22:18

## 2018-08-02 RX ADMIN — OXYCODONE HYDROCHLORIDE 10 MG: 10 TABLET ORAL at 17:15

## 2018-08-02 RX ADMIN — PANTOPRAZOLE SODIUM 40 MG: 40 TABLET, DELAYED RELEASE ORAL at 05:05

## 2018-08-02 RX ADMIN — OXYCODONE HYDROCHLORIDE 10 MG: 10 TABLET ORAL at 05:05

## 2018-08-02 RX ADMIN — MELATONIN 6 MG: at 22:18

## 2018-08-02 RX ADMIN — Medication 1 TABLET: at 22:18

## 2018-08-02 RX ADMIN — HYDROMORPHONE HYDROCHLORIDE 1 MG: 1 INJECTION, SOLUTION INTRAMUSCULAR; INTRAVENOUS; SUBCUTANEOUS at 23:41

## 2018-08-02 RX ADMIN — ASPIRIN 81 MG 81 MG: 81 TABLET ORAL at 08:21

## 2018-08-02 RX ADMIN — CLOPIDOGREL BISULFATE 75 MG: 75 TABLET, FILM COATED ORAL at 08:21

## 2018-08-02 RX ADMIN — LIDOCAINE 1 PATCH: 50 PATCH CUTANEOUS at 08:21

## 2018-08-02 RX ADMIN — ACETAMINOPHEN 650 MG: 325 TABLET, FILM COATED ORAL at 00:02

## 2018-08-02 RX ADMIN — ACETAMINOPHEN 650 MG: 325 TABLET, FILM COATED ORAL at 22:19

## 2018-08-02 RX ADMIN — ALPRAZOLAM 0.25 MG: 0.25 TABLET ORAL at 23:40

## 2018-08-02 RX ADMIN — AMIODARONE HYDROCHLORIDE 200 MG: 200 TABLET ORAL at 08:21

## 2018-08-02 RX ADMIN — AMITRIPTYLINE HYDROCHLORIDE 25 MG: 25 TABLET, FILM COATED ORAL at 22:18

## 2018-08-02 RX ADMIN — OXYCODONE HYDROCHLORIDE 10 MG: 10 TABLET ORAL at 00:02

## 2018-08-02 RX ADMIN — ACETAMINOPHEN 650 MG: 325 TABLET, FILM COATED ORAL at 05:05

## 2018-08-02 RX ADMIN — ACETAMINOPHEN 650 MG: 325 TABLET, FILM COATED ORAL at 13:52

## 2018-08-02 RX ADMIN — DULOXETINE HYDROCHLORIDE 30 MG: 30 CAPSULE, DELAYED RELEASE ORAL at 08:21

## 2018-08-02 NOTE — PROGRESS NOTES
Notified by RN that patient with persistent LLQ abdominal pain  Patient reports the pain started two days ago and has persisted since without relief  Denies N/V  On exam patient's abdomen is soft with significant tenderness to palpation of LLQ  Will order CT abdomen/pelvis for further evaluation

## 2018-08-02 NOTE — PROGRESS NOTES
Progress Note - Krystal Rose [birth year], 69 y o  female MRN: [id number]    Unit/Bed#: [facility] 714-01 Encounter: [id number]    Primary Care Provider: Shubham Gregorio DO   Date and time admitted to hospital: 7/25/2018 11:53 AM        * Splenic hemorrhage   Assessment & Plan    Negative obstruction series  Clinical exam significant for left CVA tenderness, persistent- better controlled with meds  Retro-peritoneal ultrasound - unremarkable   Pain control as scheduled  Dc ceftriaxone, continue to hold [employer]  Continue DAPT 3  Months due to recent NGOZI placement  Appreciate surgical input: no need for splenectomy/ angiogram at this time  Significant event overnight with severe pain  Imaging completed with  Findings suspicious for both subcapsular and pericapsular fluid involving the spleen, with mixed attenuation suspicious for hemorrhage/injury  Sabrina Pry is adjacent left lower lobe consolidation/atelectasis and left pleural fluid which could either be   related to splinting from the splenic finding versus contusion and injury at this site as well  Cholelithiasis and choledocholithiasis   CBD stent noted as above  Low back pain   Assessment & Plan    Chronic with history of osteoarthritis on chronic opioid use  Continue to monitor, supportive care analgesics  aqua K-pad  Chronic         Mild cognitive impairment   Assessment & Plan    Has memory difficulties  Neuropsychology input noted patient is deemed competent        Gastrointestinal hemorrhage   Assessment & Plan    Stable        H/O cholangitis   Assessment & Plan    · Patient recently admitted in May 2018 with sepsis suspected secondary to cholangitis, underwent ERCP with stone removal and biliary stent placement  · Patient was supposed to follow up with GI in 6 weeks but she did not hear from them    She is due for her stent removal as well  · Follow up GI recommendations        Coronary artery disease   Assessment & Plan    Recent cardiac catheterization status post drug-eluting stent continue Plavix aspirin  Cardiology follow-up        GI bleed   Assessment & Plan    EGD revealed esophagitis, gastritis on pantoprazole  Colonoscopy revealed ischemic colitis  Monitor hemoglobin  Hold Eliquis          Acute blood loss anemia   Assessment & Plan    Status post 2 units PRBC transfusion during hospitalization  H/HL 8  2  stable        Opioid dependence (HCC)   Assessment & Plan    Chronic arthritis on oxycodone             VTE Pharmacologic Prophylaxis:   Pharmacologic: Pharmacologic VTE Prophylaxis contraindicated due to GIB  Mechanical VTE Prophylaxis in Place: Yes    Patient Centered Rounds: I have performed bedside rounds with nursing staff today  Discussions with Specialists or Other Care Team Provider: yes    Education and Discussions with Family / Patient:yes    Time Spent for Care: 45 minutes  More than 50% of total time spent on counseling and coordination of care as described above  Current Length of Stay: 8 day(s)    Current Patient Status: Inpatient   Certification Statement: The patient will continue to require additional inpatient hospital stay due to med mgt of acute pain- splenic hemorrhage    Discharge Plan: home when medically stable    Code Status: Level 3 - DNAR and DNI      Subjective:   Pain controlled in LUQ/LLQ  ROS negative otherwise  Objective:     Vitals:   Temp (24hrs), Av 3 °F (36 8 °C), Min:98 °F (36 7 °C), Max:98 9 °F (37 2 °C)    HR:  [63-70] 63  Resp:  [18-20] 20  BP: ()/(50-62) 110/62  SpO2:  [92 %-93 %] 92 %  Body mass index is 27 33 kg/m²  Input and Output Summary (last 24 hours): Intake/Output Summary (Last 24 hours) at 18 1252  Last data filed at 18 4467   Gross per 24 hour   Intake             1170 ml   Output              950 ml   Net              220 ml       Physical Exam:     Physical Exam   Constitutional: She is oriented to person, place, and time  No distress     HENT:   Head: Normocephalic and atraumatic  Mouth/Throat: Oropharynx is clear and moist    Eyes: Conjunctivae and EOM are normal  Pupils are equal, round, and reactive to light  Neck: Normal range of motion  Neck supple  No JVD present  Cardiovascular: Normal rate and normal heart sounds  Exam reveals no gallop and no friction rub  No murmur heard  Pulmonary/Chest: Effort normal and breath sounds normal  No respiratory distress  She has no wheezes  She has no rales  Abdominal: Soft  Bowel sounds are normal  She exhibits no distension  There is tenderness  There is no rebound and no guarding  LUQ/LLQ   Musculoskeletal: Normal range of motion  She exhibits no edema, tenderness or deformity  Neurological: She is alert and oriented to person, place, and time  No cranial nerve deficit  Coordination normal    Skin: No erythema  Psychiatric: She has a normal mood and affect  Additional Data:     Labs:      Results from last 7 days  Lab Units 08/02/18  1223 08/02/18  0501   WBC Thousand/uL  --  6 30   HEMOGLOBIN g/dL 8 4* 8 0*   HEMATOCRIT % 28 1* 26 2*   PLATELETS Thousands/uL  --  263   NEUTROS PCT %  --  60   LYMPHS PCT %  --  20   MONOS PCT %  --  11   EOS PCT %  --  7*       Results from last 7 days  Lab Units 08/02/18  0501   SODIUM mmol/L 141   POTASSIUM mmol/L 3 8   CHLORIDE mmol/L 110*   CO2 mmol/L 27   BUN mg/dL 8   CREATININE mg/dL 0 65   CALCIUM mg/dL 8 2*   TOTAL PROTEIN g/dL 5 3*   BILIRUBIN TOTAL mg/dL 0 25   ALK PHOS U/L 55   ALT U/L 9*   AST U/L 12   GLUCOSE RANDOM mg/dL 88           Results from last 7 days  Lab Units 07/26/18  1624   POC GLUCOSE mg/dl 119             * I Have Reviewed All Lab Data Listed Above  * Additional Pertinent Lab Tests Reviewed:  Stu Chau Admission Reviewed    Imaging:    Imaging Reports Reviewed Today Recent Cultures (last 7 days):           Last 24 Hours Medication List:     Current Facility-Administered Medications:  acetaminophen 650 mg Oral Q8H [address], CRNP   ALPRAZolam 0 25 mg Oral HS PRN Uma Solis MD   amiodarone 200 mg Oral Daily With Breakfast Uma Solis MD   amitriptyline 25 mg Oral HS Unique Celaya MD   aspirin 81 mg Oral Daily Unique Celaya MD   clopidogrel 75 mg Oral Daily Unique Celaya MD   DULoxetine 30 mg Oral Daily Unique Celaya MD   HYDROmorphone 1 mg Intravenous Q4H PRN Helayne Mount, CRNP   lidocaine 1 patch Transdermal Daily Uma Solis MD   lisinopril 2 5 mg Oral Daily Liliya Listen, CHANDRIKA   melatonin 6 mg Oral HS Uma Solis MD   metoprolol succinate 50 mg Oral Daily Liliya Listen, CHANDRIKA   mirtazapine 7 5 mg Oral HS Unique Celaya MD   ondansetron 4 mg Intravenous Q6H PRN Unique Celaya MD   oxyCODONE 10 mg Oral Q6H Uma Solis MD   pantoprazole 40 mg Oral Early Morning Radha Mojica MD   senna-docusate sodium 1 tablet Oral HS Johana Traore MD        Today, Patient Was Seen By: Johana Traore MD    ** Please Note: Dictation voice to text software may have been used in the creation of this document   **

## 2018-08-02 NOTE — PROGRESS NOTES
Called by radiology after my colleague had ordered a ct abdomen and pelvis without contrast  Discussed with radiology and ? If pt has had any trauma, I also question if pt HH loss was due to ct findings  Pt is noted to be very tender on palpation reports it started about two days ago  Discussed with my attending Dr Wellington Soto will look to consult surgery for any input in regard to any intervention    - H&H q 6 hrs  repeat at midnight   - pt is on eliquis and Plavix however pt recently 7/12/18 has a drug eluting stent placed in the mid RCA  RECOMMENDATION WERE TO CONTINUE DAPT X 3 MONTHS     CT findings demonstrate: Findings suspicious for both subcapsular and pericapsular fluid involving the spleen, with mixed attenuation suspicious for hemorrhage/injury  Eward Dubs is adjacent left lower lobe consolidation/atelectasis and left pleural fluid which could either be   related to splinting from the splenic finding versus contusion and injury at this site as well  Cholelithiasis and choledocholithiasis   CBD stent noted as above  Upon admission pt was admitted with suspected gi bleed and on 7/27/18 pt underwent a colonoscopy:   - demonstrating Superficial Ulcers found in the cecum and ascending colon likely ischemic colitis  Biopsy taken  Normal transverse colon, descending   colon, and sigmoid colon  Hemorrhoids  EGD: Mild esophagitis seen  Mild gastritis suspicious for mild GAVE  Normal 1st portion of the duodenum and 2nd portion of the duodenum  Pigtail biliary stent visualized extending out of ampula

## 2018-08-02 NOTE — PROGRESS NOTES
Up to examine pt  She is very forgetful poor cognitive skill  However, she is significantly tender on left lower quadrant to flank side and upper under flank ribs area  Pt has difficulty with deep inspiration due to pain  Discussed with the pt she has difficulty understanding explanation of ct findings  She does report she has fallen mulitple times but not within the last few weeks  She lives by herself     Pt is on eliquis and plavix discussed with surgery at the bedside will hold eliquis needs to be addressed with cards tomorrow   Pt will need to remain on plavix due to new stent on 18  Next h & H is at midnight continue but will place pt on stepdown level 2 now   Stop eliquis   Continue frequent vitals and surgery to follow, much appreciated   Likely occurred sp colonoscopy   Will add low dose pain meds (tylenol atc), lidocaine patch, and on dilaudid 1mg q 6 changed to q 4 hrs prn   Will also get type and screen since last one down on  now  (good for three days)

## 2018-08-02 NOTE — ASSESSMENT & PLAN NOTE
EGD revealed esophagitis, gastritis on pantoprazole  Colonoscopy revealed ischemic colitis  Monitor hemoglobin  Hold Eliquis

## 2018-08-02 NOTE — CASE MANAGEMENT
Continued Stay Review    Date: 8-2-18       Vital Signs: /59 (BP Location: Left arm)   Pulse 62   Temp 97 5 °F (36 4 °C) (Oral)   Resp 18   Ht 5' 5" (1 651 m)   Wt 74 5 kg (164 lb 3 9 oz)   SpO2 93%   BMI 27 33 kg/m²     Medications:   Scheduled Meds:   Current Facility-Administered Medications:  acetaminophen 650 mg Oral Q8H [facility]   ALPRAZolam 0 25 mg Oral HS PRN   amiodarone 200 mg Oral Daily With Breakfast   amitriptyline 25 mg Oral HS   aspirin 81 mg Oral Daily   clopidogrel 75 mg Oral Daily   DULoxetine 30 mg Oral Daily   HYDROmorphone 1 mg Intravenous Q4H PRN   lidocaine 1 patch Transdermal Daily   lisinopril 2 5 mg Oral Daily   melatonin 6 mg Oral HS   metoprolol succinate 50 mg Oral Daily   mirtazapine 7 5 mg Oral HS   ondansetron 4 mg Intravenous Q6H PRN   oxyCODONE 10 mg Oral Q6H   pantoprazole 40 mg Oral Early Morning   senna-docusate sodium 1 tablet Oral HS     Abnormal Labs/Diagnostic Results:      Lab Units 08/02/18  1223 08/02/18  0501   HEMOGLOBIN g/dL 8 4* 8 0*   HEMATOCRIT % 28 1* 26 2*     Lab Units 08/02/18  0501   CHLORIDE mmol/L 110*   CALCIUM mg/dL 8 2*   TOTAL PROTEIN g/dL 5 3*           Age/Sex: 69 y o  female     Assessment/Plan:    Splenic hemorrhage   Assessment & Plan     Negative obstruction series  Clinical exam significant for left CVA tenderness, persistent- better controlled with meds  Retro-peritoneal ultrasound - unremarkable   Pain control as scheduled  Dc ceftriaxone, continue to hold [facility]  Continue DAPT 3  Months due to recent NGOZI placement  Appreciate surgical input: no need for splenectomy/ angiogram at this time  Significant event overnight with severe pain    Imaging completed with  Findings suspicious for both subcapsular and pericapsular fluid involving the spleen, with mixed attenuation suspicious for hemorrhage/injury  Sharon Avbernie is adjacent left lower lobe consolidation/atelectasis and left pleural fluid which could either be   related to splinting from the splenic finding versus contusion and injury at this site as well  Cholelithiasis and choledocholithiasis   CBD stent noted as above  H/O cholangitis   Assessment & Plan     · Patient recently admitted in May 2018 with sepsis suspected secondary to cholangitis, underwent ERCP with stone removal and biliary stent placement  · Patient was supposed to follow up with GI in 6 weeks but she did not hear from them    She is due for her stent removal as well  · Follow up GI recommendations          Coronary artery disease   Assessment & Plan     Recent cardiac catheterization status post drug-eluting stent continue Plavix aspirin  Cardiology follow-up          GI bleed   Assessment & Plan     EGD revealed esophagitis, gastritis on pantoprazole  Colonoscopy revealed ischemic colitis  Monitor hemoglobin  Hold Eliquis               Certification Statement: The patient will continue to require additional inpatient hospital stay due to med mgt of acute pain- splenic hemorrhage     Discharge Plan: home when medically stable

## 2018-08-02 NOTE — RESTORATIVE TECHNICIAN NOTE
Restorative Specialist Mobility Note       Activity: Ambulate in agee, Ambulate in room, Bathroom privileges, Chair, Dangle, Stand at bedside (Educated/encouraged pt to ambulate with assistance 3-4 x's/day  Bed alarm on   Pt callbell, phone/tray within reach )     Assistive Device: Front wheel walker       Daniela BOJORQUEZ, Restorative Technician, [employer] [facility]

## 2018-08-02 NOTE — ASSESSMENT & PLAN NOTE
Negative obstruction series  Clinical exam significant for left CVA tenderness, persistent- better controlled with meds  Retro-peritoneal ultrasound - unremarkable   Pain control as scheduled  Dc ceftriaxone, continue to hold [facility]  Continue DAPT  Appreciate surgical input  Significant event overnight with severe pain  Imaging completed with  Findings suspicious for both subcapsular and pericapsular fluid involving the spleen, with mixed attenuation suspicious for hemorrhage/injury  Kadeem Grimmene is adjacent left lower lobe consolidation/atelectasis and left pleural fluid which could either be   related to splinting from the splenic finding versus contusion and injury at this site as well  Cholelithiasis and choledocholithiasis   CBD stent noted as above

## 2018-08-02 NOTE — PROGRESS NOTES
Pt made stepdown status after suspicion for posible abdominal bleeding was discovered on CT of Abdomen  SLIM evaluated Pt  Acute care surgery consulted and evaluated pt  Full assessment completed at this time  Pt continues to have LLQ guarding and tenderness upon palpation  New orders for routine Tylenol to be given and PRN IV Dilaudid q4 ordered for pain  H&H to be drawn every 6 hours along with a midnight type and screen  H&H at this time is 8 5 / 27 6  Pt resting at this time  Will continue to monitor

## 2018-08-02 NOTE — CONSULTS
Consultation - General Surgery   Cem Schmidt 69 y o  female MRN: [id number]  Unit/Bed#: [facility] 714-01 Encounter: [id number]    Assessment/Plan     Assessment:  78yF w/ splenic capsular tear following colonscopy    Plan:  Conservative management of splenic injury  Switch to SD2 from MS  Hold Eliquis  Continue ASA/plavix  Trend H&H q6   Transfuse for Hgb <7  Rest of care     History of Present Illness     HPI:  Cem Schmidt is a 69 y o  female who presented on 7/25 with lightheadedness and fatigue  She was recently hospitalized 7/7-7/20 with Afib RVR s/p ablation  Because of VTach she underwent cardiac cath and drug-eluting stent was placed on 7/12  She was discharged on ASA, plavix and eliquis (due to recent cardioversion)  Hgb on presentation was 5 7 from 11 a week before presentation  She was given 2U RBC  Xarelto was held  ASA and plavix were continued  7/26 EGD performed showing mild esophagitis and mild gastritis  7/27 colonoscopy was performed showing superficiaal ulcers in cecum and ascending colon thought to be secondary to ischemic colitis  She received another 1U transfusion on 7/28 for Hgb that drifted down to 6 7 and went to 8 1  Eliquis was restarted on 7/29  She has not required a transfusion since  Since around the time of the colonoscopy she has had left abdominal pain  CT was performed 8/1 due to LLQ and LUQ pain  This showed subcapsular and pericapsular fluid suspicious for bleeding  She denies any rectal bleeding, dark stools or hematemesis  Inpatient consult to Acute Care Surgery     Performed by  Rosa Kim     Authorized by Gigi Delaney              Review of Systems   Constitutional: Negative for chills and fever  Respiratory: Negative for chest tightness and shortness of breath  Cardiovascular: Negative for chest pain and palpitations  Gastrointestinal: Positive for abdominal distention and abdominal pain  Negative for anal bleeding, blood in stool and nausea  Musculoskeletal: Positive for back pain  Negative for neck pain  Skin: Negative for rash and wound  Neurological: Negative for light-headedness and numbness  Historical Information   Past Medical History:   Diagnosis Date    [facility]-fib [facility])     Acute respiratory disease     Anemia     Arthritis     CHF (congestive heart failure) (HCC)     Chronic pain     Heart failure (HCC)     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[facility] [address] )     History of colon polyps     Hx of long term use of blood thinners     Hypertension     Irregular heart beat     Narcotic dependence ([facility] )     Rectal bleeding     Stroke ([facility])     mild no deficiets/ memory loss    Uses walker      Past Surgical History:   Procedure Laterality Date    COLONOSCOPY      COLONOSCOPY N/A 7/27/2018    Procedure: COLONOSCOPY;  Surgeon: Sage Merritt MD;  [facility]; Service: Gastroenterology    CORONARY STENT PLACEMENT      ERCP N/A 5/14/2018    Procedure: ENDOSCOPIC RETROGRADE CHOLANGIOPANCREATOGRAPHY (ERCP); Surgeon: Pari Marques MD;  Location: [facility];  Service: Gastroenterology    ESOPHAGOGASTRODUODENOSCOPY N/A 7/26/2018    Procedure: ESOPHAGOGASTRODUODENOSCOPY (EGD); Surgeon: Sage Merirtt MD;  Location: [facility];   Service: Gastroenterology    JOINT REPLACEMENT Right     knee     Social History   History   Alcohol Use No     History   Drug Use No     History   Smoking Status    Former Smoker   Smokeless Tobacco    Never Used     Family History: non-contributory    Meds/Allergies   all current active meds have been reviewed, current meds:   Current Facility-Administered Medications   Medication Dose Route Frequency    ALPRAZolam (XANAX) tablet 0 25 mg  0 25 mg Oral HS PRN    amiodarone tablet 200 mg  200 mg Oral Daily With Breakfast    amitriptyline (ELAVIL) tablet 25 mg  25 mg Oral HS    apixaban (ELIQUIS) tablet 5 mg  5 mg Oral BID    aspirin chewable tablet 81 mg  81 mg Oral Daily    cefTRIAXone (ROCEPHIN) 1,000 mg in dextrose 5 % 50 mL IVPB  1,000 mg Intravenous Q24H    clopidogrel (PLAVIX) tablet 75 mg  75 mg Oral Daily    docusate sodium (COLACE) capsule 100 mg  100 mg Oral BID    DULoxetine (CYMBALTA) delayed release capsule 30 mg  30 mg Oral Daily    HYDROmorphone (DILAUDID) injection 1 mg  1 mg Intravenous Q6H PRN    lidocaine (LIDODERM) 5 % patch 1 patch  1 patch Transdermal Daily    lisinopril (ZESTRIL) tablet 2 5 mg  2 5 mg Oral Daily    melatonin tablet 6 mg  6 mg Oral HS    metoprolol succinate (TOPROL-XL) 24 hr tablet 50 mg  50 mg Oral Daily    mirtazapine (REMERON) tablet 7 5 mg  7 5 mg Oral HS    ondansetron (ZOFRAN) injection 4 mg  4 mg Intravenous Q6H PRN    oxyCODONE (ROXICODONE) immediate release tablet 10 mg  10 mg Oral Q6H    pantoprazole (PROTONIX) EC tablet 40 mg  40 mg Oral Early Morning    senna-docusate sodium (SENOKOT S) 8 6-50 mg per tablet 1 tablet  1 tablet Oral HS    and PTA meds:   Prior to Admission Medications   Prescriptions Last Dose Informant Patient Reported? Taking?    ALPRAZolam (NIRAVAM) 0 25 MG dissolvable tablet   No No   Sig: Take 1 tablet (0 25 mg total) by mouth daily at bedtime as needed for anxiety   DULoxetine (CYMBALTA) 30 mg delayed release capsule   No No   Sig: Take 1 capsule (30 mg total) by mouth daily   amiodarone 200 mg tablet Past Week at Unknown time  No Yes   Sig: Take 1 tablet (200 mg total) by mouth 2 (two) times a day with meals for 8 days Then decrease to 1 tablet daily   amitriptyline (ELAVIL) 25 mg tablet   No No   Sig: Take 1 tablet (25 mg total) by mouth daily at bedtime   apixaban (ELIQUIS) 5 mg Past Week at Unknown time  Yes Yes   Sig: Take 5 mg by mouth 2 (two) times a day   aspirin (ECOTRIN LOW STRENGTH) 81 mg EC tablet Past Week at Unknown time  Yes Yes   Sig: Take 81 mg by mouth daily   clopidogrel (PLAVIX) 75 mg tablet Past Week at Unknown time  No Yes   Sig: Take 1 tablet (75 mg total) by mouth daily furosemide (LASIX) 40 mg tablet   No No   Sig: Take 1 tablet (40 mg total) by mouth daily   lidocaine (LIDODERM) 5 % Past Week at Unknown time  No Yes   Sig: Place 1 patch on the skin daily Remove & Discard patch within 12 hours or as directed by MD   lisinopril (ZESTRIL) 5 mg tablet Past Week at Unknown time  No Yes   Sig: TAKE 1 TABLET (5 MG TOTAL) BY MOUTH DAILY   melatonin 3 mg Past Week at Unknown time  No Yes   Sig: Take 2 tablets (6 mg total) by mouth daily at bedtime   metoprolol succinate (TOPROL-XL) 50 mg 24 hr tablet Past Week at Unknown time  No Yes   Sig: Take 1 tablet (50 mg total) by mouth every 12 (twelve) hours   mirtazapine (REMERON) 7 5 MG tablet Past Week at Unknown time  No Yes   Sig: Take 1 tablet (7 5 mg total) by mouth daily at bedtime   oxyCODONE (ROXICODONE) 15 mg immediate release tablet Past Week at Unknown time  Yes Yes   Sig: Take 15 mg by mouth every 6 (six) hours   polyethylene glycol (MIRALAX) 17 g packet Past Week at Unknown time  Yes Yes   Sig: Take 17 g by mouth daily   potassium chloride (K-DUR,KLOR-CON) 20 mEq tablet   No No   Sig: Take 1 tablet (20 mEq total) by mouth daily   rivaroxaban (XARELTO) 15 mg tablet Past Week at Unknown time  No Yes   Sig: Take 1 tablet (15 mg total) by mouth daily with dinner      Facility-Administered Medications: None     No Known Allergies    Objective   First Vitals:   Blood Pressure: 102/51 (07/25/18 1200)  Pulse: 68 (07/25/18 1200)  Temperature: 98 6 °F (37 °C) (07/25/18 1202)  Temp Source: Oral (07/25/18 1620)  Respirations: 18 (07/25/18 1200)  Height: 5' 5" (165 1 cm) (07/25/18 1154)  Weight - Scale: 68 kg (149 lb 14 6 oz) (07/25/18 1154)  SpO2: 97 % (07/25/18 1200)    Current Vitals:   Blood Pressure: 126/57 (08/01/18 1521)  Pulse: 64 (08/01/18 1521)  Temperature: 98 °F (36 7 °C) (08/01/18 1521)  Temp Source: Oral (08/01/18 1521)  Respirations: 18 (08/01/18 1521)  Height: 5' 5" (165 1 cm) (07/27/18 1102)  Weight - Scale: 75 6 kg (166 lb 10 7 oz) (08/01/18 0600)  SpO2: 93 % (08/01/18 1521)      Intake/Output Summary (Last 24 hours) at 08/01/18 2224  Last data filed at 08/01/18 1707   Gross per 24 hour   Intake             1005 ml   Output             1200 ml   Net             -195 ml       Invasive Devices     Peripheral Intravenous Line            Peripheral IV 08/01/18 Left Hand less than 1 day                Physical Exam   Constitutional: She is oriented to person, place, and time  She appears well-developed and well-nourished  HENT:   Head: Normocephalic and atraumatic  Cardiovascular: Normal rate and intact distal pulses  Pulmonary/Chest: Effort normal  No respiratory distress  Abdominal: Soft  She exhibits no mass  There is no rebound and no guarding  Left abdominal tenderness  Musculoskeletal: Normal range of motion  She exhibits no edema or deformity  Neurological: She is alert and oriented to person, place, and time  Skin: Skin is warm  No rash noted  Psychiatric: She has a normal mood and affect  Her behavior is normal  Judgment normal        Lab Results:   I have personally reviewed pertinent lab results  , CBC:   Lab Results   Component Value Date    WBC 5 99 08/01/2018    HGB 8 7 (L) 08/01/2018    HCT 28 1 (L) 08/01/2018     (H) 08/01/2018     08/01/2018    MCH 30 7 08/01/2018    MCHC 30 7 (L) 08/01/2018    RDW 17 7 (H) 08/01/2018    MPV 9 3 08/01/2018    NRBC 0 08/01/2018   , CMP:   Lab Results   Component Value Date     08/01/2018    K 3 6 08/01/2018     08/01/2018    CO2 27 08/01/2018    ANIONGAP 5 08/01/2018    BUN 8 08/01/2018    CREATININE 0 67 08/01/2018    GLUCOSE 95 08/01/2018    CALCIUM 8 1 (L) 08/01/2018    AST 8 08/01/2018    ALT 10 (L) 08/01/2018    ALKPHOS 50 08/01/2018    PROT 5 2 (L) 08/01/2018    BILITOT 0 34 08/01/2018    EGFR 85 08/01/2018     Imaging: I have personally reviewed pertinent reports      EKG, Pathology, and Other Studies: I have personally reviewed pertinent reports  Counseling / Coordination of Care  Total floor / unit time spent today 25 minutes  Greater than 50% of total time was spent with the patient and / or family counseling and / or coordination of care  A description of the counseling / coordination of care: 25

## 2018-08-02 NOTE — PLAN OF CARE
DISCHARGE PLANNING     Discharge to home or other facility with appropriate resources Progressing        DISCHARGE PLANNING - CARE MANAGEMENT     Discharge to post-acute care or home with appropriate resources Progressing        GASTROINTESTINAL - ADULT     Maintains or returns to baseline bowel function Progressing     Establish and maintain optimal ostomy function Progressing        HEMATOLOGIC - ADULT     Maintains hematologic stability Progressing        Knowledge Deficit     Patient/family/caregiver demonstrates understanding of disease process, treatment plan, medications, and discharge instructions Progressing        Nutrition/Hydration-ADULT     Nutrient/Hydration intake appropriate for improving, restoring or maintaining nutritional needs Progressing        PAIN - ADULT     Verbalizes/displays adequate comfort level or baseline comfort level Progressing        Potential for Falls     Patient will remain free of falls Progressing        Prexisting or High Potential for Compromised Skin Integrity     Skin integrity is maintained or improved Progressing        SAFETY ADULT     Maintain or return mobility status to optimal level Progressing

## 2018-08-02 NOTE — PROGRESS NOTES
Progress Note - General Surgery   Broderick Siddiqui 69 y o  female MRN: [id number]  Unit/Bed#: [facility] 714-01 Encounter: [id number]    Assessment:  78yF w/ splenic capsular tear following Cscope    Plan:  Conservative management  Continue to trend Hgb q6  Transfuse as necessary for Hgb <7  Continue holding Eliquis  Continue ASA and plavix given new cardiac stent  Rest of care per primary    Subjective/Objective   Subjective:  States left abdominal pain I relativelymproving  Objective:    Blood pressure 102/50, pulse 63, temperature 98 3 °F (36 8 °C), temperature source Oral, resp  rate 20, height 5' 5" (1 651 m), weight 74 5 kg (164 lb 3 9 oz), SpO2 93 %  ,Body mass index is 27 33 kg/m²  Intake/Output Summary (Last 24 hours) at 08/02/18 0547  Last data filed at 08/02/18 6372   Gross per 24 hour   Intake             1595 ml   Output             1150 ml   Net              445 ml       Invasive Devices     Peripheral Intravenous Line            Peripheral IV 08/01/18 Left Hand less than 1 day                Physical Exam:     Gen: NAD, AAOx3  CV: RRR  Pulm: no resp distress  Abd: Soft, non-distended, mildly tender to left side      Lab, Imaging and other studies:  I have personally reviewed pertinent lab results    , CBC:   Lab Results   Component Value Date    HGB 8 5 (L) 08/02/2018    HCT 27 6 (L) 08/02/2018   , CMP: No results found for: NA, K, CL, CO2, ANIONGAP, BUN, CREATININE, GLUCOSE, CALCIUM, AST, ALT, ALKPHOS, PROT, ALBUMIN, BILITOT, EGFR  VTE Mechanical Prophylaxis: sequential compression device

## 2018-08-03 ENCOUNTER — APPOINTMENT (INPATIENT)
Dept: RADIOLOGY | Facility: HOSPITAL | Age: 78
DRG: 812 | End: 2018-08-03
Payer: MEDICARE

## 2018-08-03 LAB
ALBUMIN SERPL BCP-MCNC: 2.1 G/DL (ref 3.5–5)
ALP SERPL-CCNC: 59 U/L (ref 46–116)
ALT SERPL W P-5'-P-CCNC: 12 U/L (ref 12–78)
ANION GAP SERPL CALCULATED.3IONS-SCNC: 5 MMOL/L (ref 4–13)
AST SERPL W P-5'-P-CCNC: 9 U/L (ref 5–45)
BASOPHILS # BLD AUTO: 0.06 THOUSANDS/ΜL (ref 0–0.1)
BASOPHILS NFR BLD AUTO: 1 % (ref 0–1)
BILIRUB SERPL-MCNC: 0.28 MG/DL (ref 0.2–1)
BUN SERPL-MCNC: 10 MG/DL (ref 5–25)
CALCIUM SERPL-MCNC: 8.4 MG/DL (ref 8.3–10.1)
CHLORIDE SERPL-SCNC: 108 MMOL/L (ref 100–108)
CO2 SERPL-SCNC: 29 MMOL/L (ref 21–32)
CREAT SERPL-MCNC: 0.8 MG/DL (ref 0.6–1.3)
EOSINOPHIL # BLD AUTO: 0.45 THOUSAND/ΜL (ref 0–0.61)
EOSINOPHIL NFR BLD AUTO: 7 % (ref 0–6)
ERYTHROCYTE [DISTWIDTH] IN BLOOD BY AUTOMATED COUNT: 17.1 % (ref 11.6–15.1)
GFR SERPL CREATININE-BSD FRML MDRD: 71 ML/MIN/1.73SQ M
GLUCOSE SERPL-MCNC: 86 MG/DL (ref 65–140)
HCT VFR BLD AUTO: 28 % (ref 34.8–46.1)
HGB BLD-MCNC: 8.4 G/DL (ref 11.5–15.4)
IMM GRANULOCYTES # BLD AUTO: 0.04 THOUSAND/UL (ref 0–0.2)
IMM GRANULOCYTES NFR BLD AUTO: 1 % (ref 0–2)
LYMPHOCYTES # BLD AUTO: 1.31 THOUSANDS/ΜL (ref 0.6–4.47)
LYMPHOCYTES NFR BLD AUTO: 20 % (ref 14–44)
MCH RBC QN AUTO: 30.4 PG (ref 26.8–34.3)
MCHC RBC AUTO-ENTMCNC: 30 G/DL (ref 31.4–37.4)
MCV RBC AUTO: 101 FL (ref 82–98)
MONOCYTES # BLD AUTO: 0.76 THOUSAND/ΜL (ref 0.17–1.22)
MONOCYTES NFR BLD AUTO: 12 % (ref 4–12)
NEUTROPHILS # BLD AUTO: 3.86 THOUSANDS/ΜL (ref 1.85–7.62)
NEUTS SEG NFR BLD AUTO: 59 % (ref 43–75)
NRBC BLD AUTO-RTO: 0 /100 WBCS
PLATELET # BLD AUTO: 300 THOUSANDS/UL (ref 149–390)
PMV BLD AUTO: 9.4 FL (ref 8.9–12.7)
POTASSIUM SERPL-SCNC: 4.2 MMOL/L (ref 3.5–5.3)
PROT SERPL-MCNC: 5.7 G/DL (ref 6.4–8.2)
RBC # BLD AUTO: 2.76 MILLION/UL (ref 3.81–5.12)
SODIUM SERPL-SCNC: 142 MMOL/L (ref 136–145)
WBC # BLD AUTO: 6.48 THOUSAND/UL (ref 4.31–10.16)

## 2018-08-03 PROCEDURE — 99232 SBSQ HOSP IP/OBS MODERATE 35: CPT | Performed by: INTERNAL MEDICINE

## 2018-08-03 PROCEDURE — 99232 SBSQ HOSP IP/OBS MODERATE 35: CPT | Performed by: HOSPITALIST

## 2018-08-03 PROCEDURE — 74177 CT ABD & PELVIS W/CONTRAST: CPT

## 2018-08-03 PROCEDURE — 80053 COMPREHEN METABOLIC PANEL: CPT | Performed by: HOSPITALIST

## 2018-08-03 PROCEDURE — 99221 1ST HOSP IP/OBS SF/LOW 40: CPT | Performed by: NURSE PRACTITIONER

## 2018-08-03 PROCEDURE — 85025 COMPLETE CBC W/AUTO DIFF WBC: CPT | Performed by: HOSPITALIST

## 2018-08-03 PROCEDURE — 99232 SBSQ HOSP IP/OBS MODERATE 35: CPT | Performed by: SURGERY

## 2018-08-03 RX ORDER — MINERAL OIL 100 G/100G
1 OIL RECTAL ONCE
Status: COMPLETED | OUTPATIENT
Start: 2018-08-03 | End: 2018-08-03

## 2018-08-03 RX ORDER — LACTULOSE 20 G/30ML
20 SOLUTION ORAL 3 TIMES DAILY PRN
Status: DISCONTINUED | OUTPATIENT
Start: 2018-08-03 | End: 2018-08-06 | Stop reason: HOSPADM

## 2018-08-03 RX ADMIN — MIRTAZAPINE 7.5 MG: 15 TABLET, FILM COATED ORAL at 22:24

## 2018-08-03 RX ADMIN — HYDROMORPHONE HYDROCHLORIDE 1 MG: 1 INJECTION, SOLUTION INTRAMUSCULAR; INTRAVENOUS; SUBCUTANEOUS at 20:28

## 2018-08-03 RX ADMIN — LIDOCAINE 1 PATCH: 50 PATCH CUTANEOUS at 08:41

## 2018-08-03 RX ADMIN — OXYCODONE HYDROCHLORIDE 10 MG: 10 TABLET ORAL at 05:51

## 2018-08-03 RX ADMIN — DULOXETINE HYDROCHLORIDE 30 MG: 30 CAPSULE, DELAYED RELEASE ORAL at 08:41

## 2018-08-03 RX ADMIN — ALPRAZOLAM 0.25 MG: 0.25 TABLET ORAL at 22:24

## 2018-08-03 RX ADMIN — METOPROLOL SUCCINATE 50 MG: 50 TABLET, EXTENDED RELEASE ORAL at 08:42

## 2018-08-03 RX ADMIN — MINERAL OIL 1 ENEMA: 100 OIL RECTAL at 16:18

## 2018-08-03 RX ADMIN — HYDROMORPHONE HYDROCHLORIDE 1 MG: 1 INJECTION, SOLUTION INTRAMUSCULAR; INTRAVENOUS; SUBCUTANEOUS at 09:54

## 2018-08-03 RX ADMIN — IOHEXOL 100 ML: 350 INJECTION, SOLUTION INTRAVENOUS at 13:55

## 2018-08-03 RX ADMIN — PANTOPRAZOLE SODIUM 40 MG: 40 TABLET, DELAYED RELEASE ORAL at 05:51

## 2018-08-03 RX ADMIN — MELATONIN 6 MG: at 22:24

## 2018-08-03 RX ADMIN — ASPIRIN 81 MG 81 MG: 81 TABLET ORAL at 08:42

## 2018-08-03 RX ADMIN — OXYCODONE HYDROCHLORIDE 10 MG: 10 TABLET ORAL at 12:04

## 2018-08-03 RX ADMIN — LACTULOSE 20 G: 20 SOLUTION ORAL at 22:23

## 2018-08-03 RX ADMIN — ACETAMINOPHEN 650 MG: 325 TABLET, FILM COATED ORAL at 05:52

## 2018-08-03 RX ADMIN — ACETAMINOPHEN 650 MG: 325 TABLET, FILM COATED ORAL at 16:30

## 2018-08-03 RX ADMIN — OXYCODONE HYDROCHLORIDE 10 MG: 10 TABLET ORAL at 17:37

## 2018-08-03 RX ADMIN — AMITRIPTYLINE HYDROCHLORIDE 25 MG: 25 TABLET, FILM COATED ORAL at 22:24

## 2018-08-03 RX ADMIN — LACTULOSE 20 G: 20 SOLUTION ORAL at 17:01

## 2018-08-03 RX ADMIN — ACETAMINOPHEN 650 MG: 325 TABLET, FILM COATED ORAL at 22:24

## 2018-08-03 RX ADMIN — Medication 1 TABLET: at 22:24

## 2018-08-03 RX ADMIN — CLOPIDOGREL BISULFATE 75 MG: 75 TABLET, FILM COATED ORAL at 08:42

## 2018-08-03 NOTE — HOSPICE NOTE
Spoke with pt about hospice services and benefits  She states that no one has told her that she is dying and that she really is not interested in hospice  She is really more interested in Rehab as this time  She does not appear to have a great deal of support  Will have the weekend liaison check in on pt as well

## 2018-08-03 NOTE — CONSULTS
Consultation - [address] 69 y o  female MRN: [id number]  Unit/Bed#: [facility] 714-01 Encounter: [id number]      Assessment/Plan     Assessment:  Patient Active Problem List   Diagnosis    Chronic systolic heart failure (HCC)    Elevated liver enzymes    Elevated troponin    Atrial fibrillation with RVR (HCC)    Chronic anticoagulation    Fall    Biliary stent obstruction, initial encounter    Dysthymic disorder    Weakness/physical deconditioning    Recent history of Cholangitis due to bile duct calculus with obstruction    Acute respiratory insufficiency    Brain aneurysm    Cardiac arrhythmia    Carpal tunnel syndrome    Acute on chronic systolic congestive heart failure (HCC)    Chronic pain disorder    Disc disorder of cervical region    Disorder of bone and cartilage    Essential hypertension    Gout    Hospital-acquired pneumonia    Hyperlipidemia    Insomnia    Mitral regurgitation    Opioid dependence (HCC)    Osteoarthritis    Acute pancreatitis    Small vessel disease    Tachycardia induced cardiomyopathy (HCC)    Dyspnea on exertion    Polypharmacy    Memory loss    Impaired mobility and ADLs    Ambulatory dysfunction    Monomorphic ventricular tachycardia (HCC)    Prolonged Q-T interval on ECG    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HCC)    Acute blood loss anemia    GI bleed    Coronary artery disease    H/O cholangitis    Gastrointestinal hemorrhage    Mild cognitive impairment    Splenic hemorrhage    Low back pain     Plan:  Goals of care  - Patient expresses desire to continue with current medical management with the goal of improvement of clinical condition and possible rehab  In the event of decline of her clinical condition, she would not wish to be placed on life support machines and has already established level 3 DNR/DNI code status  She does not think she is ready for hospice at this time   She is agreeable with the possibility of rehab   - Suggest re-eval of patient's capacity to make medical decisions  She has previously been deemed to have capacity to make medical decisions  During conversation today she did appear to have general understanding of implication and gravity of her situation and decisions, however she has demonstrated short term memory loss and lack of ability to recall information provided to her on multiple occasions    - She has not named a healthcare power of   She states that her daughter is a "drug addict" and does not want her contacted or to serve as a healthcare agent in an event that she could not speak for herself  Currently patient's sister has been a contact however does not wish to have the responsibility of making decisions for the patient  The patient is aware of this  - If patient declines, would have to defer to sister to make decisions, if she is unwilling, would look to sister to provide phone numbers for grandchildren or other family members  - If patient is deemed not competent to make medical decisions, would defer to daughter to make decisions if available  At this time, patient does not wish for daughter to be involved  [facility] advance care planning document provided to patient  - Palliative care will continue to follow along  We will plan to follow up with patient Monday  Please contact us if we can be of assistance in the interim or of any acute changes with the patient  Symptom management: acute abdominal pain,chronic back pain in exacerbation  Per primary team  Per patient, pain has been improving  Suggest adding PO PRN pain med  History of Present Illness   Physician Requesting Consult: Emeka Beebe MD  Reason for Consult / Principal Problem: goals of care  HPI: Rebecca Manzano is a 69 y o  female who presented to ED on 7/25 with dizziness, fatigue, decreased performance status  Found to be anemic with hgb of 5 7   Underwent EGD and colonoscopy demonstrating esophagitis with gastritis and possible ischemic colitis  H&H has remained stable  Patient developed worsening acute abdominal pain and was found to have subcapsular hematoma of spleen; no surgical intervention at this time  Patient has significant cardiac history - recent hospitalization 7/7 - 7/20, history of chronic afib, wide complex tachycardia eventually underwent ablation  Prior to that, recently had cardiac stent placement  Patient also has history of cholangitis in May 2018, s/p ERCP with stone removal and biliary stent placement; has not had stent removed yesterday  Patient lives at home alone  She has been suggested to consider assisted living or more help at home  She has one daughter who has not been involved during this admission as well as one sister who has been involved  She denies any other involved family or support system  Inpatient consult to Palliative Care  Consult performed by: Gilma Rick  Consult ordered by: Eber Morales          Review of Systems   Gastrointestinal: Positive for abdominal pain  Musculoskeletal: Positive for back pain  All other systems reviewed and are negative  Historical Information   Past Medical History:   Diagnosis Date    A-fib [facility])     Acute respiratory disease     Anemia     Arthritis     [facility]est[facility]) [facility])     Chronic pain     Heart failure (HCC)     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[facility] [address]     History of colon polyps     Hx of long term use of blood thinners     Hypertension     Irregular heart beat     Narcotic dependence ([facility] [address] [facility]     Rectal bleeding     Stroke ([facility])     mild no deficiets/ memory loss    Uses walker      Past Surgical History:   Procedure Laterality Date    COLONOSCOPY      COLONOSCOPY N/A 7/27/2018    Procedure: COLONOSCOPY;  Surgeon: Jana Yanez MD;  [facility];   Service: Gastroenterology    CORONARY STENT PLACEMENT      ERCP N/A 5/14/2018 Procedure: ENDOSCOPIC RETROGRADE CHOLANGIOPANCREATOGRAPHY (ERCP); Surgeon: Emily Larkin MD;  Location:  MAIN OR;  Service: Gastroenterology    ESOPHAGOGASTRODUODENOSCOPY N/A 7/26/2018    Procedure: ESOPHAGOGASTRODUODENOSCOPY (EGD); Surgeon: Noni Salcedo MD;  Location:  GI LAB; Service: Gastroenterology    JOINT REPLACEMENT Right     knee     Social History     Social History    Marital status:      Spouse name: N/A    Number of children: N/A    Years of education: N/A     Social History Main Topics    Smoking status: Former Smoker    Smokeless tobacco: Never Used    Alcohol use No    Drug use: No    Sexual activity: Not Asked     Other Topics Concern    None     Social History Narrative    None     History reviewed  No pertinent family history      Meds/Allergies   all current active meds have been reviewed and current meds:   Current Facility-Administered Medications   Medication Dose Route Frequency    acetaminophen (TYLENOL) tablet 650 mg  650 mg Oral Q8H [facility]    ALPRAZolam (XANAX) tablet 0 25 mg  0 25 mg Oral HS PRN    amiodarone tablet 200 mg  200 mg Oral Daily With Breakfast    amitriptyline (ELAVIL) tablet 25 mg  25 mg Oral HS    aspirin chewable tablet 81 mg  81 mg Oral Daily    clopidogrel (PLAVIX) tablet 75 mg  75 mg Oral Daily    DULoxetine (CYMBALTA) delayed release capsule 30 mg  30 mg Oral Daily    HYDROmorphone (DILAUDID) injection 1 mg  1 mg Intravenous Q4H PRN    lactulose 20 g/30 mL oral solution 20 g  20 g Oral TID PRN    lidocaine (LIDODERM) 5 % patch 1 patch  1 patch Transdermal Daily    lisinopril (ZESTRIL) tablet 2 5 mg  2 5 mg Oral Daily    melatonin tablet 6 mg  6 mg Oral HS    metoprolol succinate (TOPROL-XL) 24 hr tablet 50 mg  50 mg Oral Daily    mineral oil enema 1 enema  1 enema Rectal Once    mirtazapine (REMERON) tablet 7 5 mg  7 5 mg Oral HS    ondansetron (ZOFRAN) injection 4 mg  4 mg Intravenous Q6H PRN    oxyCODONE (ROXICODONE) immediate release tablet 10 mg  10 mg Oral Q6H    pantoprazole (PROTONIX) EC tablet 40 mg  40 mg Oral Early Morning    senna-docusate sodium (SENOKOT S) 8 6-50 mg per tablet 1 tablet  1 tablet Oral HS         No Known Allergies    Objective     Physical Exam   Constitutional: She is oriented to person, place, and time  She appears well-developed and well-nourished  She is cooperative  No distress  HENT:   Head: Normocephalic and atraumatic  Mouth/Throat: Mucous membranes are normal    Eyes: Conjunctivae and lids are normal    Neck: Trachea normal  Neck supple  Pulmonary/Chest: Effort normal    Neurological: She is alert and oriented to person, place, and time  Skin: Skin is warm and dry  Psychiatric: She has a normal mood and affect  Cognition and memory are normal        Lab Results: I have personally reviewed pertinent labs  Imaging Studies: I have personally reviewed pertinent reports  EKG, Pathology, and Other Studies: I have personally reviewed pertinent reports        Code Status: Level 3 - DNAR and DNI  Advance Directive and Living Will:    none  Power of :    POLST:

## 2018-08-03 NOTE — PROGRESS NOTES
Progress Note - General Surgery   Iza Ball 69 y o  female MRN: [id number]  Unit/Bed#: [facility] [place] 714-01 Encounter: [id number]    Assessment:  78yF w/ splenic capsular tear, esophagitis, ischemic colitis,     Plan:  Continue with conservative management  F/u AM hgb  Transfuse as necessary for Hgb <7  Continue holding eliquis  Continue ASA and plavix in setting of recent cardiac stent  Care per primary - would recommend enema  On senna and docusate at night  Subjective/Objective   Subjective:  Still with abdominal pain  States has not had a recent bowel movement although one is documented from yesterday  Patient is very forgetful though and has been short term memory  Objective:    Blood pressure 137/67, pulse 65, temperature 97 9 °F (36 6 °C), temperature source Oral, resp  rate 18, height 5' 5" (1 651 m), weight 74 2 kg (163 lb 9 3 oz), SpO2 94 %  ,Body mass index is 27 22 kg/m²  Intake/Output Summary (Last 24 hours) at 08/03/18 5780  Last data filed at 08/03/18 0545   Gross per 24 hour   Intake              800 ml   Output             1425 ml   Net             -625 ml       Invasive Devices     Peripheral Intravenous Line            Peripheral IV 08/03/18 Left Antecubital less than 1 day                Physical Exam:     Gen: NAD, AAOx3  CV: RRR  Pulm: no resp distress  Abd: Soft, non-distended, tender to left side of abdomen, incisions c/d/i    Lab, Imaging and other studies:  I have personally reviewed pertinent lab results    , CBC:   Lab Results   Component Value Date    HGB 8 4 (L) 08/02/2018    HCT 28 1 (L) 08/02/2018   , CMP:   Lab Results   Component Value Date     08/03/2018    K 4 2 08/03/2018     08/03/2018    CO2 29 08/03/2018    ANIONGAP 5 08/03/2018    BUN 10 08/03/2018    CREATININE 0 80 08/03/2018    GLUCOSE 86 08/03/2018    CALCIUM 8 4 08/03/2018    AST 9 08/03/2018    ALT 12 08/03/2018    ALKPHOS 59 08/03/2018    PROT 5 7 (L) 08/03/2018    BILITOT 0 28 08/03/2018    EGFR 71 08/03/2018     VTE Mechanical Prophylaxis: sequential compression device

## 2018-08-03 NOTE — PROGRESS NOTES
Gastroenterology Progress Note   - Lemuel Clark 69 y o  female MRN: [id number]    [facility] Encounter: [id number]      Assessment and Plan:      GI bleed   -Patient presented with GI bleeding, EGD showed esophagitis with gastritis, colonoscopy showed signs of ischemic colitis, patient has been stable, anticoagulation was restarted and then stopped again after splenic hematoma was identified, so far no more episodes of GI bleeding, patient has been tolerating the p o  route, no recent bowel movement  -Hemoglobin remained stable 8 4 this morning, goal for hemoglobin more than 7  -Continue PPI    Splenic hematoma  -Patient had splenic hematoma, probably secondary to colonoscopy while on aspirin and Plavix  -General surgery is following, plan for conservative management and continue holding [facility]  -Had abdominal pain yesterday but today the pain has been improving, CT of the abdomen today is pending  -Continue monitoring Hb    Constipation  -No BM over the last days, enema was ordered but could not be delivered due to hard stools  -We recommend starting lactulose    History of cholangitis  -Status post stent placement 2014, recent episode of cholangitis in May 2018, currently LFTs are normal, patient will need removal of stent as outpatient          Subjective:   Patient seen and examined at the bed, currently is tolerating PO route, denies nausea or vomiting, is passing gases but denies having bowel movements, denies chest pain or shortness of breath, abdominal pain is improving, patient is ambulating     Objective:     Vitals: Blood pressure 91/54, pulse 86, temperature 97 7 °F (36 5 °C), temperature source Oral, resp  rate 18, height 5' 5" (1 651 m), weight 74 2 kg (163 lb 9 3 oz), SpO2 94 %  ,Body mass index is 27 22 kg/m²        Intake/Output Summary (Last 24 hours) at 08/03/18 1440  Last data filed at 08/03/18 1204   Gross per 24 hour   Intake              700 ml   Output             1500 ml   Net -800 ml       Physical Exam:     General Appearance: Alert, appears stated age and cooperative  Lungs: Clear to auscultation bilaterally, no rales or rhonchi, no labored breathing/accessory muscle use  Heart: Regular rate and rhythm, S1, S2 normal, no murmur, click, rub or gallop  Abdomen: Soft, non-tender, non-distended; bowel sounds normal; no masses or no organomegaly  Extremities: No cyanosis, edema    Invasive Devices     Peripheral Intravenous Line            Peripheral IV 08/03/18 Left Antecubital less than 1 day                Lab Results:    Results from last 7 days  Lab Units 08/03/18  0450   WBC Thousand/uL 6 48   HEMOGLOBIN g/dL 8 4*   HEMATOCRIT % 28 0*   PLATELETS Thousands/uL 300   NEUTROS PCT % 59   LYMPHS PCT % 20   MONOS PCT % 12   EOS PCT % 7*       Results from last 7 days  Lab Units 08/03/18  0450   SODIUM mmol/L 142   POTASSIUM mmol/L 4 2   CHLORIDE mmol/L 108   CO2 mmol/L 29   BUN mg/dL 10   CREATININE mg/dL 0 80   CALCIUM mg/dL 8 4   TOTAL PROTEIN g/dL 5 7*   BILIRUBIN TOTAL mg/dL 0 28   ALK PHOS U/L 59   ALT U/L 12   AST U/L 9   GLUCOSE RANDOM mg/dL 86               Imaging Studies: I have personally reviewed pertinent imaging studies  X-ray Chest 2 Views    Result Date: 7/25/2018  Impression: No acute cardiopulmonary disease  Workstation performed: [id number]     Xr Abdomen Obstruction Series    Result Date: 7/30/2018  Impression: Nonobstructive bowel gas pattern  Workstation performed: [id number]     Ct Head Without Contrast    Result Date: 7/25/2018  Impression: No acute intracranial abnormality   Workstation performed: [id number]

## 2018-08-03 NOTE — RESTORATIVE TECHNICIAN NOTE
Restorative Specialist Mobility Note       Activity: Ambulate in agee, Ambulate in room, Bathroom privileges, Dangle, Stand at bedside, Chair (Educated/encouraged pt to ambulate with assistance 3-4 x's/day  Bed alarm on   Pt callbell, phone/tray within reach )     Assistive Device: Front wheel walker       [employer] BS, Restorative Technician, [employer]

## 2018-08-03 NOTE — PALLIATIVE CARE CONFERENCE
Palliative team consulted to see Mrs Raquel Mendez today  She is s/p splenic hematoma and h/o cardiac disease with recommendations to treat conservatively  Pt  Pleasant and cooperative, states she has poor STM and is very forgetful  She was able to participate in conversation  She states she lives alone in a home that she rents with 1st floor setup  When questioned about her home making and IADLS, she stated she calls out for [place] and pizza because they deliver to her home  Her land lady also offers to get groceries occasionally  Mrs Raquel Mendez states she has a sister and brother-in-law and a daughter who is "a drug addict and hangs with low-lifes "  Paulasa Brittongabino explained that her sister is listed as primary contact but that she has expressed she feels uncomfortable making decisions  Pt  Agreed and stated she does not want to "pile too much" on to her  She would not allow team to call her daughter  Explained importance of designating a decision maker if there is an emergency  Provided 5 Wishes packet and encouraged her to read over document  Pt  Was dismissive of the severity of her illness, and team re-iterated importance of planning ahead  Palliative LSW will follow and re-approach next week regarding 5 Wishes

## 2018-08-03 NOTE — ASSESSMENT & PLAN NOTE
Negative obstruction series  Clinical exam significant for left CVA tenderness, persistent-10/10 pain  Retro-peritoneal ultrasound - unremarkable   Pain control as scheduled  Dc ceftriaxone, continue to hold [facility]  Continue DAPT  Appreciate surgical input  Significant event overnight with severe pain  Imaging completed with  Findings suspicious for both subcapsular and pericapsular fluid involving the spleen, with mixed attenuation suspicious for hemorrhage/injury  Sharon Paling is adjacent left lower lobe consolidation/atelectasis and left pleural fluid which could either be  related to splinting from the splenic finding versus contusion and injury at this site as well  Cholelithiasis and choledocholithiasis   CBD stent noted as above  Repeat CT abdomen/pelvis with contrast today

## 2018-08-03 NOTE — PROGRESS NOTES
Progress Note - Lemuel Clark [birth year], 69 y o  female MRN: [id number]    Unit/Bed#: [facility] 714-01 Encounter: [id number]    Primary Care Provider: Amy Felix DO   Date and time admitted to hospital: 7/25/2018 11:53 AM        * Splenic hemorrhage   Assessment & Plan    Negative obstruction series  Clinical exam significant for left CVA tenderness, persistent-10/10 pain  Retro-peritoneal ultrasound - unremarkable   Pain control as scheduled  Dc ceftriaxone, continue to hold [facility]  Continue DAPT  Appreciate surgical input  Significant event overnight with severe pain  Imaging completed with  Findings suspicious for both subcapsular and pericapsular fluid involving the spleen, with mixed attenuation suspicious for hemorrhage/injury  Alejandro Dodrill is adjacent left lower lobe consolidation/atelectasis and left pleural fluid which could either be  related to splinting from the splenic finding versus contusion and injury at this site as well  Cholelithiasis and choledocholithiasis   CBD stent noted as above  Repeat CT abdomen/pelvis with contrast today- SHOWED NO NEW FINDINGS COMPARED TO PRIOR IMAGE  PALLIATIVE CARE DISCUSSION IN PROCESS  BOWEL REGIMEN WITH TRIAL OF MINERAL OIL ENEMA/ AS WELL AS LACTULOSE AS RECOMMENDED BY GI            Low back pain   Assessment & Plan    Chronic with history of osteoarthritis on chronic opioid use  Continue to monitor, supportive care analgesics  aqua K-pad  Chronic         Mild cognitive impairment   Assessment & Plan    Has memory difficulties  Neuropsychology input noted patient is deemed competent        Gastrointestinal hemorrhage   Assessment & Plan    Stable        H/O cholangitis   Assessment & Plan    · Patient recently admitted in May 2018 with sepsis suspected secondary to cholangitis, underwent ERCP with stone removal and biliary stent placement  · Patient was supposed to follow up with GI in 6 weeks but she did not hear from them    She is due for her stent removal as well  · Follow up GI recommendations        Coronary artery disease   Assessment & Plan    Recent cardiac catheterization status post drug-eluting stent continue Plavix aspirin  Cardiology follow-up        GI bleed   Assessment & Plan    EGD revealed esophagitis, gastritis on pantoprazole  Colonoscopy revealed ischemic colitis  Monitor hemoglobin  Hold Eliquis          Acute blood loss anemia   Assessment & Plan    Status post 2 units PRBC transfusion during hospitalization  H/HL 8  2  stable        Opioid dependence (HCC)   Assessment & Plan    Chronic arthritis on oxycodone         Atrial fibrillation with RVR (HCC)   Assessment & Plan    Continue beta-blocker, amiodarone  AC held for now due to splenic contusion/tear        Chronic systolic heart failure (HCC)   Assessment & Plan    EF 35%  Continue meds  Appreciate Cardiology input            VTE Pharmacologic Prophylaxis:   Pharmacologic: Pharmacologic VTE Prophylaxis contraindicated due to splenic bleed  Mechanical VTE Prophylaxis in Place: Yes    Patient Centered Rounds: I have performed bedside rounds with nursing staff today  Discussions with Specialists or Other Care Team Provider: yes    Education and Discussions with Family / Patient:  Yes with sister via telephone    Time Spent for Care: 45 minutes  More than 50% of total time spent on counseling and coordination of care as described above  Current Length of Stay: 9 day(s)    Current Patient Status: Inpatient   Certification Statement: The patient will continue to require additional inpatient hospital stay due to Medical management of chronic hemorrhage    Discharge Plan:  Rehab/skilled nursing facility    Code Status: Level 3 - DNAR and DNI      Subjective:   Severe pain left lower quadrant, left upper quadrant 10/10, radiating down left groin    Review of systems negative otherwise    Objective:     Vitals:   Temp (24hrs), Av 7 °F (36 5 °C), Min:97 5 °F (36 4 °C), Max:97 9 °F (36 6 °C)    HR:  [62-86] 86  Resp:  [18] 18  BP: ()/(54-67) 91/54  SpO2:  [93 %-94 %] 94 %  Body mass index is 27 22 kg/m²  Input and Output Summary (last 24 hours): Intake/Output Summary (Last 24 hours) at 08/03/18 1122  Last data filed at 08/03/18 0954   Gross per 24 hour   Intake              920 ml   Output             1925 ml   Net            -1005 ml       Physical Exam:     Physical Exam   Constitutional: She is oriented to person, place, and time  No distress  HENT:   Head: Normocephalic and atraumatic  Mouth/Throat: Oropharynx is clear and moist    Eyes: Conjunctivae and EOM are normal  Pupils are equal, round, and reactive to light  Neck: Normal range of motion  Neck supple  No JVD present  Cardiovascular: Normal rate and normal heart sounds  Exam reveals no gallop and no friction rub  No murmur heard  Pulmonary/Chest: Effort normal and breath sounds normal  No respiratory distress  She has no wheezes  She has no rales  Abdominal: Soft  Bowel sounds are normal  She exhibits no distension and no mass  There is tenderness  There is rebound and guarding  Musculoskeletal: Normal range of motion  She exhibits no edema  Neurological: She is alert and oriented to person, place, and time  She has normal reflexes  She displays normal reflexes  No cranial nerve deficit  She exhibits normal muscle tone  Coordination normal    Skin: Skin is warm  No erythema  Psychiatric: She has a normal mood and affect   Her behavior is normal          Additional Data:     Labs:      Results from last 7 days  Lab Units 08/03/18  0450   WBC Thousand/uL 6 48   HEMOGLOBIN g/dL 8 4*   HEMATOCRIT % 28 0*   PLATELETS Thousands/uL 300   NEUTROS PCT % 59   LYMPHS PCT % 20   MONOS PCT % 12   EOS PCT % 7*       Results from last 7 days  Lab Units 08/03/18  0450   SODIUM mmol/L 142   POTASSIUM mmol/L 4 2   CHLORIDE mmol/L 108   CO2 mmol/L 29   BUN mg/dL 10   CREATININE mg/dL 0 80   CALCIUM mg/dL 8 4   TOTAL PROTEIN g/dL 5 7*   BILIRUBIN TOTAL mg/dL 0 28   ALK PHOS U/L 59   ALT U/L 12   AST U/L 9   GLUCOSE RANDOM mg/dL 86                     * I Have Reviewed All Lab Data Listed Above  * Additional Pertinent Lab Tests Reviewed: Stu 66 Admission Reviewed    Imaging:    Imaging Reports Reviewed Today  Recent Cultures (last 7 days):           Last 24 Hours Medication List:     Current Facility-Administered Medications:  acetaminophen 650 mg Oral Q8H [facility] CHANDRIKA Garcia   ALPRAZolam 0 25 mg Oral HS PRN Wagner Lei MD   amiodarone 200 mg Oral Daily With Breakfast Wagner Lei MD   amitriptyline 25 mg Oral HS Unique Celaya MD   aspirin 81 mg Oral Daily Unique Celaya MD   clopidogrel 75 mg Oral Daily Unique Celaya MD   DULoxetine 30 mg Oral Daily Unique Celaya MD   HYDROmorphone 1 mg Intravenous Q4H PRN CHANDRIKA [place]   lidocaine 1 patch Transdermal Daily Wagner Lei MD   lisinopril 2 5 mg Oral Daily CHANDRIKA Garcia   melatonin 6 mg Oral HS Unique Celaya MD   metoprolol succinate 50 mg Oral Daily CHANDRIKA Garcia   mirtazapine 7 5 mg Oral HS Wagner Lei MD   ondansetron 4 mg Intravenous Q6H PRN Unique Celaya MD   oxyCODONE 10 mg Oral Q6H Wagner Lei MD   pantoprazole 40 mg Oral Early Morning Jd Rand MD   senna-docusate sodium 1 tablet Oral HS Audelia Hdz MD        Today, Patient Was Seen By: Audelia Hdz MD    ** Please Note: Dictation voice to text software may have been used in the creation of this document   **

## 2018-08-03 NOTE — SOCIAL WORK
CM was informed by Dr Guillermo Haile that pt is interested in hospice and prefers to speak with [facility] liaison  [facility] referral sent to same

## 2018-08-04 PROBLEM — K92.2 GASTROINTESTINAL HEMORRHAGE: Status: RESOLVED | Noted: 2018-07-25 | Resolved: 2018-08-04

## 2018-08-04 PROBLEM — K59.03 THERAPEUTIC OPIOID INDUCED CONSTIPATION: Status: ACTIVE | Noted: 2018-08-04

## 2018-08-04 PROBLEM — T40.2X5A THERAPEUTIC OPIOID INDUCED CONSTIPATION: Status: ACTIVE | Noted: 2018-08-04

## 2018-08-04 LAB
ALBUMIN SERPL BCP-MCNC: 2.3 G/DL (ref 3.5–5)
ALP SERPL-CCNC: 62 U/L (ref 46–116)
ALT SERPL W P-5'-P-CCNC: 12 U/L (ref 12–78)
ANION GAP SERPL CALCULATED.3IONS-SCNC: 5 MMOL/L (ref 4–13)
AST SERPL W P-5'-P-CCNC: 14 U/L (ref 5–45)
BASOPHILS # BLD AUTO: 0.05 THOUSANDS/ΜL (ref 0–0.1)
BASOPHILS NFR BLD AUTO: 1 % (ref 0–1)
BILIRUB SERPL-MCNC: 0.35 MG/DL (ref 0.2–1)
BUN SERPL-MCNC: 9 MG/DL (ref 5–25)
CALCIUM SERPL-MCNC: 8.6 MG/DL (ref 8.3–10.1)
CHLORIDE SERPL-SCNC: 108 MMOL/L (ref 100–108)
CO2 SERPL-SCNC: 28 MMOL/L (ref 21–32)
CREAT SERPL-MCNC: 0.75 MG/DL (ref 0.6–1.3)
EOSINOPHIL # BLD AUTO: 0.45 THOUSAND/ΜL (ref 0–0.61)
EOSINOPHIL NFR BLD AUTO: 7 % (ref 0–6)
ERYTHROCYTE [DISTWIDTH] IN BLOOD BY AUTOMATED COUNT: 16.9 % (ref 11.6–15.1)
GFR SERPL CREATININE-BSD FRML MDRD: 77 ML/MIN/1.73SQ M
GLUCOSE SERPL-MCNC: 125 MG/DL (ref 65–140)
HCT VFR BLD AUTO: 28.7 % (ref 34.8–46.1)
HGB BLD-MCNC: 8.5 G/DL (ref 11.5–15.4)
IMM GRANULOCYTES # BLD AUTO: 0.03 THOUSAND/UL (ref 0–0.2)
IMM GRANULOCYTES NFR BLD AUTO: 1 % (ref 0–2)
LYMPHOCYTES # BLD AUTO: 0.98 THOUSANDS/ΜL (ref 0.6–4.47)
LYMPHOCYTES NFR BLD AUTO: 16 % (ref 14–44)
MCH RBC QN AUTO: 30.1 PG (ref 26.8–34.3)
MCHC RBC AUTO-ENTMCNC: 29.6 G/DL (ref 31.4–37.4)
MCV RBC AUTO: 102 FL (ref 82–98)
MONOCYTES # BLD AUTO: 0.62 THOUSAND/ΜL (ref 0.17–1.22)
MONOCYTES NFR BLD AUTO: 10 % (ref 4–12)
NEUTROPHILS # BLD AUTO: 4.17 THOUSANDS/ΜL (ref 1.85–7.62)
NEUTS SEG NFR BLD AUTO: 65 % (ref 43–75)
NRBC BLD AUTO-RTO: 0 /100 WBCS
PLATELET # BLD AUTO: 319 THOUSANDS/UL (ref 149–390)
PMV BLD AUTO: 9.1 FL (ref 8.9–12.7)
POTASSIUM SERPL-SCNC: 3.6 MMOL/L (ref 3.5–5.3)
PROT SERPL-MCNC: 5.9 G/DL (ref 6.4–8.2)
RBC # BLD AUTO: 2.82 MILLION/UL (ref 3.81–5.12)
SODIUM SERPL-SCNC: 141 MMOL/L (ref 136–145)
WBC # BLD AUTO: 6.3 THOUSAND/UL (ref 4.31–10.16)

## 2018-08-04 PROCEDURE — 80053 COMPREHEN METABOLIC PANEL: CPT | Performed by: HOSPITALIST

## 2018-08-04 PROCEDURE — 99232 SBSQ HOSP IP/OBS MODERATE 35: CPT | Performed by: INTERNAL MEDICINE

## 2018-08-04 PROCEDURE — 99232 SBSQ HOSP IP/OBS MODERATE 35: CPT | Performed by: SURGERY

## 2018-08-04 PROCEDURE — 99232 SBSQ HOSP IP/OBS MODERATE 35: CPT | Performed by: HOSPITALIST

## 2018-08-04 PROCEDURE — 85025 COMPLETE CBC W/AUTO DIFF WBC: CPT | Performed by: STUDENT IN AN ORGANIZED HEALTH CARE EDUCATION/TRAINING PROGRAM

## 2018-08-04 RX ADMIN — APIXABAN 5 MG: 5 TABLET, FILM COATED ORAL at 13:32

## 2018-08-04 RX ADMIN — LISINOPRIL 2.5 MG: 2.5 TABLET ORAL at 09:35

## 2018-08-04 RX ADMIN — OXYCODONE HYDROCHLORIDE 10 MG: 10 TABLET ORAL at 17:35

## 2018-08-04 RX ADMIN — ACETAMINOPHEN 650 MG: 325 TABLET, FILM COATED ORAL at 21:35

## 2018-08-04 RX ADMIN — OXYCODONE HYDROCHLORIDE 10 MG: 10 TABLET ORAL at 05:46

## 2018-08-04 RX ADMIN — METOPROLOL SUCCINATE 50 MG: 50 TABLET, EXTENDED RELEASE ORAL at 09:35

## 2018-08-04 RX ADMIN — Medication 1 TABLET: at 21:36

## 2018-08-04 RX ADMIN — MIRTAZAPINE 7.5 MG: 15 TABLET, FILM COATED ORAL at 21:35

## 2018-08-04 RX ADMIN — AMIODARONE HYDROCHLORIDE 200 MG: 200 TABLET ORAL at 09:35

## 2018-08-04 RX ADMIN — OXYCODONE HYDROCHLORIDE 10 MG: 10 TABLET ORAL at 11:37

## 2018-08-04 RX ADMIN — DULOXETINE HYDROCHLORIDE 30 MG: 30 CAPSULE, DELAYED RELEASE ORAL at 09:35

## 2018-08-04 RX ADMIN — LIDOCAINE 1 PATCH: 50 PATCH CUTANEOUS at 09:35

## 2018-08-04 RX ADMIN — ACETAMINOPHEN 650 MG: 325 TABLET, FILM COATED ORAL at 05:46

## 2018-08-04 RX ADMIN — ACETAMINOPHEN 650 MG: 325 TABLET, FILM COATED ORAL at 13:32

## 2018-08-04 RX ADMIN — APIXABAN 5 MG: 5 TABLET, FILM COATED ORAL at 21:39

## 2018-08-04 RX ADMIN — ALPRAZOLAM 0.25 MG: 0.25 TABLET ORAL at 21:36

## 2018-08-04 RX ADMIN — AMITRIPTYLINE HYDROCHLORIDE 25 MG: 25 TABLET, FILM COATED ORAL at 21:35

## 2018-08-04 RX ADMIN — CLOPIDOGREL BISULFATE 75 MG: 75 TABLET, FILM COATED ORAL at 09:35

## 2018-08-04 RX ADMIN — PANTOPRAZOLE SODIUM 40 MG: 40 TABLET, DELAYED RELEASE ORAL at 05:46

## 2018-08-04 RX ADMIN — LACTULOSE 20 G: 20 SOLUTION ORAL at 09:34

## 2018-08-04 RX ADMIN — ASPIRIN 81 MG 81 MG: 81 TABLET ORAL at 09:35

## 2018-08-04 RX ADMIN — MELATONIN 6 MG: at 21:36

## 2018-08-04 NOTE — PROGRESS NOTES
Progress Note - General Surgery   Marcelina Moreno 69 y o  female MRN: [id number]  Unit/Bed#: [facility] 714-01 Encounter: [id number]    Assessment:  78yF w/ splenic capsular tear, esophagitis, ischemic colitis    Plan:  - conservative management  - H&H stable  - can restart anticoagulation  - continue ASA/plavix      Subjective/Objective   Subjective: abd pain present but improved, tolerating diet    Objective:    Blood pressure 150/71, pulse 61, temperature 97 6 °F (36 4 °C), temperature source Oral, resp  rate 18, height 5' 5" (1 651 m), weight 74 8 kg (164 lb 14 5 oz), SpO2 97 %  ,Body mass index is 27 44 kg/m²  Intake/Output Summary (Last 24 hours) at 08/04/18 0744  Last data filed at 08/04/18 0736   Gross per 24 hour   Intake              470 ml   Output             2000 ml   Net            -1530 ml       Invasive Devices     Peripheral Intravenous Line            Peripheral IV 08/03/18 Left Antecubital 1 day                Physical Exam:   NAD  Norm resp effort on RA  RRR  Abd soft, mild tender L side, ND  -c/c/e      Lab, Imaging and other studies:  I have personally reviewed pertinent lab results    , CBC:   Lab Results   Component Value Date    WBC 6 30 08/04/2018    HGB 8 5 (L) 08/04/2018    HCT 28 7 (L) 08/04/2018     (H) 08/04/2018     08/04/2018    MCH 30 1 08/04/2018    MCHC 29 6 (L) 08/04/2018    RDW 16 9 (H) 08/04/2018    MPV 9 1 08/04/2018    NRBC 0 08/04/2018   , CMP:   Lab Results   Component Value Date     08/04/2018    K 3 6 08/04/2018     08/04/2018    CO2 28 08/04/2018    ANIONGAP 5 08/04/2018    BUN 9 08/04/2018    CREATININE 0 75 08/04/2018    GLUCOSE 125 08/04/2018    CALCIUM 8 6 08/04/2018    AST 14 08/04/2018    ALT 12 08/04/2018    ALKPHOS 62 08/04/2018    PROT 5 9 (L) 08/04/2018    BILITOT 0 35 08/04/2018    EGFR 77 08/04/2018     VTE Mechanical Prophylaxis: sequential compression device

## 2018-08-04 NOTE — ASSESSMENT & PLAN NOTE
Negative obstruction series  Clinical exam significant for left CVA tenderness improved compared to prior day  Retro-peritoneal ultrasound - unremarkable   Pain control as scheduled  Resume Eliquis today  Continue DAPT  Appreciate surgical input  Abdominal pain improved  Imaging completed with  Findings suspicious for both subcapsular and pericapsular fluid involving the spleen, with mixed attenuation suspicious for hemorrhage/injury  Anibala Kins is adjacent left lower lobe consolidation/atelectasis and left pleural fluid which could either be  related to splinting from the splenic finding versus contusion and injury at this site as well  Cholelithiasis and choledocholithiasis   CBD stent noted as above  Repeat CT abdomen/pelvis with contrast 8/3: showed no interval changes or acute changes

## 2018-08-04 NOTE — PROGRESS NOTES
Progress Note- Yennifer Tolliver 69 y o  female MRN: [id number]    Unit/Bed#: [facility] 613-86 Encounter: [id number]      Assessment and Plan:    1  Acute GI blood loss anemia - secondary to ischemic colitis seen on colonoscopy 7/25  Hemoglobin remains stable and she has no further evidence of overt gi bleeding  She is tolerating oral anticoagulation and antiplatelet therapy  Continue blood pressure support  2  Subcapsular splenic hematoma - identified following colonoscopy; remains stable on imaging  Continue to follow symptomatically  3  History of cholangitis and choledocholithiasis - s/p biliary stenting in May 2018 which is still in place on imaging  Liver enzymes stable  Recommend ERCP with stent removal and duct clearance, timing to be determined   ______________________________________________________________________    Subjective:     Patient is a 69-year-old female with history of cognitive impairment, CAD on aspirin and Plavix, Afib on Eliquis, and CHF EF 35%, admitted with left upper quadrant abdominal pain status post EGD and colonoscopy for GI bleed, demonstrating ischemic colitis  CT imaging identified subcapsular hematoma of the spleen  Hospice has been consulted regarding her medical decision-making capacity, given her short-term memory loss  She has been restarted on anticoagulation without further evidence of GI bleeding  Her hemoglobin remains stable, she continues to have mild left upper quadrant tenderness upon palpation  She is tolerating a regular diet       Medication Administration - last 24 hours from 08/03/2018 1705 to 08/04/2018 1705       Date/Time Order Dose Route Action Action by     08/03/2018 2224 ALPRAZolam (XANAX) tablet 0 25 mg 0 25 mg Oral Given Franky Lorenzo, SUSANNAH     08/03/2018 2224 amitriptyline (ELAVIL) tablet 25 mg 25 mg Oral Given Franky Lorenzo, RN     08/04/2018 0935 aspirin chewable tablet 81 mg 81 mg Oral Given [id number] [address] Jane, RN     08/04/2018 0935 clopidogrel (PLAVIX) tablet 75 mg 75 mg Oral Given [facility], RN     08/04/2018 0935 DULoxetine (CYMBALTA) delayed release capsule 30 mg 30 mg Oral Given [facility], RN     08/04/2018 0935 lidocaine (LIDODERM) 5 % patch 1 patch 1 patch Transdermal Medication Applied [facility], RN     08/03/2018 2226 lidocaine (LIDODERM) 5 % patch 1 patch 1 patch Transdermal Patch Removed Hilda Diaz, RN     08/03/2018 2224 melatonin tablet 6 mg 6 mg Oral Given Hilda Diaz, RN     08/03/2018 2224 mirtazapine (REMERON) tablet 7 5 mg 7 5 mg Oral Given Hilda Diaz, RN     08/04/2018 1137 oxyCODONE (ROXICODONE) immediate release tablet 10 mg 10 mg Oral Given [facility], RN     08/04/2018 7812 oxyCODONE (ROXICODONE) immediate release tablet 10 mg 10 mg Oral Given Hilda Diaz, RN     08/04/2018 0142 oxyCODONE (ROXICODONE) immediate release tablet 10 mg 10 mg Oral Not Given Hilda Joe, RN     08/03/2018 1737 oxyCODONE (ROXICODONE) immediate release tablet 10 mg 10 mg Oral Given Lei Morales, RN     08/04/2018 0935 amiodarone tablet 200 mg 200 mg Oral Given [facility], RN     08/04/2018 0546 pantoprazole (PROTONIX) EC tablet 40 mg 40 mg Oral Given Hilda Oroscoach, RN     08/04/2018 0935 metoprolol succinate (TOPROL-XL) 24 hr tablet 50 mg 50 mg Oral Given [facility], RN     08/04/2018 0935 lisinopril (ZESTRIL) tablet 2 5 mg 2 5 mg Oral Given [facility], RN     08/03/2018 2224 senna-docusate sodium (SENOKOT S) 8 6-50 mg per tablet 1 tablet 1 tablet Oral Given Hilda Diaz, RN     08/03/2018 2028 HYDROmorphone (DILAUDID) injection 1 mg 1 mg Intravenous Given Derek Sea, RN     08/04/2018 1332 acetaminophen (TYLENOL) tablet 650 mg 650 mg Oral Given [facility], RN     08/04/2018 0546 acetaminophen (TYLENOL) tablet 650 mg 650 mg Oral Given Hilda Diaz RN     08/03/2018 2222 acetaminophen (TYLENOL) tablet 650 mg 650 mg Oral Given Hilda Diaz RN 08/04/2018 0934 lactulose 20 g/30 mL oral solution 20 g 20 g Oral Given Yung Guevara, RN     08/03/2018 2223 lactulose 20 g/30 mL oral solution 20 g 20 g Oral Given Farzad John, RN     08/04/2018 1332 apixaban (ELIQUIS) tablet 5 mg 5 mg Oral Given Stephenie Moore, RN          Objective:     Vitals: Blood pressure 131/66, pulse 60, temperature 97 8 °F (36 6 °C), temperature source Oral, resp  rate 16, height 5' 5" (1 651 m), weight 74 8 kg (164 lb 14 5 oz), SpO2 95 %  ,Body mass index is 27 44 kg/m²        Intake/Output Summary (Last 24 hours) at 08/04/18 1705  Last data filed at 08/04/18 1656   Gross per 24 hour   Intake              540 ml   Output             1670 ml   Net            -1130 ml       Physical Exam:   General Appearance: Awake and alert, in no acute distress  Abdomen: Soft, LUQ tender, non-distended; bowel sounds normal; no masses or no organomegaly    Invasive Devices     Peripheral Intravenous Line            Peripheral IV 08/03/18 Left Antecubital 1 day                Lab Results:      Results from last 7 days  Lab Units 08/04/18  0550 08/03/18  0450 08/02/18  1223 08/02/18  0501   WBC Thousand/uL 6 30 6 48  --  6 30   HEMOGLOBIN g/dL 8 5* 8 4* 8 4* 8 0*   HEMATOCRIT % 28 7* 28 0* 28 1* 26 2*   PLATELETS Thousands/uL 319 300  --  263         Results from last 7 days  Lab Units 08/04/18  0550 08/03/18  0450 08/02/18  0501   SODIUM mmol/L 141 142 141   POTASSIUM mmol/L 3 6 4 2 3 8   CHLORIDE mmol/L 108 108 110*   CO2 mmol/L 28 29 27   BUN mg/dL 9 10 8   CREATININE mg/dL 0 75 0 80 0 65   CALCIUM mg/dL 8 6 8 4 8 2*   TOTAL PROTEIN g/dL 5 9* 5 7* 5 3*   BILIRUBIN TOTAL mg/dL 0 35 0 28 0 25   ALK PHOS U/L 62 59 55   ALT U/L 12 12 9*   AST U/L 14 9 12   GLUCOSE RANDOM mg/dL 125 86 88     Lab Results   Component Value Date    INR 1 35 (H) 07/26/2018    INR 2 16 (H) 07/25/2018    INR 1 24 (H) 07/11/2018    PROTIME 16 8 (H) 07/26/2018    PROTIME 24 2 (H) 07/25/2018    PROTIME 15 7 (H) 07/11/2018       Imaging Studies: I have personally reviewed pertinent imaging studies

## 2018-08-04 NOTE — PROGRESS NOTES
Progress Note - Denisha Fried [birth year], 69 y o  female MRN: [id number]    Unit/Bed#: [facility] 714-01 Encounter: [id number]    Primary Care Provider: Maximino Patel DO   Date and time admitted to hospital: 7/25/2018 11:53 AM        * Splenic hemorrhage   Assessment & Plan    Negative obstruction series  Clinical exam significant for left CVA tenderness improved compared to prior day  Retro-peritoneal ultrasound - unremarkable   Pain control as scheduled  Resume Eliquis today  Continue DAPT  Appreciate surgical input  Abdominal pain improved  Imaging completed with  Findings suspicious for both subcapsular and pericapsular fluid involving the spleen, with mixed attenuation suspicious for hemorrhage/injury  Rhodia Resides is adjacent left lower lobe consolidation/atelectasis and left pleural fluid which could either be  related to splinting from the splenic finding versus contusion and injury at this site as well  Cholelithiasis and choledocholithiasis   CBD stent noted as above  Repeat CT abdomen/pelvis with contrast 8/3: showed no interval changes or acute changes  Therapeutic opioid induced constipation   Assessment & Plan    Mineral oil enemas  Lactulose  Monitor BMs        Low back pain   Assessment & Plan    Chronic with history of osteoarthritis on chronic opioid use  Continue to monitor, supportive care analgesics  aqua K-pad  Chronic         Mild cognitive impairment   Assessment & Plan    Has memory difficulties  Neuropsychology input noted patient is deemed competent        H/O cholangitis   Assessment & Plan    · Patient recently admitted in May 2018 with sepsis suspected secondary to cholangitis, underwent ERCP with stone removal and biliary stent placement  · Patient was supposed to follow up with GI in 6 weeks but she did not hear from them    She is due for her stent removal as well  · Follow up GI recommendations        Coronary artery disease   Assessment & Plan    Recent cardiac catheterization status post drug-eluting stent continue Plavix aspirin  Cardiology follow-up        GI bleed   Assessment & Plan    EGD revealed esophagitis, gastritis on pantoprazole  Colonoscopy revealed ischemic colitis  Monitor hemoglobin  resume Eliquis          Acute blood loss anemia   Assessment & Plan    Status post 2 units PRBC transfusion during hospitalization  H/HL 8  7  stable        Opioid dependence (HCC)   Assessment & Plan    Chronic arthritis on oxycodone         Atrial fibrillation with RVR (HCC)   Assessment & Plan    Continue beta-blocker, amiodarone  AC to be resumed  [id number] [address] Maddox by surgery        Chronic systolic heart failure [facility])   Assessment & Plan    EF 35%  Continue meds  Appreciate Cardiology input        Gastrointestinal hemorrhageresolved as of 2018   Assessment & Plan    Stable            VTE Pharmacologic Prophylaxis:   Pharmacologic: Apixaban (Eliquis)  Mechanical VTE Prophylaxis in Place: Yes    Patient Centered Rounds: I have performed bedside rounds with nursing staff today  Discussions with Specialists or Other Care Team Provider: yes    Education and Discussions with Family / Patient: yes  Time Spent for Care: 45 minutes  More than 50% of total time spent on counseling and coordination of care as described above  Current Length of Stay: 10 day(s)    Current Patient Status: Inpatient   Certification Statement: The patient will continue to require additional inpatient hospital stay due to med mgt of constipation, splenic hematoma    Discharge Plan: rehab    Code Status: Level 3 - DNAR and DNI      Subjective:   Improvement lower abdominal pain  continues to have constipation  Review of systems negative otherwise     Objective:     Vitals:   Temp (24hrs), Av °F (36 7 °C), Min:97 6 °F (36 4 °C), Max:98 5 °F (36 9 °C)    HR:  [57-63] 61  Resp:  [18] 18  BP: ()/(49-71) 150/71  SpO2:  [93 %-97 %] 97 %  Body mass index is 27 44 kg/m²       Input and Output Summary (last 24 hours): Intake/Output Summary (Last 24 hours) at 08/04/18 1236  Last data filed at 08/04/18 1137   Gross per 24 hour   Intake              650 ml   Output             1650 ml   Net            -1000 ml       Physical Exam:     Physical Exam   Constitutional: She is oriented to person, place, and time  No distress  HENT:   Head: Normocephalic and atraumatic  Mouth/Throat: Oropharynx is clear and moist    Eyes: Conjunctivae and EOM are normal  Pupils are equal, round, and reactive to light  Neck: Normal range of motion  Neck supple  No JVD present  Cardiovascular: Normal rate  Exam reveals no gallop and no friction rub  No murmur heard  Pulmonary/Chest: Effort normal and breath sounds normal  No respiratory distress  She has no wheezes  She has no rales  Abdominal: Soft  Bowel sounds are normal  She exhibits no distension  There is no tenderness  There is no rebound and no guarding  Musculoskeletal: Normal range of motion  She exhibits no edema  Neurological: She is alert and oriented to person, place, and time  She has normal reflexes  She displays normal reflexes  No cranial nerve deficit  She exhibits normal muscle tone  Coordination normal    Skin: Skin is warm  No erythema  Psychiatric: She has a normal mood and affect  Her behavior is normal          Additional Data:     Labs:      Results from last 7 days  Lab Units 08/04/18  0550   WBC Thousand/uL 6 30   HEMOGLOBIN g/dL 8 5*   HEMATOCRIT % 28 7*   PLATELETS Thousands/uL 319   NEUTROS PCT % 65   LYMPHS PCT % 16   MONOS PCT % 10   EOS PCT % 7*       Results from last 7 days  Lab Units 08/04/18  0550   SODIUM mmol/L 141   POTASSIUM mmol/L 3 6   CHLORIDE mmol/L 108   CO2 mmol/L 28   BUN mg/dL 9   CREATININE mg/dL 0 75   CALCIUM mg/dL 8 6   TOTAL PROTEIN g/dL 5 9*   BILIRUBIN TOTAL mg/dL 0 35   ALK PHOS U/L 62   ALT U/L 12   AST U/L 14   GLUCOSE RANDOM mg/dL 125                     * I Have Reviewed All Lab Data Listed Above    * Additional Pertinent Lab Tests Reviewed: Arturoinglan 67 Admission Reviewed    Imaging:    Imaging Reports Reviewed Today   Recent Cultures (last 7 days):           Last 24 Hours Medication List:     Current Facility-Administered Medications:  acetaminophen 650 mg Oral Q8H [address] CHANDRIKA Garcia   ALPRAZolam 0 25 mg Oral HS PRN Lori Mora MD   amiodarone 200 mg Oral Daily With Breakfast Unique Celaya MD   amitriptyline 25 mg Oral HS Unique Celaya MD   apixaban 5 mg Oral BID Holli Colbert MD   aspirin 81 mg Oral Daily Unique Celaya MD   clopidogrel 75 mg Oral Daily Unique Celaya MD   DULoxetine 30 mg Oral Daily Unique Celaya MD   HYDROmorphone 1 mg Intravenous Q4H PRN CHANDRIKA Dawn   lactulose 20 g Oral TID PRN Brianda Stephenson MD   lidocaine 1 patch Transdermal Daily Lori Mora MD   lisinopril 2 5 mg Oral Daily Veverly LuriCHANDRIKA holcomb   melatonin 6 mg Oral HS Lori Mora MD   metoprolol succinate 50 mg Oral Daily Veverly Carolynria, CHANDRIKA   mirtazapine 7 5 mg Oral HS Lori Mora MD   ondansetron 4 mg Intravenous Q6H PRN Unique Celaya MD   oxyCODONE 10 mg Oral Q6H Lori Mora MD   pantoprazole 40 mg Oral Early Morning Trinity Cisneros MD   senna-docusate sodium 1 tablet Oral HS Holli Colbert MD        Today, Patient Was Seen By: Holli Colbert MD    ** Please Note: Dictation voice to text software may have been used in the creation of this document   **

## 2018-08-04 NOTE — SOCIAL WORK
CM left VM to f/u w/pt's sister Ashutosh Shipley re: bed at [facility]  Message sent to [place] re: whether facility can accept pt over weekend  CM following

## 2018-08-05 PROBLEM — K92.2 GI BLEED: Status: RESOLVED | Noted: 2018-07-25 | Resolved: 2018-08-05

## 2018-08-05 LAB
ALBUMIN SERPL BCP-MCNC: 2.3 G/DL (ref 3.5–5)
ALP SERPL-CCNC: 59 U/L (ref 46–116)
ALT SERPL W P-5'-P-CCNC: 12 U/L (ref 12–78)
ANION GAP SERPL CALCULATED.3IONS-SCNC: 5 MMOL/L (ref 4–13)
AST SERPL W P-5'-P-CCNC: 12 U/L (ref 5–45)
BASOPHILS # BLD AUTO: 0.03 THOUSANDS/ΜL (ref 0–0.1)
BASOPHILS NFR BLD AUTO: 1 % (ref 0–1)
BILIRUB SERPL-MCNC: 0.26 MG/DL (ref 0.2–1)
BUN SERPL-MCNC: 8 MG/DL (ref 5–25)
CALCIUM SERPL-MCNC: 8.2 MG/DL (ref 8.3–10.1)
CHLORIDE SERPL-SCNC: 107 MMOL/L (ref 100–108)
CO2 SERPL-SCNC: 30 MMOL/L (ref 21–32)
CREAT SERPL-MCNC: 0.67 MG/DL (ref 0.6–1.3)
EOSINOPHIL # BLD AUTO: 0.43 THOUSAND/ΜL (ref 0–0.61)
EOSINOPHIL NFR BLD AUTO: 7 % (ref 0–6)
ERYTHROCYTE [DISTWIDTH] IN BLOOD BY AUTOMATED COUNT: 16.8 % (ref 11.6–15.1)
GFR SERPL CREATININE-BSD FRML MDRD: 85 ML/MIN/1.73SQ M
GLUCOSE SERPL-MCNC: 81 MG/DL (ref 65–140)
HCT VFR BLD AUTO: 28.2 % (ref 34.8–46.1)
HGB BLD-MCNC: 8.4 G/DL (ref 11.5–15.4)
IMM GRANULOCYTES # BLD AUTO: 0.02 THOUSAND/UL (ref 0–0.2)
IMM GRANULOCYTES NFR BLD AUTO: 0 % (ref 0–2)
LYMPHOCYTES # BLD AUTO: 1.09 THOUSANDS/ΜL (ref 0.6–4.47)
LYMPHOCYTES NFR BLD AUTO: 18 % (ref 14–44)
MCH RBC QN AUTO: 29.9 PG (ref 26.8–34.3)
MCHC RBC AUTO-ENTMCNC: 29.8 G/DL (ref 31.4–37.4)
MCV RBC AUTO: 100 FL (ref 82–98)
MONOCYTES # BLD AUTO: 0.69 THOUSAND/ΜL (ref 0.17–1.22)
MONOCYTES NFR BLD AUTO: 12 % (ref 4–12)
NEUTROPHILS # BLD AUTO: 3.76 THOUSANDS/ΜL (ref 1.85–7.62)
NEUTS SEG NFR BLD AUTO: 62 % (ref 43–75)
NRBC BLD AUTO-RTO: 0 /100 WBCS
PLATELET # BLD AUTO: 293 THOUSANDS/UL (ref 149–390)
PMV BLD AUTO: 9 FL (ref 8.9–12.7)
POTASSIUM SERPL-SCNC: 3.9 MMOL/L (ref 3.5–5.3)
PROT SERPL-MCNC: 5.9 G/DL (ref 6.4–8.2)
RBC # BLD AUTO: 2.81 MILLION/UL (ref 3.81–5.12)
SODIUM SERPL-SCNC: 142 MMOL/L (ref 136–145)
WBC # BLD AUTO: 6.02 THOUSAND/UL (ref 4.31–10.16)

## 2018-08-05 PROCEDURE — 85025 COMPLETE CBC W/AUTO DIFF WBC: CPT | Performed by: HOSPITALIST

## 2018-08-05 PROCEDURE — 80053 COMPREHEN METABOLIC PANEL: CPT | Performed by: HOSPITALIST

## 2018-08-05 PROCEDURE — 99232 SBSQ HOSP IP/OBS MODERATE 35: CPT | Performed by: HOSPITALIST

## 2018-08-05 PROCEDURE — 99232 SBSQ HOSP IP/OBS MODERATE 35: CPT | Performed by: SURGERY

## 2018-08-05 RX ADMIN — AMIODARONE HYDROCHLORIDE 200 MG: 200 TABLET ORAL at 07:43

## 2018-08-05 RX ADMIN — APIXABAN 5 MG: 5 TABLET, FILM COATED ORAL at 17:44

## 2018-08-05 RX ADMIN — CLOPIDOGREL BISULFATE 75 MG: 75 TABLET, FILM COATED ORAL at 07:43

## 2018-08-05 RX ADMIN — MELATONIN 6 MG: at 21:09

## 2018-08-05 RX ADMIN — OXYCODONE HYDROCHLORIDE 10 MG: 10 TABLET ORAL at 05:17

## 2018-08-05 RX ADMIN — AMITRIPTYLINE HYDROCHLORIDE 25 MG: 25 TABLET, FILM COATED ORAL at 21:09

## 2018-08-05 RX ADMIN — ACETAMINOPHEN 650 MG: 325 TABLET, FILM COATED ORAL at 15:24

## 2018-08-05 RX ADMIN — PANTOPRAZOLE SODIUM 40 MG: 40 TABLET, DELAYED RELEASE ORAL at 05:17

## 2018-08-05 RX ADMIN — LISINOPRIL 2.5 MG: 2.5 TABLET ORAL at 07:56

## 2018-08-05 RX ADMIN — ALPRAZOLAM 0.25 MG: 0.25 TABLET ORAL at 21:09

## 2018-08-05 RX ADMIN — METOPROLOL SUCCINATE 50 MG: 50 TABLET, EXTENDED RELEASE ORAL at 07:41

## 2018-08-05 RX ADMIN — ASPIRIN 81 MG 81 MG: 81 TABLET ORAL at 07:43

## 2018-08-05 RX ADMIN — OXYCODONE HYDROCHLORIDE 10 MG: 10 TABLET ORAL at 23:42

## 2018-08-05 RX ADMIN — OXYCODONE HYDROCHLORIDE 10 MG: 10 TABLET ORAL at 00:45

## 2018-08-05 RX ADMIN — OXYCODONE HYDROCHLORIDE 10 MG: 10 TABLET ORAL at 12:28

## 2018-08-05 RX ADMIN — Medication 1 TABLET: at 21:09

## 2018-08-05 RX ADMIN — DULOXETINE HYDROCHLORIDE 30 MG: 30 CAPSULE, DELAYED RELEASE ORAL at 07:41

## 2018-08-05 RX ADMIN — LIDOCAINE 1 PATCH: 50 PATCH CUTANEOUS at 07:56

## 2018-08-05 RX ADMIN — ACETAMINOPHEN 650 MG: 325 TABLET, FILM COATED ORAL at 21:09

## 2018-08-05 RX ADMIN — LACTULOSE 20 G: 20 SOLUTION ORAL at 08:09

## 2018-08-05 RX ADMIN — APIXABAN 5 MG: 5 TABLET, FILM COATED ORAL at 07:43

## 2018-08-05 RX ADMIN — OXYCODONE HYDROCHLORIDE 10 MG: 10 TABLET ORAL at 17:44

## 2018-08-05 RX ADMIN — MIRTAZAPINE 7.5 MG: 15 TABLET, FILM COATED ORAL at 21:09

## 2018-08-05 RX ADMIN — ACETAMINOPHEN 650 MG: 325 TABLET, FILM COATED ORAL at 05:16

## 2018-08-05 NOTE — PLAN OF CARE
DISCHARGE PLANNING     Discharge to home or other facility with appropriate resources Progressing        DISCHARGE PLANNING - CARE MANAGEMENT     Discharge to post-acute care or home with appropriate resources Progressing        GASTROINTESTINAL - ADULT     Maintains or returns to baseline bowel function Progressing     Maintains adequate nutritional intake Progressing        HEMATOLOGIC - ADULT     Maintains hematologic stability Progressing        Knowledge Deficit     Patient/family/caregiver demonstrates understanding of disease process, treatment plan, medications, and discharge instructions Progressing        Nutrition/Hydration-ADULT     Nutrient/Hydration intake appropriate for improving, restoring or maintaining nutritional needs Progressing        PAIN - ADULT     Verbalizes/displays adequate comfort level or baseline comfort level Progressing        Potential for Falls     Patient will remain free of falls Progressing        Prexisting or High Potential for Compromised Skin Integrity     Skin integrity is maintained or improved Progressing        SAFETY ADULT     Maintain or return mobility status to optimal level Progressing

## 2018-08-05 NOTE — ASSESSMENT & PLAN NOTE
Negative obstruction series  Clinical exam significant for left CVA tenderness improved compared to prior day  Retro-peritoneal ultrasound - unremarkable   Pain control as scheduled  Resumed Eliquis- no evidence of bleeds  Continue DAPT  Appreciate surgical input  Abdominal pain improved  Imaging completed with  Findings suspicious for both subcapsular and pericapsular fluid involving the spleen, with mixed attenuation suspicious for hemorrhage/injury  Ewangy Dubs is adjacent left lower lobe consolidation/atelectasis and left pleural fluid which could either be  related to splinting from the splenic finding versus contusion and injury at this site as well  Cholelithiasis and choledocholithiasis   CBD stent noted as above  Repeat CT abdomen/pelvis with contrast 8/3: showed no interval changes or acute changes

## 2018-08-05 NOTE — PROGRESS NOTES
Progress Note - Ryder Stapleton [birth year], 69 y o  female MRN: [id number]    Unit/Bed#: [facility] 714-01 Encounter: [id number]    Primary Care Provider: Kanchan Ramirez DO   Date and time admitted to hospital: 7/25/2018 11:53 AM        * Splenic hemorrhage   Assessment & Plan    Negative obstruction series  Clinical exam significant for left CVA tenderness improved compared to prior day  Retro-peritoneal ultrasound - unremarkable   Pain control as scheduled  Resumed Eliquis- no evidence of bleeds  Continue DAPT  Appreciate surgical input  Abdominal pain improved  Imaging completed with  Findings suspicious for both subcapsular and pericapsular fluid involving the spleen, with mixed attenuation suspicious for hemorrhage/injury  [place] Locker is adjacent left lower lobe consolidation/atelectasis and left pleural fluid which could either be  related to splinting from the splenic finding versus contusion and injury at this site as well  Cholelithiasis and choledocholithiasis   CBD stent noted as above  Repeat CT abdomen/pelvis with contrast 8/3: showed no interval changes or acute changes  Therapeutic opioid induced constipation   Assessment & Plan    Mineral oil enemas  Lactulose  Monitor BMs        Low back pain   Assessment & Plan    Chronic with history of osteoarthritis on chronic opioid use  Continue to monitor, supportive care analgesics  aqua K-pad  Chronic         Mild cognitive impairment   Assessment & Plan    Has memory difficulties  Neuropsychology input noted patient is deemed competent        H/O cholangitis   Assessment & Plan    · Patient recently admitted in May 2018 with sepsis suspected secondary to cholangitis, underwent ERCP with stone removal and biliary stent placement  · Patient was supposed to follow up with GI in 6 weeks but she did not hear from them    She is due for her stent removal as well  · Follow up GI recommendations OP        Coronary artery disease   Assessment & Plan    Recent cardiac catheterization status post drug-eluting stent continue Plavix aspirin  Cardiology follow-up        Acute blood loss anemia   Assessment & Plan    Status post 2 units PRBC transfusion during hospitalization  H/HL 8  2  stable        Opioid dependence (HCC)   Assessment & Plan    Chronic arthritis on oxycodone         Atrial fibrillation with RVR (HCC)   Assessment & Plan    Continue beta-blocker, amiodarone  AC to be continued        Chronic systolic heart failure (HCC)   Assessment & Plan    EF 35%  Continue meds  Appreciate Cardiology input            VTE Pharmacologic Prophylaxis:   Pharmacologic: Apixaban (Eliquis)  Mechanical VTE Prophylaxis in Place: Yes    Patient Centered Rounds: I have performed bedside rounds with nursing staff today  Discussions with Specialists or Other Care Team Provider: yes    Education and Discussions with Family / Patient: yes    Time Spent for Care: 45 minutes  More than 50% of total time spent on counseling and coordination of care as described above  Current Length of Stay: 11 day(s)    Current Patient Status: Inpatient   Certification Statement: The patient will continue to require additional inpatient hospital stay due to med mgt LLQ pain    Discharge Plan: LCTF tomorrow    Code Status: Level 3 - DNAR and DNI      Subjective:   BMs, small as reported by nurse  LLQ pain improved  ROS negative otherwise,    Objective:     Vitals:   Temp (24hrs), Av °F (36 7 °C), Min:97 8 °F (36 6 °C), Max:98 5 °F (36 9 °C)    HR:  [60-64] 64  Resp:  [16-18] 18  BP: (115-133)/(58-66) 133/63  SpO2:  [92 %-95 %] 95 %  Body mass index is 26 85 kg/m²  Input and Output Summary (last 24 hours):        Intake/Output Summary (Last 24 hours) at 18 1216  Last data filed at 18 0800   Gross per 24 hour   Intake              600 ml   Output              870 ml   Net             -270 ml       Physical Exam:     Physical Exam   Constitutional: She is oriented to person, place, and time  No distress  HENT:   Head: Normocephalic and atraumatic  Mouth/Throat: Oropharynx is clear and moist  No oropharyngeal exudate  Eyes: Conjunctivae and EOM are normal  Pupils are equal, round, and reactive to light  Neck: Normal range of motion  Neck supple  No JVD present  Cardiovascular: Normal rate  Exam reveals no gallop and no friction rub  No murmur heard  Pulmonary/Chest: Effort normal and breath sounds normal  No respiratory distress  She has no wheezes  She has no rales  Abdominal: Soft  Bowel sounds are normal  She exhibits no distension  There is no tenderness  There is no rebound and no guarding  Musculoskeletal: Normal range of motion  She exhibits no edema, tenderness or deformity  Neurological: She is alert and oriented to person, place, and time  She has normal reflexes  No cranial nerve deficit  Coordination normal    Skin: Skin is warm  Psychiatric: She has a normal mood and affect  Her behavior is normal  Thought content normal        Additional Data:     Labs:      Results from last 7 days  Lab Units 08/05/18  0505   WBC Thousand/uL 6 02   HEMOGLOBIN g/dL 8 4*   HEMATOCRIT % 28 2*   PLATELETS Thousands/uL 293   NEUTROS PCT % 62   LYMPHS PCT % 18   MONOS PCT % 12   EOS PCT % 7*       Results from last 7 days  Lab Units 08/05/18  0505   SODIUM mmol/L 142   POTASSIUM mmol/L 3 9   CHLORIDE mmol/L 107   CO2 mmol/L 30   BUN mg/dL 8   CREATININE mg/dL 0 67   CALCIUM mg/dL 8 2*   TOTAL PROTEIN g/dL 5 9*   BILIRUBIN TOTAL mg/dL 0 26   ALK PHOS U/L 59   ALT U/L 12   AST U/L 12   GLUCOSE RANDOM mg/dL 81                     * I Have Reviewed All Lab Data Listed Above  * Additional Pertinent Lab Tests Reviewed: The patient will continue to require additional inpatient hospital stay due to able to have BMs now, can be discharged tomorrow       Imaging:    Imaging Reports Reviewed Today     Recent Cultures (last 7 days):           Last 24 Hours Medication List:     Current Facility-Administered Medications:  acetaminophen 650 mg Oral Q8H [address] CHANDRIKA Garcia   ALPRAZolam 0 25 mg Oral HS PRN Jessenia Muniz MD   amiodarone 200 mg Oral Daily With Breakfast Jessenia Muniz MD   amitriptyline 25 mg Oral HS Unique Celaya MD   apixaban 5 mg Oral BID Helga Leach MD   aspirin 81 mg Oral Daily Jessenia Muniz MD   clopidogrel 75 mg Oral Daily Unique Celaya MD   DULoxetine 30 mg Oral Daily Unique Celaya MD   HYDROmorphone 1 mg Intravenous Q4H PRN CHANDRIKA Quintanilla   lactulose 20 g Oral TID PRN Rich Beltre MD   lidocaine 1 patch Transdermal Daily Jessenia Muniz MD   lisinopril 2 5 mg Oral Daily CHANDRIKA Wesley   melatonin 6 mg Oral HS Jessenia Muniz MD   metoprolol succinate 50 mg Oral Daily CHANDRIKA Wesley   mirtazapine 7 5 mg Oral HS Unique Celaya MD   ondansetron 4 mg Intravenous Q6H PRN Jessenia Muniz MD   oxyCODONE 10 mg Oral Q6H Jessenia Muniz MD   pantoprazole 40 mg Oral Early Morning Sage Merritt MD   senna-docusate sodium 1 tablet Oral HS Helga Leach MD        Today, Patient Was Seen By: Helga Leach MD    ** Please Note: Dictation voice to text software may have been used in the creation of this document   **

## 2018-08-05 NOTE — PROGRESS NOTES
Progress Note - General Surgery   Florance Mini 69 y o  female MRN: [id number]  Unit/Bed#: [facility] 714-01 Encounter: [id number]    Assessment:  68yF w/ splenic capsular tear, esophagitis, ischemic colitis    Plan:  - conservative management  - H&H stable  - continue eliquis  - continue ASA/plavix  - please call with any further questions or concerns      Subjective/Objective   Subjective: having BM which are normal, tolerating diet  Still complains of some L sided abd pain    Objective:    Blood pressure 115/58, pulse 62, temperature 98 5 °F (36 9 °C), temperature source Oral, resp  rate 16, height 5' 5" (1 651 m), weight 73 2 kg (161 lb 6 oz), SpO2 92 %  ,Body mass index is 26 85 kg/m²  Intake/Output Summary (Last 24 hours) at 08/05/18 0725  Last data filed at 08/05/18 0554   Gross per 24 hour   Intake              900 ml   Output             1320 ml   Net             -420 ml       Invasive Devices     Peripheral Intravenous Line            Peripheral IV 08/03/18 Left Antecubital 2 days                Physical Exam:   NAD  Norm resp effort on RA  RRR  Abd soft, mild tender L side, ND  -c/c/e      Lab, Imaging and other studies:  I have personally reviewed pertinent lab results    , CBC:   Lab Results   Component Value Date    WBC 6 02 08/05/2018    HGB 8 4 (L) 08/05/2018    HCT 28 2 (L) 08/05/2018     (H) 08/05/2018     08/05/2018    MCH 29 9 08/05/2018    MCHC 29 8 (L) 08/05/2018    RDW 16 8 (H) 08/05/2018    MPV 9 0 08/05/2018    NRBC 0 08/05/2018   , CMP:   Lab Results   Component Value Date     08/05/2018    K 3 9 08/05/2018     08/05/2018    CO2 30 08/05/2018    ANIONGAP 5 08/05/2018    BUN 8 08/05/2018    CREATININE 0 67 08/05/2018    GLUCOSE 81 08/05/2018    CALCIUM 8 2 (L) 08/05/2018    AST 12 08/05/2018    ALT 12 08/05/2018    ALKPHOS 59 08/05/2018    PROT 5 9 (L) 08/05/2018    BILITOT 0 26 08/05/2018    EGFR 85 08/05/2018     VTE Mechanical Prophylaxis: sequential compression device

## 2018-08-05 NOTE — ASSESSMENT & PLAN NOTE
· Patient recently admitted in May 2018 with sepsis suspected secondary to cholangitis, underwent ERCP with stone removal and biliary stent placement  · Patient was supposed to follow up with GI in 6 weeks but she did not hear from them    She is due for her stent removal as well  · Follow up GI recommendations OP

## 2018-08-06 VITALS
SYSTOLIC BLOOD PRESSURE: 118 MMHG | OXYGEN SATURATION: 97 % | RESPIRATION RATE: 18 BRPM | WEIGHT: 163.58 LBS | TEMPERATURE: 97.6 F | HEIGHT: 65 IN | HEART RATE: 61 BPM | DIASTOLIC BLOOD PRESSURE: 64 MMHG | BODY MASS INDEX: 27.25 KG/M2

## 2018-08-06 PROBLEM — D73.5 SPLENIC HEMORRHAGE: Status: RESOLVED | Noted: 2018-07-30 | Resolved: 2018-08-06

## 2018-08-06 PROCEDURE — 99239 HOSP IP/OBS DSCHRG MGMT >30: CPT | Performed by: HOSPITALIST

## 2018-08-06 RX ORDER — AMOXICILLIN 250 MG
1 CAPSULE ORAL
Qty: 30 TABLET | Refills: 0 | Status: SHIPPED | OUTPATIENT
Start: 2018-08-06 | End: 2018-09-02 | Stop reason: SDUPTHER

## 2018-08-06 RX ORDER — POLYETHYLENE GLYCOL 3350 17 G/17G
17 POWDER, FOR SOLUTION ORAL DAILY
Qty: 14 EACH | Refills: 0
Start: 2018-08-06 | End: 2018-09-01 | Stop reason: HOSPADM

## 2018-08-06 RX ORDER — OXYCODONE HYDROCHLORIDE 15 MG/1
15 TABLET ORAL EVERY 6 HOURS
Qty: 40 TABLET | Refills: 0 | Status: SHIPPED | OUTPATIENT
Start: 2018-08-06 | End: 2018-08-16

## 2018-08-06 RX ADMIN — DULOXETINE HYDROCHLORIDE 30 MG: 30 CAPSULE, DELAYED RELEASE ORAL at 08:33

## 2018-08-06 RX ADMIN — ACETAMINOPHEN 650 MG: 325 TABLET, FILM COATED ORAL at 05:32

## 2018-08-06 RX ADMIN — AMIODARONE HYDROCHLORIDE 200 MG: 200 TABLET ORAL at 08:33

## 2018-08-06 RX ADMIN — CLOPIDOGREL BISULFATE 75 MG: 75 TABLET, FILM COATED ORAL at 08:33

## 2018-08-06 RX ADMIN — PANTOPRAZOLE SODIUM 40 MG: 40 TABLET, DELAYED RELEASE ORAL at 05:32

## 2018-08-06 RX ADMIN — METOPROLOL SUCCINATE 50 MG: 50 TABLET, EXTENDED RELEASE ORAL at 08:32

## 2018-08-06 RX ADMIN — LIDOCAINE 1 PATCH: 50 PATCH CUTANEOUS at 08:31

## 2018-08-06 RX ADMIN — OXYCODONE HYDROCHLORIDE 10 MG: 10 TABLET ORAL at 12:07

## 2018-08-06 RX ADMIN — HYDROMORPHONE HYDROCHLORIDE 1 MG: 1 INJECTION, SOLUTION INTRAMUSCULAR; INTRAVENOUS; SUBCUTANEOUS at 08:40

## 2018-08-06 RX ADMIN — OXYCODONE HYDROCHLORIDE 10 MG: 10 TABLET ORAL at 05:32

## 2018-08-06 RX ADMIN — APIXABAN 5 MG: 5 TABLET, FILM COATED ORAL at 08:33

## 2018-08-06 RX ADMIN — LISINOPRIL 2.5 MG: 2.5 TABLET ORAL at 08:32

## 2018-08-06 RX ADMIN — ASPIRIN 81 MG 81 MG: 81 TABLET ORAL at 08:33

## 2018-08-06 NOTE — PALLIATIVE CARE CONFERENCE
Palliative LSW and LCSW met with Xochilt Nina in room to f/u on 5 Wishes packet that was provided to her on 8/3  She did not remember having a conversation with Palliative Care, and stated "my memory is just so bad "  Provided a recap of the consult  The 5 Wishes documents were located at her bedside, however she did not complete them  Pt  Was very pleasant and stated she has decided to go to [facility], "which is best for me" and get stronger  Encouraged her to read and complete 5 Wishes at rehab where  at facility can help answer questions and complete  Pt  Thankful

## 2018-08-06 NOTE — DISCHARGE SUMMARY
Discharge- [place] [birth year], 69 y o  female MRN: [id number]    Unit/Bed#: [facility] 714-01 Encounter: [id number]    Primary Care Provider: Jearlean Rinne, DO   Date and time admitted to hospital: 7/25/2018 11:53 AM        * Splenic hemorrhage   Assessment & Plan    Negative obstruction series  Clinical exam significant for left CVA tenderness improved compared to prior day  Retro-peritoneal ultrasound - unremarkable   Pain control as scheduled  Resumed Eliquis- no evidence of bleeds  Continue DAPT  Appreciate surgical input  Abdominal pain improved  Imaging completed with  Findings suspicious for both subcapsular and pericapsular fluid involving the spleen, with mixed attenuation suspicious for hemorrhage/injury  Darlene Gross is adjacent left lower lobe consolidation/atelectasis and left pleural fluid which could either be  related to splinting from the splenic finding versus contusion and injury at this site as well  Cholelithiasis and choledocholithiasis   CBD stent noted as above  Repeat CT abdomen/pelvis with contrast 8/3: showed no interval changes or acute changes  Therapeutic opioid induced constipation   Assessment & Plan    Mineral oil enemas  Lactulose  Monitor BMs        Low back pain   Assessment & Plan    Chronic with history of osteoarthritis on chronic opioid use  Continue to monitor, supportive care analgesics  aqua K-pad  Chronic         Mild cognitive impairment   Assessment & Plan    Has memory difficulties  Neuropsychology input noted patient is deemed competent        H/O cholangitis   Assessment & Plan    · Patient recently admitted in May 2018 with sepsis suspected secondary to cholangitis, underwent ERCP with stone removal and biliary stent placement  · Patient was supposed to follow up with GI in 6 weeks but she did not hear from them    She is due for her stent removal as well  · Follow up GI recommendations OP        Coronary artery disease   Assessment & Plan    Recent cardiac catheterization status post drug-eluting stent continue Plavix aspirin  Cardiology follow-up        Acute blood loss anemia   Assessment & Plan    Status post 2 units PRBC transfusion during hospitalization  H/HL 8 4/28 2  stable        Opioid dependence (HCC)   Assessment & Plan    Chronic arthritis on oxycodone         Atrial fibrillation with RVR (HCC)   Assessment & Plan    Continue beta-blocker, amiodarone  AC to be continued        Chronic systolic heart failure ([address] )   Assessment & Plan    EF 35%  Continue meds  Appreciate Cardiology input              Discharging Physician / Practitioner: Alcira Urbina MD  PCP: Romi Alston DO  Admission Date:   Admission Orders     Ordered        07/25/18 1450  Inpatient Admission (expected length of stay for this patient is greater than two midnights)  Once             Discharge Date: 08/06/18    Resolved Problems  Date Reviewed: 8/6/2018          Resolved    GI bleed 8/5/2018     Resolved by  Alcira Urbina MD    Gastrointestinal hemorrhage 8/4/2018     Resolved by  Alcira Urbina MD    Overview Signed 7/26/2018  1:47 PM by Noni Salcedo MD     Added automatically from request for surgery [id number]               Consultations During Hospital Stay:  Gastroenterology  General surgery  Palliative care  Procedures Performed:   EGD  Colonoscopy  Significant Findings / Test Results:     · Ischemic colitis demonstrated on EGD colonoscopy-serial H&Hs monitored, hemoglobin stable prior to discharge    Incidental Findings:   · None    Test Results Pending at Discharge (will require follow up): · None     Outpatient Tests Requested:  None  Complications:  Splenic contusion post colonoscopy    Reason for Admission:  Dizziness  Hospital Course:     Adeel Montiel is a 69 y o  female patient who originally presented to the hospital on 7/25/2018 due to dizziness  Noted to have acute blood loss anemia, GI consulted, had EGD colonoscopy that showed ischemic colitis    Patient's past medical history of atrial fibrillation, AVNRT status post ablation, and significant coronary artery disease status post NGOZI to RCA, due to significant medical history, Cardiology was recommended to have continue dual antiplatelet therapy as well as Eliquis due to recent stent and cardioversion, she is also recommended to continue beta-blocker and amiodarone  Patient was transfused 2 units PRBC  To aid with acute blood loss anemia  Splenic hemorrhage was noted as a contusion after colonoscopy, general surgery was consulted as well as GI followed up  H&H is remained stable throughout hospitalization, and was recommended for patient to continue on her anticoagulation as well as DAPT  therapy  See above for further details  Please see above list of diagnoses and related plan for additional information  Condition at Discharge: good     Discharge Day Visit / Exam:     Subjective:  No complaints today  Review of systems negative otherwise   Vitals: Blood Pressure: 118/64 (08/06/18 0710)  Pulse: 61 (08/06/18 0710)  Temperature: 97 6 °F (36 4 °C) (08/06/18 0710)  Temp Source: Oral (08/06/18 0710)  Respirations: 18 (08/06/18 0710)  Height: 5' 5" (165 1 cm) (07/27/18 1102)  Weight - Scale: 74 2 kg (163 lb 9 3 oz) (08/06/18 0538)  SpO2: 97 % (08/06/18 0710)  Exam:   Physical Exam   Constitutional: She is oriented to person, place, and time  No distress  HENT:   Head: Normocephalic and atraumatic  Mouth/Throat: Oropharynx is clear and moist    Eyes: Conjunctivae and EOM are normal  Pupils are equal, round, and reactive to light  Neck: Normal range of motion  Neck supple  No JVD present  Cardiovascular: Normal rate and normal heart sounds  Exam reveals no gallop and no friction rub  No murmur heard  Pulmonary/Chest: Effort normal and breath sounds normal  No respiratory distress  She has no wheezes  She has no rales  Abdominal: Soft  Bowel sounds are normal  She exhibits no distension   There is no tenderness  There is no rebound  Musculoskeletal: Normal range of motion  Neurological: She is alert and oriented to person, place, and time  She has normal reflexes  She displays normal reflexes  No cranial nerve deficit  She exhibits normal muscle tone  Coordination normal    Skin: Skin is warm  No erythema  Psychiatric: She has a normal mood and affect  Her behavior is normal        Discussion with Family: yes    Discharge instructions/Information to patient and family:   See after visit summary for information provided to patient and family  Provisions for Follow-Up Care:  See after visit summary for information related to follow-up care and any pertinent home health orders  Disposition:     Long Term Acute Care (LTAC) Facility at [address] [place] [address] to [address] SN[address]:   · [facility] - Not Applicable to this Patient    Planned Readmission:none     Discharge Statement:  I spent 35 minutes discharging the patient  This time was spent on the day of discharge  I had direct contact with the patient on the day of discharge  Greater than 50% of the total time was spent examining patient, answering all patient questions, arranging and discussing plan of care with patient as well as directly providing post-discharge instructions  Additional time then spent on discharge activities  Discharge Medications:  See after visit summary for reconciled discharge medications provided to patient and family        ** Please Note: This note has been constructed using a voice recognition system **

## 2018-08-06 NOTE — RESTORATIVE TECHNICIAN NOTE
Restorative Specialist Mobility Note       Activity: Ambulate in room, Ambulate in agee, [place] privileges, Chair, Stand at bedside, Dangle (Educated/encouraged pt to ambulate with assistance 3-4 x's/day  Bed alarm on   Pt callbell, phone/tray within reach )     Assistive Device: Front wheel walker          Bettye BOJORQUEZ, Restorative Technician, [facility]

## 2018-08-06 NOTE — SOCIAL WORK
Pt cleared for d/c to [facility], to be transported by [employer] at 1:30 pm via wheelchair Becca aranda CM completed facility transfer form, and notified Pt, SUSANNAH Dawkins and facility [place] of d/c arrangements

## 2018-08-06 NOTE — ASSESSMENT & PLAN NOTE
Negative obstruction series  Clinical exam significant for left CVA tenderness improved compared to prior day  Retro-peritoneal ultrasound - unremarkable   Pain control as scheduled  Resumed Eliquis- no evidence of bleeds  Continue DAPT  Appreciate surgical input  Abdominal pain improved  Imaging completed with  Findings suspicious for both subcapsular and pericapsular fluid involving the spleen, with mixed attenuation suspicious for hemorrhage/injury  Romy Lessen is adjacent left lower lobe consolidation/atelectasis and left pleural fluid which could either be  related to splinting from the splenic finding versus contusion and injury at this site as well  Cholelithiasis and choledocholithiasis   CBD stent noted as above  Repeat CT abdomen/pelvis with contrast 8/3: showed no interval changes or acute changes

## 2018-08-07 ENCOUNTER — TELEPHONE (OUTPATIENT)
Dept: OTHER | Facility: HOSPITAL | Age: 78
End: 2018-08-07

## 2018-08-08 DIAGNOSIS — F32.89 OTHER DEPRESSION: Chronic | ICD-10-CM

## 2018-08-08 RX ORDER — DULOXETIN HYDROCHLORIDE 30 MG/1
CAPSULE, DELAYED RELEASE ORAL
Qty: 30 CAPSULE | Refills: 0 | OUTPATIENT
Start: 2018-08-08

## 2018-08-08 RX ORDER — AMITRIPTYLINE HYDROCHLORIDE 25 MG/1
TABLET, FILM COATED ORAL
Qty: 30 TABLET | Refills: 0 | OUTPATIENT
Start: 2018-08-08

## 2018-08-10 RX ORDER — LISINOPRIL 5 MG/1
5 TABLET ORAL DAILY
Qty: 30 TABLET | Refills: 0 | Status: CANCELLED | OUTPATIENT
Start: 2018-08-10

## 2018-08-10 NOTE — TELEPHONE ENCOUNTER
Presley Matos from  nursing home called to schedule a billiary stent removal  Please call [phone]

## 2018-08-14 NOTE — TELEPHONE ENCOUNTER
ERCP scheduled with Dr Guerin in [place] on 9/20/2018  I gave Beatrice Clark (nurse) verbal instructions/faxed to [phone]   Medication clearance(plavix, Eliquis) faxed to [facility]

## 2018-08-20 ENCOUNTER — APPOINTMENT (EMERGENCY)
Dept: RADIOLOGY | Facility: HOSPITAL | Age: 78
DRG: 922 | End: 2018-08-20
Payer: MEDICARE

## 2018-08-20 ENCOUNTER — HOSPITAL ENCOUNTER (INPATIENT)
Facility: HOSPITAL | Age: 78
LOS: 11 days | Discharge: HOME HEALTH | DRG: 922 | End: 2018-09-01
Attending: EMERGENCY MEDICINE | Admitting: HOSPITALIST
Payer: MEDICARE

## 2018-08-20 DIAGNOSIS — D62 ACUTE BLOOD LOSS ANEMIA: ICD-10-CM

## 2018-08-20 DIAGNOSIS — I50.22 CHRONIC SYSTOLIC HEART FAILURE (HCC): Chronic | ICD-10-CM

## 2018-08-20 DIAGNOSIS — T74.91XA ELDER ABUSE, INITIAL ENCOUNTER: ICD-10-CM

## 2018-08-20 DIAGNOSIS — Z87.19 H/O: GI BLEED: ICD-10-CM

## 2018-08-20 DIAGNOSIS — K92.1 MELENA: ICD-10-CM

## 2018-08-20 DIAGNOSIS — Z87.19: ICD-10-CM
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
## 2018-08-20 DIAGNOSIS — R53.1 WEAKNESS: ICD-10-CM

## 2018-08-20 DIAGNOSIS — T07.XXXA MULTIPLE TRAUMATIC INJURIES: ICD-10-CM

## 2018-08-20 DIAGNOSIS — M54.9 BACK PAIN: ICD-10-CM

## 2018-08-20 DIAGNOSIS — R26.2 AMBULATORY DYSFUNCTION: ICD-10-CM

## 2018-08-20 DIAGNOSIS — F34.1 DYSTHYMIC DISORDER: Chronic | ICD-10-CM
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
## 2018-08-20 DIAGNOSIS — M54.50 ACUTE LEFT-SIDED LOW BACK PAIN WITHOUT SCIATICA: ICD-10-CM

## 2018-08-20 DIAGNOSIS — Z46.89 ENCOUNTER FOR REMOVAL OF BILIARY STENT: ICD-10-CM

## 2018-08-20 DIAGNOSIS — M89.8X5 PAIN OF LEFT FEMUR: ICD-10-CM

## 2018-08-20 DIAGNOSIS — M25.562 LEFT KNEE PAIN: ICD-10-CM

## 2018-08-20 DIAGNOSIS — I48.91 ATRIAL FIBRILLATION WITH RVR (HCC): Chronic | ICD-10-CM

## 2018-08-20 DIAGNOSIS — M54.2 NECK PAIN: ICD-10-CM

## 2018-08-20 LAB
ANION GAP SERPL CALCULATED.3IONS-SCNC: 9 MMOL/L (ref 4–13)
BUN SERPL-MCNC: 50 MG/DL (ref 5–25)
CALCIUM SERPL-MCNC: 8.7 MG/DL (ref 8.3–10.1)
CHLORIDE SERPL-SCNC: 101 MMOL/L (ref 100–108)
CO2 SERPL-SCNC: 27 MMOL/L (ref 21–32)
CREAT SERPL-MCNC: 2.07 MG/DL (ref 0.6–1.3)
GFR SERPL CREATININE-BSD FRML MDRD: 23 ML/MIN/1.73SQ M
GLUCOSE SERPL-MCNC: 119 MG/DL (ref 65–140)
POTASSIUM SERPL-SCNC: 3.9 MMOL/L (ref 3.5–5.3)
SODIUM SERPL-SCNC: 137 MMOL/L (ref 136–145)

## 2018-08-20 PROCEDURE — 74176 CT ABD & PELVIS W/O CONTRAST: CPT

## 2018-08-20 PROCEDURE — 96374 THER/PROPH/DIAG INJ IV PUSH: CPT

## 2018-08-20 PROCEDURE — 70450 CT HEAD/BRAIN W/O DYE: CPT

## 2018-08-20 PROCEDURE — 72170 X-RAY EXAM OF PELVIS: CPT

## 2018-08-20 PROCEDURE — 73552 X-RAY EXAM OF FEMUR 2/>: CPT

## 2018-08-20 PROCEDURE — 72125 CT NECK SPINE W/O DYE: CPT

## 2018-08-20 PROCEDURE — 71250 CT THORAX DX C-: CPT

## 2018-08-20 PROCEDURE — 36415 COLL VENOUS BLD VENIPUNCTURE: CPT | Performed by: EMERGENCY MEDICINE

## 2018-08-20 PROCEDURE — 80048 BASIC METABOLIC PNL TOTAL CA: CPT | Performed by: EMERGENCY MEDICINE

## 2018-08-20 RX ADMIN — HYDROMORPHONE HYDROCHLORIDE 0.5 MG: 1 INJECTION, SOLUTION INTRAMUSCULAR; INTRAVENOUS; SUBCUTANEOUS at 22:27

## 2018-08-21 ENCOUNTER — APPOINTMENT (INPATIENT)
Dept: RADIOLOGY | Facility: HOSPITAL | Age: 78
DRG: 922 | End: 2018-08-21
Payer: MEDICARE

## 2018-08-21 PROBLEM — M25.562 PAIN AND SWELLING OF LEFT KNEE: Status: ACTIVE | Noted: 2018-08-21

## 2018-08-21 PROBLEM — T74.91XA: Status: ACTIVE | Noted: 2018-08-21

## 2018-08-21 PROBLEM — N17.9 AKI (ACUTE KIDNEY INJURY) (HCC): Status: ACTIVE | Noted: 2018-08-21

## 2018-08-21 PROBLEM — T07.XXXA MULTIPLE TRAUMATIC INJURIES: Status: ACTIVE | Noted: 2018-08-21

## 2018-08-21 PROBLEM — S37.009A: Status: ACTIVE | Noted: 2018-08-21

## 2018-08-21 PROBLEM — M25.462 PAIN AND SWELLING OF LEFT KNEE: Status: ACTIVE | Noted: 2018-08-21

## 2018-08-21 PROBLEM — Z87.19 H/O: GI BLEED: Status: ACTIVE | Noted: 2018-08-21

## 2018-08-21 PROBLEM — M71.50 TRAUMATIC BURSITIS: Status: ACTIVE | Noted: 2018-08-21

## 2018-08-21 LAB
ANION GAP SERPL CALCULATED.3IONS-SCNC: 5 MMOL/L (ref 4–13)
APTT PPP: 36 SECONDS (ref 24–36)
BASOPHILS # BLD AUTO: 0.04 THOUSANDS/ΜL (ref 0–0.1)
BASOPHILS NFR BLD AUTO: 0 % (ref 0–1)
BUN SERPL-MCNC: 32 MG/DL (ref 5–25)
CALCIUM SERPL-MCNC: 8.3 MG/DL (ref 8.3–10.1)
CHLORIDE SERPL-SCNC: 105 MMOL/L (ref 100–108)
CK MB SERPL-MCNC: 12.7 NG/ML (ref 0–5)
CK MB SERPL-MCNC: 2.4 % (ref 0–2.5)
CK SERPL-CCNC: 520 U/L (ref 26–192)
CO2 SERPL-SCNC: 27 MMOL/L (ref 21–32)
CREAT SERPL-MCNC: 0.97 MG/DL (ref 0.6–1.3)
EOSINOPHIL # BLD AUTO: 0.23 THOUSAND/ΜL (ref 0–0.61)
EOSINOPHIL NFR BLD AUTO: 2 % (ref 0–6)
ERYTHROCYTE [DISTWIDTH] IN BLOOD BY AUTOMATED COUNT: 17.9 % (ref 11.6–15.1)
GFR SERPL CREATININE-BSD FRML MDRD: 57 ML/MIN/1.73SQ M
GLUCOSE SERPL-MCNC: 94 MG/DL (ref 65–140)
HCT VFR BLD AUTO: 22.9 % (ref 34.8–46.1)
HCT VFR BLD AUTO: 25 % (ref 34.8–46.1)
HCT VFR BLD AUTO: 25.4 % (ref 34.8–46.1)
HCT VFR BLD AUTO: 26.9 % (ref 34.8–46.1)
HGB BLD-MCNC: 6.8 G/DL (ref 11.5–15.4)
HGB BLD-MCNC: 7.3 G/DL (ref 11.5–15.4)
HGB BLD-MCNC: 7.6 G/DL (ref 11.5–15.4)
HGB BLD-MCNC: 7.9 G/DL (ref 11.5–15.4)
IMM GRANULOCYTES # BLD AUTO: 0.09 THOUSAND/UL (ref 0–0.2)
IMM GRANULOCYTES NFR BLD AUTO: 1 % (ref 0–2)
INR PPP: 1.58 (ref 0.86–1.17)
LYMPHOCYTES # BLD AUTO: 0.62 THOUSANDS/ΜL (ref 0.6–4.47)
LYMPHOCYTES NFR BLD AUTO: 4 % (ref 14–44)
MCH RBC QN AUTO: 31 PG (ref 26.8–34.3)
MCHC RBC AUTO-ENTMCNC: 29.4 G/DL (ref 31.4–37.4)
MCV RBC AUTO: 106 FL (ref 82–98)
MONOCYTES # BLD AUTO: 1.29 THOUSAND/ΜL (ref 0.17–1.22)
MONOCYTES NFR BLD AUTO: 9 % (ref 4–12)
NEUTROPHILS # BLD AUTO: 12.95 THOUSANDS/ΜL (ref 1.85–7.62)
NEUTS SEG NFR BLD AUTO: 84 % (ref 43–75)
NRBC BLD AUTO-RTO: 0 /100 WBCS
PLATELET # BLD AUTO: 497 THOUSANDS/UL (ref 149–390)
PMV BLD AUTO: 9.7 FL (ref 8.9–12.7)
POTASSIUM SERPL-SCNC: 3.9 MMOL/L (ref 3.5–5.3)
PROTHROMBIN TIME: 19 SECONDS (ref 11.8–14.2)
RBC # BLD AUTO: 2.55 MILLION/UL (ref 3.81–5.12)
SODIUM SERPL-SCNC: 137 MMOL/L (ref 136–145)
WBC # BLD AUTO: 15.22 THOUSAND/UL (ref 4.31–10.16)

## 2018-08-21 PROCEDURE — 80048 BASIC METABOLIC PNL TOTAL CA: CPT | Performed by: HOSPITALIST

## 2018-08-21 PROCEDURE — 96374 THER/PROPH/DIAG INJ IV PUSH: CPT

## 2018-08-21 PROCEDURE — 85730 THROMBOPLASTIN TIME PARTIAL: CPT | Performed by: EMERGENCY MEDICINE

## 2018-08-21 PROCEDURE — 74176 CT ABD & PELVIS W/O CONTRAST: CPT

## 2018-08-21 PROCEDURE — 85014 HEMATOCRIT: CPT | Performed by: HOSPITALIST

## 2018-08-21 PROCEDURE — 82553 CREATINE MB FRACTION: CPT | Performed by: INTERNAL MEDICINE

## 2018-08-21 PROCEDURE — 3E0U3BZ INTRODUCTION OF ANESTHETIC AGENT INTO JOINTS, PERCUTANEOUS APPROACH: ICD-10-PCS | Performed by: ORTHOPAEDIC SURGERY

## 2018-08-21 PROCEDURE — 82550 ASSAY OF CK (CPK): CPT | Performed by: INTERNAL MEDICINE

## 2018-08-21 PROCEDURE — 85025 COMPLETE CBC W/AUTO DIFF WBC: CPT | Performed by: EMERGENCY MEDICINE

## 2018-08-21 PROCEDURE — 99024 POSTOP FOLLOW-UP VISIT: CPT | Performed by: HOSPITALIST

## 2018-08-21 PROCEDURE — 85610 PROTHROMBIN TIME: CPT | Performed by: EMERGENCY MEDICINE

## 2018-08-21 PROCEDURE — 99285 EMERGENCY DEPT VISIT HI MDM: CPT

## 2018-08-21 PROCEDURE — 85018 HEMOGLOBIN: CPT | Performed by: INTERNAL MEDICINE

## 2018-08-21 PROCEDURE — 85018 HEMOGLOBIN: CPT | Performed by: HOSPITALIST

## 2018-08-21 PROCEDURE — 36415 COLL VENOUS BLD VENIPUNCTURE: CPT | Performed by: EMERGENCY MEDICINE

## 2018-08-21 PROCEDURE — 0S9D3ZX DRAINAGE OF LEFT KNEE JOINT, PERCUTANEOUS APPROACH, DIAGNOSTIC: ICD-10-PCS | Performed by: ORTHOPAEDIC SURGERY

## 2018-08-21 PROCEDURE — 73564 X-RAY EXAM KNEE 4 OR MORE: CPT

## 2018-08-21 PROCEDURE — 85014 HEMATOCRIT: CPT | Performed by: INTERNAL MEDICINE

## 2018-08-21 PROCEDURE — 99214 OFFICE O/P EST MOD 30 MIN: CPT | Performed by: INTERNAL MEDICINE

## 2018-08-21 PROCEDURE — 99220 PR INITIAL OBSERVATION CARE/DAY 70 MINUTES: CPT | Performed by: INTERNAL MEDICINE

## 2018-08-21 RX ORDER — POTASSIUM CHLORIDE 20 MEQ/1
20 TABLET, EXTENDED RELEASE ORAL DAILY
Status: DISCONTINUED | OUTPATIENT
Start: 2018-08-21 | End: 2018-09-01 | Stop reason: HOSPADM

## 2018-08-21 RX ORDER — MORPHINE SULFATE 2 MG/ML
1 INJECTION, SOLUTION INTRAMUSCULAR; INTRAVENOUS EVERY 4 HOURS PRN
Status: DISCONTINUED | OUTPATIENT
Start: 2018-08-21 | End: 2018-08-24

## 2018-08-21 RX ORDER — ALPRAZOLAM 0.25 MG/1
0.25 TABLET ORAL 3 TIMES DAILY PRN
Status: DISCONTINUED | OUTPATIENT
Start: 2018-08-21 | End: 2018-08-24

## 2018-08-21 RX ORDER — ASPIRIN 81 MG/1
81 TABLET, CHEWABLE ORAL DAILY
Status: DISCONTINUED | OUTPATIENT
Start: 2018-08-21 | End: 2018-08-31

## 2018-08-21 RX ORDER — ACETAMINOPHEN 325 MG/1
975 TABLET ORAL EVERY 8 HOURS SCHEDULED
Status: DISCONTINUED | OUTPATIENT
Start: 2018-08-21 | End: 2018-08-24

## 2018-08-21 RX ORDER — METOPROLOL SUCCINATE 50 MG/1
50 TABLET, EXTENDED RELEASE ORAL EVERY 12 HOURS SCHEDULED
Status: DISCONTINUED | OUTPATIENT
Start: 2018-08-21 | End: 2018-09-01 | Stop reason: HOSPADM

## 2018-08-21 RX ORDER — LANOLIN ALCOHOL/MO/W.PET/CERES
6 CREAM (GRAM) TOPICAL
Status: DISCONTINUED | OUTPATIENT
Start: 2018-08-21 | End: 2018-09-01 | Stop reason: HOSPADM

## 2018-08-21 RX ORDER — OXYCODONE HYDROCHLORIDE 5 MG/1
2.5 TABLET ORAL EVERY 4 HOURS PRN
Status: DISCONTINUED | OUTPATIENT
Start: 2018-08-21 | End: 2018-08-23

## 2018-08-21 RX ORDER — DOCUSATE SODIUM 100 MG/1
100 CAPSULE, LIQUID FILLED ORAL 2 TIMES DAILY PRN
Status: DISCONTINUED | OUTPATIENT
Start: 2018-08-21 | End: 2018-08-21

## 2018-08-21 RX ORDER — MAGNESIUM HYDROXIDE/ALUMINUM HYDROXICE/SIMETHICONE 120; 1200; 1200 MG/30ML; MG/30ML; MG/30ML
15 SUSPENSION ORAL EVERY 6 HOURS PRN
Status: DISCONTINUED | OUTPATIENT
Start: 2018-08-21 | End: 2018-09-01 | Stop reason: HOSPADM

## 2018-08-21 RX ORDER — POLYETHYLENE GLYCOL 3350 17 G/17G
17 POWDER, FOR SOLUTION ORAL DAILY
Status: DISCONTINUED | OUTPATIENT
Start: 2018-08-21 | End: 2018-09-01 | Stop reason: HOSPADM

## 2018-08-21 RX ORDER — LIDOCAINE 50 MG/G
2 PATCH TOPICAL DAILY
Status: DISCONTINUED | OUTPATIENT
Start: 2018-08-21 | End: 2018-09-01 | Stop reason: HOSPADM

## 2018-08-21 RX ORDER — DULOXETIN HYDROCHLORIDE 30 MG/1
30 CAPSULE, DELAYED RELEASE ORAL DAILY
Status: DISCONTINUED | OUTPATIENT
Start: 2018-08-21 | End: 2018-09-01 | Stop reason: HOSPADM

## 2018-08-21 RX ORDER — ACETAMINOPHEN 325 MG/1
650 TABLET ORAL EVERY 6 HOURS PRN
Status: DISCONTINUED | OUTPATIENT
Start: 2018-08-21 | End: 2018-08-21

## 2018-08-21 RX ORDER — ONDANSETRON 2 MG/ML
4 INJECTION INTRAMUSCULAR; INTRAVENOUS EVERY 6 HOURS PRN
Status: DISCONTINUED | OUTPATIENT
Start: 2018-08-21 | End: 2018-09-01 | Stop reason: HOSPADM

## 2018-08-21 RX ORDER — CLOPIDOGREL BISULFATE 75 MG/1
75 TABLET ORAL DAILY
Status: DISCONTINUED | OUTPATIENT
Start: 2018-08-21 | End: 2018-09-01 | Stop reason: HOSPADM

## 2018-08-21 RX ORDER — AMIODARONE HYDROCHLORIDE 200 MG/1
200 TABLET ORAL
Status: DISCONTINUED | OUTPATIENT
Start: 2018-08-21 | End: 2018-09-01 | Stop reason: HOSPADM

## 2018-08-21 RX ORDER — MIRTAZAPINE 15 MG/1
7.5 TABLET, FILM COATED ORAL
Status: DISCONTINUED | OUTPATIENT
Start: 2018-08-21 | End: 2018-09-01 | Stop reason: HOSPADM

## 2018-08-21 RX ORDER — AMITRIPTYLINE HYDROCHLORIDE 25 MG/1
25 TABLET, FILM COATED ORAL
Status: DISCONTINUED | OUTPATIENT
Start: 2018-08-21 | End: 2018-08-24

## 2018-08-21 RX ORDER — OXYCODONE HYDROCHLORIDE 5 MG/1
5 TABLET ORAL EVERY 4 HOURS PRN
Status: DISCONTINUED | OUTPATIENT
Start: 2018-08-21 | End: 2018-08-21

## 2018-08-21 RX ORDER — AMOXICILLIN 250 MG
1 CAPSULE ORAL
Status: DISCONTINUED | OUTPATIENT
Start: 2018-08-21 | End: 2018-09-01 | Stop reason: HOSPADM

## 2018-08-21 RX ORDER — LISINOPRIL 5 MG/1
5 TABLET ORAL DAILY
Status: DISCONTINUED | OUTPATIENT
Start: 2018-08-21 | End: 2018-09-01 | Stop reason: HOSPADM

## 2018-08-21 RX ORDER — OXYCODONE HYDROCHLORIDE 5 MG/1
2.5 TABLET ORAL 4 TIMES DAILY
Status: DISCONTINUED | OUTPATIENT
Start: 2018-08-21 | End: 2018-08-22

## 2018-08-21 RX ORDER — SODIUM CHLORIDE 9 MG/ML
75 INJECTION, SOLUTION INTRAVENOUS CONTINUOUS
Status: DISCONTINUED | OUTPATIENT
Start: 2018-08-21 | End: 2018-08-22

## 2018-08-21 RX ADMIN — ASPIRIN 81 MG 81 MG: 81 TABLET ORAL at 10:02

## 2018-08-21 RX ADMIN — HYDROMORPHONE HYDROCHLORIDE 0.25 MG: 1 INJECTION, SOLUTION INTRAMUSCULAR; INTRAVENOUS; SUBCUTANEOUS at 02:29

## 2018-08-21 RX ADMIN — OXYCODONE HYDROCHLORIDE 5 MG: 5 TABLET ORAL at 08:21

## 2018-08-21 RX ADMIN — CLOPIDOGREL 75 MG: 75 TABLET, FILM COATED ORAL at 10:02

## 2018-08-21 RX ADMIN — OXYCODONE HYDROCHLORIDE 2.5 MG: 5 TABLET ORAL at 14:39

## 2018-08-21 RX ADMIN — OXYCODONE HYDROCHLORIDE 2.5 MG: 5 TABLET ORAL at 21:31

## 2018-08-21 RX ADMIN — ACETAMINOPHEN 975 MG: 325 TABLET, FILM COATED ORAL at 10:02

## 2018-08-21 RX ADMIN — DULOXETINE HYDROCHLORIDE 30 MG: 30 CAPSULE, DELAYED RELEASE ORAL at 08:22

## 2018-08-21 RX ADMIN — AMITRIPTYLINE HYDROCHLORIDE 25 MG: 25 TABLET, FILM COATED ORAL at 21:31

## 2018-08-21 RX ADMIN — ALPRAZOLAM 0.25 MG: 0.25 TABLET ORAL at 21:59

## 2018-08-21 RX ADMIN — HYDROMORPHONE HYDROCHLORIDE 0.5 MG: 1 INJECTION, SOLUTION INTRAMUSCULAR; INTRAVENOUS; SUBCUTANEOUS at 00:32

## 2018-08-21 RX ADMIN — LISINOPRIL 5 MG: 5 TABLET ORAL at 08:22

## 2018-08-21 RX ADMIN — POLYETHYLENE GLYCOL 3350 17 G: 17 POWDER, FOR SOLUTION ORAL at 08:22

## 2018-08-21 RX ADMIN — LIDOCAINE 2 PATCH: 50 PATCH CUTANEOUS at 08:22

## 2018-08-21 RX ADMIN — SENNOSIDES AND DOCUSATE SODIUM 1 TABLET: 8.6; 5 TABLET ORAL at 21:59

## 2018-08-21 RX ADMIN — METOPROLOL SUCCINATE 50 MG: 50 TABLET, EXTENDED RELEASE ORAL at 08:22

## 2018-08-21 RX ADMIN — MIRTAZAPINE 7.5 MG: 15 TABLET, FILM COATED ORAL at 21:31

## 2018-08-21 RX ADMIN — ACETAMINOPHEN 975 MG: 325 TABLET, FILM COATED ORAL at 21:31

## 2018-08-21 RX ADMIN — OXYCODONE HYDROCHLORIDE 2.5 MG: 5 TABLET ORAL at 11:51

## 2018-08-21 RX ADMIN — POTASSIUM CHLORIDE 20 MEQ: 1500 TABLET, EXTENDED RELEASE ORAL at 08:22

## 2018-08-21 RX ADMIN — MELATONIN 6 MG: at 21:31

## 2018-08-21 RX ADMIN — AMIODARONE HYDROCHLORIDE 200 MG: 200 TABLET ORAL at 08:22

## 2018-08-21 RX ADMIN — OXYCODONE HYDROCHLORIDE 2.5 MG: 5 TABLET ORAL at 17:27

## 2018-08-21 RX ADMIN — SODIUM CHLORIDE 100 ML/HR: 0.9 INJECTION, SOLUTION INTRAVENOUS at 08:06

## 2018-08-21 NOTE — ASSESSMENT & PLAN NOTE
Case management  Orthopedics  OT PT referral   Would also need to rule out possibility of retroperitoneal bleed with serial hemoglobins and physical exam   So far, the patient does not have any posterior hematoma
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
## 2018-08-21 NOTE — ASSESSMENT & PLAN NOTE
Occupational and physical therapy  Orthopedic consult  Pain management but need to be very careful  Palliative care consult for pain management
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
## 2018-08-21 NOTE — ASSESSMENT & PLAN NOTE
Although patient has atrial fibrillation and would need Plavix, aspirin and Xarelto; due to the drop in hemoglobin from 8 5 to 7 9; would put these on hold for now until known to be stable  Blood transfusion consent in chart
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
## 2018-08-21 NOTE — ASSESSMENT & PLAN NOTE
Please see the bullet point on traumatic bursitis  Would like to rule out possibility of ham arthrosis as well
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
## 2018-08-21 NOTE — SOCIAL WORK
CM was given note that pt sister Viktoria Mccauley has been calling looking or her and is worried about pts well being  Pt made aware of same and now gives consent to CM to contact her sister to provide update  CM spoke with Av Matias and she is aware pt is here in this facility and is ok

## 2018-08-22 ENCOUNTER — APPOINTMENT (INPATIENT)
Dept: RADIOLOGY | Facility: HOSPITAL | Age: 78
DRG: 922 | End: 2018-08-22
Payer: MEDICARE

## 2018-08-22 LAB
ABO GROUP BLD: NORMAL
ALBUMIN SERPL BCP-MCNC: 2.5 G/DL (ref 3.5–5)
ALP SERPL-CCNC: 68 U/L (ref 46–116)
ALT SERPL W P-5'-P-CCNC: 19 U/L (ref 12–78)
ANION GAP SERPL CALCULATED.3IONS-SCNC: 8 MMOL/L (ref 4–13)
AST SERPL W P-5'-P-CCNC: 29 U/L (ref 5–45)
BASOPHILS # BLD AUTO: 0.03 THOUSANDS/ΜL (ref 0–0.1)
BASOPHILS NFR BLD AUTO: 0 % (ref 0–1)
BILIRUB SERPL-MCNC: 0.5 MG/DL (ref 0.2–1)
BLD GP AB SCN SERPL QL: NEGATIVE
BUN SERPL-MCNC: 22 MG/DL (ref 5–25)
CALCIUM SERPL-MCNC: 8.1 MG/DL (ref 8.3–10.1)
CHLORIDE SERPL-SCNC: 105 MMOL/L (ref 100–108)
CO2 SERPL-SCNC: 26 MMOL/L (ref 21–32)
CREAT SERPL-MCNC: 0.79 MG/DL (ref 0.6–1.3)
EOSINOPHIL # BLD AUTO: 0.58 THOUSAND/ΜL (ref 0–0.61)
EOSINOPHIL NFR BLD AUTO: 6 % (ref 0–6)
ERYTHROCYTE [DISTWIDTH] IN BLOOD BY AUTOMATED COUNT: 18.2 % (ref 11.6–15.1)
GFR SERPL CREATININE-BSD FRML MDRD: 72 ML/MIN/1.73SQ M
GLUCOSE SERPL-MCNC: 92 MG/DL (ref 65–140)
HCT VFR BLD AUTO: 23.4 % (ref 34.8–46.1)
HCT VFR BLD AUTO: 23.6 % (ref 34.8–46.1)
HCT VFR BLD AUTO: 27.5 % (ref 34.8–46.1)
HGB BLD-MCNC: 6.8 G/DL (ref 11.5–15.4)
HGB BLD-MCNC: 7.4 G/DL (ref 11.5–15.4)
HGB BLD-MCNC: 8.4 G/DL (ref 11.5–15.4)
IMM GRANULOCYTES # BLD AUTO: 0.04 THOUSAND/UL (ref 0–0.2)
IMM GRANULOCYTES NFR BLD AUTO: 0 % (ref 0–2)
LYMPHOCYTES # BLD AUTO: 1.24 THOUSANDS/ΜL (ref 0.6–4.47)
LYMPHOCYTES NFR BLD AUTO: 13 % (ref 14–44)
MAGNESIUM SERPL-MCNC: 2.5 MG/DL (ref 1.6–2.6)
MCH RBC QN AUTO: 30.9 PG (ref 26.8–34.3)
MCHC RBC AUTO-ENTMCNC: 29.1 G/DL (ref 31.4–37.4)
MCV RBC AUTO: 106 FL (ref 82–98)
MONOCYTES # BLD AUTO: 1.14 THOUSAND/ΜL (ref 0.17–1.22)
MONOCYTES NFR BLD AUTO: 12 % (ref 4–12)
NEUTROPHILS # BLD AUTO: 6.51 THOUSANDS/ΜL (ref 1.85–7.62)
NEUTS SEG NFR BLD AUTO: 69 % (ref 43–75)
NRBC BLD AUTO-RTO: 0 /100 WBCS
PLATELET # BLD AUTO: 378 THOUSANDS/UL (ref 149–390)
PMV BLD AUTO: 9.7 FL (ref 8.9–12.7)
POTASSIUM SERPL-SCNC: 3.8 MMOL/L (ref 3.5–5.3)
PROT SERPL-MCNC: 5.9 G/DL (ref 6.4–8.2)
RBC # BLD AUTO: 2.2 MILLION/UL (ref 3.81–5.12)
RH BLD: POSITIVE
SODIUM SERPL-SCNC: 139 MMOL/L (ref 136–145)
SPECIMEN EXPIRATION DATE: NORMAL
WBC # BLD AUTO: 9.54 THOUSAND/UL (ref 4.31–10.16)

## 2018-08-22 PROCEDURE — 85018 HEMOGLOBIN: CPT | Performed by: HOSPITALIST

## 2018-08-22 PROCEDURE — 80053 COMPREHEN METABOLIC PANEL: CPT | Performed by: INTERNAL MEDICINE

## 2018-08-22 PROCEDURE — 83735 ASSAY OF MAGNESIUM: CPT | Performed by: INTERNAL MEDICINE

## 2018-08-22 PROCEDURE — 30233N1 TRANSFUSION OF NONAUTOLOGOUS RED BLOOD CELLS INTO PERIPHERAL VEIN, PERCUTANEOUS APPROACH: ICD-10-PCS | Performed by: HOSPITALIST

## 2018-08-22 PROCEDURE — 86850 RBC ANTIBODY SCREEN: CPT | Performed by: HOSPITALIST

## 2018-08-22 PROCEDURE — 99232 SBSQ HOSP IP/OBS MODERATE 35: CPT | Performed by: HOSPITALIST

## 2018-08-22 PROCEDURE — 86923 COMPATIBILITY TEST ELECTRIC: CPT

## 2018-08-22 PROCEDURE — 86901 BLOOD TYPING SEROLOGIC RH(D): CPT | Performed by: HOSPITALIST

## 2018-08-22 PROCEDURE — P9021 RED BLOOD CELLS UNIT: HCPCS

## 2018-08-22 PROCEDURE — 73721 MRI JNT OF LWR EXTRE W/O DYE: CPT

## 2018-08-22 PROCEDURE — 99222 1ST HOSP IP/OBS MODERATE 55: CPT | Performed by: ORTHOPAEDIC SURGERY

## 2018-08-22 PROCEDURE — 99231 SBSQ HOSP IP/OBS SF/LOW 25: CPT | Performed by: FAMILY MEDICINE

## 2018-08-22 PROCEDURE — 85025 COMPLETE CBC W/AUTO DIFF WBC: CPT | Performed by: INTERNAL MEDICINE

## 2018-08-22 PROCEDURE — 85014 HEMATOCRIT: CPT | Performed by: HOSPITALIST

## 2018-08-22 PROCEDURE — 86900 BLOOD TYPING SEROLOGIC ABO: CPT | Performed by: HOSPITALIST

## 2018-08-22 RX ORDER — FUROSEMIDE 40 MG/1
40 TABLET ORAL DAILY
Status: DISCONTINUED | OUTPATIENT
Start: 2018-08-23 | End: 2018-09-01 | Stop reason: HOSPADM

## 2018-08-22 RX ADMIN — OXYCODONE HYDROCHLORIDE 15 MG: 10 TABLET ORAL at 20:56

## 2018-08-22 RX ADMIN — MELATONIN 6 MG: at 21:31

## 2018-08-22 RX ADMIN — OXYCODONE HYDROCHLORIDE 2.5 MG: 5 TABLET ORAL at 08:41

## 2018-08-22 RX ADMIN — LISINOPRIL 5 MG: 5 TABLET ORAL at 08:40

## 2018-08-22 RX ADMIN — SODIUM CHLORIDE 75 ML/HR: 0.9 INJECTION, SOLUTION INTRAVENOUS at 08:53

## 2018-08-22 RX ADMIN — POTASSIUM CHLORIDE 20 MEQ: 1500 TABLET, EXTENDED RELEASE ORAL at 08:40

## 2018-08-22 RX ADMIN — METOPROLOL SUCCINATE 50 MG: 50 TABLET, EXTENDED RELEASE ORAL at 20:56

## 2018-08-22 RX ADMIN — DULOXETINE HYDROCHLORIDE 30 MG: 30 CAPSULE, DELAYED RELEASE ORAL at 08:40

## 2018-08-22 RX ADMIN — AMITRIPTYLINE HYDROCHLORIDE 25 MG: 25 TABLET, FILM COATED ORAL at 21:31

## 2018-08-22 RX ADMIN — ACETAMINOPHEN 975 MG: 325 TABLET, FILM COATED ORAL at 21:31

## 2018-08-22 RX ADMIN — OXYCODONE HYDROCHLORIDE 2.5 MG: 5 TABLET ORAL at 05:23

## 2018-08-22 RX ADMIN — MIRTAZAPINE 7.5 MG: 15 TABLET, FILM COATED ORAL at 21:31

## 2018-08-22 RX ADMIN — ACETAMINOPHEN 975 MG: 325 TABLET, FILM COATED ORAL at 13:51

## 2018-08-22 RX ADMIN — AMIODARONE HYDROCHLORIDE 200 MG: 200 TABLET ORAL at 08:40

## 2018-08-22 RX ADMIN — ASPIRIN 81 MG 81 MG: 81 TABLET ORAL at 08:40

## 2018-08-22 RX ADMIN — ACETAMINOPHEN 975 MG: 325 TABLET, FILM COATED ORAL at 05:23

## 2018-08-22 RX ADMIN — SENNOSIDES AND DOCUSATE SODIUM 1 TABLET: 8.6; 5 TABLET ORAL at 21:31

## 2018-08-22 RX ADMIN — METOPROLOL SUCCINATE 50 MG: 50 TABLET, EXTENDED RELEASE ORAL at 08:40

## 2018-08-22 RX ADMIN — OXYCODONE HYDROCHLORIDE 15 MG: 10 TABLET ORAL at 13:52

## 2018-08-22 RX ADMIN — CLOPIDOGREL 75 MG: 75 TABLET, FILM COATED ORAL at 08:40
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
## 2018-08-22 NOTE — ASSESSMENT & PLAN NOTE
· Acute anemia suspect blood draw sedated  · Hemoglobin dropped to 6 8 and persists around the same today as well  · Discussed with patient will proceed with 1 U PRBC today and repeat counts  · Unclear source of loss, follow-up MRI need to rule out bleed over there    CT abdomen negative for intraperitoneal bleed  · Stool occult pending  · If continues to drop hemoglobin consider GI evaluation given recent GI bleed  · Recent colonoscopy last month did show evidence of colitis in the cecum and ascending colon  · Holding Eliquis

## 2018-08-22 NOTE — ASSESSMENT & PLAN NOTE
· Appreciate Orthopedic input, knee immobilized in place, recommend nonweightbearing as per Orthopedics appreciate their input  · Due to persistent pain MRI ordered of the left knee to rule out occult fracture of versus ligament injury  · Will also want to look for any blood collection in aor around the joint to explain drop in her hemoglobin

## 2018-08-22 NOTE — PHYSICAL THERAPY NOTE
PHYSICAL THERAPY NOTE          Patient Name: Mary Jo Shelton  [id number] Date: 8/22/2018    PT order received  Attempted PT eval but pt strongly refused to participate at this time 2* to inc L knee pain  Discussed purpose of PT & how MD ordered PT & OT to mobilized her as tolerated while maintaining NWB to LLE in knee immobilizer but pt continue to refused  Will continue to follow as appropriate  RN notified   Jagijt Trimble PT

## 2018-08-22 NOTE — SOCIAL WORK
[facility] able to accept pt  Son is not able to accept  Discussed same with pt  Pt agreeable to [facility] upon discharge  CM to follow

## 2018-08-22 NOTE — PROGRESS NOTES
Per MRI department, unsure if patient will get MRI tonight or tomorrow  4 ICU patients ahead of her

## 2018-08-22 NOTE — ASSESSMENT & PLAN NOTE
· Currently in NSR, status post cardioversion and in previous admission  · Continue amiodarone and metoprolol  · Eliquis on hold given drop in hemoglobin
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
## 2018-08-22 NOTE — SOCIAL WORK
Pt discussed in care coordination rounds  Not yet medically cleared for discharge  Referrals sent to [facility], [address] [place] [address], [facility] and Myke HUNTER will continue to follow

## 2018-08-22 NOTE — OCCUPATIONAL THERAPY NOTE
Occupational Therapy Cancellation Notice     OT orders received and chart review completed-Attempted to see pt for OT evaluation today however pt declining to participate in therapy at this time    Pt concerned about her leg and reports she will not participate in therapy until she is seen by the MD  Will continue to follow and attempt to see pt at another time      Baudilio Kunz, MS, OTR/L
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
## 2018-08-22 NOTE — ASSESSMENT & PLAN NOTE
· Currently euvolemic    Lasix 40 mg daily home dose is on hold due to [place] on admission  · Consider restarting possibly from tomorrow

## 2018-08-22 NOTE — PROGRESS NOTES
Patient needs a type and screen per the blood bank  Dr Hernandez Cowart is aware and will place the order  Awaiting order
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
## 2018-08-23 LAB
ABO GROUP BLD BPU: NORMAL
BASOPHILS # BLD AUTO: 0.02 THOUSANDS/ΜL (ref 0–0.1)
BASOPHILS NFR BLD AUTO: 0 % (ref 0–1)
BPU ID: NORMAL
EOSINOPHIL # BLD AUTO: 0.65 THOUSAND/ΜL (ref 0–0.61)
EOSINOPHIL NFR BLD AUTO: 7 % (ref 0–6)
ERYTHROCYTE [DISTWIDTH] IN BLOOD BY AUTOMATED COUNT: 18.5 % (ref 11.6–15.1)
HCT VFR BLD AUTO: 25.6 % (ref 34.8–46.1)
HGB BLD-MCNC: 7.9 G/DL (ref 11.5–15.4)
IMM GRANULOCYTES # BLD AUTO: 0.03 THOUSAND/UL (ref 0–0.2)
IMM GRANULOCYTES NFR BLD AUTO: 0 % (ref 0–2)
LYMPHOCYTES # BLD AUTO: 0.98 THOUSANDS/ΜL (ref 0.6–4.47)
LYMPHOCYTES NFR BLD AUTO: 11 % (ref 14–44)
MCH RBC QN AUTO: 31.6 PG (ref 26.8–34.3)
MCHC RBC AUTO-ENTMCNC: 30.9 G/DL (ref 31.4–37.4)
MCV RBC AUTO: 102 FL (ref 82–98)
MONOCYTES # BLD AUTO: 1 THOUSAND/ΜL (ref 0.17–1.22)
MONOCYTES NFR BLD AUTO: 11 % (ref 4–12)
NEUTROPHILS # BLD AUTO: 6.1 THOUSANDS/ΜL (ref 1.85–7.62)
NEUTS SEG NFR BLD AUTO: 71 % (ref 43–75)
NRBC BLD AUTO-RTO: 0 /100 WBCS
PLATELET # BLD AUTO: 322 THOUSANDS/UL (ref 149–390)
PMV BLD AUTO: 9.4 FL (ref 8.9–12.7)
RBC # BLD AUTO: 2.5 MILLION/UL (ref 3.81–5.12)
UNIT DISPENSE STATUS: NORMAL
UNIT PRODUCT CODE: NORMAL
UNIT RH: NORMAL
WBC # BLD AUTO: 8.78 THOUSAND/UL (ref 4.31–10.16)

## 2018-08-23 PROCEDURE — G8988 SELF CARE GOAL STATUS: HCPCS

## 2018-08-23 PROCEDURE — 85025 COMPLETE CBC W/AUTO DIFF WBC: CPT | Performed by: HOSPITALIST

## 2018-08-23 PROCEDURE — 0S9D3ZZ DRAINAGE OF LEFT KNEE JOINT, PERCUTANEOUS APPROACH: ICD-10-PCS | Performed by: ORTHOPAEDIC SURGERY

## 2018-08-23 PROCEDURE — 97163 PT EVAL HIGH COMPLEX 45 MIN: CPT

## 2018-08-23 PROCEDURE — 3E0U3BZ INTRODUCTION OF ANESTHETIC AGENT INTO JOINTS, PERCUTANEOUS APPROACH: ICD-10-PCS | Performed by: ORTHOPAEDIC SURGERY

## 2018-08-23 PROCEDURE — G8979 MOBILITY GOAL STATUS: HCPCS

## 2018-08-23 PROCEDURE — 97167 OT EVAL HIGH COMPLEX 60 MIN: CPT

## 2018-08-23 PROCEDURE — 3E0U33Z INTRODUCTION OF ANTI-INFLAMMATORY INTO JOINTS, PERCUTANEOUS APPROACH: ICD-10-PCS | Performed by: ORTHOPAEDIC SURGERY

## 2018-08-23 PROCEDURE — 99232 SBSQ HOSP IP/OBS MODERATE 35: CPT | Performed by: HOSPITALIST

## 2018-08-23 PROCEDURE — G8987 SELF CARE CURRENT STATUS: HCPCS

## 2018-08-23 PROCEDURE — 99231 SBSQ HOSP IP/OBS SF/LOW 25: CPT | Performed by: ORTHOPAEDIC SURGERY

## 2018-08-23 PROCEDURE — G8978 MOBILITY CURRENT STATUS: HCPCS

## 2018-08-23 RX ORDER — BETAMETHASONE SODIUM PHOSPHATE AND BETAMETHASONE ACETATE 3; 3 MG/ML; MG/ML
12 INJECTION, SUSPENSION INTRA-ARTICULAR; INTRALESIONAL; INTRAMUSCULAR; SOFT TISSUE ONCE
Status: COMPLETED | OUTPATIENT
Start: 2018-08-23 | End: 2018-08-23

## 2018-08-23 RX ORDER — BUPIVACAINE HYDROCHLORIDE 5 MG/ML
20 INJECTION, SOLUTION PERINEURAL ONCE
Status: COMPLETED | OUTPATIENT
Start: 2018-08-23 | End: 2018-08-23

## 2018-08-23 RX ORDER — LIDOCAINE HYDROCHLORIDE 10 MG/ML
20 INJECTION, SOLUTION EPIDURAL; INFILTRATION; INTRACAUDAL; PERINEURAL ONCE
Status: COMPLETED | OUTPATIENT
Start: 2018-08-23 | End: 2018-08-23

## 2018-08-23 RX ORDER — OXYCODONE HYDROCHLORIDE 5 MG/1
5 TABLET ORAL EVERY 4 HOURS PRN
Status: DISCONTINUED | OUTPATIENT
Start: 2018-08-23 | End: 2018-08-28

## 2018-08-23 RX ORDER — BUPIVACAINE HYDROCHLORIDE 2.5 MG/ML
20 INJECTION, SOLUTION EPIDURAL; INFILTRATION; INTRACAUDAL ONCE
Status: DISCONTINUED | OUTPATIENT
Start: 2018-08-23 | End: 2018-08-23

## 2018-08-23 RX ADMIN — OXYCODONE HYDROCHLORIDE 15 MG: 10 TABLET ORAL at 10:54

## 2018-08-23 RX ADMIN — DULOXETINE HYDROCHLORIDE 30 MG: 30 CAPSULE, DELAYED RELEASE ORAL at 10:55

## 2018-08-23 RX ADMIN — AMITRIPTYLINE HYDROCHLORIDE 25 MG: 25 TABLET, FILM COATED ORAL at 21:36

## 2018-08-23 RX ADMIN — AMIODARONE HYDROCHLORIDE 200 MG: 200 TABLET ORAL at 11:00

## 2018-08-23 RX ADMIN — MIRTAZAPINE 7.5 MG: 15 TABLET, FILM COATED ORAL at 21:33

## 2018-08-23 RX ADMIN — ACETAMINOPHEN 975 MG: 325 TABLET, FILM COATED ORAL at 05:44

## 2018-08-23 RX ADMIN — SENNOSIDES AND DOCUSATE SODIUM 1 TABLET: 8.6; 5 TABLET ORAL at 21:33

## 2018-08-23 RX ADMIN — BETAMETHASONE SODIUM PHOSPHATE AND BETAMETHASONE ACETATE 12 MG: 3; 3 INJECTION, SUSPENSION INTRA-ARTICULAR; INTRALESIONAL; INTRAMUSCULAR at 14:23

## 2018-08-23 RX ADMIN — ASPIRIN 81 MG 81 MG: 81 TABLET ORAL at 10:54

## 2018-08-23 RX ADMIN — OXYCODONE HYDROCHLORIDE 15 MG: 10 TABLET ORAL at 15:00

## 2018-08-23 RX ADMIN — CLOPIDOGREL 75 MG: 75 TABLET, FILM COATED ORAL at 10:52

## 2018-08-23 RX ADMIN — OXYCODONE HYDROCHLORIDE 15 MG: 10 TABLET ORAL at 21:33

## 2018-08-23 RX ADMIN — POTASSIUM CHLORIDE 20 MEQ: 1500 TABLET, EXTENDED RELEASE ORAL at 10:55

## 2018-08-23 RX ADMIN — BUPIVACAINE HYDROCHLORIDE 20 ML: 5 INJECTION, SOLUTION PERINEURAL at 14:23

## 2018-08-23 RX ADMIN — MELATONIN 6 MG: at 21:35

## 2018-08-23 RX ADMIN — METOPROLOL SUCCINATE 50 MG: 50 TABLET, EXTENDED RELEASE ORAL at 21:33

## 2018-08-23 RX ADMIN — LISINOPRIL 5 MG: 5 TABLET ORAL at 10:52

## 2018-08-23 RX ADMIN — LIDOCAINE 2 PATCH: 50 PATCH CUTANEOUS at 10:56

## 2018-08-23 RX ADMIN — LIDOCAINE HYDROCHLORIDE 20 ML: 10 INJECTION, SOLUTION EPIDURAL; INFILTRATION; INTRACAUDAL; PERINEURAL at 14:23

## 2018-08-23 RX ADMIN — METOPROLOL SUCCINATE 50 MG: 50 TABLET, EXTENDED RELEASE ORAL at 10:54

## 2018-08-23 RX ADMIN — ACETAMINOPHEN 975 MG: 325 TABLET, FILM COATED ORAL at 21:33

## 2018-08-23 RX ADMIN — POLYETHYLENE GLYCOL 3350 17 G: 17 POWDER, FOR SOLUTION ORAL at 11:01

## 2018-08-23 RX ADMIN — FUROSEMIDE 40 MG: 40 TABLET ORAL at 10:52

## 2018-08-23 RX ADMIN — ACETAMINOPHEN 975 MG: 325 TABLET, FILM COATED ORAL at 15:00

## 2018-08-23 NOTE — SOCIAL WORK
Cm reviewed patient during care coordination rounds  Patient not stable for discharge today  Possible discharge Saturday or Sunday  Patient had low hemoglobin and was transfused yesterday  Cm to follow-up with HFM in the morning as level 2 PASRR is being requested  Patient declined assessment with Dr Cezar Sheridan  Cm following
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
## 2018-08-23 NOTE — ASSESSMENT & PLAN NOTE
· Acute anemia suspect blood loss related  · Hemoglobin dropped to 6 8 and persists around the same - s/p 1 U PRBC, now hb, cont to monitor, cont holding eliquis  · MRI knee has effusion but not hemorrhagic - not likely explains drop in Hb  · CT abdomen negative for intraperitoneal bleed  · Stool occult pending  · If continues to drop hemoglobin consider GI evaluation given recent GI bleed  · Recent colonoscopy last month did show evidence of colitis in the cecum and ascending colon  · Holding Eliquis

## 2018-08-23 NOTE — ASSESSMENT & PLAN NOTE
· 2/2 knee injury  · WB per ortho on left  · PT eval - encouraged to take pain med before PT  · Palliative care consulted for pain management

## 2018-08-23 NOTE — PROGRESS NOTES
08/23/18 1601 [facility] Involvement Patient active with [facility]   Plan of Care   Comments Introduced self to pt and consulted pt about PC visit from [facility]  Pt declined  Assessment Completed by: Other (Comment)  (Consult for Catalina Salvador   Pt declined)

## 2018-08-23 NOTE — PROGRESS NOTES
Procedure- Orthopedics   Елена Vargas 69 y o  female MRN: [id number]  Unit/Bed#: [facility] 827-01    Procedure: left knee aspiration and injection    After sterile preparation of the skin overlying the knee local anesthetic was provided with Ethyl Choloride  An 18 gauge needle was then  inserted via a superior lateral portal   Approx 25cc of maame blood was aspirated  The syringe was then exchanged and a mixture of 2cc 1% lidocaine, 2cc 0 5% Marcaine, 2cc Betamethasone was injected into the knee joint  Sterile dressing was then applied  Pt tolerated the procedure well and was neurovascularly intact both pre and post procedure      Herman Myrick MD
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
## 2018-08-23 NOTE — PROGRESS NOTES
I came to see the patient but orthopedic is doing the procedure  I will follow up tomorrow    Troy Curtis MD

## 2018-08-23 NOTE — ASSESSMENT & PLAN NOTE
· Appreciate Orthopedic input, knee immobilized in place, recommend nonweightbearing as per Orthopedics appreciate their input  · MRI - mod to large effusion, tear in medial meniscus - f/u ortho rec  · D/w radio do not suspect hemorrhagic & doesn't explain hb drop  · Ice packs  · Pain med per palliative

## 2018-08-23 NOTE — CONSULTS
I came to evaluate the patient for 2nd time but the patient states that she does not need to see a psychiatrist and she refused to answer any questions, she states that she is not sure if she is going to rehab and she does not need to be seen by us  Patient refused  Discussed with     Anurag Guy MD

## 2018-08-24 LAB
ANION GAP SERPL CALCULATED.3IONS-SCNC: 7 MMOL/L (ref 4–13)
BASOPHILS # BLD AUTO: 0 THOUSANDS/ΜL (ref 0–0.1)
BASOPHILS NFR BLD AUTO: 0 % (ref 0–1)
BUN SERPL-MCNC: 17 MG/DL (ref 5–25)
CALCIUM SERPL-MCNC: 8.6 MG/DL (ref 8.3–10.1)
CHLORIDE SERPL-SCNC: 105 MMOL/L (ref 100–108)
CO2 SERPL-SCNC: 25 MMOL/L (ref 21–32)
CREAT SERPL-MCNC: 0.87 MG/DL (ref 0.6–1.3)
EOSINOPHIL # BLD AUTO: 0 THOUSAND/ΜL (ref 0–0.61)
EOSINOPHIL NFR BLD AUTO: 0 % (ref 0–6)
ERYTHROCYTE [DISTWIDTH] IN BLOOD BY AUTOMATED COUNT: 17.6 % (ref 11.6–15.1)
GFR SERPL CREATININE-BSD FRML MDRD: 64 ML/MIN/1.73SQ M
GLUCOSE SERPL-MCNC: 145 MG/DL (ref 65–140)
HCT VFR BLD AUTO: 31.1 % (ref 34.8–46.1)
HGB BLD-MCNC: 9.3 G/DL (ref 11.5–15.4)
IMM GRANULOCYTES # BLD AUTO: 0.04 THOUSAND/UL (ref 0–0.2)
IMM GRANULOCYTES NFR BLD AUTO: 1 % (ref 0–2)
LYMPHOCYTES # BLD AUTO: 0.57 THOUSANDS/ΜL (ref 0.6–4.47)
LYMPHOCYTES NFR BLD AUTO: 7 % (ref 14–44)
MCH RBC QN AUTO: 30.7 PG (ref 26.8–34.3)
MCHC RBC AUTO-ENTMCNC: 29.9 G/DL (ref 31.4–37.4)
MCV RBC AUTO: 103 FL (ref 82–98)
MONOCYTES # BLD AUTO: 0.27 THOUSAND/ΜL (ref 0.17–1.22)
MONOCYTES NFR BLD AUTO: 3 % (ref 4–12)
NEUTROPHILS # BLD AUTO: 7.3 THOUSANDS/ΜL (ref 1.85–7.62)
NEUTS SEG NFR BLD AUTO: 89 % (ref 43–75)
NRBC BLD AUTO-RTO: 0 /100 WBCS
PLATELET # BLD AUTO: 362 THOUSANDS/UL (ref 149–390)
PMV BLD AUTO: 9.6 FL (ref 8.9–12.7)
POTASSIUM SERPL-SCNC: 4.7 MMOL/L (ref 3.5–5.3)
RBC # BLD AUTO: 3.03 MILLION/UL (ref 3.81–5.12)
SODIUM SERPL-SCNC: 137 MMOL/L (ref 136–145)
WBC # BLD AUTO: 8.18 THOUSAND/UL (ref 4.31–10.16)

## 2018-08-24 PROCEDURE — 85025 COMPLETE CBC W/AUTO DIFF WBC: CPT | Performed by: HOSPITALIST

## 2018-08-24 PROCEDURE — 99232 SBSQ HOSP IP/OBS MODERATE 35: CPT | Performed by: HOSPITALIST

## 2018-08-24 PROCEDURE — 97110 THERAPEUTIC EXERCISES: CPT

## 2018-08-24 PROCEDURE — 97530 THERAPEUTIC ACTIVITIES: CPT

## 2018-08-24 PROCEDURE — 99232 SBSQ HOSP IP/OBS MODERATE 35: CPT | Performed by: NURSE PRACTITIONER

## 2018-08-24 PROCEDURE — 80048 BASIC METABOLIC PNL TOTAL CA: CPT | Performed by: HOSPITALIST

## 2018-08-24 PROCEDURE — 99232 SBSQ HOSP IP/OBS MODERATE 35: CPT | Performed by: FAMILY MEDICINE

## 2018-08-24 RX ORDER — NORTRIPTYLINE HYDROCHLORIDE 10 MG/1
20 CAPSULE ORAL
Status: DISCONTINUED | OUTPATIENT
Start: 2018-08-24 | End: 2018-09-01 | Stop reason: HOSPADM

## 2018-08-24 RX ORDER — ACETAMINOPHEN 325 MG/1
650 TABLET ORAL
Status: DISCONTINUED | OUTPATIENT
Start: 2018-08-24 | End: 2018-09-01 | Stop reason: HOSPADM

## 2018-08-24 RX ORDER — LANOLIN ALCOHOL/MO/W.PET/CERES
800 CREAM (GRAM) TOPICAL 2 TIMES DAILY
Status: DISCONTINUED | OUTPATIENT
Start: 2018-08-24 | End: 2018-09-01 | Stop reason: HOSPADM

## 2018-08-24 RX ORDER — ALPRAZOLAM 0.25 MG/1
0.25 TABLET ORAL 2 TIMES DAILY PRN
Status: DISCONTINUED | OUTPATIENT
Start: 2018-08-24 | End: 2018-09-01 | Stop reason: HOSPADM

## 2018-08-24 RX ORDER — THIAMINE MONONITRATE (VIT B1) 100 MG
100 TABLET ORAL 2 TIMES DAILY
Status: DISCONTINUED | OUTPATIENT
Start: 2018-08-24 | End: 2018-09-01 | Stop reason: HOSPADM

## 2018-08-24 RX ADMIN — LISINOPRIL 5 MG: 5 TABLET ORAL at 08:24

## 2018-08-24 RX ADMIN — METOPROLOL SUCCINATE 50 MG: 50 TABLET, EXTENDED RELEASE ORAL at 08:25

## 2018-08-24 RX ADMIN — OXYCODONE HYDROCHLORIDE 5 MG: 5 TABLET ORAL at 23:03

## 2018-08-24 RX ADMIN — AMIODARONE HYDROCHLORIDE 200 MG: 200 TABLET ORAL at 08:25

## 2018-08-24 RX ADMIN — APIXABAN 5 MG: 5 TABLET, FILM COATED ORAL at 13:33

## 2018-08-24 RX ADMIN — Medication 100 MG: at 17:25

## 2018-08-24 RX ADMIN — POTASSIUM CHLORIDE 20 MEQ: 1500 TABLET, EXTENDED RELEASE ORAL at 08:25

## 2018-08-24 RX ADMIN — DULOXETINE HYDROCHLORIDE 30 MG: 30 CAPSULE, DELAYED RELEASE ORAL at 08:24

## 2018-08-24 RX ADMIN — MIRTAZAPINE 7.5 MG: 15 TABLET, FILM COATED ORAL at 21:33

## 2018-08-24 RX ADMIN — ACETAMINOPHEN 650 MG: 325 TABLET, FILM COATED ORAL at 17:25

## 2018-08-24 RX ADMIN — ACETAMINOPHEN 975 MG: 325 TABLET, FILM COATED ORAL at 13:33

## 2018-08-24 RX ADMIN — APIXABAN 5 MG: 5 TABLET, FILM COATED ORAL at 17:25

## 2018-08-24 RX ADMIN — OXYCODONE HYDROCHLORIDE 15 MG: 10 TABLET ORAL at 15:16

## 2018-08-24 RX ADMIN — ASPIRIN 81 MG 81 MG: 81 TABLET ORAL at 08:24

## 2018-08-24 RX ADMIN — MELATONIN 6 MG: at 21:34

## 2018-08-24 RX ADMIN — POLYETHYLENE GLYCOL 3350 17 G: 17 POWDER, FOR SOLUTION ORAL at 08:25

## 2018-08-24 RX ADMIN — CLOPIDOGREL 75 MG: 75 TABLET, FILM COATED ORAL at 08:25

## 2018-08-24 RX ADMIN — ACETAMINOPHEN 650 MG: 325 TABLET, FILM COATED ORAL at 23:03

## 2018-08-24 RX ADMIN — NORTRIPTYLINE HYDROCHLORIDE 20 MG: 10 CAPSULE ORAL at 21:33

## 2018-08-24 RX ADMIN — LIDOCAINE 2 PATCH: 50 PATCH CUTANEOUS at 08:26

## 2018-08-24 RX ADMIN — METOPROLOL SUCCINATE 50 MG: 50 TABLET, EXTENDED RELEASE ORAL at 21:34

## 2018-08-24 RX ADMIN — OXYCODONE HYDROCHLORIDE 15 MG: 10 TABLET ORAL at 08:25

## 2018-08-24 RX ADMIN — FUROSEMIDE 40 MG: 40 TABLET ORAL at 08:25

## 2018-08-24 RX ADMIN — ACETAMINOPHEN 975 MG: 325 TABLET, FILM COATED ORAL at 05:17

## 2018-08-24 RX ADMIN — Medication 800 MCG: at 17:25

## 2018-08-24 NOTE — SOCIAL WORK
Pt has history of MDD and PTSD  She will require a level 2 assessment with [facility] prior to SNF placement  PASSR and MA 51 completed and faxed to [address]  Pt made aware of same but does not appear to understand the process due to confusion  Same was reviewed with pt's sister John Paul Johnson via phone  Pt [facility] not currently [facility] a bed  Pt agreeable to [facility] and same has accepted pending Level 2 assessment  Pt aware of same  Will reach out to [place] to inform us if bed becomes available
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
## 2018-08-24 NOTE — PHYSICAL THERAPY NOTE
Physical Therapy Progress Note     08/24/18 1601   Pain Assessment   Pain Assessment 0-10   Pain Score 8   Pain Type Acute pain   Pain Location Back   Effect of Pain on Daily Activities increased with LE exercises & transfers   Patient's Stated Pain Goal No pain   Hospital Pain Intervention(s) Medication (See MAR); Repositioned; Ambulation/increased activity; Emotional support;Distraction; Rest   Response to Interventions tolerated   Restrictions/Precautions   LLE Weight Bearing Per Order WBAT   Other Precautions Cognitive; Chair Alarm;WBS;Fall Risk;Pain   General   Family/Caregiver Present No   Subjective   Subjective Pt presented to therapy seated in chair, pleasant, asking if she could go for a walk  At beginning of session pt had pain in her back and given pain meds by nursing  During exercises & transfer training, pt complained of increased L knee pain  Pt anxious about potential discharge from hospital as she is concerned about home life and going to rehab, but is agreeable to go if need be  Bed Mobility   Sit to Supine 4  Minimal assistance   Additional items Assist x 1; Increased time required   Transfers   Sit to Stand 4  Minimal assistance   Additional items Assist x 1; Armrests; Increased time required;Verbal cues   Stand to Sit 4  Minimal assistance   Additional items Assist x 1; Increased time required;Armrests; Verbal cues   Stand pivot 4  Minimal assistance   Additional items Assist x 1; Increased time required   Balance   Static Sitting Fair +   Static Standing Fair   Ambulatory Fair -   Endurance Deficit   Endurance Deficit Yes   Endurance Deficit Description pain   Activity Tolerance   Activity Tolerance Patient limited by pain   Nurse [address], RN   Exercises   Knee AROM Long Arc Quad Sitting;10 reps;AROM; Bilateral   Ankle Pumps Sitting;10 reps;AROM; Bilateral   Squat Standing;10 reps;AROM; Bilateral  (sit to stand transferswith UE support)   Marching 10 reps;AROM; Bilateral;Sitting   Assessment Prognosis Fair   Problem List Decreased strength;Decreased endurance; Impaired balance;Decreased mobility; Decreased coordination;Decreased cognition; Impaired judgement;Decreased safety awareness;Orthopedic restrictions;Pain   Assessment Pt WBS orders progressed to WBAT by orthopaedics  Pt performed all transfers mobility tasks without incident this session including repeated sit to stand & SPTs, and bed mobility  Pt instructed in hand placement and sequencing for all transfers, and demonstrated good carryover  Will benefit from continued practice to ensure carryover between sessions due to noted cognitive deficits  Pt performed seated LE exercises, but was limited by increased L knee pain with AROM  Spoke with evaluating therapist during session and recommend PT to see next session to assess for ambulation goals, as pt wants to walk more, and demosntrated no issues with standing activities this session  Barriers to Discharge Decreased caregiver support; Inaccessible home environment   Goals   Patient Goals to walk   STG Expiration Date 09/01/18   Treatment Day 1   Plan   Treatment/Interventions Functional transfer training;LE strengthening/ROM; Therapeutic exercise; Endurance training;Patient/family training;Equipment eval/education; Bed mobility   Progress Progressing toward goals   PT Frequency (3-5x/week)   Recommendation   Recommendation Post acute IP rehab   Equipment Recommended Emy Saeed PTA

## 2018-08-24 NOTE — ASSESSMENT & PLAN NOTE
· Currently in NSR, status post cardioversion and in previous admission  · Continue amiodarone and metoprolol  · Restarted Eliquis today as hemoglobin stable

## 2018-08-24 NOTE — PLAN OF CARE
Problem: PHYSICAL THERAPY ADULT  Goal: Performs mobility at highest level of function for planned discharge setting  See evaluation for individualized goals  Treatment/Interventions: Functional transfer training, LE strengthening/ROM, Therapeutic exercise, Endurance training, Patient/family training, Equipment eval/education, Bed mobility, Gait training  Equipment Recommended: [facility] Day       See flowsheet documentation for full assessment, interventions and recommendations  Outcome: Progressing  Prognosis: Fair  Problem List: Decreased strength, Decreased endurance, Impaired balance, Decreased mobility, Decreased coordination, Decreased cognition, Impaired judgement, Decreased safety awareness, Orthopedic restrictions, Pain  Assessment: Pt WBS orders progressed to WBAT by orthopaedics  Pt performed all transfers mobility tasks without incident this session including repeated sit to stand & SPTs, and bed mobility  Pt instructed in hand placement and sequencing for all transfers, and demonstrated good carryover  Will benefit from continued practice to ensure carryover between sessions due to noted cognitive deficits  Pt performed seated LE exercises, but was limited by increased L knee pain with AROM  Spoke with evaluating therapist during session and recommend PT to see next session to assess for ambulation goals, as pt wants to walk more, and demosntrated no issues with standing activities this session  Barriers to Discharge: Decreased caregiver support, Inaccessible home environment     Recommendation: Post acute IP rehab          See flowsheet documentation for full assessment

## 2018-08-24 NOTE — ASSESSMENT & PLAN NOTE
· Appreciate Orthopedic input, knee immobilized in place, recommend nonweightbearing as per Orthopedics appreciate their input  · MRI - mod to large effusion, tear in medial meniscus - f/u ortho rec  · Status post arthrocentesis with 25 cc blood by Orthopedics followed by steroid injection    Knee pain is much improved today   · Ice packs  · Pain med per palliative
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
## 2018-08-24 NOTE — SOCIAL WORK
CM provided pt sister Brenda Garcia with update  Pt has expressed that she often does not have food in the home  Same dicussed with sister  Sister aware of same and reports she used to shop for pt but doesn't any longer due to health reasons  She does report pt had meals on wheels recently but cancelled them as she did not like the food  Sister reports pt's landlord often helps her with food or pt orders out  CM emailed sister more resources for home food delivery  Did not give same to pt due to her confusion  Sister also aware of need for option process through [facility] due to history of mentals health issues and in agreement  CM will continue to work on placement and update sister

## 2018-08-24 NOTE — ASSESSMENT & PLAN NOTE
· Acute anemia suspect blood loss related  · Hemoglobin dropped to 6 8 and persists around the same - s/p 1 U PRBC  · Now improved and stable  · Possibly secondary to hemarthrosis  · MRI knee has effusion but not hemorrhagic - however 25 cc blood aspirated from the knee joint  · CT abdomen negative for intraperitoneal bleed  · If continues to drop hemoglobin consider GI evaluation given recent GI bleed  · Recent colonoscopy last month did show evidence of colitis in the cecum and ascending colon  · Restart Eliquis
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
## 2018-08-25 LAB
BASOPHILS # BLD AUTO: 0.01 THOUSANDS/ΜL (ref 0–0.1)
BASOPHILS NFR BLD AUTO: 0 % (ref 0–1)
EOSINOPHIL # BLD AUTO: 0.01 THOUSAND/ΜL (ref 0–0.61)
EOSINOPHIL NFR BLD AUTO: 0 % (ref 0–6)
ERYTHROCYTE [DISTWIDTH] IN BLOOD BY AUTOMATED COUNT: 17.1 % (ref 11.6–15.1)
HCT VFR BLD AUTO: 30 % (ref 34.8–46.1)
HEMOCCULT STL QL: POSITIVE
HGB BLD-MCNC: 9 G/DL (ref 11.5–15.4)
IMM GRANULOCYTES # BLD AUTO: 0.04 THOUSAND/UL (ref 0–0.2)
IMM GRANULOCYTES NFR BLD AUTO: 0 % (ref 0–2)
LYMPHOCYTES # BLD AUTO: 0.84 THOUSANDS/ΜL (ref 0.6–4.47)
LYMPHOCYTES NFR BLD AUTO: 7 % (ref 14–44)
MCH RBC QN AUTO: 30.7 PG (ref 26.8–34.3)
MCHC RBC AUTO-ENTMCNC: 30 G/DL (ref 31.4–37.4)
MCV RBC AUTO: 102 FL (ref 82–98)
MONOCYTES # BLD AUTO: 0.54 THOUSAND/ΜL (ref 0.17–1.22)
MONOCYTES NFR BLD AUTO: 5 % (ref 4–12)
NEUTROPHILS # BLD AUTO: 9.92 THOUSANDS/ΜL (ref 1.85–7.62)
NEUTS SEG NFR BLD AUTO: 88 % (ref 43–75)
NRBC BLD AUTO-RTO: 0 /100 WBCS
PLATELET # BLD AUTO: 375 THOUSANDS/UL (ref 149–390)
PMV BLD AUTO: 9.5 FL (ref 8.9–12.7)
RBC # BLD AUTO: 2.93 MILLION/UL (ref 3.81–5.12)
WBC # BLD AUTO: 11.36 THOUSAND/UL (ref 4.31–10.16)

## 2018-08-25 PROCEDURE — 85025 COMPLETE CBC W/AUTO DIFF WBC: CPT | Performed by: HOSPITALIST

## 2018-08-25 PROCEDURE — 82272 OCCULT BLD FECES 1-3 TESTS: CPT | Performed by: HOSPITALIST

## 2018-08-25 PROCEDURE — 99232 SBSQ HOSP IP/OBS MODERATE 35: CPT | Performed by: HOSPITALIST

## 2018-08-25 RX ADMIN — ALPRAZOLAM 0.25 MG: 0.25 TABLET ORAL at 00:05

## 2018-08-25 RX ADMIN — ACETAMINOPHEN 650 MG: 325 TABLET, FILM COATED ORAL at 22:12

## 2018-08-25 RX ADMIN — DULOXETINE HYDROCHLORIDE 30 MG: 30 CAPSULE, DELAYED RELEASE ORAL at 08:07

## 2018-08-25 RX ADMIN — OXYCODONE HYDROCHLORIDE 15 MG: 10 TABLET ORAL at 22:15

## 2018-08-25 RX ADMIN — AMIODARONE HYDROCHLORIDE 200 MG: 200 TABLET ORAL at 08:07

## 2018-08-25 RX ADMIN — METOPROLOL SUCCINATE 50 MG: 50 TABLET, EXTENDED RELEASE ORAL at 22:12

## 2018-08-25 RX ADMIN — APIXABAN 5 MG: 5 TABLET, FILM COATED ORAL at 17:07

## 2018-08-25 RX ADMIN — ASPIRIN 81 MG 81 MG: 81 TABLET ORAL at 08:07

## 2018-08-25 RX ADMIN — ACETAMINOPHEN 650 MG: 325 TABLET, FILM COATED ORAL at 11:08

## 2018-08-25 RX ADMIN — POLYETHYLENE GLYCOL 3350 17 G: 17 POWDER, FOR SOLUTION ORAL at 08:07

## 2018-08-25 RX ADMIN — CLOPIDOGREL 75 MG: 75 TABLET, FILM COATED ORAL at 08:07

## 2018-08-25 RX ADMIN — POTASSIUM CHLORIDE 20 MEQ: 1500 TABLET, EXTENDED RELEASE ORAL at 08:07

## 2018-08-25 RX ADMIN — ACETAMINOPHEN 650 MG: 325 TABLET, FILM COATED ORAL at 17:07

## 2018-08-25 RX ADMIN — Medication 800 MCG: at 17:07

## 2018-08-25 RX ADMIN — Medication 100 MG: at 17:07

## 2018-08-25 RX ADMIN — APIXABAN 5 MG: 5 TABLET, FILM COATED ORAL at 08:07

## 2018-08-25 RX ADMIN — METOPROLOL SUCCINATE 50 MG: 50 TABLET, EXTENDED RELEASE ORAL at 08:07

## 2018-08-25 RX ADMIN — Medication 800 MCG: at 08:08

## 2018-08-25 RX ADMIN — OXYCODONE HYDROCHLORIDE 5 MG: 5 TABLET ORAL at 08:07

## 2018-08-25 RX ADMIN — NORTRIPTYLINE HYDROCHLORIDE 20 MG: 10 CAPSULE ORAL at 22:10

## 2018-08-25 RX ADMIN — OXYCODONE HYDROCHLORIDE 15 MG: 10 TABLET ORAL at 17:07

## 2018-08-25 RX ADMIN — LISINOPRIL 5 MG: 5 TABLET ORAL at 08:07

## 2018-08-25 RX ADMIN — ACETAMINOPHEN 650 MG: 325 TABLET, FILM COATED ORAL at 08:07

## 2018-08-25 RX ADMIN — MIRTAZAPINE 7.5 MG: 15 TABLET, FILM COATED ORAL at 22:10

## 2018-08-25 RX ADMIN — OXYCODONE HYDROCHLORIDE 15 MG: 10 TABLET ORAL at 04:26

## 2018-08-25 RX ADMIN — LIDOCAINE 2 PATCH: 50 PATCH CUTANEOUS at 08:08

## 2018-08-25 RX ADMIN — OXYCODONE HYDROCHLORIDE 15 MG: 10 TABLET ORAL at 11:08

## 2018-08-25 RX ADMIN — Medication 100 MG: at 08:07

## 2018-08-25 RX ADMIN — MELATONIN 6 MG: at 22:17

## 2018-08-25 RX ADMIN — FUROSEMIDE 40 MG: 40 TABLET ORAL at 08:07

## 2018-08-25 NOTE — ASSESSMENT & PLAN NOTE
· Currently in NSR, status post cardioversion and in previous admission  · Continue amiodarone and metoprolol  · Restarted Eliquis as hemoglobin stable but mentions about dark stool

## 2018-08-25 NOTE — ASSESSMENT & PLAN NOTE
· Acute anemia suspect blood loss related  · Hemoglobin dropped to 6 8  - s/p 1 U PRBC now appears stable    · Now improved and stable  · Possibly secondary to hemarthrosis  · MRI knee has effusion but not hemorrhagic - however 25 cc blood aspirated from the knee joint  · CT abdomen negative for intraperitoneal bleed  · Hemoglobin improved to 9 3, today is 9 0 but patient mentions about black color stool yesterday with positive stool occult blood today  · Recent colonoscopy last month did show evidence of colitis in the cecum and ascending colon  · Will consult Gastroenterology to give input with recent findings of colitis to see if he needs treatment for that and if that is the source for her dark stools  · Since hemoglobin is around 9 will continue the Eliquis and recheck tomorrow morning
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
## 2018-08-25 NOTE — ASSESSMENT & PLAN NOTE
Recent colonoscopy last month did show evidence of colitis in the cecum and ascending colon  Consult GI again this time, due to black stools yesterday positive stool occult today
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
## 2018-08-26 LAB
BASOPHILS # BLD AUTO: 0.04 THOUSANDS/ΜL (ref 0–0.1)
BASOPHILS NFR BLD AUTO: 0 % (ref 0–1)
EOSINOPHIL # BLD AUTO: 0.33 THOUSAND/ΜL (ref 0–0.61)
EOSINOPHIL NFR BLD AUTO: 3 % (ref 0–6)
ERYTHROCYTE [DISTWIDTH] IN BLOOD BY AUTOMATED COUNT: 16.5 % (ref 11.6–15.1)
HCT VFR BLD AUTO: 34 % (ref 34.8–46.1)
HGB BLD-MCNC: 10 G/DL (ref 11.5–15.4)
IMM GRANULOCYTES # BLD AUTO: 0.04 THOUSAND/UL (ref 0–0.2)
IMM GRANULOCYTES NFR BLD AUTO: 0 % (ref 0–2)
LYMPHOCYTES # BLD AUTO: 1.63 THOUSANDS/ΜL (ref 0.6–4.47)
LYMPHOCYTES NFR BLD AUTO: 17 % (ref 14–44)
MCH RBC QN AUTO: 30 PG (ref 26.8–34.3)
MCHC RBC AUTO-ENTMCNC: 29.4 G/DL (ref 31.4–37.4)
MCV RBC AUTO: 102 FL (ref 82–98)
MONOCYTES # BLD AUTO: 0.67 THOUSAND/ΜL (ref 0.17–1.22)
MONOCYTES NFR BLD AUTO: 7 % (ref 4–12)
NEUTROPHILS # BLD AUTO: 7.01 THOUSANDS/ΜL (ref 1.85–7.62)
NEUTS SEG NFR BLD AUTO: 73 % (ref 43–75)
NRBC BLD AUTO-RTO: 0 /100 WBCS
PLATELET # BLD AUTO: 393 THOUSANDS/UL (ref 149–390)
PMV BLD AUTO: 9.2 FL (ref 8.9–12.7)
RBC # BLD AUTO: 3.33 MILLION/UL (ref 3.81–5.12)
WBC # BLD AUTO: 9.72 THOUSAND/UL (ref 4.31–10.16)

## 2018-08-26 PROCEDURE — 99232 SBSQ HOSP IP/OBS MODERATE 35: CPT | Performed by: HOSPITALIST

## 2018-08-26 PROCEDURE — 85025 COMPLETE CBC W/AUTO DIFF WBC: CPT | Performed by: HOSPITALIST

## 2018-08-26 RX ORDER — OXYCODONE HYDROCHLORIDE 10 MG/1
10 TABLET ORAL EVERY 6 HOURS
Status: DISCONTINUED | OUTPATIENT
Start: 2018-08-26 | End: 2018-09-01 | Stop reason: HOSPADM

## 2018-08-26 RX ORDER — PANTOPRAZOLE SODIUM 40 MG/1
40 TABLET, DELAYED RELEASE ORAL
Status: DISCONTINUED | OUTPATIENT
Start: 2018-08-26 | End: 2018-09-01 | Stop reason: HOSPADM

## 2018-08-26 RX ADMIN — Medication 800 MCG: at 08:05

## 2018-08-26 RX ADMIN — FUROSEMIDE 40 MG: 40 TABLET ORAL at 08:06

## 2018-08-26 RX ADMIN — ACETAMINOPHEN 650 MG: 325 TABLET, FILM COATED ORAL at 11:35

## 2018-08-26 RX ADMIN — MIRTAZAPINE 7.5 MG: 15 TABLET, FILM COATED ORAL at 22:25

## 2018-08-26 RX ADMIN — APIXABAN 5 MG: 5 TABLET, FILM COATED ORAL at 17:00

## 2018-08-26 RX ADMIN — Medication 100 MG: at 08:06

## 2018-08-26 RX ADMIN — Medication 800 MCG: at 17:00

## 2018-08-26 RX ADMIN — OXYCODONE HYDROCHLORIDE 5 MG: 5 TABLET ORAL at 08:18

## 2018-08-26 RX ADMIN — NORTRIPTYLINE HYDROCHLORIDE 20 MG: 10 CAPSULE ORAL at 22:24

## 2018-08-26 RX ADMIN — ACETAMINOPHEN 650 MG: 325 TABLET, FILM COATED ORAL at 08:06

## 2018-08-26 RX ADMIN — DULOXETINE HYDROCHLORIDE 30 MG: 30 CAPSULE, DELAYED RELEASE ORAL at 08:05

## 2018-08-26 RX ADMIN — LIDOCAINE 2 PATCH: 50 PATCH CUTANEOUS at 08:06

## 2018-08-26 RX ADMIN — OXYCODONE HYDROCHLORIDE 15 MG: 10 TABLET ORAL at 05:46

## 2018-08-26 RX ADMIN — Medication 100 MG: at 17:00

## 2018-08-26 RX ADMIN — CLOPIDOGREL 75 MG: 75 TABLET, FILM COATED ORAL at 08:06

## 2018-08-26 RX ADMIN — APIXABAN 5 MG: 5 TABLET, FILM COATED ORAL at 08:06

## 2018-08-26 RX ADMIN — OXYCODONE HYDROCHLORIDE 10 MG: 10 TABLET ORAL at 17:00

## 2018-08-26 RX ADMIN — OXYCODONE HYDROCHLORIDE 10 MG: 10 TABLET ORAL at 22:25

## 2018-08-26 RX ADMIN — METOPROLOL SUCCINATE 50 MG: 50 TABLET, EXTENDED RELEASE ORAL at 22:24

## 2018-08-26 RX ADMIN — POTASSIUM CHLORIDE 20 MEQ: 1500 TABLET, EXTENDED RELEASE ORAL at 08:06

## 2018-08-26 RX ADMIN — PANTOPRAZOLE SODIUM 40 MG: 40 TABLET, DELAYED RELEASE ORAL at 17:00

## 2018-08-26 RX ADMIN — LISINOPRIL 5 MG: 5 TABLET ORAL at 08:05

## 2018-08-26 RX ADMIN — ACETAMINOPHEN 650 MG: 325 TABLET, FILM COATED ORAL at 17:00

## 2018-08-26 RX ADMIN — MELATONIN 6 MG: at 22:24

## 2018-08-26 RX ADMIN — AMIODARONE HYDROCHLORIDE 200 MG: 200 TABLET ORAL at 08:06

## 2018-08-26 RX ADMIN — ACETAMINOPHEN 650 MG: 325 TABLET, FILM COATED ORAL at 22:25

## 2018-08-26 RX ADMIN — METOPROLOL SUCCINATE 50 MG: 50 TABLET, EXTENDED RELEASE ORAL at 08:06

## 2018-08-26 RX ADMIN — ASPIRIN 81 MG 81 MG: 81 TABLET ORAL at 08:05

## 2018-08-26 RX ADMIN — OXYCODONE HYDROCHLORIDE 15 MG: 10 TABLET ORAL at 11:35

## 2018-08-26 NOTE — PROGRESS NOTES
Pt due for iv site change today; unable to place iv after multiple attempts; dr Vamsi Moeller aware to see if iv site can remain out; will assess hgb level; if hgb level wnl-will consider leaving out iv at this time
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
## 2018-08-26 NOTE — ASSESSMENT & PLAN NOTE
· Acute anemia suspect blood loss related  · Hemoglobin dropped to 6 8  - s/p 1 U PRBC now appears stable  · Now improved and stable  · Possibly secondary to hemarthrosis  · MRI knee has effusion but not hemorrhagic - however 25 cc blood aspirated from the knee joint  · CT abdomen negative for intraperitoneal bleed  · Hemoglobin improved and is stable above 9, however she has been mentioning about having black color stool since last 2 days, today they were watery    Stool occult came back positive  · Recent colonoscopy last month did show evidence of colitis in the cecum and ascending colon; EGD that time showed mild esophagitis and gastritis with possible GAVE  · Patient home medications did not show Protonix and she was not getting it so far, will restart that 40 Twice a day  · Will get input from Gastroenterology due to above and also to give input with recent findings of colitis in biopsy to see if he needs treatment for that and if that is the source for her dark stools

## 2018-08-26 NOTE — ASSESSMENT & PLAN NOTE
· PT recommends rehab, however patient is persistently refusing that as she wants to go home to sell her for nature because she knows that even should she will need long-term placement for which she will need money  · To me she appears competent though she is forgetful about some things

## 2018-08-26 NOTE — ASSESSMENT & PLAN NOTE
Recent colonoscopy last month did show evidence of colitis in the cecum and ascending colon, EGD then showed mild esophagitis and gastritis with possible GAVE  Consult GI again this time, due to black stools & positive stool occult

## 2018-08-26 NOTE — ASSESSMENT & PLAN NOTE
· Currently in NSR, status post cardioversion and in previous admission  · Continue amiodarone and metoprolol  · Continue Eliquis as hemoglobin is stable but still has dark stools

## 2018-08-26 NOTE — ASSESSMENT & PLAN NOTE
· Appreciate Orthopedic input, knee immobilized in place, recommend nonweightbearing as per Orthopedics appreciate their input  · MRI - mod to large effusion, tear in medial meniscus - f/u ortho rec  · Status post arthrocentesis with 25 cc blood by Orthopedics followed by steroid injection    Knee pain is resolved  · Cut down on pain medications from 15-10 mg oxycodone every 6 hours as patient had mentioned before that she does not want to be dependent on them

## 2018-08-27 ENCOUNTER — ANESTHESIA EVENT (INPATIENT)
Dept: GASTROENTEROLOGY | Facility: HOSPITAL | Age: 78
DRG: 922 | End: 2018-08-27
Payer: MEDICARE

## 2018-08-27 PROBLEM — K92.1 MELENA: Status: ACTIVE | Noted: 2018-08-20

## 2018-08-27 PROBLEM — Z46.89 ENCOUNTER FOR REMOVAL OF BILIARY STENT: Status: ACTIVE | Noted: 2018-08-20

## 2018-08-27 LAB
BASOPHILS # BLD AUTO: 0.05 THOUSANDS/ΜL (ref 0–0.1)
BASOPHILS NFR BLD AUTO: 1 % (ref 0–1)
EOSINOPHIL # BLD AUTO: 0.51 THOUSAND/ΜL (ref 0–0.61)
EOSINOPHIL NFR BLD AUTO: 8 % (ref 0–6)
ERYTHROCYTE [DISTWIDTH] IN BLOOD BY AUTOMATED COUNT: 16.3 % (ref 11.6–15.1)
HCT VFR BLD AUTO: 32.8 % (ref 34.8–46.1)
HGB BLD-MCNC: 9.7 G/DL (ref 11.5–15.4)
IMM GRANULOCYTES # BLD AUTO: 0.05 THOUSAND/UL (ref 0–0.2)
IMM GRANULOCYTES NFR BLD AUTO: 1 % (ref 0–2)
LYMPHOCYTES # BLD AUTO: 1.71 THOUSANDS/ΜL (ref 0.6–4.47)
LYMPHOCYTES NFR BLD AUTO: 26 % (ref 14–44)
MCH RBC QN AUTO: 30.2 PG (ref 26.8–34.3)
MCHC RBC AUTO-ENTMCNC: 29.6 G/DL (ref 31.4–37.4)
MCV RBC AUTO: 102 FL (ref 82–98)
MONOCYTES # BLD AUTO: 0.64 THOUSAND/ΜL (ref 0.17–1.22)
MONOCYTES NFR BLD AUTO: 10 % (ref 4–12)
NEUTROPHILS # BLD AUTO: 3.73 THOUSANDS/ΜL (ref 1.85–7.62)
NEUTS SEG NFR BLD AUTO: 54 % (ref 43–75)
NRBC BLD AUTO-RTO: 0 /100 WBCS
PLATELET # BLD AUTO: 376 THOUSANDS/UL (ref 149–390)
PMV BLD AUTO: 9.1 FL (ref 8.9–12.7)
RBC # BLD AUTO: 3.21 MILLION/UL (ref 3.81–5.12)
WBC # BLD AUTO: 6.69 THOUSAND/UL (ref 4.31–10.16)

## 2018-08-27 PROCEDURE — 99232 SBSQ HOSP IP/OBS MODERATE 35: CPT | Performed by: HOSPITALIST

## 2018-08-27 PROCEDURE — 85025 COMPLETE CBC W/AUTO DIFF WBC: CPT | Performed by: HOSPITALIST

## 2018-08-27 RX ADMIN — OXYCODONE HYDROCHLORIDE 10 MG: 10 TABLET ORAL at 05:32

## 2018-08-27 RX ADMIN — AMIODARONE HYDROCHLORIDE 200 MG: 200 TABLET ORAL at 07:31

## 2018-08-27 RX ADMIN — METOPROLOL SUCCINATE 50 MG: 50 TABLET, EXTENDED RELEASE ORAL at 09:32

## 2018-08-27 RX ADMIN — ASPIRIN 81 MG 81 MG: 81 TABLET ORAL at 09:33

## 2018-08-27 RX ADMIN — Medication 800 MCG: at 09:33

## 2018-08-27 RX ADMIN — LIDOCAINE 1 PATCH: 50 PATCH CUTANEOUS at 09:33

## 2018-08-27 RX ADMIN — Medication 100 MG: at 17:28

## 2018-08-27 RX ADMIN — OXYCODONE HYDROCHLORIDE 10 MG: 10 TABLET ORAL at 16:32

## 2018-08-27 RX ADMIN — Medication 800 MCG: at 17:28

## 2018-08-27 RX ADMIN — ACETAMINOPHEN 650 MG: 325 TABLET, FILM COATED ORAL at 21:50

## 2018-08-27 RX ADMIN — OXYCODONE HYDROCHLORIDE 10 MG: 10 TABLET ORAL at 11:20

## 2018-08-27 RX ADMIN — LISINOPRIL 5 MG: 5 TABLET ORAL at 09:33

## 2018-08-27 RX ADMIN — OXYCODONE HYDROCHLORIDE 10 MG: 10 TABLET ORAL at 22:54

## 2018-08-27 RX ADMIN — ACETAMINOPHEN 650 MG: 325 TABLET, FILM COATED ORAL at 16:32

## 2018-08-27 RX ADMIN — PANTOPRAZOLE SODIUM 40 MG: 40 TABLET, DELAYED RELEASE ORAL at 16:32

## 2018-08-27 RX ADMIN — ACETAMINOPHEN 650 MG: 325 TABLET, FILM COATED ORAL at 07:31

## 2018-08-27 RX ADMIN — DULOXETINE HYDROCHLORIDE 30 MG: 30 CAPSULE, DELAYED RELEASE ORAL at 09:33

## 2018-08-27 RX ADMIN — CLOPIDOGREL 75 MG: 75 TABLET, FILM COATED ORAL at 13:18

## 2018-08-27 RX ADMIN — PANTOPRAZOLE SODIUM 40 MG: 40 TABLET, DELAYED RELEASE ORAL at 05:32

## 2018-08-27 RX ADMIN — MIRTAZAPINE 7.5 MG: 15 TABLET, FILM COATED ORAL at 21:48

## 2018-08-27 RX ADMIN — APIXABAN 5 MG: 5 TABLET, FILM COATED ORAL at 13:18

## 2018-08-27 RX ADMIN — FUROSEMIDE 40 MG: 40 TABLET ORAL at 09:32

## 2018-08-27 RX ADMIN — APIXABAN 5 MG: 5 TABLET, FILM COATED ORAL at 21:50

## 2018-08-27 RX ADMIN — POTASSIUM CHLORIDE 20 MEQ: 1500 TABLET, EXTENDED RELEASE ORAL at 09:33

## 2018-08-27 RX ADMIN — ACETAMINOPHEN 650 MG: 325 TABLET, FILM COATED ORAL at 11:20

## 2018-08-27 RX ADMIN — Medication 100 MG: at 09:32

## 2018-08-27 RX ADMIN — MELATONIN 6 MG: at 21:49

## 2018-08-27 RX ADMIN — METOPROLOL SUCCINATE 50 MG: 50 TABLET, EXTENDED RELEASE ORAL at 21:50

## 2018-08-27 RX ADMIN — NORTRIPTYLINE HYDROCHLORIDE 20 MG: 10 CAPSULE ORAL at 21:52

## 2018-08-27 NOTE — SOCIAL WORK
Cm received call from [facility], who will be out to see patient today and neuropsych  Cm following
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
## 2018-08-27 NOTE — ASSESSMENT & PLAN NOTE
· Acute anemia suspect blood loss related  · Hemoglobin dropped to 6 8  - s/p 1 U PRBC now appears stable  · Now improved and stable  · Possibly secondary to hemarthrosis  · MRI knee has effusion but not hemorrhagic - however 25 cc blood aspirated from the knee joint  · CT abdomen negative for intraperitoneal bleed  · Hemoglobin improved and is stable above 9, however she has been mentioning about having black color stool since last 2 days, today they were watery    Stool occult came back positive  · Recent colonoscopy last month did show evidence of colitis in the cecum and ascending colon; EGD that time showed mild esophagitis and gastritis with possible GAVE  · Patient home medications did not show Protonix and she was not getting it so far, will restart that 40 Twice a day  · EGD today

## 2018-08-27 NOTE — CONSULTS
Consultation - [facility]  Rebecca Jose Juan 69 y o  female MRN: [id number]  Unit/Bed#: [facility] 827-01 Encounter: [id number]             Inpatient consult to gastroenterology     Performed by  [facility] by Caden Strong              Reason for Consult / Principal Problem:     Anemia/melena      ASSESSMENT AND PLAN:      Colt Martines  - hemoglobin currently stable at 9 7, however as low as 6 8 last week  - patient continues to report melanotic stools  - will proceed with repeat EGD given recurrence of melena and anemia  History of choledocholithiasis/cholangitis  - patient currently has pigtail biliary stent in place since May which is overdue for removal   Will remove via ERCP    ______________________________________________________________________    HPI:  67yo female seen in consultation for anemia  Pt was seen by our service on her prior admission 7/25 for evaluation of anemia  At that time patient underwent EGD which showed mild esophagitis, mild gastritis mild GAVE as well as colonoscopy which showed multiple irregular clean based ulcers in cecum and ascending colon as well as hemorrhoids  Unfortunately she also developed splenic hematoma following her procedures  But was discharged on her oral anticoagulants and anti-platelet therapy with a stable hemoglobin  Patient currently denies abdominal pain no nausea or vomiting  No difficulty swallowing or painful swallowing  Denies excessive NSAID use or tobacco use and occasionally drinks alcohol  Patient currently takes Eliquis for atrial fibrillation status post ablation 07/2018 and Plavix due to drug-eluting stent placed  07/2018  REVIEW OF SYSTEMS:    CONSTITUTIONAL: Denies any fever, chills, rigors, and weight loss  HEENT: No earache or tinnitus  Denies hearing loss or visual disturbances  CARDIOVASCULAR: No chest pain or palpitations     RESPIRATORY: Denies any cough, hemoptysis, shortness of breath or dyspnea on exertion  GASTROINTESTINAL: As noted in the History of Present Illness  GENITOURINARY: No problems with urination  Denies any hematuria or dysuria  NEUROLOGIC: No dizziness or vertigo, denies headaches  MUSCULOSKELETAL: Denies any muscle or joint pain  SKIN: Denies skin rashes or itching  ENDOCRINE: Denies excessive thirst  Denies intolerance to heat or cold  PSYCHOSOCIAL: Denies depression or anxiety  Denies any recent memory loss  Historical Information   Past Medical History:   Diagnosis Date    A-fib [facility])     Acute respiratory disease     Anemia     Arthritis     CHF (congestive heart failure) (HCC)     Chronic pain     Heart failure (HCC)     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 )     Rectal bleeding     Stroke ([facility])     mild no deficiets/ memory loss    Uses walker      Past Surgical History:   Procedure Laterality Date    COLONOSCOPY      COLONOSCOPY N/A 7/27/2018    Procedure: COLONOSCOPY;  Surgeon: Martell Sanchez MD;  Location: [facility]; Service: Gastroenterology    CORONARY STENT PLACEMENT      ERCP N/A 5/14/2018    Procedure: ENDOSCOPIC RETROGRADE CHOLANGIOPANCREATOGRAPHY (ERCP); Surgeon: Gaby Starks MD;  Location: [facility];  Service: Gastroenterology    ESOPHAGOGASTRODUODENOSCOPY N/A 7/26/2018    Procedure: ESOPHAGOGASTRODUODENOSCOPY (EGD); Surgeon: Martell Sanchez MD;  Location: [facility]; Service: Gastroenterology    JOINT REPLACEMENT Right     knee     Social History   History   Alcohol Use No     History   Drug Use No     History   Smoking Status    Former Smoker   Smokeless Tobacco    Never Used     History reviewed  No pertinent family history      Meds/Allergies     Prescriptions Prior to Admission   Medication    ALPRAZolam (NIRAVAM) 0 25 MG dissolvable tablet    amitriptyline (ELAVIL) 25 mg tablet    apixaban (ELIQUIS) 5 mg    aspirin (ECOTRIN LOW STRENGTH) 81 mg EC tablet    clopidogrel (PLAVIX) 75 mg tablet    DULoxetine (CYMBALTA) 30 mg delayed release capsule    furosemide (LASIX) 40 mg tablet    lidocaine (LIDODERM) 5 %    lisinopril (ZESTRIL) 5 mg tablet    melatonin 3 mg    metoprolol succinate (TOPROL-XL) 50 mg 24 hr tablet    mirtazapine (REMERON) 7 5 MG tablet    polyethylene glycol (MIRALAX) 17 g packet    polyethylene glycol (MIRALAX) 17 g packet    potassium chloride (K-DUR,KLOR-CON) 20 mEq tablet    senna-docusate sodium (SENOKOT S) 8 6-50 mg per tablet    amiodarone 200 mg tablet     Current Facility-Administered Medications   Medication Dose Route Frequency    acetaminophen (TYLENOL) tablet 650 mg  650 mg Oral 4x Daily (with meals and at bedtime)    ALPRAZolam (XANAX) tablet 0 25 mg  0 25 mg Oral BID PRN    aluminum-magnesium hydroxide-simethicone (MYLANTA) 200-200-20 mg/5 mL oral suspension 15 mL  15 mL Oral Q6H PRN    amiodarone tablet 200 mg  200 mg Oral Daily With Breakfast    apixaban (ELIQUIS) tablet 5 mg  5 mg Oral BID    aspirin chewable tablet 81 mg  81 mg Oral Daily    clopidogrel (PLAVIX) tablet 75 mg  75 mg Oral Daily    DULoxetine (CYMBALTA) delayed release capsule 30 mg  30 mg Oral Daily    folic acid (FOLVITE) tablet 800 mcg  800 mcg Oral BID    furosemide (LASIX) tablet 40 mg  40 mg Oral Daily    iohexol (OMNIPAQUE) 240 MG/ML solution 50 mL  50 mL Oral 90 min pre-procedure    lidocaine (LIDODERM) 5 % patch 2 patch  2 patch Transdermal Daily    lisinopril (ZESTRIL) tablet 5 mg  5 mg Oral Daily    melatonin tablet 6 mg  6 mg Oral HS    metoprolol succinate (TOPROL-XL) 24 hr tablet 50 mg  50 mg Oral Q12H KARELY    mirtazapine (REMERON) tablet 7 5 mg  7 5 mg Oral HS    nortriptyline (PAMELOR) capsule 20 mg  20 mg Oral HS    ondansetron (ZOFRAN) injection 4 mg  4 mg Intravenous Q6H PRN    oxyCODONE (ROXICODONE) immediate release tablet 10 mg  10 mg Oral Q6H    oxyCODONE (ROXICODONE) IR tablet 5 mg  5 mg Oral Q4H PRN    pantoprazole (PROTONIX) EC tablet 40 mg  40 mg Oral BID AC    polyethylene glycol (MIRALAX) packet 17 g  17 g Oral Daily    potassium chloride (K-DUR,KLOR-CON) CR tablet 20 mEq  20 mEq Oral Daily    senna-docusate sodium (SENOKOT S) 8 6-50 mg per tablet 1 tablet  1 tablet Oral HS    thiamine (VITAMIN B1) tablet 100 mg  100 mg Oral BID       No Known Allergies        Objective     Blood pressure 126/56, pulse 59, temperature 97 8 °F (36 6 °C), temperature source Oral, resp  rate 18, height 5' 6" (1 676 m), weight 75 4 kg (166 lb 3 6 oz), SpO2 95 %  Body mass index is 26 83 kg/m²  Intake/Output Summary (Last 24 hours) at 08/27/18 1347  Last data filed at 08/27/18 0800   Gross per 24 hour   Intake              680 ml   Output             1450 ml   Net             -770 ml         PHYSICAL EXAM:      General Appearance:   Alert, cooperative, no distress   HEENT:   Normocephalic, atraumatic, anicteric      Neck:  Supple, symmetrical, trachea midline   Lungs:   Clear to auscultation bilaterally; no rales, rhonchi or wheezing; respirations unlabored    Heart[de-identified]   Regular rate and rhythm; no murmur, rub, or gallop  Abdomen:   Soft, non-tender, non-distended; normal bowel sounds; no masses, no organomegaly    Genitalia:   Deferred    Rectal:   Deferred    Extremities:  No cyanosis, clubbing or edema    Pulses:  2+ and symmetric all extremities    Skin:  No jaundice, rashes, or lesions    Lymph nodes:  No palpable cervical lymphadenopathy        Lab Results:   Admission on 08/20/2018   No results displayed because visit has over 200 results  Imaging Studies: I have personally reviewed pertinent imaging studies

## 2018-08-27 NOTE — ASSESSMENT & PLAN NOTE
· Currently in NSR, status post cardioversion and in previous admission  · Continue amiodarone and metoprolol  · Continue Eliquis as hemoglobin is stable, f/u EGD

## 2018-08-27 NOTE — ASSESSMENT & PLAN NOTE
· Appreciate Orthopedic input, knee immobilized in place, recommend nonweightbearing as per Orthopedics appreciate their input  · MRI - mod to large effusion, tear in medial meniscus - f/u ortho rec  · Status post arthrocentesis with 25 cc blood by Orthopedics followed by steroid injection    Knee pain is resolved  · Cut down on pain medications from 15 to 10 mg oxycodone every 6 hours as patient had mentioned before that she does not want to be dependent on them
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
## 2018-08-27 NOTE — ASSESSMENT & PLAN NOTE
Recent colonoscopy last month did show evidence of colitis in the cecum and ascending colon, EGD then showed mild esophagitis and gastritis with possible GAVE  F/u EGD

## 2018-08-28 ENCOUNTER — APPOINTMENT (INPATIENT)
Dept: RADIOLOGY | Facility: HOSPITAL | Age: 78
DRG: 922 | End: 2018-08-28
Attending: INTERNAL MEDICINE
Payer: MEDICARE

## 2018-08-28 ENCOUNTER — ANESTHESIA (INPATIENT)
Dept: GASTROENTEROLOGY | Facility: HOSPITAL | Age: 78
DRG: 922 | End: 2018-08-28
Payer: MEDICARE

## 2018-08-28 LAB
BASOPHILS # BLD AUTO: 0.04 THOUSANDS/ΜL (ref 0–0.1)
BASOPHILS NFR BLD AUTO: 1 % (ref 0–1)
EOSINOPHIL # BLD AUTO: 0.64 THOUSAND/ΜL (ref 0–0.61)
EOSINOPHIL NFR BLD AUTO: 10 % (ref 0–6)
ERYTHROCYTE [DISTWIDTH] IN BLOOD BY AUTOMATED COUNT: 16.1 % (ref 11.6–15.1)
HCT VFR BLD AUTO: 30.8 % (ref 34.8–46.1)
HGB BLD-MCNC: 9.3 G/DL (ref 11.5–15.4)
IMM GRANULOCYTES # BLD AUTO: 0.08 THOUSAND/UL (ref 0–0.2)
IMM GRANULOCYTES NFR BLD AUTO: 1 % (ref 0–2)
LYMPHOCYTES # BLD AUTO: 1.31 THOUSANDS/ΜL (ref 0.6–4.47)
LYMPHOCYTES NFR BLD AUTO: 20 % (ref 14–44)
MCH RBC QN AUTO: 30.7 PG (ref 26.8–34.3)
MCHC RBC AUTO-ENTMCNC: 30.2 G/DL (ref 31.4–37.4)
MCV RBC AUTO: 102 FL (ref 82–98)
MONOCYTES # BLD AUTO: 0.68 THOUSAND/ΜL (ref 0.17–1.22)
MONOCYTES NFR BLD AUTO: 10 % (ref 4–12)
NEUTROPHILS # BLD AUTO: 3.98 THOUSANDS/ΜL (ref 1.85–7.62)
NEUTS SEG NFR BLD AUTO: 58 % (ref 43–75)
NRBC BLD AUTO-RTO: 0 /100 WBCS
PLATELET # BLD AUTO: 338 THOUSANDS/UL (ref 149–390)
PMV BLD AUTO: 9.2 FL (ref 8.9–12.7)
RBC # BLD AUTO: 3.03 MILLION/UL (ref 3.81–5.12)
WBC # BLD AUTO: 6.73 THOUSAND/UL (ref 4.31–10.16)

## 2018-08-28 PROCEDURE — 85025 COMPLETE CBC W/AUTO DIFF WBC: CPT | Performed by: HOSPITALIST

## 2018-08-28 PROCEDURE — 74330 X-RAY BILE/PANC ENDOSCOPY: CPT

## 2018-08-28 PROCEDURE — 0FC98ZZ EXTIRPATION OF MATTER FROM COMMON BILE DUCT, VIA NATURAL OR ARTIFICIAL OPENING ENDOSCOPIC: ICD-10-PCS | Performed by: INTERNAL MEDICINE

## 2018-08-28 PROCEDURE — C1726 CATH, BAL DIL, NON-VASCULAR: HCPCS | Performed by: INTERNAL MEDICINE

## 2018-08-28 PROCEDURE — 99232 SBSQ HOSP IP/OBS MODERATE 35: CPT | Performed by: INTERNAL MEDICINE

## 2018-08-28 PROCEDURE — 43264 ERCP REMOVE DUCT CALCULI: CPT | Performed by: INTERNAL MEDICINE

## 2018-08-28 PROCEDURE — 0FPB8DZ REMOVAL OF INTRALUMINAL DEVICE FROM HEPATOBILIARY DUCT, VIA NATURAL OR ARTIFICIAL OPENING ENDOSCOPIC: ICD-10-PCS | Performed by: INTERNAL MEDICINE

## 2018-08-28 PROCEDURE — C2617 STENT, NON-COR, TEM W/O DEL: HCPCS | Performed by: INTERNAL MEDICINE

## 2018-08-28 PROCEDURE — C1769 GUIDE WIRE: HCPCS | Performed by: INTERNAL MEDICINE

## 2018-08-28 PROCEDURE — 43273 ENDOSCOPIC PANCREATOSCOPY: CPT | Performed by: INTERNAL MEDICINE

## 2018-08-28 PROCEDURE — 0F798DZ DILATION OF COMMON BILE DUCT WITH INTRALUMINAL DEVICE, VIA NATURAL OR ARTIFICIAL OPENING ENDOSCOPIC: ICD-10-PCS | Performed by: INTERNAL MEDICINE

## 2018-08-28 PROCEDURE — 43274 ERCP DUCT STENT PLACEMENT: CPT | Performed by: INTERNAL MEDICINE

## 2018-08-28 DEVICE — BILIARY STENT
Type: IMPLANTABLE DEVICE | Site: BILE DUCT | Status: NON-FUNCTIONAL
Brand: ADVANIX™ BILIARY
Removed: 2019-07-12

## 2018-08-28 RX ORDER — PROPOFOL 10 MG/ML
INJECTION, EMULSION INTRAVENOUS AS NEEDED
Status: DISCONTINUED | OUTPATIENT
Start: 2018-08-28 | End: 2018-08-28 | Stop reason: SURG

## 2018-08-28 RX ORDER — ONDANSETRON 2 MG/ML
INJECTION INTRAMUSCULAR; INTRAVENOUS AS NEEDED
Status: DISCONTINUED | OUTPATIENT
Start: 2018-08-28 | End: 2018-08-28 | Stop reason: SURG

## 2018-08-28 RX ORDER — SODIUM CHLORIDE 9 MG/ML
125 INJECTION, SOLUTION INTRAVENOUS CONTINUOUS
Status: CANCELLED | OUTPATIENT
Start: 2018-08-28

## 2018-08-28 RX ORDER — SUCCINYLCHOLINE CHLORIDE 20 MG/ML
INJECTION INTRAMUSCULAR; INTRAVENOUS AS NEEDED
Status: DISCONTINUED | OUTPATIENT
Start: 2018-08-28 | End: 2018-08-28 | Stop reason: SURG

## 2018-08-28 RX ORDER — FENTANYL CITRATE 50 UG/ML
INJECTION, SOLUTION INTRAMUSCULAR; INTRAVENOUS AS NEEDED
Status: DISCONTINUED | OUTPATIENT
Start: 2018-08-28 | End: 2018-08-28 | Stop reason: SURG

## 2018-08-28 RX ORDER — METOPROLOL TARTRATE 5 MG/5ML
INJECTION INTRAVENOUS AS NEEDED
Status: DISCONTINUED | OUTPATIENT
Start: 2018-08-28 | End: 2018-08-28 | Stop reason: SURG

## 2018-08-28 RX ORDER — LIDOCAINE HYDROCHLORIDE 20 MG/ML
INJECTION, SOLUTION EPIDURAL; INFILTRATION; INTRACAUDAL; PERINEURAL AS NEEDED
Status: DISCONTINUED | OUTPATIENT
Start: 2018-08-28 | End: 2018-08-28 | Stop reason: SURG

## 2018-08-28 RX ORDER — SODIUM CHLORIDE 9 MG/ML
INJECTION, SOLUTION INTRAVENOUS CONTINUOUS PRN
Status: DISCONTINUED | OUTPATIENT
Start: 2018-08-28 | End: 2018-08-28 | Stop reason: SURG

## 2018-08-28 RX ADMIN — APIXABAN 5 MG: 5 TABLET, FILM COATED ORAL at 17:53

## 2018-08-28 RX ADMIN — LIDOCAINE HYDROCHLORIDE 100 MG: 20 INJECTION, SOLUTION EPIDURAL; INFILTRATION; INTRACAUDAL; PERINEURAL at 14:46

## 2018-08-28 RX ADMIN — MIRTAZAPINE 7.5 MG: 15 TABLET, FILM COATED ORAL at 21:41

## 2018-08-28 RX ADMIN — METOPROLOL TARTRATE 5 MG: 1 INJECTION, SOLUTION INTRAVENOUS at 15:43

## 2018-08-28 RX ADMIN — PANTOPRAZOLE SODIUM 40 MG: 40 TABLET, DELAYED RELEASE ORAL at 16:43

## 2018-08-28 RX ADMIN — SODIUM CHLORIDE: 0.9 INJECTION, SOLUTION INTRAVENOUS at 15:45

## 2018-08-28 RX ADMIN — SUCCINYLCHOLINE CHLORIDE 80 MG: 20 INJECTION, SOLUTION INTRAMUSCULAR; INTRAVENOUS at 14:46

## 2018-08-28 RX ADMIN — LIDOCAINE 1 PATCH: 50 PATCH CUTANEOUS at 09:18

## 2018-08-28 RX ADMIN — FENTANYL CITRATE 50 MCG: 50 INJECTION, SOLUTION INTRAMUSCULAR; INTRAVENOUS at 14:46

## 2018-08-28 RX ADMIN — IOHEXOL 9 ML: 240 INJECTION, SOLUTION INTRATHECAL; INTRAVASCULAR; INTRAVENOUS; ORAL at 15:35

## 2018-08-28 RX ADMIN — FENTANYL CITRATE 25 MCG: 50 INJECTION, SOLUTION INTRAMUSCULAR; INTRAVENOUS at 15:15

## 2018-08-28 RX ADMIN — PROPOFOL 100 MG: 10 INJECTION, EMULSION INTRAVENOUS at 14:46

## 2018-08-28 RX ADMIN — ACETAMINOPHEN 650 MG: 325 TABLET, FILM COATED ORAL at 16:44

## 2018-08-28 RX ADMIN — NORTRIPTYLINE HYDROCHLORIDE 20 MG: 10 CAPSULE ORAL at 21:41

## 2018-08-28 RX ADMIN — Medication 100 MG: at 17:55

## 2018-08-28 RX ADMIN — ACETAMINOPHEN 650 MG: 325 TABLET, FILM COATED ORAL at 21:41

## 2018-08-28 RX ADMIN — ONDANSETRON 4 MG: 2 INJECTION INTRAMUSCULAR; INTRAVENOUS at 14:49

## 2018-08-28 RX ADMIN — OXYCODONE HYDROCHLORIDE 10 MG: 10 TABLET ORAL at 16:43

## 2018-08-28 RX ADMIN — SODIUM CHLORIDE: 0.9 INJECTION, SOLUTION INTRAVENOUS at 14:42

## 2018-08-28 RX ADMIN — Medication 800 MCG: at 17:53

## 2018-08-28 RX ADMIN — MELATONIN 6 MG: at 21:41

## 2018-08-28 RX ADMIN — OXYCODONE HYDROCHLORIDE 10 MG: 10 TABLET ORAL at 04:57

## 2018-08-28 RX ADMIN — FENTANYL CITRATE 25 MCG: 50 INJECTION, SOLUTION INTRAMUSCULAR; INTRAVENOUS at 15:45

## 2018-08-28 RX ADMIN — OXYCODONE HYDROCHLORIDE 10 MG: 10 TABLET ORAL at 22:27

## 2018-08-28 NOTE — ASSESSMENT & PLAN NOTE
· PT recommends rehab, however patient is persistently refusing that as she wants to go home   · Pt cleared by Neuropsych - has capacity to make medical decisions  · DC home with home health possibly tomorrow

## 2018-08-28 NOTE — ASSESSMENT & PLAN NOTE
· Appreciate Orthopedic input, knee immobilized in place, recommend nonweightbearing as per Orthopedics appreciate their input  · MRI - mod to large effusion, tear in medial meniscus - f/u ortho rec  · Status post arthrocentesis with 25 cc blood by Orthopedics followed by steroid injection  Knee pain is resolved  · Patient's pain has almost resolved  She has not required oxycodone for last 2 days  Will discontinue

## 2018-08-28 NOTE — ASSESSMENT & PLAN NOTE
· Acute anemia suspect blood loss related  · Hemoglobin dropped to 6 8  - s/p 1 U PRBC now appears stable  · Now improved and stable  · Possibly secondary to hemarthrosis  · MRI knee has effusion but not hemorrhagic - however 25 cc blood aspirated from the knee joint  · CT abdomen negative for intraperitoneal bleed  · Hemoglobin improved and is stable above 9, however she has been mentioning about having black color stool since last 2 days, today they were watery  Stool occult came back positive    · Recent colonoscopy last month did show evidence of colitis in the cecum and ascending colon; EGD that time showed mild esophagitis and gastritis with possible GAVE  · Cont protonix 40 Twice a day  · EGD today

## 2018-08-28 NOTE — PHYSICAL THERAPY NOTE
Physical Therapy Cancellation Note    PT ATTEMPTED TREATMENT SESSION HOWEVER PATIENT OFF FLOOR AT THIS TIME AT GI LAB  PT WILL CONTINUE TO FOLLOW ON CASELOAD      Jesus Jonas, PT

## 2018-08-28 NOTE — OP NOTE
ENDOSCOPIC RETROGRADE CHOLAGIOPANCREATOGRAPHY    PROCEDURE: ERCP/ Biliary Plastic Stent Placement    INDICATIONS: Choledocholithiasis    POST-OP DIAGNOSIS: See the impression below    SEDATION: Monitored anesthesia care, check anesthesia records    PHYSICAL EXAM:    Blood pressure 149/69, pulse 64, temperature (!) 97 4 °F (36 3 °C), temperature source Tympanic, resp  rate 20, height 5' 6" (1 676 m), weight 75 2 kg (165 lb 12 6 oz), SpO2 94 %  Body mass index is 26 76 kg/m²  General: NAD  Heart: S1 & S2 normal, RRR  Lungs: CTA, No rales or rhonchi  Abdomen: Soft, nontender, nondistended, good bowel sounds    CONSENT:  Informed consent was obtained for the procedure, including sedation after explaining the risks and benefits of the procedure  Risks including but not limited to severe pancreatitis, bleeding, perforation, infection, aspiration were discussed in detail  Also explained about less than 100% sensitivity with the exam and other alternatives  PREPARATION:   EKG tracing, pulse oximetry, blood pressure were monitored throughout the procedure  Patient was identified by myself both verbally and by visual inspection of ID band  DESCRIPTION:   Patient was placed in the prone position and was sedated with the above medication  The standard lateral viewing gastroduodenoscope was introduced in to the oropharynx and the esophagus was intubated under direct visualization using sliding technique  Scope was passed down the esophagus up to the second part of the duodenum  The scope was withdrawn and noted the ampulla on the medial wall  Ampulla was cannulated with sphinctertome catheter and cholangiogram was performed  FINDINGS:    EGD  The esophagus appeared to be normal  The GE junction was noted at 40 cm from the incisors  The stomach appeared to be normal  No bleeding lesions were seen  The duodenum appeared to be normal   The plastic stent was seen at the ampulla    A distal duodenal diverticulum was seen with food in it  ERCP  The ampulla was identified with a plastic biliary stent in place  The stent was removed with a snare  The ampulla was post sphincterotomy  Cannulation was achieved using a 9-12 mm extraction balloon with a wire  The wire was extended to the intrahepatic ducts  Balloon sweep was done and there was resistance to passage of the balloon at the ampulla  Only scant sludge was removed  Cholangiogram did not show any filling defects initially  However on review of the CT scan there did appear to be a 13 mm stone  I re-attempted a cholangiogram and thought there may be a filling defect at the distal duct  Since she was on anticoagulant/anti-platelet therapy balloon dilation was done using 8 mm hurricane balloon  This was inflated for 2 minutes  Even after dilation balloon sweeps did not reveal any stones or sludge  Cholangiogram did show the filling defect which is whole but it appeared to be compressible  At this time since no other interventions could be done I decided to place a 7 [place] by 5 cm double-pigtail biliary stent  After placement of the stent the procedure was terminated  IMPRESSIONS:      1  Normal upper GI evaluation  2   Previously placed stent was removed  A filling defect was seen in a dilated bile duct  Since his sphincterotomy could not be done balloon dilation was done using a 8 mm hurricane balloon  Despite this balloon sweeps did not remove a stone  At this time since no further interventions could be done I placed a 7 [place] by 5 cm double pigtail biliary stent  RECOMMENDATIONS:     1  Follow hemoglobin levels  2   If anemia persists may recommend doing a capsule endoscopy  3   Will require repeat ERCP in the next 3-4 months preferably with the patient could be taken off her anti-platelet/anticoagulant therapy at that time  We will follow up in office  COMPLICATIONS: None; patient tolerated the procedure well          DISPOSITION: PACU           CONDITION: Stable

## 2018-08-28 NOTE — SOCIAL WORK
Pt competent to make decisions as per Neuropsych after evaluation  Pt continues to decline recommended SNF  She will only agree to [facility] which does not have a bed for her at this time  Pt wishes to return home and resume [facility]  Referral for resumption of care was made to same  CM to follow

## 2018-08-28 NOTE — ANESTHESIA PREPROCEDURE EVALUATION
Review of Systems/Medical History  Patient summary reviewed  Chart reviewed  No history of anesthetic complications     Cardiovascular  EKG reviewed, Hyperlipidemia, Hypertension , Valvular heart disease , mitral regurgitation, CAD , Dysrhythmias (prolonged Q!) , CHF ,   Comment: Normal sinus rhythm  Normal ECG  When compared with ECG of 25-JUL-2018 12:00,  No significant change was found  Confirmed by [address] ([id number]) on 7/27/2018 2:08:35 PM    Specimen Collected: 07/27/18 04:56  Last Resulted: 07/27/18 14:08        SVT Ablation:     Procedures Performed  1  Ablation of supraventricular tachycardia  2    3-D mapping with NAVX  3  Drug infusion with isoproterenol  4    EP testing with left atrial pacing and recording         Cardiac catheterization:       PROCEDURES PERFORMED     --  Left heart catheterization without ventriculogram   --  Left coronary angiography  --  Right coronary angiography  --  Inpatient  --  Mod Sedation Same Physician Initial 15min  --  Mod Sedation Same Physician Add 15min  --  Mod Sedation Same Physician Add 15min  --  Mod Sedation Same Physician Add 15min  --  Coronary Catheterization (w/ LHC)  --  Coronary Drug Eluting Stent w/PTCA  --  Intervention on mid RCA: balloon angioplasty  LEFT VENTRICLE: Size was normal  Systolic function was mildly reduced  Ejection fraction was estimated to be 50 %  There was severe hypokinesis of the basal-mid inferior and basal-mid inferolateral wall(s)  Wall thickness was mildly  increased  The changes were consistent with concentric remodeling (increased wall thickness with normal wall mass)  DOPPLER: Transmitral flow pattern: atrial fibrillation  Doppler parameters were consistent with elevated mean left atrial  filling pressure      RIGHT VENTRICLE: The ventricle was mildly dilated   Systolic function was normal  Wall thickness was increased      LEFT ATRIUM: The atrium was mildly dilated      RIGHT ATRIUM: The atrium was moderately dilated      MITRAL VALVE: There was moderate annular calcification  Valve structure was normal  There was mild calcification of the valve  There was normal leaflet separation  DOPPLER: The transmitral velocity was within the normal range  There was no  evidence for stenosis  There was mild regurgitation      AORTIC VALVE: The valve was trileaflet  Leaflets exhibited moderately increased thickness, moderate calcification, and normal cuspal separation  DOPPLER: Transaortic velocity was within the normal range  There was no evidence for stenosis  There was no regurgitation      TRICUSPID VALVE: The valve structure was normal  There was normal leaflet separation  DOPPLER: The transtricuspid velocity was within the normal range  There was no evidence for stenosis  There was mild to moderate regurgitation  Pulmonary  artery systolic pressure was moderately increased  Estimated peak PA pressure was 55 mmHg  The findings suggest moderate pulmonary hypertension      PULMONIC VALVE: Leaflets exhibited normal thickness, no calcification, and normal cuspal separation  DOPPLER: The transpulmonic velocity was within the normal range  There was trace regurgitation      PERICARDIUM: There was no pericardial effusion  The pericardium was normal in appearance      AORTA: The root exhibited normal size      SYSTEMIC VEINS: IVC: The inferior vena cava was mildly dilated   Respirophasic changes were blunted (less than 50% variation)    ,  Pulmonary  Pneumonia, Shortness of breath,   Comment: History of hospital acquired pneumonia       GI/Hepatic    Pancreatic problem (acute pancreatitis),   Comment: Cholangitis    Melena     Kidney disease ARF,        Endo/Other     GYN       Hematology  Anemia ,     Musculoskeletal  Back pain , cervical pain,   Arthritis     Neurology    CVA ,   Comment: History of brain aneurysm Psychology   Depression ,   Chronic opioid dependence Chronic pain,            Physical Exam    Airway    Mallampati score: II  TM Distance: >3 FB  Neck ROM: full     Dental       Cardiovascular  Cardiovascular exam normal    Pulmonary  Pulmonary exam normal Breath sounds clear to auscultation,     Other Findings        Anesthesia Plan  ASA Score- 4     Anesthesia Type- general with ASA Monitors  Additional Monitors: arterial line  Airway Plan:         Plan Factors- Patient instructed to abstain from smoking on day of procedure       Induction- intravenous and rapid sequence induction  Postoperative Plan- Plan for postoperative opioid use  Planned trial extubation    Informed Consent- Anesthetic plan and risks discussed with patient  I personally reviewed this patient with the CRNA  Discussed and agreed on the Anesthesia Plan with the CRNA             Lab Results   Component Value Date    WBC 6 73 08/28/2018    HGB 9 3 (L) 08/28/2018    HCT 30 8 (L) 08/28/2018     (H) 08/28/2018     08/28/2018     Lab Results   Component Value Date    GLUCOSE 97 06/22/2015    CALCIUM 8 6 08/24/2018     08/24/2018    K 4 7 08/24/2018    CO2 25 08/24/2018     08/24/2018    BUN 17 08/24/2018    CREATININE 0 87 08/24/2018     Lab Results   Component Value Date    INR 1 58 (H) 08/21/2018    INR 1 35 (H) 07/26/2018    INR 2 16 (H) 07/25/2018    PROTIME 19 0 (H) 08/21/2018    PROTIME 16 8 (H) 07/26/2018    PROTIME 24 2 (H) 07/25/2018     Lab Results   Component Value Date    PTT 36 08/21/2018     Type and Screen:  O        I, Dr Deya Verma, the attending physician, have personally seen and evaluated the patient prior to anesthetic care  I have reviewed the pre-anesthetic record, and other medical records if appropriate to the anesthetic care  If a CRNA is involved in the case, I have reviewed the CRNA assessment, if present, and agree  The patient is in a suitable condition to proceed with my formulated anesthetic plan
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
## 2018-08-29 DIAGNOSIS — K92.1 MELENA: Primary | ICD-10-CM

## 2018-08-29 LAB
ANION GAP SERPL CALCULATED.3IONS-SCNC: 5 MMOL/L (ref 4–13)
BUN SERPL-MCNC: 21 MG/DL (ref 5–25)
CALCIUM SERPL-MCNC: 8.3 MG/DL (ref 8.3–10.1)
CHLORIDE SERPL-SCNC: 107 MMOL/L (ref 100–108)
CO2 SERPL-SCNC: 29 MMOL/L (ref 21–32)
CREAT SERPL-MCNC: 0.95 MG/DL (ref 0.6–1.3)
ERYTHROCYTE [DISTWIDTH] IN BLOOD BY AUTOMATED COUNT: 16.1 % (ref 11.6–15.1)
GFR SERPL CREATININE-BSD FRML MDRD: 58 ML/MIN/1.73SQ M
GLUCOSE SERPL-MCNC: 94 MG/DL (ref 65–140)
HCT VFR BLD AUTO: 30.2 % (ref 34.8–46.1)
HCT VFR BLD AUTO: 30.8 % (ref 34.8–46.1)
HGB BLD-MCNC: 9 G/DL (ref 11.5–15.4)
HGB BLD-MCNC: 9.2 G/DL (ref 11.5–15.4)
MCH RBC QN AUTO: 30.8 PG (ref 26.8–34.3)
MCHC RBC AUTO-ENTMCNC: 29.8 G/DL (ref 31.4–37.4)
MCV RBC AUTO: 103 FL (ref 82–98)
PLATELET # BLD AUTO: 300 THOUSANDS/UL (ref 149–390)
PMV BLD AUTO: 9 FL (ref 8.9–12.7)
POTASSIUM SERPL-SCNC: 4.2 MMOL/L (ref 3.5–5.3)
RBC # BLD AUTO: 2.92 MILLION/UL (ref 3.81–5.12)
SODIUM SERPL-SCNC: 141 MMOL/L (ref 136–145)
WBC # BLD AUTO: 8.02 THOUSAND/UL (ref 4.31–10.16)

## 2018-08-29 PROCEDURE — 0DJ07ZZ INSPECTION OF UPPER INTESTINAL TRACT, VIA NATURAL OR ARTIFICIAL OPENING: ICD-10-PCS | Performed by: INTERNAL MEDICINE

## 2018-08-29 PROCEDURE — 85018 HEMOGLOBIN: CPT | Performed by: INTERNAL MEDICINE

## 2018-08-29 PROCEDURE — 85014 HEMATOCRIT: CPT | Performed by: INTERNAL MEDICINE

## 2018-08-29 PROCEDURE — 85027 COMPLETE CBC AUTOMATED: CPT | Performed by: INTERNAL MEDICINE

## 2018-08-29 PROCEDURE — 80048 BASIC METABOLIC PNL TOTAL CA: CPT | Performed by: INTERNAL MEDICINE

## 2018-08-29 PROCEDURE — 99232 SBSQ HOSP IP/OBS MODERATE 35: CPT | Performed by: INTERNAL MEDICINE

## 2018-08-29 RX ADMIN — PANTOPRAZOLE SODIUM 40 MG: 40 TABLET, DELAYED RELEASE ORAL at 06:00

## 2018-08-29 RX ADMIN — OXYCODONE HYDROCHLORIDE 10 MG: 10 TABLET ORAL at 05:52

## 2018-08-29 RX ADMIN — MELATONIN 6 MG: at 23:20

## 2018-08-29 RX ADMIN — CLOPIDOGREL 75 MG: 75 TABLET, FILM COATED ORAL at 08:55

## 2018-08-29 RX ADMIN — POTASSIUM CHLORIDE 20 MEQ: 1500 TABLET, EXTENDED RELEASE ORAL at 08:55

## 2018-08-29 RX ADMIN — APIXABAN 5 MG: 5 TABLET, FILM COATED ORAL at 18:10

## 2018-08-29 RX ADMIN — Medication 100 MG: at 18:10

## 2018-08-29 RX ADMIN — ACETAMINOPHEN 650 MG: 325 TABLET, FILM COATED ORAL at 07:54

## 2018-08-29 RX ADMIN — PANTOPRAZOLE SODIUM 40 MG: 40 TABLET, DELAYED RELEASE ORAL at 16:53

## 2018-08-29 RX ADMIN — METOPROLOL SUCCINATE 50 MG: 50 TABLET, EXTENDED RELEASE ORAL at 23:21

## 2018-08-29 RX ADMIN — ACETAMINOPHEN 650 MG: 325 TABLET, FILM COATED ORAL at 16:53

## 2018-08-29 RX ADMIN — MIRTAZAPINE 7.5 MG: 15 TABLET, FILM COATED ORAL at 23:20

## 2018-08-29 RX ADMIN — ACETAMINOPHEN 650 MG: 325 TABLET, FILM COATED ORAL at 23:20

## 2018-08-29 RX ADMIN — ACETAMINOPHEN 650 MG: 325 TABLET, FILM COATED ORAL at 11:15

## 2018-08-29 RX ADMIN — NORTRIPTYLINE HYDROCHLORIDE 20 MG: 10 CAPSULE ORAL at 23:22

## 2018-08-29 RX ADMIN — AMIODARONE HYDROCHLORIDE 200 MG: 200 TABLET ORAL at 07:55

## 2018-08-29 RX ADMIN — LIDOCAINE 2 PATCH: 50 PATCH CUTANEOUS at 08:56

## 2018-08-29 RX ADMIN — Medication 800 MCG: at 18:10

## 2018-08-29 RX ADMIN — POLYETHYLENE GLYCOL 3350, SODIUM SULFATE ANHYDROUS, SODIUM BICARBONATE, SODIUM CHLORIDE, POTASSIUM CHLORIDE 2000 ML: 236; 22.74; 6.74; 5.86; 2.97 POWDER, FOR SOLUTION ORAL at 17:20

## 2018-08-29 RX ADMIN — OXYCODONE HYDROCHLORIDE 10 MG: 10 TABLET ORAL at 16:53

## 2018-08-29 RX ADMIN — ASPIRIN 81 MG 81 MG: 81 TABLET ORAL at 08:55

## 2018-08-29 RX ADMIN — Medication 800 MCG: at 08:55

## 2018-08-29 RX ADMIN — OXYCODONE HYDROCHLORIDE 10 MG: 10 TABLET ORAL at 11:15

## 2018-08-29 RX ADMIN — OXYCODONE HYDROCHLORIDE 10 MG: 10 TABLET ORAL at 23:20

## 2018-08-29 RX ADMIN — Medication 100 MG: at 08:55

## 2018-08-29 RX ADMIN — DULOXETINE HYDROCHLORIDE 30 MG: 30 CAPSULE, DELAYED RELEASE ORAL at 08:55

## 2018-08-29 RX ADMIN — APIXABAN 5 MG: 5 TABLET, FILM COATED ORAL at 08:56

## 2018-08-29 NOTE — ASSESSMENT & PLAN NOTE
· Acute anemia suspect blood loss related  · Hemoglobin dropped to 6 8  - s/p 1 U PRBC  · MRI knee has effusion but not hemorrhagic - however 25 cc blood aspirated from the knee joint  · CT abdomen negative for intraperitoneal bleed  · Recent colonoscopy last month did show evidence of colitis in the cecum and ascending colon; EGD that time showed mild esophagitis and gastritis with possible GAVE  · Cont protonix 40 Twice a day  · EGD - on 8/28/2018 did not show any active bleeding  · Patient continues to have melena and hemoglobin slowly trending down

## 2018-08-29 NOTE — PROGRESS NOTES
Progress Note - Cyrus Beal 69 y o  female MRN: [id number]    Unit/Bed#: [facility] 827-01 Encounter: [id number]    Subjective:     Patient seen and examined at bedside  She has pain in her leg  She denies abdominal pain  She still complains of black watery stool  Objective:     Vitals: Blood pressure 98/51, pulse 63, temperature 98 3 °F (36 8 °C), temperature source Oral, resp  rate 16, height 5' 6" (1 676 m), weight 75 kg (165 lb 5 5 oz), SpO2 95 %  ,Body mass index is 26 69 kg/m²  Intake/Output Summary (Last 24 hours) at 08/29/18 1010  Last data filed at 08/29/18 0600   Gross per 24 hour   Intake              820 ml   Output              850 ml   Net              -30 ml       Physical Exam:     General Appearance: Alert, appears stated age and cooperative  Lungs: Clear to auscultation bilaterally, no rales or rhonchi, no labored breathing/accessory muscle use  Heart: Regular rate and rhythm, S1, S2 normal, no murmur, click, rub or gallop  Abdomen: Soft, non-tender, non-distended; bowel sounds normal; no masses or no organomegaly  Extremities: No cyanosis, edema    Invasive Devices     Peripheral Intravenous Line            Peripheral IV 08/28/18 Right Forearm 1 day                Lab Results:    Results from last 7 days  Lab Units 08/29/18  0507 08/28/18  0458   WBC Thousand/uL 8 02 6 73   HEMOGLOBIN g/dL 9 0* 9 3*   HEMATOCRIT % 30 2* 30 8*   PLATELETS Thousands/uL 300 338   NEUTROS PCT %  --  58   LYMPHS PCT %  --  20   MONOS PCT %  --  10   EOS PCT %  --  10*       Results from last 7 days  Lab Units 08/29/18  0507   SODIUM mmol/L 141   POTASSIUM mmol/L 4 2   CHLORIDE mmol/L 107   CO2 mmol/L 29   BUN mg/dL 21   CREATININE mg/dL 0 95   CALCIUM mg/dL 8 3               Imaging Studies: I have personally reviewed pertinent imaging studies  Ct Abdomen Pelvis Wo Contrast    Result Date: 8/22/2018  Impression: No noncontrast CT abnormality to account for gastrointestinal bleeding and/or anemia    Stomach and bowel appear unremarkable  Small perisplenic collection, unchanged from examination of one day earlier  Unchanged appearance of mild central biliary prominence in this patient with a biliary stent, the proximal end of which is coiled surrounding a 13 mm stone in the common hepatic duct  Likely cholelithiasis without evidence of significant gallbladder wall thickening or pericholecystic fluid, unchanged from examination of one day earlier  Workstation performed: [id number]     Ct Chest Abdomen Pelvis Wo Contrast    Result Date: 8/21/2018  Impression: Subcapsular splenic collection decreased compared to prior study  Moderate gallbladder distention with cholelithiasis, increased compared to prior study  If clinically warranted right upper quadrant sonogram may be obtained  Hepatomegaly Stable appearance of common bile duct stent and choledocholithiasis Workstation performed: [id number]     Xr Femur 2 Views Left    Result Date: 8/21/2018  Impression: 1  No acute osseous abnormality  2   Degenerative changes as described  Knee joint effusion is suspected  Additional imaging may be considered as clinically warranted  Workstation performed: [id number]     Xr Knee 4+ Vw Left Injury    Result Date: 8/21/2018  Impression: Moderate degenerative changes medial, lateral and patellofemoral joint and knee effusion Small foci of air are seen at the anterior aspect of the knee, these are likely sequela of recent to aspiration Workstation performed: [id number]     Ct Head Without Contrast    Result Date: 8/20/2018  Impression: No acute intracranial abnormality  Workstation performed: [id number]     Ct Cervical Spine Without Contrast    Result Date: 8/21/2018  Impression: No cervical spine fracture or traumatic malalignment  Significant degenerative changes  Workstation performed: [id number]     Xr Pelvis Ap Only 1 Or 2 Vw    Result Date: 8/21/2018  Impression: No acute osseous abnormality  Degenerative changes as described    Correlation with the patient's symptoms recommended  Workstation performed: [id number]     Mri Knee Left  Wo Contrast    Result Date: 8/23/2018  Impression: 1  Moderate to large knee joint effusion  2   Moderate large sized Baker's cyst which is partially ruptured  3   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 with areas of full-thickness cartilage loss and degenerative osteophytes  4   Mild tendinosis of the quadriceps tendon  Otherwise, the extensor apparatus is intact  5   Horizontal tear of the posterior horn of the medial meniscus  There is degeneration and probable tearing of the anterior horn of the lateral meniscus  No flipped meniscal fragment  6   No acute osseous abnormality  Workstation performed: [id number]       Assessment and Plan:    1) Melena and anemia: Repeat EGD yesterday was normal including esophagus, stomach, and duodenum  No source of bleeding identified  She underwent colonoscopy 7/27/18 for GI bleeding which showed superficial non-bleeding ulcers in the cecum and ascending colon likely secondary to ischemic colitis and hemorrhoids  Hemoglobin is relatively stable at 9 2  She does still complain of black watery stool however     -We will plan for inpatient capsule endoscopy tomorrow  -Clear liquids today with 1/2 Golytely bowel prep then NPO after 10 PM tonight  -Monitor H&H   -Monitor stool color  -Continue Protonix    2) History of choledocholithiasis and cholangitis: status post ERCP yesterday with stent removal  A filllng defect was identified however sphincterotomy could not be performed due to anticoagulation  Instead a new biliary stent was placed      -Plan for repeat ERCP in 3-4 months ideally off anticoagulation    3) Colitis: She underwent colonoscopy 7/27/18 for GI bleeding which showed superficial non-bleeding ulcers in the cecum and ascending colon likely secondary to ischemic colitis and hemorrhoids  Biopsies showed moderately active colitis       -Outpatient GI follow-up

## 2018-08-29 NOTE — SOCIAL WORK
Pt not yet stable for discharge today per physician  Pt aware of same  Pt indicates she has not heard back from either her landlord or her sister for a potential transport home  Cm encouraged her to continue to do same  If they are unable to transport pt aware she will need W/C van transport home and is aware of cost associated with same  CM to follow

## 2018-08-29 NOTE — SOCIAL WORK
CM received letter drom [employer] clearing pt for admit to a SNF  Pt continues to decline same however due to [facility] not having a bed for her  They continue to report they do not have a bed  Pt not in agreement with any other facility art this time  CM to offer SNF again before discharge

## 2018-08-29 NOTE — RESTORATIVE TECHNICIAN NOTE
Restorative Specialist Mobility Note       Activity: Chair  Assistive Device: None  Ambulation Response: Tolerated fairly well  Repositioned: Sitting, Up in chair   Range of Motion: Active, All extremities      Patient left resting comfortably in chair, with call bell and table within reach

## 2018-08-29 NOTE — ASSESSMENT & PLAN NOTE
· PT recommends rehab, however patient is persistently refusing that as she wants to go home   · Pt cleared by Neuropsych - has capacity to make medical decisions  · DC home with home health once cleared by GI

## 2018-08-29 NOTE — ASSESSMENT & PLAN NOTE
· Appreciate Orthopedic input, knee immobilized in place, recommend nonweightbearing as per Orthopedics appreciate their input  · MRI - mod to large effusion, tear in medial meniscus - f/u ortho rec  · Status post arthrocentesis with 25 cc blood by Orthopedics followed by steroid injection  Knee pain is resolved  · Patient's pain has almost resolved
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
## 2018-08-29 NOTE — ASSESSMENT & PLAN NOTE
Exact reason not clear  Patient continues to have melena  EGD done on 8/20/2018 unremarkable for any source of bleeding  Discussed with GI today  Plan is for capsule endoscopy inpatient tomorrow
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
## 2018-08-29 NOTE — ASSESSMENT & PLAN NOTE
Would also check CPK  Patient has elevated BUN and creatinine compared to previous  Would give fluids  Monitor metabolic profile

## 2018-08-29 NOTE — SOCIAL WORK
Pt discussed in care coordination rounds  Pt for possible discharge home today per physician  CM met with pt at bedside to discuss same  Pt continues to decline SNF and wants to return home  Pt previously active with  [facility] for skilled nursing  Resumption of care already sent for SN and PT/OT was added  Pt will contact her landlord and sister to see if either can transport her home later today if discharged  CM to follow

## 2018-08-30 ENCOUNTER — HOSPITAL ENCOUNTER (OUTPATIENT)
Dept: GASTROENTEROLOGY | Facility: HOSPITAL | Age: 78
Discharge: HOME | DRG: 922 | End: 2018-08-30
Payer: MEDICARE

## 2018-08-30 ENCOUNTER — DOCUMENTATION (OUTPATIENT)
Dept: GASTROENTEROLOGY | Facility: HOSPITAL | Age: 78
End: 2018-08-30

## 2018-08-30 LAB
ERYTHROCYTE [DISTWIDTH] IN BLOOD BY AUTOMATED COUNT: 15.9 % (ref 11.6–15.1)
HCT VFR BLD AUTO: 28.2 % (ref 34.8–46.1)
HGB BLD-MCNC: 8.4 G/DL (ref 11.5–15.4)
MCH RBC QN AUTO: 30.4 PG (ref 26.8–34.3)
MCHC RBC AUTO-ENTMCNC: 29.8 G/DL (ref 31.4–37.4)
MCV RBC AUTO: 102 FL (ref 82–98)
PLATELET # BLD AUTO: 266 THOUSANDS/UL (ref 149–390)
PMV BLD AUTO: 8.8 FL (ref 8.9–12.7)
RBC # BLD AUTO: 2.76 MILLION/UL (ref 3.81–5.12)
WBC # BLD AUTO: 6.16 THOUSAND/UL (ref 4.31–10.16)

## 2018-08-30 PROCEDURE — 85027 COMPLETE CBC AUTOMATED: CPT | Performed by: INTERNAL MEDICINE

## 2018-08-30 PROCEDURE — 97112 NEUROMUSCULAR REEDUCATION: CPT

## 2018-08-30 PROCEDURE — 97535 SELF CARE MNGMENT TRAINING: CPT

## 2018-08-30 PROCEDURE — 97110 THERAPEUTIC EXERCISES: CPT

## 2018-08-30 PROCEDURE — 97530 THERAPEUTIC ACTIVITIES: CPT

## 2018-08-30 PROCEDURE — 99232 SBSQ HOSP IP/OBS MODERATE 35: CPT | Performed by: INTERNAL MEDICINE

## 2018-08-30 RX ADMIN — NORTRIPTYLINE HYDROCHLORIDE 20 MG: 10 CAPSULE ORAL at 22:46

## 2018-08-30 RX ADMIN — FUROSEMIDE 40 MG: 40 TABLET ORAL at 09:38

## 2018-08-30 RX ADMIN — PANTOPRAZOLE SODIUM 40 MG: 40 TABLET, DELAYED RELEASE ORAL at 16:55

## 2018-08-30 RX ADMIN — METOPROLOL SUCCINATE 50 MG: 50 TABLET, EXTENDED RELEASE ORAL at 21:33

## 2018-08-30 RX ADMIN — ALPRAZOLAM 0.25 MG: 0.25 TABLET ORAL at 23:58

## 2018-08-30 RX ADMIN — POTASSIUM CHLORIDE 20 MEQ: 1500 TABLET, EXTENDED RELEASE ORAL at 09:38

## 2018-08-30 RX ADMIN — APIXABAN 5 MG: 5 TABLET, FILM COATED ORAL at 09:38

## 2018-08-30 RX ADMIN — APIXABAN 5 MG: 5 TABLET, FILM COATED ORAL at 17:02

## 2018-08-30 RX ADMIN — AMIODARONE HYDROCHLORIDE 200 MG: 200 TABLET ORAL at 09:38

## 2018-08-30 RX ADMIN — Medication 800 MCG: at 17:02

## 2018-08-30 RX ADMIN — ACETAMINOPHEN 650 MG: 325 TABLET, FILM COATED ORAL at 11:48

## 2018-08-30 RX ADMIN — Medication 100 MG: at 17:02

## 2018-08-30 RX ADMIN — METOPROLOL SUCCINATE 50 MG: 50 TABLET, EXTENDED RELEASE ORAL at 09:37

## 2018-08-30 RX ADMIN — CLOPIDOGREL 75 MG: 75 TABLET, FILM COATED ORAL at 09:38

## 2018-08-30 RX ADMIN — OXYCODONE HYDROCHLORIDE 10 MG: 10 TABLET ORAL at 11:47

## 2018-08-30 RX ADMIN — MIRTAZAPINE 7.5 MG: 15 TABLET, FILM COATED ORAL at 21:33

## 2018-08-30 RX ADMIN — OXYCODONE HYDROCHLORIDE 10 MG: 10 TABLET ORAL at 06:21

## 2018-08-30 RX ADMIN — DULOXETINE HYDROCHLORIDE 30 MG: 30 CAPSULE, DELAYED RELEASE ORAL at 09:38

## 2018-08-30 RX ADMIN — Medication 800 MCG: at 09:37

## 2018-08-30 RX ADMIN — ACETAMINOPHEN 650 MG: 325 TABLET, FILM COATED ORAL at 22:46

## 2018-08-30 RX ADMIN — LISINOPRIL 5 MG: 5 TABLET ORAL at 09:38

## 2018-08-30 RX ADMIN — ASPIRIN 81 MG 81 MG: 81 TABLET ORAL at 09:38

## 2018-08-30 RX ADMIN — OXYCODONE HYDROCHLORIDE 10 MG: 10 TABLET ORAL at 22:46

## 2018-08-30 RX ADMIN — PANTOPRAZOLE SODIUM 40 MG: 40 TABLET, DELAYED RELEASE ORAL at 06:21

## 2018-08-30 RX ADMIN — SENNOSIDES AND DOCUSATE SODIUM 1 TABLET: 8.6; 5 TABLET ORAL at 21:33

## 2018-08-30 RX ADMIN — LIDOCAINE 1 PATCH: 50 PATCH CUTANEOUS at 09:40

## 2018-08-30 RX ADMIN — Medication 100 MG: at 09:37

## 2018-08-30 RX ADMIN — MELATONIN 6 MG: at 21:33

## 2018-08-30 NOTE — ASSESSMENT & PLAN NOTE
· Status post recent PCI to RCA in July 2018  · Continue aspirin Plavix and statin  · If patient's hemoglobin continues to go down and if with evidence of GI bleeding, likely need to hold off on patient's aspirin and Plavix and have Cardiology to be on board

## 2018-08-30 NOTE — OCCUPATIONAL THERAPY NOTE
Occupational Therapy Treatment Note:       08/30/18 1055   Pain Assessment   Pain Assessment No/denies pain   Pain Score 4   Pain Type Chronic pain   Pain Location Back;Neck   ADL   Where Assessed Edge of bed   Grooming Assistance 5  Supervision/Setup   Grooming Deficit Setup; Increased time to complete;Wash/dry face; Teeth care;Brushing hair   UB Bathing Assistance 5  Supervision/Setup   UB Bathing Deficit Setup;Supervision/safety; Increased time to complete   UB Bathing Comments (assist with back area)   LB Bathing Assistance 5  Supervision/Setup   LB Bathing Deficit Setup;Supervision/safety; Increased time to complete;Right lower leg including foot; Left lower leg including foot   Transfers   Sit to Stand 5  Supervision   Additional items Increased time required;Verbal cues  (mostly CGA)   Stand to Sit 5  Supervision   Additional items Increased time required;Verbal cues  (mostly CGA)   Stand pivot 5  Supervision   Additional items Increased time required;Verbal cues   Cognition   Overall Cognitive Status WFL   Arousal/Participation Alert   Attention Within functional limits   Orientation Level Oriented X4   Memory Decreased recall of recent events   Following Commands Follows one step commands without difficulty   Activity Tolerance   Activity Tolerance Patient limited by fatigue   Medical Staff Made Aware ok to see per RN   Assessment   Assessment Patient participated in skilled OT with focus on bed mobiity, functional sitting tolerance EOB, bathing, dressing, activity endurance  Patient just completed PT and presents seated partially EOB with left leg supported on bed  Patient motivated to participate in skilled OT with focus on activity endurance, self care functional task performance, dressing, bathing, and cognitive task performance skills  Patient pleasant and motivated reporting some decreased endurance today   Patient self initiates rest periods and reports looking forward to "eating at 11" and motivated with ordering her lunch  Patient would benefit from Home OT with focus on increasing functional independence skills within her own enviroment, reassessment of need for additional DME, functional safety assessment within her own enviroment      Plan   Treatment Interventions ADL retraining   Goal Expiration Date 09/06/18   Treatment Day 1   OT Frequency 3-5x/wk   Recommendation   OT Discharge Recommendation Home OT  (updated discharge plan to Home OT with OTR approval)   Equipment Recommended (pt reports owning tub seat)   OT - OK to Discharge (when medically cleared)   Barthel Index   Feeding 10   Bathing 5   Grooming Score 5   Dressing Score 5   Bladder Score 10   Bowels Score 10   Toilet Use Score 10   Transfers (Bed/Chair) Score 10   Mobility (Level Surface) Score 10   Stairs Score 5   Barthel Index Score 80   Modified Gray Scale   Modified [place] Scale 4   JOSE Bowers

## 2018-08-30 NOTE — ASSESSMENT & PLAN NOTE
Still having some melena  Exact reason not clear  Hold off apixaban in the meantime due to decreasing hemoglobin as well as there is still some black stools  Patient continues to have melena  EGD done on 8/20/2018 unremarkable for any source of bleeding  GI on board  Underwent capsule endoscopy today; still ongoing  Follow-up results

## 2018-08-30 NOTE — PLAN OF CARE
Problem: OCCUPATIONAL THERAPY ADULT  Goal: Performs self-care activities at highest level of function for planned discharge setting  See evaluation for individualized goals  Treatment Interventions: ADL retraining, Functional transfer training, Endurance training, Cognitive reorientation, Patient/family training, Equipment evaluation/education, Compensatory technique education, Continued evaluation, Energy conservation, Activityengagement          See flowsheet documentation for full assessment, interventions and recommendations  Outcome: Progressing  Limitation: Decreased ADL status, Decreased endurance, Decreased self-care trans, Decreased high-level ADLs, Decreased Safe judgement during ADL  Prognosis: Fair  Assessment: Patient participated in skilled OT with focus on bed mobiity, functional sitting tolerance EOB, bathing, dressing, activity endurance  Patient just completed PT and presents seated partially EOB with left leg supported on bed  Patient motivated to participate in skilled OT with focus on activity endurance, self care functional task performance, dressing, bathing, and cognitive task performance skills  Patient pleasant and motivated reporting some decreased endurance today  Patient self initiates rest periods and reports looking forward to "eating at 11" and motivated with ordering her lunch  Patient would benefit from Home OT with focus on increasing functional independence skills within her own enviroment, reassessment of need for additional DME, functional safety assessment within her own enviroment        OT Discharge Recommendation: Home OT (updated discharge plan to Home OT with OTR approval)  OT - OK to Discharge:  (when medically cleared)  JOSE Johnston

## 2018-08-30 NOTE — ASSESSMENT & PLAN NOTE
· Today, patient's hemoglobin dropped from 9 2 to 8 4 today  · Patient told me that she still has black stools  · Acute anemia suspect blood loss related, due to GI bleeding  · Hemoglobin dropped to 6 8  - s/p 1 U PRBC  · MRI knee has effusion but not hemorrhagic - however 25 cc blood aspirated from the knee joint  · CT abdomen negative for intraperitoneal bleed  · Recent colonoscopy last month did show evidence of colitis in the cecum and ascending colon; EGD that time showed mild esophagitis and gastritis with possible GAVE  · Cont protonix 40 Twice a day  · EGD - on 8/28/2018 did not show any active bleeding  · Patient continues to have melena and hemoglobin slowly trending down  · Underwent capsule endoscopy today, still ongoing    Follow-up results

## 2018-08-30 NOTE — PLAN OF CARE
Problem: OCCUPATIONAL THERAPY ADULT  Goal: Performs self-care activities at highest level of function for planned discharge setting  See evaluation for individualized goals  Treatment Interventions: ADL retraining, Functional transfer training, Endurance training, Cognitive reorientation, Patient/family training, Equipment evaluation/education, Compensatory technique education, Continued evaluation, Energy conservation, Activityengagement          See flowsheet documentation for full assessment, interventions and recommendations  Outcome: Progressing  Limitation: Decreased ADL status, Decreased endurance, Decreased self-care trans, Decreased high-level ADLs, Decreased Safe judgement during ADL  Prognosis: Fair  Assessment: Patient participated in skilled OT with focus on bed mobiity, functional sitting tolerance EOB, bathing, dressing, activity endurance  Patient just completed PT and presents seated partially EOB with left leg supported on bed  Patient motivated to participate in skilled OT with focus on activity endurance, self care functional task performance, dressing, bathing, and cognitive task performance skills  Patient pleasant and motivated reporting some decreased endurance today  Patient self initiates rest periods and reports looking forward to "eating at 11" and motivated with ordering her lunch  Patient would benefit from Home OT with focus on increasing functional independence skills within her own enviroment, reassessment of need for additional DME, functional safety assessment within her own enviroment        OT Discharge Recommendation: Home OT (updated discharge plan to Home OT with OTR approval)  OT - OK to Discharge:  (when medically cleared)  JOSE Medeiros

## 2018-08-30 NOTE — ASSESSMENT & PLAN NOTE
· Currently in NSR, status post cardioversion and in previous admission  · Continue amiodarone and metoprolol  · With patient's hemoglobin continues to drop and patient still having black stools (melena), we will hold off Eliquis in the meantime  May restart once with stability with patient's hemoglobin and if no GI bleeding anymore

## 2018-08-30 NOTE — ASSESSMENT & PLAN NOTE
Resolved  Patient's Lasix was started again  CPK done several days ago was mildly elevated at 520  Status post IV fluids  Monitor  Avoid nephrotoxins  Avoid hypotension
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
## 2018-08-30 NOTE — ASSESSMENT & PLAN NOTE
Although patient has atrial fibrillation and would need Plavix, aspirin and direct oral anticoagulant; due to the drop in hemoglobin from 8 5 to 7 9; would put these on hold for now until known to be stable  Blood transfusion consent in chart

## 2018-08-30 NOTE — SOCIAL WORK
Pt discussed in care coordination rounds  [place] letter received but pt still adamantly declining SNF  Wants to go home with Charla Bedolla  Already active with JAROCHO HUNTER to advise same of discharge date  Pt will ask her landlord or sister to transport her upon d/c but may need W/C bar HUNTER to follow
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
## 2018-08-30 NOTE — OCCUPATIONAL THERAPY NOTE
Occupational Therapy Treatment Note:       08/30/18 1055   Pain Assessment   Pain Assessment No/denies pain   Pain Score 4   Pain Type Chronic pain   Pain Location Back;Neck   ADL   Where Assessed Edge of bed   Grooming Assistance 5  Supervision/Setup   Grooming Deficit Setup; Increased time to complete;Wash/dry face; Teeth care;Brushing hair   UB Bathing Assistance 4  Minimal Assistance   UB Bathing Deficit Setup;Supervision/safety; Increased time to complete   UB Bathing Comments (assist with back area)   LB Bathing Assistance 4  Minimal Assistance   LB Bathing Deficit Setup;Supervision/safety; Increased time to complete;Right lower leg including foot; Left lower leg including foot   Transfers   Sit to Stand 4  Minimal assistance   Additional items Increased time required;Verbal cues  (mostly CGA)   Stand to Sit 4  Minimal assistance   Additional items Increased time required;Verbal cues  (mostly CGA)   Cognition   Overall Cognitive Status Impaired   Arousal/Participation Alert   Attention Within functional limits   Orientation Level Oriented X4   Memory Decreased recall of precautions   Following Commands Follows one step commands without difficulty   Activity Tolerance   Activity Tolerance Patient limited by fatigue   Medical Staff Made Aware ok to see per RN   Assessment   Assessment Patient participated in skilled OT with focus on bed mobiity, functional sitting tolerance EOB, bathing, dressing, activity endurance  Patient just completed PT and presents seated partially EOB with left leg supported on bed  Patient motivated to participate in skilled OT with focus on activity endurance, self care functional task performance, dressing, bathing, and cognitive task performance skills  Patient pleasant and motivated reporting some decreased endurance today  Patient self initiates rest periods and reports looking forward to "eating at 11" and motivated with ordering her lunch   Patient would benefit from Home OT with focus on increasing functional independence skills within her own enviroment, reassessment of need for additional DME, functional safety assessment within her own enviroment      Plan   Treatment Interventions ADL retraining   Goal Expiration Date 09/06/18   Treatment Day 1   OT Frequency 3-5x/wk   Recommendation   OT Discharge Recommendation Home OT  (updated discharge plan to Home OT with OTR approval)   Equipment Recommended (pt reports owning tub seat)   OT - OK to Discharge (when medically cleared)   Barthel Index   Feeding 10   Bathing 5   Grooming Score 5   Dressing Score 5   Bladder Score 10   Bowels Score 10   Toilet Use Score 10   Transfers (Bed/Chair) Score 10   Mobility (Level Surface) Score 10   Stairs Score 5   Barthel Index Score 80   Modified [place] Scale   Modified [place] Scale 4   Emmy Hdez, JOSE

## 2018-08-30 NOTE — PLAN OF CARE
Problem: PHYSICAL THERAPY ADULT  Goal: Performs mobility at highest level of function for planned discharge setting  See evaluation for individualized goals  Treatment/Interventions: Functional transfer training, LE strengthening/ROM, Therapeutic exercise, Endurance training, Patient/family training, Equipment eval/education, Bed mobility, Gait training  Equipment Recommended: Ruben Lopez       See flowsheet documentation for full assessment, interventions and recommendations  Outcome: Progressing  Prognosis: Good  Problem List: Decreased strength, Decreased endurance, Impaired balance, Decreased mobility, Pain  Assessment: PT INITIATED TREATMENT SESSION IN ORDER TO ASSIST PATIENT IN ACHIEVING GOALS TO IMPROVE TRANSFERS, AMBULATION, STRENGTH, AND OVERALL ACTIVITY TOLERANCE  PATIENT'S SELF REPORTED GOAL IS TO "NOT DO A LOT WITH THERAPY BECAUSE SHE NEEDS TO EAT"  PATIENT RECENTLY FINISHED COLONOSCOPY PREPARATION AND HAS BEEN NPO- PRESENTS WITH REDUCED ENERGY  SHE REQUIRED S FOR PERFORMANCE OF BED MOBILITY, SIT<-->STAND TRANSFERS, STAND PIVOT TRANSFER, AND SHORT DISTANCE AMBULATION  PATIENT AMBULATED 2 FEET W/O USE OF AD (BED <-->CHAIR)  SHE DEMONSTRATED GOOD TOLERANCE FOR ACTIVE PERFORMANCE OF B/L LE THERE-EX SEATED IN CHAIR  NEXT SESSION PLAN TO PROGRESS GAIT TO AT LEAST HOUSEHOLD DISTANCES W/ USE OF RW  PT D/C RECOMMENDATION IS FOR HOME WITH HHPT, USE OF RW, AND FAMILY ASSIST PRN  SHE WILL BENEFIT FROM CONTINUED SKILLED PT THIS ADMISSION TO ACHIEVE MAXIMAL FUNCTION AND SAFETY  Barriers to Discharge: Decreased caregiver support, Inaccessible home environment     Recommendation: (S) Home PT, Home with family support     PT - OK to Discharge: (S) No (DEMONSTRATE IMPROVED MOBILITY WITH PT )    See flowsheet documentation for full assessment     Narayan Moreira, PT

## 2018-08-30 NOTE — PERIOPERATIVE NURSING NOTE
Saw pt at 0646  I explained procedure  Swallowed capsule without difficulty  Remained in esophagus for 40-50 seconds  Stood up  Drank water  Visualized stomach

## 2018-08-31 ENCOUNTER — TRANSITIONAL CARE MANAGEMENT (OUTPATIENT)
Dept: FAMILY MEDICINE CLINIC | Facility: CLINIC | Age: 78
End: 2018-08-31

## 2018-08-31 LAB
ANION GAP SERPL CALCULATED.3IONS-SCNC: 7 MMOL/L (ref 4–13)
BUN SERPL-MCNC: 15 MG/DL (ref 5–25)
CALCIUM SERPL-MCNC: 8.3 MG/DL (ref 8.3–10.1)
CHLORIDE SERPL-SCNC: 106 MMOL/L (ref 100–108)
CO2 SERPL-SCNC: 29 MMOL/L (ref 21–32)
CREAT SERPL-MCNC: 0.91 MG/DL (ref 0.6–1.3)
ERYTHROCYTE [DISTWIDTH] IN BLOOD BY AUTOMATED COUNT: 15.9 % (ref 11.6–15.1)
GFR SERPL CREATININE-BSD FRML MDRD: 61 ML/MIN/1.73SQ M
GLUCOSE SERPL-MCNC: 90 MG/DL (ref 65–140)
HCT VFR BLD AUTO: 27.5 % (ref 34.8–46.1)
HCT VFR BLD AUTO: 29.1 % (ref 34.8–46.1)
HGB BLD-MCNC: 8.1 G/DL (ref 11.5–15.4)
HGB BLD-MCNC: 8.6 G/DL (ref 11.5–15.4)
MCH RBC QN AUTO: 30.3 PG (ref 26.8–34.3)
MCHC RBC AUTO-ENTMCNC: 29.5 G/DL (ref 31.4–37.4)
MCV RBC AUTO: 103 FL (ref 82–98)
PLATELET # BLD AUTO: 244 THOUSANDS/UL (ref 149–390)
PMV BLD AUTO: 9.1 FL (ref 8.9–12.7)
POTASSIUM SERPL-SCNC: 4 MMOL/L (ref 3.5–5.3)
RBC # BLD AUTO: 2.67 MILLION/UL (ref 3.81–5.12)
SODIUM SERPL-SCNC: 142 MMOL/L (ref 136–145)
WBC # BLD AUTO: 5.5 THOUSAND/UL (ref 4.31–10.16)

## 2018-08-31 PROCEDURE — 85027 COMPLETE CBC AUTOMATED: CPT | Performed by: INTERNAL MEDICINE

## 2018-08-31 PROCEDURE — 80048 BASIC METABOLIC PNL TOTAL CA: CPT | Performed by: INTERNAL MEDICINE

## 2018-08-31 PROCEDURE — 99233 SBSQ HOSP IP/OBS HIGH 50: CPT | Performed by: GENERAL PRACTICE

## 2018-08-31 PROCEDURE — 85018 HEMOGLOBIN: CPT | Performed by: GENERAL PRACTICE

## 2018-08-31 PROCEDURE — 85014 HEMATOCRIT: CPT | Performed by: GENERAL PRACTICE

## 2018-08-31 RX ADMIN — MELATONIN 6 MG: at 21:10

## 2018-08-31 RX ADMIN — FUROSEMIDE 40 MG: 40 TABLET ORAL at 08:28

## 2018-08-31 RX ADMIN — ACETAMINOPHEN 650 MG: 325 TABLET, FILM COATED ORAL at 08:26

## 2018-08-31 RX ADMIN — POTASSIUM CHLORIDE 20 MEQ: 1500 TABLET, EXTENDED RELEASE ORAL at 08:27

## 2018-08-31 RX ADMIN — PANTOPRAZOLE SODIUM 40 MG: 40 TABLET, DELAYED RELEASE ORAL at 16:02

## 2018-08-31 RX ADMIN — LIDOCAINE 2 PATCH: 50 PATCH CUTANEOUS at 08:27

## 2018-08-31 RX ADMIN — METOPROLOL SUCCINATE 50 MG: 50 TABLET, EXTENDED RELEASE ORAL at 08:27

## 2018-08-31 RX ADMIN — ACETAMINOPHEN 650 MG: 325 TABLET, FILM COATED ORAL at 21:10

## 2018-08-31 RX ADMIN — ACETAMINOPHEN 650 MG: 325 TABLET, FILM COATED ORAL at 17:07

## 2018-08-31 RX ADMIN — ASPIRIN 81 MG 81 MG: 81 TABLET ORAL at 08:27

## 2018-08-31 RX ADMIN — APIXABAN 5 MG: 5 TABLET, FILM COATED ORAL at 08:26

## 2018-08-31 RX ADMIN — NORTRIPTYLINE HYDROCHLORIDE 20 MG: 10 CAPSULE ORAL at 21:38

## 2018-08-31 RX ADMIN — ACETAMINOPHEN 650 MG: 325 TABLET, FILM COATED ORAL at 12:38

## 2018-08-31 RX ADMIN — SENNOSIDES AND DOCUSATE SODIUM 1 TABLET: 8.6; 5 TABLET ORAL at 21:10

## 2018-08-31 RX ADMIN — ALPRAZOLAM 0.25 MG: 0.25 TABLET ORAL at 12:38

## 2018-08-31 RX ADMIN — MIRTAZAPINE 7.5 MG: 15 TABLET, FILM COATED ORAL at 21:10

## 2018-08-31 RX ADMIN — CLOPIDOGREL 75 MG: 75 TABLET, FILM COATED ORAL at 08:26

## 2018-08-31 RX ADMIN — OXYCODONE HYDROCHLORIDE 10 MG: 10 TABLET ORAL at 21:10

## 2018-08-31 RX ADMIN — DULOXETINE HYDROCHLORIDE 30 MG: 30 CAPSULE, DELAYED RELEASE ORAL at 08:27

## 2018-08-31 RX ADMIN — Medication 100 MG: at 17:07

## 2018-08-31 RX ADMIN — Medication 100 MG: at 08:27

## 2018-08-31 RX ADMIN — Medication 800 MCG: at 08:26

## 2018-08-31 RX ADMIN — Medication 800 MCG: at 17:07

## 2018-08-31 RX ADMIN — LISINOPRIL 5 MG: 5 TABLET ORAL at 08:26

## 2018-08-31 RX ADMIN — AMIODARONE HYDROCHLORIDE 200 MG: 200 TABLET ORAL at 08:27

## 2018-08-31 RX ADMIN — APIXABAN 5 MG: 5 TABLET, FILM COATED ORAL at 17:07

## 2018-08-31 RX ADMIN — PANTOPRAZOLE SODIUM 40 MG: 40 TABLET, DELAYED RELEASE ORAL at 07:28

## 2018-08-31 RX ADMIN — OXYCODONE HYDROCHLORIDE 10 MG: 10 TABLET ORAL at 15:04

## 2018-08-31 RX ADMIN — OXYCODONE HYDROCHLORIDE 10 MG: 10 TABLET ORAL at 05:48

## 2018-08-31 NOTE — SOCIAL WORK
Cm reviewed patient with Dr Alysia Anand  Patient's hemoglobin remains low  Awaiting capsule study results  [facility] appointment card given by Luis Veliz  Patient's landlord or sister may be able to transport  If not able to, patient may need [facility] transport arranged and is aware of cost   [facility] following  Cm continues to follow and work on patient's discharge plan

## 2018-08-31 NOTE — ASSESSMENT & PLAN NOTE
· Currently in NSR, status post cardioversion and in previous admission  · Continue amiodarone and metoprolol  · C/w Eliquis w/ high CHADS-VaSc 8

## 2018-08-31 NOTE — SOCIAL WORK
Patient identified as HRR per criteria  Call made to DC appointment hotline with information as required for CM support follow up  Josep Cobos following for OP CC needs
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
## 2018-08-31 NOTE — DISCHARGE INSTR - APPOINTMENTS
[facility]  Appointment  Tuesday, Sept 4th, 2018 at 10:15 am  Dr Ana Solis  [phone]    [facility] [address] [place] [address]
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
## 2018-08-31 NOTE — ASSESSMENT & PLAN NOTE
· Appreciate Orthopedic input, knee immobilized in place, recommend nonweightbearing as per Orthopedics appreciate their input  · MRI - mod to large effusion, tear in medial meniscus - f/u ortho rec  · Status post arthrocentesis with 25 cc blood by Orthopedics followed by steroid injection    Knee pain is resolved

## 2018-08-31 NOTE — RESTORATIVE TECHNICIAN NOTE
Restorative Specialist Mobility Note       Activity: Other (Comment) (Patient refused OOB to chair for lunch, agreed to sit edge of bed for lunch)     Assistive Device: None

## 2018-08-31 NOTE — ASSESSMENT & PLAN NOTE
· 8/30: patient's hemoglobin dropped from 9 2 to 8 4 today  Patient still having black stools  Capsule showed small bowel AVM that was not acutely bleeding  · 8/31: Hgb stable in 8s and stool brown  GI bleeding resolved  Spoke w/ Dr Dotty Prakash from cardio about 2 episodes of bleeding since being on triple therapy, and she rec stopping ASA  After 12 months plavix and Eliquis, switch Plavix to ASA  · Acute anemia suspect blood loss related, due to GI bleeding  · Hemoglobin dropped to 6 8  - s/p 1 U PRBC  · MRI knee has effusion but not hemorrhagic - however 25 cc blood aspirated from the knee joint  · CT abdomen negative for intraperitoneal bleed  · Recent colonoscopy last month did show evidence of colitis in the cecum and ascending colon; EGD that time showed mild esophagitis and gastritis with possible GAVE  · Cont protonix 40 Twice a day  · EGD - on 8/28/2018 did not show any active bleeding  · Patient continues to have melena and hemoglobin slowly trending down

## 2018-08-31 NOTE — ASSESSMENT & PLAN NOTE
· Status post recent PCI to RCA in July 2018  · Continue Plavix and statin  · 8/31: cardio gave ok to stop ASA and c/w plavix

## 2018-08-31 NOTE — ASSESSMENT & PLAN NOTE
· PT recommended rehab, however patient is persistently refusing that as she wants to go home   · Pt cleared by Neuropsych - has capacity to make medical decisions  · 8/31: pt cleared for d/c w/ home PT and OT    D/c tomorrow in AM when miranda can let her in

## 2018-08-31 NOTE — SOCIAL WORK
Cm spoke with patient who is unable to find transport home  Patient to discharge home tomorrow  Patient to be transported home by GORDON Valencia (voucher approved and at [employer])  SUSANNAH French aware of this  Patient informed as well and patient to confirm her landlord will be home to let her in her apartment  Cm following  Patient confirmed her landlord will be home tomorrow until 12PM NOON  Dr Mc Spangler aware of this    Patient to discharge around 11AM

## 2018-09-01 VITALS
RESPIRATION RATE: 16 BRPM | HEART RATE: 58 BPM | HEIGHT: 66 IN | WEIGHT: 156.53 LBS | DIASTOLIC BLOOD PRESSURE: 56 MMHG | OXYGEN SATURATION: 98 % | BODY MASS INDEX: 25.16 KG/M2 | SYSTOLIC BLOOD PRESSURE: 113 MMHG | TEMPERATURE: 98.4 F

## 2018-09-01 PROBLEM — I48.91 ATRIAL FIBRILLATION WITH RVR (HCC): Chronic | Status: RESOLVED | Noted: 2018-05-14 | Resolved: 2018-09-01

## 2018-09-01 PROBLEM — S37.009A: Status: RESOLVED | Noted: 2018-08-21 | Resolved: 2018-09-01

## 2018-09-01 PROBLEM — D62 ACUTE BLOOD LOSS ANEMIA: Status: RESOLVED | Noted: 2018-07-25 | Resolved: 2018-09-01

## 2018-09-01 PROCEDURE — 99239 HOSP IP/OBS DSCHRG MGMT >30: CPT | Performed by: GENERAL PRACTICE

## 2018-09-01 RX ORDER — LANOLIN ALCOHOL/MO/W.PET/CERES
100 CREAM (GRAM) TOPICAL DAILY
Qty: 30 TABLET | Refills: 0 | Status: SHIPPED | OUTPATIENT
Start: 2018-09-01 | End: 2018-10-02 | Stop reason: SDUPTHER

## 2018-09-01 RX ORDER — FOLIC ACID 1 MG/1
1 TABLET ORAL DAILY
Qty: 30 TABLET | Refills: 0 | Status: SHIPPED | OUTPATIENT
Start: 2018-09-01 | End: 2018-10-02 | Stop reason: SDUPTHER

## 2018-09-01 RX ORDER — ACETAMINOPHEN 325 MG/1
650 TABLET ORAL EVERY 6 HOURS
Qty: 100 TABLET | Refills: 0 | Status: SHIPPED | OUTPATIENT
Start: 2018-09-01 | End: 2020-01-01 | Stop reason: HOSPADM

## 2018-09-01 RX ORDER — AMIODARONE HYDROCHLORIDE 200 MG/1
200 TABLET ORAL DAILY
Qty: 30 TABLET | Refills: 0 | Status: SHIPPED | OUTPATIENT
Start: 2018-09-01 | End: 2018-09-02 | Stop reason: SDUPTHER

## 2018-09-01 RX ORDER — NORTRIPTYLINE HYDROCHLORIDE 10 MG/1
20 CAPSULE ORAL
Qty: 60 CAPSULE | Refills: 0 | Status: SHIPPED | OUTPATIENT
Start: 2018-09-01 | End: 2018-10-02 | Stop reason: SDUPTHER

## 2018-09-01 RX ORDER — LIDOCAINE 50 MG/G
OINTMENT TOPICAL AS NEEDED
Qty: 35.44 G | Refills: 0 | Status: SHIPPED | OUTPATIENT
Start: 2018-09-01 | End: 2018-10-02

## 2018-09-01 RX ORDER — PANTOPRAZOLE SODIUM 40 MG/1
40 TABLET, DELAYED RELEASE ORAL
Qty: 60 TABLET | Refills: 0 | Status: SHIPPED | OUTPATIENT
Start: 2018-09-01 | End: 2018-10-02 | Stop reason: SDUPTHER

## 2018-09-01 RX ADMIN — LISINOPRIL 5 MG: 5 TABLET ORAL at 08:19

## 2018-09-01 RX ADMIN — DULOXETINE HYDROCHLORIDE 30 MG: 30 CAPSULE, DELAYED RELEASE ORAL at 08:19

## 2018-09-01 RX ADMIN — OXYCODONE HYDROCHLORIDE 10 MG: 10 TABLET ORAL at 09:37

## 2018-09-01 RX ADMIN — Medication 100 MG: at 08:19

## 2018-09-01 RX ADMIN — METOPROLOL SUCCINATE 50 MG: 50 TABLET, EXTENDED RELEASE ORAL at 08:19

## 2018-09-01 RX ADMIN — OXYCODONE HYDROCHLORIDE 10 MG: 10 TABLET ORAL at 03:55

## 2018-09-01 RX ADMIN — ACETAMINOPHEN 650 MG: 325 TABLET, FILM COATED ORAL at 08:19

## 2018-09-01 RX ADMIN — FUROSEMIDE 40 MG: 40 TABLET ORAL at 08:19

## 2018-09-01 RX ADMIN — POTASSIUM CHLORIDE 20 MEQ: 1500 TABLET, EXTENDED RELEASE ORAL at 08:19

## 2018-09-01 RX ADMIN — PANTOPRAZOLE SODIUM 40 MG: 40 TABLET, DELAYED RELEASE ORAL at 06:03

## 2018-09-01 RX ADMIN — AMIODARONE HYDROCHLORIDE 200 MG: 200 TABLET ORAL at 08:19

## 2018-09-01 RX ADMIN — CLOPIDOGREL 75 MG: 75 TABLET, FILM COATED ORAL at 08:19

## 2018-09-01 RX ADMIN — Medication 800 MCG: at 08:19

## 2018-09-01 RX ADMIN — APIXABAN 5 MG: 5 TABLET, FILM COATED ORAL at 08:19
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
## 2018-09-01 NOTE — DISCHARGE INSTRUCTIONS
Please stop aspirin, but continue with Plavix and Eliquis  Dr Eric Harris should check CBC and BMP at follow up visit  Discharge Instructions - Delma Mak 69 y o  female MRN: [id number]  Unit/Bed#: [facility] 827-01    Weight Bearing Status:                                           Weight bearing as tolerated left leg    Pain:  Continue analgesics as directed    PT/OT:  Continue PT/OT on outpatient basis as directed    Appt Instructions:   Please call the clinic at [phone] to f/u with Dr Venus Rios as needed    Contact the office sooner if you experience any increased numbness/tingling in the extremities
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
## 2018-09-01 NOTE — ASSESSMENT & PLAN NOTE
· 8/30: patient's hemoglobin dropped from 9 2 to 8 4 today  Patient still having black stools  Capsule showed small bowel AVM that was not acutely bleeding  · 8/31: Hgb stable in 8s and stool brown  GI bleeding resolved  Spoke w/   [facility] from cardio about 2 episodes of bleeding since being on triple therapy, and she rec stopping ASA  After 12 months plavix and Eliquis, switch Plavix to ASA  · Acute anemia suspect blood loss related, due to GI bleeding  · Hemoglobin dropped to 6 8  - s/p 1 U PRBC  · MRI knee has effusion but not hemorrhagic - however 25 cc blood aspirated from the knee joint  · CT abdomen negative for intraperitoneal bleed  · Recent colonoscopy last month did show evidence of colitis in the cecum and ascending colon; EGD that time showed mild esophagitis and gastritis with possible GAVE  · Cont protonix 40 Twice a day  · EGD - on 8/28/2018 did not show any active bleeding  · Patient continues to have melena and hemoglobin slowly trending down

## 2018-09-01 NOTE — ASSESSMENT & PLAN NOTE
· PT recommended rehab, however patient is persistently refusing that as she wants to go home   · Pt cleared by Neuropsych - has capacity to make medical decisions  · 8/31: pt cleared for d/c w/ home PT and OT

## 2018-09-02 ENCOUNTER — TELEPHONE (OUTPATIENT)
Dept: FAMILY MEDICINE CLINIC | Facility: CLINIC | Age: 78
End: 2018-09-02

## 2018-09-02 DIAGNOSIS — K92.2 GASTROINTESTINAL HEMORRHAGE, UNSPECIFIED GASTROINTESTINAL HEMORRHAGE TYPE: ICD-10-CM

## 2018-09-02 DIAGNOSIS — I10 ESSENTIAL HYPERTENSION: ICD-10-CM

## 2018-09-02 DIAGNOSIS — I48.91 ATRIAL FIBRILLATION WITH RVR (HCC): Chronic | ICD-10-CM

## 2018-09-02 RX ORDER — METOPROLOL SUCCINATE 50 MG/1
50 TABLET, EXTENDED RELEASE ORAL EVERY 12 HOURS SCHEDULED
Qty: 60 TABLET | Refills: 0 | Status: SHIPPED | OUTPATIENT
Start: 2018-09-02 | End: 2018-10-10 | Stop reason: SDUPTHER

## 2018-09-02 RX ORDER — AMIODARONE HYDROCHLORIDE 200 MG/1
200 TABLET ORAL DAILY
Qty: 30 TABLET | Refills: 0 | Status: SHIPPED | OUTPATIENT
Start: 2018-09-02 | End: 2018-11-20 | Stop reason: SDUPTHER

## 2018-09-02 RX ORDER — CLOPIDOGREL BISULFATE 75 MG/1
75 TABLET ORAL DAILY
Qty: 30 TABLET | Refills: 0 | Status: SHIPPED | OUTPATIENT
Start: 2018-09-02 | End: 2018-09-14 | Stop reason: SDUPTHER

## 2018-09-02 RX ORDER — AMOXICILLIN 250 MG
1 CAPSULE ORAL
Qty: 30 TABLET | Refills: 0 | Status: SHIPPED | OUTPATIENT
Start: 2018-09-02 | End: 2020-01-01 | Stop reason: HOSPADM

## 2018-09-02 RX ORDER — LISINOPRIL 5 MG/1
5 TABLET ORAL DAILY
Qty: 30 TABLET | Refills: 0 | Status: SHIPPED | OUTPATIENT
Start: 2018-09-02 | End: 2018-10-26 | Stop reason: SDUPTHER

## 2018-09-02 RX ORDER — MIRTAZAPINE 7.5 MG/1
7.5 TABLET, FILM COATED ORAL
Qty: 30 TABLET | Refills: 0 | Status: SHIPPED | OUTPATIENT
Start: 2018-09-02 | End: 2018-10-02 | Stop reason: SDUPTHER

## 2018-09-05 ENCOUNTER — TELEPHONE (OUTPATIENT)
Dept: FAMILY MEDICINE CLINIC | Facility: CLINIC | Age: 78
End: 2018-09-05

## 2018-09-12 ENCOUNTER — TELEPHONE (OUTPATIENT)
Dept: FAMILY MEDICINE CLINIC | Facility: CLINIC | Age: 78
End: 2018-09-12

## 2018-09-12 NOTE — TELEPHONE ENCOUNTER
Mika Negron had to cancel 2 M[facility] visits because she is sick  She has been vomiting    She thinks it is from her medication

## 2018-09-14 ENCOUNTER — TELEPHONE (OUTPATIENT)
Dept: GASTROENTEROLOGY | Facility: CLINIC | Age: 78
End: 2018-09-14

## 2018-09-14 ENCOUNTER — TELEPHONE (OUTPATIENT)
Dept: GASTROENTEROLOGY | Facility: MEDICAL CENTER | Age: 78
End: 2018-09-14

## 2018-09-14 DIAGNOSIS — F32.89 OTHER DEPRESSION: Chronic | ICD-10-CM

## 2018-09-14 DIAGNOSIS — I50.22 CHRONIC SYSTOLIC HEART FAILURE (HCC): Chronic | ICD-10-CM

## 2018-09-14 DIAGNOSIS — I48.91 ATRIAL FIBRILLATION WITH RVR (HCC): Chronic | ICD-10-CM

## 2018-09-14 RX ORDER — CLOPIDOGREL BISULFATE 75 MG/1
75 TABLET ORAL DAILY
Qty: 30 TABLET | Refills: 3 | Status: SHIPPED | OUTPATIENT
Start: 2018-09-14 | End: 2018-09-18 | Stop reason: SDUPTHER

## 2018-09-14 RX ORDER — ALPRAZOLAM 0.25 MG/1
0.25 TABLET, ORALLY DISINTEGRATING ORAL
Qty: 30 TABLET | Refills: 2 | OUTPATIENT
Start: 2018-09-14 | End: 2018-09-18 | Stop reason: SDUPTHER

## 2018-09-14 RX ORDER — FUROSEMIDE 40 MG/1
40 TABLET ORAL DAILY
Qty: 30 TABLET | Refills: 3 | Status: SHIPPED | OUTPATIENT
Start: 2018-09-14 | End: 2018-09-18 | Stop reason: SDUPTHER
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
## 2018-09-14 NOTE — TELEPHONE ENCOUNTER
Bre holcomb visiting nirse from [employer]  called in for these prescription to be filled for Monique

## 2018-09-17 ENCOUNTER — TELEPHONE (OUTPATIENT)
Dept: FAMILY MEDICINE CLINIC | Facility: CLINIC | Age: 78
End: 2018-09-17

## 2018-09-17 NOTE — TELEPHONE ENCOUNTER
Reported back to Renetta Webb at [phone] that sister Lee Mckinnon will do a well check on Alyssa Correa

## 2018-09-17 NOTE — TELEPHONE ENCOUNTER
Octaviano Villarreal from [address] [place] [address] visiting nurses called on a well check for Monique  Stated she did not answer the door or phone calls and contacted our office to reach out to her sister Sarah Cutler in order to do a well check on her and make sure everything is ok with Monique

## 2018-09-18 DIAGNOSIS — F32.89 OTHER DEPRESSION: Chronic | ICD-10-CM

## 2018-09-18 DIAGNOSIS — I50.22 CHRONIC SYSTOLIC HEART FAILURE (HCC): Chronic | ICD-10-CM

## 2018-09-18 DIAGNOSIS — I48.91 ATRIAL FIBRILLATION WITH RVR (HCC): Chronic | ICD-10-CM

## 2018-09-18 RX ORDER — FUROSEMIDE 40 MG/1
40 TABLET ORAL DAILY
Qty: 30 TABLET | Refills: 0 | Status: ON HOLD | OUTPATIENT
Start: 2018-09-18 | End: 2019-07-16 | Stop reason: SDUPTHER

## 2018-09-18 RX ORDER — ALPRAZOLAM 0.25 MG/1
0.25 TABLET, ORALLY DISINTEGRATING ORAL
Qty: 30 TABLET | Refills: 2 | OUTPATIENT
Start: 2018-09-18 | End: 2019-01-10 | Stop reason: HOSPADM

## 2018-09-18 RX ORDER — CLOPIDOGREL BISULFATE 75 MG/1
75 TABLET ORAL DAILY
Qty: 30 TABLET | Refills: 5 | Status: ON HOLD | OUTPATIENT
Start: 2018-09-18 | End: 2019-07-16 | Stop reason: SDUPTHER

## 2018-09-18 RX ORDER — FUROSEMIDE 40 MG/1
40 TABLET ORAL DAILY
Qty: 30 TABLET | Refills: 5 | Status: SHIPPED | OUTPATIENT
Start: 2018-09-18 | End: 2018-09-29 | Stop reason: HOSPADM

## 2018-09-18 NOTE — TELEPHONE ENCOUNTER
Henrique Allen is with Darrel Samuel and wants to continue the home care in order to prevent readmission to the hospital  Could you sign the orders if she sends them  Henrique Allen also states that Darrel Samuel has no Lasix or Plavix, and Alprazolam in her home  Should Denisse be taking these medications ? Please advise    Henrique Allen [phone]

## 2018-09-18 NOTE — TELEPHONE ENCOUNTER
Patient has been without lasix but does have potasium, Patient has two puss swelling in both legs due to not having having lasix  [facility]   Please advise

## 2018-09-19 ENCOUNTER — TELEPHONE (OUTPATIENT)
Dept: GASTROENTEROLOGY | Facility: CLINIC | Age: 78
End: 2018-09-19

## 2018-09-19 NOTE — TELEPHONE ENCOUNTER
I spoke with pt earlier to reschedule procedure with Dr Guerin in 3-4 months  Pt aware procedure on 9/20/2018 is cancelled  I will call pt with another date

## 2018-09-19 NOTE — TELEPHONE ENCOUNTER
Dr Cezar Mckee pt    Pt called in questioning why her ERCP for tomorrow is cancelled  Pt disconnected before I could transfer to a procedure   Please advise

## 2018-09-21 ENCOUNTER — TELEPHONE (OUTPATIENT)
Dept: FAMILY MEDICINE CLINIC | Facility: CLINIC | Age: 78
End: 2018-09-21

## 2018-09-21 NOTE — TELEPHONE ENCOUNTER
Tiny Trevino from [address] [place] [address] Petra AMESA called stating that she is going to put in an order for a  due to issues at home, such [facility]  She states that there was one in there before she went to the hospital, but is putting in another one now that she is out

## 2018-09-26 ENCOUNTER — APPOINTMENT (EMERGENCY)
Dept: RADIOLOGY | Facility: HOSPITAL | Age: 78
DRG: 683 | End: 2018-09-26
Payer: MEDICARE

## 2018-09-26 ENCOUNTER — HOSPITAL ENCOUNTER (INPATIENT)
Facility: HOSPITAL | Age: 78
LOS: 2 days | Discharge: HOME HEALTH | DRG: 683 | End: 2018-09-29
Attending: EMERGENCY MEDICINE | Admitting: FAMILY MEDICINE
Payer: MEDICARE

## 2018-09-26 DIAGNOSIS — F11.23 OPIOID WITHDRAWAL (HCC): ICD-10-CM

## 2018-09-26 DIAGNOSIS — R74.8 ELEVATED LIVER ENZYMES: ICD-10-CM

## 2018-09-26 DIAGNOSIS — D50.0 IRON DEFICIENCY ANEMIA DUE TO CHRONIC BLOOD LOSS: ICD-10-CM

## 2018-09-26 DIAGNOSIS — R10.9 ABDOMINAL PAIN, UNSPECIFIED ABDOMINAL LOCATION: ICD-10-CM

## 2018-09-26 DIAGNOSIS — T07.XXXA MULTIPLE TRAUMATIC INJURIES: ICD-10-CM

## 2018-09-26 DIAGNOSIS — N17.9 AKI (ACUTE KIDNEY INJURY) (HCC): Primary | ICD-10-CM

## 2018-09-26 PROBLEM — K83.1 BILIARY OBSTRUCTION: Status: ACTIVE | Noted: 2018-09-26

## 2018-09-26 PROBLEM — I48.91 ATRIAL FIBRILLATION (HCC): Status: ACTIVE | Noted: 2018-09-26

## 2018-09-26 LAB
ALBUMIN SERPL BCP-MCNC: 4.3 G/DL (ref 3.5–5)
ALP SERPL-CCNC: 99 U/L (ref 46–116)
ALT SERPL W P-5'-P-CCNC: 18 U/L (ref 12–78)
ANION GAP SERPL CALCULATED.3IONS-SCNC: 8 MMOL/L (ref 4–13)
APTT PPP: 32 SECONDS (ref 24–36)
AST SERPL W P-5'-P-CCNC: 14 U/L (ref 5–45)
ATRIAL RATE: 71 BPM
BASOPHILS # BLD AUTO: 0.03 THOUSANDS/ΜL (ref 0–0.1)
BASOPHILS NFR BLD AUTO: 0 % (ref 0–1)
BILIRUB SERPL-MCNC: 0.66 MG/DL (ref 0.2–1)
BUN SERPL-MCNC: 32 MG/DL (ref 5–25)
CALCIUM SERPL-MCNC: 9.6 MG/DL (ref 8.3–10.1)
CHLORIDE SERPL-SCNC: 96 MMOL/L (ref 100–108)
CO2 SERPL-SCNC: 30 MMOL/L (ref 21–32)
CREAT SERPL-MCNC: 1.43 MG/DL (ref 0.6–1.3)
EOSINOPHIL # BLD AUTO: 0.01 THOUSAND/ΜL (ref 0–0.61)
EOSINOPHIL NFR BLD AUTO: 0 % (ref 0–6)
ERYTHROCYTE [DISTWIDTH] IN BLOOD BY AUTOMATED COUNT: 15.3 % (ref 11.6–15.1)
GFR SERPL CREATININE-BSD FRML MDRD: 35 ML/MIN/1.73SQ M
GLUCOSE SERPL-MCNC: 188 MG/DL (ref 65–140)
HCT VFR BLD AUTO: 34.8 % (ref 34.8–46.1)
HGB BLD-MCNC: 10.6 G/DL (ref 11.5–15.4)
IMM GRANULOCYTES # BLD AUTO: 0.02 THOUSAND/UL (ref 0–0.2)
IMM GRANULOCYTES NFR BLD AUTO: 0 % (ref 0–2)
INR PPP: 1.7 (ref 0.86–1.17)
LIPASE SERPL-CCNC: 78 U/L (ref 73–393)
LYMPHOCYTES # BLD AUTO: 0.77 THOUSANDS/ΜL (ref 0.6–4.47)
LYMPHOCYTES NFR BLD AUTO: 8 % (ref 14–44)
MCH RBC QN AUTO: 28.1 PG (ref 26.8–34.3)
MCHC RBC AUTO-ENTMCNC: 30.5 G/DL (ref 31.4–37.4)
MCV RBC AUTO: 92 FL (ref 82–98)
MONOCYTES # BLD AUTO: 0.31 THOUSAND/ΜL (ref 0.17–1.22)
MONOCYTES NFR BLD AUTO: 3 % (ref 4–12)
NEUTROPHILS # BLD AUTO: 8.23 THOUSANDS/ΜL (ref 1.85–7.62)
NEUTS SEG NFR BLD AUTO: 89 % (ref 43–75)
NRBC BLD AUTO-RTO: 0 /100 WBCS
P AXIS: 98 DEGREES
PLATELET # BLD AUTO: 347 THOUSANDS/UL (ref 149–390)
PMV BLD AUTO: 10.2 FL (ref 8.9–12.7)
POTASSIUM SERPL-SCNC: 3 MMOL/L (ref 3.5–5.3)
PR INTERVAL: 152 MS
PROT SERPL-MCNC: 8.8 G/DL (ref 6.4–8.2)
PROTHROMBIN TIME: 20.1 SECONDS (ref 11.8–14.2)
QRS AXIS: 8 DEGREES
QRSD INTERVAL: 110 MS
QT INTERVAL: 478 MS
QTC INTERVAL: 519 MS
RBC # BLD AUTO: 3.77 MILLION/UL (ref 3.81–5.12)
SODIUM SERPL-SCNC: 134 MMOL/L (ref 136–145)
T WAVE AXIS: 73 DEGREES
TROPONIN I SERPL-MCNC: <0.02 NG/ML
VENTRICULAR RATE: 71 BPM
WBC # BLD AUTO: 9.37 THOUSAND/UL (ref 4.31–10.16)

## 2018-09-26 PROCEDURE — 85610 PROTHROMBIN TIME: CPT | Performed by: EMERGENCY MEDICINE

## 2018-09-26 PROCEDURE — 99220 PR INITIAL OBSERVATION CARE/DAY 70 MINUTES: CPT | Performed by: INTERNAL MEDICINE

## 2018-09-26 PROCEDURE — 36415 COLL VENOUS BLD VENIPUNCTURE: CPT | Performed by: EMERGENCY MEDICINE

## 2018-09-26 PROCEDURE — 74176 CT ABD & PELVIS W/O CONTRAST: CPT

## 2018-09-26 PROCEDURE — 84484 ASSAY OF TROPONIN QUANT: CPT | Performed by: EMERGENCY MEDICINE

## 2018-09-26 PROCEDURE — 96374 THER/PROPH/DIAG INJ IV PUSH: CPT

## 2018-09-26 PROCEDURE — 83690 ASSAY OF LIPASE: CPT | Performed by: EMERGENCY MEDICINE

## 2018-09-26 PROCEDURE — 85025 COMPLETE CBC W/AUTO DIFF WBC: CPT | Performed by: EMERGENCY MEDICINE

## 2018-09-26 PROCEDURE — 96375 TX/PRO/DX INJ NEW DRUG ADDON: CPT

## 2018-09-26 PROCEDURE — 96361 HYDRATE IV INFUSION ADD-ON: CPT

## 2018-09-26 PROCEDURE — 93005 ELECTROCARDIOGRAM TRACING: CPT

## 2018-09-26 PROCEDURE — 99285 EMERGENCY DEPT VISIT HI MDM: CPT

## 2018-09-26 PROCEDURE — 80053 COMPREHEN METABOLIC PANEL: CPT | Performed by: EMERGENCY MEDICINE

## 2018-09-26 PROCEDURE — 85730 THROMBOPLASTIN TIME PARTIAL: CPT | Performed by: EMERGENCY MEDICINE

## 2018-09-26 PROCEDURE — 96376 TX/PRO/DX INJ SAME DRUG ADON: CPT

## 2018-09-26 RX ORDER — LANOLIN ALCOHOL/MO/W.PET/CERES
6 CREAM (GRAM) TOPICAL
Status: DISCONTINUED | OUTPATIENT
Start: 2018-09-26 | End: 2018-09-29 | Stop reason: HOSPADM

## 2018-09-26 RX ORDER — ACETAMINOPHEN 325 MG/1
650 TABLET ORAL EVERY 6 HOURS
Status: DISCONTINUED | OUTPATIENT
Start: 2018-09-26 | End: 2018-09-29 | Stop reason: HOSPADM

## 2018-09-26 RX ORDER — POTASSIUM CITRATE 10 MEQ/1
20 TABLET, EXTENDED RELEASE ORAL
Status: DISCONTINUED | OUTPATIENT
Start: 2018-09-26 | End: 2018-09-29 | Stop reason: HOSPADM

## 2018-09-26 RX ORDER — ASCORBIC ACID 500 MG
500 TABLET ORAL DAILY
Status: DISCONTINUED | OUTPATIENT
Start: 2018-09-26 | End: 2018-09-29 | Stop reason: HOSPADM

## 2018-09-26 RX ORDER — NORTRIPTYLINE HYDROCHLORIDE 10 MG/1
20 CAPSULE ORAL
Status: DISCONTINUED | OUTPATIENT
Start: 2018-09-26 | End: 2018-09-29 | Stop reason: HOSPADM

## 2018-09-26 RX ORDER — DULOXETIN HYDROCHLORIDE 30 MG/1
30 CAPSULE, DELAYED RELEASE ORAL DAILY
Status: DISCONTINUED | OUTPATIENT
Start: 2018-09-26 | End: 2018-09-29 | Stop reason: HOSPADM

## 2018-09-26 RX ORDER — PANTOPRAZOLE SODIUM 40 MG/1
40 TABLET, DELAYED RELEASE ORAL
Status: DISCONTINUED | OUTPATIENT
Start: 2018-09-26 | End: 2018-09-29 | Stop reason: HOSPADM

## 2018-09-26 RX ORDER — FUROSEMIDE 40 MG/1
40 TABLET ORAL DAILY
Status: CANCELLED | OUTPATIENT
Start: 2018-09-26

## 2018-09-26 RX ORDER — AMIODARONE HYDROCHLORIDE 200 MG/1
200 TABLET ORAL DAILY
Status: DISCONTINUED | OUTPATIENT
Start: 2018-09-26 | End: 2018-09-29 | Stop reason: HOSPADM

## 2018-09-26 RX ORDER — POLYETHYLENE GLYCOL 3350 17 G/17G
17 POWDER, FOR SOLUTION ORAL DAILY
Status: DISCONTINUED | OUTPATIENT
Start: 2018-09-26 | End: 2018-09-29 | Stop reason: HOSPADM

## 2018-09-26 RX ORDER — CLOPIDOGREL BISULFATE 75 MG/1
75 TABLET ORAL DAILY
Status: DISCONTINUED | OUTPATIENT
Start: 2018-09-26 | End: 2018-09-29 | Stop reason: HOSPADM

## 2018-09-26 RX ORDER — ALPRAZOLAM 0.25 MG/1
0.25 TABLET ORAL
Status: DISCONTINUED | OUTPATIENT
Start: 2018-09-26 | End: 2018-09-29 | Stop reason: HOSPADM

## 2018-09-26 RX ORDER — MIRTAZAPINE 15 MG/1
7.5 TABLET, FILM COATED ORAL
Status: DISCONTINUED | OUTPATIENT
Start: 2018-09-26 | End: 2018-09-29 | Stop reason: HOSPADM

## 2018-09-26 RX ORDER — AMOXICILLIN 250 MG
1 CAPSULE ORAL
Status: DISCONTINUED | OUTPATIENT
Start: 2018-09-26 | End: 2018-09-29 | Stop reason: HOSPADM

## 2018-09-26 RX ORDER — ONDANSETRON 2 MG/ML
4 INJECTION INTRAMUSCULAR; INTRAVENOUS ONCE
Status: COMPLETED | OUTPATIENT
Start: 2018-09-26 | End: 2018-09-26

## 2018-09-26 RX ORDER — POTASSIUM CHLORIDE 20 MEQ/1
20 TABLET, EXTENDED RELEASE ORAL DAILY
Status: DISCONTINUED | OUTPATIENT
Start: 2018-09-26 | End: 2018-09-29 | Stop reason: HOSPADM

## 2018-09-26 RX ORDER — THIAMINE MONONITRATE (VIT B1) 100 MG
100 TABLET ORAL DAILY
Status: DISCONTINUED | OUTPATIENT
Start: 2018-09-26 | End: 2018-09-29 | Stop reason: HOSPADM

## 2018-09-26 RX ORDER — METOPROLOL SUCCINATE 50 MG/1
50 TABLET, EXTENDED RELEASE ORAL EVERY 12 HOURS SCHEDULED
Status: DISCONTINUED | OUTPATIENT
Start: 2018-09-26 | End: 2018-09-29 | Stop reason: HOSPADM

## 2018-09-26 RX ORDER — FOLIC ACID 1 MG/1
1 TABLET ORAL DAILY
Status: DISCONTINUED | OUTPATIENT
Start: 2018-09-26 | End: 2018-09-29 | Stop reason: HOSPADM

## 2018-09-26 RX ORDER — SODIUM CHLORIDE 9 MG/ML
50 INJECTION, SOLUTION INTRAVENOUS CONTINUOUS
Status: DISCONTINUED | OUTPATIENT
Start: 2018-09-26 | End: 2018-09-27

## 2018-09-26 RX ADMIN — HYDROMORPHONE HYDROCHLORIDE 1 MG: 1 INJECTION, SOLUTION INTRAMUSCULAR; INTRAVENOUS; SUBCUTANEOUS at 09:20

## 2018-09-26 RX ADMIN — ACETAMINOPHEN 650 MG: 325 TABLET, FILM COATED ORAL at 21:02

## 2018-09-26 RX ADMIN — OXYCODONE HYDROCHLORIDE 15 MG: 10 TABLET ORAL at 18:39

## 2018-09-26 RX ADMIN — MELATONIN 6 MG: at 21:03

## 2018-09-26 RX ADMIN — POTASSIUM CITRATE 20 MEQ: 10 TABLET, EXTENDED RELEASE ORAL at 12:34

## 2018-09-26 RX ADMIN — SENNOSIDES AND DOCUSATE SODIUM 1 TABLET: 8.6; 5 TABLET ORAL at 21:03

## 2018-09-26 RX ADMIN — ALPRAZOLAM 0.25 MG: 0.25 TABLET ORAL at 21:03

## 2018-09-26 RX ADMIN — HYDROMORPHONE HYDROCHLORIDE 0.5 MG: 1 INJECTION, SOLUTION INTRAMUSCULAR; INTRAVENOUS; SUBCUTANEOUS at 10:52

## 2018-09-26 RX ADMIN — MIRTAZAPINE 7.5 MG: 15 TABLET, FILM COATED ORAL at 21:03

## 2018-09-26 RX ADMIN — SODIUM CHLORIDE 1000 ML: 0.9 INJECTION, SOLUTION INTRAVENOUS at 09:19

## 2018-09-26 RX ADMIN — SODIUM CHLORIDE 50 ML/HR: 0.9 INJECTION, SOLUTION INTRAVENOUS at 21:08

## 2018-09-26 RX ADMIN — APIXABAN 5 MG: 5 TABLET, FILM COATED ORAL at 18:39

## 2018-09-26 RX ADMIN — NORTRIPTYLINE HYDROCHLORIDE 20 MG: 10 CAPSULE ORAL at 21:07

## 2018-09-26 RX ADMIN — ONDANSETRON 4 MG: 2 INJECTION INTRAMUSCULAR; INTRAVENOUS at 09:19

## 2018-09-26 NOTE — ASSESSMENT & PLAN NOTE
Will hold Ace inhibitor for now continue with hydration as above  Patient initially presented with hypertension as well high with a systolic in 80K however has been asymptomatic  Will continue with fluid resuscitation

## 2018-09-26 NOTE — ED ATTENDING ATTESTATION
Chelita Anne MD, saw and evaluated the patient  I have discussed the patient with the resident/non-physician practitioner and agree with the resident's/non-physician practitioner's findings, Plan of Care, and MDM as documented in the resident's/non-physician practitioner's note, except where noted  All available labs and Radiology studies were reviewed  At this point I agree with the current assessment done in the Emergency Department  I have conducted an independent evaluation of this patient a history and physical is as follows:    Patient presents to the emergency department the chief complaint of nausea and dry heaves  The patient reports that she has rheumatoid arthritis and has been taking oxycodone twice a day for longer than 2 years  The patient reports she has never missed a dose until approximately 4-5 days ago when she ran out of her medication  The patient was recently hospitalized after an injury and was unable to make her follow-up appointment with her rheumatologist and therefore was unable to get her prescription for her oxycodone  The last dose of oxycodone was approximately 4-5 days ago  The patient states yesterday she developed nausea and started with dry heaves and developed a diffuse abdominal crampy discomfort  Although symptoms have persisted  Patient denies diarrhea or fever  The patient also reports diffuse myalgias and arthralgias  Physical exam demonstrates an elderly female in no apparent distress but appears uncomfortable  HEENT exam was normal  Oral mucosa is slightly dry  Lungs are clear with equal breath sounds  The heart had a regular rate rhythm  The abdomen is soft without masses but there is diffuse tenderness that was mild in severity  There is no rebound or guarding  The back was nontender  All extremities are symmetric and nontender  There is no rash  There is no focal neurologic deficit      Critical Care Time  CritCare Time    Procedures

## 2018-09-26 NOTE — ASSESSMENT & PLAN NOTE
Continue with Eliquis and Plavix therapy  Patient with episode of GI bleeding earlier in the month  After review with Cardiology was felt that the patient should be taken off of triple therapy with aspirin Plavix and Eliquis and continued with Plavix and Eliquis therapy for 1 year and then transition to aspirin Plavix at that time
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
## 2018-09-26 NOTE — ASSESSMENT & PLAN NOTE
Status post stent replacement on earlier admission in September    Patient will need follow-up in 3 to 4 months with GI
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
## 2018-09-26 NOTE — ED NOTES
Dr Stevens Runner aware of patient's low blood pressure  Per Dr Stevens Runner, another liter of normal saline to be started at this time        Jeanice Shone, RN  09/26/18 1527

## 2018-09-26 NOTE — ASSESSMENT & PLAN NOTE
Patient clinically dry currently  Will gently hydrate and monitor volume status closely    Hold Lasix for now
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
## 2018-09-27 PROBLEM — E87.6 HYPOKALEMIA: Status: ACTIVE | Noted: 2018-09-27

## 2018-09-27 LAB
ALBUMIN SERPL BCP-MCNC: 3.1 G/DL (ref 3.5–5)
ALP SERPL-CCNC: 89 U/L (ref 46–116)
ALT SERPL W P-5'-P-CCNC: 15 U/L (ref 12–78)
ANION GAP SERPL CALCULATED.3IONS-SCNC: 7 MMOL/L (ref 4–13)
AST SERPL W P-5'-P-CCNC: 16 U/L (ref 5–45)
BILIRUB SERPL-MCNC: 0.42 MG/DL (ref 0.2–1)
BUN SERPL-MCNC: 27 MG/DL (ref 5–25)
CALCIUM SERPL-MCNC: 8 MG/DL (ref 8.3–10.1)
CHLORIDE SERPL-SCNC: 104 MMOL/L (ref 100–108)
CO2 SERPL-SCNC: 28 MMOL/L (ref 21–32)
CREAT SERPL-MCNC: 1.17 MG/DL (ref 0.6–1.3)
GFR SERPL CREATININE-BSD FRML MDRD: 45 ML/MIN/1.73SQ M
GLUCOSE SERPL-MCNC: 99 MG/DL (ref 65–140)
MAGNESIUM SERPL-MCNC: 1.9 MG/DL (ref 1.6–2.6)
PHOSPHATE SERPL-MCNC: 3 MG/DL (ref 2.3–4.1)
POTASSIUM SERPL-SCNC: 2.8 MMOL/L (ref 3.5–5.3)
POTASSIUM SERPL-SCNC: 4.2 MMOL/L (ref 3.5–5.3)
PROT SERPL-MCNC: 6.6 G/DL (ref 6.4–8.2)
SODIUM SERPL-SCNC: 139 MMOL/L (ref 136–145)

## 2018-09-27 PROCEDURE — G8978 MOBILITY CURRENT STATUS: HCPCS

## 2018-09-27 PROCEDURE — 99232 SBSQ HOSP IP/OBS MODERATE 35: CPT | Performed by: FAMILY MEDICINE

## 2018-09-27 PROCEDURE — 97163 PT EVAL HIGH COMPLEX 45 MIN: CPT

## 2018-09-27 PROCEDURE — 99222 1ST HOSP IP/OBS MODERATE 55: CPT | Performed by: INTERNAL MEDICINE

## 2018-09-27 PROCEDURE — G8979 MOBILITY GOAL STATUS: HCPCS

## 2018-09-27 PROCEDURE — 83735 ASSAY OF MAGNESIUM: CPT | Performed by: INTERNAL MEDICINE

## 2018-09-27 PROCEDURE — 84132 ASSAY OF SERUM POTASSIUM: CPT | Performed by: FAMILY MEDICINE

## 2018-09-27 PROCEDURE — 84100 ASSAY OF PHOSPHORUS: CPT | Performed by: INTERNAL MEDICINE

## 2018-09-27 PROCEDURE — 80053 COMPREHEN METABOLIC PANEL: CPT | Performed by: INTERNAL MEDICINE

## 2018-09-27 RX ORDER — OXYCODONE HYDROCHLORIDE 5 MG/1
TABLET ORAL
Status: COMPLETED
Start: 2018-09-27 | End: 2018-09-27

## 2018-09-27 RX ORDER — POTASSIUM CHLORIDE 20 MEQ/1
40 TABLET, EXTENDED RELEASE ORAL ONCE
Status: COMPLETED | OUTPATIENT
Start: 2018-09-27 | End: 2018-09-27

## 2018-09-27 RX ORDER — POTASSIUM CHLORIDE 20 MEQ/1
40 TABLET, EXTENDED RELEASE ORAL EVERY 4 HOURS
Status: DISCONTINUED | OUTPATIENT
Start: 2018-09-27 | End: 2018-09-27

## 2018-09-27 RX ORDER — OXYCODONE HYDROCHLORIDE 10 MG/1
10 TABLET ORAL EVERY 6 HOURS PRN
Status: DISCONTINUED | OUTPATIENT
Start: 2018-09-27 | End: 2018-09-29 | Stop reason: HOSPADM

## 2018-09-27 RX ORDER — OXYCODONE HYDROCHLORIDE 10 MG/1
TABLET ORAL
Status: COMPLETED
Start: 2018-09-27 | End: 2018-09-27

## 2018-09-27 RX ADMIN — PANTOPRAZOLE SODIUM 40 MG: 40 TABLET, DELAYED RELEASE ORAL at 06:44

## 2018-09-27 RX ADMIN — POTASSIUM CITRATE 20 MEQ: 10 TABLET, EXTENDED RELEASE ORAL at 08:30

## 2018-09-27 RX ADMIN — ACETAMINOPHEN 650 MG: 325 TABLET, FILM COATED ORAL at 21:23

## 2018-09-27 RX ADMIN — POTASSIUM CHLORIDE 20 MEQ: 1500 TABLET, EXTENDED RELEASE ORAL at 08:30

## 2018-09-27 RX ADMIN — THIAMINE HCL TAB 100 MG 100 MG: 100 TAB at 08:30

## 2018-09-27 RX ADMIN — OXYCODONE HYDROCHLORIDE 15 MG: 10 TABLET ORAL at 08:37

## 2018-09-27 RX ADMIN — POTASSIUM CITRATE 20 MEQ: 10 TABLET, EXTENDED RELEASE ORAL at 16:20

## 2018-09-27 RX ADMIN — SENNOSIDES AND DOCUSATE SODIUM 1 TABLET: 8.6; 5 TABLET ORAL at 21:23

## 2018-09-27 RX ADMIN — POTASSIUM CITRATE 20 MEQ: 10 TABLET, EXTENDED RELEASE ORAL at 11:49

## 2018-09-27 RX ADMIN — APIXABAN 5 MG: 5 TABLET, FILM COATED ORAL at 08:29

## 2018-09-27 RX ADMIN — FOLIC ACID 1 MG: 1 TABLET ORAL at 08:29

## 2018-09-27 RX ADMIN — POTASSIUM CHLORIDE 40 MEQ: 1500 TABLET, EXTENDED RELEASE ORAL at 13:48

## 2018-09-27 RX ADMIN — OXYCODONE HYDROCHLORIDE 5 MG: 5 TABLET ORAL at 00:50

## 2018-09-27 RX ADMIN — MELATONIN 6 MG: at 21:23

## 2018-09-27 RX ADMIN — OXYCODONE HYDROCHLORIDE 10 MG: 10 TABLET ORAL at 00:51

## 2018-09-27 RX ADMIN — SODIUM CHLORIDE 50 ML/HR: 0.9 INJECTION, SOLUTION INTRAVENOUS at 16:20

## 2018-09-27 RX ADMIN — APIXABAN 5 MG: 5 TABLET, FILM COATED ORAL at 18:08

## 2018-09-27 RX ADMIN — OXYCODONE HYDROCHLORIDE AND ACETAMINOPHEN 500 MG: 500 TABLET ORAL at 08:29

## 2018-09-27 RX ADMIN — POTASSIUM CHLORIDE 40 MEQ: 1500 TABLET, EXTENDED RELEASE ORAL at 18:08

## 2018-09-27 RX ADMIN — ACETAMINOPHEN 650 MG: 325 TABLET, FILM COATED ORAL at 16:20

## 2018-09-27 RX ADMIN — MIRTAZAPINE 7.5 MG: 15 TABLET, FILM COATED ORAL at 21:23

## 2018-09-27 RX ADMIN — AMIODARONE HYDROCHLORIDE 200 MG: 200 TABLET ORAL at 08:29

## 2018-09-27 RX ADMIN — ACETAMINOPHEN 650 MG: 325 TABLET, FILM COATED ORAL at 10:43

## 2018-09-27 RX ADMIN — ACETAMINOPHEN 650 MG: 325 TABLET, FILM COATED ORAL at 05:30

## 2018-09-27 RX ADMIN — PANTOPRAZOLE SODIUM 40 MG: 40 TABLET, DELAYED RELEASE ORAL at 16:20

## 2018-09-27 RX ADMIN — NORTRIPTYLINE HYDROCHLORIDE 20 MG: 10 CAPSULE ORAL at 21:24

## 2018-09-27 RX ADMIN — CLOPIDOGREL 75 MG: 75 TABLET, FILM COATED ORAL at 08:29

## 2018-09-27 RX ADMIN — OXYCODONE HYDROCHLORIDE 10 MG: 10 TABLET ORAL at 21:27

## 2018-09-27 RX ADMIN — DULOXETINE HYDROCHLORIDE 30 MG: 30 CAPSULE, DELAYED RELEASE ORAL at 08:29
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
## 2018-09-27 NOTE — ASSESSMENT & PLAN NOTE
· Patient with possible withdrawal versus biliary symptoms  Symptoms completely abated with 1 time dose of IV Dilaudid in the ED  Patient reported that she had plan out of narcotics, PMED web site reviewed -patient appeared to have scripts filled by multiple providers in multiple pharmacy  Discussed with the patient about weaning off of the narcotics and follow up with the primary care physician for further management  CT scan reviewed-will consult Gastroenterology  Stable LFTs    No abdominal pain
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
## 2018-09-27 NOTE — ASSESSMENT & PLAN NOTE
Patient with potassium of 2 8  Replete and monitor  The potassium remained stable by tomorrow patient can be discharged home with outpatient follow-up
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
## 2018-09-27 NOTE — ASSESSMENT & PLAN NOTE
Patient initially presented with hypertension as well high with a systolic in 67K however has been asymptomatic    Discontinue IV fluids and monitor blood pressure  Hold lisinopril

## 2018-09-27 NOTE — CONSULTS
Consultation - 126 [facility]  Theron Montaño 69 y o  female MRN: [id number]  Unit/Bed#: [facility] 807-01 Encounter: [id number]        Consults    Reason for Consult / Principal Problem: elevated liver enzymes    ASSESSMENT AND PLAN:      Nausea  -patient presented with nausea without vomiting likely related to withdrawal from opioids, this has resolved and she is tolerating a regular diet  -zofran PRN    Choledocholithiasis with biliary stent  -patient with a history of CBD stone, with pigtail stent currently in place as of 8/28  -repeat ERCP off of anticoagulation in about 4 months is recommended in order to be able to complete sphincterotomy and reattempt stone removal  -Her LFTs are normal at this time and imaging shows the stent is in place without biliary dilatation  -she should follow-up with Dr Annie Mcmahon in the office prior to repeat ERCP   -please call if questions  _____________________________________________________________________    HPI:  Theron Montaño is a 69 y /o female with a history of CAD with PCI 7/12, CHF EF 50%, afib, choledocholithiasis with stent who presented to the ED with nausea and gagging without vomiting or abdominal pain  She reports that approximately 5 days ago she ran out of her oxycodone which she takes chronically for back pain, after 1-2 days she started developing nausea and generally feeling unwell  She states prior to this she was doing well and was not having any abdominal pain since her stent exchange  She had a plastic stent removed and replaced with double pigtail stent 8/28/18, at that a sphincterotomy was unable to be completed given anticoagulation, the stone was attempted to be removed with balloon dilation however only a small amount of sludge was removed  It was recommended she have a repeat ERCP in about 4 months when she can be off of anticoagulation in order to complete sphincterotomy  She denies jaundice, dark urine   She also melena and anemia about one month ago EGD was unremarkable, capsule showed small nonbleeding AVMs, there were superficial ulcers in the colon secondary to ischemic colitis  She had resolution of her GI bleeding at that time and had no melena since  Her hemoglobin has increased from 8 6 8/31 to 10 6 9/26      REVIEW OF SYSTEMS:    CONSTITUTIONAL: Denies any fever, chills  HEENT: No earache or tinnitus  CARDIOVASCULAR: No chest pain or palpitations  RESPIRATORY: Denies any cough  GASTROINTESTINAL: As noted in the History of Present Illness  GENITOURINARY: No problems with urination  Denies any hematuria or dysuria  NEUROLOGIC: No dizziness or vertigo, denies headaches  MUSCULOSKELETAL: + neck and back pain  SKIN: Denies skin rashes or itching  ENDOCRINE: Denies excessive thirst  Denies intolerance to heat or cold  PSYCHOSOCIAL: Denies depression or anxiety  Denies any recent memory loss  Historical Information   Past Medical History:   Diagnosis Date    A-fib [facility])     Acute respiratory disease     Anemia     Arthritis     [facility]congest[facility] failure) [facility])     Chronic pain     Heart failure ([facility])     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     History of colon polyps     Hx of long term use of blood thinners     Hypertension     Irregular heart beat     Narcotic dependence [facility]     Rectal bleeding     Stroke ([facility])     mild no deficiets/ memory loss    Uses walker      Past Surgical History:   Procedure Laterality Date    COLONOSCOPY      COLONOSCOPY N/A 7/27/2018    Procedure: COLONOSCOPY;  Surgeon: Mikaela Knowles MD;  [facility]; Service: Gastroenterology    CORONARY STENT PLACEMENT      ERCP N/A 5/14/2018    Procedure: ENDOSCOPIC RETROGRADE CHOLANGIOPANCREATOGRAPHY (ERCP);   Surgeon: Rogelio Willis MD;  [facility]: [facility];  Service: Gastroenterology    ERCP N/A 8/28/2018    Procedure: ENDOSCOPIC RETROGRADE CHOLANGIOPANCREATOGRAPHY (ERCP) w/ EGD;  Surgeon: Rogelio Willis MD; [facility]; Service: Gastroenterology    ESOPHAGOGASTRODUODENOSCOPY N/A 7/26/2018    Procedure: ESOPHAGOGASTRODUODENOSCOPY (EGD); Surgeon: Liliya Ferraro MD;  Location:  GI LAB; Service: Gastroenterology    JOINT REPLACEMENT Right     knee     Social History   History   Alcohol Use No     History   Drug Use No     History   Smoking Status    Former Smoker   Smokeless Tobacco    Never Used     History reviewed  No pertinent family history      Meds/Allergies     Prescriptions Prior to Admission   Medication    acetaminophen (TYLENOL) 325 mg tablet    ALPRAZolam (NIRAVAM) 0 25 MG dissolvable tablet    amiodarone 200 mg tablet    apixaban (ELIQUIS) 5 mg    ascorbic acid (VITAMIN C) 500 mg tablet    clopidogrel (PLAVIX) 75 mg tablet    DULoxetine (CYMBALTA) 30 mg delayed release capsule    folic acid (FOLVITE) 1 mg tablet    furosemide (LASIX) 40 mg tablet    furosemide (LASIX) 40 mg tablet    lidocaine (XYLOCAINE) 5 % ointment    lisinopril (ZESTRIL) 5 mg tablet    melatonin 3 mg    metoprolol succinate (TOPROL-XL) 50 mg 24 hr tablet    mirtazapine (REMERON) 7 5 MG tablet    nortriptyline (PAMELOR) 10 mg capsule    oxyCODONE (ROXICODONE) 15 mg immediate release tablet    pantoprazole (PROTONIX) 40 mg tablet    polyethylene glycol (MIRALAX) 17 g packet    potassium chloride (K-DUR,KLOR-CON) 20 mEq tablet    senna-docusate sodium (SENOKOT S) 8 6-50 mg per tablet    thiamine 100 MG tablet     Current Facility-Administered Medications   Medication Dose Route Frequency    acetaminophen (TYLENOL) tablet 650 mg  650 mg Oral Q6H    ALPRAZolam (XANAX) tablet 0 25 mg  0 25 mg Oral HS PRN    amiodarone tablet 200 mg  200 mg Oral Daily    apixaban (ELIQUIS) tablet 5 mg  5 mg Oral BID    ascorbic acid (VITAMIN C) tablet 500 mg  500 mg Oral Daily    clopidogrel (PLAVIX) tablet 75 mg  75 mg Oral Daily    DULoxetine (CYMBALTA) delayed release capsule 30 mg  30 mg Oral Daily    folic acid (FOLVITE) tablet 1 mg  1 mg Oral Daily    melatonin tablet 6 mg  6 mg Oral HS    metoprolol succinate (TOPROL-XL) 24 hr tablet 50 mg  50 mg Oral Q12H KARELY    mirtazapine (REMERON) tablet 7 5 mg  7 5 mg Oral HS    nortriptyline (PAMELOR) capsule 20 mg  20 mg Oral HS    oxyCODONE (ROXICODONE) immediate release tablet 10 mg  10 mg Oral Q6H PRN    pantoprazole (PROTONIX) EC tablet 40 mg  40 mg Oral BID AC    polyethylene glycol (MIRALAX) packet 17 g  17 g Oral Daily    potassium chloride (K-DUR,KLOR-CON) CR tablet 20 mEq  20 mEq Oral Daily    potassium chloride (K-DUR,KLOR-CON) CR tablet 40 mEq  40 mEq Oral Q4H    potassium citrate (UROCIT-K) CR tablet 20 mEq  20 mEq Oral TID With Meals    senna-docusate sodium (SENOKOT S) 8 6-50 mg per tablet 1 tablet  1 tablet Oral HS    sodium chloride 0 9 % infusion  50 mL/hr Intravenous Continuous    thiamine (VITAMIN B1) tablet 100 mg  100 mg Oral Daily       No Known Allergies        Objective     Blood pressure 102/50, pulse 66, temperature 98 2 °F (36 8 °C), temperature source Oral, resp  rate 14, height 5' 5" (1 651 m), weight 65 5 kg (144 lb 8 oz), SpO2 95 %  Body mass index is 24 05 kg/m²        Intake/Output Summary (Last 24 hours) at 09/27/18 1544  Last data filed at 09/27/18 1501   Gross per 24 hour   Intake          1639 17 ml   Output              400 ml   Net          1239 17 ml         PHYSICAL EXAM:      General Appearance:   Alert, cooperative, no distress   HEENT:   Normocephalic, atraumatic, anicteric      Neck:  Supple, symmetrical, trachea midline   Lungs:   Clear to auscultation bilaterally   Heart[de-identified]   Regular rate   Abdomen:   Soft, non-tender, non-distended; normal bowel sounds   Genitalia:   Deferred    Rectal:   Deferred    Extremities:  No edema    Pulses:  2+ and symmetric all extremities    Skin:  No jaundice   Lymph nodes:  No palpable cervical lymphadenopathy        Lab Results:   Admission on 09/26/2018   Component Date Value    WBC 09/26/2018 9 37     RBC 09/26/2018 3 77*    Hemoglobin 09/26/2018 10 6*    Hematocrit 09/26/2018 34 8     MCV 09/26/2018 92     MCH 09/26/2018 28 1     MCHC 09/26/2018 30 5*    RDW 09/26/2018 15 3*    MPV 09/26/2018 10 2     Platelets 19/85/8058 347     nRBC 09/26/2018 0     Neutrophils Relative 09/26/2018 89*    Immat GRANS % 09/26/2018 0     Lymphocytes Relative 09/26/2018 8*    Monocytes Relative 09/26/2018 3*    Eosinophils Relative 09/26/2018 0     Basophils Relative 09/26/2018 0     Neutrophils Absolute 09/26/2018 8 23*    Immature Grans Absolute 09/26/2018 0 02     Lymphocytes Absolute 09/26/2018 0 77     Monocytes Absolute 09/26/2018 0 31     Eosinophils Absolute 09/26/2018 0 01     Basophils Absolute 09/26/2018 0 03     Protime 09/26/2018 20 1*    INR 09/26/2018 1 70*    PTT 09/26/2018 32     Sodium 09/26/2018 134*    Potassium 09/26/2018 3 0*    Chloride 09/26/2018 96*    CO2 09/26/2018 30     ANION GAP 09/26/2018 8     BUN 09/26/2018 32*    Creatinine 09/26/2018 1 43*    Glucose 09/26/2018 188*    Calcium 09/26/2018 9 6     AST 09/26/2018 14     ALT 09/26/2018 18     Alkaline Phosphatase 09/26/2018 99     Total Protein 09/26/2018 8 8*    Albumin 09/26/2018 4 3     Total Bilirubin 09/26/2018 0 66     eGFR 09/26/2018 35     Lipase 09/26/2018 78     Troponin I 09/26/2018 <0 02     Ventricular Rate 09/26/2018 71     Atrial Rate 09/26/2018 71     MS Interval 09/26/2018 152     QRSD Interval 09/26/2018 110     QT Interval 09/26/2018 478     QTC Interval 09/26/2018 519     P Axis 09/26/2018 98     QRS Axis 09/26/2018 8     T Wave Axis 09/26/2018 73     Sodium 09/27/2018 139     Potassium 09/27/2018 2 8*    Chloride 09/27/2018 104     CO2 09/27/2018 28     ANION GAP 09/27/2018 7     BUN 09/27/2018 27*    Creatinine 09/27/2018 1 17     Glucose 09/27/2018 99     Calcium 09/27/2018 8 0*    AST 09/27/2018 16     ALT 09/27/2018 15     Alkaline Phosphatase 09/27/2018 89     Total Protein 09/27/2018 6 6     Albumin 09/27/2018 3 1*    Total Bilirubin 09/27/2018 0 42     eGFR 09/27/2018 45     Magnesium 09/27/2018 1 9     Phosphorus 09/27/2018 3 0        Imaging Studies: I have personally reviewed pertinent imaging studies

## 2018-09-27 NOTE — PLAN OF CARE
Problem: PHYSICAL THERAPY ADULT  Goal: Performs mobility at highest level of function for planned discharge setting  See evaluation for individualized goals  Treatment/Interventions: Functional transfer training, LE strengthening/ROM, Elevations, Therapeutic exercise, Endurance training, Patient/family training, Equipment eval/education, Bed mobility, Gait training, Spoke to nursing, Spoke to case management  Equipment Recommended: Gilma Morales (current use of RW for mobility)       See flowsheet documentation for full assessment, interventions and recommendations  Prognosis: Good  Problem List: Decreased strength, Decreased endurance, Impaired balance, Decreased mobility, Decreased skin integrity, Pain  Assessment: Pt is a 68 y/o female admitted to [facility] 2* opioid withdrawal,vomitting,MALA and reports of ABD pain  Pt lives alone in 2 story home,first floor setup,reports 2 CASSIA,owns personal DME and reports being completely I PTA  Pt currently is not at functional mobility baseline,needs Ax1 for mobility,inc chronic neck and back pain,reports being fatigue and tired,unsteady and ataxic gait pattern,reports of dizziness during upright mobility,IV medicine management and ongoing medical care  pt demonstrates minimal deficits during functional mobility and gait including dec endurance,dec balance,dec BLE strength,inc chronic neck and back pain,and needs minAx1 for sit to supine,transfers and gait with use of RW  pt would cont to benefit from skilled inpt PT services to maximize functional independence  Barriers to Discharge: Decreased caregiver support, Inaccessible home environment (CASSIA and lives alone)     Recommendation: Home with family support, Home PT (use of personal DME as needed)          See flowsheet documentation for full assessment

## 2018-09-27 NOTE — SOCIAL WORK
Patient identified as HRR per criteria  Call made to DC appointment hotline with information as required for CM support follow up  OP CC referral placed as well
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
## 2018-09-27 NOTE — RESTORATIVE TECHNICIAN NOTE
Restorative Specialist Mobility Note       Activity: Chair     Assistive Device: None     Ambulation Response: Tolerated fairly well  Repositioned: Up in chair, Sitting           Range of Motion: Active, All extremities      Pt left sitting comfortably in chair at bedside with call bell within reach  Chair alarm activated

## 2018-09-28 PROBLEM — D50.0 IRON DEFICIENCY ANEMIA DUE TO CHRONIC BLOOD LOSS: Status: ACTIVE | Noted: 2018-09-28

## 2018-09-28 LAB
ALBUMIN SERPL BCP-MCNC: 2.9 G/DL (ref 3.5–5)
ALP SERPL-CCNC: 92 U/L (ref 46–116)
ALT SERPL W P-5'-P-CCNC: 14 U/L (ref 12–78)
ANION GAP SERPL CALCULATED.3IONS-SCNC: 6 MMOL/L (ref 4–13)
AST SERPL W P-5'-P-CCNC: 12 U/L (ref 5–45)
BASOPHILS # BLD AUTO: 0.08 THOUSANDS/ΜL (ref 0–0.1)
BASOPHILS NFR BLD AUTO: 1 % (ref 0–1)
BILIRUB SERPL-MCNC: 0.23 MG/DL (ref 0.2–1)
BUN SERPL-MCNC: 17 MG/DL (ref 5–25)
CALCIUM SERPL-MCNC: 8.2 MG/DL (ref 8.3–10.1)
CHLORIDE SERPL-SCNC: 107 MMOL/L (ref 100–108)
CO2 SERPL-SCNC: 27 MMOL/L (ref 21–32)
CREAT SERPL-MCNC: 0.88 MG/DL (ref 0.6–1.3)
EOSINOPHIL # BLD AUTO: 0.28 THOUSAND/ΜL (ref 0–0.61)
EOSINOPHIL NFR BLD AUTO: 5 % (ref 0–6)
ERYTHROCYTE [DISTWIDTH] IN BLOOD BY AUTOMATED COUNT: 15.6 % (ref 11.6–15.1)
FERRITIN SERPL-MCNC: 14 NG/ML (ref 8–388)
FOLATE SERPL-MCNC: >20 NG/ML (ref 3.1–17.5)
GFR SERPL CREATININE-BSD FRML MDRD: 64 ML/MIN/1.73SQ M
GLUCOSE SERPL-MCNC: 98 MG/DL (ref 65–140)
HCT VFR BLD AUTO: 27.3 % (ref 34.8–46.1)
HCT VFR BLD AUTO: 27.3 % (ref 34.8–46.1)
HGB BLD-MCNC: 7.7 G/DL (ref 11.5–15.4)
HGB BLD-MCNC: 7.9 G/DL (ref 11.5–15.4)
IMM GRANULOCYTES # BLD AUTO: 0.03 THOUSAND/UL (ref 0–0.2)
IMM GRANULOCYTES NFR BLD AUTO: 1 % (ref 0–2)
IRON SATN MFR SERPL: 7 %
IRON SERPL-MCNC: 26 UG/DL (ref 50–170)
LYMPHOCYTES # BLD AUTO: 1.54 THOUSANDS/ΜL (ref 0.6–4.47)
LYMPHOCYTES NFR BLD AUTO: 26 % (ref 14–44)
MCH RBC QN AUTO: 27.9 PG (ref 26.8–34.3)
MCHC RBC AUTO-ENTMCNC: 28.9 G/DL (ref 31.4–37.4)
MCV RBC AUTO: 97 FL (ref 82–98)
MONOCYTES # BLD AUTO: 0.57 THOUSAND/ΜL (ref 0.17–1.22)
MONOCYTES NFR BLD AUTO: 10 % (ref 4–12)
NEUTROPHILS # BLD AUTO: 3.5 THOUSANDS/ΜL (ref 1.85–7.62)
NEUTS SEG NFR BLD AUTO: 57 % (ref 43–75)
NRBC BLD AUTO-RTO: 0 /100 WBCS
PLATELET # BLD AUTO: 266 THOUSANDS/UL (ref 149–390)
PMV BLD AUTO: 10.3 FL (ref 8.9–12.7)
POTASSIUM SERPL-SCNC: 4.3 MMOL/L (ref 3.5–5.3)
PROT SERPL-MCNC: 6.3 G/DL (ref 6.4–8.2)
RBC # BLD AUTO: 2.83 MILLION/UL (ref 3.81–5.12)
SODIUM SERPL-SCNC: 140 MMOL/L (ref 136–145)
TIBC SERPL-MCNC: 395 UG/DL (ref 250–450)
VIT B12 SERPL-MCNC: 316 PG/ML (ref 100–900)
WBC # BLD AUTO: 6 THOUSAND/UL (ref 4.31–10.16)

## 2018-09-28 PROCEDURE — 83540 ASSAY OF IRON: CPT | Performed by: INTERNAL MEDICINE

## 2018-09-28 PROCEDURE — 83550 IRON BINDING TEST: CPT | Performed by: INTERNAL MEDICINE

## 2018-09-28 PROCEDURE — 82728 ASSAY OF FERRITIN: CPT | Performed by: INTERNAL MEDICINE

## 2018-09-28 PROCEDURE — 80053 COMPREHEN METABOLIC PANEL: CPT | Performed by: FAMILY MEDICINE

## 2018-09-28 PROCEDURE — 85014 HEMATOCRIT: CPT | Performed by: FAMILY MEDICINE

## 2018-09-28 PROCEDURE — 85025 COMPLETE CBC W/AUTO DIFF WBC: CPT | Performed by: FAMILY MEDICINE

## 2018-09-28 PROCEDURE — 99232 SBSQ HOSP IP/OBS MODERATE 35: CPT | Performed by: FAMILY MEDICINE

## 2018-09-28 PROCEDURE — 82746 ASSAY OF FOLIC ACID SERUM: CPT | Performed by: INTERNAL MEDICINE

## 2018-09-28 PROCEDURE — 85018 HEMOGLOBIN: CPT | Performed by: FAMILY MEDICINE

## 2018-09-28 PROCEDURE — 82607 VITAMIN B-12: CPT | Performed by: INTERNAL MEDICINE

## 2018-09-28 RX ORDER — ALPRAZOLAM 0.5 MG/1
0.5 TABLET ORAL 2 TIMES DAILY PRN
Status: DISCONTINUED | OUTPATIENT
Start: 2018-09-28 | End: 2018-09-29 | Stop reason: HOSPADM

## 2018-09-28 RX ADMIN — ACETAMINOPHEN 650 MG: 325 TABLET, FILM COATED ORAL at 09:23

## 2018-09-28 RX ADMIN — ACETAMINOPHEN 650 MG: 325 TABLET, FILM COATED ORAL at 21:56

## 2018-09-28 RX ADMIN — POLYETHYLENE GLYCOL 3350 17 G: 17 POWDER, FOR SOLUTION ORAL at 09:24

## 2018-09-28 RX ADMIN — NORTRIPTYLINE HYDROCHLORIDE 20 MG: 10 CAPSULE ORAL at 21:59

## 2018-09-28 RX ADMIN — APIXABAN 5 MG: 5 TABLET, FILM COATED ORAL at 09:24

## 2018-09-28 RX ADMIN — PANTOPRAZOLE SODIUM 40 MG: 40 TABLET, DELAYED RELEASE ORAL at 16:03

## 2018-09-28 RX ADMIN — SENNOSIDES AND DOCUSATE SODIUM 1 TABLET: 8.6; 5 TABLET ORAL at 21:58

## 2018-09-28 RX ADMIN — ACETAMINOPHEN 650 MG: 325 TABLET, FILM COATED ORAL at 05:54

## 2018-09-28 RX ADMIN — PANTOPRAZOLE SODIUM 40 MG: 40 TABLET, DELAYED RELEASE ORAL at 06:00

## 2018-09-28 RX ADMIN — CLOPIDOGREL 75 MG: 75 TABLET, FILM COATED ORAL at 09:24

## 2018-09-28 RX ADMIN — AMIODARONE HYDROCHLORIDE 200 MG: 200 TABLET ORAL at 09:24

## 2018-09-28 RX ADMIN — ACETAMINOPHEN 650 MG: 325 TABLET, FILM COATED ORAL at 16:03

## 2018-09-28 RX ADMIN — METOPROLOL SUCCINATE 50 MG: 50 TABLET, EXTENDED RELEASE ORAL at 21:57

## 2018-09-28 RX ADMIN — THIAMINE HCL TAB 100 MG 100 MG: 100 TAB at 09:24

## 2018-09-28 RX ADMIN — APIXABAN 5 MG: 5 TABLET, FILM COATED ORAL at 17:14

## 2018-09-28 RX ADMIN — ALPRAZOLAM 0.5 MG: 0.5 TABLET ORAL at 16:05

## 2018-09-28 RX ADMIN — FOLIC ACID 1 MG: 1 TABLET ORAL at 09:23

## 2018-09-28 RX ADMIN — OXYCODONE HYDROCHLORIDE 10 MG: 10 TABLET ORAL at 22:02

## 2018-09-28 RX ADMIN — METOPROLOL SUCCINATE 50 MG: 50 TABLET, EXTENDED RELEASE ORAL at 09:23

## 2018-09-28 RX ADMIN — MIRTAZAPINE 7.5 MG: 15 TABLET, FILM COATED ORAL at 21:56

## 2018-09-28 RX ADMIN — DULOXETINE HYDROCHLORIDE 30 MG: 30 CAPSULE, DELAYED RELEASE ORAL at 09:24

## 2018-09-28 RX ADMIN — POTASSIUM CHLORIDE 20 MEQ: 1500 TABLET, EXTENDED RELEASE ORAL at 09:24

## 2018-09-28 RX ADMIN — POTASSIUM CITRATE 20 MEQ: 10 TABLET, EXTENDED RELEASE ORAL at 11:24

## 2018-09-28 RX ADMIN — MELATONIN 6 MG: at 21:56

## 2018-09-28 RX ADMIN — POTASSIUM CITRATE 20 MEQ: 10 TABLET, EXTENDED RELEASE ORAL at 09:00

## 2018-09-28 RX ADMIN — POTASSIUM CITRATE 20 MEQ: 10 TABLET, EXTENDED RELEASE ORAL at 16:03

## 2018-09-28 RX ADMIN — OXYCODONE HYDROCHLORIDE AND ACETAMINOPHEN 500 MG: 500 TABLET ORAL at 09:24

## 2018-09-28 RX ADMIN — OXYCODONE HYDROCHLORIDE 10 MG: 10 TABLET ORAL at 05:54

## 2018-09-28 NOTE — ASSESSMENT & PLAN NOTE
Status post stent replacement on earlier admission in September      GI evaluation appreciated- f/u as outpatient
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
## 2018-09-28 NOTE — ASSESSMENT & PLAN NOTE
· Patient with possible withdrawal versus biliary symptoms  Symptoms completely abated with 1 time dose of IV Dilaudid in the ED  Patient reported that she had ran out of narcotics, PMED web site reviewed -patient appeared to have scripts filled by multiple providers in multiple pharmacy  continue current dose of oxycodone  CT scan reviewed-will consult Gastroenterology  Stable LFTs    No abdominal pain

## 2018-09-28 NOTE — RESTORATIVE TECHNICIAN NOTE
Restorative Specialist Mobility Note       Activity: Other (Comment) (Patient refused OOB to chair for lunch )
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
## 2018-09-29 VITALS
OXYGEN SATURATION: 95 % | TEMPERATURE: 98.1 F | BODY MASS INDEX: 24.07 KG/M2 | HEIGHT: 65 IN | WEIGHT: 144.5 LBS | SYSTOLIC BLOOD PRESSURE: 127 MMHG | HEART RATE: 72 BPM | RESPIRATION RATE: 18 BRPM | DIASTOLIC BLOOD PRESSURE: 59 MMHG

## 2018-09-29 PROBLEM — N17.9 AKI (ACUTE KIDNEY INJURY) (HCC): Status: RESOLVED | Noted: 2018-08-21 | Resolved: 2018-09-29

## 2018-09-29 PROBLEM — E87.6 HYPOKALEMIA: Status: RESOLVED | Noted: 2018-09-27 | Resolved: 2018-09-29

## 2018-09-29 PROBLEM — R10.9 ABDOMINAL PAIN: Status: RESOLVED | Noted: 2018-09-26 | Resolved: 2018-09-29

## 2018-09-29 LAB
ALBUMIN SERPL BCP-MCNC: 2.8 G/DL (ref 3.5–5)
ALP SERPL-CCNC: 92 U/L (ref 46–116)
ALT SERPL W P-5'-P-CCNC: 14 U/L (ref 12–78)
ANION GAP SERPL CALCULATED.3IONS-SCNC: 3 MMOL/L (ref 4–13)
AST SERPL W P-5'-P-CCNC: 10 U/L (ref 5–45)
BASOPHILS # BLD AUTO: 0.06 THOUSANDS/ΜL (ref 0–0.1)
BASOPHILS NFR BLD AUTO: 1 % (ref 0–1)
BILIRUB SERPL-MCNC: 0.32 MG/DL (ref 0.2–1)
BUN SERPL-MCNC: 12 MG/DL (ref 5–25)
CALCIUM SERPL-MCNC: 8.6 MG/DL (ref 8.3–10.1)
CHLORIDE SERPL-SCNC: 107 MMOL/L (ref 100–108)
CO2 SERPL-SCNC: 32 MMOL/L (ref 21–32)
CREAT SERPL-MCNC: 0.8 MG/DL (ref 0.6–1.3)
EOSINOPHIL # BLD AUTO: 0.34 THOUSAND/ΜL (ref 0–0.61)
EOSINOPHIL NFR BLD AUTO: 5 % (ref 0–6)
ERYTHROCYTE [DISTWIDTH] IN BLOOD BY AUTOMATED COUNT: 15.2 % (ref 11.6–15.1)
GFR SERPL CREATININE-BSD FRML MDRD: 71 ML/MIN/1.73SQ M
GLUCOSE SERPL-MCNC: 97 MG/DL (ref 65–140)
HCT VFR BLD AUTO: 27.5 % (ref 34.8–46.1)
HGB BLD-MCNC: 8.1 G/DL (ref 11.5–15.4)
IMM GRANULOCYTES # BLD AUTO: 0.03 THOUSAND/UL (ref 0–0.2)
IMM GRANULOCYTES NFR BLD AUTO: 1 % (ref 0–2)
LYMPHOCYTES # BLD AUTO: 1.38 THOUSANDS/ΜL (ref 0.6–4.47)
LYMPHOCYTES NFR BLD AUTO: 22 % (ref 14–44)
MCH RBC QN AUTO: 28.3 PG (ref 26.8–34.3)
MCHC RBC AUTO-ENTMCNC: 29.5 G/DL (ref 31.4–37.4)
MCV RBC AUTO: 96 FL (ref 82–98)
MONOCYTES # BLD AUTO: 0.66 THOUSAND/ΜL (ref 0.17–1.22)
MONOCYTES NFR BLD AUTO: 10 % (ref 4–12)
NEUTROPHILS # BLD AUTO: 3.96 THOUSANDS/ΜL (ref 1.85–7.62)
NEUTS SEG NFR BLD AUTO: 61 % (ref 43–75)
NRBC BLD AUTO-RTO: 0 /100 WBCS
PLATELET # BLD AUTO: 283 THOUSANDS/UL (ref 149–390)
PMV BLD AUTO: 10.3 FL (ref 8.9–12.7)
POTASSIUM SERPL-SCNC: 4.5 MMOL/L (ref 3.5–5.3)
PROT SERPL-MCNC: 5.9 G/DL (ref 6.4–8.2)
RBC # BLD AUTO: 2.86 MILLION/UL (ref 3.81–5.12)
SODIUM SERPL-SCNC: 142 MMOL/L (ref 136–145)
WBC # BLD AUTO: 6.43 THOUSAND/UL (ref 4.31–10.16)

## 2018-09-29 PROCEDURE — 85025 COMPLETE CBC W/AUTO DIFF WBC: CPT | Performed by: FAMILY MEDICINE

## 2018-09-29 PROCEDURE — 99239 HOSP IP/OBS DSCHRG MGMT >30: CPT | Performed by: FAMILY MEDICINE

## 2018-09-29 PROCEDURE — 80053 COMPREHEN METABOLIC PANEL: CPT | Performed by: FAMILY MEDICINE

## 2018-09-29 RX ORDER — OXYCODONE HYDROCHLORIDE 5 MG/1
5 TABLET ORAL EVERY 6 HOURS PRN
Qty: 20 TABLET | Refills: 0 | Status: SHIPPED | OUTPATIENT
Start: 2018-09-29 | End: 2018-10-09

## 2018-09-29 RX ORDER — FERROUS SULFATE TAB EC 324 MG (65 MG FE EQUIVALENT) 324 (65 FE) MG
324 TABLET DELAYED RESPONSE ORAL
Qty: 30 TABLET | Refills: 0 | Status: SHIPPED | OUTPATIENT
Start: 2018-09-29 | End: 2018-10-02 | Stop reason: SDUPTHER

## 2018-09-29 RX ADMIN — POTASSIUM CITRATE 20 MEQ: 10 TABLET, EXTENDED RELEASE ORAL at 12:12

## 2018-09-29 RX ADMIN — PANTOPRAZOLE SODIUM 40 MG: 40 TABLET, DELAYED RELEASE ORAL at 06:19

## 2018-09-29 RX ADMIN — OXYCODONE HYDROCHLORIDE AND ACETAMINOPHEN 500 MG: 500 TABLET ORAL at 08:13

## 2018-09-29 RX ADMIN — AMIODARONE HYDROCHLORIDE 200 MG: 200 TABLET ORAL at 08:13

## 2018-09-29 RX ADMIN — DULOXETINE HYDROCHLORIDE 30 MG: 30 CAPSULE, DELAYED RELEASE ORAL at 08:12

## 2018-09-29 RX ADMIN — METOPROLOL SUCCINATE 50 MG: 50 TABLET, EXTENDED RELEASE ORAL at 08:12

## 2018-09-29 RX ADMIN — CLOPIDOGREL 75 MG: 75 TABLET, FILM COATED ORAL at 08:13

## 2018-09-29 RX ADMIN — THIAMINE HCL TAB 100 MG 100 MG: 100 TAB at 08:13

## 2018-09-29 RX ADMIN — ALPRAZOLAM 0.25 MG: 0.25 TABLET ORAL at 00:09

## 2018-09-29 RX ADMIN — POTASSIUM CHLORIDE 20 MEQ: 1500 TABLET, EXTENDED RELEASE ORAL at 08:13

## 2018-09-29 RX ADMIN — ACETAMINOPHEN 650 MG: 325 TABLET, FILM COATED ORAL at 12:12

## 2018-09-29 RX ADMIN — APIXABAN 5 MG: 5 TABLET, FILM COATED ORAL at 08:13

## 2018-09-29 RX ADMIN — POTASSIUM CITRATE 20 MEQ: 10 TABLET, EXTENDED RELEASE ORAL at 08:12

## 2018-09-29 RX ADMIN — FOLIC ACID 1 MG: 1 TABLET ORAL at 08:13

## 2018-09-29 NOTE — SOCIAL WORK
The pt medically clear for discharge and has no family or money to get home   Cm provided taxi voucher to home    Cm will follow

## 2018-09-29 NOTE — DISCHARGE INSTRUCTIONS
Contact your primary care physician to obtain a refill for medication  Make sure that you keep the current appointment as before admission with Gastroenterology and Cardiology  Take oxycodone 1tablets 4 times a day as needed for 3 days, then 1 tablet 3 times a day as needed for 3 days, and 1 tablet 2 times a day as needed for 3 days and then stop
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
## 2018-10-01 ENCOUNTER — TRANSITIONAL CARE MANAGEMENT (OUTPATIENT)
Dept: FAMILY MEDICINE CLINIC | Facility: CLINIC | Age: 78
End: 2018-10-01

## 2018-10-01 ENCOUNTER — PATIENT OUTREACH (OUTPATIENT)
Dept: FAMILY MEDICINE CLINIC | Facility: CLINIC | Age: 78
End: 2018-10-01

## 2018-10-01 NOTE — PROGRESS NOTES
Anne Marie Rao is receiving home care services of SN, PT, OT  She has all her medications and visiting nurses are filling her mediplanner  Patient is very forgetful  Her landlady is the only person she has to  take her to appointments  I discussed with her filling out a Ed[employer]on application and she was agreeable  She has not scheduled f/u appointments and I encouraged her to call PCP today to schedule appointment  She is taking oxycodone for pain to knee, back and neck  She usually orders take out for meals  She does not have a scale  She denies chest pain, sob or LE edema  She feels she needs assistance at home, she is active with [facility]  I will call to inquire about services she qualifies for  She has my contact information and is agreeable to further outreach

## 2018-10-02 DIAGNOSIS — I48.91 ATRIAL FIBRILLATION WITH RVR (HCC): Chronic | ICD-10-CM

## 2018-10-02 DIAGNOSIS — M15.9 PRIMARY OSTEOARTHRITIS INVOLVING MULTIPLE JOINTS: Primary | ICD-10-CM

## 2018-10-02 DIAGNOSIS — R26.2 AMBULATORY DYSFUNCTION: ICD-10-CM

## 2018-10-02 DIAGNOSIS — D50.0 IRON DEFICIENCY ANEMIA DUE TO CHRONIC BLOOD LOSS: ICD-10-CM

## 2018-10-02 DIAGNOSIS — F32.89 OTHER DEPRESSION: Chronic | ICD-10-CM

## 2018-10-02 DIAGNOSIS — I50.22 CHRONIC SYSTOLIC HEART FAILURE (HCC): Chronic | ICD-10-CM

## 2018-10-02 DIAGNOSIS — M54.50 ACUTE LEFT-SIDED LOW BACK PAIN WITHOUT SCIATICA: ICD-10-CM

## 2018-10-02 DIAGNOSIS — G89.4 CHRONIC PAIN DISORDER: ICD-10-CM

## 2018-10-02 DIAGNOSIS — Z87.19 H/O: GI BLEED: ICD-10-CM

## 2018-10-02 RX ORDER — MIRTAZAPINE 7.5 MG/1
7.5 TABLET, FILM COATED ORAL
Qty: 30 TABLET | Refills: 5 | Status: SHIPPED | OUTPATIENT
Start: 2018-10-02 | End: 2019-04-03 | Stop reason: SDUPTHER

## 2018-10-02 RX ORDER — ASCORBIC ACID 500 MG
500 TABLET ORAL DAILY
Qty: 30 TABLET | Refills: 5 | Status: SHIPPED | OUTPATIENT
Start: 2018-10-02 | End: 2019-04-03 | Stop reason: SDUPTHER

## 2018-10-02 RX ORDER — POTASSIUM CHLORIDE 20 MEQ/1
20 TABLET, EXTENDED RELEASE ORAL DAILY
Qty: 30 TABLET | Refills: 5 | Status: SHIPPED | OUTPATIENT
Start: 2018-10-02 | End: 2019-04-03 | Stop reason: SDUPTHER

## 2018-10-02 RX ORDER — FOLIC ACID 1 MG/1
1 TABLET ORAL DAILY
Qty: 30 TABLET | Refills: 5 | Status: SHIPPED | OUTPATIENT
Start: 2018-10-02 | End: 2019-04-03 | Stop reason: SDUPTHER

## 2018-10-02 RX ORDER — LANOLIN ALCOHOL/MO/W.PET/CERES
100 CREAM (GRAM) TOPICAL DAILY
Qty: 30 TABLET | Refills: 5 | Status: ON HOLD | OUTPATIENT
Start: 2018-10-02 | End: 2019-07-16 | Stop reason: SDUPTHER

## 2018-10-02 RX ORDER — PANTOPRAZOLE SODIUM 40 MG/1
40 TABLET, DELAYED RELEASE ORAL
Qty: 60 TABLET | Refills: 5 | Status: ON HOLD | OUTPATIENT
Start: 2018-10-02 | End: 2019-07-16 | Stop reason: SDUPTHER

## 2018-10-02 RX ORDER — DULOXETIN HYDROCHLORIDE 30 MG/1
30 CAPSULE, DELAYED RELEASE ORAL DAILY
Qty: 30 CAPSULE | Refills: 5 | Status: SHIPPED | OUTPATIENT
Start: 2018-10-02 | End: 2019-04-03 | Stop reason: SDUPTHER

## 2018-10-02 RX ORDER — NORTRIPTYLINE HYDROCHLORIDE 10 MG/1
20 CAPSULE ORAL
Qty: 60 CAPSULE | Refills: 5 | Status: SHIPPED | OUTPATIENT
Start: 2018-10-02 | End: 2019-04-03 | Stop reason: SDUPTHER

## 2018-10-02 RX ORDER — FERROUS SULFATE TAB EC 324 MG (65 MG FE EQUIVALENT) 324 (65 FE) MG
324 TABLET DELAYED RESPONSE ORAL
Qty: 30 TABLET | Refills: 5 | Status: SHIPPED | OUTPATIENT
Start: 2018-10-02 | End: 2019-02-14 | Stop reason: SDUPTHER

## 2018-10-02 RX ORDER — LIDOCAINE 50 MG/G
1 PATCH TOPICAL DAILY
Qty: 30 PATCH | Refills: 0 | Status: SHIPPED | OUTPATIENT
Start: 2018-10-02 | End: 2019-01-12

## 2018-10-02 NOTE — TELEPHONE ENCOUNTER
Prescription for lidocaine patch sent to [address] [place] [address]  Please notify patient

## 2018-10-02 NOTE — TELEPHONE ENCOUNTER
BROOKE: Pt has a TCM appt on 10/3/18     - I spoke to pt on 10/1 and she stated that she would like to get the lidocaine patch instead of the ointment, and asked me to let you know, as she may forget when she is here for her appt  - I also spoke with the visiting nurse, and she wanted me to let you know that she was advised in the hospital to taper off of the oxycodone, and was given a schedule as to how to do that  It is also detailed in the AVS in chart - I will print it out for you      - Visiting nurse also states that as she is on both lisinopril and potassium, she must mention that this is a possible med interaction

## 2018-10-10 DIAGNOSIS — I48.91 ATRIAL FIBRILLATION WITH RVR (HCC): Chronic | ICD-10-CM

## 2018-10-10 RX ORDER — METOPROLOL SUCCINATE 50 MG/1
50 TABLET, EXTENDED RELEASE ORAL EVERY 12 HOURS SCHEDULED
Qty: 60 TABLET | Refills: 0 | Status: SHIPPED | OUTPATIENT
Start: 2018-10-10 | End: 2018-11-01 | Stop reason: SDUPTHER

## 2018-10-11 ENCOUNTER — TELEPHONE (OUTPATIENT)
Dept: FAMILY MEDICINE CLINIC | Facility: CLINIC | Age: 78
End: 2018-10-11

## 2018-10-11 ENCOUNTER — TELEPHONE (OUTPATIENT)
Dept: CARDIOLOGY CLINIC | Facility: CLINIC | Age: 78
End: 2018-10-11

## 2018-10-11 NOTE — TELEPHONE ENCOUNTER
P/C from [employer] [facility]  Pt discharged after hospital for chronic afib and chronic diastolic heart failure, being monitored by [facility]; hx of SVT ablation 7/16/18  She has had a steady increase in weight since last week; from 154 8 to 162 2 lbs  Pt has been non compliant with her low sodium diet, and says she has a big appetite  Per nurse, pt has no shortness of breath, lungs are clear  HR 70, /64  Meds: Lasix 40/d, K+ 20/d, Toprol 50/d, amiodarone 200/d, Plavix 75/d, Eliquis 5 BID

## 2018-10-11 NOTE — TELEPHONE ENCOUNTER
This pt needs an appt  Please schedule with NP , fellow and any cardiologist for CHF and call the visiting nurse to schedule, not the pt     Richa Gomez [phone] Thanks

## 2018-10-11 NOTE — TELEPHONE ENCOUNTER
Spoke to Jami Coker - ON#[phone] and she spoke to cardiologist - she is going to see pt in the morning and call to make an appt for her CHF doctor, per the request of the cardiologist

## 2018-10-11 NOTE — TELEPHONE ENCOUNTER
Home Howell from [address] called stating that pt has had a weight gain the past 6 days from 154 8 to 162 2  She also states that pt has been eating a lot of food with salt  She also states that pt has not seen cardiology since she was d/c from hospital  She has an appt with them on 10/24/18  Luiza Fitzgerald also said that she will call the cardiologist office to let them know, and see if she can be seen sooner

## 2018-10-12 NOTE — TELEPHONE ENCOUNTER
Pt cancelled VNA today  Vanessa Cutler concerned of readmission over the weekend d/t non-compliance  Stated pt does not normally answer the phone if we want to reach out to her  I called the mobile # several times, number is busy   will continue to try  Pt has cancelled several appt w/Dr Tania Sanders  Next appt scheduled for 10/24/18  VNA to visit on Monday

## 2018-10-25 ENCOUNTER — OFFICE VISIT (OUTPATIENT)
Dept: FAMILY MEDICINE CLINIC | Facility: CLINIC | Age: 78
End: 2018-10-25
Payer: MEDICARE

## 2018-10-25 VITALS
BODY MASS INDEX: 26.13 KG/M2 | RESPIRATION RATE: 16 BRPM | DIASTOLIC BLOOD PRESSURE: 72 MMHG | SYSTOLIC BLOOD PRESSURE: 110 MMHG | HEART RATE: 72 BPM | TEMPERATURE: 97.4 F | WEIGHT: 157 LBS

## 2018-10-25 DIAGNOSIS — Z00.00 HEALTHCARE MAINTENANCE: ICD-10-CM

## 2018-10-25 DIAGNOSIS — F34.1 DYSTHYMIC DISORDER: Chronic | ICD-10-CM

## 2018-10-25 DIAGNOSIS — I25.10 CORONARY ARTERY DISEASE INVOLVING NATIVE CORONARY ARTERY OF NATIVE HEART WITHOUT ANGINA PECTORIS: ICD-10-CM

## 2018-10-25 DIAGNOSIS — G89.4 CHRONIC PAIN DISORDER: ICD-10-CM

## 2018-10-25 DIAGNOSIS — M15.9 PRIMARY OSTEOARTHRITIS INVOLVING MULTIPLE JOINTS: ICD-10-CM

## 2018-10-25 DIAGNOSIS — I10 ESSENTIAL HYPERTENSION: ICD-10-CM

## 2018-10-25 DIAGNOSIS — I50.22 CHRONIC SYSTOLIC HEART FAILURE (HCC): Primary | Chronic | ICD-10-CM

## 2018-10-25 PROCEDURE — 90662 IIV NO PRSV INCREASED AG IM: CPT

## 2018-10-25 PROCEDURE — G0008 ADMIN INFLUENZA VIRUS VAC: HCPCS

## 2018-10-25 PROCEDURE — 99214 OFFICE O/P EST MOD 30 MIN: CPT | Performed by: FAMILY MEDICINE

## 2018-10-25 NOTE — PROGRESS NOTES
Assessment/Plan:    Patient received high-dose flu vaccine today  Patient to continue present treatment  Recommend patient follow up with her specialists as scheduled and return to the office in 3 months  Diagnoses and all orders for this visit:    Chronic systolic heart failure ([address] )    Essential hypertension    Coronary artery disease involving native coronary artery of native heart without angina pectoris    Primary osteoarthritis involving multiple joints    Chronic pain disorder    Dysthymic disorder    Healthcare maintenance  -     influenza vaccine, [id number], high-dose, PF 0 5 mL, for patients 65 yr+ (FLUZONE HIGH-DOSE)          Subjective:      Patient ID: Jt Manzano is a 69 y o  female  Patient is being seen in follow-up for chronic conditions  She was recent hospitalized from 09/26/2018 until 09/29/2018 at [facility] with congestive heart failure and abdominal pain  Patient has visiting nurses twice a week  She saw Dr Guillermo Marin rheumatologist 2 weeks ago  Appetite is fair and patient states she is not sleeping well  She admits to chronic pain  The following portions of the patient's history were reviewed and updated as appropriate: allergies, current medications, past family history, past medical history, past social history, past surgical history and problem list     Review of Systems   Constitutional: Positive for activity change and fatigue  Negative for appetite change and unexpected weight change  HENT: Negative  Eyes: Positive for visual disturbance  Respiratory: Positive for shortness of breath  Negative for cough, chest tightness and wheezing  Cardiovascular: Negative for chest pain, palpitations and leg swelling  Gastrointestinal: Positive for diarrhea and nausea  Negative for abdominal pain, blood in stool, constipation and vomiting  Endocrine: Negative for cold intolerance and heat intolerance     Genitourinary: Negative for difficulty urinating, dysuria, frequency, hematuria and urgency  Musculoskeletal: Positive for arthralgias, back pain, gait problem, joint swelling, myalgias, neck pain and neck stiffness  Skin: Negative  Neurological: Positive for weakness  Negative for dizziness, syncope, light-headedness and headaches  Hematological: Negative for adenopathy  Does not bruise/bleed easily  Psychiatric/Behavioral: Positive for sleep disturbance  Negative for dysphoric mood  The patient is nervous/anxious  Objective:      /72   Pulse 72   Temp (!) 97 4 °F (36 3 °C)   Resp 16   Wt 71 2 kg (157 lb)   BMI 26 13 kg/m²          Physical Exam   Constitutional: She is oriented to person, place, and time  She appears well-developed and well-nourished  No distress  HENT:   Head: Normocephalic  Mouth/Throat: Oropharynx is clear and moist    Eyes: Conjunctivae are normal  No scleral icterus  Neck: Neck supple  Cardiovascular: Normal rate and regular rhythm  Pulmonary/Chest: Effort normal and breath sounds normal    Abdominal: Soft  There is no tenderness  Musculoskeletal: She exhibits no edema  Lymphadenopathy:     She has no cervical adenopathy  Neurological: She is alert and oriented to person, place, and time  Skin: Skin is warm and dry  Psychiatric: She has a normal mood and affect

## 2018-10-26 DIAGNOSIS — I10 ESSENTIAL HYPERTENSION: ICD-10-CM

## 2018-10-26 RX ORDER — LISINOPRIL 5 MG/1
5 TABLET ORAL DAILY
Qty: 30 TABLET | Refills: 3 | Status: SHIPPED | OUTPATIENT
Start: 2018-10-26 | End: 2019-01-10 | Stop reason: HOSPADM

## 2018-10-26 RX ORDER — LISINOPRIL 5 MG/1
5 TABLET ORAL DAILY
Qty: 30 TABLET | Refills: 0 | OUTPATIENT
Start: 2018-10-26

## 2018-10-30 ENCOUNTER — TELEPHONE (OUTPATIENT)
Dept: FAMILY MEDICINE CLINIC | Facility: CLINIC | Age: 78
End: 2018-10-30

## 2018-10-30 NOTE — TELEPHONE ENCOUNTER
Left detailed message  for Emeli Nieto visiting nurse Sharan Yuan, she left a message on the refill line requesting a refill for pts Lisinopril to [address] [place] [address]  This was already sent on 10/26/18 so please verify if pt never got this or not

## 2018-11-01 ENCOUNTER — TELEPHONE (OUTPATIENT)
Dept: FAMILY MEDICINE CLINIC | Facility: CLINIC | Age: 78
End: 2018-11-01

## 2018-11-01 DIAGNOSIS — I48.91 ATRIAL FIBRILLATION WITH RVR (HCC): Chronic | ICD-10-CM

## 2018-11-01 RX ORDER — METOPROLOL SUCCINATE 50 MG/1
TABLET, EXTENDED RELEASE ORAL
Qty: 60 TABLET | Refills: 0 | OUTPATIENT
Start: 2018-11-01

## 2018-11-01 RX ORDER — METOPROLOL SUCCINATE 50 MG/1
50 TABLET, EXTENDED RELEASE ORAL EVERY 12 HOURS SCHEDULED
Qty: 56 TABLET | Refills: 2 | Status: SHIPPED | OUTPATIENT
Start: 2018-11-01 | End: 2018-11-20 | Stop reason: SDUPTHER

## 2018-11-01 NOTE — TELEPHONE ENCOUNTER
Wicho Abadping from [employer] called, requesting that [facility] sets up meds in blister packs, making it easier for pt to be compliant with her medication  I called [facility], and they state that they will do that, but in the future, need meds sent for 28 day or 84 day supply, as it is set up in 7- day packs  Thank you

## 2018-11-02 RX ORDER — AMIODARONE HYDROCHLORIDE 200 MG/1
200 TABLET ORAL DAILY
Qty: 30 TABLET | Refills: 0 | OUTPATIENT
Start: 2018-11-02

## 2018-11-03 DIAGNOSIS — I48.91 ATRIAL FIBRILLATION WITH RVR (HCC): Chronic | ICD-10-CM

## 2018-11-05 RX ORDER — AMIODARONE HYDROCHLORIDE 200 MG/1
200 TABLET ORAL DAILY
Qty: 30 TABLET | Refills: 0 | OUTPATIENT
Start: 2018-11-05

## 2018-11-08 ENCOUNTER — TELEPHONE (OUTPATIENT)
Dept: FAMILY MEDICINE CLINIC | Facility: CLINIC | Age: 78
End: 2018-11-08

## 2018-11-08 NOTE — TELEPHONE ENCOUNTER
Phone call with Cathie Chance, visiting nurse  She will re-certify patient for home nursing and will be starting prescription blister packs with [address] on 11/24/2018  Patient remains noncompliant  She will get  involved and Department of Aging

## 2018-11-08 NOTE — TELEPHONE ENCOUNTER
Patient need refill on thiamine will send back  Nurse states that patient is non compliance with medications  She will be starting blister packs with her  Nurse is starting her for vna services, she has been canceling appointments

## 2018-11-20 ENCOUNTER — TELEPHONE (OUTPATIENT)
Dept: FAMILY MEDICINE CLINIC | Facility: CLINIC | Age: 78
End: 2018-11-20

## 2018-11-20 DIAGNOSIS — I48.91 ATRIAL FIBRILLATION WITH RVR (HCC): Chronic | ICD-10-CM

## 2018-11-20 RX ORDER — METOPROLOL SUCCINATE 50 MG/1
50 TABLET, EXTENDED RELEASE ORAL EVERY 12 HOURS SCHEDULED
Qty: 56 TABLET | Refills: 2 | Status: SHIPPED | OUTPATIENT
Start: 2018-11-20 | End: 2019-01-10 | Stop reason: HOSPADM

## 2018-11-20 RX ORDER — AMIODARONE HYDROCHLORIDE 200 MG/1
200 TABLET ORAL DAILY
Qty: 30 TABLET | Refills: 2 | Status: SHIPPED | OUTPATIENT
Start: 2018-11-20 | End: 2019-04-03 | Stop reason: SDUPTHER

## 2018-11-20 NOTE — TELEPHONE ENCOUNTER
Sally MASCORRO Visiting nurse  Vanessa Cutler, stated Shane Monk called and canceled appts with the visiting nurses and she now has been discharged, she states Shane Monk has been non compliant  She wanted to make sure you were aware

## 2018-12-22 DIAGNOSIS — F32.89 OTHER DEPRESSION: Chronic | ICD-10-CM

## 2018-12-23 RX ORDER — AMITRIPTYLINE HYDROCHLORIDE 25 MG/1
TABLET, FILM COATED ORAL
Qty: 30 TABLET | Refills: 0 | Status: SHIPPED | OUTPATIENT
Start: 2018-12-23 | End: 2019-01-10 | Stop reason: HOSPADM

## 2019-01-01 ENCOUNTER — PATIENT OUTREACH (OUTPATIENT)
Dept: FAMILY MEDICINE CLINIC | Facility: CLINIC | Age: 79
End: 2019-01-01

## 2019-01-01 ENCOUNTER — TELEPHONE (OUTPATIENT)
Dept: FAMILY MEDICINE CLINIC | Facility: CLINIC | Age: 79
End: 2019-01-01

## 2019-01-01 ENCOUNTER — OFFICE VISIT (OUTPATIENT)
Dept: FAMILY MEDICINE CLINIC | Facility: CLINIC | Age: 79
End: 2019-01-01
Payer: MEDICARE

## 2019-01-01 VITALS
DIASTOLIC BLOOD PRESSURE: 70 MMHG | HEART RATE: 80 BPM | RESPIRATION RATE: 18 BRPM | TEMPERATURE: 99.8 F | OXYGEN SATURATION: 96 % | SYSTOLIC BLOOD PRESSURE: 110 MMHG | HEIGHT: 65 IN | WEIGHT: 178 LBS | BODY MASS INDEX: 29.66 KG/M2

## 2019-01-01 DIAGNOSIS — E78.5 HYPERLIPIDEMIA, UNSPECIFIED HYPERLIPIDEMIA TYPE: ICD-10-CM

## 2019-01-01 DIAGNOSIS — F34.1 DYSTHYMIC DISORDER: ICD-10-CM

## 2019-01-01 DIAGNOSIS — R53.1 WEAKNESS: Primary | ICD-10-CM

## 2019-01-01 DIAGNOSIS — I10 ESSENTIAL HYPERTENSION: Primary | ICD-10-CM

## 2019-01-01 DIAGNOSIS — M15.9 PRIMARY OSTEOARTHRITIS INVOLVING MULTIPLE JOINTS: ICD-10-CM

## 2019-01-01 DIAGNOSIS — R26.89 IMBALANCE: ICD-10-CM

## 2019-01-01 DIAGNOSIS — F32.A DEPRESSION, UNSPECIFIED DEPRESSION TYPE: Primary | ICD-10-CM

## 2019-01-01 DIAGNOSIS — I50.22 CHRONIC SYSTOLIC HEART FAILURE (HCC): Primary | ICD-10-CM

## 2019-01-01 DIAGNOSIS — I48.91 ATRIAL FIBRILLATION WITH RVR (HCC): ICD-10-CM

## 2019-01-01 DIAGNOSIS — I48.20 CHRONIC ATRIAL FIBRILLATION (HCC): ICD-10-CM

## 2019-01-01 DIAGNOSIS — R26.2 AMBULATORY DYSFUNCTION: ICD-10-CM

## 2019-01-01 DIAGNOSIS — Z00.00 HEALTHCARE MAINTENANCE: ICD-10-CM

## 2019-01-01 DIAGNOSIS — F32.A DEPRESSION, UNSPECIFIED DEPRESSION TYPE: ICD-10-CM

## 2019-01-01 DIAGNOSIS — I25.10 CORONARY ARTERY DISEASE: ICD-10-CM

## 2019-01-01 DIAGNOSIS — I25.10 CORONARY ARTERY DISEASE INVOLVING NATIVE CORONARY ARTERY OF NATIVE HEART WITHOUT ANGINA PECTORIS: ICD-10-CM

## 2019-01-01 DIAGNOSIS — F41.9 ANXIETY: ICD-10-CM

## 2019-01-01 DIAGNOSIS — D50.0 IRON DEFICIENCY ANEMIA DUE TO CHRONIC BLOOD LOSS: ICD-10-CM

## 2019-01-01 DIAGNOSIS — R41.3 MEMORY LOSS: Primary | ICD-10-CM

## 2019-01-01 DIAGNOSIS — D62 ACUTE BLOOD LOSS ANEMIA: ICD-10-CM

## 2019-01-01 DIAGNOSIS — I10 ESSENTIAL HYPERTENSION: ICD-10-CM

## 2019-01-01 DIAGNOSIS — I50.22 CHRONIC SYSTOLIC HEART FAILURE (HCC): Chronic | ICD-10-CM

## 2019-01-01 LAB
ALBUMIN SERPL BCP-MCNC: 3.8 G/DL (ref 3.5–5)
ALP SERPL-CCNC: 106 U/L (ref 46–116)
ALT SERPL W P-5'-P-CCNC: 22 U/L (ref 12–78)
ANION GAP SERPL CALCULATED.3IONS-SCNC: 7 MMOL/L (ref 4–13)
AST SERPL W P-5'-P-CCNC: 12 U/L (ref 5–45)
BILIRUB SERPL-MCNC: 0.23 MG/DL (ref 0.2–1)
BUN SERPL-MCNC: 20 MG/DL (ref 5–25)
CALCIUM SERPL-MCNC: 8.5 MG/DL (ref 8.3–10.1)
CHLORIDE SERPL-SCNC: 106 MMOL/L (ref 100–108)
CO2 SERPL-SCNC: 26 MMOL/L (ref 21–32)
CREAT SERPL-MCNC: 1.21 MG/DL (ref 0.6–1.3)
ERYTHROCYTE [DISTWIDTH] IN BLOOD BY AUTOMATED COUNT: 13.9 % (ref 11.6–15.1)
GFR SERPL CREATININE-BSD FRML MDRD: 43 ML/MIN/1.73SQ M
GLUCOSE SERPL-MCNC: 110 MG/DL (ref 65–140)
HCT VFR BLD AUTO: 35.8 % (ref 34.8–46.1)
HGB BLD-MCNC: 10.4 G/DL (ref 11.5–15.4)
MCH RBC QN AUTO: 33.4 PG (ref 26.8–34.3)
MCHC RBC AUTO-ENTMCNC: 29.1 G/DL (ref 31.4–37.4)
MCV RBC AUTO: 115 FL (ref 82–98)
PLATELET # BLD AUTO: 308 THOUSANDS/UL (ref 149–390)
PMV BLD AUTO: 10.1 FL (ref 8.9–12.7)
POTASSIUM SERPL-SCNC: 4.3 MMOL/L (ref 3.5–5.3)
PROT SERPL-MCNC: 7.1 G/DL (ref 6.4–8.2)
RBC # BLD AUTO: 3.11 MILLION/UL (ref 3.81–5.12)
SODIUM SERPL-SCNC: 139 MMOL/L (ref 136–145)
WBC # BLD AUTO: 8.41 THOUSAND/UL (ref 4.31–10.16)

## 2019-01-01 PROCEDURE — G0008 ADMIN INFLUENZA VIRUS VAC: HCPCS

## 2019-01-01 PROCEDURE — 85027 COMPLETE CBC AUTOMATED: CPT | Performed by: FAMILY MEDICINE

## 2019-01-01 PROCEDURE — 36415 COLL VENOUS BLD VENIPUNCTURE: CPT | Performed by: FAMILY MEDICINE

## 2019-01-01 PROCEDURE — 90662 IIV NO PRSV INCREASED AG IM: CPT

## 2019-01-01 PROCEDURE — 99214 OFFICE O/P EST MOD 30 MIN: CPT | Performed by: FAMILY MEDICINE

## 2019-01-01 PROCEDURE — 80053 COMPREHEN METABOLIC PANEL: CPT | Performed by: FAMILY MEDICINE

## 2019-01-01 RX ORDER — FUROSEMIDE 40 MG/1
40 TABLET ORAL DAILY
Qty: 30 TABLET | Refills: 5 | Status: SHIPPED | OUTPATIENT
Start: 2019-01-01 | End: 2020-01-01

## 2019-01-01 RX ORDER — AMIODARONE HYDROCHLORIDE 200 MG/1
200 TABLET ORAL DAILY
Qty: 30 TABLET | Refills: 2 | Status: SHIPPED | OUTPATIENT
Start: 2019-01-01 | End: 2019-01-01 | Stop reason: SDUPTHER

## 2019-01-01 RX ORDER — FLUOXETINE 20 MG/1
20 TABLET, FILM COATED ORAL DAILY
Qty: 30 TABLET | Refills: 5 | Status: SHIPPED | OUTPATIENT
Start: 2019-01-01 | End: 2020-01-01

## 2019-01-01 RX ORDER — AMIODARONE HYDROCHLORIDE 200 MG/1
200 TABLET ORAL DAILY
Qty: 30 TABLET | Refills: 2 | Status: SHIPPED | OUTPATIENT
Start: 2019-01-01 | End: 2020-01-01

## 2019-01-01 RX ORDER — CLOPIDOGREL BISULFATE 75 MG/1
75 TABLET ORAL DAILY
Qty: 30 TABLET | Refills: 5 | Status: SHIPPED | OUTPATIENT
Start: 2019-01-01 | End: 2020-01-01

## 2019-01-01 RX ORDER — FERROUS SULFATE 325(65) MG
325 TABLET ORAL
Qty: 30 TABLET | Refills: 5 | Status: SHIPPED | OUTPATIENT
Start: 2019-01-01 | End: 2020-01-01

## 2019-01-01 RX ORDER — DIGOXIN 125 MCG
0.12 TABLET ORAL DAILY
Qty: 30 TABLET | Refills: 5 | Status: SHIPPED | OUTPATIENT
Start: 2019-01-01 | End: 2020-01-01

## 2019-01-01 RX ORDER — METOPROLOL SUCCINATE 50 MG/1
50 TABLET, EXTENDED RELEASE ORAL EVERY 12 HOURS SCHEDULED
Qty: 60 TABLET | Refills: 5 | Status: SHIPPED | OUTPATIENT
Start: 2019-01-01 | End: 2020-01-01

## 2019-01-01 RX ORDER — FOLIC ACID 1 MG/1
1 TABLET ORAL DAILY
Qty: 30 TABLET | Refills: 5 | Status: SHIPPED | OUTPATIENT
Start: 2019-01-01 | End: 2020-01-01

## 2019-01-01 RX ORDER — ASCORBIC ACID 500 MG
500 TABLET ORAL DAILY
Qty: 30 TABLET | Refills: 5 | Status: SHIPPED | OUTPATIENT
Start: 2019-01-01 | End: 2020-01-01

## 2019-01-01 RX ORDER — LISINOPRIL 5 MG/1
5 TABLET ORAL DAILY
Qty: 30 TABLET | Refills: 3 | Status: SHIPPED | OUTPATIENT
Start: 2019-01-01 | End: 2020-01-01 | Stop reason: SDUPTHER

## 2019-01-01 RX ORDER — NORTRIPTYLINE HYDROCHLORIDE 10 MG/1
10 CAPSULE ORAL
Qty: 30 CAPSULE | Refills: 5 | Status: SHIPPED | OUTPATIENT
Start: 2019-01-01 | End: 2020-01-01

## 2019-01-01 RX ORDER — LISINOPRIL 5 MG/1
5 TABLET ORAL DAILY
Refills: 3 | COMMUNITY
Start: 2019-01-01 | End: 2019-01-01 | Stop reason: SDUPTHER

## 2019-01-01 RX ORDER — ATORVASTATIN CALCIUM 20 MG/1
20 TABLET, FILM COATED ORAL
Qty: 30 TABLET | Refills: 5 | Status: SHIPPED | OUTPATIENT
Start: 2019-01-01 | End: 2020-01-01

## 2019-01-03 ENCOUNTER — APPOINTMENT (EMERGENCY)
Dept: RADIOLOGY | Facility: HOSPITAL | Age: 79
DRG: 682 | End: 2019-01-03
Payer: MEDICARE

## 2019-01-03 ENCOUNTER — HOSPITAL ENCOUNTER (INPATIENT)
Facility: HOSPITAL | Age: 79
LOS: 7 days | Discharge: NURSING, REHAB OR LONG-TERM CARE | DRG: 682 | End: 2019-01-10
Attending: EMERGENCY MEDICINE | Admitting: STUDENT IN AN ORGANIZED HEALTH CARE EDUCATION/TRAINING PROGRAM
Payer: MEDICARE

## 2019-01-03 DIAGNOSIS — I95.9 HYPOTENSION: ICD-10-CM

## 2019-01-03 DIAGNOSIS — I49.8 OTHER CARDIAC ARRHYTHMIA: ICD-10-CM

## 2019-01-03 DIAGNOSIS — N17.9 AKI (ACUTE KIDNEY INJURY) (HCC): Primary | ICD-10-CM

## 2019-01-03 DIAGNOSIS — I50.22 CHRONIC SYSTOLIC HEART FAILURE (HCC): Chronic | ICD-10-CM

## 2019-01-03 DIAGNOSIS — R41.3 MEMORY LOSS: ICD-10-CM

## 2019-01-03 DIAGNOSIS — F11.20 UNCOMPLICATED OPIOID DEPENDENCE (HCC): ICD-10-CM

## 2019-01-03 DIAGNOSIS — K80.50 CHOLEDOCHOLITHIASIS: ICD-10-CM

## 2019-01-03 DIAGNOSIS — D72.829 LEUKOCYTOSIS: ICD-10-CM

## 2019-01-03 DIAGNOSIS — R77.8 ELEVATED TROPONIN: ICD-10-CM

## 2019-01-03 DIAGNOSIS — Z78.9 IMPAIRED MOBILITY AND ADLS: ICD-10-CM

## 2019-01-03 DIAGNOSIS — R79.89 ELEVATED BRAIN NATRIURETIC PEPTIDE (BNP) LEVEL: ICD-10-CM

## 2019-01-03 DIAGNOSIS — Z74.09 IMPAIRED MOBILITY AND ADLS: ICD-10-CM

## 2019-01-03 DIAGNOSIS — F41.9 ANXIETY: ICD-10-CM

## 2019-01-03 PROBLEM — Z98.890 HISTORY OF BILIARY DUCT STENT PLACEMENT: Status: ACTIVE | Noted: 2019-01-03

## 2019-01-03 PROBLEM — R65.10 SIRS (SYSTEMIC INFLAMMATORY RESPONSE SYNDROME) (HCC): Status: ACTIVE | Noted: 2019-01-03

## 2019-01-03 PROBLEM — E87.2 METABOLIC ACIDOSIS: Status: ACTIVE | Noted: 2019-01-03

## 2019-01-03 LAB
ALBUMIN SERPL BCP-MCNC: 2.8 G/DL (ref 3.5–5)
ALP SERPL-CCNC: 95 U/L (ref 46–116)
ALT SERPL W P-5'-P-CCNC: 22 U/L (ref 12–78)
ANION GAP SERPL CALCULATED.3IONS-SCNC: 15 MMOL/L (ref 4–13)
ANISOCYTOSIS BLD QL SMEAR: PRESENT
APTT PPP: 40 SECONDS (ref 26–38)
APTT PPP: 43 SECONDS (ref 26–38)
AST SERPL W P-5'-P-CCNC: 35 U/L (ref 5–45)
ATRIAL RATE: 170 BPM
ATRIAL RATE: 86 BPM
BACTERIA UR QL AUTO: ABNORMAL /HPF
BASOPHILS # BLD MANUAL: 0 THOUSAND/UL (ref 0–0.1)
BASOPHILS NFR MAR MANUAL: 0 % (ref 0–1)
BILIRUB SERPL-MCNC: 0.8 MG/DL (ref 0.2–1)
BILIRUB UR QL STRIP: ABNORMAL
BUN SERPL-MCNC: 108 MG/DL (ref 5–25)
CALCIUM SERPL-MCNC: 9 MG/DL (ref 8.3–10.1)
CHLORIDE SERPL-SCNC: 102 MMOL/L (ref 100–108)
CK MB SERPL-MCNC: 12.2 NG/ML (ref 0–5)
CK MB SERPL-MCNC: 6 % (ref 0–2.5)
CK SERPL-CCNC: 205 U/L (ref 26–192)
CLARITY UR: CLEAR
CLARITY, POC: CLEAR
CO2 SERPL-SCNC: 19 MMOL/L (ref 21–32)
COLOR UR: ABNORMAL
COLOR, POC: NORMAL
CREAT SERPL-MCNC: 3.38 MG/DL (ref 0.6–1.3)
EOSINOPHIL # BLD MANUAL: 0 THOUSAND/UL (ref 0–0.4)
EOSINOPHIL NFR BLD MANUAL: 0 % (ref 0–6)
ERYTHROCYTE [DISTWIDTH] IN BLOOD BY AUTOMATED COUNT: 15.4 % (ref 11.6–15.1)
ERYTHROCYTE [DISTWIDTH] IN BLOOD BY AUTOMATED COUNT: 15.6 % (ref 11.6–15.1)
GFR SERPL CREATININE-BSD FRML MDRD: 12 ML/MIN/1.73SQ M
GLUCOSE SERPL-MCNC: 127 MG/DL (ref 65–140)
GLUCOSE UR STRIP-MCNC: NEGATIVE MG/DL
HCT VFR BLD AUTO: 38.2 % (ref 34.8–46.1)
HCT VFR BLD AUTO: 38.3 % (ref 34.8–46.1)
HGB BLD-MCNC: 12.3 G/DL (ref 11.5–15.4)
HGB BLD-MCNC: 12.5 G/DL (ref 11.5–15.4)
HGB UR QL STRIP.AUTO: ABNORMAL
HYALINE CASTS #/AREA URNS LPF: ABNORMAL /LPF
INR PPP: 1.87 (ref 0.86–1.17)
INR PPP: 2.02 (ref 0.86–1.17)
KETONES UR STRIP-MCNC: NEGATIVE MG/DL
LACTATE SERPL-SCNC: 2 MMOL/L (ref 0.5–2)
LEUKOCYTE ESTERASE UR QL STRIP: NEGATIVE
LIPASE SERPL-CCNC: 72 U/L (ref 73–393)
LYMPHOCYTES # BLD AUTO: 0.13 THOUSAND/UL (ref 0.6–4.47)
LYMPHOCYTES # BLD AUTO: 1 % (ref 14–44)
MACROCYTES BLD QL AUTO: PRESENT
MAGNESIUM SERPL-MCNC: 3 MG/DL (ref 1.6–2.6)
MCH RBC QN AUTO: 31.6 PG (ref 26.8–34.3)
MCH RBC QN AUTO: 31.6 PG (ref 26.8–34.3)
MCHC RBC AUTO-ENTMCNC: 32.2 G/DL (ref 31.4–37.4)
MCHC RBC AUTO-ENTMCNC: 32.6 G/DL (ref 31.4–37.4)
MCV RBC AUTO: 97 FL (ref 82–98)
MCV RBC AUTO: 98 FL (ref 82–98)
MONOCYTES # BLD AUTO: 0.89 THOUSAND/UL (ref 0–1.22)
MONOCYTES NFR BLD: 7 % (ref 4–12)
NEUTROPHILS # BLD MANUAL: 11.66 THOUSAND/UL (ref 1.85–7.62)
NEUTS SEG NFR BLD AUTO: 92 % (ref 43–75)
NITRITE UR QL STRIP: NEGATIVE
NON-SQ EPI CELLS URNS QL MICRO: ABNORMAL /HPF
NRBC BLD AUTO-RTO: 0 /100 WBCS
NT-PROBNP SERPL-MCNC: 3451 PG/ML
P AXIS: 76 DEGREES
PH UR STRIP.AUTO: 5.5 [PH] (ref 4.5–8)
PLATELET # BLD AUTO: 299 THOUSANDS/UL (ref 149–390)
PLATELET # BLD AUTO: 311 THOUSANDS/UL (ref 149–390)
PLATELET BLD QL SMEAR: ADEQUATE
PMV BLD AUTO: 10.1 FL (ref 8.9–12.7)
PMV BLD AUTO: 10.3 FL (ref 8.9–12.7)
POIKILOCYTOSIS BLD QL SMEAR: PRESENT
POTASSIUM SERPL-SCNC: 4 MMOL/L (ref 3.5–5.3)
PR INTERVAL: 158 MS
PROT SERPL-MCNC: 8 G/DL (ref 6.4–8.2)
PROT UR STRIP-MCNC: ABNORMAL MG/DL
PROTHROMBIN TIME: 21.6 SECONDS (ref 11.8–14.2)
PROTHROMBIN TIME: 22.9 SECONDS (ref 11.8–14.2)
QRS AXIS: -12 DEGREES
QRS AXIS: -19 DEGREES
QRSD INTERVAL: 112 MS
QRSD INTERVAL: 118 MS
QT INTERVAL: 290 MS
QT INTERVAL: 338 MS
QTC INTERVAL: 404 MS
QTC INTERVAL: 404 MS
RBC # BLD AUTO: 3.89 MILLION/UL (ref 3.81–5.12)
RBC # BLD AUTO: 3.96 MILLION/UL (ref 3.81–5.12)
RBC #/AREA URNS AUTO: ABNORMAL /HPF
RBC MORPH BLD: PRESENT
SODIUM SERPL-SCNC: 136 MMOL/L (ref 136–145)
SP GR UR STRIP.AUTO: 1.02 (ref 1–1.03)
T WAVE AXIS: 189 DEGREES
T WAVE AXIS: 190 DEGREES
T4 FREE SERPL-MCNC: 1.67 NG/DL (ref 0.76–1.46)
TROPONIN I SERPL-MCNC: 0.16 NG/ML
TROPONIN I SERPL-MCNC: 0.22 NG/ML
TROPONIN I SERPL-MCNC: 0.28 NG/ML
TROPONIN I SERPL-MCNC: 0.29 NG/ML
TSH SERPL DL<=0.05 MIU/L-ACNC: 0.31 UIU/ML (ref 0.36–3.74)
UROBILINOGEN UR QL STRIP.AUTO: 0.2 E.U./DL
VENTRICULAR RATE: 117 BPM
VENTRICULAR RATE: 86 BPM
WBC # BLD AUTO: 10.92 THOUSAND/UL (ref 4.31–10.16)
WBC # BLD AUTO: 12.67 THOUSAND/UL (ref 4.31–10.16)
WBC #/AREA URNS AUTO: ABNORMAL /HPF

## 2019-01-03 PROCEDURE — 74176 CT ABD & PELVIS W/O CONTRAST: CPT

## 2019-01-03 PROCEDURE — 83735 ASSAY OF MAGNESIUM: CPT | Performed by: EMERGENCY MEDICINE

## 2019-01-03 PROCEDURE — 85027 COMPLETE CBC AUTOMATED: CPT | Performed by: PHYSICIAN ASSISTANT

## 2019-01-03 PROCEDURE — 70450 CT HEAD/BRAIN W/O DYE: CPT

## 2019-01-03 PROCEDURE — 80053 COMPREHEN METABOLIC PANEL: CPT | Performed by: EMERGENCY MEDICINE

## 2019-01-03 PROCEDURE — 85027 COMPLETE CBC AUTOMATED: CPT | Performed by: EMERGENCY MEDICINE

## 2019-01-03 PROCEDURE — 96361 HYDRATE IV INFUSION ADD-ON: CPT

## 2019-01-03 PROCEDURE — 73564 X-RAY EXAM KNEE 4 OR MORE: CPT

## 2019-01-03 PROCEDURE — 87040 BLOOD CULTURE FOR BACTERIA: CPT | Performed by: EMERGENCY MEDICINE

## 2019-01-03 PROCEDURE — 82553 CREATINE MB FRACTION: CPT | Performed by: EMERGENCY MEDICINE

## 2019-01-03 PROCEDURE — 84484 ASSAY OF TROPONIN QUANT: CPT | Performed by: EMERGENCY MEDICINE

## 2019-01-03 PROCEDURE — 72125 CT NECK SPINE W/O DYE: CPT

## 2019-01-03 PROCEDURE — 83690 ASSAY OF LIPASE: CPT | Performed by: EMERGENCY MEDICINE

## 2019-01-03 PROCEDURE — 93010 ELECTROCARDIOGRAM REPORT: CPT | Performed by: INTERNAL MEDICINE

## 2019-01-03 PROCEDURE — 81001 URINALYSIS AUTO W/SCOPE: CPT

## 2019-01-03 PROCEDURE — 85610 PROTHROMBIN TIME: CPT | Performed by: PHYSICIAN ASSISTANT

## 2019-01-03 PROCEDURE — 84484 ASSAY OF TROPONIN QUANT: CPT | Performed by: PHYSICIAN ASSISTANT

## 2019-01-03 PROCEDURE — 85007 BL SMEAR W/DIFF WBC COUNT: CPT | Performed by: EMERGENCY MEDICINE

## 2019-01-03 PROCEDURE — 96376 TX/PRO/DX INJ SAME DRUG ADON: CPT

## 2019-01-03 PROCEDURE — 36415 COLL VENOUS BLD VENIPUNCTURE: CPT | Performed by: EMERGENCY MEDICINE

## 2019-01-03 PROCEDURE — 83880 ASSAY OF NATRIURETIC PEPTIDE: CPT | Performed by: EMERGENCY MEDICINE

## 2019-01-03 PROCEDURE — 93005 ELECTROCARDIOGRAM TRACING: CPT

## 2019-01-03 PROCEDURE — 96374 THER/PROPH/DIAG INJ IV PUSH: CPT

## 2019-01-03 PROCEDURE — 83605 ASSAY OF LACTIC ACID: CPT | Performed by: EMERGENCY MEDICINE

## 2019-01-03 PROCEDURE — 84443 ASSAY THYROID STIM HORMONE: CPT | Performed by: EMERGENCY MEDICINE

## 2019-01-03 PROCEDURE — 99285 EMERGENCY DEPT VISIT HI MDM: CPT

## 2019-01-03 PROCEDURE — 82550 ASSAY OF CK (CPK): CPT | Performed by: EMERGENCY MEDICINE

## 2019-01-03 PROCEDURE — 85610 PROTHROMBIN TIME: CPT | Performed by: EMERGENCY MEDICINE

## 2019-01-03 PROCEDURE — 85730 THROMBOPLASTIN TIME PARTIAL: CPT | Performed by: EMERGENCY MEDICINE

## 2019-01-03 PROCEDURE — 85730 THROMBOPLASTIN TIME PARTIAL: CPT | Performed by: PHYSICIAN ASSISTANT

## 2019-01-03 PROCEDURE — 99223 1ST HOSP IP/OBS HIGH 75: CPT | Performed by: INTERNAL MEDICINE

## 2019-01-03 PROCEDURE — 84439 ASSAY OF FREE THYROXINE: CPT | Performed by: EMERGENCY MEDICINE

## 2019-01-03 PROCEDURE — 71250 CT THORAX DX C-: CPT

## 2019-01-03 RX ORDER — FERROUS SULFATE 325(65) MG
325 TABLET ORAL
Status: DISCONTINUED | OUTPATIENT
Start: 2019-01-04 | End: 2019-01-10 | Stop reason: HOSPADM

## 2019-01-03 RX ORDER — HEPARIN SODIUM 1000 [USP'U]/ML
4000 INJECTION, SOLUTION INTRAVENOUS; SUBCUTANEOUS ONCE
Status: COMPLETED | OUTPATIENT
Start: 2019-01-03 | End: 2019-01-03

## 2019-01-03 RX ORDER — FENTANYL CITRATE 50 UG/ML
25 INJECTION, SOLUTION INTRAMUSCULAR; INTRAVENOUS ONCE
Status: COMPLETED | OUTPATIENT
Start: 2019-01-03 | End: 2019-01-03

## 2019-01-03 RX ORDER — HEPARIN SODIUM 1000 [USP'U]/ML
2000 INJECTION, SOLUTION INTRAVENOUS; SUBCUTANEOUS AS NEEDED
Status: DISCONTINUED | OUTPATIENT
Start: 2019-01-03 | End: 2019-01-06

## 2019-01-03 RX ORDER — PANTOPRAZOLE SODIUM 40 MG/1
40 TABLET, DELAYED RELEASE ORAL
Status: DISCONTINUED | OUTPATIENT
Start: 2019-01-04 | End: 2019-01-05

## 2019-01-03 RX ORDER — SODIUM CHLORIDE 9 MG/ML
75 INJECTION, SOLUTION INTRAVENOUS CONTINUOUS
Status: DISCONTINUED | OUTPATIENT
Start: 2019-01-03 | End: 2019-01-04

## 2019-01-03 RX ORDER — NORTRIPTYLINE HYDROCHLORIDE 10 MG/1
20 CAPSULE ORAL
Status: DISCONTINUED | OUTPATIENT
Start: 2019-01-03 | End: 2019-01-07

## 2019-01-03 RX ORDER — ONDANSETRON 2 MG/ML
4 INJECTION INTRAMUSCULAR; INTRAVENOUS EVERY 6 HOURS PRN
Status: DISCONTINUED | OUTPATIENT
Start: 2019-01-03 | End: 2019-01-03

## 2019-01-03 RX ORDER — FOLIC ACID 1 MG/1
1 TABLET ORAL DAILY
Status: DISCONTINUED | OUTPATIENT
Start: 2019-01-04 | End: 2019-01-10 | Stop reason: HOSPADM

## 2019-01-03 RX ORDER — METOPROLOL SUCCINATE 50 MG/1
50 TABLET, EXTENDED RELEASE ORAL 2 TIMES DAILY
Status: DISCONTINUED | OUTPATIENT
Start: 2019-01-03 | End: 2019-01-04

## 2019-01-03 RX ORDER — HEPARIN SODIUM 10000 [USP'U]/100ML
3-20 INJECTION, SOLUTION INTRAVENOUS
Status: DISCONTINUED | OUTPATIENT
Start: 2019-01-03 | End: 2019-01-06

## 2019-01-03 RX ORDER — AMOXICILLIN 250 MG
1 CAPSULE ORAL
Status: DISCONTINUED | OUTPATIENT
Start: 2019-01-03 | End: 2019-01-10 | Stop reason: HOSPADM

## 2019-01-03 RX ORDER — THIAMINE MONONITRATE (VIT B1) 100 MG
100 TABLET ORAL DAILY
Status: DISCONTINUED | OUTPATIENT
Start: 2019-01-04 | End: 2019-01-10 | Stop reason: HOSPADM

## 2019-01-03 RX ORDER — CALCIUM CARBONATE 200(500)MG
1000 TABLET,CHEWABLE ORAL DAILY PRN
Status: DISCONTINUED | OUTPATIENT
Start: 2019-01-03 | End: 2019-01-10 | Stop reason: HOSPADM

## 2019-01-03 RX ORDER — CLOPIDOGREL BISULFATE 75 MG/1
75 TABLET ORAL DAILY
Status: DISCONTINUED | OUTPATIENT
Start: 2019-01-04 | End: 2019-01-10 | Stop reason: HOSPADM

## 2019-01-03 RX ORDER — ACETAMINOPHEN 325 MG/1
975 TABLET ORAL EVERY 8 HOURS SCHEDULED
Status: DISCONTINUED | OUTPATIENT
Start: 2019-01-03 | End: 2019-01-10 | Stop reason: HOSPADM

## 2019-01-03 RX ORDER — HEPARIN SODIUM 1000 [USP'U]/ML
4000 INJECTION, SOLUTION INTRAVENOUS; SUBCUTANEOUS AS NEEDED
Status: DISCONTINUED | OUTPATIENT
Start: 2019-01-03 | End: 2019-01-06

## 2019-01-03 RX ORDER — LANOLIN ALCOHOL/MO/W.PET/CERES
6 CREAM (GRAM) TOPICAL
Status: DISCONTINUED | OUTPATIENT
Start: 2019-01-03 | End: 2019-01-10 | Stop reason: HOSPADM

## 2019-01-03 RX ORDER — ASPIRIN 81 MG/1
324 TABLET, CHEWABLE ORAL ONCE
Status: COMPLETED | OUTPATIENT
Start: 2019-01-03 | End: 2019-01-03

## 2019-01-03 RX ORDER — MIRTAZAPINE 15 MG/1
7.5 TABLET, FILM COATED ORAL
Status: DISCONTINUED | OUTPATIENT
Start: 2019-01-03 | End: 2019-01-10 | Stop reason: HOSPADM

## 2019-01-03 RX ORDER — OXYCODONE HYDROCHLORIDE 10 MG/1
10 TABLET ORAL EVERY 6 HOURS PRN
Status: DISCONTINUED | OUTPATIENT
Start: 2019-01-03 | End: 2019-01-07

## 2019-01-03 RX ORDER — AMIODARONE HYDROCHLORIDE 200 MG/1
200 TABLET ORAL DAILY
Status: DISCONTINUED | OUTPATIENT
Start: 2019-01-04 | End: 2019-01-06

## 2019-01-03 RX ADMIN — MELATONIN 6 MG: at 22:17

## 2019-01-03 RX ADMIN — HEPARIN SODIUM 12 UNITS/KG/HR: 10000 INJECTION, SOLUTION INTRAVENOUS at 20:29

## 2019-01-03 RX ADMIN — METOPROLOL SUCCINATE 50 MG: 50 TABLET, EXTENDED RELEASE ORAL at 20:47

## 2019-01-03 RX ADMIN — SENNOSIDES AND DOCUSATE SODIUM 1 TABLET: 8.6; 5 TABLET ORAL at 22:17

## 2019-01-03 RX ADMIN — ACETAMINOPHEN 975 MG: 325 TABLET, FILM COATED ORAL at 22:17

## 2019-01-03 RX ADMIN — HEPARIN SODIUM 4000 UNITS: 1000 INJECTION INTRAVENOUS; SUBCUTANEOUS at 20:41

## 2019-01-03 RX ADMIN — ASPIRIN 81 MG 324 MG: 81 TABLET ORAL at 16:29

## 2019-01-03 RX ADMIN — FENTANYL CITRATE 25 MCG: 50 INJECTION, SOLUTION INTRAMUSCULAR; INTRAVENOUS at 15:20

## 2019-01-03 RX ADMIN — SODIUM CHLORIDE 250 ML: 0.9 INJECTION, SOLUTION INTRAVENOUS at 23:49

## 2019-01-03 RX ADMIN — SODIUM CHLORIDE 75 ML/HR: 0.9 INJECTION, SOLUTION INTRAVENOUS at 20:19

## 2019-01-03 RX ADMIN — NORTRIPTYLINE HYDROCHLORIDE 20 MG: 10 CAPSULE ORAL at 23:50

## 2019-01-03 RX ADMIN — SODIUM CHLORIDE 1000 ML: 0.9 INJECTION, SOLUTION INTRAVENOUS at 15:05

## 2019-01-03 RX ADMIN — FENTANYL CITRATE 25 MCG: 50 INJECTION, SOLUTION INTRAMUSCULAR; INTRAVENOUS at 14:37

## 2019-01-03 RX ADMIN — MIRTAZAPINE 7.5 MG: 15 TABLET ORAL at 22:17

## 2019-01-03 NOTE — ED ATTENDING ATTESTATION
Emergency Department Note- Ethan Olivares 69 y o  female MRN: [id number]    Unit/Bed#: MICU 05 Encounter: [id number]    Ethan Olivares is a 69 y o  female who presents with   Chief Complaint   Patient presents with    Fall     pt fell around 1/1/19  last seen by family on the 1st  found on the floor covered in feces and urine  multiple abrasions and ecchymotic areas on pt          History of Present Illness   HPI:  Ethan Olivares is a 69 y o  female who presents with complaint of a fall  Patient states she fell on January 1st which was 2 days ago  Patient states she was unable to get up after the fall secondary to generalized weakness  Her only complaint is cervical spine pain and bilateral knee pain  Patient presents covered in feces and urine with scattered abrasions an ecchymotic areas  No facial trauma is noted  She does have pain in the mid cervical region  Moving all extremities but complains of pain in her right knee  There is no bony deformity or crepitus noted  Patient was placed in a cervical spine collar for immobilization  Will obtain CT scan of head, cervical spine, chest abdomen pelvis, x-ray right knee, labs including a CK  ROS is otherwise unremarkable  Historical Information   Past Medical History:   Diagnosis Date    A-fib [facility])     Acute respiratory disease     Anemia     Arthritis     [facility]congestive heart failure) [facility])     Chronic pain     Heart failure (HCC)     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[facility] )     History of colon polyps     Hx of long term use of blood thinners     Hypertension     Irregular heart beat     Narcotic dependence ([facility] )     Rectal bleeding     Stroke (HCC)     mild no deficiets/ memory loss    Uses walker      Past Surgical History:   Procedure Laterality Date    COLONOSCOPY      COLONOSCOPY N/A 7/27/2018    Procedure: COLONOSCOPY;  Surgeon: Pj Newsome MD;  [facility];   Service: Gastroenterology    CORONARY STENT PLACEMENT      ERCP N/A 5/14/2018    Procedure: ENDOSCOPIC RETROGRADE CHOLANGIOPANCREATOGRAPHY (ERCP); Surgeon: Pee oCuch MD;  [facility];  Service: Gastroenterology    ERCP N/A 8/28/2018    Procedure: ENDOSCOPIC RETROGRADE CHOLANGIOPANCREATOGRAPHY (ERCP) w/ EGD;  Surgeon: Pee Couch MD;  [facility]; Service: Gastroenterology    ESOPHAGOGASTRODUODENOSCOPY N/A 7/26/2018    Procedure: ESOPHAGOGASTRODUODENOSCOPY (EGD); Surgeon: Pj Newsome MD;  [facility];   Service: Gastroenterology    JOINT REPLACEMENT Right     knee     Social History   History   Alcohol Use No     History   Drug Use No     History   Smoking Status    Former Smoker   Smokeless Tobacco    Never Used       Family History:   Meds/Allergies     No Known Allergies    Objective   First Vitals:   Blood Pressure: 132/60 (01/03/19 1340)  Pulse: 57 (01/03/19 1340)  Temperature: (!) 96 3 °F (35 7 °C) (01/03/19 1344)  Temp Source: Rectal (01/03/19 1344)  Respirations: 20 (01/03/19 1340)  Height: 5' 5" (165 1 cm) (01/03/19 2217)  Weight - Scale: 71 kg (156 lb 8 4 oz) (01/03/19 1340)  SpO2: 95 % (01/03/19 1340)    Current Vitals:   Blood Pressure: 91/52 (01/04/19 0616)  Pulse: 104 (01/04/19 0616)  Temperature: 97 6 °F (36 4 °C) (01/04/19 0608)  Temp Source: Oral (01/04/19 6044)  Respirations: 15 (01/04/19 0616)  Height: 5' 5" (165 1 cm) (01/04/19 4459)  Weight - Scale: 69 4 kg (153 lb) (01/04/19 0608)  SpO2: 95 % (01/04/19 0616)      Intake/Output Summary (Last 24 hours) at 01/04/19 0826  Last data filed at 01/04/19 2516   Gross per 24 hour   Intake          2807 31 ml   Output              400 ml   Net          2407 31 ml       Invasive Devices     Peripheral Intravenous Line            Peripheral IV 01/03/19 Left Antecubital less than 1 day    Peripheral IV 01/04/19 Right Antecubital less than 1 day          Arterial Line            Arterial Line 01/04/19 Left Radial less than 1 day                      Medical Decision Makin  Creatinine kinase is only slightly elevated with no indication of rhabdomyolysis  INR therapeutic at 2 02  No active external signs of bleeding  Patient with a new onset acute kidney injury with a creatinine of 3 38 and BUN of 108  Troponin elevated at 0 16  Will continue with IV fluids, await imaging studies, will require admission  CTs negative for acute traumatic injuries  Poss cholecystitis, will review with admitting team    Leukocytosis, poss aspiration pneumonitis      Recent Results (from the past 36 hour(s))   CBC and differential    Collection Time: 19  2:03 PM   Result Value Ref Range    WBC 12 67 (H) 4 31 - 10 16 Thousand/uL    RBC 3 89 3 81 - 5 12 Million/uL    Hemoglobin 12 3 11 5 - 15 4 g/dL    Hematocrit 38 2 34 8 - 46 1 %    MCV 98 82 - 98 fL    MCH 31 6 26 8 - 34 3 pg    MCHC 32 2 31 4 - 37 4 g/dL    RDW 15 6 (H) 11 6 - 15 1 %    MPV 10 3 8 9 - 12 7 fL    Platelets 858 931 - 195 Thousands/uL    nRBC 0 /100 WBCs   Comprehensive metabolic panel    Collection Time: 19  2:03 PM   Result Value Ref Range    Sodium 136 136 - 145 mmol/L    Potassium 4 0 3 5 - 5 3 mmol/L    Chloride 102 100 - 108 mmol/L    CO2 19 (L) 21 - 32 mmol/L    ANION GAP 15 (H) 4 - 13 mmol/L     (H) 5 - 25 mg/dL    Creatinine 3 38 (H) 0 60 - 1 30 mg/dL    Glucose 127 65 - 140 mg/dL    Calcium 9 0 8 3 - 10 1 mg/dL    AST 35 5 - 45 U/L    ALT 22 12 - 78 U/L    Alkaline Phosphatase 95 46 - 116 U/L    Total Protein 8 0 6 4 - 8 2 g/dL    Albumin 2 8 (L) 3 5 - 5 0 g/dL    Total Bilirubin 0 80 0 20 - 1 00 mg/dL    eGFR 12 ml/min/1 73sq m   Lipase    Collection Time: 19  2:03 PM   Result Value Ref Range    Lipase 72 (L) 73 - 393 u/L   Troponin I    Collection Time: 19  2:03 PM   Result Value Ref Range    Troponin I 0 16 (H) <=0 04 ng/mL   Lactic acid, plasma    Collection Time: 19  2:03 PM   Result Value Ref Range    LACTIC ACID 2 0 0 5 - 2 0 mmol/L   TSH, 3rd generation with Free T4 reflex    Collection Time: 01/03/19  2:03 PM   Result Value Ref Range    TSH 3RD GENERATON 0 306 (L) 0 358 - 3 740 uIU/mL   Magnesium    Collection Time: 01/03/19  2:03 PM   Result Value Ref Range    Magnesium 3 0 (H) 1 6 - 2 6 mg/dL   B-type natriuretic peptide    Collection Time: 01/03/19  2:03 PM   Result Value Ref Range    NT-proBNP 3,451 (H) <450 pg/mL   T4, free    Collection Time: 01/03/19  2:03 PM   Result Value Ref Range    Free T4 1 67 (H) 0 76 - 1 46 ng/dL   Manual Differential(PHLEBS Do Not Order)    Collection Time: 01/03/19  2:03 PM   Result Value Ref Range    Segmented % 92 (H) 43 - 75 %    Lymphocytes % 1 (L) 14 - 44 %    Monocytes % 7 4 - 12 %    Eosinophils, % 0 0 - 6 %    Basophils % 0 0 - 1 %    Absolute Neutrophils 11 66 (H) 1 85 - 7 62 Thousand/uL    Lymphocytes Absolute 0 13 (L) 0 60 - 4 47 Thousand/uL    Monocytes Absolute 0 89 0 00 - 1 22 Thousand/uL    Eosinophils Absolute 0 00 0 00 - 0 40 Thousand/uL    Basophils Absolute 0 00 0 00 - 0 10 Thousand/uL    Total Counted      RBC Morphology Present     Anisocytosis Present     Macrocytes Present     Poikilocytes Present     Platelet Estimate Adequate Adequate   Protime-INR    Collection Time: 01/03/19  2:04 PM   Result Value Ref Range    Protime 22 9 (H) 11 8 - 14 2 seconds    INR 2 02 (H) 0 86 - 1 17   APTT    Collection Time: 01/03/19  2:04 PM   Result Value Ref Range    PTT 43 (H) 26 - 38 seconds   ECG 12 lead    Collection Time: 01/03/19  2:04 PM   Result Value Ref Range    Ventricular Rate 86 BPM    Atrial Rate 86 BPM    AZ Interval 158 ms    QRSD Interval 112 ms    QT Interval 338 ms    QTC Interval 404 ms    P [place] 76 degrees    QRS Axis -12 degrees    T Wave Axis 190 degrees   CK (with reflex to MB)    Collection Time: 01/03/19  2:07 PM   Result Value Ref Range    Total  (H) 26 - 192 U/L   CKMB    Collection Time: 01/03/19  2:07 PM   Result Value Ref Range    CK-MB Index 6 0 (H) 0 0 - 2 5 %    CK-MB 12 2 (H) 0 0 - 5 0 ng/mL   ECG 12 lead    Collection Time: 01/03/19  3:08 PM   Result Value Ref Range    Ventricular Rate 117 BPM    Atrial Rate 170 BPM    RI Interval  ms    QRSD Interval 118 ms    QT Interval 290 ms    QTC Interval 404 ms    P Axis  degrees    QRS Axis -19 degrees    T Wave Axis 189 degrees   POCT urinalysis dipstick    Collection Time: 01/03/19  4:58 PM   Result Value Ref Range    Color, UA michelle     Clarity, UA clear    ED Urine Macroscopic    Collection Time: 01/03/19  4:59 PM   Result Value Ref Range    Color, UA Michelle     Clarity, UA Clear     pH, UA 5 5 4 5 - 8 0    Leukocytes, UA Negative Negative    Nitrite, UA Negative Negative    Protein, UA 30 (1+) (A) Negative mg/dl    Glucose, UA Negative Negative mg/dl    Ketones, UA Negative Negative mg/dl    Urobilinogen, UA 0 2 0 2, 1 0 E U /dl E U /dl    Bilirubin, UA Interference- unable to analyze (A) Negative    Blood, UA Trace (A) Negative    Specific Gravity, UA 1 025 1 003 - 1 030   Urine Microscopic    Collection Time: 01/03/19  4:59 PM   Result Value Ref Range    RBC, UA 4-10 (A) None Seen, 0-5 /hpf    WBC, UA 4-10 (A) None Seen, 0-5, 5-55, 5-65 /hpf    Epithelial Cells None Seen None Seen, Occasional /hpf    Bacteria, UA None Seen None Seen, Occasional /hpf    Hyaline Casts, UA 3-5 (A) None Seen /lpf   Troponin I    Collection Time: 01/03/19  6:47 PM   Result Value Ref Range    Troponin I 0 22 (H) <=0 04 ng/mL   CBC    Collection Time: 01/03/19  8:27 PM   Result Value Ref Range    WBC 10 92 (H) 4 31 - 10 16 Thousand/uL    RBC 3 96 3 81 - 5 12 Million/uL    Hemoglobin 12 5 11 5 - 15 4 g/dL    Hematocrit 38 3 34 8 - 46 1 %    MCV 97 82 - 98 fL    MCH 31 6 26 8 - 34 3 pg    MCHC 32 6 31 4 - 37 4 g/dL    RDW 15 4 (H) 11 6 - 15 1 %    Platelets 912 751 - 509 Thousands/uL    MPV 10 1 8 9 - 12 7 fL   APTT    Collection Time: 01/03/19  8:27 PM   Result Value Ref Range    PTT 40 (H) 26 - 38 seconds   Protime-INR    Collection Time: 01/03/19  8:27 PM   Result Value Ref Range Protime 21 6 (H) 11 8 - 14 2 seconds    INR 1 87 (H) 0 86 - 1 17   Troponin I    Collection Time: 01/03/19  8:27 PM   Result Value Ref Range    Troponin I 0 28 (H) <=0 04 ng/mL   Troponin I    Collection Time: 01/03/19 11:06 PM   Result Value Ref Range    Troponin I 0 29 (H) <=0 04 ng/mL   APTT    Collection Time: 01/04/19  2:45 AM   Result Value Ref Range    PTT 86 (H) 26 - 38 seconds   TSH, 3rd generation    Collection Time: 01/04/19  4:40 AM   Result Value Ref Range    TSH 3RD GENERATON 0 664 0 358 - 3 740 uIU/mL   Comprehensive metabolic panel    Collection Time: 01/04/19  4:40 AM   Result Value Ref Range    Sodium 139 136 - 145 mmol/L    Potassium 2 8 (L) 3 5 - 5 3 mmol/L    Chloride 106 100 - 108 mmol/L    CO2 23 21 - 32 mmol/L    ANION GAP 10 4 - 13 mmol/L     (H) 5 - 25 mg/dL    Creatinine 2 29 (H) 0 60 - 1 30 mg/dL    Glucose 121 65 - 140 mg/dL    Calcium 8 0 (L) 8 3 - 10 1 mg/dL    AST 20 5 - 45 U/L    ALT 18 12 - 78 U/L    Alkaline Phosphatase 76 46 - 116 U/L    Total Protein 6 3 (L) 6 4 - 8 2 g/dL    Albumin 2 3 (L) 3 5 - 5 0 g/dL    Total Bilirubin 0 57 0 20 - 1 00 mg/dL    eGFR 20 ml/min/1 73sq m   Magnesium    Collection Time: 01/04/19  4:40 AM   Result Value Ref Range    Magnesium 2 6 1 6 - 2 6 mg/dL   CBC (With Platelets)    Collection Time: 01/04/19  4:40 AM   Result Value Ref Range    WBC 8 50 4 31 - 10 16 Thousand/uL    RBC 3 28 (L) 3 81 - 5 12 Million/uL    Hemoglobin 10 2 (L) 11 5 - 15 4 g/dL    Hematocrit 31 9 (L) 34 8 - 46 1 %    MCV 97 82 - 98 fL    MCH 31 1 26 8 - 34 3 pg    MCHC 32 0 31 4 - 37 4 g/dL    RDW 15 6 (H) 11 6 - 15 1 %    Platelets 278 719 - 987 Thousands/uL    MPV 10 0 8 9 - 12 7 fL   Lactic acid, plasma    Collection Time: 01/04/19  4:40 AM   Result Value Ref Range    LACTIC ACID 1 2 0 5 - 2 0 mmol/L   Procalcitonin    Collection Time: 01/04/19  4:40 AM   Result Value Ref Range    Procalcitonin 1 31 (H) <=0 25 ng/ml   Troponin I    Collection Time: 01/04/19  4:40 AM Result Value Ref Range    Troponin I 0 32 (H) <=0 04 ng/mL   Phosphorus    Collection Time: 01/04/19  4:40 AM   Result Value Ref Range    Phosphorus 3 6 2 3 - 4 1 mg/dL     CT chest abdomen pelvis wo contrast   ED Interpretation   1  Left greater than right bibasilar opacities  Differential considerations include atelectasis versus infection to include aspiration pneumonia  Recommend clinical and laboratory correlation  2   Emphysematous changes  3   Gallbladder wall thickening, gallbladder distention and cholelithiasis  Correlate for focal right upper quadrant tenderness to exclude acute cholecystitis  If there is focal right upper quadrant tenderness further evaluation with targeted    ultrasound could be considered  4   Biliary stent in place with choledocholithiasis  5   No acute traumatic injury in the chest abdomen or pelvis  Workstation performed: [id number]         Final Result   1  Left greater than right bibasilar opacities  Differential considerations include atelectasis versus infection to include aspiration pneumonia  Recommend clinical and laboratory correlation  2   Emphysematous changes  3   Gallbladder wall thickening, gallbladder distention and cholelithiasis  Correlate for focal right upper quadrant tenderness to exclude acute cholecystitis  If there is focal right upper quadrant tenderness further evaluation with targeted    ultrasound could be considered  4   Biliary stent in place with choledocholithiasis  5   No acute traumatic injury in the chest abdomen or pelvis  Workstation performed: [id number]         CT spine cervical without contrast   ED Interpretation      No cervical spine fracture or traumatic malalignment  Workstation performed: [id number]         Final Result      No cervical spine fracture or traumatic malalignment                     Workstation performed: [id number]         CT head without contrast ED Interpretation      No acute intracranial abnormality  Workstation performed: [id number]         Final Result      No acute intracranial abnormality  Workstation performed: [id number]         XR knee 4+ views left injury   Final Result      No acute osseous abnormality  Moderate to severe osteoarthritic degenerative changes of the left knee joint  Workstation performed: [id number]               Portions of the record may have been created with voice recognition software  Occasional wrong word or "sound a like" substitutions may have occurred due to the inherent limitations of voice recognition software  Kg Caruso, DO, saw and evaluated the patient  I have discussed the patient with the resident/non-physician practitioner and agree with the resident's/non-physician practitioner's findings, Plan of Care, and MDM as documented in the resident's/non-physician practitioner's note, except where noted  All available labs and Radiology studies were reviewed  At this point I agree with the current assessment done in the Emergency Department    I have conducted an independent evaluation of this patient a history and physical is as follows:      Critical Care Time  CritCare Time    Procedures

## 2019-01-03 NOTE — ED PROVIDER NOTES
History  Chief Complaint   Patient presents with    Fall     pt fell around 1/1/19  last seen by family on the 1st  found on the floor covered in feces and urine  multiple abrasions and ecchymotic areas on pt      70 y/o female with a pmhx of S CHF (EF35%), HTN, CAD, on Eliquis presents via EMS after she was found by family on the floor of her home  Pt states that she doesn't remember falling, only waking up on the floor and was unable to get off the floor  Pt states that she was on the floor x 2 days  Pt states that she now has pain in the Head, neck, abd, sternum, Lt knee  pt denies recent illness, fever, n,v, ha, visual changes, diarrhea     Primary Survey:  A: Intact  B: CTA b/l  C: Radial and Dps 2+ b/l  D: no deformities noted  E: Pt was exposed                 Prior to Admission Medications   Prescriptions Last Dose Informant Patient Reported? Taking?    ALPRAZolam (NIRAVAM) 0 25 MG dissolvable tablet   No No   Sig: Take 1 tablet (0 25 mg total) by mouth daily at bedtime as needed for anxiety   DULoxetine (CYMBALTA) 30 mg delayed release capsule   No No   Sig: Take 1 capsule (30 mg total) by mouth daily   acetaminophen (TYLENOL) 325 mg tablet   No No   Sig: Take 2 tablets (650 mg total) by mouth every 6 (six) hours   amiodarone 200 mg tablet   No No   Sig: Take 1 tablet (200 mg total) by mouth daily Then decrease to 1 tablet daily   amitriptyline (ELAVIL) 25 mg tablet   No No   Sig: TAKE ONE TABLET AT BEDTIME   apixaban (ELIQUIS) 5 mg   Yes No   Sig: Take 5 mg by mouth 2 (two) times a day   ascorbic acid (VITAMIN C) 500 mg tablet   No No   Sig: Take 1 tablet (500 mg total) by mouth daily   clopidogrel (PLAVIX) 75 mg tablet   No No   Sig: Take 1 tablet (75 mg total) by mouth daily   ferrous sulfate 324 (65 Fe) mg   No No   Sig: Take 1 tablet (324 mg total) by mouth daily before breakfast   folic acid (FOLVITE) 1 mg tablet   No No   Sig: Take 1 tablet (1 mg total) by mouth daily   furosemide (LASIX) 40 mg tablet   No No   Sig: Take 1 tablet (40 mg total) by mouth daily   lidocaine (LIDODERM) 5 %   No No   Sig: Apply 1 patch topically daily Remove & Discard patch within 12 hours or as directed by MD   lisinopril (ZESTRIL) 5 mg tablet   No No   Sig: Take 1 tablet (5 mg total) by mouth daily   melatonin 3 mg   No No   Sig: Take 2 tablets (6 mg total) by mouth daily at bedtime   metoprolol succinate (TOPROL-XL) 50 mg 24 hr tablet   No No   Sig: Take 1 tablet (50 mg total) by mouth every 12 (twelve) hours   mirtazapine (REMERON) 7 5 MG tablet   No No   Sig: Take 1 tablet (7 5 mg total) by mouth daily at bedtime   nortriptyline (PAMELOR) 10 mg capsule   No No   Sig: Take 2 capsules (20 mg total) by mouth daily at bedtime   oxyCODONE-acetaminophen (PERCOCET)  mg per tablet   Yes No   pantoprazole (PROTONIX) 40 mg tablet   No No   Sig: Take 1 tablet (40 mg total) by mouth 2 (two) times a day before meals   polyethylene glycol (MIRALAX) 17 g packet   Yes No   Sig: Take 17 g by mouth daily   potassium chloride (K-DUR,KLOR-CON) 20 mEq tablet   No No   Sig: Take 1 tablet (20 mEq total) by mouth daily   senna-docusate sodium (SENOKOT S) 8 6-50 mg per tablet   No No   Sig: Take 1 tablet by mouth daily at bedtime   thiamine 100 MG tablet   No No   Sig: Take 1 tablet (100 mg total) by mouth daily      Facility-Administered Medications: None       Past Medical History:   Diagnosis Date    A-fib ([facility] )     Acute respiratory disease     Anemia     Arthritis     CHF (congestive heart failure) (HCC)     Chronic pain     Heart failure (HCC)     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[facility] )     History of colon polyps     Hx of long term use of blood thinners     Hypertension     Irregular heart beat     Narcotic dependence (HCC)     Rectal bleeding     Stroke (HCC)     mild no deficiets/ memory loss    Uses walker        Past Surgical History:   Procedure Laterality Date    COLONOSCOPY      COLONOSCOPY N/A 7/27/2018    Procedure: COLONOSCOPY;  Surgeon: Colby Tom MD;  [facility]; Service: Gastroenterology    CORONARY STENT PLACEMENT      ERCP N/A 5/14/2018    Procedure: ENDOSCOPIC RETROGRADE CHOLANGIOPANCREATOGRAPHY (ERCP); Surgeon: Romi Billings MD;  [facility];  Service: Gastroenterology    ERCP N/A 8/28/2018    Procedure: ENDOSCOPIC RETROGRADE CHOLANGIOPANCREATOGRAPHY (ERCP) w/ EGD;  Surgeon: Romi Billings MD;  [facility]; Service: Gastroenterology    ESOPHAGOGASTRODUODENOSCOPY N/A 7/26/2018    Procedure: ESOPHAGOGASTRODUODENOSCOPY (EGD); Surgeon: Colby Tom MD;  [facility]; Service: Gastroenterology    JOINT REPLACEMENT Right     knee       History reviewed  No pertinent family history  I have reviewed and agree with the history as documented  Social History   Substance Use Topics    Smoking status: Former Smoker    Smokeless tobacco: Never Used    Alcohol use No        Review of Systems   Constitutional: Negative for chills, diaphoresis, fatigue and fever  HENT: Negative for congestion, ear discharge, facial swelling, hearing loss, rhinorrhea, sinus pain, sinus pressure, sneezing, sore throat, tinnitus and trouble swallowing  Eyes: Negative for pain, discharge and redness  Respiratory: Negative for cough, choking, chest tightness, shortness of breath, wheezing and stridor  Cardiovascular: Negative for chest pain, palpitations and leg swelling  Gastrointestinal: Positive for abdominal pain  Negative for abdominal distention, blood in stool, constipation, diarrhea, nausea and vomiting  Endocrine: Negative for cold intolerance, polydipsia and polyuria  Genitourinary: Negative for difficulty urinating, dysuria, enuresis, flank pain, frequency and hematuria  Musculoskeletal: Positive for arthralgias, back pain, neck pain and neck stiffness  Negative for gait problem  Skin: Negative for rash and wound     Neurological: Negative for dizziness, seizures, syncope, weakness, numbness and headaches  Hematological: Negative for adenopathy  Psychiatric/Behavioral: Negative for agitation, confusion, hallucinations, sleep disturbance and suicidal ideas  All other systems reviewed and are negative  Physical Exam  ED Triage Vitals   Temperature Pulse Respirations Blood Pressure SpO2   01/03/19 1344 01/03/19 1340 01/03/19 1340 01/03/19 1340 01/03/19 1340   (!) 96 3 °F (35 7 °C) 57 20 132/60 95 %      Temp Source Heart Rate Source Patient Position - Orthostatic VS BP Location FiO2 (%)   01/03/19 1344 01/03/19 1340 01/03/19 1430 01/03/19 1600 --   Rectal Monitor Lying Right arm       Pain Score       01/03/19 1412       5           Orthostatic Vital Signs  Vitals:    01/03/19 1500 01/03/19 1515 01/03/19 1600 01/03/19 1700   BP: 138/61  147/69 127/52   Pulse: 96 92 98 97   Patient Position - Orthostatic VS:   Lying Lying       Physical Exam   Constitutional: She is oriented to person, place, and time  She appears well-developed and well-nourished  No distress  HENT:   Head: Normocephalic and atraumatic  Right Ear: External ear normal    Left Ear: External ear normal    Nose: No sinus tenderness  No epistaxis  Mouth/Throat: No oropharyngeal exudate  Eyes: Pupils are equal, round, and reactive to light  Conjunctivae and EOM are normal  Right eye exhibits no discharge  Left eye exhibits no discharge  Neck: No JVD present  Cardiovascular: Normal rate, regular rhythm, normal heart sounds and intact distal pulses  Exam reveals no gallop and no friction rub  No murmur heard  Pulmonary/Chest: Effort normal and breath sounds normal  No stridor  No respiratory distress  She has no wheezes  She has no rales  She exhibits tenderness  Abdominal: Soft  Bowel sounds are normal  She exhibits no distension and no mass  There is tenderness in the right lower quadrant and left lower quadrant  There is no rebound and no guarding  Musculoskeletal: Normal range of motion  She exhibits tenderness  She exhibits no edema or deformity  Lymphadenopathy:     She has no cervical adenopathy  Neurological: She is alert and oriented to person, place, and time  She has normal strength  No cranial nerve deficit or sensory deficit  GCS eye subscore is 4  GCS verbal subscore is 5  GCS motor subscore is 6  Reflex Scores:       Patellar reflexes are 2+ on the right side and 2+ on the left side  UE and LE 5/5 strength, No focal neuro deficits  Skin: Skin is warm, dry and intact  Capillary refill takes less than 2 seconds  She is not diaphoretic  Psychiatric: She has a normal mood and affect  Her speech is normal and behavior is normal  Judgment and thought content normal    Nursing note and vitals reviewed  ED Medications  Medications   fentanyl citrate (PF) 100 MCG/2ML 25 mcg (25 mcg Intravenous Given 1/3/19 1437)   sodium chloride 0 9 % bolus 1,000 mL (0 mL Intravenous Stopped 1/3/19 1610)   fentanyl citrate (PF) 100 MCG/2ML 25 mcg (25 mcg Intravenous Given 1/3/19 1520)   aspirin chewable tablet 324 mg (324 mg Oral Given 1/3/19 1629)       Diagnostic Studies  Results Reviewed     Procedure Component Value Units Date/Time    Troponin I [[id number]]     Lab Status:  No result Specimen:  Blood     T4, free [[id number]]  (Abnormal) Collected:  01/03/19 1403    Lab Status:  Final result Specimen:  Blood from Arm, Right Updated:  01/03/19 1817     Free T4 1 67 (H) ng/dL     CBC and differential [[id number]]  (Abnormal) Collected:  01/03/19 1403    Lab Status:  Final result Specimen:  Blood from Arm, Right Updated:  01/03/19 1744     WBC 12 67 (H) Thousand/uL      RBC 3 89 Million/uL      Hemoglobin 12 3 g/dL      Hematocrit 38 2 %      MCV 98 fL      MCH 31 6 pg      MCHC 32 2 g/dL      RDW 15 6 (H) %      MPV 10 3 fL      Platelets 559 Thousands/uL      nRBC 0 /100 WBCs     Narrative: This is an appended report    These results have been appended to a previously verified report      CKMB [[id number]]  (Abnormal) Collected:  01/03/19 1407    Lab Status:  Final result Specimen:  Blood from Arm, Right Updated:  01/03/19 1740     CK-MB Index 6 0 (H) %      CK-MB 12 2 (H) ng/mL     Urine Microscopic [[id number]]  (Abnormal) Collected:  01/03/19 1659    Lab Status:  Final result Specimen:  Urine from Urine, Clean Catch Updated:  01/03/19 1715     RBC, UA 4-10 (A) /hpf      WBC, UA 4-10 (A) /hpf      Epithelial Cells None Seen /hpf      Bacteria, UA None Seen /hpf      Hyaline Casts, UA 3-5 (A) /lpf     POCT urinalysis dipstick [[id number]]  (Normal) Resulted:  01/03/19 1658    Lab Status:  Final result Specimen:  Urine Updated:  01/03/19 1659     Color, UA michelle     Clarity, UA clear    ED Urine Macroscopic [[id number]]  (Abnormal) Collected:  01/03/19 1659    Lab Status:  Final result Specimen:  Urine Updated:  01/03/19 1658     Color, UA Michelle     Clarity, UA Clear     pH, UA 5 5     Leukocytes, UA Negative     Nitrite, UA Negative     Protein, UA 30 (1+) (A) mg/dl      Glucose, UA Negative mg/dl      Ketones, UA Negative mg/dl      Urobilinogen, UA 0 2 E U /dl      Bilirubin, UA Interference- unable to analyze (A)     Blood, UA Trace (A)     Specific [place], UA 1 025    Narrative:       CLINITEK RESULT    CK (with reflex to MB) [[id number]]  (Abnormal) Collected:  01/03/19 1407    Lab Status:  Final result Specimen:  Blood from Arm, Right Updated:  01/03/19 1452     Total  (H) U/L     Comprehensive metabolic panel [[id number]]  (Abnormal) Collected:  01/03/19 1403    Lab Status:  Final result Specimen:  Blood from Arm, Right Updated:  01/03/19 1451     Sodium 136 mmol/L      Potassium 4 0 mmol/L      Chloride 102 mmol/L      CO2 19 (L) mmol/L      ANION GAP 15 (H) mmol/L       (H) mg/dL      Creatinine 3 38 (H) mg/dL      Glucose 127 mg/dL      Calcium 9 0 mg/dL      AST 35 U/L      ALT 22 U/L      Alkaline Phosphatase 95 U/L      Total Protein 8 0 g/dL      Albumin 2 8 (L) g/dL      Total Bilirubin 0 80 mg/dL      eGFR 12 ml/min/1 73sq m     Narrative:         National Kidney Disease Education Program recommendations are as follows:  GFR calculation is accurate only with a steady state creatinine  Chronic Kidney disease less than 60 ml/min/1 73 sq  meters  Kidney failure less than 15 ml/min/1 73 sq  meters  Lipase [[id number]]  (Abnormal) Collected:  01/03/19 1403    Lab Status:  Final result Specimen:  Blood from Arm, Right Updated:  01/03/19 1451     Lipase 72 (L) u/L     TSH, 3rd generation with Free T4 reflex [[id number]]  (Abnormal) Collected:  01/03/19 1403    Lab Status:  Final result Specimen:  Blood from Arm, Right Updated:  01/03/19 1451     TSH 3RD GENERATON 0 306 (L) uIU/mL     Narrative:         Patients undergoing fluorescein dye angiography may retain small amounts of fluorescein in the body for 48-72 hours post procedure  Samples containing fluorescein can produce falsely depressed TSH values  If the patient had this procedure,a specimen should be resubmitted post fluorescein clearance            The recommended reference ranges for TSH during pregnancy are as follows:  First trimester 0 1 to 2 5 uIU/mL  Second trimester  0 2 to 3 0 uIU/mL  Third trimester 0 3 to 3 0 uIU/m      Magnesium [[id number]]  (Abnormal) Collected:  01/03/19 1403    Lab Status:  Final result Specimen:  Blood from Arm, Right Updated:  01/03/19 1451     Magnesium 3 0 (H) mg/dL     B-type natriuretic peptide [[id number]]  (Abnormal) Collected:  01/03/19 1403    Lab Status:  Final result Specimen:  Blood from Arm, Right Updated:  01/03/19 1451     NT-proBNP 3,451 (H) pg/mL     Protime-INR [[id number]]  (Abnormal) Collected:  01/03/19 1404    Lab Status:  Final result Specimen:  Blood from Arm, Right Updated:  01/03/19 1432     Protime 22 9 (H) seconds      INR 2 02 (H)    APTT [[id number]]  (Abnormal) Collected:  01/03/19 1404    Lab Status:  Final result Specimen:  Blood from Arm, Right Updated:  01/03/19 1432     PTT 43 (H) seconds     Troponin I [[id number]]  (Abnormal) Collected:  01/03/19 1403    Lab Status:  Final result Specimen:  Blood from Arm, Right Updated:  01/03/19 1431     Troponin I 0 16 (H) ng/mL     Lactic acid, plasma [[id number]]  (Normal) Collected:  01/03/19 1403    Lab Status:  Final result Specimen:  Blood from Arm, Right Updated:  01/03/19 1431     LACTIC ACID 2 0 mmol/L     Narrative:         Result may be elevated if tourniquet was used during collection  Blood culture #1 [[id number]] Collected:  01/03/19 1404    Lab Status: In process Specimen:  Blood from Arm, Right Updated:  01/03/19 1415    Blood culture #2 [[id number]] Collected:  01/03/19 1404    Lab Status: In process Specimen:  Blood from Arm, Left Updated:  01/03/19 1410                 CT chest abdomen pelvis wo contrast   ED Interpretation by [facility],  (01/03 1817)   1  Left greater than right bibasilar opacities  Differential considerations include atelectasis versus infection to include aspiration pneumonia  Recommend clinical and laboratory correlation  2   Emphysematous changes  3   Gallbladder wall thickening, gallbladder distention and cholelithiasis  Correlate for focal right upper quadrant tenderness to exclude acute cholecystitis  If there is focal right upper quadrant tenderness further evaluation with targeted    ultrasound could be considered  4   Biliary stent in place with choledocholithiasis  5   No acute traumatic injury in the chest abdomen or pelvis  Workstation performed: [id number]         Final Result by Beena Vázquez MD (01/03 1658)   1  Left greater than right bibasilar opacities  Differential considerations include atelectasis versus infection to include aspiration pneumonia  Recommend clinical and laboratory correlation  2   Emphysematous changes  3   Gallbladder wall thickening, gallbladder distention and cholelithiasis  Correlate for focal right upper quadrant tenderness to exclude acute cholecystitis  If there is focal right upper quadrant tenderness further evaluation with targeted    ultrasound could be considered  4   Biliary stent in place with choledocholithiasis  5   No acute traumatic injury in the chest abdomen or pelvis  Workstation performed: [id number]         CT spine cervical without contrast   ED Interpretation by Kadi Varela DO (01/03 1632)      No cervical spine fracture or traumatic malalignment  Workstation performed: [id number]         Final Result by Edgardo Ferraro MD (01/03 1615)      No cervical spine fracture or traumatic malalignment  Workstation performed: [id number]         CT head without contrast   ED Interpretation by Kadi Varela DO (01/03 1613)      No acute intracranial abnormality  Workstation performed: [id number]         Final Result by Edgardo Ferraro MD (01/03 1611)      No acute intracranial abnormality  Workstation performed: [id number]         XR knee 4+ views left injury   Final Result by Linda Lund MD (01/03 1188)      No acute osseous abnormality  Moderate to severe osteoarthritic degenerative changes of the left knee joint        Workstation performed: [id number]               Procedures  ECG 12 Lead Documentation  Date/Time: 1/3/2019 2:49 PM  Performed by: Caleb Esquivel  Authorized by: Caleb Esquivel     Indications / Diagnosis:  Fall, down 2 days  Patient location:  ED  Previous ECG:     Previous ECG:  Compared to current    Comparison ECG info:  [id number]    Similarity:  Changes noted  Interpretation:     Interpretation: non-specific    Rate:     ECG rate:  86    ECG rate assessment: normal    Rhythm:     Rhythm: sinus rhythm    Ectopy:     Ectopy: none    QRS:     QRS axis:  Left    QRS intervals:  Normal  Conduction:     Conduction: normal    ST segments:     ST segments:  Normal  T waves:     T waves: inverted      Inverted:  V4, V6 and V5          Phone Consults  ED Phone Contact    ED Course                               MDM  Number of Diagnoses or Management Options  MALA (acute kidney injury) ([facility] ): new and requires workup  Elevated brain natriuretic peptide (BNP) level: new and requires workup  Elevated troponin: new and requires workup  Leukocytosis: new and requires workup  Diagnosis management comments: Pt continues to deny chest pain    Trauma scan, with sepsis and cards workup to include CK  CT scans neg for acute pathology  1  Elevated Troponin, 0 16  -Admit MALA and trend trop    -EKG shows inverted T waves V4-6, Pt is ASx    2  Elevated BNP, 3400  -Trend,    3  MALA, Cr 3 38 BL 0 80/GFR 12 BL 60-70  -Fluid, Slow fluids due to poor EF    4  Leukocytosis, likely 2/2 leukamoid   -Trend    CritCare Time    Disposition  Final diagnoses:   MALA (acute kidney injury) ([facility] )   Elevated troponin   Elevated brain natriuretic peptide (BNP) level   Leukocytosis     Time reflects when diagnosis was documented in both MDM as applicable and the Disposition within this note     Time User Action Codes Description Comment    1/3/2019  6:22 PM Naun Patricio Led Add [N17 9] MALA (acute kidney injury) ([facility] )     1/3/2019  6:22 PM Roverto Patricio Add [R74 8] Elevated troponin     1/3/2019  6:22 PM Naun Patricio Led Add [R79 89] Elevated brain natriuretic peptide (BNP) level     1/3/2019  6:22 PM Naun Patricio Led Add [D72 829] Leukocytosis       ED Disposition     ED Disposition Condition Comment    Admit  Case was discussed with [facility] and the patient's admission status was agreed to be Admission Status: inpatient status to the service of [facility]   Follow-up Information    None         Patient's Medications   Discharge Prescriptions    No medications on file     No discharge procedures on file  ED Provider  Attending physically available and evaluated Doris Ford I managed the patient along with the ED Attending      Electronically Signed by         Eli Burris DO  01/03/19 9451

## 2019-01-03 NOTE — ED NOTES
Pt -160 irregular rate  Pt asymptomatic although c/o continued severe pain  Dr Constantino Back at bedside       Yola Orosco RN  01/03/19 9908

## 2019-01-04 ENCOUNTER — APPOINTMENT (INPATIENT)
Dept: NON INVASIVE DIAGNOSTICS | Facility: HOSPITAL | Age: 79
DRG: 682 | End: 2019-01-04
Payer: MEDICARE

## 2019-01-04 ENCOUNTER — APPOINTMENT (INPATIENT)
Dept: RADIOLOGY | Facility: HOSPITAL | Age: 79
DRG: 682 | End: 2019-01-04
Payer: MEDICARE

## 2019-01-04 LAB
ALBUMIN SERPL BCP-MCNC: 2.3 G/DL (ref 3.5–5)
ALP SERPL-CCNC: 76 U/L (ref 46–116)
ALT SERPL W P-5'-P-CCNC: 18 U/L (ref 12–78)
ANION GAP SERPL CALCULATED.3IONS-SCNC: 10 MMOL/L (ref 4–13)
ANION GAP SERPL CALCULATED.3IONS-SCNC: 12 MMOL/L (ref 4–13)
APTT PPP: 71 SECONDS (ref 26–38)
APTT PPP: 86 SECONDS (ref 26–38)
AST SERPL W P-5'-P-CCNC: 20 U/L (ref 5–45)
ATRIAL RATE: 0 BPM
ATRIAL RATE: 120 BPM
BACTERIA UR QL AUTO: ABNORMAL /HPF
BILIRUB SERPL-MCNC: 0.57 MG/DL (ref 0.2–1)
BILIRUB UR QL STRIP: ABNORMAL
BUN SERPL-MCNC: 106 MG/DL (ref 5–25)
BUN SERPL-MCNC: 77 MG/DL (ref 5–25)
CALCIUM SERPL-MCNC: 8 MG/DL (ref 8.3–10.1)
CALCIUM SERPL-MCNC: 8.4 MG/DL (ref 8.3–10.1)
CHLORIDE SERPL-SCNC: 106 MMOL/L (ref 100–108)
CHLORIDE SERPL-SCNC: 108 MMOL/L (ref 100–108)
CLARITY UR: ABNORMAL
CO2 SERPL-SCNC: 21 MMOL/L (ref 21–32)
CO2 SERPL-SCNC: 23 MMOL/L (ref 21–32)
COLOR UR: ABNORMAL
CREAT SERPL-MCNC: 1.6 MG/DL (ref 0.6–1.3)
CREAT SERPL-MCNC: 2.29 MG/DL (ref 0.6–1.3)
ERYTHROCYTE [DISTWIDTH] IN BLOOD BY AUTOMATED COUNT: 15.6 % (ref 11.6–15.1)
GFR SERPL CREATININE-BSD FRML MDRD: 20 ML/MIN/1.73SQ M
GFR SERPL CREATININE-BSD FRML MDRD: 31 ML/MIN/1.73SQ M
GLUCOSE SERPL-MCNC: 121 MG/DL (ref 65–140)
GLUCOSE SERPL-MCNC: 138 MG/DL (ref 65–140)
GLUCOSE UR STRIP-MCNC: NEGATIVE MG/DL
HCT VFR BLD AUTO: 31.9 % (ref 34.8–46.1)
HGB BLD-MCNC: 10.2 G/DL (ref 11.5–15.4)
HGB UR QL STRIP.AUTO: NEGATIVE
HYALINE CASTS #/AREA URNS LPF: ABNORMAL /LPF
KETONES UR STRIP-MCNC: NEGATIVE MG/DL
LACTATE SERPL-SCNC: 1.2 MMOL/L (ref 0.5–2)
LEUKOCYTE ESTERASE UR QL STRIP: NEGATIVE
MAGNESIUM SERPL-MCNC: 2.6 MG/DL (ref 1.6–2.6)
MCH RBC QN AUTO: 31.1 PG (ref 26.8–34.3)
MCHC RBC AUTO-ENTMCNC: 32 G/DL (ref 31.4–37.4)
MCV RBC AUTO: 97 FL (ref 82–98)
NITRITE UR QL STRIP: NEGATIVE
NON-SQ EPI CELLS URNS QL MICRO: ABNORMAL /HPF
P AXIS: -5 DEGREES
PH UR STRIP.AUTO: 5 [PH] (ref 4.5–8)
PHOSPHATE SERPL-MCNC: 3.6 MG/DL (ref 2.3–4.1)
PLATELET # BLD AUTO: 257 THOUSANDS/UL (ref 149–390)
PMV BLD AUTO: 10 FL (ref 8.9–12.7)
POTASSIUM SERPL-SCNC: 2.8 MMOL/L (ref 3.5–5.3)
POTASSIUM SERPL-SCNC: 3.4 MMOL/L (ref 3.5–5.3)
PR INTERVAL: 216 MS
PROCALCITONIN SERPL-MCNC: 1.31 NG/ML
PROT SERPL-MCNC: 6.3 G/DL (ref 6.4–8.2)
PROT UR STRIP-MCNC: ABNORMAL MG/DL
QRS AXIS: -11 DEGREES
QRS AXIS: 0 DEGREES
QRSD INTERVAL: 0 MS
QRSD INTERVAL: 114 MS
QT INTERVAL: 0 MS
QT INTERVAL: 358 MS
QTC INTERVAL: 0 MS
QTC INTERVAL: 493 MS
RBC # BLD AUTO: 3.28 MILLION/UL (ref 3.81–5.12)
RBC #/AREA URNS AUTO: ABNORMAL /HPF
SODIUM SERPL-SCNC: 139 MMOL/L (ref 136–145)
SODIUM SERPL-SCNC: 141 MMOL/L (ref 136–145)
SP GR UR STRIP.AUTO: 1.02 (ref 1–1.03)
T WAVE AXIS: 0 DEGREES
T WAVE AXIS: 195 DEGREES
TROPONIN I SERPL-MCNC: 0.27 NG/ML
TROPONIN I SERPL-MCNC: 0.32 NG/ML
TSH SERPL DL<=0.05 MIU/L-ACNC: 0.66 UIU/ML (ref 0.36–3.74)
UROBILINOGEN UR QL STRIP.AUTO: 1 E.U./DL
VENTRICULAR RATE: 0 BPM
VENTRICULAR RATE: 114 BPM
WBC # BLD AUTO: 8.5 THOUSAND/UL (ref 4.31–10.16)
WBC #/AREA URNS AUTO: ABNORMAL /HPF

## 2019-01-04 PROCEDURE — 36620 INSERTION CATHETER ARTERY: CPT

## 2019-01-04 PROCEDURE — 93005 ELECTROCARDIOGRAM TRACING: CPT

## 2019-01-04 PROCEDURE — 80053 COMPREHEN METABOLIC PANEL: CPT | Performed by: PHYSICIAN ASSISTANT

## 2019-01-04 PROCEDURE — 99222 1ST HOSP IP/OBS MODERATE 55: CPT | Performed by: INTERNAL MEDICINE

## 2019-01-04 PROCEDURE — G8987 SELF CARE CURRENT STATUS: HCPCS

## 2019-01-04 PROCEDURE — 93010 ELECTROCARDIOGRAM REPORT: CPT | Performed by: INTERNAL MEDICINE

## 2019-01-04 PROCEDURE — 84145 PROCALCITONIN (PCT): CPT | Performed by: PHYSICIAN ASSISTANT

## 2019-01-04 PROCEDURE — 84484 ASSAY OF TROPONIN QUANT: CPT | Performed by: PHYSICIAN ASSISTANT

## 2019-01-04 PROCEDURE — 85730 THROMBOPLASTIN TIME PARTIAL: CPT | Performed by: INTERNAL MEDICINE

## 2019-01-04 PROCEDURE — 76705 ECHO EXAM OF ABDOMEN: CPT

## 2019-01-04 PROCEDURE — 93308 TTE F-UP OR LMTD: CPT

## 2019-01-04 PROCEDURE — G8979 MOBILITY GOAL STATUS: HCPCS

## 2019-01-04 PROCEDURE — G8978 MOBILITY CURRENT STATUS: HCPCS

## 2019-01-04 PROCEDURE — 80048 BASIC METABOLIC PNL TOTAL CA: CPT | Performed by: NURSE PRACTITIONER

## 2019-01-04 PROCEDURE — 97167 OT EVAL HIGH COMPLEX 60 MIN: CPT

## 2019-01-04 PROCEDURE — 84484 ASSAY OF TROPONIN QUANT: CPT | Performed by: NURSE PRACTITIONER

## 2019-01-04 PROCEDURE — 81001 URINALYSIS AUTO W/SCOPE: CPT | Performed by: NURSE PRACTITIONER

## 2019-01-04 PROCEDURE — 83605 ASSAY OF LACTIC ACID: CPT | Performed by: PHYSICIAN ASSISTANT

## 2019-01-04 PROCEDURE — 83735 ASSAY OF MAGNESIUM: CPT | Performed by: PHYSICIAN ASSISTANT

## 2019-01-04 PROCEDURE — 97163 PT EVAL HIGH COMPLEX 45 MIN: CPT

## 2019-01-04 PROCEDURE — 84443 ASSAY THYROID STIM HORMONE: CPT | Performed by: PHYSICIAN ASSISTANT

## 2019-01-04 PROCEDURE — 85027 COMPLETE CBC AUTOMATED: CPT | Performed by: PHYSICIAN ASSISTANT

## 2019-01-04 PROCEDURE — 99221 1ST HOSP IP/OBS SF/LOW 40: CPT | Performed by: INTERNAL MEDICINE

## 2019-01-04 PROCEDURE — G8988 SELF CARE GOAL STATUS: HCPCS

## 2019-01-04 PROCEDURE — 85730 THROMBOPLASTIN TIME PARTIAL: CPT | Performed by: PHYSICIAN ASSISTANT

## 2019-01-04 PROCEDURE — 99291 CRITICAL CARE FIRST HOUR: CPT | Performed by: STUDENT IN AN ORGANIZED HEALTH CARE EDUCATION/TRAINING PROGRAM

## 2019-01-04 PROCEDURE — 84100 ASSAY OF PHOSPHORUS: CPT | Performed by: NURSE PRACTITIONER

## 2019-01-04 RX ORDER — ALBUMIN, HUMAN INJ 5% 5 %
SOLUTION INTRAVENOUS
Status: COMPLETED
Start: 2019-01-04 | End: 2019-01-04

## 2019-01-04 RX ORDER — FENTANYL CITRATE 50 UG/ML
50 INJECTION, SOLUTION INTRAMUSCULAR; INTRAVENOUS ONCE
Status: COMPLETED | OUTPATIENT
Start: 2019-01-04 | End: 2019-01-04

## 2019-01-04 RX ORDER — POTASSIUM CHLORIDE 14.9 MG/ML
20 INJECTION INTRAVENOUS ONCE
Status: COMPLETED | OUTPATIENT
Start: 2019-01-04 | End: 2019-01-05

## 2019-01-04 RX ORDER — LIDOCAINE HYDROCHLORIDE 10 MG/ML
1 INJECTION, SOLUTION EPIDURAL; INFILTRATION; INTRACAUDAL; PERINEURAL ONCE
Status: COMPLETED | OUTPATIENT
Start: 2019-01-04 | End: 2019-01-04

## 2019-01-04 RX ORDER — SODIUM CHLORIDE, SODIUM GLUCONATE, SODIUM ACETATE, POTASSIUM CHLORIDE, MAGNESIUM CHLORIDE, SODIUM PHOSPHATE, DIBASIC, AND POTASSIUM PHOSPHATE .53; .5; .37; .037; .03; .012; .00082 G/100ML; G/100ML; G/100ML; G/100ML; G/100ML; G/100ML; G/100ML
1000 INJECTION, SOLUTION INTRAVENOUS ONCE
Status: COMPLETED | OUTPATIENT
Start: 2019-01-04 | End: 2019-01-04

## 2019-01-04 RX ORDER — ALBUMIN, HUMAN INJ 5% 5 %
12.5 SOLUTION INTRAVENOUS ONCE
Status: COMPLETED | OUTPATIENT
Start: 2019-01-04 | End: 2019-01-04

## 2019-01-04 RX ORDER — POTASSIUM CHLORIDE 14.9 MG/ML
20 INJECTION INTRAVENOUS ONCE
Status: COMPLETED | OUTPATIENT
Start: 2019-01-04 | End: 2019-01-04

## 2019-01-04 RX ORDER — NOREPINEPHRINE BITARTRATE 1 MG/ML
INJECTION, SOLUTION INTRAVENOUS
Status: COMPLETED
Start: 2019-01-04 | End: 2019-01-04

## 2019-01-04 RX ORDER — ALPRAZOLAM 0.25 MG/1
0.25 TABLET ORAL 2 TIMES DAILY PRN
Status: DISCONTINUED | OUTPATIENT
Start: 2019-01-04 | End: 2019-01-07

## 2019-01-04 RX ORDER — POTASSIUM CHLORIDE 20 MEQ/1
40 TABLET, EXTENDED RELEASE ORAL ONCE
Status: COMPLETED | OUTPATIENT
Start: 2019-01-04 | End: 2019-01-04

## 2019-01-04 RX ORDER — SODIUM CHLORIDE, SODIUM GLUCONATE, SODIUM ACETATE, POTASSIUM CHLORIDE, MAGNESIUM CHLORIDE, SODIUM PHOSPHATE, DIBASIC, AND POTASSIUM PHOSPHATE .53; .5; .37; .037; .03; .012; .00082 G/100ML; G/100ML; G/100ML; G/100ML; G/100ML; G/100ML; G/100ML
75 INJECTION, SOLUTION INTRAVENOUS CONTINUOUS
Status: DISCONTINUED | OUTPATIENT
Start: 2019-01-04 | End: 2019-01-05

## 2019-01-04 RX ORDER — LIDOCAINE HYDROCHLORIDE 10 MG/ML
INJECTION, SOLUTION EPIDURAL; INFILTRATION; INTRACAUDAL; PERINEURAL
Status: COMPLETED
Start: 2019-01-04 | End: 2019-01-04

## 2019-01-04 RX ADMIN — SENNOSIDES AND DOCUSATE SODIUM 1 TABLET: 8.6; 5 TABLET ORAL at 21:10

## 2019-01-04 RX ADMIN — ALBUMIN (HUMAN) 12.5 G: 12.5 INJECTION, SOLUTION INTRAVENOUS at 04:53

## 2019-01-04 RX ADMIN — ALPRAZOLAM 0.25 MG: 0.25 TABLET ORAL at 17:01

## 2019-01-04 RX ADMIN — SODIUM CHLORIDE 75 ML/HR: 0.9 INJECTION, SOLUTION INTRAVENOUS at 05:02

## 2019-01-04 RX ADMIN — SODIUM CHLORIDE, SODIUM GLUCONATE, SODIUM ACETATE, POTASSIUM CHLORIDE, MAGNESIUM CHLORIDE, SODIUM PHOSPHATE, DIBASIC, AND POTASSIUM PHOSPHATE 75 ML/HR: .53; .5; .37; .037; .03; .012; .00082 INJECTION, SOLUTION INTRAVENOUS at 15:02

## 2019-01-04 RX ADMIN — NOREPINEPHRINE BITARTRATE 4000 MCG: 1 INJECTION INTRAVENOUS at 07:42

## 2019-01-04 RX ADMIN — THIAMINE HCL TAB 100 MG 100 MG: 100 TAB at 09:55

## 2019-01-04 RX ADMIN — POTASSIUM CHLORIDE 20 MEQ: 200 INJECTION, SOLUTION INTRAVENOUS at 15:03

## 2019-01-04 RX ADMIN — POTASSIUM CHLORIDE 20 MEQ: 200 INJECTION, SOLUTION INTRAVENOUS at 08:26

## 2019-01-04 RX ADMIN — PANTOPRAZOLE SODIUM 40 MG: 40 TABLET, DELAYED RELEASE ORAL at 08:14

## 2019-01-04 RX ADMIN — NORTRIPTYLINE HYDROCHLORIDE 20 MG: 10 CAPSULE ORAL at 21:11

## 2019-01-04 RX ADMIN — CLOPIDOGREL BISULFATE 75 MG: 75 TABLET ORAL at 09:55

## 2019-01-04 RX ADMIN — OXYCODONE HYDROCHLORIDE 10 MG: 10 TABLET ORAL at 21:10

## 2019-01-04 RX ADMIN — ACETAMINOPHEN 975 MG: 325 TABLET, FILM COATED ORAL at 08:13

## 2019-01-04 RX ADMIN — SODIUM CHLORIDE, SODIUM GLUCONATE, SODIUM ACETATE, POTASSIUM CHLORIDE, MAGNESIUM CHLORIDE, SODIUM PHOSPHATE, DIBASIC, AND POTASSIUM PHOSPHATE 75 ML/HR: .53; .5; .37; .037; .03; .012; .00082 INJECTION, SOLUTION INTRAVENOUS at 08:53

## 2019-01-04 RX ADMIN — SODIUM CHLORIDE, SODIUM GLUCONATE, SODIUM ACETATE, POTASSIUM CHLORIDE, MAGNESIUM CHLORIDE, SODIUM PHOSPHATE, DIBASIC, AND POTASSIUM PHOSPHATE 1000 ML: .53; .5; .37; .037; .03; .012; .00082 INJECTION, SOLUTION INTRAVENOUS at 13:35

## 2019-01-04 RX ADMIN — NOREPINEPHRINE BITARTRATE 5 MCG/MIN: 1 INJECTION INTRAVENOUS at 08:06

## 2019-01-04 RX ADMIN — FOLIC ACID 1 MG: 1 TABLET ORAL at 09:55

## 2019-01-04 RX ADMIN — POTASSIUM CHLORIDE 40 MEQ: 1500 TABLET, EXTENDED RELEASE ORAL at 08:14

## 2019-01-04 RX ADMIN — LIDOCAINE HYDROCHLORIDE 1 ML: 10 INJECTION, SOLUTION EPIDURAL; INFILTRATION; INTRACAUDAL; PERINEURAL at 08:02

## 2019-01-04 RX ADMIN — MELATONIN 6 MG: at 21:11

## 2019-01-04 RX ADMIN — AMIODARONE HYDROCHLORIDE 200 MG: 200 TABLET ORAL at 09:55

## 2019-01-04 RX ADMIN — ACETAMINOPHEN 975 MG: 325 TABLET, FILM COATED ORAL at 21:10

## 2019-01-04 RX ADMIN — MIRTAZAPINE 7.5 MG: 15 TABLET ORAL at 21:10

## 2019-01-04 RX ADMIN — ACETAMINOPHEN 975 MG: 325 TABLET, FILM COATED ORAL at 15:26

## 2019-01-04 RX ADMIN — OXYCODONE HYDROCHLORIDE 10 MG: 10 TABLET ORAL at 12:04

## 2019-01-04 RX ADMIN — FERROUS SULFATE TAB 325 MG (65 MG ELEMENTAL FE) 325 MG: 325 (65 FE) TAB at 08:14

## 2019-01-04 RX ADMIN — POTASSIUM CHLORIDE 40 MEQ: 1500 TABLET, EXTENDED RELEASE ORAL at 15:32

## 2019-01-04 RX ADMIN — SODIUM CHLORIDE 250 ML: 0.9 INJECTION, SOLUTION INTRAVENOUS at 01:55

## 2019-01-04 RX ADMIN — PANTOPRAZOLE SODIUM 40 MG: 40 TABLET, DELAYED RELEASE ORAL at 15:32

## 2019-01-04 RX ADMIN — FENTANYL CITRATE 50 MCG: 50 INJECTION, SOLUTION INTRAMUSCULAR; INTRAVENOUS at 09:56

## 2019-01-04 RX ADMIN — ALBUMIN, HUMAN INJ 5% 12.5 G: 5 SOLUTION at 04:53

## 2019-01-04 NOTE — PLAN OF CARE
Problem: OCCUPATIONAL THERAPY ADULT  Goal: Performs self-care activities at highest level of function for planned discharge setting  See evaluation for individualized goals  Treatment Interventions: ADL retraining, Functional transfer training, UE strengthening/ROM, Endurance training, Patient/family training, Equipment evaluation/education, Compensatory technique education, Continued evaluation, Activityengagement, Energy conservation  Equipment Recommended: Bedside commode       See flowsheet documentation for full assessment, interventions and recommendations  Limitation: Decreased ADL status, Decreased UE strength, Decreased Safe judgement during ADL, Decreased cognition, Decreased endurance, Decreased high-level ADLs, Decreased self-care trans  Prognosis: Good  Assessment: Pt is a 69 y o  female who was [facility] to [facility] 1/3/[facility] due to unwitnessed fall 3 days ago and was unable to get up  Pt's dx include: MALA, Fall, elevated troponin, SIRS, hx of biliary duct stent placement, metabolic acidosis, A-fib, CAD, AVNRT, opoid dependence, cardiac arrhythmia, chronic systolic heart failure  Pt PMH includes:  A-fib, Anemia, arthritis, CHF, chronic pain, irregular heart beat, narcotic dependence, stroke, heart failure  At baseline pt was completing all areas of occupation independently, drove, and used a RW for functional mobility  Pt lives alone in a 2  with 2 [employer], 1st floor setup  Pt reports ~10 falls in the last 6 months but unable to identify why  Pt currently presents with multiple lines, fall risk, impaired cognition, pain, decreased ADL status  Currently pt completes functional mobility/transfers with Min Ax2 and ADLs within room with Mod A  Pt currently demonstrates impairments in the following areas: limited functional forward reach, impaired balance, decreased activity tolerance/endurance, generalized weakness/fatigue, impaired safety awarness/judgement, impaired cognition   These impairments, as well as pt's home setup, home alone/limited support, increased caregiver assistance, decreased ADL status, pain, fall risk, multiple lines,and  telemetry limit pt's ability to safely engage in all baseline areas of occupation, including grooming, bathing, dressing, toileting, functional mobility/transfers,community mobility, driving, house maintenance, medication management,  Rest/sleep  Anticipate pt will continue to progress with continued participation in ADLs, maintaining safety, managing pain, and as she medically progresses  Recommend STR when medically stable  Recommend Psych consult  From OT standpoint, pt will continue to benefit from continued OT services to address the following goals 3-5x/wk to  w/in 7-10 days     Recommendation: Psych Consult  OT Discharge Recommendation: Short Term Rehab  OT - OK to Discharge: Yes (To STR when medically stable )

## 2019-01-04 NOTE — PROGRESS NOTES
Pt transferred to room 419  No active orders  Notified admitting [facility], Dr Jed Quinones  Currently working on orders, no further action at this time  Will continue to monitor

## 2019-01-04 NOTE — PLAN OF CARE
Problem: PHYSICAL THERAPY ADULT  Goal: Performs mobility at highest level of function for planned discharge setting  See evaluation for individualized goals  Treatment/Interventions: LE strengthening/ROM, Functional transfer training, Therapeutic exercise, Endurance training, Bed mobility, Gait training, Spoke to nursing, OT, Spoke to case management  Equipment Recommended:  (Pt has RW, [facility])       See flowsheet documentation for full assessment, interventions and recommendations  Prognosis: Good  Problem List: Decreased strength, Decreased endurance, Impaired balance, Decreased mobility, Decreased coordination, Decreased cognition, Impaired judgement, Decreased safety awareness, Pain  Assessment: Pt is a 68 yo female presenting to B on 1/3/19 after an unwitnessed fall at home  CT C spine/head and XR left knee (-) for acute fx  PMH is significant for: chronic systolic HF, CAD s/p NGOZI last year, chronic opioid dependence, PAF s/p cardioversion, AVNRT s/p ablation, and CBD stone s/p biliary stent  Pt is supine at start of session and agreeable to therapy  Pt transferred supine <> sit with Shahab x1  Pt transferred sit <> stand with Shahab x1 and hand held assist  Pt transferred to bedside chair with stand pivot, Shahab x2 and hand held assist  Pt is unsteady and required frequent verbal cues for sequencing transfers  Pt remained seated in bedside chair with all needs within reach and chair alarm in place, RN aware  PT to recommend inpt rehab to maximize safety and independence with functional mobility and decrease risk for future falls  PT to follow up   Barriers to Discharge: Inaccessible home environment, Decreased caregiver support     Recommendation: Post acute IP rehab     PT - OK to Discharge: Yes (To rehab when medically stable)    See flowsheet documentation for full assessment         Comments: Katherine Courtney, PT, DPT

## 2019-01-04 NOTE — PROGRESS NOTES
Pts BP systolic in 48Q since midnight  Afib in 100s-120s  [place] with [facility] notified overnight and up to see pt multiple times  2 250mL NS boluses given with no improvement  Pt was tele overnight, no upgrade of care  Around 4:30 no improvement of BP, slight crackles in bases of lungs now  [facility] with [facility] aware  Critical care consulted and Rusty Ace PA-C at bedside  EKG obtained, labs sent, albumin ordered  BP still low  Pt transferred to MICU

## 2019-01-04 NOTE — ASSESSMENT & PLAN NOTE
· S/p prior TIFFANIE/cardioversion  · Continue BB, amio  · Hold Eliquis due to renal insufficiency   Heparin drip

## 2019-01-04 NOTE — CONSULTS
[address] [facility] [place] 69 y o  female MRN: [id number]  Unit/Bed#: [facility] 419-01 Encounter: [id number]    Physician Requesting Consult: May Viveros DO    Reason for Consultation / Chief Complaint: Hypotension    History of Present Illness:  Ashley Regalado is a 69 y o  female who was admitted last evening on 1/3 due to syncope at home and being on the floor 2 days prior to being found by her family  She was found to be in rapid atrial fibrillation with MALA (Cr 3 3) and dehydrated  She was given 1 liter bolus in the ED and admitted to the floor  Her home Toprol XL which she takes for a history of A  Fib and AVNRT was resumed and given at appx 9 pm  She then gradually became hypotensive over the next couple of hours to the 28Y systolically  She is asymptomatic with this, however she rem;ains hypotensive in the 90V systolically despite a 868 ml fluid bolus and mIVF at 75 ml/hr  She was noted to have a mild troponin elevation at 0 2 on admission with some ischemic changes on EKG  She denies fevers/chills/sweats  She had a BM while on the floor which was non-bloody  She does have some mild left sided chest discomfort which is stable as well as generalized abdominal pain which is stable, she states, since the time of her fall  She admits that this was a syncopal fall and further admits that recently she "passed out 7 times in one day"  Of note, she has a history of CAD with a NGOZI placed to the RCA in 7/2018 and had an ablation for AVNRT in 7/2018 as well  She had a TIFFANIE at that time revealing an EF of 35% and has a history of CHF  Her PMH is significant for stroke, rectal bleeding, HTN, anemia and atrial fibrillation  She takes Eliquis for a  Fib chronically, but was started on a heparin gtt on admission due to her MALA  History obtained from chart review and the patient      Past Medical History:  Past Medical History:   Diagnosis Date    A-fib [facility])     Acute respiratory disease     Anemia     Arthritis  CHF (congestive heart failure) (HCC)     Chronic pain     Heart failure (HCC)     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 [address] )     History of colon polyps     Hx of long term use of blood thinners     Hypertension     Irregular heart beat     Narcotic dependence (HCC)     Rectal bleeding     Stroke (HCC)     mild no deficiets/ memory loss    Uses walker        Past Surgical History:  Past Surgical History:   Procedure Laterality Date    COLONOSCOPY      COLONOSCOPY N/A 7/27/2018    Procedure: COLONOSCOPY;  Surgeon: Elle Thomas MD;  Location: [facility]; Service: Gastroenterology    CORONARY STENT PLACEMENT      ERCP N/A 5/14/2018    Procedure: ENDOSCOPIC RETROGRADE CHOLANGIOPANCREATOGRAPHY (ERCP); Surgeon: Victorino Bhatia MD;  Location: [facility];  Service: Gastroenterology    ERCP N/A 8/28/2018    Procedure: ENDOSCOPIC RETROGRADE CHOLANGIOPANCREATOGRAPHY (ERCP) w/ EGD;  Surgeon: Victorino Bhatia MD;  [facility]: [facility]; Service: Gastroenterology    ESOPHAGOGASTRODUODENOSCOPY N/A 7/26/2018    Procedure: ESOPHAGOGASTRODUODENOSCOPY (EGD); Surgeon: Elle Thomas MD;  Location: [facility]; Service: Gastroenterology    JOINT REPLACEMENT Right     knee       Past Family History:  History reviewed  No pertinent family history  Social History:  History   Smoking Status    Former Smoker   Smokeless Tobacco    Never Used     History   Alcohol Use No     History   Drug Use No     Marital Status:     Home Medications:   Prior to Admission medications    Medication Sig Start Date End Date Taking?  Authorizing Provider   acetaminophen (TYLENOL) 325 mg tablet Take 2 tablets (650 mg total) by mouth every 6 (six) hours 9/1/18   Carina Valle, DO   ALPRAZolam (NIRAVAM) 0 25 MG dissolvable tablet Take 1 tablet (0 25 mg total) by mouth daily at bedtime as needed for anxiety 9/18/18   Caitlyn Chase, DO   amiodarone 200 mg tablet Take 1 tablet (200 mg total) by mouth daily Then decrease to 1 tablet daily 11/20/18   Nicole Chavez,    amitriptyline (ELAVIL) 25 mg tablet TAKE ONE TABLET AT BEDTIME 12/23/18   Nicole Chavez, DO   apixaban (ELIQUIS) 5 mg Take 5 mg by mouth 2 (two) times a day    Historical Provider, MD   ascorbic acid (VITAMIN C) 500 mg tablet Take 1 tablet (500 mg total) by mouth daily 10/2/18   Nicole Chavez, DO   clopidogrel (PLAVIX) 75 mg tablet Take 1 tablet (75 mg total) by mouth daily 9/18/18   Nicole Chavez, DO   DULoxetine (CYMBALTA) 30 mg delayed release capsule Take 1 capsule (30 mg total) by mouth daily 10/2/18   Nicole Chavez, DO   ferrous sulfate 324 (65 Fe) mg Take 1 tablet (324 mg total) by mouth daily before breakfast 10/2/18   Nicole Chavez, DO   folic acid (FOLVITE) 1 mg tablet Take 1 tablet (1 mg total) by mouth daily 10/2/18   Nicole Chavez,    furosemide (LASIX) 40 mg tablet Take 1 tablet (40 mg total) by mouth daily 9/18/18   Nicole Chavez, DO   lidocaine (LIDODERM) 5 % Apply 1 patch topically daily Remove & Discard patch within 12 hours or as directed by MD 10/2/18   Nicole Chavez,    lisinopril (ZESTRIL) 5 mg tablet Take 1 tablet (5 mg total) by mouth daily 10/26/18   Nicole Chavez, DO   melatonin 3 mg Take 2 tablets (6 mg total) by mouth daily at bedtime 7/20/18   Michael Miranda, DO   metoprolol succinate (TOPROL-XL) 50 mg 24 hr tablet Take 1 tablet (50 mg total) by mouth every 12 (twelve) hours 11/20/18   Nicole Chavez,    mirtazapine (REMERON) 7 5 MG tablet Take 1 tablet (7 5 mg total) by mouth daily at bedtime 10/2/18   Nicole Chavez, DO   nortriptyline (PAMELOR) 10 mg capsule Take 2 capsules (20 mg total) by mouth daily at bedtime 10/2/18   Nicole Chavez,    oxyCODONE-acetaminophen (PERCOCET)  mg per tablet  10/2/18   Historical Provider, MD   pantoprazole (PROTONIX) 40 mg tablet Take 1 tablet (40 mg total) by mouth 2 (two) times a day before meals 10/2/18   Nicole Chavez DO   polyethylene glycol [facility] 17 g packet Take 17 g by mouth daily    Historical Provider, MD   potassium chloride (K-DUR,KLOR-CON) 20 mEq tablet Take 1 tablet (20 mEq total) by mouth daily 10/2/18   Debi Mullins DO   senna-docusate sodium (SENOKOT S) 8 6-50 mg per tablet Take 1 tablet by mouth daily at bedtime 9/2/18   Kelly Angle, DO   thiamine 100 MG tablet Take 1 tablet (100 mg total) by mouth daily 10/2/18   Debi Mullins DO       Inpatient Medications:  Scheduled Meds:  Current Facility-Administered Medications:  acetaminophen 975 mg Oral [facility] Tildon Res, PA-C    albumin human        albumin human 12 5 g Intravenous Once Dorthshira Duck, PA-C    amiodarone 200 mg Oral Daily Tildon Res, PA-C    calcium carbonate 1,000 mg Oral Daily PRN Tildon Res, PA-C    clopidogrel 75 mg Oral Daily Tildon Res, PA-C    ferrous sulfate 325 mg Oral Daily With Breakfast Tildon Res, PA-C    folic acid 1 mg Oral Daily Tildon Res, PA-C    heparin (porcine) 3-20 Units/kg/hr (Order-Specific) Intravenous Titrated Tildon Res, PA-C Last Rate: 12 Units/kg/hr (01/03/19 2029)   heparin (porcine) 2,000 Units Intravenous PRN Tildon Res, PA-C    heparin (porcine) 4,000 Units Intravenous PRN Tildon Res, PA-C    melatonin 6 mg Oral HS Tildon Res, PA-C    metoprolol succinate 50 mg Oral BID Tildon Res, PA-C    mirtazapine 7 5 mg Oral HS Tildon Res, PA-C    nortriptyline 20 mg Oral HS Tildon Res, PA-C    oxyCODONE 10 mg Oral Q6H PRN Tildon Res, PA-C    pantoprazole 40 mg Oral BID AC Tildon Res, PA-C    senna-docusate sodium 1 tablet Oral HS Tildon Res, PA-C    sodium chloride 75 mL/hr Intravenous Continuous Tildon Res, PA-C Last Rate: 75 mL/hr (01/03/19 2019)   thiamine 100 mg Oral Daily Tildon Res, PA-C      Continuous Infusions:  heparin (porcine) 3-20 Units/kg/hr (Order-Specific) Last Rate: 12 Units/kg/hr (01/03/19 )   sodium chloride 75 mL/hr Last Rate: 75 mL/hr (19)     PRN Meds:    calcium carbonate 1,000 mg Daily PRN   heparin (porcine) 2,000 Units PRN   heparin (porcine) 4,000 Units PRN   oxyCODONE 10 mg Q6H PRN       Allergies:  No Known Allergies    ROS:   Review of Systems   Constitutional: Positive for fatigue  Negative for appetite change, chills, diaphoresis, fever and unexpected weight change  HENT: Negative  Eyes: Negative  Respiratory: Negative  Negative for chest tightness and shortness of breath  Cardiovascular: Positive for chest pain  Gastrointestinal: Positive for abdominal pain  Negative for blood in stool, constipation, diarrhea, nausea and vomiting  Endocrine: Negative  Genitourinary: Negative  Musculoskeletal: Negative  Skin: Negative  Allergic/Immunologic: Negative  Neurological: Negative  Negative for headaches  Hematological: Negative  Psychiatric/Behavioral: Negative  Vitals:  Vitals:    19 0122 19 0305 19 0400 19 0424   BP: (!) 70/40 (!) 72/41 (!) 68/40 (!) 84/42   BP Location:  Right arm     Pulse:  (!) 115     Resp:  18     Temp:  97 7 °F (36 5 °C)     TempSrc:  Oral     SpO2:       Weight:       Height:         Temperature:   Temp (24hrs), Av 4 °F (36 3 °C), Min:96 3 °F (35 7 °C), Max:98 °F (36 7 °C)    Current Temperature: 97 7 °F (36 5 °C)    Weights:   IBW: 57 kg  Body mass index is 26 05 kg/m²  Hemodynamic Monitoring:  N/A     Non-Invasive/Invasive Ventilation Settings:  Respiratory    Lab Data (Last 4 hours)    None         O2/Vent Data (Last 4 hours)    None              No results found for: PHART, GCM1RIU, PO2ART, CZI8RWU, U7TYNPZF, BEART, SOURCE  SpO2: SpO2: 95 %, SpO2 Device: O2 Device: None (Room air)     Physical Exam:  Physical Exam   Constitutional: She is oriented to person, place, and time  She appears well-developed and well-nourished  HENT:   Head: Normocephalic     Eyes: Pupils are equal, round, and reactive to light  Neck: Normal range of motion  Neck supple  No JVD present  Cardiovascular: Exam reveals no gallop and no friction rub  No murmur heard   + increased rate, regular     Pulmonary/Chest: Effort normal and breath sounds normal  No respiratory distress  She has no wheezes  She has no rales  Abdominal: Soft  Bowel sounds are normal  She exhibits no distension  There is tenderness  There is guarding  There is no rebound  + diffuse abdominal tenderness throughout her abdomen, voluntary guarding  Non-distended   Genitourinary: Vagina normal    Musculoskeletal: Normal range of motion  She exhibits no edema, tenderness or deformity  Neurological: She is alert and oriented to person, place, and time  + non-focal exam   Skin: Skin is warm and dry  She is not diaphoretic    + bilateral heel erythema, blanching- present on admission   Psychiatric: She has a normal mood and affect         Labs:    Results from last 7 days  Lab Units 01/03/19 2027 01/03/19  1403   WBC Thousand/uL 10 92* 12 67*   HEMOGLOBIN g/dL 12 5 12 3   HEMATOCRIT % 38 3 38 2   PLATELETS Thousands/uL 311 299   MONO PCT %  --  7       Results from last 7 days  Lab Units 01/03/19  1403   SODIUM mmol/L 136   POTASSIUM mmol/L 4 0   CHLORIDE mmol/L 102   CO2 mmol/L 19*   ANION GAP mmol/L 15*   BUN mg/dL 108*   CREATININE mg/dL 3 38*   CALCIUM mg/dL 9 0   ALT U/L 22   AST U/L 35   ALK PHOS U/L 95   ALBUMIN g/dL 2 8*   TOTAL BILIRUBIN mg/dL 0 80       Results from last 7 days  Lab Units 01/03/19  1403   MAGNESIUM mg/dL 3 0*        Results from last 7 days  Lab Units 01/04/19  0245 01/03/19 2027 01/03/19  1404   INR   --  1 87* 2 02*   PTT seconds 86* 40* 43*       Results from last 7 days  Lab Units 01/03/19  2306 01/03/19 2027 01/03/19  1847 01/03/19  1403   TROPONIN I ng/mL 0 29* 0 28* 0 22* 0 16*       Results from last 7 days  Lab Units 01/03/19  1403   LACTIC ACID mmol/L 2 0     ABG:No results found for: PHART, DRL9OFH, PO2ART, MZY0SME, M9JXBQWY, BEART, SOURCE  VBG:          Imaging:   Ct Chest Abdomen Pelvis Wo Contrast    Result Date: 1/3/2019  Impression: 1  Left greater than right bibasilar opacities  Differential considerations include atelectasis versus infection to include aspiration pneumonia  Recommend clinical and laboratory correlation  2   Emphysematous changes  3   Gallbladder wall thickening, gallbladder distention and cholelithiasis  Correlate for focal right upper quadrant tenderness to exclude acute cholecystitis  If there is focal right upper quadrant tenderness further evaluation with targeted ultrasound could be considered  4   Biliary stent in place with choledocholithiasis  5   No acute traumatic injury in the chest abdomen or pelvis  Workstation performed: [id number]     Xr Knee 4+ Views Left Injury    Result Date: 1/3/2019  Impression: No acute osseous abnormality  Moderate to severe osteoarthritic degenerative changes of the left knee joint  Workstation performed: [id number]     Ct Head Without Contrast    Result Date: 1/3/2019  Impression: No acute intracranial abnormality  Workstation performed: [id number]     Ct Spine Cervical Without Contrast    Result Date: 1/3/2019  Impression: No cervical spine fracture or traumatic malalignment  Workstation performed: [id number]    I have personally reviewed pertinent reports  EK/4 4:15 am: Sinus tachycardia, rate 114 bpm, ST depression in V2 and V3 mildy more pronounced than prior, biphasic T wave in V4-V5, unchanged  This was personally reviewed by myself  Micro:   BCX: pending  ______________________________________________________________________    Assessment and Plan:     68 y/o female s/p syncopal fall with rapid atrial fibrillation on admission, now with hypotension following long acting beta-blocker administration    Neuro:   Chronic opioid dependence- she takes percocet 10/325 at home for "generalized aches and pains"  Continue scheduled tylenol and prn oxycodone 10 mg here  Low back pain- CT C/A/P negative for injury  Continue above regimen  CV:   Rapid atrial fibrillation and AVRNT - s/p ablation in 7/2018  On home amiodarone and was given dose of Toprol XL 50 mg last evening  Now hypotensive  Will hold Toprol XL for now and resume with IV metoprolol when pressures improve  Rate control improved  Heparin infusion for A/C due to MALA  Hypotension- secondary likely to Toprol XL given at 9 pm  Obtain Arterial line in ICU and if needed may require central line and vasopressors if further gentle fluid resuscitation not effective  Patient is in agreement if needed  Sepsis unlikely, though procalcitonin 1 31  Re-check tomorrow to trend  LA nl, no leukocytosis and afebrile  Continue to monitor for s/s of infection  CAD s/p NGOZI to RCA 7/2018- on DAPT with ASA/Plavix  Acute ischemic changes present on EKG and mild troponin elevation at 0 2  Will trend troponin and repeat EKG if needed  Will f/u cardiology c/s today  History of chronic systolic heart failure- ECHO in 7/2018 reveals EF 35%  No evidence of AE at this time but monitor closely for signs of volume overload  Lung:   No active issues  Monitor for volume overload given history of CHF  GI:   Choledocholithaisis with prior biliary stent placement- LFTs normal  No need for further work up at this time  Diffuse abdominal pain- LA normal  CT A/P negative for acute intra-abdominal process  Serial exams and monitor  F/E/N:   NSS at 75 ml/hr  Given 1 L bolus in ED, 500 mls crystalloid and 250 ml albumin on the floor without response in blood pressure  Hypokalemia- replete and re-check at noon  Regular diet    :   MALA- due to hypotension/dehydration  Improving  Continue IVF hydration and repeat in AM  Strict I/O's  Patient does not have a gomes catheter  ID:   No active issues at this time   Recheck prolactin level in AM      Heme:   Anemia- likely dilutional  Will trend  Continue heparin infusion for A/C while eliquis on hold due to MALA  PTT therapeutic currently  Endo:   Goal BS <180  No h/o DM II  No coverage needed  Msk/Skin:   Bilateral heel erythema -no wounds  Offloading and routine skin care  Proph:   SCDs, Heparin infusion    Disposition: Transfer to ICU for closer hemodynamic monitoring and resuscitation  Family (Patient's sister Yodit Chowdhury was updated)  Counseling / Coordination of Care  Total Critical Care time spent 30 minutes excluding procedures, teaching and family updates  ______________________________________________________________________    VTE Pharmacologic Prophylaxis: Heparin Drip  VTE Mechanical Prophylaxis: sequential compression device    Invasive lines and devices: Invasive Devices     Peripheral Intravenous Line            Peripheral IV 01/03/19 Left Antecubital less than 1 day                Code Status: Level 3 - DNAR and DNI  POA:    POLST:      Given critical illness, patient length of stay will require greater than two midnights  Portions of the record may have been created with voice recognition software  Occasional wrong word or "sound a like" substitutions may have occurred due to the inherent limitations of voice recognition software  Read the chart carefully and recognize, using context, where substitutions have occurred        Marco A Saini PA-C    Consults
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
## 2019-01-04 NOTE — ASSESSMENT & PLAN NOTE
· Hypothermia, tachycardia, leukocytosis  · No evidence to suggest acute infection  · Monitor off antibiotics for now  · F/U blood cultures

## 2019-01-04 NOTE — ASSESSMENT & PLAN NOTE
· Currently very tachy with significant ectopy  Likely because patient has not had her cardiac meds in several days  · Resume home dose metoprolol now   PRN beta blockers if necessary
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
## 2019-01-04 NOTE — PLAN OF CARE
Knowledge Deficit     Patient/family/caregiver demonstrates understanding of disease process, treatment plan, medications, and discharge instructions Progressing        MUSCULOSKELETAL - ADULT     Maintain or return mobility to safest level of function Progressing     Maintain proper alignment of affected body part Progressing        PAIN - ADULT     Verbalizes/displays adequate comfort level or baseline comfort level Progressing        Potential for Falls     Patient will remain free of falls Progressing        SAFETY ADULT     Maintain or return to baseline ADL function Progressing     Maintain or return mobility status to optimal level Progressing        SKIN/TISSUE INTEGRITY - ADULT     Skin integrity remains intact Progressing     Incision(s), wounds(s) or drain site(s) healing without S/S of infection Progressing     Oral mucous membranes remain intact Progressing

## 2019-01-04 NOTE — ASSESSMENT & PLAN NOTE
· CT on admission showed gallbladder wall thickening and gallbladder distention  · LFTs WNL  · Serial abdominal exams
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
## 2019-01-04 NOTE — ASSESSMENT & PLAN NOTE
· Likely due to renal failure   However, she does have ischemic changes on EKG  · Trend troponin  · Will be on heparin drip as she cannot take Eliquis secondary to renal function  · Consult cardiology given EKG changes

## 2019-01-04 NOTE — PLAN OF CARE
Knowledge Deficit     Patient/family/caregiver demonstrates understanding of disease process, treatment plan, medications, and discharge instructions Progressing        MUSCULOSKELETAL - ADULT     Maintain or return mobility to safest level of function Progressing     Maintain proper alignment of affected body part Progressing        Nutrition/Hydration-ADULT     Nutrient/Hydration intake appropriate for improving, restoring or maintaining nutritional needs Progressing        PAIN - ADULT     Verbalizes/displays adequate comfort level or baseline comfort level Progressing        Potential for Falls     Patient will remain free of falls Progressing        Prexisting or High Potential for Compromised Skin Integrity     Skin integrity is maintained or improved Progressing        SAFETY ADULT     Maintain or return to baseline ADL function Progressing     Maintain or return mobility status to optimal level Progressing        SKIN/TISSUE INTEGRITY - ADULT     Skin integrity remains intact Progressing     Incision(s), wounds(s) or drain site(s) healing without S/S of infection Progressing     Oral mucous membranes remain intact Progressing

## 2019-01-04 NOTE — CONSULTS
Consultation -  [facility]  Allegra Lima 69 y o  female MRN: [id number]  [facility]: [id number]        Consults    Reason for Consult / Principal Problem:     Choledocholithiasis  Abdominal pain      ASSESSMENT AND PLAN:      Choledocholithiasis  Abdominal pain  -patient well known to our service with history of persistent choledocholithiasis status post ERCP X3, last ERCP 8/28/18 with stent exchange with persistent dilation of CBD and choledocholithiasis   -patient currently with normal liver enzymes, afebrile without evidence of cholangitis  -patient currently meets SIRS criteria however do not suspect that biliary tree is source blood for further workup and antibiotics as per critical care team  -no indication for ERCP at this juncture  -okay to advance diet to surgical soft  -patient will likely need a repeat ERCP at some point however this will need to be done when her anticoagulation is held in elective cholecystectomy would also be advised given high risk of recurrence for CBD stones given chronic cholelithiasis  -patient reports abdominal pain mainly in the lower quadrants which correlates to physical exam  -recommend bowel regimen has there was evidence of stool seen throughout colon   -start MiraLax once daily    ______________________________________________________________________    HPI:  18-year-old female seen in consultation for choledocholithiasis and abdominal pain  Patient with significant past medical history for CAD with PCI 07/2012 CHF with EF of 50%, atrial fibrillation on anticoagulation  Patient presents with fall from home that occurred 3 days prior to admission but unsure of the circumstances  She is unclear if she lost consciousness  Family came to check on her and found her on the ground  She was found to be hypotensive with acute kidney injury and complaining of abdominal pain    Patient states prior to fall she was not experiencing any GI symptoms  She denied abdominal pain, nausea, vomiting, diarrhea or constipation, fever  No yellowing of her eyes or skin, dark doing of her urine or lightening of her stool  She denied any blood or black/tarry stool  She noted that she was eating out often because she was feeling too weak to go grocery shopping  Currently she is complaining of abdominal pain mainly in the lower quadrants without nausea or vomiting diarrhea or constipation  Patient with known history of choledocholithiasis who initially had ERCP with sphincterotomy and stent placement in 2014 for biliary dilatation and then on 5/14/18 patient presented with acute cholangitis and underwent ERCP for occluded stent with stent exchange and multiple stone/debris removal   Patient underwent 2nd ERCP on 8/27/18 for stent exchange  REVIEW OF SYSTEMS:    CONSTITUTIONAL: Denies any fever, chills, rigors, and weight loss  HEENT: No earache or tinnitus  Denies hearing loss or visual disturbances  CARDIOVASCULAR: No chest pain or palpitations  RESPIRATORY: Denies any cough, hemoptysis, shortness of breath or dyspnea on exertion  GASTROINTESTINAL: As noted in the History of Present Illness  GENITOURINARY: No problems with urination  Denies any hematuria or dysuria  NEUROLOGIC: No dizziness or vertigo, denies headaches  MUSCULOSKELETAL: Denies any muscle or joint pain  SKIN: Denies skin rashes or itching  ENDOCRINE: Denies excessive thirst  Denies intolerance to heat or cold  PSYCHOSOCIAL: Denies depression or anxiety  Denies any recent memory loss         Historical Information   Past Medical History:   Diagnosis Date    A-fib ([facility] )     Acute respiratory disease     Anemia     Arthritis     CHF (congestive heart failure) (HCC)     Chronic pain     Heart failure (HCC)     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[facility] )     History of colon polyps     Hx of long term use of blood thinners     Hypertension     Irregular heart beat     Narcotic dependence ([facility] )     Rectal bleeding     Stroke (HCC)     mild no deficiets/ memory loss    Uses walker      Past Surgical History:   Procedure Laterality Date    COLONOSCOPY      COLONOSCOPY N/A 7/27/2018    Procedure: COLONOSCOPY;  Surgeon: Vannessa Deutsch MD;  [facility]; Service: Gastroenterology    CORONARY STENT PLACEMENT      ERCP N/A 5/14/2018    Procedure: ENDOSCOPIC RETROGRADE CHOLANGIOPANCREATOGRAPHY (ERCP); Surgeon: Shaun Esposito MD;  [facility];  Service: Gastroenterology    ERCP N/A 8/28/2018    Procedure: ENDOSCOPIC RETROGRADE CHOLANGIOPANCREATOGRAPHY (ERCP) w/ EGD;  Surgeon: Shaun Esposito MD;  [facility]; Service: Gastroenterology    ESOPHAGOGASTRODUODENOSCOPY N/A 7/26/2018    Procedure: ESOPHAGOGASTRODUODENOSCOPY (EGD); Surgeon: Vannessa Deutsch MD;  [facility]; Service: Gastroenterology    JOINT REPLACEMENT Right     knee     Social History   History   Alcohol Use No     History   Drug Use No     History   Smoking Status    Former Smoker   Smokeless Tobacco    Never Used     History reviewed  No pertinent family history      Meds/Allergies     Prescriptions Prior to Admission   Medication    acetaminophen (TYLENOL) 325 mg tablet    ALPRAZolam (NIRAVAM) 0 25 MG dissolvable tablet    amiodarone 200 mg tablet    amitriptyline (ELAVIL) 25 mg tablet    apixaban (ELIQUIS) 5 mg    ascorbic acid (VITAMIN C) 500 mg tablet    clopidogrel (PLAVIX) 75 mg tablet    DULoxetine (CYMBALTA) 30 mg delayed release capsule    ferrous sulfate 324 (65 Fe) mg    folic acid (FOLVITE) 1 mg tablet    furosemide (LASIX) 40 mg tablet    lidocaine (LIDODERM) 5 %    lisinopril (ZESTRIL) 5 mg tablet    melatonin 3 mg    metoprolol succinate (TOPROL-XL) 50 mg 24 hr tablet    mirtazapine (REMERON) 7 5 MG tablet    nortriptyline (PAMELOR) 10 mg capsule    oxyCODONE-acetaminophen (PERCOCET)  mg per tablet    pantoprazole (PROTONIX) 40 mg tablet    polyethylene glycol (MIRALAX) 17 g packet    potassium chloride (K-DUR,KLOR-CON) 20 mEq tablet    senna-docusate sodium (SENOKOT S) 8 6-50 mg per tablet    thiamine 100 MG tablet     Current Facility-Administered Medications   Medication Dose Route Frequency    acetaminophen (TYLENOL) tablet 975 mg  975 mg Oral Q8H [address]    ALPRAZolam (XANAX) tablet 0 25 mg  0 25 mg Oral BID PRN    amiodarone tablet 200 mg  200 mg Oral Daily    calcium carbonate (TUMS) chewable tablet 1,000 mg  1,000 mg Oral Daily PRN    clopidogrel (PLAVIX) tablet 75 mg  75 mg Oral Daily    ferrous sulfate tablet 325 mg  325 mg Oral Daily With Breakfast    folic acid (FOLVITE) tablet 1 mg  1 mg Oral Daily    heparin (porcine) 25,000 units in 250 mL infusion (premix)  3-20 Units/kg/hr (Order-Specific) Intravenous Titrated    heparin (porcine) injection 2,000 Units  2,000 Units Intravenous PRN    heparin (porcine) injection 4,000 Units  4,000 Units Intravenous PRN    melatonin tablet 6 mg  6 mg Oral HS    mirtazapine (REMERON) tablet 7 5 mg  7 5 mg Oral HS    multi-electrolyte (ISOLYTE-S PH 7 4 equivalent) IV solution  75 mL/hr Intravenous Continuous    norepinephrine (LEVOPHED) 4 mg (STANDARD CONCENTRATION) IV in sodium chloride 0 9% 250 mL  1-30 mcg/min Intravenous Titrated    nortriptyline (PAMELOR) capsule 20 mg  20 mg Oral HS    oxyCODONE (ROXICODONE) immediate release tablet 10 mg  10 mg Oral Q6H PRN    pantoprazole (PROTONIX) EC tablet 40 mg  40 mg Oral BID AC    senna-docusate sodium (SENOKOT S) 8 6-50 mg per tablet 1 tablet  1 tablet Oral HS    thiamine (VITAMIN B1) tablet 100 mg  100 mg Oral Daily       No Known Allergies        Objective     Blood pressure 106/56, pulse 100, temperature 97 8 °F (36 6 °C), temperature source Oral, resp  rate (!) 26, height 5' 5" (1 651 m), weight 69 4 kg (153 lb), SpO2 98 %  Body mass index is 25 46 kg/m²        Intake/Output Summary (Last 24 hours) at 01/04/19 [address] [place] [address] filed at 01/04/19 1600   Gross per 24 hour   Intake          4215 86 ml   Output             1950 ml   Net          2265 86 ml         PHYSICAL EXAM:      General Appearance:   Alert, cooperative, no distress   HEENT:   Normocephalic, atraumatic, anicteric      Neck:  Supple, symmetrical, trachea midline   Lungs:   Clear to auscultation bilaterally; no rales, rhonchi or wheezing; respirations unlabored    Heart[de-identified]   Regular rate and rhythm; no murmur, rub, or gallop  Abdomen:   Soft, mild tenderness in lower quadrants, non-distended; normal bowel sounds; no masses, no organomegaly    Genitalia:   Deferred    Rectal:   Deferred    Extremities:  No cyanosis, clubbing or edema    Pulses:  2+ and symmetric all extremities    Skin:  No jaundice, rashes, or lesions    Lymph nodes:  No palpable cervical lymphadenopathy        Lab Results:   Admission on 01/03/2019   Component Date Value    WBC 01/03/2019 12 67*    RBC 01/03/2019 3 89     Hemoglobin 01/03/2019 12 3     Hematocrit 01/03/2019 38 2     MCV 01/03/2019 98     MCH 01/03/2019 31 6     MCHC 01/03/2019 32 2     RDW 01/03/2019 15 6*    MPV 01/03/2019 10 3     Platelets 23/94/0683 299     nRBC 01/03/2019 0     Sodium 01/03/2019 136     Potassium 01/03/2019 4 0     Chloride 01/03/2019 102     CO2 01/03/2019 19*    ANION GAP 01/03/2019 15*    BUN 01/03/2019 108*    Creatinine 01/03/2019 3 38*    Glucose 01/03/2019 127     Calcium 01/03/2019 9 0     AST 01/03/2019 35     ALT 01/03/2019 22     Alkaline Phosphatase 01/03/2019 95     Total Protein 01/03/2019 8 0     Albumin 01/03/2019 2 8*    Total Bilirubin 01/03/2019 0 80     eGFR 01/03/2019 12     Lipase 01/03/2019 72*    Troponin I 01/03/2019 0 16*    Blood Culture 01/03/2019 No Growth at 24 hrs   Blood Culture 01/03/2019 No Growth at 24 hrs       LACTIC ACID 01/03/2019 2 0     Protime 01/03/2019 22 9*    INR 01/03/2019 2 02*    PTT 01/03/2019 43*    TSH 3RD GENERATON 01/03/2019 0 306*    Magnesium 01/03/2019 3 0*    NT-proBNP 01/03/2019 3451*    Color, UA 01/03/2019 michelle     Clarity, UA 01/03/2019 clear     Total CK 01/03/2019 205*    Free T4 01/03/2019 1 67*    CK-MB Index 01/03/2019 6 0*    CK-MB 01/03/2019 12 2*    Ventricular Rate 01/03/2019 117     Atrial Rate 01/03/2019 170     QRSD Interval 01/03/2019 118     QT Interval 01/03/2019 290     QTC Interval 01/03/2019 404     QRS Axis 01/03/2019 -23     T Wave [place] 01/03/2019 189     Ventricular Rate 01/03/2019 86     Atrial Rate 01/03/2019 86     [place] Interval 01/03/2019 158     QRSD Interval 01/03/2019 112     QT Interval 01/03/2019 338     QTC Interval 01/03/2019 404     P Axis 01/03/2019 76     QRS Axis 01/03/2019 -12     T Wave Axis 01/03/2019 190     Color, UA 01/03/2019 Michelle     Clarity, UA 01/03/2019 Clear     pH, UA 01/03/2019 5 5     Leukocytes, UA 01/03/2019 Negative     Nitrite, UA 01/03/2019 Negative     Protein, UA 01/03/2019 30 (1+)*    Glucose, UA 01/03/2019 Negative     Ketones, UA 01/03/2019 Negative     Urobilinogen, UA 01/03/2019 0 2     Bilirubin, UA 01/03/2019 Interference- unable to analyze*    Blood, UA 01/03/2019 Trace*    Specific Gravity, UA 01/03/2019 1 025     RBC, UA 01/03/2019 4-10*    WBC, UA 01/03/2019 4-10*    Epithelial Cells 01/03/2019 None Seen     Bacteria, UA 01/03/2019 None Seen     Hyaline Casts, UA 01/03/2019 3-5*    Segmented % 01/03/2019 92*    Lymphocytes % 01/03/2019 1*    Monocytes % 01/03/2019 7     Eosinophils, % 01/03/2019 0     Basophils % 01/03/2019 0     Absolute Neutrophils 01/03/2019 11 66*    Lymphocytes Absolute 01/03/2019 0 13*    Monocytes Absolute 01/03/2019 0 89     Eosinophils Absolute 01/03/2019 0 00     Basophils Absolute 01/03/2019 0 00     RBC Morphology 01/03/2019 Present     Anisocytosis 01/03/2019 Present     Macrocytes 01/03/2019 Present     Poikilocytes 01/03/2019 Present     Platelet Estimate 01/03/2019 Adequate     Troponin I 01/03/2019 0 22*    WBC 01/03/2019 10 92*    RBC 01/03/2019 3 96     Hemoglobin 01/03/2019 12 5     Hematocrit 01/03/2019 38 3     MCV 01/03/2019 97     MCH 01/03/2019 31 6     MCHC 01/03/2019 32 6     RDW 01/03/2019 15 4*    Platelets 06/37/2848 311     MPV 01/03/2019 10 1     PTT 01/03/2019 40*    Protime 01/03/2019 21 6*    INR 01/03/2019 1 87*    Troponin I 01/03/2019 0 28*    Troponin I 01/03/2019 0 29*    PTT 01/04/2019 86*    TSH 3RD GENERATON 01/04/2019 0 664     Sodium 01/04/2019 139     Potassium 01/04/2019 2 8*    Chloride 01/04/2019 106     CO2 01/04/2019 23     ANION GAP 01/04/2019 10     BUN 01/04/2019 106*    Creatinine 01/04/2019 2 29*    Glucose 01/04/2019 121     Calcium 01/04/2019 8 0*    AST 01/04/2019 20     ALT 01/04/2019 18     Alkaline Phosphatase 01/04/2019 76     Total Protein 01/04/2019 6 3*    Albumin 01/04/2019 2 3*    Total Bilirubin 01/04/2019 0 57     eGFR 01/04/2019 20     Magnesium 01/04/2019 2 6     WBC 01/04/2019 8 50     RBC 01/04/2019 3 28*    Hemoglobin 01/04/2019 10 2*    Hematocrit 01/04/2019 31 9*    MCV 01/04/2019 97     MCH 01/04/2019 31 1     MCHC 01/04/2019 32 0     RDW 01/04/2019 15 6*    Platelets 55/42/0776 257     MPV 01/04/2019 10 0     LACTIC ACID 01/04/2019 1 2     Procalcitonin 01/04/2019 1 31*    Troponin I 01/04/2019 0 32*    Troponin I 01/04/2019 0 27*    Phosphorus 01/04/2019 3 6     PTT 01/04/2019 71*    Color, UA 01/04/2019 Dk Yellow     Clarity, UA 01/04/2019 Cloudy     Specific Gravity, UA 01/04/2019 1 018     pH, UA 01/04/2019 5 0     Leukocytes, UA 01/04/2019 Negative     Nitrite, UA 01/04/2019 Negative     Protein, UA 01/04/2019 Trace*    Glucose, UA 01/04/2019 Negative     Ketones, UA 01/04/2019 Negative     Urobilinogen, UA 01/04/2019 1 0     Bilirubin, UA 01/04/2019 Interference- unable to analyze*    Blood, UA 01/04/2019 Negative     Sodium 01/04/2019 141     Potassium 01/04/2019 3 4*    Chloride 01/04/2019 108     CO2 01/04/2019 21     ANION GAP 01/04/2019 12     BUN 01/04/2019 77*    Creatinine 01/04/2019 1 60*    Glucose 01/04/2019 138     Calcium 01/04/2019 8 4     eGFR 01/04/2019 31     RBC, UA 01/04/2019 2-4*    WBC, UA 01/04/2019 None Seen     Epithelial Cells 01/04/2019 None Seen     Bacteria, UA 01/04/2019 None Seen     Hyaline Casts, UA 01/04/2019 None Seen     Ventricular Rate 01/04/2019 114     Atrial Rate 01/04/2019 120     [place] Interval 01/04/2019 216     QRSD Interval 01/04/2019 114     QT Interval 01/04/2019 358     QTC Interval 01/04/2019 493     P Axis 01/04/2019 -5     QRS [place] 01/04/2019 -11     T Wave [place] 01/04/2019 195     Ventricular Rate 01/04/2019 0     Atrial Rate 01/04/2019 0     QRSD Interval 01/04/2019 0     QT Interval 01/04/2019 0     QTC Interval 01/04/2019 0     QRS Axis 01/04/2019 0     T Wave [place] 01/04/2019 0        Imaging Studies: I have personally reviewed pertinent imaging studies

## 2019-01-04 NOTE — CONSULTS
Cardiology   Avi Wood 69 y o  female MRN: [id number]  Unit/Bed#: MICU 05 Encounter: [id number]      Reason for Consult / Principal Problem:  Elevated troponin    Physician Requesting Consult:  Peg Brown MD    Cardiologist:  Patient has not followed up with Cardiology outpatient        Assessment/Plan:    >> Elevated troponin:  Likely due to demand ischemia from fall and dehydration and acute kidney injury  >> T-wave inversions on EKG:  As above, does not appear to be active ischemia, likely diffuse subendocardial ischemia  Can check echocardiogram to rule out wall motion abnormality  >> Atrial fibrillation:  Continue home medications, continue heparin instead of Eliquis  >> Coronary artery disease status post stent:  Continue Plavix  >> AVNRT status post ablation:  Continue amiodarone and metoprolol    Appears volume depleted, would give IVF    Agree with palliative care consult      CC:  Fall    HPI: Avi Wood 69y o  year old female who presents with a fall  She was found down by her son lying in feces  She remembers falling but does not remember why she fell or how long she was down  She does have some chest pain, she states it is worse with taking deep breaths, is worse with touching  She does not remember whether she hit her chest when she fell  She has an extensive cardiac history, in July was admitted with atrial fibrillation and cardioverted, subsequently started to have runs of wide complex tachycardia which was thought to be due to ischemia, she did have a cardiac catheterization and stenting done: The results of which are given below  She continue to have wide complex tachycardia and it was thought to be due to AVNRT with aberrancy, she had a AVNRT ablation done  She lives alone and is doing poorly at home, she is forgetful and likely has some degree of cognitive dysfunction  She is tangential in thought and tox about many different things    She states she does not remember exactly how she fell  When she came to the hospital a troponin was checked and it was elevated, since then the troponins have remained fairly stable, her EKG showed new T-wave inversions in the anterior and lateral leads    Denies palpitations, orthopnea, PND, pedal edema, syncope, presyncope, diaphoresis, nausea/vomiting     Remainder of ROS done and negative    EKG:     ECHO:  From 05/20/2018:LEFT VENTRICLE:  Systolic function was mildly reduced  Ejection fraction was estimated to be 50 %  There was severe hypokinesis of the basal-mid inferior and basal-mid inferolateral wall(s)  The changes were consistent with concentric remodeling (increased wall thickness with normal wall mass)  Doppler parameters were consistent with elevated mean left atrial filling pressure      RIGHT VENTRICLE:  The ventricle was mildly dilated  Systolic function was normal   Wall thickness was increased      LEFT ATRIUM:  The atrium was mildly dilated      RIGHT ATRIUM:  The atrium was moderately dilated      MITRAL VALVE:  There was moderate annular calcification  There was mild regurgitation      TRICUSPID VALVE:  There was mild to moderate regurgitation  Estimated peak PA pressure was 55 mmHg  The findings suggest moderate pulmonary hypertension      IVC, HEPATIC VEINS:  The inferior vena cava was mildly dilated  Respirophasic changes were blunted (less than 50% variation)  Stress test:     Cardiac Catheterization: CORONARY CIRCULATION:  1st diagonal: There was a discrete 50 % stenosis at the ostium of the vessel segment  Mid circumflex: There was a discrete 50 % stenosis just after OM1  1st obtuse marginal: There was a 40 % stenosis  Mid RCA: There was a discrete 90 % stenosis  This is a likely culprit for the patient's clinical presentation  An intervention was performed      1ST LESION INTERVENTIONS:  A balloon angioplasty procedure was performed on the lesion in the mid RCA    A Xience Nikia Rx 3 0 x 28mm drug-eluting stent was placed across the lesion and deployed at a maximum inflation pressure of 14 adam  CT scan from yesterday: IMPRESSION:  1  Left greater than right bibasilar opacities  Differential considerations include atelectasis versus infection to include aspiration pneumonia  Recommend clinical and laboratory correlation  2   Emphysematous changes  3   Gallbladder wall thickening, gallbladder distention and cholelithiasis  Correlate for focal right upper quadrant tenderness to exclude acute cholecystitis  If there is focal right upper quadrant tenderness further evaluation with targeted   ultrasound could be considered  4   Biliary stent in place with choledocholithiasis  5   No acute traumatic injury in the chest abdomen or pelvis  Labs:  TSH suppressed, INR 1 87, potassium 2 8, creatinine 2 29, this is acute her baseline creatinine is normal, BUN is 108        Family History: History reviewed  No pertinent family history  Historical Information   Past Medical History:   Diagnosis Date    A-fib [facility])     Acute respiratory disease     Anemia     Arthritis     CHF (congestive heart failure) (HCC)     Chronic pain     Heart failure (HCC)     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[facility] [address] )     History of colon polyps     Hx of long term use of blood thinners     Hypertension     Irregular heart beat     Narcotic dependence ([facility] [address] )     Rectal bleeding     Stroke (HCC)     mild no deficiets/ memory loss    Uses walker      Past Surgical History:   Procedure Laterality Date    COLONOSCOPY      COLONOSCOPY N/A 7/27/2018    Procedure: COLONOSCOPY;  Surgeon: Eron Priest MD;  Location: [facility]; Service: Gastroenterology    CORONARY STENT PLACEMENT      ERCP N/A 5/14/2018    Procedure: ENDOSCOPIC RETROGRADE CHOLANGIOPANCREATOGRAPHY (ERCP);   Surgeon: Carmen Ramirez MD;  Location: [facility];  Service: Gastroenterology    ERCP N/A 8/28/2018    Procedure: ENDOSCOPIC RETROGRADE CHOLANGIOPANCREATOGRAPHY (ERCP) w/ EGD;  Surgeon: Deepak Newell MD;  [facility]: [facility]; Service: Gastroenterology    ESOPHAGOGASTRODUODENOSCOPY N/A 7/26/2018    Procedure: ESOPHAGOGASTRODUODENOSCOPY (EGD); Surgeon: Sanju Castillo MD;  Location:  GI LAB; Service: Gastroenterology    JOINT REPLACEMENT Right     knee     Social History   History   Alcohol Use No     History   Drug Use No     History   Smoking Status    Former Smoker   Smokeless Tobacco    Never Used     Family History: History reviewed  No pertinent family history      Review of Systems:  Review of Systems        Scheduled Meds:  Current Facility-Administered Medications:  acetaminophen 975 mg Oral Q8H [facility] Gilberto Townsend PA-C    amiodarone 200 mg Oral Daily LEON Pierre-SHARI    calcium carbonate 1,000 mg Oral Daily PRN LEON Pierre-SHARI    clopidogrel 75 mg Oral Daily LEON Pierre-SHARI    ferrous sulfate 325 mg Oral Daily With Breakfast Gilberto Townsend PA-C    folic acid 1 mg Oral Daily LEON Pierre-SHARI    heparin (porcine) 3-20 Units/kg/hr (Order-Specific) Intravenous Titrated LEON Pierre-C Last Rate: 12 Units/kg/hr (01/04/19 1158)   heparin (porcine) 2,000 Units Intravenous PRN LEON Pierre-SHARI    heparin (porcine) 4,000 Units Intravenous PRN LEON Pierre-SHARI    melatonin 6 mg Oral HS LEON Pierre-SHARI    mirtazapine 7 5 mg Oral HS Gilberto Townsend PA-C    multi-electrolyte 75 mL/hr Intravenous Continuous CHANDRIKA Sommers Last Rate: 75 mL/hr (01/04/19 1158)   norepinephrine 1-30 mcg/min Intravenous Titrated Angelmonika Carpenter, CRNP Last Rate: 4 mcg/min (01/04/19 1158)   nortriptyline 20 mg Oral HS LEON Pierre-SHARI    oxyCODONE 10 mg Oral Q6H PRN LEON Pierre-C    pantoprazole 40 mg Oral BID AC LEON Pierre-SHARI    potassium chloride 20 mEq Intravenous Once CHANDRIKA Sommers    senna-docusate sodium 1 tablet Oral HS Alethea Dooley PA-C    thiamine 100 mg Oral Daily Alethea Dooley PA-C      Continuous Infusions:  heparin (porcine) 3-20 Units/kg/hr (Order-Specific) Last Rate: 12 Units/kg/hr (01/04/19 1158)   multi-electrolyte 75 mL/hr Last Rate: 75 mL/hr (01/04/19 1158)   norepinephrine 1-30 mcg/min Last Rate: 4 mcg/min (01/04/19 1158)     PRN Meds: calcium carbonate    heparin (porcine)    heparin (porcine)    oxyCODONE  current meds:   Current Facility-Administered Medications   Medication Dose Route Frequency    acetaminophen (TYLENOL) tablet 975 mg  975 mg Oral Q8H [address]    amiodarone tablet 200 mg  200 mg Oral Daily    calcium carbonate (TUMS) chewable tablet 1,000 mg  1,000 mg Oral Daily PRN    clopidogrel (PLAVIX) tablet 75 mg  75 mg Oral Daily    ferrous sulfate tablet 325 mg  325 mg Oral Daily With Breakfast    folic acid (FOLVITE) tablet 1 mg  1 mg Oral Daily    heparin (porcine) 25,000 units in 250 mL infusion (premix)  3-20 Units/kg/hr (Order-Specific) Intravenous Titrated    heparin (porcine) injection 2,000 Units  2,000 Units Intravenous PRN    heparin (porcine) injection 4,000 Units  4,000 Units Intravenous PRN    melatonin tablet 6 mg  6 mg Oral HS    mirtazapine (REMERON) tablet 7 5 mg  7 5 mg Oral HS    multi-electrolyte (ISOLYTE-S PH 7 4 equivalent) IV solution  75 mL/hr Intravenous Continuous    norepinephrine (LEVOPHED) 4 mg (STANDARD CONCENTRATION) IV in sodium chloride 0 9% 250 mL  1-30 mcg/min Intravenous Titrated    nortriptyline (PAMELOR) capsule 20 mg  20 mg Oral HS    oxyCODONE (ROXICODONE) immediate release tablet 10 mg  10 mg Oral Q6H PRN    pantoprazole (PROTONIX) EC tablet 40 mg  40 mg Oral BID AC    potassium chloride 20 mEq IVPB (premix)  20 mEq Intravenous Once    senna-docusate sodium (SENOKOT S) 8 6-50 mg per tablet 1 tablet  1 tablet Oral HS    thiamine (VITAMIN B1) tablet 100 mg  100 mg Oral Daily       No Known Allergies    Objective   Vitals: Blood pressure 106/56, pulse 104, temperature 97 6 °F (36 4 °C), temperature source Oral, resp  rate 21, height 5' 5" (1 651 m), weight 69 4 kg (153 lb), SpO2 97 %  , Body mass index is 25 46 kg/m² , Orthostatic Blood Pressures      Most Recent Value   Blood Pressure  106/56 filed at 01/04/2019 0800   Patient Position - Orthostatic VS  Sitting filed at 01/04/2019 0305            Intake/Output Summary (Last 24 hours) at 01/04/19 1227  Last data filed at 01/04/19 1158   Gross per 24 hour   Intake          3141 48 ml   Output             1500 ml   Net          1641 48 ml       Invasive Devices     Peripheral Intravenous Line            Peripheral IV 01/03/19 Left Antecubital less than 1 day    Peripheral IV 01/04/19 Right Antecubital less than 1 day          Arterial Line            Arterial Line 01/04/19 Left Radial less than 1 day                Physical Exam:    General:  AO x3, no acute distress  Cardiac:  S1-S2 normal tenderness to palpation over chest, JVP:  Not seen  Lungs:  Clear to auscultation bilaterally, no wheezing or crackles    Abdomen:  Soft nontender nondistended, positive bowel sounds  Extremities:  Warm, well perfused, pulses palpable, no ulcers or rashes, no pedal edema  Neuro: Grossly nonfocal        Lab Results:   Recent Results (from the past 24 hour(s))   CBC and differential    Collection Time: 01/03/19  2:03 PM   Result Value Ref Range    WBC 12 67 (H) 4 31 - 10 16 Thousand/uL    RBC 3 89 3 81 - 5 12 Million/uL    Hemoglobin 12 3 11 5 - 15 4 g/dL    Hematocrit 38 2 34 8 - 46 1 %    MCV 98 82 - 98 fL    MCH 31 6 26 8 - 34 3 pg    MCHC 32 2 31 4 - 37 4 g/dL    RDW 15 6 (H) 11 6 - 15 1 %    MPV 10 3 8 9 - 12 7 fL    Platelets 897 845 - 099 Thousands/uL    nRBC 0 /100 WBCs   Comprehensive metabolic panel    Collection Time: 01/03/19  2:03 PM   Result Value Ref Range    Sodium 136 136 - 145 mmol/L    Potassium 4 0 3 5 - 5 3 mmol/L    Chloride 102 100 - 108 mmol/L    CO2 19 (L) 21 - 32 mmol/L    ANION GAP 15 (H) 4 - 13 mmol/L     (H) 5 - 25 mg/dL    Creatinine 3 38 (H) 0 60 - 1 30 mg/dL    Glucose 127 65 - 140 mg/dL    Calcium 9 0 8 3 - 10 1 mg/dL    AST 35 5 - 45 U/L    ALT 22 12 - 78 U/L    Alkaline Phosphatase 95 46 - 116 U/L    Total Protein 8 0 6 4 - 8 2 g/dL    Albumin 2 8 (L) 3 5 - 5 0 g/dL    Total Bilirubin 0 80 0 20 - 1 00 mg/dL    eGFR 12 ml/min/1 73sq m   Lipase    Collection Time: 01/03/19  2:03 PM   Result Value Ref Range    Lipase 72 (L) 73 - 393 u/L   Troponin I    Collection Time: 01/03/19  2:03 PM   Result Value Ref Range    Troponin I 0 16 (H) <=0 04 ng/mL   Lactic acid, plasma    Collection Time: 01/03/19  2:03 PM   Result Value Ref Range    LACTIC ACID 2 0 0 5 - 2 0 mmol/L   TSH, 3rd generation with Free T4 reflex    Collection Time: 01/03/19  2:03 PM   Result Value Ref Range    TSH 3RD GENERATON 0 306 (L) 0 358 - 3 740 uIU/mL   Magnesium    Collection Time: 01/03/19  2:03 PM   Result Value Ref Range    Magnesium 3 0 (H) 1 6 - 2 6 mg/dL   B-type natriuretic peptide    Collection Time: 01/03/19  2:03 PM   Result Value Ref Range    NT-proBNP 3,451 (H) <450 pg/mL   T4, free    Collection Time: 01/03/19  2:03 PM   Result Value Ref Range    Free T4 1 67 (H) 0 76 - 1 46 ng/dL   Manual Differential(PHLEBS Do Not Order)    Collection Time: 01/03/19  2:03 PM   Result Value Ref Range    Segmented % 92 (H) 43 - 75 %    Lymphocytes % 1 (L) 14 - 44 %    Monocytes % 7 4 - 12 %    Eosinophils, % 0 0 - 6 %    Basophils % 0 0 - 1 %    Absolute Neutrophils 11 66 (H) 1 85 - 7 62 Thousand/uL    Lymphocytes Absolute 0 13 (L) 0 60 - 4 47 Thousand/uL    Monocytes Absolute 0 89 0 00 - 1 22 Thousand/uL    Eosinophils Absolute 0 00 0 00 - 0 40 Thousand/uL    Basophils Absolute 0 00 0 00 - 0 10 Thousand/uL    Total Counted      RBC Morphology Present     Anisocytosis Present     Macrocytes Present     Poikilocytes Present     Platelet Estimate Adequate Adequate   Protime-INR    Collection Time: 01/03/19  2:04 PM   Result Value Ref Range    Protime 22 9 (H) 11 8 - 14 2 seconds    INR 2 02 (H) 0 86 - 1 17   APTT    Collection Time: 01/03/19  2:04 PM   Result Value Ref Range    PTT 43 (H) 26 - 38 seconds   ECG 12 lead    Collection Time: 01/03/19  2:04 PM   Result Value Ref Range    Ventricular Rate 86 BPM    Atrial Rate 86 BPM    FL Interval 158 ms    QRSD Interval 112 ms    QT Interval 338 ms    QTC Interval 404 ms    P [place] 76 degrees    QRS Axis -12 degrees    T Wave Axis 190 degrees   CK (with reflex to MB)    Collection Time: 01/03/19  2:07 PM   Result Value Ref Range    Total  (H) 26 - 192 U/L   CKMB    Collection Time: 01/03/19  2:07 PM   Result Value Ref Range    CK-MB Index 6 0 (H) 0 0 - 2 5 %    CK-MB 12 2 (H) 0 0 - 5 0 ng/mL   ECG 12 lead    Collection Time: 01/03/19  3:08 PM   Result Value Ref Range    Ventricular Rate 117 BPM    Atrial Rate 170 BPM    FL Interval  ms    QRSD Interval 118 ms    QT Interval 290 ms    QTC Interval 404 ms    P Axis  degrees    QRS Axis -19 degrees    T Wave Axis 189 degrees   POCT urinalysis dipstick    Collection Time: 01/03/19  4:58 PM   Result Value Ref Range    Color, UA michelle     Clarity, UA clear    ED Urine Macroscopic    Collection Time: 01/03/19  4:59 PM   Result Value Ref Range    Color, UA Michelle     Clarity, UA Clear     pH, UA 5 5 4 5 - 8 0    Leukocytes, UA Negative Negative    Nitrite, UA Negative Negative    Protein, UA 30 (1+) (A) Negative mg/dl    Glucose, UA Negative Negative mg/dl    Ketones, UA Negative Negative mg/dl    Urobilinogen, UA 0 2 0 2, 1 0 E U /dl E U /dl    Bilirubin, UA Interference- unable to analyze (A) Negative    Blood, UA Trace (A) Negative    Specific Gravity, UA 1 025 1 003 - 1 030   Urine Microscopic    Collection Time: 01/03/19  4:59 PM   Result Value Ref Range    RBC, UA 4-10 (A) None Seen, 0-5 /hpf    WBC, UA 4-10 (A) None Seen, 0-5, 5-55, 5-65 /hpf    Epithelial Cells None Seen None Seen, Occasional /hpf    Bacteria, UA None Seen None Seen, Occasional /hpf    Hyaline Casts, UA 3-5 (A) None Seen /lpf   Troponin I    Collection Time: 01/03/19  6:47 PM   Result Value Ref Range    Troponin I 0 22 (H) <=0 04 ng/mL   CBC    Collection Time: 01/03/19  8:27 PM   Result Value Ref Range    WBC 10 92 (H) 4 31 - 10 16 Thousand/uL    RBC 3 96 3 81 - 5 12 Million/uL    Hemoglobin 12 5 11 5 - 15 4 g/dL    Hematocrit 38 3 34 8 - 46 1 %    MCV 97 82 - 98 fL    MCH 31 6 26 8 - 34 3 pg    MCHC 32 6 31 4 - 37 4 g/dL    RDW 15 4 (H) 11 6 - 15 1 %    Platelets 128 345 - 179 Thousands/uL    MPV 10 1 8 9 - 12 7 fL   APTT    Collection Time: 01/03/19  8:27 PM   Result Value Ref Range    PTT 40 (H) 26 - 38 seconds   Protime-INR    Collection Time: 01/03/19  8:27 PM   Result Value Ref Range    Protime 21 6 (H) 11 8 - 14 2 seconds    INR 1 87 (H) 0 86 - 1 17   Troponin I    Collection Time: 01/03/19  8:27 PM   Result Value Ref Range    Troponin I 0 28 (H) <=0 04 ng/mL   Troponin I    Collection Time: 01/03/19 11:06 PM   Result Value Ref Range    Troponin I 0 29 (H) <=0 04 ng/mL   APTT    Collection Time: 01/04/19  2:45 AM   Result Value Ref Range    PTT 86 (H) 26 - 38 seconds   TSH, 3rd generation    Collection Time: 01/04/19  4:40 AM   Result Value Ref Range    TSH 3RD GENERATON 0 664 0 358 - 3 740 uIU/mL   Comprehensive metabolic panel    Collection Time: 01/04/19  4:40 AM   Result Value Ref Range    Sodium 139 136 - 145 mmol/L    Potassium 2 8 (L) 3 5 - 5 3 mmol/L    Chloride 106 100 - 108 mmol/L    CO2 23 21 - 32 mmol/L    ANION GAP 10 4 - 13 mmol/L     (H) 5 - 25 mg/dL    Creatinine 2 29 (H) 0 60 - 1 30 mg/dL    Glucose 121 65 - 140 mg/dL    Calcium 8 0 (L) 8 3 - 10 1 mg/dL    AST 20 5 - 45 U/L    ALT 18 12 - 78 U/L    Alkaline Phosphatase 76 46 - 116 U/L    Total Protein 6 3 (L) 6 4 - 8 2 g/dL    Albumin 2 3 (L) 3 5 - 5 0 g/dL    Total Bilirubin 0 57 0 20 - 1 00 mg/dL    eGFR 20 ml/min/1 73sq m   Magnesium    Collection Time: 01/04/19  4:40 AM   Result Value Ref Range    Magnesium 2 6 1 6 - 2 6 mg/dL   CBC (With Platelets)    Collection Time: 01/04/19  4:40 AM   Result Value Ref Range    WBC 8 50 4 31 - 10 16 Thousand/uL    RBC 3 28 (L) 3 81 - 5 12 Million/uL    Hemoglobin 10 2 (L) 11 5 - 15 4 g/dL    Hematocrit 31 9 (L) 34 8 - 46 1 %    MCV 97 82 - 98 fL    MCH 31 1 26 8 - 34 3 pg    MCHC 32 0 31 4 - 37 4 g/dL    RDW 15 6 (H) 11 6 - 15 1 %    Platelets 743 688 - 964 Thousands/uL    MPV 10 0 8 9 - 12 7 fL   Lactic acid, plasma    Collection Time: 01/04/19  4:40 AM   Result Value Ref Range    LACTIC ACID 1 2 0 5 - 2 0 mmol/L   Procalcitonin    Collection Time: 01/04/19  4:40 AM   Result Value Ref Range    Procalcitonin 1 31 (H) <=0 25 ng/ml   Troponin I    Collection Time: 01/04/19  4:40 AM   Result Value Ref Range    Troponin I 0 32 (H) <=0 04 ng/mL   Phosphorus    Collection Time: 01/04/19  4:40 AM   Result Value Ref Range    Phosphorus 3 6 2 3 - 4 1 mg/dL   Troponin I    Collection Time: 01/04/19  8:05 AM   Result Value Ref Range    Troponin I 0 27 (H) <=0 04 ng/mL   APTT    Collection Time: 01/04/19  8:49 AM   Result Value Ref Range    PTT 71 (H) 26 - 38 seconds   UA w Reflex to Microscopic w Reflex to Culture    Collection Time: 01/04/19 10:16 AM   Result Value Ref Range    Color, UA Dk Yellow     Clarity, UA Cloudy     Specific [place], UA 1 018 1 003 - 1 030    pH, UA 5 0 4 5 - 8 0    Leukocytes, UA Negative Negative    Nitrite, UA Negative Negative    Protein, UA Trace (A) Negative mg/dl    Glucose, UA Negative Negative mg/dl    Ketones, UA Negative Negative mg/dl    Urobilinogen, UA 1 0 0 2, 1 0 E U /dl E U /dl    Bilirubin, UA Interference- unable to analyze (A) Negative    Blood, UA Negative Negative   Urine Microscopic    Collection Time: 01/04/19 10:16 AM   Result Value Ref Range    RBC, UA 2-4 (A) None Seen, 0-5 /hpf    WBC, UA None Seen None Seen, 0-5, 5-55, 5-65 /hpf    Epithelial Cells None Seen None Seen, Occasional /hpf    Bacteria, UA None Seen None Seen, Occasional /hpf    Hyaline Casts, UA None Seen None Seen /lpf       Imaging: I have personally reviewed pertinent reports
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
## 2019-01-04 NOTE — SOCIAL WORK
CM informed by PT pt recommended for STR at d/c  CM met with pt who is agreeable  Pt requested referral to [facility] since she has been there in the past  CM also provided pt with a SNF list for additional choices  CM to follow

## 2019-01-04 NOTE — H&P
H&P- Andry Riddle [birth year], 69 y o  female MRN: [id number]    Unit/Bed#: [facility] 419-01 Encounter: [id number]    Primary Care Provider: Simran Thomas DO   Date and time admitted to hospital: 1/3/2019  1:39 PM     * MALA (acute kidney injury) [facility])   Assessment & Plan    · Likely due to dehydration  · Fluid hydration  · Avoid nephrotoxins     Fall   Assessment & Plan    · Possible syncope  · Cardiac workup under way  · PT/OT evals     Elevated troponin   Assessment & Plan    · Likely due to renal failure  However, she does have ischemic changes on EKG  · Trend troponin  · Will be on heparin drip as she cannot take Eliquis secondary to renal function  · Consult cardiology given EKG changes     SIRS (systemic inflammatory response syndrome) ([facility])   Assessment & Plan    · Hypothermia, tachycardia, leukocytosis  · No evidence to suggest acute infection  · Monitor off antibiotics for now  · F/U blood cultures     History of biliary duct stent placement   Assessment & Plan    · CT on admission showed gallbladder wall thickening and gallbladder distention  · LFTs WNL  · Serial abdominal exams     Metabolic acidosis   Assessment & Plan    · AGMA likely due to renal failure  · Monitor with IVF hydration     Atrial fibrillation ([facility] [address] )   Assessment & Plan    · S/p prior TIFFANIE/cardioversion  · Continue BB, amio  · Hold Eliquis due to renal insufficiency  Heparin drip     Coronary artery disease   Assessment & Plan    · S/p NGOZI to RCA 7/2018  · Continue cardiac meds     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[facility] [address] )   Assessment & Plan    · S/p prior ablation     Opioid dependence ([facility] [address] )   Assessment & Plan    · PDMP website queried- patient takes oxy 10 q6hrs PRN     Cardiac arrhythmia   Assessment & Plan    · Currently very tachy with significant ectopy  Likely because patient has not had her cardiac meds in several days  · Resume home dose metoprolol now   PRN beta blockers if necessary     Chronic systolic heart failure ([facility] [address] ) Assessment & Plan    · Most recent echo showed EF 35%  · Continue BB  · Hold diuretics/ACEI due to MALA       VTE Prophylaxis: Heparin Drip  / sequential compression device   Code Status: DNR but accepts intubation  POLST: There is no POLST form on file for this patient (pre-hospital)    Anticipated Length of Stay:  Patient will be admitted on an Inpatient basis with an anticipated length of stay of  > 2 midnights  Justification for Hospital Stay: MALA, NSTEMI, PT/OT evals    Total Time for Visit, including Counseling / Coordination of Care: 1 hour  Greater than 50% of this total time spent on direct patient counseling and coordination of care  Chief Complaint:   Fall    History of Present Illness:    Kika Holt is a 69 y o  female with a history of chronic systolic HF, CAD s/p NGOZI last year, chronic opioid dependence, PAF s/p cardioversion, AVNRT s/p ablation, and CBD stone s/p biliary stent who presents with fall from home  Patient tells me she fell three days ago but is unsure how she fell  She thinks she may have passed out but cannot remember the details around this  She fell in the bedroom and was too weak to get herself up  Family came to check on her and found her on the ground  She lives at home alone and uses a walker normally  Currently, she states she feels "terrible" and complains of pain everywhere  She is hypersensitive to light touch everywhere  In the ED, she was found to have elevated creatinine and elevated troponin  New T wave inversions present on EKG in inferolateral leads  On telemetry she is in an irregular narrow complex tachycardia with significant ectopy  Review of Systems:    Review of Systems   Constitutional: Negative  HENT: Negative  Eyes: Negative  Cardiovascular: Positive for chest pain  Gastrointestinal: Positive for abdominal pain  Negative for diarrhea and vomiting  Endocrine: Negative  Genitourinary: Negative      Musculoskeletal: Positive for arthralgias and myalgias  Skin: Negative  Allergic/Immunologic: Negative  Neurological: Negative  Hematological: Negative  Psychiatric/Behavioral: Negative  Past Medical and Surgical History:     Past Medical History:   Diagnosis Date    A-fib 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[facility] [address] )     History of colon polyps     Hx of long term use of blood thinners     Hypertension     Irregular heart beat     Narcotic dependence (HCC)     Rectal bleeding     Stroke (HCC)     mild no deficiets/ memory loss    Uses walker        Past Surgical History:   Procedure Laterality Date    COLONOSCOPY      COLONOSCOPY N/A 7/27/2018    Procedure: COLONOSCOPY;  Surgeon: Colby Tom MD;  Location: [facility]; Service: Gastroenterology    CORONARY STENT PLACEMENT      ERCP N/A 5/14/2018    Procedure: ENDOSCOPIC RETROGRADE CHOLANGIOPANCREATOGRAPHY (ERCP); Surgeon: Romi Billings MD;  Location[facility];  Service: Gastroenterology    ERCP N/A 8/28/2018    Procedure: ENDOSCOPIC RETROGRADE CHOLANGIOPANCREATOGRAPHY (ERCP) w/ EGD;  Surgeon: Romi Billings MD;  [facility]; Service: Gastroenterology    ESOPHAGOGASTRODUODENOSCOPY N/A 7/26/2018    Procedure: ESOPHAGOGASTRODUODENOSCOPY (EGD); Surgeon: Colby Tom MD;  [facility]: [facility]; Service: Gastroenterology    JOINT REPLACEMENT Right     knee       Meds/Allergies:    Prior to Admission medications    Medication Sig Start Date End Date Taking?  Authorizing Provider   acetaminophen (TYLENOL) 325 mg tablet Take 2 tablets (650 mg total) by mouth every 6 (six) hours 9/1/18   Ron Gill, DO   ALPRAZolam (NIRAVAM) 0 25 MG dissolvable tablet Take 1 tablet (0 25 mg total) by mouth daily at bedtime as needed for anxiety 9/18/18   Jaylon Ervin,    amiodarone 200 mg tablet Take 1 tablet (200 mg total) by mouth daily Then decrease to 1 tablet daily 11/20/18   Judith Lab, DO   amitriptyline (ELAVIL) 25 mg tablet TAKE ONE TABLET AT BEDTIME 12/23/18   Judith Lab, DO   apixaban (ELIQUIS) 5 mg Take 5 mg by mouth 2 (two) times a day    Historical Provider, MD   ascorbic acid (VITAMIN C) 500 mg tablet Take 1 tablet (500 mg total) by mouth daily 10/2/18   Judith Lab, DO   clopidogrel (PLAVIX) 75 mg tablet Take 1 tablet (75 mg total) by mouth daily 9/18/18   Judith Lab, DO   DULoxetine (CYMBALTA) 30 mg delayed release capsule Take 1 capsule (30 mg total) by mouth daily 10/2/18   Judith Lab, DO   ferrous sulfate 324 (65 Fe) mg Take 1 tablet (324 mg total) by mouth daily before breakfast 10/2/18   Judith Lab, DO   folic acid (FOLVITE) 1 mg tablet Take 1 tablet (1 mg total) by mouth daily 10/2/18   Judith Lab, DO   furosemide (LASIX) 40 mg tablet Take 1 tablet (40 mg total) by mouth daily 9/18/18   Judith Lab, DO   lidocaine (LIDODERM) 5 % Apply 1 patch topically daily Remove & Discard patch within 12 hours or as directed by MD 10/2/18   Judith Lab, DO   lisinopril (ZESTRIL) 5 mg tablet Take 1 tablet (5 mg total) by mouth daily 10/26/18   Judith Lab, DO   melatonin 3 mg Take 2 tablets (6 mg total) by mouth daily at bedtime 7/20/18   Michael Miranda, DO   metoprolol succinate (TOPROL-XL) 50 mg 24 hr tablet Take 1 tablet (50 mg total) by mouth every 12 (twelve) hours 11/20/18   Judith Lab, DO   mirtazapine (REMERON) 7 5 MG tablet Take 1 tablet (7 5 mg total) by mouth daily at bedtime 10/2/18   Judith Lab, DO   nortriptyline (PAMELOR) 10 mg capsule Take 2 capsules (20 mg total) by mouth daily at bedtime 10/2/18   Judith Lab, DO   oxyCODONE-acetaminophen (PERCOCET)  mg per tablet  10/2/18   Historical Provider, MD   pantoprazole (PROTONIX) 40 mg tablet Take 1 tablet (40 mg total) by mouth 2 (two) times a day before meals 10/2/18   Judith Dowell DO   polyethylene glycol (MIRALAX) 17 g packet Take 17 g by mouth daily    Historical Provider, MD   potassium chloride (K-DUR,KLOR-CON) 20 mEq tablet Take 1 tablet (20 mEq total) by mouth daily 10/2/18   Nonda Brynn, DO   senna-docusate sodium (SENOKOT S) 8 6-50 mg per tablet Take 1 tablet by mouth daily at bedtime 9/2/18   Jerman Rasmussen DO   thiamine 100 MG tablet Take 1 tablet (100 mg total) by mouth daily 10/2/18   Nonda Brynn, DO     I have reviewed home medications with a medical source (PCP, Pharmacy, other)  Allergies: No Known Allergies    Social History:     Marital Status:    Occupation:    Patient Pre-hospital Living Situation: Lives at home  Patient Pre-hospital Level of Mobility: Uses a walker  Patient Pre-hospital Diet Restrictions: None  Substance Use History:   History   Alcohol Use No     History   Smoking Status    Former Smoker   Smokeless Tobacco    Never Used     History   Drug Use No       Family History:    non-contributory    Physical Exam:     Vitals:   Blood Pressure: 119/55 (01/03/19 1830)  Pulse: (!) 122 (01/03/19 1830)  Temperature: (!) 96 3 °F (35 7 °C) (01/03/19 1344)  Temp Source: Rectal (01/03/19 1344)  Respirations: 20 (01/03/19 1830)  Weight - Scale: 71 kg (156 lb 8 4 oz) (01/03/19 1340)  SpO2: 95 % (01/03/19 1830)    Physical Exam   Constitutional: She is oriented to person, place, and time  No distress  HENT:   Head: Normocephalic and atraumatic  Eyes: Pupils are equal, round, and reactive to light  EOM are normal    Neck: Neck supple  Cardiovascular:   Tachycardic, irregular   Pulmonary/Chest: Effort normal and breath sounds normal  No respiratory distress  She has no wheezes  She has no rales  Abdominal:   Soft  Tender throughout- hypersensitive to light touch throughout   Musculoskeletal:   Hypersensitive to light touch throughout   Neurological: She is alert and oriented to person, place, and time  Skin: Skin is warm and dry   She is not diaphoretic  Scattered abrasions    Psychiatric: She has a normal mood and affect  Her behavior is normal        Additional Data:     Lab Results: I have personally reviewed pertinent reports  Results from last 7 days  Lab Units 01/03/19  1403   WBC Thousand/uL 12 67*   HEMOGLOBIN g/dL 12 3   HEMATOCRIT % 38 2   PLATELETS Thousands/uL 299   LYMPHO PCT % 1*   MONO PCT % 7   EOS PCT % 0       Results from last 7 days  Lab Units 01/03/19  1403   SODIUM mmol/L 136   POTASSIUM mmol/L 4 0   CHLORIDE mmol/L 102   CO2 mmol/L 19*   BUN mg/dL 108*   CREATININE mg/dL 3 38*   ANION GAP mmol/L 15*   CALCIUM mg/dL 9 0   ALBUMIN g/dL 2 8*   TOTAL BILIRUBIN mg/dL 0 80   ALK PHOS U/L 95   ALT U/L 22   AST U/L 35   GLUCOSE RANDOM mg/dL 127       Results from last 7 days  Lab Units 01/03/19  1404   INR  2 02*               Results from last 7 days  Lab Units 01/03/19  1403   LACTIC ACID mmol/L 2 0       Imaging: I have personally reviewed pertinent reports  CT chest abdomen pelvis wo contrast   ED Interpretation by Lea Cason DO (01/03 1817)   1  Left greater than right bibasilar opacities  Differential considerations include atelectasis versus infection to include aspiration pneumonia  Recommend clinical and laboratory correlation  2   Emphysematous changes  3   Gallbladder wall thickening, gallbladder distention and cholelithiasis  Correlate for focal right upper quadrant tenderness to exclude acute cholecystitis  If there is focal right upper quadrant tenderness further evaluation with targeted    ultrasound could be considered  4   Biliary stent in place with choledocholithiasis  5   No acute traumatic injury in the chest abdomen or pelvis  Workstation performed: [id number]         Final Result by Parth Sin MD (01/03 1658)   1  Left greater than right bibasilar opacities  Differential considerations include atelectasis versus infection to include aspiration pneumonia  Recommend clinical and laboratory correlation  2   Emphysematous changes  3   Gallbladder wall thickening, gallbladder distention and cholelithiasis  Correlate for focal right upper quadrant tenderness to exclude acute cholecystitis  If there is focal right upper quadrant tenderness further evaluation with targeted    ultrasound could be considered  4   Biliary stent in place with choledocholithiasis  5   No acute traumatic injury in the chest abdomen or pelvis  Workstation performed: [id number]         CT spine cervical without contrast   ED Interpretation by Kadi Varela DO (01/03 1632)      No cervical spine fracture or traumatic malalignment  Workstation performed: [id number]         Final Result by Edgardo Ferraro MD (01/03 1615)      No cervical spine fracture or traumatic malalignment  Workstation performed: [id number]         CT head without contrast   ED Interpretation by Kadi Varela DO (01/03 1613)      No acute intracranial abnormality  Workstation performed: [id number]         Final Result by Edgardo Ferraro MD (01/03 1611)      No acute intracranial abnormality  Workstation performed: [id number]         XR knee 4+ views left injury   Final Result by Linda Lund MD (01/03 0398)      No acute osseous abnormality  Moderate to severe osteoarthritic degenerative changes of the left knee joint  Workstation performed: [id number]             EKG, Pathology, and Other Studies Reviewed on Admission:   · EKG: T wave inversions inferolateral leads- new compared to prior    Allscripts / Epic Records Reviewed: Yes     ** Please Note: This note has been constructed using a voice recognition system   **

## 2019-01-05 PROBLEM — I21.4 NSTEMI (NON-ST ELEVATED MYOCARDIAL INFARCTION) (HCC): Status: ACTIVE | Noted: 2019-01-05

## 2019-01-05 PROBLEM — N17.9 AKI (ACUTE KIDNEY INJURY) (HCC): Status: RESOLVED | Noted: 2019-01-03 | Resolved: 2019-01-05

## 2019-01-05 PROBLEM — E86.0 DEHYDRATION: Status: ACTIVE | Noted: 2019-01-05

## 2019-01-05 PROBLEM — E87.2 METABOLIC ACIDOSIS: Status: RESOLVED | Noted: 2019-01-03 | Resolved: 2019-01-05

## 2019-01-05 PROBLEM — E83.39 HYPOPHOSPHATEMIA: Status: ACTIVE | Noted: 2019-01-05

## 2019-01-05 LAB
ALBUMIN SERPL BCP-MCNC: 2.4 G/DL (ref 3.5–5)
ALP SERPL-CCNC: 75 U/L (ref 46–116)
ALT SERPL W P-5'-P-CCNC: 22 U/L (ref 12–78)
ANION GAP SERPL CALCULATED.3IONS-SCNC: 7 MMOL/L (ref 4–13)
APTT PPP: 103 SECONDS (ref 26–38)
APTT PPP: 56 SECONDS (ref 26–38)
APTT PPP: 69 SECONDS (ref 26–38)
AST SERPL W P-5'-P-CCNC: 25 U/L (ref 5–45)
BASOPHILS # BLD AUTO: 0.02 THOUSANDS/ΜL (ref 0–0.1)
BASOPHILS NFR BLD AUTO: 0 % (ref 0–1)
BILIRUB DIRECT SERPL-MCNC: 0.16 MG/DL (ref 0–0.2)
BILIRUB SERPL-MCNC: 0.34 MG/DL (ref 0.2–1)
BUN SERPL-MCNC: 46 MG/DL (ref 5–25)
CALCIUM SERPL-MCNC: 7.9 MG/DL (ref 8.3–10.1)
CHLORIDE SERPL-SCNC: 110 MMOL/L (ref 100–108)
CO2 SERPL-SCNC: 24 MMOL/L (ref 21–32)
CREAT SERPL-MCNC: 1.06 MG/DL (ref 0.6–1.3)
EOSINOPHIL # BLD AUTO: 0.2 THOUSAND/ΜL (ref 0–0.61)
EOSINOPHIL NFR BLD AUTO: 4 % (ref 0–6)
ERYTHROCYTE [DISTWIDTH] IN BLOOD BY AUTOMATED COUNT: 15.9 % (ref 11.6–15.1)
GFR SERPL CREATININE-BSD FRML MDRD: 51 ML/MIN/1.73SQ M
GLUCOSE SERPL-MCNC: 96 MG/DL (ref 65–140)
HCT VFR BLD AUTO: 31 % (ref 34.8–46.1)
HGB BLD-MCNC: 9.6 G/DL (ref 11.5–15.4)
IMM GRANULOCYTES # BLD AUTO: 0.05 THOUSAND/UL (ref 0–0.2)
IMM GRANULOCYTES NFR BLD AUTO: 1 % (ref 0–2)
LYMPHOCYTES # BLD AUTO: 1.04 THOUSANDS/ΜL (ref 0.6–4.47)
LYMPHOCYTES NFR BLD AUTO: 19 % (ref 14–44)
MAGNESIUM SERPL-MCNC: 2.3 MG/DL (ref 1.6–2.6)
MCH RBC QN AUTO: 31 PG (ref 26.8–34.3)
MCHC RBC AUTO-ENTMCNC: 31 G/DL (ref 31.4–37.4)
MCV RBC AUTO: 100 FL (ref 82–98)
MONOCYTES # BLD AUTO: 0.54 THOUSAND/ΜL (ref 0.17–1.22)
MONOCYTES NFR BLD AUTO: 10 % (ref 4–12)
NEUTROPHILS # BLD AUTO: 3.67 THOUSANDS/ΜL (ref 1.85–7.62)
NEUTS SEG NFR BLD AUTO: 66 % (ref 43–75)
NRBC BLD AUTO-RTO: 0 /100 WBCS
PHOSPHATE SERPL-MCNC: 1.5 MG/DL (ref 2.3–4.1)
PLATELET # BLD AUTO: 235 THOUSANDS/UL (ref 149–390)
PMV BLD AUTO: 9.5 FL (ref 8.9–12.7)
POTASSIUM SERPL-SCNC: 4 MMOL/L (ref 3.5–5.3)
PROCALCITONIN SERPL-MCNC: 0.49 NG/ML
PROT SERPL-MCNC: 5.9 G/DL (ref 6.4–8.2)
RBC # BLD AUTO: 3.1 MILLION/UL (ref 3.81–5.12)
SODIUM SERPL-SCNC: 141 MMOL/L (ref 136–145)
WBC # BLD AUTO: 5.52 THOUSAND/UL (ref 4.31–10.16)

## 2019-01-05 PROCEDURE — 93308 TTE F-UP OR LMTD: CPT | Performed by: INTERNAL MEDICINE

## 2019-01-05 PROCEDURE — 84100 ASSAY OF PHOSPHORUS: CPT | Performed by: NURSE PRACTITIONER

## 2019-01-05 PROCEDURE — 99232 SBSQ HOSP IP/OBS MODERATE 35: CPT | Performed by: INTERNAL MEDICINE

## 2019-01-05 PROCEDURE — 80048 BASIC METABOLIC PNL TOTAL CA: CPT | Performed by: NURSE PRACTITIONER

## 2019-01-05 PROCEDURE — 85025 COMPLETE CBC W/AUTO DIFF WBC: CPT | Performed by: NURSE PRACTITIONER

## 2019-01-05 PROCEDURE — 85730 THROMBOPLASTIN TIME PARTIAL: CPT | Performed by: EMERGENCY MEDICINE

## 2019-01-05 PROCEDURE — 83735 ASSAY OF MAGNESIUM: CPT | Performed by: NURSE PRACTITIONER

## 2019-01-05 PROCEDURE — 93321 DOPPLER ECHO F-UP/LMTD STD: CPT | Performed by: INTERNAL MEDICINE

## 2019-01-05 PROCEDURE — 84145 PROCALCITONIN (PCT): CPT | Performed by: NURSE PRACTITIONER

## 2019-01-05 PROCEDURE — 93325 DOPPLER ECHO COLOR FLOW MAPG: CPT | Performed by: INTERNAL MEDICINE

## 2019-01-05 PROCEDURE — 85730 THROMBOPLASTIN TIME PARTIAL: CPT | Performed by: INTERNAL MEDICINE

## 2019-01-05 PROCEDURE — 80076 HEPATIC FUNCTION PANEL: CPT | Performed by: INTERNAL MEDICINE

## 2019-01-05 RX ORDER — METOPROLOL TARTRATE 5 MG/5ML
2.5 INJECTION INTRAVENOUS EVERY 6 HOURS
Status: DISCONTINUED | OUTPATIENT
Start: 2019-01-05 | End: 2019-01-06

## 2019-01-05 RX ORDER — PANTOPRAZOLE SODIUM 40 MG/1
40 TABLET, DELAYED RELEASE ORAL
Status: DISCONTINUED | OUTPATIENT
Start: 2019-01-06 | End: 2019-01-10 | Stop reason: HOSPADM

## 2019-01-05 RX ADMIN — OXYCODONE HYDROCHLORIDE 10 MG: 10 TABLET ORAL at 03:18

## 2019-01-05 RX ADMIN — ACETAMINOPHEN 975 MG: 325 TABLET, FILM COATED ORAL at 06:27

## 2019-01-05 RX ADMIN — AMIODARONE HYDROCHLORIDE 200 MG: 200 TABLET ORAL at 08:46

## 2019-01-05 RX ADMIN — DIBASIC SODIUM PHOSPHATE, MONOBASIC POTASSIUM PHOSPHATE AND MONOBASIC SODIUM PHOSPHATE 2 TABLET: 852; 155; 130 TABLET ORAL at 08:46

## 2019-01-05 RX ADMIN — MELATONIN 6 MG: at 22:00

## 2019-01-05 RX ADMIN — SENNOSIDES AND DOCUSATE SODIUM 1 TABLET: 8.6; 5 TABLET ORAL at 22:00

## 2019-01-05 RX ADMIN — SODIUM CHLORIDE, SODIUM GLUCONATE, SODIUM ACETATE, POTASSIUM CHLORIDE, MAGNESIUM CHLORIDE, SODIUM PHOSPHATE, DIBASIC, AND POTASSIUM PHOSPHATE 75 ML/HR: .53; .5; .37; .037; .03; .012; .00082 INJECTION, SOLUTION INTRAVENOUS at 05:06

## 2019-01-05 RX ADMIN — DIBASIC SODIUM PHOSPHATE, MONOBASIC POTASSIUM PHOSPHATE AND MONOBASIC SODIUM PHOSPHATE 2 TABLET: 852; 155; 130 TABLET ORAL at 17:22

## 2019-01-05 RX ADMIN — HEPARIN SODIUM 12 UNITS/KG/HR: 10000 INJECTION, SOLUTION INTRAVENOUS at 01:38

## 2019-01-05 RX ADMIN — THIAMINE HCL TAB 100 MG 100 MG: 100 TAB at 08:46

## 2019-01-05 RX ADMIN — FERROUS SULFATE TAB 325 MG (65 MG ELEMENTAL FE) 325 MG: 325 (65 FE) TAB at 08:46

## 2019-01-05 RX ADMIN — HEPARIN SODIUM 2000 UNITS: 1000 INJECTION INTRAVENOUS; SUBCUTANEOUS at 06:28

## 2019-01-05 RX ADMIN — ACETAMINOPHEN 975 MG: 325 TABLET, FILM COATED ORAL at 22:00

## 2019-01-05 RX ADMIN — NORTRIPTYLINE HYDROCHLORIDE 20 MG: 10 CAPSULE ORAL at 22:00

## 2019-01-05 RX ADMIN — CLOPIDOGREL BISULFATE 75 MG: 75 TABLET ORAL at 08:46

## 2019-01-05 RX ADMIN — METOPROLOL TARTRATE 2.5 MG: 5 INJECTION, SOLUTION INTRAVENOUS at 22:01

## 2019-01-05 RX ADMIN — PANTOPRAZOLE SODIUM 40 MG: 40 TABLET, DELAYED RELEASE ORAL at 06:27

## 2019-01-05 RX ADMIN — METOPROLOL TARTRATE 2.5 MG: 5 INJECTION, SOLUTION INTRAVENOUS at 09:26

## 2019-01-05 RX ADMIN — ACETAMINOPHEN 975 MG: 325 TABLET, FILM COATED ORAL at 14:18

## 2019-01-05 RX ADMIN — HEPARIN SODIUM 12 UNITS/KG/HR: 10000 INJECTION, SOLUTION INTRAVENOUS at 17:24

## 2019-01-05 RX ADMIN — METOPROLOL TARTRATE 2.5 MG: 5 INJECTION, SOLUTION INTRAVENOUS at 16:06

## 2019-01-05 RX ADMIN — MIRTAZAPINE 7.5 MG: 15 TABLET ORAL at 22:00

## 2019-01-05 RX ADMIN — FOLIC ACID 1 MG: 1 TABLET ORAL at 08:46

## 2019-01-05 NOTE — PROGRESS NOTES
Progress Note - Critical Care   Ron Ruelas 69 y o  female MRN: [id number]  [facility]: [id number]    Assessment:   Principal Problem:    MALA (acute kidney injury) ([facility] )  Active Problems:    Syncope    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[facility])    HFrEF 35%  Mitral regurgitation    Elevated troponin    Fall    Cardiac arrhythmia    Opioid dependence ([facility] ), chronic pain    Coronary artery disease, status post stent to RCA    Atrial fibrillation ([place] [address] )    History of biliary duct stent placement for choledocholithiasis    SIRS (systemic inflammatory response syndrome) ([facility])  Hypophosphosphatemia  Resolved Problems:    Metabolic acidosis  Hypotension secondary to suspected intravascular volume depletion    Plan  Neuro:   Syncope on presentation  Chronic pain syndrome with chronic opioid use  · PAD  · Appreciate Palliative recommendations  · Pain controlled with: Scheduled tylenol 975 mg q 8 hours and PRN Ocycodone  · Delirium Precautions  · CAM ICU per protocol  · Regulate sleep/wake cycle+, continue melatonin  · Trend neuro exam  · Continue nortriptyline and Remeron  · Home medications on hold include: Xanax and Cymbalta    CV:   Hypotension secondary to suspected intravascular volume depletion improved  CAD status post drug-eluting stent to the RCA 7/2018  AFib  Elevated troponin  Hx of AVNRT s/p ablation  · Norepinephrine weaned off at 0400  · Cardiology following: continue amio and plavic  · Restart BB when CV stable  · Continue to hold Lasix and lisinopril  · Limited echo pending  · Hemodynamic infusions: None  · MAP goal > 65  · Rhythm: Atrial Fibrillation  · Follow rhythm on telemetry  Lung:   · Patient on room air  · Pulmonary hygiene  GI:   History of Choledocholithiasis last ERCP 8/28/2018 with stent exchange  · GI following  · OP follow up for repeat ERCP with coordination of antocoagulation  · Doubt source of sepsis biliary, however if develops bacteremia from biliary source then would consider earlier stent exchange  · GERD, continue protonix  · Bowel regimen: sennakot     FEN:   Intravascular volume depletion, improved  · Goal 24 hour fluid balance:  Net positive   Fluid/Diuretic plan:  Hold diuretics, discontinue IV fluids unable to take p o   · Nutrition/diet plan:  Advance diet to surgical soft  · Replete electrolytes with goals: K >4 0, Mag >2 0, and Phos >3 0  · Phosphate replaced  :   Acute kidney injury improved  · Indwelling Ken[place] present: no   · Trend UOP and BUN/creat  · Strict I and O  ID:   · Trend temps and WBC count  · PCT decreased  · F/u cultures  Heme:   · Heparin gtt for systemic AC, will need transition back to eliquis  · Trend hgb and plts  · Transfuse as needed for goal hgb >7  Endo:   · Glycemic control plan: N/A  MSK/Skin:  · Mobility goal:   · PT consult: yes  · OT consult: yes  · Frequent turning and pressure off-loading  Lines:  · PIV access  VTE Prophylaxis:  · Pharmacologic Prophylaxis:Heparin Drip  · Mechanical Prophylaxis: sequential compression device    Disposition: Transfer to SD level 2      ______________________________________________________________________  Chief Complaint:   " I am starving, having eaten for days"    HPI/24hr events:   NE weaned to off 1am  No acute overnight events    Review of Systems   Constitutional: Positive for fatigue  HENT: Negative  Eyes: Negative  Respiratory: Negative  Cardiovascular: Negative  Gastrointestinal: Negative for abdominal pain  Endocrine: Negative  Genitourinary: Negative  Musculoskeletal: Negative  Skin: Negative  Allergic/Immunologic: Negative  Neurological: Negative  Hematological: Negative  Psychiatric/Behavioral: Negative      All other systems reviewed and are negative     ______________________________________________________________________  Temperature:   Temp (24hrs), Av 8 °F (36 6 °C), Min:97 5 °F (36 4 °C), Max:98 1 °F (36 7 °C)    Current Temperature: 98 1 °F (36 7 °C)    Vitals:    01/05/19 0300 01/05/19 0400 01/05/19 0500 01/05/19 0600   BP:       BP Location:       Pulse: 104 102 (!) 110 (!) 106   Resp: 21 19 (!) 29 (!) 32   Temp:  98 1 °F (36 7 °C)     TempSrc:  Oral     SpO2: 95% 97% 96% 97%   Weight:  75 7 kg (166 lb 14 2 oz)     Height:         Arterial Line BP: 130/62  Arterial Line MAP (mmHg): 86 mmHg     Weights:   IBW: 57 kg    Body mass index is 27 77 kg/m²  Weight (last 2 days)     Date/Time   Weight    01/05/19 0400  75 7 (166 89)    01/04/19 0608  69 4 (153)    01/03/19 1340  71 (156 53)            Height: 5' 5" (165 1 cm)  SpO2: SpO2: 97 % On RA  ______________________________________________________________________  Physical Exam:     Physical Exam   Constitutional: She is oriented to person, place, and time  She appears well-developed and well-nourished  No distress  HENT:   Head: Normocephalic and atraumatic  Eyes: Pupils are equal, round, and reactive to light  Neck: Neck supple  No JVD present  Cardiovascular: Normal heart sounds  An irregularly irregular rhythm present  Tachycardia present  Pulses:       Radial pulses are 2+ on the right side, and 2+ on the left side  Dorsalis pedis pulses are 1+ on the right side, and 1+ on the left side  Pulmonary/Chest: No accessory muscle usage  No respiratory distress  Fine crackles bilateral posterior bases   Abdominal: Soft  Bowel sounds are normal  She exhibits no distension  There is no tenderness  Neurological: She is oriented to person, place, and time  GCS eye subscore is 4  GCS verbal subscore is 5  GCS motor subscore is 6  Nonfocal   Skin: Skin is warm, dry and intact  No cyanosis  Nails show no clubbing      ______________________________________________________________________  Intake and Outputs:  I/O       01/03 0701 - 01/04 0700 01/04 0701 - 01/05 0700 01/05 0701 - 01/06 0700    P  O  240 1020     I V  (mL/kg) 817 3 (11 8) 2892 2 (38 2)     IV Piggyback 1750 100 Total Intake(mL/kg) 2807 3 (40 5) 4012 2 (53)     Urine (mL/kg/hr) 400 2883 (1 6)     Total Output 400 2883      Net +2407 3 +1129 2             Unmeasured Stool Occurrence  0 x         UOP: voding  Nutrition:        Diet Orders            Start     Ordered    01/05/19 0611  Room Service  Once     Question:  Type of Service  Answer:  Room Service-Appropriate    01/05/19 2545    01/05/19 0001  Diet NPO; Sips with meds  Diet effective midnight     Question Answer Comment   Diet Type NPO    NPO Except: Sips with meds    RD to adjust diet per protocol?  Yes        01/04/19 1657          Labs:     Results from last 7 days  Lab Units 01/04/19  0440 01/03/19 2027 01/03/19  1403   WBC Thousand/uL 8 50 10 92* 12 67*   HEMOGLOBIN g/dL 10 2* 12 5 12 3   HEMATOCRIT % 31 9* 38 3 38 2   PLATELETS Thousands/uL 257 311 299   MONO PCT %  --   --  7      Results from last 7 days  Lab Units 01/05/19  0506 01/04/19  1404 01/04/19  0440 01/03/19  1403   SODIUM mmol/L 141 141 139 136   POTASSIUM mmol/L 4 0 3 4* 2 8* 4 0   CHLORIDE mmol/L 110* 108 106 102   CO2 mmol/L 24 21 23 19*   BUN mg/dL 46* 77* 106* 108*   CREATININE mg/dL 1 06 1 60* 2 29* 3 38*   CALCIUM mg/dL 7 9* 8 4 8 0* 9 0   ALK PHOS U/L  --   --  76 95   ALT U/L  --   --  18 22   AST U/L  --   --  20 35       Results from last 7 days  Lab Units 01/05/19  0506 01/04/19  0440 01/03/19  1403   MAGNESIUM mg/dL 2 3 2 6 3 0*       Results from last 7 days  Lab Units 01/05/19  0506 01/04/19  0440   PHOSPHORUS mg/dL 1 5* 3 6        Results from last 7 days  Lab Units 01/05/19  0506 01/04/19  0849 01/04/19  0245 01/03/19 2027 01/03/19  1404   INR   --   --   --  1 87* 2 02*   PTT seconds 56* 71* 86* 40* 43*       Results from last 7 days  Lab Units 01/04/19  0440   LACTIC ACID mmol/L 1 2       0  Lab Value Date/Time   TROPONINI 0 27 (H) 01/04/2019 0805   TROPONINI 0 32 (H) 01/04/2019 0440   TROPONINI 0 29 (H) 01/03/2019 2306   TROPONINI 0 28 (H) 01/03/2019 2027   TROPONINI 0 22 (H) 01/03/2019 1847   TROPONINI 0 16 (H) 01/03/2019 1403   TROPONINI <0 02 09/26/2018 0924   TROPONINI 0 02 07/25/2018 1213   TROPONINI <0 02 07/07/2018 0917   TROPONINI 0 04 05/22/2018 1539   TROPONINI 0 08 (H) 05/14/2018 0440   TROPONINI 0 09 (H) 05/13/2018 2305   TROPONINI 0 08 (H) 05/13/2018 2009     Imaging:  None in the past 24 hours  EKG:  Atrial fibrillation, 102 beats per minute on tele this a m  Micro:  Procedure Component Value - Date/Time   Blood culture #1 [[id number]] Collected: 01/03/19 1404   Lab Status: Preliminary result Specimen: Blood from Arm, Right Updated: 01/04/19 1701    Blood Culture No Growth at 24 hrs  Blood culture #2 [[id number]] Collected: 01/03/19 1404   Lab Status: Preliminary result Specimen: Blood from Arm, Left Updated: 01/04/19 1701    Blood Culture No Growth at 24 hrs           Allergies: No Known Allergies  Medications:   Scheduled Meds:  Current Facility-Administered Medications:  acetaminophen 975 mg Oral [facility] Amy Collins PA-C    ALPRAZolam 0 25 mg Oral BID PRN CHANDRIKA Suarez    amiodarone 200 mg Oral Daily LEON Edward-SHARI    calcium carbonate 1,000 mg Oral Daily PRN LEON Edward-SHARI    clopidogrel 75 mg Oral Daily LEON Edward-SHARI    ferrous sulfate 325 mg Oral Daily With Breakfast Amy Collins PA-C    folic acid 1 mg Oral Daily Amy Collins PA-C    heparin (porcine) 3-20 Units/kg/hr (Order-Specific) Intravenous Titrated LEON Edward-SHARI Last Rate: 14 Units/kg/hr (01/05/19 0630)   heparin (porcine) 2,000 Units Intravenous PRN LEON Edward-SHARI    heparin (porcine) 4,000 Units Intravenous PRN Amy Collins PA-C    melatonin 6 mg Oral HS LEON Edward-SHARI    mirtazapine 7 5 mg Oral HS Amy Collins PA-C    multi-electrolyte 75 mL/hr Intravenous Continuous CHANDRIKA Suarez Last Rate: 75 mL/hr (01/05/19 7569)   norepinephrine 1-30 mcg/min Intravenous Titrated Loy Alyssa CHANDRIKA Doran Last Rate: Stopped (01/05/19 0423)   nortriptyline 20 mg Oral HS Greta Debbie, PA-C    oxyCODONE 10 mg Oral Q6H PRN Greta Debbie, PA-C    pantoprazole 40 mg Oral BID AC Greta Debbie, PA-C    senna-docusate sodium 1 tablet Oral HS Greta Debbie, PA-C    thiamine 100 mg Oral Daily Greta Debbie, PA-C      Continuous Infusions:  heparin (porcine) 3-20 Units/kg/hr (Order-Specific) Last Rate: 14 Units/kg/hr (01/05/19 0630)   multi-electrolyte 75 mL/hr Last Rate: 75 mL/hr (01/05/19 0506)   norepinephrine 1-30 mcg/min Last Rate: Stopped (01/05/19 0423)     PRN Meds:    ALPRAZolam 0 25 mg BID PRN   calcium carbonate 1,000 mg Daily PRN   heparin (porcine) 2,000 Units PRN   heparin (porcine) 4,000 Units PRN   oxyCODONE 10 mg Q6H PRN       Invasive lines and devices: Invasive Devices     Peripheral Intravenous Line            Peripheral IV 01/03/19 Left Antecubital 1 day    Peripheral IV 01/05/19 Right Forearm less than 1 day          Arterial Line            Arterial Line 01/04/19 Left Radial less than 1 day                   Counseling / Coordination of Care  0 mins of critical care    Code Status: Level 3 - DNAR and DNI    Portions of the record may have been created with voice recognition software  Occasional wrong word or "sound a like" substitutions may have occurred due to the inherent limitations of voice recognition software  Read the chart carefully and recognize, using context, where substitutions have occurred      Bridgett Klinefelter, CRNP

## 2019-01-05 NOTE — PROGRESS NOTES
[facility]  Progress Note - Karma Dean 69 y o  female MRN: [id number]    Unit/Bed#: [facility] 05 Encounter: [id number]    Assessment/Plan:  Choledocholithiasis  Abdominal pain  -patient well known to our service with history of persistent choledocholithiasis status post ERCP X3, last ERCP 8/28/18 with stent exchange with persistent dilation of CBD and choledocholithiasis   -patient currently with normal liver enzymes, afebrile without evidence of cholangitis  -patient currently meets SIRS criteria however do not suspect that biliary tree is source blood for further workup and antibiotics as per critical care team  -no indication for ERCP at this juncture  -okay to advance to regular diet  -patient will likely need a repeat ERCP at some point however this will need to be done when her anticoagulation is held in elective cholecystectomy would also be advised given high risk of recurrence for CBD stones given chronic cholelithiasis  -patient reports improved abdominal pain mainly in the lower quadrants which correlates to physical exam  -recommend bowel regimen has there was evidence of stool seen throughout colon   -can change to PPI once daily  -start MiraLax once daily    Subjective:   Patient denies nausea for vomiting  Abdominal pain has markedly improved  Patient is stating that she is hungry  No fevers overnight  Denies dysuria or increased urinary frequency  Objective:     Vitals: Blood pressure 106/56, pulse (!) 108, temperature 97 8 °F (36 6 °C), temperature source Oral, resp  rate 17, height 5' 5" (1 651 m), weight 75 7 kg (166 lb 14 2 oz), SpO2 97 %  ,Body mass index is 27 77 kg/m²        Intake/Output Summary (Last 24 hours) at 01/05/19 0928  Last data filed at 01/05/19 2569   Gross per 24 hour   Intake          4263 47 ml   Output             2283 ml   Net          1980 47 ml       Review of Systems: as per HPI  Review of Systems    Physical Exam:     Physical Exam   Constitutional: She is oriented to person, place, and time  No distress  HENT:   Head: Atraumatic  Eyes: No scleral icterus  Neck: Neck supple  Cardiovascular:   Tachycardic   Pulmonary/Chest: Effort normal and breath sounds normal    Abdominal: Soft  Bowel sounds are normal  She exhibits no distension  There is tenderness (Mild left lower quadrant tenderness)  Musculoskeletal: She exhibits no edema  Neurological: She is alert and oriented to person, place, and time  Skin: Skin is warm and dry  Psychiatric: She has a normal mood and affect           Invasive Devices     Peripheral Intravenous Line            Peripheral IV 01/03/19 Left Antecubital 1 day    Peripheral IV 01/05/19 Right Forearm less than 1 day          Arterial Line            Arterial Line 01/04/19 Left Radial 1 day                        CBC: No results found for: WBC, HGB, HCT, MCV, PLT, ADJUSTEDWBC, MCH, MCHC, RDW, MPV, NRBC,   CMP: Lab Results   Component Value Date    K 4 0 01/05/2019     (H) 01/05/2019    CO2 24 01/05/2019    BUN 46 (H) 01/05/2019    CREATININE 1 06 01/05/2019    CALCIUM 7 9 (L) 01/05/2019    AST 25 01/05/2019    ALT 22 01/05/2019    ALKPHOS 75 01/05/2019    EGFR 51 01/05/2019

## 2019-01-05 NOTE — PROGRESS NOTES
Progress Note - ICU Transfer to SD/MS tele   Yamil Leija 69 y o  female MRN: [id number]  [address] [facility]   Unit/Bed#: [facility] 05 Encounter: [id number]    Code Status: Level 3 - DNAR and DNI  POA:    POLST:      Reason for ICU admission:  Hypotension    Active problems:   Principal Problem (Resolved):    MALA (acute kidney injury) ([facility] )  Active Problems:    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[facility] )    Chronic systolic heart failure (HCC)    Elevated troponin    Fall    Cardiac arrhythmia    Opioid dependence ([facility] )    Coronary artery disease    Atrial fibrillation ([facility] )    History of biliary duct stent placement    SIRS (systemic inflammatory response syndrome) ([facility] )    Hypophosphatemia    Dehydration  Resolved Problems:    Metabolic acidosis      Consultants:   GI  Cardiology  Palliative Services    History of Present Illness:  Per H&P 1/3 by JEFERSON Silva, " Yamil Leija is a 69 y o  female with a history of chronic systolic HF, CAD s/p NGOZI last year, chronic opioid dependence, PAF s/p cardioversion, AVNRT s/p ablation, and CBD stone s/p biliary stent who presents with fall from home  Patient tells me she fell three days ago but is unsure how she fell  She thinks she may have passed out but cannot remember the details around this  She fell in the bedroom and was too weak to get herself up  Family came to check on her and found her on the ground  She lives at home alone and uses a walker normally  Currently, she states she feels "terrible" and complains of pain everywhere  She is hypersensitive to light touch everywhere  In the ED, she was found to have elevated creatinine and elevated troponin  New T wave inversions present on EKG in inferolateral leads  On telemetry she is in an irregular narrow complex tachycardia with significant ectopy  "    Summary of clinical course: The patient was transferred to the critical care unit on January 4th at 5:00 a m  secondary to hypotension  The patient had developed AFib with RVR and received her home dose Toprol XL and became hypotensive with blood pressures 70/40, unresponsive to volume resuscitation  The patient was transferred to the critical care unit and required norepinephrine for less than 24 hours  No clear source of sepsis has been identified at this time and it is believed that the hypotension was related to intravascular volume depletion with long-acting beta-blocker administration  Patient is now hemodynamically stable on room air in appropriate to transfer back to the floor        Recent or scheduled procedures:   1/4 ECHO pending    Outstanding/pending diagnostics:       Cultures:   1/3 Blood cx NGTD       Mobilization Plan:   OOB    Nutrition Plan:   Surgical soft diet    VTE Pharmacologic Prophylaxis: Heparin Drip  VTE Mechanical Prophylaxis: sequential compression device    Discharge Plan:   Patient should be ready for discharge to [facility] based on CM and therapy evaluations    Initial Physical Therapy Recommendations: Short term rehab  Initial Occupational Therapy Recommendations: Short term rehab  Initial /Plan: pending    Home medications that are not reordered and reason why:  Xanax, Eliquis, Cymbalta, Lasix, lisinopril, metoprolol  Blood pressure medications and Lasix held in setting of hypotension and intravascular volume depletion, would recommend restarting low-dose short-acting beta blockers 1st  Transition back to Eliquis once no further interventional procedures are planned     Specific Diagnosis Plan:    Hypotension:  Secondary to intravascular volume depletion with beta-blockers; now resolved    HFrEF:  Cardiology following    Diuresis plan:  Lasix on hold secondary to acute kidney injury and intravascular volume depletion    History of choledocholithiasis status post ERCP with stent exchange on 08/28/2018  Will need outpatient follow-up with GI    AFib  Continue heparin drip, transition back to Eliquis once stable  Consider short-acting beta-blocker titration if rate control required for heart rate greater than 110    Spoke with Dr Shabana Krishnamurthy  regarding transfer  At   Please call [phone] with any questions or concerns  Portions of the record may have been created with voice recognition software  Occasional wrong word or "sound a like" substitutions may have occurred due to the inherent limitations of voice recognition software  Read the chart carefully and recognize, using context, where substitutions have occurred      CHANDRIKA Nguyen

## 2019-01-05 NOTE — NURSING NOTE
Transferred to P625 via wheelchair with monitor accompanied by myself and transport  Patient denies any complaints  Vital signs stable  All due meds given  Patient has no personal belongings

## 2019-01-06 PROBLEM — K80.50 CHOLEDOCHOLITHIASIS: Status: ACTIVE | Noted: 2019-01-06

## 2019-01-06 PROBLEM — D64.9 ANEMIA: Status: ACTIVE | Noted: 2019-01-06

## 2019-01-06 PROBLEM — R77.8 ELEVATED TROPONIN: Status: RESOLVED | Noted: 2018-05-14 | Resolved: 2019-01-06

## 2019-01-06 LAB
ANION GAP SERPL CALCULATED.3IONS-SCNC: 8 MMOL/L (ref 4–13)
ANISOCYTOSIS BLD QL SMEAR: PRESENT
APTT PPP: 55 SECONDS (ref 26–38)
APTT PPP: 84 SECONDS (ref 26–38)
BASOPHILS # BLD MANUAL: 0.2 THOUSAND/UL (ref 0–0.1)
BASOPHILS NFR MAR MANUAL: 4 % (ref 0–1)
BUN SERPL-MCNC: 20 MG/DL (ref 5–25)
CALCIUM SERPL-MCNC: 7.8 MG/DL (ref 8.3–10.1)
CHLORIDE SERPL-SCNC: 108 MMOL/L (ref 100–108)
CO2 SERPL-SCNC: 25 MMOL/L (ref 21–32)
CREAT SERPL-MCNC: 0.81 MG/DL (ref 0.6–1.3)
EOSINOPHIL # BLD MANUAL: 0.2 THOUSAND/UL (ref 0–0.4)
EOSINOPHIL NFR BLD MANUAL: 4 % (ref 0–6)
ERYTHROCYTE [DISTWIDTH] IN BLOOD BY AUTOMATED COUNT: 16.3 % (ref 11.6–15.1)
GFR SERPL CREATININE-BSD FRML MDRD: 70 ML/MIN/1.73SQ M
GLUCOSE SERPL-MCNC: 120 MG/DL (ref 65–140)
HCT VFR BLD AUTO: 30.8 % (ref 34.8–46.1)
HGB BLD-MCNC: 9.5 G/DL (ref 11.5–15.4)
LYMPHOCYTES # BLD AUTO: 1.33 THOUSAND/UL (ref 0.6–4.47)
LYMPHOCYTES # BLD AUTO: 27 % (ref 14–44)
MAGNESIUM SERPL-MCNC: 1.7 MG/DL (ref 1.6–2.6)
MCH RBC QN AUTO: 31.6 PG (ref 26.8–34.3)
MCHC RBC AUTO-ENTMCNC: 30.8 G/DL (ref 31.4–37.4)
MCV RBC AUTO: 102 FL (ref 82–98)
MONOCYTES # BLD AUTO: 0.39 THOUSAND/UL (ref 0–1.22)
MONOCYTES NFR BLD: 8 % (ref 4–12)
NEUTROPHILS # BLD MANUAL: 2.76 THOUSAND/UL (ref 1.85–7.62)
NEUTS BAND NFR BLD MANUAL: 1 % (ref 0–8)
NEUTS SEG NFR BLD AUTO: 55 % (ref 43–75)
NRBC BLD AUTO-RTO: 0 /100 WBCS
PHOSPHATE SERPL-MCNC: 1.8 MG/DL (ref 2.3–4.1)
PLATELET # BLD AUTO: 236 THOUSANDS/UL (ref 149–390)
PLATELET BLD QL SMEAR: ADEQUATE
PMV BLD AUTO: 10 FL (ref 8.9–12.7)
POIKILOCYTOSIS BLD QL SMEAR: PRESENT
POTASSIUM SERPL-SCNC: 3.8 MMOL/L (ref 3.5–5.3)
RBC # BLD AUTO: 3.01 MILLION/UL (ref 3.81–5.12)
RBC MORPH BLD: PRESENT
SODIUM SERPL-SCNC: 141 MMOL/L (ref 136–145)
VARIANT LYMPHS # BLD AUTO: 1 %
WBC # BLD AUTO: 4.93 THOUSAND/UL (ref 4.31–10.16)

## 2019-01-06 PROCEDURE — 85007 BL SMEAR W/DIFF WBC COUNT: CPT | Performed by: FAMILY MEDICINE

## 2019-01-06 PROCEDURE — 85027 COMPLETE CBC AUTOMATED: CPT | Performed by: FAMILY MEDICINE

## 2019-01-06 PROCEDURE — 83735 ASSAY OF MAGNESIUM: CPT | Performed by: FAMILY MEDICINE

## 2019-01-06 PROCEDURE — 99233 SBSQ HOSP IP/OBS HIGH 50: CPT | Performed by: NURSE PRACTITIONER

## 2019-01-06 PROCEDURE — 80048 BASIC METABOLIC PNL TOTAL CA: CPT | Performed by: FAMILY MEDICINE

## 2019-01-06 PROCEDURE — 99232 SBSQ HOSP IP/OBS MODERATE 35: CPT | Performed by: INTERNAL MEDICINE

## 2019-01-06 PROCEDURE — 84100 ASSAY OF PHOSPHORUS: CPT | Performed by: FAMILY MEDICINE

## 2019-01-06 PROCEDURE — 85730 THROMBOPLASTIN TIME PARTIAL: CPT | Performed by: FAMILY MEDICINE

## 2019-01-06 RX ORDER — MAGNESIUM SULFATE 1 G/100ML
1 INJECTION INTRAVENOUS ONCE
Status: COMPLETED | OUTPATIENT
Start: 2019-01-06 | End: 2019-01-06

## 2019-01-06 RX ADMIN — METOPROLOL TARTRATE 2.5 MG: 5 INJECTION, SOLUTION INTRAVENOUS at 03:33

## 2019-01-06 RX ADMIN — DIBASIC SODIUM PHOSPHATE, MONOBASIC POTASSIUM PHOSPHATE AND MONOBASIC SODIUM PHOSPHATE 2 TABLET: 852; 155; 130 TABLET ORAL at 17:41

## 2019-01-06 RX ADMIN — ALPRAZOLAM 0.25 MG: 0.25 TABLET ORAL at 15:24

## 2019-01-06 RX ADMIN — MAGNESIUM SULFATE HEPTAHYDRATE 1 G: 1 INJECTION, SOLUTION INTRAVENOUS at 14:10

## 2019-01-06 RX ADMIN — THIAMINE HCL TAB 100 MG 100 MG: 100 TAB at 09:01

## 2019-01-06 RX ADMIN — HEPARIN SODIUM 2000 UNITS: 1000 INJECTION INTRAVENOUS; SUBCUTANEOUS at 03:27

## 2019-01-06 RX ADMIN — ACETAMINOPHEN 975 MG: 325 TABLET, FILM COATED ORAL at 06:39

## 2019-01-06 RX ADMIN — OXYCODONE HYDROCHLORIDE 10 MG: 10 TABLET ORAL at 00:32

## 2019-01-06 RX ADMIN — OXYCODONE HYDROCHLORIDE 10 MG: 10 TABLET ORAL at 23:02

## 2019-01-06 RX ADMIN — ACETAMINOPHEN 975 MG: 325 TABLET, FILM COATED ORAL at 21:35

## 2019-01-06 RX ADMIN — NORTRIPTYLINE HYDROCHLORIDE 20 MG: 10 CAPSULE ORAL at 21:34

## 2019-01-06 RX ADMIN — MELATONIN 6 MG: at 21:35

## 2019-01-06 RX ADMIN — ACETAMINOPHEN 975 MG: 325 TABLET, FILM COATED ORAL at 14:10

## 2019-01-06 RX ADMIN — CLOPIDOGREL BISULFATE 75 MG: 75 TABLET ORAL at 09:01

## 2019-01-06 RX ADMIN — METOPROLOL TARTRATE 2.5 MG: 5 INJECTION, SOLUTION INTRAVENOUS at 09:01

## 2019-01-06 RX ADMIN — APIXABAN 5 MG: 5 TABLET, FILM COATED ORAL at 17:41

## 2019-01-06 RX ADMIN — FOLIC ACID 1 MG: 1 TABLET ORAL at 09:01

## 2019-01-06 RX ADMIN — APIXABAN 5 MG: 5 TABLET, FILM COATED ORAL at 11:35

## 2019-01-06 RX ADMIN — ALPRAZOLAM 0.25 MG: 0.25 TABLET ORAL at 00:32

## 2019-01-06 RX ADMIN — METOPROLOL TARTRATE 25 MG: 25 TABLET, FILM COATED ORAL at 14:10

## 2019-01-06 RX ADMIN — FERROUS SULFATE TAB 325 MG (65 MG ELEMENTAL FE) 325 MG: 325 (65 FE) TAB at 09:01

## 2019-01-06 RX ADMIN — MIRTAZAPINE 7.5 MG: 15 TABLET ORAL at 21:34

## 2019-01-06 RX ADMIN — PANTOPRAZOLE SODIUM 40 MG: 40 TABLET, DELAYED RELEASE ORAL at 06:39

## 2019-01-06 RX ADMIN — AMIODARONE HYDROCHLORIDE 200 MG: 200 TABLET ORAL at 09:01

## 2019-01-06 NOTE — PROGRESS NOTES
Cardiology Progress Note - Allegra Lima 69 y o  female MRN: [id number]    Unit/Bed#: [facility] 625-01 Encounter: [id number]        Subjective:    No significant events overnight  No chest pain or shortness of breath  ROS    Objective:   Vitals: Blood pressure 132/78, pulse (!) 117, temperature 98 2 °F (36 8 °C), temperature source Oral, resp  rate 18, height 5' 5" (1 651 m), weight 74 6 kg (164 lb 7 4 oz), SpO2 97 %  , Body mass index is 27 37 kg/m² , Orthostatic Blood Pressures      Most Recent Value   Blood Pressure  132/78 filed at 01/06/2019 4755   Patient Position - Orthostatic VS  Sitting filed at 01/05/2019 5659         Systolic (56TEU), ZVO:688 , Min:132 , UJR:890     Diastolic (51AUB), RPQ:12, Min:78, Max:86      Intake/Output Summary (Last 24 hours) at 01/06/19 0856  Last data filed at 01/06/19 0700   Gross per 24 hour   Intake          1595 88 ml   Output             1801 ml   Net          -205 12 ml     Weight (last 2 days)     Date/Time   Weight    01/06/19 0525  74 6 (164 46)    01/05/19 2044  74 7 (164 68)    01/05/19 0400  75 7 (166 89)    01/04/19 0608  69 4 (153)                Telemetry Review: No significant arrhythmias seen on telemetry review  afib        Physical Exam   Constitutional: She is oriented to person, place, and time  No distress  HENT:   Mouth/Throat: No oropharyngeal exudate  Eyes: No scleral icterus  Neck: No JVD present  Cardiovascular: Normal rate  An irregularly irregular rhythm present  Pulmonary/Chest: Effort normal  No respiratory distress  She has no wheezes  She has no rales  Abdominal: Soft  Bowel sounds are normal  She exhibits no distension  There is no tenderness  There is no rebound  Musculoskeletal: She exhibits no edema  Neurological: She is alert and oriented to person, place, and time  Skin: Skin is warm and dry  She is not diaphoretic  Psychiatric: She has a normal mood and affect   Her behavior is normal          Laboratory Results:    Results from last 7 days  Lab Units 01/04/19  0805 01/04/19  0440 01/03/19  2306  01/03/19  1407   CK TOTAL U/L  --   --   --   --  205*   TROPONIN I ng/mL 0 27* 0 32* 0 29*  < >  --    CK MB INDEX %  --   --   --   --  6 0*   < > = values in this interval not displayed  CBC with diff:   Results from last 7 days  Lab Units 01/06/19  0240 01/05/19  0932 01/04/19  0440 01/03/19 2027 01/03/19  1403   WBC Thousand/uL 4 93 5 52 8 50 10 92* 12 67*   HEMOGLOBIN g/dL 9 5* 9 6* 10 2* 12 5 12 3   HEMATOCRIT % 30 8* 31 0* 31 9* 38 3 38 2   MCV fL 102* 100* 97 97 98   PLATELETS Thousands/uL 236 235 257 311 299   MCH pg 31 6 31 0 31 1 31 6 31 6   MCHC g/dL 30 8* 31 0* 32 0 32 6 32 2   RDW % 16 3* 15 9* 15 6* 15 4* 15 6*   MPV fL 10 0 9 5 10 0 10 1 10 3   NRBC AUTO /100 WBCs 0 0  --   --  0         CMP:  Results from last 7 days  Lab Units 01/06/19  0240 01/05/19  0506 01/04/19  1404 01/04/19 0440 01/03/19  1403   POTASSIUM mmol/L 3 8 4 0 3 4* 2 8* 4 0   CHLORIDE mmol/L 108 110* 108 106 102   CO2 mmol/L 25 24 21 23 19*   BUN mg/dL 20 46* 77* 106* 108*   CREATININE mg/dL 0 81 1 06 1 60* 2 29* 3 38*   CALCIUM mg/dL 7 8* 7 9* 8 4 8 0* 9 0   AST U/L  --  25  --  20 35   ALT U/L  --  22  --  18 22   ALK PHOS U/L  --  75  --  76 95   EGFR ml/min/1 73sq m 70 51 31 20 12         BMP:  Results from last 7 days  Lab Units 01/06/19  0240 01/05/19  0506 01/04/19  1404 01/04/19 0440 01/03/19  1403   POTASSIUM mmol/L 3 8 4 0 3 4* 2 8* 4 0   CHLORIDE mmol/L 108 110* 108 106 102   CO2 mmol/L 25 24 21 23 19*   BUN mg/dL 20 46* 77* 106* 108*   CREATININE mg/dL 0 81 1 06 1 60* 2 29* 3 38*   CALCIUM mg/dL 7 8* 7 9* 8 4 8 0* 9 0       BNP: No results for input(s): BNP in the last 72 hours      Magnesium:   Results from last 7 days  Lab Units 01/06/19  0240 01/05/19  0506 01/04/19  0440 01/03/19  1403   MAGNESIUM mg/dL 1 7 2 3 2 6 3 0*       Coags:   Results from last 7 days  Lab Units 01/06/19  0240 01/05/19  1940 01/05/19  1218 01/05/19  9046 19  0849 19  0245 19  1404   PTT seconds 55* 69* 103* 56* 71* 86* 40* 43*   INR   --   --   --   --   --   --  1 87* 2 02*       TSH: No results found for: TSH    Hemoglobin A1C       Lipid Profile:       Cardiac testing:   Results for orders placed during the hospital encounter of 18   Echo complete with contrast if indicated    Narrative Madeline [address]  [facility], [address] [facility]  [phone]    Transthoracic Echocardiogram  2D, M-mode, Doppler, and Color Doppler    Study date:  [id number]    Patient: Volodymyr Kaur  MR number: [id number]  Account number: [de-identified]  : 1940  Age: 69 years  Gender: Female  Status: Inpatient  Location: Bedside  Height: 65 in  Weight: 178 6 lb  BP: 117/ 81 mmHg    Indications: Assess left ventricular function  Diagnoses: I24 9 - Acute ischemic heart disease, unspecified    Sonographer:  Eric Galindo RDCS  Primary Physician:  Sravanthi Trujillo DO  Referring Physician:  Alan Duffy MD  Group:  [facility]  Cardiology Fellow:  Irlanda Diaz MD  Interpreting Physician:  Lio Jeffries DO    SUMMARY    LEFT VENTRICLE:  Systolic function was mildly reduced  Ejection fraction was estimated to be 50 %  There was severe hypokinesis of the basal-mid inferior and basal-mid inferolateral wall(s)  The changes were consistent with concentric remodeling (increased wall thickness with normal wall mass)  Doppler parameters were consistent with elevated mean left atrial filling pressure  RIGHT VENTRICLE:  The ventricle was mildly dilated  Systolic function was normal   Wall thickness was increased  LEFT ATRIUM:  The atrium was mildly dilated  RIGHT ATRIUM:  The atrium was moderately dilated  MITRAL VALVE:  There was moderate annular calcification  There was mild regurgitation  TRICUSPID VALVE:  There was mild to moderate regurgitation    Estimated peak PA pressure was 55 mmHg  The findings suggest moderate pulmonary hypertension  IVC, HEPATIC VEINS:  The inferior vena cava was mildly dilated  Respirophasic changes were blunted (less than 50% variation)  HISTORY: PRIOR HISTORY: systolic heart failure, afib, sepsis, chronic anticoagulation    PROCEDURE: The procedure was performed at the bedside  This was a routine study  The transthoracic approach was used  The study included complete 2D imaging, M-mode, complete spectral Doppler, and color Doppler  The heart rate was 87 bpm,  at the start of the study  Images were obtained from the parasternal, apical, subcostal, and suprasternal notch acoustic windows  Image quality was adequate  LEFT VENTRICLE: Size was normal  Systolic function was mildly reduced  Ejection fraction was estimated to be 50 %  There was severe hypokinesis of the basal-mid inferior and basal-mid inferolateral wall(s)  Wall thickness was mildly  increased  The changes were consistent with concentric remodeling (increased wall thickness with normal wall mass)  DOPPLER: Transmitral flow pattern: atrial fibrillation  Doppler parameters were consistent with elevated mean left atrial  filling pressure  RIGHT VENTRICLE: The ventricle was mildly dilated  Systolic function was normal  Wall thickness was increased  LEFT ATRIUM: The atrium was mildly dilated  RIGHT ATRIUM: The atrium was moderately dilated  MITRAL VALVE: There was moderate annular calcification  Valve structure was normal  There was mild calcification of the valve  There was normal leaflet separation  DOPPLER: The transmitral velocity was within the normal range  There was no  evidence for stenosis  There was mild regurgitation  AORTIC VALVE: The valve was trileaflet  Leaflets exhibited moderately increased thickness, moderate calcification, and normal cuspal separation  DOPPLER: Transaortic velocity was within the normal range   There was no evidence for stenosis  There was no regurgitation  TRICUSPID VALVE: The valve structure was normal  There was normal leaflet separation  DOPPLER: The transtricuspid velocity was within the normal range  There was no evidence for stenosis  There was mild to moderate regurgitation  Pulmonary  artery systolic pressure was moderately increased  Estimated peak PA pressure was 55 mmHg  The findings suggest moderate pulmonary hypertension  PULMONIC VALVE: Leaflets exhibited normal thickness, no calcification, and normal cuspal separation  DOPPLER: The transpulmonic velocity was within the normal range  There was trace regurgitation  PERICARDIUM: There was no pericardial effusion  The pericardium was normal in appearance  AORTA: The root exhibited normal size  SYSTEMIC VEINS: IVC: The inferior vena cava was mildly dilated  Respirophasic changes were blunted (less than 50% variation)      SYSTEM MEASUREMENT TABLES    2D  %FS: 23 31 %  Ao Diam: 2 84 cm  EDV(Teich): 127 84 ml  EF(Teich): 46 35 %  ESV(Teich): 68 58 ml  IVSd: 1 14 cm  LA Area: 21 35 cm2  LA Diam: 3 89 cm  LVEDV MOD A4C: 125 04 ml  LVEF MOD A4C: 61 7 %  LVESV MOD A4C: 47 9 ml  LVIDd: 5 17 cm  LVIDs: 3 97 cm  LVLd A4C: 8 04 cm  LVLs A4C: 7 66 cm  LVPWd: 1 09 cm  RA Area: 23 54 cm2  RVIDd: 4 27 cm  SV MOD A4C: 77 15 ml  SV(Teich): 59 26 ml    CW  TR Vmax: 3 26 m/s  TR maxP 59 mmHg    MM  TAPSE: 2 89 cm    PW  AVC: 377 63 ms  E': 0 07 m/s  E/E': 11 01  MV A Ge: 0 21 m/s  MV Dec [place]: 4 94 m/s2  MV DecT: 153 92 ms  MV E Ge: 0 76 m/s  MV E/A Ratio: 3 58  MV PHT: 44 64 ms  MVA By PHT: 4 93 cm2    [employer] Accredited Echocardiography Laboratory    Prepared and electronically signed by    Kassidy Ramirez DO  Signed 15-May-2018 14:00:03       Results for orders placed during the hospital encounter of 18   TIFFANIE    Narrative [address]  [address] [place] [address]  [phone]    Transesophageal Echocardiogram  2D, Doppler, and Color Doppler    Study date:      Patient: Oscar Meraz  MR number: [id number]  Account number: [de-identified]  : 1940  Age: 69 years  Gender: Female  Status: Inpatient  Location: Cath lab  Height: 65 in  Weight: 149 lb  BP: 79/ 52 mmHg    Indications: Atrial fibrillation  Diagnoses: I48 0 - Atrial fibrillation    Sonographer:  Phyllis Morris RDCS  Interpreting Physician:  Radha Gutiérrez MD  Primary Physician:  Dami Lord DO  Referring Physician:  Jerel Shah MD  Group:  [id number]  Cardiology Fellow:  Jerel Shah MD    SUMMARY    LEFT VENTRICLE:  Systolic function was markedly reduced  Ejection fraction was estimated to be 35 %  Beat to beat variability and tachycardia limit an accurate assessment of ejection fraction  There was severe diffuse hypokinesis with regional variations  RIGHT VENTRICLE:  The ventricle was mildly dilated  Systolic function was reduced  LEFT ATRIUM:  The atrium was moderately dilated  No thrombus was identified  There was evidence of spontaneous echo contrast ("smoke")  LEFT ATRIAL APPENDAGE:  The appendage was dilated  The function was moderately reduced (moderately reduced emptying velocity)  No thrombus was identified  There was evidence of spontaneous echo contrast ("smoke") in the appendage  ATRIAL SEPTUM:  No defect or patent foramen ovale was identified  RIGHT ATRIUM:  The atrium was moderately dilated  There was evidence of continuous spontaneous echo contrast ("smoke")  MITRAL VALVE:  There was moderate regurgitation  Regurgitation grade was 3+ on a scale of 0 to 4+  AORTIC VALVE:  There was trace regurgitation  TRICUSPID VALVE:  There was mild regurgitation  PULMONIC VALVE:  There was trace regurgitation  HISTORY: PRIOR HISTORY: Heart failure, Atrial fibrillation, Sepsis      PROCEDURE: The procedure was performed in the catheterization laboratory  This was a routine study  The risks and alternatives of the procedure were explained to the patient and informed consent was obtained  The transesophageal approach  was used  The study included complete 2D imaging, complete spectral Doppler, and color Doppler  The heart rate was 108 bpm, at the start of the study  An adult omniplane probe was inserted by the cardiology fellow under direct supervision  of the attending cardiologist  Intubated with ease  One intubation attempt(s)  There was no blood detected on the probe  Image quality was adequate  There were no complications during the procedure  MEDICATIONS: Benzocaine spray, topical  to oropharynx, prior to procedure  Anesthesia administered by anesthesia team     LEFT VENTRICLE: Size was normal  Systolic function was markedly reduced  Ejection fraction was estimated to be 35 %  Beat to beat variability and tachycardia limit an accurate assessment of ejection fraction  There was severe diffuse  hypokinesis with regional variations  Wall thickness was increased  DOPPLER: The study was not technically sufficient to allow evaluation of LV diastolic function  RIGHT VENTRICLE: The ventricle was mildly dilated  Systolic function was reduced  LEFT ATRIUM: The atrium was moderately dilated  No thrombus was identified  There was evidence of spontaneous echo contrast ("smoke")  APPENDAGE: The appendage was dilated  No thrombus was identified  There was evidence of spontaneous echo  contrast ("smoke") in the appendage  DOPPLER: The function was moderately reduced (moderately reduced emptying velocity)  ATRIAL SEPTUM: No defect or patent foramen ovale was identified  RIGHT ATRIUM: The atrium was moderately dilated  No thrombus was identified  There was evidence of continuous spontaneous echo contrast ("smoke")  MITRAL VALVE: There was mild annular calcification  There was mildly reduced leaflet separation   DOPPLER: There was moderate regurgitation  Regurgitation grade was 3+ on a scale of 0 to 4+  The regurgitant jet was centrally directed  AORTIC VALVE: The valve was trileaflet  Leaflets exhibited normal cuspal separation and sclerosis  DOPPLER: There was trace regurgitation  TRICUSPID VALVE: The valve structure was normal  There was normal leaflet separation  DOPPLER: There was mild regurgitation  PULMONIC VALVE: Not well visualized  DOPPLER: There was trace regurgitation  PERICARDIUM: There was no pericardial effusion  The pericardium was normal in appearance  AORTA: The root exhibited normal size  There was no atheroma  There was no evidence for dissection  There was no evidence for aneurysm  PULMONARY VEINS: DOPPLER: There was systolic blunting in the pulmonary vein(s)  [address] Accredited Echocardiography Laboratory    Prepared and electronically signed by    Monserrat Aden MD  Signed 12-Jul-2018 13:57:07       No results found for this or any previous visit  No results found for this or any previous visit      Meds/Allergies   current meds:   Current Facility-Administered Medications   Medication Dose Route Frequency    acetaminophen (TYLENOL) tablet 975 mg  975 mg Oral Q8H [facility]    ALPRAZolam (XANAX) tablet 0 25 mg  0 25 mg Oral BID PRN    calcium carbonate (TUMS) chewable tablet 1,000 mg  1,000 mg Oral Daily PRN    clopidogrel (PLAVIX) tablet 75 mg  75 mg Oral Daily    ferrous sulfate tablet 325 mg  325 mg Oral Daily With Breakfast    folic acid (FOLVITE) tablet 1 mg  1 mg Oral Daily    heparin (porcine) 25,000 units in 250 mL infusion (premix)  3-20 Units/kg/hr (Order-Specific) Intravenous Titrated    heparin (porcine) injection 2,000 Units  2,000 Units Intravenous PRN    heparin (porcine) injection 4,000 Units  4,000 Units Intravenous PRN    melatonin tablet 6 mg  6 mg Oral HS    metoprolol tartrate (LOPRESSOR) tablet 25 mg  25 mg Oral Q12H [facility]    mirtazapine (REMERON) tablet 7 5 mg  7 5 mg Oral HS    nortriptyline (PAMELOR) capsule 20 mg  20 mg Oral HS    oxyCODONE (ROXICODONE) immediate release tablet 10 mg  10 mg Oral Q6H PRN    pantoprazole (PROTONIX) EC tablet 40 mg  40 mg Oral Early Morning    senna-docusate sodium (SENOKOT S) 8 6-50 mg per tablet 1 tablet  1 tablet Oral HS    thiamine (VITAMIN B1) tablet 100 mg  100 mg Oral Daily     Prescriptions Prior to Admission   Medication    acetaminophen (TYLENOL) 325 mg tablet    ALPRAZolam (NIRAVAM) 0 25 MG dissolvable tablet    amiodarone 200 mg tablet    amitriptyline (ELAVIL) 25 mg tablet    apixaban (ELIQUIS) 5 mg    ascorbic acid (VITAMIN C) 500 mg tablet    clopidogrel (PLAVIX) 75 mg tablet    DULoxetine (CYMBALTA) 30 mg delayed release capsule    ferrous sulfate 324 (65 Fe) mg    folic acid (FOLVITE) 1 mg tablet    furosemide (LASIX) 40 mg tablet    lidocaine (LIDODERM) 5 %    lisinopril (ZESTRIL) 5 mg tablet    melatonin 3 mg    metoprolol succinate (TOPROL-XL) 50 mg 24 hr tablet    mirtazapine (REMERON) 7 5 MG tablet    nortriptyline (PAMELOR) 10 mg capsule    oxyCODONE-acetaminophen (PERCOCET)  mg per tablet    pantoprazole (PROTONIX) 40 mg tablet    polyethylene glycol (MIRALAX) 17 g packet    potassium chloride (K-DUR,KLOR-CON) 20 mEq tablet    senna-docusate sodium (SENOKOT S) 8 6-50 mg per tablet    thiamine 100 MG tablet         heparin (porcine) 3-20 Units/kg/hr (Order-Specific) Last Rate: 14 Units/kg/hr (01/06/19 0322)     Assessment:  Active Problems:    Chronic systolic heart failure (HCC)    Elevated troponin    Fall    Cardiac arrhythmia    Opioid dependence (HCC)    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HCC)    Coronary artery disease    Atrial fibrillation (HCC)    History of biliary duct stent placement    SIRS (systemic inflammatory response syndrome) (HCC)    Hypophosphatemia    Dehydration    NSTEMI (non-ST elevated myocardial infarction) ([facility] [address] ), type II      Plan:    Dehydration/ Atrial Fibrillation    Recommend Rate control with oral metoprolol  She is currently on IV heparin  Can transition to Eliquis per primary service  History of CAD: NGOZI to RCA  Continue Clopidogrel and remainder of usual therapy for CAD  History of AVNRT ablation  Counseling / Coordination of Care  Total floor / unit time spent today 25 minutes  Greater than 50% of total time was spent with the patient and / or family counseling and / or coordination of care  A description of the counseling / coordination of care

## 2019-01-06 NOTE — RESTORATIVE TECHNICIAN NOTE
Restorative Specialist Mobility Note       Activity: Ambulate in agee, Ambulate in room, Bathroom privileges, Stand at bedside, Dangle (Educated/encouraged pt to ambulate with assistance 3-4 x's/day  Bed alarm on   Pt callbell, phone/tray within reach )     Assistive Device: Front wheel walker    Rebecca BOJORQUEZ, Restorative Technician, [employer] [facility]

## 2019-01-07 PROBLEM — Z71.89 GOALS OF CARE, COUNSELING/DISCUSSION: Status: ACTIVE | Noted: 2019-01-07

## 2019-01-07 LAB
ANION GAP SERPL CALCULATED.3IONS-SCNC: 4 MMOL/L (ref 4–13)
BASOPHILS # BLD MANUAL: 0.07 THOUSAND/UL (ref 0–0.1)
BASOPHILS NFR MAR MANUAL: 1 % (ref 0–1)
BUN SERPL-MCNC: 13 MG/DL (ref 5–25)
CALCIUM SERPL-MCNC: 8 MG/DL (ref 8.3–10.1)
CHLORIDE SERPL-SCNC: 108 MMOL/L (ref 100–108)
CO2 SERPL-SCNC: 28 MMOL/L (ref 21–32)
CREAT SERPL-MCNC: 0.67 MG/DL (ref 0.6–1.3)
EOSINOPHIL # BLD MANUAL: 0.55 THOUSAND/UL (ref 0–0.4)
EOSINOPHIL NFR BLD MANUAL: 8 % (ref 0–6)
ERYTHROCYTE [DISTWIDTH] IN BLOOD BY AUTOMATED COUNT: 16.4 % (ref 11.6–15.1)
GFR SERPL CREATININE-BSD FRML MDRD: 85 ML/MIN/1.73SQ M
GLUCOSE SERPL-MCNC: 92 MG/DL (ref 65–140)
HCT VFR BLD AUTO: 31.5 % (ref 34.8–46.1)
HGB BLD-MCNC: 9.6 G/DL (ref 11.5–15.4)
LYMPHOCYTES # BLD AUTO: 0.28 THOUSAND/UL (ref 0.6–4.47)
LYMPHOCYTES # BLD AUTO: 4 % (ref 14–44)
MAGNESIUM SERPL-MCNC: 1.9 MG/DL (ref 1.6–2.6)
MCH RBC QN AUTO: 31.1 PG (ref 26.8–34.3)
MCHC RBC AUTO-ENTMCNC: 30.5 G/DL (ref 31.4–37.4)
MCV RBC AUTO: 102 FL (ref 82–98)
METAMYELOCYTES NFR BLD MANUAL: 1 % (ref 0–1)
MONOCYTES # BLD AUTO: 0.34 THOUSAND/UL (ref 0–1.22)
MONOCYTES NFR BLD: 5 % (ref 4–12)
MYELOCYTES NFR BLD MANUAL: 1 % (ref 0–1)
NEUTROPHILS # BLD MANUAL: 5.24 THOUSAND/UL (ref 1.85–7.62)
NEUTS SEG NFR BLD AUTO: 76 % (ref 43–75)
NRBC BLD AUTO-RTO: 0 /100 WBCS
OVALOCYTES BLD QL SMEAR: PRESENT
PHOSPHATE SERPL-MCNC: 2.3 MG/DL (ref 2.3–4.1)
PLATELET # BLD AUTO: 260 THOUSANDS/UL (ref 149–390)
PLATELET BLD QL SMEAR: ADEQUATE
PMV BLD AUTO: 9.9 FL (ref 8.9–12.7)
POLYCHROMASIA BLD QL SMEAR: PRESENT
POTASSIUM SERPL-SCNC: 3.9 MMOL/L (ref 3.5–5.3)
RBC # BLD AUTO: 3.09 MILLION/UL (ref 3.81–5.12)
RBC MORPH BLD: PRESENT
SODIUM SERPL-SCNC: 140 MMOL/L (ref 136–145)
VARIANT LYMPHS # BLD AUTO: 4 %
WBC # BLD AUTO: 6.89 THOUSAND/UL (ref 4.31–10.16)

## 2019-01-07 PROCEDURE — 85007 BL SMEAR W/DIFF WBC COUNT: CPT | Performed by: NURSE PRACTITIONER

## 2019-01-07 PROCEDURE — 99233 SBSQ HOSP IP/OBS HIGH 50: CPT | Performed by: INTERNAL MEDICINE

## 2019-01-07 PROCEDURE — 80048 BASIC METABOLIC PNL TOTAL CA: CPT | Performed by: NURSE PRACTITIONER

## 2019-01-07 PROCEDURE — 99232 SBSQ HOSP IP/OBS MODERATE 35: CPT | Performed by: INTERNAL MEDICINE

## 2019-01-07 PROCEDURE — 83735 ASSAY OF MAGNESIUM: CPT | Performed by: NURSE PRACTITIONER

## 2019-01-07 PROCEDURE — 97116 GAIT TRAINING THERAPY: CPT

## 2019-01-07 PROCEDURE — 97110 THERAPEUTIC EXERCISES: CPT

## 2019-01-07 PROCEDURE — 85027 COMPLETE CBC AUTOMATED: CPT | Performed by: NURSE PRACTITIONER

## 2019-01-07 PROCEDURE — 99223 1ST HOSP IP/OBS HIGH 75: CPT | Performed by: PHYSICIAN ASSISTANT

## 2019-01-07 PROCEDURE — 84100 ASSAY OF PHOSPHORUS: CPT | Performed by: NURSE PRACTITIONER

## 2019-01-07 PROCEDURE — 97530 THERAPEUTIC ACTIVITIES: CPT

## 2019-01-07 RX ORDER — ALPRAZOLAM 0.25 MG/1
0.25 TABLET ORAL
Status: DISCONTINUED | OUTPATIENT
Start: 2019-01-07 | End: 2019-01-10 | Stop reason: HOSPADM

## 2019-01-07 RX ORDER — OXYCODONE HYDROCHLORIDE 5 MG/1
5 TABLET ORAL EVERY 4 HOURS PRN
Status: DISCONTINUED | OUTPATIENT
Start: 2019-01-07 | End: 2019-01-10 | Stop reason: HOSPADM

## 2019-01-07 RX ORDER — FUROSEMIDE 10 MG/ML
20 INJECTION INTRAMUSCULAR; INTRAVENOUS ONCE
Status: COMPLETED | OUTPATIENT
Start: 2019-01-07 | End: 2019-01-07

## 2019-01-07 RX ORDER — METOPROLOL SUCCINATE 50 MG/1
50 TABLET, EXTENDED RELEASE ORAL 2 TIMES DAILY
Status: DISCONTINUED | OUTPATIENT
Start: 2019-01-07 | End: 2019-01-08

## 2019-01-07 RX ORDER — NORTRIPTYLINE HYDROCHLORIDE 10 MG/1
10 CAPSULE ORAL
Status: DISCONTINUED | OUTPATIENT
Start: 2019-01-07 | End: 2019-01-10 | Stop reason: HOSPADM

## 2019-01-07 RX ORDER — OXYCODONE HYDROCHLORIDE 5 MG/1
2.5 TABLET ORAL EVERY 4 HOURS PRN
Status: DISCONTINUED | OUTPATIENT
Start: 2019-01-07 | End: 2019-01-10 | Stop reason: HOSPADM

## 2019-01-07 RX ORDER — AMIODARONE HYDROCHLORIDE 200 MG/1
200 TABLET ORAL
Status: DISCONTINUED | OUTPATIENT
Start: 2019-01-07 | End: 2019-01-10 | Stop reason: HOSPADM

## 2019-01-07 RX ADMIN — PANTOPRAZOLE SODIUM 40 MG: 40 TABLET, DELAYED RELEASE ORAL at 05:53

## 2019-01-07 RX ADMIN — MELATONIN 6 MG: at 21:35

## 2019-01-07 RX ADMIN — NORTRIPTYLINE HYDROCHLORIDE 10 MG: 10 CAPSULE ORAL at 21:35

## 2019-01-07 RX ADMIN — FUROSEMIDE 20 MG: 10 INJECTION, SOLUTION INTRAMUSCULAR; INTRAVENOUS at 19:04

## 2019-01-07 RX ADMIN — THIAMINE HCL TAB 100 MG 100 MG: 100 TAB at 08:47

## 2019-01-07 RX ADMIN — ACETAMINOPHEN 975 MG: 325 TABLET, FILM COATED ORAL at 05:53

## 2019-01-07 RX ADMIN — OXYCODONE HYDROCHLORIDE 10 MG: 10 TABLET ORAL at 05:56

## 2019-01-07 RX ADMIN — CLOPIDOGREL BISULFATE 75 MG: 75 TABLET ORAL at 08:47

## 2019-01-07 RX ADMIN — METOPROLOL SUCCINATE 50 MG: 50 TABLET, EXTENDED RELEASE ORAL at 19:04

## 2019-01-07 RX ADMIN — ALPRAZOLAM 0.25 MG: 0.25 TABLET ORAL at 01:27

## 2019-01-07 RX ADMIN — METOPROLOL TARTRATE 25 MG: 25 TABLET, FILM COATED ORAL at 14:54

## 2019-01-07 RX ADMIN — FERROUS SULFATE TAB 325 MG (65 MG ELEMENTAL FE) 325 MG: 325 (65 FE) TAB at 08:47

## 2019-01-07 RX ADMIN — ACETAMINOPHEN 975 MG: 325 TABLET, FILM COATED ORAL at 14:53

## 2019-01-07 RX ADMIN — DIBASIC SODIUM PHOSPHATE, MONOBASIC POTASSIUM PHOSPHATE AND MONOBASIC SODIUM PHOSPHATE 2 TABLET: 852; 155; 130 TABLET ORAL at 08:49

## 2019-01-07 RX ADMIN — APIXABAN 5 MG: 5 TABLET, FILM COATED ORAL at 08:47

## 2019-01-07 RX ADMIN — MIRTAZAPINE 7.5 MG: 15 TABLET ORAL at 21:35

## 2019-01-07 RX ADMIN — FOLIC ACID 1 MG: 1 TABLET ORAL at 08:47

## 2019-01-07 RX ADMIN — METOPROLOL TARTRATE 25 MG: 25 TABLET, FILM COATED ORAL at 01:27

## 2019-01-07 RX ADMIN — APIXABAN 5 MG: 5 TABLET, FILM COATED ORAL at 17:31

## 2019-01-07 RX ADMIN — ACETAMINOPHEN 975 MG: 325 TABLET, FILM COATED ORAL at 21:35

## 2019-01-07 RX ADMIN — OXYCODONE HYDROCHLORIDE 5 MG: 5 TABLET ORAL at 19:24

## 2019-01-07 RX ADMIN — AMIODARONE HYDROCHLORIDE 200 MG: 200 TABLET ORAL at 19:08

## 2019-01-07 RX ADMIN — SENNOSIDES AND DOCUSATE SODIUM 1 TABLET: 8.6; 5 TABLET ORAL at 21:35

## 2019-01-07 NOTE — ASSESSMENT & PLAN NOTE
· CT on admission showed gallbladder wall thickening and gallbladder distention  · ERCP outpatient, need elective surgery of her gallbladder when medically stable   · Also requesting all of her teeth removed at that time     · LFTs WNL  · Serial abdominal exams

## 2019-01-07 NOTE — ASSESSMENT & PLAN NOTE
· Drop noted but suspect possible dilutional effect   · hgb 12 5 on admission currently 9 5  · Per nursing dark stool noted, check stool for occult blood  · Continue PPI  · Monitor CBC

## 2019-01-07 NOTE — ASSESSMENT & PLAN NOTE
· Palliative care following for symptom mgmt  · Continue oxycodone IR 10mg [id number] PRN  · Continue scheduled tylenol  · PDMP website queried- patient takes oxy 10 q6hrs PRN

## 2019-01-07 NOTE — PHYSICAL THERAPY NOTE
Physical Therapy Progress Note     01/07/19 1444   Pain Assessment   Pain Assessment 0-10   Pain Score 8   Pain Location Back   Pain Orientation Bilateral   Restrictions/Precautions   Weight Bearing Precautions Per Order No   Other Precautions Bed Alarm; Fall Risk;Pain;Telemetry   General   Family/Caregiver Present No   Cognition   Overall Cognitive Status WFL   Arousal/Participation Alert; Cooperative   Subjective   Subjective Pt reports back pain  Agrees to participate  Bed Mobility   Supine to Sit 4  Minimal assistance   Additional items Assist x 1;Verbal cues   Transfers   Sit to Stand 4  Minimal assistance   Additional items Assist x 1;Verbal cues   Stand to Sit 5  Supervision   Additional items Assist x 1;Verbal cues   Stand pivot 4  Minimal assistance   Additional items Assist x 1   Toilet transfer 4  Minimal assistance   Additional items Assist x 1   Ambulation/Elevation   Gait pattern Foward flexed; Short stride;Narrow AYE  (slow)   Gait Assistance 4  Minimal assist   Additional items Assist x 1;Verbal cues   Assistive Device Rolling walker   Distance 50 feet and 20 feet   Stair Management Assistance Not tested   Balance   Static Sitting Fair   Static Standing Fair -   Dynamic Standing Poor +   Ambulatory Poor +   Endurance Deficit   Endurance Deficit Yes   Endurance Deficit Description pain   Activity Tolerance   Activity Tolerance Patient limited by pain; Patient limited by fatigue   Nurse [facility] to see per RN Aide   Exercises   Hip Flexion Sitting;20 reps;AROM; Bilateral   Knee AROM Long Arc Quad Sitting;20 reps;AROM; Bilateral   Ankle Pumps Sitting;20 reps;AROM; Bilateral   Assessment   Prognosis Fair   Problem List Decreased strength;Decreased endurance; Impaired balance;Decreased mobility; Decreased coordination;Pain   Assessment Pt is making slow but steady progress with the use of a rolling walker  Requires min assist for transfers and ambulation    Gait is slow and initially unsteady due to narrow base of support  Without loss of balance today  Requires additional 9 min due to use of restroom  Completed seated exercises as well  SOB with activity however room air 95%  Pt would benefit from continued physical therapy to maximize functional mobility and safety  Goals   Patient Goals To get stronger   STG Expiration Date 01/14/19   Treatment Day 1   Plan   Treatment/Interventions Functional transfer training;LE strengthening/ROM; Therapeutic exercise; Endurance training;Patient/family training;Bed mobility;Gait training;Spoke to nursing   Progress Progressing toward goals   PT Frequency (3-5x/week)   Recommendation   Recommendation Short-term skilled PT   Equipment Recommended Walker  (JENS)     Venu Shearer, PTA

## 2019-01-07 NOTE — UTILIZATION REVIEW
Continued Stay Review    Date: 1/7/2018    Vital Signs: /81   Pulse 96   Temp 98 24 °F (36 8 °C)   Resp 18   Ht 5' 5" (1 651 m)   Wt 70 5 kg (155 lb 6 8 oz)   SpO2 99%   BMI 25 86 kg/m²     Medications:   Scheduled Meds:   Current Facility-Administered Medications:  acetaminophen 975 mg Oral Q8H KARELY   ALPRAZolam 0 25 mg Oral HS PRN   apixaban 5 mg Oral BID   calcium carbonate 1,000 mg Oral Daily PRN   clopidogrel 75 mg Oral Daily   ferrous sulfate 325 mg Oral Daily With Breakfast   folic acid 1 mg Oral Daily   melatonin 6 mg Oral HS   metoprolol tartrate 25 mg Oral Q12H KARELY   mirtazapine 7 5 mg Oral HS   nortriptyline 10 mg Oral HS   oxyCODONE 2 5 mg Oral Q4H PRN   oxyCODONE 5 mg Oral Q4H PRN   pantoprazole 40 mg Oral Early Morning   senna-docusate sodium 1 tablet Oral HS   thiamine 100 mg Oral Daily        Nursing orders  -  Telem - Urinary retention protocol - I & O q 4- Daily weights - SCD's to le's- Up with assistance - Diet cardiac  - PT/OT eval    Abnormal Labs/Diagnostic Results: 1/7 - Bun/cr 13/0 67  -  Lukas 8 0 - Wbc 6 89 - H/H 9 6-31 5    Age/Sex: 69 y o  female     Assessment/Plan:   Anemia     · Drop noted but suspect possible dilutional effect   · hgb 12 5 on admission currently 9 5  · Per nursing dark stool noted, check stool for occult blood  · Continue PPI  · Monitor CBC      Choledocholithiasis     · GI following, has hx of persistent choledocholithiasis s/p ERCP with stent exchange with persistent dilation of CBD and choledocholithiasis  · U/S abd: Choledocholithiasis and dilated common bile duct measuring up to 13 mm   A CBD stent is seen in place   No intrahepatic biliary ductal dilation    · LFTs normal  · Tolerating diet  · Continue bowel regimen  · OP follow per GI will likely need ERCP and elective cholecystectomy      NSTEMI (non-ST elevated myocardial infarction) ([facility] [address] ), type II     · Type 2 NSTEMI in the setting of Afib with RVR, hypotension s/p norepinephrine  · Patient free of chest pain  · Cardiology following  · Telemetry stable  · Continue plavix, BB      Hypophosphatemia   Assessment & Plan     · Replete  · Monitor phos level      SIRS (systemic inflammatory response syndrome) (HCC)     · Hypothermia, tachycardia, leukocytosis  · No evidence to suggest acute infection  · Monitor off antibiotics for now  · BC negative x 72 hours      Atrial fibrillation (HCC)     · S/p prior TIFFANIE/cardioversion  · Continue BB, amiodarone stopped by cardiology today  · Creatinine 0 81 and GFR 70  ? Stop heparin gtt and resume Eliquis   · Continue telem can d/c if HR remains stable for the net 24 hours       Coronary artery disease     · S/p NGOZI to RCA 7/2018  · Continue cardiac meds      AVNRT (AV johnaa re-entry tachycardia) ([facility] [address] )     · S/p prior ablation      Opioid dependence ([email or web address] )     · Palliative care following for symptom mgmt  · Continue oxycodone IR 10mg [id number] PRN  · Continue scheduled tylenol  · PDMP website queried- patient takes oxy 10 q6hrs PRN      Fall     · Possible syncope prior to admission  · CTH no acute abnormality   · CT Cspine not acute abnormality  · Found on floor of her home, covered on feces, doesn't recall fall, was unable to get up off the floor and may have been on floor for 2 days  · PT/OT evals  · Patient has poor family/social support  Suggest 24 hour supervision     · Consult geriatrics      Chronic systolic heart failure (HCC)     · Most recent echo showed EF 35%  · Continue BB  · Lasix remains on hold, does not appear volume overload on exam  · Monitor daily weights and I/O            Discharge Plan:  TBD

## 2019-01-07 NOTE — ASSESSMENT & PLAN NOTE
· S/p prior TIFFANIE/cardioversion  · Continue BB, amiodarone stopped by cardiology today  · Creatinine 0 81 and GFR 70  · Stop heparin gtt and resume Eliquis   · Continue telem can d/c if HR remains stable for the net 24 hours

## 2019-01-07 NOTE — PLAN OF CARE
Problem: PHYSICAL THERAPY ADULT  Goal: Performs mobility at highest level of function for planned discharge setting  See evaluation for individualized goals  Treatment/Interventions: LE strengthening/ROM, Functional transfer training, Therapeutic exercise, Endurance training, Bed mobility, Gait training, Spoke to nursing, OT, Spoke to case management  Equipment Recommended:  (Pt has RW, [facility])       See flowsheet documentation for full assessment, interventions and recommendations  Outcome: Progressing  Prognosis: Fair  Problem List: Decreased strength, Decreased endurance, Impaired balance, Decreased mobility, Decreased coordination, Pain  Assessment: Pt is making slow but steady progress with the use of a rolling walker  Requires min assist for transfers and ambulation  Gait is slow and initially unsteady due to narrow base of support  Without loss of balance today  Requires additional 9 min due to use of restroom  Completed seated exercises as well  SOB with activity however room air 95%  Pt would benefit from continued physical therapy to maximize functional mobility and safety  Barriers to Discharge: Inaccessible home environment, Decreased caregiver support     Recommendation: Short-term skilled PT     PT - OK to Discharge: Yes (To rehab when medically stable)    See flowsheet documentation for full assessment

## 2019-01-07 NOTE — ASSESSMENT & PLAN NOTE
· Type 2 NSTEMI in the setting of Afib with RVR, hypotension s/p norepinephrine  · Patient free of chest pain  · Cardiology following  · Telemetry stable  · Continue plavix, BB

## 2019-01-07 NOTE — RESTORATIVE TECHNICIAN NOTE
Restorative Specialist Mobility Note       Activity: Ambulate in agee, Ambulate in room, Bathroom privileges, Dangle, Stand at bedside (Educated/encouraged pt to ambulate with assistance 3-4 x's/day  Bed alarm on   Pt callbell, phone/tray within reach )     Assistive Device: Front wheel walker (Assisted PTA Tyler Jung)    Ronny BOJORQUEZ, Restorative Technician, [employer]

## 2019-01-07 NOTE — ASSESSMENT & PLAN NOTE
· GI following, has hx of persistent choledocholithiasis s/p ERCP with stent exchange with persistent dilation of CBD and choledocholithiasis  · U/S abd: Choledocholithiasis and dilated common bile duct measuring up to 13 mm  A CBD stent is seen in place  No intrahepatic biliary ductal dilation    · LFTs normal  · Tolerating diet  · Continue bowel regimen  · OP follow per GI will likely need ERCP and elective cholecystectomy

## 2019-01-07 NOTE — ASSESSMENT & PLAN NOTE
· Hypothermia, tachycardia, leukocytosis  · No evidence to suggest acute infection  · Monitor off antibiotics for now  · BC negative x 72 hours

## 2019-01-07 NOTE — ASSESSMENT & PLAN NOTE
- patient would like to continue working with therapy to get stronger with plan for discharge to SNF with likely transition to long term care    - time spent with patient providing support

## 2019-01-07 NOTE — PROGRESS NOTES
Cardiology   Mary Riddle 69 y o  female MRN: [id number]  Unit/Bed#: [facility] 924-01 Encounter: [id number]        Assessment/Plan:    >> Coronary artery disease  >> Atrial fibrillation with rapid ventricular response      Plan:    Continue Plavix 75 mg daily, continue Eliquis 5 mg daily for atrial fibrillation, resume home dose of amiodarone 200 mg daily,  Resume home dose of metoprolol succinate 50 mg b i d , discontinue metoprolol tartrate that was ordered here  Keep potassium greater than 4 and magnesium greater than 2    It is okay if her heart rate is in the 120s to 130s overnight, as it may take several hours for the revised medication doses to take effect    Subjective:  No complaint    Denies chest pain, shortness of breath, palpitations, orthopnea, PND, pedal edema, syncope, presyncope, diaphoresis, nausea/vomiting     Remainder of ROS done and negative    Telemetry:  AFib in the 120s              Historical Information   Past Medical History:   Diagnosis Date    A-fib ([facility] )     Acute respiratory disease     Anemia     Arthritis     CH[facility]congestive heart failure) ([facility])     Chronic pain     Heart failure (HCC)     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     History of colon polyps     Hx of long term use of blood thinners     Hypertension     Irregular heart beat     [facility]otic dependence [facility]     Rectal bleeding     Stroke [facility]     mild no deficiets/ memory loss    Uses walker      Past Surgical History:   Procedure Laterality Date    COLONOSCOPY      COLONOSCOPY N/A 7/27/2018    Procedure: COLONOSCOPY;  Surgeon: Liliya Ferraro MD;  [facility]: [facility]; Service: Gastroenterology    CORONARY STENT PLACEMENT      ERCP N/A 5/14/2018    Procedure: ENDOSCOPIC RETROGRADE CHOLANGIOPANCREATOGRAPHY (ERCP);   Surgeon: Cl Lisa MD;  Location: [facility];  Service: Gastroenterology    ERCP N/A 8/28/2018    Procedure: ENDOSCOPIC RETROGRADE CHOLANGIOPANCREATOGRAPHY (ERCP) w/ EGD;  Surgeon: Rogelio Willis MD;  [facility]: [facility]; Service: Gastroenterology    ESOPHAGOGASTRODUODENOSCOPY N/A 7/26/2018    Procedure: ESOPHAGOGASTRODUODENOSCOPY (EGD); Surgeon: Mikaela Knowles MD;  Location:  GI LAB; Service: Gastroenterology    JOINT REPLACEMENT Right     knee     Social History   History   Alcohol Use No     History   Drug Use No     History   Smoking Status    Former Smoker   Smokeless Tobacco    Never Used     Family History: History reviewed  No pertinent family history  Scheduled Meds:  Current Facility-Administered Medications:  acetaminophen 975 mg Oral Q8H [address] Jelena Meeta, PA-C   ALPRAZolam 0 25 mg Oral HS PRN Madiha Orn, PA-C   amiodarone 200 mg Oral Daily With Breakfast Anny Pearce MD   apixaban 5 mg Oral BID CHANDRIKA Khan   calcium carbonate 1,000 mg Oral Daily PRN Jelena Meeta, PA-C   clopidogrel 75 mg Oral Daily Jelena Meeta, PA-C   ferrous sulfate 325 mg Oral Daily With Breakfast LEON Gilliland-SHARI   folic acid 1 mg Oral Daily Jelena Meeta, PA-C   melatonin 6 mg Oral HS Jelena Meeta, PA-C   metoprolol succinate 50 mg Oral BID Anny Pearce MD   metoprolol tartrate 25 mg Oral Q12H [address] Matthew Hollis MD   mirtazapine 7 5 mg Oral HS Jelena Meeta, PA-C   nortriptyline 10 mg Oral HS Madiha Orn, PA-C   oxyCODONE 2 5 mg Oral Q4H PRN Madiha Orn, PA-C   oxyCODONE 5 mg Oral Q4H PRN Madiha Orn, PA-C   pantoprazole 40 mg Oral Early Morning Zach Frederick PA-C   senna-docusate sodium 1 tablet Oral HS Jelena Meeta, PA-C   thiamine 100 mg Oral Daily Jelena Meeta, PA-C     Continuous Infusions:   PRN Meds:  ALPRAZolam    calcium carbonate    oxyCODONE    oxyCODONE  current meds:   Current Facility-Administered Medications   Medication Dose Route Frequency    acetaminophen (TYLENOL) tablet 975 mg  975 mg Oral Q8H [address]    ALPRAZolam (XANAX) tablet 0 25 mg  0 25 mg Oral HS PRN    amiodarone tablet 200 mg  200 mg Oral Daily With Breakfast    apixaban (ELIQUIS) tablet 5 mg  5 mg Oral BID    calcium carbonate (TUMS) chewable tablet 1,000 mg  1,000 mg Oral Daily PRN    clopidogrel (PLAVIX) tablet 75 mg  75 mg Oral Daily    ferrous sulfate tablet 325 mg  325 mg Oral Daily With Breakfast    folic acid (FOLVITE) tablet 1 mg  1 mg Oral Daily    melatonin tablet 6 mg  6 mg Oral HS    metoprolol succinate (TOPROL-XL) 24 hr tablet 50 mg  50 mg Oral BID    mirtazapine (REMERON) tablet 7 5 mg  7 5 mg Oral HS    nortriptyline (PAMELOR) capsule 10 mg  10 mg Oral HS    oxyCODONE (ROXICODONE) IR tablet 2 5 mg  2 5 mg Oral Q4H PRN    oxyCODONE (ROXICODONE) IR tablet 5 mg  5 mg Oral Q4H PRN    pantoprazole (PROTONIX) EC tablet 40 mg  40 mg Oral Early Morning    senna-docusate sodium (SENOKOT S) 8 6-50 mg per tablet 1 tablet  1 tablet Oral HS    thiamine (VITAMIN B1) tablet 100 mg  100 mg Oral Daily       No Known Allergies    Objective   Vitals: Blood pressure 135/74, pulse (!) 124, temperature 98 24 °F (36 8 °C), resp  rate 18, height 5' 5" (1 651 m), weight 70 5 kg (155 lb 6 8 oz), SpO2 97 %  , Body mass index is 25 86 kg/m² , Orthostatic Blood Pressures      Most Recent Value   Blood Pressure  135/74 filed at 01/07/2019 1454   Patient Position - Orthostatic VS  Sitting filed at 01/05/2019 2207            Intake/Output Summary (Last 24 hours) at 01/07/19 1808  Last data filed at 01/07/19 1500   Gross per 24 hour   Intake             1020 ml   Output             1600 ml   Net             -580 ml       Invasive Devices     Peripheral Intravenous Line            Peripheral IV 01/05/19 Right Forearm 2 days                Physical Exam:    General:  AO x3, no acute distress  Cardiac:  S1-S2 normal  Irregular, tachycardic JVP:  Not seen  Lungs:  Clear to auscultation bilaterally, no wheezing or crackles    Abdomen:  Soft nontender nondistended, positive bowel sounds  Extremities:  Warm, well perfused, pulses palpable, no ulcers or rashes  Neuro: Grossly nonfocal  Psych:  Normal affect      Lab Results:   Recent Results (from the past 24 hour(s))   Phosphorus    Collection Time: 01/07/19  5:10 AM   Result Value Ref Range    Phosphorus 2 3 2 3 - 4 1 mg/dL   Basic metabolic panel    Collection Time: 01/07/19  5:10 AM   Result Value Ref Range    Sodium 140 136 - 145 mmol/L    Potassium 3 9 3 5 - 5 3 mmol/L    Chloride 108 100 - 108 mmol/L    CO2 28 21 - 32 mmol/L    ANION GAP 4 4 - 13 mmol/L    BUN 13 5 - 25 mg/dL    Creatinine 0 67 0 60 - 1 30 mg/dL    Glucose 92 65 - 140 mg/dL    Calcium 8 0 (L) 8 3 - 10 1 mg/dL    eGFR 85 ml/min/1 73sq m   Magnesium    Collection Time: 01/07/19  5:10 AM   Result Value Ref Range    Magnesium 1 9 1 6 - 2 6 mg/dL   CBC and differential    Collection Time: 01/07/19  5:10 AM   Result Value Ref Range    WBC 6 89 4 31 - 10 16 Thousand/uL    RBC 3 09 (L) 3 81 - 5 12 Million/uL    Hemoglobin 9 6 (L) 11 5 - 15 4 g/dL    Hematocrit 31 5 (L) 34 8 - 46 1 %     (H) 82 - 98 fL    MCH 31 1 26 8 - 34 3 pg    MCHC 30 5 (L) 31 4 - 37 4 g/dL    RDW 16 4 (H) 11 6 - 15 1 %    MPV 9 9 8 9 - 12 7 fL    Platelets 026 042 - 467 Thousands/uL    nRBC 0 /100 WBCs   Manual Differential(PHLEBS Do Not Order)    Collection Time: 01/07/19  5:10 AM   Result Value Ref Range    Segmented % 76 (H) 43 - 75 %    Lymphocytes % 4 (L) 14 - 44 %    Monocytes % 5 4 - 12 %    Eosinophils, % 8 (H) 0 - 6 %    Basophils % 1 0 - 1 %    Metamyelocytes% 1 0 - 1 %    Myelocytes % 1 0 - 1 %    Atypical Lymphocytes % 4 (H) <=0 %    Absolute Neutrophils 5 24 1 85 - 7 62 Thousand/uL    Lymphocytes Absolute 0 28 (L) 0 60 - 4 47 Thousand/uL    Monocytes Absolute 0 34 0 00 - 1 22 Thousand/uL    Eosinophils Absolute 0 55 (H) 0 00 - 0 40 Thousand/uL    Basophils Absolute 0 07 0 00 - 0 10 Thousand/uL    Total Counted      RBC Morphology Present     Ovalocytes Present     Polychromasia Present     Platelet Estimate Adequate Adequate       Imaging:  I have personally reviewed pertinent reports

## 2019-01-07 NOTE — PROGRESS NOTES
Progress Note - Theron Montaño [birth year], 69 y o  female MRN: [id number]    Unit/Bed#: [facility] 924-01 Encounter: [id number]    Primary Care Provider: Caitlyn Chase DO   Date and time admitted to hospital: 1/3/2019  1:39 PM        Anemia   Assessment & Plan    · Drop noted but suspect possible dilutional effect   · hgb 12 5 on admission currently 9 5  · Per nursing dark stool noted, check stool for occult blood  · Continue PPI  · Monitor CBC     Choledocholithiasis   Assessment & Plan    · GI following, has hx of persistent choledocholithiasis s/p ERCP with stent exchange with persistent dilation of CBD and choledocholithiasis  · U/S abd: Choledocholithiasis and dilated common bile duct measuring up to 13 mm  A CBD stent is seen in place  No intrahepatic biliary ductal dilation  · LFTs normal  · Tolerating diet  · Continue bowel regimen  · OP follow per GI will likely need ERCP and elective cholecystectomy     NSTEMI (non-ST elevated myocardial infarction) ([facility] [address] ), type II   Assessment & Plan    · Type 2 NSTEMI in the setting of Afib with RVR, hypotension s/p norepinephrine  · Patient free of chest pain  · Cardiology following  · Telemetry stable  · Continue plavix, BB     Hypophosphatemia   Assessment & Plan    · Replete  · Monitor phos level     SIRS (systemic inflammatory response syndrome) (HCC)   Assessment & Plan    · Hypothermia, tachycardia, leukocytosis  · No evidence to suggest acute infection  · Monitor off antibiotics for now  · BC negative x 72 hours     History of biliary duct stent placement   Assessment & Plan    · CT on admission showed gallbladder wall thickening and gallbladder distention  · ERCP outpatient, need elective surgery of her gallbladder when medically stable   · Also requesting all of her teeth removed at that time     · LFTs WNL  · Serial abdominal exams     Atrial fibrillation (HCC)   Assessment & Plan    · S/p prior TIFFANIE/cardioversion  · Continue BB, amiodarone stopped by cardiology · Creatinine 0 81 and GFR 70  · Stop heparin gtt and resume Eliquis   · Continue telem can d/c if HR remains stable for the net 24 hours      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[facility] [address] )   Assessment & Plan    · S/p prior ablation     Fall   Assessment & Plan    · Possible syncope prior to admission  · CTH no acute abnormality   · CT Cspine not acute abnormality  · Found on floor of her home, covered on feces, doesn't recall fall, was unable to get up off the floor and may have been on floor for 2 days  · PT/OT evals  · Patient has poor family/social support  Suggest 24 hour supervision  · Consult geriatrics     Chronic systolic heart failure (HCC)   Assessment & Plan    · Most recent echo showed EF 35%  · Continue BB  · Lasix remains on hold, does not appear volume overload on exam  · Monitor daily weights and I/O            VTE Pharmacologic Prophylaxis:   Pharmacologic: Apixaban (Eliquis)  Mechanical VTE Prophylaxis in Place: Yes    Patient Centered Rounds: I have performed bedside rounds with nursing staff today  Discussions with Specialists or Other Care Team Provider:      Education and Discussions with Family / Patient: patient and sister on the phone    Time Spent for Care: 30 minutes  More than 50% of total time spent on counseling and coordination of care as described above  Current Length of Stay: 4 day(s)    Current Patient Status: Inpatient   Certification Statement: The patient will continue to require additional inpatient hospital stay due to  fall syncope work up    Discharge Plan: need ot/pt    Code Status: Level 3 - DNAR and DNI      Subjective:   I am doing well  I want to know when I will get these procedures done  No pain at this time      Objective:     Vitals:   Temp (24hrs), Av 2 °F (36 8 °C), Min:98 1 °F (36 7 °C), Max:98 24 °F (36 8 °C)    Temp:  [98 1 °F (36 7 °C)-98 24 °F (36 8 °C)] 98 24 °F (36 8 °C)  HR:  [] 124  Resp:  [18] 18  BP: (135-178)/(74-93) 135/74  SpO2: [97 %-99 %] 97 %  Body mass index is 25 86 kg/m²  Input and Output Summary (last 24 hours): Intake/Output Summary (Last 24 hours) at 01/07/19 1644  Last data filed at 01/07/19 1500   Gross per 24 hour   Intake             1020 ml   Output             2000 ml   Net             -980 ml       Physical Exam:     Physical Exam   Constitutional: She is oriented to person, place, and time  She appears well-developed and well-nourished  No distress  HENT:   Head: Normocephalic  Right Ear: External ear normal    Eyes: Pupils are equal, round, and reactive to light  Conjunctivae and EOM are normal  Right eye exhibits no discharge  Left eye exhibits no discharge  No scleral icterus  Neck: Normal range of motion  Neck supple  No JVD present  No tracheal deviation present  No thyromegaly present  Cardiovascular: Normal rate, regular rhythm, normal heart sounds and intact distal pulses  Exam reveals no gallop and no friction rub  No murmur heard  Pulmonary/Chest: Effort normal and breath sounds normal  No stridor  No respiratory distress  She has no wheezes  She has no rales  She exhibits no tenderness  Abdominal: Soft  Bowel sounds are normal  She exhibits no distension and no mass  There is no tenderness  There is no rebound and no guarding  Musculoskeletal: Normal range of motion  She exhibits no edema, tenderness or deformity  Lymphadenopathy:     She has no cervical adenopathy  Neurological: She is alert and oriented to person, place, and time  She has normal reflexes  She displays normal reflexes  No cranial nerve deficit  She exhibits normal muscle tone  Coordination normal    Skin: Skin is warm  No rash noted  She is diaphoretic  No erythema  No pallor  Psychiatric: She has a normal mood and affect  Her behavior is normal  Judgment and thought content normal    Nursing note and vitals reviewed        Additional Data:     Labs:      Results from last 7 days  Lab Units 01/07/19  1553 01/05/19  0932   WBC Thousand/uL 6 89  < > 5 52   HEMOGLOBIN g/dL 9 6*  < > 9 6*   HEMATOCRIT % 31 5*  < > 31 0*   PLATELETS Thousands/uL 260  < > 235   NEUTROS PCT %  --   --  66   LYMPHS PCT %  --   --  19   LYMPHO PCT % 4*  < >  --    MONOS PCT %  --   --  10   MONO PCT % 5  < >  --    EOS PCT % 8*  < > 4   < > = values in this interval not displayed  Results from last 7 days  Lab Units 01/07/19  0510  01/05/19  0506   POTASSIUM mmol/L 3 9  < > 4 0   CHLORIDE mmol/L 108  < > 110*   CO2 mmol/L 28  < > 24   BUN mg/dL 13  < > 46*   CREATININE mg/dL 0 67  < > 1 06   CALCIUM mg/dL 8 0*  < > 7 9*   ALK PHOS U/L  --   --  75   ALT U/L  --   --  22   AST U/L  --   --  25   < > = values in this interval not displayed  Results from last 7 days  Lab Units 01/03/19 2027   INR  1 87*       * I Have Reviewed All Lab Data Listed Above  * Additional Pertinent Lab Tests Reviewed: Stu 66 Admission Reviewed    Imaging:    Imaging Reports Reviewed Today Include:    Imaging Personally Reviewed by Myself Includes:      Recent Cultures (last 7 days):       Results from last 7 days  Lab Units 01/03/19  1404   BLOOD CULTURE  No Growth at 72 hrs  No Growth at 72 hrs         Last 24 Hours Medication List:     Current Facility-Administered Medications:  acetaminophen 975 mg Oral Q8H [address] Amy Collins PA-C   ALPRAZolam 0 25 mg Oral HS PRN Maggie Devi PA-C   apixaban 5 mg Oral BID CHANDRIKA Hammond   calcium carbonate 1,000 mg Oral Daily PRN Amy Collins PA-C   clopidogrel 75 mg Oral Daily Amy Collins PA-C   ferrous sulfate 325 mg Oral Daily With Breakfast Amy Collins PA-C   folic acid 1 mg Oral Daily Amy Collins PA-C   melatonin 6 mg Oral HS Amy Collins PA-C   metoprolol tartrate 25 mg Oral Q12H [address] Manny Andrews MD   mirtazapine 7 5 mg Oral HS Amy Collins PA-C   nortriptyline 10 mg Oral HS Maggie Devi PA-C   oxyCODONE 2 5 mg Oral Q4H PRN Leighann Gong WILLIAM Schulz   oxyCODONE 5 mg Oral Q4H PRN Ursula Waddell PA-C   pantoprazole 40 mg Oral Early Morning Christa Moreno PA-C   senna-docusate sodium 1 tablet Oral HS Troy Joyce PA-C   thiamine 100 mg Oral Daily Troy Joyce PA-C        Today, Patient Was Seen By: Shelli Beltrán MD    ** Please Note: Dictation voice to text software may have been used in the creation of this document   **
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
## 2019-01-07 NOTE — ASSESSMENT & PLAN NOTE
· S/p prior TIFFANIE/cardioversion  · Continue BB, amiodarone stopped by cardiology    · Creatinine 0 81 and GFR 70  · Stop heparin gtt and resume Eliquis   · Continue telem can d/c if HR remains stable for the net 24 hours

## 2019-01-07 NOTE — ASSESSMENT & PLAN NOTE
· Possible syncope prior to admission  · CTH no acute abnormality   · CT Cspine not acute abnormality  · Found on floor of her home, covered on feces, doesn't recall fall, was unable to get up off the floor and may have been on floor for 2 days  · PT/OT evals  · Patient has poor family/social support  Suggest 24 hour supervision     · Consult geriatrics
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
## 2019-01-07 NOTE — ASSESSMENT & PLAN NOTE
· Most recent echo showed EF 35%  · Continue BB  · Lasix remains on hold, does not appear volume overload on exam  · Monitor daily weights and I/O

## 2019-01-07 NOTE — PROGRESS NOTES
Progress Note - Gwendolyn Cruise [birth year], 69 y o  female MRN: [id number]    Unit/Bed#: [facility] 625-01 Encounter: [id number]    Primary Care Provider: Zoey Domingo DO   Date and time admitted to hospital: 1/3/2019  1:39 PM        Anemia   Assessment & Plan    · Drop noted but suspect possible dilutional effect   · hgb 12 5 on admission currently 9 5  · Per nursing dark stool noted, check stool for occult blood  · Continue PPI  · Monitor CBC     Choledocholithiasis   Assessment & Plan    · GI following, has hx of persistent choledocholithiasis s/p ERCP with stent exchange with persistent dilation of CBD and choledocholithiasis  · U/S abd: Choledocholithiasis and dilated common bile duct measuring up to 13 mm  A CBD stent is seen in place  No intrahepatic biliary ductal dilation    · LFTs normal  · Tolerating diet  · Continue bowel regimen  · OP follow per GI will likely need ERCP and elective cholecystectomy     NSTEMI (non-ST elevated myocardial infarction) ([facility] [address] ), type II   Assessment & Plan    · Type 2 NSTEMI in the setting of Afib with RVR, hypotension s/p norepinephrine  · Patient free of chest pain  · Cardiology following  · Telemetry stable  · Continue plavix, BB     Hypophosphatemia   Assessment & Plan    · Replete  · Monitor phos level     SIRS (systemic inflammatory response syndrome) (HCC)   Assessment & Plan    · Hypothermia, tachycardia, leukocytosis  · No evidence to suggest acute infection  · Monitor off antibiotics for now  · BC negative x 72 hours     Atrial fibrillation (HCC)   Assessment & Plan    · S/p prior TIFFANIE/cardioversion  · Continue BB, amiodarone stopped by cardiology today  · Creatinine 0 81 and GFR 70  · Stop heparin gtt and resume Eliquis   · Continue telem can d/c if HR remains stable for the net 24 hours      Coronary artery disease   Assessment & Plan    · S/p NGOZI to RCA 7/2018  · Continue cardiac meds     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[facility] [address] )   Assessment & Plan    · S/p prior ablation     Opioid dependence ([facility] [address] )   Assessment & Plan    · Palliative care following for symptom mgmt  · Continue oxycodone IR 10mg [id number] PRN  · Continue scheduled tylenol  · PDMP website queried- patient takes oxy 10 q6hrs PRN     Fall   Assessment & Plan    · Possible syncope prior to admission  · CTH no acute abnormality   · CT Cspine not acute abnormality  · Found on floor of her home, covered on feces, doesn't recall fall, was unable to get up off the floor and may have been on floor for 2 days  · PT/OT evals  · Patient has poor family/social support  Suggest 24 hour supervision  · Consult geriatrics     Chronic systolic heart failure (HCC)   Assessment & Plan    · Most recent echo showed EF 35%  · Continue BB  · Lasix remains on hold, does not appear volume overload on exam  · Monitor daily weights and I/O       VTE Pharmacologic Prophylaxis:   Pharmacologic: Apixaban (Eliquis)  Mechanical VTE Prophylaxis in Place: Yes    Patient Centered Rounds: I have performed bedside rounds with nursing staff today  Discussions with Specialists or Other Care Team Provider: GI    Education and Discussions with Family / Patient: patient     Time Spent for Care: 30 minutes  More than 50% of total time spent on counseling and coordination of care as described above  Current Length of Stay: 3 day(s)    Current Patient Status: Inpatient   Certification Statement: The patient will continue to require additional inpatient hospital stay due to need for geriatrics eval and safe discharge plan    Discharge Plan: patient will need 24 hours supervision and PT/OT recommends rehab    Code Status: Level 3 - DNAR and DNI      Subjective:   Reports not sleeping despite medications, states no new problem and was to have sleep study completed as an OP  Denies CP or SOB       Objective:     Vitals:   Temp (24hrs), Av °F (36 7 °C), Min:97 5 °F (36 4 °C), Max:98 2 °F (36 8 °C)    Temp:  [97 5 °F (36 4 °C)-98 2 °F (36 8 °C)] 98 2 °F (36 8 °C)  HR:  [109-118] 111  Resp:  [18] 18  BP: (132-148)/(78-93) 137/84  SpO2:  [96 %-97 %] 97 %  Body mass index is 27 37 kg/m²  Input and Output Summary (last 24 hours): Intake/Output Summary (Last 24 hours) at 01/06/19 2039  Last data filed at 01/06/19 1855   Gross per 24 hour   Intake          1079 33 ml   Output             1652 ml   Net          -572 67 ml       Physical Exam:     Physical Exam   Constitutional: She is oriented to person, place, and time  She appears well-developed and well-nourished  No distress  HENT:   Head: Normocephalic  Mouth/Throat: Oropharynx is clear and moist    Neck: Neck supple  Cardiovascular: An irregular rhythm present  Tachycardia present  Pulmonary/Chest: Effort normal and breath sounds normal  No respiratory distress  Abdominal: Soft  Bowel sounds are normal  She exhibits no distension  There is no tenderness  Musculoskeletal: Normal range of motion  She exhibits no edema  Neurological: She is alert and oriented to person, place, and time  Skin: Skin is warm and dry  Psychiatric: She has a normal mood and affect  Her behavior is normal    Vitals reviewed  Additional Data:     Labs:      Results from last 7 days  Lab Units 01/06/19  0240 01/05/19  0932   WBC Thousand/uL 4 93 5 52   HEMOGLOBIN g/dL 9 5* 9 6*   HEMATOCRIT % 30 8* 31 0*   PLATELETS Thousands/uL 236 235   NEUTROS PCT %  --  66   LYMPHS PCT %  --  19   LYMPHO PCT % 27  --    MONOS PCT %  --  10   MONO PCT % 8  --    EOS PCT % 4 4       Results from last 7 days  Lab Units 01/06/19  0240 01/05/19  0506   POTASSIUM mmol/L 3 8 4 0   CHLORIDE mmol/L 108 110*   CO2 mmol/L 25 24   BUN mg/dL 20 46*   CREATININE mg/dL 0 81 1 06   CALCIUM mg/dL 7 8* 7 9*   ALK PHOS U/L  --  75   ALT U/L  --  22   AST U/L  --  25       Results from last 7 days  Lab Units 01/03/19 2027   INR  1 87*       * I Have Reviewed All Lab Data Listed Above  * Additional Pertinent Lab Tests Reviewed:  All Labs For Current Hospital Admission Reviewed    Imaging:    Imaging Reports Reviewed Today Include: U/S RUQ, CT Cspine, CTH  Imaging Personally Reviewed by Myself Includes:  none    Recent Cultures (last 7 days):       Results from last 7 days  Lab Units 01/03/19  1404   BLOOD CULTURE  No Growth at 72 hrs  No Growth at 72 hrs  Last 24 Hours Medication List:     Current Facility-Administered Medications:  acetaminophen 975 mg Oral Q8H [address] [place] Roche, PA-SHARI   ALPRAZolam 0 25 mg Oral BID PRN CHANDRIKA   apixaban 5 mg Oral BID Druscilla Goodell, CRNP   calcium carbonate 1,000 mg Oral Daily PRN Behzad Roche, PA-SHARI   clopidogrel 75 mg Oral Daily [place] Roche, PA-SHARI   ferrous sulfate 325 mg Oral Daily With Breakfast [place] Roche, PA-SHARI   folic acid 1 mg Oral Daily [place] Roche, WILLIAM   melatonin 6 mg Oral HS [place] Roche, PA-SHARI   metoprolol tartrate 25 mg Oral Q12H [address] Cindy Rascon MD   mirtazapine 7 5 mg Oral HS Behzad Roche, WILLIAM   nortriptyline 20 mg Oral HS Behzad Roche, PA-SHARI   oxyCODONE 10 mg Oral Q6H PRN Behzad Roche, PA-SHARI   pantoprazole 40 mg Oral Early Morning Dee Dee Norris PA-C   potassium-sodium phosphateS 2 tablet Oral BID With Meals Druscilla Goodell, CRNP   senna-docusate sodium 1 tablet Oral HS Behzad Roche, WILLIAM   thiamine 100 mg Oral Daily Behzad Roche, WILLIAM        Today, Patient Was Seen By: Druscilla Goodell, CRNP    ** Please Note: Dictation voice to text software may have been used in the creation of this document   **

## 2019-01-08 LAB
ANION GAP SERPL CALCULATED.3IONS-SCNC: 4 MMOL/L (ref 4–13)
ANISOCYTOSIS BLD QL SMEAR: PRESENT
BACTERIA BLD CULT: NORMAL
BACTERIA BLD CULT: NORMAL
BASOPHILS # BLD MANUAL: 0.09 THOUSAND/UL (ref 0–0.1)
BASOPHILS NFR MAR MANUAL: 1 % (ref 0–1)
BUN SERPL-MCNC: 14 MG/DL (ref 5–25)
CALCIUM SERPL-MCNC: 8.5 MG/DL (ref 8.3–10.1)
CHLORIDE SERPL-SCNC: 105 MMOL/L (ref 100–108)
CO2 SERPL-SCNC: 31 MMOL/L (ref 21–32)
CREAT SERPL-MCNC: 0.81 MG/DL (ref 0.6–1.3)
EOSINOPHIL # BLD MANUAL: 0.26 THOUSAND/UL (ref 0–0.4)
EOSINOPHIL NFR BLD MANUAL: 3 % (ref 0–6)
ERYTHROCYTE [DISTWIDTH] IN BLOOD BY AUTOMATED COUNT: 16.6 % (ref 11.6–15.1)
GFR SERPL CREATININE-BSD FRML MDRD: 70 ML/MIN/1.73SQ M
GLUCOSE SERPL-MCNC: 114 MG/DL (ref 65–140)
HCT VFR BLD AUTO: 33.6 % (ref 34.8–46.1)
HGB BLD-MCNC: 10.1 G/DL (ref 11.5–15.4)
LYMPHOCYTES # BLD AUTO: 1.05 THOUSAND/UL (ref 0.6–4.47)
LYMPHOCYTES # BLD AUTO: 12 % (ref 14–44)
MACROCYTES BLD QL AUTO: PRESENT
MCH RBC QN AUTO: 31.2 PG (ref 26.8–34.3)
MCHC RBC AUTO-ENTMCNC: 30.1 G/DL (ref 31.4–37.4)
MCV RBC AUTO: 104 FL (ref 82–98)
MONOCYTES # BLD AUTO: 0.52 THOUSAND/UL (ref 0–1.22)
MONOCYTES NFR BLD: 6 % (ref 4–12)
NEUTROPHILS # BLD MANUAL: 6.82 THOUSAND/UL (ref 1.85–7.62)
NEUTS SEG NFR BLD AUTO: 78 % (ref 43–75)
NRBC BLD AUTO-RTO: 0 /100 WBCS
NRBC BLD AUTO-RTO: 2 /100 WBC (ref 0–2)
PLATELET # BLD AUTO: 301 THOUSANDS/UL (ref 149–390)
PLATELET BLD QL SMEAR: ADEQUATE
PMV BLD AUTO: 9.9 FL (ref 8.9–12.7)
POLYCHROMASIA BLD QL SMEAR: PRESENT
POTASSIUM SERPL-SCNC: 3.9 MMOL/L (ref 3.5–5.3)
RBC # BLD AUTO: 3.24 MILLION/UL (ref 3.81–5.12)
RBC MORPH BLD: PRESENT
SODIUM SERPL-SCNC: 140 MMOL/L (ref 136–145)
VIT B12 SERPL-MCNC: 533 PG/ML (ref 100–900)
WBC # BLD AUTO: 8.74 THOUSAND/UL (ref 4.31–10.16)

## 2019-01-08 PROCEDURE — 99231 SBSQ HOSP IP/OBS SF/LOW 25: CPT | Performed by: INTERNAL MEDICINE

## 2019-01-08 PROCEDURE — 99233 SBSQ HOSP IP/OBS HIGH 50: CPT | Performed by: PHYSICIAN ASSISTANT

## 2019-01-08 PROCEDURE — 97110 THERAPEUTIC EXERCISES: CPT

## 2019-01-08 PROCEDURE — 85027 COMPLETE CBC AUTOMATED: CPT | Performed by: INTERNAL MEDICINE

## 2019-01-08 PROCEDURE — 80048 BASIC METABOLIC PNL TOTAL CA: CPT | Performed by: INTERNAL MEDICINE

## 2019-01-08 PROCEDURE — 97116 GAIT TRAINING THERAPY: CPT

## 2019-01-08 PROCEDURE — 85007 BL SMEAR W/DIFF WBC COUNT: CPT | Performed by: INTERNAL MEDICINE

## 2019-01-08 PROCEDURE — 99232 SBSQ HOSP IP/OBS MODERATE 35: CPT | Performed by: INTERNAL MEDICINE

## 2019-01-08 PROCEDURE — 82607 VITAMIN B-12: CPT | Performed by: PHYSICIAN ASSISTANT

## 2019-01-08 RX ORDER — FUROSEMIDE 40 MG/1
40 TABLET ORAL DAILY
Status: DISCONTINUED | OUTPATIENT
Start: 2019-01-09 | End: 2019-01-10 | Stop reason: HOSPADM

## 2019-01-08 RX ORDER — FUROSEMIDE 10 MG/ML
20 INJECTION INTRAMUSCULAR; INTRAVENOUS ONCE
Status: COMPLETED | OUTPATIENT
Start: 2019-01-08 | End: 2019-01-08

## 2019-01-08 RX ADMIN — PANTOPRAZOLE SODIUM 40 MG: 40 TABLET, DELAYED RELEASE ORAL at 05:37

## 2019-01-08 RX ADMIN — CLOPIDOGREL BISULFATE 75 MG: 75 TABLET ORAL at 09:19

## 2019-01-08 RX ADMIN — NORTRIPTYLINE HYDROCHLORIDE 10 MG: 10 CAPSULE ORAL at 21:07

## 2019-01-08 RX ADMIN — MIRTAZAPINE 7.5 MG: 15 TABLET ORAL at 21:07

## 2019-01-08 RX ADMIN — ALPRAZOLAM 0.25 MG: 0.25 TABLET ORAL at 21:10

## 2019-01-08 RX ADMIN — OXYCODONE HYDROCHLORIDE 5 MG: 5 TABLET ORAL at 13:36

## 2019-01-08 RX ADMIN — OXYCODONE HYDROCHLORIDE 5 MG: 5 TABLET ORAL at 09:24

## 2019-01-08 RX ADMIN — METOPROLOL SUCCINATE 50 MG: 50 TABLET, EXTENDED RELEASE ORAL at 09:20

## 2019-01-08 RX ADMIN — MELATONIN 6 MG: at 21:07

## 2019-01-08 RX ADMIN — SENNOSIDES AND DOCUSATE SODIUM 1 TABLET: 8.6; 5 TABLET ORAL at 21:07

## 2019-01-08 RX ADMIN — ACETAMINOPHEN 975 MG: 325 TABLET, FILM COATED ORAL at 05:37

## 2019-01-08 RX ADMIN — APIXABAN 5 MG: 5 TABLET, FILM COATED ORAL at 09:19

## 2019-01-08 RX ADMIN — METOPROLOL SUCCINATE 75 MG: 25 TABLET, EXTENDED RELEASE ORAL at 17:49

## 2019-01-08 RX ADMIN — ACETAMINOPHEN 975 MG: 325 TABLET, FILM COATED ORAL at 13:36

## 2019-01-08 RX ADMIN — AMIODARONE HYDROCHLORIDE 200 MG: 200 TABLET ORAL at 09:20

## 2019-01-08 RX ADMIN — FOLIC ACID 1 MG: 1 TABLET ORAL at 09:20

## 2019-01-08 RX ADMIN — APIXABAN 5 MG: 5 TABLET, FILM COATED ORAL at 17:49

## 2019-01-08 RX ADMIN — ACETAMINOPHEN 975 MG: 325 TABLET, FILM COATED ORAL at 21:07

## 2019-01-08 RX ADMIN — OXYCODONE HYDROCHLORIDE 5 MG: 5 TABLET ORAL at 17:49

## 2019-01-08 RX ADMIN — FUROSEMIDE 20 MG: 10 INJECTION, SOLUTION INTRAMUSCULAR; INTRAVENOUS at 11:55

## 2019-01-08 RX ADMIN — FERROUS SULFATE TAB 325 MG (65 MG ELEMENTAL FE) 325 MG: 325 (65 FE) TAB at 09:19

## 2019-01-08 RX ADMIN — THIAMINE HCL TAB 100 MG 100 MG: 100 TAB at 09:19

## 2019-01-08 NOTE — PROGRESS NOTES
01/08/19 [address] Affiliation Bastian witness   Current Amish Involvement Patient not active with [employer]   Spiritual Beliefs/Perceptions   Concept of God Accepting   Relationship with God Close   Support Systems Family members   Stress Factors   Patient Stress Factors Health changes   Coping Responses   Patient Coping Fearful;Open/discussion; Sadness   Patient Spiritual Assessment   Feelings of Discouragement pt has lost two children  Feels she is losing her independence   Plan of Care   Comments Provided chaplaincy education  Cultivated a relationship of care and support  Listened empathically  Explored spiritual needs and resources  Explored relational needs and resources  Explored emotional needs and resources  Faciltiated story telling  Provided grief counseling  Reframed experience of pt  Emotional resoruces utilized  Made decisions  Emotional resources utilized    Verbally processed emotions   Assessment Completed by: Unit visit

## 2019-01-08 NOTE — PROGRESS NOTES
[facility] Hospitalist Service - Internal Medicine Progress Note       PATIENT INFORMATION      Patient: Kika Holt 69 y o  female   MRN: [id number]  PCP: Jose Newby DO  Unit/Bed#: [facility] 924-01 Encounter: [id number]  Date Of Visit: 01/08/19       ASSESSMENTS & PLAN       1  Physical deconditioning  · Appreciate PT/OT input - plan for skilled rehab on discharge once medically stable  · Appreciate palliative care and geriatrics input - medications being titrated for polypharmacy reduction    2  Systemic inflammatory response syndrome (SIRS)  · Previously with hypothermia/leukocytosis (now normalized) and intermittent tachycardia (h/o atrial fibrillation noted however)   · Blood cultures from 1/3 remain negative  · Monitor vital signs and maintain hemodynamics    3  Chronic opioid dependence  · Continue current analgesia regimen (w/ constipation prophylaxis)     4  Atrial fibrillation  · Rate controlled on Toprol-XL - on Eliquis for anticoagulation    5  CAD  · Status post prior stenting - continue Plavix/Toprol-XL    6  Chronic diastolic CHF  · Continue Lasix/Toprol-XL regimen - cardiology input appreciated - recent EF of 60%  · Monitor serum potassium/magnesium    7  Choledocholithiasis  · Appreciate Gastroenterology input - status post prior ERCP with biliary stenting - RUQ ultrasound revealing evidence of cholelithiasis with gallbladder sludge - recommend outpatient elective ERCP for stent removal/stone extraction off anticoagulation      8  Iron deficiency anemia  · Continue ferrous sulfate supplementation (w/ constipation prophylaxis)    9  Hypophosphatemia - Hypokalemia  · Monitor/replete as necessary      VTE Prophylaxis:  Eliquis      SUBJECTIVE     Seen/examined earlier today  No significant overnight events noted per patient today other than poor sleep overnight  She was sitting upright in a chair during my encounter and remained in pleasant demeanor    Still complains generalized weakness/fatigue however  OBJECTIVE     Vitals:   Temp (24hrs), Av 8 °F (36 6 °C), Min:97 5 °F (36 4 °C), Max:98 1 °F (36 7 °C)    Temp:  [97 5 °F (36 4 °C)-98 1 °F (36 7 °C)] 97 5 °F (36 4 °C)  HR:  [] 104  Resp:  [18-22] 20  BP: (131-142)/(72-88) 131/88  SpO2:  [96 %-100 %] 98 %  Body mass index is 26 85 kg/m²  Input and Output Summary (last 24 hours): Intake/Output Summary (Last 24 hours) at 19 1721  Last data filed at 19 1451   Gross per 24 hour   Intake              720 ml   Output             4550 ml   Net            -3830 ml       Physical Exam:     GENERAL:  Weak/fatigued - no acute distress  HEAD:  Normocephalic - atraumatic  EYES: PERRL - EOMI   MOUTH:  Mucosa moist  NECK:  Supple - full range of motion  CARDIAC:  Irregularly irregular but rate control - S1/S2 positive  PULMONARY:  Clear to auscultation bilaterally - nonlabored respirations  ABDOMEN:  Soft - nontender/nondistended - active bowel sounds  MUSCULOSKELETAL:  Motor strength/range of motion deconditioned  NEUROLOGIC:  Alert/await  SKIN:  Chronic wrinkles/blemishes   PSYCHIATRIC:  Mood/affect pleasant      ADDITIONAL DATA       Labs & Recent Cultures:       Results from last 7 days  Lab Units 19  0541  19  0932   WBC Thousand/uL 8 74  < > 5 52   HEMOGLOBIN g/dL 10 1*  < > 9 6*   HEMATOCRIT % 33 6*  < > 31 0*   PLATELETS Thousands/uL 301  < > 235   NEUTROS PCT %  --   --  66   LYMPHS PCT %  --   --  19   LYMPHO PCT % 12*  < >  --    MONOS PCT %  --   --  10   MONO PCT % 6  < >  --    EOS PCT % 3  < > 4   < > = values in this interval not displayed      Results from last 7 days  Lab Units 19  0541  19  0506   SODIUM mmol/L 140  < > 141   POTASSIUM mmol/L 3 9  < > 4 0   CHLORIDE mmol/L 105  < > 110*   CO2 mmol/L 31  < > 24   BUN mg/dL 14  < > 46*   CREATININE mg/dL 0 81  < > 1 06   CALCIUM mg/dL 8 5  < > 7 9*   ALK PHOS U/L  --   --  75   ALT U/L  --   --  22   AST U/L  --   --  25   < > = values in this interval not displayed  Results from last 7 days  Lab Units 01/03/19  2027   INR  1 87*           Results from last 7 days  Lab Units 01/03/19  1404   BLOOD CULTURE  No Growth After 5 Days  No Growth After 5 Days  Last 24 Hours Medication List:     Current Facility-Administered Medications:  acetaminophen 975 mg Oral Q8H [facility] Troy Joyce PA-C   ALPRAZolam 0 25 mg Oral HS PRN Ursula Waddell PA-C   amiodarone 200 mg Oral Daily With Breakfast Yara Latif MD   apixaban 5 mg Oral BID CHANDRIKA Villar   calcium carbonate 1,000 mg Oral Daily PRN Troy Joyce PA-C   clopidogrel 75 mg Oral Daily Troy Joyce PA-C   ferrous sulfate 325 mg Oral Daily With Breakfast Troy Joyce PA-C   folic acid 1 mg Oral Daily Troy Joyce PA-C   [START ON 1/9/2019] furosemide 40 mg Oral Daily Shola Hall MD   melatonin 6 mg Oral HS Troy Joyce PA-C   metoprolol succinate 75 mg Oral BID Shola Hall MD   mirtazapine 7 5 mg Oral HS Troy Joyce PA-C   nortriptyline 10 mg Oral HS Ursula Waddell PA-C   oxyCODONE 2 5 mg Oral Q4H PRN Ursula Waddell PA-C   oxyCODONE 5 mg Oral Q4H PRN Ursula Waddell PA-C   pantoprazole 40 mg Oral Early Morning Ileene CancelWILLIAM   senna-docusate sodium 1 tablet Oral HS Troy Joyce PA-C   thiamine 100 mg Oral Daily Troy Joyce PA-C          Time Spent for Care: 33 minutes  More than 50% of total time spent on counseling and coordination of care as described above  Current Length of Stay: 5 day(s)      Code Status: Level 3 - DNAR and DNI         ** Please Note: This note is constructed using a voice recognition dictation system   **

## 2019-01-08 NOTE — PLAN OF CARE
Problem: PHYSICAL THERAPY ADULT  Goal: Performs mobility at highest level of function for planned discharge setting  See evaluation for individualized goals  Treatment/Interventions: LE strengthening/ROM, Functional transfer training, Therapeutic exercise, Endurance training, Bed mobility, Gait training, Spoke to nursing, OT, Spoke to case management  Equipment Recommended:  (Pt has RW, [facility])       See flowsheet documentation for full assessment, interventions and recommendations  Outcome: Progressing  Prognosis: Good  Problem List: Decreased strength, Decreased endurance, Impaired balance, Decreased mobility, Decreased cognition, Decreased safety awareness, Decreased skin integrity  Assessment: Pt able to inc ambulation distance to 180 feet with use of RW on various surfaces needing minAx1  Pt needs multiple static stand rest breaks during upright mobility 2* reports of fatigue,SOB and BLE weakness  Pt able to perform sit to stand transfers and BM needing minAx1  Pt able to perform and complete BLE ther ex HEP in sitting position AROM with rest breaks inbetween each exercise  Pt would cont to benefit from skilled inpt PT services to maximize functional independence  Barriers to Discharge: Decreased caregiver support (lives alone)     Recommendation: Short-term skilled PT     PT - OK to Discharge: Yes (To rehab when medically stable)    See flowsheet documentation for full assessment

## 2019-01-08 NOTE — PROGRESS NOTES
Cardiology   Ethan Olivares 69 y o  female MRN: [id number]  Unit/Bed#: [facility] 924-01 Encounter: [id number]        Assessment/Plan:    >> Coronary artery disease  >> Atrial fibrillation with rapid ventricular response      Plan:    Continue Plavix 75 mg daily, continue Eliquis 5 mg daily for atrial fibrillation, resume home dose of amiodarone 200 mg daily,  Resume home dose of metoprolol succinate 50 mg b i d ,     Still somewhat tachycardic, continue to monitor on home dose of medications, will likely improve in the next 24 hours  Will repeat a dose of IV Lasix today, and consider switching to a home p o  Dose from tomorrow    Keep potassium greater than 4 and magnesium greater than 2    It is okay if her heart rate is in the 120s to 130s overnight, as it may take several hours for the revised medication doses to take effect    Weight is clearly not been charted accurately, needs daily standing weights    Strict intake and output charting    Subjective:  No complaints    Denies chest pain, shortness of breath, palpitations, orthopnea, PND, pedal edema, syncope, presyncope, diaphoresis, nausea/vomiting     Remainder of ROS done and negative    Telemetry:  AFib in the low 100s              Historical Information   Past Medical History:   Diagnosis Date    [facility]fib ([facility] )     Acute respiratory disease     Anemia     Arthritis     CHF (congestive heart failure) (HCC)     Chronic pain     Heart failure (HCC)     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 )     History of colon polyps     Hx of long term use of blood thinners     Hypertension     Irregular heart beat     Narcotic dependence ([facility] )     Rectal bleeding     Stroke ([facility] )     mild no deficiets/ memory loss    Uses walker      Past Surgical History:   Procedure Laterality Date    COLONOSCOPY      COLONOSCOPY N/A 7/27/2018    Procedure: COLONOSCOPY;  Surgeon: Pj Newsome MD;  [facility]: [facility];   Service: Gastroenterology   [facility] CORONARY STENT PLACEMENT      ERCP N/A 5/14/2018    Procedure: ENDOSCOPIC RETROGRADE CHOLANGIOPANCREATOGRAPHY (ERCP); Surgeon: Viraj Tadeo MD;  [facility];  Service: Gastroenterology    ERCP N/A 8/28/2018    Procedure: ENDOSCOPIC RETROGRADE CHOLANGIOPANCREATOGRAPHY (ERCP) w/ EGD;  Surgeon: Viraj Tadeo MD;  Location:  GI LAB; Service: Gastroenterology    ESOPHAGOGASTRODUODENOSCOPY N/A 7/26/2018    Procedure: ESOPHAGOGASTRODUODENOSCOPY (EGD); Surgeon: Cristiane Pablo MD;  Location: [facility]; Service: Gastroenterology    JOINT REPLACEMENT Right     knee     Social History   History   Alcohol Use No     History   Drug Use No     History   Smoking Status    Former Smoker   Smokeless Tobacco    Never Used     Family History: History reviewed  No pertinent family history  Scheduled Meds:    Current Facility-Administered Medications:  acetaminophen 975 mg Oral [facility] Chelita Lopez PA-C   ALPRAZolam 0 25 mg Oral HS PRN Juancarlos WILLIAM Mustafa   amiodarone 200 mg Oral Daily With Breakfast Freddie Roman MD   apixaban 5 mg Oral BID CHANDRIKA Lowe   calcium carbonate 1,000 mg Oral Daily PRN Chelita Lopez PA-C   clopidogrel 75 mg Oral Daily Chelita Lopez PA-C   ferrous sulfate 325 mg Oral Daily With Breakfast Chelita Lopez PA-C   folic acid 1 mg Oral Daily Chelita Lopez PA-C   melatonin 6 mg Oral HS Chelita Lopez PA-C   metoprolol succinate 50 mg Oral BID Freddie Roamn MD   mirtazapine 7 5 mg Oral HS Chelita Lopez PA-C   nortriptyline 10 mg Oral HS Juancarlos [place]   oxyCODONE 2 5 mg Oral Q4H PRN Juancarlos [place]   oxyCODONE 5 mg Oral Q4H PRN Juancarlos [place]   pantoprazole 40 mg Oral Early Morning Mildred Norris PA-C   senna-docusate sodium 1 tablet Oral HS Chelita Lopez PA-C   thiamine 100 mg Oral Daily Cheilta Lopez PA-C     Continuous Infusions:   PRN Meds:  ALPRAZolam    calcium carbonate    oxyCODONE   oxyCODONE  current meds:   Current Facility-Administered Medications   Medication Dose Route Frequency    acetaminophen (TYLENOL) tablet 975 mg  975 mg Oral Q8H [address]    ALPRAZolam (XANAX) tablet 0 25 mg  0 25 mg Oral HS PRN    amiodarone tablet 200 mg  200 mg Oral Daily With Breakfast    apixaban (ELIQUIS) tablet 5 mg  5 mg Oral BID    calcium carbonate (TUMS) chewable tablet 1,000 mg  1,000 mg Oral Daily PRN    clopidogrel (PLAVIX) tablet 75 mg  75 mg Oral Daily    ferrous sulfate tablet 325 mg  325 mg Oral Daily With Breakfast    folic acid (FOLVITE) tablet 1 mg  1 mg Oral Daily    melatonin tablet 6 mg  6 mg Oral HS    metoprolol succinate (TOPROL-XL) 24 hr tablet 50 mg  50 mg Oral BID    mirtazapine (REMERON) tablet 7 5 mg  7 5 mg Oral HS    nortriptyline (PAMELOR) capsule 10 mg  10 mg Oral HS    oxyCODONE (ROXICODONE) IR tablet 2 5 mg  2 5 mg Oral Q4H PRN    oxyCODONE (ROXICODONE) IR tablet 5 mg  5 mg Oral Q4H PRN    pantoprazole (PROTONIX) EC tablet 40 mg  40 mg Oral Early Morning    senna-docusate sodium (SENOKOT S) 8 6-50 mg per tablet 1 tablet  1 tablet Oral HS    thiamine (VITAMIN B1) tablet 100 mg  100 mg Oral Daily       No Known Allergies    Objective   Vitals: Blood pressure 131/88, pulse 104, temperature 97 5 °F (36 4 °C), resp  rate 20, height 5' 5" (1 651 m), weight 73 kg (160 lb 15 oz), SpO2 98 %  , Body mass index is 26 78 kg/m² ,   Orthostatic Blood Pressures      Most Recent Value   Blood Pressure  131/88 filed at 01/08/2019 5051   Patient Position - Orthostatic VS  Sitting filed at 01/05/2019 2207            Intake/Output Summary (Last 24 hours) at 01/08/19 1037  Last data filed at 01/08/19 0600   Gross per 24 hour   Intake              180 ml   Output             3250 ml   Net            -3070 ml       Invasive Devices     Peripheral Intravenous Line            Peripheral IV 01/05/19 Right Forearm 3 days                Physical Exam:    General:  AO x3, no acute distress  Cardiac:  S1-S2 normal  Irregular, tachycardic JVP:  Not seen  Lungs:  Clear to auscultation bilaterally, no wheezing or crackles    Abdomen:  Soft nontender nondistended, positive bowel sounds  Extremities:  Warm, well perfused, pulses palpable, no ulcers or rashes  Neuro: Grossly nonfocal  Psych:  Normal affect      Lab Results:   Recent Results (from the past 24 hour(s))   Vitamin B12    Collection Time: 01/08/19  5:41 AM   Result Value Ref Range    Vitamin B-12 533 100 - 900 pg/mL   CBC and differential    Collection Time: 01/08/19  5:41 AM   Result Value Ref Range    WBC 8 74 4 31 - 10 16 Thousand/uL    RBC 3 24 (L) 3 81 - 5 12 Million/uL    Hemoglobin 10 1 (L) 11 5 - 15 4 g/dL    Hematocrit 33 6 (L) 34 8 - 46 1 %     (H) 82 - 98 fL    MCH 31 2 26 8 - 34 3 pg    MCHC 30 1 (L) 31 4 - 37 4 g/dL    RDW 16 6 (H) 11 6 - 15 1 %    MPV 9 9 8 9 - 12 7 fL    Platelets 503 462 - 896 Thousands/uL    nRBC 0 /100 WBCs   Basic metabolic panel    Collection Time: 01/08/19  5:41 AM   Result Value Ref Range    Sodium 140 136 - 145 mmol/L    Potassium 3 9 3 5 - 5 3 mmol/L    Chloride 105 100 - 108 mmol/L    CO2 31 21 - 32 mmol/L    ANION GAP 4 4 - 13 mmol/L    BUN 14 5 - 25 mg/dL    Creatinine 0 81 0 60 - 1 30 mg/dL    Glucose 114 65 - 140 mg/dL    Calcium 8 5 8 3 - 10 1 mg/dL    eGFR 70 ml/min/1 73sq m   Manual Differential(PHLEBS Do Not Order)    Collection Time: 01/08/19  5:41 AM   Result Value Ref Range    Segmented % 78 (H) 43 - 75 %    Lymphocytes % 12 (L) 14 - 44 %    Monocytes % 6 4 - 12 %    Eosinophils, % 3 0 - 6 %    Basophils % 1 0 - 1 %    Absolute Neutrophils 6 82 1 85 - 7 62 Thousand/uL    Lymphocytes Absolute 1 05 0 60 - 4 47 Thousand/uL    Monocytes Absolute 0 52 0 00 - 1 22 Thousand/uL    Eosinophils Absolute 0 26 0 00 - 0 40 Thousand/uL    Basophils Absolute 0 09 0 00 - 0 10 Thousand/uL    Total Counted      nRBC 2 0 - 2 /100 WBC    RBC Morphology Present     Anisocytosis Present     Macrocytes Present     Polychromasia Present     Platelet Estimate Adequate Adequate       Imaging: I have personally reviewed pertinent reports

## 2019-01-08 NOTE — PHYSICAL THERAPY NOTE
Physical Therapy Tx Session:       01/08/19 1105   Pain Assessment   Pain Assessment No/denies pain   Pain Score No Pain   Restrictions/Precautions   Other Precautions Chair Alarm; Impulsive; Fall Risk;Multiple lines   General   Chart Reviewed Yes   Family/Caregiver Present No   Cognition   Overall Cognitive Status WFL   Arousal/Participation Arousable; Alert; Responsive   Attention Attends with cues to redirect   Orientation Level Oriented X4   Following Commands Follows one step commands with increased time or repetition  (2* impulsive at times,dec cog at times)   Subjective   Subjective pt supine in bed resting comfortably;pt willing and agreeable to work with PT and to participate in therapy intervention:"I will try and I know I need to walk more"   Bed Mobility   Supine to Sit 4  Minimal assistance   Additional items Assist x 1;HOB elevated; Bedrails; Increased time required;Verbal cues;LE management   Transfers   Sit to Stand 4  Minimal assistance   Additional items Assist x 1;Bedrails; Increased time required;Verbal cues   Stand to Sit 4  Minimal assistance   Additional items Assist x 1; Armrests; Increased time required;Verbal cues  (for safety,education and control descent)   Ambulation/Elevation   Gait pattern Poor UE support;Narrow AYE; Forward Flexion; Inconsistent annie; Foward flexed; Short stride; Ataxia   Gait Assistance 4  Minimal assist   Additional items Assist x 1;Verbal cues   Assistive Device Rolling walker   Distance 180 feet with use of RW on tile and hardwood zeke;pt needs multiple static stand rest breaks 2* inc fatigue and BLE weakness per pt   Balance   Static Sitting Good  (in chair postmobility with chair alarm intact)   Dynamic Sitting Poor +   Static Standing Poor +   Dynamic Standing Poor +   Ambulatory Poor +   Endurance Deficit   Endurance Deficit Yes   Endurance Deficit Description reports of fatigue,BLE weakness and SOB during upright mobility   Activity Tolerance   Activity Tolerance Patient limited by fatigue  (fair)   Medical Staff Elo Coreas 405 (CM)   Nurse Made Aware yes   Exercises   Hip Flexion Sitting;15 reps;AROM; Bilateral   Hip Abduction Sitting;15 reps;AROM; Bilateral   Hip Adduction Sitting;15 reps;AROM; Bilateral   Knee AROM Long Arc Quad Sitting;15 reps;AROM; Bilateral   Ankle Pumps Sitting;20 reps;AROM; Bilateral   Assessment   Prognosis Good   Problem List Decreased strength;Decreased endurance; Impaired balance;Decreased mobility; Decreased cognition;Decreased safety awareness;Decreased skin integrity   Assessment Pt able to inc ambulation distance to 180 feet with use of RW on various surfaces needing minAx1  Pt needs multiple static stand rest breaks during upright mobility 2* reports of fatigue,SOB and BLE weakness  Pt able to perform sit to stand transfers and BM needing minAx1  Pt able to perform and complete BLE ther ex HEP in sitting position AROM with rest breaks inbetween each exercise  Pt would cont to benefit from skilled inpt PT services to maximize functional independence  Barriers to Discharge Decreased caregiver support  (lives alone)   Goals   Patient Goals to walk more and to go to rehab   STG Expiration Date 01/14/19   Treatment Day 2   Plan   Treatment/Interventions Functional transfer training;LE strengthening/ROM; Therapeutic exercise; Endurance training;Patient/family training;Equipment eval/education; Bed mobility;Gait training;Spoke to nursing;Spoke to case management   Progress Slow progress, decreased activity tolerance   PT Frequency Other (Comment)  (3-5x/week;restorative therapy aide for mobility)   Recommendation   Recommendation Short-term skilled PT   Equipment Recommended Walker  (use of RW for mobility)   Skilled PT recommended while in hospital and upon DC to progress pt toward treatment goals

## 2019-01-08 NOTE — PROGRESS NOTES
Progress Note - Marquez Sanders 69 y o  female MRN: [id number]    [facility] Encounter: [id number]      Assessment/Plan  1  Polypharmacy  Recommendation made the same  Will reduce patient's Pamelor 10 mg q h s  Will reduce Xanax to 0 25 mg p r n  Q h s --nursing reports she did not take  Continue with patient's Remeron 7 5 mg q h s      2  Memory loss  Patient continues to be alert oriented x4  This is not new, patient complained of this in July and August as well  Patient scored 1/3 delayed recall today, unable to perform clock draw given vision impairment, patient was supposed to [facility] at [facility] upon discharge for formal cognitive assessment, this recommendation remains the same  B12 533     3  Difficulty with mobility, performing ADLs  Recommendations remain the same  Patient was evaluated by palliative care, reported goal was to go to rehab, then transition to long-term care  Agree with these recommendations  Continue with PT, OT     4  Insomnia  Continue with mirtazapine 7 5 mg q h s  Has Xanax 0 25 mg p r n  Q h s  Available to her, unclear if she takes it routinely or not, can continue for next 3 days then can stop and can continue with melatonin  Nursing reports patient did not take Xanax last night, but complains that she did not sleep much  Curious to see if nursing notices if she is sleeping or not, unsure she is reliable historian     5  Delirium precautions  Continue with Tylenol 975 mg Q 8  Continue with oxycodone to 2 5, 5 mg p r n  Q 4 for moderate, severe pain  Continue with Lidoderm patch if applicable  Continue with bowel regimen as ordered  Patient at risk for developing delirium, delirium preventing tactics advised  Redirect unwanted patient behaviors as first line tx  Reorient patient frequently    Avoid deliriogenic meds including tramadol, benzodiazepines, benadryl  Good sleep hygiene important, limit night time interruptions  Encourage patient to stay awake during the day  Ensure adequate hydration/nutrition  Mobilize often      6  Fall  Recommendations remain the same  Has recurrent falls  Suspect medications not helping  Continue with Pamelor 10 mg q h s  Continue with Xanax to 0 25 mg p r n  Q h s   PT, OT     7  Gallbladder wall thickening  Apparently patient is going to need cholecystectomy, but she reports per hospitalist she needs to be discharged, and then readmitted, this will be difficult as patient does not have support at home, and cannot get to places, will need to speak with case management about these recommendations       Subjective:   Patient reports she did not sleep well overnight, but per nursing she did not use her p r n  Xanax  Patient reports being cold, having large appetite  Patient denies  headaches, vision changes, chest pain, sob, N/V/D, constipation, difficulty with urination, muscle pain  Objective:     Vitals: Blood pressure 131/88, pulse 104, temperature 97 5 °F (36 4 °C), resp  rate 20, height 5' 5" (1 651 m), weight 73 2 kg (161 lb 6 oz), SpO2 98 %  ,Body mass index is 26 85 kg/m²  Intake/Output Summary (Last 24 hours) at 01/08/19 1317  Last data filed at 01/08/19 1240   Gross per 24 hour   Intake              480 ml   Output             4150 ml   Net            -3670 ml       Physical Exam: General : WD in NAD  HEENT : MMM no erythema or exudates  EOMI, sclera anicteric  Heart : Normal rate  Lungs : CTA  Abdomen : Soft, NT/ND, BS auscultated in all 4 quads  No organomegaly  Ext :  Pulses +2/4 B/L  Neg edema B/L  Neg calf swelling B/L  Skin : Pink, warm, dry, age appropriate turgor and mobility  Neuro : Nonfocal  Psych : A * O x 4       Invasive Devices     Peripheral Intravenous Line            Peripheral IV 01/05/19 Right Forearm 3 days                Lab, Imaging and other studies: I have personally reviewed pertinent reports      VTE Pharmacologic Prophylaxis: Sequential compression device (Venodyne)   VTE Mechanical Prophylaxis: sequential compression device

## 2019-01-09 LAB
ALBUMIN SERPL BCP-MCNC: 2.5 G/DL (ref 3.5–5)
ALP SERPL-CCNC: 98 U/L (ref 46–116)
ALT SERPL W P-5'-P-CCNC: 37 U/L (ref 12–78)
ANION GAP SERPL CALCULATED.3IONS-SCNC: 7 MMOL/L (ref 4–13)
ANISOCYTOSIS BLD QL SMEAR: PRESENT
AST SERPL W P-5'-P-CCNC: 31 U/L (ref 5–45)
BASOPHILS # BLD MANUAL: 0 THOUSAND/UL (ref 0–0.1)
BASOPHILS NFR MAR MANUAL: 0 % (ref 0–1)
BILIRUB SERPL-MCNC: 0.16 MG/DL (ref 0.2–1)
BUN SERPL-MCNC: 13 MG/DL (ref 5–25)
CALCIUM SERPL-MCNC: 8.2 MG/DL (ref 8.3–10.1)
CHLORIDE SERPL-SCNC: 105 MMOL/L (ref 100–108)
CO2 SERPL-SCNC: 28 MMOL/L (ref 21–32)
CREAT SERPL-MCNC: 0.86 MG/DL (ref 0.6–1.3)
EOSINOPHIL # BLD MANUAL: 0.36 THOUSAND/UL (ref 0–0.4)
EOSINOPHIL NFR BLD MANUAL: 4 % (ref 0–6)
ERYTHROCYTE [DISTWIDTH] IN BLOOD BY AUTOMATED COUNT: 16.8 % (ref 11.6–15.1)
GFR SERPL CREATININE-BSD FRML MDRD: 65 ML/MIN/1.73SQ M
GLUCOSE SERPL-MCNC: 119 MG/DL (ref 65–140)
HCT VFR BLD AUTO: 32.6 % (ref 34.8–46.1)
HGB BLD-MCNC: 9.5 G/DL (ref 11.5–15.4)
LYMPHOCYTES # BLD AUTO: 1 THOUSAND/UL (ref 0.6–4.47)
LYMPHOCYTES # BLD AUTO: 11 % (ref 14–44)
MACROCYTES BLD QL AUTO: PRESENT
MAGNESIUM SERPL-MCNC: 1.8 MG/DL (ref 1.6–2.6)
MCH RBC QN AUTO: 31 PG (ref 26.8–34.3)
MCHC RBC AUTO-ENTMCNC: 29.1 G/DL (ref 31.4–37.4)
MCV RBC AUTO: 107 FL (ref 82–98)
METAMYELOCYTES NFR BLD MANUAL: 3 % (ref 0–1)
MONOCYTES # BLD AUTO: 0.27 THOUSAND/UL (ref 0–1.22)
MONOCYTES NFR BLD: 3 % (ref 4–12)
NEUTROPHILS # BLD MANUAL: 7.17 THOUSAND/UL (ref 1.85–7.62)
NEUTS BAND NFR BLD MANUAL: 1 % (ref 0–8)
NEUTS SEG NFR BLD AUTO: 78 % (ref 43–75)
NRBC BLD AUTO-RTO: 0 /100 WBCS
PHOSPHATE SERPL-MCNC: 2.9 MG/DL (ref 2.3–4.1)
PLATELET # BLD AUTO: 290 THOUSANDS/UL (ref 149–390)
PLATELET BLD QL SMEAR: ADEQUATE
PMV BLD AUTO: 9.9 FL (ref 8.9–12.7)
POLYCHROMASIA BLD QL SMEAR: PRESENT
POTASSIUM SERPL-SCNC: 3.6 MMOL/L (ref 3.5–5.3)
PROT SERPL-MCNC: 6 G/DL (ref 6.4–8.2)
RBC # BLD AUTO: 3.06 MILLION/UL (ref 3.81–5.12)
RBC MORPH BLD: PRESENT
SODIUM SERPL-SCNC: 140 MMOL/L (ref 136–145)
WBC # BLD AUTO: 9.07 THOUSAND/UL (ref 4.31–10.16)

## 2019-01-09 PROCEDURE — 80053 COMPREHEN METABOLIC PANEL: CPT | Performed by: INTERNAL MEDICINE

## 2019-01-09 PROCEDURE — 85007 BL SMEAR W/DIFF WBC COUNT: CPT | Performed by: INTERNAL MEDICINE

## 2019-01-09 PROCEDURE — 83735 ASSAY OF MAGNESIUM: CPT | Performed by: INTERNAL MEDICINE

## 2019-01-09 PROCEDURE — 99232 SBSQ HOSP IP/OBS MODERATE 35: CPT | Performed by: INTERNAL MEDICINE

## 2019-01-09 PROCEDURE — 85027 COMPLETE CBC AUTOMATED: CPT | Performed by: INTERNAL MEDICINE

## 2019-01-09 PROCEDURE — 84100 ASSAY OF PHOSPHORUS: CPT | Performed by: INTERNAL MEDICINE

## 2019-01-09 RX ORDER — POLYETHYLENE GLYCOL 3350 17 G/17G
17 POWDER, FOR SOLUTION ORAL DAILY PRN
Status: DISCONTINUED | OUTPATIENT
Start: 2019-01-09 | End: 2019-01-10 | Stop reason: HOSPADM

## 2019-01-09 RX ORDER — METOPROLOL SUCCINATE 50 MG/1
100 TABLET, EXTENDED RELEASE ORAL 2 TIMES DAILY
Status: DISCONTINUED | OUTPATIENT
Start: 2019-01-09 | End: 2019-01-10 | Stop reason: HOSPADM

## 2019-01-09 RX ADMIN — APIXABAN 5 MG: 5 TABLET, FILM COATED ORAL at 08:17

## 2019-01-09 RX ADMIN — SENNOSIDES AND DOCUSATE SODIUM 1 TABLET: 8.6; 5 TABLET ORAL at 21:06

## 2019-01-09 RX ADMIN — MELATONIN 6 MG: at 21:06

## 2019-01-09 RX ADMIN — ALPRAZOLAM 0.25 MG: 0.25 TABLET ORAL at 21:06

## 2019-01-09 RX ADMIN — THIAMINE HCL TAB 100 MG 100 MG: 100 TAB at 08:17

## 2019-01-09 RX ADMIN — OXYCODONE HYDROCHLORIDE 5 MG: 5 TABLET ORAL at 14:17

## 2019-01-09 RX ADMIN — MIRTAZAPINE 7.5 MG: 15 TABLET ORAL at 21:06

## 2019-01-09 RX ADMIN — ACETAMINOPHEN 975 MG: 325 TABLET, FILM COATED ORAL at 05:13

## 2019-01-09 RX ADMIN — FERROUS SULFATE TAB 325 MG (65 MG ELEMENTAL FE) 325 MG: 325 (65 FE) TAB at 08:17

## 2019-01-09 RX ADMIN — OXYCODONE HYDROCHLORIDE 5 MG: 5 TABLET ORAL at 00:39

## 2019-01-09 RX ADMIN — OXYCODONE HYDROCHLORIDE 2.5 MG: 5 TABLET ORAL at 08:17

## 2019-01-09 RX ADMIN — METOPROLOL SUCCINATE 75 MG: 25 TABLET, EXTENDED RELEASE ORAL at 08:17

## 2019-01-09 RX ADMIN — ACETAMINOPHEN 975 MG: 325 TABLET, FILM COATED ORAL at 14:17

## 2019-01-09 RX ADMIN — METOPROLOL SUCCINATE 100 MG: 100 TABLET, FILM COATED, EXTENDED RELEASE ORAL at 17:25

## 2019-01-09 RX ADMIN — OXYCODONE HYDROCHLORIDE 5 MG: 5 TABLET ORAL at 05:13

## 2019-01-09 RX ADMIN — AMIODARONE HYDROCHLORIDE 200 MG: 200 TABLET ORAL at 08:17

## 2019-01-09 RX ADMIN — OXYCODONE HYDROCHLORIDE 5 MG: 5 TABLET ORAL at 23:06

## 2019-01-09 RX ADMIN — ACETAMINOPHEN 975 MG: 325 TABLET, FILM COATED ORAL at 21:06

## 2019-01-09 RX ADMIN — PANTOPRAZOLE SODIUM 40 MG: 40 TABLET, DELAYED RELEASE ORAL at 05:13

## 2019-01-09 RX ADMIN — NORTRIPTYLINE HYDROCHLORIDE 10 MG: 10 CAPSULE ORAL at 21:06

## 2019-01-09 RX ADMIN — CLOPIDOGREL BISULFATE 75 MG: 75 TABLET ORAL at 08:17

## 2019-01-09 RX ADMIN — FOLIC ACID 1 MG: 1 TABLET ORAL at 08:17

## 2019-01-09 RX ADMIN — FUROSEMIDE 40 MG: 40 TABLET ORAL at 08:17

## 2019-01-09 RX ADMIN — APIXABAN 5 MG: 5 TABLET, FILM COATED ORAL at 17:25

## 2019-01-09 NOTE — PROGRESS NOTES
[facility] Hospitalist Service - Internal Medicine Progress Note       PATIENT INFORMATION      Patient: Roberto Mehta 69 y o  female   MRN: [id number]  PCP: Charlene Zapata DO  Unit/Bed#: [facility] 924-01 Encounter: [id number]  Date Of Visit: 01/09/19       ASSESSMENTS & PLAN       1  Physical deconditioning  · Appreciate PT/OT input - plan for skilled rehab on discharge possibly tomorrow  · Appreciate palliative care and geriatrics input - medications being titrated for polypharmacy reduction    2  Systemic inflammatory response syndrome (SIRS)  · Previously with hypothermia/leukocytosis (now normalized) and intermittent tachycardia (h/o atrial fibrillation noted however)   · Blood cultures from 1/3 are negative  · Monitor vital signs and maintain hemodynamics    3  Chronic opioid dependence  · Continue current analgesia regimen (w/ constipation prophylaxis)     4  Atrial fibrillation  · Rate controlled on Toprol-XL (dose increased to 100 mg BID today) due to intermittent borderline tachycardia - on Eliquis for anticoagulation    5  CAD  · Status post prior stenting - continue Plavix/Toprol-XL    6  Chronic diastolic CHF  · Continue Lasix/Toprol-XL regimen - cardiology input appreciated - recent EF of 60%  · Monitor serum potassium/magnesium  · Net fluid balance of approximately (-) 4 43 L at this time    7  Choledocholithiasis  · Appreciate gastroenterology input - status post prior ERCP with biliary stenting - RUQ ultrasound revealing evidence of cholelithiasis with gallbladder sludge - recommend outpatient elective ERCP for stent removal/stone extraction (while off anticoagulation)    8  Iron deficiency anemia  · Continue ferrous sulfate supplementation (w/ constipation prophylaxis)    9  Hypophosphatemia - Hypokalemia  · Monitor/replete as necessary - currently normalized      VTE Prophylaxis:  Eliquis      SUBJECTIVE     Seen/examined earlier in the day    Complains of generalized weakness/fatigue but otherwise denies any new complaints  Denies any chest pain or shortness of breath at this time  Remains in hopeful spirits  OBJECTIVE     Vitals:   Temp (24hrs), Av 1 °F (36 7 °C), Min:97 9 °F (36 6 °C), Max:98 4 °F (36 9 °C)    Temp:  [97 9 °F (36 6 °C)-98 4 °F (36 9 °C)] 98 1 °F (36 7 °C)  HR:  [] 105  Resp:  [18-20] 20  BP: (113-134)/(67-88) 115/67  SpO2:  [94 %-96 %] 96 %  Body mass index is 27 33 kg/m²  Input and Output Summary (last 24 hours): Intake/Output Summary (Last 24 hours) at 19 1614  Last data filed at 19 1406   Gross per 24 hour   Intake             1005 ml   Output             3700 ml   Net            -2695 ml       Physical Exam:     GENERAL:  Weak/fatigued - no acute distress  HEAD:  Normocephalic - atraumatic  EYES: PERRL - EOMI   MOUTH:  Mucosa moist  NECK:  Supple - full range of motion  CARDIAC:  Irregularly irregular but rate controlled - S1/S2 positive  PULMONARY:  Clear breath sounds bilaterally - nonlabored respirations  ABDOMEN:  Soft - nontender/nondistended - active bowel sounds  MUSCULOSKELETAL:  Motor strength/range of motion remains deconditioned  NEUROLOGIC:  Alert/awake  SKIN:  Chronic wrinkles/blemishes   PSYCHIATRIC:  Mood/affect stable      ADDITIONAL DATA       Labs & Recent Cultures:       Results from last 7 days  Lab Units 19  0526  19  0932   WBC Thousand/uL 9 07  < > 5 52   HEMOGLOBIN g/dL 9 5*  < > 9 6*   HEMATOCRIT % 32 6*  < > 31 0*   PLATELETS Thousands/uL 290  < > 235   NEUTROS PCT %  --   --  66   LYMPHS PCT %  --   --  19   LYMPHO PCT % 11*  < >  --    MONOS PCT %  --   --  10   MONO PCT % 3*  < >  --    EOS PCT % 4  < > 4   < > = values in this interval not displayed      Results from last 7 days  Lab Units 19  0526   SODIUM mmol/L 140   POTASSIUM mmol/L 3 6   CHLORIDE mmol/L 105   CO2 mmol/L 28   BUN mg/dL 13   CREATININE mg/dL 0 86   CALCIUM mg/dL 8 2*   ALK PHOS U/L 98   ALT U/L 37   AST U/L 31 Results from last 7 days  Lab Units 01/03/19  2027   INR  1 87*           Results from last 7 days  Lab Units 01/03/19  1404   BLOOD CULTURE  No Growth After 5 Days  No Growth After 5 Days  Last 24 Hours Medication List:     Current Facility-Administered Medications:  acetaminophen 975 mg Oral Q8H [address] Rea Foy, PA-C   ALPRAZolam 0 25 mg Oral HS PRN John Paul Nuñez, PA-C   amiodarone 200 mg Oral Daily With Breakfast Orion Webster MD   apixaban 5 mg Oral BID Metta Primrose, CRNP   calcium carbonate 1,000 mg Oral Daily PRN Evetta Fee, PA-C   clopidogrel 75 mg Oral Daily Evetta Fee, PA-C   ferrous sulfate 325 mg Oral Daily With Breakfast Rea Foy, PA-C   folic acid 1 mg Oral Daily Evetta Fee, PA-C   furosemide 40 mg Oral Daily Chaz Trejo MD   melatonin 6 mg Oral HS Evetta Fee, PA-C   metoprolol succinate 100 mg Oral BID Chaz Trejo MD   mirtazapine 7 5 mg Oral HS Evetta Fee, PA-C   nortriptyline 10 mg Oral HS John Paul Savannah, PA-C   oxyCODONE 2 5 mg Oral Q4H PRN John Paul Savannah, PA-C   oxyCODONE 5 mg Oral Q4H PRN John Paul Savannah, PA-C   pantoprazole 40 mg Oral Early Morning Mitali Ramirez, PA-C   senna-docusate sodium 1 tablet Oral HS Evetta Fee, PA-C   thiamine 100 mg Oral Daily Rea Foy, PA-C          Time Spent for Care: 33 minutes  More than 50% of total time spent on counseling and coordination of care as described above  Current Length of Stay: 6 day(s)      Code Status: Level 3 - DNAR and DNI         ** Please Note: This note is constructed using a voice recognition dictation system   **

## 2019-01-09 NOTE — PROGRESS NOTES
Cardiology   Anu Mariscal 69 y o  female MRN: [id number]  Unit/Bed#: [facility] 924-01 Encounter: [id number]        Assessment/Plan:    >> Coronary artery disease  >> Atrial fibrillation with rapid ventricular response      Plan:    Continue Plavix 75 mg daily, continue Eliquis 5 mg daily for atrial fibrillation, resume home dose of amiodarone 200 mg daily,  Increase metoprolol succinate to 100 b i d  Today    Switch to p o  Lasix 40 mg from today    Keep potassium greater than 4 and magnesium greater than 2    Weight is clearly not been charted accurately, needs daily standing weights    Strict intake and output charting    Subjective:  No complaints, no new events overnight    Denies chest pain, shortness of breath, palpitations, orthopnea, PND, pedal edema, syncope, presyncope, diaphoresis, nausea/vomiting     Remainder of ROS done and negative    Telemetry:  AFib in the low 100s              Historical Information   Past Medical History:   Diagnosis Date    A-fib ([facility] )     Acute respiratory disease     Anemia     Arthritis     [facility]est[facility]     Chronic pain     Heart failure (HCC)     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[facility] )     History of colon polyps     Hx of long term use of blood thinners     Hypertension     Irregular heart beat     [facility] dependence [facility]     Rectal bleeding     Stroke ([facility] )     mild no deficiets/ memory loss    Uses walker      Past Surgical History:   Procedure Laterality Date    COLONOSCOPY      COLONOSCOPY N/A 7/27/2018    Procedure: COLONOSCOPY;  Surgeon: Renuka Cherry MD;  [facility]: [facility]; Service: Gastroenterology    CORONARY STENT PLACEMENT      ERCP N/A 5/14/2018    Procedure: ENDOSCOPIC RETROGRADE CHOLANGIOPANCREATOGRAPHY (ERCP);   Surgeon: Arthur Dey MD;  Location: [facility];  Service: Gastroenterology    ERCP N/A 8/28/2018    Procedure: ENDOSCOPIC RETROGRADE CHOLANGIOPANCREATOGRAPHY (ERCP) w/ EGD;  Surgeon: Cally Colunga MD;  Location:  GI LAB; Service: Gastroenterology    ESOPHAGOGASTRODUODENOSCOPY N/A 7/26/2018    Procedure: ESOPHAGOGASTRODUODENOSCOPY (EGD); Surgeon: Arash Nelson MD;  Location:  GI LAB; Service: Gastroenterology    JOINT REPLACEMENT Right     knee     Social History   History   Alcohol Use No     History   Drug Use No     History   Smoking Status    Former Smoker   Smokeless Tobacco    Never Used     Family History: History reviewed  No pertinent family history  Scheduled Meds:    Current Facility-Administered Medications:  acetaminophen 975 mg Oral [facility] Evetta Fee, PA-C   ALPRAZolam 0 25 mg Oral HS PRN John Paul Savannah, PA-C   amiodarone 200 mg Oral Daily With Breakfast Orion Webster MD   apixaban 5 mg Oral BID Metta Primrose, CRNP   calcium carbonate 1,000 mg Oral Daily PRN Evetta Fee, PA-C   clopidogrel 75 mg Oral Daily Evetta Fee, PA-C   ferrous sulfate 325 mg Oral Daily With Breakfast Evetta Fee, PA-C   folic acid 1 mg Oral Daily Evetta Fee, PA-C   furosemide 40 mg Oral Daily Chaz Trejo MD   melatonin 6 mg Oral HS Evetta Fee, PA-C   metoprolol succinate 100 mg Oral BID Chaz Trejo MD   mirtazapine 7 5 mg Oral HS Evetta Fee, PA-C   nortriptyline 10 mg Oral HS John Paul Savannah, PA-C   oxyCODONE 2 5 mg Oral Q4H PRN John Paul Savannah, PA-C   oxyCODONE 5 mg Oral Q4H PRN John Paul Savannah, PA-C   pantoprazole 40 mg Oral Early Morning Mitali Ramirez, WILLIAM   senna-docusate sodium 1 tablet Oral HS Evetta Fee, PA-C   thiamine 100 mg Oral Daily Evetta Fee, PA-C     Continuous Infusions:   PRN Meds:  ALPRAZolam    calcium carbonate    oxyCODONE    oxyCODONE  current meds:   Current Facility-Administered Medications   Medication Dose Route Frequency    acetaminophen (TYLENOL) tablet 975 mg  975 mg Oral Q8H [facility]    ALPRAZolam (XANAX) tablet 0 25 mg  0 25 mg Oral HS PRN    amiodarone tablet 200 mg  200 mg Oral Daily With Breakfast    apixaban (ELIQUIS) tablet 5 mg  5 mg Oral BID    calcium carbonate (TUMS) chewable tablet 1,000 mg  1,000 mg Oral Daily PRN    clopidogrel (PLAVIX) tablet 75 mg  75 mg Oral Daily    ferrous sulfate tablet 325 mg  325 mg Oral Daily With Breakfast    folic acid (FOLVITE) tablet 1 mg  1 mg Oral Daily    furosemide (LASIX) tablet 40 mg  40 mg Oral Daily    melatonin tablet 6 mg  6 mg Oral HS    metoprolol succinate (TOPROL-XL) 24 hr tablet 100 mg  100 mg Oral BID    mirtazapine (REMERON) tablet 7 5 mg  7 5 mg Oral HS    nortriptyline (PAMELOR) capsule 10 mg  10 mg Oral HS    oxyCODONE (ROXICODONE) IR tablet 2 5 mg  2 5 mg Oral Q4H PRN    oxyCODONE (ROXICODONE) IR tablet 5 mg  5 mg Oral Q4H PRN    pantoprazole (PROTONIX) EC tablet 40 mg  40 mg Oral Early Morning    senna-docusate sodium (SENOKOT S) 8 6-50 mg per tablet 1 tablet  1 tablet Oral HS    thiamine (VITAMIN B1) tablet 100 mg  100 mg Oral Daily       No Known Allergies    Objective   Vitals: Blood pressure 134/85, pulse (!) 107, temperature 98 4 °F (36 9 °C), resp  rate 18, height 5' 5" (1 651 m), weight 74 5 kg (164 lb 3 9 oz), SpO2 94 %  , Body mass index is 27 33 kg/m² ,   Orthostatic Blood Pressures      Most Recent Value   Blood Pressure  134/85 filed at 01/09/2019 0725   Patient Position - Orthostatic VS  Sitting filed at 01/05/2019 2207            Intake/Output Summary (Last 24 hours) at 01/09/19 1048  Last data filed at 01/09/19 9932   Gross per 24 hour   Intake              825 ml   Output             4400 ml   Net            -3575 ml       Invasive Devices     Peripheral Intravenous Line            Peripheral IV 01/05/19 Right Forearm 4 days                Physical Exam:    General:  AO x3, no acute distress  Cardiac:  S1-S2 normal  Irregular, tachycardic JVP:  Not seen  Lungs:  Clear to auscultation bilaterally, no wheezing or crackles    Abdomen:  Soft nontender nondistended, positive bowel sounds  Extremities:  Warm, well perfused, pulses palpable, no ulcers or rashes  Neuro: Grossly nonfocal  Psych:  Normal affect      Lab Results:   Recent Results (from the past 24 hour(s))   Comprehensive metabolic panel    Collection Time: 01/09/19  5:26 AM   Result Value Ref Range    Sodium 140 136 - 145 mmol/L    Potassium 3 6 3 5 - 5 3 mmol/L    Chloride 105 100 - 108 mmol/L    CO2 28 21 - 32 mmol/L    ANION GAP 7 4 - 13 mmol/L    BUN 13 5 - 25 mg/dL    Creatinine 0 86 0 60 - 1 30 mg/dL    Glucose 119 65 - 140 mg/dL    Calcium 8 2 (L) 8 3 - 10 1 mg/dL    AST 31 5 - 45 U/L    ALT 37 12 - 78 U/L    Alkaline Phosphatase 98 46 - 116 U/L    Total Protein 6 0 (L) 6 4 - 8 2 g/dL    Albumin 2 5 (L) 3 5 - 5 0 g/dL    Total Bilirubin 0 16 (L) 0 20 - 1 00 mg/dL    eGFR 65 ml/min/1 73sq m   CBC and differential    Collection Time: 01/09/19  5:26 AM   Result Value Ref Range    WBC 9 07 4 31 - 10 16 Thousand/uL    RBC 3 06 (L) 3 81 - 5 12 Million/uL    Hemoglobin 9 5 (L) 11 5 - 15 4 g/dL    Hematocrit 32 6 (L) 34 8 - 46 1 %     (H) 82 - 98 fL    MCH 31 0 26 8 - 34 3 pg    MCHC 29 1 (L) 31 4 - 37 4 g/dL    RDW 16 8 (H) 11 6 - 15 1 %    MPV 9 9 8 9 - 12 7 fL    Platelets 755 475 - 566 Thousands/uL    nRBC 0 /100 WBCs   Phosphorus    Collection Time: 01/09/19  5:26 AM   Result Value Ref Range    Phosphorus 2 9 2 3 - 4 1 mg/dL   Magnesium    Collection Time: 01/09/19  5:26 AM   Result Value Ref Range    Magnesium 1 8 1 6 - 2 6 mg/dL   Manual Differential(PHLEBS Do Not Order)    Collection Time: 01/09/19  5:26 AM   Result Value Ref Range    Segmented % 78 (H) 43 - 75 %    Bands % 1 0 - 8 %    Lymphocytes % 11 (L) 14 - 44 %    Monocytes % 3 (L) 4 - 12 %    Eosinophils, % 4 0 - 6 %    Basophils % 0 0 - 1 %    Metamyelocytes% 3 (H) 0 - 1 %    Absolute Neutrophils 7 17 1 85 - 7 62 Thousand/uL    Lymphocytes Absolute 1 00 0 60 - 4 47 Thousand/uL    Monocytes Absolute 0 27 0 00 - 1 22 Thousand/uL    Eosinophils Absolute 0 36 0 00 - 0 40 Thousand/uL Basophils Absolute 0 00 0 00 - 0 10 Thousand/uL    Total Counted      RBC Morphology Present     Anisocytosis Present     Macrocytes Present     Polychromasia Present     Platelet Estimate Adequate Adequate       Imaging: I have personally reviewed pertinent reports

## 2019-01-10 VITALS
SYSTOLIC BLOOD PRESSURE: 116 MMHG | WEIGHT: 164.46 LBS | DIASTOLIC BLOOD PRESSURE: 77 MMHG | HEART RATE: 106 BPM | OXYGEN SATURATION: 99 % | RESPIRATION RATE: 20 BRPM | BODY MASS INDEX: 27.4 KG/M2 | TEMPERATURE: 97.7 F | HEIGHT: 65 IN

## 2019-01-10 PROBLEM — F41.9 ANXIETY: Status: ACTIVE | Noted: 2019-01-10

## 2019-01-10 LAB
ANISOCYTOSIS BLD QL SMEAR: PRESENT
BASOPHILS # BLD MANUAL: 0.21 THOUSAND/UL (ref 0–0.1)
BASOPHILS NFR MAR MANUAL: 2 % (ref 0–1)
EOSINOPHIL # BLD MANUAL: 0.53 THOUSAND/UL (ref 0–0.4)
EOSINOPHIL NFR BLD MANUAL: 5 % (ref 0–6)
ERYTHROCYTE [DISTWIDTH] IN BLOOD BY AUTOMATED COUNT: 16.7 % (ref 11.6–15.1)
HCT VFR BLD AUTO: 32.8 % (ref 34.8–46.1)
HGB BLD-MCNC: 9.8 G/DL (ref 11.5–15.4)
LYMPHOCYTES # BLD AUTO: 0.21 THOUSAND/UL (ref 0.6–4.47)
LYMPHOCYTES # BLD AUTO: 2 % (ref 14–44)
MACROCYTES BLD QL AUTO: PRESENT
MAGNESIUM SERPL-MCNC: 1.7 MG/DL (ref 1.6–2.6)
MCH RBC QN AUTO: 31.8 PG (ref 26.8–34.3)
MCHC RBC AUTO-ENTMCNC: 29.9 G/DL (ref 31.4–37.4)
MCV RBC AUTO: 107 FL (ref 82–98)
METAMYELOCYTES NFR BLD MANUAL: 1 % (ref 0–1)
MONOCYTES # BLD AUTO: 0.63 THOUSAND/UL (ref 0–1.22)
MONOCYTES NFR BLD: 6 % (ref 4–12)
NEUTROPHILS # BLD MANUAL: 8.85 THOUSAND/UL (ref 1.85–7.62)
NEUTS SEG NFR BLD AUTO: 84 % (ref 43–75)
NRBC BLD AUTO-RTO: 0 /100 WBCS
PHOSPHATE SERPL-MCNC: 3 MG/DL (ref 2.3–4.1)
PLATELET # BLD AUTO: 319 THOUSANDS/UL (ref 149–390)
PLATELET BLD QL SMEAR: ADEQUATE
PMV BLD AUTO: 9.9 FL (ref 8.9–12.7)
POTASSIUM SERPL-SCNC: 3.7 MMOL/L (ref 3.5–5.3)
RBC # BLD AUTO: 3.08 MILLION/UL (ref 3.81–5.12)
RBC MORPH BLD: PRESENT
WBC # BLD AUTO: 10.54 THOUSAND/UL (ref 4.31–10.16)

## 2019-01-10 PROCEDURE — 85007 BL SMEAR W/DIFF WBC COUNT: CPT | Performed by: INTERNAL MEDICINE

## 2019-01-10 PROCEDURE — 83735 ASSAY OF MAGNESIUM: CPT | Performed by: INTERNAL MEDICINE

## 2019-01-10 PROCEDURE — 99232 SBSQ HOSP IP/OBS MODERATE 35: CPT | Performed by: INTERNAL MEDICINE

## 2019-01-10 PROCEDURE — 84132 ASSAY OF SERUM POTASSIUM: CPT | Performed by: INTERNAL MEDICINE

## 2019-01-10 PROCEDURE — 85027 COMPLETE CBC AUTOMATED: CPT | Performed by: INTERNAL MEDICINE

## 2019-01-10 PROCEDURE — 84100 ASSAY OF PHOSPHORUS: CPT | Performed by: INTERNAL MEDICINE

## 2019-01-10 PROCEDURE — 99239 HOSP IP/OBS DSCHRG MGMT >30: CPT | Performed by: INTERNAL MEDICINE

## 2019-01-10 RX ORDER — ALPRAZOLAM 0.25 MG/1
0.25 TABLET ORAL
Qty: 5 TABLET | Refills: 0 | Status: SHIPPED | OUTPATIENT
Start: 2019-01-10 | End: 2019-07-16 | Stop reason: HOSPADM

## 2019-01-10 RX ORDER — METOPROLOL SUCCINATE 100 MG/1
100 TABLET, EXTENDED RELEASE ORAL 2 TIMES DAILY
Qty: 60 TABLET | Refills: 0 | Status: SHIPPED | OUTPATIENT
Start: 2019-01-10 | End: 2019-07-16 | Stop reason: HOSPADM

## 2019-01-10 RX ORDER — OXYCODONE HYDROCHLORIDE 5 MG/1
2.5 TABLET ORAL EVERY 4 HOURS PRN
Qty: 15 TABLET | Refills: 0 | Status: ON HOLD | OUTPATIENT
Start: 2019-01-10 | End: 2019-01-15

## 2019-01-10 RX ADMIN — FOLIC ACID 1 MG: 1 TABLET ORAL at 08:45

## 2019-01-10 RX ADMIN — CLOPIDOGREL BISULFATE 75 MG: 75 TABLET ORAL at 08:45

## 2019-01-10 RX ADMIN — THIAMINE HCL TAB 100 MG 100 MG: 100 TAB at 08:46

## 2019-01-10 RX ADMIN — AMIODARONE HYDROCHLORIDE 200 MG: 200 TABLET ORAL at 08:45

## 2019-01-10 RX ADMIN — ACETAMINOPHEN 975 MG: 325 TABLET, FILM COATED ORAL at 16:25

## 2019-01-10 RX ADMIN — FUROSEMIDE 40 MG: 40 TABLET ORAL at 08:46

## 2019-01-10 RX ADMIN — POLYETHYLENE GLYCOL 3350 17 G: 17 POWDER, FOR SOLUTION ORAL at 08:56

## 2019-01-10 RX ADMIN — FERROUS SULFATE TAB 325 MG (65 MG ELEMENTAL FE) 325 MG: 325 (65 FE) TAB at 08:45

## 2019-01-10 RX ADMIN — OXYCODONE HYDROCHLORIDE 5 MG: 5 TABLET ORAL at 16:24

## 2019-01-10 RX ADMIN — ACETAMINOPHEN 975 MG: 325 TABLET, FILM COATED ORAL at 05:01

## 2019-01-10 RX ADMIN — METOPROLOL SUCCINATE 100 MG: 100 TABLET, FILM COATED, EXTENDED RELEASE ORAL at 08:45

## 2019-01-10 RX ADMIN — APIXABAN 5 MG: 5 TABLET, FILM COATED ORAL at 08:46

## 2019-01-10 RX ADMIN — OXYCODONE HYDROCHLORIDE 5 MG: 5 TABLET ORAL at 08:46

## 2019-01-10 RX ADMIN — PANTOPRAZOLE SODIUM 40 MG: 40 TABLET, DELAYED RELEASE ORAL at 05:01

## 2019-01-10 NOTE — DISCHARGE SUMMARY
Discharge Summary - [facility] Hospitalist Service - Internal Medicine      Patient Information: Luigi Rush 69 y o  female MRN: [id number]  Unit/Bed#: DISCHARGE POOL Encounter: [id number]    Discharging Physician / Practitioner: Rose Peralta MD  PCP: Brennan Badillo DO  Admission Date:   Admission Orders     Ordered        01/03/19 1827  Inpatient Admission (expected length of stay for this patient is greater than two midnights)  Once             Discharge Date: 01/10/19      Reason for Admission:  Fall with prolonged immobility       Discharge Diagnoses:     Principal Problem:    Physical deconditioning - Falls - Possible syncope     Chronic Problems:    Chronic diastolic CHF    Opioid dependence    AVNRT (AV johana re-entry tachycardia) - Atrial fibrillation    Coronary artery disease    Choledocholithiasis    Myocardial infarction type 2     Iron deficiency anemia    Resolved Problems:    Systemic inflammatory response syndrome (SIRS)     Acute kidney injury    Hypophosphatemia    Hypokalemia      Consultations During Hospital Stay:  · Cardiology  · Geriatrics  · Critical care medicine  · Palliative care  · Gastroenterology      Hospital Course:     1  Physical deconditioning  · Appreciated PT/OT input - plan for skilled rehab on discharge later today - patient was intermittently resistant to being discharged to rehab due to having errands take care of but after discussion with Medical in cardiology team, she is now agreeable - not safe to live alone at home (recurrent falls/weakness and initially found laying in her own feces)   · Appreciate palliative care and geriatrics input - medications being titrated for polypharmacy reduction     2    Systemic inflammatory response syndrome (SIRS)  · Previously with hypothermia/leukocytosis/hypotension and intermittent tachycardia (h/o atrial fibrillation noted however)   · Blood cultures from 1/3 are negative  · Hemodynamics/vital signs remained fairly stable  · Appreciate previous critical care team evaluation regarding persistent hypotension (transferred out of ICU on 1/5 where she was briefly on vasopressor support with norepinephrine for less than 24 hrs with symptomatology attributed to intravascular volume depletion in the setting of beta-blocker use)      3  Chronic opioid dependence  · Continue analgesia regimen (w/ constipation prophylaxis) - limit narcotic intake due to suspected episodes of syncope at home with falls/immobility     4  Atrial fibrillation  · Rate controlled on Toprol-XL (dose increased to 100 mg BID yesterday has BP stable now) due to intermittent borderline tachycardia - on Eliquis for anticoagulation  · 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      5  CAD  · Status post prior stenting - continue Plavix/Toprol-XL      6  Chronic diastolic CHF  · Continue Lasix/Toprol-XL regimen - cardiology input appreciated - recent EF of 60% (previously at 35% last year during episode of AVNRT)   · Monitor serum potassium/magnesium  · Net fluid balance of approximately (-) 6 17 L on day of discharge     7  Choledocholithiasis  · Appreciated gastroenterology input - status post prior ERCP with biliary stenting - RUQ ultrasound revealing evidence of cholelithiasis with gallbladder sludge - recommending outpatient elective ERCP for stent removal/stone extraction (while off anticoagulation - coordinate in the outpatient setting)     8  Iron deficiency anemia  · Continue ferrous sulfate supplementation (w/ constipation prophylaxis)     9    Hypophosphatemia - Hypokalemia  · Monitored/repleted as necessary - currently normalized through discharge day      Condition at Discharge: fair       Discharge Day Visit / Exam:     Vitals: Blood Pressure: 116/77 (01/10/19 1115)  Pulse: (!) 106 (01/10/19 1115)  Temperature: 97 7 °F (36 5 °C) (01/10/19 1115)  Temp Source: Oral (01/08/19 2250)  Respirations: 20 (01/10/19 1115)  Height: 5' 5" (165 1 cm) (01/04/19 9880)  Weight - Scale: 74 6 kg (164 lb 7 4 oz) (01/10/19 0537)  SpO2: 99 % (01/10/19 1115)      Physical exam - I had a face-to-face encounter with the patient on day of discharge  Discussion with Patient and/or Family:  The patient has been advised to return to the ER immediately if any symptoms recur or worsen  Discharge instructions/Information to Patient and/or Family:   See after visit summary for information provided to patient and/or family  Provisions for Follow-Up Care:  See after visit summary for information related to follow-up care and any pertinent home health orders  Disposition:   Skilled rehab facility      Discharge Medications:  See after visit summary for reconciled discharge medications provided to patient and/or family  Discharge Statement:  I spent 38 minutes discharging the patient  This time was spent on the day of discharge  I had direct contact with the patient on the day of discharge  Greater than 50% of the total time was spent examining patient, answering all patient questions, arranging and discussing plan of care with patient as well as directly providing post-discharge instructions  Additional time then spent on discharge activities     ** Please Note: This note is constructed using a voice recognition dictation system   **

## 2019-01-10 NOTE — SOCIAL WORK
CM [facility]  Transport arranged via [id number] [employer] [facility]  Pt, bedside RN, and Donita notified of transport time  Chart copy requested  Transfer to facility and CMN forms completed  F/u IMM discussed with pt  Pt advised she will contact her sister re: d/c--does not need CM to contact

## 2019-01-10 NOTE — PLAN OF CARE
Knowledge Deficit     Patient/family/caregiver demonstrates understanding of disease process, treatment plan, medications, and discharge instructions Adequate for Discharge        MUSCULOSKELETAL - ADULT     Maintain or return mobility to safest level of function Adequate for Discharge     Maintain proper alignment of affected body part Adequate for Discharge        Nutrition/Hydration-ADULT     Nutrient/Hydration intake appropriate for improving, restoring or maintaining nutritional needs Adequate for Discharge        PAIN - ADULT     Verbalizes/displays adequate comfort level or baseline comfort level Adequate for Discharge        Potential for Falls     Patient will remain free of falls Adequate for Discharge        Prexisting or High Potential for Compromised Skin Integrity     Skin integrity is maintained or improved Adequate for Discharge        SAFETY ADULT     Maintain or return to baseline ADL function Adequate for Discharge     Maintain or return mobility status to optimal level Adequate for Discharge        SKIN/TISSUE INTEGRITY - ADULT     Skin integrity remains intact Adequate for Discharge     Incision(s), wounds(s) or drain site(s) healing without S/S of infection Adequate for Discharge     Oral mucous membranes remain intact Adequate for Discharge
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
## 2019-01-11 ENCOUNTER — TELEPHONE (OUTPATIENT)
Dept: GASTROENTEROLOGY | Facility: CLINIC | Age: 79
End: 2019-01-11

## 2019-01-11 ENCOUNTER — TRANSITIONAL CARE MANAGEMENT (OUTPATIENT)
Dept: FAMILY MEDICINE CLINIC | Facility: CLINIC | Age: 79
End: 2019-01-11

## 2019-01-12 ENCOUNTER — APPOINTMENT (EMERGENCY)
Dept: RADIOLOGY | Facility: HOSPITAL | Age: 79
DRG: 644 | End: 2019-01-12
Payer: MEDICARE

## 2019-01-12 ENCOUNTER — HOSPITAL ENCOUNTER (INPATIENT)
Facility: HOSPITAL | Age: 79
LOS: 2 days | Discharge: NURSING, REHAB OR LONG-TERM CARE | DRG: 644 | End: 2019-01-15
Attending: EMERGENCY MEDICINE | Admitting: FAMILY MEDICINE
Payer: MEDICARE

## 2019-01-12 DIAGNOSIS — I25.10 CORONARY ARTERY DISEASE: ICD-10-CM

## 2019-01-12 DIAGNOSIS — F11.20 UNCOMPLICATED OPIOID DEPENDENCE (HCC): ICD-10-CM

## 2019-01-12 DIAGNOSIS — R07.9 CHEST PAIN: Primary | ICD-10-CM

## 2019-01-12 PROBLEM — M54.2 ANTERIOR NECK PAIN: Status: ACTIVE | Noted: 2019-01-12

## 2019-01-12 LAB
ALBUMIN SERPL BCP-MCNC: 3.2 G/DL (ref 3.5–5)
ALP SERPL-CCNC: 98 U/L (ref 46–116)
ALT SERPL W P-5'-P-CCNC: 32 U/L (ref 12–78)
ANION GAP SERPL CALCULATED.3IONS-SCNC: 9 MMOL/L (ref 4–13)
APTT PPP: 32 SECONDS (ref 26–38)
AST SERPL W P-5'-P-CCNC: 26 U/L (ref 5–45)
BASOPHILS # BLD AUTO: 0.05 THOUSANDS/ΜL (ref 0–0.1)
BASOPHILS NFR BLD AUTO: 1 % (ref 0–1)
BILIRUB SERPL-MCNC: 0.17 MG/DL (ref 0.2–1)
BUN SERPL-MCNC: 16 MG/DL (ref 5–25)
CALCIUM SERPL-MCNC: 9 MG/DL (ref 8.3–10.1)
CHLORIDE SERPL-SCNC: 101 MMOL/L (ref 100–108)
CO2 SERPL-SCNC: 29 MMOL/L (ref 21–32)
CREAT SERPL-MCNC: 1.12 MG/DL (ref 0.6–1.3)
EOSINOPHIL # BLD AUTO: 0.22 THOUSAND/ΜL (ref 0–0.61)
EOSINOPHIL NFR BLD AUTO: 2 % (ref 0–6)
ERYTHROCYTE [DISTWIDTH] IN BLOOD BY AUTOMATED COUNT: 16.1 % (ref 11.6–15.1)
GFR SERPL CREATININE-BSD FRML MDRD: 47 ML/MIN/1.73SQ M
GLUCOSE SERPL-MCNC: 168 MG/DL (ref 65–140)
HCT VFR BLD AUTO: 37.5 % (ref 34.8–46.1)
HGB BLD-MCNC: 11.3 G/DL (ref 11.5–15.4)
IMM GRANULOCYTES # BLD AUTO: 0.13 THOUSAND/UL (ref 0–0.2)
IMM GRANULOCYTES NFR BLD AUTO: 1 % (ref 0–2)
INR PPP: 1.21 (ref 0.86–1.17)
LYMPHOCYTES # BLD AUTO: 1.14 THOUSANDS/ΜL (ref 0.6–4.47)
LYMPHOCYTES NFR BLD AUTO: 11 % (ref 14–44)
MCH RBC QN AUTO: 31.7 PG (ref 26.8–34.3)
MCHC RBC AUTO-ENTMCNC: 30.1 G/DL (ref 31.4–37.4)
MCV RBC AUTO: 105 FL (ref 82–98)
MONOCYTES # BLD AUTO: 0.54 THOUSAND/ΜL (ref 0.17–1.22)
MONOCYTES NFR BLD AUTO: 5 % (ref 4–12)
NEUTROPHILS # BLD AUTO: 8.28 THOUSANDS/ΜL (ref 1.85–7.62)
NEUTS SEG NFR BLD AUTO: 80 % (ref 43–75)
NRBC BLD AUTO-RTO: 0 /100 WBCS
PLATELET # BLD AUTO: 416 THOUSANDS/UL (ref 149–390)
PMV BLD AUTO: 9.7 FL (ref 8.9–12.7)
POTASSIUM SERPL-SCNC: 4 MMOL/L (ref 3.5–5.3)
PROT SERPL-MCNC: 7.4 G/DL (ref 6.4–8.2)
PROTHROMBIN TIME: 15.4 SECONDS (ref 11.8–14.2)
RBC # BLD AUTO: 3.56 MILLION/UL (ref 3.81–5.12)
SODIUM SERPL-SCNC: 139 MMOL/L (ref 136–145)
TROPONIN I SERPL-MCNC: 0.03 NG/ML
TROPONIN I SERPL-MCNC: 0.03 NG/ML
TROPONIN I SERPL-MCNC: 0.04 NG/ML
WBC # BLD AUTO: 10.36 THOUSAND/UL (ref 4.31–10.16)

## 2019-01-12 PROCEDURE — 93005 ELECTROCARDIOGRAM TRACING: CPT

## 2019-01-12 PROCEDURE — 85025 COMPLETE CBC W/AUTO DIFF WBC: CPT | Performed by: EMERGENCY MEDICINE

## 2019-01-12 PROCEDURE — 85730 THROMBOPLASTIN TIME PARTIAL: CPT | Performed by: EMERGENCY MEDICINE

## 2019-01-12 PROCEDURE — 84484 ASSAY OF TROPONIN QUANT: CPT | Performed by: PHYSICIAN ASSISTANT

## 2019-01-12 PROCEDURE — 84484 ASSAY OF TROPONIN QUANT: CPT | Performed by: EMERGENCY MEDICINE

## 2019-01-12 PROCEDURE — 80053 COMPREHEN METABOLIC PANEL: CPT | Performed by: EMERGENCY MEDICINE

## 2019-01-12 PROCEDURE — 36415 COLL VENOUS BLD VENIPUNCTURE: CPT

## 2019-01-12 PROCEDURE — 71046 X-RAY EXAM CHEST 2 VIEWS: CPT

## 2019-01-12 PROCEDURE — 99285 EMERGENCY DEPT VISIT HI MDM: CPT

## 2019-01-12 PROCEDURE — 99223 1ST HOSP IP/OBS HIGH 75: CPT | Performed by: PHYSICIAN ASSISTANT

## 2019-01-12 PROCEDURE — 85610 PROTHROMBIN TIME: CPT | Performed by: EMERGENCY MEDICINE

## 2019-01-12 RX ORDER — FUROSEMIDE 40 MG/1
40 TABLET ORAL DAILY
Status: DISCONTINUED | OUTPATIENT
Start: 2019-01-13 | End: 2019-01-15 | Stop reason: HOSPADM

## 2019-01-12 RX ORDER — DIGOXIN 125 MCG
125 TABLET ORAL DAILY
Status: DISCONTINUED | OUTPATIENT
Start: 2019-01-13 | End: 2019-01-15 | Stop reason: HOSPADM

## 2019-01-12 RX ORDER — CLOPIDOGREL BISULFATE 75 MG/1
75 TABLET ORAL DAILY
Status: DISCONTINUED | OUTPATIENT
Start: 2019-01-13 | End: 2019-01-15 | Stop reason: HOSPADM

## 2019-01-12 RX ORDER — THIAMINE MONONITRATE (VIT B1) 100 MG
100 TABLET ORAL DAILY
Status: DISCONTINUED | OUTPATIENT
Start: 2019-01-13 | End: 2019-01-15 | Stop reason: HOSPADM

## 2019-01-12 RX ORDER — PANTOPRAZOLE SODIUM 40 MG/1
40 TABLET, DELAYED RELEASE ORAL
Status: DISCONTINUED | OUTPATIENT
Start: 2019-01-13 | End: 2019-01-15 | Stop reason: HOSPADM

## 2019-01-12 RX ORDER — ACETAMINOPHEN 325 MG/1
650 TABLET ORAL EVERY 4 HOURS PRN
Status: DISCONTINUED | OUTPATIENT
Start: 2019-01-12 | End: 2019-01-15 | Stop reason: HOSPADM

## 2019-01-12 RX ORDER — NORTRIPTYLINE HYDROCHLORIDE 10 MG/1
20 CAPSULE ORAL
Status: DISCONTINUED | OUTPATIENT
Start: 2019-01-12 | End: 2019-01-15 | Stop reason: HOSPADM

## 2019-01-12 RX ORDER — MIRTAZAPINE 15 MG/1
7.5 TABLET, FILM COATED ORAL
Status: DISCONTINUED | OUTPATIENT
Start: 2019-01-12 | End: 2019-01-15 | Stop reason: HOSPADM

## 2019-01-12 RX ORDER — BISACODYL 10 MG
10 SUPPOSITORY, RECTAL RECTAL DAILY PRN
COMMUNITY
End: 2020-01-01 | Stop reason: HOSPADM

## 2019-01-12 RX ORDER — SORBITOL SOLUTION 70 %
30 SOLUTION, ORAL MISCELLANEOUS DAILY PRN
COMMUNITY
End: 2019-07-16 | Stop reason: HOSPADM

## 2019-01-12 RX ORDER — METOPROLOL SUCCINATE 50 MG/1
100 TABLET, EXTENDED RELEASE ORAL 2 TIMES DAILY
Status: DISCONTINUED | OUTPATIENT
Start: 2019-01-12 | End: 2019-01-15 | Stop reason: HOSPADM

## 2019-01-12 RX ORDER — FOLIC ACID 1 MG/1
1 TABLET ORAL DAILY
Status: DISCONTINUED | OUTPATIENT
Start: 2019-01-13 | End: 2019-01-15 | Stop reason: HOSPADM

## 2019-01-12 RX ORDER — BISACODYL 10 MG
10 SUPPOSITORY, RECTAL RECTAL DAILY PRN
Status: DISCONTINUED | OUTPATIENT
Start: 2019-01-12 | End: 2019-01-15 | Stop reason: HOSPADM

## 2019-01-12 RX ORDER — POTASSIUM CHLORIDE 20 MEQ/1
20 TABLET, EXTENDED RELEASE ORAL DAILY
Status: DISCONTINUED | OUTPATIENT
Start: 2019-01-13 | End: 2019-01-15 | Stop reason: HOSPADM

## 2019-01-12 RX ORDER — ALPRAZOLAM 0.25 MG/1
0.25 TABLET ORAL
Status: DISCONTINUED | OUTPATIENT
Start: 2019-01-12 | End: 2019-01-15 | Stop reason: HOSPADM

## 2019-01-12 RX ORDER — OXYCODONE HYDROCHLORIDE 5 MG/1
2.5 TABLET ORAL EVERY 4 HOURS PRN
Status: DISCONTINUED | OUTPATIENT
Start: 2019-01-12 | End: 2019-01-14

## 2019-01-12 RX ORDER — AMIODARONE HYDROCHLORIDE 200 MG/1
200 TABLET ORAL DAILY
Status: DISCONTINUED | OUTPATIENT
Start: 2019-01-13 | End: 2019-01-15 | Stop reason: HOSPADM

## 2019-01-12 RX ORDER — LIDOCAINE 4 G/G
1 PATCH TOPICAL EVERY 12 HOURS SCHEDULED
Status: ON HOLD | COMMUNITY
End: 2019-07-16 | Stop reason: SDUPTHER

## 2019-01-12 RX ORDER — ASCORBIC ACID 500 MG
500 TABLET ORAL DAILY
Status: DISCONTINUED | OUTPATIENT
Start: 2019-01-13 | End: 2019-01-15 | Stop reason: HOSPADM

## 2019-01-12 RX ORDER — AMOXICILLIN 250 MG
1 CAPSULE ORAL
Status: DISCONTINUED | OUTPATIENT
Start: 2019-01-12 | End: 2019-01-15 | Stop reason: HOSPADM

## 2019-01-12 RX ORDER — DULOXETIN HYDROCHLORIDE 30 MG/1
30 CAPSULE, DELAYED RELEASE ORAL DAILY
Status: DISCONTINUED | OUTPATIENT
Start: 2019-01-13 | End: 2019-01-15 | Stop reason: HOSPADM

## 2019-01-12 RX ORDER — LANOLIN ALCOHOL/MO/W.PET/CERES
6 CREAM (GRAM) TOPICAL
Status: DISCONTINUED | OUTPATIENT
Start: 2019-01-12 | End: 2019-01-15 | Stop reason: HOSPADM

## 2019-01-12 RX ADMIN — NORTRIPTYLINE HYDROCHLORIDE 20 MG: 10 CAPSULE ORAL at 21:48

## 2019-01-12 RX ADMIN — STANDARDIZED SENNA CONCENTRATE AND DOCUSATE SODIUM 1 TABLET: 8.6; 5 TABLET, FILM COATED ORAL at 21:50

## 2019-01-12 RX ADMIN — OXYCODONE HYDROCHLORIDE 2.5 MG: 5 TABLET ORAL at 19:12

## 2019-01-12 RX ADMIN — MIRTAZAPINE 7.5 MG: 15 TABLET, FILM COATED ORAL at 21:50

## 2019-01-12 RX ADMIN — MELATONIN TAB 3 MG 6 MG: 3 TAB at 21:50

## 2019-01-12 RX ADMIN — APIXABAN 5 MG: 5 TABLET, FILM COATED ORAL at 20:16

## 2019-01-12 NOTE — ED NOTES
Pt states that she had chest pain this morning and patient states that she does not have any chest pain at this time     Esequiel Siddiqui RN  01/12/19 2017

## 2019-01-12 NOTE — H&P
History and Physical - [facility] Internal Medicine    Patient Information: Leidy Allen 69 y o  female MRN: [id number]  Unit/Bed#: BEN Encounter: [id number]  Admitting Physician: Garcia Cardona PA-C  PCP: Ramy Vasquez DO  Date of Admission:  01/12/19    Assessment/Plan:    Hospital Problem List:     Principal Problem:    Chest pain  Active Problems:    Chronic systolic heart failure (HCC)    Hyperlipidemia    Coronary artery disease    Atrial fibrillation (HCC)    Anxiety    Hypertension    Anterior neck pain      Primary Problem(s):  · Chest Pain  · Presenting with 2 episodes of sharp left-sided chest pain while lying in bed  · Will rule out ACS however this may be spasm from an underlying thyroiditis  · CXR with tiny left lung base opacity suggestive of tiny pleural effusion  No vascular congestion  · Known CAD status post NGOZI to the RCA July 2018  · Medications pt is already on include- Plavix, beta-blocker, Eliquis, amiodarone, digoxin  · Unclear why patient is not on a statin  · Troponin 0 03  Trend x3    · Noted prior troponins during last admission on 1/3/18 which were recently elevated given MALA and dehydation  · EKG without any signs of acute ischemia  · Check TSH, T3, T4   · Obtain lipid panel and hemoglobin A1c  · Monitor on telemetry  Additional Problems:   · Anterior neck pain:  Patient with swollen area over thyroid  Tenderness to palpation to anterior neck  Possible thyroiditis  Patient complains of some throat pain/irritation a few days ago  Will check TSH, free T3, free T4  · Chronic atrial fibrillation: Initially diagnosed 2016  With failed ablation  Currently in AFib  Anticoagulated on Eliquis given high chads Vasc score of 8  Maintained on amiodarone 200 mg daily and digoxin 0 125 mg daily  On metoprolol succinate 100 mg b i d  · Chronic systolic CHF:  Echo from March 2018 with LVEF 40 45%, hypokinesis of the anterior and anterior septal walls    Echo from 1/5/19 with improved systolic function with EF now 60%  Maintained on lasix 40 mg daily  Stable with no signs of acute volume overload  · Anxiety/depression:  On nortriptyline, mirtazapine, Cymbalta, prn xanax    · GERD:  Continue PPI    · History of CVA:  On plavix and Eliquis    · Chronic opioid dependence: On oxycodone  · Recent fall at home:  Noted during last hospitalization and workup with negative for any acute fractures  Patient with persistent ecchymosis scattered on her body  VTE Prophylaxis: Apixaban (Eliquis)  / sequential compression device   Code Status:  DNR DNI  Anticipated Length of Stay:  Patient will be admitted on an Observation basis with an anticipated length of stay of  less than 2 midnights  Justification for Hospital Stay:  ACS rule out    Chief Complaint:  Chest pain    History of Present Illness:    Susanna Kang is a 69 y o  female with a PMH of chronic atrial fibrillation, AVNRT with failed ablation,  anticoagulated on Eliquis, chronic diastolic CHF, essential hypertension, GERD, anxiety/depression, opioid dependent, CAD s/p NGOZI to RCA July 2018  Patient is a poor historian given age and forgetfulness  Recent hospitalization at [facility] from 1/3/19 to 1/10/18 for being found down on the ground with MALA, dehydration, and physical deconditioning  She was discharged on 1/10/18 to [facility] rehab  She presents after experiencing 2 episodes of chest pain earlier this morning around 11 o'clock while lying in bed  Pain was left-sided and described as sharp in nature lasting for a few seconds  Resolved on its own  Unidentifiable aggravating or relieving factors  Denies any associated symptoms such as shortness of breath, nausea, vomiting, diaphoresis, shoulder or back pain  Patient does admit to some pain in her anterior neck/jaw which is tender to palpation- this is new pain for her and has occurred within the past 2-3 days  Ambulates with a walker at baseline    Previously lived alone  Has elderly sister who she communicates with but otherwise does not have any outside caregivers or family  Patients sister can be reached at [phone]  Former smoker  Denies alcohol consumption  Review of Systems:    General:   No Fever or chills; + anterior neck tenderness   EENT:   No ear pain, facial swelling; No sneezing, sore throat  Skin:   No rashes, color changes  Respiratory:     No shortness of breath, cough, wheezing, stridor  Cardiovascular:     + chest pain, palpitations  Gastrointestinal:    No nausea, vomiting, diarrhea;    Musculoskeletal:     No arthralgias, myalgias, swelling  Neurologic:   No dizziness, numbness, weakness  No speech difficulties  Psych:   No agitation,     Otherwise, All other twelve-point review of systems normal      Past Medical and Surgical History:     Past Medical History:   Diagnosis Date    A-fib ([place] [address] )     Acute respiratory disease     Anemia     Arthritis     [facility]     Chronic pain     Heart failure ([facility])     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[place] [address] )     History of colon polyps     Hx of long term use of blood thinners     Hypertension     Irregular heart beat     Narcotic dependence ([place] [address] )     Rectal bleeding     Stroke ([facility])     mild no deficiets/ memory loss    Uses walker        Past Surgical History:   Procedure Laterality Date    COLONOSCOPY      COLONOSCOPY N/A 7/27/2018    Procedure: COLONOSCOPY;  Surgeon: Mindi Boyle MD;  Location: [facility]; Service: Gastroenterology    CORONARY STENT PLACEMENT      ERCP N/A 5/14/2018    Procedure: ENDOSCOPIC RETROGRADE CHOLANGIOPANCREATOGRAPHY (ERCP); Surgeon: Luis Fletcher MD;  [facility];  Service: Gastroenterology    ERCP N/A 8/28/2018    Procedure: ENDOSCOPIC RETROGRADE CHOLANGIOPANCREATOGRAPHY (ERCP) w/ EGD;  Surgeon: Lusi Fletcher MD;  [facility];   Service: Gastroenterology   Tanna Camejo ESOPHAGOGASTRODUODENOSCOPY N/A 7/26/2018    Procedure: ESOPHAGOGASTRODUODENOSCOPY (EGD); Surgeon: Rafaela Peralta MD;  Location: [facility]; Service: Gastroenterology    JOINT REPLACEMENT Right     knee       Meds/Allergies:    No current facility-administered medications for this encounter        Current Outpatient Prescriptions   Medication Sig Dispense Refill    acetaminophen (TYLENOL) 325 mg tablet Take 2 tablets (650 mg total) by mouth every 6 (six) hours 100 tablet 0    ALPRAZolam (XANAX) 0 25 mg tablet Take 1 tablet (0 25 mg total) by mouth daily at bedtime as needed for anxiety for up to 10 days 5 tablet 0    amiodarone 200 mg tablet Take 1 tablet (200 mg total) by mouth daily Then decrease to 1 tablet daily 30 tablet 2    apixaban (ELIQUIS) 5 mg Take 5 mg by mouth 2 (two) times a day      ascorbic acid (VITAMIN C) 500 mg tablet Take 1 tablet (500 mg total) by mouth daily 30 tablet 5    bisacodyl (DULCOLAX) 10 mg suppository Insert 10 mg into the rectum daily as needed for constipation      clopidogrel (PLAVIX) 75 mg tablet Take 1 tablet (75 mg total) by mouth daily 30 tablet 5    DIGOXIN PO Take 0 125 mg by mouth daily      DULoxetine (CYMBALTA) 30 mg delayed release capsule Take 1 capsule (30 mg total) by mouth daily 30 capsule 5    ferrous sulfate 324 (65 Fe) mg Take 1 tablet (324 mg total) by mouth daily before breakfast 30 tablet 5    folic acid (FOLVITE) 1 mg tablet Take 1 tablet (1 mg total) by mouth daily 30 tablet 5    furosemide (LASIX) 40 mg tablet Take 1 tablet (40 mg total) by mouth daily 30 tablet 0    Lidocaine (ASPERCREME LIDOCAINE) 4 % PTCH Apply 1 patch topically every 12 (twelve) hours      melatonin 3 mg Take 2 tablets (6 mg total) by mouth daily at bedtime 30 tablet 0    metoprolol succinate (TOPROL-XL) 100 mg 24 hr tablet Take 1 tablet (100 mg total) by mouth 2 (two) times a day 60 tablet 0    mirtazapine (REMERON) 7 5 MG tablet Take 1 tablet (7 5 mg total) by mouth daily at bedtime 30 tablet 5    nortriptyline (PAMELOR) 10 mg capsule Take 2 capsules (20 mg total) by mouth daily at bedtime 60 capsule 5    oxyCODONE (ROXICODONE) 5 mg immediate release tablet Take 0 5 tablets (2 5 mg total) by mouth every 4 (four) hours as needed for moderate pain Max Daily Amount: 15 mg 15 tablet 0    pantoprazole (PROTONIX) 40 mg tablet Take 1 tablet (40 mg total) by mouth 2 (two) times a day before meals 60 tablet 5    potassium chloride (K-DUR,KLOR-CON) 20 mEq tablet Take 1 tablet (20 mEq total) by mouth daily 30 tablet 5    senna-docusate sodium (SENOKOT S) 8 6-50 mg per tablet Take 1 tablet by mouth daily at bedtime 30 tablet 0    sorbitol 70 % solution Take 30 mL by mouth daily as needed      thiamine 100 MG tablet Take 1 tablet (100 mg total) by mouth daily 30 tablet 5       No Known Allergies    Allergies: No Known Allergies    Social History:     Marital Status:      Substance Use History:   History   Alcohol Use No     History   Smoking Status    Former Smoker   Smokeless Tobacco    Never Used     History   Drug Use No       Family History:    non-contributory    Physical Exam:     Vitals:   Blood Pressure: 106/68 (01/12/19 1654)  Pulse: 62 (01/12/19 1654)  Temperature: 98 2 °F (36 8 °C) (01/12/19 1336)  Temp Source: Oral (01/12/19 1336)  Respirations: 18 (01/12/19 1654)  SpO2: 96 % (01/12/19 1654)    Physical Exam   Constitutional: She is oriented to person, place, and time  Vital signs are normal  She appears well-developed and well-nourished  No distress  Forgetful   HENT:   Head: Normocephalic  Place tenderness to anterior neck as well as nodules palpated   Cardiovascular: Normal rate, regular rhythm and normal heart sounds  Pulmonary/Chest: Effort normal and breath sounds normal  No respiratory distress  She has no wheezes  She has no rales  She exhibits no tenderness  Abdominal: Soft  Bowel sounds are normal  She exhibits no distension and no mass   There is no tenderness  There is no rebound and no guarding  Neurological: She is alert and oriented to person, place, and time  Skin: Skin is warm and dry  She is not diaphoretic  Ecchymosis to right thigh, buttocks, scattered ecchymosis on arms  Nursing note and vitals reviewed  Additional Data:     Lab Results: I have personally reviewed pertinent reports  Results from last 7 days  Lab Units 01/12/19  1354   WBC Thousand/uL 10 36*   HEMOGLOBIN g/dL 11 3*   HEMATOCRIT % 37 5   PLATELETS Thousands/uL 416*   NEUTROS PCT % 80*   LYMPHS PCT % 11*   MONOS PCT % 5   EOS PCT % 2       Results from last 7 days  Lab Units 01/12/19  1354   POTASSIUM mmol/L 4 0   CHLORIDE mmol/L 101   CO2 mmol/L 29   BUN mg/dL 16   CREATININE mg/dL 1 12   CALCIUM mg/dL 9 0   ALK PHOS U/L 98   ALT U/L 32   AST U/L 26       Results from last 7 days  Lab Units 01/12/19  1354   INR  1 21*       Imaging: I have personally reviewed pertinent reports  Ct Chest Abdomen Pelvis Wo Contrast    Result Date: 1/3/2019  Narrative: CT CHEST, ABDOMEN AND PELVIS WITHOUT IV CONTRAST INDICATION:   abd pain, clavicular pain, rib pain, back pain  COMPARISON:  CT abdomen pelvis 9/26/2018, CT chest abdomen pelvis 8/20/2018 TECHNIQUE: CT examination of the chest, abdomen and pelvis was performed without intravenous contrast   Axial, sagittal, and coronal 2D reformatted images were created from the source data and submitted for interpretation  Radiation dose length product (DLP) for this visit:  1068  63 mGy-cm   This examination, like all CT scans performed in the [facility], was performed utilizing techniques to minimize radiation dose exposure, including the use of iterative reconstruction and automated exposure control  Enteric contrast was administered  FINDINGS: CHEST LUNGS:  There are left greater than right bibasilar opacities  No endobronchial lesion  No suspicious nodule or mass  Centrilobular emphysematous changes   PLEURA: Unremarkable  HEART/GREAT VESSELS:  Unremarkable for patient's age  MEDIASTINUM AND ASUNCION:  Unremarkable  CHEST WALL AND LOWER NECK:   Unremarkable  ABDOMEN LIVER/BILIARY TREE:  One or more subcentimeter sharply circumscribed low-density hepatic lesion(s) are noted, too small to accurately characterize, but statistically most likely to represent subcentimeter hepatic cysts  Hepatomegaly  No suspicious solid hepatic lesion is identified  Hepatic contours are normal   There is a biliary stent in place  Choledocholithiasis is redemonstrated  GALLBLADDER:  The gallbladder is distended  There are dependent gallstones present  There is gallbladder wall thickening  SPLEEN:  Calcified granulomata are noted in the spleen  No suspicious splenic mass  PANCREAS:  Unremarkable  ADRENAL GLANDS:  Unremarkable  KIDNEYS/URETERS:  Stable 7 mm hyperdense lesion at the lower pole left kidney  No obstructive uropathy  STOMACH AND BOWEL:  Moderate size hiatal hernia  No bowel obstruction  Mild stool retention throughout the colon  APPENDIX:  No findings to suggest appendicitis  ABDOMINOPELVIC CAVITY:  No ascites or free intraperitoneal air  No lymphadenopathy  VESSELS:  Atherosclerotic changes are present  No evidence of aneurysm  PELVIS REPRODUCTIVE ORGANS:  Unremarkable for patient's age  URINARY BLADDER:  The bladder is distended without focal wall thickening  ABDOMINAL WALL/INGUINAL REGIONS:  Small fat-containing periumbilical hernia  OSSEOUS STRUCTURES:  No acute fracture or destructive osseous lesion  Impression: 1  Left greater than right bibasilar opacities  Differential considerations include atelectasis versus infection to include aspiration pneumonia  Recommend clinical and laboratory correlation  2   Emphysematous changes  3   Gallbladder wall thickening, gallbladder distention and cholelithiasis  Correlate for focal right upper quadrant tenderness to exclude acute cholecystitis    If there is focal right upper quadrant tenderness further evaluation with targeted ultrasound could be considered  4   Biliary stent in place with choledocholithiasis  5   No acute traumatic injury in the chest abdomen or pelvis  Workstation performed: [id number]     X-ray Chest 2 Views    Result Date: 1/12/2019  Narrative: CHEST INDICATION:   chest pain  COMPARISON:  Chest x-ray dated July 7, 2018  EXAM PERFORMED/VIEWS:  XR CHEST PA & LATERAL FINDINGS: Cardiomediastinal silhouette appears unremarkable  There is a small left lung base opacity suggestive of a tiny pleural effusion on/or atelectasis  Mild chronic obstructive airway changes are present  No pneumothorax or pleural effusion  There are orthopedic anchors at the right humeral head  There are osteoarthritic degenerative changes of the left shoulder joint  Impression: Tiny left lung base opacity suggestive of a tiny pleural effusion and/or atelectasis  No pulmonary vascular congestion  Workstation performed: [id number]     Xr Knee 4+ Views Left Injury    Result Date: 1/3/2019  Narrative: LEFT KNEE INDICATION:   Status post fall  Knee pain  COMPARISON:  Left knee x-ray dated August 21, 2018  VIEWS:  XR KNEE 4+ VW LEFT INJURY FINDINGS: There is no acute fracture or dislocation  There is no joint effusion  Moderate to severe tricompartmental osteoarthritis as evidenced by joint space narrowing, osteophyte formation and subchondral sclerosis  No lytic or blastic lesions are seen  Soft tissues are unremarkable  Impression: No acute osseous abnormality  Moderate to severe osteoarthritic degenerative changes of the left knee joint  Workstation performed: [id number]     Ct Head Without Contrast    Result Date: 1/3/2019  Narrative: CT BRAIN - WITHOUT CONTRAST INDICATION:   Status post fall  COMPARISON:  8/20/2018  TECHNIQUE:  CT examination of the brain was performed  In addition to axial images, coronal 2D reformatted images were created and submitted for interpretation  Radiation dose length product (DLP) for this visit:  966 93 mGy-cm   This examination, like all CT scans performed in the [facility], was performed utilizing techniques to minimize radiation dose exposure, including the use of iterative  reconstruction and automated exposure control  IMAGE QUALITY:  Diagnostic  FINDINGS: PARENCHYMA: Decreased attenuation is noted in periventricular and subcortical white matter demonstrating an appearance that is statistically most likely to represent mild microangiopathic change  No CT signs of acute infarction  No intracranial mass, mass effect or midline shift  No acute parenchymal hemorrhage  VENTRICLES AND EXTRA-AXIAL SPACES:  Normal for the patient's age  VISUALIZED ORBITS AND PARANASAL SINUSES:  Unremarkable  CALVARIUM AND EXTRACRANIAL SOFT TISSUES:  Normal      Impression: No acute intracranial abnormality  Workstation performed: [id number]     Ct Spine Cervical Without Contrast    Result Date: 1/3/2019  Narrative: CT CERVICAL SPINE - WITHOUT CONTRAST INDICATION:   Status post fall  COMPARISON:  5/13/2018  TECHNIQUE:  CT examination of the cervical spine was performed without intravenous contrast   Contiguous axial images were obtained  Sagittal and coronal reconstructions were performed  Radiation dose length product (DLP) for this visit:  288 5 mGy-cm   This examination, like all CT scans performed in the [facility], was performed utilizing techniques to minimize radiation dose exposure, including the use of iterative reconstruction and automated exposure control  IMAGE QUALITY:  Diagnostic  FINDINGS: ALIGNMENT:  Unchanged alignment of the cervical spine, including reversal of the normal cervical lordosis and retrolisthesis at C3-4  No acute subluxation  VERTEBRAL BODIES:  No fracture  Bony fusion again noted at C3-4 and C4-5  DEGENERATIVE CHANGES:  Severe multilevel cervical degenerative changes are noted    No critical central canal stenosis  PREVERTEBRAL AND PARASPINAL SOFT TISSUES:  Atherosclerotic vascular calcification is noted at the carotid bifurcations bilaterally  THORACIC INLET:  Normal      Impression: No cervical spine fracture or traumatic malalignment  Workstation performed: [id number]     Us Right Upper Quadrant    Result Date: 1/4/2019  Narrative: RIGHT UPPER QUADRANT ULTRASOUND INDICATION:     RUQ pain, evidence of dilated GB on CT, history of biliary stent  Please evaluate ductal patency and doppler  COMPARISON:  CT chest, abdomen and pelvis dated January 3, 2019 TECHNIQUE:   Real-time ultrasound of the right upper quadrant was performed with a curvilinear transducer with both volumetric sweeps and still imaging techniques  FINDINGS: PANCREAS:  The visualized portion of the pancreatic parenchyma is unremarkable  The pancreatic duct is top normal in size measuring 4 mm at the neck  AORTA AND IVC:  Visualized portions are normal for patient age  LIVER: Size: Mildly enlarged right hepatic lobe measuring up to 20 cm the midclavicular line, likely related to Riedel's lobe configuration  Contour:  Surface contour is smooth  Parenchyma:  Echogenicity and echotexture are within normal limits  No evidence of suspicious mass  There is a subcentimeter left hepatic lobe cyst  Limited imaging of the main portal vein shows it to be patent and hepatopetal   BILIARY: The gallbladder is mildly distended and is filled with sludge and small gallstones  There is mild gallbladder wall thickening measuring up to 4 mm without associated hyperemia  Sonographic Desai's sign was positive  No pericholecystic fluid  The common bile duct is mildly dilated measuring up to 13 mm  The CBD stent is seen in place  There is a filling defect within the CBD, likely corresponding to the choledocholithiasis seen on the prior CT  No intrahepatic biliary ductal dilation  KIDNEY: Right kidney measures 11 3 x 4 3 cm  Within normal limits  ASCITES:   None  Impression: 1  Cholelithiasis and gallbladder sludge with mild gallbladder wall thickening and positive sonographic Desai's sign  The findings are equivocal for acute cholecystitis  Further evaluation with hepatobiliary scintigraphy would be beneficial if there is a clinical concern for acute cholecystitis  2   Choledocholithiasis and dilated common bile duct measuring up to 13 mm  A CBD stent is seen in place  No intrahepatic biliary ductal dilation  Workstation performed: [id number]       EKG, Pathology, and Other Studies Reviewed on Admission:   · EK aflutter    Allscripts Records Reviewed: Yes     Total Time for Visit, including Counseling / Coordination of Care: 45 minutes  Greater than 50% of this total time spent on direct patient counseling and coordination of care  ** Please Note: This note has been constructed using a voice recognition system   **

## 2019-01-12 NOTE — ED PROVIDER NOTES
History  Chief Complaint   Patient presents with    Chest Pain     pt reports 2 episodes of sharp chest pain this morning  pt reports resolution of symptoms at this time  A 81-year-old female with past medical history of CAD s/p stents, AFib on eliquis, anemia, CHF, hypertension and CVA; presents after two episodes of chest pain earlier this morning  Patient describes the chest pain as a sharp and stabbing pain in the left chest   Each episode lasted for less than a minute before resolving  Patient denies associated fever, chills, cough, diaphoresis, dizziness, lightheadedness, shortness of breath, abdominal pain, nausea, vomiting, diarrhea, peripheral edema and rashes  Patient denies history of similar symptoms  Of note, patient was admitted to [facility] from 1/3-1/10 after being found on the ground for three days  During that time, patient was found to have acute kidney injury as well as rapid AFib  Patient did also require a brief ICU stay secondary to hypotension which was felt to be secondary to hypovolemia and AFib  Patient was discharged to [facility]  A/P:  Chest pain, atypical for ACS however patient does have a significant history for CAD requiring stent placement  Will obtain cardiac workup for anemia, renal impairment, electrolyte abnormality and ACS  Patient will likely require admission for further ACS rule out  History provided by:  Patient and medical records      Prior to Admission Medications   Prescriptions Last Dose Informant Patient Reported? Taking?    ALPRAZolam (XANAX) 0 25 mg tablet   No Yes   Sig: Take 1 tablet (0 25 mg total) by mouth daily at bedtime as needed for anxiety for up to 10 days   DIGOXIN PO   Yes Yes   Sig: Take 0 125 mg by mouth daily   DULoxetine (CYMBALTA) 30 mg delayed release capsule   No Yes   Sig: Take 1 capsule (30 mg total) by mouth daily   Lidocaine (ASPERCREME LIDOCAINE) 4 % PTCH   Yes Yes   Sig: Apply 1 patch topically every 12 (twelve) hours acetaminophen (TYLENOL) 325 mg tablet   No Yes   Sig: Take 2 tablets (650 mg total) by mouth every 6 (six) hours   amiodarone 200 mg tablet   No Yes   Sig: Take 1 tablet (200 mg total) by mouth daily Then decrease to 1 tablet daily   apixaban (ELIQUIS) 5 mg   Yes Yes   Sig: Take 5 mg by mouth 2 (two) times a day   ascorbic acid (VITAMIN C) 500 mg tablet   No Yes   Sig: Take 1 tablet (500 mg total) by mouth daily   bisacodyl (DULCOLAX) 10 mg suppository   Yes Yes   Sig: Insert 10 mg into the rectum daily as needed for constipation   clopidogrel (PLAVIX) 75 mg tablet   No Yes   Sig: Take 1 tablet (75 mg total) by mouth daily   ferrous sulfate 324 (65 Fe) mg   No Yes   Sig: Take 1 tablet (324 mg total) by mouth daily before breakfast   folic acid (FOLVITE) 1 mg tablet   No Yes   Sig: Take 1 tablet (1 mg total) by mouth daily   furosemide (LASIX) 40 mg tablet   No Yes   Sig: Take 1 tablet (40 mg total) by mouth daily   melatonin 3 mg   No Yes   Sig: Take 2 tablets (6 mg total) by mouth daily at bedtime   metoprolol succinate (TOPROL-XL) 100 mg 24 hr tablet   No Yes   Sig: Take 1 tablet (100 mg total) by mouth 2 (two) times a day   mirtazapine (REMERON) 7 5 MG tablet   No Yes   Sig: Take 1 tablet (7 5 mg total) by mouth daily at bedtime   nortriptyline (PAMELOR) 10 mg capsule   No Yes   Sig: Take 2 capsules (20 mg total) by mouth daily at bedtime   oxyCODONE (ROXICODONE) 5 mg immediate release tablet   No Yes   Sig: Take 0 5 tablets (2 5 mg total) by mouth every 4 (four) hours as needed for moderate pain Max Daily Amount: 15 mg   pantoprazole (PROTONIX) 40 mg tablet   No Yes   Sig: Take 1 tablet (40 mg total) by mouth 2 (two) times a day before meals   potassium chloride (K-DUR,KLOR-CON) 20 mEq tablet   No Yes   Sig: Take 1 tablet (20 mEq total) by mouth daily   senna-docusate sodium (SENOKOT S) 8 6-50 mg per tablet   No Yes   Sig: Take 1 tablet by mouth daily at bedtime   sorbitol 70 % solution   Yes Yes   Sig: Take 30 mL by mouth daily as needed   thiamine 100 MG tablet   No Yes   Sig: Take 1 tablet (100 mg total) by mouth daily      Facility-Administered Medications: None       Past Medical History:   Diagnosis Date    A-fib ([place] [address] )     Acute respiratory disease     Anemia     Arthritis     CHF (congestive heart failure) (HCC)     Chronic pain     Heart failure (HCC)     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[place] [address] )     History of colon polyps     Hx of long term use of blood thinners     Hypertension     Irregular heart beat     Narcotic dependence ([place] [address] )     Rectal bleeding     Stroke ([place] [address] )     mild no deficiets/ memory loss    Uses walker        Past Surgical History:   Procedure Laterality Date    COLONOSCOPY      COLONOSCOPY N/A 7/27/2018    Procedure: COLONOSCOPY;  Surgeon: Elle Thomas MD;  [facility]: [facility]; Service: Gastroenterology    CORONARY STENT PLACEMENT      ERCP N/A 5/14/2018    Procedure: ENDOSCOPIC RETROGRADE CHOLANGIOPANCREATOGRAPHY (ERCP); Surgeon: Victorino Bhatia MD;  [facility];  Service: Gastroenterology    ERCP N/A 8/28/2018    Procedure: ENDOSCOPIC RETROGRADE CHOLANGIOPANCREATOGRAPHY (ERCP) w/ EGD;  Surgeon: Victorino Bhatia MD;  [facility]; Service: Gastroenterology    ESOPHAGOGASTRODUODENOSCOPY N/A 7/26/2018    Procedure: ESOPHAGOGASTRODUODENOSCOPY (EGD); Surgeon: Elle Thomas MD;  [facility]; Service: Gastroenterology    JOINT REPLACEMENT Right     knee       History reviewed  No pertinent family history  I have reviewed and agree with the history as documented  Social History   Substance Use Topics    Smoking status: Former Smoker    Smokeless tobacco: Never Used    Alcohol use No        Review of Systems   Cardiovascular: Positive for chest pain  All other systems reviewed and are negative        Physical Exam  Physical Exam   General Appearance: alert and oriented, nad, non toxic appearing  Skin:  Warm, dry, intact  HEENT: atraumatic, normocephalic  Neck: Supple, trachea midline  Cardiac: irregularly irregular, consistent with afib on the telemetry, rate 80-90's; no murmurs, rub, gallops  Pulmonary: lungs CTAB; no wheezes, rales, rhonchi    Chest wall nontender to palpation  Gastrointestinal: abdomen soft, nontender, nondistended; no guarding or rebound tenderness; good bowel sounds, no mass or bruits  Extremities:  no pedal edema, 2+ pulses; no calf tenderness, no clubbing, no cyanosis  Neuro:  no focal motor or sensory deficits, CN 2-12 grossly intact  Psych:  Normal mood and affect, normal judgement and insight      Vital Signs  ED Triage Vitals   Temperature Pulse Respirations Blood Pressure SpO2   01/12/19 1336 01/12/19 1336 01/12/19 1336 01/12/19 1336 01/12/19 1336   98 2 °F (36 8 °C) 87 18 125/78 99 %      Temp Source Heart Rate Source Patient Position - Orthostatic VS BP Location FiO2 (%)   01/12/19 1336 01/12/19 1336 01/12/19 1336 01/12/19 1336 --   Oral Monitor Lying Right arm       Pain Score       01/12/19 1533       Worst Possible Pain           Vitals:    01/12/19 1533 01/12/19 1654 01/12/19 1743 01/12/19 2016   BP: 112/69 106/68 137/86 110/86   Pulse: 89 62 91 96   Patient Position - Orthostatic VS: Lying  Lying        Visual Acuity      ED Medications  Medications   amiodarone tablet 200 mg (not administered)   ALPRAZolam (XANAX) tablet 0 25 mg (not administered)   apixaban (ELIQUIS) tablet 5 mg (5 mg Oral Given 1/12/19 2016)   ascorbic acid (VITAMIN C) tablet 500 mg (not administered)   bisacodyl (DULCOLAX) rectal suppository 10 mg (not administered)   clopidogrel (PLAVIX) tablet 75 mg (not administered)   digoxin (LANOXIN) tablet 125 mcg (not administered)   DULoxetine (CYMBALTA) delayed release capsule 30 mg (not administered)   folic acid (FOLVITE) tablet 1 mg (not administered)   furosemide (LASIX) tablet 40 mg (not administered)   melatonin tablet 6 mg (not administered)   metoprolol succinate (TOPROL-XL) 24 hr tablet 100 mg (50 mg Oral Not Given 1/12/19 2016)   mirtazapine (REMERON) tablet 7 5 mg (not administered)   nortriptyline (PAMELOR) capsule 20 mg (not administered)   oxyCODONE (ROXICODONE) IR tablet 2 5 mg (2 5 mg Oral Given 1/12/19 1912)   pantoprazole (PROTONIX) EC tablet 40 mg (not administered)   potassium chloride (K-DUR,KLOR-CON) CR tablet 20 mEq (not administered)   senna-docusate sodium (SENOKOT S) 8 6-50 mg per tablet 1 tablet (not administered)   thiamine (VITAMIN B1) tablet 100 mg (not administered)   acetaminophen (TYLENOL) tablet 650 mg (not administered)       Diagnostic Studies  Results Reviewed     Procedure Component Value Units Date/Time    Troponin I [[id number]]  (Normal) Collected:  01/12/19 1354    Lab Status:  Final result Specimen:  Blood from Arm, Left Updated:  01/12/19 1421     Troponin I 0 03 ng/mL     Comprehensive metabolic panel [[id number]]  (Abnormal) Collected:  01/12/19 1354    Lab Status:  Final result Specimen:  Blood from Arm, Left Updated:  01/12/19 1420     Sodium 139 mmol/L      Potassium 4 0 mmol/L      Chloride 101 mmol/L      CO2 29 mmol/L      ANION GAP 9 mmol/L      BUN 16 mg/dL      Creatinine 1 12 mg/dL      Glucose 168 (H) mg/dL      Calcium 9 0 mg/dL      AST 26 U/L      ALT 32 U/L      Alkaline Phosphatase 98 U/L      Total Protein 7 4 g/dL      Albumin 3 2 (L) g/dL      Total Bilirubin 0 17 (L) mg/dL      eGFR 47 ml/min/1 73sq m     Narrative:         National Kidney Disease Education Program recommendations are as follows:  GFR calculation is accurate only with a steady state creatinine  Chronic Kidney disease less than 60 ml/min/1 73 sq  meters  Kidney failure less than 15 ml/min/1 73 sq  meters      Protime-INR [[id number]]  (Abnormal) Collected:  01/12/19 1354    Lab Status:  Final result Specimen:  Blood from Arm, Left Updated:  01/12/19 1414     Protime 15 4 (H) seconds      INR 1 21 (H)    APTT [[id number]]  (Normal) Collected:  01/12/19 1354 Lab Status:  Final result Specimen:  Blood from Arm, Left Updated:  01/12/19 1414     PTT 32 seconds     CBC and differential [[id number]]  (Abnormal) Collected:  01/12/19 1354    Lab Status:  Final result Specimen:  Blood from Arm, Left Updated:  01/12/19 1405     WBC 10 36 (H) Thousand/uL      RBC 3 56 (L) Million/uL      Hemoglobin 11 3 (L) g/dL      Hematocrit 37 5 %       (H) fL      MCH 31 7 pg      MCHC 30 1 (L) g/dL      RDW 16 1 (H) %      MPV 9 7 fL      Platelets 841 (H) Thousands/uL      nRBC 0 /100 WBCs      Neutrophils Relative 80 (H) %      Immat GRANS % 1 %      Lymphocytes Relative 11 (L) %      Monocytes Relative 5 %      Eosinophils Relative 2 %      Basophils Relative 1 %      Neutrophils Absolute 8 28 (H) Thousands/µL      Immature Grans Absolute 0 13 Thousand/uL      Lymphocytes Absolute 1 14 Thousands/µL      Monocytes Absolute 0 54 Thousand/µL      Eosinophils Absolute 0 22 Thousand/µL      Basophils Absolute 0 05 Thousands/µL                  X-ray chest 2 views   ED Interpretation by [id number] Camilo Marroquin DO (01/12 1421)   No acute disease      Final Result by Efrem Thorne MD (01/12 1715)      Tiny left lung base opacity suggestive of a tiny pleural effusion and/or atelectasis  No pulmonary vascular congestion        Workstation performed: [id number]                    Procedures  Procedures   ECG 12 Lead Documentation  Date/Time: today/date: 1/12/2019  Performed by: Livier oHlman    ECG reviewed by me, the ED Provider: yes    Patient location:  ED   Previous ECG:  Compared to current  Rate:  ECG rate assessment: 94   Rhythm: Atrial Fibrillation   Ectopy: none    QRS axis:  Normal  Intervals: normal   Q waves: None   ST segments:  Nonspecific changes   T waves: normal      Impression: Atrial Fibrillation, rate controlled, with nonspecific ST changes        Phone Contacts  ED Phone Contact    ED Course  ED Course as of Jan 12 2055   Sat Jan 12, 2019   1419 Improved from labs over the past several days  Hemoglobin: (!) 11 3   1434 Mild increase from labs obtained over the past several days during admission (Cr 0 8), creatinine however initially 3 3  Creatinine: 1 12   1603 Due to HEART score of 5 and chest pain, will admit for further ACS rule out  Troponin I: 0 03       [id number] SCORE      Most Recent Value   [id number]   Age  2 Filed at: 01/12/2019 1849   Sex  1 Filed at: 01/12/2019 1849   CHF History  1 Filed at: 01/12/2019 1849   HTN History  1 Filed at: 01/12/2019 1849   Stroke or TIA Symptoms Previously  2 Filed at: 01/12/2019 1849   Vascular Disease History  1 Filed at: 01/12/2019 1849   Diabetes History     [id number] Score  8 Filed at: 01/12/2019 1849        HEART Risk Score      Most Recent Value   History  0 Filed at: 01/12/2019 1435   ECG  1 Filed at: 01/12/2019 1435   Age  2 Filed at: 01/12/2019 1435   Risk Factors  2 Filed at: 01/12/2019 1435   Troponin  0 Filed at: 01/12/2019 1435   Heart Score Risk Calculator   History  0 Filed at: 01/12/2019 1435   ECG  1 Filed at: 01/12/2019 1435   Age  2 Filed at: 01/12/2019 1435   Risk Factors  2 Filed at: 01/12/2019 1435   Troponin  0 Filed at: 01/12/2019 1435   HEART Score  5 Filed at: 01/12/2019 1435   HEART Score  5 Filed at: 01/12/2019 1435                            MDM  CritCare Time    Disposition  Final diagnoses:   Chest pain     Time reflects when diagnosis was documented in both MDM as applicable and the Disposition within this note     Time User Action Codes Description Comment    1/12/2019  4:23 PM Dami, [address] [R07 9] Chest pain       ED Disposition     ED Disposition Condition Comment    Admit  Case was discussed with ELDER and the patient's admission status was agreed to be Admission Status: observation status to the service of Dr Cem Thomason           Follow-up Information    None         Current Discharge Medication List      CONTINUE these medications which have NOT CHANGED    Details   acetaminophen (TYLENOL) 325 mg tablet Take 2 tablets (650 mg total) by mouth every 6 (six) hours  Qty: 100 tablet, Refills: 0    Associated Diagnoses: Multiple traumatic injuries      ALPRAZolam (XANAX) 0 25 mg tablet Take 1 tablet (0 25 mg total) by mouth daily at bedtime as needed for anxiety for up to 10 days  Qty: 5 tablet, Refills: 0    Associated Diagnoses: Anxiety      amiodarone 200 mg tablet Take 1 tablet (200 mg total) by mouth daily Then decrease to 1 tablet daily  Qty: 30 tablet, Refills: 2    Associated Diagnoses: Atrial fibrillation with RVR ([facility])      apixaban (ELIQUIS) 5 mg Take 5 mg by mouth 2 (two) times a day      ascorbic acid (VITAMIN C) 500 mg tablet Take 1 tablet (500 mg total) by mouth daily  Qty: 30 tablet, Refills: 5    Associated Diagnoses: Iron deficiency anemia due to chronic blood loss      bisacodyl (DULCOLAX) 10 mg suppository Insert 10 mg into the rectum daily as needed for constipation      clopidogrel (PLAVIX) 75 mg tablet Take 1 tablet (75 mg total) by mouth daily  Qty: 30 tablet, Refills: 5    Associated Diagnoses: Atrial fibrillation with RVR ([facility])      DIGOXIN PO Take 0 125 mg by mouth daily      DULoxetine (CYMBALTA) 30 mg delayed release capsule Take 1 capsule (30 mg total) by mouth daily  Qty: 30 capsule, Refills: 5    Associated Diagnoses: Other depression      ferrous sulfate 324 (65 Fe) mg Take 1 tablet (324 mg total) by mouth daily before breakfast  Qty: 30 tablet, Refills: 5    Associated Diagnoses: Iron deficiency anemia due to chronic blood loss      folic acid (FOLVITE) 1 mg tablet Take 1 tablet (1 mg total) by mouth daily  Qty: 30 tablet, Refills: 5    Associated Diagnoses: Ambulatory dysfunction      furosemide (LASIX) 40 mg tablet Take 1 tablet (40 mg total) by mouth daily  Qty: 30 tablet, Refills: 0    Associated Diagnoses: Chronic systolic heart failure (HCC)      Lidocaine (ASPERCREME LIDOCAINE) 4 % PTCH Apply 1 patch topically every 12 (twelve) hours      melatonin 3 mg Take 2 tablets (6 mg total) by mouth daily at bedtime  Qty: 30 tablet, Refills: 0    Associated Diagnoses: Atrial fibrillation with RVR ([facility])      metoprolol succinate (TOPROL-XL) 100 mg 24 hr tablet Take 1 tablet (100 mg total) by mouth 2 (two) times a day  Qty: 60 tablet, Refills: 0    Associated Diagnoses: Chronic systolic heart failure (HCC)      mirtazapine (REMERON) 7 5 MG tablet Take 1 tablet (7 5 mg total) by mouth daily at bedtime  Qty: 30 tablet, Refills: 5    Associated Diagnoses: Atrial fibrillation with RVR (HCC)      nortriptyline (PAMELOR) 10 mg capsule Take 2 capsules (20 mg total) by mouth daily at bedtime  Qty: 60 capsule, Refills: 5    Associated Diagnoses: Acute left-sided low back pain without sciatica      oxyCODONE (ROXICODONE) 5 mg immediate release tablet Take 0 5 tablets (2 5 mg total) by mouth every 4 (four) hours as needed for moderate pain Max Daily Amount: 15 mg  Qty: 15 tablet, Refills: 0    Associated Diagnoses: Uncomplicated opioid dependence (HCC)      pantoprazole (PROTONIX) 40 mg tablet Take 1 tablet (40 mg total) by mouth 2 (two) times a day before meals  Qty: 60 tablet, Refills: 5    Associated Diagnoses: H/O: GI bleed      potassium chloride (K-DUR,KLOR-CON) 20 mEq tablet Take 1 tablet (20 mEq total) by mouth daily  Qty: 30 tablet, Refills: 5    Associated Diagnoses: Chronic systolic heart failure (HCC)      senna-docusate sodium (SENOKOT S) 8 6-50 mg per tablet Take 1 tablet by mouth daily at bedtime  Qty: 30 tablet, Refills: 0    Associated Diagnoses: Gastrointestinal hemorrhage, unspecified gastrointestinal hemorrhage type      sorbitol 70 % solution Take 30 mL by mouth daily as needed      thiamine 100 MG tablet Take 1 tablet (100 mg total) by mouth daily  Qty: 30 tablet, Refills: 5    Associated Diagnoses: Ambulatory dysfunction           No discharge procedures on file      ED Provider  Electronically Signed by           Jose Streeter DO  01/12/19 2055

## 2019-01-12 NOTE — PLAN OF CARE
CARDIOVASCULAR - ADULT     Maintains optimal cardiac output and hemodynamic stability Progressing     Absence of cardiac dysrhythmias or at baseline rhythm Progressing        DISCHARGE PLANNING     Discharge to home or other facility with appropriate resources Progressing        INFECTION - ADULT     Absence or prevention of progression during hospitalization Progressing     Absence of fever/infection during neutropenic period Progressing        Knowledge Deficit     Patient/family/caregiver demonstrates understanding of disease process, treatment plan, medications, and discharge instructions Progressing        PAIN - ADULT     Verbalizes/displays adequate comfort level or baseline comfort level Progressing        Potential for Falls     Patient will remain free of falls Progressing        Prexisting or High Potential for Compromised Skin Integrity     Skin integrity is maintained or improved Progressing        SAFETY ADULT     Maintain or return to baseline ADL function Progressing     Maintain or return mobility status to optimal level Progressing        SKIN/TISSUE INTEGRITY - ADULT     Skin integrity remains intact Progressing

## 2019-01-13 LAB
ATRIAL RATE: 300 BPM
CHOLEST SERPL-MCNC: 231 MG/DL (ref 50–200)
EST. AVERAGE GLUCOSE BLD GHB EST-MCNC: 105 MG/DL
HBA1C MFR BLD: 5.3 % (ref 4.2–6.3)
HDLC SERPL-MCNC: 33 MG/DL (ref 40–60)
LDLC SERPL CALC-MCNC: 139 MG/DL (ref 0–100)
NONHDLC SERPL-MCNC: 198 MG/DL
P AXIS: 0 DEGREES
QRS AXIS: 9 DEGREES
QRSD INTERVAL: 106 MS
QT INTERVAL: 374 MS
QTC INTERVAL: 467 MS
T WAVE AXIS: 17 DEGREES
T3FREE SERPL-MCNC: 2.27 PG/ML (ref 2.3–4.2)
T4 FREE SERPL-MCNC: 1.04 NG/DL (ref 0.76–1.46)
TRIGL SERPL-MCNC: 293 MG/DL
TSH SERPL DL<=0.05 MIU/L-ACNC: 5.02 UIU/ML (ref 0.36–3.74)
VENTRICULAR RATE: 94 BPM

## 2019-01-13 PROCEDURE — 80061 LIPID PANEL: CPT | Performed by: PHYSICIAN ASSISTANT

## 2019-01-13 PROCEDURE — 84439 ASSAY OF FREE THYROXINE: CPT | Performed by: PHYSICIAN ASSISTANT

## 2019-01-13 PROCEDURE — 93010 ELECTROCARDIOGRAM REPORT: CPT | Performed by: INTERNAL MEDICINE

## 2019-01-13 PROCEDURE — 83036 HEMOGLOBIN GLYCOSYLATED A1C: CPT | Performed by: PHYSICIAN ASSISTANT

## 2019-01-13 PROCEDURE — 84443 ASSAY THYROID STIM HORMONE: CPT | Performed by: PHYSICIAN ASSISTANT

## 2019-01-13 PROCEDURE — 99233 SBSQ HOSP IP/OBS HIGH 50: CPT | Performed by: PHYSICIAN ASSISTANT

## 2019-01-13 PROCEDURE — 84481 FREE ASSAY (FT-3): CPT | Performed by: PHYSICIAN ASSISTANT

## 2019-01-13 RX ORDER — METOPROLOL TARTRATE 5 MG/5ML
5 INJECTION INTRAVENOUS EVERY 6 HOURS PRN
Status: DISCONTINUED | OUTPATIENT
Start: 2019-01-13 | End: 2019-01-15 | Stop reason: HOSPADM

## 2019-01-13 RX ORDER — ATORVASTATIN CALCIUM 20 MG/1
20 TABLET, FILM COATED ORAL
Status: DISCONTINUED | OUTPATIENT
Start: 2019-01-13 | End: 2019-01-15 | Stop reason: HOSPADM

## 2019-01-13 RX ORDER — LIDOCAINE 50 MG/G
1 PATCH TOPICAL DAILY
Status: DISCONTINUED | OUTPATIENT
Start: 2019-01-13 | End: 2019-01-15 | Stop reason: HOSPADM

## 2019-01-13 RX ADMIN — ATORVASTATIN CALCIUM 20 MG: 20 TABLET, FILM COATED ORAL at 16:14

## 2019-01-13 RX ADMIN — OXYCODONE HYDROCHLORIDE 2.5 MG: 5 TABLET ORAL at 05:48

## 2019-01-13 RX ADMIN — STANDARDIZED SENNA CONCENTRATE AND DOCUSATE SODIUM 1 TABLET: 8.6; 5 TABLET, FILM COATED ORAL at 21:02

## 2019-01-13 RX ADMIN — DIGOXIN 125 MCG: 125 TABLET ORAL at 08:54

## 2019-01-13 RX ADMIN — METOPROLOL SUCCINATE 100 MG: 50 TABLET, EXTENDED RELEASE ORAL at 21:02

## 2019-01-13 RX ADMIN — THIAMINE HCL TAB 100 MG 100 MG: 100 TAB at 08:54

## 2019-01-13 RX ADMIN — APIXABAN 5 MG: 5 TABLET, FILM COATED ORAL at 08:55

## 2019-01-13 RX ADMIN — MELATONIN TAB 3 MG 6 MG: 3 TAB at 21:02

## 2019-01-13 RX ADMIN — PANTOPRAZOLE SODIUM 40 MG: 40 TABLET, DELAYED RELEASE ORAL at 16:14

## 2019-01-13 RX ADMIN — MIRTAZAPINE 7.5 MG: 15 TABLET, FILM COATED ORAL at 21:03

## 2019-01-13 RX ADMIN — METOPROLOL TARTRATE 5 MG: 5 INJECTION, SOLUTION INTRAVENOUS at 17:13

## 2019-01-13 RX ADMIN — OXYCODONE HYDROCHLORIDE AND ACETAMINOPHEN 500 MG: 500 TABLET ORAL at 08:57

## 2019-01-13 RX ADMIN — OXYCODONE HYDROCHLORIDE 2.5 MG: 5 TABLET ORAL at 10:11

## 2019-01-13 RX ADMIN — OXYCODONE HYDROCHLORIDE 2.5 MG: 5 TABLET ORAL at 20:47

## 2019-01-13 RX ADMIN — APIXABAN 5 MG: 5 TABLET, FILM COATED ORAL at 21:02

## 2019-01-13 RX ADMIN — AMIODARONE HYDROCHLORIDE 200 MG: 200 TABLET ORAL at 08:54

## 2019-01-13 RX ADMIN — CLOPIDOGREL BISULFATE 75 MG: 75 TABLET ORAL at 08:55

## 2019-01-13 RX ADMIN — DULOXETINE HYDROCHLORIDE 30 MG: 30 CAPSULE, DELAYED RELEASE ORAL at 08:54

## 2019-01-13 RX ADMIN — FOLIC ACID 1 MG: 1 TABLET ORAL at 08:54

## 2019-01-13 RX ADMIN — LIDOCAINE 1 PATCH: 50 PATCH TOPICAL at 10:38

## 2019-01-13 RX ADMIN — OXYCODONE HYDROCHLORIDE 2.5 MG: 5 TABLET ORAL at 14:34

## 2019-01-13 RX ADMIN — NORTRIPTYLINE HYDROCHLORIDE 20 MG: 10 CAPSULE ORAL at 21:02

## 2019-01-13 RX ADMIN — PANTOPRAZOLE SODIUM 40 MG: 40 TABLET, DELAYED RELEASE ORAL at 06:22

## 2019-01-13 NOTE — SOCIAL WORK
Pt has been at [facility] for [address]  Per PA, pt will be ready for d/c tomorrow back to [place] and needs a wheelchair van     arranged [facility] for tomorrow at 2 PM

## 2019-01-13 NOTE — PROGRESS NOTES
[facility] Progress Note  Patient: Domingo Ram 69 y o  female   MRN: [id number]  PCP: Gita Rosario DO  Unit/Bed#: [facility] 2 -02 Encounter: [id number]  Date Of Visit: 01/13/19    Assessment:    Principal Problem:    Chest pain  Active Problems:    Chronic systolic heart failure (HCC)    Hyperlipidemia    Coronary artery disease    Atrial fibrillation (HCC)    Anxiety    Hypertension    Anterior neck pain      Plan:  · Chest Pain: Presenting with 2 episodes of sharp left-sided chest pain while lying in bed  Ruled ACS however this may be spasm from an underlying thyroiditis  CXR with tiny left lung base opacity suggestive of tiny pleural effusion  No vascular congestion  Known CAD status post NGOZI to the RCA July 2018  Medications pt is already on include- Plavix, beta-blocker, Eliquis, amiodarone, digoxin  Unclear why patient is not on a statin  Troponin 0 03 and has remained without elevation  Noted prior troponins during last admission on 1/3/18 which were recently elevated given MALA and dehydation  EKG without any signs of acute ischemia  Check TSH, T3, T4  Lipid panel and hemoglobin A1c  Monitor on telemetry  -lipid panel:  Cholesterol 231, triglycerides 293, HDL 33,      -start patient on statin 20 mg daily    · Anterior neck pain:  Patient with swollen area over thyroid  Tenderness to palpation to anterior neck  Possible thyroiditis  Patient complains of some throat pain/irritation a few days ago  Will check TSH, free T3, free T4    -TSH elevated at 5 021   -Free T3 was 2 27 (reference range 2 30-4 20)   -Free T4 was 1 04 (reference range is 0 76-1 46)   -ultimately patient will need a radioactive iodine uptake scan to assess   -should be given a script for this upon discharge and will need to follow up with PCP for results      · Chronic atrial fibrillation: Initially diagnosed 2016  With failed ablation  Currently in AFib    Anticoagulated on Eliquis given high chads Vasc score of 8  Maintained on amiodarone 200 mg daily and digoxin 0 125 mg daily  On metoprolol succinate 100 mg b i d  Rate controlled       · Chronic systolic CHF:  Echo from 2018 with LVEF 40 45%, hypokinesis of the anterior and anterior septal walls  Echo from 19 with improved systolic function with EF now 60%  Maintained on lasix 40 mg daily  Stable with no signs of acute volume overload      · Anxiety/depression:  On nortriptyline, mirtazapine, Cymbalta, prn xanax     · GERD:  Continue PPI     · History of CVA:  On plavix and Eliquis     · Chronic opioid dependence: On oxycodone      · Recent fall at home:  Noted during last hospitalization and workup with negative for any acute fractures  Patient with persistent ecchymosis scattered on her body  VTE Pharmacologic Prophylaxis:   Pharmacologic: Apixaban (Eliquis)  Mechanical VTE Prophylaxis in Place: Yes    Discharge Plan:  [facility] home tomorrow- case management arranging transportation for tomorrow morning  Discussions with Specialists or Other Care Team Provider: Dr Jaime Guzman    Education and Discussions with Family / Patient: patient    Time Spent for Care: 15 minutes  More than 50% of total time spent on counseling and coordination of care as described above  Current Length of Stay: 0 day(s)  Current Patient Status: Observation   Code Status: Level 3 - DNAR and DNI    Subjective:   Resting in bed  Denies any further up this of chest pain  Her in will tender with palpation  Discussed result of thyroid lab work and will need a outpatient night and thyroid uptake scan to further assess  Agreeable to follow up with PCP for results  Objective:     Vitals:   Temp (24hrs), Av °F (36 7 °C), Min:97 6 °F (36 4 °C), Max:98 6 °F (37 °C)    Temp:  [97 6 °F (36 4 °C)-98 6 °F (37 °C)] 98 6 °F (37 °C)  HR:  [62-99] 66  Resp:  [18-22] 20  BP: (101-137)/(58-86) 107/64  SpO2:  [96 %-99 %] 96 %  There is no height or weight on file to calculate BMI  Input and Output Summary (last 24 hours):     No intake or output data in the 24 hours ending 01/13/19 1236    Physical Exam:     Physical Exam   Constitutional: She is oriented to person, place, and time  She appears well-developed and well-nourished  No distress  Tenderness to anterior neck   HENT:   Head: Normocephalic and atraumatic  Eyes: Conjunctivae are normal    Cardiovascular: Normal rate, regular rhythm and normal heart sounds  Pulmonary/Chest: Effort normal and breath sounds normal  No respiratory distress  She has no wheezes  She has no rales  She exhibits no tenderness  Abdominal: Soft  Bowel sounds are normal  She exhibits no distension and no mass  There is no tenderness  There is no rebound and no guarding  Neurological: She is alert and oriented to person, place, and time  Skin: Skin is warm and dry  She is not diaphoretic  Psychiatric: She has a normal mood and affect  Her behavior is normal    Nursing note and vitals reviewed  Additional Data:     Labs:      Results from last 7 days  Lab Units 01/12/19  1354   WBC Thousand/uL 10 36*   HEMOGLOBIN g/dL 11 3*   HEMATOCRIT % 37 5   PLATELETS Thousands/uL 416*   NEUTROS PCT % 80*   LYMPHS PCT % 11*   MONOS PCT % 5   EOS PCT % 2       Results from last 7 days  Lab Units 01/12/19  1354   POTASSIUM mmol/L 4 0   CHLORIDE mmol/L 101   CO2 mmol/L 29   BUN mg/dL 16   CREATININE mg/dL 1 12   CALCIUM mg/dL 9 0   ALK PHOS U/L 98   ALT U/L 32   AST U/L 26       Results from last 7 days  Lab Units 01/12/19  1354   INR  1 21*       * I Have Reviewed All Lab Data Listed Above  * Additional Pertinent Lab Tests Reviewed:  [facility] Admission Reviewed    Imaging:    Imaging Reports Reviewed Today Include:   Imaging Personally Reviewed by Myself Includes:      Recent Cultures (last 7 days):           Last 24 Hours Medication List:     Current Facility-Administered Medications:  acetaminophen 650 mg Oral Q4H PRN Julia Johnston WILLIAM   ALPRAZolam 0 25 mg Oral HS PRN Janee Kocher, WILLIAM   amiodarone 200 mg Oral Daily Brianna Piger, PA-SHARI   apixaban 5 mg Oral BID Brianna Piger, PA-SHARI   ascorbic acid 500 mg Oral Daily Brianna Piger, PA-SHARI   atorvastatin 20 mg Oral Daily With [employer], PA-SHARI   bisacodyl 10 mg Rectal Daily PRN Janee Kocher, WILLIAM   clopidogrel 75 mg Oral Daily Brianna Piger, PA-SHARI   digoxin 125 mcg Oral Daily Brianna Piger, PA-SHARI   DULoxetine 30 mg Oral Daily Brianna Piger, PA-SHARI   folic acid 1 mg Oral Daily Brianna Piger, PA-SHARI   furosemide 40 mg Oral Daily Brianna Piger, PA-C   lidocaine 1 patch Topical Daily Brianna Piger, PA-SHARI   melatonin 6 mg Oral HS Brianna Piger, PA-SHARI   metoprolol succinate 100 mg Oral BID Brianna Piger, PA-SHARI   mirtazapine 7 5 mg Oral HS Brianna Piger, PA-SHARI   nortriptyline 20 mg Oral HS Brianna Piger, PA-SHARI   oxyCODONE 2 5 mg Oral Q4H PRN Brianna Piger, PA-SHARI   pantoprazole 40 mg Oral BID AC Brianna Piger, WILLIAM   potassium chloride 20 mEq Oral Daily Brianna Piger, WILLIAM   senna-docusate sodium 1 tablet Oral HS Brianna Piger, WILLIAM   thiamine 100 mg Oral Daily Janee Kocher, PA-C        Today, Patient Was Seen By: Janee Kocher, PA-C    ** Please Note: This note has been constructed using a voice recognition system   **
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
## 2019-01-14 ENCOUNTER — APPOINTMENT (INPATIENT)
Dept: CT IMAGING | Facility: HOSPITAL | Age: 79
DRG: 644 | End: 2019-01-14
Payer: MEDICARE

## 2019-01-14 LAB
ANION GAP SERPL CALCULATED.3IONS-SCNC: 8 MMOL/L (ref 4–13)
BUN SERPL-MCNC: 18 MG/DL (ref 5–25)
CALCIUM SERPL-MCNC: 9.1 MG/DL (ref 8.3–10.1)
CHLORIDE SERPL-SCNC: 106 MMOL/L (ref 100–108)
CO2 SERPL-SCNC: 26 MMOL/L (ref 21–32)
CREAT SERPL-MCNC: 0.95 MG/DL (ref 0.6–1.3)
ERYTHROCYTE [DISTWIDTH] IN BLOOD BY AUTOMATED COUNT: 16.1 % (ref 11.6–15.1)
GFR SERPL CREATININE-BSD FRML MDRD: 58 ML/MIN/1.73SQ M
GLUCOSE SERPL-MCNC: 92 MG/DL (ref 65–140)
HCT VFR BLD AUTO: 32.9 % (ref 34.8–46.1)
HGB BLD-MCNC: 9.9 G/DL (ref 11.5–15.4)
MCH RBC QN AUTO: 31.7 PG (ref 26.8–34.3)
MCHC RBC AUTO-ENTMCNC: 30.1 G/DL (ref 31.4–37.4)
MCV RBC AUTO: 105 FL (ref 82–98)
PLATELET # BLD AUTO: 387 THOUSANDS/UL (ref 149–390)
PMV BLD AUTO: 9.6 FL (ref 8.9–12.7)
POTASSIUM SERPL-SCNC: 4.2 MMOL/L (ref 3.5–5.3)
RBC # BLD AUTO: 3.12 MILLION/UL (ref 3.81–5.12)
SODIUM SERPL-SCNC: 140 MMOL/L (ref 136–145)
WBC # BLD AUTO: 8.32 THOUSAND/UL (ref 4.31–10.16)

## 2019-01-14 PROCEDURE — 80048 BASIC METABOLIC PNL TOTAL CA: CPT | Performed by: PHYSICIAN ASSISTANT

## 2019-01-14 PROCEDURE — 85027 COMPLETE CBC AUTOMATED: CPT | Performed by: PHYSICIAN ASSISTANT

## 2019-01-14 PROCEDURE — 99232 SBSQ HOSP IP/OBS MODERATE 35: CPT | Performed by: INTERNAL MEDICINE

## 2019-01-14 PROCEDURE — 70491 CT SOFT TISSUE NECK W/DYE: CPT

## 2019-01-14 RX ORDER — OXYCODONE HYDROCHLORIDE 5 MG/1
5 TABLET ORAL EVERY 4 HOURS PRN
Status: DISCONTINUED | OUTPATIENT
Start: 2019-01-14 | End: 2019-01-15 | Stop reason: HOSPADM

## 2019-01-14 RX ADMIN — PANTOPRAZOLE SODIUM 40 MG: 40 TABLET, DELAYED RELEASE ORAL at 16:00

## 2019-01-14 RX ADMIN — OXYCODONE HYDROCHLORIDE 2.5 MG: 5 TABLET ORAL at 05:39

## 2019-01-14 RX ADMIN — FOLIC ACID 1 MG: 1 TABLET ORAL at 09:36

## 2019-01-14 RX ADMIN — OXYCODONE HYDROCHLORIDE 2.5 MG: 5 TABLET ORAL at 09:39

## 2019-01-14 RX ADMIN — IOHEXOL 85 ML: 350 INJECTION, SOLUTION INTRAVENOUS at 14:48

## 2019-01-14 RX ADMIN — PANTOPRAZOLE SODIUM 40 MG: 40 TABLET, DELAYED RELEASE ORAL at 05:40

## 2019-01-14 RX ADMIN — LIDOCAINE 1 PATCH: 50 PATCH TOPICAL at 09:35

## 2019-01-14 RX ADMIN — ATORVASTATIN CALCIUM 20 MG: 20 TABLET, FILM COATED ORAL at 16:00

## 2019-01-14 RX ADMIN — OXYCODONE HYDROCHLORIDE 5 MG: 5 TABLET ORAL at 16:00

## 2019-01-14 RX ADMIN — THIAMINE HCL TAB 100 MG 100 MG: 100 TAB at 09:35

## 2019-01-14 RX ADMIN — DULOXETINE HYDROCHLORIDE 30 MG: 30 CAPSULE, DELAYED RELEASE ORAL at 09:36

## 2019-01-14 RX ADMIN — NORTRIPTYLINE HYDROCHLORIDE 20 MG: 10 CAPSULE ORAL at 21:17

## 2019-01-14 RX ADMIN — MIRTAZAPINE 7.5 MG: 15 TABLET, FILM COATED ORAL at 21:17

## 2019-01-14 RX ADMIN — POTASSIUM CHLORIDE 20 MEQ: 1500 TABLET, EXTENDED RELEASE ORAL at 09:36

## 2019-01-14 RX ADMIN — DIGOXIN 125 MCG: 125 TABLET ORAL at 09:36

## 2019-01-14 RX ADMIN — AMIODARONE HYDROCHLORIDE 200 MG: 200 TABLET ORAL at 09:36

## 2019-01-14 RX ADMIN — CLOPIDOGREL BISULFATE 75 MG: 75 TABLET ORAL at 09:36

## 2019-01-14 RX ADMIN — MELATONIN TAB 3 MG 6 MG: 3 TAB at 21:16

## 2019-01-14 RX ADMIN — METOPROLOL SUCCINATE 100 MG: 50 TABLET, EXTENDED RELEASE ORAL at 21:16

## 2019-01-14 RX ADMIN — APIXABAN 5 MG: 5 TABLET, FILM COATED ORAL at 21:16

## 2019-01-14 RX ADMIN — APIXABAN 5 MG: 5 TABLET, FILM COATED ORAL at 09:36

## 2019-01-14 RX ADMIN — OXYCODONE HYDROCHLORIDE AND ACETAMINOPHEN 500 MG: 500 TABLET ORAL at 09:36

## 2019-01-14 NOTE — SOCIAL WORK
CM met with patient at bedside to address discharge needs  CM pointed out name/number on the white board and communicated she was that individual      Patient lives alone in a [facility]; patient only uses the 1st floor, although she denies difficulty with steps  Patient could benefit from assistance at home with ADLs and functional mobility  Patient orders out for her meals  Her sister used to do her food shopping, but her sister's health is now also declining and she is unable now to help pt  Patient uses a RW for DMEs  Patient reports she is able to ambulate without assistance from a seated or laying position  Hx of VNA reported 3Xs  Patient is not involved in any community activities  Patient is retired  PCP identified as aicha Almaraz  physician  Patient's LACE score is 80, and she has a dx of CHF, therefore a TCM/HRR appointment would normally be made at d/c, but she is being discharged back to [place] to continue her rehab  No POA identified currently but literature was provided to appoint a POA, both an Advanced Directive and 5 Wishes book  Sister, Deb Pichardo, is permitted to be patient's healthcare representative and spokesperson for the family  In regards to a designated caregiver, patient reports she has a landlady that calls her frequently to be certain that she is well and hasn't fallen, and will drop off meals to her on occassion  Patient uses [employer] as they deliver her medications and also use pill pockets  Patient does not drive; she will be scheduled for a transport to [facility] when medically cleared  This is a readmission within the last 30 days, however it appears as though the admissions are unrelated  Her last admission dx was for a fall with prolonged immobility; this admission was for chest pain  CM will remain available and follow as needed

## 2019-01-14 NOTE — UTILIZATION REVIEW
OBSERVATION WRITTEN 01/12/19 @ 1624 CONVERTED TO INPATIENT ADMISSION 01/13/19 @ 2312 DUE TO FURTHER DIAGNOSTIC WORKUP REQUIRED FOR CHEST PAIN, REQUIRING AT LEAST A 2 MIDNIGHT STAY  Admitting Physician ISELA ARTEAGA    Level of Care Med Surg    Estimated length of stay More than 2 Midnights    Certification I certify that inpatient services are medically necessary for this patient for a duration of greater than two midnights  See H&P and MD Progress Notes for additional information about the patient's course of treatment  [address] [facility] Review Department  Phone: [phone]; Fax [phone]  [email or web address]  org  ATTENTION: Please call with any questions or concerns to [phone]  and carefully listen to the prompts so that you are directed to the right person  Send all requests for admission clinical reviews, approved or denied determinations and any other requests to fax [phone]   All voicemails are confidential

## 2019-01-14 NOTE — PLAN OF CARE
Problem: DISCHARGE PLANNING - CARE MANAGEMENT  Goal: Discharge to post-acute care or home with appropriate resources  INTERVENTIONS:  - Conduct assessment to determine patient/family and health care team treatment goals, and need for post-acute services based on payer coverage, community resources, and patient preferences, and barriers to discharge  - Address psychosocial, clinical, and financial barriers to discharge as identified in assessment in conjunction with the patient/family and health care team  - Arrange appropriate level of post-acute services according to patients   needs and preference and payer coverage in collaboration with the physician and health care team  - Communicate with and update the patient/family, physician, and health care team regarding progress on the discharge plan  - Arrange appropriate transportation to post-acute venues  Outcome: Progressing  CM to schedule a [facility] transport at discharge  CM explained that she would incur a cost  Pt acknowledges

## 2019-01-14 NOTE — PHYSICIAN ADVISOR
Current patient class: Observation  The patient is currently on Hospital Day: 2 at [facility]        The patient was admitted to the hospital  on N/A at N/A for the following diagnosis:  Chest pain [R07 9]       There is documentation in the medical record of an expected length of stay of at least 2 midnights  The patient is therefore expected to satisfy the 2 midnight benchmark and given the 2 midnight presumption is appropriate for INPATIENT ADMISSION  Given this expectation of a satisfying stay, CMS instructs us that the patient is most often appropriate for inpatient admission under part A provided medical necessity is documented in the chart  After review of the relevant documentation, labs, vital signs and test results, the patient is appropriate for INPATIENT ADMISSION  Admission to the hospital as an inpatient is a complex decision making process which requires the practitioner to consider the patients presenting complaint, history and physical examination and all relevant testing  With this in mind, in this case, the patient was deemed appropriate for INPATIENT ADMISSION  After review of the documentation and testing available at the time of the admission I concur with this clinical determination of medical necessity  The patient does have an inpatient admission within the previous 30 days  The patient was admitted on 1/3/19 and discharged on 1/10/19 as an inpatient  The patient therefore required readmission review  In this case the patient should be considered a SEPARATE and UNRELATED INPATIENT ADMISSION  The patient had been discharged in stable condition with a completed care plan  There were no unresolved acute medical issues at the time of discharged which would have reasonably been expected to prompt this readmission  Rationale is as follows:     The patient is a 69 yrs   Female who presented to the ED at 1/12/2019  1:26 PM with a chief complaint of Chest Pain (pt reports 2 episodes of sharp chest pain this morning  pt reports resolution of symptoms at this time  )    The patients vitals on arrival were ED Triage Vitals   Temperature Pulse Respirations Blood Pressure SpO2   01/12/19 1336 01/12/19 1336 01/12/19 1336 01/12/19 1336 01/12/19 1336   98 2 °F (36 8 °C) 87 18 125/78 99 %      Temp Source Heart Rate Source Patient Position - Orthostatic VS BP Location FiO2 (%)   01/12/19 1336 01/12/19 1336 01/12/19 1336 01/12/19 1336 --   Oral Monitor Lying Right arm       Pain Score       01/12/19 1533       Worst Possible Pain           Past Medical History:   Diagnosis Date    A-fib ([facility] )     Acute respiratory disease     Anemia     Arthritis     CHF (congestive heart failure) (HCC)     Chronic pain     Heart failure (HCC)     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[facility] )     History of colon polyps     Hx of long term use of blood thinners     Hypertension     Irregular heart beat     Narcotic dependence (HCC)     Rectal bleeding     Stroke (HCC)     mild no deficiets/ memory loss    Uses walker      Past Surgical History:   Procedure Laterality Date    COLONOSCOPY      COLONOSCOPY N/A 7/27/2018    Procedure: COLONOSCOPY;  Surgeon: Marian Jones MD;  [facility]: [facility]; Service: Gastroenterology    CORONARY STENT PLACEMENT      ERCP N/A 5/14/2018    Procedure: ENDOSCOPIC RETROGRADE CHOLANGIOPANCREATOGRAPHY (ERCP); Surgeon: Jostin De Leon MD;  [facility]: [facility];  Service: Gastroenterology    ERCP N/A 8/28/2018    Procedure: ENDOSCOPIC RETROGRADE CHOLANGIOPANCREATOGRAPHY (ERCP) w/ EGD;  Surgeon: Jostin De Leon MD;  [facility]; Service: Gastroenterology    ESOPHAGOGASTRODUODENOSCOPY N/A 7/26/2018    Procedure: ESOPHAGOGASTRODUODENOSCOPY (EGD); Surgeon: Marian Jones MD;  [facility]: [facility];   Service: Gastroenterology    JOINT REPLACEMENT Right     knee           Consults have been placed to:   IP CONSULT TO CASE MANAGEMENT    Vitals:    01/13/19 0700 01/13/19 1459 01/13/19 2102 01/13/19 2257   BP: 107/64 138/82 133/74 116/86   BP Location: Right arm Left arm  Right arm   Pulse: 66 74 87 97   Resp: 20 20 19   Temp: 98 6 °F (37 °C) 97 8 °F (36 6 °C)  97 5 °F (36 4 °C)   TempSrc: Tympanic Temporal  Temporal   SpO2: 96% 98%  97%       Most recent labs:    Recent Labs      01/12/19   1354   01/12/19   2213   WBC  10 36*   --    --    HGB  11 3*   --    --    HCT  37 5   --    --    PLT  416*   --    --    K  4 0   --    --    CALCIUM  9 0   --    --    BUN  16   --    --    CREATININE  1 12   --    --    INR  1 21*   --    --    TROPONINI  0 03   < >  0 04   AST  26   --    --    ALT  32   --    --    ALKPHOS  98   --    --     < > = values in this interval not displayed         Scheduled Meds:  Current Facility-Administered Medications:  acetaminophen 650 mg Oral Q4H PRN Brianna Piger, PA-C   ALPRAZolam 0 25 mg Oral HS PRN [employer], PA-C   amiodarone 200 mg Oral Daily Brianna Piger, PA-C   apixaban 5 mg Oral BID Brianna Piger, PA-C   ascorbic acid 500 mg Oral Daily Brianna Piger, PA-C   atorvastatin 20 mg Oral Daily With [employer], PA-C   bisacodyl 10 mg Rectal Daily PRN [employer], PA-C   clopidogrel 75 mg Oral Daily Brianna Piger, PA-C   digoxin 125 mcg Oral Daily Brianna Piger, PA-C   DULoxetine 30 mg Oral Daily Brianna Piger, PA-C   folic acid 1 mg Oral Daily Brianna Piger, PA-C   furosemide 40 mg Oral Daily Brianna Piger, PA-C   lidocaine 1 patch Topical Daily Brianna Piger, PA-C   melatonin 6 mg Oral HS Brianna Piger, PA-C   metoprolol 5 mg Intravenous Q6H PRN Brianna Piger, PA-C   metoprolol succinate 100 mg Oral BID Brianna Piger, PA-C   mirtazapine 7 5 mg Oral HS Brianna Piger, PA-C   nortriptyline 20 mg Oral HS Brianna Piger, PA-C   oxyCODONE 2 5 mg Oral Q4H PRN Brianna Funes PA-C   pantoprazole 40 mg Oral BID AC Brianna Funes PA-C   potassium chloride 20 mEq Oral Daily [employer] senna-docusate sodium 1 tablet Oral HS Brianna Funes PA-C   thiamine 100 mg Oral Daily Brianna Funes PA-C     Continuous Infusions:   PRN Meds:   acetaminophen    ALPRAZolam    bisacodyl    metoprolol    oxyCODONE    Surgical procedures (if appropriate):

## 2019-01-14 NOTE — OCCUPATIONAL THERAPY NOTE
Occupational Therapy         Patient Name: Gwendolyn Goodman  Today's Date: 1/14/2019            Attempted to see pt for evaluation, but off the flr for testing  Will continue when appropriate   Tricia Owen OT

## 2019-01-14 NOTE — PROGRESS NOTES
[facility] Progress Note  Patient: Allegra Lima 69 y o  female   MRN: [id number]  PCP: Vikki Corea DO  Unit/Bed#: [facility] [address] [id number] Encounter: [id number]  Date Of Visit: 01/14/19      Assessment/plan  1  Atypical chest pain- no acs  troponins negative  No changes on tele    2  Hyperlipidemia- continue statin  3  Bilateral neck pain below tmj joint- Pt reported as Anterior neck pain but pain is really right below tmj joint bilateral no pain over thyroid or anterior neck  Her pain starts when she first starts to eat and then resolves  She is s/p tonsillectomy  She has been having dentition problems  Will obtain ct neck  Could be related to swollen lymph nodes  Referred pain from tmj vrs dental abscess  4  Elevated tsh, normal free t4 and free t2 was lower- likely subclinical hypothyroid  Pt denies pain over thyroid and no pain on palpation  If ct neck negative pt will need repeat tsh, free t3, and t4 in 4 to 6 weeks  If numbers are still elevated after that time but not indicated of hypothyroid  She will need a radioactive iodine uptake scan  Also will need to reevaluate amiodarone in light of the elevated tsh          5  Chronic atrial fibrillation: Initially diagnosed 2016  With failed ablation  Currently in AFib   Anticoagulated on Eliquis given high chads Vasc score of 8  Maintained on amiodarone 200 mg daily and digoxin 0 125 mg daily   On metoprolol succinate 100 mg b i d  Rate controlled       6  Chronic systolic CHF:  Echo from March 2018 with LVEF 40 45%, hypokinesis of the anterior and anterior septal walls   Echo from 1/5/19 with improved systolic function with EF now 60%  Maintained on lasix 40 mg daily  Stable with no signs of acute volume overload      7  Anxiety/depression:  On nortriptyline, mirtazapine, Cymbalta, prn xanax     8  GERD:  Continue PPI     9  History of CVA:  On plavix and Eliquis     10  Chronic opioid dependence:  On oxycodone      11  Hypotension- pt asymptomatic  Will repeat  Lasix and metoprolol were held       dispo- obtain ct neck  If within normal limits pt can be discharged to [facility] with outpt follow up    Subjective:   Pt seen and examined  Pt describes her pain in her neck immediately when she tries to eat  She denies pain on swallowing  She did have her tonsil removed years ago  She states the pain is not increased when she chews  It is not the front part of her neck that is sore but more on the sides  Sometimes she feels the pain up into her ears and then down the side of her neck  No lightheadedness or dizziness  No f/c no cp it has since resolved  No sob no n/v/d no abd pain  She does have poor teeth  She states a lot of her teeth turned black from medication and broke off  She needs to have her teeth removed but the oral surgeon is hesitant to remove them due to being on blood thinners  Objective:     Vitals: Blood pressure (!) 88/65, pulse 102, temperature 98 4 °F (36 9 °C), temperature source Temporal, resp  rate 18, SpO2 96 %  ,There is no height or weight on file to calculate BMI  Lab, Imaging and other studies:    Results from last 7 days  Lab Units 01/14/19  0537 01/12/19  1354   WBC Thousand/uL 8 32 10 36*   HEMOGLOBIN g/dL 9 9* 11 3*   HEMATOCRIT % 32 9* 37 5   PLATELETS Thousands/uL 387 416*   INR   --  1 21*       Results from last 7 days  Lab Units 01/14/19  0537 01/12/19  1354   POTASSIUM mmol/L 4 2 4 0   CHLORIDE mmol/L 106 101   CO2 mmol/L 26 29   BUN mg/dL 18 16   CREATININE mg/dL 0 95 1 12   CALCIUM mg/dL 9 1 9 0   ALK PHOS U/L  --  98   ALT U/L  --  32   AST U/L  --  26       Results from last 7 days  Lab Units 01/12/19  2213 01/12/19  1857 01/12/19  1354   TROPONIN I ng/mL 0 04 0 03 0 03     Lab Results   Component Value Date    BLOODCX No Growth After 5 Days  01/03/2019    BLOODCX No Growth After 5 Days  01/03/2019    BLOODCX No Growth After 5 Days   05/13/2018    BLOODCX Staphylococcus coagulase negative (A) 05/13/2018     Scheduled Meds:   Current Facility-Administered Medications:  acetaminophen 650 mg Oral Q4H PRN Brianna Piger, PA-C   ALPRAZolam 0 25 mg Oral HS PRN Corean Lango, PA-C   amiodarone 200 mg Oral Daily Brianna Piger, PA-C   apixaban 5 mg Oral BID Brianna Piger, PA-C   ascorbic acid 500 mg Oral Daily Brianna Piger, PA-C   atorvastatin 20 mg Oral Daily With [employer], PA-C   bisacodyl 10 mg Rectal Daily PRN Corean Lango, PA-C   clopidogrel 75 mg Oral Daily Brianna Piger, PA-C   digoxin 125 mcg Oral Daily Brianna Piger, PA-C   DULoxetine 30 mg Oral Daily Brianna Piger, PA-C   folic acid 1 mg Oral Daily Brianna Piger, PA-C   furosemide 40 mg Oral Daily Brianna Piger, PA-C   lidocaine 1 patch Topical Daily Brianna Piger, PA-C   melatonin 6 mg Oral HS Brianna Piger, PA-C   metoprolol 5 mg Intravenous Q6H PRN Brianna Piger, PA-C   metoprolol succinate 100 mg Oral BID Brianna Piger, PA-C   mirtazapine 7 5 mg Oral HS Brianna Piger, PA-C   nortriptyline 20 mg Oral HS Brianna Piger, PA-C   oxyCODONE 2 5 mg Oral Q4H PRN Brianna Piger, PA-C   pantoprazole 40 mg Oral BID AC Brianna Piger, PA-C   potassium chloride 20 mEq Oral Daily Brianna Piger, PA-C   senna-docusate sodium 1 tablet Oral HS Brianna Piger, PA-C   thiamine 100 mg Oral Daily Brianna Piger, PA-C     Continuous Infusions:    PRN Meds:   acetaminophen    ALPRAZolam    bisacodyl    metoprolol    oxyCODONE      Physical exam:  Physical Exam  General appearance: alert and oriented, in no acute distress  Head: Normocephalic, without obvious abnormality, atraumatic  Eyes: conjunctivae/corneas clear  PERRL, EOM's intact  Fundi benign    Neck: no adenopathy, no carotid bruit, no JVD, supple, symmetrical, trachea midline and thyroid not enlarged, symmetric, no tenderness/mass/nodules  Lungs: clear to auscultation bilaterally  Heart: regular rate and rhythm, S1, S2 normal, no murmur, click, rub or gallop  Abdomen: soft, non-tender; bowel sounds normal; no masses,  no organomegaly  Extremities: extremities normal, warm and well-perfused; no cyanosis, clubbing, or edema  Pulses: 2+ and symmetric  Skin: Skin color, texture, turgor normal  No rashes or lesions  Neurologic: Mental status: Alert, oriented, thought content appropriate   Ears- tm good cone of light bilateral  No erythema  No pain  Mouth- poor dentition  Can not visualize posterior pharynx well         VTE Pharmacologic Prophylaxis: eliquis  VTE Mechanical Prophylaxis: sequential compression device    Counseling / Coordination of Care  Total floor / unit time spent today 20 minutes    Current Length of Stay: 1 day(s)    Current Patient Status: Inpatient       Code Status: Level 3 - DNAR and DNI

## 2019-01-14 NOTE — SOCIAL WORK
11:51  PA made CM aware that patient would be cleared for discharge today  A [facility] transport was scheduled with [facility] for 2:00 to return to [facility] to continue her rehab  Numbers for report in EPIC  No Medical Necessity form required  Updated: attending, RN, unit clerk, facility via [facility], and patient at bedside  No IMM presented to patient as she has only been IP for one day  Patient identified as high risk for readmission (HRR) as LACE score is 80, however, given that she is discharging to [facility], no TCM appointment will be scheduled at this time  A consult for CM was received for Advanced Directive information and Life Alert  CM provided the 5 Wishes and Advanced Directives brochure and explained the process of how to complete it  Also gave flyers on Life Alert  12:30  Attending reached out to CM to report that she wanted to send pt to get a cat scan and requested the transport be cancelled for now until the report is able to be read  Called [facility] and spoke to Rashida Sampson to cancel  Will reschedule once medically cleared

## 2019-01-14 NOTE — PHYSICAL THERAPY NOTE
PHYSICAL THERAPY NOTE          Patient Name: Andry Riddle  [id number] Date: 1/14/2019     PT consult received  Pt off floor currently for CT scan  PT will follow and complete eval at later time    Suha Truong, PT

## 2019-01-15 VITALS
HEART RATE: 83 BPM | OXYGEN SATURATION: 96 % | SYSTOLIC BLOOD PRESSURE: 110 MMHG | RESPIRATION RATE: 18 BRPM | DIASTOLIC BLOOD PRESSURE: 70 MMHG | TEMPERATURE: 98.7 F

## 2019-01-15 PROBLEM — M54.2 ANTERIOR NECK PAIN: Status: RESOLVED | Noted: 2019-01-12 | Resolved: 2019-01-15

## 2019-01-15 PROBLEM — R07.9 CHEST PAIN: Status: RESOLVED | Noted: 2019-01-12 | Resolved: 2019-01-15

## 2019-01-15 PROCEDURE — 99239 HOSP IP/OBS DSCHRG MGMT >30: CPT | Performed by: INTERNAL MEDICINE

## 2019-01-15 RX ORDER — ATORVASTATIN CALCIUM 20 MG/1
20 TABLET, FILM COATED ORAL
Refills: 0 | Status: ON HOLD
Start: 2019-01-15 | End: 2019-07-16 | Stop reason: SDUPTHER

## 2019-01-15 RX ORDER — OXYCODONE HYDROCHLORIDE 5 MG/1
5 TABLET ORAL EVERY 4 HOURS PRN
Qty: 15 TABLET | Refills: 0
Start: 2019-01-15 | End: 2019-01-25

## 2019-01-15 RX ORDER — OXYCODONE HYDROCHLORIDE 5 MG/1
5 TABLET ORAL EVERY 4 HOURS PRN
Qty: 15 TABLET | Refills: 0
Start: 2019-01-15 | End: 2019-01-15

## 2019-01-15 RX ADMIN — FOLIC ACID 1 MG: 1 TABLET ORAL at 08:10

## 2019-01-15 RX ADMIN — LIDOCAINE 1 PATCH: 50 PATCH TOPICAL at 08:09

## 2019-01-15 RX ADMIN — THIAMINE HCL TAB 100 MG 100 MG: 100 TAB at 08:10

## 2019-01-15 RX ADMIN — FUROSEMIDE 40 MG: 40 TABLET ORAL at 08:10

## 2019-01-15 RX ADMIN — APIXABAN 5 MG: 5 TABLET, FILM COATED ORAL at 08:09

## 2019-01-15 RX ADMIN — POTASSIUM CHLORIDE 20 MEQ: 1500 TABLET, EXTENDED RELEASE ORAL at 08:10

## 2019-01-15 RX ADMIN — DULOXETINE HYDROCHLORIDE 30 MG: 30 CAPSULE, DELAYED RELEASE ORAL at 08:09

## 2019-01-15 RX ADMIN — CLOPIDOGREL BISULFATE 75 MG: 75 TABLET ORAL at 08:10

## 2019-01-15 RX ADMIN — OXYCODONE HYDROCHLORIDE 5 MG: 5 TABLET ORAL at 08:10

## 2019-01-15 RX ADMIN — PANTOPRAZOLE SODIUM 40 MG: 40 TABLET, DELAYED RELEASE ORAL at 06:27

## 2019-01-15 RX ADMIN — METOPROLOL SUCCINATE 100 MG: 50 TABLET, EXTENDED RELEASE ORAL at 08:09

## 2019-01-15 RX ADMIN — OXYCODONE HYDROCHLORIDE AND ACETAMINOPHEN 500 MG: 500 TABLET ORAL at 08:16

## 2019-01-15 RX ADMIN — AMIODARONE HYDROCHLORIDE 200 MG: 200 TABLET ORAL at 08:10

## 2019-01-15 RX ADMIN — DIGOXIN 125 MCG: 125 TABLET ORAL at 08:10

## 2019-01-15 NOTE — SOCIAL WORK
Attending made CM aware that patient would be cleared for discharge today  A [facility] transport was scheduled with [email or web address] for 12:00 to [facility] in EPIC  No Medical Necessity form or IMM required; pt has only been hospitalized for 2 days  Updated: attending, RN, unit clerk, facility via [facility], and patient at bedside  Patient identified as high risk for readmission (HRR) as her LACE score is 81, however, given that she is discharging to [facility], no TCM appointment will be scheduled at this time

## 2019-01-15 NOTE — DISCHARGE SUMMARY
[facility]    Patient Information: Vani Hugo 69 y o  female MRN: [id number]  [facility] [address] [address] Encounter: [id number]    Discharging Physician / Practitioner: Flavia Blevins DO  PCP: Miranda Padilla DO  Admission Date: 1/12/2019  Discharge Date: 01/15/19        Reason for Admission: chest pain    Discharge Diagnoses:     Principal Problem (Resolved):    Chest pain  Active Problems:    Chronic systolic heart failure (HCC)    Hyperlipidemia    Coronary artery disease    Atrial fibrillation ([address] )    Anxiety    Hypertension  Resolved Problems:    Anterior neck pain      Consultations During Hospital Stay:  · none    Procedures Performed:     · none    Significant Findings / Test Results:   X-ray Chest 2 Views  Result Date: 1/12/2019  Impression: Tiny left lung base opacity suggestive of a tiny pleural effusion and/or atelectasis  No pulmonary vascular congestion  Xr Knee 4+ Views Left Injury  Result Date: 1/3/2019  Impression: No acute osseous abnormality  Moderate to severe osteoarthritic degenerative changes of the left knee joint  3     Ct Head Without Contrast  Result Date: 1/3/2019  Impression: No acute intracranial abnormality  Ct Soft Tissue Neck W Contrast  Result Date: 1/14/2019  Impression: 1  No suspicious mass or collection identified in the neck  2   Questionable sebaceous cyst laterally in the left face immediately lateral lateral to the left submandibular gland  Further clinical evaluation recommended  This is well seen on image 40, series 2       Incidental Findings:   · none    Test Results Pending at Discharge (will require follow up):   · none     Outpatient Tests Requested:  tsh with free t4, free t3 in 4 to 6 weeks  Radioactive iodine uptake scan to evaluate thyroid    Hospital Course:     Vani Hugo is a 69 y o  female patient who originally presented to the hospital on 1/12/2019 due to Atypical chest pain   She was monitored on tele and troponins were negative  She did not have acs  She was started on a statin for hyperlipidemia      Pt did have neck pain that seemed to move from anterior to below tmj  Her ct neck was negative but did show small thyroid nodules  Pt did have elevated tsh and normal free t4 and lower free t3  There was worried about thyroiditis  This could also be subclinical hypothyroidism  She will need repeat tsh and free t4/t3 in 4 to 6 weeks  She will also need a radioactive iodine uptake scan that can be ordered by her family doctor  If her pain persists in the location below tmj would have her follow up with ent  She also has Chronic atrial fibrillation: Initially diagnosed 2016  With failed ablation  Currently in AFib   Anticoagulated on Eliquis given high chads Vasc score of 8  Maintained on amiodarone 200 mg daily and digoxin 0 125 mg daily   On metoprolol succinate 100 mg b i d  Rate controlled  Pt has Chronic systolic CHF:  Echo from March 2018 with LVEF 40 45%, hypokinesis of the anterior and anterior septal walls   Echo from 1/5/19 with improved systolic function with EF now 60%  Maintained on lasix 40 mg daily  Stable with no signs of acute volume overload     For her chronic opioid dependence she was continued on oxycodone  Did check pdmp website in which pt was taking much larger quantities of oxycodone  Have increased her oxycodone slightly to 5mg prn  Pt was discharged back to [facility]  Discharge Day Visit / Exam:     * Please refer to separate progress note for these details *    Discharge instructions/Information to patient and family:   See after visit summary for information provided to patient and family  Provisions for Follow-Up Care:  See after visit summary for information related to follow-up care and any pertinent home health orders  Discharge Statement:  I spent 35 minutes discharging the patient  This time was spent on the day of discharge   I had direct contact with the patient on the day of discharge  Greater than 50% of the total time was spent examining patient, answering all patient questions, arranging and discussing plan of care with patient as well as directly providing post-discharge instructions  Additional time then spent on discharge activities  Discharge Medications:  See after visit summary for reconciled discharge medications provided to patient and family

## 2019-01-15 NOTE — PROGRESS NOTES
[address] Internal Medicine Progress Note  Patient: Erik Urbina 69 y o  female   MRN: [id number]  PCP: Nils Andino DO  Unit/Bed#: [address] 223-02 Encounter: [id number]  Date Of Visit: 01/15/19      Assessment/plan  1  Atypical chest pain- no acs  troponins negative  No changes on tele     2  Hyperlipidemia- continue statin       3  Bilateral neck pain below tmj joint- Pt reported as Anterior neck pain but pain is nowright below tmj joint bilateral no pain over thyroid or anterior neck  Her pain starts when she first starts to eat and then resolves  She is s/p tonsillectomy  She has been having dentition problems  ct neck reviewed  Did reveal thyroid nodules  Pain is improving per pt  If pain persists pt should be referred to ent outpt       4  Elevated tsh, normal free t4 and free t3 was lower- likely subclinical hypothyroid  Pt denies pain over thyroid and no pain on palpation  Ct neck did reveal thyroid nodules of 1 5  Do agree with the radioactive iodine uptake scan now that she has nodules and abnormal tsh  Also will need to reevaluate amiodarone in light of the elevated tsh       5  Chronic atrial fibrillation: Initially diagnosed 2016  With failed ablation  Currently in AFib   Anticoagulated on Eliquis given high chads Vasc score of 8  Maintained on amiodarone 200 mg daily and digoxin 0 125 mg daily   On metoprolol succinate 100 mg b i d  Rate controlled       6  Chronic systolic CHF:  Echo from March 2018 with LVEF 40 45%, hypokinesis of the anterior and anterior septal walls   Echo from 1/5/19 with improved systolic function with EF now 60%  Maintained on lasix 40 mg daily  Stable with no signs of acute volume overload      7  Anxiety/depression:  On nortriptyline, mirtazapine, Cymbalta, prn xanax     8  GERD:  Continue PPI     9  History of CVA:  On plavix and Eliquis     10  Chronic opioid dependence:  On oxycodone  dose increased slightly to 5mg prn     11   Hypotension- resolved    dispo- pt stable for d/c to [facility]  Subjective:   Pt seen and examined  Pt states her pain is better  She states she takes increase doses of pain medications at home  We did discuss using minimal pain medications in order to not cause syncope/confusion  Pt was found on the floor prior to admit to [facility]  No f/c no cp no sob no n/vd/ no abd pain    Objective:     Vitals: Blood pressure 110/70, pulse 83, temperature 98 7 °F (37 1 °C), temperature source Tympanic, resp  rate 18, SpO2 96 %  ,There is no height or weight on file to calculate BMI  Lab, Imaging and other studies:    Results from last 7 days  Lab Units 01/14/19  0537 01/12/19  1354   WBC Thousand/uL 8 32 10 36*   HEMOGLOBIN g/dL 9 9* 11 3*   HEMATOCRIT % 32 9* 37 5   PLATELETS Thousands/uL 387 416*   INR   --  1 21*       Results from last 7 days  Lab Units 01/14/19  0537 01/12/19  1354   POTASSIUM mmol/L 4 2 4 0   CHLORIDE mmol/L 106 101   CO2 mmol/L 26 29   BUN mg/dL 18 16   CREATININE mg/dL 0 95 1 12   CALCIUM mg/dL 9 1 9 0   ALK PHOS U/L  --  98   ALT U/L  --  32   AST U/L  --  26       Results from last 7 days  Lab Units 01/12/19  2213 01/12/19  1857 01/12/19  1354   TROPONIN I ng/mL 0 04 0 03 0 03     Lab Results   Component Value Date    BLOODCX No Growth After 5 Days  01/03/2019    BLOODCX No Growth After 5 Days  01/03/2019    BLOODCX No Growth After 5 Days   05/13/2018    BLOODCX Staphylococcus coagulase negative (A) 05/13/2018     Scheduled Meds:   Current Facility-Administered Medications:  acetaminophen 650 mg Oral Q4H PRN Brianna Piger, PA-C   ALPRAZolam 0 25 mg Oral HS PRN Cristino Peel, PA-C   amiodarone 200 mg Oral Daily Brianna Piger, PA-C   apixaban 5 mg Oral BID Brianna Piger, PA-C   ascorbic acid 500 mg Oral Daily Brianna Piger, PA-C   atorvastatin 20 mg Oral Daily With [email or web address], PA-C   bisacodyl 10 mg Rectal Daily PRN Cristino Peel, PA-C   clopidogrel 75 mg Oral Daily Brianna Piger, PA-C   digoxin 125 mcg Oral Daily PAWEL ClemonsC DULoxetine 30 mg Oral Daily Brianna Piger, PA-C   folic acid 1 mg Oral Daily Brianna Piger, PA-C   furosemide 40 mg Oral Daily Brianna Piger, PA-C   lidocaine 1 patch Topical Daily Brianna Piger, PA-C   melatonin 6 mg Oral HS Brianna Piger, PA-C   metoprolol 5 mg Intravenous Q6H PRN Brianna Piger, PA-C   metoprolol succinate 100 mg Oral BID Brianna Piger, PA-C   mirtazapine 7 5 mg Oral HS Brianna Piger, PA-C   nortriptyline 20 mg Oral HS Brianna Piger, PA-C   oxyCODONE 5 mg Oral Q4H PRN Janny Ambron, DO   pantoprazole 40 mg Oral BID AC Brianna Piger, PA-C   potassium chloride 20 mEq Oral Daily Brianna Piger, PA-C   senna-docusate sodium 1 tablet Oral HS Brianna Piger, PA-C   thiamine 100 mg Oral Daily Brianna Piger, PA-C     Continuous Infusions:    PRN Meds:   acetaminophen    ALPRAZolam    bisacodyl    metoprolol    oxyCODONE      Physical exam:  Physical Exam  General appearance: alert and oriented, in no acute distress  Head: Normocephalic, without obvious abnormality, atraumatic  Eyes: conjunctivae/corneas clear  PERRL, EOM's intact  Fundi benign    Neck: no adenopathy, no carotid bruit, no JVD, supple, symmetrical, trachea midline and thyroid not enlarged, symmetric, no tenderness/mass/nodules  Lungs: clear to auscultation bilaterally  Heart: irregularly irregular rhythm  Abdomen: soft, non-tender; bowel sounds normal; no masses,  no organomegaly  Extremities: extremities normal, warm and well-perfused; no cyanosis, clubbing, or edema  Pulses: 2+ and symmetric  Skin: Skin color, texture, turgor normal  No rashes or lesions  Neurologic: Grossly normal

## 2019-01-15 NOTE — PLAN OF CARE
CARDIOVASCULAR - ADULT     Maintains optimal cardiac output and hemodynamic stability Progressing     Absence of cardiac dysrhythmias or at baseline rhythm Progressing        DISCHARGE PLANNING     Discharge to home or other facility with appropriate resources Progressing        DISCHARGE PLANNING - CARE MANAGEMENT     Discharge to post-acute care or home with appropriate resources Progressing        INFECTION - ADULT     Absence or prevention of progression during hospitalization Progressing     Absence of fever/infection during neutropenic period Progressing        Knowledge Deficit     Patient/family/caregiver demonstrates understanding of disease process, treatment plan, medications, and discharge instructions Progressing        PAIN - ADULT     Verbalizes/displays adequate comfort level or baseline comfort level Progressing        Potential for Falls     Patient will remain free of falls Progressing        Prexisting or High Potential for Compromised Skin Integrity     Skin integrity is maintained or improved Progressing        SAFETY ADULT     Maintain or return to baseline ADL function Progressing     Maintain or return mobility status to optimal level Progressing        SKIN/TISSUE INTEGRITY - ADULT     Skin integrity remains intact Progressing

## 2019-01-22 ENCOUNTER — PATIENT OUTREACH (OUTPATIENT)
Dept: CASE MANAGEMENT | Facility: OTHER | Age: 79
End: 2019-01-22

## 2019-01-23 ENCOUNTER — TRANSITIONAL CARE MANAGEMENT (OUTPATIENT)
Dept: FAMILY MEDICINE CLINIC | Facility: CLINIC | Age: 79
End: 2019-01-23

## 2019-01-30 ENCOUNTER — PATIENT OUTREACH (OUTPATIENT)
Dept: CASE MANAGEMENT | Facility: OTHER | Age: 79
End: 2019-01-30

## 2019-01-30 ENCOUNTER — EPISODE CHANGES (OUTPATIENT)
Dept: CASE MANAGEMENT | Facility: OTHER | Age: 79
End: 2019-01-30

## 2019-01-30 NOTE — PROGRESS NOTES
Discharged from [facility] 1/23/19  [facility] in   Patient will need to follow up with [employer]  (Tino @ [phone] ) for [facility] assistance under waiver services  Son has been unsuccessful in reaching patient with follow up calls  No answer

## 2019-02-01 ENCOUNTER — PATIENT OUTREACH (OUTPATIENT)
Dept: FAMILY MEDICINE CLINIC | Facility: CLINIC | Age: 79
End: 2019-02-01

## 2019-02-09 DIAGNOSIS — I10 ESSENTIAL HYPERTENSION: ICD-10-CM

## 2019-02-09 RX ORDER — LISINOPRIL 5 MG/1
5 TABLET ORAL DAILY
Qty: 30 TABLET | Refills: 3 | Status: SHIPPED | OUTPATIENT
Start: 2019-02-09 | End: 2019-07-05 | Stop reason: SDUPTHER

## 2019-02-11 ENCOUNTER — TELEPHONE (OUTPATIENT)
Dept: FAMILY MEDICINE CLINIC | Facility: CLINIC | Age: 79
End: 2019-02-11

## 2019-02-11 RX ORDER — LISINOPRIL 5 MG/1
5 TABLET ORAL DAILY
Qty: 30 TABLET | Refills: 0 | OUTPATIENT
Start: 2019-02-11

## 2019-02-11 NOTE — TELEPHONE ENCOUNTER
[place] pharmacy called, stating that pt needs a refill of Eliquis  It does not look like we have prescribed this before  I called pt to find out if she is on this and who is prescribing it, and if she is in need of it, since refill request came from [facility]

## 2019-02-12 ENCOUNTER — PATIENT OUTREACH (OUTPATIENT)
Dept: FAMILY MEDICINE CLINIC | Facility: CLINIC | Age: 79
End: 2019-02-12

## 2019-02-12 NOTE — PROGRESS NOTES
Contacted Tino to make referral   They confirmed a referral was made but would not give any information  regarding the application status without direct consent from the patient

## 2019-04-03 ENCOUNTER — TELEPHONE (OUTPATIENT)
Dept: FAMILY MEDICINE CLINIC | Facility: CLINIC | Age: 79
End: 2019-04-03

## 2019-04-03 DIAGNOSIS — I50.22 CHRONIC SYSTOLIC HEART FAILURE (HCC): Chronic | ICD-10-CM

## 2019-04-03 DIAGNOSIS — I48.91 ATRIAL FIBRILLATION WITH RVR (HCC): Chronic | ICD-10-CM

## 2019-04-03 DIAGNOSIS — M54.50 ACUTE LEFT-SIDED LOW BACK PAIN WITHOUT SCIATICA: ICD-10-CM

## 2019-04-03 DIAGNOSIS — R26.2 AMBULATORY DYSFUNCTION: ICD-10-CM

## 2019-04-03 DIAGNOSIS — D50.0 IRON DEFICIENCY ANEMIA DUE TO CHRONIC BLOOD LOSS: ICD-10-CM

## 2019-04-03 DIAGNOSIS — F32.89 OTHER DEPRESSION: Chronic | ICD-10-CM

## 2019-04-03 RX ORDER — NORTRIPTYLINE HYDROCHLORIDE 10 MG/1
20 CAPSULE ORAL
Qty: 60 CAPSULE | Refills: 5 | Status: SHIPPED | OUTPATIENT
Start: 2019-04-03 | End: 2019-07-16 | Stop reason: HOSPADM

## 2019-04-03 RX ORDER — MIRTAZAPINE 7.5 MG/1
7.5 TABLET, FILM COATED ORAL
Qty: 30 TABLET | Refills: 5 | Status: SHIPPED | OUTPATIENT
Start: 2019-04-03 | End: 2019-07-16 | Stop reason: HOSPADM

## 2019-04-03 RX ORDER — FERROUS SULFATE 325(65) MG
325 TABLET ORAL
Qty: 30 TABLET | Refills: 5 | Status: ON HOLD | OUTPATIENT
Start: 2019-04-03 | End: 2019-07-16 | Stop reason: SDUPTHER

## 2019-04-03 RX ORDER — POTASSIUM CHLORIDE 20 MEQ/1
20 TABLET, EXTENDED RELEASE ORAL DAILY
Qty: 30 TABLET | Refills: 5 | Status: SHIPPED | OUTPATIENT
Start: 2019-04-03 | End: 2019-07-16 | Stop reason: HOSPADM

## 2019-04-03 RX ORDER — ASCORBIC ACID 500 MG
500 TABLET ORAL DAILY
Qty: 30 TABLET | Refills: 5 | Status: ON HOLD | OUTPATIENT
Start: 2019-04-03 | End: 2019-07-16 | Stop reason: SDUPTHER

## 2019-04-03 RX ORDER — DULOXETIN HYDROCHLORIDE 30 MG/1
30 CAPSULE, DELAYED RELEASE ORAL DAILY
Qty: 30 CAPSULE | Refills: 5 | Status: SHIPPED | OUTPATIENT
Start: 2019-04-03 | End: 2019-07-16 | Stop reason: HOSPADM

## 2019-04-03 RX ORDER — AMIODARONE HYDROCHLORIDE 200 MG/1
200 TABLET ORAL DAILY
Qty: 30 TABLET | Refills: 2 | Status: SHIPPED | OUTPATIENT
Start: 2019-04-03 | End: 2019-07-05 | Stop reason: SDUPTHER

## 2019-04-03 RX ORDER — FOLIC ACID 1 MG/1
1 TABLET ORAL DAILY
Qty: 30 TABLET | Refills: 5 | Status: ON HOLD | OUTPATIENT
Start: 2019-04-03 | End: 2019-07-16 | Stop reason: SDUPTHER

## 2019-04-10 ENCOUNTER — PATIENT OUTREACH (OUTPATIENT)
Dept: FAMILY MEDICINE CLINIC | Facility: CLINIC | Age: 79
End: 2019-04-10

## 2019-05-10 ENCOUNTER — APPOINTMENT (EMERGENCY)
Dept: RADIOLOGY | Facility: HOSPITAL | Age: 79
End: 2019-05-10
Payer: MEDICARE

## 2019-05-10 ENCOUNTER — HOSPITAL ENCOUNTER (EMERGENCY)
Facility: HOSPITAL | Age: 79
Discharge: HOME | End: 2019-05-11
Admitting: EMERGENCY MEDICINE
Payer: MEDICARE

## 2019-05-10 LAB
BASE EXCESS BLDA CALC-SCNC: 2 MMOL/L (ref -2–3)
CA-I BLD-SCNC: 1.03 MMOL/L (ref 1.12–1.32)
GLUCOSE SERPL-MCNC: 110 MG/DL (ref 65–140)
HCO3 BLDA-SCNC: 26.2 MMOL/L (ref 24–30)
HCT VFR BLD CALC: 42 % (ref 34.8–46.1)
HGB BLDA-MCNC: 14.3 G/DL (ref 11.5–15.4)
PCO2 BLD: 27 MMOL/L (ref 21–32)
PCO2 BLD: 40.3 MM HG (ref 42–50)
PH BLD: 7.42 [PH] (ref 7.3–7.4)
PO2 BLD: 24 MM HG (ref 35–45)
POTASSIUM BLD-SCNC: 4.1 MMOL/L (ref 3.5–5.3)
SAO2 % BLD FROM PO2: 44 % (ref 95–98)
SODIUM BLD-SCNC: 142 MMOL/L (ref 136–145)
SPECIMEN SOURCE: ABNORMAL

## 2019-05-10 PROCEDURE — 71260 CT THORAX DX C+: CPT

## 2019-05-10 PROCEDURE — 99284 EMERGENCY DEPT VISIT MOD MDM: CPT

## 2019-05-10 PROCEDURE — 99282 EMERGENCY DEPT VISIT SF MDM: CPT | Performed by: SURGERY

## 2019-05-10 PROCEDURE — 70450 CT HEAD/BRAIN W/O DYE: CPT

## 2019-05-10 PROCEDURE — 84295 ASSAY OF SERUM SODIUM: CPT

## 2019-05-10 PROCEDURE — 82803 BLOOD GASES ANY COMBINATION: CPT

## 2019-05-10 PROCEDURE — 82330 ASSAY OF CALCIUM: CPT

## 2019-05-10 PROCEDURE — 82947 ASSAY GLUCOSE BLOOD QUANT: CPT

## 2019-05-10 PROCEDURE — 74177 CT ABD & PELVIS W/CONTRAST: CPT

## 2019-05-10 PROCEDURE — 85014 HEMATOCRIT: CPT

## 2019-05-10 PROCEDURE — 84132 ASSAY OF SERUM POTASSIUM: CPT

## 2019-05-10 RX ORDER — ACETAMINOPHEN 325 MG/1
975 TABLET ORAL ONCE
Status: COMPLETED | OUTPATIENT
Start: 2019-05-10 | End: 2019-05-10

## 2019-05-10 RX ADMIN — IOHEXOL 100 ML: 350 INJECTION, SOLUTION INTRAVENOUS at 22:25

## 2019-05-10 RX ADMIN — ACETAMINOPHEN 975 MG: 325 TABLET ORAL at 23:28

## 2019-05-11 VITALS
HEART RATE: 116 BPM | RESPIRATION RATE: 18 BRPM | TEMPERATURE: 97.2 F | OXYGEN SATURATION: 91 % | SYSTOLIC BLOOD PRESSURE: 121 MMHG | WEIGHT: 160.05 LBS | DIASTOLIC BLOOD PRESSURE: 82 MMHG

## 2019-05-11 PROBLEM — R07.89 CHEST WALL PAIN: Status: ACTIVE | Noted: 2019-05-11
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
## 2019-05-11 PROCEDURE — NC001 PR NO CHARGE: Performed by: EMERGENCY MEDICINE

## 2019-05-11 RX ORDER — LIDOCAINE 50 MG/G
1 PATCH TOPICAL ONCE
Status: DISCONTINUED | OUTPATIENT
Start: 2019-05-11 | End: 2019-05-11 | Stop reason: HOSPADM

## 2019-05-11 RX ADMIN — LIDOCAINE 1 PATCH: 50 PATCH TOPICAL at 00:50

## 2019-06-06 DIAGNOSIS — I48.91 ATRIAL FIBRILLATION WITH RVR (HCC): Chronic | ICD-10-CM

## 2019-06-06 RX ORDER — METOPROLOL SUCCINATE 50 MG/1
50 TABLET, EXTENDED RELEASE ORAL EVERY 12 HOURS SCHEDULED
Qty: 56 TABLET | Refills: 2 | Status: ON HOLD | OUTPATIENT
Start: 2019-06-06 | End: 2019-07-16 | Stop reason: SDUPTHER

## 2019-06-28 ENCOUNTER — TELEPHONE (OUTPATIENT)
Dept: GASTROENTEROLOGY | Facility: CLINIC | Age: 79
End: 2019-06-28

## 2019-07-05 DIAGNOSIS — I48.91 ATRIAL FIBRILLATION WITH RVR (HCC): Chronic | ICD-10-CM

## 2019-07-05 DIAGNOSIS — I10 ESSENTIAL HYPERTENSION: ICD-10-CM

## 2019-07-05 RX ORDER — LISINOPRIL 5 MG/1
5 TABLET ORAL DAILY
Qty: 30 TABLET | Refills: 3 | Status: SHIPPED | OUTPATIENT
Start: 2019-07-05 | End: 2019-07-16 | Stop reason: HOSPADM

## 2019-07-05 RX ORDER — AMIODARONE HYDROCHLORIDE 200 MG/1
200 TABLET ORAL DAILY
Qty: 30 TABLET | Refills: 2 | Status: ON HOLD | OUTPATIENT
Start: 2019-07-05 | End: 2019-07-16 | Stop reason: SDUPTHER

## 2019-07-08 ENCOUNTER — APPOINTMENT (EMERGENCY)
Dept: RADIOLOGY | Facility: HOSPITAL | Age: 79
DRG: 811 | End: 2019-07-08
Payer: MEDICARE

## 2019-07-08 ENCOUNTER — HOSPITAL ENCOUNTER (INPATIENT)
Facility: HOSPITAL | Age: 79
LOS: 8 days | Discharge: HOME HEALTH | DRG: 811 | End: 2019-07-16
Attending: EMERGENCY MEDICINE | Admitting: FAMILY MEDICINE
Payer: MEDICARE

## 2019-07-08 DIAGNOSIS — L60.8 TOENAIL DEFORMITY: ICD-10-CM

## 2019-07-08 DIAGNOSIS — K92.2 GI BLEED: ICD-10-CM

## 2019-07-08 DIAGNOSIS — S69.91XA INJURY OF FINGER OF RIGHT HAND, INITIAL ENCOUNTER: ICD-10-CM

## 2019-07-08 DIAGNOSIS — K80.50 CALCULUS OF BILE DUCT WITHOUT CHOLANGITIS OR CHOLECYSTITIS WITHOUT OBSTRUCTION: ICD-10-CM

## 2019-07-08 DIAGNOSIS — I50.22 CHRONIC SYSTOLIC HEART FAILURE (HCC): Chronic | ICD-10-CM

## 2019-07-08 DIAGNOSIS — M54.2 NECK PAIN: ICD-10-CM

## 2019-07-08 DIAGNOSIS — I48.20 CHRONIC ATRIAL FIBRILLATION (HCC): ICD-10-CM

## 2019-07-08 DIAGNOSIS — D62 ACUTE BLOOD LOSS ANEMIA: Primary | ICD-10-CM

## 2019-07-08 DIAGNOSIS — Z87.19 H/O: GI BLEED: ICD-10-CM

## 2019-07-08 DIAGNOSIS — I48.91 ATRIAL FIBRILLATION WITH RVR (HCC): ICD-10-CM

## 2019-07-08 DIAGNOSIS — T85.590A: ICD-10-CM

## 2019-07-08 DIAGNOSIS — M54.9 CHRONIC BACK PAIN: ICD-10-CM

## 2019-07-08 DIAGNOSIS — R26.2 AMBULATORY DYSFUNCTION: ICD-10-CM

## 2019-07-08 DIAGNOSIS — G89.29 CHRONIC BACK PAIN: ICD-10-CM

## 2019-07-08 DIAGNOSIS — D50.0 IRON DEFICIENCY ANEMIA DUE TO CHRONIC BLOOD LOSS: ICD-10-CM

## 2019-07-08 DIAGNOSIS — I25.10 CORONARY ARTERY DISEASE: ICD-10-CM

## 2019-07-08 PROBLEM — F32.A DEPRESSION: Status: ACTIVE | Noted: 2019-07-08

## 2019-07-08 PROBLEM — Z86.73 HISTORY OF STROKE: Status: ACTIVE | Noted: 2019-07-08

## 2019-07-08 PROBLEM — I95.9 HYPOTENSION: Status: ACTIVE | Noted: 2019-07-08

## 2019-07-08 PROBLEM — S61.219A FINGER LACERATION: Status: ACTIVE | Noted: 2019-07-08

## 2019-07-08 LAB
ABO GROUP BLD: NORMAL
ANION GAP SERPL CALCULATED.3IONS-SCNC: 8 MMOL/L (ref 4–13)
APTT PPP: 33 SECONDS (ref 23–37)
ATRIAL RATE: 141 BPM
BASOPHILS # BLD AUTO: 0.04 THOUSANDS/ΜL (ref 0–0.1)
BASOPHILS NFR BLD AUTO: 1 % (ref 0–1)
BLD GP AB SCN SERPL QL: NEGATIVE
BUN SERPL-MCNC: 28 MG/DL (ref 5–25)
CALCIUM SERPL-MCNC: 8.7 MG/DL (ref 8.3–10.1)
CHLORIDE SERPL-SCNC: 106 MMOL/L (ref 100–108)
CO2 SERPL-SCNC: 24 MMOL/L (ref 21–32)
CREAT SERPL-MCNC: 1.59 MG/DL (ref 0.6–1.3)
EOSINOPHIL # BLD AUTO: 0.13 THOUSAND/ΜL (ref 0–0.61)
EOSINOPHIL NFR BLD AUTO: 2 % (ref 0–6)
ERYTHROCYTE [DISTWIDTH] IN BLOOD BY AUTOMATED COUNT: 15.7 % (ref 11.6–15.1)
GFR SERPL CREATININE-BSD FRML MDRD: 31 ML/MIN/1.73SQ M
GLUCOSE SERPL-MCNC: 119 MG/DL (ref 65–140)
HCT VFR BLD AUTO: 28.1 % (ref 34.8–46.1)
HCT VFR BLD AUTO: 30.1 % (ref 34.8–46.1)
HGB BLD-MCNC: 8.7 G/DL (ref 11.5–15.4)
HGB BLD-MCNC: 9.3 G/DL (ref 11.5–15.4)
IMM GRANULOCYTES # BLD AUTO: 0.02 THOUSAND/UL (ref 0–0.2)
IMM GRANULOCYTES NFR BLD AUTO: 0 % (ref 0–2)
INR PPP: 1.9 (ref 0.84–1.19)
LYMPHOCYTES # BLD AUTO: 0.69 THOUSANDS/ΜL (ref 0.6–4.47)
LYMPHOCYTES NFR BLD AUTO: 10 % (ref 14–44)
MCH RBC QN AUTO: 35.2 PG (ref 26.8–34.3)
MCHC RBC AUTO-ENTMCNC: 31 G/DL (ref 31.4–37.4)
MCV RBC AUTO: 114 FL (ref 82–98)
MONOCYTES # BLD AUTO: 0.44 THOUSAND/ΜL (ref 0.17–1.22)
MONOCYTES NFR BLD AUTO: 6 % (ref 4–12)
NEUTROPHILS # BLD AUTO: 5.76 THOUSANDS/ΜL (ref 1.85–7.62)
NEUTS SEG NFR BLD AUTO: 81 % (ref 43–75)
NRBC BLD AUTO-RTO: 0 /100 WBCS
P AXIS: 0 DEGREES
PLATELET # BLD AUTO: 289 THOUSANDS/UL (ref 149–390)
PMV BLD AUTO: 10.4 FL (ref 8.9–12.7)
POTASSIUM SERPL-SCNC: 3.3 MMOL/L (ref 3.5–5.3)
PROTHROMBIN TIME: 21.3 SECONDS (ref 11.6–14.5)
QRS AXIS: -16 DEGREES
QRSD INTERVAL: 108 MS
QT INTERVAL: 296 MS
QTC INTERVAL: 395 MS
RBC # BLD AUTO: 2.47 MILLION/UL (ref 3.81–5.12)
RH BLD: POSITIVE
SODIUM SERPL-SCNC: 138 MMOL/L (ref 136–145)
SPECIMEN EXPIRATION DATE: NORMAL
T WAVE AXIS: 237 DEGREES
TROPONIN I SERPL-MCNC: 0.02 NG/ML
VENTRICULAR RATE: 107 BPM
WBC # BLD AUTO: 7.08 THOUSAND/UL (ref 4.31–10.16)

## 2019-07-08 PROCEDURE — 85610 PROTHROMBIN TIME: CPT | Performed by: EMERGENCY MEDICINE

## 2019-07-08 PROCEDURE — 99223 1ST HOSP IP/OBS HIGH 75: CPT | Performed by: FAMILY MEDICINE

## 2019-07-08 PROCEDURE — 85018 HEMOGLOBIN: CPT | Performed by: PHYSICIAN ASSISTANT

## 2019-07-08 PROCEDURE — 86900 BLOOD TYPING SEROLOGIC ABO: CPT | Performed by: EMERGENCY MEDICINE

## 2019-07-08 PROCEDURE — 93010 ELECTROCARDIOGRAM REPORT: CPT | Performed by: INTERNAL MEDICINE

## 2019-07-08 PROCEDURE — 30233N1 TRANSFUSION OF NONAUTOLOGOUS RED BLOOD CELLS INTO PERIPHERAL VEIN, PERCUTANEOUS APPROACH: ICD-10-PCS | Performed by: EMERGENCY MEDICINE

## 2019-07-08 PROCEDURE — 99284 EMERGENCY DEPT VISIT MOD MDM: CPT | Performed by: EMERGENCY MEDICINE

## 2019-07-08 PROCEDURE — 93005 ELECTROCARDIOGRAM TRACING: CPT

## 2019-07-08 PROCEDURE — 99222 1ST HOSP IP/OBS MODERATE 55: CPT | Performed by: INTERNAL MEDICINE

## 2019-07-08 PROCEDURE — 85730 THROMBOPLASTIN TIME PARTIAL: CPT | Performed by: EMERGENCY MEDICINE

## 2019-07-08 PROCEDURE — 86923 COMPATIBILITY TEST ELECTRIC: CPT

## 2019-07-08 PROCEDURE — 36415 COLL VENOUS BLD VENIPUNCTURE: CPT | Performed by: EMERGENCY MEDICINE

## 2019-07-08 PROCEDURE — 70450 CT HEAD/BRAIN W/O DYE: CPT

## 2019-07-08 PROCEDURE — 96360 HYDRATION IV INFUSION INIT: CPT

## 2019-07-08 PROCEDURE — 90471 IMMUNIZATION ADMIN: CPT

## 2019-07-08 PROCEDURE — 82272 OCCULT BLD FECES 1-3 TESTS: CPT

## 2019-07-08 PROCEDURE — 80048 BASIC METABOLIC PNL TOTAL CA: CPT | Performed by: EMERGENCY MEDICINE

## 2019-07-08 PROCEDURE — 85025 COMPLETE CBC W/AUTO DIFF WBC: CPT | Performed by: EMERGENCY MEDICINE

## 2019-07-08 PROCEDURE — P9016 RBC LEUKOCYTES REDUCED: HCPCS

## 2019-07-08 PROCEDURE — 86901 BLOOD TYPING SEROLOGIC RH(D): CPT | Performed by: EMERGENCY MEDICINE

## 2019-07-08 PROCEDURE — 84484 ASSAY OF TROPONIN QUANT: CPT | Performed by: EMERGENCY MEDICINE

## 2019-07-08 PROCEDURE — 72125 CT NECK SPINE W/O DYE: CPT

## 2019-07-08 PROCEDURE — 90715 TDAP VACCINE 7 YRS/> IM: CPT | Performed by: EMERGENCY MEDICINE

## 2019-07-08 PROCEDURE — 73140 X-RAY EXAM OF FINGER(S): CPT

## 2019-07-08 PROCEDURE — 1124F ACP DISCUSS-NO DSCNMKR DOCD: CPT | Performed by: PHYSICIAN ASSISTANT

## 2019-07-08 PROCEDURE — 86850 RBC ANTIBODY SCREEN: CPT | Performed by: EMERGENCY MEDICINE

## 2019-07-08 PROCEDURE — 85014 HEMATOCRIT: CPT | Performed by: PHYSICIAN ASSISTANT

## 2019-07-08 PROCEDURE — 99285 EMERGENCY DEPT VISIT HI MDM: CPT

## 2019-07-08 RX ORDER — THIAMINE MONONITRATE (VIT B1) 100 MG
100 TABLET ORAL DAILY
Status: DISCONTINUED | OUTPATIENT
Start: 2019-07-09 | End: 2019-07-16 | Stop reason: HOSPADM

## 2019-07-08 RX ORDER — FOLIC ACID 1 MG/1
1 TABLET ORAL DAILY
Status: DISCONTINUED | OUTPATIENT
Start: 2019-07-09 | End: 2019-07-16 | Stop reason: HOSPADM

## 2019-07-08 RX ORDER — ASCORBIC ACID 500 MG
500 TABLET ORAL DAILY
Status: DISCONTINUED | OUTPATIENT
Start: 2019-07-09 | End: 2019-07-16 | Stop reason: HOSPADM

## 2019-07-08 RX ORDER — POTASSIUM CHLORIDE 20 MEQ/1
40 TABLET, EXTENDED RELEASE ORAL ONCE
Status: COMPLETED | OUTPATIENT
Start: 2019-07-08 | End: 2019-07-08

## 2019-07-08 RX ORDER — LIDOCAINE 50 MG/G
1 PATCH TOPICAL DAILY
Status: DISCONTINUED | OUTPATIENT
Start: 2019-07-09 | End: 2019-07-08

## 2019-07-08 RX ORDER — ATORVASTATIN CALCIUM 20 MG/1
20 TABLET, FILM COATED ORAL
Status: DISCONTINUED | OUTPATIENT
Start: 2019-07-08 | End: 2019-07-16 | Stop reason: HOSPADM

## 2019-07-08 RX ORDER — AMIODARONE HYDROCHLORIDE 200 MG/1
200 TABLET ORAL DAILY
Status: DISCONTINUED | OUTPATIENT
Start: 2019-07-09 | End: 2019-07-08

## 2019-07-08 RX ORDER — LIDOCAINE 50 MG/G
1 PATCH TOPICAL DAILY
Status: DISCONTINUED | OUTPATIENT
Start: 2019-07-08 | End: 2019-07-09

## 2019-07-08 RX ORDER — HYDROXYZINE HYDROCHLORIDE 25 MG/1
25 TABLET, FILM COATED ORAL ONCE AS NEEDED
Status: COMPLETED | OUTPATIENT
Start: 2019-07-08 | End: 2019-07-08

## 2019-07-08 RX ORDER — AMOXICILLIN AND CLAVULANATE POTASSIUM 875; 125 MG/1; MG/1
1 TABLET, FILM COATED ORAL ONCE
Status: COMPLETED | OUTPATIENT
Start: 2019-07-08 | End: 2019-07-08

## 2019-07-08 RX ORDER — FERROUS SULFATE 325(65) MG
325 TABLET ORAL
Status: DISCONTINUED | OUTPATIENT
Start: 2019-07-09 | End: 2019-07-16 | Stop reason: HOSPADM

## 2019-07-08 RX ORDER — ACETAMINOPHEN 325 MG/1
650 TABLET ORAL EVERY 6 HOURS PRN
Status: DISCONTINUED | OUTPATIENT
Start: 2019-07-08 | End: 2019-07-11

## 2019-07-08 RX ORDER — TRANEXAMIC ACID 100 MG/ML
500 INJECTION, SOLUTION INTRAVENOUS ONCE
Status: COMPLETED | OUTPATIENT
Start: 2019-07-08 | End: 2019-07-08

## 2019-07-08 RX ORDER — PANTOPRAZOLE SODIUM 40 MG/1
40 TABLET, DELAYED RELEASE ORAL
Status: DISCONTINUED | OUTPATIENT
Start: 2019-07-08 | End: 2019-07-16 | Stop reason: HOSPADM

## 2019-07-08 RX ORDER — AMIODARONE HYDROCHLORIDE 200 MG/1
200 TABLET ORAL DAILY
Status: DISCONTINUED | OUTPATIENT
Start: 2019-07-08 | End: 2019-07-16 | Stop reason: HOSPADM

## 2019-07-08 RX ORDER — DIGOXIN 125 MCG
125 TABLET ORAL DAILY
Status: DISCONTINUED | OUTPATIENT
Start: 2019-07-09 | End: 2019-07-16 | Stop reason: HOSPADM

## 2019-07-08 RX ADMIN — TRANEXAMIC ACID 500 MG: 1 INJECTION, SOLUTION INTRAVENOUS at 11:18

## 2019-07-08 RX ADMIN — HYDROXYZINE HYDROCHLORIDE 25 MG: 25 TABLET ORAL at 21:51

## 2019-07-08 RX ADMIN — AMOXICILLIN AND CLAVULANATE POTASSIUM 1 TABLET: 875; 125 TABLET, FILM COATED ORAL at 09:21

## 2019-07-08 RX ADMIN — SODIUM CHLORIDE 1000 ML: 0.9 INJECTION, SOLUTION INTRAVENOUS at 15:15

## 2019-07-08 RX ADMIN — PANTOPRAZOLE SODIUM 40 MG: 40 TABLET, DELAYED RELEASE ORAL at 17:09

## 2019-07-08 RX ADMIN — POTASSIUM CHLORIDE 40 MEQ: 1500 TABLET, EXTENDED RELEASE ORAL at 17:35

## 2019-07-08 RX ADMIN — ACETAMINOPHEN 650 MG: 325 TABLET ORAL at 17:08

## 2019-07-08 RX ADMIN — SODIUM CHLORIDE 1000 ML: 0.9 INJECTION, SOLUTION INTRAVENOUS at 11:06

## 2019-07-08 RX ADMIN — ATORVASTATIN CALCIUM 20 MG: 20 TABLET, FILM COATED ORAL at 18:00

## 2019-07-08 RX ADMIN — AMIODARONE HYDROCHLORIDE 200 MG: 200 TABLET ORAL at 17:27

## 2019-07-08 RX ADMIN — TETANUS TOXOID, REDUCED DIPHTHERIA TOXOID AND ACELLULAR PERTUSSIS VACCINE, ADSORBED 0.5 ML: 5; 2.5; 8; 8; 2.5 SUSPENSION INTRAMUSCULAR at 09:21

## 2019-07-08 RX ADMIN — LIDOCAINE 1 PATCH: 50 PATCH TOPICAL at 17:27

## 2019-07-08 NOTE — ASSESSMENT & PLAN NOTE
· Present on admission as evidenced by creatinine of 1 59, up from baseline 0 8-1 1  · Suspect pre-renal as patient reports not eating for the last 2 days  · 2L bolused as above  · Continue IVF  · BMP in AM

## 2019-07-08 NOTE — ASSESSMENT & PLAN NOTE
· PDMP reviewed - patient has not had oxycodone filled since January 2019  · Avoid narcotic pain medication at this time secondary to hypotension
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
## 2019-07-08 NOTE — ED PROVIDER NOTES
History  Chief Complaint   Patient presents with    Finger Laceration     Pt presents with with R pointer finger laceration after biting it yesterday  Pt takes Eliquis  Pt states she fell yesterday aswell and now complains of neck pain  66 yo F presents to ED for persistent bleeding of her finger  Pt says she had her finger in her mouth yesterday afternoon around 3 pm to pick her teeth and was standing at her walker when she lost her balance and fell backwards  She did not hit her head and did not have LOC, but she did whip her neck backwards and accidentally bit her distal R middle finger  She landed on her bottom  She was able to get up and has been able to ambulate at her baseline  Pt says her finger has continued to bleed since then all night and finally decided this morning it wasn't going to stop bleeding on its own, so she called EMS  Per EMS, she had approx 100 cc blood in a bucket  Prior to Admission Medications   Prescriptions Last Dose Informant Patient Reported? Taking?    ALPRAZolam (XANAX) 0 25 mg tablet   No No   Sig: Take 1 tablet (0 25 mg total) by mouth daily at bedtime as needed for anxiety for up to 10 days   DIGOXIN PO   Yes No   Sig: Take 0 125 mg by mouth daily   DULoxetine (CYMBALTA) 30 mg delayed release capsule   No No   Sig: Take 1 capsule (30 mg total) by mouth daily   FEROSUL 325 (65 Fe) MG tablet   No No   Sig: Take 1 tablet (325 mg total) by mouth daily with breakfast   Lidocaine (ASPERCREME LIDOCAINE) 4 % PTCH   Yes No   Sig: Apply 1 patch topically every 12 (twelve) hours   acetaminophen (TYLENOL) 325 mg tablet   No No   Sig: Take 2 tablets (650 mg total) by mouth every 6 (six) hours   amiodarone 200 mg tablet   No No   Sig: Take 1 tablet (200 mg total) by mouth daily Then decrease to 1 tablet daily   apixaban (ELIQUIS) 5 mg   No No   Sig: Take 1 tablet (5 mg total) by mouth 2 (two) times a day   ascorbic acid (VITAMIN C) 500 mg tablet   No No   Sig: Take 1 tablet (500 mg total) by mouth daily   aspirin (ECOTRIN LOW STRENGTH) 81 mg EC tablet   Yes No   Sig: Take 81 mg by mouth   atorvastatin (LIPITOR) 20 mg tablet   No No   Sig: Take 1 tablet (20 mg total) by mouth daily with dinner   bisacodyl (DULCOLAX) 10 mg suppository   Yes No   Sig: Insert 10 mg into the rectum daily as needed for constipation   clopidogrel (PLAVIX) 75 mg tablet   No No   Sig: Take 1 tablet (75 mg total) by mouth daily   folic acid (FOLVITE) 1 mg tablet   No No   Sig: Take 1 tablet (1 mg total) by mouth daily   furosemide (LASIX) 40 mg tablet   No No   Sig: Take 1 tablet (40 mg total) by mouth daily   lisinopril (ZESTRIL) 5 mg tablet   No No   Sig: Take 1 tablet (5 mg total) by mouth daily   melatonin 3 mg   No No   Sig: Take 2 tablets (6 mg total) by mouth daily at bedtime   methocarbamol (ROBAXIN) 500 mg tablet   Yes No   Sig: Take 500 mg by mouth daily as needed   metoprolol succinate (TOPROL-XL) 100 mg 24 hr tablet   No No   Sig: Take 1 tablet (100 mg total) by mouth 2 (two) times a day   metoprolol succinate (TOPROL-XL) 50 mg 24 hr tablet   Yes No   metoprolol succinate (TOPROL-XL) 50 mg 24 hr tablet   No No   Sig: TAKE 1 TABLET (50 MG TOTAL) BY MOUTH EVERY 12 (TWELVE) HOURS   mirtazapine (REMERON) 7 5 MG tablet   No No   Sig: Take 1 tablet (7 5 mg total) by mouth daily at bedtime   nortriptyline (PAMELOR) 10 mg capsule   No No   Sig: Take 2 capsules (20 mg total) by mouth daily at bedtime   oxyCODONE (ROXICODONE) 10 MG TABS   Yes No   pantoprazole (PROTONIX) 40 mg tablet   No No   Sig: Take 1 tablet (40 mg total) by mouth 2 (two) times a day before meals   potassium chloride (K-DUR,KLOR-CON) 20 mEq tablet   No No   Sig: Take 1 tablet (20 mEq total) by mouth daily   senna-docusate sodium (SENOKOT S) 8 6-50 mg per tablet   No No   Sig: Take 1 tablet by mouth daily at bedtime   sorbitol 70 % solution   Yes No   Sig: Take 30 mL by mouth daily as needed   thiamine 100 MG tablet   No No   Sig: Take 1 tablet (100 mg total) by mouth daily      Facility-Administered Medications: None       Past Medical History:   Diagnosis Date    A-fib ([place] [address] )     Acute respiratory disease     Anemia     Arthritis     Atrial fibrillation (HCC)     CHF (congestive heart failure) (HCC)     Chronic pain     Heart failure (HCC)     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[place] [address] )     History of colon polyps     Hx of long term use of blood thinners     Hypertension     Irregular heart beat     Narcotic dependence ([place] [address] )     Rectal bleeding     Stroke (HCC)     mild no deficiets/ memory loss    Uses walker        Past Surgical History:   Procedure Laterality Date    COLONOSCOPY      COLONOSCOPY N/A 7/27/2018    Procedure: COLONOSCOPY;  Surgeon: Sage Merritt MD;  Location: [facility]; Service: Gastroenterology    CORONARY STENT PLACEMENT      ERCP N/A 5/14/2018    Procedure: ENDOSCOPIC RETROGRADE CHOLANGIOPANCREATOGRAPHY (ERCP); Surgeon: Pari Marques MD;  [facility]: [facility];  Service: Gastroenterology    ERCP N/A 8/28/2018    Procedure: ENDOSCOPIC RETROGRADE CHOLANGIOPANCREATOGRAPHY (ERCP) w/ EGD;  Surgeon: Pari Marques MD;  [facility]; Service: Gastroenterology    ESOPHAGOGASTRODUODENOSCOPY N/A 7/26/2018    Procedure: ESOPHAGOGASTRODUODENOSCOPY (EGD); Surgeon: Sage Merritt MD;  [facility]; Service: Gastroenterology    JOINT REPLACEMENT Right     knee       History reviewed  No pertinent family history  I have reviewed and agree with the history as documented  Social History     Tobacco Use    Smoking status: Current Every Day Smoker   Substance Use Topics    Alcohol use: Not Currently    Drug use: Not Currently        Review of Systems   Constitutional: Negative for activity change, chills and fever  HENT: Negative for congestion, rhinorrhea, sore throat and trouble swallowing  Eyes: Negative for pain, discharge and visual disturbance     Respiratory: Negative for cough, chest tightness and shortness of breath  Cardiovascular: Negative for chest pain, palpitations and leg swelling  Gastrointestinal: Positive for diarrhea (and change in stool caliber)  Negative for abdominal pain, nausea and vomiting  Genitourinary: Negative for dysuria, frequency and urgency  Musculoskeletal: Negative for gait problem, neck pain and neck stiffness  Skin: Positive for wound  Negative for pallor and rash  Neurological: Positive for dizziness and light-headedness  Negative for syncope and headaches  Physical Exam  ED Triage Vitals   Temperature Pulse Respirations Blood Pressure SpO2   07/08/19 0907 07/08/19 0907 07/08/19 0907 07/08/19 0907 07/08/19 0907   97 8 °F (36 6 °C) (!) 109 16 (!) 86/58 99 %      Temp Source Heart Rate Source Patient Position - Orthostatic VS BP Location FiO2 (%)   07/08/19 1613 07/08/19 1452 07/08/19 0907 07/08/19 0907 --   Oral Monitor Lying Left arm       Pain Score       07/08/19 0907       No Pain             Orthostatic Vital Signs  Vitals:    07/09/19 2300 07/09/19 2312 07/10/19 0323 07/10/19 0725   BP: 133/83 133/83 108/60 105/61   Pulse: 98 105 (!) 106 93   Patient Position - Orthostatic VS:           Physical Exam   Constitutional: She is oriented to person, place, and time  She appears well-developed and well-nourished  No distress  HENT:   Head: Normocephalic and atraumatic  Mouth/Throat: Oropharynx is clear and moist    Eyes: Conjunctivae and EOM are normal  Right eye exhibits no discharge  Left eye exhibits no discharge  Neck: Normal range of motion  Neck supple  nontender   Cardiovascular: Intact distal pulses  Irregular rhythm, normal rate   Pulmonary/Chest: Effort normal and breath sounds normal  No respiratory distress  She exhibits no tenderness  Abdominal: Soft  There is no tenderness  There is no rebound and no guarding  Genitourinary: Rectal exam shows guaiac positive stool  Musculoskeletal: Normal range of motion  She exhibits no edema, tenderness or deformity  Neurological: She is alert and oriented to person, place, and time  No sensory deficit  She exhibits normal muscle tone  Skin: Skin is warm and dry  R distal middle finger pad macerated, actively bleeding from two areas  Nail intact  Nursing note and vitals reviewed        ED Medications  Medications   acetaminophen (TYLENOL) tablet 650 mg (650 mg Oral Given 7/9/19 2124)   ascorbic acid (VITAMIN C) tablet 500 mg (500 mg Oral Given 7/10/19 0818)   atorvastatin (LIPITOR) tablet 20 mg (20 mg Oral Given 7/9/19 1648)   digoxin (LANOXIN) tablet 125 mcg (125 mcg Oral Given 7/10/19 0819)   ferrous sulfate tablet 325 mg (325 mg Oral Given 0/91/58 1244)   folic acid (FOLVITE) tablet 1 mg (1 mg Oral Given 7/10/19 0819)   pantoprazole (PROTONIX) EC tablet 40 mg (40 mg Oral Given 7/10/19 0739)   thiamine (VITAMIN B1) tablet 100 mg (100 mg Oral Given 7/10/19 0819)   amiodarone tablet 200 mg (200 mg Oral Given 7/10/19 0818)   lidocaine (LIDODERM) 5 % patch 2 patch (2 patches Topical Patch Removed 7/10/19 0013)   hydrOXYzine HCL (ATARAX) tablet 25 mg (25 mg Oral Given 7/9/19 2244)   traMADol (ULTRAM) tablet 50 mg (50 mg Oral Given 7/10/19 0012)   melatonin tablet 3 mg (3 mg Oral Given 7/10/19 0126)   tetanus-diphtheria-acellular pertussis (BOOSTRIX) IM injection 0 5 mL (0 5 mL Intramuscular Given 7/8/19 0921)   amoxicillin-clavulanate (AUGMENTIN) 875-125 mg per tablet 1 tablet (1 tablet Oral Given 7/8/19 0921)   tranexamic acid 100mg/mL (for epistaxis) 500 mg (500 mg Nasal Given 7/8/19 1118)   sodium chloride 0 9 % bolus 1,000 mL (0 mL Intravenous Stopped 7/8/19 1206)   sodium chloride 0 9 % bolus 1,000 mL (0 mL Intravenous Stopped 7/8/19 2131)   potassium chloride (K-DUR,KLOR-CON) CR tablet 40 mEq (40 mEq Oral Given 7/8/19 1735)   hydrOXYzine HCL (ATARAX) tablet 25 mg (25 mg Oral Given 7/8/19 2151)   potassium chloride (K-DUR,KLOR-CON) CR tablet 20 mEq (20 mEq Oral Given 7/9/19 1435)   methocarbamol (ROBAXIN) tablet 1,000 mg (1,000 mg Oral Given 7/10/19 0012)   ALPRAZolam Beula Mk) tablet 0 25 mg (0 25 mg Oral Given 7/10/19 0211)       Diagnostic Studies  Results Reviewed     Procedure Component Value Units Date/Time    Protime-INR [[id number]]  (Abnormal) Collected:  07/08/19 1118    Lab Status:  Final result Specimen:  Blood from Arm, Right Updated:  07/08/19 1158     Protime 21 3 seconds      INR 1 90    APTT [[id number]]  (Normal) Collected:  07/08/19 1118    Lab Status:  Final result Specimen:  Blood from Arm, Right Updated:  07/08/19 1158     PTT 33 seconds     Troponin I [[id number]]  (Normal) Collected:  07/08/19 0930    Lab Status:  Final result Specimen:  Blood from Arm, Right Updated:  07/08/19 0955     Troponin I 0 02 ng/mL     Basic metabolic panel [[id number]]  (Abnormal) Collected:  07/08/19 0930    Lab Status:  Final result Specimen:  Blood from Arm, Right Updated:  07/08/19 0951     Sodium 138 mmol/L      Potassium 3 3 mmol/L      Chloride 106 mmol/L      CO2 24 mmol/L      ANION GAP 8 mmol/L      BUN 28 mg/dL      Creatinine 1 59 mg/dL      Glucose 119 mg/dL      Calcium 8 7 mg/dL      eGFR 31 ml/min/1 73sq m     Narrative:       [place] guidelines for Chronic Kidney Disease (CKD):     Stage 1 with normal or high GFR (GFR > 90 mL/min/1 73 square meters)    Stage 2 Mild CKD (GFR = 60-89 mL/min/1 73 square meters)    Stage 3A Moderate CKD (GFR = 45-59 mL/min/1 73 square meters)    Stage 3B Moderate CKD (GFR = 30-44 mL/min/1 73 square meters)    Stage 4 Severe CKD (GFR = 15-29 mL/min/1 73 square meters)    Stage 5 End Stage CKD (GFR <15 mL/min/1 73 square meters)  Note: GFR calculation is accurate only with a steady state creatinine    CBC and differential [[id number]]  (Abnormal) Collected:  07/08/19 0930    Lab Status:  Final result Specimen:  Blood from Arm, Right Updated:  07/08/19 0940     WBC 7 08 Thousand/uL      RBC 2 47 Million/uL Hemoglobin 8 7 g/dL      Hematocrit 28 1 %       fL      MCH 35 2 pg      MCHC 31 0 g/dL      RDW 15 7 %      MPV 10 4 fL      Platelets 478 Thousands/uL      nRBC 0 /100 WBCs      Neutrophils Relative 81 %      Immat GRANS % 0 %      Lymphocytes Relative 10 %      Monocytes Relative 6 %      Eosinophils Relative 2 %      Basophils Relative 1 %      Neutrophils Absolute 5 76 Thousands/µL      Immature Grans Absolute 0 02 Thousand/uL      Lymphocytes Absolute 0 69 Thousands/µL      Monocytes Absolute 0 44 Thousand/µL      Eosinophils Absolute 0 13 Thousand/µL      Basophils Absolute 0 04 Thousands/µL                  XR abdomen 1 view kub   Final Result by Jacky Noland MD (07/10 8508)      Nonobstructive bowel gas pattern  CBD stent present  Workstation performed: [id number]         XR finger third digit-middle RIGHT   Final Result by Jacky Noland MD (07/08 1022)      No acute osseous abnormality  Workstation performed: [id number]         CT spine cervical without contrast   Final Result by Pietro Montano MD (07/08 1010)      No cervical spine fracture or traumatic malalignment  Workstation performed: [id number]         CT head without contrast   Final Result by Pietro Montano MD (07/08 1015)      No acute intracranial abnormality  Workstation performed: [id number]               Procedures  ECG 12 Lead Documentation Only  Date/Time: 7/8/2019 9:13 AM  Performed by: Maureen Machado MD  Authorized by:  Maureen Machado MD     Indications / Diagnosis:  Fall  ECG reviewed by me, the ED Provider: yes    Patient location:  ED  Rate:     ECG rate:  107    ECG rate assessment: tachycardic    Rhythm:     Rhythm: atrial fibrillation    ST segments:     ST segments:  Normal  T waves:     T waves: normal              ED Course           Identification of Seniors at Risk      Most Recent Value   (ISAR) Identification of Seniors at Risk   Before the illness or injury that brought you to the Emergency, did you need someone to help you on a regular basis? 1 Filed at: 07/08/2019 0907   In the last 24 hours, have you needed more help than usual?  1 Filed at: 07/08/2019 1110   Have you been hospitalized for one or more nights during the past 6 months? 1 Filed at: 07/08/2019 0907   In general, do you see well?  0 Filed at: 07/08/2019 1011   In general, do you have serious problems with your memory? 0 Filed at: 07/08/2019 0907   Do you take more than three different medications every day? 1 Filed at: 07/08/2019 3915   ISAR Score  4 Filed at: 07/08/2019 0907                          [facility]  Number of Diagnoses or Management Options  Acute blood loss anemia:   Atrial fibrillation with RVR ([address] ):   GI bleed:   Injury of finger of right hand, initial encounter:   Diagnosis management comments: Macerated finger- no laceration to close  Wrapped in TXA soaked surgifoam with ace wrap for pressure  XR without fracture  hgb 8 7 from 10 6 several months ago  Intermittently hypotensive, tachycardic   1L IVF and 1 unit pRBC ordered   +guaiac stool  Admit to medicine      Disposition  Final diagnoses:   Acute blood loss anemia   Injury of finger of right hand, initial encounter   GI bleed   Atrial fibrillation with RVR ([facility] )     Time reflects when diagnosis was documented in both MDM as applicable and the Disposition within this note     Time User Action Codes Description Comment    7/8/2019 12:34 PM Armando White Add [D62] Acute blood loss anemia     7/8/2019 12:35 PM [place], [address] Injury of finger of right hand, initial encounter     7/8/2019 12:35 PM Laura Shaper [K92 2] GI bleed     7/8/2019 12:35 PM Arlelea Kays Add [I48 91] Atrial fibrillation with RVR ([address] )     7/8/2019  4:37 PM Quenten Mighty [D62] Acute blood loss anemia     7/8/2019  4:37 PM Quenten Mighty [D62] Acute blood loss anemia     7/8/2019  4:37 PM Sony Rough [L60 8] Toenail deformity     7/8/2019  4:37 PM ROSALIA Talbot  [address] [D62] Acute blood loss anemia       ED Disposition     ED Disposition Condition Date/Time Comment    Admit Stable Mon Jul 8, 2019 12:34 PM Case was discussed with ELDER and the patient's admission status was agreed to be Admission Status: inpatient status to the service of Dr Corrales Prior           Follow-up Information     Follow up With Specialties Details Why Contact Info    Herberth Fitzgerald DPM Podiatry, Wound Care Schedule an appointment as soon as possible for a visit in 1 week(s)  [place] [address]  [address] [place] [address]            Current Discharge Medication List      CONTINUE these medications which have NOT CHANGED    Details   acetaminophen (TYLENOL) 325 mg tablet Take 2 tablets (650 mg total) by mouth every 6 (six) hours  Qty: 100 tablet, Refills: 0    Associated Diagnoses: Multiple traumatic injuries      ALPRAZolam (XANAX) 0 25 mg tablet Take 1 tablet (0 25 mg total) by mouth daily at bedtime as needed for anxiety for up to 10 days  Qty: 5 tablet, Refills: 0    Associated Diagnoses: Anxiety      amiodarone 200 mg tablet Take 1 tablet (200 mg total) by mouth daily Then decrease to 1 tablet daily  Qty: 30 tablet, Refills: 2    Associated Diagnoses: Atrial fibrillation with RVR (HCC)      apixaban (ELIQUIS) 5 mg Take 1 tablet (5 mg total) by mouth 2 (two) times a day  Qty: 60 tablet, Refills: 3    Associated Diagnoses: Atrial fibrillation with RVR (HCC)      ascorbic acid (VITAMIN C) 500 mg tablet Take 1 tablet (500 mg total) by mouth daily  Qty: 30 tablet, Refills: 5    Associated Diagnoses: Iron deficiency anemia due to chronic blood loss      aspirin (ECOTRIN LOW STRENGTH) 81 mg EC tablet Take 81 mg by mouth      atorvastatin (LIPITOR) 20 mg tablet Take 1 tablet (20 mg total) by mouth daily with dinner  Refills: 0    Associated Diagnoses: Coronary artery disease      bisacodyl (DULCOLAX) 10 mg suppository Insert 10 mg into the rectum daily as needed for constipation      clopidogrel (PLAVIX) 75 mg tablet Take 1 tablet (75 mg total) by mouth daily  Qty: 30 tablet, Refills: 5    Associated Diagnoses: Atrial fibrillation with RVR (HCC)      DIGOXIN PO Take 0 125 mg by mouth daily      DULoxetine (CYMBALTA) 30 mg delayed release capsule Take 1 capsule (30 mg total) by mouth daily  Qty: 30 capsule, Refills: 5    Associated Diagnoses: Other depression      FEROSUL 325 (65 Fe) MG tablet Take 1 tablet (325 mg total) by mouth daily with breakfast  Qty: 30 tablet, Refills: 5    Associated Diagnoses: Iron deficiency anemia due to chronic blood loss      folic acid (FOLVITE) 1 mg tablet Take 1 tablet (1 mg total) by mouth daily  Qty: 30 tablet, Refills: 5    Associated Diagnoses: Ambulatory dysfunction      furosemide (LASIX) 40 mg tablet Take 1 tablet (40 mg total) by mouth daily  Qty: 30 tablet, Refills: 0    Associated Diagnoses: Chronic systolic heart failure (HCC)      Lidocaine (ASPERCREME LIDOCAINE) 4 % PTCH Apply 1 patch topically every 12 (twelve) hours      lisinopril (ZESTRIL) 5 mg tablet Take 1 tablet (5 mg total) by mouth daily  Qty: 30 tablet, Refills: 3    Associated Diagnoses: Essential hypertension      melatonin 3 mg Take 2 tablets (6 mg total) by mouth daily at bedtime  Qty: 30 tablet, Refills: 0    Associated Diagnoses: Atrial fibrillation with RVR (HCC)      methocarbamol (ROBAXIN) 500 mg tablet Take 500 mg by mouth daily as needed      !! metoprolol succinate (TOPROL-XL) 100 mg 24 hr tablet Take 1 tablet (100 mg total) by mouth 2 (two) times a day  Qty: 60 tablet, Refills: 0    Associated Diagnoses: Chronic systolic heart failure (HCC)      ! ! metoprolol succinate (TOPROL-XL) 50 mg 24 hr tablet Refills: 2      !! metoprolol succinate (TOPROL-XL) 50 mg 24 hr tablet TAKE 1 TABLET (50 MG TOTAL) BY MOUTH EVERY 12 (TWELVE) HOURS  Qty: 56 tablet, Refills: 2    Associated Diagnoses: Atrial fibrillation with RVR (HCC)      mirtazapine (REMERON) 7 5 MG tablet Take 1 tablet (7 5 mg total) by mouth daily at bedtime  Qty: 30 tablet, Refills: 5    Associated Diagnoses: Atrial fibrillation with RVR (HCC)      nortriptyline (PAMELOR) 10 mg capsule Take 2 capsules (20 mg total) by mouth daily at bedtime  Qty: 60 capsule, Refills: 5    Associated Diagnoses: Acute left-sided low back pain without sciatica      oxyCODONE (ROXICODONE) 10 MG TABS Refills: 0      pantoprazole (PROTONIX) 40 mg tablet Take 1 tablet (40 mg total) by mouth 2 (two) times a day before meals  Qty: 60 tablet, Refills: 5    Associated Diagnoses: H/O: GI bleed      potassium chloride (K-DUR,KLOR-CON) 20 mEq tablet Take 1 tablet (20 mEq total) by mouth daily  Qty: 30 tablet, Refills: 5    Associated Diagnoses: Chronic systolic heart failure (HCC)      senna-docusate sodium (SENOKOT S) 8 6-50 mg per tablet Take 1 tablet by mouth daily at bedtime  Qty: 30 tablet, Refills: 0    Associated Diagnoses: Gastrointestinal hemorrhage, unspecified gastrointestinal hemorrhage type      sorbitol 70 % solution Take 30 mL by mouth daily as needed      thiamine 100 MG tablet Take 1 tablet (100 mg total) by mouth daily  Qty: 30 tablet, Refills: 5    Associated Diagnoses: Ambulatory dysfunction       !! - Potential duplicate medications found  Please discuss with provider  No discharge procedures on file  ED Provider  Attending physically available and evaluated Mary Jo Shelton I managed the patient along with the ED Attending      Electronically Signed by         Beth Ramirez MD  07/10/19 2196

## 2019-07-08 NOTE — ED ATTENDING ATTESTATION
Jennifer Medellin MD, saw and evaluated the patient  I have discussed the patient with the resident/non-physician practitioner and agree with the resident's/non-physician practitioner's findings, Plan of Care, and MDM as documented in the resident's/non-physician practitioner's note, except where noted  All available labs and Radiology studies were reviewed  I was present for key portions of any procedure(s) performed by the resident/non-physician practitioner and I was immediately available to provide assistance  At this point I agree with the current assessment done in the Emergency Department  I have conducted an independent evaluation of this patient a history and physical is as follows:  Patient on Eliquis with bleeding from right 3rd digit since yesterday  Patient was standing with her walker when she lost her balance  She states that she did not feel lightheaded and did not faint  Patient had her finger in her mouth at the time, and fell backwards, biting her finger  It has been bleeding since  Patient comes in today because of ongoing bleeding  Patient did strike her head  No loss of consciousness  No other complaints at this time  Review of systems otherwise negative in 12 systems reviewed  On exam the patient does not appear pale  Her vital signs are only remarkable for slight tachycardia  Patient did have a low blood pressure in triage, but has had normal blood pressures since  Her HEENT exam is atraumatic and nontoxic  Her neck is supple and nontender with no adenopathy  Heart is irregular without murmurs, rubs, gallops  Lungs are clear with good air movement  Abdomen is soft and nontender with no masses, rebound, guarding  Extremities are intact with good pulses  Patient has bleeding from the pad of her right 3rd digit  She is neurologically nonfocal   Unknown last tetanus  Impression:  Fall, head strike, finger laceration    Will update tetanus, antibiotics, CT head and neck, clean and dress wound  Reassessment:  Patient with falling blood pressure  Heme-positive from below  Will plan to transfuse and will admit      Critical Care Time  CriticalCare Time  Performed by: Paras Alfred MD  Authorized by: Paras Alfred MD     Critical care provider statement:     Critical care was necessary to treat or prevent imminent or life-threatening deterioration of the following conditions:  Shock    Critical care was time spent personally by me on the following activities:  Blood draw for specimens, obtaining history from patient or surrogate, development of treatment plan with patient or surrogate, discussions with consultants, discussions with primary provider, evaluation of patient's response to treatment, examination of patient, review of old charts, re-evaluation of patient's condition, ordering and review of radiographic studies, ordering and review of laboratory studies and ordering and performing treatments and interventions

## 2019-07-08 NOTE — ASSESSMENT & PLAN NOTE
· Patient initially noted to be hypotensive in ED and was resuscitated with 1L NS  Patient again became hypotensive on the floor, mentating appropriately with bounding distal pulses   Another 1L bolus ordered  · 1 unit PRBCs to be transfused  · Antihypertensives on hold

## 2019-07-08 NOTE — ASSESSMENT & PLAN NOTE
· Eliquis on hold given the principal problem   Resume AC once able given patient's [id number] (at least 7)  · Rate control may be difficult given her hypotension

## 2019-07-08 NOTE — ASSESSMENT & PLAN NOTE
· Hemoglobin 8 7 on admission, down from 9 9 in January   Suspect true value may be even lower as patient suspected to be hemoconcentrated on admission with dehydration/MALA  · Blood loss likely multifactorial in the setting of bleeding from finger and GIB (black stools and FOBT+ in ED)  · Transfuse 1 unit PRBCs - attending obtained consent  · Monitor H&H  · GI consult  · If finger continues to bleed, consult Trauma  · Clear liquid diet, NPO after midnight  · Eliquis on hold
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
## 2019-07-08 NOTE — ASSESSMENT & PLAN NOTE
Wt Readings from Last 3 Encounters:   05/10/19 72 6 kg (160 lb 0 9 oz)   01/10/19 74 6 kg (164 lb 7 4 oz)   10/25/18 71 2 kg (157 lb)   · Limited echocardiogram in January revealed patient's left ventricular ejection fraction had improved to 60%  · Diuretics on hold given MALA and hypotension

## 2019-07-08 NOTE — ASSESSMENT & PLAN NOTE
· Patient fell the day prior to admission while ambulating with her walker and simultaneously trying to pick food out of her teeth   When she fell, she cut her finger on her right 3rd finger  · If continues to bleed, consult trauma

## 2019-07-08 NOTE — ASSESSMENT & PLAN NOTE
· Patient reports history of 2 "mini strokes"  · Resume Eliquis for A   Fib once able given CHADS-VASC score

## 2019-07-08 NOTE — CONSULTS
Consultation - [address] [facility]  Tomeka Chanel 67 y o  female MRN: [id number]  Unit/Bed#: [facility] 922-01 Encounter: [id number]        Consults    Reason for Consult / Principal Problem:     Anemia      ASSESSMENT AND PLAN:      79-year-old female with history of AFib on Eliquis, coronary artery disease, hyperlipidemia, hypertension, GERD, choledocholithiasis status post ERCP last done by Dr Chris Shannon in August 2018 presented to ED today after a fall yesterday and bleeding from her right middle finger  Patient does not have any signs of overt GI bleed  At this time the only source of her bleed seems to be from her right middle finger which was injured during the fall yesterday  Apparently she lost significant quantity of blood from her finger  At this time we will continue to monitor her blood counts and look for any signs of overt GI bleed  If she continues to have significant drop in hemoglobin or unresponsive hemoglobin to blood transfusions or she has for GI bleed then we will pursue endoscopy  Regarding her history of choledocholithiasis she does need a repeat ERCP for stent exchange versus stent removal   I will discuss this with the attending to see if we would like to do the procedure well during her current admission versus is scheduling it on outpatient basis  Patient seen and discussed with attending, Dr Alfredito Domingo      ______________________________________________________________________    HPI:  79-year-old female with history of AFib on Eliquis, coronary artery disease, hyperlipidemia, hypertension, GERD, choledocholithiasis status post ERCP last done by Dr Chris Shannon in August 2018 presented to ED today after a fall yesterday and bleeding from her right middle finger  Patient reports that she was eating food yesterday when some of the food became stuck in her mouth between her teeth    She tried to take the food out by her fingers from between her teeth and during this process she lost control of her walker and fell backwards  She accidentally bit her middle finger of her right hand during the fall  She bled from her finger over night  She reports that she might have lost multiple cups of blood from her finger  She denies any hematemesis hematochezia or melena  On review of records she is found to be on aspirin aspirin Plavix and Eliquis  Her hemoglobin at admission today was 8 7 with baseline of 9 5-10 5, platelets were normal, LFTs were normal, INR was 1 9  Her last colonoscopy was in July 2018 whichd ulcers in the cecum and ascending colon while EGD done on the same day short mild esophagitis and gastritis and suspicion of GAVE  The  ulcers in colon were biopsied and showed moderately active colitis with cryptitis crypt abscess formation  Her last ERCP was in August 2018 where scant sludge was removed and cholangiogram did not show any filling defects and a 7 Malay pigtail biliary stent was placed  It was recommended for her to return for a repeat ERCP in 3-4 months for stent exchange versus removal   She was seen by Dr Margie Mobley in clinic a month after the ERCP  She was scheduled for repeat ERCP but it has not been done yet  Dr Idania Cedillo has contacted patient multiple times to get ERCP scheduled  REVIEW OF SYSTEMS:    CONSTITUTIONAL: Denies any fever, chills, rigors, and weight loss  HEENT: No earache or tinnitus  Denies hearing loss or visual disturbances  CARDIOVASCULAR: No chest pain or palpitations  RESPIRATORY: Denies any cough, hemoptysis, shortness of breath or dyspnea on exertion  GASTROINTESTINAL: As noted in the History of Present Illness  GENITOURINARY: No problems with urination  Denies any hematuria or dysuria  NEUROLOGIC: No dizziness or vertigo, denies headaches  MUSCULOSKELETAL: Denies any muscle or joint pain  SKIN: Denies skin rashes or itching  ENDOCRINE: Denies excessive thirst  Denies intolerance to heat or cold    PSYCHOSOCIAL: Denies depression or anxiety  Denies any recent memory loss  Historical Information   Past Medical History:   Diagnosis Date    A-fib ([place] [address] )     Acute respiratory disease     Anemia     Arthritis     Atrial fibrillation (HCC)     CHF (congestive heart failure) (HCC)     Chronic pain     Heart failure (HCC)     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[place] [address] )     History of colon polyps     Hx of long term use of blood thinners     Hypertension     Irregular heart beat     Narcotic dependence ([place] [address] )     Rectal bleeding     Stroke (HCC)     mild no deficiets/ memory loss    Uses walker      Past Surgical History:   Procedure Laterality Date    COLONOSCOPY      COLONOSCOPY N/A 7/27/2018    Procedure: COLONOSCOPY;  Surgeon: Svitlana Buitrago MD;  Location: [facility]; Service: Gastroenterology    CORONARY STENT PLACEMENT      ERCP N/A 5/14/2018    Procedure: ENDOSCOPIC RETROGRADE CHOLANGIOPANCREATOGRAPHY (ERCP); Surgeon: Elder Mcardle, MD;  [facility]: [facility];  Service: Gastroenterology    ERCP N/A 8/28/2018    Procedure: ENDOSCOPIC RETROGRADE CHOLANGIOPANCREATOGRAPHY (ERCP) w/ EGD;  Surgeon: Elder Mcardle, MD;  [facility]; Service: Gastroenterology    ESOPHAGOGASTRODUODENOSCOPY N/A 7/26/2018    Procedure: ESOPHAGOGASTRODUODENOSCOPY (EGD); Surgeon: Svitlana Buitrago MD;  [facility]: [facility]; Service: Gastroenterology    JOINT REPLACEMENT Right     knee     Social History   Social History     Substance and Sexual Activity   Alcohol Use Not Currently     Social History     Substance and Sexual Activity   Drug Use Not Currently     Social History     Tobacco Use   Smoking Status Current Every Day Smoker     History reviewed  No pertinent family history      Meds/Allergies     Medications Prior to Admission   Medication    acetaminophen (TYLENOL) 325 mg tablet    ALPRAZolam (XANAX) 0 25 mg tablet    amiodarone 200 mg tablet    apixaban (ELIQUIS) 5 mg    ascorbic acid (VITAMIN C) 500 mg tablet    aspirin (ECOTRIN LOW STRENGTH) 81 mg EC tablet    atorvastatin (LIPITOR) 20 mg tablet    bisacodyl (DULCOLAX) 10 mg suppository    clopidogrel (PLAVIX) 75 mg tablet    DIGOXIN PO    DULoxetine (CYMBALTA) 30 mg delayed release capsule    FEROSUL 325 (65 Fe) MG tablet    folic acid (FOLVITE) 1 mg tablet    furosemide (LASIX) 40 mg tablet    Lidocaine (ASPERCREME LIDOCAINE) 4 % PTCH    lisinopril (ZESTRIL) 5 mg tablet    melatonin 3 mg    methocarbamol (ROBAXIN) 500 mg tablet    metoprolol succinate (TOPROL-XL) 100 mg 24 hr tablet    metoprolol succinate (TOPROL-XL) 50 mg 24 hr tablet    metoprolol succinate (TOPROL-XL) 50 mg 24 hr tablet    mirtazapine (REMERON) 7 5 MG tablet    nortriptyline (PAMELOR) 10 mg capsule    oxyCODONE (ROXICODONE) 10 MG TABS    pantoprazole (PROTONIX) 40 mg tablet    potassium chloride (K-DUR,KLOR-CON) 20 mEq tablet    senna-docusate sodium (SENOKOT S) 8 6-50 mg per tablet    sorbitol 70 % solution    thiamine 100 MG tablet     Current Facility-Administered Medications   Medication Dose Route Frequency    acetaminophen (TYLENOL) tablet 650 mg  650 mg Oral Q6H PRN    amiodarone tablet 200 mg  200 mg Oral Daily    [START ON 7/9/2019] ascorbic acid (VITAMIN C) tablet 500 mg  500 mg Oral Daily    atorvastatin (LIPITOR) tablet 20 mg  20 mg Oral Daily With Dinner    [START ON 7/9/2019] digoxin (LANOXIN) tablet 125 mcg  125 mcg Oral Daily    [START ON 7/9/2019] ferrous sulfate tablet 325 mg  325 mg Oral Daily With Breakfast    [START ON 4/9/1913] folic acid (FOLVITE) tablet 1 mg  1 mg Oral Daily    lidocaine (LIDODERM) 5 % patch 1 patch  1 patch Topical Daily    pantoprazole (PROTONIX) EC tablet 40 mg  40 mg Oral BID AC    [START ON 7/9/2019] thiamine (VITAMIN B1) tablet 100 mg  100 mg Oral Daily       No Known Allergies        Objective     Blood pressure 108/64, pulse (!) 108, temperature (!) 97 2 °F (36 2 °C), temperature source Oral, resp   rate 22, SpO2 100 %  There is no height or weight on file to calculate BMI  Intake/Output Summary (Last 24 hours) at 7/8/2019 1807  Last data filed at 7/8/2019 1745  Gross per 24 hour   Intake 1000 ml   Output 900 ml   Net 100 ml         PHYSICAL EXAM:      General Appearance:   Alert, cooperative, no distress   HEENT:   Normocephalic, atraumatic, anicteric      Neck:  Supple, symmetrical, trachea midline   Lungs:   Clear to auscultation bilaterally; no rales, rhonchi or wheezing; respirations unlabored    Heart[de-identified]   Regular rate and rhythm; no murmur, rub, or gallop     Abdomen:   Soft, non-tender, non-distended; normal bowel sounds; no masses, no organomegaly    Genitalia:   Deferred    Rectal:   Deferred    Extremities:  No cyanosis, clubbing or edema    Pulses:  2+ and symmetric all extremities    Skin:  No jaundice, rashes, or lesions    Lymph nodes:  No palpable cervical lymphadenopathy        Lab Results:   Admission on 07/08/2019   Component Date Value    WBC 07/08/2019 7 08     RBC 07/08/2019 2 47*    Hemoglobin 07/08/2019 8 7*    Hematocrit 07/08/2019 28 1*    MCV 07/08/2019 114*    MCH 07/08/2019 35 2*    MCHC 07/08/2019 31 0*    RDW 07/08/2019 15 7*    MPV 07/08/2019 10 4     Platelets 97/91/8429 289     nRBC 07/08/2019 0     Neutrophils Relative 07/08/2019 81*    Immat GRANS % 07/08/2019 0     Lymphocytes Relative 07/08/2019 10*    Monocytes Relative 07/08/2019 6     Eosinophils Relative 07/08/2019 2     Basophils Relative 07/08/2019 1     Neutrophils Absolute 07/08/2019 5 76     Immature Grans Absolute 07/08/2019 0 02     Lymphocytes Absolute 07/08/2019 0 69     Monocytes Absolute 07/08/2019 0 44     Eosinophils Absolute 07/08/2019 0 13     Basophils Absolute 07/08/2019 0 04     Sodium 07/08/2019 138     Potassium 07/08/2019 3 3*    Chloride 07/08/2019 106     CO2 07/08/2019 24     ANION GAP 07/08/2019 8     BUN 07/08/2019 28*    Creatinine 07/08/2019 1 59*    Glucose 07/08/2019 119     Calcium 07/08/2019 8 7     eGFR 07/08/2019 31     Troponin I 07/08/2019 0 02     ABO Grouping 07/08/2019 O     Rh Factor 07/08/2019 Positive     Antibody Screen 07/08/2019 Negative     Specimen Expiration Date 07/08/2019 [id number]     Unit Product Code 07/08/2019 E9752G30     Unit Number 07/08/2019 [id number]     Unit ABO 07/08/2019 O     Unit RH 07/08/2019 POS     Crossmatch 07/08/2019 Compatible     Unit Dispense Status 07/08/2019 Crossmatched     Unit Product Code 07/08/2019 C5715A69     Unit Number 07/08/2019 [id number]     Unit ABO 07/08/2019 O     Unit RH 07/08/2019 POS     Crossmatch 07/08/2019 Compatible     Unit Dispense Status 07/08/2019 Issued     Protime 07/08/2019 21 3*    INR 07/08/2019 1 90*    PTT 07/08/2019 33     Ventricular Rate 07/08/2019 107     Atrial Rate 07/08/2019 141     QRSD Interval 07/08/2019 108     QT Interval 07/08/2019 296     QTC Interval 07/08/2019 395     P Axis 07/08/2019 0     QRS Axis 07/08/2019 -16     T Wave Axis 07/08/2019 237        Imaging Studies: I have personally reviewed pertinent imaging studies

## 2019-07-09 ENCOUNTER — APPOINTMENT (INPATIENT)
Dept: RADIOLOGY | Facility: HOSPITAL | Age: 79
DRG: 811 | End: 2019-07-09
Attending: INTERNAL MEDICINE
Payer: MEDICARE

## 2019-07-09 LAB
ANION GAP SERPL CALCULATED.3IONS-SCNC: 7 MMOL/L (ref 4–13)
BUN SERPL-MCNC: 17 MG/DL (ref 5–25)
CALCIUM SERPL-MCNC: 8.3 MG/DL (ref 8.3–10.1)
CHLORIDE SERPL-SCNC: 112 MMOL/L (ref 100–108)
CO2 SERPL-SCNC: 24 MMOL/L (ref 21–32)
CREAT SERPL-MCNC: 1.07 MG/DL (ref 0.6–1.3)
GFR SERPL CREATININE-BSD FRML MDRD: 50 ML/MIN/1.73SQ M
GLUCOSE SERPL-MCNC: 108 MG/DL (ref 65–140)
HCT VFR BLD AUTO: 27.4 % (ref 34.8–46.1)
HCT VFR BLD AUTO: 28.5 % (ref 34.8–46.1)
HGB BLD-MCNC: 8.5 G/DL (ref 11.5–15.4)
HGB BLD-MCNC: 8.7 G/DL (ref 11.5–15.4)
MAGNESIUM SERPL-MCNC: 2.3 MG/DL (ref 1.6–2.6)
POTASSIUM SERPL-SCNC: 3.4 MMOL/L (ref 3.5–5.3)
SODIUM SERPL-SCNC: 143 MMOL/L (ref 136–145)

## 2019-07-09 PROCEDURE — 99232 SBSQ HOSP IP/OBS MODERATE 35: CPT | Performed by: INTERNAL MEDICINE

## 2019-07-09 PROCEDURE — 80048 BASIC METABOLIC PNL TOTAL CA: CPT | Performed by: PHYSICIAN ASSISTANT

## 2019-07-09 PROCEDURE — 85014 HEMATOCRIT: CPT | Performed by: PHYSICIAN ASSISTANT

## 2019-07-09 PROCEDURE — G8988 SELF CARE GOAL STATUS: HCPCS

## 2019-07-09 PROCEDURE — 85018 HEMOGLOBIN: CPT | Performed by: PHYSICIAN ASSISTANT

## 2019-07-09 PROCEDURE — 0HBRXZZ EXCISION OF TOE NAIL, EXTERNAL APPROACH: ICD-10-PCS | Performed by: PODIATRIST

## 2019-07-09 PROCEDURE — 74018 RADEX ABDOMEN 1 VIEW: CPT

## 2019-07-09 PROCEDURE — G8978 MOBILITY CURRENT STATUS: HCPCS

## 2019-07-09 PROCEDURE — 83735 ASSAY OF MAGNESIUM: CPT | Performed by: PHYSICIAN ASSISTANT

## 2019-07-09 PROCEDURE — G8987 SELF CARE CURRENT STATUS: HCPCS

## 2019-07-09 PROCEDURE — 97163 PT EVAL HIGH COMPLEX 45 MIN: CPT

## 2019-07-09 PROCEDURE — 97167 OT EVAL HIGH COMPLEX 60 MIN: CPT

## 2019-07-09 PROCEDURE — 99232 SBSQ HOSP IP/OBS MODERATE 35: CPT | Performed by: PHYSICIAN ASSISTANT

## 2019-07-09 PROCEDURE — G8979 MOBILITY GOAL STATUS: HCPCS

## 2019-07-09 RX ORDER — LIDOCAINE 50 MG/G
2 PATCH TOPICAL DAILY
Status: DISCONTINUED | OUTPATIENT
Start: 2019-07-09 | End: 2019-07-16 | Stop reason: HOSPADM

## 2019-07-09 RX ORDER — POTASSIUM CHLORIDE 20 MEQ/1
20 TABLET, EXTENDED RELEASE ORAL ONCE
Status: COMPLETED | OUTPATIENT
Start: 2019-07-09 | End: 2019-07-09

## 2019-07-09 RX ORDER — TRAMADOL HYDROCHLORIDE 50 MG/1
50 TABLET ORAL EVERY 4 HOURS PRN
Status: COMPLETED | OUTPATIENT
Start: 2019-07-09 | End: 2019-07-10

## 2019-07-09 RX ORDER — HYDROXYZINE HYDROCHLORIDE 25 MG/1
25 TABLET, FILM COATED ORAL EVERY 6 HOURS PRN
Status: DISCONTINUED | OUTPATIENT
Start: 2019-07-09 | End: 2019-07-16 | Stop reason: HOSPADM

## 2019-07-09 RX ORDER — METHOCARBAMOL 500 MG/1
1000 TABLET, FILM COATED ORAL ONCE AS NEEDED
Status: COMPLETED | OUTPATIENT
Start: 2019-07-09 | End: 2019-07-10

## 2019-07-09 RX ORDER — LIDOCAINE 50 MG/G
2 PATCH TOPICAL DAILY
Status: DISCONTINUED | OUTPATIENT
Start: 2019-07-09 | End: 2019-07-09

## 2019-07-09 RX ORDER — HYDROXYZINE HYDROCHLORIDE 25 MG/1
25 TABLET, FILM COATED ORAL EVERY 6 HOURS PRN
Status: CANCELLED | OUTPATIENT
Start: 2019-07-09

## 2019-07-09 RX ADMIN — DIGOXIN 125 MCG: 125 TABLET ORAL at 08:22

## 2019-07-09 RX ADMIN — OXYCODONE HYDROCHLORIDE AND ACETAMINOPHEN 500 MG: 500 TABLET ORAL at 08:20

## 2019-07-09 RX ADMIN — THIAMINE HCL TAB 100 MG 100 MG: 100 TAB at 08:21

## 2019-07-09 RX ADMIN — AMIODARONE HYDROCHLORIDE 200 MG: 200 TABLET ORAL at 08:21

## 2019-07-09 RX ADMIN — ACETAMINOPHEN 650 MG: 325 TABLET ORAL at 12:49

## 2019-07-09 RX ADMIN — HYDROXYZINE HYDROCHLORIDE 25 MG: 25 TABLET ORAL at 16:47

## 2019-07-09 RX ADMIN — FOLIC ACID 1 MG: 1 TABLET ORAL at 08:21

## 2019-07-09 RX ADMIN — FERROUS SULFATE TAB 325 MG (65 MG ELEMENTAL FE) 325 MG: 325 (65 FE) TAB at 08:20

## 2019-07-09 RX ADMIN — LIDOCAINE 2 PATCH: 50 PATCH CUTANEOUS at 12:50

## 2019-07-09 RX ADMIN — HYDROXYZINE HYDROCHLORIDE 25 MG: 25 TABLET ORAL at 22:44

## 2019-07-09 RX ADMIN — ACETAMINOPHEN 650 MG: 325 TABLET ORAL at 21:24

## 2019-07-09 RX ADMIN — PANTOPRAZOLE SODIUM 40 MG: 40 TABLET, DELAYED RELEASE ORAL at 06:20

## 2019-07-09 RX ADMIN — POTASSIUM CHLORIDE 20 MEQ: 1500 TABLET, EXTENDED RELEASE ORAL at 14:35

## 2019-07-09 RX ADMIN — ATORVASTATIN CALCIUM 20 MG: 20 TABLET, FILM COATED ORAL at 16:48

## 2019-07-09 RX ADMIN — PANTOPRAZOLE SODIUM 40 MG: 40 TABLET, DELAYED RELEASE ORAL at 16:48
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
## 2019-07-09 NOTE — PHYSICIAN ADVISOR
Current patient class: Inpatient  The patient is currently on Hospital Day: 2 at [address] [place] [address]      The patient was admitted to the hospital at [phone] on 7/8/19 for the following diagnosis:  Acute blood loss anemia [D62]  GI bleed [K92 2]  Hand laceration [S61 419A]  Atrial fibrillation with RVR (HCC) [I48 91]  Injury of finger of right hand, initial encounter [S69 91XA]       There is documentation in the medical record of an expected length of stay of at least 2 midnights  The patient is therefore expected to satisfy the 2 midnight benchmark and given the 2 midnight presumption is appropriate for INPATIENT ADMISSION  Given this expectation of a satisfying stay, CMS instructs us that the patient is most often appropriate for inpatient admission under part A provided medical necessity is documented in the chart  After review of the relevant documentation, labs, vital signs and test results, the patient is appropriate for INPATIENT ADMISSION  Admission to the hospital as an inpatient is a complex decision making process which requires the practitioner to consider the patients presenting complaint, history and physical examination and all relevant testing  With this in mind, in this case, the patient was deemed appropriate for INPATIENT ADMISSION  After review of the documentation and testing available at the time of the admission I concur with this clinical determination of medical necessity  Rationale is as follows: The patient is a 67 yrs old Female who presented to the ED at 7/8/2019  8:38 AM with a chief complaint of Finger Laceration (Pt presents with with R pointer finger laceration after biting it yesterday  Pt takes Eliquis  Pt states she fell yesterday aswell and now complains of neck pain  )  Patient states that she was bleeding profusely per the notes  Patient is on anticoagulation with Eliquis    When the patient came in she was noted to be a little hypotensive with a pressure of 86/58  Patient was given couple L IV fluid bolus and she was also found to be in acute renal insufficiency with creatinine of 1 59  Patient's baseline is around 0 9-1 1  Patient was placed on IV fluids  Patient was also found to have blood loss anemia requiring a transfusion  Hemoglobin went from 9 9-8 5  GI was also consulted since the patient was heme-positive  Secondary to GI workup as well as monitoring for blood loss anemia the patient is expected to cross the 2 midnight benchmark and is therefore inpatient appropriate    The patients vitals on arrival were ED Triage Vitals   Temperature Pulse Respirations Blood Pressure SpO2   07/08/19 0907 07/08/19 0907 07/08/19 0907 07/08/19 0907 07/08/19 0907   97 8 °F (36 6 °C) (!) 109 16 (!) 86/58 99 %      Temp Source Heart Rate Source Patient Position - Orthostatic VS BP Location FiO2 (%)   07/08/19 1613 07/08/19 1452 07/08/19 0907 07/08/19 0907 --   Oral Monitor Lying Left arm       Pain Score       07/08/19 0907       No Pain           Past Medical History:   Diagnosis Date    A-fib [facility])     Acute respiratory disease     Anemia     Arthritis     Atrial fibrillation (HCC)     CHF (congestive heart failure) (HCC)     Chronic pain     Heart failure (HCC)     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[facility]     History of colon polyps     Hx of long term use of blood thinners     Hypertension     Irregular heart beat     Narcotic dependence ([facility] )     Rectal bleeding     Stroke ([facility] )     mild no deficiets/ memory loss    Uses walker      Past Surgical History:   Procedure Laterality Date    COLONOSCOPY      COLONOSCOPY N/A 7/27/2018    Procedure: COLONOSCOPY;  Surgeon: Radha Mojica MD;  [facility]; Service: Gastroenterology    CORONARY STENT PLACEMENT      ERCP N/A 5/14/2018    Procedure: ENDOSCOPIC RETROGRADE CHOLANGIOPANCREATOGRAPHY (ERCP);   Surgeon: Deb Dent MD;  [facility];  Service: Gastroenterology    ERCP N/A 8/28/2018    Procedure: ENDOSCOPIC RETROGRADE CHOLANGIOPANCREATOGRAPHY (ERCP) w/ EGD;  Surgeon: Pham Lord MD;  [facility]; Service: Gastroenterology    ESOPHAGOGASTRODUODENOSCOPY N/A 7/26/2018    Procedure: ESOPHAGOGASTRODUODENOSCOPY (EGD); Surgeon: Tracy Candelaria MD;  [facility]; Service: Gastroenterology    JOINT REPLACEMENT Right     knee           Consults have been placed to:   IP CONSULT TO CASE MANAGEMENT  IP CONSULT TO GASTROENTEROLOGY  IP CONSULT TO PODIATRY    Vitals:    07/09/19 0315 07/09/19 0750 07/09/19 1125 07/09/19 1455   BP: 94/52 94/53 121/69 103/59   BP Location:       Pulse: 76 93 78 74   Resp: 16 18 18 20   Temp:  98 1 °F (36 7 °C) 98 4 °F (36 9 °C) 97 7 °F (36 5 °C)   TempSrc:       SpO2: 95% 97% 98%        Most recent labs:    Recent Labs     07/08/19  0930 07/08/19  1118  07/09/19  0541 07/09/19  0751   WBC 7 08  --   --   --   --    HGB 8 7*  --    < >  --  8 5*   HCT 28 1*  --    < >  --  27 4*     --   --   --   --    K 3 3*  --   --  3 4*  --    CALCIUM 8 7  --   --  8 3  --    BUN 28*  --   --  17  --    CREATININE 1 59*  --   --  1 07  --    INR  --  1 90*  --   --   --    TROPONINI 0 02  --   --   --   --     < > = values in this interval not displayed         Scheduled Meds:  Current Facility-Administered Medications:  acetaminophen 650 mg Oral Q6H PRN Dick Josue PA-C   amiodarone 200 mg Oral Daily Dick Josue PA-C   ascorbic acid 500 mg Oral Daily Dick Josue PA-C   atorvastatin 20 mg Oral Daily With Fatemeh Saeed PA-C   digoxin 125 mcg Oral Daily Dick Josue PA-C   ferrous sulfate 325 mg Oral Daily With Breakfast Dick Josue PA-C   folic acid 1 mg Oral Daily Dick Josue PA-C   lidocaine 2 patch Topical Daily Maritza Glass MD   pantoprazole 40 mg Oral BID AC Dick Josue PA-C   thiamine 100 mg Oral Daily Dick Josue PA-C     Continuous Infusions:   PRN Meds:   acetaminophen    Surgical procedures (if appropriate):
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
## 2019-07-09 NOTE — PROGRESS NOTES
Progress Note- Mary Jo Shelton 67 y o  female MRN: [id number]    Unit/Bed#: [facility] 922-01 Encounter: [id number]      Assessment and Plan:    76-year-old female with history of AFib on Eliquis, coronary artery disease, hyperlipidemia, hypertension, GERD, choledocholithiasis status post ERCP last done by Dr Frandy Brush in August 2018 presented to ED 7/8/19 after a fall and bleeding from her right middle finger    - No signs of overt GI bleed  -  Patient had cardiac catheterization with stent placement more than a year ago and has since been on Plavix  She is on Eliquis for atrial fibrillation   - Patient needs repeat ERCP for stent exchange versus stent removal   we plan to do it later this week on Friday  - Plavix and Eliquis were last taken on 7/7/19 and are on hold since  She would need to be off plavix for atleast 5 days with Eliquis continued to be on hold for us to proceed with ERCP on  Friday the 12th of July 2019       Patient seen and discussed with attending, Dr Anuja Childress MD  Gastroenterology Fellow  [address]  Date: July 9, 2019      ______________________________________________________________________    Subjective:       Patient does not have any complaints overnight  She denies any hematemesis, melena or hematochezia      Medication Administration - last 24 hours from 07/08/2019 1320 to 07/09/2019 1320       Date/Time Order Dose Route Action Action by     07/08/2019 2131 sodium chloride 0 9 % bolus 1,000 mL 0 mL Intravenous Stopped Jasper Cyr RN     07/08/2019 1929 sodium chloride 0 9 % bolus 1,000 mL   Intravenous Restarted Jasper Cyr RN     07/08/2019 1620 sodium chloride 0 9 % bolus 1,000 mL 0 mL Intravenous Hold Karan Hill RN     07/08/2019 1515 sodium chloride 0 9 % bolus 1,000 mL 1,000 mL Intravenous [address] Karan Hill RN     07/09/2019 1249 acetaminophen (TYLENOL) tablet 650 mg 650 mg Oral Given Kaitlyn Pino RN     07/08/2019 7866 acetaminophen (TYLENOL) tablet 650 mg 650 mg Oral Given Cole Arora, SUSANNAH     07/09/2019 0820 ascorbic acid (VITAMIN C) tablet 500 mg 500 mg Oral Given Kenzie Ferreira, RN     07/08/2019 1800 atorvastatin (LIPITOR) tablet 20 mg 20 mg Oral Given Cole Arora, SUSANNAH     07/09/2019 2360 digoxin (LANOXIN) tablet 125 mcg 125 mcg Oral Given Kenzie Ferreira, RN     07/09/2019 0820 ferrous sulfate tablet 325 mg 325 mg Oral Given Kenzie Ferreira, SUSANNAH     81/31/5234 5509 folic acid (FOLVITE) tablet 1 mg 1 mg Oral Given Kenzie Ferreira, RN     07/09/2019 0620 pantoprazole (PROTONIX) EC tablet 40 mg 40 mg Oral Given Stacey Boggs, RN     07/08/2019 1709 pantoprazole (PROTONIX) EC tablet 40 mg 40 mg Oral Given Cole Arora, SUSANNAH     07/09/2019 6150 thiamine (VITAMIN B1) tablet 100 mg 100 mg Oral Given Izabella Van, SUSANNAH     07/08/2019 1735 potassium chloride (K-DUR,KLOR-CON) CR tablet 40 mEq 40 mEq Oral Given Cole Arora, SUSANNAH     07/09/2019 0547 lidocaine (LIDODERM) 5 % patch 1 patch 1 patch Topical Patch Removed Kiran Dubois, RN     07/08/2019 1727 lidocaine (LIDODERM) 5 % patch 1 patch 1 patch Topical Medication Applied Cole Arora, SUSANNAH     07/09/2019 2111 amiodarone tablet 200 mg 200 mg Oral Given Kenzie Ferreira, RN     07/08/2019 1727 amiodarone tablet 200 mg 200 mg Oral Given Cole Arora, SUSANNAH     07/08/2019 2151 hydrOXYzine HCL (ATARAX) tablet 25 mg 25 mg Oral Given Lidia Valle, RN     07/09/2019 1250 lidocaine (LIDODERM) 5 % patch 2 patch 2 patch Topical Medication Applied Kenzie Ferreira RN          Current Facility-Administered Medications:     acetaminophen (TYLENOL) tablet 650 mg, 650 mg, Oral, Q6H PRN, Chery Li PA-C, 650 mg at 07/09/19 1249    amiodarone tablet 200 mg, 200 mg, Oral, Daily, Chery Li PA-C, 200 mg at 07/09/19 1784    ascorbic acid (VITAMIN C) tablet 500 mg, 500 mg, Oral, Daily, Chery Li PA-C, 500 mg at 07/09/19 0820    atorvastatin (LIPITOR) tablet 20 mg, 20 mg, Oral, Daily With Enedina Marion PA-C, 20 mg at 07/08/19 1800    digoxin (LANOXIN) tablet 125 mcg, 125 mcg, Oral, Daily, LEON Moss-C, 125 mcg at 07/09/19 0191    ferrous sulfate tablet 325 mg, 325 mg, Oral, Daily With Breakfast, LEON Moss-C, 325 mg at 82/86/16 9947    folic acid (FOLVITE) tablet 1 mg, 1 mg, Oral, Daily, LEON Moss-C, 1 mg at 07/09/19 4306    lidocaine (LIDODERM) 5 % patch 2 patch, 2 patch, Topical, Daily, Courtney Kim MD, 2 patch at 07/09/19 1250    pantoprazole (PROTONIX) EC tablet 40 mg, 40 mg, Oral, BID AC, LEON Moss-C, 40 mg at 07/09/19 0620    potassium chloride (K-DUR,KLOR-CON) CR tablet 20 mEq, 20 mEq, Oral, Once, Aydee WAGNER PA-C    thiamine (VITAMIN B1) tablet 100 mg, 100 mg, Oral, Daily, LEON Moss-C, 100 mg at 07/09/19 1542    Objective:     Vitals: Blood pressure 121/69, pulse 78, temperature 98 4 °F (36 9 °C), resp  rate 18, SpO2 98 %  ,There is no height or weight on file to calculate BMI  Intake/Output Summary (Last 24 hours) at 7/9/2019 1320  Last data filed at 7/9/2019 0900  Gross per 24 hour   Intake 1950 ml   Output 2200 ml   Net -250 ml       Physical Exam:   GENERAL APPEARANCE: Pleasant, alert, awake, oriented to time place and person  Good attention span   HEENT: Nonicteric  NC/AT, PERRL, TM clear, pharynx,/tonsils normal    NECK: Neck supply no swollen nodes  HEART: Normal S1 S2     LUNGS: clear to auscultation bilaterally  ABDOMEN: soft nontender, no hepatosplenomegaly, no masses felt  EXTREMITIES: no edema  SKIN: no rashes  NEURO: alert and oriented x three, no focal deficits  5/5 strength in all extremities   Normal gait      Invasive Devices     Peripheral Intravenous Line            Peripheral IV 07/08/19 Right Antecubital 1 day                Lab Results:  Admission on 07/08/2019   Component Date Value    WBC 07/08/2019 7 08     RBC 07/08/2019 2 47*    Hemoglobin 07/08/2019 8 7*    Hematocrit 07/08/2019 28 1*    MCV 07/08/2019 114*    MCH 07/08/2019 35 2*    MCHC 07/08/2019 31 0*    RDW 07/08/2019 15 7*    MPV 07/08/2019 10 4     Platelets 24/35/2324 289     nRBC 07/08/2019 0     Neutrophils Relative 07/08/2019 81*    Immat GRANS % 07/08/2019 0     Lymphocytes Relative 07/08/2019 10*    Monocytes Relative 07/08/2019 6     Eosinophils Relative 07/08/2019 2     Basophils Relative 07/08/2019 1     Neutrophils Absolute 07/08/2019 5 76     Immature Grans Absolute 07/08/2019 0 02     Lymphocytes Absolute 07/08/2019 0 69     Monocytes Absolute 07/08/2019 0 44     Eosinophils Absolute 07/08/2019 0 13     Basophils Absolute 07/08/2019 0 04     Sodium 07/08/2019 138     Potassium 07/08/2019 3 3*    Chloride 07/08/2019 106     CO2 07/08/2019 24     ANION GAP 07/08/2019 8     BUN 07/08/2019 28*    Creatinine 07/08/2019 1 59*    Glucose 07/08/2019 119     Calcium 07/08/2019 8 7     eGFR 07/08/2019 31     Troponin I 07/08/2019 0 02     ABO Grouping 07/08/2019 O     Rh Factor 07/08/2019 Positive     Antibody Screen 07/08/2019 Negative     Specimen Expiration Date 07/08/2019 [id number]     Unit Product Code 07/09/2019 R0819Q61     Unit Number 07/09/2019 [id number]     Unit ABO 07/09/2019 O     Unit RH 07/09/2019 POS     Crossmatch 07/09/2019 Compatible     Unit Dispense Status 07/09/2019 Crossmatched     Unit Product Code 07/09/2019 [id number]     Unit Number 07/09/2019 [id number]     Unit ABO 07/09/2019 O     Unit RH 07/09/2019 POS     Crossmatch 07/09/2019 Compatible     Unit Dispense Status 07/09/2019 Presumed Trans     Protime 07/08/2019 21 3*    INR 07/08/2019 1 90*    PTT 07/08/2019 33     Ventricular Rate 07/08/2019 107     Atrial Rate 07/08/2019 141     QRSD Interval 07/08/2019 108     QT Interval 07/08/2019 296     QTC Interval 07/08/2019 395     P Axis 07/08/2019 0     QRS Axis 07/08/2019 -16     T Wave Axis 07/08/2019 237     Hemoglobin 07/08/2019 9 3*    Hematocrit 07/08/2019 30 1*    Sodium 07/09/2019 143     Potassium 07/09/2019 3 4*    Chloride 07/09/2019 112*    CO2 07/09/2019 24     ANION GAP 07/09/2019 7     BUN 07/09/2019 17     Creatinine 07/09/2019 1 07     Glucose 07/09/2019 108     Calcium 07/09/2019 8 3     eGFR 07/09/2019 50     Magnesium 07/09/2019 2 3     Hemoglobin 07/09/2019 8 5*    Hematocrit 07/09/2019 27 4*       Imaging Studies: I have personally reviewed pertinent imaging studies
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
## 2019-07-09 NOTE — RESTORATIVE TECHNICIAN NOTE
Restorative Specialist Mobility Note       Activity: Ambulate in agee, Ambulate in room, Bathroom privileges, Dangle, Stand at bedside(Educated/encouraged pt to ambulate with assistance 3-4 x's/day  Bed alarm on   Pt callbell, phone/tray within reach )     Assistive Device: Front wheel walker    Smita BOJORQUEZ, Restorative Technician, [employer]

## 2019-07-09 NOTE — ASSESSMENT & PLAN NOTE
· Present on admission as evidenced by creatinine of 1 59, up from baseline 0 8-1 1  · Improving to 1 07  · Suspect pre-renal as patient reports not eating for the last 2 days  · 2L bolused as above  · Discontinue IVF  · Patient to resume diet today  · Continue to follow BMP

## 2019-07-09 NOTE — ASSESSMENT & PLAN NOTE
· Eliquis on hold given the principal problem   Resume AC once able given patient's [id number] (at least 7)  · Rate control may be difficult given her hypotension

## 2019-07-09 NOTE — PLAN OF CARE
Problem: PHYSICAL THERAPY ADULT  Goal: Performs mobility at highest level of function for planned discharge setting  See evaluation for individualized goals  Description  Treatment/Interventions: Functional transfer training, LE strengthening/ROM, Elevations, Therapeutic exercise, Endurance training, Patient/family training, Equipment eval/education, Bed mobility, Gait training, Spoke to nursing, Spoke to case management, OT  Equipment Recommended: Walker(RW)       See flowsheet documentation for full assessment, interventions and recommendations  Note:   Prognosis: Good  Problem List: Decreased strength, Decreased range of motion, Decreased endurance, Impaired balance, Decreased mobility, Decreased coordination  Assessment: Pt is 67 y o  female seen for PT evaluation s/p admit to [facility] on 7/8/19  Two pt identifiers were used to confirm  Pt presented s/p fall at home which occurred on 7/8/19  Pt was admitted with a primary dx of: acute blood loss anemia  PT now consulted for assessment of mobility and d/c needs  Pts current co morbidities effecting treatment include: chronic systolic heart failure, opioid dependence, atrial fibrillation, MALA, depression, HTN, gout and personal factors including 2 CASSIA home environment  Pt currently lives in a two story home with a first floor setup alone  Pts current clinical presentation is Unstable/ Unpredictable (high complexity) due to Ongoing medical management for primary dx, Increased reliance on more restrictive AD compared to baseline, decreased activity tolerance compared to baseline, fall risk, increased assistance needed from caregiver at current time, continuous telemetry monitoring, multiple lines, decline in overall functional mobility status  Prior to admission, pt was I with ambulation with inconsistent use of an AD as per pt   Upon evaluation, pt currently is requiring supervision assist for bed mobility; min A for transfers and min A for ambulation w/ RW  Pt displays decreased step and stride length, decreased gait speed, improper weight shift, inconsistent annie  Pt presents at PT eval functioning below baseline and currently w/ overall mobility deficits secondary to: decreased strength, decreased endurance, impaired balance, impaired coordination, impaired mobility  Pt currently at a fall risk secondary to impairments listed above  Based on PT evaluation, pt will continue to benefit from skilled acute PT interventions to address stated impairments; to maximize functional mobility; for ongoing pt/ family training; and DME needs  At conclusion of PT session pt was left seated in bedside chair, all needs within reach  Provided pt education regarding PT plan to improve functional mobility status  PT is currently recommending post acute inpatient rehab  Pt agreeable to plan and goals as stated on evaluation  PT will continue to follow during hospital stay  Recommendation: Post acute IP rehab     PT - OK to Discharge: Yes    See flowsheet documentation for full assessment
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
## 2019-07-09 NOTE — ASSESSMENT & PLAN NOTE
· Patient initially noted to be hypotensive in ED and was resuscitated with 1L NS  Patient again became hypotensive on the floor, mentating appropriately with bounding distal pulses   Another 1L bolus was ordered  · 1 unit PRBCs transfused  · Antihypertensives on hold  · Stable

## 2019-07-09 NOTE — PROGRESS NOTES
Patient's blood pressure was checked upon transfer to the unit from ED and found to be only in the 70s/40s, HR ranging from 100-108 bpm, oxygen saturation 99% on room air  Patient is asymptomatic in regards to the hypotension, but there are no orders in place for IV fluids or any boluses at the current time  Bernardino Ballard notified, will be up to evaluate patient at the bedside shortly  Orders to follow in Epic

## 2019-07-09 NOTE — PLAN OF CARE
Problem: Potential for Falls  Goal: Patient will remain free of falls  Description  INTERVENTIONS:  - Assess patient frequently for physical needs  -  Identify cognitive and physical deficits and behaviors that affect risk of falls  -  [place] fall precautions as indicated by assessment   - Educate patient/family on patient safety including physical limitations  - Instruct patient to call for assistance with activity based on assessment  - Modify environment to reduce risk of injury  - Consider OT/PT consult to assist with strengthening/mobility  Outcome: Progressing     Problem: CARDIOVASCULAR - ADULT  Goal: Maintains optimal cardiac output and hemodynamic stability  Description  INTERVENTIONS:  - Monitor I/O, vital signs and rhythm  - Monitor for S/S and trends of decreased cardiac output i e  bleeding, hypotension  - Administer and titrate ordered vasoactive medications to optimize hemodynamic stability  - Assess quality of pulses, skin color and temperature  - Assess for signs of decreased coronary artery perfusion - ex   Angina  - Instruct patient to report change in severity of symptoms  Outcome: Progressing  Goal: Absence of cardiac dysrhythmias or at baseline rhythm  Description  INTERVENTIONS:  - Continuous cardiac monitoring, monitor vital signs, obtain 12 lead EKG if indicated  - Administer antiarrhythmic and heart rate control medications as ordered  - Monitor electrolytes and administer replacement therapy as ordered  Outcome: Progressing     Problem: GASTROINTESTINAL - ADULT  Goal: Minimal or absence of nausea and/or vomiting  Description  INTERVENTIONS:  - Administer IV fluids as ordered to ensure adequate hydration  - Maintain NPO status until nausea and vomiting are resolved  - Nasogastric tube as ordered  - Administer ordered antiemetic medications as needed  - Provide nonpharmacologic comfort measures as appropriate  - Advance diet as tolerated, if ordered  - Nutrition services referral to assist patient with adequate nutrition and appropriate food choices  Outcome: Progressing  Goal: Maintains or returns to baseline bowel function  Description  INTERVENTIONS:  - Assess bowel function  - Encourage oral fluids to ensure adequate hydration  - Administer IV fluids as ordered to ensure adequate hydration  - Administer ordered medications as needed  - Encourage mobilization and activity  - Nutrition services referral to assist patient with appropriate food choices  Outcome: Progressing     Problem: SKIN/TISSUE INTEGRITY - ADULT  Goal: Skin integrity remains intact  Description  INTERVENTIONS  - Identify patients at risk for skin breakdown  - Assess and monitor skin integrity  - Assess and monitor nutrition and hydration status  - Monitor labs (i e  albumin)  - Assess for incontinence   - Turn and reposition patient  - Assist with mobility/ambulation  - Relieve pressure over bony prominences  - Avoid friction and shearing  - Provide appropriate hygiene as needed including keeping skin clean and dry  - Evaluate need for skin moisturizer/barrier cream  - Collaborate with interdisciplinary team (i e  Nutrition, Rehabilitation, etc )   - Patient/family teaching  Outcome: Progressing     Problem: SAFETY ADULT  Goal: Maintain or return to baseline ADL function  Description  INTERVENTIONS:  -  Assess patient's ability to carry out ADLs; assess patient's baseline for ADL function and identify physical deficits which impact ability to perform ADLs (bathing, care of mouth/teeth, toileting, grooming, dressing, etc )  - Assess/evaluate cause of self-care deficits   - Assess range of motion  - Assess patient's mobility; develop plan if impaired  - Assess patient's need for assistive devices and provide as appropriate  - Encourage maximum independence but intervene and supervise when necessary  ¯ Involve family in performance of ADLs  ¯ Assess for home care needs following discharge   ¯ Request OT consult to assist with ADL evaluation and planning for discharge  ¯ Provide patient education as appropriate  Outcome: Progressing  Goal: Maintain or return mobility status to optimal level  Description  INTERVENTIONS:  - Assess patient's baseline mobility status (ambulation, transfers, stairs, etc )    - Identify cognitive and physical deficits and behaviors that affect mobility  - Identify mobility aids required to assist with transfers and/or ambulation (gait belt, sit-to-stand, lift, walker, cane, etc )  - [place] fall precautions as indicated by assessment  - Record patient progress and toleration of activity level on Mobility SBAR; progress patient to next Phase/Stage  - Instruct patient to call for assistance with activity based on assessment  - Request Rehabilitation consult to assist with strengthening/weightbearing, etc   Outcome: Progressing

## 2019-07-09 NOTE — PLAN OF CARE
Problem: Potential for Falls  Goal: Patient will remain free of falls  Description  INTERVENTIONS:  - Assess patient frequently for physical needs  -  Identify cognitive and physical deficits and behaviors that affect risk of falls  -  [place] fall precautions as indicated by assessment   - Educate patient/family on patient safety including physical limitations  - Instruct patient to call for assistance with activity based on assessment  - Modify environment to reduce risk of injury  - Consider OT/PT consult to assist with strengthening/mobility  Outcome: Progressing     Problem: CARDIOVASCULAR - ADULT  Goal: Maintains optimal cardiac output and hemodynamic stability  Description  INTERVENTIONS:  - Monitor I/O, vital signs and rhythm  - Monitor for S/S and trends of decreased cardiac output i e  bleeding, hypotension  - Administer and titrate ordered vasoactive medications to optimize hemodynamic stability  - Assess quality of pulses, skin color and temperature  - Assess for signs of decreased coronary artery perfusion - ex   Angina  - Instruct patient to report change in severity of symptoms  Outcome: Progressing  Goal: Absence of cardiac dysrhythmias or at baseline rhythm  Description  INTERVENTIONS:  - Continuous cardiac monitoring, monitor vital signs, obtain 12 lead EKG if indicated  - Administer antiarrhythmic and heart rate control medications as ordered  - Monitor electrolytes and administer replacement therapy as ordered  Outcome: Progressing     Problem: GASTROINTESTINAL - ADULT  Goal: Minimal or absence of nausea and/or vomiting  Description  INTERVENTIONS:  - Administer IV fluids as ordered to ensure adequate hydration  - Maintain NPO status until nausea and vomiting are resolved  - Nasogastric tube as ordered  - Administer ordered antiemetic medications as needed  - Provide nonpharmacologic comfort measures as appropriate  - Advance diet as tolerated, if ordered  - Nutrition services referral to assist patient with adequate nutrition and appropriate food choices  Outcome: Progressing  Goal: Maintains or returns to baseline bowel function  Description  INTERVENTIONS:  - Assess bowel function  - Encourage oral fluids to ensure adequate hydration  - Administer IV fluids as ordered to ensure adequate hydration  - Administer ordered medications as needed  - Encourage mobilization and activity  - Nutrition services referral to assist patient with appropriate food choices  Outcome: Progressing  Goal: Maintains adequate nutritional intake  Description  INTERVENTIONS:  - Monitor percentage of each meal consumed  - Identify factors contributing to decreased intake, treat as appropriate  - Assist with meals as needed  - Monitor I&O, WT and lab values  - Obtain nutrition services referral as needed  Outcome: Progressing  Goal: Establish and maintain optimal ostomy function  Description  INTERVENTIONS:  - Assess bowel function  - Encourage oral fluids to ensure adequate hydration  - Administer IV fluids as ordered to ensure adequate hydration  - Administer ordered medications as needed  - Encourage mobilization and activity  - Nutrition services referral to assist patient with appropriate food choices  - Assess stoma site  Outcome: Progressing     Problem: SKIN/TISSUE INTEGRITY - ADULT  Goal: Skin integrity remains intact  Description  INTERVENTIONS  - Identify patients at risk for skin breakdown  - Assess and monitor skin integrity  - Assess and monitor nutrition and hydration status  - Monitor labs (i e  albumin)  - Assess for incontinence   - Turn and reposition patient  - Assist with mobility/ambulation  - Relieve pressure over bony prominences  - Avoid friction and shearing  - Provide appropriate hygiene as needed including keeping skin clean and dry  - Evaluate need for skin moisturizer/barrier cream  - Collaborate with interdisciplinary team (i e  Nutrition, Rehabilitation, etc )   - Patient/family teaching  Outcome: Progressing  Goal: Incision(s), wounds(s) or drain site(s) healing without S/S of infection  Description  INTERVENTIONS  - Assess and document risk factors for skin impairment   - Assess and document dressing, incision, wound bed, drain sites and surrounding tissue  - Initiate Nutrition services consult and/or wound management as needed  Outcome: Progressing  Goal: Oral mucous membranes remain intact  Description  INTERVENTIONS  - Assess oral mucosa and hygiene practices  - Implement preventative oral hygiene regimen  - Implement oral medicated treatments as ordered  - Initiate Nutrition services referral as needed  Outcome: Progressing     Problem: SAFETY ADULT  Goal: Maintain or return to baseline ADL function  Description  INTERVENTIONS:  -  Assess patient's ability to carry out ADLs; assess patient's baseline for ADL function and identify physical deficits which impact ability to perform ADLs (bathing, care of mouth/teeth, toileting, grooming, dressing, etc )  - Assess/evaluate cause of self-care deficits   - Assess range of motion  - Assess patient's mobility; develop plan if impaired  - Assess patient's need for assistive devices and provide as appropriate  - Encourage maximum independence but intervene and supervise when necessary  ¯ Involve family in performance of ADLs  ¯ Assess for home care needs following discharge   ¯ Request OT consult to assist with ADL evaluation and planning for discharge  ¯ Provide patient education as appropriate  Outcome: Progressing  Goal: Maintain or return mobility status to optimal level  Description  INTERVENTIONS:  - Assess patient's baseline mobility status (ambulation, transfers, stairs, etc )    - Identify cognitive and physical deficits and behaviors that affect mobility  - Identify mobility aids required to assist with transfers and/or ambulation (gait belt, sit-to-stand, lift, walker, cane, etc )  - [place] fall precautions as indicated by assessment  - Record patient progress and toleration of activity level on Mobility SBAR; progress patient to next Phase/Stage  - Instruct patient to call for assistance with activity based on assessment  - Request Rehabilitation consult to assist with strengthening/weightbearing, etc   Outcome: Progressing

## 2019-07-09 NOTE — ASSESSMENT & PLAN NOTE
· Patient fell the day prior to admission while ambulating with her walker and simultaneously trying to pick food out of her teeth   When she fell, she cut her finger on her right 3rd finger  · Bleeding resolved

## 2019-07-09 NOTE — CONSULTS
Consult - Podiatry   Luis Armando Meza 67 y o  female MRN: [id number]  Unit/Bed#: [facility] 922-01 Encounter: [id number]    Assessment/Plan     Assessment:  1  Onychodystrophy vs onychomycosis       Plan:  - Nails x10 were sharply trimmed to normal length and thickness with a large nail nipper without incident   - Thank you for consultation, will sign off at this time  History of Present Illness     HPI:  Luis Armando Meza is a 67 y o  female who presents with elongated toenails  States that their nails are painful, elongated  They have difficulty applying their socks and shoes  The pressure within their shoe gear is painful and they have been unable to cut their nails adequately  Inpatient consult to Podiatry     Performed by  Horacio Castelan DPM     Authorized by Kenia Linton PA-C            Review of Systems   Constitutional: Negative  HENT: Negative  Eyes: Negative  Respiratory: Negative  Cardiovascular: Negative  Gastrointestinal: Negative  Musculoskeletal:  Negative  Skin: elongated toenails   Neurological: Negative  Psych: negative  Historical Information   Past Medical History:   Diagnosis Date    A-fib ([facility] [address] )     Acute respiratory disease     Anemia     Arthritis     Atrial fibrillation (HCC)     [facility]est[facility]     Chronic pain     Heart failure (HCC)     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[address] )     History of colon polyps     Hx of long term use of blood thinners     Hypertension     Irregular heart beat     Narcotic dependence ([address] )     Rectal bleeding     Stroke ([facility])     mild no deficiets/ memory loss    Uses walker      Past Surgical History:   Procedure Laterality Date    COLONOSCOPY      COLONOSCOPY N/A 7/27/2018    Procedure: COLONOSCOPY;  Surgeon: Elijah Cai MD;  [facility]: [facility];   Service: Gastroenterology    CORONARY STENT PLACEMENT      ERCP N/A 5/14/2018    Procedure: ENDOSCOPIC RETROGRADE CHOLANGIOPANCREATOGRAPHY (ERCP); Surgeon: Joanne Harding MD;  Location:  MAIN OR;  Service: Gastroenterology    ERCP N/A 8/28/2018    Procedure: ENDOSCOPIC RETROGRADE CHOLANGIOPANCREATOGRAPHY (ERCP) w/ EGD;  Surgeon: Joanne Harding MD;  Location:  GI LAB; Service: Gastroenterology    ESOPHAGOGASTRODUODENOSCOPY N/A 7/26/2018    Procedure: ESOPHAGOGASTRODUODENOSCOPY (EGD); Surgeon: Barby Read MD;  Location: [facility]; Service: Gastroenterology    JOINT REPLACEMENT Right     knee     Social History   Social History     Substance and Sexual Activity   Alcohol Use Not Currently     Social History     Substance and Sexual Activity   Drug Use Not Currently     Social History     Tobacco Use   Smoking Status Current Every Day Smoker     Family History: History reviewed  No pertinent family history      Meds/Allergies   Medications Prior to Admission   Medication    acetaminophen (TYLENOL) 325 mg tablet    ALPRAZolam (XANAX) 0 25 mg tablet    amiodarone 200 mg tablet    apixaban (ELIQUIS) 5 mg    ascorbic acid (VITAMIN C) 500 mg tablet    aspirin (ECOTRIN LOW STRENGTH) 81 mg EC tablet    atorvastatin (LIPITOR) 20 mg tablet    bisacodyl (DULCOLAX) 10 mg suppository    clopidogrel (PLAVIX) 75 mg tablet    DIGOXIN PO    DULoxetine (CYMBALTA) 30 mg delayed release capsule    FEROSUL 325 (65 Fe) MG tablet    folic acid (FOLVITE) 1 mg tablet    furosemide (LASIX) 40 mg tablet    Lidocaine (ASPERCREME LIDOCAINE) 4 % PTCH    lisinopril (ZESTRIL) 5 mg tablet    melatonin 3 mg    methocarbamol (ROBAXIN) 500 mg tablet    metoprolol succinate (TOPROL-XL) 100 mg 24 hr tablet    metoprolol succinate (TOPROL-XL) 50 mg 24 hr tablet    metoprolol succinate (TOPROL-XL) 50 mg 24 hr tablet    mirtazapine (REMERON) 7 5 MG tablet    nortriptyline (PAMELOR) 10 mg capsule    oxyCODONE (ROXICODONE) 10 MG TABS    pantoprazole (PROTONIX) 40 mg tablet    potassium chloride (K-DUR,KLOR-CON) 20 mEq tablet    senna-docusate sodium (SENOKOT S) 8 6-50 mg per tablet    sorbitol 70 % solution    thiamine 100 MG tablet     No Known Allergies    Objective   First Vitals:   Blood Pressure: (!) 86/58 (07/08/19 0907)  Pulse: (!) 109 (07/08/19 0907)  Temperature: 97 8 °F (36 6 °C) (07/08/19 0907)  Temp Source: Oral (07/08/19 1613)  Respirations: 16 (07/08/19 0907)  SpO2: 99 % (07/08/19 0907)    Current Vitals:   Blood Pressure: 94/53 (07/09/19 0750)  Pulse: 93 (07/09/19 0750)  Temperature: 98 1 °F (36 7 °C) (07/09/19 0750)  Temp Source: Oral (07/09/19 0300)  Respirations: 18 (07/09/19 0750)  SpO2: 97 % (07/09/19 0750)        BP 94/53   Pulse 93   Temp 98 1 °F (36 7 °C)   Resp 18   SpO2 97%      General Appearance:    Alert, cooperative, no distress   Lungs:     Respirations unlabored   Heart:    Regular rate and rhythm, S1 and S2 normal, no murmur, rub   or gallop   Abdomen:     Soft, non-tender   Extremities:  No wounds or rashes noted bilaterally  Normal skin temperature turgor and color noted bilaterally  Pulses:   Pedal pulses palpable bilaterally, capillary refill time is within normal limits bilaterally  Skin:  Elongated, dystrophic, thickened toenails x10 with notable subungual debris and TTP  No interdigital macerations noted   Neurologic:   Gross sensation is intact  Protective sensation is intact             Lab Results:   Admission on 07/08/2019   Component Date Value    WBC 07/08/2019 7 08     RBC 07/08/2019 2 47*    Hemoglobin 07/08/2019 8 7*    Hematocrit 07/08/2019 28 1*    MCV 07/08/2019 114*    MCH 07/08/2019 35 2*    MCHC 07/08/2019 31 0*    RDW 07/08/2019 15 7*    MPV 07/08/2019 10 4     Platelets 93/74/0200 289     nRBC 07/08/2019 0     Neutrophils Relative 07/08/2019 81*    Immat GRANS % 07/08/2019 0     Lymphocytes Relative 07/08/2019 10*    Monocytes Relative 07/08/2019 6     Eosinophils Relative 07/08/2019 2     Basophils Relative 07/08/2019 1     Neutrophils Absolute 07/08/2019 5 76     Immature Grans Absolute 07/08/2019 0 02     Lymphocytes Absolute 07/08/2019 0 69     Monocytes Absolute 07/08/2019 0 44     Eosinophils Absolute 07/08/2019 0 13     Basophils Absolute 07/08/2019 0 04     Sodium 07/08/2019 138     Potassium 07/08/2019 3 3*    Chloride 07/08/2019 106     CO2 07/08/2019 24     ANION GAP 07/08/2019 8     BUN 07/08/2019 28*    Creatinine 07/08/2019 1 59*    Glucose 07/08/2019 119     Calcium 07/08/2019 8 7     eGFR 07/08/2019 31     Troponin I 07/08/2019 0 02     ABO Grouping 07/08/2019 O     Rh Factor 07/08/2019 Positive     Antibody Screen 07/08/2019 Negative     Specimen Expiration Date 07/08/2019 74317571     Unit Product Code 07/09/2019 D4474E26     Unit Number 07/09/2019 [id number]     Unit ABO 07/09/2019 O     Unit RH 07/09/2019 POS     Crossmatch 07/09/2019 Compatible     Unit Dispense Status 07/09/2019 Crossmatched     Unit Product Code 07/09/2019 M3264D75     Unit Number 07/09/2019 [id number]     Unit ABO 07/09/2019 O     Unit RH 07/09/2019 POS     Crossmatch 07/09/2019 Compatible     Unit Dispense Status 07/09/2019 Presumed Trans     Protime 07/08/2019 21 3*    INR 07/08/2019 1 90*    PTT 07/08/2019 33     Ventricular Rate 07/08/2019 107     Atrial Rate 07/08/2019 141     QRSD Interval 07/08/2019 108     QT Interval 07/08/2019 296     QTC Interval 07/08/2019 395     P Axis 07/08/2019 0     QRS Axis 07/08/2019 -16     T Wave Axis 07/08/2019 237     Hemoglobin 07/08/2019 9 3*    Hematocrit 07/08/2019 30 1*    Sodium 07/09/2019 143     Potassium 07/09/2019 3 4*    Chloride 07/09/2019 112*    CO2 07/09/2019 24     ANION GAP 07/09/2019 7     BUN 07/09/2019 17     Creatinine 07/09/2019 1 07     Glucose 07/09/2019 108     Calcium 07/09/2019 8 3     eGFR 07/09/2019 50     Magnesium 07/09/2019 2 3     Hemoglobin 07/09/2019 8 5*    Hematocrit 07/09/2019 27 4*                   Invalid input(s): LABAEARO            Imaging: I have personally reviewed pertinent films in PACS  EKG, Pathology, and Other Studies: I have personally reviewed pertinent reports        Code Status: Level 3 - DNAR and DNI  Advance Directive and Living Will: Yes    Power of :    POLST:

## 2019-07-09 NOTE — UTILIZATION REVIEW
Initial Clinical Review    Admission: Date/Time/Statement: 7/8/19 @ 1236     Orders Placed This Encounter   Procedures    Inpatient Admission (expected length of stay for this patient Order details is greater than two midnights)     Standing Status:   Standing     Number of Occurrences:   1     Order Specific Question:   Admitting Physician     Answer:   Harman Coronado [[id number]]     Order Specific Question:   Level of Care     Answer:   Med Surg [16]     Order Specific Question:   Estimated length of stay     Answer:   More than 2 Midnights     Order Specific Question:   Certification     Answer:   I certify that inpatient services are medically necessary for this patient for a duration of greater than two midnights  See H&P and MD Progress Notes for additional information about the patient's course of treatment  ED Arrival Information     Expected Arrival Acuity Means of Arrival Escorted By Service Admission Type    - 7/8/2019 08:37 Urgent Ambulance [facility]    Arrival Complaint    -        Chief Complaint   Patient presents with    Finger Laceration     Pt presents with with R pointer finger laceration after biting it yesterday  Pt takes Eliquis  Pt states she fell yesterday aswell and now complains of neck pain  Assessment/Plan:  MS FERGUSON IS A 78 YO FEMALE PRESENTS TO THE ED VIA EMS FROM  HOME  SHE FELL WHILE AMBULATING WITH A WALKER WHILE SHE WAS PICKING FOOD OUT OF HER TEETH AND LACERATED HER R 3RD FINGER  NO REPORTED LOC  HIT HEAD, NECK AND BUTTOCKS  FINGER HAD CONTINUOUS BLEEDING - APPROX 100-200 ML IN BUCKET PER EMS  ON ELIQUIS BUT DID NOT TAKE IT THE EVENING OF THE FALL  IN ED FOUND TO HAVE HEME + BLACK STOOLS,  SHE IS ADMITTED TO INPATIENT WITH ACUTE BLOOD LOSS ANEMIA - TRANSFUSED WITH 1 U PRBCs, GI CONSULT, MONITOR H/H, CL LIQ DIET, NPO p MN, ELIQUIS ON HOLD  HYPOTENSION WITH FLUID RESUSCITATION  MALA - SUSPECT PRERENAL, PT HAS NOT EATEN IN 2 DAYS      PMH:  NEYDA FIB ON ELIQUIS, CHF, CVA, OPIOID USE, AMBULATORY DYSFUNCTION, HLD, HTN, GERD        ED Triage Vitals   Temperature Pulse Respirations Blood Pressure SpO2   07/08/19 0907 07/08/19 0907 07/08/19 0907 07/08/19 0907 07/08/19 0907   97 8 °F (36 6 °C) (!) 109 16 (!) 86/58 99 %      Temp Source Heart Rate Source Patient Position - Orthostatic VS BP Location FiO2 (%)   07/08/19 1613 07/08/19 1452 07/08/19 0907 07/08/19 0907 --   Oral Monitor Lying Left arm       Pain Score       07/08/19 0907       No Pain        Wt Readings from Last 1 Encounters:   05/10/19 72 6 kg (160 lb 0 9 oz)     Additional Vital Signs:     07/09/19 0750  98 1 °F (36 7 °C)  93  18  94/53    97 %     07/09/19 0315    76  16  94/52  66  95 %     07/09/19 0300  97 9 °F (36 6 °C)  75  18  127/48Abnormal     95 %     07/08/19 2225    110Abnormal     104/63  77  98 %     07/08/19 2215  97 9 °F (36 6 °C)  89  18  104/63  77  99 %     07/08/19 2105  97 3 °F (36 3 °C)Abnormal   114Abnormal   20  105/62  76  100 %     07/08/19 1910  97 7 °F (36 5 °C)  108Abnormal   18  84/47Abnormal     99 %  None (Room air)   07/08/19 1909        86/58Abnormal          07/08/19 1659  97 2 °F (36 2 °C)Abnormal   108Abnormal   22  108/64         07/08/19 1628  97 2 °F (36 2 °C)Abnormal   102  22  106/54         07/08/19 1613  97 2 °F (36 2 °C)Abnormal   103  22  80/52Abnormal          07/08/19 1510    103    94/60         07/08/19 1452    114Abnormal   20  72/52Abnormal          07/08/19 1451        80/52Abnormal          07/08/19 1449        90/60         07/08/19 1245    106Abnormal   20  113/76    100 %  None (Room air)   07/08/19 1230    106Abnormal   18  119/93    100 %     07/08/19 1145    102  20  127/62    99 %  None (Room air)   07/08/19 1130    114Abnormal   18  126/64    98 %  None (Room air)   07/08/19 1115    118Abnormal   20  129/62    99 %     07/08/19 1100    116Abnormal   26Abnormal   89/57Abnormal    98 %     07/08/19 1045    118Abnormal   21  86/62Abnormal     98 %  None (Room air)   07/08/19 0915    112Abnormal   18  101/68    98 %  None (Room air)     Pertinent Labs/Diagnostic Test Results:      7/8 CT C SPINE, CT HEAD, XRAY R 3RD FINGER - NO ACUTE DISEASE     7/9 XRAY ABD - KUB - PENDING     7/8 ECG - Atrial fibrillation with rapid ventricular response  Incomplete left bundle branch block  Nonspecific ST and T wave abnormality    Results from last 7 days   Lab Units 07/09/19  0751 07/08/19  2033 07/08/19  0930   WBC Thousand/uL  --   --  7 08   HEMOGLOBIN g/dL 8 5* 9 3* 8 7*   HEMATOCRIT % 27 4* 30 1* 28 1*   PLATELETS Thousands/uL  --   --  289   NEUTROS ABS Thousands/µL  --   --  5 76     Results from last 7 days   Lab Units 07/09/19  0541 07/08/19  0930   SODIUM mmol/L 143 138   POTASSIUM mmol/L 3 4* 3 3*   CHLORIDE mmol/L 112* 106   CO2 mmol/L 24 24   ANION GAP mmol/L 7 8   BUN mg/dL 17 28*   CREATININE mg/dL 1 07 1 59*   EGFR ml/min/1 73sq m 50 31   CALCIUM mg/dL 8 3 8 7   MAGNESIUM mg/dL 2 3  --      Results from last 7 days   Lab Units 07/09/19  0541 07/08/19  0930   GLUCOSE RANDOM mg/dL 108 119     Results from last 7 days   Lab Units 07/08/19  0930   TROPONIN I ng/mL 0 02     Results from last 7 days   Lab Units 07/08/19  1118   PROTIME seconds 21 3*   INR  1 90*   PTT seconds 33     ED Treatment:   Medication Administration from 07/08/2019 0837 to 07/08/2019 1351    Date/Time Order Dose Route Action   07/08/2019 0921 tetanus-diphtheria-acellular pertussis (BOOSTRIX) IM injection 0 5 mL 0 5 mL Intramuscular Given   07/08/2019 0921 amoxicillin-clavulanate (AUGMENTIN) 875-125 mg per tablet 1 tablet 1 tablet Oral Given   07/08/2019 1118 tranexamic acid 100mg/mL (for epistaxis) 500 mg 500 mg Nasal Given   07/08/2019 1106 sodium chloride 0 9 % bolus 1,000 mL 1,000 mL Intravenous New Bag        Past Medical History:   Diagnosis Date    A-fib [facility])     Acute respiratory disease     Anemia     Arthritis     Atrial fibrillation (HCC)     CHF (congestive heart failure) (HCC)     Chronic pain     Heart failure (HCC)     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[facility] [address] )     History of colon polyps     Hx of long term use of blood thinners     Hypertension     Irregular heart beat     Narcotic dependence (HCC)     Rectal bleeding     Stroke (HCC)     mild no deficiets/ memory loss    Uses walker      Present on Admission:   Atrial fibrillation (HCC)   Chronic systolic heart failure (HCC)   Ambulatory dysfunction   Opioid dependence (HCC)    Admitting Diagnosis: Acute blood loss anemia [D62]  GI bleed [K92 2]  Hand laceration [S61 419A]  Atrial fibrillation with RVR (HCC) [I48 91]  Injury of finger of right hand, initial encounter [S69 91XA]     Age/Sex: 67 y o  female     Admission Orders:    Current Facility-Administered Medications:  acetaminophen 650 mg Oral Q6H PRN X1 7/8   amiodarone 200 mg Oral Daily    ascorbic acid 500 mg Oral Daily    atorvastatin 20 mg Oral Daily With Dinner    digoxin 125 mcg Oral Daily    ferrous sulfate 325 mg Oral Daily With Breakfast    folic acid 1 mg Oral Daily    lidocaine 1 patch Topical Daily    pantoprazole 40 mg Oral BID AC    thiamine 100 mg Oral Daily      INPATIENT MED SURG THEN UPGRADED TO LEVEL 2 STEPDOWN FOR ONGOING HYPOTENSION  TELE  SCDs  VITALS Q 4 HR   FALL PRECAUTIONS   TRANSFUSION GUIDELINES   H/H Q 8 HR  REGULAR DIET   IP CONSULT TO CASE MANAGEMENT  IP CONSULT TO GASTROENTEROLOGY  IP CONSULT TO PODIATRY  PT/OT EVAL/TX     Network Utilization Review Department  Phone: [phone]; Fax [phone]  [email or web address]  ATTENTION: Please call with any questions or concerns to [phone]  and carefully listen to the prompts so that you are directed to the right person  Send all requests for admission clinical reviews, approved or denied determinations and any other requests to fax [phone]   All voicemails are confidential

## 2019-07-09 NOTE — PLAN OF CARE
Problem: Potential for Falls  Goal: Patient will remain free of falls  Description  INTERVENTIONS:  - Assess patient frequently for physical needs  -  Identify cognitive and physical deficits and behaviors that affect risk of falls  -  [place] fall precautions as indicated by assessment   - Educate patient/family on patient safety including physical limitations  - Instruct patient to call for assistance with activity based on assessment  - Modify environment to reduce risk of injury  - Consider OT/PT consult to assist with strengthening/mobility  Outcome: Progressing     Problem: CARDIOVASCULAR - ADULT  Goal: Maintains optimal cardiac output and hemodynamic stability  Description  INTERVENTIONS:  - Monitor I/O, vital signs and rhythm  - Monitor for S/S and trends of decreased cardiac output i e  bleeding, hypotension  - Administer and titrate ordered vasoactive medications to optimize hemodynamic stability  - Assess quality of pulses, skin color and temperature  - Assess for signs of decreased coronary artery perfusion - ex   Angina  - Instruct patient to report change in severity of symptoms  Outcome: Progressing  Goal: Absence of cardiac dysrhythmias or at baseline rhythm  Description  INTERVENTIONS:  - Continuous cardiac monitoring, monitor vital signs, obtain 12 lead EKG if indicated  - Administer antiarrhythmic and heart rate control medications as ordered  - Monitor electrolytes and administer replacement therapy as ordered  Outcome: Progressing     Problem: GASTROINTESTINAL - ADULT  Goal: Minimal or absence of nausea and/or vomiting  Description  INTERVENTIONS:  - Administer IV fluids as ordered to ensure adequate hydration  - Maintain NPO status until nausea and vomiting are resolved  - Nasogastric tube as ordered  - Administer ordered antiemetic medications as needed  - Provide nonpharmacologic comfort measures as appropriate  - Advance diet as tolerated, if ordered  - Nutrition services referral to assist patient with adequate nutrition and appropriate food choices  Outcome: Progressing  Goal: Maintains or returns to baseline bowel function  Description  INTERVENTIONS:  - Assess bowel function  - Encourage oral fluids to ensure adequate hydration  - Administer IV fluids as ordered to ensure adequate hydration  - Administer ordered medications as needed  - Encourage mobilization and activity  - Nutrition services referral to assist patient with appropriate food choices  Outcome: Progressing  Goal: Maintains adequate nutritional intake  Description  INTERVENTIONS:  - Monitor percentage of each meal consumed  - Identify factors contributing to decreased intake, treat as appropriate  - Assist with meals as needed  - Monitor I&O, WT and lab values  - Obtain nutrition services referral as needed  Outcome: Progressing  Goal: Establish and maintain optimal ostomy function  Description  INTERVENTIONS:  - Assess bowel function  - Encourage oral fluids to ensure adequate hydration  - Administer IV fluids as ordered to ensure adequate hydration  - Administer ordered medications as needed  - Encourage mobilization and activity  - Nutrition services referral to assist patient with appropriate food choices  - Assess stoma site  Outcome: Progressing     Problem: SKIN/TISSUE INTEGRITY - ADULT  Goal: Skin integrity remains intact  Description  INTERVENTIONS  - Identify patients at risk for skin breakdown  - Assess and monitor skin integrity  - Assess and monitor nutrition and hydration status  - Monitor labs (i e  albumin)  - Assess for incontinence   - Turn and reposition patient  - Assist with mobility/ambulation  - Relieve pressure over bony prominences  - Avoid friction and shearing  - Provide appropriate hygiene as needed including keeping skin clean and dry  - Evaluate need for skin moisturizer/barrier cream  - Collaborate with interdisciplinary team (i e  Nutrition, Rehabilitation, etc )   - Patient/family teaching  Outcome: Progressing  Goal: Incision(s), wounds(s) or drain site(s) healing without S/S of infection  Description  INTERVENTIONS  - Assess and document risk factors for skin impairment   - Assess and document dressing, incision, wound bed, drain sites and surrounding tissue  - Initiate Nutrition services consult and/or wound management as needed  Outcome: Progressing  Goal: Oral mucous membranes remain intact  Description  INTERVENTIONS  - Assess oral mucosa and hygiene practices  - Implement preventative oral hygiene regimen  - Implement oral medicated treatments as ordered  - Initiate Nutrition services referral as needed  Outcome: Progressing     Problem: SAFETY ADULT  Goal: Maintain or return to baseline ADL function  Description  INTERVENTIONS:  -  Assess patient's ability to carry out ADLs; assess patient's baseline for ADL function and identify physical deficits which impact ability to perform ADLs (bathing, care of mouth/teeth, toileting, grooming, dressing, etc )  - Assess/evaluate cause of self-care deficits   - Assess range of motion  - Assess patient's mobility; develop plan if impaired  - Assess patient's need for assistive devices and provide as appropriate  - Encourage maximum independence but intervene and supervise when necessary  ¯ Involve family in performance of ADLs  ¯ Assess for home care needs following discharge   ¯ Request OT consult to assist with ADL evaluation and planning for discharge  ¯ Provide patient education as appropriate  Outcome: Progressing  Goal: Maintain or return mobility status to optimal level  Description  INTERVENTIONS:  - Assess patient's baseline mobility status (ambulation, transfers, stairs, etc )    - Identify cognitive and physical deficits and behaviors that affect mobility  - Identify mobility aids required to assist with transfers and/or ambulation (gait belt, sit-to-stand, lift, walker, cane, etc )  - [place] fall precautions as indicated by assessment  - Record patient progress and toleration of activity level on Mobility SBAR; progress patient to next Phase/Stage  - Instruct patient to call for assistance with activity based on assessment  - Request Rehabilitation consult to assist with strengthening/weightbearing, etc   Outcome: Progressing

## 2019-07-09 NOTE — ASSESSMENT & PLAN NOTE
· Patient reports history of 2 "mini strokes"  · Resume Eliquis for A   Fib once able, given CHADS-VASC score  · Currently on hold for ERCP

## 2019-07-09 NOTE — ASSESSMENT & PLAN NOTE
· Hemoglobin 8 7 on admission, down from 9 9 in January  Suspect true value may be even lower as patient suspected to be hemoconcentrated on admission with dehydration/MALA  · 8 5 today  · Blood loss likely multifactorial in the setting of bleeding from finger and GIB (black stools and FOBT+ in ED)  · S/p transfusoin 1 unit PRBCs - attending obtained consent  · Monitor H&H  · GI consulted  · KUB today  · ERCP, to evaluate for removal/replacement of stent, patient must be off Plavix for at least 5 days, last dose of Plavix and Eliquis on 07/07    · Clear liquid diet to be advanced today  · Eliquis on hold
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
## 2019-07-10 PROBLEM — R57.9 SHOCK (HCC): Status: ACTIVE | Noted: 2019-07-08

## 2019-07-10 LAB
ANION GAP SERPL CALCULATED.3IONS-SCNC: 5 MMOL/L (ref 4–13)
BASOPHILS # BLD AUTO: 0.04 THOUSANDS/ΜL (ref 0–0.1)
BASOPHILS NFR BLD AUTO: 1 % (ref 0–1)
BUN SERPL-MCNC: 14 MG/DL (ref 5–25)
CALCIUM SERPL-MCNC: 8.4 MG/DL (ref 8.3–10.1)
CHLORIDE SERPL-SCNC: 111 MMOL/L (ref 100–108)
CO2 SERPL-SCNC: 26 MMOL/L (ref 21–32)
CREAT SERPL-MCNC: 1 MG/DL (ref 0.6–1.3)
EOSINOPHIL # BLD AUTO: 0.29 THOUSAND/ΜL (ref 0–0.61)
EOSINOPHIL NFR BLD AUTO: 5 % (ref 0–6)
ERYTHROCYTE [DISTWIDTH] IN BLOOD BY AUTOMATED COUNT: 20 % (ref 11.6–15.1)
GFR SERPL CREATININE-BSD FRML MDRD: 54 ML/MIN/1.73SQ M
GLUCOSE SERPL-MCNC: 104 MG/DL (ref 65–140)
HCT VFR BLD AUTO: 27 % (ref 34.8–46.1)
HCT VFR BLD AUTO: 27.2 % (ref 34.8–46.1)
HCT VFR BLD AUTO: 29.8 % (ref 34.8–46.1)
HCT VFR BLD AUTO: 29.9 % (ref 34.8–46.1)
HGB BLD-MCNC: 8.1 G/DL (ref 11.5–15.4)
HGB BLD-MCNC: 8.1 G/DL (ref 11.5–15.4)
HGB BLD-MCNC: 9.1 G/DL (ref 11.5–15.4)
HGB BLD-MCNC: 9.1 G/DL (ref 11.5–15.4)
IMM GRANULOCYTES # BLD AUTO: 0.05 THOUSAND/UL (ref 0–0.2)
IMM GRANULOCYTES NFR BLD AUTO: 1 % (ref 0–2)
LYMPHOCYTES # BLD AUTO: 0.94 THOUSANDS/ΜL (ref 0.6–4.47)
LYMPHOCYTES NFR BLD AUTO: 15 % (ref 14–44)
MCH RBC QN AUTO: 33.2 PG (ref 26.8–34.3)
MCHC RBC AUTO-ENTMCNC: 29.8 G/DL (ref 31.4–37.4)
MCV RBC AUTO: 112 FL (ref 82–98)
MONOCYTES # BLD AUTO: 0.58 THOUSAND/ΜL (ref 0.17–1.22)
MONOCYTES NFR BLD AUTO: 10 % (ref 4–12)
NEUTROPHILS # BLD AUTO: 4.23 THOUSANDS/ΜL (ref 1.85–7.62)
NEUTS SEG NFR BLD AUTO: 68 % (ref 43–75)
NRBC BLD AUTO-RTO: 0 /100 WBCS
PLATELET # BLD AUTO: 206 THOUSANDS/UL (ref 149–390)
PMV BLD AUTO: 10.3 FL (ref 8.9–12.7)
POTASSIUM SERPL-SCNC: 3.6 MMOL/L (ref 3.5–5.3)
RBC # BLD AUTO: 2.44 MILLION/UL (ref 3.81–5.12)
SODIUM SERPL-SCNC: 142 MMOL/L (ref 136–145)
WBC # BLD AUTO: 6.13 THOUSAND/UL (ref 4.31–10.16)

## 2019-07-10 PROCEDURE — 85014 HEMATOCRIT: CPT | Performed by: PHYSICIAN ASSISTANT

## 2019-07-10 PROCEDURE — 85018 HEMOGLOBIN: CPT | Performed by: PHYSICIAN ASSISTANT

## 2019-07-10 PROCEDURE — 85018 HEMOGLOBIN: CPT | Performed by: INTERNAL MEDICINE

## 2019-07-10 PROCEDURE — 85014 HEMATOCRIT: CPT | Performed by: INTERNAL MEDICINE

## 2019-07-10 PROCEDURE — 99232 SBSQ HOSP IP/OBS MODERATE 35: CPT | Performed by: INTERNAL MEDICINE

## 2019-07-10 PROCEDURE — 85025 COMPLETE CBC W/AUTO DIFF WBC: CPT | Performed by: PHYSICIAN ASSISTANT

## 2019-07-10 PROCEDURE — 80048 BASIC METABOLIC PNL TOTAL CA: CPT | Performed by: PHYSICIAN ASSISTANT

## 2019-07-10 RX ORDER — METHOCARBAMOL 500 MG/1
500 TABLET, FILM COATED ORAL EVERY 8 HOURS SCHEDULED
Status: DISCONTINUED | OUTPATIENT
Start: 2019-07-10 | End: 2019-07-11

## 2019-07-10 RX ORDER — OXYCODONE HYDROCHLORIDE 5 MG/1
5 TABLET ORAL EVERY 4 HOURS PRN
Status: COMPLETED | OUTPATIENT
Start: 2019-07-10 | End: 2019-07-11

## 2019-07-10 RX ORDER — LANOLIN ALCOHOL/MO/W.PET/CERES
3 CREAM (GRAM) TOPICAL
Status: DISCONTINUED | OUTPATIENT
Start: 2019-07-10 | End: 2019-07-16 | Stop reason: HOSPADM

## 2019-07-10 RX ORDER — ALPRAZOLAM 0.25 MG/1
0.25 TABLET ORAL ONCE
Status: COMPLETED | OUTPATIENT
Start: 2019-07-10 | End: 2019-07-10

## 2019-07-10 RX ADMIN — ALPRAZOLAM 0.25 MG: 0.25 TABLET ORAL at 02:11

## 2019-07-10 RX ADMIN — OXYCODONE HYDROCHLORIDE AND ACETAMINOPHEN 500 MG: 500 TABLET ORAL at 08:18

## 2019-07-10 RX ADMIN — THIAMINE HCL TAB 100 MG 100 MG: 100 TAB at 08:19

## 2019-07-10 RX ADMIN — MELATONIN 3 MG: 3 TAB ORAL at 21:23

## 2019-07-10 RX ADMIN — PANTOPRAZOLE SODIUM 40 MG: 40 TABLET, DELAYED RELEASE ORAL at 16:39

## 2019-07-10 RX ADMIN — OXYCODONE HYDROCHLORIDE 5 MG: 5 TABLET ORAL at 17:59

## 2019-07-10 RX ADMIN — LIDOCAINE 2 PATCH: 50 PATCH CUTANEOUS at 16:39

## 2019-07-10 RX ADMIN — FOLIC ACID 1 MG: 1 TABLET ORAL at 08:19

## 2019-07-10 RX ADMIN — FERROUS SULFATE TAB 325 MG (65 MG ELEMENTAL FE) 325 MG: 325 (65 FE) TAB at 07:39

## 2019-07-10 RX ADMIN — TRAMADOL HYDROCHLORIDE 50 MG: 50 TABLET, FILM COATED ORAL at 00:12

## 2019-07-10 RX ADMIN — METHOCARBAMOL 1000 MG: 500 TABLET, FILM COATED ORAL at 00:12

## 2019-07-10 RX ADMIN — TRAMADOL HYDROCHLORIDE 50 MG: 50 TABLET, FILM COATED ORAL at 11:54

## 2019-07-10 RX ADMIN — ATORVASTATIN CALCIUM 20 MG: 20 TABLET, FILM COATED ORAL at 16:38

## 2019-07-10 RX ADMIN — HYDROXYZINE HYDROCHLORIDE 25 MG: 25 TABLET ORAL at 23:04

## 2019-07-10 RX ADMIN — MELATONIN 3 MG: 3 TAB ORAL at 01:26

## 2019-07-10 RX ADMIN — DIGOXIN 125 MCG: 125 TABLET ORAL at 08:19

## 2019-07-10 RX ADMIN — METHOCARBAMOL 500 MG: 500 TABLET, FILM COATED ORAL at 21:23

## 2019-07-10 RX ADMIN — PANTOPRAZOLE SODIUM 40 MG: 40 TABLET, DELAYED RELEASE ORAL at 07:39

## 2019-07-10 RX ADMIN — AMIODARONE HYDROCHLORIDE 200 MG: 200 TABLET ORAL at 08:18

## 2019-07-10 NOTE — ASSESSMENT & PLAN NOTE
· PDMP reviewed - patient has not had oxycodone filled since January 2019  · Prn oxycodone and tramadol for back pain ordered

## 2019-07-10 NOTE — ASSESSMENT & PLAN NOTE
· Eliquis on hold given the principal problem   Resume AC once able given patient's [id number] (at least 7)

## 2019-07-10 NOTE — ASSESSMENT & PLAN NOTE
· Hemoglobin 8 7 on admission, down from 9 9 in January '  · Suspect related to Blood loss likely multifactorial in the setting of bleeding from finger and GIB (black stools and FOBT+ in ED)  · S/p transfusoin 1 unit PRBCs   · Hb stable between 8-9 since  · Monitor H&H  · GI consulted  · ERCP, to evaluate for removal/replacement of stent, patient must be off Plavix for at least 5 days, last dose of Plavix and Eliquis on 07/07    · Eliquis on hold
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
## 2019-07-10 NOTE — RESTORATIVE TECHNICIAN NOTE
Restorative Specialist Mobility Note       Activity: Ambulate in agee, Ambulate in room, Bathroom privileges, Chair, Dangle, Stand at bedside(Educated/encouraged pt to ambulate with assistance 3-4 x's/day  Bed alarm on   Pt callbell, phone/tray within reach )     Assistive Device: Front wheel walker    Carlos BOJORQUEZ, Restorative Technician, [employer] [facility]

## 2019-07-10 NOTE — NURSING NOTE
22:45 Patient complaining of anxiety, PRN atarax given  00:15 Patient began complaining of pain in neck/back (chronic)  unrelieved by Tylenol  SLIM contacted, patient given tramadol and robaxin  Aqua K pad offered, declined by patient  01:26 Patient complaining of inability to fall asleep  Order obtained for melatonin  02:15 Patient complaining of anxiety and inability to sleep  Order obtained for Xanax      02:20 Patient refusing to drink anything besides Pepsi  Explained to patient that Pepsi has caffeine in it and that might be keeping her awake  Patient states "I haven't slept in a year and 8 months  I like when I'm in the hospital because they give me medication to put me out " SLIM aware

## 2019-07-10 NOTE — ASSESSMENT & PLAN NOTE
Traumatic shock - POA, 2/2 ABLA from finger laceration, as evidenced by BP 86/62 &  on arrival in ED, w/ improvement following IV hydration and PRBC  · Now resolved    · 1 unit PRBCs transfused on 7/9  · Antihypertensives on hold
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
## 2019-07-10 NOTE — ASSESSMENT & PLAN NOTE
· Present on admission as evidenced by creatinine of 1 59, up from baseline 0 8-1 1  · Improving to 1 07  · Suspect pre-renal as patient reports not eating for the last 2 days  · 2L bolused as above  · Encourage po hydration  · Continue to follow BMP

## 2019-07-11 ENCOUNTER — APPOINTMENT (INPATIENT)
Dept: RADIOLOGY | Facility: HOSPITAL | Age: 79
DRG: 811 | End: 2019-07-11
Attending: INTERNAL MEDICINE
Payer: MEDICARE

## 2019-07-11 ENCOUNTER — ANESTHESIA EVENT (INPATIENT)
Dept: GASTROENTEROLOGY | Facility: HOSPITAL | Age: 79
DRG: 811 | End: 2019-07-11
Payer: MEDICARE

## 2019-07-11 PROBLEM — M54.9 CHRONIC BACK PAIN: Status: ACTIVE | Noted: 2019-07-11

## 2019-07-11 PROBLEM — R57.9 SHOCK (HCC): Status: RESOLVED | Noted: 2019-07-08 | Resolved: 2019-07-11

## 2019-07-11 PROBLEM — G89.29 CHRONIC BACK PAIN: Status: ACTIVE | Noted: 2019-07-11

## 2019-07-11 PROBLEM — N17.9 AKI (ACUTE KIDNEY INJURY) (HCC): Status: RESOLVED | Noted: 2019-01-03 | Resolved: 2019-07-11

## 2019-07-11 LAB
ABO GROUP BLD BPU: NORMAL
ABO GROUP BLD BPU: NORMAL
ANION GAP SERPL CALCULATED.3IONS-SCNC: 5 MMOL/L (ref 4–13)
BPU ID: NORMAL
BPU ID: NORMAL
BUN SERPL-MCNC: 13 MG/DL (ref 5–25)
CALCIUM SERPL-MCNC: 8.6 MG/DL (ref 8.3–10.1)
CHLORIDE SERPL-SCNC: 110 MMOL/L (ref 100–108)
CO2 SERPL-SCNC: 28 MMOL/L (ref 21–32)
CREAT SERPL-MCNC: 0.95 MG/DL (ref 0.6–1.3)
CROSSMATCH: NORMAL
CROSSMATCH: NORMAL
ERYTHROCYTE [DISTWIDTH] IN BLOOD BY AUTOMATED COUNT: 19.1 % (ref 11.6–15.1)
GFR SERPL CREATININE-BSD FRML MDRD: 58 ML/MIN/1.73SQ M
GLUCOSE SERPL-MCNC: 108 MG/DL (ref 65–140)
HCT VFR BLD AUTO: 28.1 % (ref 34.8–46.1)
HCT VFR BLD AUTO: 28.5 % (ref 34.8–46.1)
HCT VFR BLD AUTO: 28.7 % (ref 34.8–46.1)
HCT VFR BLD AUTO: 29 % (ref 34.8–46.1)
HGB BLD-MCNC: 8.4 G/DL (ref 11.5–15.4)
HGB BLD-MCNC: 8.4 G/DL (ref 11.5–15.4)
HGB BLD-MCNC: 8.5 G/DL (ref 11.5–15.4)
HGB BLD-MCNC: 8.6 G/DL (ref 11.5–15.4)
MCH RBC QN AUTO: 33.2 PG (ref 26.8–34.3)
MCHC RBC AUTO-ENTMCNC: 29.5 G/DL (ref 31.4–37.4)
MCV RBC AUTO: 113 FL (ref 82–98)
PLATELET # BLD AUTO: 218 THOUSANDS/UL (ref 149–390)
PMV BLD AUTO: 10.2 FL (ref 8.9–12.7)
POTASSIUM SERPL-SCNC: 3.8 MMOL/L (ref 3.5–5.3)
RBC # BLD AUTO: 2.53 MILLION/UL (ref 3.81–5.12)
SODIUM SERPL-SCNC: 143 MMOL/L (ref 136–145)
UNIT DISPENSE STATUS: NORMAL
UNIT DISPENSE STATUS: NORMAL
UNIT PRODUCT CODE: NORMAL
UNIT PRODUCT CODE: NORMAL
UNIT RH: NORMAL
UNIT RH: NORMAL
WBC # BLD AUTO: 7.15 THOUSAND/UL (ref 4.31–10.16)

## 2019-07-11 PROCEDURE — 85027 COMPLETE CBC AUTOMATED: CPT | Performed by: INTERNAL MEDICINE

## 2019-07-11 PROCEDURE — 99232 SBSQ HOSP IP/OBS MODERATE 35: CPT | Performed by: INTERNAL MEDICINE

## 2019-07-11 PROCEDURE — 72050 X-RAY EXAM NECK SPINE 4/5VWS: CPT

## 2019-07-11 PROCEDURE — 80048 BASIC METABOLIC PNL TOTAL CA: CPT | Performed by: INTERNAL MEDICINE

## 2019-07-11 PROCEDURE — 85018 HEMOGLOBIN: CPT | Performed by: INTERNAL MEDICINE

## 2019-07-11 PROCEDURE — 85014 HEMATOCRIT: CPT | Performed by: INTERNAL MEDICINE

## 2019-07-11 RX ORDER — GABAPENTIN 100 MG/1
100 CAPSULE ORAL 2 TIMES DAILY
Status: DISCONTINUED | OUTPATIENT
Start: 2019-07-11 | End: 2019-07-16 | Stop reason: HOSPADM

## 2019-07-11 RX ORDER — METOPROLOL SUCCINATE 50 MG/1
50 TABLET, EXTENDED RELEASE ORAL DAILY
Status: DISCONTINUED | OUTPATIENT
Start: 2019-07-11 | End: 2019-07-13

## 2019-07-11 RX ORDER — OXYCODONE HYDROCHLORIDE 5 MG/1
2.5 TABLET ORAL EVERY 4 HOURS PRN
Status: DISCONTINUED | OUTPATIENT
Start: 2019-07-11 | End: 2019-07-16 | Stop reason: HOSPADM

## 2019-07-11 RX ORDER — METHOCARBAMOL 500 MG/1
500 TABLET, FILM COATED ORAL EVERY 6 HOURS SCHEDULED
Status: DISCONTINUED | OUTPATIENT
Start: 2019-07-11 | End: 2019-07-16 | Stop reason: HOSPADM

## 2019-07-11 RX ORDER — ACETAMINOPHEN 325 MG/1
975 TABLET ORAL EVERY 8 HOURS SCHEDULED
Status: DISCONTINUED | OUTPATIENT
Start: 2019-07-11 | End: 2019-07-16 | Stop reason: HOSPADM

## 2019-07-11 RX ORDER — OXYCODONE HYDROCHLORIDE 5 MG/1
5 TABLET ORAL EVERY 4 HOURS PRN
Status: DISCONTINUED | OUTPATIENT
Start: 2019-07-11 | End: 2019-07-16 | Stop reason: HOSPADM

## 2019-07-11 RX ADMIN — GABAPENTIN 100 MG: 100 CAPSULE ORAL at 17:06

## 2019-07-11 RX ADMIN — DIGOXIN 125 MCG: 125 TABLET ORAL at 08:44

## 2019-07-11 RX ADMIN — ACETAMINOPHEN 975 MG: 325 TABLET ORAL at 13:13

## 2019-07-11 RX ADMIN — FERROUS SULFATE TAB 325 MG (65 MG ELEMENTAL FE) 325 MG: 325 (65 FE) TAB at 08:44

## 2019-07-11 RX ADMIN — THIAMINE HCL TAB 100 MG 100 MG: 100 TAB at 08:44

## 2019-07-11 RX ADMIN — FOLIC ACID 1 MG: 1 TABLET ORAL at 08:44

## 2019-07-11 RX ADMIN — AMIODARONE HYDROCHLORIDE 200 MG: 200 TABLET ORAL at 08:44

## 2019-07-11 RX ADMIN — METHOCARBAMOL 500 MG: 500 TABLET, FILM COATED ORAL at 11:58

## 2019-07-11 RX ADMIN — ATORVASTATIN CALCIUM 20 MG: 20 TABLET, FILM COATED ORAL at 17:05

## 2019-07-11 RX ADMIN — METHOCARBAMOL 500 MG: 500 TABLET, FILM COATED ORAL at 23:07

## 2019-07-11 RX ADMIN — METHOCARBAMOL 500 MG: 500 TABLET, FILM COATED ORAL at 17:05

## 2019-07-11 RX ADMIN — MELATONIN 3 MG: 3 TAB ORAL at 21:45

## 2019-07-11 RX ADMIN — OXYCODONE HYDROCHLORIDE 5 MG: 5 TABLET ORAL at 00:09

## 2019-07-11 RX ADMIN — OXYCODONE HYDROCHLORIDE 5 MG: 5 TABLET ORAL at 22:23

## 2019-07-11 RX ADMIN — PANTOPRAZOLE SODIUM 40 MG: 40 TABLET, DELAYED RELEASE ORAL at 07:02

## 2019-07-11 RX ADMIN — ACETAMINOPHEN 975 MG: 325 TABLET ORAL at 21:43

## 2019-07-11 RX ADMIN — OXYCODONE HYDROCHLORIDE 5 MG: 5 TABLET ORAL at 11:49

## 2019-07-11 RX ADMIN — METHOCARBAMOL 500 MG: 500 TABLET, FILM COATED ORAL at 07:02

## 2019-07-11 RX ADMIN — PANTOPRAZOLE SODIUM 40 MG: 40 TABLET, DELAYED RELEASE ORAL at 17:05

## 2019-07-11 RX ADMIN — GABAPENTIN 100 MG: 100 CAPSULE ORAL at 11:58

## 2019-07-11 RX ADMIN — OXYCODONE HYDROCHLORIDE AND ACETAMINOPHEN 500 MG: 500 TABLET ORAL at 08:44

## 2019-07-11 NOTE — ASSESSMENT & PLAN NOTE
Wt Readings from Last 3 Encounters:   07/11/19 77 3 kg (170 lb 8 oz)   05/10/19 72 6 kg (160 lb 0 9 oz)   01/10/19 74 6 kg (164 lb 7 4 oz)   · Limited echocardiogram in January revealed patient's left ventricular ejection fraction had improved to 60% without wall motion abnormalities  · Diuretics on hold given MALA and hypotension, we will likely restart home dose diuretics from tomorrow  · I&O and daily weight

## 2019-07-11 NOTE — RESTORATIVE TECHNICIAN NOTE
Restorative Specialist Mobility Note       Activity: Ambulate in agee, Ambulate in room, Bathroom privileges, Chair, Dangle, Stand at bedside(Educated/encouraged pt to ambulate with assistance 3-4 x's/day  Bed alarm on   Pt callbell, phone/tray within reach )     Assistive Device: Front wheel walker          Edgar BOJORQUEZ, Restorative Technician, [employer] [facility]

## 2019-07-11 NOTE — ASSESSMENT & PLAN NOTE
· Status post PCI to RCA in July 2018  · In December 2018, aspirin was stopped after discussion with Cardiology due to frequent bleeding on triple therapy with aspirin, Plavix and Eliquis  · Plavix and Eliquis for continued which have been on hold since 07/07 for planned ERCP tomorrow  · Restart Plavix and Eliquis as soon as cleared by GI  · Continue statin

## 2019-07-11 NOTE — ASSESSMENT & PLAN NOTE
· Eliquis on hold given the principal problem  Resume AC once able given patient's high [id number] (at least 7)  · Restarted Toprol-XL at lower dose  · Continue home dosage

## 2019-07-11 NOTE — ASSESSMENT & PLAN NOTE
· PDMP reviewed by the team - patient has not had oxycodone filled since January 2019  · Prn oxycodone and tramadol for back pain ordered

## 2019-07-11 NOTE — ASSESSMENT & PLAN NOTE
· Patient reports history of 2 "mini strokes"  · Resume Eliquis for A   Fib once able, given high CHADS-VASC score  · Currently on hold for ERCP

## 2019-07-11 NOTE — ASSESSMENT & PLAN NOTE
· Patient ambulates with a walker  PT/OT consults for safe discharge planning >> recommends rehab which patient refused

## 2019-07-11 NOTE — ASSESSMENT & PLAN NOTE
· Patient fell the day prior to admission while ambulating with her walker and simultaneously trying to pick food out of her teeth   When she fell, she cut her finger on her right 3rd finger  · Bleeding resolved  · Local wound care

## 2019-07-11 NOTE — ASSESSMENT & PLAN NOTE
· Hemoglobin 8 7 on admission, down from 9 9 in January '  · Suspect related to Blood loss likely multifactorial in the setting of bleeding from finger and GIB (black stools and FOBT+ in ED)  · S/p transfusoin 1 unit PRBCs   · Hb stable between 8-9 since  · Monitor H&H  · GI consulted  · ERCP, to evaluate for removal/replacement of stent, patient must be off Plavix for at least 5 days, last dose of Plavix and Eliquis on 07/07  · Eliquis and Plavix on hold in prep for procedure tomorrow

## 2019-07-11 NOTE — ASSESSMENT & PLAN NOTE
Chronic back and neck pain  Neck pain slightly worsened than before  Range of motion appears normal   No C-spine tenderness  CT C-spine was normal on admission  Obtain C-spine x-ray  Standing Tylenol and Robaxin   P r n  Oxycodone, limit narcotics  Added gabapentin

## 2019-07-11 NOTE — ASSESSMENT & PLAN NOTE
Traumatic shock - POA, 2/2 ABLA from finger laceration, as evidenced by BP 86/62 &  on arrival in ED, w/ improvement following IV hydration and PRBC  · Now resolved  · 1 unit PRBCs transfused on 7/9  · Restart antihypertensive as tolerated

## 2019-07-11 NOTE — PLAN OF CARE
Problem: Potential for Falls  Goal: Patient will remain free of falls  Description  INTERVENTIONS:  - Assess patient frequently for physical needs  -  Identify cognitive and physical deficits and behaviors that affect risk of falls  -  [place] fall precautions as indicated by assessment   - Educate patient/family on patient safety including physical limitations  - Instruct patient to call for assistance with activity based on assessment  - Modify environment to reduce risk of injury  - Consider OT/PT consult to assist with strengthening/mobility  Outcome: Progressing     Problem: CARDIOVASCULAR - ADULT  Goal: Maintains optimal cardiac output and hemodynamic stability  Description  INTERVENTIONS:  - Monitor I/O, vital signs and rhythm  - Monitor for S/S and trends of decreased cardiac output i e  bleeding, hypotension  - Administer and titrate ordered vasoactive medications to optimize hemodynamic stability  - Assess quality of pulses, skin color and temperature  - Assess for signs of decreased coronary artery perfusion - ex   Angina  - Instruct patient to report change in severity of symptoms  Outcome: Progressing  Goal: Absence of cardiac dysrhythmias or at baseline rhythm  Description  INTERVENTIONS:  - Continuous cardiac monitoring, monitor vital signs, obtain 12 lead EKG if indicated  - Administer antiarrhythmic and heart rate control medications as ordered  - Monitor electrolytes and administer replacement therapy as ordered  Outcome: Progressing     Problem: GASTROINTESTINAL - ADULT  Goal: Minimal or absence of nausea and/or vomiting  Description  INTERVENTIONS:  - Administer IV fluids as ordered to ensure adequate hydration  - Maintain NPO status until nausea and vomiting are resolved  - Nasogastric tube as ordered  - Administer ordered antiemetic medications as needed  - Provide nonpharmacologic comfort measures as appropriate  - Advance diet as tolerated, if ordered  - Nutrition services referral to assist patient with adequate nutrition and appropriate food choices  Outcome: Progressing  Goal: Maintains or returns to baseline bowel function  Description  INTERVENTIONS:  - Assess bowel function  - Encourage oral fluids to ensure adequate hydration  - Administer IV fluids as ordered to ensure adequate hydration  - Administer ordered medications as needed  - Encourage mobilization and activity  - Nutrition services referral to assist patient with appropriate food choices  Outcome: Progressing  Goal: Maintains adequate nutritional intake  Description  INTERVENTIONS:  - Monitor percentage of each meal consumed  - Identify factors contributing to decreased intake, treat as appropriate  - Assist with meals as needed  - Monitor I&O, WT and lab values  - Obtain nutrition services referral as needed  Outcome: Progressing     Problem: SKIN/TISSUE INTEGRITY - ADULT  Goal: Skin integrity remains intact  Description  INTERVENTIONS  - Identify patients at risk for skin breakdown  - Assess and monitor skin integrity  - Assess and monitor nutrition and hydration status  - Monitor labs (i e  albumin)  - Assess for incontinence   - Turn and reposition patient  - Assist with mobility/ambulation  - Relieve pressure over bony prominences  - Avoid friction and shearing  - Provide appropriate hygiene as needed including keeping skin clean and dry  - Evaluate need for skin moisturizer/barrier cream  - Collaborate with interdisciplinary team (i e  Nutrition, Rehabilitation, etc )   - Patient/family teaching  Outcome: Progressing  Goal: Incision(s), wounds(s) or drain site(s) healing without S/S of infection  Description  INTERVENTIONS  - Assess and document risk factors for skin impairment   - Assess and document dressing, incision, wound bed, drain sites and surrounding tissue  - Initiate Nutrition services consult and/or wound management as needed  Outcome: Progressing  Goal: Oral mucous membranes remain intact  Description  INTERVENTIONS  - Assess oral mucosa and hygiene practices  - Implement preventative oral hygiene regimen  - Implement oral medicated treatments as ordered  - Initiate Nutrition services referral as needed  Outcome: Progressing     Problem: SAFETY ADULT  Goal: Maintain or return to baseline ADL function  Description  INTERVENTIONS:  -  Assess patient's ability to carry out ADLs; assess patient's baseline for ADL function and identify physical deficits which impact ability to perform ADLs (bathing, care of mouth/teeth, toileting, grooming, dressing, etc )  - Assess/evaluate cause of self-care deficits   - Assess range of motion  - Assess patient's mobility; develop plan if impaired  - Assess patient's need for assistive devices and provide as appropriate  - Encourage maximum independence but intervene and supervise when necessary  ¯ Involve family in performance of ADLs  ¯ Assess for home care needs following discharge   ¯ Request OT consult to assist with ADL evaluation and planning for discharge  ¯ Provide patient education as appropriate  Outcome: Progressing  Goal: Maintain or return mobility status to optimal level  Description  INTERVENTIONS:  - Assess patient's baseline mobility status (ambulation, transfers, stairs, etc )    - Identify cognitive and physical deficits and behaviors that affect mobility  - Identify mobility aids required to assist with transfers and/or ambulation (gait belt, sit-to-stand, lift, walker, cane, etc )  - [place] fall precautions as indicated by assessment  - Record patient progress and toleration of activity level on Mobility SBAR; progress patient to next Phase/Stage  - Instruct patient to call for assistance with activity based on assessment  - Request Rehabilitation consult to assist with strengthening/weightbearing, etc   Outcome: Progressing
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
## 2019-07-12 ENCOUNTER — ANESTHESIA (INPATIENT)
Dept: GASTROENTEROLOGY | Facility: HOSPITAL | Age: 79
DRG: 811 | End: 2019-07-12
Payer: MEDICARE

## 2019-07-12 ENCOUNTER — APPOINTMENT (INPATIENT)
Dept: RADIOLOGY | Facility: HOSPITAL | Age: 79
DRG: 811 | End: 2019-07-12
Payer: MEDICARE

## 2019-07-12 ENCOUNTER — APPOINTMENT (INPATIENT)
Dept: GASTROENTEROLOGY | Facility: HOSPITAL | Age: 79
DRG: 811 | End: 2019-07-12
Payer: MEDICARE

## 2019-07-12 LAB
ERYTHROCYTE [DISTWIDTH] IN BLOOD BY AUTOMATED COUNT: 18.6 % (ref 11.6–15.1)
HCT VFR BLD AUTO: 28.7 % (ref 34.8–46.1)
HGB BLD-MCNC: 8.6 G/DL (ref 11.5–15.4)
MCH RBC QN AUTO: 33.9 PG (ref 26.8–34.3)
MCHC RBC AUTO-ENTMCNC: 30 G/DL (ref 31.4–37.4)
MCV RBC AUTO: 113 FL (ref 82–98)
PLATELET # BLD AUTO: 233 THOUSANDS/UL (ref 149–390)
PMV BLD AUTO: 10.3 FL (ref 8.9–12.7)
RBC # BLD AUTO: 2.54 MILLION/UL (ref 3.81–5.12)
WBC # BLD AUTO: 5.88 THOUSAND/UL (ref 4.31–10.16)

## 2019-07-12 PROCEDURE — 43275 ERCP REMOVE FORGN BODY DUCT: CPT | Performed by: INTERNAL MEDICINE

## 2019-07-12 PROCEDURE — C1769 GUIDE WIRE: HCPCS

## 2019-07-12 PROCEDURE — 43273 ENDOSCOPIC PANCREATOSCOPY: CPT | Performed by: INTERNAL MEDICINE

## 2019-07-12 PROCEDURE — 74330 X-RAY BILE/PANC ENDOSCOPY: CPT

## 2019-07-12 PROCEDURE — 99232 SBSQ HOSP IP/OBS MODERATE 35: CPT | Performed by: INTERNAL MEDICINE

## 2019-07-12 PROCEDURE — 0FPB8DZ REMOVAL OF INTRALUMINAL DEVICE FROM HEPATOBILIARY DUCT, VIA NATURAL OR ARTIFICIAL OPENING ENDOSCOPIC: ICD-10-PCS | Performed by: INTERNAL MEDICINE

## 2019-07-12 PROCEDURE — 85027 COMPLETE CBC AUTOMATED: CPT | Performed by: INTERNAL MEDICINE

## 2019-07-12 PROCEDURE — 43264 ERCP REMOVE DUCT CALCULI: CPT | Performed by: INTERNAL MEDICINE

## 2019-07-12 PROCEDURE — C1726 CATH, BAL DIL, NON-VASCULAR: HCPCS

## 2019-07-12 PROCEDURE — 97530 THERAPEUTIC ACTIVITIES: CPT

## 2019-07-12 PROCEDURE — 97535 SELF CARE MNGMENT TRAINING: CPT

## 2019-07-12 PROCEDURE — 0FC98ZZ EXTIRPATION OF MATTER FROM COMMON BILE DUCT, VIA NATURAL OR ARTIFICIAL OPENING ENDOSCOPIC: ICD-10-PCS | Performed by: INTERNAL MEDICINE

## 2019-07-12 RX ORDER — ONDANSETRON 2 MG/ML
INJECTION INTRAMUSCULAR; INTRAVENOUS AS NEEDED
Status: DISCONTINUED | OUTPATIENT
Start: 2019-07-12 | End: 2019-07-12 | Stop reason: SURG

## 2019-07-12 RX ORDER — CYCLOBENZAPRINE HCL 10 MG
10 TABLET ORAL ONCE
Status: COMPLETED | OUTPATIENT
Start: 2019-07-12 | End: 2019-07-12

## 2019-07-12 RX ORDER — SUCCINYLCHOLINE/SOD CL,ISO/PF 100 MG/5ML
SYRINGE (ML) INTRAVENOUS AS NEEDED
Status: DISCONTINUED | OUTPATIENT
Start: 2019-07-12 | End: 2019-07-12 | Stop reason: SURG

## 2019-07-12 RX ORDER — LIDOCAINE HYDROCHLORIDE 10 MG/ML
INJECTION, SOLUTION INFILTRATION; PERINEURAL AS NEEDED
Status: DISCONTINUED | OUTPATIENT
Start: 2019-07-12 | End: 2019-07-12 | Stop reason: SURG

## 2019-07-12 RX ORDER — SODIUM CHLORIDE 9 MG/ML
INJECTION, SOLUTION INTRAVENOUS CONTINUOUS PRN
Status: DISCONTINUED | OUTPATIENT
Start: 2019-07-12 | End: 2019-07-12 | Stop reason: SURG

## 2019-07-12 RX ORDER — PROPOFOL 10 MG/ML
INJECTION, EMULSION INTRAVENOUS AS NEEDED
Status: DISCONTINUED | OUTPATIENT
Start: 2019-07-12 | End: 2019-07-12 | Stop reason: SURG

## 2019-07-12 RX ORDER — DEXAMETHASONE SODIUM PHOSPHATE 10 MG/ML
INJECTION, SOLUTION INTRAMUSCULAR; INTRAVENOUS AS NEEDED
Status: DISCONTINUED | OUTPATIENT
Start: 2019-07-12 | End: 2019-07-12 | Stop reason: SURG

## 2019-07-12 RX ADMIN — ACETAMINOPHEN 975 MG: 325 TABLET ORAL at 05:52

## 2019-07-12 RX ADMIN — AMIODARONE HYDROCHLORIDE 200 MG: 200 TABLET ORAL at 08:38

## 2019-07-12 RX ADMIN — PROPOFOL 150 MG: 10 INJECTION, EMULSION INTRAVENOUS at 15:34

## 2019-07-12 RX ADMIN — DEXAMETHASONE SODIUM PHOSPHATE 5 MG: 10 INJECTION, SOLUTION INTRAMUSCULAR; INTRAVENOUS at 15:47

## 2019-07-12 RX ADMIN — FOLIC ACID 1 MG: 1 TABLET ORAL at 08:38

## 2019-07-12 RX ADMIN — GABAPENTIN 100 MG: 100 CAPSULE ORAL at 17:24

## 2019-07-12 RX ADMIN — PANTOPRAZOLE SODIUM 40 MG: 40 TABLET, DELAYED RELEASE ORAL at 17:23

## 2019-07-12 RX ADMIN — METHOCARBAMOL 500 MG: 500 TABLET, FILM COATED ORAL at 17:24

## 2019-07-12 RX ADMIN — SODIUM CHLORIDE: 9 INJECTION, SOLUTION INTRAVENOUS at 15:12

## 2019-07-12 RX ADMIN — ACETAMINOPHEN 975 MG: 325 TABLET ORAL at 22:05

## 2019-07-12 RX ADMIN — OXYCODONE HYDROCHLORIDE AND ACETAMINOPHEN 500 MG: 500 TABLET ORAL at 08:38

## 2019-07-12 RX ADMIN — GABAPENTIN 100 MG: 100 CAPSULE ORAL at 08:38

## 2019-07-12 RX ADMIN — OXYCODONE HYDROCHLORIDE 5 MG: 5 TABLET ORAL at 11:30

## 2019-07-12 RX ADMIN — DIGOXIN 125 MCG: 125 TABLET ORAL at 08:40

## 2019-07-12 RX ADMIN — ACETAMINOPHEN 975 MG: 325 TABLET ORAL at 17:22

## 2019-07-12 RX ADMIN — IOHEXOL 8 ML: 240 INJECTION, SOLUTION INTRATHECAL; INTRAVASCULAR; INTRAVENOUS; ORAL at 16:00

## 2019-07-12 RX ADMIN — METHOCARBAMOL 500 MG: 500 TABLET, FILM COATED ORAL at 05:52

## 2019-07-12 RX ADMIN — THIAMINE HCL TAB 100 MG 100 MG: 100 TAB at 08:38

## 2019-07-12 RX ADMIN — IOHEXOL 7 ML: 240 INJECTION, SOLUTION INTRATHECAL; INTRAVASCULAR; INTRAVENOUS; ORAL at 16:00

## 2019-07-12 RX ADMIN — Medication 100 MG: at 15:35

## 2019-07-12 RX ADMIN — FERROUS SULFATE TAB 325 MG (65 MG ELEMENTAL FE) 325 MG: 325 (65 FE) TAB at 08:38

## 2019-07-12 RX ADMIN — ONDANSETRON 4 MG: 2 INJECTION INTRAMUSCULAR; INTRAVENOUS at 15:50

## 2019-07-12 RX ADMIN — OXYCODONE HYDROCHLORIDE 5 MG: 5 TABLET ORAL at 19:52

## 2019-07-12 RX ADMIN — PANTOPRAZOLE SODIUM 40 MG: 40 TABLET, DELAYED RELEASE ORAL at 05:52

## 2019-07-12 RX ADMIN — ATORVASTATIN CALCIUM 20 MG: 20 TABLET, FILM COATED ORAL at 17:24

## 2019-07-12 RX ADMIN — CYCLOBENZAPRINE HYDROCHLORIDE 10 MG: 10 TABLET, FILM COATED ORAL at 23:26

## 2019-07-12 RX ADMIN — MELATONIN 3 MG: 3 TAB ORAL at 22:05

## 2019-07-12 RX ADMIN — LIDOCAINE HYDROCHLORIDE 50 MG: 10 INJECTION, SOLUTION INFILTRATION; PERINEURAL at 15:34

## 2019-07-12 RX ADMIN — CIPROFLOXACIN 400 MG: 2 INJECTION INTRAVENOUS at 15:42

## 2019-07-12 RX ADMIN — METHOCARBAMOL 500 MG: 500 TABLET, FILM COATED ORAL at 11:30

## 2019-07-12 NOTE — ANESTHESIA POSTPROCEDURE EVALUATION
Post-Op Assessment Note    CV Status:  Stable    Pain management: adequate     Mental Status:  Alert and awake   Hydration Status:  Euvolemic and stable   PONV Controlled:  None   Airway Patency:  Patent   Post Op Vitals Reviewed: Yes      Staff: Anesthesiologist, CRNA           /78 (07/12/19 1640)    Temp (!) 97 2 °F (36 2 °C) (07/12/19 1640)    Pulse (!) 117 (07/12/19 1640)   Resp 16 (07/12/19 1640)    SpO2 100 % (07/12/19 1640)

## 2019-07-12 NOTE — ASSESSMENT & PLAN NOTE
Wt Readings from Last 3 Encounters:   07/12/19 77 1 kg (170 lb)   05/10/19 72 6 kg (160 lb 0 9 oz)   01/10/19 74 6 kg (164 lb 7 4 oz)   · Limited echocardiogram in January revealed patient's left ventricular ejection fraction had improved to 60% without wall motion abnormalities  · Diuretics on hold given MALA and hypotension, we will likely restart home dose diuretics from tomorrow  · I&O and daily weight

## 2019-07-12 NOTE — PLAN OF CARE
Problem: OCCUPATIONAL THERAPY ADULT  Goal: Performs self-care activities at highest level of function for planned discharge setting  See evaluation for individualized goals  Description  Treatment Interventions: ADL retraining, Functional transfer training, Endurance training, Cognitive reorientation, Patient/family training, Equipment evaluation/education, Fine motor coordination activities, Compensatory technique education, Energy conservation, Activityengagement          See flowsheet documentation for full assessment, interventions and recommendations  Note:   Limitation: Decreased ADL status, Decreased Safe judgement during ADL, Decreased cognition, Decreased endurance, Decreased fine motor control, Decreased self-care trans, Decreased high-level ADLs  Prognosis: Fair  Assessment: pt seen for skilled OT treatment session w focus on self care, functional mobility and pt education  Pt needs min assist for bedmobility w HOB elevated, min assist for safe sit to stand, initailly unsteady in stance, min A/S w UB self care, mod assist for LB  she does c/o having diffiuclty managing at home, however thinks that with area of aging involvement that she will be able to manage  Pt educated on therapy recommendation of inpatient rehab due to her decline in funcitonal level, pt states she understands, but that she needs to go home  discussed [facility]   OT will continue to follow while in acute care  Recommendation: Psych Consult  OT Discharge Recommendation: Short Term Rehab  OT - OK to Discharge: Yes(when medically appropriate)

## 2019-07-12 NOTE — ASSESSMENT & PLAN NOTE
· Eliquis on hold given the principal problem  Resume AC once able given patient's high [id number] (at least 7)  · Restarted Toprol-XL at lower dose  · Restart anticoagulation with Eliquis as soon as cleared by GI

## 2019-07-12 NOTE — PHYSICAL THERAPY NOTE
Physical Therapy Cancellation Note  Attempted to see patient several times, however off the floor at this time  Will continue to follow       Ari Bacon PTA

## 2019-07-12 NOTE — ASSESSMENT & PLAN NOTE
Chronic back and neck pain  Pain is better controlled with current pain med regimen today  Range of motion appears normal   No C-spine tenderness  CT C-spine was normal on admission  C-spine x-ray shows Reversal of the normal cervical lordosis with anterior subluxation of C3 upon C4 with Diffuse cervical spondylitic degenerative change from C3-4 through C6-7 with loss of disc height, endplate osteophyte formation and partial fusion of the endplates and facets  These changes result in significant bony foraminal narrowing    PLAN:  Will obtain neurosurgical consultation  Standing Tylenol and Robaxin   P r n  Oxycodone, limit narcotics  Added gabapentin
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
## 2019-07-12 NOTE — PLAN OF CARE
Problem: Potential for Falls  Goal: Patient will remain free of falls  Description  INTERVENTIONS:  - Assess patient frequently for physical needs  -  Identify cognitive and physical deficits and behaviors that affect risk of falls  -  [place] fall precautions as indicated by assessment   - Educate patient/family on patient safety including physical limitations  - Instruct patient to call for assistance with activity based on assessment  - Modify environment to reduce risk of injury  - Consider OT/PT consult to assist with strengthening/mobility  Outcome: Progressing     Problem: CARDIOVASCULAR - ADULT  Goal: Maintains optimal cardiac output and hemodynamic stability  Description  INTERVENTIONS:  - Monitor I/O, vital signs and rhythm  - Monitor for S/S and trends of decreased cardiac output i e  bleeding, hypotension  - Administer and titrate ordered vasoactive medications to optimize hemodynamic stability  - Assess quality of pulses, skin color and temperature  - Assess for signs of decreased coronary artery perfusion - ex   Angina  - Instruct patient to report change in severity of symptoms  Outcome: Progressing  Goal: Absence of cardiac dysrhythmias or at baseline rhythm  Description  INTERVENTIONS:  - Continuous cardiac monitoring, monitor vital signs, obtain 12 lead EKG if indicated  - Administer antiarrhythmic and heart rate control medications as ordered  - Monitor electrolytes and administer replacement therapy as ordered  Outcome: Progressing     Problem: GASTROINTESTINAL - ADULT  Goal: Minimal or absence of nausea and/or vomiting  Description  INTERVENTIONS:  - Administer IV fluids as ordered to ensure adequate hydration  - Maintain NPO status until nausea and vomiting are resolved  - Nasogastric tube as ordered  - Administer ordered antiemetic medications as needed  - Provide nonpharmacologic comfort measures as appropriate  - Advance diet as tolerated, if ordered  - Nutrition services referral to assist patient with adequate nutrition and appropriate food choices  Outcome: Progressing  Goal: Maintains or returns to baseline bowel function  Description  INTERVENTIONS:  - Assess bowel function  - Encourage oral fluids to ensure adequate hydration  - Administer IV fluids as ordered to ensure adequate hydration  - Administer ordered medications as needed  - Encourage mobilization and activity  - Nutrition services referral to assist patient with appropriate food choices  Outcome: Progressing  Goal: Maintains adequate nutritional intake  Description  INTERVENTIONS:  - Monitor percentage of each meal consumed  - Identify factors contributing to decreased intake, treat as appropriate  - Assist with meals as needed  - Monitor I&O, WT and lab values  - Obtain nutrition services referral as needed  Outcome: Progressing  Goal: Establish and maintain optimal ostomy function  Description  INTERVENTIONS:  - Assess bowel function  - Encourage oral fluids to ensure adequate hydration  - Administer IV fluids as ordered to ensure adequate hydration  - Administer ordered medications as needed  - Encourage mobilization and activity  - Nutrition services referral to assist patient with appropriate food choices  - Assess stoma site  Outcome: Progressing     Problem: SKIN/TISSUE INTEGRITY - ADULT  Goal: Skin integrity remains intact  Description  INTERVENTIONS  - Identify patients at risk for skin breakdown  - Assess and monitor skin integrity  - Assess and monitor nutrition and hydration status  - Monitor labs (i e  albumin)  - Assess for incontinence   - Turn and reposition patient  - Assist with mobility/ambulation  - Relieve pressure over bony prominences  - Avoid friction and shearing  - Provide appropriate hygiene as needed including keeping skin clean and dry  - Evaluate need for skin moisturizer/barrier cream  - Collaborate with interdisciplinary team (i e  Nutrition, Rehabilitation, etc )   - Patient/family teaching  Outcome: Progressing  Goal: Incision(s), wounds(s) or drain site(s) healing without S/S of infection  Description  INTERVENTIONS  - Assess and document risk factors for skin impairment   - Assess and document dressing, incision, wound bed, drain sites and surrounding tissue  - Initiate Nutrition services consult and/or wound management as needed  Outcome: Progressing  Goal: Oral mucous membranes remain intact  Description  INTERVENTIONS  - Assess oral mucosa and hygiene practices  - Implement preventative oral hygiene regimen  - Implement oral medicated treatments as ordered  - Initiate Nutrition services referral as needed  Outcome: Progressing     Problem: SAFETY ADULT  Goal: Maintain or return to baseline ADL function  Description  INTERVENTIONS:  -  Assess patient's ability to carry out ADLs; assess patient's baseline for ADL function and identify physical deficits which impact ability to perform ADLs (bathing, care of mouth/teeth, toileting, grooming, dressing, etc )  - Assess/evaluate cause of self-care deficits   - Assess range of motion  - Assess patient's mobility; develop plan if impaired  - Assess patient's need for assistive devices and provide as appropriate  - Encourage maximum independence but intervene and supervise when necessary  ¯ Involve family in performance of ADLs  ¯ Assess for home care needs following discharge   ¯ Request OT consult to assist with ADL evaluation and planning for discharge  ¯ Provide patient education as appropriate  Outcome: Progressing  Goal: Maintain or return mobility status to optimal level  Description  INTERVENTIONS:  - Assess patient's baseline mobility status (ambulation, transfers, stairs, etc )    - Identify cognitive and physical deficits and behaviors that affect mobility  - Identify mobility aids required to assist with transfers and/or ambulation (gait belt, sit-to-stand, lift, walker, cane, etc )  - [place] fall precautions as indicated by assessment  - Record patient progress and toleration of activity level on Mobility SBAR; progress patient to next Phase/Stage  - Instruct patient to call for assistance with activity based on assessment  - Request Rehabilitation consult to assist with strengthening/weightbearing, etc   Outcome: Progressing
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
## 2019-07-12 NOTE — ASSESSMENT & PLAN NOTE
· Status post PCI to RCA in July 2018  · In December 2018, aspirin was stopped after discussion with Cardiology due to frequent bleeding on triple therapy with aspirin, Plavix and Eliquis  · Plavix and Eliquis for continued which have been on hold since 07/07 for planned ERCP today  · Restart Plavix and Eliquis as soon as cleared by GI  · Continue statin

## 2019-07-12 NOTE — ASSESSMENT & PLAN NOTE
· Hemoglobin 8 7 on admission, down from 9 9 in January  · Suspect related to Blood loss likely multifactorial in the setting of bleeding from finger and GIB (black stools and FOBT+ in ED)  · S/p transfusoin 1 unit PRBCs   · Hb stable between 8-9 since  · Monitor H&H  · GI consulted  · ERCP, to evaluate for removal/replacement of stent, patient must be off Plavix for at least 5 days, last dose of Plavix and Eliquis on 07/07  · Eliquis and Plavix on hold in prep for procedure today

## 2019-07-12 NOTE — ANESTHESIA PREPROCEDURE EVALUATION
Review of Systems/Medical History  Patient summary reviewed  Chart reviewed  No history of anesthetic complications     Cardiovascular  Hyperlipidemia, Hypertension controlled, Past MI , CAD , Cardiac stents (x1 in 7/2018 )  Dysrhythmias , atrial fibrillation, CHF (chronic diastolic CHF) , MYERS,    Pulmonary  Negative pulmonary ROS        GI/Hepatic      Comment: Choledocholithiasis s/p biliary stent     Negative  ROS        Endo/Other  Negative endo/other ROS      GYN       Hematology  Anemia ,     Musculoskeletal  Back pain ,   Arthritis     Neurology    CVA , no residual symptoms,    Psychology   Anxiety, Depression , being treated for depression,   Chronic opioid dependence Chronic pain,            Physical Exam    Airway    Mallampati score: II  TM Distance: >3 FB  Neck ROM: limited     Dental       Cardiovascular  Rhythm: regular, Rate: normal, Cardiovascular exam normal    Pulmonary  Pulmonary exam normal Breath sounds clear to auscultation,     Other Findings        Anesthesia Plan  ASA Score- 3     Anesthesia Type- general with ASA Monitors  Additional Monitors:   Airway Plan: ETT  Plan Factors-    Induction- intravenous  Postoperative Plan-   Planned trial extubation    Informed Consent- Anesthetic plan and risks discussed with patient  I personally reviewed this patient with the CRNA  Discussed and agreed on the Anesthesia Plan with the CRNA         NPO verified  NKDA  Plan:  GETA    Risks and benefits discussed with patient  Questions answered  Patient consented

## 2019-07-12 NOTE — SOCIAL WORK
CM met with pt to re-discuss STR recommendations  Pt still refusing STR upon d/c--feels between [facility] and [address] [place] [address] she will be able to manage at home

## 2019-07-13 PROBLEM — M48.02 CERVICAL SPINAL STENOSIS: Status: ACTIVE | Noted: 2019-07-13

## 2019-07-13 LAB
ANION GAP SERPL CALCULATED.3IONS-SCNC: 4 MMOL/L (ref 4–13)
ANISOCYTOSIS BLD QL SMEAR: PRESENT
BASOPHILS # BLD MANUAL: 0 THOUSAND/UL (ref 0–0.1)
BASOPHILS NFR MAR MANUAL: 0 % (ref 0–1)
BUN SERPL-MCNC: 12 MG/DL (ref 5–25)
CALCIUM SERPL-MCNC: 8.6 MG/DL (ref 8.3–10.1)
CHLORIDE SERPL-SCNC: 108 MMOL/L (ref 100–108)
CO2 SERPL-SCNC: 28 MMOL/L (ref 21–32)
CREAT SERPL-MCNC: 0.93 MG/DL (ref 0.6–1.3)
EOSINOPHIL # BLD MANUAL: 0 THOUSAND/UL (ref 0–0.4)
EOSINOPHIL NFR BLD MANUAL: 0 % (ref 0–6)
ERYTHROCYTE [DISTWIDTH] IN BLOOD BY AUTOMATED COUNT: 18.1 % (ref 11.6–15.1)
GFR SERPL CREATININE-BSD FRML MDRD: 59 ML/MIN/1.73SQ M
GLUCOSE SERPL-MCNC: 134 MG/DL (ref 65–140)
HCT VFR BLD AUTO: 29.6 % (ref 34.8–46.1)
HGB BLD-MCNC: 8.8 G/DL (ref 11.5–15.4)
HYPERCHROMIA BLD QL SMEAR: PRESENT
LYMPHOCYTES # BLD AUTO: 0.18 THOUSAND/UL (ref 0.6–4.47)
LYMPHOCYTES # BLD AUTO: 2 % (ref 14–44)
MACROCYTES BLD QL AUTO: PRESENT
MCH RBC QN AUTO: 33.7 PG (ref 26.8–34.3)
MCHC RBC AUTO-ENTMCNC: 29.7 G/DL (ref 31.4–37.4)
MCV RBC AUTO: 113 FL (ref 82–98)
MONOCYTES # BLD AUTO: 0.09 THOUSAND/UL (ref 0–1.22)
MONOCYTES NFR BLD: 1 % (ref 4–12)
NEUTROPHILS # BLD MANUAL: 8.78 THOUSAND/UL (ref 1.85–7.62)
NEUTS SEG NFR BLD AUTO: 97 % (ref 43–75)
NRBC BLD AUTO-RTO: 0 /100 WBCS
NRBC BLD AUTO-RTO: 1 /100 WBC (ref 0–2)
PLATELET # BLD AUTO: 260 THOUSANDS/UL (ref 149–390)
PLATELET BLD QL SMEAR: ADEQUATE
PMV BLD AUTO: 10.4 FL (ref 8.9–12.7)
POIKILOCYTOSIS BLD QL SMEAR: PRESENT
POLYCHROMASIA BLD QL SMEAR: PRESENT
POTASSIUM SERPL-SCNC: 4.7 MMOL/L (ref 3.5–5.3)
RBC # BLD AUTO: 2.61 MILLION/UL (ref 3.81–5.12)
RBC MORPH BLD: PRESENT
SODIUM SERPL-SCNC: 140 MMOL/L (ref 136–145)
WBC # BLD AUTO: 9.05 THOUSAND/UL (ref 4.31–10.16)

## 2019-07-13 PROCEDURE — 99222 1ST HOSP IP/OBS MODERATE 55: CPT | Performed by: PHYSICIAN ASSISTANT

## 2019-07-13 PROCEDURE — 85007 BL SMEAR W/DIFF WBC COUNT: CPT | Performed by: PHYSICIAN ASSISTANT

## 2019-07-13 PROCEDURE — 80048 BASIC METABOLIC PNL TOTAL CA: CPT | Performed by: INTERNAL MEDICINE

## 2019-07-13 PROCEDURE — 99232 SBSQ HOSP IP/OBS MODERATE 35: CPT | Performed by: INTERNAL MEDICINE

## 2019-07-13 PROCEDURE — 85027 COMPLETE CBC AUTOMATED: CPT | Performed by: PHYSICIAN ASSISTANT

## 2019-07-13 RX ORDER — DULOXETIN HYDROCHLORIDE 20 MG/1
20 CAPSULE, DELAYED RELEASE ORAL DAILY
Status: DISCONTINUED | OUTPATIENT
Start: 2019-07-13 | End: 2019-07-14

## 2019-07-13 RX ORDER — METOPROLOL SUCCINATE 50 MG/1
50 TABLET, EXTENDED RELEASE ORAL 2 TIMES DAILY
Status: DISCONTINUED | OUTPATIENT
Start: 2019-07-13 | End: 2019-07-16 | Stop reason: HOSPADM

## 2019-07-13 RX ORDER — FUROSEMIDE 40 MG/1
40 TABLET ORAL DAILY
Status: DISCONTINUED | OUTPATIENT
Start: 2019-07-13 | End: 2019-07-16 | Stop reason: HOSPADM

## 2019-07-13 RX ORDER — CLOPIDOGREL BISULFATE 75 MG/1
75 TABLET ORAL DAILY
Status: DISCONTINUED | OUTPATIENT
Start: 2019-07-13 | End: 2019-07-16 | Stop reason: HOSPADM

## 2019-07-13 RX ADMIN — FOLIC ACID 1 MG: 1 TABLET ORAL at 09:29

## 2019-07-13 RX ADMIN — APIXABAN 5 MG: 5 TABLET, FILM COATED ORAL at 12:00

## 2019-07-13 RX ADMIN — OXYCODONE HYDROCHLORIDE 5 MG: 5 TABLET ORAL at 05:59

## 2019-07-13 RX ADMIN — AMIODARONE HYDROCHLORIDE 200 MG: 200 TABLET ORAL at 09:29

## 2019-07-13 RX ADMIN — OXYCODONE HYDROCHLORIDE 5 MG: 5 TABLET ORAL at 10:02

## 2019-07-13 RX ADMIN — PANTOPRAZOLE SODIUM 40 MG: 40 TABLET, DELAYED RELEASE ORAL at 16:15

## 2019-07-13 RX ADMIN — METHOCARBAMOL 500 MG: 500 TABLET, FILM COATED ORAL at 05:59

## 2019-07-13 RX ADMIN — METOPROLOL SUCCINATE 50 MG: 50 TABLET, EXTENDED RELEASE ORAL at 09:29

## 2019-07-13 RX ADMIN — METHOCARBAMOL 500 MG: 500 TABLET, FILM COATED ORAL at 11:02

## 2019-07-13 RX ADMIN — GABAPENTIN 100 MG: 100 CAPSULE ORAL at 17:49

## 2019-07-13 RX ADMIN — DULOXETINE HYDROCHLORIDE 20 MG: 20 CAPSULE, DELAYED RELEASE ORAL at 12:00

## 2019-07-13 RX ADMIN — PANTOPRAZOLE SODIUM 40 MG: 40 TABLET, DELAYED RELEASE ORAL at 05:59

## 2019-07-13 RX ADMIN — OXYCODONE HYDROCHLORIDE AND ACETAMINOPHEN 500 MG: 500 TABLET ORAL at 09:29

## 2019-07-13 RX ADMIN — HYDROXYZINE HYDROCHLORIDE 25 MG: 25 TABLET ORAL at 19:23

## 2019-07-13 RX ADMIN — MELATONIN 3 MG: 3 TAB ORAL at 21:39

## 2019-07-13 RX ADMIN — FERROUS SULFATE TAB 325 MG (65 MG ELEMENTAL FE) 325 MG: 325 (65 FE) TAB at 09:29

## 2019-07-13 RX ADMIN — ACETAMINOPHEN 975 MG: 325 TABLET ORAL at 13:01

## 2019-07-13 RX ADMIN — METOPROLOL SUCCINATE 50 MG: 50 TABLET, EXTENDED RELEASE ORAL at 17:49

## 2019-07-13 RX ADMIN — METHOCARBAMOL 500 MG: 500 TABLET, FILM COATED ORAL at 17:49

## 2019-07-13 RX ADMIN — CLOPIDOGREL BISULFATE 75 MG: 75 TABLET ORAL at 16:14

## 2019-07-13 RX ADMIN — GABAPENTIN 100 MG: 100 CAPSULE ORAL at 09:29

## 2019-07-13 RX ADMIN — THIAMINE HCL TAB 100 MG 100 MG: 100 TAB at 09:29

## 2019-07-13 RX ADMIN — APIXABAN 5 MG: 5 TABLET, FILM COATED ORAL at 21:39

## 2019-07-13 RX ADMIN — DIGOXIN 125 MCG: 125 TABLET ORAL at 09:29

## 2019-07-13 RX ADMIN — OXYCODONE HYDROCHLORIDE 5 MG: 5 TABLET ORAL at 16:15

## 2019-07-13 RX ADMIN — ACETAMINOPHEN 975 MG: 325 TABLET ORAL at 05:59

## 2019-07-13 RX ADMIN — HYDROXYZINE HYDROCHLORIDE 25 MG: 25 TABLET ORAL at 12:00

## 2019-07-13 RX ADMIN — FUROSEMIDE 40 MG: 40 TABLET ORAL at 16:15

## 2019-07-13 RX ADMIN — ACETAMINOPHEN 975 MG: 325 TABLET ORAL at 21:39

## 2019-07-13 RX ADMIN — METHOCARBAMOL 500 MG: 500 TABLET, FILM COATED ORAL at 23:13

## 2019-07-13 RX ADMIN — ATORVASTATIN CALCIUM 20 MG: 20 TABLET, FILM COATED ORAL at 17:49

## 2019-07-13 NOTE — ASSESSMENT & PLAN NOTE
No need for acute neurosurgical intervention at this time per Neurosurgery  Neurosurgery recommends outpatient follow-up  Appreciate neurosurgical input

## 2019-07-13 NOTE — ASSESSMENT & PLAN NOTE
Chronic back and neck pain  Pain is better controlled with current pain med regimen today  Range of motion appears normal   No C-spine tenderness  CT C-spine was normal on admission  C-spine x-ray shows Reversal of the normal cervical lordosis with anterior subluxation of C3 upon C4 with Diffuse cervical spondylitic degenerative change from C3-4 through C6-7 with loss of disc height, endplate osteophyte formation and partial fusion of the endplates and facets  These changes result in significant bony foraminal narrowing    PLAN:  Appreciate neurosurgical input, no neurosurgical intervention needed inpatient and recommended outpatient follow-up  Standing Tylenol and Robaxin   P r n  Oxycodone, limit narcotics  Added gabapentin

## 2019-07-13 NOTE — ASSESSMENT & PLAN NOTE
· Hemoglobin 8 7 on admission, down from 9 9 in January  · Suspect related to Blood loss likely multifactorial in the setting of bleeding from finger and GIB (black stools and FOBT+ in ED)  · S/p transfusoin 1 unit PRBCs   · Hb stable between 8-9 since  · Monitor H&H  · GI consulted, status post ERCP  sphincteroplasty and statin removal on 07/12  · Restarted anticoagulation as per GI clearance
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
## 2019-07-13 NOTE — PROGRESS NOTES
Denies abdominal pain, nausea, vomiting  No chills, no fever  Tolerated ERCP well  Heart rate 108, temp 97 5°, /72  Awake and comfortable  Abdomen is soft without tenderness  Impressions/recommendations:  Choledocholithiasis, status post successful extraction yesterday  Status post sphincteroplasty  Okay to restart anticoagulation    Re-evaluate need for clopidogrel since stent placement was more than 1 year ago

## 2019-07-13 NOTE — ASSESSMENT & PLAN NOTE
· Patient reports history of 2 "mini strokes"  · No further intervention planned by GI on neurosurgery inpatient, will restart Eliquis for anticoagulation on 07/13

## 2019-07-13 NOTE — ASSESSMENT & PLAN NOTE
Wt Readings from Last 3 Encounters:   07/12/19 77 1 kg (170 lb)   05/10/19 72 6 kg (160 lb 0 9 oz)   01/10/19 74 6 kg (164 lb 7 4 oz)   ·   ·   · Limited echocardiogram in January revealed patient's left ventricular ejection fraction had improved to 60% without wall motion abnormalities  · Diuretics on hold given MALA and hypotension, restart home dose Lasix on 7/13  · I&O and daily weight

## 2019-07-13 NOTE — ASSESSMENT & PLAN NOTE
· Increase Toprol XL to 50 mg b i d  With holding parameters  As per last cardiology note in the hospital from January 2019, patient's metoprolol dose was increased to Toprol  mg b i d  Up titrate as needed  · Restarted Eliquis for anticoagulation

## 2019-07-13 NOTE — CONSULTS
Consultation - Neurosurgery   Елена Vargas 67 y o  female MRN: [id number]  Unit/Bed#: [facility] 922-01 Encounter: [id number]      Assessment/Plan     Assessment:  1  Chronic neck and back pain  2  Cervical spinal stenosis with anterior listhesis of C3 on C4 and auto fusion of C3 through C5  3  Acute blood-loss anemia  4  Traumatic finger laceration on Eliquis   5  Acute  kidney injury    Plan:  -no urgent neurosurgical needs  -recommend outpatient follow-up with Neurosurgery  -PT and OT for mobilization and safe discharge planning      History of Present Illness   Inpatient consult to Neurosurgery  Consult performed by: Ivet Saavedra PA-C  Consult ordered by: Brenda Han MD          HPI: Елена Vargas is a 67 y o   female on Eliquis for AFib with CHF and prior CVA  She has a history of opioid dependence coronary artery disease in chronic neck and low back pain  She presented with a traumatic laceration of her index finger  She tripped and fell while her hand was in her mouth biting her finger  She lost 1-200 cc of blood minimum  She complains of her chronic neck and back pain with no new exacerbation and a CT scan of the cervical spine was done  She has severe cervical spinal stenosis with anterior listhesis of C3-C4 and auto fusion of C3 through C5  She denies any upper extremity weakness clumsiness or numbness  She does complain of slight weakness in lower extremities which causes use a walker  She has fallen but does not appear she has what would be considered frequent falls  He the CT scan appears unchanged from previous  Review of Systems   Respiratory: Negative  Cardiovascular: Negative  Gastrointestinal: Negative  Musculoskeletal: Positive for back pain, gait problem and neck pain  Skin: Negative  Neurological: Negative for dizziness, seizures, syncope, facial asymmetry, weakness, light-headedness, numbness and headaches  Hematological: Negative  Psychiatric/Behavioral: Negative  All other systems reviewed and are negative  Historical Information   Past Medical History:   Diagnosis Date    A-fib ([place] [id number] )     Acute respiratory disease     Anemia     Arthritis     Atrial fibrillation (HCC)     CHF (congestive heart failure) (HCC)     Chronic pain     Heart failure (HCC)     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[place] [address] )     History of colon polyps     Hx of long term use of blood thinners     Hypertension     Irregular heart beat     Narcotic dependence ([place] [address] )     Rectal bleeding     Stroke (HCC)     mild no deficiets/ memory loss    Uses walker      Past Surgical History:   Procedure Laterality Date    COLONOSCOPY      COLONOSCOPY N/A 7/27/2018    Procedure: COLONOSCOPY;  Surgeon: Bo Nolasco MD;  Location: [facility]; Service: Gastroenterology    CORONARY STENT PLACEMENT      ERCP N/A 5/14/2018    Procedure: ENDOSCOPIC RETROGRADE CHOLANGIOPANCREATOGRAPHY (ERCP); Surgeon: Beth Pena MD;  [facility];  Service: Gastroenterology    ERCP N/A 8/28/2018    Procedure: ENDOSCOPIC RETROGRADE CHOLANGIOPANCREATOGRAPHY (ERCP) w/ EGD;  Surgeon: Beth Pena MD;  [facility]; Service: Gastroenterology    ESOPHAGOGASTRODUODENOSCOPY N/A 7/26/2018    Procedure: ESOPHAGOGASTRODUODENOSCOPY (EGD); Surgeon: Bo Nolasco MD;  [facility]: [facility]; Service: Gastroenterology    JOINT REPLACEMENT Right     knee     Social History     Substance and Sexual Activity   Alcohol Use Not Currently     Social History     Substance and Sexual Activity   Drug Use Not Currently     Social History     Tobacco Use   Smoking Status Current Every Day Smoker   Smokeless Tobacco Never Used     History reviewed  No pertinent family history      Meds/Allergies   all current active meds have been reviewed  No Known Allergies    Objective     Intake/Output Summary (Last 24 hours) at 7/13/2019 1152  Last data filed at 7/13/2019 0900  Gross per 24 hour   Intake 380 ml Output 700 ml   Net -320 ml       Physical Exam   Constitutional: She is oriented to person, place, and time  cachectic   HENT:   Head: Normocephalic and atraumatic  Neck:   Tender  paraspinal greater on the right   Cardiovascular: Normal rate  Pulmonary/Chest: Effort normal    Abdominal: Soft  Neurological: She is alert and oriented to person, place, and time  She has normal strength  Skin: Skin is warm and dry  Psychiatric: She has a normal mood and affect  Her speech is normal      Neurologic Exam     Mental Status   Oriented to person, place, and time  Attention: normal  Concentration: normal    Speech: speech is normal   Level of consciousness: alert  Knowledge: poor and good  Cranial Nerves   Cranial nerves II through XII intact  CN V   Facial sensation intact  Motor Exam     Strength   Strength 5/5 throughout  Sensory Exam   Light touch normal         Vitals:Blood pressure 135/72, pulse (!) 108, temperature 97 5 °F (36 4 °C), resp  rate 20, height 5' 5" (1 651 m), weight 77 1 kg (170 lb), SpO2 95 %  ,Body mass index is 28 29 kg/m²  Lab Results: I have personally reviewed pertinent results        Imaging Studies: I have personally reviewed pertinent films in PACS      VTE Prophylaxis: Sequential compression device (Venodyne)

## 2019-07-13 NOTE — PLAN OF CARE
Problem: Potential for Falls  Goal: Patient will remain free of falls  Description  INTERVENTIONS:  - Assess patient frequently for physical needs  -  Identify cognitive and physical deficits and behaviors that affect risk of falls    -  [place] fall precautions as indicated by assessment   - Educate patient/family on patient safety including physical limitations  - Instruct patient to call for assistance with activity based on assessment  - Modify environment to reduce risk of injury  - Consider OT/PT consult to assist with strengthening/mobility  Outcome: Progressing     Problem: SKIN/TISSUE INTEGRITY - ADULT  Goal: Incision(s), wounds(s) or drain site(s) healing without S/S of infection  Description  INTERVENTIONS  - Assess and document risk factors for skin impairment   - Assess and document dressing, incision, wound bed, drain sites and surrounding tissue  - Initiate Nutrition services consult and/or wound management as needed  Outcome: Progressing     Problem: SAFETY ADULT  Goal: Maintain or return to baseline ADL function  Description  INTERVENTIONS:  -  Assess patient's ability to carry out ADLs; assess patient's baseline for ADL function and identify physical deficits which impact ability to perform ADLs (bathing, care of mouth/teeth, toileting, grooming, dressing, etc )  - Assess/evaluate cause of self-care deficits   - Assess range of motion  - Assess patient's mobility; develop plan if impaired  - Assess patient's need for assistive devices and provide as appropriate  - Encourage maximum independence but intervene and supervise when necessary  ¯ Involve family in performance of ADLs  ¯ Assess for home care needs following discharge   ¯ Request OT consult to assist with ADL evaluation and planning for discharge  ¯ Provide patient education as appropriate  Outcome: Progressing  Goal: Maintain or return mobility status to optimal level  Description  INTERVENTIONS:  - Assess patient's baseline mobility status (ambulation, transfers, stairs, etc )    - Identify cognitive and physical deficits and behaviors that affect mobility  - Identify mobility aids required to assist with transfers and/or ambulation (gait belt, sit-to-stand, lift, walker, cane, etc )  - [place] fall precautions as indicated by assessment  - Record patient progress and toleration of activity level on Mobility SBAR; progress patient to next Phase/Stage  - Instruct patient to call for assistance with activity based on assessment  - Request Rehabilitation consult to assist with strengthening/weightbearing, etc   Outcome: Progressing     Problem: CARDIOVASCULAR - ADULT  Goal: Maintains optimal cardiac output and hemodynamic stability  Description  INTERVENTIONS:  - Monitor I/O, vital signs and rhythm  - Monitor for S/S and trends of decreased cardiac output i e  bleeding, hypotension  - Administer and titrate ordered vasoactive medications to optimize hemodynamic stability  - Assess quality of pulses, skin color and temperature  - Assess for signs of decreased coronary artery perfusion - ex   Angina  - Instruct patient to report change in severity of symptoms  Outcome: Completed  Goal: Absence of cardiac dysrhythmias or at baseline rhythm  Description  INTERVENTIONS:  - Continuous cardiac monitoring, monitor vital signs, obtain 12 lead EKG if indicated  - Administer antiarrhythmic and heart rate control medications as ordered  - Monitor electrolytes and administer replacement therapy as ordered  Outcome: Completed     Problem: GASTROINTESTINAL - ADULT  Goal: Minimal or absence of nausea and/or vomiting  Description  INTERVENTIONS:  - Administer IV fluids as ordered to ensure adequate hydration  - Maintain NPO status until nausea and vomiting are resolved  - Nasogastric tube as ordered  - Administer ordered antiemetic medications as needed  - Provide nonpharmacologic comfort measures as appropriate  - Advance diet as tolerated, if ordered  - Nutrition services referral to assist patient with adequate nutrition and appropriate food choices  Outcome: Completed  Goal: Maintains or returns to baseline bowel function  Description  INTERVENTIONS:  - Assess bowel function  - Encourage oral fluids to ensure adequate hydration  - Administer IV fluids as ordered to ensure adequate hydration  - Administer ordered medications as needed  - Encourage mobilization and activity  - Nutrition services referral to assist patient with appropriate food choices  Outcome: Completed  Goal: Maintains adequate nutritional intake  Description  INTERVENTIONS:  - Monitor percentage of each meal consumed  - Identify factors contributing to decreased intake, treat as appropriate  - Assist with meals as needed  - Monitor I&O, WT and lab values  - Obtain nutrition services referral as needed  Outcome: Completed  Goal: Establish and maintain optimal ostomy function  Description  INTERVENTIONS:  - Assess bowel function  - Encourage oral fluids to ensure adequate hydration  - Administer IV fluids as ordered to ensure adequate hydration  - Administer ordered medications as needed  - Encourage mobilization and activity  - Nutrition services referral to assist patient with appropriate food choices  - Assess stoma site  Outcome: Completed     Problem: SKIN/TISSUE INTEGRITY - ADULT  Goal: Skin integrity remains intact  Description  INTERVENTIONS  - Identify patients at risk for skin breakdown  - Assess and monitor skin integrity  - Assess and monitor nutrition and hydration status  - Monitor labs (i e  albumin)  - Assess for incontinence   - Turn and reposition patient  - Assist with mobility/ambulation  - Relieve pressure over bony prominences  - Avoid friction and shearing  - Provide appropriate hygiene as needed including keeping skin clean and dry  - Evaluate need for skin moisturizer/barrier cream  - Collaborate with interdisciplinary team (i e  Nutrition, Rehabilitation, etc )   - Patient/family teaching  Outcome: Completed  Goal: Oral mucous membranes remain intact  Description  INTERVENTIONS  - Assess oral mucosa and hygiene practices  - Implement preventative oral hygiene regimen  - Implement oral medicated treatments as ordered  - Initiate Nutrition services referral as needed  Outcome: Completed

## 2019-07-13 NOTE — ASSESSMENT & PLAN NOTE
· Status post PCI to RCA in July 2018  · In December 2018, aspirin was stopped after discussion with Cardiology due to frequent bleeding on triple therapy with aspirin, Plavix and Eliquis  · Was on Plavix and Eliquis on admission which were on hold due to bleeding and then in preparation for ERCP  · Status post ERCP on 07/12  Restart home dose Plavix and Eliquis  · Outpatient cardiology follow-up to decide regarding Plavix versus aspirin monotherapy in combination with Eliquis  · Continue statin
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
## 2019-07-14 LAB
ERYTHROCYTE [DISTWIDTH] IN BLOOD BY AUTOMATED COUNT: 18.1 % (ref 11.6–15.1)
HCT VFR BLD AUTO: 28.7 % (ref 34.8–46.1)
HGB BLD-MCNC: 8.7 G/DL (ref 11.5–15.4)
MCH RBC QN AUTO: 34.4 PG (ref 26.8–34.3)
MCHC RBC AUTO-ENTMCNC: 30.3 G/DL (ref 31.4–37.4)
MCV RBC AUTO: 113 FL (ref 82–98)
PLATELET # BLD AUTO: 268 THOUSANDS/UL (ref 149–390)
PMV BLD AUTO: 10.2 FL (ref 8.9–12.7)
RBC # BLD AUTO: 2.53 MILLION/UL (ref 3.81–5.12)
WBC # BLD AUTO: 10.01 THOUSAND/UL (ref 4.31–10.16)

## 2019-07-14 PROCEDURE — 99231 SBSQ HOSP IP/OBS SF/LOW 25: CPT | Performed by: PHYSICIAN ASSISTANT

## 2019-07-14 PROCEDURE — 85027 COMPLETE CBC AUTOMATED: CPT | Performed by: INTERNAL MEDICINE

## 2019-07-14 PROCEDURE — 99232 SBSQ HOSP IP/OBS MODERATE 35: CPT | Performed by: INTERNAL MEDICINE

## 2019-07-14 RX ORDER — GABAPENTIN 100 MG/1
100 CAPSULE ORAL 2 TIMES DAILY
Qty: 30 CAPSULE | Refills: 0 | Status: CANCELLED | OUTPATIENT
Start: 2019-07-14

## 2019-07-14 RX ORDER — NORTRIPTYLINE HYDROCHLORIDE 10 MG/1
20 CAPSULE ORAL
Status: DISCONTINUED | OUTPATIENT
Start: 2019-07-14 | End: 2019-07-14

## 2019-07-14 RX ORDER — NORTRIPTYLINE HYDROCHLORIDE 10 MG/1
10 CAPSULE ORAL
Status: DISCONTINUED | OUTPATIENT
Start: 2019-07-14 | End: 2019-07-16 | Stop reason: HOSPADM

## 2019-07-14 RX ORDER — METHOCARBAMOL 500 MG/1
500 TABLET, FILM COATED ORAL ONCE AS NEEDED
Qty: 14 TABLET | Refills: 0 | Status: SHIPPED | OUTPATIENT
Start: 2019-07-14 | End: 2019-07-16 | Stop reason: SDUPTHER

## 2019-07-14 RX ADMIN — FERROUS SULFATE TAB 325 MG (65 MG ELEMENTAL FE) 325 MG: 325 (65 FE) TAB at 10:06

## 2019-07-14 RX ADMIN — DULOXETINE HYDROCHLORIDE 20 MG: 20 CAPSULE, DELAYED RELEASE ORAL at 10:05

## 2019-07-14 RX ADMIN — METHOCARBAMOL 500 MG: 500 TABLET, FILM COATED ORAL at 05:22

## 2019-07-14 RX ADMIN — GABAPENTIN 100 MG: 100 CAPSULE ORAL at 10:06

## 2019-07-14 RX ADMIN — AMIODARONE HYDROCHLORIDE 200 MG: 200 TABLET ORAL at 10:06

## 2019-07-14 RX ADMIN — OXYCODONE HYDROCHLORIDE 2.5 MG: 5 TABLET ORAL at 10:16

## 2019-07-14 RX ADMIN — CLOPIDOGREL BISULFATE 75 MG: 75 TABLET ORAL at 10:06

## 2019-07-14 RX ADMIN — DIGOXIN 125 MCG: 125 TABLET ORAL at 10:08

## 2019-07-14 RX ADMIN — PANTOPRAZOLE SODIUM 40 MG: 40 TABLET, DELAYED RELEASE ORAL at 18:09

## 2019-07-14 RX ADMIN — APIXABAN 5 MG: 5 TABLET, FILM COATED ORAL at 10:07

## 2019-07-14 RX ADMIN — LIDOCAINE 2 PATCH: 50 PATCH CUTANEOUS at 13:30

## 2019-07-14 RX ADMIN — THIAMINE HCL TAB 100 MG 100 MG: 100 TAB at 10:05

## 2019-07-14 RX ADMIN — ACETAMINOPHEN 975 MG: 325 TABLET ORAL at 13:25

## 2019-07-14 RX ADMIN — MELATONIN 3 MG: 3 TAB ORAL at 21:34

## 2019-07-14 RX ADMIN — ACETAMINOPHEN 975 MG: 325 TABLET ORAL at 21:30

## 2019-07-14 RX ADMIN — ATORVASTATIN CALCIUM 20 MG: 20 TABLET, FILM COATED ORAL at 18:08

## 2019-07-14 RX ADMIN — ACETAMINOPHEN 975 MG: 325 TABLET ORAL at 05:22

## 2019-07-14 RX ADMIN — METHOCARBAMOL 500 MG: 500 TABLET, FILM COATED ORAL at 18:08

## 2019-07-14 RX ADMIN — APIXABAN 5 MG: 5 TABLET, FILM COATED ORAL at 18:09

## 2019-07-14 RX ADMIN — PANTOPRAZOLE SODIUM 40 MG: 40 TABLET, DELAYED RELEASE ORAL at 05:22

## 2019-07-14 RX ADMIN — OXYCODONE HYDROCHLORIDE AND ACETAMINOPHEN 500 MG: 500 TABLET ORAL at 10:06

## 2019-07-14 RX ADMIN — METHOCARBAMOL 500 MG: 500 TABLET, FILM COATED ORAL at 13:24

## 2019-07-14 RX ADMIN — FUROSEMIDE 40 MG: 40 TABLET ORAL at 10:07

## 2019-07-14 RX ADMIN — NORTRIPTYLINE HYDROCHLORIDE 10 MG: 10 CAPSULE ORAL at 21:35

## 2019-07-14 RX ADMIN — GABAPENTIN 100 MG: 100 CAPSULE ORAL at 18:09

## 2019-07-14 RX ADMIN — METHOCARBAMOL 500 MG: 500 TABLET, FILM COATED ORAL at 23:19

## 2019-07-14 RX ADMIN — FOLIC ACID 1 MG: 1 TABLET ORAL at 10:06
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
## 2019-07-14 NOTE — PROGRESS NOTES
Progress Note - Neurosurgery   Enzo Hartland 67 y o  female MRN: [id number]  Unit/Bed#: [facility] 922-01 Encounter: [id number]    Assessment:  1  Chronic neck and back pain  2  Cervical spinal stenosis with anterior listhesis of C3 on C4 and auto fusion of C3 through C5  3  Acute blood-loss anemia  4  Traumatic finger laceration on Eliquis   5  Acute  kidney injury      Plan:  -stable from a neurosurgical standpoint  -may follow up as an outpatient with MRI of the neck prior to visit  -PT and OT for mobilization and safe discharge planning  -neurosurgery will sign off at this time      Subjective/Objective     Feels better on muscle relaxant      Intake/Output       19 0701 - 07/15/19 0700      [id number] 0342-4147 Total       Intake    P O   380  -- 380    Total Intake 380 -- 380       Output    Urine  500  -- 500    Urine 500 -- 500    Stool  --  -- --    Unmeasured Stool Occurrence 1 x -- 1 x    Total Output 500 -- 500       Net I/O     -120 -- -120          Invasive Devices     Peripheral Intravenous Line            Peripheral IV 19 Left;Ventral (anterior) Forearm 3 days                Physical Exam:    Vitals: Blood pressure 96/60, pulse 74, temperature 97 7 °F (36 5 °C), resp  rate 18, height 5' 5" (1 651 m), weight 77 1 kg (170 lb), SpO2 97 %  ,Body mass index is 28 29 kg/m²  Awake and alert  Moves all extremities  Limited neck movement unchanged  Strength good throughout  Lungs clear bilateral  Heart regular rate  Abdomen soft        Lab Results: I have personally reviewed pertinent results        Imaging Studies: I have personally reviewed pertinent films in PACS      VTE Pharmacologic Prophylaxis:  Eliquis    VTE Mechanical Prophylaxis: sequential compression device
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
## 2019-07-14 NOTE — ASSESSMENT & PLAN NOTE
Wt Readings from Last 3 Encounters:   07/12/19 77 1 kg (170 lb)   05/10/19 72 6 kg (160 lb 0 9 oz)   01/10/19 74 6 kg (164 lb 7 4 oz)     ·   · Limited echocardiogram in January revealed patient's left ventricular ejection fraction had improved to 60% without wall motion abnormalities  · Diuretics on hold given MALA and hypotension, restart home dose Lasix on 7/13  · I&O and daily weight

## 2019-07-14 NOTE — ASSESSMENT & PLAN NOTE
Chronic back and neck pain  Pain is better controlled with current pain med regimen today  Range of motion appears normal   No C-spine tenderness  CT C-spine was normal on admission  C-spine x-ray shows Reversal of the normal cervical lordosis with anterior subluxation of C3 upon C4 with Diffuse cervical spondylitic degenerative change from C3-4 through C6-7 with loss of disc height, endplate osteophyte formation and partial fusion of the endplates and facets  These changes result in significant bony foraminal narrowing    PLAN:  Appreciate neurosurgical input, no neurosurgical intervention needed inpatient and recommended outpatient follow-up  Standing Tylenol and Robaxin   P r n  Oxycodone, limit narcotics

## 2019-07-14 NOTE — ASSESSMENT & PLAN NOTE
· Patient ambulates with a walker  PT/OT consults for safe discharge planning >> recommends rehab which patient refused  Had an extensive discussion with patient for last 2-3 days regarding rehab which she adamantly refuses  She is willing to except home PT, home OT and nursing care  She reports that she has been in rehab twice in recent past and does not feel that it helps her much and she has not to take care of at home  Patient is AO x3 and able to comprehend situation

## 2019-07-14 NOTE — SOCIAL WORK
Patient is ready for d/c today  Patient is refusing STR  Referral was made to [facility]  Made them aware that patient would d/c today  Patient lives alone and states that she does not have anybody for transportation today  Patient states that she normally will have her landlord transport her , but she is not available today  Patient is assist of 1 and requires a walker for safety from PT  Patient states that she has a walker at home and rarely uses it and does not want a walker to get into her house, which is recommended from PT  Patient is insistent to go home with a taxi  Nursing to set up for the taxi for patient to d/c home

## 2019-07-15 PROCEDURE — 99232 SBSQ HOSP IP/OBS MODERATE 35: CPT | Performed by: INTERNAL MEDICINE

## 2019-07-15 RX ADMIN — ACETAMINOPHEN 975 MG: 325 TABLET ORAL at 22:00

## 2019-07-15 RX ADMIN — CLOPIDOGREL BISULFATE 75 MG: 75 TABLET ORAL at 08:48

## 2019-07-15 RX ADMIN — ACETAMINOPHEN 975 MG: 325 TABLET ORAL at 13:26

## 2019-07-15 RX ADMIN — OXYCODONE HYDROCHLORIDE 5 MG: 5 TABLET ORAL at 16:55

## 2019-07-15 RX ADMIN — DIGOXIN 125 MCG: 125 TABLET ORAL at 08:48

## 2019-07-15 RX ADMIN — OXYCODONE HYDROCHLORIDE 5 MG: 5 TABLET ORAL at 09:23

## 2019-07-15 RX ADMIN — GABAPENTIN 100 MG: 100 CAPSULE ORAL at 08:48

## 2019-07-15 RX ADMIN — FUROSEMIDE 40 MG: 40 TABLET ORAL at 08:48

## 2019-07-15 RX ADMIN — METOPROLOL SUCCINATE 50 MG: 50 TABLET, EXTENDED RELEASE ORAL at 17:00

## 2019-07-15 RX ADMIN — FOLIC ACID 1 MG: 1 TABLET ORAL at 08:48

## 2019-07-15 RX ADMIN — METHOCARBAMOL 500 MG: 500 TABLET, FILM COATED ORAL at 17:00

## 2019-07-15 RX ADMIN — METHOCARBAMOL 500 MG: 500 TABLET, FILM COATED ORAL at 23:16

## 2019-07-15 RX ADMIN — OXYCODONE HYDROCHLORIDE AND ACETAMINOPHEN 500 MG: 500 TABLET ORAL at 08:48

## 2019-07-15 RX ADMIN — APIXABAN 5 MG: 5 TABLET, FILM COATED ORAL at 17:00

## 2019-07-15 RX ADMIN — AMIODARONE HYDROCHLORIDE 200 MG: 200 TABLET ORAL at 08:48

## 2019-07-15 RX ADMIN — METOPROLOL SUCCINATE 50 MG: 50 TABLET, EXTENDED RELEASE ORAL at 08:48

## 2019-07-15 RX ADMIN — PANTOPRAZOLE SODIUM 40 MG: 40 TABLET, DELAYED RELEASE ORAL at 16:55

## 2019-07-15 RX ADMIN — FERROUS SULFATE TAB 325 MG (65 MG ELEMENTAL FE) 325 MG: 325 (65 FE) TAB at 08:48

## 2019-07-15 RX ADMIN — APIXABAN 5 MG: 5 TABLET, FILM COATED ORAL at 08:48

## 2019-07-15 RX ADMIN — ACETAMINOPHEN 975 MG: 325 TABLET ORAL at 05:30

## 2019-07-15 RX ADMIN — MELATONIN 3 MG: 3 TAB ORAL at 22:00

## 2019-07-15 RX ADMIN — METHOCARBAMOL 500 MG: 500 TABLET, FILM COATED ORAL at 05:30

## 2019-07-15 RX ADMIN — PANTOPRAZOLE SODIUM 40 MG: 40 TABLET, DELAYED RELEASE ORAL at 05:30

## 2019-07-15 RX ADMIN — OXYCODONE HYDROCHLORIDE 5 MG: 5 TABLET ORAL at 22:00

## 2019-07-15 RX ADMIN — METHOCARBAMOL 500 MG: 500 TABLET, FILM COATED ORAL at 11:48

## 2019-07-15 RX ADMIN — GABAPENTIN 100 MG: 100 CAPSULE ORAL at 17:00

## 2019-07-15 RX ADMIN — NORTRIPTYLINE HYDROCHLORIDE 10 MG: 10 CAPSULE ORAL at 22:01

## 2019-07-15 RX ADMIN — HYDROXYZINE HYDROCHLORIDE 25 MG: 25 TABLET ORAL at 22:00

## 2019-07-15 RX ADMIN — THIAMINE HCL TAB 100 MG 100 MG: 100 TAB at 08:48

## 2019-07-15 RX ADMIN — OXYCODONE HYDROCHLORIDE 5 MG: 5 TABLET ORAL at 05:30

## 2019-07-15 RX ADMIN — ATORVASTATIN CALCIUM 20 MG: 20 TABLET, FILM COATED ORAL at 16:55

## 2019-07-15 NOTE — ASSESSMENT & PLAN NOTE
· Patient ambulates with a walker  · Will obtain neuro psychiatric consultation for competency evaluation  · PT/OT consults for safe discharge planning >> recommends rehab which patient refused  Had an extensive discussion with patient for last 2-3 days regarding rehab which she adamantly refuses  She is willing to except home PT, home OT and nursing care  She reports that she has been in rehab twice in recent past and does not feel that it helps her much and she has not to take care of at home

## 2019-07-15 NOTE — PLAN OF CARE
Problem: Potential for Falls  Goal: Patient will remain free of falls  Description  INTERVENTIONS:  - Assess patient frequently for physical needs  -  Identify cognitive and physical deficits and behaviors that affect risk of falls    -  [place] fall precautions as indicated by assessment   - Educate patient/family on patient safety including physical limitations  - Instruct patient to call for assistance with activity based on assessment  - Modify environment to reduce risk of injury  - Consider OT/PT consult to assist with strengthening/mobility  Outcome: Progressing     Problem: SKIN/TISSUE INTEGRITY - ADULT  Goal: Incision(s), wounds(s) or drain site(s) healing without S/S of infection  Description  INTERVENTIONS  - Assess and document risk factors for skin impairment   - Assess and document dressing, incision, wound bed, drain sites and surrounding tissue  - Initiate Nutrition services consult and/or wound management as needed  Outcome: Progressing     Problem: SAFETY ADULT  Goal: Maintain or return to baseline ADL function  Description  INTERVENTIONS:  -  Assess patient's ability to carry out ADLs; assess patient's baseline for ADL function and identify physical deficits which impact ability to perform ADLs (bathing, care of mouth/teeth, toileting, grooming, dressing, etc )  - Assess/evaluate cause of self-care deficits   - Assess range of motion  - Assess patient's mobility; develop plan if impaired  - Assess patient's need for assistive devices and provide as appropriate  - Encourage maximum independence but intervene and supervise when necessary  ¯ Involve family in performance of ADLs  ¯ Assess for home care needs following discharge   ¯ Request OT consult to assist with ADL evaluation and planning for discharge  ¯ Provide patient education as appropriate  Outcome: Progressing  Goal: Maintain or return mobility status to optimal level  Description  INTERVENTIONS:  - Assess patient's baseline mobility status (ambulation, transfers, stairs, etc )    - Identify cognitive and physical deficits and behaviors that affect mobility  - Identify mobility aids required to assist with transfers and/or ambulation (gait belt, sit-to-stand, lift, walker, cane, etc )  - [place] fall precautions as indicated by assessment  - Record patient progress and toleration of activity level on Mobility SBAR; progress patient to next Phase/Stage  - Instruct patient to call for assistance with activity based on assessment  - Request Rehabilitation consult to assist with strengthening/weightbearing, etc   Outcome: Progressing     Problem: Prexisting or High Potential for Compromised Skin Integrity  Goal: Skin integrity is maintained or improved  Description  INTERVENTIONS:  - Identify patients at risk for skin breakdown  - Assess and monitor skin integrity  - Assess and monitor nutrition and hydration status  - Monitor labs (i e  albumin)  - Assess for incontinence   - Turn and reposition patient  - Assist with mobility/ambulation  - Relieve pressure over bony prominences  - Avoid friction and shearing  - Provide appropriate hygiene as needed including keeping skin clean and dry  - Evaluate need for skin moisturizer/barrier cream  - Collaborate with interdisciplinary team (i e  Nutrition, Rehabilitation, etc )   - Patient/family teaching  Outcome: Progressing

## 2019-07-15 NOTE — PLAN OF CARE
Problem: Potential for Falls  Goal: Patient will remain free of falls  Description  INTERVENTIONS:  - Assess patient frequently for physical needs  -  Identify cognitive and physical deficits and behaviors that affect risk of falls    -  [place] fall precautions as indicated by assessment   - Educate patient/family on patient safety including physical limitations  - Instruct patient to call for assistance with activity based on assessment  - Modify environment to reduce risk of injury  - Consider OT/PT consult to assist with strengthening/mobility  Outcome: Progressing     Problem: SKIN/TISSUE INTEGRITY - ADULT  Goal: Incision(s), wounds(s) or drain site(s) healing without S/S of infection  Description  INTERVENTIONS  - Assess and document risk factors for skin impairment   - Assess and document dressing, incision, wound bed, drain sites and surrounding tissue  - Initiate Nutrition services consult and/or wound management as needed  Outcome: Progressing     Problem: SAFETY ADULT  Goal: Maintain or return to baseline ADL function  Description  INTERVENTIONS:  -  Assess patient's ability to carry out ADLs; assess patient's baseline for ADL function and identify physical deficits which impact ability to perform ADLs (bathing, care of mouth/teeth, toileting, grooming, dressing, etc )  - Assess/evaluate cause of self-care deficits   - Assess range of motion  - Assess patient's mobility; develop plan if impaired  - Assess patient's need for assistive devices and provide as appropriate  - Encourage maximum independence but intervene and supervise when necessary  ¯ Involve family in performance of ADLs  ¯ Assess for home care needs following discharge   ¯ Request OT consult to assist with ADL evaluation and planning for discharge  ¯ Provide patient education as appropriate  Outcome: Progressing  Goal: Maintain or return mobility status to optimal level  Description  INTERVENTIONS:  - Assess patient's baseline mobility status (ambulation, transfers, stairs, etc )    - Identify cognitive and physical deficits and behaviors that affect mobility  - Identify mobility aids required to assist with transfers and/or ambulation (gait belt, sit-to-stand, lift, walker, cane, etc )  - [place] fall precautions as indicated by assessment  - Record patient progress and toleration of activity level on Mobility SBAR; progress patient to next Phase/Stage  - Instruct patient to call for assistance with activity based on assessment  - Request Rehabilitation consult to assist with strengthening/weightbearing, etc   Outcome: Progressing     Problem: Prexisting or High Potential for Compromised Skin Integrity  Goal: Skin integrity is maintained or improved  Description  INTERVENTIONS:  - Identify patients at risk for skin breakdown  - Assess and monitor skin integrity  - Assess and monitor nutrition and hydration status  - Monitor labs (i e  albumin)  - Assess for incontinence   - Turn and reposition patient  - Assist with mobility/ambulation  - Relieve pressure over bony prominences  - Avoid friction and shearing  - Provide appropriate hygiene as needed including keeping skin clean and dry  - Evaluate need for skin moisturizer/barrier cream  - Collaborate with interdisciplinary team (i e  Nutrition, Rehabilitation, etc )   - Patient/family teaching  Outcome: Progressing
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
## 2019-07-15 NOTE — ASSESSMENT & PLAN NOTE
· Patient reports history of 2 "mini strokes"  · No further intervention planned by GI on neurosurgery inpatient, restarted Eliquis for anticoagulation on 07/13

## 2019-07-15 NOTE — RESTORATIVE TECHNICIAN NOTE
Restorative Specialist Mobility Note       Activity: Ambulate in agee, Ambulate in room, Bathroom privileges, Dangle, Stand at bedside(Educated/encouraged pt to ambulate with assistance 3-4 x's/day  Bed alarm on   Pt callbell, phone/tray within reach )     Assistive Device: Front wheel walker       [employer] BS, Restorative Technician, [employer]
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
## 2019-07-16 VITALS
BODY MASS INDEX: 28.32 KG/M2 | OXYGEN SATURATION: 95 % | TEMPERATURE: 97.3 F | RESPIRATION RATE: 18 BRPM | WEIGHT: 170 LBS | DIASTOLIC BLOOD PRESSURE: 66 MMHG | SYSTOLIC BLOOD PRESSURE: 100 MMHG | HEART RATE: 71 BPM | HEIGHT: 65 IN

## 2019-07-16 DIAGNOSIS — Z71.89 COMPLEX CARE COORDINATION: Primary | ICD-10-CM

## 2019-07-16 LAB
ERYTHROCYTE [DISTWIDTH] IN BLOOD BY AUTOMATED COUNT: 17.4 % (ref 11.6–15.1)
HCT VFR BLD AUTO: 31.2 % (ref 34.8–46.1)
HGB BLD-MCNC: 9.1 G/DL (ref 11.5–15.4)
MCH RBC QN AUTO: 33.3 PG (ref 26.8–34.3)
MCHC RBC AUTO-ENTMCNC: 29.2 G/DL (ref 31.4–37.4)
MCV RBC AUTO: 114 FL (ref 82–98)
PLATELET # BLD AUTO: 283 THOUSANDS/UL (ref 149–390)
PMV BLD AUTO: 9.9 FL (ref 8.9–12.7)
RBC # BLD AUTO: 2.73 MILLION/UL (ref 3.81–5.12)
WBC # BLD AUTO: 5.94 THOUSAND/UL (ref 4.31–10.16)

## 2019-07-16 PROCEDURE — 99238 HOSP IP/OBS DSCHRG MGMT 30/<: CPT | Performed by: INTERNAL MEDICINE

## 2019-07-16 PROCEDURE — 85027 COMPLETE CBC AUTOMATED: CPT | Performed by: INTERNAL MEDICINE

## 2019-07-16 RX ORDER — FERROUS SULFATE 325(65) MG
325 TABLET ORAL
Qty: 30 TABLET | Refills: 0 | Status: SHIPPED | OUTPATIENT
Start: 2019-07-16 | End: 2019-01-01 | Stop reason: SDUPTHER

## 2019-07-16 RX ORDER — FUROSEMIDE 40 MG/1
40 TABLET ORAL DAILY
Qty: 30 TABLET | Refills: 0 | Status: SHIPPED | OUTPATIENT
Start: 2019-07-16 | End: 2019-08-27 | Stop reason: SDUPTHER

## 2019-07-16 RX ORDER — LIDOCAINE 4 G/G
1 PATCH TOPICAL EVERY 12 HOURS SCHEDULED
Qty: 30 PATCH | Refills: 0 | Status: SHIPPED | OUTPATIENT
Start: 2019-07-16 | End: 2020-01-01 | Stop reason: HOSPADM

## 2019-07-16 RX ORDER — NORTRIPTYLINE HYDROCHLORIDE 10 MG/1
10 CAPSULE ORAL
Qty: 30 CAPSULE | Refills: 0 | Status: SHIPPED | OUTPATIENT
Start: 2019-07-16 | End: 2019-01-01 | Stop reason: SDUPTHER

## 2019-07-16 RX ORDER — LANOLIN ALCOHOL/MO/W.PET/CERES
100 CREAM (GRAM) TOPICAL DAILY
Qty: 30 TABLET | Refills: 0 | Status: SHIPPED | OUTPATIENT
Start: 2019-07-16 | End: 2020-01-01 | Stop reason: HOSPADM

## 2019-07-16 RX ORDER — PANTOPRAZOLE SODIUM 40 MG/1
40 TABLET, DELAYED RELEASE ORAL
Qty: 60 TABLET | Refills: 0 | Status: ON HOLD | OUTPATIENT
Start: 2019-07-16 | End: 2020-01-01 | Stop reason: SDUPTHER

## 2019-07-16 RX ORDER — DIGOXIN 125 MCG
0.12 TABLET ORAL DAILY
Qty: 30 TABLET | Refills: 0 | Status: SHIPPED | OUTPATIENT
Start: 2019-07-16 | End: 2019-08-26 | Stop reason: SDUPTHER

## 2019-07-16 RX ORDER — FOLIC ACID 1 MG/1
1 TABLET ORAL DAILY
Qty: 30 TABLET | Refills: 0 | Status: SHIPPED | OUTPATIENT
Start: 2019-07-16 | End: 2019-01-01 | Stop reason: SDUPTHER

## 2019-07-16 RX ORDER — ASCORBIC ACID 500 MG
500 TABLET ORAL DAILY
Qty: 30 TABLET | Refills: 0 | Status: SHIPPED | OUTPATIENT
Start: 2019-07-16 | End: 2019-01-01 | Stop reason: SDUPTHER

## 2019-07-16 RX ORDER — METOPROLOL SUCCINATE 50 MG/1
50 TABLET, EXTENDED RELEASE ORAL EVERY 12 HOURS SCHEDULED
Qty: 56 TABLET | Refills: 0 | Status: SHIPPED | OUTPATIENT
Start: 2019-07-16 | End: 2019-01-01 | Stop reason: SDUPTHER

## 2019-07-16 RX ORDER — METHOCARBAMOL 500 MG/1
500 TABLET, FILM COATED ORAL ONCE AS NEEDED
Qty: 14 TABLET | Refills: 0 | Status: SHIPPED | OUTPATIENT
Start: 2019-07-16 | End: 2020-01-01 | Stop reason: HOSPADM

## 2019-07-16 RX ORDER — NORTRIPTYLINE HYDROCHLORIDE 10 MG/1
10 CAPSULE ORAL
Qty: 30 CAPSULE | Refills: 0 | Status: SHIPPED | OUTPATIENT
Start: 2019-07-16 | End: 2019-07-16

## 2019-07-16 RX ORDER — CLOPIDOGREL BISULFATE 75 MG/1
75 TABLET ORAL DAILY
Qty: 30 TABLET | Refills: 0 | Status: SHIPPED | OUTPATIENT
Start: 2019-07-16 | End: 2019-08-27 | Stop reason: SDUPTHER

## 2019-07-16 RX ORDER — AMIODARONE HYDROCHLORIDE 200 MG/1
200 TABLET ORAL DAILY
Qty: 30 TABLET | Refills: 0 | Status: SHIPPED | OUTPATIENT
Start: 2019-07-16 | End: 2019-01-01 | Stop reason: SDUPTHER

## 2019-07-16 RX ORDER — ATORVASTATIN CALCIUM 20 MG/1
20 TABLET, FILM COATED ORAL
Qty: 30 TABLET | Refills: 0 | Status: SHIPPED | OUTPATIENT
Start: 2019-07-16 | End: 2019-08-26 | Stop reason: SDUPTHER

## 2019-07-16 RX ORDER — LANOLIN ALCOHOL/MO/W.PET/CERES
6 CREAM (GRAM) TOPICAL
Qty: 30 TABLET | Refills: 0 | Status: SHIPPED | OUTPATIENT
Start: 2019-07-16 | End: 2020-01-01 | Stop reason: HOSPADM

## 2019-07-16 RX ADMIN — APIXABAN 5 MG: 5 TABLET, FILM COATED ORAL at 09:14

## 2019-07-16 RX ADMIN — DIGOXIN 125 MCG: 125 TABLET ORAL at 09:14

## 2019-07-16 RX ADMIN — ACETAMINOPHEN 975 MG: 325 TABLET ORAL at 05:39

## 2019-07-16 RX ADMIN — CLOPIDOGREL BISULFATE 75 MG: 75 TABLET ORAL at 09:14

## 2019-07-16 RX ADMIN — METHOCARBAMOL 500 MG: 500 TABLET, FILM COATED ORAL at 05:39

## 2019-07-16 RX ADMIN — METHOCARBAMOL 500 MG: 500 TABLET, FILM COATED ORAL at 11:21

## 2019-07-16 RX ADMIN — FERROUS SULFATE TAB 325 MG (65 MG ELEMENTAL FE) 325 MG: 325 (65 FE) TAB at 09:14

## 2019-07-16 RX ADMIN — ACETAMINOPHEN 975 MG: 325 TABLET ORAL at 14:01

## 2019-07-16 RX ADMIN — PANTOPRAZOLE SODIUM 40 MG: 40 TABLET, DELAYED RELEASE ORAL at 05:38

## 2019-07-16 RX ADMIN — OXYCODONE HYDROCHLORIDE AND ACETAMINOPHEN 500 MG: 500 TABLET ORAL at 09:14

## 2019-07-16 RX ADMIN — THIAMINE HCL TAB 100 MG 100 MG: 100 TAB at 09:14

## 2019-07-16 RX ADMIN — AMIODARONE HYDROCHLORIDE 200 MG: 200 TABLET ORAL at 09:14

## 2019-07-16 RX ADMIN — OXYCODONE HYDROCHLORIDE 5 MG: 5 TABLET ORAL at 05:38

## 2019-07-16 RX ADMIN — GABAPENTIN 100 MG: 100 CAPSULE ORAL at 09:14

## 2019-07-16 RX ADMIN — FOLIC ACID 1 MG: 1 TABLET ORAL at 09:14

## 2019-07-16 NOTE — ASSESSMENT & PLAN NOTE
· Hemoglobin 8 7 on admission, down from 9 9 in January  · Suspect related to Blood loss likely multifactorial in the setting of bleeding from finger and GIB (black stools and FOBT+ in ED)  · S/p transfusoin 1 unit PRBCs   · Hb stable between 8-9 since  · Monitor H&H  · GI consulted, choledocholithiasis status post ERCP  sphincteroplasty and stone removal on 07/12  · Restarted anticoagulation as per GI clearance

## 2019-07-16 NOTE — SOCIAL WORK
CM reviewed cost of meds with pt approx $30 00--pt agreeable with same  [address] to deliver meds to pt this evening

## 2019-07-16 NOTE — SOCIAL WORK
Pt cleared by Dr Fran Guevara CM met with pt to discuss dcp  Pt still adamantly refusing STR  Plan will be for d/c to home today with [facility]  DALE discussed transportation to f/u appts with pt  Pt reports her elizabeth can provide transport to needed appts--if not she will arrange a taxi  CM offered [facility]  Pt agreeable with receiving application  Pt reports having support from her elizabeth who checks on her 2x/day  CM reviewed risks of dcp to home with pt  Pt reports elizabeth will be home to let pt in to apt--and if not she has access to a key for her apt  Referral made to outpt CM  DALE obtained approval from Stevensalazar Szymanski W -Director CM for w/c [facility] transport for d/c to home  Pt reports she does not currently have money with her to pay for cost of meds--but can afford meds through her home pharmacy who provides delivery  CM to fax new scripts to pt's home pharmacy and to coordinate w/c [facility] for d/c to home today  F/u PCP appt scheduled for 1pm 7/24  Transport arranged via Louis Villatoro w/lul dinero at 4:30 to pt's home
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
## 2019-07-16 NOTE — PLAN OF CARE
Problem: Potential for Falls  Goal: Patient will remain free of falls  Description  INTERVENTIONS:  - Assess patient frequently for physical needs  -  Identify cognitive and physical deficits and behaviors that affect risk of falls    -  [place] fall precautions as indicated by assessment   - Educate patient/family on patient safety including physical limitations  - Instruct patient to call for assistance with activity based on assessment  - Modify environment to reduce risk of injury  - Consider OT/PT consult to assist with strengthening/mobility  Outcome: Progressing     Problem: SKIN/TISSUE INTEGRITY - ADULT  Goal: Incision(s), wounds(s) or drain site(s) healing without S/S of infection  Description  INTERVENTIONS  - Assess and document risk factors for skin impairment   - Assess and document dressing, incision, wound bed, drain sites and surrounding tissue  - Initiate Nutrition services consult and/or wound management as needed  Outcome: Progressing     Problem: SAFETY ADULT  Goal: Maintain or return to baseline ADL function  Description  INTERVENTIONS:  -  Assess patient's ability to carry out ADLs; assess patient's baseline for ADL function and identify physical deficits which impact ability to perform ADLs (bathing, care of mouth/teeth, toileting, grooming, dressing, etc )  - Assess/evaluate cause of self-care deficits   - Assess range of motion  - Assess patient's mobility; develop plan if impaired  - Assess patient's need for assistive devices and provide as appropriate  - Encourage maximum independence but intervene and supervise when necessary  ¯ Involve family in performance of ADLs  ¯ Assess for home care needs following discharge   ¯ Request OT consult to assist with ADL evaluation and planning for discharge  ¯ Provide patient education as appropriate  Outcome: Progressing  Goal: Maintain or return mobility status to optimal level  Description  INTERVENTIONS:  - Assess patient's baseline mobility status (ambulation, transfers, stairs, etc )    - Identify cognitive and physical deficits and behaviors that affect mobility  - Identify mobility aids required to assist with transfers and/or ambulation (gait belt, sit-to-stand, lift, walker, cane, etc )  - [place] fall precautions as indicated by assessment  - Record patient progress and toleration of activity level on Mobility SBAR; progress patient to next Phase/Stage  - Instruct patient to call for assistance with activity based on assessment  - Request Rehabilitation consult to assist with strengthening/weightbearing, etc   Outcome: Progressing     Problem: Prexisting or High Potential for Compromised Skin Integrity  Goal: Skin integrity is maintained or improved  Description  INTERVENTIONS:  - Identify patients at risk for skin breakdown  - Assess and monitor skin integrity  - Assess and monitor nutrition and hydration status  - Monitor labs (i e  albumin)  - Assess for incontinence   - Turn and reposition patient  - Assist with mobility/ambulation  - Relieve pressure over bony prominences  - Avoid friction and shearing  - Provide appropriate hygiene as needed including keeping skin clean and dry  - Evaluate need for skin moisturizer/barrier cream  - Collaborate with interdisciplinary team (i e  Nutrition, Rehabilitation, etc )   - Patient/family teaching  Outcome: Progressing
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
## 2019-07-16 NOTE — ASSESSMENT & PLAN NOTE
· Patient ambulates with a walker  · Neuro psych cleared patient for capacity  · PT/OT consults for safe discharge planning >> recommends rehab which patient refused  Had an extensive discussion with patient for last 2-3 days regarding rehab which she adamantly refuses  She is willing to except home PT, home OT and nursing care  She reports that she has been in rehab twice in recent past and does not feel that it helps her much and she has not to take care of at home

## 2019-07-16 NOTE — RESTORATIVE TECHNICIAN NOTE
Restorative Specialist Mobility Note       Activity: Ambulate in room, Bathroom privileges, Dangle, Stand at bedside(Educated/encouraged pt to ambulate with assistance 3-4 x's/day  Bed alarm on   Pt callbell, phone/tray within reach )     Assistive Device: Front wheel walker    Alana BOJORQUEZ, Restorative Technician, [employer]

## 2019-07-17 ENCOUNTER — TRANSITIONAL CARE MANAGEMENT (OUTPATIENT)
Dept: FAMILY MEDICINE CLINIC | Facility: CLINIC | Age: 79
End: 2019-07-17

## 2019-07-17 ENCOUNTER — TELEPHONE (OUTPATIENT)
Dept: NEUROSURGERY | Facility: CLINIC | Age: 79
End: 2019-07-17

## 2019-07-17 NOTE — TELEPHONE ENCOUNTER
07/25/2019-MAILED "UNABLE TO REACH" LETTER TO PT     07/20/2019-CALLED PT (3rd ATTEMPT) AND LEFT MESSAGE ON MACHINE TO SCHEDULE MRI AND FOLLOW UP APT     07/19/2019-CALLED PT AGAIN AND LEFT MESSAGE ON MACHINE TO SCHEDULE MRI AND FOLLOW UP APT     07/18/2019-MRI SCRIPT IN CHART  CALLED PT AND LEFT MESSAGE ON MACHINE TO SCHEDULE MRI AND FOLLOW UP APT     07/17/2019-YAZMIN (07/15/2019) OUTPATIENT FOLLOW UP WITH MRI  PT DISCHARGED TO HOME  CONSULT BY SARA HITCHCOCK    NEED MRI SCRIPT

## 2019-07-18 ENCOUNTER — TELEPHONE (OUTPATIENT)
Dept: FAMILY MEDICINE CLINIC | Facility: CLINIC | Age: 79
End: 2019-07-18

## 2019-07-18 ENCOUNTER — PATIENT OUTREACH (OUTPATIENT)
Dept: CASE MANAGEMENT | Facility: OTHER | Age: 79
End: 2019-07-18

## 2019-07-18 DIAGNOSIS — M48.02 CERVICAL STENOSIS OF SPINAL CANAL: ICD-10-CM

## 2019-07-18 DIAGNOSIS — M54.2 NECK PAIN: Primary | ICD-10-CM

## 2019-07-18 NOTE — TELEPHONE ENCOUNTER
Lonny Dumont for  Homecare called and stated patient declined visit for start of care for nurse visits to home  Patient stated to [facility] she would call them when ready to schedule  Patient stated to [facility] she had a 4th death in her family and wanted Dr Venu Castaneda to be aware

## 2019-07-18 NOTE — TELEPHONE ENCOUNTER
Cassia Jones called and stated she is going End date the episode for skilled nursing as the patient declined twice   If needed PCP will need to provide a new referral

## 2019-08-20 ENCOUNTER — TELEPHONE (OUTPATIENT)
Dept: FAMILY MEDICINE CLINIC | Facility: CLINIC | Age: 79
End: 2019-08-20

## 2019-08-20 NOTE — TELEPHONE ENCOUNTER
Took call from [facility] requesting refills for Atorvastatin and Digoxin       LMOM advised patient return call to confirm she needs these medications

## 2019-08-26 DIAGNOSIS — D62 ACUTE BLOOD LOSS ANEMIA: ICD-10-CM

## 2019-08-26 DIAGNOSIS — I25.10 CORONARY ARTERY DISEASE: ICD-10-CM

## 2019-08-27 DIAGNOSIS — D62 ACUTE BLOOD LOSS ANEMIA: ICD-10-CM

## 2019-08-27 DIAGNOSIS — I50.22 CHRONIC SYSTOLIC HEART FAILURE (HCC): Chronic | ICD-10-CM

## 2019-08-27 DIAGNOSIS — I48.91 ATRIAL FIBRILLATION WITH RVR (HCC): ICD-10-CM

## 2019-08-27 DIAGNOSIS — I25.10 CORONARY ARTERY DISEASE: ICD-10-CM

## 2019-08-27 RX ORDER — DIGOXIN 125 MCG
0.12 TABLET ORAL DAILY
Qty: 30 TABLET | Refills: 0 | Status: SHIPPED | OUTPATIENT
Start: 2019-08-27 | End: 2019-08-27 | Stop reason: SDUPTHER

## 2019-08-27 RX ORDER — DIGOXIN 125 MCG
0.12 TABLET ORAL DAILY
Qty: 30 TABLET | Refills: 0 | Status: SHIPPED | OUTPATIENT
Start: 2019-08-27 | End: 2019-01-01 | Stop reason: SDUPTHER

## 2019-08-27 RX ORDER — ATORVASTATIN CALCIUM 20 MG/1
20 TABLET, FILM COATED ORAL
Qty: 30 TABLET | Refills: 0 | Status: SHIPPED | OUTPATIENT
Start: 2019-08-27 | End: 2019-01-01 | Stop reason: SDUPTHER

## 2019-08-27 RX ORDER — ATORVASTATIN CALCIUM 20 MG/1
20 TABLET, FILM COATED ORAL
Qty: 30 TABLET | Refills: 0 | Status: SHIPPED | OUTPATIENT
Start: 2019-08-27 | End: 2019-08-27 | Stop reason: SDUPTHER

## 2019-08-27 RX ORDER — FUROSEMIDE 40 MG/1
40 TABLET ORAL DAILY
Qty: 30 TABLET | Refills: 0 | Status: SHIPPED | OUTPATIENT
Start: 2019-08-27 | End: 2019-01-01 | Stop reason: SDUPTHER

## 2019-08-27 RX ORDER — CLOPIDOGREL BISULFATE 75 MG/1
75 TABLET ORAL DAILY
Qty: 30 TABLET | Refills: 0 | Status: SHIPPED | OUTPATIENT
Start: 2019-08-27 | End: 2019-01-01 | Stop reason: SDUPTHER

## 2019-08-27 NOTE — TELEPHONE ENCOUNTER
Pharmacy states patient will not call herself  She is very noncompliant  Tried calling patient several times, phone number busy

## 2019-09-18 NOTE — PROGRESS NOTES
Assessment/Plan:  Patient received high-dose flu vaccine today  Non fasting labs drawn for CBC and CMP  Patient to continue present treatment and instructed to contact the office with her updated medication list   Recommend patient schedule follow-up appointments with specialists  Will contact nurse /care coordinator to assist patient  Discussed assisted living or nursing home care  Return to the office in 3 months  Diagnoses and all orders for this visit:    Essential hypertension  -     CBC and Platelet  -     Comprehensive metabolic panel    Hyperlipidemia, unspecified hyperlipidemia type    Coronary artery disease involving native coronary artery of native heart without angina pectoris    Chronic atrial fibrillation (HCC)    Chronic systolic heart failure (HCC)    Primary osteoarthritis involving multiple joints    Anxiety    Depression, unspecified depression type    Healthcare maintenance  -     influenza vaccine, [id number], high-dose, PF 0 5 mL (FLUZONE HIGH-DOSE)    Acute blood loss anemia          Subjective:      Patient ID: Krystal Rose is a 67 y o  female  Patient is being seen for follow-up for chronic conditions and she is not fasting today  Patient has not been seen in this office for almost a year  She was recently in the hospital 2 months ago at [facility]  Patient was recommended to be discharged to rehab facility although she refused  Patient never made follow-up appointment for transition of care and did not follow up with specialists as recommended including Dr Deedee Lo at [address] [place] [address] Cardiology  Patient is unsure of the medications that she is taking and recommend she call the office with an updated list   Patient complains of ongoing problems with depression  Patient was brought to the office by her landlord  Hypertension   This is a chronic problem  Associated symptoms include anxiety and orthopnea   Pertinent negatives include no blurred vision, chest pain, headaches, palpitations, peripheral edema, PND or shortness of breath  Past treatments include beta blockers and diuretics  Compliance problems include exercise and diet  Hypertensive end-organ damage includes CAD/MI, CVA and heart failure  Hyperlipidemia   This is a chronic problem  She has no history of diabetes or hypothyroidism  Associated symptoms include leg pain  Pertinent negatives include no chest pain, focal sensory loss, focal weakness, myalgias or shortness of breath  Current antihyperlipidemic treatment includes statins  Compliance problems include adherence to exercise and adherence to diet  Anxiety   Presents for follow-up visit  Symptoms include confusion, decreased concentration, depressed mood, excessive worry, insomnia and nervous/anxious behavior  Patient reports no chest pain, hyperventilation, irritability, nausea, palpitations, panic, restlessness, shortness of breath or suicidal ideas  The severity of symptoms is interfering with daily activities, incapacitating and causing significant distress  The quality of sleep is poor  Nighttime awakenings: several            The following portions of the patient's history were reviewed and updated as appropriate: allergies, current medications, past family history, past medical history, past social history, past surgical history and problem list     Review of Systems   Constitutional: Negative for irritability  Eyes: Negative for blurred vision  Respiratory: Negative for shortness of breath  Cardiovascular: Positive for orthopnea  Negative for chest pain, palpitations and PND  Gastrointestinal: Negative for nausea  Musculoskeletal: Negative for myalgias  Neurological: Negative for focal weakness and headaches  Psychiatric/Behavioral: Positive for confusion and decreased concentration  Negative for suicidal ideas  The patient is nervous/anxious and has insomnia            Objective:      /70   Pulse 80   Temp 99 8 °F (37 7 °C) (Tympanic)   Resp 18   Ht 5' 5" (1 651 m)   Wt 80 7 kg (178 lb)   SpO2 96%   BMI 29 62 kg/m²          Physical Exam   Constitutional: She is oriented to person, place, and time  She appears well-developed and well-nourished  No distress  HENT:   Head: Normocephalic  Mouth/Throat: Oropharynx is clear and moist    Eyes: Conjunctivae are normal  No scleral icterus  Neck: Neck supple  No thyromegaly present  Cardiovascular: Normal rate  Irregular rhythm   Pulmonary/Chest: Effort normal and breath sounds normal  No respiratory distress  She has no wheezes  Abdominal: Soft  There is no tenderness  Musculoskeletal: She exhibits no edema  Lymphadenopathy:     She has no cervical adenopathy  Neurological: She is alert and oriented to person, place, and time  Skin: Skin is warm and dry  Psychiatric:   Patient appears somewhat depressed  BMI Counseling: Body mass index is 29 62 kg/m²  The BMI is above normal  Nutrition recommendations include reducing fast food intake, consuming healthier snacks, decreasing soda and/or juice intake, moderation in carbohydrate intake and reducing intake of saturated fat and trans fat

## 2019-09-19 NOTE — PROGRESS NOTES
Spoke with Jay Pride from [facility] to make referral   Patient was opened in June and assessment was completed  She was deemed nursing home eligible and was put on waiting list for options program   Jay Pride needed to clarify some information regarding Denisse's case and will call me back

## 2019-09-20 NOTE — TELEPHONE ENCOUNTER
Iona Hillman called today requesting an order for PT to come out to evaluate Denisse  She states that Vladimir De is off balance and unsteady on her feet       Thank you    Please contact Iona Hillman when order complete

## 2019-09-20 NOTE — PROGRESS NOTES
Spoke with Jose Smith MA at the office to provide update to Dr Vania Deshpande on patient  Requested an order for physical therapy to assist with strengthening, endurance and  balance issues the  patient is experiencing  Informed her the county is involved and will update me after visiting patient

## 2019-09-20 NOTE — PROGRESS NOTES
[address] [facility]  Spoke with STEPHEN Salcedo's case was reopened and she will be assigned a  to make a home visit to determine if she needs services at home or needs to be placed in nursing home  She was assessed back in June and determined to be nursing home eligible at that time  I requested to be contacted by the  following visit for update
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
## 2019-09-20 NOTE — PROGRESS NOTES
Contacted Denisse to inform the county will assign her a  who will contact her to make a visit to determine what assistance she needs  She will also receive a call from a physical therapist to schedule an initial visit  Informed her I will call her back next week to check in  She was agreeable

## 2019-09-25 NOTE — PROGRESS NOTES
Spoke with Carlos Raines at [facility] to inquire if anyone called patient to set up initial assessment  She informed me there was no record of anyone calling patient as of today

## 2019-09-25 NOTE — PROGRESS NOTES
Called Denisse back to inform her the therapist has not called her yet to set up initial visit but she should hear from them soon  She verbalized understanding

## 2019-09-25 NOTE — PROGRESS NOTES
Contacted Denisse to inquire if she heard from [facility]  She will be meeting with the  from the [facility] this afternoon  She informed me the physical therapist called her and said the [facility] will set up therapy for her  I told her I would look into that but that is usually not the case  Will contact her later today or tomorrow to find out about her visit from the [facility]  She was agreeable

## 2019-09-27 NOTE — TELEPHONE ENCOUNTER
Casey Love from [facility] called to let us know that PT will be sent out to see the patient in her home on Tuesday Oct 1

## 2019-10-01 NOTE — TELEPHONE ENCOUNTER
Cindy Hernandez from [place] [id number] called, stating that pt is agreeable to a PT Home Health evaluation tomorrow

## 2019-10-01 NOTE — PROGRESS NOTES
Spoke with Dr Kortney Lopez and Lalita Mosley MA to inform regarding AAA concerns with Denisse's medication management  Requested order for home health nurse to assess medication compliance

## 2019-10-01 NOTE — PROGRESS NOTES
Received a call back from Tony Arana  from [facility]  She did an assessment yesterday on Denisse and relayed Georgina Cano is refusing to go to a nursing home  She would like to stay at home and receive caregiver services  Anali Cruz can initiate the waiver program but Georgina Cano will need to get copies of her financial records and will need assistance filling out the paperwork  She is going to check with Denisse's elizabeth to see if she would be willing to assist Denisse  I informed Anali Cruz if she can not find someone to help to let me know and I can arrange to have our  or community health worker assist Georgina Cano with paperwork  Anali Cruz is concerned that Georgina Cano is depressed and she is not sure if Georgina Cano is taking her medications properly  I informed her I will discuss with Dr Bernard Recinos and we can send out a home care nurse to assess medication compliance  Anali Cruz will keep me updated

## 2019-10-03 NOTE — TELEPHONE ENCOUNTER
Chrystal Doty for [facility] called stating that patient has wounds on her left middle finger, right middle finger, and right index finger as a result of her anxity  Patient puts neosporin and band aids on the wounds  Chrystal Doty would also like a  to see patient

## 2019-10-16 NOTE — TELEPHONE ENCOUNTER
Juana Chun from [facility] care called just to report that they were supposed to be following pt 2 times a week however when they went to visit patient yesterday she was not available  They will only be seeing her once this week   (They are supposed to report when they dont follow their plan of care)

## 2019-10-22 NOTE — TELEPHONE ENCOUNTER
According to hospital discharge med list patient should not be taking mirtazapine, potassium or duloxetine

## 2019-10-22 NOTE — TELEPHONE ENCOUNTER
Took call from patient homecare nurse Anne would like to confirm these medications have been d/c because patient states they were not but [place] states you did d/c  Mirtazapine 7 5mg daily  Potassium 20 meq daily   Duloxetine 30 mg daily     Per Epic Mirtazapine and Potassium were d/c on 7/16/2019, no record that I can find on Duloxetine  FYI - Patient also is complaining of severe back pain right middle back a 10/10 on pain scale, took three extra strength tylenol pain went down to 9/10  Patient has Pt coming in this afternoon would like to go to the ED after therapy  Advised she schedule an appt to be evaluated rather then go to ED patient will call us back to schedule  Please confirm if medications have been d/c       Thanks

## 2019-10-22 NOTE — PROGRESS NOTES
Lisa Garrido is receiving home care services of SN and PT  She was evaluated by [facility] but states she was tpld there are no resources for caregiver services  We discussed if she can't obtain caregiver assistance at home would she be willing to go into nursing home and she said she feels she would have no other choice  Will contact her [facility]  Cheikh Veloz to obtain update  Lisa Garrido does not weigh due to not having scale  She denies chest pain, LE edema but does become sob with exertion  She is not following a low sodium diet, eats mainly microwave dinners and orders take out from [employer]  We discussed reading the food labels to look for lower sodium options  She does not do her own grocery shopping, her landlady does grocery shopping for her  She has follow up scheduled with PCP but she has not seen cardiology since hospitalization  Her landlady take her to appointments, encouraged her to discuss with her and schedule appointment as soon as possible  Her medications are prefilled in weekly mediplanner by pharmacy and she is taking all as prescribed  She states she ambulates with walker but feels very weak in LE  She is currently having back pain in mid back area  She states it's hard to be on her feet for too long  Taking tylenol with limited relief  She does have scheduled visit with physical therapist today  Instructed her to inform therapist of pain  She is agreeable to further outreach

## 2019-10-23 NOTE — PROGRESS NOTES
Received vm message from Claudean Pace at [facility] and returned call  We discussed patients living conditions and if she would qualify for caregiver assistance  Lisbet Hughes recommended the waiver process but is concerned patient will be unable to obtain bank statement for the last 2 years and will have difficulty filing out paperwork required  She did send application to [facility]  I informed her I talked with patient this week and she did indicate to me she would be interested in going to [facility] if she is not able to obtain caregiver assistance at home  We discussed patient does go back and forth on agreeing to nursing home placement  She has limited support system, elizabeth assists with getting groceries and taking her to appointments when able  Lisbet Hughes stated Yolie Mak does not want to become patient's full time caregiver  Lisbet Hughes will reach out to patient to see if she is agreeable to nursing home placement  She feels she could get into [facility] in a few weeks  I asked her to keep me updated and to let me know if I can be of further assistance

## 2019-10-29 NOTE — TELEPHONE ENCOUNTER
Meaghan Avalos from [facility] care called, stating that pt is doing very well and she is discharging her from skilled nursing care  She states that pt refuses to get a scale  She also states that pts back pain is gone  Pt has stopped drinking soda, and attributes the lack of her back pain to this     Her vitals today:  /64  Pulse 74  Wt 176    Previous wt 10/2/19:  179

## 2019-11-11 NOTE — TELEPHONE ENCOUNTER
Took call from [facility] requesting a refill for Lisinopril and Eliquis for her packet  Call placed to patient to confirm she needed the refill  Patient does need refill       Please advise

## 2019-11-15 NOTE — TELEPHONE ENCOUNTER
Moreno schwab [facility] received a call from [place] to have her pill packs refilled however he needs a script for her Amiodarone to complete her med packs    Thank you

## 2019-11-15 NOTE — TELEPHONE ENCOUNTER
Zeus Carpio wants to know why her nortriptyline was decreased to 1 capsule at bedtime  She said taking two capsules at bedtime wasn't working so well to begin with  She would also like to go back on Prozac  as she is very depressed  Please advise    Thank you

## 2019-11-15 NOTE — TELEPHONE ENCOUNTER
Prescription sent to [facility] Vaprema Mchenry for Prozac 20 mg daily  Please notify patient

## 2019-12-03 NOTE — LETTER
Date: 60/70/79    Dear Ashley Pal,   My name is Emile Linaresyakov; I am a registered nurse care manager working with [facility]  I have not been able to reach you and would like to set a time that I can talk with you over the phone  My work is to help patients that have complex medical conditions get the care they need  This includes patients who may have been in the hospital or emergency room  I have enclosed my phone number for you  Please call me with any questions you may have  I look forward to hearing from you    Sincerely,  Savana Rossi RN  [phone]

## 2020-01-01 ENCOUNTER — PATIENT OUTREACH (OUTPATIENT)
Dept: FAMILY MEDICINE CLINIC | Facility: CLINIC | Age: 80
End: 2020-01-01

## 2020-01-01 ENCOUNTER — TRANSITIONAL CARE MANAGEMENT (OUTPATIENT)
Dept: FAMILY MEDICINE CLINIC | Facility: CLINIC | Age: 80
End: 2020-01-01

## 2020-01-01 ENCOUNTER — OFFICE VISIT (OUTPATIENT)
Dept: FAMILY MEDICINE CLINIC | Facility: CLINIC | Age: 80
End: 2020-01-01
Payer: MEDICARE

## 2020-01-01 ENCOUNTER — TELEPHONE (OUTPATIENT)
Dept: FAMILY MEDICINE CLINIC | Facility: CLINIC | Age: 80
End: 2020-01-01

## 2020-01-01 ENCOUNTER — EPISODE CHANGES (OUTPATIENT)
Dept: CASE MANAGEMENT | Facility: OTHER | Age: 80
End: 2020-01-01

## 2020-01-01 ENCOUNTER — EPISODE CHANGES (OUTPATIENT)
Dept: CASE MANAGEMENT | Facility: HOSPITAL | Age: 80
End: 2020-01-01

## 2020-01-01 ENCOUNTER — APPOINTMENT (INPATIENT)
Dept: RADIOLOGY | Facility: HOSPITAL | Age: 80
DRG: 291 | End: 2020-01-01
Payer: MEDICARE

## 2020-01-01 ENCOUNTER — APPOINTMENT (EMERGENCY)
Dept: RADIOLOGY | Facility: HOSPITAL | Age: 80
DRG: 682 | End: 2020-01-01
Payer: MEDICARE

## 2020-01-01 ENCOUNTER — HOSPITAL ENCOUNTER (INPATIENT)
Facility: HOSPITAL | Age: 80
LOS: 5 days | Discharge: HOME HEALTH | DRG: 291 | End: 2020-08-10
Attending: EMERGENCY MEDICINE | Admitting: INTERNAL MEDICINE
Payer: MEDICARE

## 2020-01-01 ENCOUNTER — APPOINTMENT (EMERGENCY)
Dept: RADIOLOGY | Facility: HOSPITAL | Age: 80
DRG: 291 | End: 2020-01-01
Payer: MEDICARE

## 2020-01-01 ENCOUNTER — APPOINTMENT (INPATIENT)
Dept: NON INVASIVE DIAGNOSTICS | Facility: HOSPITAL | Age: 80
DRG: 291 | End: 2020-01-01
Payer: MEDICARE

## 2020-01-01 ENCOUNTER — APPOINTMENT (INPATIENT)
Dept: RADIOLOGY | Facility: HOSPITAL | Age: 80
DRG: 682 | End: 2020-01-01
Attending: GENERAL PRACTICE
Payer: MEDICARE

## 2020-01-01 ENCOUNTER — APPOINTMENT (INPATIENT)
Dept: RADIOLOGY | Facility: HOSPITAL | Age: 80
DRG: 682 | End: 2020-01-01
Payer: MEDICARE

## 2020-01-01 ENCOUNTER — TELEPHONE (OUTPATIENT)
Dept: NEPHROLOGY | Facility: CLINIC | Age: 80
End: 2020-01-01

## 2020-01-01 ENCOUNTER — APPOINTMENT (INPATIENT)
Dept: RADIOLOGY | Facility: HOSPITAL | Age: 80
DRG: 682 | End: 2020-01-01
Attending: INTERNAL MEDICINE
Payer: MEDICARE

## 2020-01-01 ENCOUNTER — TELEPHONE (OUTPATIENT)
Dept: NEUROSURGERY | Facility: CLINIC | Age: 80
End: 2020-01-01

## 2020-01-01 ENCOUNTER — HOSPITAL ENCOUNTER (INPATIENT)
Facility: HOSPITAL | Age: 80
LOS: 22 days | DRG: 682 | End: 2020-09-17
Attending: EMERGENCY MEDICINE | Admitting: INTERNAL MEDICINE
Payer: MEDICARE

## 2020-01-01 VITALS
RESPIRATION RATE: 20 BRPM | SYSTOLIC BLOOD PRESSURE: 128 MMHG | TEMPERATURE: 97.5 F | HEIGHT: 65 IN | WEIGHT: 166.23 LBS | BODY MASS INDEX: 27.7 KG/M2 | DIASTOLIC BLOOD PRESSURE: 73 MMHG | OXYGEN SATURATION: 92 % | HEART RATE: 99 BPM

## 2020-01-01 VITALS
TEMPERATURE: 99.3 F | RESPIRATION RATE: 16 BRPM | HEIGHT: 65 IN | BODY MASS INDEX: 27.16 KG/M2 | WEIGHT: 163 LBS | DIASTOLIC BLOOD PRESSURE: 68 MMHG | SYSTOLIC BLOOD PRESSURE: 144 MMHG | OXYGEN SATURATION: 95 % | HEART RATE: 84 BPM

## 2020-01-01 VITALS
HEIGHT: 65 IN | BODY MASS INDEX: 31.66 KG/M2 | SYSTOLIC BLOOD PRESSURE: 176 MMHG | WEIGHT: 190.04 LBS | DIASTOLIC BLOOD PRESSURE: 90 MMHG | TEMPERATURE: 98.5 F | RESPIRATION RATE: 21 BRPM | OXYGEN SATURATION: 91 % | HEART RATE: 140 BPM

## 2020-01-01 DIAGNOSIS — L60.2 NAIL DISORDER (ONYCHOGRYPHOSIS): ICD-10-CM

## 2020-01-01 DIAGNOSIS — D62 ACUTE BLOOD LOSS ANEMIA: ICD-10-CM

## 2020-01-01 DIAGNOSIS — R45.851 SUICIDAL IDEATION: ICD-10-CM

## 2020-01-01 DIAGNOSIS — I10 ESSENTIAL HYPERTENSION: ICD-10-CM

## 2020-01-01 DIAGNOSIS — R53.1 WEAKNESS: ICD-10-CM

## 2020-01-01 DIAGNOSIS — I48.91 ATRIAL FIBRILLATION WITH RVR (HCC): Chronic | ICD-10-CM

## 2020-01-01 DIAGNOSIS — G89.4 CHRONIC PAIN DISORDER: ICD-10-CM

## 2020-01-01 DIAGNOSIS — I10 ESSENTIAL HYPERTENSION: Primary | ICD-10-CM

## 2020-01-01 DIAGNOSIS — I48.91 ATRIAL FIBRILLATION WITH RVR (HCC): ICD-10-CM

## 2020-01-01 DIAGNOSIS — M15.9 PRIMARY OSTEOARTHRITIS INVOLVING MULTIPLE JOINTS: ICD-10-CM

## 2020-01-01 DIAGNOSIS — Z91.14 NONCOMPLIANCE WITH MEDICATION REGIMEN: ICD-10-CM

## 2020-01-01 DIAGNOSIS — R65.21 SEPTIC SHOCK (HCC): ICD-10-CM

## 2020-01-01 DIAGNOSIS — M48.02 CERVICAL SPINAL STENOSIS: ICD-10-CM

## 2020-01-01 DIAGNOSIS — I50.9 CHF (CONGESTIVE HEART FAILURE) (HCC): ICD-10-CM

## 2020-01-01 DIAGNOSIS — M54.2 PAIN OF CERVICAL SPINE: ICD-10-CM

## 2020-01-01 DIAGNOSIS — R07.9 CHEST PAIN: ICD-10-CM

## 2020-01-01 DIAGNOSIS — R26.2 AMBULATORY DYSFUNCTION: ICD-10-CM

## 2020-01-01 DIAGNOSIS — E11.9 DM2 (DIABETES MELLITUS, TYPE 2) (HCC): ICD-10-CM

## 2020-01-01 DIAGNOSIS — R77.8 ELEVATED TROPONIN: ICD-10-CM

## 2020-01-01 DIAGNOSIS — I25.10 CORONARY ARTERY DISEASE: ICD-10-CM

## 2020-01-01 DIAGNOSIS — F32.A DEPRESSION, UNSPECIFIED DEPRESSION TYPE: ICD-10-CM

## 2020-01-01 DIAGNOSIS — F34.1 DYSTHYMIC DISORDER: Chronic | ICD-10-CM

## 2020-01-01 DIAGNOSIS — Z87.19 H/O: GI BLEED: ICD-10-CM

## 2020-01-01 DIAGNOSIS — G31.84 MILD COGNITIVE IMPAIRMENT: ICD-10-CM

## 2020-01-01 DIAGNOSIS — M50.90 DISC DISORDER OF CERVICAL REGION: ICD-10-CM

## 2020-01-01 DIAGNOSIS — F41.9 ANXIETY: ICD-10-CM

## 2020-01-01 DIAGNOSIS — I50.22 CHRONIC SYSTOLIC HEART FAILURE (HCC): Chronic | ICD-10-CM

## 2020-01-01 DIAGNOSIS — I25.10 CORONARY ARTERY DISEASE INVOLVING NATIVE CORONARY ARTERY OF NATIVE HEART WITHOUT ANGINA PECTORIS: ICD-10-CM

## 2020-01-01 DIAGNOSIS — Z71.89 COMPLEX CARE COORDINATION: Primary | ICD-10-CM

## 2020-01-01 DIAGNOSIS — A41.9 SEPTIC SHOCK (HCC): ICD-10-CM

## 2020-01-01 DIAGNOSIS — D50.0 IRON DEFICIENCY ANEMIA DUE TO CHRONIC BLOOD LOSS: ICD-10-CM

## 2020-01-01 DIAGNOSIS — I48.91 ATRIAL FIBRILLATION WITH RAPID VENTRICULAR RESPONSE (HCC): Primary | ICD-10-CM

## 2020-01-01 DIAGNOSIS — N17.9 AKI (ACUTE KIDNEY INJURY) (HCC): Primary | ICD-10-CM

## 2020-01-01 DIAGNOSIS — M19.90 DJD (DEGENERATIVE JOINT DISEASE): ICD-10-CM

## 2020-01-01 DIAGNOSIS — E78.5 HYPERLIPIDEMIA, UNSPECIFIED HYPERLIPIDEMIA TYPE: ICD-10-CM

## 2020-01-01 LAB
ALBUMIN SERPL BCP-MCNC: 2.7 G/DL (ref 3.5–5)
ALBUMIN SERPL BCP-MCNC: 2.8 G/DL (ref 3.5–5)
ALBUMIN SERPL BCP-MCNC: 3.1 G/DL (ref 3.5–5)
ALBUMIN SERPL BCP-MCNC: 3.1 G/DL (ref 3.5–5)
ALBUMIN SERPL BCP-MCNC: 3.2 G/DL (ref 3.5–5)
ALBUMIN SERPL BCP-MCNC: 3.3 G/DL (ref 3.5–5)
ALBUMIN SERPL BCP-MCNC: 3.3 G/DL (ref 3.5–5)
ALP SERPL-CCNC: 62 U/L (ref 46–116)
ALP SERPL-CCNC: 64 U/L (ref 46–116)
ALP SERPL-CCNC: 72 U/L (ref 46–116)
ALP SERPL-CCNC: 79 U/L (ref 46–116)
ALP SERPL-CCNC: 86 U/L (ref 46–116)
ALP SERPL-CCNC: 88 U/L (ref 46–116)
ALP SERPL-CCNC: 96 U/L (ref 46–116)
ALT SERPL W P-5'-P-CCNC: 21 U/L (ref 12–78)
ALT SERPL W P-5'-P-CCNC: 22 U/L (ref 12–78)
ALT SERPL W P-5'-P-CCNC: 23 U/L (ref 12–78)
ALT SERPL W P-5'-P-CCNC: 24 U/L (ref 12–78)
ALT SERPL W P-5'-P-CCNC: 30 U/L (ref 12–78)
ALT SERPL W P-5'-P-CCNC: 30 U/L (ref 12–78)
ALT SERPL W P-5'-P-CCNC: 32 U/L (ref 12–78)
AMPHETAMINES SERPL QL SCN: NEGATIVE
ANION GAP SERPL CALCULATED.3IONS-SCNC: 10 MMOL/L (ref 4–13)
ANION GAP SERPL CALCULATED.3IONS-SCNC: 10 MMOL/L (ref 4–13)
ANION GAP SERPL CALCULATED.3IONS-SCNC: 13 MMOL/L (ref 4–13)
ANION GAP SERPL CALCULATED.3IONS-SCNC: 14 MMOL/L (ref 4–13)
ANION GAP SERPL CALCULATED.3IONS-SCNC: 4 MMOL/L (ref 4–13)
ANION GAP SERPL CALCULATED.3IONS-SCNC: 5 MMOL/L (ref 4–13)
ANION GAP SERPL CALCULATED.3IONS-SCNC: 5 MMOL/L (ref 4–13)
ANION GAP SERPL CALCULATED.3IONS-SCNC: 6 MMOL/L (ref 4–13)
ANION GAP SERPL CALCULATED.3IONS-SCNC: 7 MMOL/L (ref 4–13)
ANION GAP SERPL CALCULATED.3IONS-SCNC: 9 MMOL/L (ref 4–13)
ANION GAP SERPL CALCULATED.3IONS-SCNC: 9 MMOL/L (ref 4–13)
ANISOCYTOSIS BLD QL SMEAR: PRESENT
APAP SERPL-MCNC: <2 UG/ML (ref 10–20)
AST SERPL W P-5'-P-CCNC: 18 U/L (ref 5–45)
AST SERPL W P-5'-P-CCNC: 20 U/L (ref 5–45)
AST SERPL W P-5'-P-CCNC: 21 U/L (ref 5–45)
AST SERPL W P-5'-P-CCNC: 22 U/L (ref 5–45)
AST SERPL W P-5'-P-CCNC: 25 U/L (ref 5–45)
AST SERPL W P-5'-P-CCNC: 29 U/L (ref 5–45)
AST SERPL W P-5'-P-CCNC: 38 U/L (ref 5–45)
ATRIAL RATE: 127 BPM
ATRIAL RATE: 131 BPM
ATRIAL RATE: 138 BPM
ATRIAL RATE: 150 BPM
ATRIAL RATE: 357 BPM
BACTERIA BLD CULT: ABNORMAL
BACTERIA UR QL AUTO: NORMAL /HPF
BARBITURATES UR QL: NEGATIVE
BASE EXCESS BLDA CALC-SCNC: -3 MMOL/L (ref -2–3)
BASOPHILS # BLD AUTO: 0.03 THOUSANDS/ΜL (ref 0–0.1)
BASOPHILS # BLD AUTO: 0.04 THOUSANDS/ΜL (ref 0–0.1)
BASOPHILS # BLD AUTO: 0.05 THOUSANDS/ΜL (ref 0–0.1)
BASOPHILS # BLD AUTO: 0.05 THOUSANDS/ΜL (ref 0–0.1)
BASOPHILS # BLD AUTO: 0.06 THOUSANDS/ΜL (ref 0–0.1)
BASOPHILS # BLD AUTO: 0.07 THOUSANDS/ΜL (ref 0–0.1)
BASOPHILS # BLD AUTO: 0.07 THOUSANDS/ΜL (ref 0–0.1)
BASOPHILS # BLD AUTO: 0.08 THOUSANDS/ΜL (ref 0–0.1)
BASOPHILS # BLD AUTO: 0.08 THOUSANDS/ΜL (ref 0–0.1)
BASOPHILS # BLD AUTO: 0.1 THOUSANDS/ΜL (ref 0–0.1)
BASOPHILS # BLD MANUAL: 0 THOUSAND/UL (ref 0–0.1)
BASOPHILS NFR BLD AUTO: 0 % (ref 0–1)
BASOPHILS NFR BLD AUTO: 0 % (ref 0–1)
BASOPHILS NFR BLD AUTO: 1 % (ref 0–1)
BASOPHILS NFR MAR MANUAL: 0 % (ref 0–1)
BENZODIAZ UR QL: NEGATIVE
BILIRUB DIRECT SERPL-MCNC: 0.43 MG/DL (ref 0–0.2)
BILIRUB SERPL-MCNC: 0.32 MG/DL (ref 0.2–1)
BILIRUB SERPL-MCNC: 0.4 MG/DL (ref 0.2–1)
BILIRUB SERPL-MCNC: 0.47 MG/DL (ref 0.2–1)
BILIRUB SERPL-MCNC: 0.53 MG/DL (ref 0.2–1)
BILIRUB SERPL-MCNC: 0.65 MG/DL (ref 0.2–1)
BILIRUB SERPL-MCNC: 0.68 MG/DL (ref 0.2–1)
BILIRUB SERPL-MCNC: 0.85 MG/DL (ref 0.2–1)
BILIRUB UR QL STRIP: NEGATIVE
BUN SERPL-MCNC: 13 MG/DL (ref 5–25)
BUN SERPL-MCNC: 14 MG/DL (ref 5–25)
BUN SERPL-MCNC: 15 MG/DL (ref 5–25)
BUN SERPL-MCNC: 16 MG/DL (ref 5–25)
BUN SERPL-MCNC: 17 MG/DL (ref 5–25)
BUN SERPL-MCNC: 17 MG/DL (ref 5–25)
BUN SERPL-MCNC: 18 MG/DL (ref 5–25)
BUN SERPL-MCNC: 18 MG/DL (ref 5–25)
BUN SERPL-MCNC: 19 MG/DL (ref 5–25)
BUN SERPL-MCNC: 22 MG/DL (ref 5–25)
BUN SERPL-MCNC: 23 MG/DL (ref 5–25)
BUN SERPL-MCNC: 24 MG/DL (ref 5–25)
BUN SERPL-MCNC: 25 MG/DL (ref 5–25)
BUN SERPL-MCNC: 31 MG/DL (ref 5–25)
BUN SERPL-MCNC: 32 MG/DL (ref 5–25)
CA-I BLD-SCNC: 1.18 MMOL/L (ref 1.12–1.32)
CALCIUM SERPL-MCNC: 10.1 MG/DL (ref 8.3–10.1)
CALCIUM SERPL-MCNC: 7.9 MG/DL (ref 8.3–10.1)
CALCIUM SERPL-MCNC: 8 MG/DL (ref 8.3–10.1)
CALCIUM SERPL-MCNC: 8.1 MG/DL (ref 8.3–10.1)
CALCIUM SERPL-MCNC: 8.2 MG/DL (ref 8.3–10.1)
CALCIUM SERPL-MCNC: 8.3 MG/DL (ref 8.3–10.1)
CALCIUM SERPL-MCNC: 8.5 MG/DL (ref 8.3–10.1)
CALCIUM SERPL-MCNC: 8.7 MG/DL (ref 8.3–10.1)
CALCIUM SERPL-MCNC: 8.8 MG/DL (ref 8.3–10.1)
CALCIUM SERPL-MCNC: 8.9 MG/DL (ref 8.3–10.1)
CALCIUM SERPL-MCNC: 9 MG/DL (ref 8.3–10.1)
CALCIUM SERPL-MCNC: 9.5 MG/DL (ref 8.3–10.1)
CALCIUM SERPL-MCNC: 9.7 MG/DL (ref 8.3–10.1)
CHLORIDE SERPL-SCNC: 100 MMOL/L (ref 100–108)
CHLORIDE SERPL-SCNC: 101 MMOL/L (ref 100–108)
CHLORIDE SERPL-SCNC: 102 MMOL/L (ref 100–108)
CHLORIDE SERPL-SCNC: 103 MMOL/L (ref 100–108)
CHLORIDE SERPL-SCNC: 104 MMOL/L (ref 100–108)
CHLORIDE SERPL-SCNC: 105 MMOL/L (ref 100–108)
CHLORIDE SERPL-SCNC: 105 MMOL/L (ref 100–108)
CHLORIDE SERPL-SCNC: 106 MMOL/L (ref 100–108)
CHLORIDE SERPL-SCNC: 107 MMOL/L (ref 100–108)
CHLORIDE SERPL-SCNC: 109 MMOL/L (ref 100–108)
CHOLEST SERPL-MCNC: 202 MG/DL (ref 50–200)
CK SERPL-CCNC: 39 U/L (ref 26–192)
CLARITY UR: CLEAR
CO2 SERPL-SCNC: 21 MMOL/L (ref 21–32)
CO2 SERPL-SCNC: 22 MMOL/L (ref 21–32)
CO2 SERPL-SCNC: 24 MMOL/L (ref 21–32)
CO2 SERPL-SCNC: 25 MMOL/L (ref 21–32)
CO2 SERPL-SCNC: 25 MMOL/L (ref 21–32)
CO2 SERPL-SCNC: 26 MMOL/L (ref 21–32)
CO2 SERPL-SCNC: 27 MMOL/L (ref 21–32)
CO2 SERPL-SCNC: 28 MMOL/L (ref 21–32)
CO2 SERPL-SCNC: 29 MMOL/L (ref 21–32)
CO2 SERPL-SCNC: 30 MMOL/L (ref 21–32)
COCAINE UR QL: NEGATIVE
COLOR UR: YELLOW
CREAT SERPL-MCNC: 0.96 MG/DL (ref 0.6–1.3)
CREAT SERPL-MCNC: 0.98 MG/DL (ref 0.6–1.3)
CREAT SERPL-MCNC: 1.04 MG/DL (ref 0.6–1.3)
CREAT SERPL-MCNC: 1.07 MG/DL (ref 0.6–1.3)
CREAT SERPL-MCNC: 1.07 MG/DL (ref 0.6–1.3)
CREAT SERPL-MCNC: 1.08 MG/DL (ref 0.6–1.3)
CREAT SERPL-MCNC: 1.08 MG/DL (ref 0.6–1.3)
CREAT SERPL-MCNC: 1.1 MG/DL (ref 0.6–1.3)
CREAT SERPL-MCNC: 1.12 MG/DL (ref 0.6–1.3)
CREAT SERPL-MCNC: 1.16 MG/DL (ref 0.6–1.3)
CREAT SERPL-MCNC: 1.19 MG/DL (ref 0.6–1.3)
CREAT SERPL-MCNC: 1.22 MG/DL (ref 0.6–1.3)
CREAT SERPL-MCNC: 1.23 MG/DL (ref 0.6–1.3)
CREAT SERPL-MCNC: 1.34 MG/DL (ref 0.6–1.3)
CREAT SERPL-MCNC: 1.35 MG/DL (ref 0.6–1.3)
CREAT SERPL-MCNC: 1.69 MG/DL (ref 0.6–1.3)
CREAT SERPL-MCNC: 1.69 MG/DL (ref 0.6–1.3)
CREAT UR-MCNC: 45.7 MG/DL
D DIMER PPP FEU-MCNC: 0.27 UG/ML FEU
DIGOXIN SERPL-MCNC: 0.4 NG/ML (ref 0.8–2)
EOSINOPHIL # BLD AUTO: 0.28 THOUSAND/ΜL (ref 0–0.61)
EOSINOPHIL # BLD AUTO: 0.29 THOUSAND/ΜL (ref 0–0.61)
EOSINOPHIL # BLD AUTO: 0.29 THOUSAND/ΜL (ref 0–0.61)
EOSINOPHIL # BLD AUTO: 0.3 THOUSAND/ΜL (ref 0–0.61)
EOSINOPHIL # BLD AUTO: 0.37 THOUSAND/ΜL (ref 0–0.61)
EOSINOPHIL # BLD AUTO: 0.37 THOUSAND/ΜL (ref 0–0.61)
EOSINOPHIL # BLD AUTO: 0.38 THOUSAND/ΜL (ref 0–0.61)
EOSINOPHIL # BLD AUTO: 0.39 THOUSAND/ΜL (ref 0–0.61)
EOSINOPHIL # BLD AUTO: 0.39 THOUSAND/ΜL (ref 0–0.61)
EOSINOPHIL # BLD AUTO: 0.46 THOUSAND/ΜL (ref 0–0.61)
EOSINOPHIL # BLD AUTO: 0.46 THOUSAND/ΜL (ref 0–0.61)
EOSINOPHIL # BLD AUTO: 0.5 THOUSAND/ΜL (ref 0–0.61)
EOSINOPHIL # BLD MANUAL: 0 THOUSAND/UL (ref 0–0.4)
EOSINOPHIL NFR BLD AUTO: 2 % (ref 0–6)
EOSINOPHIL NFR BLD AUTO: 2 % (ref 0–6)
EOSINOPHIL NFR BLD AUTO: 3 % (ref 0–6)
EOSINOPHIL NFR BLD AUTO: 4 % (ref 0–6)
EOSINOPHIL NFR BLD AUTO: 5 % (ref 0–6)
EOSINOPHIL NFR BLD AUTO: 6 % (ref 0–6)
EOSINOPHIL NFR BLD AUTO: 6 % (ref 0–6)
EOSINOPHIL NFR BLD MANUAL: 0 % (ref 0–6)
ERYTHROCYTE [DISTWIDTH] IN BLOOD BY AUTOMATED COUNT: 15.1 % (ref 11.6–15.1)
ERYTHROCYTE [DISTWIDTH] IN BLOOD BY AUTOMATED COUNT: 15.2 % (ref 11.6–15.1)
ERYTHROCYTE [DISTWIDTH] IN BLOOD BY AUTOMATED COUNT: 15.3 % (ref 11.6–15.1)
ERYTHROCYTE [DISTWIDTH] IN BLOOD BY AUTOMATED COUNT: 15.4 % (ref 11.6–15.1)
ERYTHROCYTE [DISTWIDTH] IN BLOOD BY AUTOMATED COUNT: 15.6 % (ref 11.6–15.1)
ERYTHROCYTE [DISTWIDTH] IN BLOOD BY AUTOMATED COUNT: 15.8 % (ref 11.6–15.1)
ERYTHROCYTE [DISTWIDTH] IN BLOOD BY AUTOMATED COUNT: 15.8 % (ref 11.6–15.1)
ERYTHROCYTE [DISTWIDTH] IN BLOOD BY AUTOMATED COUNT: 15.9 % (ref 11.6–15.1)
ERYTHROCYTE [DISTWIDTH] IN BLOOD BY AUTOMATED COUNT: 16 % (ref 11.6–15.1)
ERYTHROCYTE [DISTWIDTH] IN BLOOD BY AUTOMATED COUNT: 16.1 % (ref 11.6–15.1)
ERYTHROCYTE [DISTWIDTH] IN BLOOD BY AUTOMATED COUNT: 17 % (ref 11.6–15.1)
ERYTHROCYTE [DISTWIDTH] IN BLOOD BY AUTOMATED COUNT: 18.3 % (ref 11.6–15.1)
ERYTHROCYTE [DISTWIDTH] IN BLOOD BY AUTOMATED COUNT: 19.3 % (ref 11.6–15.1)
ERYTHROCYTE [DISTWIDTH] IN BLOOD BY AUTOMATED COUNT: 19.4 % (ref 11.6–15.1)
EST. AVERAGE GLUCOSE BLD GHB EST-MCNC: 171 MG/DL
ETHANOL SERPL-MCNC: <3 MG/DL (ref 0–3)
FERRITIN SERPL-MCNC: 33 NG/ML (ref 8–388)
FERRITIN SERPL-MCNC: 45 NG/ML (ref 8–388)
FOLATE SERPL-MCNC: >20 NG/ML (ref 3.1–17.5)
GFR SERPL CREATININE-BSD FRML MDRD: 28 ML/MIN/1.73SQ M
GFR SERPL CREATININE-BSD FRML MDRD: 28 ML/MIN/1.73SQ M
GFR SERPL CREATININE-BSD FRML MDRD: 37 ML/MIN/1.73SQ M
GFR SERPL CREATININE-BSD FRML MDRD: 38 ML/MIN/1.73SQ M
GFR SERPL CREATININE-BSD FRML MDRD: 42 ML/MIN/1.73SQ M
GFR SERPL CREATININE-BSD FRML MDRD: 42 ML/MIN/1.73SQ M
GFR SERPL CREATININE-BSD FRML MDRD: 44 ML/MIN/1.73SQ M
GFR SERPL CREATININE-BSD FRML MDRD: 45 ML/MIN/1.73SQ M
GFR SERPL CREATININE-BSD FRML MDRD: 47 ML/MIN/1.73SQ M
GFR SERPL CREATININE-BSD FRML MDRD: 48 ML/MIN/1.73SQ M
GFR SERPL CREATININE-BSD FRML MDRD: 49 ML/MIN/1.73SQ M
GFR SERPL CREATININE-BSD FRML MDRD: 51 ML/MIN/1.73SQ M
GFR SERPL CREATININE-BSD FRML MDRD: 55 ML/MIN/1.73SQ M
GFR SERPL CREATININE-BSD FRML MDRD: 56 ML/MIN/1.73SQ M
GLUCOSE SERPL-MCNC: 107 MG/DL (ref 65–140)
GLUCOSE SERPL-MCNC: 112 MG/DL (ref 65–140)
GLUCOSE SERPL-MCNC: 112 MG/DL (ref 65–140)
GLUCOSE SERPL-MCNC: 114 MG/DL (ref 65–140)
GLUCOSE SERPL-MCNC: 114 MG/DL (ref 65–140)
GLUCOSE SERPL-MCNC: 115 MG/DL (ref 65–140)
GLUCOSE SERPL-MCNC: 115 MG/DL (ref 65–140)
GLUCOSE SERPL-MCNC: 116 MG/DL (ref 65–140)
GLUCOSE SERPL-MCNC: 116 MG/DL (ref 65–140)
GLUCOSE SERPL-MCNC: 117 MG/DL (ref 65–140)
GLUCOSE SERPL-MCNC: 118 MG/DL (ref 65–140)
GLUCOSE SERPL-MCNC: 119 MG/DL (ref 65–140)
GLUCOSE SERPL-MCNC: 119 MG/DL (ref 65–140)
GLUCOSE SERPL-MCNC: 123 MG/DL (ref 65–140)
GLUCOSE SERPL-MCNC: 123 MG/DL (ref 65–140)
GLUCOSE SERPL-MCNC: 124 MG/DL (ref 65–140)
GLUCOSE SERPL-MCNC: 124 MG/DL (ref 65–140)
GLUCOSE SERPL-MCNC: 125 MG/DL (ref 65–140)
GLUCOSE SERPL-MCNC: 125 MG/DL (ref 65–140)
GLUCOSE SERPL-MCNC: 126 MG/DL (ref 65–140)
GLUCOSE SERPL-MCNC: 126 MG/DL (ref 65–140)
GLUCOSE SERPL-MCNC: 127 MG/DL (ref 65–140)
GLUCOSE SERPL-MCNC: 127 MG/DL (ref 65–140)
GLUCOSE SERPL-MCNC: 128 MG/DL (ref 65–140)
GLUCOSE SERPL-MCNC: 129 MG/DL (ref 65–140)
GLUCOSE SERPL-MCNC: 130 MG/DL (ref 65–140)
GLUCOSE SERPL-MCNC: 130 MG/DL (ref 65–140)
GLUCOSE SERPL-MCNC: 131 MG/DL (ref 65–140)
GLUCOSE SERPL-MCNC: 132 MG/DL (ref 65–140)
GLUCOSE SERPL-MCNC: 133 MG/DL (ref 65–140)
GLUCOSE SERPL-MCNC: 134 MG/DL (ref 65–140)
GLUCOSE SERPL-MCNC: 135 MG/DL (ref 65–140)
GLUCOSE SERPL-MCNC: 136 MG/DL (ref 65–140)
GLUCOSE SERPL-MCNC: 137 MG/DL (ref 65–140)
GLUCOSE SERPL-MCNC: 137 MG/DL (ref 65–140)
GLUCOSE SERPL-MCNC: 138 MG/DL (ref 65–140)
GLUCOSE SERPL-MCNC: 139 MG/DL (ref 65–140)
GLUCOSE SERPL-MCNC: 140 MG/DL (ref 65–140)
GLUCOSE SERPL-MCNC: 141 MG/DL (ref 65–140)
GLUCOSE SERPL-MCNC: 142 MG/DL (ref 65–140)
GLUCOSE SERPL-MCNC: 143 MG/DL (ref 65–140)
GLUCOSE SERPL-MCNC: 143 MG/DL (ref 65–140)
GLUCOSE SERPL-MCNC: 144 MG/DL (ref 65–140)
GLUCOSE SERPL-MCNC: 144 MG/DL (ref 65–140)
GLUCOSE SERPL-MCNC: 145 MG/DL (ref 65–140)
GLUCOSE SERPL-MCNC: 146 MG/DL (ref 65–140)
GLUCOSE SERPL-MCNC: 146 MG/DL (ref 65–140)
GLUCOSE SERPL-MCNC: 148 MG/DL (ref 65–140)
GLUCOSE SERPL-MCNC: 149 MG/DL (ref 65–140)
GLUCOSE SERPL-MCNC: 152 MG/DL (ref 65–140)
GLUCOSE SERPL-MCNC: 152 MG/DL (ref 65–140)
GLUCOSE SERPL-MCNC: 153 MG/DL (ref 65–140)
GLUCOSE SERPL-MCNC: 153 MG/DL (ref 65–140)
GLUCOSE SERPL-MCNC: 154 MG/DL (ref 65–140)
GLUCOSE SERPL-MCNC: 155 MG/DL (ref 65–140)
GLUCOSE SERPL-MCNC: 155 MG/DL (ref 65–140)
GLUCOSE SERPL-MCNC: 156 MG/DL (ref 65–140)
GLUCOSE SERPL-MCNC: 157 MG/DL (ref 65–140)
GLUCOSE SERPL-MCNC: 157 MG/DL (ref 65–140)
GLUCOSE SERPL-MCNC: 158 MG/DL (ref 65–140)
GLUCOSE SERPL-MCNC: 160 MG/DL (ref 65–140)
GLUCOSE SERPL-MCNC: 161 MG/DL (ref 65–140)
GLUCOSE SERPL-MCNC: 161 MG/DL (ref 65–140)
GLUCOSE SERPL-MCNC: 162 MG/DL (ref 65–140)
GLUCOSE SERPL-MCNC: 163 MG/DL (ref 65–140)
GLUCOSE SERPL-MCNC: 163 MG/DL (ref 65–140)
GLUCOSE SERPL-MCNC: 167 MG/DL (ref 65–140)
GLUCOSE SERPL-MCNC: 168 MG/DL (ref 65–140)
GLUCOSE SERPL-MCNC: 169 MG/DL (ref 65–140)
GLUCOSE SERPL-MCNC: 171 MG/DL (ref 65–140)
GLUCOSE SERPL-MCNC: 173 MG/DL (ref 65–140)
GLUCOSE SERPL-MCNC: 175 MG/DL (ref 65–140)
GLUCOSE SERPL-MCNC: 178 MG/DL (ref 65–140)
GLUCOSE SERPL-MCNC: 179 MG/DL (ref 65–140)
GLUCOSE SERPL-MCNC: 182 MG/DL (ref 65–140)
GLUCOSE SERPL-MCNC: 183 MG/DL (ref 65–140)
GLUCOSE SERPL-MCNC: 185 MG/DL (ref 65–140)
GLUCOSE SERPL-MCNC: 189 MG/DL (ref 65–140)
GLUCOSE SERPL-MCNC: 189 MG/DL (ref 65–140)
GLUCOSE SERPL-MCNC: 191 MG/DL (ref 65–140)
GLUCOSE SERPL-MCNC: 207 MG/DL (ref 65–140)
GLUCOSE SERPL-MCNC: 327 MG/DL (ref 65–140)
GLUCOSE SERPL-MCNC: 89 MG/DL (ref 65–140)
GLUCOSE UR STRIP-MCNC: NEGATIVE MG/DL
GRAM STN SPEC: ABNORMAL
HBA1C MFR BLD: 7.6 %
HCO3 BLDA-SCNC: 21.4 MMOL/L (ref 24–30)
HCT VFR BLD AUTO: 27.5 % (ref 34.8–46.1)
HCT VFR BLD AUTO: 28.1 % (ref 34.8–46.1)
HCT VFR BLD AUTO: 28.8 % (ref 34.8–46.1)
HCT VFR BLD AUTO: 29.1 % (ref 34.8–46.1)
HCT VFR BLD AUTO: 30.2 % (ref 34.8–46.1)
HCT VFR BLD AUTO: 30.7 % (ref 34.8–46.1)
HCT VFR BLD AUTO: 33.8 % (ref 34.8–46.1)
HCT VFR BLD AUTO: 34.1 % (ref 34.8–46.1)
HCT VFR BLD AUTO: 34.4 % (ref 34.8–46.1)
HCT VFR BLD AUTO: 35.2 % (ref 34.8–46.1)
HCT VFR BLD AUTO: 36.4 % (ref 34.8–46.1)
HCT VFR BLD AUTO: 36.4 % (ref 34.8–46.1)
HCT VFR BLD AUTO: 36.9 % (ref 34.8–46.1)
HCT VFR BLD AUTO: 37 % (ref 34.8–46.1)
HCT VFR BLD AUTO: 37.1 % (ref 34.8–46.1)
HCT VFR BLD AUTO: 39 % (ref 34.8–46.1)
HCT VFR BLD AUTO: 39.8 % (ref 34.8–46.1)
HCT VFR BLD AUTO: 41.2 % (ref 34.8–46.1)
HCT VFR BLD CALC: 31 % (ref 34.8–46.1)
HDLC SERPL-MCNC: 36 MG/DL
HEMOCCULT STL QL: POSITIVE
HGB BLD-MCNC: 10.1 G/DL (ref 11.5–15.4)
HGB BLD-MCNC: 10.5 G/DL (ref 11.5–15.4)
HGB BLD-MCNC: 10.8 G/DL (ref 11.5–15.4)
HGB BLD-MCNC: 11 G/DL (ref 11.5–15.4)
HGB BLD-MCNC: 11.3 G/DL (ref 11.5–15.4)
HGB BLD-MCNC: 11.5 G/DL (ref 11.5–15.4)
HGB BLD-MCNC: 11.6 G/DL (ref 11.5–15.4)
HGB BLD-MCNC: 11.7 G/DL (ref 11.5–15.4)
HGB BLD-MCNC: 11.7 G/DL (ref 11.5–15.4)
HGB BLD-MCNC: 12.3 G/DL (ref 11.5–15.4)
HGB BLD-MCNC: 12.4 G/DL (ref 11.5–15.4)
HGB BLD-MCNC: 13 G/DL (ref 11.5–15.4)
HGB BLD-MCNC: 8.5 G/DL (ref 11.5–15.4)
HGB BLD-MCNC: 8.5 G/DL (ref 11.5–15.4)
HGB BLD-MCNC: 8.7 G/DL (ref 11.5–15.4)
HGB BLD-MCNC: 9 G/DL (ref 11.5–15.4)
HGB BLD-MCNC: 9.2 G/DL (ref 11.5–15.4)
HGB BLD-MCNC: 9.5 G/DL (ref 11.5–15.4)
HGB BLDA-MCNC: 10.5 G/DL (ref 11.5–15.4)
HGB UR QL STRIP.AUTO: NEGATIVE
HYALINE CASTS #/AREA URNS LPF: NORMAL /LPF
HYPERCHROMIA BLD QL SMEAR: PRESENT
IMM GRANULOCYTES # BLD AUTO: 0.04 THOUSAND/UL (ref 0–0.2)
IMM GRANULOCYTES # BLD AUTO: 0.06 THOUSAND/UL (ref 0–0.2)
IMM GRANULOCYTES # BLD AUTO: 0.06 THOUSAND/UL (ref 0–0.2)
IMM GRANULOCYTES # BLD AUTO: 0.07 THOUSAND/UL (ref 0–0.2)
IMM GRANULOCYTES # BLD AUTO: 0.07 THOUSAND/UL (ref 0–0.2)
IMM GRANULOCYTES # BLD AUTO: 0.09 THOUSAND/UL (ref 0–0.2)
IMM GRANULOCYTES # BLD AUTO: 0.1 THOUSAND/UL (ref 0–0.2)
IMM GRANULOCYTES # BLD AUTO: 0.1 THOUSAND/UL (ref 0–0.2)
IMM GRANULOCYTES # BLD AUTO: 0.11 THOUSAND/UL (ref 0–0.2)
IMM GRANULOCYTES # BLD AUTO: 0.16 THOUSAND/UL (ref 0–0.2)
IMM GRANULOCYTES # BLD AUTO: 0.16 THOUSAND/UL (ref 0–0.2)
IMM GRANULOCYTES # BLD AUTO: 0.28 THOUSAND/UL (ref 0–0.2)
IMM GRANULOCYTES NFR BLD AUTO: 1 % (ref 0–2)
IMM GRANULOCYTES NFR BLD AUTO: 2 % (ref 0–2)
INR PPP: 1.39 (ref 0.84–1.19)
IRON SATN MFR SERPL: 18 %
IRON SATN MFR SERPL: 20 %
IRON SERPL-MCNC: 63 UG/DL (ref 50–170)
IRON SERPL-MCNC: 67 UG/DL (ref 50–170)
KETONES UR STRIP-MCNC: NEGATIVE MG/DL
LACTATE SERPL-SCNC: 7.4 MMOL/L (ref 0.5–2)
LDLC SERPL DIRECT ASSAY-MCNC: 114 MG/DL (ref 0–100)
LEUKOCYTE ESTERASE UR QL STRIP: ABNORMAL
LYMPHOCYTES # BLD AUTO: 0.49 THOUSAND/UL (ref 0.6–4.47)
LYMPHOCYTES # BLD AUTO: 0.72 THOUSANDS/ΜL (ref 0.6–4.47)
LYMPHOCYTES # BLD AUTO: 0.75 THOUSANDS/ΜL (ref 0.6–4.47)
LYMPHOCYTES # BLD AUTO: 0.81 THOUSANDS/ΜL (ref 0.6–4.47)
LYMPHOCYTES # BLD AUTO: 0.9 THOUSANDS/ΜL (ref 0.6–4.47)
LYMPHOCYTES # BLD AUTO: 0.9 THOUSANDS/ΜL (ref 0.6–4.47)
LYMPHOCYTES # BLD AUTO: 0.94 THOUSANDS/ΜL (ref 0.6–4.47)
LYMPHOCYTES # BLD AUTO: 0.97 THOUSANDS/ΜL (ref 0.6–4.47)
LYMPHOCYTES # BLD AUTO: 0.99 THOUSANDS/ΜL (ref 0.6–4.47)
LYMPHOCYTES # BLD AUTO: 1.14 THOUSANDS/ΜL (ref 0.6–4.47)
LYMPHOCYTES # BLD AUTO: 1.28 THOUSANDS/ΜL (ref 0.6–4.47)
LYMPHOCYTES # BLD AUTO: 1.31 THOUSANDS/ΜL (ref 0.6–4.47)
LYMPHOCYTES # BLD AUTO: 1.65 THOUSANDS/ΜL (ref 0.6–4.47)
LYMPHOCYTES # BLD AUTO: 5 % (ref 14–44)
LYMPHOCYTES NFR BLD AUTO: 10 % (ref 14–44)
LYMPHOCYTES NFR BLD AUTO: 11 % (ref 14–44)
LYMPHOCYTES NFR BLD AUTO: 11 % (ref 14–44)
LYMPHOCYTES NFR BLD AUTO: 12 % (ref 14–44)
LYMPHOCYTES NFR BLD AUTO: 13 % (ref 14–44)
LYMPHOCYTES NFR BLD AUTO: 13 % (ref 14–44)
LYMPHOCYTES NFR BLD AUTO: 14 % (ref 14–44)
LYMPHOCYTES NFR BLD AUTO: 15 % (ref 14–44)
LYMPHOCYTES NFR BLD AUTO: 8 % (ref 14–44)
LYMPHOCYTES NFR BLD AUTO: 9 % (ref 14–44)
MACROCYTES BLD QL AUTO: PRESENT
MAGNESIUM SERPL-MCNC: 1.8 MG/DL (ref 1.6–2.6)
MAGNESIUM SERPL-MCNC: 2.1 MG/DL (ref 1.6–2.6)
MAGNESIUM SERPL-MCNC: 2.4 MG/DL (ref 1.6–2.6)
MAGNESIUM SERPL-MCNC: 2.5 MG/DL (ref 1.6–2.6)
MAGNESIUM SERPL-MCNC: 2.6 MG/DL (ref 1.6–2.6)
MAGNESIUM SERPL-MCNC: 2.6 MG/DL (ref 1.6–2.6)
MAGNESIUM SERPL-MCNC: 3.3 MG/DL (ref 1.6–2.6)
MCH RBC QN AUTO: 32.4 PG (ref 26.8–34.3)
MCH RBC QN AUTO: 32.5 PG (ref 26.8–34.3)
MCH RBC QN AUTO: 32.5 PG (ref 26.8–34.3)
MCH RBC QN AUTO: 32.6 PG (ref 26.8–34.3)
MCH RBC QN AUTO: 32.7 PG (ref 26.8–34.3)
MCH RBC QN AUTO: 32.8 PG (ref 26.8–34.3)
MCH RBC QN AUTO: 32.8 PG (ref 26.8–34.3)
MCH RBC QN AUTO: 32.9 PG (ref 26.8–34.3)
MCH RBC QN AUTO: 32.9 PG (ref 26.8–34.3)
MCH RBC QN AUTO: 33.1 PG (ref 26.8–34.3)
MCH RBC QN AUTO: 33.2 PG (ref 26.8–34.3)
MCH RBC QN AUTO: 33.3 PG (ref 26.8–34.3)
MCH RBC QN AUTO: 33.3 PG (ref 26.8–34.3)
MCH RBC QN AUTO: 33.6 PG (ref 26.8–34.3)
MCH RBC QN AUTO: 33.6 PG (ref 26.8–34.3)
MCH RBC QN AUTO: 33.7 PG (ref 26.8–34.3)
MCHC RBC AUTO-ENTMCNC: 29.4 G/DL (ref 31.4–37.4)
MCHC RBC AUTO-ENTMCNC: 29.5 G/DL (ref 31.4–37.4)
MCHC RBC AUTO-ENTMCNC: 30.5 G/DL (ref 31.4–37.4)
MCHC RBC AUTO-ENTMCNC: 30.8 G/DL (ref 31.4–37.4)
MCHC RBC AUTO-ENTMCNC: 30.9 G/DL (ref 31.4–37.4)
MCHC RBC AUTO-ENTMCNC: 31 G/DL (ref 31.4–37.4)
MCHC RBC AUTO-ENTMCNC: 31 G/DL (ref 31.4–37.4)
MCHC RBC AUTO-ENTMCNC: 31.1 G/DL (ref 31.4–37.4)
MCHC RBC AUTO-ENTMCNC: 31.2 G/DL (ref 31.4–37.4)
MCHC RBC AUTO-ENTMCNC: 31.3 G/DL (ref 31.4–37.4)
MCHC RBC AUTO-ENTMCNC: 31.3 G/DL (ref 31.4–37.4)
MCHC RBC AUTO-ENTMCNC: 31.5 G/DL (ref 31.4–37.4)
MCHC RBC AUTO-ENTMCNC: 31.6 G/DL (ref 31.4–37.4)
MCHC RBC AUTO-ENTMCNC: 31.7 G/DL (ref 31.4–37.4)
MCHC RBC AUTO-ENTMCNC: 32 G/DL (ref 31.4–37.4)
MCHC RBC AUTO-ENTMCNC: 32.1 G/DL (ref 31.4–37.4)
MCV RBC AUTO: 102 FL (ref 82–98)
MCV RBC AUTO: 103 FL (ref 82–98)
MCV RBC AUTO: 104 FL (ref 82–98)
MCV RBC AUTO: 105 FL (ref 82–98)
MCV RBC AUTO: 105 FL (ref 82–98)
MCV RBC AUTO: 106 FL (ref 82–98)
MCV RBC AUTO: 106 FL (ref 82–98)
MCV RBC AUTO: 107 FL (ref 82–98)
MCV RBC AUTO: 108 FL (ref 82–98)
MCV RBC AUTO: 109 FL (ref 82–98)
MCV RBC AUTO: 109 FL (ref 82–98)
MCV RBC AUTO: 110 FL (ref 82–98)
MCV RBC AUTO: 111 FL (ref 82–98)
MCV RBC AUTO: 113 FL (ref 82–98)
METAMYELOCYTES NFR BLD MANUAL: 3 % (ref 0–1)
METHADONE UR QL: NEGATIVE
MONOCYTES # BLD AUTO: 0 THOUSAND/UL (ref 0–1.22)
MONOCYTES # BLD AUTO: 0.48 THOUSAND/ΜL (ref 0.17–1.22)
MONOCYTES # BLD AUTO: 0.62 THOUSAND/ΜL (ref 0.17–1.22)
MONOCYTES # BLD AUTO: 0.63 THOUSAND/ΜL (ref 0.17–1.22)
MONOCYTES # BLD AUTO: 0.71 THOUSAND/ΜL (ref 0.17–1.22)
MONOCYTES # BLD AUTO: 0.72 THOUSAND/ΜL (ref 0.17–1.22)
MONOCYTES # BLD AUTO: 0.72 THOUSAND/ΜL (ref 0.17–1.22)
MONOCYTES # BLD AUTO: 0.75 THOUSAND/ΜL (ref 0.17–1.22)
MONOCYTES # BLD AUTO: 0.81 THOUSAND/ΜL (ref 0.17–1.22)
MONOCYTES # BLD AUTO: 0.81 THOUSAND/ΜL (ref 0.17–1.22)
MONOCYTES # BLD AUTO: 0.86 THOUSAND/ΜL (ref 0.17–1.22)
MONOCYTES # BLD AUTO: 0.95 THOUSAND/ΜL (ref 0.17–1.22)
MONOCYTES # BLD AUTO: 0.96 THOUSAND/ΜL (ref 0.17–1.22)
MONOCYTES NFR BLD AUTO: 10 % (ref 4–12)
MONOCYTES NFR BLD AUTO: 10 % (ref 4–12)
MONOCYTES NFR BLD AUTO: 11 % (ref 4–12)
MONOCYTES NFR BLD AUTO: 5 % (ref 4–12)
MONOCYTES NFR BLD AUTO: 7 % (ref 4–12)
MONOCYTES NFR BLD AUTO: 8 % (ref 4–12)
MONOCYTES NFR BLD AUTO: 9 % (ref 4–12)
MONOCYTES NFR BLD AUTO: 9 % (ref 4–12)
MONOCYTES NFR BLD: 0 % (ref 4–12)
NEUTROPHILS # BLD AUTO: 10.67 THOUSANDS/ΜL (ref 1.85–7.62)
NEUTROPHILS # BLD AUTO: 15.88 THOUSANDS/ΜL (ref 1.85–7.62)
NEUTROPHILS # BLD AUTO: 4.45 THOUSANDS/ΜL (ref 1.85–7.62)
NEUTROPHILS # BLD AUTO: 4.82 THOUSANDS/ΜL (ref 1.85–7.62)
NEUTROPHILS # BLD AUTO: 5.07 THOUSANDS/ΜL (ref 1.85–7.62)
NEUTROPHILS # BLD AUTO: 5.32 THOUSANDS/ΜL (ref 1.85–7.62)
NEUTROPHILS # BLD AUTO: 5.58 THOUSANDS/ΜL (ref 1.85–7.62)
NEUTROPHILS # BLD AUTO: 6.12 THOUSANDS/ΜL (ref 1.85–7.62)
NEUTROPHILS # BLD AUTO: 6.54 THOUSANDS/ΜL (ref 1.85–7.62)
NEUTROPHILS # BLD AUTO: 7.18 THOUSANDS/ΜL (ref 1.85–7.62)
NEUTROPHILS # BLD AUTO: 7.27 THOUSANDS/ΜL (ref 1.85–7.62)
NEUTROPHILS # BLD AUTO: 7.54 THOUSANDS/ΜL (ref 1.85–7.62)
NEUTROPHILS # BLD MANUAL: 9.1 THOUSAND/UL (ref 1.85–7.62)
NEUTS BAND NFR BLD MANUAL: 15 % (ref 0–8)
NEUTS SEG NFR BLD AUTO: 67 % (ref 43–75)
NEUTS SEG NFR BLD AUTO: 69 % (ref 43–75)
NEUTS SEG NFR BLD AUTO: 70 % (ref 43–75)
NEUTS SEG NFR BLD AUTO: 71 % (ref 43–75)
NEUTS SEG NFR BLD AUTO: 72 % (ref 43–75)
NEUTS SEG NFR BLD AUTO: 74 % (ref 43–75)
NEUTS SEG NFR BLD AUTO: 75 % (ref 43–75)
NEUTS SEG NFR BLD AUTO: 76 % (ref 43–75)
NEUTS SEG NFR BLD AUTO: 76 % (ref 43–75)
NEUTS SEG NFR BLD AUTO: 77 % (ref 43–75)
NEUTS SEG NFR BLD AUTO: 77 % (ref 43–75)
NEUTS SEG NFR BLD AUTO: 81 % (ref 43–75)
NEUTS SEG NFR BLD AUTO: 81 % (ref 43–75)
NITRITE UR QL STRIP: NEGATIVE
NON-SQ EPI CELLS URNS QL MICRO: NORMAL /HPF
NRBC BLD AUTO-RTO: 0 /100 WBCS
NRBC BLD AUTO-RTO: 1 /100 WBCS
NRBC BLD AUTO-RTO: 2 /100 WBCS
NRBC BLD AUTO-RTO: 4 /100 WBC (ref 0–2)
NT-PROBNP SERPL-MCNC: 1326 PG/ML
NT-PROBNP SERPL-MCNC: 3581 PG/ML
OPIATES UR QL SCN: POSITIVE
OXYCODONE+OXYMORPHONE UR QL SCN: NEGATIVE
PCO2 BLD: 22 MMOL/L (ref 21–32)
PCO2 BLD: 36 MM HG (ref 42–50)
PCP UR QL: NEGATIVE
PH BLD: 7.38 [PH] (ref 7.3–7.4)
PH UR STRIP.AUTO: 7 [PH]
PHOSPHATE SERPL-MCNC: 3.4 MG/DL (ref 2.3–4.1)
PHOSPHATE SERPL-MCNC: 3.6 MG/DL (ref 2.3–4.1)
PHOSPHATE SERPL-MCNC: 3.8 MG/DL (ref 2.3–4.1)
PHOSPHATE SERPL-MCNC: 3.9 MG/DL (ref 2.3–4.1)
PHOSPHATE SERPL-MCNC: 4.1 MG/DL (ref 2.3–4.1)
PLATELET # BLD AUTO: 224 THOUSANDS/UL (ref 149–390)
PLATELET # BLD AUTO: 233 THOUSANDS/UL (ref 149–390)
PLATELET # BLD AUTO: 248 THOUSANDS/UL (ref 149–390)
PLATELET # BLD AUTO: 251 THOUSANDS/UL (ref 149–390)
PLATELET # BLD AUTO: 259 THOUSANDS/UL (ref 149–390)
PLATELET # BLD AUTO: 271 THOUSANDS/UL (ref 149–390)
PLATELET # BLD AUTO: 271 THOUSANDS/UL (ref 149–390)
PLATELET # BLD AUTO: 275 THOUSANDS/UL (ref 149–390)
PLATELET # BLD AUTO: 275 THOUSANDS/UL (ref 149–390)
PLATELET # BLD AUTO: 277 THOUSANDS/UL (ref 149–390)
PLATELET # BLD AUTO: 282 THOUSANDS/UL (ref 149–390)
PLATELET # BLD AUTO: 289 THOUSANDS/UL (ref 149–390)
PLATELET # BLD AUTO: 290 THOUSANDS/UL (ref 149–390)
PLATELET # BLD AUTO: 312 THOUSANDS/UL (ref 149–390)
PLATELET # BLD AUTO: 317 THOUSANDS/UL (ref 149–390)
PLATELET # BLD AUTO: 323 THOUSANDS/UL (ref 149–390)
PLATELET # BLD AUTO: 339 THOUSANDS/UL (ref 149–390)
PLATELET # BLD AUTO: 345 THOUSANDS/UL (ref 149–390)
PLATELET BLD QL SMEAR: ADEQUATE
PMV BLD AUTO: 10 FL (ref 8.9–12.7)
PMV BLD AUTO: 10.1 FL (ref 8.9–12.7)
PMV BLD AUTO: 10.1 FL (ref 8.9–12.7)
PMV BLD AUTO: 9.3 FL (ref 8.9–12.7)
PMV BLD AUTO: 9.4 FL (ref 8.9–12.7)
PMV BLD AUTO: 9.5 FL (ref 8.9–12.7)
PMV BLD AUTO: 9.6 FL (ref 8.9–12.7)
PMV BLD AUTO: 9.6 FL (ref 8.9–12.7)
PMV BLD AUTO: 9.7 FL (ref 8.9–12.7)
PMV BLD AUTO: 9.8 FL (ref 8.9–12.7)
PO2 BLD: 53 MM HG (ref 35–45)
POLYCHROMASIA BLD QL SMEAR: PRESENT
POTASSIUM BLD-SCNC: 4.2 MMOL/L (ref 3.5–5.3)
POTASSIUM SERPL-SCNC: 3.5 MMOL/L (ref 3.5–5.3)
POTASSIUM SERPL-SCNC: 3.5 MMOL/L (ref 3.5–5.3)
POTASSIUM SERPL-SCNC: 3.7 MMOL/L (ref 3.5–5.3)
POTASSIUM SERPL-SCNC: 3.8 MMOL/L (ref 3.5–5.3)
POTASSIUM SERPL-SCNC: 3.9 MMOL/L (ref 3.5–5.3)
POTASSIUM SERPL-SCNC: 4 MMOL/L (ref 3.5–5.3)
POTASSIUM SERPL-SCNC: 4.1 MMOL/L (ref 3.5–5.3)
POTASSIUM SERPL-SCNC: 4.1 MMOL/L (ref 3.5–5.3)
POTASSIUM SERPL-SCNC: 4.2 MMOL/L (ref 3.5–5.3)
POTASSIUM SERPL-SCNC: 4.2 MMOL/L (ref 3.5–5.3)
POTASSIUM SERPL-SCNC: 4.3 MMOL/L (ref 3.5–5.3)
POTASSIUM SERPL-SCNC: 4.4 MMOL/L (ref 3.5–5.3)
POTASSIUM SERPL-SCNC: 4.8 MMOL/L (ref 3.5–5.3)
PROCALCITONIN SERPL-MCNC: 3.13 NG/ML
PROT SERPL-MCNC: 6.6 G/DL (ref 6.4–8.2)
PROT SERPL-MCNC: 6.9 G/DL (ref 6.4–8.2)
PROT SERPL-MCNC: 6.9 G/DL (ref 6.4–8.2)
PROT SERPL-MCNC: 7.1 G/DL (ref 6.4–8.2)
PROT SERPL-MCNC: 7.2 G/DL (ref 6.4–8.2)
PROT SERPL-MCNC: 7.2 G/DL (ref 6.4–8.2)
PROT SERPL-MCNC: 7.5 G/DL (ref 6.4–8.2)
PROT UR STRIP-MCNC: NEGATIVE MG/DL
PROTHROMBIN TIME: 17 SECONDS (ref 11.6–14.5)
QRS AXIS: -35 DEGREES
QRS AXIS: -64 DEGREES
QRS AXIS: -87 DEGREES
QRS AXIS: 183 DEGREES
QRS AXIS: 211 DEGREES
QRSD INTERVAL: 100 MS
QRSD INTERVAL: 102 MS
QRSD INTERVAL: 104 MS
QRSD INTERVAL: 168 MS
QRSD INTERVAL: 96 MS
QT INTERVAL: 274 MS
QT INTERVAL: 294 MS
QT INTERVAL: 306 MS
QT INTERVAL: 308 MS
QT INTERVAL: 342 MS
QTC INTERVAL: 385 MS
QTC INTERVAL: 415 MS
QTC INTERVAL: 465 MS
QTC INTERVAL: 472 MS
QTC INTERVAL: 473 MS
RBC # BLD AUTO: 2.53 MILLION/UL (ref 3.81–5.12)
RBC # BLD AUTO: 2.55 MILLION/UL (ref 3.81–5.12)
RBC # BLD AUTO: 2.61 MILLION/UL (ref 3.81–5.12)
RBC # BLD AUTO: 2.74 MILLION/UL (ref 3.81–5.12)
RBC # BLD AUTO: 2.75 MILLION/UL (ref 3.81–5.12)
RBC # BLD AUTO: 2.86 MILLION/UL (ref 3.81–5.12)
RBC # BLD AUTO: 3.09 MILLION/UL (ref 3.81–5.12)
RBC # BLD AUTO: 3.12 MILLION/UL (ref 3.81–5.12)
RBC # BLD AUTO: 3.26 MILLION/UL (ref 3.81–5.12)
RBC # BLD AUTO: 3.37 MILLION/UL (ref 3.81–5.12)
RBC # BLD AUTO: 3.44 MILLION/UL (ref 3.81–5.12)
RBC # BLD AUTO: 3.54 MILLION/UL (ref 3.81–5.12)
RBC # BLD AUTO: 3.55 MILLION/UL (ref 3.81–5.12)
RBC # BLD AUTO: 3.56 MILLION/UL (ref 3.81–5.12)
RBC # BLD AUTO: 3.56 MILLION/UL (ref 3.81–5.12)
RBC # BLD AUTO: 3.78 MILLION/UL (ref 3.81–5.12)
RBC # BLD AUTO: 3.82 MILLION/UL (ref 3.81–5.12)
RBC # BLD AUTO: 3.97 MILLION/UL (ref 3.81–5.12)
RBC #/AREA URNS AUTO: NORMAL /HPF
RBC MORPH BLD: PRESENT
SALICYLATES SERPL-MCNC: <3 MG/DL (ref 3–20)
SAO2 % BLD FROM PO2: 87 % (ref 60–85)
SARS-COV-2 RNA RESP QL NAA+PROBE: NEGATIVE
SODIUM BLD-SCNC: 136 MMOL/L (ref 136–145)
SODIUM SERPL-SCNC: 137 MMOL/L (ref 136–145)
SODIUM SERPL-SCNC: 138 MMOL/L (ref 136–145)
SODIUM SERPL-SCNC: 139 MMOL/L (ref 136–145)
SODIUM SERPL-SCNC: 140 MMOL/L (ref 136–145)
SODIUM SERPL-SCNC: 140 MMOL/L (ref 136–145)
SODIUM SERPL-SCNC: 141 MMOL/L (ref 136–145)
SP GR UR STRIP.AUTO: 1.01 (ref 1–1.03)
SPECIMEN SOURCE: ABNORMAL
T WAVE AXIS: -15 DEGREES
T WAVE AXIS: -87 DEGREES
T WAVE AXIS: 130 DEGREES
T WAVE AXIS: 143 DEGREES
T WAVE AXIS: 39 DEGREES
T4 FREE SERPL-MCNC: 1.11 NG/DL (ref 0.76–1.46)
THC UR QL: NEGATIVE
TIBC SERPL-MCNC: 333 UG/DL (ref 250–450)
TIBC SERPL-MCNC: 346 UG/DL (ref 250–450)
TRIGL SERPL-MCNC: 422 MG/DL
TROPONIN I SERPL-MCNC: 0.04 NG/ML
TROPONIN I SERPL-MCNC: 0.06 NG/ML
TROPONIN I SERPL-MCNC: 0.07 NG/ML
TROPONIN I SERPL-MCNC: 0.08 NG/ML
TROPONIN I SERPL-MCNC: 0.18 NG/ML
TROPONIN I SERPL-MCNC: 0.2 NG/ML
TSH SERPL DL<=0.05 MIU/L-ACNC: 8.76 UIU/ML (ref 0.36–3.74)
UROBILINOGEN UR QL STRIP.AUTO: 0.2 E.U./DL
UUN 24H UR-MCNC: 292 MG/DL
VENTRICULAR RATE: 103 BPM
VENTRICULAR RATE: 111 BPM
VENTRICULAR RATE: 138 BPM
VENTRICULAR RATE: 142 BPM
VENTRICULAR RATE: 143 BPM
VIT B12 SERPL-MCNC: 448 PG/ML (ref 100–900)
WBC # BLD AUTO: 10.14 THOUSAND/UL (ref 4.31–10.16)
WBC # BLD AUTO: 13.08 THOUSAND/UL (ref 4.31–10.16)
WBC # BLD AUTO: 16.91 THOUSAND/UL (ref 4.31–10.16)
WBC # BLD AUTO: 19.25 THOUSAND/UL (ref 4.31–10.16)
WBC # BLD AUTO: 6.38 THOUSAND/UL (ref 4.31–10.16)
WBC # BLD AUTO: 6.68 THOUSAND/UL (ref 4.31–10.16)
WBC # BLD AUTO: 6.94 THOUSAND/UL (ref 4.31–10.16)
WBC # BLD AUTO: 7.29 THOUSAND/UL (ref 4.31–10.16)
WBC # BLD AUTO: 7.37 THOUSAND/UL (ref 4.31–10.16)
WBC # BLD AUTO: 7.47 THOUSAND/UL (ref 4.31–10.16)
WBC # BLD AUTO: 8.37 THOUSAND/UL (ref 4.31–10.16)
WBC # BLD AUTO: 8.98 THOUSAND/UL (ref 4.31–10.16)
WBC # BLD AUTO: 8.98 THOUSAND/UL (ref 4.31–10.16)
WBC # BLD AUTO: 9.18 THOUSAND/UL (ref 4.31–10.16)
WBC # BLD AUTO: 9.28 THOUSAND/UL (ref 4.31–10.16)
WBC # BLD AUTO: 9.4 THOUSAND/UL (ref 4.31–10.16)
WBC # BLD AUTO: 9.44 THOUSAND/UL (ref 4.31–10.16)
WBC # BLD AUTO: 9.89 THOUSAND/UL (ref 4.31–10.16)
WBC #/AREA URNS AUTO: NORMAL /HPF

## 2020-01-01 PROCEDURE — 82948 REAGENT STRIP/BLOOD GLUCOSE: CPT

## 2020-01-01 PROCEDURE — 99222 1ST HOSP IP/OBS MODERATE 55: CPT | Performed by: INTERNAL MEDICINE

## 2020-01-01 PROCEDURE — 84295 ASSAY OF SERUM SODIUM: CPT

## 2020-01-01 PROCEDURE — 80162 ASSAY OF DIGOXIN TOTAL: CPT | Performed by: INTERNAL MEDICINE

## 2020-01-01 PROCEDURE — 80048 BASIC METABOLIC PNL TOTAL CA: CPT | Performed by: GENERAL PRACTICE

## 2020-01-01 PROCEDURE — 93005 ELECTROCARDIOGRAM TRACING: CPT

## 2020-01-01 PROCEDURE — 85025 COMPLETE CBC W/AUTO DIFF WBC: CPT | Performed by: INTERNAL MEDICINE

## 2020-01-01 PROCEDURE — 99222 1ST HOSP IP/OBS MODERATE 55: CPT | Performed by: PHYSICIAN ASSISTANT

## 2020-01-01 PROCEDURE — 85025 COMPLETE CBC W/AUTO DIFF WBC: CPT | Performed by: NURSE PRACTITIONER

## 2020-01-01 PROCEDURE — 83605 ASSAY OF LACTIC ACID: CPT | Performed by: PHYSICIAN ASSISTANT

## 2020-01-01 PROCEDURE — 80048 BASIC METABOLIC PNL TOTAL CA: CPT | Performed by: NURSE PRACTITIONER

## 2020-01-01 PROCEDURE — 97110 THERAPEUTIC EXERCISES: CPT

## 2020-01-01 PROCEDURE — 72141 MRI NECK SPINE W/O DYE: CPT

## 2020-01-01 PROCEDURE — 99232 SBSQ HOSP IP/OBS MODERATE 35: CPT | Performed by: HOSPITALIST

## 2020-01-01 PROCEDURE — 82272 OCCULT BLD FECES 1-3 TESTS: CPT | Performed by: INTERNAL MEDICINE

## 2020-01-01 PROCEDURE — 96361 HYDRATE IV INFUSION ADD-ON: CPT

## 2020-01-01 PROCEDURE — 85025 COMPLETE CBC W/AUTO DIFF WBC: CPT | Performed by: HOSPITALIST

## 2020-01-01 PROCEDURE — 97116 GAIT TRAINING THERAPY: CPT

## 2020-01-01 PROCEDURE — 99232 SBSQ HOSP IP/OBS MODERATE 35: CPT | Performed by: INTERNAL MEDICINE

## 2020-01-01 PROCEDURE — G1004 CDSM NDSC: HCPCS

## 2020-01-01 PROCEDURE — 82947 ASSAY GLUCOSE BLOOD QUANT: CPT

## 2020-01-01 PROCEDURE — 80053 COMPREHEN METABOLIC PANEL: CPT | Performed by: FAMILY MEDICINE

## 2020-01-01 PROCEDURE — 80053 COMPREHEN METABOLIC PANEL: CPT | Performed by: INTERNAL MEDICINE

## 2020-01-01 PROCEDURE — 83036 HEMOGLOBIN GLYCOSYLATED A1C: CPT | Performed by: INTERNAL MEDICINE

## 2020-01-01 PROCEDURE — 36415 COLL VENOUS BLD VENIPUNCTURE: CPT

## 2020-01-01 PROCEDURE — 82550 ASSAY OF CK (CPK): CPT | Performed by: NURSE PRACTITIONER

## 2020-01-01 PROCEDURE — 93010 ELECTROCARDIOGRAM REPORT: CPT | Performed by: INTERNAL MEDICINE

## 2020-01-01 PROCEDURE — 83735 ASSAY OF MAGNESIUM: CPT | Performed by: EMERGENCY MEDICINE

## 2020-01-01 PROCEDURE — 0HBRXZZ EXCISION OF TOE NAIL, EXTERNAL APPROACH: ICD-10-PCS | Performed by: STUDENT IN AN ORGANIZED HEALTH CARE EDUCATION/TRAINING PROGRAM

## 2020-01-01 PROCEDURE — 80048 BASIC METABOLIC PNL TOTAL CA: CPT | Performed by: EMERGENCY MEDICINE

## 2020-01-01 PROCEDURE — 96360 HYDRATION IV INFUSION INIT: CPT

## 2020-01-01 PROCEDURE — 74176 CT ABD & PELVIS W/O CONTRAST: CPT

## 2020-01-01 PROCEDURE — 83735 ASSAY OF MAGNESIUM: CPT | Performed by: INTERNAL MEDICINE

## 2020-01-01 PROCEDURE — 99233 SBSQ HOSP IP/OBS HIGH 50: CPT | Performed by: GENERAL PRACTICE

## 2020-01-01 PROCEDURE — 76770 US EXAM ABDO BACK WALL COMP: CPT

## 2020-01-01 PROCEDURE — 80076 HEPATIC FUNCTION PANEL: CPT | Performed by: PHYSICIAN ASSISTANT

## 2020-01-01 PROCEDURE — 3078F DIAST BP <80 MM HG: CPT | Performed by: FAMILY MEDICINE

## 2020-01-01 PROCEDURE — 85027 COMPLETE CBC AUTOMATED: CPT | Performed by: GENERAL PRACTICE

## 2020-01-01 PROCEDURE — 83540 ASSAY OF IRON: CPT | Performed by: HOSPITALIST

## 2020-01-01 PROCEDURE — 99232 SBSQ HOSP IP/OBS MODERATE 35: CPT | Performed by: PHYSICIAN ASSISTANT

## 2020-01-01 PROCEDURE — 99285 EMERGENCY DEPT VISIT HI MDM: CPT

## 2020-01-01 PROCEDURE — 82570 ASSAY OF URINE CREATININE: CPT | Performed by: INTERNAL MEDICINE

## 2020-01-01 PROCEDURE — 97167 OT EVAL HIGH COMPLEX 60 MIN: CPT

## 2020-01-01 PROCEDURE — 99232 SBSQ HOSP IP/OBS MODERATE 35: CPT | Performed by: GENERAL PRACTICE

## 2020-01-01 PROCEDURE — 99222 1ST HOSP IP/OBS MODERATE 55: CPT | Performed by: PSYCHIATRY & NEUROLOGY

## 2020-01-01 PROCEDURE — 97530 THERAPEUTIC ACTIVITIES: CPT

## 2020-01-01 PROCEDURE — 87186 SC STD MICRODIL/AGAR DIL: CPT | Performed by: PHYSICIAN ASSISTANT

## 2020-01-01 PROCEDURE — 80320 DRUG SCREEN QUANTALCOHOLS: CPT | Performed by: EMERGENCY MEDICINE

## 2020-01-01 PROCEDURE — 99232 SBSQ HOSP IP/OBS MODERATE 35: CPT | Performed by: FAMILY MEDICINE

## 2020-01-01 PROCEDURE — 84484 ASSAY OF TROPONIN QUANT: CPT | Performed by: INTERNAL MEDICINE

## 2020-01-01 PROCEDURE — 73502 X-RAY EXAM HIP UNI 2-3 VIEWS: CPT

## 2020-01-01 PROCEDURE — 3077F SYST BP >= 140 MM HG: CPT | Performed by: FAMILY MEDICINE

## 2020-01-01 PROCEDURE — 80048 BASIC METABOLIC PNL TOTAL CA: CPT | Performed by: HOSPITALIST

## 2020-01-01 PROCEDURE — 85610 PROTHROMBIN TIME: CPT | Performed by: INTERNAL MEDICINE

## 2020-01-01 PROCEDURE — 84439 ASSAY OF FREE THYROXINE: CPT | Performed by: INTERNAL MEDICINE

## 2020-01-01 PROCEDURE — 85025 COMPLETE CBC W/AUTO DIFF WBC: CPT

## 2020-01-01 PROCEDURE — 85027 COMPLETE CBC AUTOMATED: CPT | Performed by: INTERNAL MEDICINE

## 2020-01-01 PROCEDURE — 84132 ASSAY OF SERUM POTASSIUM: CPT

## 2020-01-01 PROCEDURE — 84100 ASSAY OF PHOSPHORUS: CPT | Performed by: INTERNAL MEDICINE

## 2020-01-01 PROCEDURE — 80053 COMPREHEN METABOLIC PANEL: CPT | Performed by: NURSE PRACTITIONER

## 2020-01-01 PROCEDURE — 99239 HOSP IP/OBS DSCHRG MGMT >30: CPT | Performed by: INTERNAL MEDICINE

## 2020-01-01 PROCEDURE — 97166 OT EVAL MOD COMPLEX 45 MIN: CPT

## 2020-01-01 PROCEDURE — RECHECK: Performed by: NURSE PRACTITIONER

## 2020-01-01 PROCEDURE — 80329 ANALGESICS NON-OPIOID 1 OR 2: CPT | Performed by: EMERGENCY MEDICINE

## 2020-01-01 PROCEDURE — 83550 IRON BINDING TEST: CPT | Performed by: NURSE PRACTITIONER

## 2020-01-01 PROCEDURE — 97163 PT EVAL HIGH COMPLEX 45 MIN: CPT

## 2020-01-01 PROCEDURE — 82746 ASSAY OF FOLIC ACID SERUM: CPT | Performed by: GENERAL PRACTICE

## 2020-01-01 PROCEDURE — 70450 CT HEAD/BRAIN W/O DYE: CPT

## 2020-01-01 PROCEDURE — 99232 SBSQ HOSP IP/OBS MODERATE 35: CPT | Performed by: NURSE PRACTITIONER

## 2020-01-01 PROCEDURE — 80048 BASIC METABOLIC PNL TOTAL CA: CPT | Performed by: INTERNAL MEDICINE

## 2020-01-01 PROCEDURE — 96365 THER/PROPH/DIAG IV INF INIT: CPT

## 2020-01-01 PROCEDURE — 82607 VITAMIN B-12: CPT | Performed by: GENERAL PRACTICE

## 2020-01-01 PROCEDURE — 85014 HEMATOCRIT: CPT

## 2020-01-01 PROCEDURE — 1124F ACP DISCUSS-NO DSCNMKR DOCD: CPT | Performed by: INTERNAL MEDICINE

## 2020-01-01 PROCEDURE — 99223 1ST HOSP IP/OBS HIGH 75: CPT | Performed by: INTERNAL MEDICINE

## 2020-01-01 PROCEDURE — 82803 BLOOD GASES ANY COMBINATION: CPT

## 2020-01-01 PROCEDURE — 1160F RVW MEDS BY RX/DR IN RCRD: CPT | Performed by: FAMILY MEDICINE

## 2020-01-01 PROCEDURE — 84443 ASSAY THYROID STIM HORMONE: CPT | Performed by: INTERNAL MEDICINE

## 2020-01-01 PROCEDURE — 80053 COMPREHEN METABOLIC PANEL: CPT

## 2020-01-01 PROCEDURE — 83735 ASSAY OF MAGNESIUM: CPT | Performed by: GENERAL PRACTICE

## 2020-01-01 PROCEDURE — 36415 COLL VENOUS BLD VENIPUNCTURE: CPT | Performed by: EMERGENCY MEDICINE

## 2020-01-01 PROCEDURE — NC001 PR NO CHARGE: Performed by: FAMILY MEDICINE

## 2020-01-01 PROCEDURE — 84484 ASSAY OF TROPONIN QUANT: CPT

## 2020-01-01 PROCEDURE — 71045 X-RAY EXAM CHEST 1 VIEW: CPT

## 2020-01-01 PROCEDURE — 99222 1ST HOSP IP/OBS MODERATE 55: CPT | Performed by: NEUROLOGICAL SURGERY

## 2020-01-01 PROCEDURE — 85027 COMPLETE CBC AUTOMATED: CPT | Performed by: FAMILY MEDICINE

## 2020-01-01 PROCEDURE — 99214 OFFICE O/P EST MOD 30 MIN: CPT | Performed by: FAMILY MEDICINE

## 2020-01-01 PROCEDURE — 4040F PNEUMOC VAC/ADMIN/RCVD: CPT | Performed by: FAMILY MEDICINE

## 2020-01-01 PROCEDURE — 80061 LIPID PANEL: CPT | Performed by: NURSE PRACTITIONER

## 2020-01-01 PROCEDURE — 93306 TTE W/DOPPLER COMPLETE: CPT | Performed by: INTERNAL MEDICINE

## 2020-01-01 PROCEDURE — 85379 FIBRIN DEGRADATION QUANT: CPT | Performed by: EMERGENCY MEDICINE

## 2020-01-01 PROCEDURE — 82330 ASSAY OF CALCIUM: CPT

## 2020-01-01 PROCEDURE — 83880 ASSAY OF NATRIURETIC PEPTIDE: CPT | Performed by: NURSE PRACTITIONER

## 2020-01-01 PROCEDURE — 96375 TX/PRO/DX INJ NEW DRUG ADDON: CPT

## 2020-01-01 PROCEDURE — 97168 OT RE-EVAL EST PLAN CARE: CPT

## 2020-01-01 PROCEDURE — 1036F TOBACCO NON-USER: CPT | Performed by: FAMILY MEDICINE

## 2020-01-01 PROCEDURE — 82728 ASSAY OF FERRITIN: CPT | Performed by: HOSPITALIST

## 2020-01-01 PROCEDURE — 84145 PROCALCITONIN (PCT): CPT | Performed by: PHYSICIAN ASSISTANT

## 2020-01-01 PROCEDURE — 83735 ASSAY OF MAGNESIUM: CPT | Performed by: NURSE PRACTITIONER

## 2020-01-01 PROCEDURE — 99285 EMERGENCY DEPT VISIT HI MDM: CPT | Performed by: EMERGENCY MEDICINE

## 2020-01-01 PROCEDURE — 83721 ASSAY OF BLOOD LIPOPROTEIN: CPT | Performed by: NURSE PRACTITIONER

## 2020-01-01 PROCEDURE — 81001 URINALYSIS AUTO W/SCOPE: CPT | Performed by: INTERNAL MEDICINE

## 2020-01-01 PROCEDURE — 84100 ASSAY OF PHOSPHORUS: CPT | Performed by: NURSE PRACTITIONER

## 2020-01-01 PROCEDURE — 83880 ASSAY OF NATRIURETIC PEPTIDE: CPT | Performed by: EMERGENCY MEDICINE

## 2020-01-01 PROCEDURE — 87040 BLOOD CULTURE FOR BACTERIA: CPT | Performed by: PHYSICIAN ASSISTANT

## 2020-01-01 PROCEDURE — 85025 COMPLETE CBC W/AUTO DIFF WBC: CPT | Performed by: EMERGENCY MEDICINE

## 2020-01-01 PROCEDURE — 83550 IRON BINDING TEST: CPT | Performed by: HOSPITALIST

## 2020-01-01 PROCEDURE — U0003 INFECTIOUS AGENT DETECTION BY NUCLEIC ACID (DNA OR RNA); SEVERE ACUTE RESPIRATORY SYNDROME CORONAVIRUS 2 (SARS-COV-2) (CORONAVIRUS DISEASE [COVID-19]), AMPLIFIED PROBE TECHNIQUE, MAKING USE OF HIGH THROUGHPUT TECHNOLOGIES AS DESCRIBED BY CMS-2020-01-R: HCPCS | Performed by: EMERGENCY MEDICINE

## 2020-01-01 PROCEDURE — 85007 BL SMEAR W/DIFF WBC COUNT: CPT | Performed by: INTERNAL MEDICINE

## 2020-01-01 PROCEDURE — 96374 THER/PROPH/DIAG INJ IV PUSH: CPT

## 2020-01-01 PROCEDURE — 84484 ASSAY OF TROPONIN QUANT: CPT | Performed by: EMERGENCY MEDICINE

## 2020-01-01 PROCEDURE — 87077 CULTURE AEROBIC IDENTIFY: CPT | Performed by: PHYSICIAN ASSISTANT

## 2020-01-01 PROCEDURE — 97535 SELF CARE MNGMENT TRAINING: CPT

## 2020-01-01 PROCEDURE — 72052 X-RAY EXAM NECK SPINE 6/>VWS: CPT

## 2020-01-01 PROCEDURE — 84100 ASSAY OF PHOSPHORUS: CPT | Performed by: EMERGENCY MEDICINE

## 2020-01-01 PROCEDURE — 99239 HOSP IP/OBS DSCHRG MGMT >30: CPT | Performed by: FAMILY MEDICINE

## 2020-01-01 PROCEDURE — 84484 ASSAY OF TROPONIN QUANT: CPT | Performed by: PHYSICIAN ASSISTANT

## 2020-01-01 PROCEDURE — 80307 DRUG TEST PRSMV CHEM ANLYZR: CPT | Performed by: INTERNAL MEDICINE

## 2020-01-01 PROCEDURE — 3008F BODY MASS INDEX DOCD: CPT | Performed by: FAMILY MEDICINE

## 2020-01-01 PROCEDURE — 82728 ASSAY OF FERRITIN: CPT | Performed by: NURSE PRACTITIONER

## 2020-01-01 PROCEDURE — 84540 ASSAY OF URINE/UREA-N: CPT | Performed by: INTERNAL MEDICINE

## 2020-01-01 PROCEDURE — 96367 TX/PROPH/DG ADDL SEQ IV INF: CPT

## 2020-01-01 PROCEDURE — 83540 ASSAY OF IRON: CPT | Performed by: NURSE PRACTITIONER

## 2020-01-01 PROCEDURE — 74177 CT ABD & PELVIS W/CONTRAST: CPT

## 2020-01-01 PROCEDURE — 94760 N-INVAS EAR/PLS OXIMETRY 1: CPT

## 2020-01-01 PROCEDURE — 71250 CT THORAX DX C-: CPT

## 2020-01-01 PROCEDURE — 93306 TTE W/DOPPLER COMPLETE: CPT

## 2020-01-01 RX ORDER — ACETAMINOPHEN 650 MG/1
650 SUPPOSITORY RECTAL EVERY 6 HOURS PRN
Status: DISCONTINUED | OUTPATIENT
Start: 2020-01-01 | End: 2020-01-01 | Stop reason: HOSPADM

## 2020-01-01 RX ORDER — AMIODARONE HYDROCHLORIDE 200 MG/1
200 TABLET ORAL DAILY
Qty: 30 TABLET | Refills: 0 | Status: SHIPPED | OUTPATIENT
Start: 2020-01-01 | End: 2020-01-01 | Stop reason: HOSPADM

## 2020-01-01 RX ORDER — ACETAMINOPHEN 650 MG/1
650 SUPPOSITORY RECTAL EVERY 4 HOURS PRN
Status: DISCONTINUED | OUTPATIENT
Start: 2020-01-01 | End: 2020-01-01

## 2020-01-01 RX ORDER — FUROSEMIDE 10 MG/ML
40 INJECTION INTRAMUSCULAR; INTRAVENOUS
Status: DISCONTINUED | OUTPATIENT
Start: 2020-01-01 | End: 2020-01-01

## 2020-01-01 RX ORDER — LISINOPRIL 5 MG/1
5 TABLET ORAL DAILY
Status: DISCONTINUED | OUTPATIENT
Start: 2020-01-01 | End: 2020-01-01

## 2020-01-01 RX ORDER — GABAPENTIN 300 MG/1
300 CAPSULE ORAL
Status: DISCONTINUED | OUTPATIENT
Start: 2020-01-01 | End: 2020-01-01

## 2020-01-01 RX ORDER — HYDROMORPHONE HCL/PF 1 MG/ML
1 SYRINGE (ML) INJECTION
Status: DISCONTINUED | OUTPATIENT
Start: 2020-01-01 | End: 2020-01-01 | Stop reason: HOSPADM

## 2020-01-01 RX ORDER — VALACYCLOVIR HYDROCHLORIDE 1 G/1
1000 TABLET, FILM COATED ORAL EVERY 12 HOURS SCHEDULED
Status: COMPLETED | OUTPATIENT
Start: 2020-01-01 | End: 2020-01-01

## 2020-01-01 RX ORDER — GABAPENTIN 100 MG/1
100 CAPSULE ORAL DAILY
Status: DISCONTINUED | OUTPATIENT
Start: 2020-01-01 | End: 2020-01-01

## 2020-01-01 RX ORDER — FLUOXETINE HYDROCHLORIDE 20 MG/1
20 CAPSULE ORAL DAILY
Status: DISCONTINUED | OUTPATIENT
Start: 2020-01-01 | End: 2020-01-01

## 2020-01-01 RX ORDER — FERROUS SULFATE 325(65) MG
TABLET ORAL
Qty: 30 TABLET | Refills: 2 | Status: SHIPPED | OUTPATIENT
Start: 2020-01-01 | End: 2020-01-01

## 2020-01-01 RX ORDER — OXYCODONE HYDROCHLORIDE 5 MG/1
5 TABLET ORAL
Status: DISCONTINUED | OUTPATIENT
Start: 2020-01-01 | End: 2020-01-01

## 2020-01-01 RX ORDER — ASCORBIC ACID 500 MG
TABLET ORAL
Qty: 30 TABLET | Refills: 5 | Status: SHIPPED | OUTPATIENT
Start: 2020-01-01 | End: 2020-01-01 | Stop reason: HOSPADM

## 2020-01-01 RX ORDER — ATORVASTATIN CALCIUM 20 MG/1
20 TABLET, FILM COATED ORAL
Qty: 30 TABLET | Refills: 5 | Status: ON HOLD | OUTPATIENT
Start: 2020-01-01 | End: 2020-01-01 | Stop reason: SDUPTHER

## 2020-01-01 RX ORDER — OXYCODONE HYDROCHLORIDE 5 MG/1
7.5 TABLET ORAL
Status: DISCONTINUED | OUTPATIENT
Start: 2020-01-01 | End: 2020-01-01

## 2020-01-01 RX ORDER — FLUOXETINE HYDROCHLORIDE 20 MG/1
20 CAPSULE ORAL DAILY
Status: DISCONTINUED | OUTPATIENT
Start: 2020-01-01 | End: 2020-01-01 | Stop reason: HOSPADM

## 2020-01-01 RX ORDER — LIDOCAINE 50 MG/G
1 PATCH TOPICAL DAILY
Status: DISCONTINUED | OUTPATIENT
Start: 2020-01-01 | End: 2020-01-01

## 2020-01-01 RX ORDER — OXYCODONE HYDROCHLORIDE 5 MG/1
2.5 TABLET ORAL EVERY 6 HOURS PRN
Status: DISCONTINUED | OUTPATIENT
Start: 2020-01-01 | End: 2020-01-01

## 2020-01-01 RX ORDER — CLOPIDOGREL BISULFATE 75 MG/1
75 TABLET ORAL DAILY
Status: DISCONTINUED | OUTPATIENT
Start: 2020-01-01 | End: 2020-01-01

## 2020-01-01 RX ORDER — LORAZEPAM 2 MG/ML
1 INJECTION INTRAMUSCULAR
Status: DISCONTINUED | OUTPATIENT
Start: 2020-01-01 | End: 2020-01-01 | Stop reason: HOSPADM

## 2020-01-01 RX ORDER — AMIODARONE HYDROCHLORIDE 50 MG/ML
INJECTION, SOLUTION INTRAVENOUS CODE/TRAUMA/SEDATION MEDICATION
Status: COMPLETED | OUTPATIENT
Start: 2020-01-01 | End: 2020-01-01

## 2020-01-01 RX ORDER — SODIUM CHLORIDE, SODIUM GLUCONATE, SODIUM ACETATE, POTASSIUM CHLORIDE, MAGNESIUM CHLORIDE, SODIUM PHOSPHATE, DIBASIC, AND POTASSIUM PHOSPHATE .53; .5; .37; .037; .03; .012; .00082 G/100ML; G/100ML; G/100ML; G/100ML; G/100ML; G/100ML; G/100ML
1000 INJECTION, SOLUTION INTRAVENOUS ONCE
Status: DISCONTINUED | OUTPATIENT
Start: 2020-01-01 | End: 2020-01-01

## 2020-01-01 RX ORDER — CLOPIDOGREL BISULFATE 75 MG/1
75 TABLET ORAL DAILY
Qty: 30 TABLET | Refills: 0 | Status: SHIPPED | OUTPATIENT
Start: 2020-01-01 | End: 2020-01-01 | Stop reason: HOSPADM

## 2020-01-01 RX ORDER — HYDROMORPHONE HCL/PF 1 MG/ML
0.2 SYRINGE (ML) INJECTION EVERY 6 HOURS PRN
Status: DISCONTINUED | OUTPATIENT
Start: 2020-01-01 | End: 2020-01-01

## 2020-01-01 RX ORDER — GABAPENTIN 300 MG/1
300 CAPSULE ORAL DAILY
Status: DISCONTINUED | OUTPATIENT
Start: 2020-01-01 | End: 2020-01-01

## 2020-01-01 RX ORDER — AMIODARONE HYDROCHLORIDE 200 MG/1
TABLET ORAL
Qty: 28 TABLET | Refills: 2 | Status: ON HOLD | OUTPATIENT
Start: 2020-01-01 | End: 2020-01-01 | Stop reason: SDUPTHER

## 2020-01-01 RX ORDER — METOPROLOL SUCCINATE 50 MG/1
50 TABLET, EXTENDED RELEASE ORAL EVERY 12 HOURS
Status: DISCONTINUED | OUTPATIENT
Start: 2020-01-01 | End: 2020-01-01 | Stop reason: HOSPADM

## 2020-01-01 RX ORDER — FOLIC ACID 1 MG/1
1000 TABLET ORAL DAILY
Status: DISCONTINUED | OUTPATIENT
Start: 2020-01-01 | End: 2020-01-01

## 2020-01-01 RX ORDER — PANTOPRAZOLE SODIUM 40 MG/1
40 TABLET, DELAYED RELEASE ORAL
Status: DISCONTINUED | OUTPATIENT
Start: 2020-01-01 | End: 2020-01-01 | Stop reason: HOSPADM

## 2020-01-01 RX ORDER — LISINOPRIL 2.5 MG/1
2.5 TABLET ORAL DAILY
Qty: 30 TABLET | Refills: 0 | Status: SHIPPED | OUTPATIENT
Start: 2020-01-01 | End: 2020-01-01 | Stop reason: SDUPTHER

## 2020-01-01 RX ORDER — FUROSEMIDE 20 MG/1
20 TABLET ORAL DAILY
Status: DISCONTINUED | OUTPATIENT
Start: 2020-01-01 | End: 2020-01-01

## 2020-01-01 RX ORDER — CLOPIDOGREL BISULFATE 75 MG/1
75 TABLET ORAL DAILY
Status: DISCONTINUED | OUTPATIENT
Start: 2020-01-01 | End: 2020-01-01 | Stop reason: HOSPADM

## 2020-01-01 RX ORDER — METOPROLOL SUCCINATE 50 MG/1
50 TABLET, EXTENDED RELEASE ORAL EVERY 12 HOURS SCHEDULED
Status: DISCONTINUED | OUTPATIENT
Start: 2020-01-01 | End: 2020-01-01

## 2020-01-01 RX ORDER — BISACODYL 10 MG
10 SUPPOSITORY, RECTAL RECTAL DAILY PRN
Status: DISCONTINUED | OUTPATIENT
Start: 2020-01-01 | End: 2020-01-01 | Stop reason: HOSPADM

## 2020-01-01 RX ORDER — ACETAMINOPHEN 325 MG/1
650 TABLET ORAL EVERY 6 HOURS
Status: DISCONTINUED | OUTPATIENT
Start: 2020-01-01 | End: 2020-01-01

## 2020-01-01 RX ORDER — SODIUM CHLORIDE 9 MG/ML
50 INJECTION, SOLUTION INTRAVENOUS CONTINUOUS
Status: DISPENSED | OUTPATIENT
Start: 2020-01-01 | End: 2020-01-01

## 2020-01-01 RX ORDER — DOPAMINE HYDROCHLORIDE 160 MG/100ML
INJECTION, SOLUTION INTRAVENOUS
Status: DISPENSED
Start: 2020-01-01 | End: 2020-01-01

## 2020-01-01 RX ORDER — MAGNESIUM SULFATE HEPTAHYDRATE 40 MG/ML
2 INJECTION, SOLUTION INTRAVENOUS ONCE
Status: COMPLETED | OUTPATIENT
Start: 2020-01-01 | End: 2020-01-01

## 2020-01-01 RX ORDER — METOPROLOL SUCCINATE 50 MG/1
50 TABLET, EXTENDED RELEASE ORAL EVERY 12 HOURS
Status: DISCONTINUED | OUTPATIENT
Start: 2020-01-01 | End: 2020-01-01

## 2020-01-01 RX ORDER — METOPROLOL TARTRATE 50 MG/1
50 TABLET, FILM COATED ORAL EVERY 12 HOURS SCHEDULED
Status: DISCONTINUED | OUTPATIENT
Start: 2020-01-01 | End: 2020-01-01

## 2020-01-01 RX ORDER — METHOCARBAMOL 500 MG/1
250 TABLET, FILM COATED ORAL EVERY 8 HOURS SCHEDULED
Status: DISCONTINUED | OUTPATIENT
Start: 2020-01-01 | End: 2020-01-01

## 2020-01-01 RX ORDER — OXYCODONE HYDROCHLORIDE 5 MG/1
5 TABLET ORAL EVERY 6 HOURS PRN
Status: DISCONTINUED | OUTPATIENT
Start: 2020-01-01 | End: 2020-01-01

## 2020-01-01 RX ORDER — HYDROMORPHONE HCL/PF 1 MG/ML
0.2 SYRINGE (ML) INJECTION ONCE
Status: COMPLETED | OUTPATIENT
Start: 2020-01-01 | End: 2020-01-01

## 2020-01-01 RX ORDER — FERROUS SULFATE 325(65) MG
325 TABLET ORAL
Status: DISCONTINUED | OUTPATIENT
Start: 2020-01-01 | End: 2020-01-01 | Stop reason: HOSPADM

## 2020-01-01 RX ORDER — POTASSIUM CHLORIDE 20 MEQ/1
20 TABLET, EXTENDED RELEASE ORAL ONCE
Status: COMPLETED | OUTPATIENT
Start: 2020-01-01 | End: 2020-01-01

## 2020-01-01 RX ORDER — LIDOCAINE 50 MG/G
1 PATCH TOPICAL ONCE
Status: COMPLETED | OUTPATIENT
Start: 2020-01-01 | End: 2020-01-01

## 2020-01-01 RX ORDER — AMOXICILLIN 250 MG
1 CAPSULE ORAL
Status: DISCONTINUED | OUTPATIENT
Start: 2020-01-01 | End: 2020-01-01 | Stop reason: HOSPADM

## 2020-01-01 RX ORDER — ALPRAZOLAM 0.25 MG/1
0.25 TABLET ORAL
Status: COMPLETED | OUTPATIENT
Start: 2020-01-01 | End: 2020-01-01

## 2020-01-01 RX ORDER — ONDANSETRON 2 MG/ML
4 INJECTION INTRAMUSCULAR; INTRAVENOUS EVERY 6 HOURS PRN
Status: DISCONTINUED | OUTPATIENT
Start: 2020-01-01 | End: 2020-01-01 | Stop reason: HOSPADM

## 2020-01-01 RX ORDER — TRAMADOL HYDROCHLORIDE 50 MG/1
50 TABLET ORAL EVERY 6 HOURS PRN
Status: DISCONTINUED | OUTPATIENT
Start: 2020-01-01 | End: 2020-01-01 | Stop reason: HOSPADM

## 2020-01-01 RX ORDER — ACETAMINOPHEN 325 MG/1
975 TABLET ORAL EVERY 8 HOURS SCHEDULED
Status: DISCONTINUED | OUTPATIENT
Start: 2020-01-01 | End: 2020-01-01

## 2020-01-01 RX ORDER — AMIODARONE HYDROCHLORIDE 200 MG/1
200 TABLET ORAL DAILY
Status: DISCONTINUED | OUTPATIENT
Start: 2020-01-01 | End: 2020-01-01

## 2020-01-01 RX ORDER — POTASSIUM CHLORIDE 20 MEQ/1
20 TABLET, EXTENDED RELEASE ORAL DAILY
Qty: 30 TABLET | Refills: 0 | Status: SHIPPED | OUTPATIENT
Start: 2020-01-01 | End: 2020-01-01 | Stop reason: HOSPADM

## 2020-01-01 RX ORDER — METHOCARBAMOL 500 MG/1
250 TABLET, FILM COATED ORAL 2 TIMES DAILY PRN
Status: DISCONTINUED | OUTPATIENT
Start: 2020-01-01 | End: 2020-01-01

## 2020-01-01 RX ORDER — GLYCOPYRROLATE 0.2 MG/ML
0.1 INJECTION INTRAMUSCULAR; INTRAVENOUS
Status: DISCONTINUED | OUTPATIENT
Start: 2020-01-01 | End: 2020-01-01 | Stop reason: HOSPADM

## 2020-01-01 RX ORDER — OXYCODONE HYDROCHLORIDE 5 MG/1
5 TABLET ORAL EVERY 6 HOURS
Status: DISCONTINUED | OUTPATIENT
Start: 2020-01-01 | End: 2020-01-01

## 2020-01-01 RX ORDER — THIAMINE MONONITRATE (VIT B1) 100 MG
100 TABLET ORAL DAILY
Status: DISCONTINUED | OUTPATIENT
Start: 2020-01-01 | End: 2020-01-01 | Stop reason: HOSPADM

## 2020-01-01 RX ORDER — OXYCODONE HYDROCHLORIDE 5 MG/1
5 TABLET ORAL EVERY 4 HOURS PRN
Status: DISCONTINUED | OUTPATIENT
Start: 2020-01-01 | End: 2020-01-01

## 2020-01-01 RX ORDER — HALOPERIDOL 5 MG/ML
1 INJECTION INTRAMUSCULAR EVERY 6 HOURS PRN
Status: DISCONTINUED | OUTPATIENT
Start: 2020-01-01 | End: 2020-01-01 | Stop reason: HOSPADM

## 2020-01-01 RX ORDER — POTASSIUM CHLORIDE 20 MEQ/1
40 TABLET, EXTENDED RELEASE ORAL ONCE
Status: COMPLETED | OUTPATIENT
Start: 2020-01-01 | End: 2020-01-01

## 2020-01-01 RX ORDER — FUROSEMIDE 40 MG/1
40 TABLET ORAL DAILY
Status: DISCONTINUED | OUTPATIENT
Start: 2020-01-01 | End: 2020-01-01

## 2020-01-01 RX ORDER — FUROSEMIDE 10 MG/ML
40 INJECTION INTRAMUSCULAR; INTRAVENOUS ONCE
Status: COMPLETED | OUTPATIENT
Start: 2020-01-01 | End: 2020-01-01

## 2020-01-01 RX ORDER — HYDROMORPHONE HCL/PF 1 MG/ML
0.5 SYRINGE (ML) INJECTION
Status: DISCONTINUED | OUTPATIENT
Start: 2020-01-01 | End: 2020-01-01

## 2020-01-01 RX ORDER — LORAZEPAM 2 MG/ML
1 INJECTION INTRAMUSCULAR EVERY 4 HOURS PRN
Status: DISCONTINUED | OUTPATIENT
Start: 2020-01-01 | End: 2020-01-01 | Stop reason: HOSPADM

## 2020-01-01 RX ORDER — PANTOPRAZOLE SODIUM 40 MG/1
40 TABLET, DELAYED RELEASE ORAL
Status: DISCONTINUED | OUTPATIENT
Start: 2020-01-01 | End: 2020-01-01

## 2020-01-01 RX ORDER — NORTRIPTYLINE HYDROCHLORIDE 10 MG/1
CAPSULE ORAL
Qty: 30 CAPSULE | Refills: 5 | Status: ON HOLD | OUTPATIENT
Start: 2020-01-01 | End: 2020-01-01 | Stop reason: SDUPTHER

## 2020-01-01 RX ORDER — AMIODARONE HYDROCHLORIDE 200 MG/1
200 TABLET ORAL
Status: DISCONTINUED | OUTPATIENT
Start: 2020-01-01 | End: 2020-01-01 | Stop reason: HOSPADM

## 2020-01-01 RX ORDER — LISINOPRIL 5 MG/1
5 TABLET ORAL DAILY
Qty: 30 TABLET | Refills: 3 | Status: SHIPPED | OUTPATIENT
Start: 2020-01-01 | End: 2020-01-01

## 2020-01-01 RX ORDER — HYDROMORPHONE HCL/PF 1 MG/ML
0.5 SYRINGE (ML) INJECTION ONCE
Status: COMPLETED | OUTPATIENT
Start: 2020-01-01 | End: 2020-01-01

## 2020-01-01 RX ORDER — DIGOXIN 125 MCG
125 TABLET ORAL DAILY
Status: DISCONTINUED | OUTPATIENT
Start: 2020-01-01 | End: 2020-01-01 | Stop reason: HOSPADM

## 2020-01-01 RX ORDER — ACETAMINOPHEN 325 MG/1
650 TABLET ORAL
Status: DISCONTINUED | OUTPATIENT
Start: 2020-01-01 | End: 2020-01-01

## 2020-01-01 RX ORDER — GABAPENTIN 100 MG/1
200 CAPSULE ORAL
Status: DISCONTINUED | OUTPATIENT
Start: 2020-01-01 | End: 2020-01-01

## 2020-01-01 RX ORDER — ATORVASTATIN CALCIUM 20 MG/1
20 TABLET, FILM COATED ORAL
Status: DISCONTINUED | OUTPATIENT
Start: 2020-01-01 | End: 2020-01-01 | Stop reason: HOSPADM

## 2020-01-01 RX ORDER — ZOLPIDEM TARTRATE 5 MG/1
5 TABLET ORAL
Status: DISCONTINUED | OUTPATIENT
Start: 2020-01-01 | End: 2020-01-01

## 2020-01-01 RX ORDER — DIGOXIN 125 MCG
125 TABLET ORAL DAILY
Qty: 30 TABLET | Refills: 0 | Status: SHIPPED | OUTPATIENT
Start: 2020-01-01 | End: 2020-01-01 | Stop reason: HOSPADM

## 2020-01-01 RX ORDER — HYDROMORPHONE HCL/PF 1 MG/ML
SYRINGE (ML) INJECTION
Status: COMPLETED
Start: 2020-01-01 | End: 2020-01-01

## 2020-01-01 RX ORDER — ATORVASTATIN CALCIUM 20 MG/1
20 TABLET, FILM COATED ORAL
Status: DISCONTINUED | OUTPATIENT
Start: 2020-01-01 | End: 2020-01-01

## 2020-01-01 RX ORDER — NORTRIPTYLINE HYDROCHLORIDE 10 MG/1
10 CAPSULE ORAL
Qty: 30 CAPSULE | Refills: 0 | Status: SHIPPED | OUTPATIENT
Start: 2020-01-01 | End: 2020-01-01 | Stop reason: HOSPADM

## 2020-01-01 RX ORDER — FUROSEMIDE 40 MG/1
40 TABLET ORAL DAILY
Qty: 30 TABLET | Refills: 5 | Status: ON HOLD | OUTPATIENT
Start: 2020-01-01 | End: 2020-01-01 | Stop reason: SDUPTHER

## 2020-01-01 RX ORDER — FLUOXETINE HYDROCHLORIDE 20 MG/1
20 CAPSULE ORAL DAILY
Qty: 30 CAPSULE | Refills: 0 | Status: SHIPPED | OUTPATIENT
Start: 2020-01-01 | End: 2020-01-01 | Stop reason: HOSPADM

## 2020-01-01 RX ORDER — ACETAMINOPHEN 325 MG/1
975 TABLET ORAL EVERY 8 HOURS SCHEDULED
Status: DISCONTINUED | OUTPATIENT
Start: 2020-01-01 | End: 2020-01-01 | Stop reason: HOSPADM

## 2020-01-01 RX ORDER — LISINOPRIL 2.5 MG/1
2.5 TABLET ORAL
Status: DISCONTINUED | OUTPATIENT
Start: 2020-01-01 | End: 2020-01-01

## 2020-01-01 RX ORDER — PANTOPRAZOLE SODIUM 40 MG/1
40 TABLET, DELAYED RELEASE ORAL
Qty: 60 TABLET | Refills: 0 | Status: SHIPPED | OUTPATIENT
Start: 2020-01-01 | End: 2020-01-01 | Stop reason: HOSPADM

## 2020-01-01 RX ORDER — GABAPENTIN 100 MG/1
100 CAPSULE ORAL 2 TIMES DAILY
Status: DISCONTINUED | OUTPATIENT
Start: 2020-01-01 | End: 2020-01-01

## 2020-01-01 RX ORDER — OXYCODONE HYDROCHLORIDE 5 MG/1
5 TABLET ORAL EVERY 6 HOURS PRN
Status: DISCONTINUED | OUTPATIENT
Start: 2020-01-01 | End: 2020-01-01 | Stop reason: HOSPADM

## 2020-01-01 RX ORDER — METOPROLOL SUCCINATE 50 MG/1
50 TABLET, EXTENDED RELEASE ORAL EVERY 12 HOURS SCHEDULED
Qty: 60 TABLET | Refills: 5 | Status: SHIPPED | OUTPATIENT
Start: 2020-01-01 | End: 2020-01-01 | Stop reason: HOSPADM

## 2020-01-01 RX ORDER — DOCUSATE SODIUM 100 MG/1
100 CAPSULE, LIQUID FILLED ORAL 2 TIMES DAILY
Status: DISCONTINUED | OUTPATIENT
Start: 2020-01-01 | End: 2020-01-01

## 2020-01-01 RX ORDER — LISINOPRIL 2.5 MG/1
2.5 TABLET ORAL DAILY
Qty: 30 TABLET | Refills: 0 | Status: SHIPPED | OUTPATIENT
Start: 2020-01-01 | End: 2020-01-01 | Stop reason: HOSPADM

## 2020-01-01 RX ORDER — FOLIC ACID 1 MG/1
1000 TABLET ORAL DAILY
Status: DISCONTINUED | OUTPATIENT
Start: 2020-01-01 | End: 2020-01-01 | Stop reason: HOSPADM

## 2020-01-01 RX ORDER — FERROUS SULFATE 325(65) MG
TABLET ORAL
Qty: 28 TABLET | Refills: 2 | Status: SHIPPED | OUTPATIENT
Start: 2020-01-01 | End: 2020-01-01 | Stop reason: HOSPADM

## 2020-01-01 RX ORDER — PANTOPRAZOLE SODIUM 40 MG/1
40 TABLET, DELAYED RELEASE ORAL
Qty: 60 TABLET | Refills: 0 | Status: SHIPPED | OUTPATIENT
Start: 2020-01-01 | End: 2020-01-01 | Stop reason: SDUPTHER

## 2020-01-01 RX ORDER — METHOCARBAMOL 500 MG/1
500 TABLET, FILM COATED ORAL ONCE AS NEEDED
Status: DISCONTINUED | OUTPATIENT
Start: 2020-01-01 | End: 2020-01-01

## 2020-01-01 RX ORDER — FOLIC ACID 1 MG/1
TABLET ORAL
Qty: 30 TABLET | Refills: 5 | Status: SHIPPED | OUTPATIENT
Start: 2020-01-01 | End: 2020-01-01 | Stop reason: HOSPADM

## 2020-01-01 RX ORDER — OXYCODONE HYDROCHLORIDE 5 MG/1
2.5 TABLET ORAL EVERY 4 HOURS PRN
Status: DISCONTINUED | OUTPATIENT
Start: 2020-01-01 | End: 2020-01-01

## 2020-01-01 RX ORDER — POTASSIUM CHLORIDE 20 MEQ/1
20 TABLET, EXTENDED RELEASE ORAL DAILY
Status: DISCONTINUED | OUTPATIENT
Start: 2020-01-01 | End: 2020-01-01 | Stop reason: HOSPADM

## 2020-01-01 RX ORDER — DIGOXIN 125 MCG
125 TABLET ORAL DAILY
Status: DISCONTINUED | OUTPATIENT
Start: 2020-01-01 | End: 2020-01-01

## 2020-01-01 RX ORDER — ATORVASTATIN CALCIUM 20 MG/1
20 TABLET, FILM COATED ORAL
Qty: 30 TABLET | Refills: 0 | Status: SHIPPED | OUTPATIENT
Start: 2020-01-01 | End: 2020-01-01 | Stop reason: HOSPADM

## 2020-01-01 RX ORDER — CLOPIDOGREL BISULFATE 75 MG/1
75 TABLET ORAL DAILY
Qty: 30 TABLET | Refills: 5 | Status: ON HOLD | OUTPATIENT
Start: 2020-01-01 | End: 2020-01-01 | Stop reason: SDUPTHER

## 2020-01-01 RX ORDER — FUROSEMIDE 40 MG/1
40 TABLET ORAL DAILY
Qty: 30 TABLET | Refills: 0 | Status: SHIPPED | OUTPATIENT
Start: 2020-01-01 | End: 2020-01-01 | Stop reason: HOSPADM

## 2020-01-01 RX ORDER — ASCORBIC ACID 500 MG
500 TABLET ORAL DAILY
Status: DISCONTINUED | OUTPATIENT
Start: 2020-01-01 | End: 2020-01-01 | Stop reason: HOSPADM

## 2020-01-01 RX ORDER — ACETAMINOPHEN 325 MG/1
975 TABLET ORAL ONCE
Status: COMPLETED | OUTPATIENT
Start: 2020-01-01 | End: 2020-01-01

## 2020-01-01 RX ORDER — DIGOXIN 125 MCG
0.12 TABLET ORAL DAILY
Qty: 30 TABLET | Refills: 5 | Status: SHIPPED | OUTPATIENT
Start: 2020-01-01 | End: 2020-01-01 | Stop reason: HOSPADM

## 2020-01-01 RX ORDER — NORTRIPTYLINE HYDROCHLORIDE 10 MG/1
10 CAPSULE ORAL
Status: DISCONTINUED | OUTPATIENT
Start: 2020-01-01 | End: 2020-01-01 | Stop reason: HOSPADM

## 2020-01-01 RX ORDER — LANOLIN ALCOHOL/MO/W.PET/CERES
6 CREAM (GRAM) TOPICAL
Status: DISCONTINUED | OUTPATIENT
Start: 2020-01-01 | End: 2020-01-01 | Stop reason: HOSPADM

## 2020-01-01 RX ORDER — SODIUM CHLORIDE, SODIUM GLUCONATE, SODIUM ACETATE, POTASSIUM CHLORIDE, MAGNESIUM CHLORIDE, SODIUM PHOSPHATE, DIBASIC, AND POTASSIUM PHOSPHATE .53; .5; .37; .037; .03; .012; .00082 G/100ML; G/100ML; G/100ML; G/100ML; G/100ML; G/100ML; G/100ML
1000 INJECTION, SOLUTION INTRAVENOUS ONCE
Status: COMPLETED | OUTPATIENT
Start: 2020-01-01 | End: 2020-01-01

## 2020-01-01 RX ORDER — PHENYLEPHRINE HYDROCHLORIDE 10 MG/ML
INJECTION INTRAVENOUS
Status: COMPLETED
Start: 2020-01-01 | End: 2020-01-01

## 2020-01-01 RX ORDER — AMIODARONE HYDROCHLORIDE 200 MG/1
TABLET ORAL
Qty: 30 TABLET | Refills: 2 | Status: SHIPPED | OUTPATIENT
Start: 2020-01-01 | End: 2020-01-01

## 2020-01-01 RX ORDER — AMOXICILLIN 250 MG
1 CAPSULE ORAL
Status: DISCONTINUED | OUTPATIENT
Start: 2020-01-01 | End: 2020-01-01

## 2020-01-01 RX ORDER — LANOLIN ALCOHOL/MO/W.PET/CERES
3 CREAM (GRAM) TOPICAL ONCE AS NEEDED
Status: COMPLETED | OUTPATIENT
Start: 2020-01-01 | End: 2020-01-01

## 2020-01-01 RX ORDER — LISINOPRIL 5 MG/1
5 TABLET ORAL DAILY
Qty: 30 TABLET | Refills: 3 | Status: SHIPPED | OUTPATIENT
Start: 2020-01-01 | End: 2020-01-01 | Stop reason: HOSPADM

## 2020-01-01 RX ORDER — LIDOCAINE 50 MG/G
1 PATCH TOPICAL DAILY
Status: DISCONTINUED | OUTPATIENT
Start: 2020-01-01 | End: 2020-01-01 | Stop reason: HOSPADM

## 2020-01-01 RX ORDER — LISINOPRIL 2.5 MG/1
2.5 TABLET ORAL DAILY
Status: DISCONTINUED | OUTPATIENT
Start: 2020-01-01 | End: 2020-01-01 | Stop reason: HOSPADM

## 2020-01-01 RX ORDER — FLUOXETINE HYDROCHLORIDE 20 MG/1
CAPSULE ORAL
Qty: 30 CAPSULE | Refills: 5 | Status: ON HOLD | OUTPATIENT
Start: 2020-01-01 | End: 2020-01-01 | Stop reason: SDUPTHER

## 2020-01-01 RX ORDER — LANOLIN ALCOHOL/MO/W.PET/CERES
6 CREAM (GRAM) TOPICAL
Status: DISCONTINUED | OUTPATIENT
Start: 2020-01-01 | End: 2020-01-01

## 2020-01-01 RX ORDER — FUROSEMIDE 40 MG/1
40 TABLET ORAL DAILY
Status: DISCONTINUED | OUTPATIENT
Start: 2020-01-01 | End: 2020-01-01 | Stop reason: HOSPADM

## 2020-01-01 RX ORDER — METOPROLOL SUCCINATE 50 MG/1
50 TABLET, EXTENDED RELEASE ORAL EVERY 12 HOURS
Qty: 60 TABLET | Refills: 0 | Status: SHIPPED | OUTPATIENT
Start: 2020-01-01 | End: 2020-01-01 | Stop reason: HOSPADM

## 2020-01-01 RX ORDER — NORTRIPTYLINE HYDROCHLORIDE 10 MG/1
10 CAPSULE ORAL
Status: DISCONTINUED | OUTPATIENT
Start: 2020-01-01 | End: 2020-01-01

## 2020-01-01 RX ADMIN — NORTRIPTYLINE HYDROCHLORIDE 10 MG: 10 CAPSULE ORAL at 23:02

## 2020-01-01 RX ADMIN — ACETAMINOPHEN 975 MG: 325 TABLET, FILM COATED ORAL at 06:00

## 2020-01-01 RX ADMIN — ATORVASTATIN CALCIUM 20 MG: 20 TABLET, FILM COATED ORAL at 18:17

## 2020-01-01 RX ADMIN — APIXABAN 5 MG: 5 TABLET, FILM COATED ORAL at 17:00

## 2020-01-01 RX ADMIN — GABAPENTIN 200 MG: 100 CAPSULE ORAL at 21:19

## 2020-01-01 RX ADMIN — SODIUM CHLORIDE 500 ML: 0.9 INJECTION, SOLUTION INTRAVENOUS at 17:31

## 2020-01-01 RX ADMIN — PANTOPRAZOLE SODIUM 40 MG: 40 TABLET, DELAYED RELEASE ORAL at 05:06

## 2020-01-01 RX ADMIN — ACETAMINOPHEN 650 MG: 325 TABLET, FILM COATED ORAL at 11:37

## 2020-01-01 RX ADMIN — SODIUM CHLORIDE, SODIUM GLUCONATE, SODIUM ACETATE, POTASSIUM CHLORIDE, MAGNESIUM CHLORIDE, SODIUM PHOSPHATE, DIBASIC, AND POTASSIUM PHOSPHATE 1000 ML: .53; .5; .37; .037; .03; .012; .00082 INJECTION, SOLUTION INTRAVENOUS at 07:40

## 2020-01-01 RX ADMIN — OXYCODONE HYDROCHLORIDE 5 MG: 5 TABLET ORAL at 23:58

## 2020-01-01 RX ADMIN — DOCUSATE SODIUM 100 MG: 100 CAPSULE, LIQUID FILLED ORAL at 18:00

## 2020-01-01 RX ADMIN — PANTOPRAZOLE SODIUM 40 MG: 40 TABLET, DELAYED RELEASE ORAL at 06:07

## 2020-01-01 RX ADMIN — PANTOPRAZOLE SODIUM 40 MG: 40 TABLET, DELAYED RELEASE ORAL at 16:56

## 2020-01-01 RX ADMIN — Medication 6 MG: at 21:22

## 2020-01-01 RX ADMIN — AMIODARONE HYDROCHLORIDE 200 MG: 200 TABLET ORAL at 09:09

## 2020-01-01 RX ADMIN — Medication 6 MG: at 21:18

## 2020-01-01 RX ADMIN — ACETAMINOPHEN 975 MG: 325 TABLET, FILM COATED ORAL at 22:05

## 2020-01-01 RX ADMIN — INSULIN LISPRO 1 UNITS: 100 INJECTION, SOLUTION INTRAVENOUS; SUBCUTANEOUS at 12:18

## 2020-01-01 RX ADMIN — ACETAMINOPHEN 975 MG: 325 TABLET, FILM COATED ORAL at 13:56

## 2020-01-01 RX ADMIN — OXYCODONE HYDROCHLORIDE 5 MG: 5 TABLET ORAL at 05:22

## 2020-01-01 RX ADMIN — OXYCODONE HYDROCHLORIDE 5 MG: 5 TABLET ORAL at 23:30

## 2020-01-01 RX ADMIN — ACETAMINOPHEN 975 MG: 325 TABLET, FILM COATED ORAL at 03:57

## 2020-01-01 RX ADMIN — APIXABAN 5 MG: 5 TABLET, FILM COATED ORAL at 17:16

## 2020-01-01 RX ADMIN — FUROSEMIDE 40 MG: 10 INJECTION, SOLUTION INTRAMUSCULAR; INTRAVENOUS at 21:44

## 2020-01-01 RX ADMIN — OXYCODONE HYDROCHLORIDE 7.5 MG: 5 TABLET ORAL at 21:54

## 2020-01-01 RX ADMIN — FUROSEMIDE 20 MG: 20 TABLET ORAL at 09:30

## 2020-01-01 RX ADMIN — CLOPIDOGREL BISULFATE 75 MG: 75 TABLET ORAL at 08:41

## 2020-01-01 RX ADMIN — GABAPENTIN 100 MG: 100 CAPSULE ORAL at 17:00

## 2020-01-01 RX ADMIN — ACETAMINOPHEN 650 MG: 325 TABLET, FILM COATED ORAL at 04:36

## 2020-01-01 RX ADMIN — OXYCODONE HYDROCHLORIDE 7.5 MG: 5 TABLET ORAL at 21:23

## 2020-01-01 RX ADMIN — AMIODARONE HYDROCHLORIDE 200 MG: 200 TABLET ORAL at 10:07

## 2020-01-01 RX ADMIN — LORAZEPAM 1 MG: 2 INJECTION INTRAMUSCULAR; INTRAVENOUS at 00:44

## 2020-01-01 RX ADMIN — CLOPIDOGREL BISULFATE 75 MG: 75 TABLET ORAL at 09:09

## 2020-01-01 RX ADMIN — HYDROMORPHONE HYDROCHLORIDE 0.5 MG: 1 INJECTION, SOLUTION INTRAMUSCULAR; INTRAVENOUS; SUBCUTANEOUS at 19:53

## 2020-01-01 RX ADMIN — OXYCODONE HYDROCHLORIDE 5 MG: 5 TABLET ORAL at 05:35

## 2020-01-01 RX ADMIN — GABAPENTIN 100 MG: 100 CAPSULE ORAL at 12:45

## 2020-01-01 RX ADMIN — OXYCODONE HYDROCHLORIDE 7.5 MG: 5 TABLET ORAL at 08:24

## 2020-01-01 RX ADMIN — LIDOCAINE 1 PATCH: 50 PATCH CUTANEOUS at 19:26

## 2020-01-01 RX ADMIN — PANTOPRAZOLE SODIUM 40 MG: 40 TABLET, DELAYED RELEASE ORAL at 04:58

## 2020-01-01 RX ADMIN — ACETAMINOPHEN 975 MG: 325 TABLET, FILM COATED ORAL at 13:38

## 2020-01-01 RX ADMIN — THIAMINE HCL TAB 100 MG 100 MG: 100 TAB at 08:42

## 2020-01-01 RX ADMIN — AMIODARONE HYDROCHLORIDE 200 MG: 200 TABLET ORAL at 09:30

## 2020-01-01 RX ADMIN — OXYCODONE HYDROCHLORIDE 7.5 MG: 5 TABLET ORAL at 22:03

## 2020-01-01 RX ADMIN — FUROSEMIDE 20 MG: 20 TABLET ORAL at 08:40

## 2020-01-01 RX ADMIN — OXYCODONE HYDROCHLORIDE 5 MG: 5 TABLET ORAL at 03:01

## 2020-01-01 RX ADMIN — GABAPENTIN 200 MG: 100 CAPSULE ORAL at 21:50

## 2020-01-01 RX ADMIN — GABAPENTIN 100 MG: 100 CAPSULE ORAL at 09:09

## 2020-01-01 RX ADMIN — DOCUSATE SODIUM 100 MG: 100 CAPSULE, LIQUID FILLED ORAL at 08:24

## 2020-01-01 RX ADMIN — FLUOXETINE 20 MG: 20 CAPSULE ORAL at 08:09

## 2020-01-01 RX ADMIN — ATORVASTATIN CALCIUM 20 MG: 20 TABLET, FILM COATED ORAL at 16:43

## 2020-01-01 RX ADMIN — FLUOXETINE 20 MG: 20 CAPSULE ORAL at 09:38

## 2020-01-01 RX ADMIN — NORTRIPTYLINE HYDROCHLORIDE 10 MG: 10 CAPSULE ORAL at 21:19

## 2020-01-01 RX ADMIN — FOLIC ACID 1000 MCG: 1 TABLET ORAL at 08:25

## 2020-01-01 RX ADMIN — FOLIC ACID 1000 MCG: 1 TABLET ORAL at 09:09

## 2020-01-01 RX ADMIN — APIXABAN 5 MG: 5 TABLET, FILM COATED ORAL at 09:09

## 2020-01-01 RX ADMIN — ZOLPIDEM TARTRATE 5 MG: 5 TABLET, COATED ORAL at 21:20

## 2020-01-01 RX ADMIN — OXYCODONE HYDROCHLORIDE 7.5 MG: 5 TABLET ORAL at 12:46

## 2020-01-01 RX ADMIN — OXYCODONE HYDROCHLORIDE 7.5 MG: 5 TABLET ORAL at 17:03

## 2020-01-01 RX ADMIN — PANTOPRAZOLE SODIUM 40 MG: 40 TABLET, DELAYED RELEASE ORAL at 05:01

## 2020-01-01 RX ADMIN — ACETAMINOPHEN 975 MG: 325 TABLET, FILM COATED ORAL at 14:17

## 2020-01-01 RX ADMIN — FLUOXETINE 20 MG: 20 CAPSULE ORAL at 09:44

## 2020-01-01 RX ADMIN — METOPROLOL SUCCINATE 50 MG: 50 TABLET, EXTENDED RELEASE ORAL at 09:09

## 2020-01-01 RX ADMIN — ACETAMINOPHEN 650 MG: 325 TABLET, FILM COATED ORAL at 11:50

## 2020-01-01 RX ADMIN — METOPROLOL SUCCINATE 50 MG: 50 TABLET, EXTENDED RELEASE ORAL at 09:38

## 2020-01-01 RX ADMIN — APIXABAN 5 MG: 5 TABLET, FILM COATED ORAL at 10:07

## 2020-01-01 RX ADMIN — METOPROLOL SUCCINATE 50 MG: 50 TABLET, EXTENDED RELEASE ORAL at 20:53

## 2020-01-01 RX ADMIN — ACETAMINOPHEN 975 MG: 325 TABLET, FILM COATED ORAL at 05:06

## 2020-01-01 RX ADMIN — ACETAMINOPHEN 975 MG: 325 TABLET, FILM COATED ORAL at 20:08

## 2020-01-01 RX ADMIN — OXYCODONE HYDROCHLORIDE 5 MG: 5 TABLET ORAL at 16:57

## 2020-01-01 RX ADMIN — OXYCODONE HYDROCHLORIDE 5 MG: 5 TABLET ORAL at 19:51

## 2020-01-01 RX ADMIN — PANTOPRAZOLE SODIUM 40 MG: 40 TABLET, DELAYED RELEASE ORAL at 05:31

## 2020-01-01 RX ADMIN — CLOPIDOGREL BISULFATE 75 MG: 75 TABLET ORAL at 08:55

## 2020-01-01 RX ADMIN — OXYCODONE HYDROCHLORIDE 7.5 MG: 5 TABLET ORAL at 16:54

## 2020-01-01 RX ADMIN — Medication 500 MG: at 09:12

## 2020-01-01 RX ADMIN — GABAPENTIN 100 MG: 100 CAPSULE ORAL at 08:55

## 2020-01-01 RX ADMIN — FLUOXETINE 20 MG: 20 CAPSULE ORAL at 08:55

## 2020-01-01 RX ADMIN — PANTOPRAZOLE SODIUM 40 MG: 40 TABLET, DELAYED RELEASE ORAL at 05:34

## 2020-01-01 RX ADMIN — HALOPERIDOL LACTATE 1 MG: 5 INJECTION INTRAMUSCULAR at 21:31

## 2020-01-01 RX ADMIN — SENNOSIDES AND DOCUSATE SODIUM 1 TABLET: 8.6; 5 TABLET ORAL at 21:31

## 2020-01-01 RX ADMIN — ATORVASTATIN CALCIUM 20 MG: 20 TABLET, FILM COATED ORAL at 16:27

## 2020-01-01 RX ADMIN — OXYCODONE HYDROCHLORIDE 7.5 MG: 5 TABLET ORAL at 21:17

## 2020-01-01 RX ADMIN — OXYCODONE HYDROCHLORIDE 5 MG: 5 TABLET ORAL at 00:00

## 2020-01-01 RX ADMIN — PANTOPRAZOLE SODIUM 40 MG: 40 TABLET, DELAYED RELEASE ORAL at 06:28

## 2020-01-01 RX ADMIN — AMIODARONE HYDROCHLORIDE 200 MG: 200 TABLET ORAL at 08:09

## 2020-01-01 RX ADMIN — INSULIN LISPRO 1 UNITS: 100 INJECTION, SOLUTION INTRAVENOUS; SUBCUTANEOUS at 17:29

## 2020-01-01 RX ADMIN — ACETAMINOPHEN 650 MG: 325 TABLET, FILM COATED ORAL at 16:27

## 2020-01-01 RX ADMIN — VALACYCLOVIR HYDROCHLORIDE 1000 MG: 1 TABLET, FILM COATED ORAL at 18:07

## 2020-01-01 RX ADMIN — INSULIN LISPRO 1 UNITS: 100 INJECTION, SOLUTION INTRAVENOUS; SUBCUTANEOUS at 21:27

## 2020-01-01 RX ADMIN — ACETAMINOPHEN 975 MG: 325 TABLET, FILM COATED ORAL at 05:30

## 2020-01-01 RX ADMIN — AMIODARONE HYDROCHLORIDE 200 MG: 200 TABLET ORAL at 08:42

## 2020-01-01 RX ADMIN — FUROSEMIDE 20 MG: 20 TABLET ORAL at 09:44

## 2020-01-01 RX ADMIN — NORTRIPTYLINE HYDROCHLORIDE 10 MG: 10 CAPSULE ORAL at 21:38

## 2020-01-01 RX ADMIN — ATORVASTATIN CALCIUM 20 MG: 20 TABLET, FILM COATED ORAL at 18:06

## 2020-01-01 RX ADMIN — ATORVASTATIN CALCIUM 20 MG: 20 TABLET, FILM COATED ORAL at 16:11

## 2020-01-01 RX ADMIN — HYDROMORPHONE HYDROCHLORIDE 0.5 MG: 1 INJECTION, SOLUTION INTRAMUSCULAR; INTRAVENOUS; SUBCUTANEOUS at 10:47

## 2020-01-01 RX ADMIN — FLUOXETINE 20 MG: 20 CAPSULE ORAL at 09:24

## 2020-01-01 RX ADMIN — CLOPIDOGREL BISULFATE 75 MG: 75 TABLET ORAL at 10:00

## 2020-01-01 RX ADMIN — APIXABAN 5 MG: 5 TABLET, FILM COATED ORAL at 08:25

## 2020-01-01 RX ADMIN — AMIODARONE HYDROCHLORIDE 200 MG: 200 TABLET ORAL at 08:21

## 2020-01-01 RX ADMIN — Medication 6 MG: at 21:21

## 2020-01-01 RX ADMIN — INSULIN LISPRO 1 UNITS: 100 INJECTION, SOLUTION INTRAVENOUS; SUBCUTANEOUS at 10:56

## 2020-01-01 RX ADMIN — FLUOXETINE 20 MG: 20 CAPSULE ORAL at 08:34

## 2020-01-01 RX ADMIN — SENNOSIDES AND DOCUSATE SODIUM 1 TABLET: 8.6; 5 TABLET ORAL at 21:40

## 2020-01-01 RX ADMIN — FLUOXETINE 20 MG: 20 CAPSULE ORAL at 08:40

## 2020-01-01 RX ADMIN — MELATONIN 6 MG: at 22:07

## 2020-01-01 RX ADMIN — OXYCODONE HYDROCHLORIDE 5 MG: 5 TABLET ORAL at 05:01

## 2020-01-01 RX ADMIN — CEFEPIME HYDROCHLORIDE 2000 MG: 2 INJECTION, POWDER, FOR SOLUTION INTRAVENOUS at 08:31

## 2020-01-01 RX ADMIN — ACETAMINOPHEN 650 MG: 325 TABLET, FILM COATED ORAL at 08:24

## 2020-01-01 RX ADMIN — THIAMINE HCL TAB 100 MG 100 MG: 100 TAB at 08:17

## 2020-01-01 RX ADMIN — APIXABAN 5 MG: 5 TABLET, FILM COATED ORAL at 17:02

## 2020-01-01 RX ADMIN — HYDROMORPHONE HYDROCHLORIDE 1 MG: 1 INJECTION, SOLUTION INTRAMUSCULAR; INTRAVENOUS; SUBCUTANEOUS at 04:44

## 2020-01-01 RX ADMIN — INSULIN LISPRO 1 UNITS: 100 INJECTION, SOLUTION INTRAVENOUS; SUBCUTANEOUS at 17:34

## 2020-01-01 RX ADMIN — METOPROLOL SUCCINATE 50 MG: 50 TABLET, EXTENDED RELEASE ORAL at 10:47

## 2020-01-01 RX ADMIN — Medication 6 MG: at 21:20

## 2020-01-01 RX ADMIN — FLUOXETINE 20 MG: 20 CAPSULE ORAL at 08:21

## 2020-01-01 RX ADMIN — METOPROLOL SUCCINATE 50 MG: 50 TABLET, EXTENDED RELEASE ORAL at 09:29

## 2020-01-01 RX ADMIN — GABAPENTIN 200 MG: 100 CAPSULE ORAL at 23:02

## 2020-01-01 RX ADMIN — DIGOXIN 125 MCG: 125 TABLET ORAL at 10:07

## 2020-01-01 RX ADMIN — GABAPENTIN 200 MG: 100 CAPSULE ORAL at 21:18

## 2020-01-01 RX ADMIN — LORAZEPAM 1 MG: 2 INJECTION INTRAMUSCULAR; INTRAVENOUS at 16:06

## 2020-01-01 RX ADMIN — ACETAMINOPHEN 975 MG: 325 TABLET, FILM COATED ORAL at 06:48

## 2020-01-01 RX ADMIN — METHOCARBAMOL TABLETS 250 MG: 500 TABLET, COATED ORAL at 14:32

## 2020-01-01 RX ADMIN — APIXABAN 5 MG: 5 TABLET, FILM COATED ORAL at 17:57

## 2020-01-01 RX ADMIN — INSULIN LISPRO 1 UNITS: 100 INJECTION, SOLUTION INTRAVENOUS; SUBCUTANEOUS at 12:46

## 2020-01-01 RX ADMIN — HYDROMORPHONE HYDROCHLORIDE 0.5 MG: 1 INJECTION, SOLUTION INTRAMUSCULAR; INTRAVENOUS; SUBCUTANEOUS at 01:23

## 2020-01-01 RX ADMIN — ACETAMINOPHEN 975 MG: 325 TABLET, FILM COATED ORAL at 05:20

## 2020-01-01 RX ADMIN — GABAPENTIN 200 MG: 100 CAPSULE ORAL at 22:02

## 2020-01-01 RX ADMIN — FERROUS SULFATE TAB 325 MG (65 MG ELEMENTAL FE) 325 MG: 325 (65 FE) TAB at 08:18

## 2020-01-01 RX ADMIN — METOPROLOL SUCCINATE 50 MG: 50 TABLET, EXTENDED RELEASE ORAL at 22:02

## 2020-01-01 RX ADMIN — OXYCODONE HYDROCHLORIDE 2.5 MG: 5 TABLET ORAL at 20:52

## 2020-01-01 RX ADMIN — APIXABAN 5 MG: 5 TABLET, FILM COATED ORAL at 09:23

## 2020-01-01 RX ADMIN — GABAPENTIN 100 MG: 100 CAPSULE ORAL at 08:40

## 2020-01-01 RX ADMIN — APIXABAN 5 MG: 5 TABLET, FILM COATED ORAL at 09:29

## 2020-01-01 RX ADMIN — APIXABAN 5 MG: 5 TABLET, FILM COATED ORAL at 17:26

## 2020-01-01 RX ADMIN — FUROSEMIDE 40 MG: 10 INJECTION, SOLUTION INTRAMUSCULAR; INTRAVENOUS at 10:07

## 2020-01-01 RX ADMIN — OXYCODONE HYDROCHLORIDE 5 MG: 5 TABLET ORAL at 06:25

## 2020-01-01 RX ADMIN — ACETAMINOPHEN 975 MG: 325 TABLET, FILM COATED ORAL at 14:33

## 2020-01-01 RX ADMIN — FOLIC ACID 1000 MCG: 1 TABLET ORAL at 08:55

## 2020-01-01 RX ADMIN — CLOPIDOGREL BISULFATE 75 MG: 75 TABLET ORAL at 09:14

## 2020-01-01 RX ADMIN — OXYCODONE HYDROCHLORIDE 5 MG: 5 TABLET ORAL at 20:08

## 2020-01-01 RX ADMIN — PANTOPRAZOLE SODIUM 40 MG: 40 TABLET, DELAYED RELEASE ORAL at 16:14

## 2020-01-01 RX ADMIN — APIXABAN 5 MG: 5 TABLET, FILM COATED ORAL at 08:08

## 2020-01-01 RX ADMIN — INSULIN LISPRO 1 UNITS: 100 INJECTION, SOLUTION INTRAVENOUS; SUBCUTANEOUS at 21:55

## 2020-01-01 RX ADMIN — INSULIN LISPRO 1 UNITS: 100 INJECTION, SOLUTION INTRAVENOUS; SUBCUTANEOUS at 21:52

## 2020-01-01 RX ADMIN — LORAZEPAM 1 MG: 2 INJECTION INTRAMUSCULAR; INTRAVENOUS at 22:13

## 2020-01-01 RX ADMIN — NORTRIPTYLINE HYDROCHLORIDE 10 MG: 10 CAPSULE ORAL at 21:49

## 2020-01-01 RX ADMIN — NORTRIPTYLINE HYDROCHLORIDE 10 MG: 10 CAPSULE ORAL at 21:40

## 2020-01-01 RX ADMIN — ACETAMINOPHEN 975 MG: 325 TABLET, FILM COATED ORAL at 06:45

## 2020-01-01 RX ADMIN — OXYCODONE HYDROCHLORIDE 5 MG: 5 TABLET ORAL at 06:08

## 2020-01-01 RX ADMIN — CLOPIDOGREL BISULFATE 75 MG: 75 TABLET ORAL at 08:08

## 2020-01-01 RX ADMIN — VALACYCLOVIR HYDROCHLORIDE 1000 MG: 1 TABLET, FILM COATED ORAL at 20:31

## 2020-01-01 RX ADMIN — CLOPIDOGREL BISULFATE 75 MG: 75 TABLET ORAL at 08:35

## 2020-01-01 RX ADMIN — HYDROMORPHONE HYDROCHLORIDE 0.5 MG: 1 INJECTION, SOLUTION INTRAMUSCULAR; INTRAVENOUS; SUBCUTANEOUS at 18:19

## 2020-01-01 RX ADMIN — OXYCODONE HYDROCHLORIDE 5 MG: 5 TABLET ORAL at 05:34

## 2020-01-01 RX ADMIN — OXYCODONE HYDROCHLORIDE 7.5 MG: 5 TABLET ORAL at 08:09

## 2020-01-01 RX ADMIN — INSULIN LISPRO 1 UNITS: 100 INJECTION, SOLUTION INTRAVENOUS; SUBCUTANEOUS at 12:22

## 2020-01-01 RX ADMIN — OXYCODONE HYDROCHLORIDE 5 MG: 5 TABLET ORAL at 16:53

## 2020-01-01 RX ADMIN — Medication 6 MG: at 21:10

## 2020-01-01 RX ADMIN — OXYCODONE HYDROCHLORIDE 2.5 MG: 5 TABLET ORAL at 09:32

## 2020-01-01 RX ADMIN — HYDROMORPHONE HYDROCHLORIDE: 1 INJECTION, SOLUTION INTRAMUSCULAR; INTRAVENOUS; SUBCUTANEOUS at 01:30

## 2020-01-01 RX ADMIN — SENNOSIDES AND DOCUSATE SODIUM 1 TABLET: 8.6; 5 TABLET ORAL at 21:28

## 2020-01-01 RX ADMIN — INSULIN LISPRO 1 UNITS: 100 INJECTION, SOLUTION INTRAVENOUS; SUBCUTANEOUS at 12:07

## 2020-01-01 RX ADMIN — PHENYLEPHRINE HYDROCHLORIDE 15 MG: 10 INJECTION INTRAVENOUS at 09:41

## 2020-01-01 RX ADMIN — OXYCODONE HYDROCHLORIDE 5 MG: 5 TABLET ORAL at 18:02

## 2020-01-01 RX ADMIN — SENNOSIDES AND DOCUSATE SODIUM 1 TABLET: 8.6; 5 TABLET ORAL at 21:20

## 2020-01-01 RX ADMIN — NORTRIPTYLINE HYDROCHLORIDE 10 MG: 10 CAPSULE ORAL at 22:53

## 2020-01-01 RX ADMIN — DOCUSATE SODIUM 100 MG: 100 CAPSULE, LIQUID FILLED ORAL at 08:08

## 2020-01-01 RX ADMIN — NORTRIPTYLINE HYDROCHLORIDE 10 MG: 10 CAPSULE ORAL at 21:51

## 2020-01-01 RX ADMIN — DIGOXIN 125 MCG: 125 TABLET ORAL at 11:41

## 2020-01-01 RX ADMIN — DIGOXIN 125 MCG: 125 TABLET ORAL at 09:30

## 2020-01-01 RX ADMIN — LISINOPRIL 2.5 MG: 2.5 TABLET ORAL at 09:38

## 2020-01-01 RX ADMIN — CLOPIDOGREL BISULFATE 75 MG: 75 TABLET ORAL at 10:07

## 2020-01-01 RX ADMIN — OXYCODONE HYDROCHLORIDE 5 MG: 5 TABLET ORAL at 12:22

## 2020-01-01 RX ADMIN — APIXABAN 5 MG: 5 TABLET, FILM COATED ORAL at 18:06

## 2020-01-01 RX ADMIN — DIGOXIN 125 MCG: 125 TABLET ORAL at 09:14

## 2020-01-01 RX ADMIN — PANTOPRAZOLE SODIUM 40 MG: 40 TABLET, DELAYED RELEASE ORAL at 17:57

## 2020-01-01 RX ADMIN — APIXABAN 5 MG: 5 TABLET, FILM COATED ORAL at 21:37

## 2020-01-01 RX ADMIN — ACETAMINOPHEN 975 MG: 325 TABLET, FILM COATED ORAL at 21:12

## 2020-01-01 RX ADMIN — APIXABAN 5 MG: 5 TABLET, FILM COATED ORAL at 08:09

## 2020-01-01 RX ADMIN — ACETAMINOPHEN 650 MG: 325 TABLET, FILM COATED ORAL at 17:44

## 2020-01-01 RX ADMIN — CLOPIDOGREL BISULFATE 75 MG: 75 TABLET ORAL at 08:51

## 2020-01-01 RX ADMIN — Medication 6 MG: at 23:04

## 2020-01-01 RX ADMIN — OXYCODONE HYDROCHLORIDE 7.5 MG: 5 TABLET ORAL at 09:08

## 2020-01-01 RX ADMIN — ACETAMINOPHEN 975 MG: 325 TABLET, FILM COATED ORAL at 22:02

## 2020-01-01 RX ADMIN — SENNOSIDES AND DOCUSATE SODIUM 1 TABLET: 8.6; 5 TABLET ORAL at 21:50

## 2020-01-01 RX ADMIN — FUROSEMIDE 40 MG: 40 TABLET ORAL at 09:38

## 2020-01-01 RX ADMIN — INSULIN LISPRO 1 UNITS: 100 INJECTION, SOLUTION INTRAVENOUS; SUBCUTANEOUS at 08:13

## 2020-01-01 RX ADMIN — VALACYCLOVIR HYDROCHLORIDE 1000 MG: 1 TABLET, FILM COATED ORAL at 08:36

## 2020-01-01 RX ADMIN — AMIODARONE HYDROCHLORIDE 200 MG: 200 TABLET ORAL at 09:13

## 2020-01-01 RX ADMIN — ATORVASTATIN CALCIUM 20 MG: 20 TABLET, FILM COATED ORAL at 17:12

## 2020-01-01 RX ADMIN — HYDROMORPHONE HYDROCHLORIDE 0.5 MG: 1 INJECTION, SOLUTION INTRAMUSCULAR; INTRAVENOUS; SUBCUTANEOUS at 11:09

## 2020-01-01 RX ADMIN — APIXABAN 5 MG: 5 TABLET, FILM COATED ORAL at 18:16

## 2020-01-01 RX ADMIN — HYDROMORPHONE HYDROCHLORIDE 0.5 MG: 1 INJECTION, SOLUTION INTRAMUSCULAR; INTRAVENOUS; SUBCUTANEOUS at 01:50

## 2020-01-01 RX ADMIN — CLOPIDOGREL BISULFATE 75 MG: 75 TABLET ORAL at 08:07

## 2020-01-01 RX ADMIN — VALACYCLOVIR HYDROCHLORIDE 1000 MG: 1 TABLET, FILM COATED ORAL at 08:25

## 2020-01-01 RX ADMIN — AMIODARONE HYDROCHLORIDE 200 MG: 200 TABLET ORAL at 09:32

## 2020-01-01 RX ADMIN — FLUOXETINE 20 MG: 20 CAPSULE ORAL at 08:23

## 2020-01-01 RX ADMIN — INSULIN LISPRO 1 UNITS: 100 INJECTION, SOLUTION INTRAVENOUS; SUBCUTANEOUS at 11:37

## 2020-01-01 RX ADMIN — ACETAMINOPHEN 650 MG: 325 TABLET, FILM COATED ORAL at 11:58

## 2020-01-01 RX ADMIN — OXYCODONE HYDROCHLORIDE 7.5 MG: 5 TABLET ORAL at 16:43

## 2020-01-01 RX ADMIN — APIXABAN 5 MG: 5 TABLET, FILM COATED ORAL at 08:17

## 2020-01-01 RX ADMIN — SODIUM CHLORIDE 250 ML: 0.9 INJECTION, SOLUTION INTRAVENOUS at 16:46

## 2020-01-01 RX ADMIN — ACETAMINOPHEN 975 MG: 325 TABLET, FILM COATED ORAL at 03:53

## 2020-01-01 RX ADMIN — SODIUM CHLORIDE 500 ML: 0.9 INJECTION, SOLUTION INTRAVENOUS at 12:46

## 2020-01-01 RX ADMIN — ACETAMINOPHEN 650 MG: 325 TABLET, FILM COATED ORAL at 17:28

## 2020-01-01 RX ADMIN — OXYCODONE HYDROCHLORIDE 7.5 MG: 5 TABLET ORAL at 11:26

## 2020-01-01 RX ADMIN — DIGOXIN 125 MCG: 125 TABLET ORAL at 08:47

## 2020-01-01 RX ADMIN — OXYCODONE HYDROCHLORIDE 5 MG: 5 TABLET ORAL at 11:38

## 2020-01-01 RX ADMIN — OXYCODONE HYDROCHLORIDE 2.5 MG: 5 TABLET ORAL at 14:03

## 2020-01-01 RX ADMIN — ACETAMINOPHEN 650 MG: 325 TABLET, FILM COATED ORAL at 11:42

## 2020-01-01 RX ADMIN — ZOLPIDEM TARTRATE 5 MG: 5 TABLET, COATED ORAL at 21:18

## 2020-01-01 RX ADMIN — METOPROLOL SUCCINATE 50 MG: 50 TABLET, EXTENDED RELEASE ORAL at 07:52

## 2020-01-01 RX ADMIN — METOPROLOL SUCCINATE 50 MG: 50 TABLET, EXTENDED RELEASE ORAL at 10:07

## 2020-01-01 RX ADMIN — NORTRIPTYLINE HYDROCHLORIDE 10 MG: 10 CAPSULE ORAL at 21:22

## 2020-01-01 RX ADMIN — PANTOPRAZOLE SODIUM 40 MG: 40 TABLET, DELAYED RELEASE ORAL at 05:13

## 2020-01-01 RX ADMIN — Medication 500 MG: at 10:00

## 2020-01-01 RX ADMIN — FUROSEMIDE 40 MG: 10 INJECTION, SOLUTION INTRAMUSCULAR; INTRAVENOUS at 09:32

## 2020-01-01 RX ADMIN — ACETAMINOPHEN 650 MG: 325 TABLET, FILM COATED ORAL at 21:53

## 2020-01-01 RX ADMIN — PANTOPRAZOLE SODIUM 40 MG: 40 TABLET, DELAYED RELEASE ORAL at 06:01

## 2020-01-01 RX ADMIN — OXYCODONE HYDROCHLORIDE 7.5 MG: 5 TABLET ORAL at 16:27

## 2020-01-01 RX ADMIN — OXYCODONE HYDROCHLORIDE 7.5 MG: 5 TABLET ORAL at 11:42

## 2020-01-01 RX ADMIN — OXYCODONE HYDROCHLORIDE 2.5 MG: 5 TABLET ORAL at 12:12

## 2020-01-01 RX ADMIN — POTASSIUM CHLORIDE 40 MEQ: 1500 TABLET, EXTENDED RELEASE ORAL at 11:43

## 2020-01-01 RX ADMIN — PANTOPRAZOLE SODIUM 40 MG: 40 TABLET, DELAYED RELEASE ORAL at 06:48

## 2020-01-01 RX ADMIN — FUROSEMIDE 20 MG: 20 TABLET ORAL at 08:09

## 2020-01-01 RX ADMIN — OXYCODONE HYDROCHLORIDE 7.5 MG: 5 TABLET ORAL at 08:08

## 2020-01-01 RX ADMIN — FLUOXETINE 20 MG: 20 CAPSULE ORAL at 08:51

## 2020-01-01 RX ADMIN — GABAPENTIN 100 MG: 100 CAPSULE ORAL at 08:17

## 2020-01-01 RX ADMIN — APIXABAN 5 MG: 5 TABLET, FILM COATED ORAL at 09:13

## 2020-01-01 RX ADMIN — DOCUSATE SODIUM 100 MG: 100 CAPSULE, LIQUID FILLED ORAL at 17:12

## 2020-01-01 RX ADMIN — ATORVASTATIN CALCIUM 20 MG: 20 TABLET, FILM COATED ORAL at 17:04

## 2020-01-01 RX ADMIN — ACETAMINOPHEN 650 MG: 325 TABLET, FILM COATED ORAL at 07:51

## 2020-01-01 RX ADMIN — ACETAMINOPHEN 650 MG: 325 TABLET, FILM COATED ORAL at 21:50

## 2020-01-01 RX ADMIN — FUROSEMIDE 20 MG: 20 TABLET ORAL at 09:09

## 2020-01-01 RX ADMIN — DOCUSATE SODIUM AND SENNOSIDES 1 TABLET: 8.6; 5 TABLET ORAL at 22:02

## 2020-01-01 RX ADMIN — LIDOCAINE 1 PATCH: 50 PATCH CUTANEOUS at 09:25

## 2020-01-01 RX ADMIN — GLYCOPYRROLATE 0.1 MG: 0.2 INJECTION, SOLUTION INTRAMUSCULAR; INTRAVENOUS at 05:52

## 2020-01-01 RX ADMIN — ACETAMINOPHEN 975 MG: 325 TABLET, FILM COATED ORAL at 21:20

## 2020-01-01 RX ADMIN — DOCUSATE SODIUM 100 MG: 100 CAPSULE, LIQUID FILLED ORAL at 17:02

## 2020-01-01 RX ADMIN — OXYCODONE HYDROCHLORIDE 5 MG: 5 TABLET ORAL at 09:03

## 2020-01-01 RX ADMIN — ACETAMINOPHEN 975 MG: 325 TABLET, FILM COATED ORAL at 03:02

## 2020-01-01 RX ADMIN — PANTOPRAZOLE SODIUM 40 MG: 40 TABLET, DELAYED RELEASE ORAL at 05:15

## 2020-01-01 RX ADMIN — LORAZEPAM 1 MG: 2 INJECTION INTRAMUSCULAR; INTRAVENOUS at 20:41

## 2020-01-01 RX ADMIN — CLOPIDOGREL BISULFATE 75 MG: 75 TABLET ORAL at 09:43

## 2020-01-01 RX ADMIN — GABAPENTIN 100 MG: 100 CAPSULE ORAL at 08:24

## 2020-01-01 RX ADMIN — CLOPIDOGREL BISULFATE 75 MG: 75 TABLET ORAL at 08:17

## 2020-01-01 RX ADMIN — OXYCODONE HYDROCHLORIDE 5 MG: 5 TABLET ORAL at 17:16

## 2020-01-01 RX ADMIN — ACETAMINOPHEN 975 MG: 325 TABLET, FILM COATED ORAL at 05:11

## 2020-01-01 RX ADMIN — METOPROLOL SUCCINATE 50 MG: 50 TABLET, EXTENDED RELEASE ORAL at 08:07

## 2020-01-01 RX ADMIN — METOPROLOL SUCCINATE 50 MG: 50 TABLET, EXTENDED RELEASE ORAL at 20:32

## 2020-01-01 RX ADMIN — OXYCODONE HYDROCHLORIDE 5 MG: 5 TABLET ORAL at 12:07

## 2020-01-01 RX ADMIN — ACETAMINOPHEN 975 MG: 325 TABLET, FILM COATED ORAL at 21:30

## 2020-01-01 RX ADMIN — GABAPENTIN 200 MG: 100 CAPSULE ORAL at 21:53

## 2020-01-01 RX ADMIN — OXYCODONE HYDROCHLORIDE 5 MG: 5 TABLET ORAL at 15:37

## 2020-01-01 RX ADMIN — FLUOXETINE 20 MG: 20 CAPSULE ORAL at 09:15

## 2020-01-01 RX ADMIN — LISINOPRIL 2.5 MG: 2.5 TABLET ORAL at 21:18

## 2020-01-01 RX ADMIN — AMIODARONE HYDROCHLORIDE 200 MG: 200 TABLET ORAL at 09:24

## 2020-01-01 RX ADMIN — SODIUM CHLORIDE 50 ML/HR: 0.9 INJECTION, SOLUTION INTRAVENOUS at 12:51

## 2020-01-01 RX ADMIN — OXYCODONE HYDROCHLORIDE 7.5 MG: 5 TABLET ORAL at 08:54

## 2020-01-01 RX ADMIN — FOLIC ACID 1000 MCG: 1 TABLET ORAL at 08:09

## 2020-01-01 RX ADMIN — OXYCODONE HYDROCHLORIDE 5 MG: 5 TABLET ORAL at 14:56

## 2020-01-01 RX ADMIN — ACETAMINOPHEN 975 MG: 325 TABLET, FILM COATED ORAL at 21:10

## 2020-01-01 RX ADMIN — Medication 6 MG: at 21:37

## 2020-01-01 RX ADMIN — ACETAMINOPHEN 650 MG: 325 TABLET, FILM COATED ORAL at 08:08

## 2020-01-01 RX ADMIN — THIAMINE HCL TAB 100 MG 100 MG: 100 TAB at 09:13

## 2020-01-01 RX ADMIN — Medication 6 MG: at 21:31

## 2020-01-01 RX ADMIN — THIAMINE HCL TAB 100 MG 100 MG: 100 TAB at 09:38

## 2020-01-01 RX ADMIN — METOPROLOL SUCCINATE 50 MG: 50 TABLET, EXTENDED RELEASE ORAL at 21:43

## 2020-01-01 RX ADMIN — OXYCODONE HYDROCHLORIDE 7.5 MG: 5 TABLET ORAL at 16:11

## 2020-01-01 RX ADMIN — SENNOSIDES AND DOCUSATE SODIUM 1 TABLET: 8.6; 5 TABLET ORAL at 21:53

## 2020-01-01 RX ADMIN — ACETAMINOPHEN 650 MG: 325 TABLET, FILM COATED ORAL at 22:02

## 2020-01-01 RX ADMIN — ACETAMINOPHEN 975 MG: 325 TABLET, FILM COATED ORAL at 19:26

## 2020-01-01 RX ADMIN — FUROSEMIDE 20 MG: 20 TABLET ORAL at 08:19

## 2020-01-01 RX ADMIN — HYDROMORPHONE HYDROCHLORIDE 1 MG: 1 INJECTION, SOLUTION INTRAMUSCULAR; INTRAVENOUS; SUBCUTANEOUS at 00:26

## 2020-01-01 RX ADMIN — POTASSIUM CHLORIDE 20 MEQ: 1500 TABLET, EXTENDED RELEASE ORAL at 09:15

## 2020-01-01 RX ADMIN — NORTRIPTYLINE HYDROCHLORIDE 10 MG: 10 CAPSULE ORAL at 21:20

## 2020-01-01 RX ADMIN — FOLIC ACID 1000 MCG: 1 TABLET ORAL at 09:12

## 2020-01-01 RX ADMIN — ATORVASTATIN CALCIUM 20 MG: 20 TABLET, FILM COATED ORAL at 16:41

## 2020-01-01 RX ADMIN — ACETAMINOPHEN 650 MG: 325 TABLET, FILM COATED ORAL at 21:19

## 2020-01-01 RX ADMIN — METOPROLOL SUCCINATE 50 MG: 50 TABLET, EXTENDED RELEASE ORAL at 09:24

## 2020-01-01 RX ADMIN — HYDROMORPHONE HYDROCHLORIDE 1 MG: 1 INJECTION, SOLUTION INTRAMUSCULAR; INTRAVENOUS; SUBCUTANEOUS at 04:30

## 2020-01-01 RX ADMIN — OXYCODONE HYDROCHLORIDE 5 MG: 5 TABLET ORAL at 00:21

## 2020-01-01 RX ADMIN — FLUOXETINE 20 MG: 20 CAPSULE ORAL at 10:07

## 2020-01-01 RX ADMIN — METOPROLOL SUCCINATE 50 MG: 50 TABLET, EXTENDED RELEASE ORAL at 20:28

## 2020-01-01 RX ADMIN — INSULIN LISPRO 1 UNITS: 100 INJECTION, SOLUTION INTRAVENOUS; SUBCUTANEOUS at 17:16

## 2020-01-01 RX ADMIN — OXYCODONE HYDROCHLORIDE 5 MG: 5 TABLET ORAL at 18:28

## 2020-01-01 RX ADMIN — LIDOCAINE 1 PATCH: 50 PATCH CUTANEOUS at 10:00

## 2020-01-01 RX ADMIN — ACETAMINOPHEN 650 MG: 325 TABLET, FILM COATED ORAL at 21:21

## 2020-01-01 RX ADMIN — ACETAMINOPHEN 975 MG: 325 TABLET, FILM COATED ORAL at 06:25

## 2020-01-01 RX ADMIN — HYDROMORPHONE HYDROCHLORIDE 1 MG: 1 INJECTION, SOLUTION INTRAMUSCULAR; INTRAVENOUS; SUBCUTANEOUS at 02:54

## 2020-01-01 RX ADMIN — POTASSIUM CHLORIDE 20 MEQ: 1500 TABLET, EXTENDED RELEASE ORAL at 04:36

## 2020-01-01 RX ADMIN — ACETAMINOPHEN 650 MG: 325 TABLET, FILM COATED ORAL at 17:03

## 2020-01-01 RX ADMIN — INSULIN LISPRO 1 UNITS: 100 INJECTION, SOLUTION INTRAVENOUS; SUBCUTANEOUS at 11:51

## 2020-01-01 RX ADMIN — HYDROMORPHONE HYDROCHLORIDE 1 MG: 1 INJECTION, SOLUTION INTRAMUSCULAR; INTRAVENOUS; SUBCUTANEOUS at 18:48

## 2020-01-01 RX ADMIN — FUROSEMIDE 40 MG: 40 TABLET ORAL at 08:18

## 2020-01-01 RX ADMIN — INSULIN LISPRO 1 UNITS: 100 INJECTION, SOLUTION INTRAVENOUS; SUBCUTANEOUS at 06:51

## 2020-01-01 RX ADMIN — SENNOSIDES AND DOCUSATE SODIUM 1 TABLET: 8.6; 5 TABLET ORAL at 21:18

## 2020-01-01 RX ADMIN — APIXABAN 5 MG: 5 TABLET, FILM COATED ORAL at 09:30

## 2020-01-01 RX ADMIN — POTASSIUM CHLORIDE 20 MEQ: 1500 TABLET, EXTENDED RELEASE ORAL at 08:50

## 2020-01-01 RX ADMIN — METOPROLOL SUCCINATE 50 MG: 50 TABLET, EXTENDED RELEASE ORAL at 21:18

## 2020-01-01 RX ADMIN — FOLIC ACID 1000 MCG: 1 TABLET ORAL at 08:41

## 2020-01-01 RX ADMIN — FOLIC ACID 1000 MCG: 1 TABLET ORAL at 09:43

## 2020-01-01 RX ADMIN — LIDOCAINE 1 PATCH: 50 PATCH CUTANEOUS at 09:15

## 2020-01-01 RX ADMIN — OXYCODONE HYDROCHLORIDE 7.5 MG: 5 TABLET ORAL at 21:19

## 2020-01-01 RX ADMIN — AMIODARONE HYDROCHLORIDE 200 MG: 200 TABLET ORAL at 08:55

## 2020-01-01 RX ADMIN — POTASSIUM CHLORIDE 20 MEQ: 1500 TABLET, EXTENDED RELEASE ORAL at 08:18

## 2020-01-01 RX ADMIN — HYDROMORPHONE HYDROCHLORIDE 0.2 MG: 1 INJECTION, SOLUTION INTRAMUSCULAR; INTRAVENOUS; SUBCUTANEOUS at 19:32

## 2020-01-01 RX ADMIN — OXYCODONE HYDROCHLORIDE 5 MG: 5 TABLET ORAL at 11:45

## 2020-01-01 RX ADMIN — METOPROLOL SUCCINATE 50 MG: 50 TABLET, EXTENDED RELEASE ORAL at 08:34

## 2020-01-01 RX ADMIN — ACETAMINOPHEN 650 MG: 325 TABLET, FILM COATED ORAL at 11:25

## 2020-01-01 RX ADMIN — SENNOSIDES AND DOCUSATE SODIUM 1 TABLET: 8.6; 5 TABLET ORAL at 23:02

## 2020-01-01 RX ADMIN — OXYCODONE HYDROCHLORIDE 7.5 MG: 5 TABLET ORAL at 07:36

## 2020-01-01 RX ADMIN — ATORVASTATIN CALCIUM 20 MG: 20 TABLET, FILM COATED ORAL at 17:00

## 2020-01-01 RX ADMIN — METOPROLOL SUCCINATE 50 MG: 50 TABLET, EXTENDED RELEASE ORAL at 09:31

## 2020-01-01 RX ADMIN — ZOLPIDEM TARTRATE 5 MG: 5 TABLET, COATED ORAL at 21:14

## 2020-01-01 RX ADMIN — OXYCODONE HYDROCHLORIDE 5 MG: 5 TABLET ORAL at 21:31

## 2020-01-01 RX ADMIN — AMIODARONE HYDROCHLORIDE 150 MG: 50 INJECTION, SOLUTION INTRAVENOUS at 06:46

## 2020-01-01 RX ADMIN — Medication 6 MG: at 21:14

## 2020-01-01 RX ADMIN — METOPROLOL SUCCINATE 50 MG: 50 TABLET, EXTENDED RELEASE ORAL at 21:19

## 2020-01-01 RX ADMIN — DIGOXIN 125 MCG: 125 TABLET ORAL at 08:21

## 2020-01-01 RX ADMIN — ACETAMINOPHEN 975 MG: 325 TABLET, FILM COATED ORAL at 14:32

## 2020-01-01 RX ADMIN — INSULIN LISPRO 1 UNITS: 100 INJECTION, SOLUTION INTRAVENOUS; SUBCUTANEOUS at 21:17

## 2020-01-01 RX ADMIN — METOPROLOL SUCCINATE 50 MG: 50 TABLET, EXTENDED RELEASE ORAL at 23:04

## 2020-01-01 RX ADMIN — FLUOXETINE 20 MG: 20 CAPSULE ORAL at 08:47

## 2020-01-01 RX ADMIN — MAGNESIUM SULFATE HEPTAHYDRATE 2 G: 40 INJECTION, SOLUTION INTRAVENOUS at 01:27

## 2020-01-01 RX ADMIN — ACETAMINOPHEN 975 MG: 325 TABLET, FILM COATED ORAL at 22:08

## 2020-01-01 RX ADMIN — INSULIN LISPRO 1 UNITS: 100 INJECTION, SOLUTION INTRAVENOUS; SUBCUTANEOUS at 09:24

## 2020-01-01 RX ADMIN — FOLIC ACID 1000 MCG: 1 TABLET ORAL at 10:08

## 2020-01-01 RX ADMIN — PANTOPRAZOLE SODIUM 40 MG: 40 TABLET, DELAYED RELEASE ORAL at 16:46

## 2020-01-01 RX ADMIN — OXYCODONE HYDROCHLORIDE 7.5 MG: 5 TABLET ORAL at 17:27

## 2020-01-01 RX ADMIN — FLUOXETINE 20 MG: 20 CAPSULE ORAL at 08:08

## 2020-01-01 RX ADMIN — ACETAMINOPHEN 650 MG: 325 TABLET, FILM COATED ORAL at 06:30

## 2020-01-01 RX ADMIN — GABAPENTIN 100 MG: 100 CAPSULE ORAL at 09:29

## 2020-01-01 RX ADMIN — APIXABAN 5 MG: 5 TABLET, FILM COATED ORAL at 18:18

## 2020-01-01 RX ADMIN — ACETAMINOPHEN 650 MG: 325 TABLET, FILM COATED ORAL at 07:36

## 2020-01-01 RX ADMIN — OXYCODONE HYDROCHLORIDE 5 MG: 5 TABLET ORAL at 16:41

## 2020-01-01 RX ADMIN — NORTRIPTYLINE HYDROCHLORIDE 10 MG: 10 CAPSULE ORAL at 21:18

## 2020-01-01 RX ADMIN — ZOLPIDEM TARTRATE 5 MG: 5 TABLET, COATED ORAL at 21:50

## 2020-01-01 RX ADMIN — Medication 500 MG: at 08:42

## 2020-01-01 RX ADMIN — CLOPIDOGREL BISULFATE 75 MG: 75 TABLET ORAL at 08:25

## 2020-01-01 RX ADMIN — APIXABAN 5 MG: 5 TABLET, FILM COATED ORAL at 10:00

## 2020-01-01 RX ADMIN — APIXABAN 5 MG: 5 TABLET, FILM COATED ORAL at 08:41

## 2020-01-01 RX ADMIN — APIXABAN 5 MG: 5 TABLET, FILM COATED ORAL at 17:28

## 2020-01-01 RX ADMIN — AMIODARONE HYDROCHLORIDE 200 MG: 200 TABLET ORAL at 08:25

## 2020-01-01 RX ADMIN — APIXABAN 5 MG: 5 TABLET, FILM COATED ORAL at 09:32

## 2020-01-01 RX ADMIN — OXYCODONE HYDROCHLORIDE 5 MG: 5 TABLET ORAL at 18:36

## 2020-01-01 RX ADMIN — VALACYCLOVIR HYDROCHLORIDE 1000 MG: 1 TABLET, FILM COATED ORAL at 08:55

## 2020-01-01 RX ADMIN — VALACYCLOVIR HYDROCHLORIDE 1000 MG: 1 TABLET, FILM COATED ORAL at 21:11

## 2020-01-01 RX ADMIN — OXYCODONE HYDROCHLORIDE 5 MG: 5 TABLET ORAL at 20:22

## 2020-01-01 RX ADMIN — Medication 6 MG: at 21:28

## 2020-01-01 RX ADMIN — FOLIC ACID 1000 MCG: 1 TABLET ORAL at 09:30

## 2020-01-01 RX ADMIN — ZOLPIDEM TARTRATE 5 MG: 5 TABLET, COATED ORAL at 22:02

## 2020-01-01 RX ADMIN — Medication 6 MG: at 21:40

## 2020-01-01 RX ADMIN — ACETAMINOPHEN 650 MG: 325 TABLET, FILM COATED ORAL at 11:44

## 2020-01-01 RX ADMIN — APIXABAN 5 MG: 5 TABLET, FILM COATED ORAL at 08:40

## 2020-01-01 RX ADMIN — AMIODARONE HYDROCHLORIDE 200 MG: 200 TABLET ORAL at 08:40

## 2020-01-01 RX ADMIN — HYDROMORPHONE HYDROCHLORIDE 0.5 MG: 1 INJECTION, SOLUTION INTRAMUSCULAR; INTRAVENOUS; SUBCUTANEOUS at 12:32

## 2020-01-01 RX ADMIN — Medication 6 MG: at 22:02

## 2020-01-01 RX ADMIN — GABAPENTIN 100 MG: 100 CAPSULE ORAL at 18:06

## 2020-01-01 RX ADMIN — ATORVASTATIN CALCIUM 20 MG: 20 TABLET, FILM COATED ORAL at 18:18

## 2020-01-01 RX ADMIN — INSULIN LISPRO 1 UNITS: 100 INJECTION, SOLUTION INTRAVENOUS; SUBCUTANEOUS at 08:56

## 2020-01-01 RX ADMIN — ATORVASTATIN CALCIUM 20 MG: 20 TABLET, FILM COATED ORAL at 18:02

## 2020-01-01 RX ADMIN — OXYCODONE HYDROCHLORIDE 7.5 MG: 5 TABLET ORAL at 21:20

## 2020-01-01 RX ADMIN — ACETAMINOPHEN 650 MG: 325 TABLET, FILM COATED ORAL at 12:46

## 2020-01-01 RX ADMIN — ACETAMINOPHEN 975 MG: 325 TABLET, FILM COATED ORAL at 15:05

## 2020-01-01 RX ADMIN — HYDROMORPHONE HYDROCHLORIDE 0.5 MG: 1 INJECTION, SOLUTION INTRAMUSCULAR; INTRAVENOUS; SUBCUTANEOUS at 02:10

## 2020-01-01 RX ADMIN — HYDROMORPHONE HYDROCHLORIDE 0.5 MG: 1 INJECTION, SOLUTION INTRAMUSCULAR; INTRAVENOUS; SUBCUTANEOUS at 09:40

## 2020-01-01 RX ADMIN — Medication 6 MG: at 21:50

## 2020-01-01 RX ADMIN — Medication 1.5 MG/HR: at 06:52

## 2020-01-01 RX ADMIN — Medication 0.5 MG/HR: at 11:36

## 2020-01-01 RX ADMIN — GABAPENTIN 300 MG: 300 CAPSULE ORAL at 08:09

## 2020-01-01 RX ADMIN — IOHEXOL 100 ML: 350 INJECTION, SOLUTION INTRAVENOUS at 16:25

## 2020-01-01 RX ADMIN — ACETAMINOPHEN 650 MG: 325 TABLET, FILM COATED ORAL at 16:11

## 2020-01-01 RX ADMIN — ACETAMINOPHEN 975 MG: 325 TABLET, FILM COATED ORAL at 11:55

## 2020-01-01 RX ADMIN — FLUOXETINE 20 MG: 20 CAPSULE ORAL at 08:17

## 2020-01-01 RX ADMIN — DOCUSATE SODIUM AND SENNOSIDES 1 TABLET: 8.6; 5 TABLET ORAL at 21:57

## 2020-01-01 RX ADMIN — GABAPENTIN 100 MG: 100 CAPSULE ORAL at 09:30

## 2020-01-01 RX ADMIN — AMIODARONE HYDROCHLORIDE 200 MG: 200 TABLET ORAL at 09:43

## 2020-01-01 RX ADMIN — VALACYCLOVIR HYDROCHLORIDE 1000 MG: 1 TABLET, FILM COATED ORAL at 20:28

## 2020-01-01 RX ADMIN — INSULIN LISPRO 1 UNITS: 100 INJECTION, SOLUTION INTRAVENOUS; SUBCUTANEOUS at 17:13

## 2020-01-01 RX ADMIN — FOLIC ACID 1000 MCG: 1 TABLET ORAL at 08:17

## 2020-01-01 RX ADMIN — NORTRIPTYLINE HYDROCHLORIDE 10 MG: 10 CAPSULE ORAL at 21:28

## 2020-01-01 RX ADMIN — THIAMINE HCL TAB 100 MG 100 MG: 100 TAB at 10:00

## 2020-01-01 RX ADMIN — ACETAMINOPHEN 975 MG: 325 TABLET, FILM COATED ORAL at 04:30

## 2020-01-01 RX ADMIN — AMIODARONE HYDROCHLORIDE 200 MG: 200 TABLET ORAL at 08:18

## 2020-01-01 RX ADMIN — ACETAMINOPHEN 650 MG: 325 TABLET, FILM COATED ORAL at 17:02

## 2020-01-01 RX ADMIN — TRAMADOL HYDROCHLORIDE 50 MG: 50 TABLET, FILM COATED ORAL at 15:34

## 2020-01-01 RX ADMIN — DIGOXIN 125 MCG: 125 TABLET ORAL at 08:40

## 2020-01-01 RX ADMIN — FLUOXETINE 20 MG: 20 CAPSULE ORAL at 09:59

## 2020-01-01 RX ADMIN — HYDROMORPHONE HYDROCHLORIDE 1 MG: 1 INJECTION, SOLUTION INTRAMUSCULAR; INTRAVENOUS; SUBCUTANEOUS at 15:30

## 2020-01-01 RX ADMIN — TRAMADOL HYDROCHLORIDE 50 MG: 50 TABLET, FILM COATED ORAL at 16:14

## 2020-01-01 RX ADMIN — AMIODARONE HYDROCHLORIDE 200 MG: 200 TABLET ORAL at 08:35

## 2020-01-01 RX ADMIN — PANTOPRAZOLE SODIUM 40 MG: 40 TABLET, DELAYED RELEASE ORAL at 06:38

## 2020-01-01 RX ADMIN — ACETAMINOPHEN 975 MG: 325 TABLET, FILM COATED ORAL at 05:34

## 2020-01-01 RX ADMIN — THIAMINE HCL TAB 100 MG 100 MG: 100 TAB at 09:24

## 2020-01-01 RX ADMIN — ATORVASTATIN CALCIUM 20 MG: 20 TABLET, FILM COATED ORAL at 16:56

## 2020-01-01 RX ADMIN — Medication 6 MG: at 21:49

## 2020-01-01 RX ADMIN — FOLIC ACID 1000 MCG: 1 TABLET ORAL at 08:35

## 2020-01-01 RX ADMIN — FERROUS SULFATE TAB 325 MG (65 MG ELEMENTAL FE) 325 MG: 325 (65 FE) TAB at 07:45

## 2020-01-01 RX ADMIN — MELATONIN 3 MG: at 23:08

## 2020-01-01 RX ADMIN — VALACYCLOVIR HYDROCHLORIDE 1000 MG: 1 TABLET, FILM COATED ORAL at 09:54

## 2020-01-01 RX ADMIN — METHOCARBAMOL TABLETS 500 MG: 500 TABLET, COATED ORAL at 00:22

## 2020-01-01 RX ADMIN — APIXABAN 5 MG: 5 TABLET, FILM COATED ORAL at 18:00

## 2020-01-01 RX ADMIN — CLOPIDOGREL BISULFATE 75 MG: 75 TABLET ORAL at 09:29

## 2020-01-01 RX ADMIN — FERROUS SULFATE TAB 325 MG (65 MG ELEMENTAL FE) 325 MG: 325 (65 FE) TAB at 09:24

## 2020-01-01 RX ADMIN — OXYCODONE HYDROCHLORIDE 5 MG: 5 TABLET ORAL at 04:58

## 2020-01-01 RX ADMIN — FERROUS SULFATE TAB 325 MG (65 MG ELEMENTAL FE) 325 MG: 325 (65 FE) TAB at 09:38

## 2020-01-01 RX ADMIN — METRONIDAZOLE 500 MG: 500 INJECTION, SOLUTION INTRAVENOUS at 07:40

## 2020-01-01 RX ADMIN — APIXABAN 5 MG: 5 TABLET, FILM COATED ORAL at 08:55

## 2020-01-01 RX ADMIN — LISINOPRIL 2.5 MG: 2.5 TABLET ORAL at 09:24

## 2020-01-01 RX ADMIN — NORTRIPTYLINE HYDROCHLORIDE 10 MG: 10 CAPSULE ORAL at 21:31

## 2020-01-01 RX ADMIN — AMIODARONE HYDROCHLORIDE 150 MG: 50 INJECTION, SOLUTION INTRAVENOUS at 01:34

## 2020-01-01 RX ADMIN — HYDROMORPHONE HYDROCHLORIDE 1 MG: 1 INJECTION, SOLUTION INTRAMUSCULAR; INTRAVENOUS; SUBCUTANEOUS at 03:42

## 2020-01-01 RX ADMIN — DIGOXIN 125 MCG: 125 TABLET ORAL at 09:25

## 2020-01-01 RX ADMIN — NORTRIPTYLINE HYDROCHLORIDE 10 MG: 10 CAPSULE ORAL at 21:12

## 2020-01-01 RX ADMIN — APIXABAN 5 MG: 5 TABLET, FILM COATED ORAL at 08:21

## 2020-01-01 RX ADMIN — APIXABAN 5 MG: 5 TABLET, FILM COATED ORAL at 08:52

## 2020-01-01 RX ADMIN — OXYCODONE HYDROCHLORIDE 5 MG: 5 TABLET ORAL at 06:13

## 2020-01-01 RX ADMIN — APIXABAN 5 MG: 5 TABLET, FILM COATED ORAL at 17:05

## 2020-01-01 RX ADMIN — POTASSIUM CHLORIDE 40 MEQ: 1500 TABLET, EXTENDED RELEASE ORAL at 13:01

## 2020-01-01 RX ADMIN — DOCUSATE SODIUM AND SENNOSIDES 1 TABLET: 8.6; 5 TABLET ORAL at 22:08

## 2020-01-01 RX ADMIN — OXYCODONE HYDROCHLORIDE 5 MG: 5 TABLET ORAL at 21:19

## 2020-01-01 RX ADMIN — OXYCODONE HYDROCHLORIDE 5 MG: 5 TABLET ORAL at 17:53

## 2020-01-01 RX ADMIN — Medication 500 MG: at 08:18

## 2020-01-01 RX ADMIN — ATORVASTATIN CALCIUM 20 MG: 20 TABLET, FILM COATED ORAL at 17:02

## 2020-01-01 RX ADMIN — ACETAMINOPHEN 650 MG: 325 TABLET, FILM COATED ORAL at 10:10

## 2020-01-01 RX ADMIN — PANTOPRAZOLE SODIUM 40 MG: 40 TABLET, DELAYED RELEASE ORAL at 18:16

## 2020-01-01 RX ADMIN — AMIODARONE HYDROCHLORIDE 0.5 MG/MIN: 50 INJECTION, SOLUTION INTRAVENOUS at 07:50

## 2020-01-01 RX ADMIN — OXYCODONE HYDROCHLORIDE 2.5 MG: 5 TABLET ORAL at 23:50

## 2020-01-01 RX ADMIN — OXYCODONE HYDROCHLORIDE 2.5 MG: 5 TABLET ORAL at 19:59

## 2020-01-01 RX ADMIN — DIGOXIN 125 MCG: 125 TABLET ORAL at 08:24

## 2020-01-01 RX ADMIN — METOPROLOL SUCCINATE 50 MG: 50 TABLET, EXTENDED RELEASE ORAL at 21:50

## 2020-01-01 RX ADMIN — HYDROMORPHONE HYDROCHLORIDE 0.5 MG: 1 INJECTION, SOLUTION INTRAMUSCULAR; INTRAVENOUS; SUBCUTANEOUS at 05:54

## 2020-01-01 RX ADMIN — DOCUSATE SODIUM AND SENNOSIDES 1 TABLET: 8.6; 5 TABLET ORAL at 22:04

## 2020-01-01 RX ADMIN — OXYCODONE HYDROCHLORIDE 5 MG: 5 TABLET ORAL at 04:25

## 2020-01-01 RX ADMIN — VALACYCLOVIR HYDROCHLORIDE 1000 MG: 1 TABLET, FILM COATED ORAL at 21:29

## 2020-01-01 RX ADMIN — SENNOSIDES AND DOCUSATE SODIUM 1 TABLET: 8.6; 5 TABLET ORAL at 21:22

## 2020-01-01 RX ADMIN — APIXABAN 5 MG: 5 TABLET, FILM COATED ORAL at 08:34

## 2020-01-01 RX ADMIN — LISINOPRIL 2.5 MG: 2.5 TABLET ORAL at 22:02

## 2020-01-01 RX ADMIN — ATORVASTATIN CALCIUM 20 MG: 20 TABLET, FILM COATED ORAL at 17:57

## 2020-01-01 RX ADMIN — LORAZEPAM 1 MG: 2 INJECTION INTRAMUSCULAR; INTRAVENOUS at 23:18

## 2020-01-01 RX ADMIN — LISINOPRIL 2.5 MG: 2.5 TABLET ORAL at 14:33

## 2020-01-01 RX ADMIN — CLOPIDOGREL BISULFATE 75 MG: 75 TABLET ORAL at 09:25

## 2020-01-01 RX ADMIN — METOPROLOL SUCCINATE 50 MG: 50 TABLET, EXTENDED RELEASE ORAL at 21:14

## 2020-01-01 RX ADMIN — OXYCODONE HYDROCHLORIDE 5 MG: 5 TABLET ORAL at 05:53

## 2020-01-01 RX ADMIN — APIXABAN 5 MG: 5 TABLET, FILM COATED ORAL at 09:38

## 2020-01-01 RX ADMIN — ATORVASTATIN CALCIUM 20 MG: 20 TABLET, FILM COATED ORAL at 16:53

## 2020-01-01 RX ADMIN — METOPROLOL SUCCINATE 50 MG: 50 TABLET, EXTENDED RELEASE ORAL at 22:05

## 2020-01-01 RX ADMIN — APIXABAN 5 MG: 5 TABLET, FILM COATED ORAL at 17:45

## 2020-01-01 RX ADMIN — LISINOPRIL 5 MG: 5 TABLET ORAL at 10:00

## 2020-01-01 RX ADMIN — ACETAMINOPHEN 975 MG: 325 TABLET, FILM COATED ORAL at 21:38

## 2020-01-01 RX ADMIN — ACETAMINOPHEN 650 MG: 325 TABLET, FILM COATED ORAL at 16:38

## 2020-01-01 RX ADMIN — DIGOXIN 125 MCG: 125 TABLET ORAL at 08:17

## 2020-01-01 RX ADMIN — APIXABAN 5 MG: 5 TABLET, FILM COATED ORAL at 18:28

## 2020-01-01 RX ADMIN — LIDOCAINE 1 PATCH: 50 PATCH CUTANEOUS at 08:42

## 2020-01-01 RX ADMIN — ATORVASTATIN CALCIUM 20 MG: 20 TABLET, FILM COATED ORAL at 17:28

## 2020-01-01 RX ADMIN — FOLIC ACID 1000 MCG: 1 TABLET ORAL at 09:24

## 2020-01-01 RX ADMIN — APIXABAN 5 MG: 5 TABLET, FILM COATED ORAL at 09:43

## 2020-01-01 RX ADMIN — HYDROMORPHONE HYDROCHLORIDE 1 MG: 1 INJECTION, SOLUTION INTRAMUSCULAR; INTRAVENOUS; SUBCUTANEOUS at 22:52

## 2020-01-01 RX ADMIN — FERROUS SULFATE TAB 325 MG (65 MG ELEMENTAL FE) 325 MG: 325 (65 FE) TAB at 07:00

## 2020-01-01 RX ADMIN — ACETAMINOPHEN 650 MG: 325 TABLET, FILM COATED ORAL at 08:55

## 2020-01-01 RX ADMIN — FLUOXETINE 20 MG: 20 CAPSULE ORAL at 09:32

## 2020-01-01 RX ADMIN — ACETAMINOPHEN 650 MG: 325 TABLET, FILM COATED ORAL at 11:55

## 2020-01-01 RX ADMIN — POTASSIUM CHLORIDE 20 MEQ: 1500 TABLET, EXTENDED RELEASE ORAL at 12:18

## 2020-01-01 RX ADMIN — SENNOSIDES AND DOCUSATE SODIUM 1 TABLET: 8.6; 5 TABLET ORAL at 21:14

## 2020-01-01 RX ADMIN — POTASSIUM CHLORIDE 20 MEQ: 1500 TABLET, EXTENDED RELEASE ORAL at 09:24

## 2020-01-01 RX ADMIN — OXYCODONE HYDROCHLORIDE 5 MG: 5 TABLET ORAL at 19:54

## 2020-01-01 RX ADMIN — CLOPIDOGREL BISULFATE 75 MG: 75 TABLET ORAL at 09:32

## 2020-01-01 RX ADMIN — Medication 0.5 MG/HR: at 20:54

## 2020-01-01 RX ADMIN — ZOLPIDEM TARTRATE 5 MG: 5 TABLET, COATED ORAL at 21:22

## 2020-01-01 RX ADMIN — OXYCODONE HYDROCHLORIDE 2.5 MG: 5 TABLET ORAL at 06:01

## 2020-01-01 RX ADMIN — ACETAMINOPHEN 975 MG: 325 TABLET, FILM COATED ORAL at 17:53

## 2020-01-01 RX ADMIN — OXYCODONE HYDROCHLORIDE 5 MG: 5 TABLET ORAL at 01:18

## 2020-01-01 RX ADMIN — METOPROLOL SUCCINATE 50 MG: 50 TABLET, EXTENDED RELEASE ORAL at 21:21

## 2020-01-01 RX ADMIN — DIGOXIN 125 MCG: 125 TABLET ORAL at 09:44

## 2020-01-01 RX ADMIN — AMIODARONE HYDROCHLORIDE 200 MG: 200 TABLET ORAL at 09:38

## 2020-01-01 RX ADMIN — ACETAMINOPHEN 650 MG: 325 TABLET, FILM COATED ORAL at 17:12

## 2020-01-01 RX ADMIN — INSULIN LISPRO 1 UNITS: 100 INJECTION, SOLUTION INTRAVENOUS; SUBCUTANEOUS at 11:42

## 2020-01-01 RX ADMIN — HYDROMORPHONE HYDROCHLORIDE 1 MG: 1 INJECTION, SOLUTION INTRAMUSCULAR; INTRAVENOUS; SUBCUTANEOUS at 21:53

## 2020-01-01 RX ADMIN — FOLIC ACID 1000 MCG: 1 TABLET ORAL at 08:51

## 2020-01-01 RX ADMIN — METOPROLOL SUCCINATE 50 MG: 50 TABLET, EXTENDED RELEASE ORAL at 21:38

## 2020-01-01 RX ADMIN — METOPROLOL TARTRATE 50 MG: 50 TABLET, FILM COATED ORAL at 12:40

## 2020-01-01 RX ADMIN — LISINOPRIL 5 MG: 5 TABLET ORAL at 08:41

## 2020-01-01 RX ADMIN — OXYCODONE HYDROCHLORIDE 5 MG: 5 TABLET ORAL at 21:09

## 2020-01-01 RX ADMIN — DIGOXIN 125 MCG: 125 TABLET ORAL at 08:35

## 2020-01-01 RX ADMIN — GABAPENTIN 300 MG: 300 CAPSULE ORAL at 21:18

## 2020-01-01 RX ADMIN — FLUOXETINE 20 MG: 20 CAPSULE ORAL at 09:08

## 2020-01-01 RX ADMIN — HYDROMORPHONE HYDROCHLORIDE 1 MG: 1 INJECTION, SOLUTION INTRAMUSCULAR; INTRAVENOUS; SUBCUTANEOUS at 23:31

## 2020-01-01 RX ADMIN — FUROSEMIDE 40 MG: 40 TABLET ORAL at 09:14

## 2020-01-01 RX ADMIN — ACETAMINOPHEN 650 MG: 325 TABLET, FILM COATED ORAL at 21:48

## 2020-01-01 RX ADMIN — NORTRIPTYLINE HYDROCHLORIDE 10 MG: 10 CAPSULE ORAL at 22:08

## 2020-01-01 RX ADMIN — AMIODARONE HYDROCHLORIDE 200 MG: 200 TABLET ORAL at 09:29

## 2020-01-01 RX ADMIN — LIDOCAINE 1 PATCH: 50 PATCH CUTANEOUS at 09:39

## 2020-01-01 RX ADMIN — TRAMADOL HYDROCHLORIDE 50 MG: 50 TABLET, FILM COATED ORAL at 22:02

## 2020-01-01 RX ADMIN — ACETAMINOPHEN 650 MG: 325 TABLET, FILM COATED ORAL at 07:39

## 2020-01-01 RX ADMIN — TRAMADOL HYDROCHLORIDE 50 MG: 50 TABLET, FILM COATED ORAL at 09:46

## 2020-01-01 RX ADMIN — DIGOXIN 125 MCG: 125 TABLET ORAL at 08:08

## 2020-01-01 RX ADMIN — SODIUM CHLORIDE 50 ML/HR: 0.9 INJECTION, SOLUTION INTRAVENOUS at 12:24

## 2020-01-01 RX ADMIN — METOPROLOL SUCCINATE 50 MG: 50 TABLET, EXTENDED RELEASE ORAL at 20:21

## 2020-01-01 RX ADMIN — GLYCOPYRROLATE 0.1 MG: 0.2 INJECTION, SOLUTION INTRAMUSCULAR; INTRAVENOUS at 21:32

## 2020-01-01 RX ADMIN — ALPRAZOLAM 0.25 MG: 0.25 TABLET ORAL at 00:57

## 2020-01-01 RX ADMIN — NORTRIPTYLINE HYDROCHLORIDE 10 MG: 10 CAPSULE ORAL at 21:11

## 2020-01-01 RX ADMIN — INSULIN LISPRO 1 UNITS: 100 INJECTION, SOLUTION INTRAVENOUS; SUBCUTANEOUS at 09:11

## 2020-01-01 RX ADMIN — DIGOXIN 125 MCG: 125 TABLET ORAL at 08:09

## 2020-01-01 RX ADMIN — OXYCODONE HYDROCHLORIDE 7.5 MG: 5 TABLET ORAL at 11:58

## 2020-01-01 RX ADMIN — TRAMADOL HYDROCHLORIDE 50 MG: 50 TABLET, FILM COATED ORAL at 09:24

## 2020-01-01 RX ADMIN — PANTOPRAZOLE SODIUM 40 MG: 40 TABLET, DELAYED RELEASE ORAL at 05:22

## 2020-01-01 RX ADMIN — Medication 500 MG: at 09:24

## 2020-01-01 RX ADMIN — OXYCODONE HYDROCHLORIDE 7.5 MG: 5 TABLET ORAL at 07:52

## 2020-01-01 RX ADMIN — VALACYCLOVIR HYDROCHLORIDE 1000 MG: 1 TABLET, FILM COATED ORAL at 09:30

## 2020-01-01 RX ADMIN — FOLIC ACID 1000 MCG: 1 TABLET ORAL at 08:08

## 2020-01-01 RX ADMIN — VALACYCLOVIR HYDROCHLORIDE 1000 MG: 1 TABLET, FILM COATED ORAL at 09:46

## 2020-01-01 RX ADMIN — ACETAMINOPHEN 650 MG: 325 TABLET, FILM COATED ORAL at 21:18

## 2020-01-01 RX ADMIN — CLOPIDOGREL BISULFATE 75 MG: 75 TABLET ORAL at 09:38

## 2020-01-01 RX ADMIN — CLOPIDOGREL BISULFATE 75 MG: 75 TABLET ORAL at 08:21

## 2020-01-01 RX ADMIN — PANTOPRAZOLE SODIUM 40 MG: 40 TABLET, DELAYED RELEASE ORAL at 05:19

## 2020-01-01 RX ADMIN — DIGOXIN 125 MCG: 125 TABLET ORAL at 08:55

## 2020-01-01 RX ADMIN — OXYCODONE HYDROCHLORIDE 5 MG: 5 TABLET ORAL at 11:55

## 2020-01-01 RX ADMIN — ATORVASTATIN CALCIUM 20 MG: 20 TABLET, FILM COATED ORAL at 15:37

## 2020-01-01 RX ADMIN — METOPROLOL SUCCINATE 50 MG: 50 TABLET, EXTENDED RELEASE ORAL at 21:54

## 2020-01-01 RX ADMIN — SENNOSIDES AND DOCUSATE SODIUM 1 TABLET: 8.6; 5 TABLET ORAL at 21:37

## 2020-01-01 RX ADMIN — GABAPENTIN 200 MG: 100 CAPSULE ORAL at 21:14

## 2020-01-01 RX ADMIN — ACETAMINOPHEN 975 MG: 325 TABLET, FILM COATED ORAL at 13:39

## 2020-01-01 RX ADMIN — OXYCODONE HYDROCHLORIDE 5 MG: 5 TABLET ORAL at 16:56

## 2020-01-01 RX ADMIN — OXYCODONE HYDROCHLORIDE 7.5 MG: 5 TABLET ORAL at 17:12

## 2020-01-01 RX ADMIN — LORAZEPAM 1 MG: 2 INJECTION INTRAMUSCULAR; INTRAVENOUS at 18:19

## 2020-01-01 RX ADMIN — GABAPENTIN 300 MG: 300 CAPSULE ORAL at 21:47

## 2020-01-01 RX ADMIN — MELATONIN 6 MG: at 22:05

## 2020-01-01 RX ADMIN — ATORVASTATIN CALCIUM 20 MG: 20 TABLET, FILM COATED ORAL at 16:14

## 2020-01-01 RX ADMIN — GABAPENTIN 100 MG: 100 CAPSULE ORAL at 08:53

## 2020-01-01 RX ADMIN — OXYCODONE HYDROCHLORIDE 7.5 MG: 5 TABLET ORAL at 21:47

## 2020-01-01 RX ADMIN — FOLIC ACID 1000 MCG: 1 TABLET ORAL at 09:38

## 2020-01-01 RX ADMIN — OXYCODONE HYDROCHLORIDE 7.5 MG: 5 TABLET ORAL at 21:51

## 2020-01-01 RX ADMIN — METOPROLOL SUCCINATE 50 MG: 50 TABLET, EXTENDED RELEASE ORAL at 08:17

## 2020-01-01 RX ADMIN — MAGNESIUM SULFATE HEPTAHYDRATE 2 G: 40 INJECTION, SOLUTION INTRAVENOUS at 12:53

## 2020-01-01 RX ADMIN — HYDROMORPHONE HYDROCHLORIDE 0.5 MG: 1 INJECTION, SOLUTION INTRAMUSCULAR; INTRAVENOUS; SUBCUTANEOUS at 23:53

## 2020-01-01 RX ADMIN — ACETAMINOPHEN 975 MG: 325 TABLET, FILM COATED ORAL at 15:00

## 2020-01-01 RX ADMIN — HYDROMORPHONE HYDROCHLORIDE 0.5 MG: 1 INJECTION, SOLUTION INTRAMUSCULAR; INTRAVENOUS; SUBCUTANEOUS at 07:54

## 2020-01-01 RX ADMIN — PANTOPRAZOLE SODIUM 40 MG: 40 TABLET, DELAYED RELEASE ORAL at 06:02

## 2020-01-01 RX ADMIN — LIDOCAINE 5% 1 PATCH: 700 PATCH TOPICAL at 01:09

## 2020-01-01 RX ADMIN — AMIODARONE HYDROCHLORIDE 200 MG: 200 TABLET ORAL at 08:51

## 2020-01-01 RX ADMIN — METOPROLOL SUCCINATE 50 MG: 50 TABLET, EXTENDED RELEASE ORAL at 21:22

## 2020-01-01 RX ADMIN — APIXABAN 5 MG: 5 TABLET, FILM COATED ORAL at 18:07

## 2020-01-01 RX ADMIN — PANTOPRAZOLE SODIUM 40 MG: 40 TABLET, DELAYED RELEASE ORAL at 06:12

## 2020-01-01 RX ADMIN — OXYCODONE HYDROCHLORIDE 7.5 MG: 5 TABLET ORAL at 17:02

## 2020-01-01 RX ADMIN — APIXABAN 5 MG: 5 TABLET, FILM COATED ORAL at 17:12

## 2020-01-01 RX ADMIN — VANCOMYCIN HYDROCHLORIDE 1250 MG: 10 INJECTION, POWDER, LYOPHILIZED, FOR SOLUTION INTRAVENOUS at 09:13

## 2020-01-01 RX ADMIN — MELATONIN 6 MG: at 21:12

## 2020-01-01 RX ADMIN — OXYCODONE HYDROCHLORIDE 7.5 MG: 5 TABLET ORAL at 17:45

## 2020-01-01 RX ADMIN — METHOCARBAMOL TABLETS 500 MG: 500 TABLET, COATED ORAL at 20:31

## 2020-01-01 RX ADMIN — FLUOXETINE 20 MG: 20 CAPSULE ORAL at 09:29

## 2020-01-01 RX ADMIN — DIGOXIN 125 MCG: 125 TABLET ORAL at 09:32

## 2020-01-01 RX ADMIN — GLYCOPYRROLATE 0.1 MG: 0.2 INJECTION, SOLUTION INTRAMUSCULAR; INTRAVENOUS at 01:18

## 2020-01-01 RX ADMIN — NORTRIPTYLINE HYDROCHLORIDE 10 MG: 10 CAPSULE ORAL at 22:04

## 2020-01-01 RX ADMIN — METOPROLOL SUCCINATE 50 MG: 50 TABLET, EXTENDED RELEASE ORAL at 08:52

## 2020-01-01 RX ADMIN — ACETAMINOPHEN 650 MG: 325 TABLET, FILM COATED ORAL at 08:07

## 2020-01-01 RX ADMIN — METOPROLOL TARTRATE 50 MG: 50 TABLET, FILM COATED ORAL at 08:41

## 2020-01-01 RX ADMIN — HYDROMORPHONE HYDROCHLORIDE 1 MG: 1 INJECTION, SOLUTION INTRAMUSCULAR; INTRAVENOUS; SUBCUTANEOUS at 00:37

## 2020-01-01 RX ADMIN — DOCUSATE SODIUM 100 MG: 100 CAPSULE, LIQUID FILLED ORAL at 08:07

## 2020-01-01 RX ADMIN — METOPROLOL SUCCINATE 50 MG: 50 TABLET, EXTENDED RELEASE ORAL at 09:32

## 2020-01-01 RX ADMIN — FUROSEMIDE 40 MG: 40 TABLET ORAL at 09:24

## 2020-01-01 RX ADMIN — OXYCODONE HYDROCHLORIDE 7.5 MG: 5 TABLET ORAL at 11:50

## 2020-01-01 RX ADMIN — APIXABAN 5 MG: 5 TABLET, FILM COATED ORAL at 08:18

## 2020-01-01 RX ADMIN — OXYCODONE HYDROCHLORIDE 5 MG: 5 TABLET ORAL at 08:18

## 2020-01-01 RX ADMIN — AMIODARONE HYDROCHLORIDE 1 MG/MIN: 50 INJECTION, SOLUTION INTRAVENOUS at 01:49

## 2020-01-01 RX ADMIN — INSULIN LISPRO 1 UNITS: 100 INJECTION, SOLUTION INTRAVENOUS; SUBCUTANEOUS at 21:32

## 2020-01-01 RX ADMIN — LIDOCAINE 1 PATCH: 50 PATCH CUTANEOUS at 08:18

## 2020-01-01 RX ADMIN — GABAPENTIN 300 MG: 300 CAPSULE ORAL at 08:07

## 2020-01-01 RX ADMIN — FOLIC ACID 1000 MCG: 1 TABLET ORAL at 10:00

## 2020-01-01 RX ADMIN — PANTOPRAZOLE SODIUM 40 MG: 40 TABLET, DELAYED RELEASE ORAL at 06:46

## 2020-01-01 RX ADMIN — OXYCODONE HYDROCHLORIDE 5 MG: 5 TABLET ORAL at 18:17

## 2020-01-01 RX ADMIN — ZOLPIDEM TARTRATE 5 MG: 5 TABLET, COATED ORAL at 21:48

## 2020-01-01 RX ADMIN — OXYCODONE HYDROCHLORIDE 7.5 MG: 5 TABLET ORAL at 11:44

## 2020-01-01 RX ADMIN — METOPROLOL SUCCINATE 50 MG: 50 TABLET, EXTENDED RELEASE ORAL at 09:43

## 2020-01-01 RX ADMIN — DIGOXIN 125 MCG: 125 TABLET ORAL at 09:09

## 2020-01-01 RX ADMIN — CLOPIDOGREL BISULFATE 75 MG: 75 TABLET ORAL at 08:40

## 2020-01-01 RX ADMIN — DIGOXIN 125 MCG: 125 TABLET ORAL at 08:51

## 2020-01-01 RX ADMIN — FOLIC ACID 1000 MCG: 1 TABLET ORAL at 08:40

## 2020-01-01 RX ADMIN — APIXABAN 5 MG: 5 TABLET, FILM COATED ORAL at 18:02

## 2020-01-01 RX ADMIN — MELATONIN 6 MG: at 22:02

## 2020-01-01 RX ADMIN — DIGOXIN 125 MCG: 125 TABLET ORAL at 09:38

## 2020-01-01 RX ADMIN — METOPROLOL SUCCINATE 50 MG: 50 TABLET, EXTENDED RELEASE ORAL at 08:22

## 2020-01-01 RX ADMIN — DOCUSATE SODIUM AND SENNOSIDES 1 TABLET: 8.6; 5 TABLET ORAL at 21:12

## 2020-01-01 RX ADMIN — ACETAMINOPHEN 975 MG: 325 TABLET, FILM COATED ORAL at 11:45

## 2020-01-01 RX ADMIN — METOPROLOL SUCCINATE 50 MG: 50 TABLET, EXTENDED RELEASE ORAL at 21:10

## 2020-01-01 RX ADMIN — LORAZEPAM 1 MG: 2 INJECTION INTRAMUSCULAR; INTRAVENOUS at 17:48

## 2020-01-01 RX ADMIN — ACETAMINOPHEN 650 MG: 325 TABLET, FILM COATED ORAL at 09:09

## 2020-01-01 RX ADMIN — ACETAMINOPHEN 975 MG: 325 TABLET, FILM COATED ORAL at 21:57

## 2020-01-01 RX ADMIN — VALACYCLOVIR HYDROCHLORIDE 1000 MG: 1 TABLET, FILM COATED ORAL at 23:02

## 2020-01-01 RX ADMIN — FERROUS SULFATE TAB 325 MG (65 MG ELEMENTAL FE) 325 MG: 325 (65 FE) TAB at 09:14

## 2020-01-01 RX ADMIN — ACETAMINOPHEN 975 MG: 325 TABLET, FILM COATED ORAL at 12:06

## 2020-01-01 RX ADMIN — GABAPENTIN 200 MG: 100 CAPSULE ORAL at 21:10

## 2020-01-01 RX ADMIN — LISINOPRIL 2.5 MG: 2.5 TABLET ORAL at 21:47

## 2020-01-01 RX ADMIN — HYDROMORPHONE HYDROCHLORIDE 0.5 MG: 1 INJECTION, SOLUTION INTRAMUSCULAR; INTRAVENOUS; SUBCUTANEOUS at 12:02

## 2020-01-01 RX ADMIN — Medication 6 MG: at 21:26

## 2020-01-01 RX ADMIN — HYDROMORPHONE HYDROCHLORIDE 1 MG: 1 INJECTION, SOLUTION INTRAMUSCULAR; INTRAVENOUS; SUBCUTANEOUS at 06:29

## 2020-01-01 RX ADMIN — OXYCODONE HYDROCHLORIDE 5 MG: 5 TABLET ORAL at 13:41

## 2020-01-01 RX ADMIN — SENNOSIDES AND DOCUSATE SODIUM 1 TABLET: 8.6; 5 TABLET ORAL at 21:09

## 2020-01-01 RX ADMIN — LORAZEPAM 1 MG: 2 INJECTION INTRAMUSCULAR; INTRAVENOUS at 03:41

## 2020-01-01 RX ADMIN — VALACYCLOVIR HYDROCHLORIDE 1000 MG: 1 TABLET, FILM COATED ORAL at 09:10

## 2020-01-01 RX ADMIN — CLOPIDOGREL BISULFATE 75 MG: 75 TABLET ORAL at 09:30

## 2020-01-01 RX ADMIN — FOLIC ACID 1000 MCG: 1 TABLET ORAL at 09:32

## 2020-01-01 RX ADMIN — MELATONIN 3 MG: at 00:06

## 2020-01-01 RX ADMIN — METOPROLOL TARTRATE 50 MG: 50 TABLET, FILM COATED ORAL at 21:57

## 2020-01-01 RX ADMIN — ATORVASTATIN CALCIUM 20 MG: 20 TABLET, FILM COATED ORAL at 16:46

## 2020-01-01 RX ADMIN — PANTOPRAZOLE SODIUM 40 MG: 40 TABLET, DELAYED RELEASE ORAL at 05:35

## 2020-01-01 RX ADMIN — POTASSIUM CHLORIDE 20 MEQ: 1500 TABLET, EXTENDED RELEASE ORAL at 09:38

## 2020-01-01 RX ADMIN — Medication 6 MG: at 21:54

## 2020-01-01 RX ADMIN — SENNOSIDES AND DOCUSATE SODIUM 1 TABLET: 8.6; 5 TABLET ORAL at 21:49

## 2020-01-01 RX ADMIN — FUROSEMIDE 40 MG: 40 TABLET ORAL at 08:43

## 2020-01-01 RX ADMIN — ACETAMINOPHEN 975 MG: 325 TABLET, FILM COATED ORAL at 11:38

## 2020-01-01 RX ADMIN — OXYCODONE HYDROCHLORIDE 7.5 MG: 5 TABLET ORAL at 11:56

## 2020-01-01 RX ADMIN — OXYCODONE HYDROCHLORIDE 2.5 MG: 5 TABLET ORAL at 03:04

## 2020-01-01 RX ADMIN — ACETAMINOPHEN 650 MG: 650 SUPPOSITORY RECTAL at 06:57

## 2020-01-01 RX ADMIN — ATORVASTATIN CALCIUM 20 MG: 20 TABLET, FILM COATED ORAL at 17:45

## 2020-01-01 RX ADMIN — ATORVASTATIN CALCIUM 20 MG: 20 TABLET, FILM COATED ORAL at 17:54

## 2020-01-01 RX ADMIN — ATORVASTATIN CALCIUM 20 MG: 20 TABLET, FILM COATED ORAL at 16:55

## 2020-01-01 RX ADMIN — PANTOPRAZOLE SODIUM 40 MG: 40 TABLET, DELAYED RELEASE ORAL at 06:25

## 2020-01-01 RX ADMIN — ACETAMINOPHEN 650 MG: 325 TABLET, FILM COATED ORAL at 16:53

## 2020-01-01 RX ADMIN — ACETAMINOPHEN 975 MG: 325 TABLET, FILM COATED ORAL at 06:01

## 2020-01-01 RX ADMIN — TRAMADOL HYDROCHLORIDE 50 MG: 50 TABLET, FILM COATED ORAL at 22:11

## 2020-01-01 RX ADMIN — NORTRIPTYLINE HYDROCHLORIDE 10 MG: 10 CAPSULE ORAL at 21:56

## 2020-01-01 RX ADMIN — PANTOPRAZOLE SODIUM 40 MG: 40 TABLET, DELAYED RELEASE ORAL at 05:11

## 2020-01-01 RX ADMIN — ACETAMINOPHEN 975 MG: 325 TABLET, FILM COATED ORAL at 21:18

## 2020-01-01 RX ADMIN — LIDOCAINE 1 PATCH: 50 PATCH CUTANEOUS at 03:57

## 2020-01-01 RX ADMIN — OXYCODONE HYDROCHLORIDE 7.5 MG: 5 TABLET ORAL at 06:30

## 2020-01-01 RX ADMIN — Medication 500 MG: at 09:38

## 2020-01-01 RX ADMIN — NORTRIPTYLINE HYDROCHLORIDE 10 MG: 10 CAPSULE ORAL at 22:02

## 2020-01-01 RX ADMIN — INSULIN LISPRO 1 UNITS: 100 INJECTION, SOLUTION INTRAVENOUS; SUBCUTANEOUS at 17:55

## 2020-01-01 RX ADMIN — AMIODARONE HYDROCHLORIDE 200 MG: 200 TABLET ORAL at 08:07

## 2020-01-01 RX ADMIN — VALACYCLOVIR HYDROCHLORIDE 1000 MG: 1 TABLET, FILM COATED ORAL at 21:20

## 2020-01-01 RX ADMIN — ACETAMINOPHEN 975 MG: 325 TABLET, FILM COATED ORAL at 21:40

## 2020-01-01 RX ADMIN — OXYCODONE HYDROCHLORIDE 5 MG: 5 TABLET ORAL at 10:57

## 2020-01-01 RX ADMIN — METOPROLOL SUCCINATE 50 MG: 50 TABLET, EXTENDED RELEASE ORAL at 08:55

## 2020-01-01 RX ADMIN — AMIODARONE HYDROCHLORIDE 200 MG: 200 TABLET ORAL at 08:17

## 2020-01-01 RX ADMIN — FLUOXETINE 20 MG: 20 CAPSULE ORAL at 09:09

## 2020-01-01 RX ADMIN — METHOCARBAMOL TABLETS 250 MG: 500 TABLET, COATED ORAL at 20:37

## 2020-01-01 RX ADMIN — METHOCARBAMOL TABLETS 250 MG: 500 TABLET, COATED ORAL at 19:54

## 2020-01-01 RX ADMIN — HYDROMORPHONE HYDROCHLORIDE 1 MG: 1 INJECTION, SOLUTION INTRAMUSCULAR; INTRAVENOUS; SUBCUTANEOUS at 20:47

## 2020-01-01 RX ADMIN — OXYCODONE HYDROCHLORIDE 7.5 MG: 5 TABLET ORAL at 11:37

## 2020-01-01 RX ADMIN — GLYCOPYRROLATE 0.1 MG: 0.2 INJECTION, SOLUTION INTRAMUSCULAR; INTRAVENOUS at 18:29

## 2020-01-01 RX ADMIN — OXYCODONE HYDROCHLORIDE 5 MG: 5 TABLET ORAL at 00:04

## 2020-01-01 RX ADMIN — PANTOPRAZOLE SODIUM 40 MG: 40 TABLET, DELAYED RELEASE ORAL at 06:05

## 2020-01-01 RX ADMIN — MELATONIN 6 MG: at 21:57

## 2020-01-01 RX ADMIN — OXYCODONE HYDROCHLORIDE 5 MG: 5 TABLET ORAL at 05:20

## 2020-01-01 RX ADMIN — GABAPENTIN 300 MG: 300 CAPSULE ORAL at 21:22

## 2020-01-01 RX ADMIN — ACETAMINOPHEN 975 MG: 325 TABLET, FILM COATED ORAL at 13:01

## 2020-01-01 RX ADMIN — NORTRIPTYLINE HYDROCHLORIDE 10 MG: 10 CAPSULE ORAL at 23:49

## 2020-01-01 RX ADMIN — APIXABAN 5 MG: 5 TABLET, FILM COATED ORAL at 17:31

## 2020-01-01 RX ADMIN — FUROSEMIDE 20 MG: 20 TABLET ORAL at 08:55

## 2020-01-01 RX ADMIN — DOCUSATE SODIUM 100 MG: 100 CAPSULE, LIQUID FILLED ORAL at 18:06

## 2020-01-01 RX ADMIN — LISINOPRIL 2.5 MG: 2.5 TABLET ORAL at 21:22

## 2020-01-01 RX ADMIN — GABAPENTIN 200 MG: 100 CAPSULE ORAL at 21:28

## 2020-01-01 RX ADMIN — METHOCARBAMOL TABLETS 500 MG: 500 TABLET, COATED ORAL at 09:09

## 2020-01-01 RX ADMIN — INSULIN LISPRO 1 UNITS: 100 INJECTION, SOLUTION INTRAVENOUS; SUBCUTANEOUS at 21:24

## 2020-01-01 RX ADMIN — INSULIN LISPRO 1 UNITS: 100 INJECTION, SOLUTION INTRAVENOUS; SUBCUTANEOUS at 21:29

## 2020-01-01 RX ADMIN — FOLIC ACID 1000 MCG: 1 TABLET ORAL at 08:23

## 2020-01-03 NOTE — TELEPHONE ENCOUNTER
I did receive a call this week from her  through [facility] agency on aging who informed me she is closing her case  She said Salvador Roger is not following through on obtaining the financial paperwork needed for the waiver process  Since Salvador Roger has not agreed to be placed in a nursing home, she has run out of options for her  I have not had success reaching her and she has not returned my calls  Sadly, I am not sure what more can be done at this time  If she does become hospitalized, I can discuss her case with inpatient case management

## 2020-01-03 NOTE — TELEPHONE ENCOUNTER
Vijay Aguilar, pts elizabeth called to cancel pts appt for today, as she is sick  She states that she started during the night with vomiting and diarrhea and is weak  She also states that pt stays in bed all the time now, and does not sleep or answer her phone and is depressed  She states that she feels pt would be better in a facility, as she does not get around very well, and has no transportation  I did suggest that she take pt to ER, as she is very weak, and may be dehydrated

## 2020-01-10 NOTE — TELEPHONE ENCOUNTER
Arturo Li, pts landlord and friend, called in stating pt reported having black stools, pink coloration to her urine, and vomiting yesterday  Per verbal conversation with Dr Dalila Swan pt should be seen at ER and if refuses Arturo Li can try calling an ambulance to transport her   Arturo Li made aware and will try to get pt to go to ER

## 2020-01-22 NOTE — TELEPHONE ENCOUNTER
Attempted to call pt multiple times today, as she is due for her AWV and f/u appt, but phone just gives a busy signal/

## 2020-02-18 NOTE — ASSESSMENT & PLAN NOTE
A: currently euvolemic and well perfused, last echo had an EF of 50%, thought this was in may    P:    Estimate EF w ventriculogram on cath tomorrow  Continue lasix  Monitor electrolytes Gargle with warm salt water solution 3-5 times daily. Dissolve 1/2 teaspoon salt in half cup of warm tap water. Gargle and spit.      Try a premixed saline nasal spray, available over the counter, such as [place] Nasal Spray (or generic equi

## 2020-03-02 NOTE — PROGRESS NOTES
Assessment/Plan:  Patient to continue present treatment  Patient instructed to follow a low-fat, low-salt and a low-carbohydrate diet and get regular exercise walking with use of her walker as tolerated  Recommend patient follow up with her specialists and return to the office in 6 months for appointment and fasting labs  Again recommend patient consider living in a nursing home  Diagnoses and all orders for this visit:    Essential hypertension    Hyperlipidemia, unspecified hyperlipidemia type    Coronary artery disease involving native coronary artery of native heart without angina pectoris    Atrial fibrillation with RVR (HCC)    Chronic systolic heart failure (HCC)    Chronic pain disorder    Primary osteoarthritis involving multiple joints    Depression, unspecified depression type          Subjective:      Patient ID: Valeria Zelaya is a 79 y o  female  Patient is here for follow-up appointment for chronic conditions and she is not fasting today  Patient has been feeling fairly well overall  Patient states she takes her medications as prescribed prepackaged from the pharmacy  Patient has not followed up with specialists as previously recommended  She ambulates with use of a walker  Hypertension   This is a chronic problem  The problem is controlled  Associated symptoms include anxiety, blurred vision, neck pain and shortness of breath  Pertinent negatives include no chest pain, headaches, orthopnea, palpitations, peripheral edema or PND  Past treatments include ACE inhibitors, beta blockers and diuretics  The current treatment provides significant improvement  Compliance problems include exercise and diet  Hypertensive end-organ damage includes CAD/MI  Hyperlipidemia   This is a chronic problem  The problem is controlled  She has no history of diabetes or hypothyroidism  Associated symptoms include a focal weakness, myalgias and shortness of breath   Pertinent negatives include no chest pain, focal sensory loss or leg pain  Current antihyperlipidemic treatment includes statins  The current treatment provides moderate improvement of lipids  Compliance problems include adherence to exercise and adherence to diet  The following portions of the patient's history were reviewed and updated as appropriate: allergies, current medications, past family history, past medical history, past social history, past surgical history and problem list     Review of Systems   Constitutional: Positive for fatigue  Negative for activity change, appetite change and unexpected weight change  HENT: Negative  Eyes: Positive for blurred vision  Respiratory: Positive for shortness of breath  Negative for cough, chest tightness and wheezing  Cardiovascular: Negative for chest pain, palpitations, orthopnea, leg swelling and PND  Gastrointestinal: Negative for abdominal pain, blood in stool, constipation, diarrhea, nausea and vomiting  Endocrine: Negative for cold intolerance and heat intolerance  Genitourinary: Positive for urgency  Negative for difficulty urinating, dysuria, frequency and hematuria  Musculoskeletal: Positive for arthralgias, back pain, gait problem, myalgias, neck pain and neck stiffness  Negative for joint swelling  Skin: Negative  Neurological: Positive for dizziness, focal weakness and weakness  Negative for syncope, light-headedness and headaches  Hematological: Negative for adenopathy  Does not bruise/bleed easily  Psychiatric/Behavioral: Positive for dysphoric mood and sleep disturbance  The patient is nervous/anxious  Objective:      /68 (BP Location: Left arm, Patient Position: Sitting, Cuff Size: Standard)   Pulse 84   Temp 99 3 °F (37 4 °C) (Tympanic)   Resp 16   Ht 5' 5" (1 651 m)   Wt 73 9 kg (163 lb)   SpO2 95%   BMI 27 12 kg/m²          Physical Exam   Constitutional: She is oriented to person, place, and time   She appears well-developed and well-nourished  No distress  HENT:   Head: Normocephalic  Right Ear: External ear normal    Left Ear: External ear normal    Nose: Nose normal    Mouth/Throat: Oropharynx is clear and moist    Eyes: Conjunctivae are normal  No scleral icterus  Neck: Neck supple  No thyromegaly present  Cardiovascular: Normal rate and regular rhythm  Pulmonary/Chest: Effort normal and breath sounds normal    Abdominal: Soft  There is no tenderness  Musculoskeletal: She exhibits no edema  Lymphadenopathy:     She has no cervical adenopathy  Neurological: She is alert and oriented to person, place, and time  Skin: Skin is warm and dry  Psychiatric: She has a normal mood and affect  BMI Counseling: Body mass index is 27 12 kg/m²  The BMI is above normal  Nutrition recommendations include decreasing overall calorie intake, reducing fast food intake, consuming healthier snacks, decreasing soda and/or juice intake, moderation in carbohydrate intake and reducing intake of saturated fat and trans fat  Exercise recommendations include moderate aerobic physical activity for 150 minutes/week

## 2020-03-09 NOTE — PROGRESS NOTES
Progress Note - Ara Cooney 69 y o  female MRN: [id number]    Unit/Bed#: [facility] 827-01 Encounter: [id number]    Subjective:     Patient seen and examined at bedside  She is currently eating lunch  She denies abdominal pain and black stool  She is now having brown stool  She is anxious to go home  Objective:     Vitals: Blood pressure 112/56, pulse (!) 52, temperature 97 5 °F (36 4 °C), temperature source Oral, resp  rate 12, height 5' 6" (1 676 m), weight 70 6 kg (155 lb 10 3 oz), SpO2 95 %  ,Body mass index is 25 12 kg/m²  Intake/Output Summary (Last 24 hours) at 08/31/18 0988  Last data filed at 08/31/18 8301   Gross per 24 hour   Intake              865 ml   Output                0 ml   Net              865 ml       Physical Exam:     General Appearance: Alert, elderly female, appears stated age and cooperative  Lungs: Clear to auscultation bilaterally, no rales or rhonchi, no labored breathing/accessory muscle use  Heart: Regular rate and rhythm, S1, S2 normal, no murmur, click, rub or gallop  Abdomen: Soft, non-tender, non-distended; bowel sounds normal; no masses or no organomegaly  Extremities: No cyanosis, edema    Invasive Devices     Peripheral Intravenous Line            Peripheral IV 08/28/18 Right Forearm 3 days                Lab Results:    Results from last 7 days  Lab Units 08/31/18  0541  08/28/18  0458   WBC Thousand/uL 5 50  < > 6 73   HEMOGLOBIN g/dL 8 1*  < > 9 3*   HEMATOCRIT % 27 5*  < > 30 8*   PLATELETS Thousands/uL 244  < > 338   NEUTROS PCT %  --   --  58   LYMPHS PCT %  --   --  20   MONOS PCT %  --   --  10   EOS PCT %  --   --  10*   < > = values in this interval not displayed  Results from last 7 days  Lab Units 08/31/18  0541   SODIUM mmol/L 142   POTASSIUM mmol/L 4 0   CHLORIDE mmol/L 106   CO2 mmol/L 29   BUN mg/dL 15   CREATININE mg/dL 0 91   CALCIUM mg/dL 8 3               Imaging Studies: I have personally reviewed pertinent imaging studies      No results found     Assessment and Plan:    Discussed plan with Dr Stacy Johnson with [facility]    1) Melena and anemia: EGD was normal including esophagus, stomach, and duodenum  No source of bleeding identified  She underwent colonoscopy 7/27/18 for GI bleeding which showed superficial non-bleeding ulcers in the cecum and ascending colon likely secondary to ischemic colitis and hemorrhoids  Preliminary reading from capsule study yesterday shows proximal small bowel AVMs but no active bleeding  Hemoglobin 8 1 which is slowly trending down  However she denies any further melena      -No plan for endoscopic intervention at this time  -Monitor hemoglobin and stool color  -SLIM will discuss plan for anticoagulation with Cardiology  -Consider further work-up for macrocytic anemia  -Continue Protonix     2) History of choledocholithiasis and cholangitis: status post ERCP with stent removal  A filllng defect was identified however sphincterotomy could not be performed due to anticoagulation  Instead a new biliary stent was placed       -Plan for repeat ERCP in 3-4 months ideally off anticoagulation but will need to clear with Cardiology first     3) Colitis: She underwent colonoscopy 7/27/18 for GI bleeding which showed superficial non-bleeding ulcers in the cecum and ascending colon likely secondary to ischemic colitis and hemorrhoids  Biopsies showed moderately active colitis            (3) no apparent problem

## 2020-03-13 NOTE — TELEPHONE ENCOUNTER
Patient received a hospital bill for a outpatient visit, she wants to know why she has been given number for billing

## 2020-08-05 PROBLEM — E11.9 DM2 (DIABETES MELLITUS, TYPE 2) (HCC): Status: ACTIVE | Noted: 2020-01-01

## 2020-08-05 PROBLEM — D72.829 LEUKOCYTOSIS: Status: ACTIVE | Noted: 2020-01-01

## 2020-08-05 NOTE — SOCIAL WORK
CM met with pt at bedside and introduced self/role with dcp  Pt reports she resides alone in a 2 story home with 2 CASSIA and bedroom/bathroom on first floor  Pt reports independent with ADLS - however, recently not feeling well and has not been taking care of herself  Pt reports that she uses a RW  Owns a SPC  Pt reports she no longer drives so has been having difficulty getting to her appointments  Her landlord assists pt with grocery shopping  Pt reports she has not had an appetite in about 3 weeks and has been drinking Ensure  Pt reports hx of VNA and STR  No hx of MH or drug/alcohol abuse  Pharmacy is [facility] - pt reports they deliver and medication is in bubble packs  Pt reports no POA or LW  Pt reports her three children are  and she recently lost her sister and brother  Pt with limited support  CM to follow for dcp  CM reviewed d/c planning process including the following: identifying help at home, patient preference for d/c planning needs, Discharge Lounge, [facility] Meds to Bed program, availability of treatment team to discuss questions or concerns patient and/or family may have regarding understanding medications and recognizing signs and symptoms once discharged  CM also encouraged patient to follow up with all recommended appointments after discharge  Patient advised of importance for patient and family to participate in managing patients medical well being

## 2020-08-05 NOTE — ASSESSMENT & PLAN NOTE
· Patient with shortness of breath and slight chest pressure prompting call to EMS  Apparently found in wide complex rhythm (telemetry strip is not available to me at this time), placed on amiodarone GTT after bolus in ED with improvement in rate and without wide complex  · Suspect in setting of medication noncompliance  · Continue amiodarone GTT for now  · Check TSH, free T4  · Continue telemetry monitoring  · Resume metoprolol succinate and digoxin in a m    · Cardiology consult will be appreciated  · Resume anticoagulation with Eliquis

## 2020-08-05 NOTE — ASSESSMENT & PLAN NOTE
· Patient with shortness of breath and slight chest pressure prompting call to EMS  Apparently found in wide complex rhythm (telemetry strip is not available to me at this time), placed on amiodarone GTT after bolus in ED with improvement in rate and without wide complex  · Patient remains in atrial fibrillation with RVR  Likely secondary to medication noncompliance for about 1 week  · Cardiology following, intravenous amiodarone was stopped and patient was restarted on p o  Amiodarone  We are also continuing digoxin 0 125 mg and metoprolol, currently on tartrate however plan to transition back to succinate eventually  · Now back on Eliquis  · Continue telemetry monitoring  · TSH mildly elevated at 8 760 however T4 normal   No history thyroid disease    · Monitor closely

## 2020-08-05 NOTE — ASSESSMENT & PLAN NOTE
· Urgency on admission however now improved  Likely secondary to medication noncompliance  · Continue lisinopril, Lopressor     · Monitor

## 2020-08-05 NOTE — ASSESSMENT & PLAN NOTE
· PDMP reviewed, appears not to have received any opioids as outpatient since 01/2019  · Longstanding history of chronic back and neck pain and frequently complaining of these throughout my evaluation  · Will trial non opioid pain control for now, reassess

## 2020-08-05 NOTE — ASSESSMENT & PLAN NOTE
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

## 2020-08-05 NOTE — CONSULTS
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)  -12 lead ECG 8/5:  Atrial fibrillation with RVR, rate 142 ppm, chronic LBBB  -24 hour telemetry review; Afib, rates 90's to 120's; no evidence of WCT  -Received IV amiodarone bolus 150 mg; currently on IV amiodarone infusion at 0 5 mg/hour  -Previously on amiodarone 200 mg daily, digoxin 0 125 mg daily (dig level 0 4), and metoprolol succinate 50 mg q 12 hours  -Previously on apixaban 5 mg b i d   -TSH 8 76; free T4 1 1    3  Non MI troponin elevation in the setting of #1/4    -No complaints CP  -No significant ischemic changes noted on 12 lead ECG  -Troponin elevation: 0 06--0 18--0 20    4  Acute CHF exacerbation, unclear if diastolic vs systolic  5  Hx of tachy mediated cardiomyopathy (prior EF 35%, now recovered 60% per TTE01/2019)  -On exam signs of volume overload; + JVD, bibasilar fine crackles, abdominal distension, and trace nonpitting bilateral lower extremity edema  -Symptomatically feels breathing is labored at rest, complains of significant MYERS with minimal exertion; and orthopnea  -Cxray--lungs are clear, no pneumothorax or pleural effusion  -2D TTE 01/2019:  LVEF 60%, no regional wall motion abnormalities, the LA and RA dilated, RV normal size and systolic function, trace TR  -Previously on furosemide 40 mg daily (placed on hold)    5   CAD s/p PCI/NGOZI x1 mRCA   -No complaints of CP  -[facility] 07/2018:  D1 50% stenosis at the ostium, mid circumflex 50% stenosis just after OM1, OM1 40% stenosis, mid RCA 90% stenosis  -Previously on clopidogrel 75 mg daily, atorvastatin 20 mg daily, and metoprolol succinate 50 mg q 12 hours    6  Hypertension  -BP elevated on admission at 177/113; last recorded at 153/69,   -Previously on lisinopril 5 mg daily, metoprolol succinate 50 mg b i d , and furosemide 40 mg daily    7  Dyslipidemia  -Lipid profile 01/2019:  Cholesterol 231, triglycerides 293, HDL 33,   -Previously on atorvastatin 20 mg daily    Plan  -Obtain 2D TTE  -Give a dose of IV Lasix 40 mg x1 now; consider additional doses; hold oral furosemide  -DC metoprolol succinate; start metoprolol tartrate 50 mg q 12 hours--eventual transition back to XL  -DC IV amiodarone infusion, restart oral amiodarone 200 mg daily  -Restart digoxin 0 125 mg daily  -Continue apixaban 5 mg b i d , atorvastatin 20 mg daily, clopidogrel 75 mg daily, and lisinopril 5 mg daily  -Repeat lipid profile w/ LDL reflex  -Monitor renal function and electrolytes closely  -Monitor closely on telemetry    HPI: Osman Langley 79y o  year old female with a complex cardiac history including persistent non-valvular atrial fibrillation (multiple prior cardioversions), AVNRT w/abberancy (s/p ablation 7/2018); tachy mediated cardiomyopathy LVEF 35% (now recovered to 60% per TTE 1/2019), CAD s/p PCI/NGOZI to mRCA (7/2018) hypertension, dyslipidemia, MARCOS, cognitive dysfunction, anxiety/depression, chronic pain, and medication noncompliance  Patient unfortunately has not followed through with routine follow-up in the outpatient setting  Her current outpatient cardiac medications include:  Amiodarone 200 mg daily, apixaban 5 mg b i d , atorvastatin 20 mg daily, clopidogrel 75 mg daily, digoxin 0 125 mg daily, furosemide 40 mg daily, lisinopril 5 mg daily, and metoprolol succinate 50 mg q 12 hours  She presented to the [place] [id number]  ED on 8/05/2020 with complaints of palpitations and shortness of breath  She summoned EMS    Per the report patient was in a wide complex tachycardia but remained hemodynamically stable  Further workup in the ED  Hemodynamics on admission  Temp 98° F, , RR 23, /113, sat 99% on RA  Laboratory data on admission  NA +137, K +3 8, chloride 102, CO2 22, anion gap 13, BUN 14, creatinine 1 2, glucose 327, calcium 9 5, normal LFTs, albumin 3 3, phosphorus 4 1, magnesium 2 1, WBC 19 3, HGB 13 0, platelet count 501  Troponin x3:  0 06--0 18--0 20  Imaging  Chest x-ray:lungs are clear, no pneumothorax or pleural effusion     Initial 12 lead ECG in the ED demonstrated atrial fibrillation with RVR, rate 142 ppm, chronic LBBB  She received IV amiodarone bolus 150 mg and initiated on an IV amiodarone infusion with improvement in her heart rates  There is question of whether patient has been compliant with her routine cardiac medications  She is a very unreliable historian  Cardiology consulted for further treatment recommendations/management  Family History: History reviewed  No pertinent family history  Historical Information   Past Medical History:   Diagnosis Date    [facility]     Acute respiratory disease     Anemia     Arthritis     Atrial fibrillation (HCC)     CHF (congestive heart failure) (HCC)     Chronic pain     Heart failure (HCC)     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[facility] )     History of colon polyps     Hx of long term use of blood thinners     Hypertension     Irregular heart beat     Narcotic dependence ([facility] )     Rectal bleeding     Stroke (HCC)     mild no deficiets/ memory loss    Uses walker      Past Surgical History:   Procedure Laterality Date    COLONOSCOPY      COLONOSCOPY N/A 7/27/2018    Procedure: COLONOSCOPY;  Surgeon: Juliann Granda MD;  Location: [facility]; Service: Gastroenterology    CORONARY STENT PLACEMENT      ERCP N/A 5/14/2018    Procedure: ENDOSCOPIC RETROGRADE CHOLANGIOPANCREATOGRAPHY (ERCP);   Surgeon: Rebecca Hedrick Aarti Morales MD;  Location:  MAIN OR;  Service: Gastroenterology    ERCP N/A 8/28/2018    Procedure: ENDOSCOPIC RETROGRADE CHOLANGIOPANCREATOGRAPHY (ERCP) w/ EGD;  Surgeon: Christoph Wolfe MD;  Location:  GI LAB; Service: Gastroenterology    ESOPHAGOGASTRODUODENOSCOPY N/A 7/26/2018    Procedure: ESOPHAGOGASTRODUODENOSCOPY (EGD); Surgeon: Manuel Silver MD;  Location: [facility]; Service: Gastroenterology    JOINT REPLACEMENT Right     knee     Social History   Social History     Substance and Sexual Activity   Alcohol Use Not Currently     Social History     Substance and Sexual Activity   Drug Use Not Currently     Social History     Tobacco Use   Smoking Status Former Smoker    Packs/day: 0 50    Types: Cigarettes   Smokeless Tobacco Never Used     Family History: History reviewed  No pertinent family history  Review of Systems:  Review of Systems   Constitutional: Positive for activity change  Negative for appetite change, chills, fatigue and fever  HENT: Negative for congestion  Respiratory: Positive for chest tightness and shortness of breath  Negative for cough  Cardiovascular: Positive for palpitations  Negative for chest pain and leg swelling  +MYERS, orthopnea   Gastrointestinal: Negative for abdominal pain  Genitourinary: Negative for difficulty urinating and dysuria  Musculoskeletal: Negative for arthralgias and myalgias  Neurological: Negative for dizziness, light-headedness and headaches  Psychiatric/Behavioral: Positive for suicidal ideas  The patient is nervous/anxious  All other systems reviewed and are negative            Scheduled Meds:acetaminophen, 650 mg, Oral, Q6H, Bertis Spar, DO  amiodarone, 0 5 mg/min, Intravenous, Continuous, Bertis Spar, DO, Last Rate: 0 5 mg/min (08/05/20 0750)  apixaban, 5 mg, Oral, BID, Bertis Spar, DO  ascorbic acid, 500 mg, Oral, Daily, Bertis Spar, DO  atorvastatin, 20 mg, Oral, Daily With Dinner, Bertis Spar, DO  bisacodyl, 10 mg, Rectal, Daily PRN, Abiquiu Yashira, DO  clopidogrel, 75 mg, Oral, Daily, Abiquiu Yashira, DO  ferrous sulfate, 325 mg, Oral, Daily With Breakfast, Abiquiu [place], DO  FLUoxetine, 20 mg, Oral, Daily, Abiquiu [place], DO  folic acid, 2,618 mcg, Oral, Daily, Abiquiu Yashira, DO  furosemide, 40 mg, Oral, Daily, Abiquiu [place], DO  lidocaine, 1 patch, Topical, Daily, Abiquiu [place], DO  lisinopril, 5 mg, Oral, Daily, Abiquiu Yashira, DO  melatonin, 6 mg, Oral, HS, Abiquiu Yashira, DO  metoprolol succinate, 50 mg, Oral, Q12H [address], Abiquiu [place], DO  nortriptyline, 10 mg, Oral, HS, Abiquiu Yashira, DO  ondansetron, 4 mg, Intravenous, Q6H PRN, Abiquiu Yashira, DO  pantoprazole, 40 mg, Oral, BID AC, Abiquiu [place], DO  senna-docusate sodium, 1 tablet, Oral, HS, Abiquiu Yashira, DO  thiamine, 100 mg, Oral, Daily, Abiquiu Yashira, DO      Continuous Infusions:amiodarone, 0 5 mg/min, Last Rate: 0 5 mg/min (08/05/20 0750)      PRN Meds: bisacodyl    ondansetron  all current active meds have been reviewed, current meds:   Current Facility-Administered Medications   Medication Dose Route Frequency    acetaminophen (TYLENOL) tablet 650 mg  650 mg Oral Q6H    amiodarone (CORDARONE) 900 mg in dextrose 5 % 500 mL infusion  0 5 mg/min Intravenous Continuous    apixaban (ELIQUIS) tablet 5 mg  5 mg Oral BID    ascorbic acid (VITAMIN C) tablet 500 mg  500 mg Oral Daily    atorvastatin (LIPITOR) tablet 20 mg  20 mg Oral Daily With Dinner    bisacodyl (DULCOLAX) rectal suppository 10 mg  10 mg Rectal Daily PRN    clopidogrel (PLAVIX) tablet 75 mg  75 mg Oral Daily    ferrous sulfate tablet 325 mg  325 mg Oral Daily With Breakfast    FLUoxetine (PROzac) capsule 20 mg  20 mg Oral Daily    folic acid (FOLVITE) tablet 1,000 mcg  1,000 mcg Oral Daily    furosemide (LASIX) tablet 40 mg  40 mg Oral Daily    lidocaine (LIDODERM) 5 % patch 1 patch  1 patch Topical Daily    lisinopril (ZESTRIL) tablet 5 mg  5 mg Oral Daily    melatonin tablet 6 mg  6 mg Oral HS    metoprolol succinate (TOPROL-XL) 24 hr tablet 50 mg  50 mg Oral Q12H [facility]    nortriptyline (PAMELOR) capsule 10 mg  10 mg Oral HS    ondansetron (ZOFRAN) injection 4 mg  4 mg Intravenous Q6H PRN    pantoprazole (PROTONIX) EC tablet 40 mg  40 mg Oral BID AC    senna-docusate sodium (SENOKOT S) 8 6-50 mg per tablet 1 tablet  1 tablet Oral HS    thiamine (VITAMIN B1) tablet 100 mg  100 mg Oral Daily    and PTA meds:   Prior to Admission Medications   Prescriptions Last Dose Informant Patient Reported? Taking?    FEROSUL 325 (65 Fe) MG tablet   No No   Sig: TAKE ONE TABLET BY MOUTH DAILY WITH BREAKFAST   FLUoxetine (PROzac) 20 mg capsule   No No   Sig: TAKE 1 CAPSULE (20 MG TOTAL) BY MOUTH DAILY   Lidocaine (ASPERCREME LIDOCAINE) 4 % PTCH   No No   Sig: Apply 1 patch topically every 12 (twelve) hours   acetaminophen (TYLENOL) 325 mg tablet   No No   Sig: Take 2 tablets (650 mg total) by mouth every 6 (six) hours   amiodarone 200 mg tablet   No No   Sig: TAKE 1 TABLET (200 MG TOTAL) BY MOUTH DAILY   apixaban (ELIQUIS) 5 mg   No No   Sig: Take 1 tablet (5 mg total) by mouth 2 (two) times a day   ascorbic acid (VITAMIN C) 500 mg tablet   No No   Sig: TAKE ONE TABLET BY MOUTH DAILY   atorvastatin (LIPITOR) 20 mg tablet   No No   Sig: TAKE 1 TABLET (20 MG TOTAL) BY MOUTH DAILY WITH DINNER   bisacodyl (DULCOLAX) 10 mg suppository   Yes No   Sig: Insert 10 mg into the rectum daily as needed for constipation   clopidogrel (PLAVIX) 75 mg tablet   No No   Sig: TAKE 1 TABLET (75 MG TOTAL) BY MOUTH DAILY   digoxin (LANOXIN) 0 125 mg tablet   No No   Sig: TAKE 1 TABLET (0 125 MG TOTAL) BY MOUTH DAILY   folic acid (FOLVITE) 1 mg tablet   No No   Sig: TAKE ONE TABLET BY MOUTH DAILY   furosemide (LASIX) 40 mg tablet   No No   Sig: TAKE 1 TABLET (40 MG TOTAL) BY MOUTH DAILY   lisinopril (ZESTRIL) 5 mg tablet   No No   Sig: TAKE 1 TABLET (5 MG TOTAL) BY MOUTH DAILY   melatonin 3 mg   No No   Sig: Take 2 tablets (6 mg total) by mouth daily at bedtime   methocarbamol (ROBAXIN) 500 mg tablet   No No   Sig: Take 1 tablet (500 mg total) by mouth once as needed for muscle spasms for up to 1 dose   metoprolol succinate (TOPROL-XL) 50 mg 24 hr tablet   No No   Sig: TAKE 1 TABLET (50 MG TOTAL) BY MOUTH EVERY 12 (TWELVE) HOURS   nortriptyline (PAMELOR) 10 mg capsule   No No   Sig: TAKE ONE CAPSULE BY MOUTH AT BEDTIME   pantoprazole (PROTONIX) 40 mg tablet   No No   Sig: Take 1 tablet (40 mg total) by mouth 2 (two) times a day before meals   senna-docusate sodium (SENOKOT S) 8 6-50 mg per tablet   No No   Sig: Take 1 tablet by mouth daily at bedtime   thiamine 100 MG tablet   No No   Sig: Take 1 tablet (100 mg total) by mouth daily      Facility-Administered Medications: None       No Known Allergies    Objective   Vitals: Blood pressure 153/69, pulse 103, temperature 98 °F (36 7 °C), resp  rate 20, SpO2 94 %  , There is no height or weight on file to calculate BMI ,   Orthostatic Blood Pressures      Most Recent Value   Blood Pressure  153/69 filed at 08/05/2020 0800   Patient Position - Orthostatic VS  Lying filed at 08/05/2020 0800            Intake/Output Summary (Last 24 hours) at 8/5/2020 0841  Last data filed at 8/5/2020 0158  Gross per 24 hour   Intake 150 ml   Output    Net 150 ml       Invasive Devices     Peripheral Intravenous Line            Peripheral IV 08/05/20 Left Antecubital less than 1 day    Peripheral IV 08/05/20 Left Hand less than 1 day              Physical Exam:  Physical Exam   Constitutional: She is oriented to person, place, and time  She does not appear ill  No distress  HENT:   Head: Normocephalic and atraumatic  Eyes: No scleral icterus  Neck: Normal range of motion  +JVD   Cardiovascular: An irregularly irregular rhythm present  Tachycardia present  Pulmonary/Chest: Effort normal  She has no wheezes  She has no rhonchi  She has rales  Abdominal: Soft   Normal appearance and bowel sounds are normal  She exhibits distension  There is no abdominal tenderness  Musculoskeletal: Normal range of motion  General: No swelling or tenderness  Comments: Trace nonpitting bilateral lower extremity edema   Neurological: She is alert and oriented to person, place, and time  Skin: Skin is warm  Capillary refill takes less than 2 seconds       Lab Results:   Recent Results (from the past 24 hour(s))   CBC and differential    Collection Time: 08/05/20 12:32 AM   Result Value Ref Range    WBC 19 25 (H) 4 31 - 10 16 Thousand/uL    RBC 3 97 3 81 - 5 12 Million/uL    Hemoglobin 13 0 11 5 - 15 4 g/dL    Hematocrit 41 2 34 8 - 46 1 %     (H) 82 - 98 fL    MCH 32 7 26 8 - 34 3 pg    MCHC 31 6 31 4 - 37 4 g/dL    RDW 15 4 (H) 11 6 - 15 1 %    MPV 9 5 8 9 - 12 7 fL    Platelets 625 552 - 700 Thousands/uL    nRBC 0 /100 WBCs    Neutrophils Relative 81 (H) 43 - 75 %    Immat GRANS % 2 0 - 2 %    Lymphocytes Relative 9 (L) 14 - 44 %    Monocytes Relative 5 4 - 12 %    Eosinophils Relative 2 0 - 6 %    Basophils Relative 1 0 - 1 %    Neutrophils Absolute 15 88 (H) 1 85 - 7 62 Thousands/µL    Immature Grans Absolute 0 28 (H) 0 00 - 0 20 Thousand/uL    Lymphocytes Absolute 1 65 0 60 - 4 47 Thousands/µL    Monocytes Absolute 0 96 0 17 - 1 22 Thousand/µL    Eosinophils Absolute 0 38 0 00 - 0 61 Thousand/µL    Basophils Absolute 0 10 0 00 - 0 10 Thousands/µL   Comprehensive metabolic panel    Collection Time: 08/05/20 12:32 AM   Result Value Ref Range    Sodium 137 136 - 145 mmol/L    Potassium 3 8 3 5 - 5 3 mmol/L    Chloride 102 100 - 108 mmol/L    CO2 22 21 - 32 mmol/L    ANION GAP 13 4 - 13 mmol/L    BUN 14 5 - 25 mg/dL    Creatinine 1 23 0 60 - 1 30 mg/dL    Glucose 327 (H) 65 - 140 mg/dL    Calcium 9 5 8 3 - 10 1 mg/dL    AST 25 5 - 45 U/L    ALT 32 12 - 78 U/L    Alkaline Phosphatase 96 46 - 116 U/L    Total Protein 7 5 6 4 - 8 2 g/dL    Albumin 3 3 (L) 3 5 - 5 0 g/dL    Total Bilirubin 0 53 0 20 - 1 00 mg/dL    eGFR 42 ml/min/1 73sq m   Troponin I    Collection Time: 08/05/20 12:32 AM   Result Value Ref Range    Troponin I 0 06 (H) <=0 04 ng/mL   Magnesium    Collection Time: 08/05/20 12:32 AM   Result Value Ref Range    Magnesium 2 1 1 6 - 2 6 mg/dL   Phosphorus    Collection Time: 08/05/20 12:32 AM   Result Value Ref Range    Phosphorus 4 1 2 3 - 4 1 mg/dL   Ethanol    Collection Time: 08/05/20  1:07 AM   Result Value Ref Range    Ethanol Lvl <3 0 - 3 mg/dL   Salicylate level    Collection Time: 08/05/20  1:07 AM   Result Value Ref Range    Salicylate Lvl <3 (L) 3 - 20 mg/dL   Acetaminophen level-If concentration is detectable, please discuss with medical  on call      Collection Time: 08/05/20  1:07 AM   Result Value Ref Range    Acetaminophen Level <2 (L) 10 - 20 ug/mL   TSH, 3rd generation    Collection Time: 08/05/20  1:07 AM   Result Value Ref Range    TSH 3RD GENERATON 8 760 (H) 0 358 - 3 740 uIU/mL   T4, free    Collection Time: 08/05/20  1:07 AM   Result Value Ref Range    Free T4 1 11 0 76 - 1 46 ng/dL   Novel Coronavirus (Covid-19),PCR [facility]    Collection Time: 08/05/20  1:36 AM    Specimen: Nose; Nares   Result Value Ref Range    SARS-CoV-2 Negative Negative   Digoxin level    Collection Time: 08/05/20  4:41 AM   Result Value Ref Range    Digoxin Lvl 0 4 (L) 0 8 - 2 0 ng/mL   Basic metabolic panel    Collection Time: 08/05/20  4:41 AM   Result Value Ref Range    Sodium 137 136 - 145 mmol/L    Potassium 4 8 3 5 - 5 3 mmol/L    Chloride 103 100 - 108 mmol/L    CO2 24 21 - 32 mmol/L    ANION GAP 10 4 - 13 mmol/L    BUN 15 5 - 25 mg/dL    Creatinine 1 12 0 60 - 1 30 mg/dL    Glucose 207 (H) 65 - 140 mg/dL    Calcium 9 7 8 3 - 10 1 mg/dL    eGFR 47 ml/min/1 73sq m   Magnesium    Collection Time: 08/05/20  4:41 AM   Result Value Ref Range    Magnesium 3 3 (H) 1 6 - 2 6 mg/dL   CBC (With Platelets)    Collection Time: 08/05/20  4:41 AM   Result Value Ref Range    WBC 16 91 (H) 4 31 - 10 16 Thousand/uL    RBC 3 78 (L) 3 81 - 5 12 Million/uL    Hemoglobin 12 3 11 5 - 15 4 g/dL    Hematocrit 39 0 34 8 - 46 1 %     (H) 82 - 98 fL    MCH 32 5 26 8 - 34 3 pg    MCHC 31 5 31 4 - 37 4 g/dL    RDW 15 3 (H) 11 6 - 15 1 %    Platelets 083 723 - 746 Thousands/uL    MPV 9 3 8 9 - 12 7 fL   Troponin I    Collection Time: 08/05/20  4:41 AM   Result Value Ref Range    Troponin I 0 18 (H) <=0 04 ng/mL   Hemoglobin A1C    Collection Time: 08/05/20  4:43 AM   Result Value Ref Range    Hemoglobin A1C 7 6 (H) Normal 3 8-5 6%; PreDiabetic 5 7-6 4%; Diabetic >=6 5%; Glycemic control for adults with diabetes <7 0% %     mg/dl   Troponin I    Collection Time: 08/05/20  6:38 AM   Result Value Ref Range    Troponin I 0 20 (H) <=0 04 ng/mL     Imaging: I have personally reviewed pertinent reports  and I have personally reviewed pertinent films in PACS  Code Status:  Level 1 full code  Epic/ Allscripts/Care Everywhere records reviewed:  Yes    * Please Note: Fluency DirectDictation voice to text software may have been used in the creation of this document   **

## 2020-08-05 NOTE — ASSESSMENT & PLAN NOTE
· Had been off home medications, will resume reported lisinopril 5 mg daily and metoprolol succinate for now with hold parameters and monitor blood pressure per protocol

## 2020-08-05 NOTE — ASSESSMENT & PLAN NOTE
Lab Results   Component Value Date    HGBA1C 7 6 (H) 08/05/2020       · No reported history of diabetes, A1c 7 6 and patient noted to be hyperglycemic  · Start SSI  · Diabetic diet  · Monitor Accu-Cheks and adjust as needed  · Would benefit from p o  Agent on discharge and close outpatient follow-up with PCP  Would also recommend glucometer on discharge as patient should be checking BG at home     · Nutrition consult

## 2020-08-05 NOTE — ASSESSMENT & PLAN NOTE
· S/p PCI to RCA in 2018  · Had been maintained on Plavix and Eliquis; previously had been on ASA however held secondary to bleeding  · Will continue Plavix, beta-blocker, statin

## 2020-08-05 NOTE — ASSESSMENT & PLAN NOTE
· Non MI troponin elevation with peak troponin of 0 20  Likely secondary to AFib with RVR  EKG otherwise without ischemic changes  · No complaints of chest pain

## 2020-08-05 NOTE — H&P
H&P- Anali Julian [birth year], 79 y o  female MRN: [id number]    Unit/Bed#: ED 05 Encounter: [id number]    Primary Care Provider: Jaylon Ervin DO   Date and time admitted to hospital: 8/5/2020 12:20 AM        * Atrial fibrillation with RVR ([address] )  Assessment & Plan  · Patient with shortness of breath and slight chest pressure prompting call to EMS  Apparently found in wide complex rhythm (telemetry strip is not available to me at this time), placed on amiodarone GTT after bolus in ED with improvement in rate and without wide complex  · Suspect in setting of medication noncompliance  · Continue amiodarone GTT for now  · Check TSH, free T4  · Continue telemetry monitoring  · Resume metoprolol succinate and digoxin in a m  · Cardiology consult will be appreciated  · Resume anticoagulation with Eliquis    Elevated troponin  Assessment & Plan  · Suspect in setting of AFib with RVR, EKG otherwise without ischemic changes  · Will continue to trend troponin for now and telemetry monitoring as above    Depression  Assessment & Plan  · With patient endorsing worsening mood and limited energy secondary to chronic pain    Patient admitting to frequent thoughts of wishing she would be dead and did state some suicidal ideation if she had a gun at home (patient reports she does not have gun at home   · In this setting psychiatry consultation will be appreciated  · Reported on nortriptyline and Prozac at home    Chronic pain disorder  Assessment & Plan  · PDMP reviewed, appears not to have received any opioids as outpatient since 01/2019  · Longstanding history of chronic back and neck pain and frequently complaining of these throughout my evaluation  · Will trial non opioid pain control for now, reassess    Coronary artery disease  Assessment & Plan  · S/p PCI to RCA in 2018  · Had been maintained on Plavix and Eliquis; previously had been on ASA however held secondary to bleeding  · Will continue Plavix, beta-blocker, statin    Essential hypertension  Assessment & Plan  · Had been off home medications, will resume reported lisinopril 5 mg daily and metoprolol succinate for now with hold parameters and monitor blood pressure per protocol        VTE Prophylaxis: Apixaban (Eliquis)  / sequential compression device   Code Status: Level 1 - Full Code can be reassessed after psychiatric evaluation  POLST: POLST form is not discussed and not completed at this time  Anticipated Length of Stay:  Patient will be admitted on an Inpatient basis with an anticipated length of stay of  greater than 2 midnights  Justification for Hospital Stay: Please see detailed plans noted above  Chief Complaint:     Shortness of breath  History of Present Illness:  Vani Hugo is a 79 y o  female who presented with rapid heart rate and shortness of breath  Has a past medical history significant for AVNRT s/p prior ablation, AFib normally anticoagulated on Coumadin, CHF, CAD s/p PCI to RCA in 2018, and chronic pain disorder  She presented this evening with the sudden onset of shortness of breath and rapid heart rate which was persistent at both minimal exertion and rest, not associated with dizziness/lightheadedness, fevers/chills, cough/wheezing, or chest pain/pressure  Does endorse poor recent appetite  She had contacted EMS, who apparently found patient and wide complex tachycardia but hemodynamically stable  Subsequently found in ED in AFib with RVR and initiated on amiodarone GTT given reported wide complex tachycardia with improvement in rates; additionally found with slightly elevated troponin  At the time of my evaluation, patient lying in bed in no acute distress and does not appear short of breath, and endorses marked improvement with her breathing  She frequently interrupts and complains of pain all over particularly in her back and neck, and is frequently requesting pain medication throughout my evaluation    She admits that for least past 5-6 days she has not been compliant with her home medications as she had apparently run out and did not have the energy to contact her pharmacy to have it delivered  She frequently interrupts to complain that with her chronic pain she is unable to leave her home and frequently stays in bed some days, and states she has been like this since her home opioids had been discontinued  She frequently interrupts to state that she wishes to die or end up in a coma; when inquired about suicidal plan she states she does not clearly have one, but mentioned consideration if she had a firearm however patient states she does not own one      Review of Systems:    Constitutional:  Denies fever or chills but endorse poor appetite  Eyes:  Denies change in visual acuity   HENT:  Denies nasal congestion or sore throat   Respiratory:  Denies cough but endorsed shortness of breath  Cardiovascular:  Denies chest pain or edema   GI:  Denies abdominal pain, nausea, vomiting, bloody stools or diarrhea   :  Denies dysuria   Musculoskeletal:  Endorsed back and neck pain  Integument:  Denies rash   Neurologic:  Denies headache, focal weakness or sensory changes   Endocrine:  Denies polyuria or polydipsia   Lymphatic:  Denies swollen glands   Psychiatric:  Denies anxiety but endorses depression    Past Medical and Surgical History:   Past Medical History:   Diagnosis Date    A-fib ([facility] )     Acute respiratory disease     Anemia     Arthritis     Atrial fibrillation (HCC)     CHF (congestive heart failure) (HCC)     Chronic pain     Heart failure (HCC)     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[facility] )     History of colon polyps     Hx of long term use of blood thinners     Hypertension     Irregular heart beat     Narcotic dependence (HCC)     Rectal bleeding     Stroke (HCC)     mild no deficiets/ memory loss    Uses walker      Past Surgical History:   Procedure Laterality Date    COLONOSCOPY  COLONOSCOPY N/A 7/27/2018    Procedure: COLONOSCOPY;  Surgeon: Carmen Mendez MD;  Location: [facility]; Service: Gastroenterology    CORONARY STENT PLACEMENT      ERCP N/A 5/14/2018    Procedure: ENDOSCOPIC RETROGRADE CHOLANGIOPANCREATOGRAPHY (ERCP); Surgeon: Freya Silva MD;  Location: [facility];  Service: Gastroenterology    ERCP N/A 8/28/2018    Procedure: ENDOSCOPIC RETROGRADE CHOLANGIOPANCREATOGRAPHY (ERCP) w/ EGD;  Surgeon: Freya Silva MD;  [facility]: [facility]; Service: Gastroenterology    ESOPHAGOGASTRODUODENOSCOPY N/A 7/26/2018    Procedure: ESOPHAGOGASTRODUODENOSCOPY (EGD); Surgeon: Carmen Mendez MD;  Location: [facility];   Service: Gastroenterology    JOINT REPLACEMENT Right     knee       Meds/Allergies:    Current Facility-Administered Medications:     acetaminophen (TYLENOL) tablet 650 mg, 650 mg, Oral, Q6H, Smiley Laurent, , 650 mg at 08/05/20 0436    amiodarone (CORDARONE) 900 mg in dextrose 5 % 500 mL infusion, 1 mg/min, Intravenous, Continuous, Last Rate: 33 3 mL/hr at 08/05/20 0149, 1 mg/min at 08/05/20 0149 **FOLLOWED BY** amiodarone (CORDARONE) 900 mg in dextrose 5 % 500 mL infusion, 0 5 mg/min, Intravenous, Continuous, Smiley Laurent DO    apixaban (ELIQUIS) tablet 5 mg, 5 mg, Oral, BID, Smiley Laurent DO    ascorbic acid (VITAMIN C) tablet 500 mg, 500 mg, Oral, Daily, Smiley Laurent DO    atorvastatin (LIPITOR) tablet 20 mg, 20 mg, Oral, Daily With Dinner, Smiley Laurent DO    bisacodyl (DULCOLAX) rectal suppository 10 mg, 10 mg, Rectal, Daily PRN, Smiley Laurent DO    clopidogrel (PLAVIX) tablet 75 mg, 75 mg, Oral, Daily, Smiley Laurent,     ferrous sulfate tablet 325 mg, 325 mg, Oral, Daily With Breakfast, Simley Laurent,     FLUoxetine (PROzac) capsule 20 mg, 20 mg, Oral, Daily, Smiley Laurent DO    folic acid (FOLVITE) tablet 1,000 mcg, 1,000 mcg, Oral, Daily, Smiley Laurent DO    furosemide (LASIX) tablet 40 mg, 40 mg, Oral, Daily, Smiley Laurent DO   lidocaine (LIDODERM) 5 % patch 1 patch, 1 patch, Topical, Daily, Aiden Reed,     lisinopril (ZESTRIL) tablet 5 mg, 5 mg, Oral, Daily, Aiden Reed,     melatonin tablet 6 mg, 6 mg, Oral, HS, Aiden Reed, DO    metoprolol succinate (TOPROL-XL) 24 hr tablet 50 mg, 50 mg, Oral, Q12H [address], Aiden Reed,     nortriptyline (PAMELOR) capsule 10 mg, 10 mg, Oral, HS, Aiden Reed, DO    [facility]) injection 4 mg, 4 mg, Intravenous, Q6H PRN, Aiden Reed,     pantoprazole (PROTONIX) EC tablet 40 mg, 40 mg, Oral, BID AC, Aiden Reed,     senna-docusate sodium (SENOKOT S) 8 6-50 mg per tablet 1 tablet, 1 tablet, Oral, HS, Aiden Reed, DO    thiamine (VITAMIN B1) tablet 100 mg, 100 mg, Oral, Daily, Aiden Reed, DO    Current Outpatient Medications:     acetaminophen (TYLENOL) 325 mg tablet, Take 2 tablets (650 mg total) by mouth every 6 (six) hours, Disp: 100 tablet, Rfl: 0    amiodarone 200 mg tablet, TAKE 1 TABLET (200 MG TOTAL) BY MOUTH DAILY, Disp: 28 tablet, Rfl: 2    apixaban (ELIQUIS) 5 mg, Take 1 tablet (5 mg total) by mouth 2 (two) times a day, Disp: 60 tablet, Rfl: 2    ascorbic acid (VITAMIN C) 500 mg tablet, TAKE ONE TABLET BY MOUTH DAILY, Disp: 30 tablet, Rfl: 5    atorvastatin (LIPITOR) 20 mg tablet, TAKE 1 TABLET (20 MG TOTAL) BY MOUTH DAILY WITH DINNER, Disp: 30 tablet, Rfl: 5    bisacodyl (DULCOLAX) 10 mg suppository, Insert 10 mg into the rectum daily as needed for constipation, Disp: , Rfl:     clopidogrel (PLAVIX) 75 mg tablet, TAKE 1 TABLET (75 MG TOTAL) BY MOUTH DAILY, Disp: 30 tablet, Rfl: 5    digoxin (LANOXIN) 0 125 mg tablet, TAKE 1 TABLET (0 125 MG TOTAL) BY MOUTH DAILY, Disp: 30 tablet, Rfl: 5    FEROSUL 325 (65 Fe) MG tablet, TAKE ONE TABLET BY MOUTH DAILY WITH BREAKFAST, Disp: 28 tablet, Rfl: 2    FLUoxetine (PROzac) 20 mg capsule, TAKE 1 CAPSULE (20 MG TOTAL) BY MOUTH DAILY, Disp: 30 capsule, Rfl: 5    folic acid (FOLVITE) 1 mg tablet, TAKE ONE TABLET BY MOUTH DAILY, Disp: 30 tablet, Rfl: 5    furosemide (LASIX) 40 mg tablet, TAKE 1 TABLET (40 MG TOTAL) BY MOUTH DAILY, Disp: 30 tablet, Rfl: 5    Lidocaine (ASPERCREME LIDOCAINE) 4 % PTCH, Apply 1 patch topically every 12 (twelve) hours, Disp: 30 patch, Rfl: 0    lisinopril (ZESTRIL) 5 mg tablet, TAKE 1 TABLET (5 MG TOTAL) BY MOUTH DAILY, Disp: 30 tablet, Rfl: 3    melatonin 3 mg, Take 2 tablets (6 mg total) by mouth daily at bedtime, Disp: 30 tablet, Rfl: 0    methocarbamol (ROBAXIN) 500 mg tablet, Take 1 tablet (500 mg total) by mouth once as needed for muscle spasms for up to 1 dose, Disp: 14 tablet, Rfl: 0    metoprolol succinate (TOPROL-XL) 50 mg 24 hr tablet, TAKE 1 TABLET (50 MG TOTAL) BY MOUTH EVERY 12 (TWELVE) HOURS, Disp: 60 tablet, Rfl: 5    nortriptyline (PAMELOR) 10 mg capsule, TAKE ONE CAPSULE BY MOUTH AT BEDTIME, Disp: 30 capsule, Rfl: 5    pantoprazole (PROTONIX) 40 mg tablet, Take 1 tablet (40 mg total) by mouth 2 (two) times a day before meals, Disp: 60 tablet, Rfl: 0    senna-docusate sodium (SENOKOT S) 8 6-50 mg per tablet, Take 1 tablet by mouth daily at bedtime, Disp: 30 tablet, Rfl: 0    thiamine 100 MG tablet, Take 1 tablet (100 mg total) by mouth daily, Disp: 30 tablet, Rfl: 0      Allergies: No Known Allergies  History:  Marital Status:      Substance Use History:   Social History     Substance and Sexual Activity   Alcohol Use Not Currently     Social History     Tobacco Use   Smoking Status Former Smoker    Packs/day: 0 50    Types: Cigarettes   Smokeless Tobacco Never Used     Social History     Substance and Sexual Activity   Drug Use Not Currently       Family History:  Non-contributory  Physical Exam:     Vitals:   Blood Pressure: 130/62 (08/05/20 0151)  Pulse: (!) 124 (08/05/20 0151)  Temperature: 98 °F (36 7 °C) (08/05/20 0151)  Respirations: 22 (08/05/20 0151)  SpO2: 93 % (08/05/20 0151)    Constitutional:  Frail appearing, no acute distress, non-toxic appearance   Eyes:  PERRL, conjunctiva normal   HENT:  Atraumatic, external ears normal, nose normal, oropharynx moist, no pharyngeal exudates  Neck- normal range of motion, no tenderness, supple   Respiratory:  No respiratory distress, normal breath sounds, no rales, no wheezing   Cardiovascular:  Rapid irregularly irregular rate and rhythm, no murmurs, no gallops, no rubs   GI:  Soft, nondistended, normal bowel sounds, nontender, no organomegaly, no mass, no rebound, no guarding   :  No costovertebral angle tenderness   Musculoskeletal:  No edema, no tenderness, no deformities  Back- no tenderness  Integument:  Well hydrated, no rash   Lymphatic:  No lymphadenopathy noted   Neurologic:  Alert &awake, communicative, CN 2-12 normal, normal motor function, normal sensory function, no focal deficits noted   Psychiatric:  Speech and behavior depressed, frequently interrupting to complain about pain  Patient frequently mentioning she wishes daily she could "end up in a coma "      Lab Results: I have personally reviewed pertinent reports  Results from last 7 days   Lab Units 08/05/20 0441 08/05/20  0032   WBC Thousand/uL 16 91* 19 25*   HEMOGLOBIN g/dL 12 3 13 0   HEMATOCRIT % 39 0 41 2   PLATELETS Thousands/uL 312 345   NEUTROS PCT %  --  81*   LYMPHS PCT %  --  9*   MONOS PCT %  --  5   EOS PCT %  --  2     Results from last 7 days   Lab Units 08/05/20 0441 08/05/20  0032   POTASSIUM mmol/L 4 8 3 8   CHLORIDE mmol/L 103 102   CO2 mmol/L 24 22   BUN mg/dL 15 14   CREATININE mg/dL 1 12 1 23   CALCIUM mg/dL 9 7 9 5   ALK PHOS U/L  --  96   ALT U/L  --  32   AST U/L  --  25           EKG:  Atrial fibrillation with RVR with PVCs versus aberrancy    Imaging: I have personally reviewed pertinent films in PACS    CXR: personally reviewed, no acute cardiopulmonary disease visualized    Final radiologist read is pending      ** Please Note: Dragon 360 Dictation voice to text software was used in the creation of this document   **

## 2020-08-05 NOTE — ASSESSMENT & PLAN NOTE
· Patient rests response/reacted to AFib with RVR  · No signs of infection  · WBC returned to normal

## 2020-08-05 NOTE — ASSESSMENT & PLAN NOTE
· PDMP reviewed, appears not to have received any opioids as outpatient since 01/2019  · Longstanding history of chronic back and neck pain and frequently complaining of these throughout my evaluation  · No improvement with non opioid pain control with Tylenol, lidocaine patch, aqua K-pad  · Note UDS positive for opiates  · Will add 5 mg Oxycodone Q6 hours PRN for several doses only  Patient aware she will not be discharged with this medication     · PT/OT evaluations

## 2020-08-05 NOTE — ASSESSMENT & PLAN NOTE
· With patient endorsing worsening mood and limited energy secondary to chronic pain  Patient admitting to frequent thoughts of wishing she would be dead and did allude to some suicidal ideation if she had a gun at home (patient reports she does not have gun at home ) per admitting physician  · Continue amitriptyline and Prozac  · Appreciated, one-to-one was discontinued  No further psychiatric recommendations

## 2020-08-05 NOTE — ED PROVIDER NOTES
History  Chief Complaint   Patient presents with    Chest Pain     Pt called EMS due to SOB/palpitations  Pt stated multiple times she no longer showers and is very depressed  HPI   71-year-old woman presents to the emergency department with rapid heart rate  Patient has a history of AVNRT status post ablation, atrial fibrillation with previous hospitalizations for rapid ventricular response  Called EMS this evening for chest pain  When EMS arrived they found patient in a wide complex tachycardia  Patient was hemodynamically stable when vitals were checked  She kept saying that she wanted to die because she has chronic pain "all over the place "  She explains she was previously prescribed oxycodone but this was discontinued, and she struggles with pain on a daily basis  She says that she spends many days at home where she does not get out of bed  She says she has been very poorly compliant with many for prescribed medications including her cardiac meds  She says she has been taking her "night meds" occasionally but not any of her "day meds" for about 10 days  On chart review the patient takes amiodarone 200 mg daily and digoxin 0 125 mg daily  The patient has a history of depression and takes nortriptyline and fluoxetine  Despite depression and her repeatedly saying that she wants to die the patient is emphatic that she did not overdose or take any of her prescribed or other medications in excess tonight  She is prescribed apixaban for atrial fibrillation but says she has not been compliant with this  She is currently endorsing generalized pain but specifically denies chest pain, shortness of breath, dizziness palpitations, abdominal pain, nausea, vomiting  Prior to Admission Medications   Prescriptions Last Dose Informant Patient Reported? Taking?    FEROSUL 325 (65 Fe) MG tablet Unknown at Unknown time  No No   Sig: TAKE ONE TABLET BY MOUTH DAILY WITH BREAKFAST   FLUoxetine (PROzac) 20 mg capsule Unknown at Unknown time  No No   Sig: TAKE 1 CAPSULE (20 MG TOTAL) BY MOUTH DAILY   Lidocaine (ASPERCREME LIDOCAINE) 4 % PTCH Unknown at Unknown time  No No   Sig: Apply 1 patch topically every 12 (twelve) hours   acetaminophen (TYLENOL) 325 mg tablet Not Taking at Unknown time  No No   Sig: Take 2 tablets (650 mg total) by mouth every 6 (six) hours   Patient not taking: Reported on 8/5/2020   amiodarone 200 mg tablet Unknown at Unknown time  No No   Sig: TAKE 1 TABLET (200 MG TOTAL) BY MOUTH DAILY   apixaban (ELIQUIS) 5 mg Unknown at Unknown time  No No   Sig: Take 1 tablet (5 mg total) by mouth 2 (two) times a day   ascorbic acid (VITAMIN C) 500 mg tablet Unknown at Unknown time  No No   Sig: TAKE ONE TABLET BY MOUTH DAILY   atorvastatin (LIPITOR) 20 mg tablet Unknown at Unknown time  No No   Sig: TAKE 1 TABLET (20 MG TOTAL) BY MOUTH DAILY WITH DINNER   bisacodyl (DULCOLAX) 10 mg suppository Unknown at Unknown time  Yes No   Sig: Insert 10 mg into the rectum daily as needed for constipation   clopidogrel (PLAVIX) 75 mg tablet Unknown at Unknown time  No No   Sig: TAKE 1 TABLET (75 MG TOTAL) BY MOUTH DAILY   digoxin (LANOXIN) 0 125 mg tablet Unknown at Unknown time  No No   Sig: TAKE 1 TABLET (0 125 MG TOTAL) BY MOUTH DAILY   folic acid (FOLVITE) 1 mg tablet Unknown at Unknown time  No No   Sig: TAKE ONE TABLET BY MOUTH DAILY   furosemide (LASIX) 40 mg tablet Unknown at Unknown time  No No   Sig: TAKE 1 TABLET (40 MG TOTAL) BY MOUTH DAILY   lisinopril (ZESTRIL) 5 mg tablet Unknown at Unknown time  No No   Sig: TAKE 1 TABLET (5 MG TOTAL) BY MOUTH DAILY   melatonin 3 mg Unknown at Unknown time  No No   Sig: Take 2 tablets (6 mg total) by mouth daily at bedtime   methocarbamol (ROBAXIN) 500 mg tablet Unknown at Unknown time  No No   Sig: Take 1 tablet (500 mg total) by mouth once as needed for muscle spasms for up to 1 dose   metoprolol succinate (TOPROL-XL) 50 mg 24 hr tablet Unknown at Unknown time  No No Sig: TAKE 1 TABLET (50 MG TOTAL) BY MOUTH EVERY 12 (TWELVE) HOURS   nortriptyline (PAMELOR) 10 mg capsule Unknown at Unknown time  No No   Sig: TAKE ONE CAPSULE BY MOUTH AT BEDTIME   pantoprazole (PROTONIX) 40 mg tablet Unknown at Unknown time  No No   Sig: Take 1 tablet (40 mg total) by mouth 2 (two) times a day before meals   senna-docusate sodium (SENOKOT S) 8 6-50 mg per tablet Unknown at Unknown time  No No   Sig: Take 1 tablet by mouth daily at bedtime   thiamine 100 MG tablet Unknown at Unknown time  No No   Sig: Take 1 tablet (100 mg total) by mouth daily      Facility-Administered Medications: None       Past Medical History:   Diagnosis Date    A-fib ([place] [address] )     Acute respiratory disease     Anemia     Arthritis     Atrial fibrillation (HCC)     CHF (congestive heart failure) (HCC)     Chronic pain     Heart failure (HCC)     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[place] [address] )     History of colon polyps     Hx of long term use of blood thinners     Hypertension     Irregular heart beat     Narcotic dependence (HCC)     Rectal bleeding     Stroke (HCC)     mild no deficiets/ memory loss    Uses walker        Past Surgical History:   Procedure Laterality Date    COLONOSCOPY      COLONOSCOPY N/A 7/27/2018    Procedure: COLONOSCOPY;  Surgeon: Poppy Mariee MD;  Location: [facility]; Service: Gastroenterology    CORONARY STENT PLACEMENT      ERCP N/A 5/14/2018    Procedure: ENDOSCOPIC RETROGRADE CHOLANGIOPANCREATOGRAPHY (ERCP); Surgeon: Lilly Ferreira MD;  [facility];  Service: Gastroenterology    ERCP N/A 8/28/2018    Procedure: ENDOSCOPIC RETROGRADE CHOLANGIOPANCREATOGRAPHY (ERCP) w/ EGD;  Surgeon: Lilly Ferreira MD;  [facility]; Service: Gastroenterology    ESOPHAGOGASTRODUODENOSCOPY N/A 7/26/2018    Procedure: ESOPHAGOGASTRODUODENOSCOPY (EGD); Surgeon: Poppy Mariee MD;  [facility];   Service: Gastroenterology    JOINT REPLACEMENT Right     knee History reviewed  No pertinent family history  I have reviewed and agree with the history as documented  E-Cigarette/Vaping    E-Cigarette Use Never User      E-Cigarette/Vaping Substances    Nicotine No     THC No     CBD No     Flavoring No     Other No     Unknown No      Social History     Tobacco Use    Smoking status: Former Smoker     Packs/day: 0 50     Types: Cigarettes    Smokeless tobacco: Never Used   Substance Use Topics    Alcohol use: Not Currently    Drug use: Not Currently        Review of Systems   Constitutional: Negative for chills and fever  Respiratory: Negative for cough and shortness of breath  Cardiovascular: Positive for chest pain  Gastrointestinal: Negative for abdominal pain, nausea and vomiting  Genitourinary: Negative for dysuria and frequency  Musculoskeletal: Positive for arthralgias and myalgias  Negative for back pain and neck pain  Skin: Negative for pallor and rash  Neurological: Negative for dizziness, syncope, weakness, light-headedness, numbness and headaches  Psychiatric/Behavioral: Positive for agitation and suicidal ideas  Negative for confusion  All other systems reviewed and are negative  Physical Exam  ED Triage Vitals   Temperature Pulse Respirations Blood Pressure SpO2   08/05/20 0151 08/05/20 0105 08/05/20 0105 08/05/20 0105 08/05/20 0105   98 °F (36 7 °C) (!) 124 (!) 26 (!) 177/113 99 %      Temp Source Heart Rate Source Patient Position - Orthostatic VS BP Location FiO2 (%)   08/05/20 1142 08/05/20 0800 08/05/20 0800 08/05/20 0800 --   Oral Monitor Lying Right arm       Pain Score       08/05/20 0436       8             Orthostatic Vital Signs  Vitals:    08/05/20 1000 08/05/20 1141 08/05/20 1142 08/05/20 1240   BP: 117/84  118/79 121/86   Pulse: (!) 112 (!) 119 (!) 114 (!) 116   Patient Position - Orthostatic VS: Lying  Lying        Physical Exam  Vitals signs and nursing note reviewed     Constitutional:       General: She is not in acute distress  Appearance: She is well-developed  She is not diaphoretic  HENT:      Head: Normocephalic and atraumatic  Eyes:      General: No scleral icterus  Conjunctiva/sclera: Conjunctivae normal       Pupils: Pupils are equal, round, and reactive to light  Neck:      Musculoskeletal: Normal range of motion and neck supple  Cardiovascular:      Rate and Rhythm: Rhythm irregular  Heart sounds: No murmur  No friction rub  No gallop  Comments: Rapid irregularly irregular rhythm  Pulmonary:      Breath sounds: Normal breath sounds  No wheezing or rales  Abdominal:      General: There is no distension  Palpations: Abdomen is soft  Tenderness: There is no abdominal tenderness  There is no guarding or rebound  Musculoskeletal: Normal range of motion  General: No tenderness  Skin:     General: Skin is warm and dry  Coloration: Skin is not pale  Findings: No erythema  Neurological:      Mental Status: She is alert and oriented to person, place, and time  Cranial Nerves: No cranial nerve deficit  Sensory: No sensory deficit  Motor: No abnormal muscle tone  Psychiatric:         Mood and Affect: Mood is anxious  Behavior: Behavior is agitated  Comments: She is agitated and anxious  She yells out frequently for help  Repeatedly states she wants to die           ED Medications  Medications   apixaban (ELIQUIS) tablet 5 mg (5 mg Oral Given 8/5/20 1000)   clopidogrel (PLAVIX) tablet 75 mg (75 mg Oral Given 8/5/20 1000)   atorvastatin (LIPITOR) tablet 20 mg (has no administration in time range)   ascorbic acid (VITAMIN C) tablet 500 mg (500 mg Oral Given 8/5/20 1000)   bisacodyl (DULCOLAX) rectal suppository 10 mg (has no administration in time range)   ferrous sulfate tablet 325 mg (325 mg Oral Given 8/5/20 8145)   FLUoxetine (PROzac) capsule 20 mg (20 mg Oral Given 6/5/30 0107)   folic acid (FOLVITE) tablet 1,000 mcg (1,000 mcg Oral Given 8/5/20 1000)   melatonin tablet 6 mg (has no administration in time range)   nortriptyline (PAMELOR) capsule 10 mg (has no administration in time range)   pantoprazole (PROTONIX) EC tablet 40 mg (40 mg Oral Given 8/5/20 4561)   senna-docusate sodium (SENOKOT S) 8 6-50 mg per tablet 1 tablet (has no administration in time range)   thiamine (VITAMIN B1) tablet 100 mg (100 mg Oral Given 8/5/20 1000)   lidocaine (LIDODERM) 5 % patch 1 patch (1 patch Topical Medication Applied 8/5/20 1000)   ondansetron (ZOFRAN) injection 4 mg (has no administration in time range)   lisinopril (ZESTRIL) tablet 5 mg (5 mg Oral Given 8/5/20 1000)   metoprolol tartrate (LOPRESSOR) tablet 50 mg (50 mg Oral Given 8/5/20 1240)   amiodarone tablet 200 mg (has no administration in time range)   digoxin (LANOXIN) tablet 125 mcg (125 mcg Oral Given 8/5/20 1141)   insulin lispro (HumaLOG) 100 units/mL subcutaneous injection 1-5 Units ( Subcutaneous Canceled Entry 8/5/20 1357)   insulin lispro (HumaLOG) 100 units/mL subcutaneous injection 1-6 Units (has no administration in time range)   acetaminophen (TYLENOL) tablet 975 mg (has no administration in time range)   oxyCODONE (ROXICODONE) IR tablet 5 mg (has no administration in time range)   amiodarone 150 mg in dextrose 5 % 100 mL IV bolus (0 mg Intravenous Stopped 8/5/20 0147)   magnesium sulfate 2 g/50 mL IVPB (premix) 2 g (0 g Intravenous Stopped 8/5/20 0158)   HYDROmorphone (DILAUDID) injection 0 5 mg (0 5 mg Intravenous Given 8/5/20 0150)   potassium chloride (K-DUR,KLOR-CON) CR tablet 20 mEq (20 mEq Oral Given 8/5/20 0436)   furosemide (LASIX) injection 40 mg (40 mg Intravenous Given 8/5/20 1007)       Diagnostic Studies  Results Reviewed     Procedure Component Value Units Date/Time    Rapid drug screen, urine [[id number]]  (Abnormal) Collected:  08/05/20 1054    Lab Status:  Final result Specimen:  Urine, Clean Catch Updated:  08/05/20 1218     Amph/Meth UR Negative Barbiturate Ur Negative     Benzodiazepine Urine Negative     Cocaine Urine Negative     Methadone Urine Negative     Opiate Urine Positive     PCP Ur Negative     THC Urine Negative     Oxycodone Urine Negative    Narrative:       Presumptive report  If requested, specimen will be sent to reference lab for confirmation  FOR MEDICAL PURPOSES ONLY  IF CONFIRMATION NEEDED PLEASE CONTACT THE LAB WITHIN 5 DAYS  Drug Screen Cutoff Levels:  AMPHETAMINE/METHAMPHETAMINES  1000 ng/mL  BARBITURATES     200 ng/mL  BENZODIAZEPINES     200 ng/mL  COCAINE      300 ng/mL  METHADONE      300 ng/mL  OPIATES      300 ng/mL  PHENCYCLIDINE     25 ng/mL  THC       50 ng/mL  OXYCODONE      100 ng/mL    NT-BNP PRO [[id number]]  (Abnormal) Collected:  08/05/20 0441    Lab Status:  Final result Specimen:  Blood from Arm, Left Updated:  08/05/20 1044     NT-proBNP 3,581 pg/mL     Troponin I [[id number]]  (Abnormal) Collected:  08/05/20 0638    Lab Status:  Final result Specimen:  Blood from Arm, Left Updated:  08/05/20 0712     Troponin I 0 20 ng/mL     Digoxin level [[id number]]  (Abnormal) Collected:  08/05/20 0441    Lab Status:  Final result Specimen:  Blood from Arm, Left Updated:  08/05/20 0610     Digoxin Lvl 0 4 ng/mL     Hemoglobin A1C [[id number]]  (Abnormal) Collected:  08/05/20 0443    Lab Status:  Final result Specimen:  Blood from Arm, Left Updated:  08/05/20 0542     Hemoglobin A1C 7 6 %       mg/dl     Troponin I [[id number]]  (Abnormal) Collected:  08/05/20 0441    Lab Status:  Final result Specimen:  Blood from Arm, Left Updated:  08/05/20 0529     Troponin I 0 18 ng/mL     TSH, 3rd generation [[id number]]  (Abnormal) Collected:  08/05/20 0107    Lab Status:  Final result Specimen:  Blood from Arm, Left Updated:  08/05/20 0526     TSH 3RD GENERATON 8 760 uIU/mL     Narrative:       Patients undergoing fluorescein dye angiography may retain small amounts of fluorescein in the body for 48-72 hours post procedure  Samples containing fluorescein can produce falsely depressed TSH values  If the patient had this procedure,a specimen should be resubmitted post fluorescein clearance        T4, free [[id number]]  (Normal) Collected:  08/05/20 0107    Lab Status:  Final result Specimen:  Blood from Arm, Left Updated:  08/05/20 0526     Free T4 1 11 ng/dL     Basic metabolic panel [[id number]]  (Abnormal) Collected:  08/05/20 0441    Lab Status:  Final result Specimen:  Blood from Arm, Left Updated:  08/05/20 0524     Sodium 137 mmol/L      Potassium 4 8 mmol/L      Chloride 103 mmol/L      CO2 24 mmol/L      ANION GAP 10 mmol/L      BUN 15 mg/dL      Creatinine 1 12 mg/dL      Glucose 207 mg/dL      Calcium 9 7 mg/dL      eGFR 47 ml/min/1 73sq m     Narrative:       [place] guidelines for Chronic Kidney Disease (CKD):     Stage 1 with normal or high GFR (GFR > 90 mL/min/1 73 square meters)    Stage 2 Mild CKD (GFR = 60-89 mL/min/1 73 square meters)    Stage 3A Moderate CKD (GFR = 45-59 mL/min/1 73 square meters)    Stage 3B Moderate CKD (GFR = 30-44 mL/min/1 73 square meters)    Stage 4 Severe CKD (GFR = 15-29 mL/min/1 73 square meters)    Stage 5 End Stage CKD (GFR <15 mL/min/1 73 square meters)  Note: GFR calculation is accurate only with a steady state creatinine    Magnesium [[id number]]  (Abnormal) Collected:  08/05/20 0441    Lab Status:  Final result Specimen:  Blood from Arm, Left Updated:  08/05/20 0524     Magnesium 3 3 mg/dL     CBC (With Platelets) [[id number]]  (Abnormal) Collected:  08/05/20 0441    Lab Status:  Final result Specimen:  Blood from Arm, Left Updated:  08/05/20 0456     WBC 16 91 Thousand/uL      RBC 3 78 Million/uL      Hemoglobin 12 3 g/dL      Hematocrit 39 0 %       fL      MCH 32 5 pg      MCHC 31 5 g/dL      RDW 15 3 %      Platelets 936 Thousands/uL      MPV 9 3 fL     Novel Coronavirus (Covid-19),PCR [facility] [[id number]]  (Normal) Collected:  08/05/20 0136    Lab Status:  Final result Specimen:  Nares from Nose Updated:  08/05/20 0239     SARS-CoV-2 Negative    Narrative: The specimen collection materials, transport medium, and/or testing methodology utilized in the production of these test results have been proven to be reliable in a limited validation with an abbreviated program under the Emergency Utilization Authorization provided by the FDA  Testing reported as "Presumptive positive" will be confirmed with secondary testing with a reference laboratory to ensure result accuracy  Clinical caution and judgement should be used with the interpretation of these results with consideration of the clinical impression and other laboratory testing  Testing reported as "Positive" or "Negative" has been proven to be accurate according to standard laboratory validation requirements  All testing is performed with control materials showing appropriate reactivity at standard intervals  Salicylate level [[id number]]  (Abnormal) Collected:  08/05/20 0107    Lab Status:  Final result Specimen:  Blood from Arm, Left Updated:  57/10/28 9881     Salicylate Lvl <3 mg/dL     Acetaminophen level-If concentration is detectable, please discuss with medical  on call   [[id number]]  (Abnormal) Collected:  08/05/20 0107    Lab Status:  Final result Specimen:  Blood from Arm, Left Updated:  08/05/20 0125     Acetaminophen Level <2 ug/mL     Ethanol [[id number]]  (Normal) Collected:  08/05/20 0107    Lab Status:  Final result Specimen:  Blood from Arm, Left Updated:  08/05/20 0122     Ethanol Lvl <3 mg/dL     Comprehensive metabolic panel [[id number]]  (Abnormal) Collected:  08/05/20 0032    Lab Status:  Final result Specimen:  Blood from Arm, Left Updated:  08/05/20 0111     Sodium 137 mmol/L      Potassium 3 8 mmol/L      Chloride 102 mmol/L      CO2 22 mmol/L      ANION GAP 13 mmol/L      BUN 14 mg/dL      Creatinine 1 23 mg/dL      Glucose 327 mg/dL      Calcium 9 5 mg/dL AST 25 U/L      ALT 32 U/L      Alkaline Phosphatase 96 U/L      Total Protein 7 5 g/dL      Albumin 3 3 g/dL      Total Bilirubin 0 53 mg/dL      eGFR 42 ml/min/1 73sq m     Narrative:       [place] guidelines for Chronic Kidney Disease (CKD):     Stage 1 with normal or high GFR (GFR > 90 mL/min/1 73 square meters)    Stage 2 Mild CKD (GFR = 60-89 mL/min/1 73 square meters)    Stage 3A Moderate CKD (GFR = 45-59 mL/min/1 73 square meters)    Stage 3B Moderate CKD (GFR = 30-44 mL/min/1 73 square meters)    Stage 4 Severe CKD (GFR = 15-29 mL/min/1 73 square meters)    Stage 5 End Stage CKD (GFR <15 mL/min/1 73 square meters)  Note: GFR calculation is accurate only with a steady state creatinine    Troponin I [[id number]]  (Abnormal) Collected:  08/05/20 0032    Lab Status:  Final result Specimen:  Blood from Arm, Left Updated:  08/05/20 0103     Troponin I 0 06 ng/mL     Magnesium [[id number]]  (Normal) Collected:  08/05/20 0032    Lab Status:  Final result Specimen:  Blood from Arm, Left Updated:  08/05/20 0059     Magnesium 2 1 mg/dL     Phosphorus [[id number]]  (Normal) Collected:  08/05/20 0032    Lab Status:  Final result Specimen:  Blood from Arm, Left Updated:  08/05/20 0059     Phosphorus 4 1 mg/dL     CBC and differential [[id number]]  (Abnormal) Collected:  08/05/20 0032    Lab Status:  Final result Specimen:  Blood from Arm, Left Updated:  08/05/20 0057     WBC 19 25 Thousand/uL      RBC 3 97 Million/uL      Hemoglobin 13 0 g/dL      Hematocrit 41 2 %       fL      MCH 32 7 pg      MCHC 31 6 g/dL      RDW 15 4 %      MPV 9 5 fL      Platelets 257 Thousands/uL      nRBC 0 /100 WBCs      Neutrophils Relative 81 %      Immat GRANS % 2 %      Lymphocytes Relative 9 %      Monocytes Relative 5 %      Eosinophils Relative 2 %      Basophils Relative 1 %      Neutrophils Absolute 15 88 Thousands/µL      Immature Grans Absolute 0 28 Thousand/uL      Lymphocytes Absolute 1 65 Thousands/µL      Monocytes Absolute 0 96 Thousand/µL      Eosinophils Absolute 0 38 Thousand/µL      Basophils Absolute 0 10 Thousands/µL                  XR chest 1 view portable   ED Interpretation by Ashleigh High MD (08/05 3029)   ED wet read:  No acute cardiopulmonary disease  Final Result by Harrison Ortiz MD (08/05 6631)      No acute cardiopulmonary disease  Workstation performed: [id number]               Procedures  ECG 12 Lead Documentation Only    Date/Time: 8/5/2020 12:26 AM  Performed by: Ashleigh High MD  Authorized by: Ashleigh High MD     Indications / Diagnosis:  Rapid HR  ECG reviewed by me, the ED Provider: yes    Patient location:  ED  Previous ECG:     Previous ECG:  Compared to current    Comparison ECG info:  A-fib RVR    Similarity:  Changes noted    Comparison to cardiac monitor: Yes    Interpretation:     Interpretation: abnormal    Quality:     Tracing quality:  Limited by artifact  Rate:     ECG rate:  142    ECG rate assessment: normal    Rhythm:     Rhythm: atrial fibrillation    Ectopy:     Ectopy: none    QRS:     QRS axis:  Right    QRS intervals:   Wide ((borderline))  Conduction:     Conduction: abnormal      Abnormal conduction: complete LBBB    ST segments:     ST segments:  Normal (Appropriate discordance)  T waves:     T waves: normal    Comments:      Atrial fibrillation with aberrancy          ED Course  ED Course as of Aug 05 1613   Wed Aug 05, 2020   0118 Likely rate-related   Troponin I(!): 0 06   0124 AST: 25   0124 ALT: 32   0124 Suspect reactive   WBC(!): 19 25           MDM  Number of Diagnoses or Management Options  Anxiety: new and requires workup  Atrial fibrillation with rapid ventricular response ([address] ): new and requires workup  Noncompliance with medication regimen: new and requires workup  Suicidal ideation: new and requires workup     Amount and/or Complexity of Data Reviewed  Clinical lab tests: ordered and reviewed  Tests in the radiology section of CPT®: ordered and reviewed  Tests in the medicine section of CPT®: ordered and reviewed  Decide to obtain previous medical records or to obtain history from someone other than the patient: yes  Obtain history from someone other than the patient: yes  Review and summarize past medical records: yes  Discuss the patient with other providers: yes  Independent visualization of images, tracings, or specimens: yes    Patient Progress  Patient progress: improved    27-year-old woman here with rapid irregularly irregular heart rhythm  EMS was called pre-hospital concerned patient was in ventricular tachycardia  On arrival an EKG was obtained, which showed atrial fibrillation with aberrancy and rapid ventricular response  This is consistent with patient's longstanding AFib  She has required hospitalization previously for rapid AFib  This is likely induced from her poor medication compliance  Patient does have suicidal ideation, but says that she did not overdose on any of her medications  Her QRS is borderline wide but not greater than 120 milliseconds, therefore I do not believe patient empirically needs sodium bicarbonate at this time although she will require close monitoring as she is prescribed nortriptyline  She is not hemodynamically unstable or having signs of end-organ damage, and there is no indication for electrical cardioversion on arrival   Plan is rate control with amiodarone bolus and drip given this is one of patient's home medications  Will also give 2 g of intravenous magnesium  Check cardiac labs  Patient will require admission to the hospital for rate control of rapid AFib, Cardiology consultation, likely psychiatric consultation for suicidal ideation  On amiodarone patient's heart rate improved to the 80s to 120s  She has a leukocytosis that is likely reactive  Troponin elevation is likely rate related given denies focal chest pain despite pre-hospital report  Patient was discussed with Dr Mary Dugan for admission  Disposition  Final diagnoses:   Atrial fibrillation with rapid ventricular response ([address] )   Anxiety   Suicidal ideation   Noncompliance with medication regimen     Time reflects when diagnosis was documented in both MDM as applicable and the Disposition within this note     Time User Action Codes Description Comment    8/5/2020  2:41 AM Beatrice Estefany Add [I48 91] Atrial fibrillation with rapid ventricular response ([address] )     8/5/2020  2:41 AM Beatrice Estefany Add [F41 9] Anxiety     8/5/2020  2:41 AM Beatrice Estefany Add [R45 851] Suicidal ideation     8/5/2020  2:42 AM Beatrice Estefany Add [Z91 14] Noncompliance with medication regimen     8/5/2020 12:50 PM Carlos Kamara Add [E11 9] DM2 (diabetes mellitus, type 2) [facility])       ED Disposition     ED Disposition Condition Date/Time Comment    Admit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

## 2020-08-05 NOTE — ASSESSMENT & PLAN NOTE
· With patient endorsing worsening mood and limited energy secondary to chronic pain    Patient admitting to frequent thoughts of wishing she would be dead and did state some suicidal ideation if she had a gun at home (patient reports she does not have gun at home   · In this setting psychiatry consultation will be appreciated  · Reported on nortriptyline and Prozac at home

## 2020-08-05 NOTE — PROGRESS NOTES
[facility]    Progress Note - [facility] [birth year], 79 y o  female MRN: [id number]    Unit/Bed#: MICU 13 Encounter: [id number]    Primary Care Provider: Kevyn Chadwick DO   Date and time admitted to hospital: 8/5/2020 12:20 AM      * Atrial fibrillation with RVR ([address] )  Assessment & Plan  · Patient with shortness of breath and slight chest pressure prompting call to EMS  Apparently found in wide complex rhythm (telemetry strip is not available to me at this time), placed on amiodarone GTT after bolus in ED with improvement in rate and without wide complex  · Patient remains in atrial fibrillation with RVR  Likely secondary to medication noncompliance for about 1 week  · Cardiology following, intravenous amiodarone was stopped and patient was restarted on p o  Amiodarone  We are also continuing digoxin 0 125 mg and metoprolol, currently on tartrate however plan to transition back to succinate eventually  · Now back on Eliquis  · Continue telemetry monitoring  · TSH mildly elevated at 8 760 however T4 normal   No history thyroid disease  · Monitor closely    Depression  Assessment & Plan  · With patient endorsing worsening mood and limited energy secondary to chronic pain  Patient admitting to frequent thoughts of wishing she would be dead and did allude to some suicidal ideation if she had a gun at home (patient reports she does not have gun at home ) per admitting physician  · Continue amitriptyline and Prozac  · Appreciated, one-to-one was discontinued  No further psychiatric recommendations  Acute on chronic diastolic congestive heart failure [facility])  Assessment & Plan  Wt Readings from Last 3 Encounters:   03/02/20 73 9 kg (163 lb)   09/18/19 80 7 kg (178 lb)   07/12/19 77 1 kg (170 lb)     · Likely diastolic however updated echo pending  Does have a history of tachy mediated cardiomyopathy with a recovered EF on last TTE in 2019     · Given intravenous Lasix today by Cardiology  · Continue beta-blocker  · I&Os, daily weights, low-sodium diet  · Monitor volume status        Elevated troponin  Assessment & Plan  · Non MI troponin elevation with peak troponin of 0 20  Likely secondary to AFib with RVR  EKG otherwise without ischemic changes  · No complaints of chest pain  Essential hypertension  Assessment & Plan  · Urgency on admission however now improved  Likely secondary to medication noncompliance  · Continue lisinopril, Lopressor  · Monitor    Coronary artery disease  Assessment & Plan  · S/p PCI to RCA in 2018  · Had been maintained on Plavix and Eliquis; previously had been on ASA however held secondary to bleeding  · Will continue Plavix, beta-blocker, statin    DM2 (diabetes mellitus, type 2) ([facility] )  Assessment & Plan  Lab Results   Component Value Date    HGBA1C 7 6 (H) 08/05/2020       · No reported history of diabetes, A1c 7 6 and patient noted to be hyperglycemic  · Start SSI  · Diabetic diet  · Monitor Accu-Cheks and adjust as needed  · Would benefit from p o  Agent on discharge and close outpatient follow-up with PCP  Would also recommend glucometer on discharge as patient should be checking BG at home  · Nutrition consult    Chronic pain disorder  Assessment & Plan  · PDMP reviewed, appears not to have received any opioids as outpatient since 01/2019  · Longstanding history of chronic back and neck pain and frequently complaining of these throughout my evaluation  · No improvement with non opioid pain control with Tylenol, lidocaine patch, aqua K-pad  · Note UDS positive for opiates  · Will add 5 mg Oxycodone Q6 hours PRN for several doses only  Patient aware she will not be discharged with this medication  · PT/OT evaluations   ·     · Leukocytosis  ·  Unclear etiology  Trending down  Afebrile  · No focal symptomology to suspect infection, no urinary complaints, abdominal pain, diarrhea    · Chest x-ray negative  · Trend      Pharmacologic: Apixaban (Eliquis)  Mechanical VTE Prophylaxis in Place: Yes    Patient Centered Rounds: I have performed bedside rounds with nursing staff today  Discussions with Specialists or Other Care Team Provider: nursing    Education and Discussions with Family / Patient: patient  Refused update to family  Time Spent for Care: 30 minutes  More than 50% of total time spent on counseling and coordination of care as described above  Current Length of Stay: 0 day(s)    Current Patient Status: Inpatient   Certification Statement: The patient will continue to require additional inpatient hospital stay due to rate control, additional Cardiology recommendations, echocardiogram, psychiatric evaluation    Discharge Plan / Estimated Discharge Date:  Not medically stable, pending better rate control, improvement in volume status and psychiatric consult       Code Status: Level 1 - Full Code      Subjective:   Patient complains of generalized pain  States that this is chronic and has been a problem since stopping oxycodone approximately 1 year ago  She states she was taken off this by her primary care provider as they told her that this was synthetic heroin " We discussed her UDS, she adamantly denies that she has taken any opioid medications  She denies any chest pain, or palpitations  She does endorse some intermittent shortness of breath  States that she has not taken her medications over the past week because she has not felt well  Objective:     Vitals:   Temp (24hrs), Av 8 °F (36 6 °C), Min:97 6 °F (36 4 °C), Max:98 °F (36 7 °C)    Temp:  [97 6 °F (36 4 °C)-98 °F (36 7 °C)] 97 6 °F (36 4 °C)  HR:  [] 116  Resp:  [20-35] 35  BP: (117-177)/() 121/86  SpO2:  [93 %-99 %] 98 %  There is no height or weight on file to calculate BMI  Input and Output Summary (last 24 hours):        Intake/Output Summary (Last 24 hours) at 2020 1344  Last data filed at 2020 1053  Gross per 24 hour   Intake 150 ml   Output 450 ml   Net -300 ml       Physical Exam:     Physical Exam    Additional Data:     Labs:    Results from last 7 days   Lab Units 08/05/20  0441 08/05/20  0032   WBC Thousand/uL 16 91* 19 25*   HEMOGLOBIN g/dL 12 3 13 0   HEMATOCRIT % 39 0 41 2   PLATELETS Thousands/uL 312 345   NEUTROS PCT %  --  81*   LYMPHS PCT %  --  9*   MONOS PCT %  --  5   EOS PCT %  --  2     Results from last 7 days   Lab Units 08/05/20  0441 08/05/20  0032   POTASSIUM mmol/L 4 8 3 8   CHLORIDE mmol/L 103 102   CO2 mmol/L 24 22   BUN mg/dL 15 14   CREATININE mg/dL 1 12 1 23   CALCIUM mg/dL 9 7 9 5   ALK PHOS U/L  --  96   ALT U/L  --  32   AST U/L  --  25             Recent Cultures (last 7 days):           Last 24 Hours Medication List:   acetaminophen, 975 mg, Oral, Q8H [address], CHANDRIKA Carvajal  [START ON 8/6/2020] amiodarone, 200 mg, Oral, Daily With Breakfast, CHANDRIKA Scott  apixaban, 5 mg, Oral, BID, Miri Vines, DO  ascorbic acid, 500 mg, Oral, Daily, Miri Vines, DO  atorvastatin, 20 mg, Oral, Daily With Dinner, Miri Vines, DO  bisacodyl, 10 mg, Rectal, Daily PRN, Miri Vines, DO  clopidogrel, 75 mg, Oral, Daily, Mirireynaldo Vines, DO  digoxin, 125 mcg, Oral, Daily, CHANDRIKA Scott  ferrous sulfate, 325 mg, Oral, Daily With Breakfast, Miri Vines, DO  FLUoxetine, 20 mg, Oral, Daily, Miri Okatherine, DO  folic acid, 5,457 mcg, Oral, Daily, Miri Vines, DO  insulin lispro, 1-5 Units, Subcutaneous, TID AC, CHANDRIKA Carvajal  insulin lispro, 1-6 Units, Subcutaneous, HS, CHANDRIKA Carvajal  lidocaine, 1 patch, Topical, Daily, Miri Vines DO  lisinopril, 5 mg, Oral, Daily, Mirireynaldo Vines, DO  melatonin, 6 mg, Oral, HS, Miri Oyster, DO  metoprolol tartrate, 50 mg, Oral, Q12H KARELY, CHANDRIKA Scott  nortriptyline, 10 mg, Oral, HS, Miri Oyster, DO  ondansetron, 4 mg, Intravenous, Q6H PRN, Miri Oyster, DO  oxyCODONE, 5 mg, Oral, Q6H PRN, Sheryle Malone, CHANDRIKA  pantoprazole, 40 mg, Oral, BID AC, Abner Maribell, DO  senna-docusate sodium, 1 tablet, Oral, HS, Abner Maribell, DO  thiamine, 100 mg, Oral, Daily, Abner Maribell, DO         Today, Patient Was Seen By: CHANDRIKA Singh    ** Please Note: Dragon 360 Dictation voice to text software may have been used in the creation of this document   **
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
## 2020-08-05 NOTE — ASSESSMENT & PLAN NOTE
· Suspect in setting of AFib with RVR, EKG otherwise without ischemic changes  · Will continue to trend troponin for now and telemetry monitoring as above

## 2020-08-06 PROBLEM — D72.829 LEUKOCYTOSIS: Status: RESOLVED | Noted: 2020-01-01 | Resolved: 2020-01-01

## 2020-08-06 NOTE — PHYSICAL THERAPY NOTE
PHYSICAL THERAPY Evaluation NOTE  Evaluation Time: 80 - 1  Patient Name: Arya Alves  Today's Date: 8/6/2020   AGE:   79 y o   Mrn:   [id number]  ADMIT DX:  Suicidal ideation [R45 851]  Anxiety [F41 9]  Chest pain [R07 9]  Noncompliance with medication regimen [Z91 14]  Atrial fibrillation with rapid ventricular response ([facility]) [I48 91]    Past Medical History:   Diagnosis Date    [facility]-fib (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[place] [address] )     History of colon polyps     Hx of long term use of blood thinners     Hypertension     Irregular heart beat     Narcotic dependence ([place] [address] )     Rectal bleeding     Stroke ([place] [address] )     mild no deficiets/ memory loss    Uses walker      Length Of Stay: 1  PHYSICAL THERAPY EVALUATION :      08/06/20 1136   Note Type   Note type Eval/Treat   Pain Assessment   Pain Assessment Tool 0-10   Pain Score 9   Pain Location/Orientation Location: Neck   Home Living   [address]   Home Layout Two level;Performs ADLs on one level; Able to live on main level with bedroom/bathroom;Stairs to enter without rails   Bathroom Shower/Tub Tub/shower unit   Bathroom Toilet Raised   Bathroom Equipment Shower chair;Tub transfer bench   Home Equipment Cane;Walker   Additional Comments Pt lives in [facility]  Performs all ADLs on 1st floor  Bathroom has a tub/shower with shower chair, raised toilet seat, and a tub transfer bench  Prior Function   Level of [place] Independent with ADLs and functional mobility; Needs assistance with IADLs; Other (Comment)  (reports difficulty with ADLs/mobility recently "bedridden")   Lives With [address] [place] Help From Other (Comment)  (landlord)   ADL Assistance Independent  (recent dfficulty with ADLs)   IADLs Needs assistance   Falls in the last 6 months 0   Vocational Retired   Comments Pt reports INDEP with ADLs and functional mobility  Uses RW primarily for mobilizing and states she "can only go 4 steps" before needing to take a break  Reported that she recently has been having trouble performing ADLs and has been "bedridden" due to fatigue/sob  Receives occasional assistance with cleaning from "landlord"   Restrictions/Precautions   Weight Bearing Precautions Per Order No   Braces or Orthoses Other (Comment)  (none)   Other Precautions Cognitive; Chair Alarm; Bed Alarm;Multiple lines;Telemetry; Fall Risk;Pain;Suicidal   General   Additional Pertinent History Pt is a 78 yo F who presents with SOB due to A-fib with RVR and noncompliance with medications   Family/Caregiver Present No   Cognition   Overall Cognitive Status Impaired   Arousal/Participation Cooperative   Attention Attends with cues to redirect   Orientation Level Oriented X4   Memory Decreased recall of precautions;Decreased recall of recent events;Decreased short term memory   Following Commands Follows one step commands without difficulty   Comments Pt identified with full name and birthdate  pt follows all commands approriately  Demonstrates decreased short term memory with repetitive conversation  Pt expresses depressive thoughts   RLE Assessment   RLE Assessment   (functionally graded > 3+/5)   LLE Assessment   LLE Assessment   (functionally graded > 3+/5)   Coordination   Movements are Fluid and Coordinated 0   Coordination and Movement Description increased postural sway and ataxia with gait   Bed Mobility   Supine to Sit 4  Minimal assistance   Additional items Assist x 1;HOB elevated; Increased time required;LE management;Verbal cues   Additional Comments Pt was supine in bed at start of session  performed STs transfer with RW and performed gait to recliner   Pt was seated OOB in recliner at end of PT evaluation   Transfers   Sit to Stand 4  Minimal assistance   Additional items Assist x 1; Increased time required;Verbal cues  (VC for proper hand placement)   Stand to Sit 4  Minimal assistance   Additional items Assist x 1; Increased time required;Verbal cues  (VC for proper hand placement)   Ambulation/Elevation   Gait pattern Improper Weight shift; Step to;Short stride; Ataxia; Excessively slow; Foward flexed;Decreased foot clearance;Narrow AYE   Gait Assistance 4  Minimal assist   Additional items Assist x 1   Assistive Device Rolling walker   Distance 3' from bed to chair   Balance   Static Sitting Fair   Dynamic Sitting Fair -   Static Standing Fair -   Dynamic Standing Poor +   Ambulatory Poor +   Endurance Deficit   Endurance Deficit Yes   Endurance Deficit Description dyspnea and increased need for assistance with activity   Activity Tolerance   Activity Tolerance Patient limited by fatigue;Patient limited by pain   Medical Staff Made Aware Spoke to Marzena Harrison   Nurse Made Aware Spoke to RN   Assessment   Prognosis Fair   Problem List Decreased strength;Decreased endurance; Impaired balance;Decreased mobility; Decreased cognition; Impaired judgement;Decreased safety awareness;Pain   Assessment Pt is a 78 yo F who presents with SOB due to A-fib with RVR and noncompliance with medications  Order placed for PT eval and treat with activity orders for up w/ A   Pt presents with PMHx of A-fib, elevated troponin, chronic pain disorder, HTN, CAD, acute on chronic diastolic CHF, depression, DM2, ambulatory dysfunction, osteoarthritis and personal factors of living in 2 story house, mobilizing w/ assistive device, stair(s) to enter home, inability to ambulate household distances, inability to navigate community distances, inability to navigate level surfaces w/o external assistance, unable to perform dynamic tasks in community, decreased cognition, limited home support, depression, inability to perform IADLs, inability to perform ADLs and inability to live alone  Pt clinical presentation is unstable/unpredictable based on these factors  Pt PLOF was independent with ADLs and functional mobility with RW and assistance with IADLs  Upon evaluation, Pt impairments include pain, weakness, decreased endurance, impaired cognition, impaired balance, gait deviations, decreased safety awareness, impaired judgment and fall risk  Functional mobility assessment showed a need for [place] assistance with bed mobility, transfers, and gait and utilizes RW AD for mobilization  Pt demonstrates decreased tolerance to activity due to dyspnea, pain, and fatigue and is able to follow commands with increased repetition  Pt is a good candidate for IP PT to address their impairments and to prevent secondary complications associated with hospital stay  Pt D/C recommendation is for Post-acute rehabilitation to facilitate safe discharge and facilitate further recovery to PLOF  Goals   Patient Goals to get stronger   STG Expiration Date 08/16/20   Short Term Goal #1 pt will: Increase bilateral LE strength 1/2 grade to facilitate independent mobility, Perform all bed mobility tasks modified independent to decrease fall risk factors, Perform all transfers w/ supervision to improve independence, Ambulate 150' ft  with roller walker w/ supervision w/o LOB, Navigate 2 stairs w/ minx1 without handrail to facilitate return to previous living environment, Increase all balance 1/2 grade to decrease risk for falls and Improve Barthel Index score to 80 or greater to facilitate independence   PT Treatment Day 1  (follow up treatment session)   Plan   Treatment/Interventions Functional transfer training;LE strengthening/ROM; Elevations; Therapeutic exercise; Endurance training;Cognitive reorientation; Bed mobility;Gait training;Spoke to nursing;OT   PT Frequency Other (Comment)  (3-5x/wk)   Recommendation   PT Discharge Recommendation Post-Acute Rehabilitation Services   Equipment Recommended Walker   PT - OK to Discharge Yes   Additional Comments to rehab when medically appropriate   Barthel Index   Feeding 10   Bathing 0   Grooming Score 5   Dressing Score 5   Bladder Score 10   Bowels Score 10   Toilet Use Score 5   Transfers (Bed/Chair) Score 10   Mobility (Level Surface) Score 0   Stairs Score 0   Barthel Index Score 55       Skilled PT recommended while in hospital and upon DC to progress pt toward treatment goals  Treatment Time: 7992 - 3854  S: Pt requested to perform gait training to maintain overall strength  O: Pt performed gait training x20' x4 with RW, MinAx1, and SBA 2nd for chair follow  Pt demonstrated dyspnea and fatigue with gait training, requiring frequent standing rest breaks  Pt also showed decreased foot clearance, forward flexion, increased postural sway, and ataxia with gait  Pt performed stand-to-sit transfer to recliner with MinAx1 at the end of treatment session  A: Pt demonstrated poor endurance and tolerance to activity as shown by the need for frequent rest breaks  Pt is motivated to return to independence with mobility  Pt requires frequent cueing to redirect for safety with gait mechanics and transfer techniques due to decreased short term memory and safety awareness    P: bed mobility, transfer training, gait training, LE strengthening, endurance training, cognitive reorientation    [address] [facility], [facility] 8/6/2020

## 2020-08-06 NOTE — ED ATTENDING ATTESTATION
8/5/2020  I, Phyllis Lynne MD, saw and evaluated the patient  I have discussed the patient with the resident and agree with the resident's findings, Plan of Care, and MDM as documented in the resident's note, unless otherwise documented below  All available laboratory and imaging studies were reviewed by myself  I was present for key portions of any procedure(s) performed by the resident and I was immediately available to provide assistance  I agree with the current assessment done in the Emergency Department  I have conducted an independent evaluation of this patient  79 y o  female with past medical history Atrial fibrillation, congestive heart failure, hypertension, CVA, depression, presenting via EMS with chest pain and possible ventricular tachycardia  Patient reports running out her medications including her heart medications at least several days ago, possibly 10 days ago  This evening around 10 pm, patient developed a central substernal chest pain, 4/10, associated with some shortness of breath and palpitations  She had "pain everywhere, all over the place"  EMS found her to be tachycardic in 180s, in a wide complex tachycardia  Since she was hemodynamically stable otherwise, and since they were approximately 10 minutes away, they transported the patient without any antidysrhythmics on board and she has not required any cardioversion en route  Paramedics note that patient has made numerous statements to them that she does not want to live, especially due to her severe chronic pain, and after the death of all three of her children and of her brother and sister  Patient denies shortness of breath at present  She does reports pain all over  She denies recent fevers or chills  She reports that most days, she does not even get out of bed  She reports that her landlady frequently comes by and brings her groceries and watches out for her    Although patient reports that she does not want to live, she vehemently denies doing anything to hurt herself such as overdosing on any of her medications  She reports that she has not really been compliant with any of her medications including her heart medications  During my interview, patient does report a 4/10 chest pain without shortness of breath or lightheadedness however, to resident physician, she denied this in front of me and reported that she was hurting all over  Denies abdominal pain, nausea, vomiting  Pre-hospital EKGs reveal rapid wide complex tachycardia that is irregular, raising suspicion for aberrantly conducted supraventricular tachycardia such as atrial fibrillation  Ten systems reviewed and are negative unless otherwise noted above  Physical Exam  Vitals:    08/05/20 1142 08/05/20 1240 08/05/20 1527 08/05/20 2000   BP: 118/79 121/86 110/67 107/56   TempSrc: Oral   Oral   Pulse: (!) 114 (!) 116 102 (!) 110   Resp: (!) 35  (!) 25 22   Patient Position - Orthostatic VS: Lying   Lying   Temp: 97 6 °F (36 4 °C)   97 8 °F (36 6 °C)        Constitutional:  Awake, alert, mildly disheveled, very tearful, not diaphoretic  HEENT:  Normocephalic, atraumatic  Sclera anicteric, conjunctiva not injected  Moist oral mucosa  Cardiac:  Irregularly irregular, rapid rate, unable to appreciate murmurs 2+ radial pulses  2+ dorsalis pedis  Respiratory:  Lungs are clear to auscultation bilaterally, no wheezes or rales  Abdomen:  Nondistended  Bowel sounds present  No tenderness to palpation  No rebound or guarding  Extremities:  No deformities, no edema  Integument:  No rashes or exposed areas, cap refill less than 2 seconds  Neurologic:  Awake, alert, and oriented x3  Moves all extremities spontaneously  Nonfocal exam   Psychiatric:  Tearful, reports that she does not want to live after losing her 3 children and after losing her brother last year and sister earlier this year      Labs  Labs Reviewed   CBC AND DIFFERENTIAL - Abnormal       Result Value Ref Range Status    WBC 19 25 (*) 4 31 - 10 16 Thousand/uL Final    RBC 3 97  3 81 - 5 12 Million/uL Final    Hemoglobin 13 0  11 5 - 15 4 g/dL Final    Hematocrit 41 2  34 8 - 46 1 % Final     (*) 82 - 98 fL Final    MCH 32 7  26 8 - 34 3 pg Final    MCHC 31 6  31 4 - 37 4 g/dL Final    RDW 15 4 (*) 11 6 - 15 1 % Final    MPV 9 5  8 9 - 12 7 fL Final    Platelets 330  800 - 390 Thousands/uL Final    nRBC 0  /100 WBCs Final    Neutrophils Relative 81 (*) 43 - 75 % Final    Immat GRANS % 2  0 - 2 % Final    Lymphocytes Relative 9 (*) 14 - 44 % Final    Monocytes Relative 5  4 - 12 % Final    Eosinophils Relative 2  0 - 6 % Final    Basophils Relative 1  0 - 1 % Final    Neutrophils Absolute 15 88 (*) 1 85 - 7 62 Thousands/µL Final    Immature Grans Absolute 0 28 (*) 0 00 - 0 20 Thousand/uL Final    Lymphocytes Absolute 1 65  0 60 - 4 47 Thousands/µL Final    Monocytes Absolute 0 96  0 17 - 1 22 Thousand/µL Final    Eosinophils Absolute 0 38  0 00 - 0 61 Thousand/µL Final    Basophils Absolute 0 10  0 00 - 0 10 Thousands/µL Final   COMPREHENSIVE METABOLIC PANEL - Abnormal    Sodium 137  136 - 145 mmol/L Final    Potassium 3 8  3 5 - 5 3 mmol/L Final    Chloride 102  100 - 108 mmol/L Final    CO2 22  21 - 32 mmol/L Final    ANION GAP 13  4 - 13 mmol/L Final    BUN 14  5 - 25 mg/dL Final    Creatinine 1 23  0 60 - 1 30 mg/dL Final    Comment: Standardized to IDMS reference method    Glucose 327 (*) 65 - 140 mg/dL Final    Comment: If the patient is fasting, the ADA then defines impaired fasting glucose as > 100 mg/dL and diabetes as > or equal to 123 mg/dL  Specimen collection should occur prior to Sulfasalazine administration due to the potential for falsely depressed results  Specimen collection should occur prior to Sulfapyridine administration due to the potential for falsely elevated results      Calcium 9 5  8 3 - 10 1 mg/dL Final    AST 25  5 - 45 U/L Final    Comment: Specimen collection should occur prior to Sulfasalazine administration due to the potential for falsely depressed results  ALT 32  12 - 78 U/L Final    Comment: Specimen collection should occur prior to Sulfasalazine and/or Sulfapyridine administration due to the potential for falsely depressed results  Alkaline Phosphatase 96  46 - 116 U/L Final    Total Protein 7 5  6 4 - 8 2 g/dL Final    Albumin 3 3 (*) 3 5 - 5 0 g/dL Final    Total Bilirubin 0 53  0 20 - 1 00 mg/dL Final    Comment: Use of this assay is not recommended for patients undergoing treatment with eltrombopag due to the potential for falsely elevated results  eGFR 42  ml/min/1 73sq m Final    Narrative:     National [employer] guidelines for Chronic Kidney Disease (CKD):     Stage 1 with normal or high GFR (GFR > 90 mL/min/1 73 square meters)    Stage 2 Mild CKD (GFR = 60-89 mL/min/1 73 square meters)    Stage 3A Moderate CKD (GFR = 45-59 mL/min/1 73 square meters)    Stage 3B Moderate CKD (GFR = 30-44 mL/min/1 73 square meters)    Stage 4 Severe CKD (GFR = 15-29 mL/min/1 73 square meters)    Stage 5 End Stage CKD (GFR <15 mL/min/1 73 square meters)  Note: GFR calculation is accurate only with a steady state creatinine   TROPONIN I - Abnormal    Troponin I 0 06 (*) <=0 04 ng/mL Final    Comment: Siemens Chemistry analyzer 99% cutoff is > 0 04 ng/mL in network labs     o cTnI 99% cutoff is useful only when applied to patients in the clinical setting of myocardial ischemia   o cTnI 99% cutoff should be interpreted in the context of clinical history, ECG findings and possibly cardiac imaging to establish correct diagnosis  o cTnI 99% cutoff may be suggestive but clearly not indicative of a coronary event without the clinical setting of myocardial ischemia      Results indicate test should be repeated on new specimen collected within 4-6 hours of the original   SALICYLATE LEVEL - Abnormal    Salicylate Lvl <3 (*) 3 - 20 mg/dL Final   ACETAMINOPHEN LEVEL - Abnormal    Acetaminophen Level <2 (*) 10 - 20 ug/mL Final   DIGOXIN LEVEL - Abnormal    Digoxin Lvl 0 4 (*) 0 8 - 2 0 ng/mL Final   TSH, 3RD GENERATION - Abnormal    TSH 3RD GENERATON 8 760 (*) 0 358 - 3 740 uIU/mL Final    Comment: The recommended reference ranges for TSH during pregnancy are as follows:   First trimester 0 1 to 2 5 uIU/mL   Second trimester  0 2 to 3 0 uIU/mL   Third trimester 0 3 to 3 0 uIU/m    Note: Normal ranges may not apply to patients who are transgender, non-binary, or whose legal sex, sex at birth, and gender identity differ  Using supplements with high doses of biotin 20 to more than 300 times greater than the adequate daily intake for adults of 30 mcg/day as established by the [place] [employer], can cause falsely depress results  Narrative:     Patients undergoing fluorescein dye angiography may retain small amounts of fluorescein in the body for 48-72 hours post procedure  Samples containing fluorescein can produce falsely depressed TSH values  If the patient had this procedure,a specimen should be resubmitted post fluorescein clearance  TROPONIN I - Abnormal    Troponin I 0 20 (*) <=0 04 ng/mL Final    Comment: Siemens Chemistry analyzer 99% cutoff is > 0 04 ng/mL in network labs     o cTnI 99% cutoff is useful only when applied to patients in the clinical setting of myocardial ischemia   o cTnI 99% cutoff should be interpreted in the context of clinical history, ECG findings and possibly cardiac imaging to establish correct diagnosis  o cTnI 99% cutoff may be suggestive but clearly not indicative of a coronary event without the clinical setting of myocardial ischemia  Results indicate test should be repeated on new specimen collected within 4-6 hours of the original   HEMOGLOBIN A1C - Abnormal    Hemoglobin A1C 7 6 (*) Normal 3 8-5 6%; PreDiabetic 5 7-6 4%;  Diabetic >=6 5%; Glycemic control for adults with diabetes <7 0% % Final      mg/dl Final   BASIC METABOLIC PANEL - Abnormal    Sodium 137  136 - 145 mmol/L Final    Potassium 4 8  3 5 - 5 3 mmol/L Final    Chloride 103  100 - 108 mmol/L Final    CO2 24  21 - 32 mmol/L Final    ANION GAP 10  4 - 13 mmol/L Final    BUN 15  5 - 25 mg/dL Final    Creatinine 1 12  0 60 - 1 30 mg/dL Final    Comment: Standardized to IDMS reference method    Glucose 207 (*) 65 - 140 mg/dL Final    Comment: If the patient is fasting, the ADA then defines impaired fasting glucose as > 100 mg/dL and diabetes as > or equal to 123 mg/dL  Specimen collection should occur prior to Sulfasalazine administration due to the potential for falsely depressed results  Specimen collection should occur prior to Sulfapyridine administration due to the potential for falsely elevated results      Calcium 9 7  8 3 - 10 1 mg/dL Final    eGFR 47  ml/min/1 73sq m Final    Narrative:     [place] guidelines for Chronic Kidney Disease (CKD):     Stage 1 with normal or high GFR (GFR > 90 mL/min/1 73 square meters)    Stage 2 Mild CKD (GFR = 60-89 mL/min/1 73 square meters)    Stage 3A Moderate CKD (GFR = 45-59 mL/min/1 73 square meters)    Stage 3B Moderate CKD (GFR = 30-44 mL/min/1 73 square meters)    Stage 4 Severe CKD (GFR = 15-29 mL/min/1 73 square meters)    Stage 5 End Stage CKD (GFR <15 mL/min/1 73 square meters)  Note: GFR calculation is accurate only with a steady state creatinine   MAGNESIUM - Abnormal    Magnesium 3 3 (*) 1 6 - 2 6 mg/dL Final   CBC AND PLATELET - Abnormal    WBC 16 91 (*) 4 31 - 10 16 Thousand/uL Final    RBC 3 78 (*) 3 81 - 5 12 Million/uL Final    Hemoglobin 12 3  11 5 - 15 4 g/dL Final    Hematocrit 39 0  34 8 - 46 1 % Final     (*) 82 - 98 fL Final    MCH 32 5  26 8 - 34 3 pg Final    MCHC 31 5  31 4 - 37 4 g/dL Final    RDW 15 3 (*) 11 6 - 15 1 % Final    Platelets 003  360 - 390 Thousands/uL Final    MPV 9 3  8 9 - 12 7 fL Final   TROPONIN I - Abnormal    Troponin I 0 18 (*) <=0 04 ng/mL Final    Comment: Siemens Chemistry analyzer 99% cutoff is > 0 04 ng/mL in network labs     o cTnI 99% cutoff is useful only when applied to patients in the clinical setting of myocardial ischemia   o cTnI 99% cutoff should be interpreted in the context of clinical history, ECG findings and possibly cardiac imaging to establish correct diagnosis  o cTnI 99% cutoff may be suggestive but clearly not indicative of a coronary event without the clinical setting of myocardial ischemia  Results indicate test should be repeated on new specimen collected within 4-6 hours of the original   NT-BNP PRO (BRAIN NATRIURETIC PEPTIDE) - Abnormal    NT-proBNP 3,581 (*) <450 pg/mL Final   NOVEL CORONAVIRUS (COVID-19), PCR [facility] - Normal    SARS-CoV-2 Negative  Negative Final    Narrative: The specimen collection materials, transport medium, and/or testing methodology utilized in the production of these test results have been proven to be reliable in a limited validation with an abbreviated program under the Emergency Utilization Authorization provided by the FDA  Testing reported as "Presumptive positive" will be confirmed with secondary testing with a reference laboratory to ensure result accuracy  Clinical caution and judgement should be used with the interpretation of these results with consideration of the clinical impression and other laboratory testing  Testing reported as "Positive" or "Negative" has been proven to be accurate according to standard laboratory validation requirements  All testing is performed with control materials showing appropriate reactivity at standard intervals       MAGNESIUM - Normal    Magnesium 2 1  1 6 - 2 6 mg/dL Final   PHOSPHORUS - Normal    Phosphorus 4 1  2 3 - 4 1 mg/dL Final   MEDICAL ALCOHOL - Normal    Ethanol Lvl <3  0 - 3 mg/dL Final   T4, FREE - Normal    Free T4 1 11  0 76 - 1 46 ng/dL Final    Comment: Specimen collection should occur prior to Sulfasalazine administration due to the potential for falsely elevated results  COMA PANEL    Narrative: The following orders were created for panel order Coma Panel  Procedure                               Abnormality         Status                     ---------                               -----------         ------                     [id number]]                      Normal              Final result               Salicylate JDANN[[id number]]             Abnormal            Final result               Acetaminophen level-If c  Delfino Fuentes  [[id number]]  Abnormal            Final result                 Please view results for these tests on the individual orders  Tests  XR chest 1 view portable   ED Interpretation   ED wet read:  No acute cardiopulmonary disease  Final Result      No acute cardiopulmonary disease  Workstation performed: [id number]             Procedures  ECG 12 Lead Documentation Only    Date/Time: 8/5/2020 12:26 AM  Performed by: Pelon Castellano MD  Authorized by: Pelon Castellano MD     Comments:      Atrial fibrillation with rapid ventricular response, ventricular rate 142,  consistent with nonspecific intraventricular conduction delay versus incomplete left bundle-branch block, , low-voltage QRS, ST depressions in lateral precordial leads are present, Q-waves in lead V2 suggestive of septal infarct, age indeterminate  Septal infarct and lateral ST depressions are new compared to prior EKG dated 08/08/2019    CriticalCare Time  Performed by: Pelon Castellano MD  Authorized by: Pelon Castellano MD     Critical care provider statement:     Critical care time (minutes):  20    Critical care start time:  8/5/2020 12:20 AM    Critical care end time:  8/5/2020 3:00 AM    Critical care time was exclusive of:  Separately billable procedures and treating other patients and teaching time    Critical care was necessary to treat or prevent imminent or life-threatening deterioration of the following conditions: Cardiac failure    Critical care was time spent personally by me on the following activities:  Blood draw for specimens, obtaining history from patient or surrogate, discussions with consultants, evaluation of patient's response to treatment, examination of patient, ordering and performing treatments and interventions, ordering and review of laboratory studies, ordering and review of radiographic studies, review of old charts and re-evaluation of patient's condition        HEART Risk Score      Most Recent Value   Heart Score Risk Calculator   History  0 Filed at: 08/05/2020 0224   ECG  1 Filed at: 08/05/2020 0224   Age  2 Filed at: 08/05/2020 0224   Risk Factors  2 Filed at: 08/05/2020 0224   Troponin  1 Filed at: 08/05/2020 0224   HEART Score  6 Filed at: 08/05/2020 0224          ED Course  Medications   amiodarone 150 mg in dextrose 5 % 100 mL IV bolus (0 mg Intravenous Stopped 8/5/20 0147)   magnesium sulfate 2 g/50 mL IVPB (premix) 2 g (0 g Intravenous Stopped 8/5/20 0158)       29-year-old female presenting with chest pain, found to be in aberrantly conducted atrial fibrillation with rapid ventricular response  Vital signs reviewed, tachycardic, hypertensive, afebrile, not hypoxic  Differential diagnosis includes chest pain secondary to atrial fibrillation with RVR, acute coronary syndrome/myocardial infarction, PE, electrolyte abnormality, versus another etiology of symptoms  EKG is with Q-waves in lead V2 suggestive of age-indeterminate septal infarct, there is no evidence of STEMI at present  Labs obtained, revealing BMP with hyperglycemia of 327, CBC with leukocytosis of 19 25, with neutrophil predominance, possibly due to current stress of cardiac dysrhythmia versus due to infection versus another pathology  Troponin is elevated at 0 06, will continue trending, as this could be secondary to rate related strain, due to CHF, or due to myocardial infarction    Given patient's report of wanting to die, toxicologic evaluation undertaken, Tylenol and acetaminophen levels are undetectable, digoxin is subtherapeutic  COVID-19 PCR is negative  We considered nortriptyline overdose, patient does have terminal R waves in lead AVR on her EKG that are consistent with exposure to tricyclic antidepressant although not indicative of overdose  Patient's QRS is 100  No indication for bicarb at present  Will continue monitoring  Patient is awake, alert, not hypotensive, and without seizure activity  We are starting patient on amiodarone bolus and drip  Patient did receive magnesium 2 g shortly prior to arrival since her pre-hospital rhythm strip was suggestive of a possible polymorphic VT/torsades de pointe  Patient admitted to [facility] for further evaluation and care      Clinical Impression  Final diagnoses:   Atrial fibrillation with rapid ventricular response ([address] )   Anxiety   Suicidal ideation   Noncompliance with medication regimen   Elevated troponin   Chest pain

## 2020-08-06 NOTE — ASSESSMENT & PLAN NOTE
· Patient with shortness of breath and slight chest pressure prompting call to EMS  Found in wide complex rhythm  · Placed on amiodarone GTT after bolus in ED with improvement in rate and without wide complex  · Patient remains in atrial fibrillation with RVR  Likely secondary to medication noncompliance for about 1 week  · Cardiology following, appreciate input  · Transitioned to oral Amiodarone 200 mg daily   · Continue digoxin 0 125 mcg daily  · Metoprolol succinate changed to tartrate in the acute setting, continue at 50 mg q 12 hours   · Continue Eliquis   · Monitor on telemetry monitor   · TSH mildly elevated at 8 760 however T4 normal   No history thyroid disease

## 2020-08-06 NOTE — PLAN OF CARE
Problem: PHYSICAL THERAPY ADULT  Goal: Performs mobility at highest level of function for planned discharge setting  See evaluation for individualized goals  Description: Treatment/Interventions: Functional transfer training, LE strengthening/ROM, Elevations, Therapeutic exercise, Endurance training, Cognitive reorientation, Bed mobility, Gait training, Spoke to nursing, OT  Equipment Recommended: Milan Quinones       See flowsheet documentation for full assessment, interventions and recommendations  Outcome: Progressing  Note: Prognosis: Fair  Problem List: Decreased strength, Decreased endurance, Impaired balance, Decreased mobility, Decreased cognition, Impaired judgement, Decreased safety awareness, Pain  Assessment: Pt is a 78 yo F who presents with SOB due to A-fib with RVR and noncompliance with medications  Order placed for PT eval and treat with activity orders for up w/ A  Pt presents with PMHx of A-fib, elevated troponin, chronic pain disorder, HTN, CAD, acute on chronic diastolic CHF, depression, DM2, ambulatory dysfunction, osteoarthritis and personal factors of living in 2 story house, mobilizing w/ assistive device, stair(s) to enter home, inability to ambulate household distances, inability to navigate community distances, inability to navigate level surfaces w/o external assistance, unable to perform dynamic tasks in community, decreased cognition, limited home support, depression, inability to perform IADLs, inability to perform ADLs and inability to live alone  Pt clinical presentation is unstable/unpredictable based on these factors  Pt PLOF was independent with ADLs and functional mobility with RW and assistance with IADLs  Upon evaluation, Pt impairments include pain, weakness, decreased endurance, impaired cognition, impaired balance, gait deviations, decreased safety awareness, impaired judgment and fall risk   Functional mobility assessment showed a need for Kena assistance with bed mobility, transfers, and gait and utilizes RW AD for mobilization  Pt demonstrates decreased tolerance to activity due to dyspnea, pain, and fatigue and is able to follow commands with increased repetition  Pt is a good candidate for IP PT to address their impairments and to prevent secondary complications associated with hospital stay  Pt D/C recommendation is for Post-acute rehabilitation to facilitate safe discharge and facilitate further recovery to PLOF  PT Discharge Recommendation: Post-Acute Rehabilitation Services     PT - OK to Discharge: Yes    See flowsheet documentation for full assessment

## 2020-08-06 NOTE — QUICK NOTE
Notified by nursing that patient has hypotension and T-wave changes  EKG reviewed  Patient does have more pronounced T-wave inversions  Presently, patient is comfortable in chest pain-free  Suspect T-wave changes are due to volume depletion from IV diuretics yesterday  Will give normal saline 250 IV bolus x1 and place holding parameters for lisinopril  Monitor BP  Cardiology made aware

## 2020-08-06 NOTE — ASSESSMENT & PLAN NOTE
Wt Readings from Last 3 Encounters:   08/06/20 86 4 kg (190 lb 7 6 oz)   03/02/20 73 9 kg (163 lb)   09/18/19 80 7 kg (178 lb)     · Does have a history of tachy mediated cardiomyopathy with a recovered EF on last TTE in 2019  · Updated echo 8/5: "LVEF 45%, there was hypokinesis of the mid anteroseptal, apical septal, and apical wall(s), wall thickness was mildly to moderately increased   Concentric hypertrophy was present "  · Cardiology following, appreciate input  · Will transition from Lasix 40 mg IV daily to Lasix 40 mg po daily  · Continue Lisinopril 5 mg daily   · Metoprolol succinate changed to tartrate in the acute setting, continue at 50 mg q 12 hours   · I&Os, daily weights, low-sodium diet  · Monitor volume status

## 2020-08-06 NOTE — ASSESSMENT & PLAN NOTE
· S/p PCI to RCA in 2018  · Had been maintained on Plavix and Eliquis; previously had been on ASA however held secondary to bleeding  · Will continue Plavix, beta-blocker, statin  · Presently, denies chest pain

## 2020-08-06 NOTE — ASSESSMENT & PLAN NOTE
· With patient endorsing worsening mood and limited energy secondary to chronic pain  · Patient admitting to frequent thoughts of wishing she would be dead and did allude to some suicidal ideation if she had a gun at home (patient reports she does not have gun at home ) per admitting physician    · Patient does not recall making these statements - Appreciate psychiatry input, ok to discontinue 1:1     · Continue Zoloft 20 mg daily (concerned patient is not taking this at home)  · Continue Nortriptyline  · Recommend close follow-up with PCP and psychiatrist

## 2020-08-06 NOTE — ASSESSMENT & PLAN NOTE
Lab Results   Component Value Date    HGBA1C 7 6 (H) 08/05/2020     · No reported history of diabetes, A1c 7 6 and patient noted to be hyperglycemic  · Start SSI  · Diabetic diet  · Monitor Accu-Cheks and adjust as needed  · Would benefit from an oral agent on discharge and close outpatient follow-up with PCP  Would also recommend glucometer on discharge as patient should be checking BG at home     · Nutrition consult

## 2020-08-06 NOTE — ASSESSMENT & PLAN NOTE
· Urgency on admission however now improved  Likely secondary to medication noncompliance    · Most recent blood pressure reviewed, 113/75  · Continue Lisinopril 5 mg daily, Lopressor the mg twice daily  · Transition to oral Lasix  · Monitor
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
## 2020-08-06 NOTE — PROGRESS NOTES
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)  -12 lead ECG 8/5:  Atrial fibrillation with RVR, rate 142 ppm, chronic LBBB  -24 hour telemetry review; Afib, rates 100's to 120's; no evidence of WCT  -Restarted on amiodarone 200 mg daily, digoxin 0 125 mg daily (dig level 0 4), and metoprolol tartrate 50 mg q 12 hours  -Restarted on apixaban 5 mg b i d  -TSH 8 76; free T4 1 1     3  Non MI troponin elevation in the setting of #1/4    -No complaints CP  -No significant ischemic changes noted on 12 lead ECG  -Troponin elevation: 0 06--0 18--0 20     4  Acute diastolic CHF exacerbation  5  Cardiomyopathy; 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 (EF 45%, down from 60%)  -Volume status improved; supplemental O2 weaned off  -Symptomatically denies SOB at rest or orthopnea; has not gotten OOB to determine if she is dyspneic  -proBNP 3,581  -Cxray--Lungs are clear, no pneumothorax or pleural effusion  -2D TTE 1/2019:  LVEF 60%, no regional wall motion abnormalities, the LA and RA dilated, RV normal size and systolic function, trace TR  -Previously on oral furosemide 40 mg daily (on hold)  -Received x1 dose of IV Lasix 40 mg  -24 hour I&O balance -970 ml; overall -1 1 L  Weights  -8/6: 190 lbs--bed scale    5  CAD s/p PCI/NGOZI x1 mRCA   -No complaints of CP  -C 07/2018:  D1 50% stenosis at the ostium, mid circumflex 50% stenosis just after OM1, OM1 40% stenosis, mid RCA 90% stenosis  -restarted onon clopidogrel 75 mg daily, atorvastatin 20 mg daily, and metoprolol succinate 50 mg q 12 hours     6   Hypertension  -Average /75, last recorded 103/79,   -Current BP regimen: lisinopril 5 mg daily, metoprolol tartrate 50 mg q 12 hours, and furosemide 40 mg daily     7  Dyslipidemia  -Lipid profile 08/06/2020:  Cholesterol 202, triglycerides 422, HDL 36,   -On atorvastatin 20 mg daily     Plan  -DC IV Lasix restart oral Lasix 40 mg daily +K-Dur 20 mEq daily  -Continue metoprolol tartrate 50 mg q 12 hours (did not receive dose last evening adjusted hold parameters for systolic BP <527); transition to Toprol XL 50 mg q 12 hours tomorrow  -Continue oral amiodarone 200 mg daily  -Continue digoxin 0 125 mg daily  -Continue apixaban 5 mg b i d and clopidogrel 75 mg daily  -Up titrate atorvastatin to 40 mg daily  -Monitor renal function and electrolytes closely  -Monitor closely on telemetry    Subjective  Review of Systems   Constitution: Negative for chills, fever and malaise/fatigue  HENT: Negative for congestion  Eyes: Negative for visual disturbance  Cardiovascular: Negative for chest pain, dyspnea on exertion, irregular heartbeat, leg swelling, near-syncope, orthopnea and palpitations  Respiratory: Negative for cough and shortness of breath  Musculoskeletal: Positive for back pain and neck pain  Gastrointestinal: Negative for abdominal pain  Genitourinary: Negative for dysuria  Neurological: Negative for dizziness, headaches and light-headedness  Objective:   Physical Exam   Constitutional: She is oriented to person, place, and time  Non-toxic appearance  No distress  HENT:   Head: Normocephalic and atraumatic  Eyes: Pupils are equal, round, and reactive to light  No scleral icterus  Neck: Normal range of motion  Neck supple  Muscular tenderness present  Cardiovascular: An irregular rhythm present  Tachycardia present  Murmur heard  Pulmonary/Chest: Effort normal  She has no wheezes  She has no rales  Abdominal: Soft  Normal appearance and bowel sounds are normal    Musculoskeletal:         General: No swelling or tenderness     Neurological: She is alert and oriented to person, place, and time  Skin: Skin is warm and dry  Capillary refill takes less than 2 seconds  She is not diaphoretic  Psychiatric: Mood normal    Nursing note and vitals reviewed  Vitals: Blood pressure 103/79, pulse 104, temperature 97 8 °F (36 6 °C), temperature source Oral, resp  rate (!) 25, weight 86 4 kg (190 lb 7 6 oz), SpO2 92 %  ,     Body mass index is 31 7 kg/m²  ,   Systolic (93XPI), XXE:813 , Min:103 , LJE:067     Diastolic (47HOI), GGA:79, Min:56, Max:86      Intake/Output Summary (Last 24 hours) at 8/6/2020 0855  Last data filed at 8/6/2020 0754  Gross per 24 hour   Intake 30 ml   Output 1300 ml   Net -1270 ml     Weight (last 2 days)     Date/Time   Weight    08/06/20 0600   86 4 (190 48)              LABORATORY RESULTS  Results from last 7 days   Lab Units 08/05/20  0638 08/05/20  0441 08/05/20  0032   TROPONIN I ng/mL 0 20* 0 18* 0 06*     CBC with diff:   Results from last 7 days   Lab Units 08/06/20  0517 08/05/20  0441 08/05/20  0032   WBC Thousand/uL 9 44 16 91* 19 25*   HEMOGLOBIN g/dL 10 8* 12 3 13 0   HEMATOCRIT % 33 8* 39 0 41 2   MCV fL 104* 103* 104*   PLATELETS Thousands/uL 251 312 345   MCH pg 33 1 32 5 32 7   MCHC g/dL 32 0 31 5 31 6   RDW % 15 6* 15 3* 15 4*   MPV fL 9 5 9 3 9 5   NRBC AUTO /100 WBCs 0  --  0       CMP:  Results from last 7 days   Lab Units 08/06/20  0530 08/05/20  0441 08/05/20  0032   POTASSIUM mmol/L 3 9 4 8 3 8   CHLORIDE mmol/L 107 103 102   CO2 mmol/L 25 24 22   BUN mg/dL 18 15 14   CREATININE mg/dL 0 98 1 12 1 23   CALCIUM mg/dL 8 0* 9 7 9 5   AST U/L  --   --  25   ALT U/L  --   --  32   ALK PHOS U/L  --   --  96   EGFR ml/min/1 73sq m 55 47 42       BMP:  Results from last 7 days   Lab Units 08/06/20  0530 08/05/20  0441 08/05/20  0032   POTASSIUM mmol/L 3 9 4 8 3 8   CHLORIDE mmol/L 107 103 102   CO2 mmol/L 25 24 22   BUN mg/dL 18 15 14   CREATININE mg/dL 0 98 1 12 1 23   CALCIUM mg/dL 8 0* 9 7 9 5       Lab Results   Component Value Date    NTBNP 3,581 (H) 2020    NTBNP 3,451 (H) 2019    NTBNP 860 (H) 2018        Results from last 7 days   Lab Units 20  0441 20  0032   MAGNESIUM mg/dL 3 3* 2 1       Results from last 7 days   Lab Units 20  0443   HEMOGLOBIN A1C % 7 6*       Results from last 7 days   Lab Units 20  0107   TSH 3RD GENERATON uIU/mL 8 760*   FREE T4 ng/dL 1 11             Lipid Profile:   Lab Results   Component Value Date    CHOL 218 2015    CHOL 151 2014    CHOL 144 2013     Lab Results   Component Value Date    HDL 36 (L) 2020    HDL 33 (L) 2019    HDL 47 2015     Lab Results   Component Value Date    LDLCALC  2020      Comment:      Calculated LDL invalid, triglycerides >400 mg/dl    LDLCALC 139 (H) 2019    LDLCALC 140 (H) 2015     Lab Results   Component Value Date    TRIG 422 (H) 2020    TRIG 293 (H) 2019    TRIG 155 2015       Cardiac testing:   Results for orders placed during the hospital encounter of 20   Echo complete with contrast if indicated    Narrative [facility] [address]  [address] [place] [address]  [phone]    Transthoracic Echocardiogram  2D, M-mode, Doppler, and Color Doppler    Study date:      Patient: Angel Rosario  MR number: [id number]  Account number: [de-identified]  : 1940  Age: 79 years  Gender: Female  Status: Inpatient  Location: Medical Intensive Care Unit  Height: 65 in  Weight: 162 6 lb  BP: 121/ 86 mmHg    Indications: Dyspnea, shortness of breath, wheezing, tachypnea  Diagnoses: R06 00 - Dyspnea, unspecified    Sonographer:  Ron Sawyer RDCS  Primary Physician:  Jaziel White DO  Referring Physician:  CHANDRIKA Simon  Group:  [facility]  Interpreting Physician:  Maeve Walsh MD    SUMMARY    LEFT VENTRICLE:  Systolic function was mildly reduced  Ejection fraction was estimated to be 45 %    There was hypokinesis of the mid anteroseptal, apical septal, and apical wall(s)  Wall thickness was mildly to moderately increased  Concentric hypertrophy was present  RIGHT VENTRICLE:  The size was normal   Systolic function was mildly reduced  Wall thickness was normal     LEFT ATRIUM:  The atrium was dilated  RIGHT ATRIUM:  The atrium was dilated  MITRAL VALVE:  There was mild annular calcification  There was moderate regurgitation  TRICUSPID VALVE:  There was mild to moderate regurgitation  Estimated peak PA pressure was 40 mmHg  HISTORY: PRIOR HISTORY: Atrial fibrillation with rapid ventricular response, elevated troponin, congestive heart failure, hypertension, coronary artery disease, depression, diabetes mellitus type 2  PROCEDURE: The study was performed in the Medical Intensive Care Unit  This was a routine study  The transthoracic approach was used  The study included complete 2D imaging, M-mode, complete spectral Doppler, and color Doppler  The heart  rate was 102 bpm, at the start of the study  Echocardiographic views were limited due to restricted patient mobility  Image quality was adequate  LEFT VENTRICLE: Size was normal  Systolic function was mildly reduced  Ejection fraction was estimated to be 45 %  There was hypokinesis of the mid anteroseptal, apical septal, and apical wall(s)  Wall thickness was mildly to moderately  increased  Concentric hypertrophy was present  DOPPLER: Transmitral flow pattern: atrial fibrillation  RIGHT VENTRICLE: The size was normal  Systolic function was mildly reduced  Wall thickness was normal     LEFT ATRIUM: The atrium was dilated  RIGHT ATRIUM: The atrium was dilated  MITRAL VALVE: There was mild annular calcification  Valve structure was normal  There was mild-moderate diffuse thickening  There was normal leaflet separation  DOPPLER: The transmitral velocity was within the normal range  There was no  evidence for stenosis   There was moderate regurgitation  AORTIC VALVE: The valve was trileaflet  Leaflets exhibited mild calcification, normal cuspal separation, and sclerosis  DOPPLER: Transaortic velocity was within the normal range  There was no evidence for stenosis  There was no significant  regurgitation  TRICUSPID VALVE: The valve structure was normal  There was normal leaflet separation  DOPPLER: The transtricuspid velocity was within the normal range  There was no evidence for stenosis  There was mild to moderate regurgitation  Estimated  peak PA pressure was 40 mmHg  PULMONIC VALVE: Leaflets exhibited normal thickness, no calcification, and normal cuspal separation  DOPPLER: The transpulmonic velocity was within the normal range  There was trace regurgitation  PERICARDIUM: There was no pericardial effusion  AORTA: The root exhibited normal size  SYSTEMIC VEINS: IVC: The inferior vena cava was normal in size      SYSTEM MEASUREMENT TABLES    2D  Ao Diam: 3 33 cm  EDV(Teich): 84 74 ml  HR_4Ch_Q: 126 63 BPM  IVSd: 1 51 cm  LA Diam: 4 4 cm  LAAs A4C: 39 42 cm2  LAESV A-L A4C: 184 61 ml  LAESV MOD A4C: 169 43 ml  LALs A4C: 7 15 cm  LVCO_4Ch_Q: 2 79 L/min  LVEF_4Ch_Q: 35 87 %  LVIDd: 4 34 cm  LVLd_4Ch_Q: 7 08 cm  LVLs_4Ch_Q: 6 76 cm  LVPWd: 1 22 cm  LVSV_4Ch_Q: 22 02 ml  LVVED_4Ch_Q: 61 4 ml  LVVES_4Ch_Q: 39 37 ml  RAAs: 23 63 cm2  RAESV A-L: 80 6 ml  RAESV MOD: 79 12 ml  RALs: 5 88 cm  RVIDd: 2 45 cm    CW  MR VTI: 132 03 cm  MR Vmax: 4 95 m/s  MR Vmean: 3 69 m/s  MR maxP 2 mmHg  MR meanP 58 mmHg  TR Vmax: 2 75 m/s  TR maxP 18 mmHg    MM  TAPSE: 1 1 cm    PW  E': 0 05 m/s    [employer] Accredited Echocardiography Laboratory    Prepared and electronically signed by    Melony Fowler MD  Signed 05-Aug-2020 17:30:13       Results for orders placed during the hospital encounter of 18   TIFFANIE    Narrative [address]  [place], [place] [address]  [phone]    Transesophageal Echocardiogram  2D, Doppler, and Color Doppler    Study date:      Patient: Dangelo Khan  MR number: [id number]  Account number: [de-identified]  : 1940  Age: 69 years  Gender: Female  Status: Inpatient  Location: Cath lab  Height: 65 in  Weight: 149 lb  BP: 79/ 52 mmHg    Indications: Atrial fibrillation  Diagnoses: I48 0 - Atrial fibrillation    Sonographer:  Eliot Wells RDCS  Interpreting Physician:  Marshal Perera MD  Primary Physician:  Juan José Berman DO  Referring Physician:  Shona Shipley MD  Group:  [facility]  Cardiology Fellow:  Shona Shipley MD    SUMMARY    LEFT VENTRICLE:  Systolic function was markedly reduced  Ejection fraction was estimated to be 35 %  Beat to beat variability and tachycardia limit an accurate assessment of ejection fraction  There was severe diffuse hypokinesis with regional variations  RIGHT VENTRICLE:  The ventricle was mildly dilated  Systolic function was reduced  LEFT ATRIUM:  The atrium was moderately dilated  No thrombus was identified  There was evidence of spontaneous echo contrast ("smoke")  LEFT ATRIAL APPENDAGE:  The appendage was dilated  The function was moderately reduced (moderately reduced emptying velocity)  No thrombus was identified  There was evidence of spontaneous echo contrast ("smoke") in the appendage  ATRIAL SEPTUM:  No defect or patent foramen ovale was identified  RIGHT ATRIUM:  The atrium was moderately dilated  There was evidence of continuous spontaneous echo contrast ("smoke")  MITRAL VALVE:  There was moderate regurgitation  Regurgitation grade was 3+ on a scale of 0 to 4+  AORTIC VALVE:  There was trace regurgitation  TRICUSPID VALVE:  There was mild regurgitation  PULMONIC VALVE:  There was trace regurgitation  HISTORY: PRIOR HISTORY: Heart failure, Atrial fibrillation, Sepsis      PROCEDURE: The procedure was performed in the catheterization laboratory  This was a routine study  The risks and alternatives of the procedure were explained to the patient and informed consent was obtained  The transesophageal approach  was used  The study included complete 2D imaging, complete spectral Doppler, and color Doppler  The heart rate was 108 bpm, at the start of the study  An adult omniplane probe was inserted by the cardiology fellow under direct supervision  of the attending cardiologist  Intubated with ease  One intubation attempt(s)  There was no blood detected on the probe  Image quality was adequate  There were no complications during the procedure  MEDICATIONS: Benzocaine spray, topical  to oropharynx, prior to procedure  Anesthesia administered by anesthesia team     LEFT VENTRICLE: Size was normal  Systolic function was markedly reduced  Ejection fraction was estimated to be 35 %  Beat to beat variability and tachycardia limit an accurate assessment of ejection fraction  There was severe diffuse  hypokinesis with regional variations  Wall thickness was increased  DOPPLER: The study was not technically sufficient to allow evaluation of LV diastolic function  RIGHT VENTRICLE: The ventricle was mildly dilated  Systolic function was reduced  LEFT ATRIUM: The atrium was moderately dilated  No thrombus was identified  There was evidence of spontaneous echo contrast ("smoke")  APPENDAGE: The appendage was dilated  No thrombus was identified  There was evidence of spontaneous echo  contrast ("smoke") in the appendage  DOPPLER: The function was moderately reduced (moderately reduced emptying velocity)  ATRIAL SEPTUM: No defect or patent foramen ovale was identified  RIGHT ATRIUM: The atrium was moderately dilated  No thrombus was identified  There was evidence of continuous spontaneous echo contrast ("smoke")  MITRAL VALVE: There was mild annular calcification   There was mildly reduced leaflet separation  DOPPLER: There was moderate regurgitation  Regurgitation grade was 3+ on a scale of 0 to 4+  The regurgitant jet was centrally directed  AORTIC VALVE: The valve was trileaflet  Leaflets exhibited normal cuspal separation and sclerosis  DOPPLER: There was trace regurgitation  TRICUSPID VALVE: The valve structure was normal  There was normal leaflet separation  DOPPLER: There was mild regurgitation  PULMONIC VALVE: Not well visualized  DOPPLER: There was trace regurgitation  PERICARDIUM: There was no pericardial effusion  The pericardium was normal in appearance  AORTA: The root exhibited normal size  There was no atheroma  There was no evidence for dissection  There was no evidence for aneurysm  PULMONARY VEINS: DOPPLER: There was systolic blunting in the pulmonary vein(s)  [address] Accredited Echocardiography Laboratory    Prepared and electronically signed by    Monserrat Aden MD  Signed 12-Jul-2018 13:57:07       No results found for this or any previous visit  No procedure found  No results found for this or any previous visit      Meds/Allergies   all current active meds have been reviewed, current meds:   Current Facility-Administered Medications   Medication Dose Route Frequency    acetaminophen (TYLENOL) tablet 975 mg  975 mg Oral Q8H [facility]    amiodarone tablet 200 mg  200 mg Oral Daily With Breakfast    apixaban (ELIQUIS) tablet 5 mg  5 mg Oral BID    ascorbic acid (VITAMIN C) tablet 500 mg  500 mg Oral Daily    atorvastatin (LIPITOR) tablet 20 mg  20 mg Oral Daily With Dinner    bisacodyl (DULCOLAX) rectal suppository 10 mg  10 mg Rectal Daily PRN    clopidogrel (PLAVIX) tablet 75 mg  75 mg Oral Daily    digoxin (LANOXIN) tablet 125 mcg  125 mcg Oral Daily    ferrous sulfate tablet 325 mg  325 mg Oral Daily With Breakfast    FLUoxetine (PROzac) capsule 20 mg  20 mg Oral Daily    folic acid (FOLVITE) tablet 1,000 mcg  1,000 mcg Oral Daily    furosemide (LASIX) tablet 40 mg  40 mg Oral Daily    insulin lispro (HumaLOG) 100 units/mL subcutaneous injection 1-5 Units  1-5 Units Subcutaneous TID AC    insulin lispro (HumaLOG) 100 units/mL subcutaneous injection 1-6 Units  1-6 Units Subcutaneous HS    lidocaine (LIDODERM) 5 % patch 1 patch  1 patch Topical Daily    lisinopril (ZESTRIL) tablet 5 mg  5 mg Oral Daily    melatonin tablet 6 mg  6 mg Oral HS    metoprolol tartrate (LOPRESSOR) tablet 50 mg  50 mg Oral Q12H [address]    nortriptyline (PAMELOR) capsule 10 mg  10 mg Oral HS    ondansetron (ZOFRAN) injection 4 mg  4 mg Intravenous Q6H PRN    oxyCODONE (ROXICODONE) IR tablet 5 mg  5 mg Oral Q6H PRN    pantoprazole (PROTONIX) EC tablet 40 mg  40 mg Oral BID AC    potassium chloride (K-DUR,KLOR-CON) CR tablet 20 mEq  20 mEq Oral Daily    senna-docusate sodium (SENOKOT S) 8 6-50 mg per tablet 1 tablet  1 tablet Oral HS    thiamine (VITAMIN B1) tablet 100 mg  100 mg Oral Daily    and PTA meds:   Prior to Admission Medications   Prescriptions Last Dose Informant Patient Reported? Taking?    FEROSUL 325 (65 Fe) MG tablet Unknown at Unknown time  No No   Sig: TAKE ONE TABLET BY MOUTH DAILY WITH BREAKFAST   FLUoxetine (PROzac) 20 mg capsule Unknown at Unknown time  No No   Sig: TAKE 1 CAPSULE (20 MG TOTAL) BY MOUTH DAILY   Lidocaine (ASPERCREME LIDOCAINE) 4 % PTCH Unknown at Unknown time  No No   Sig: Apply 1 patch topically every 12 (twelve) hours   acetaminophen (TYLENOL) 325 mg tablet Not Taking at Unknown time  No No   Sig: Take 2 tablets (650 mg total) by mouth every 6 (six) hours   Patient not taking: Reported on 8/5/2020   amiodarone 200 mg tablet Unknown at Unknown time  No No   Sig: TAKE 1 TABLET (200 MG TOTAL) BY MOUTH DAILY   apixaban (ELIQUIS) 5 mg Unknown at Unknown time  No No   Sig: Take 1 tablet (5 mg total) by mouth 2 (two) times a day   ascorbic acid (VITAMIN C) 500 mg tablet Unknown at Unknown time  No No   Sig: TAKE ONE TABLET BY MOUTH DAILY   atorvastatin (LIPITOR) 20 mg tablet Unknown at Unknown time  No No   Sig: TAKE 1 TABLET (20 MG TOTAL) BY MOUTH DAILY WITH DINNER   bisacodyl (DULCOLAX) 10 mg suppository Unknown at Unknown time  Yes No   Sig: Insert 10 mg into the rectum daily as needed for constipation   clopidogrel (PLAVIX) 75 mg tablet Unknown at Unknown time  No No   Sig: TAKE 1 TABLET (75 MG TOTAL) BY MOUTH DAILY   digoxin (LANOXIN) 0 125 mg tablet Unknown at Unknown time  No No   Sig: TAKE 1 TABLET (0 125 MG TOTAL) BY MOUTH DAILY   folic acid (FOLVITE) 1 mg tablet Unknown at Unknown time  No No   Sig: TAKE ONE TABLET BY MOUTH DAILY   furosemide (LASIX) 40 mg tablet Unknown at Unknown time  No No   Sig: TAKE 1 TABLET (40 MG TOTAL) BY MOUTH DAILY   lisinopril (ZESTRIL) 5 mg tablet Unknown at Unknown time  No No   Sig: TAKE 1 TABLET (5 MG TOTAL) BY MOUTH DAILY   melatonin 3 mg Unknown at Unknown time  No No   Sig: Take 2 tablets (6 mg total) by mouth daily at bedtime   methocarbamol (ROBAXIN) 500 mg tablet Unknown at Unknown time  No No   Sig: Take 1 tablet (500 mg total) by mouth once as needed for muscle spasms for up to 1 dose   metoprolol succinate (TOPROL-XL) 50 mg 24 hr tablet Unknown at Unknown time  No No   Sig: TAKE 1 TABLET (50 MG TOTAL) BY MOUTH EVERY 12 (TWELVE) HOURS   nortriptyline (PAMELOR) 10 mg capsule Unknown at Unknown time  No No   Sig: TAKE ONE CAPSULE BY MOUTH AT BEDTIME   pantoprazole (PROTONIX) 40 mg tablet Unknown at Unknown time  No No   Sig: Take 1 tablet (40 mg total) by mouth 2 (two) times a day before meals   senna-docusate sodium (SENOKOT S) 8 6-50 mg per tablet Unknown at Unknown time  No No   Sig: Take 1 tablet by mouth daily at bedtime   thiamine 100 MG tablet Unknown at Unknown time  No No   Sig: Take 1 tablet (100 mg total) by mouth daily      Facility-Administered Medications: None          EKG personally reviewed by CHANDRIKA Leonardo      Assessment:  Principal Problem:    Atrial fibrillation with RVR ([place] [address] )  Active Problems:    Elevated troponin    Chronic pain disorder    Essential hypertension    Coronary artery disease    Acute on chronic diastolic congestive heart failure (HCC)    Depression    DM2 (diabetes mellitus, type 2) ([place] [address] )      Counseling / Coordination of Care  Total floor / unit time spent today 20 minutes  Greater than 50% of total time was spent with the patient and / or family counseling and / or coordination of care  ** Please Note: Dragon 360 Dictation voice to text software may have been used in the creation of this document   **

## 2020-08-06 NOTE — ASSESSMENT & PLAN NOTE
· PDMP reviewed, appears not to have received any opioids as outpatient since 01/2019  · Urine drug screen positive for opiates  · Longstanding history of chronic back and neck pain and frequently complaining of these throughout my evaluation  · No improvement with non opioid pain control with Tylenol, lidocaine patch, aqua K-pad  · Yesterday, oxycodone IR 5 mg PRN was added with little relief  · Patient reports persistent neck tenderness and muscle spasms resulting in decreased range of motion and severe pain  · Patient states that if we do not address her neck pain during this admission then she will discharge and come back to the ED with complaints of neck pain    · I have offered patient an MRI of the cervical spine which she is amenable to having while hospitalized  · Patient also endorses that she was supposed to follow-up with neurosurgery as an outpatient in 2019 but was unable to do so due to lack of transportation    · PT/OT evaluations

## 2020-08-06 NOTE — PROGRESS NOTES
Progress Note - Gwendolyn Riddleuise 4/08/1809, 79 y o  female MRN: [id number]    Unit/Bed#: MICU 13 Encounter: [id number]    Primary Care Provider: Zoey Domingo DO   Date and time admitted to hospital: 8/5/2020 12:20 AM        * Atrial fibrillation with RVR ([address] )  Assessment & Plan  · Patient with shortness of breath and slight chest pressure prompting call to EMS  Found in wide complex rhythm  · Placed on amiodarone GTT after bolus in ED with improvement in rate and without wide complex  · Patient remains in atrial fibrillation with RVR  Likely secondary to medication noncompliance for about 1 week  · Cardiology following, appreciate input  · Transitioned to oral Amiodarone 200 mg daily   · Continue digoxin 0 125 mcg daily  · Metoprolol succinate changed to tartrate in the acute setting, continue at 50 mg q 12 hours   · Continue Eliquis   · Monitor on telemetry monitor   · TSH mildly elevated at 8 760 however T4 normal   No history thyroid disease  Acute on chronic diastolic congestive heart failure (HCC)  Assessment & Plan  Wt Readings from Last 3 Encounters:   08/06/20 86 4 kg (190 lb 7 6 oz)   03/02/20 73 9 kg (163 lb)   09/18/19 80 7 kg (178 lb)     · Does have a history of tachy mediated cardiomyopathy with a recovered EF on last TTE in 2019  · Updated echo 8/5: "LVEF 45%, there was hypokinesis of the mid anteroseptal, apical septal, and apical wall(s), wall thickness was mildly to moderately increased  Concentric hypertrophy was present "  · Cardiology following, appreciate input  · Will transition from Lasix 40 mg IV daily to Lasix 40 mg po daily  · Continue Lisinopril 5 mg daily   · Metoprolol succinate changed to tartrate in the acute setting, continue at 50 mg q 12 hours   · I&Os, daily weights, low-sodium diet  · Monitor volume status    Pain of cervical spine  Assessment & Plan  With associated muscle spasms  Patient has had an ongoing history of neck pain for 30 years    She was supposed to follow-up with neurosurgery as an outpatient in 2019, however, was unable to do so due to lack of transportation  Presently, she continues to have neck pain and decreased range of motion  She does have neck spasms on exam   She states that if we do not address her neck pain then she will discharge and return to the ED for neck pain evaluation  · Obtain MRI of cervical spine  · Continue scheduled Tylenol, add Robaxin 250 mg every 8 hours, continue Lidoderm patch  · PT/OT  · Oxycodone 5 mg IR PRN while hospitalized (patient aware she will not be discharged home on opioids)    DM2 (diabetes mellitus, type 2) ([facility] )  Assessment & Plan  Lab Results   Component Value Date    HGBA1C 7 6 (H) 08/05/2020     · No reported history of diabetes, A1c 7 6 and patient noted to be hyperglycemic  · Start SSI  · Diabetic diet  · Monitor Accu-Cheks and adjust as needed  · Would benefit from an oral agent on discharge and close outpatient follow-up with PCP  Would also recommend glucometer on discharge as patient should be checking BG at home  · Nutrition consult    Depression  Assessment & Plan  · With patient endorsing worsening mood and limited energy secondary to chronic pain  · Patient admitting to frequent thoughts of wishing she would be dead and did allude to some suicidal ideation if she had a gun at home (patient reports she does not have gun at home ) per admitting physician    · Patient does not recall making these statements - Appreciate psychiatry input, ok to discontinue 1:1     · Continue Zoloft 20 mg daily (concerned patient is not taking this at home)  · Continue Nortriptyline  · Recommend close follow-up with PCP and psychiatrist     Coronary artery disease  Assessment & Plan  · S/p PCI to RCA in 2018  · Had been maintained on Plavix and Eliquis; previously had been on ASA however held secondary to bleeding  · Will continue Plavix, beta-blocker, statin  · Presently, denies chest pain    Essential hypertension  Assessment & Plan  · Urgency on admission however now improved  Likely secondary to medication noncompliance  · Most recent blood pressure reviewed, 113/75  · Continue Lisinopril 5 mg daily, Lopressor the mg twice daily  · Transition to oral Lasix  · Monitor    Chronic pain disorder  Assessment & Plan  · PDMP reviewed, appears not to have received any opioids as outpatient since 01/2019  · Urine drug screen positive for opiates  · Longstanding history of chronic back and neck pain and frequently complaining of these throughout my evaluation  · No improvement with non opioid pain control with Tylenol, lidocaine patch, aqua K-pad  · Yesterday, oxycodone IR 5 mg PRN was added with little relief  · Patient reports persistent neck tenderness and muscle spasms resulting in decreased range of motion and severe pain  · Patient states that if we do not address her neck pain during this admission then she will discharge and come back to the ED with complaints of neck pain  · I have offered patient an MRI of the cervical spine which she is amenable to having while hospitalized  · Patient also endorses that she was supposed to follow-up with neurosurgery as an outpatient in 2019 but was unable to do so due to lack of transportation    · PT/OT evaluations     Elevated troponin  Assessment & Plan  · Non MI troponin elevation with peak troponin of 0 20  Likely secondary to AFib with RVR  EKG otherwise without ischemic changes  · No complaints of chest pain  Leukocytosisresolved as of 8/6/2020  Assessment & Plan  · Patient rests response/reacted to AFib with RVR  · No signs of infection  · WBC returned to normal    VTE Pharmacologic Prophylaxis:   Pharmacologic: Apixaban (Eliquis)  Mechanical VTE Prophylaxis in Place: Yes    Patient Centered Rounds: I have performed bedside rounds with nursing staff today      Discussions with Specialists or Other Care Team Provider:  Nursing, case management, cardiology    Education and Discussions with Family / Patient:  I have answered all questions the best my ability  Time Spent for Care: 30 minutes  More than 50% of total time spent on counseling and coordination of care as described above  Current Length of Stay: 1 day(s)    Current Patient Status: Inpatient   Certification Statement: The patient will continue to require additional inpatient hospital stay due to Atrial fibrillation requiring telemetry monitoring and medication adjustments  Severe neck pain requiring MRI  Discharge Plan:  Patient is not medically stable for discharge today, likely tomorrow pending results of cervical spine MRI and heart rate  Code Status: Level 1 - Full Code      Subjective:   Patient is moaning and groaning during my exam   She reports severe neck pain  She states she has decreased range of motion and can barely turn her head to the right or left  She states his neck pain has been ongoing for 30 years  She states it has been much worse the last year  She was supposed to follow-up with neurosurgery for an outpatient MRI but has been unable to do so due to lack of transportation  She states that if we do not address her neck pain while she is hospitalized and she will discharge to come right back to the emergency room complaints of neck pain  She is pleased to hear that I will order an MRI of the neck  She also endorses persistent insomnia despite melatonin  She denies any suicide ideation  She denies any headache, dizziness, lightheadedness, chest pain, shortness of breath, nausea, vomiting, or diarrhea  Objective:     Vitals:   Temp (24hrs), Av 4 °F (36 9 °C), Min:97 8 °F (36 6 °C), Max:98 9 °F (37 2 °C)    Temp:  [97 8 °F (36 6 °C)-98 9 °F (37 2 °C)] 98 9 °F (37 2 °C)  HR:  [102-110] 102  Resp:  [22-25] 23  BP: ()/(51-79) 90/51  SpO2:  [92 %-97 %] 93 %  Body mass index is 31 7 kg/m²       Input and Output Summary (last 24 hours): Intake/Output Summary (Last 24 hours) at 8/6/2020 1505  Last data filed at 8/6/2020 1442  Gross per 24 hour   Intake 30 ml   Output 1600 ml   Net -1570 ml       Physical Exam:     Physical Exam   Constitutional: She is oriented to person, place, and time  She appears well-developed  No distress  HENT:   Head: Normocephalic  Neck: Muscular tenderness present  Cardiovascular: Normal rate  An irregular rhythm present  Pulmonary/Chest: Effort normal and breath sounds normal  No respiratory distress  She has no wheezes  She has no rhonchi  She has no rales  Abdominal: Soft  Bowel sounds are normal  She exhibits no distension  There is no abdominal tenderness  Obese    Musculoskeletal: Normal range of motion  General: No tenderness  Neurological: She is alert and oriented to person, place, and time  She has normal reflexes  Skin: Skin is warm and dry  Capillary refill takes less than 2 seconds  She is not diaphoretic  Psychiatric: Her behavior is normal  Mood and thought content normal    Nursing note and vitals reviewed  Additional Data:     Labs:    Results from last 7 days   Lab Units 08/06/20  0517   WBC Thousand/uL 9 44   HEMOGLOBIN g/dL 10 8*   HEMATOCRIT % 33 8*   PLATELETS Thousands/uL 251   NEUTROS PCT % 77*   LYMPHS PCT % 10*   MONOS PCT % 8   EOS PCT % 3     Results from last 7 days   Lab Units 08/06/20  0530  08/05/20  0032   POTASSIUM mmol/L 3 9   < > 3 8   CHLORIDE mmol/L 107   < > 102   CO2 mmol/L 25   < > 22   BUN mg/dL 18   < > 14   CREATININE mg/dL 0 98   < > 1 23   CALCIUM mg/dL 8 0*   < > 9 5   ALK PHOS U/L  --   --  96   ALT U/L  --   --  32   AST U/L  --   --  25    < > = values in this interval not displayed  * I Have Reviewed All Lab Data Listed Above  * Additional Pertinent Lab Tests Reviewed:  All Labs Within Last 24 Hours Reviewed    Imaging:    Imaging Reports Reviewed Today Include:  CXR  Imaging Personally Reviewed by Myself Includes: None    Recent Cultures (last 7 days):           Last 24 Hours Medication List:   acetaminophen, 975 mg, Oral, Q8H [address], CHANDRIKA Carvajal  amiodarone, 200 mg, Oral, Daily With Breakfast, CHANDRIKA Lindsay  apixaban, 5 mg, Oral, BID, [place] Slight, DO  ascorbic acid, 500 mg, Oral, Daily, [place] Slight, DO  atorvastatin, 20 mg, Oral, Daily With Dinner, [place]a Slight, DO  bisacodyl, 10 mg, Rectal, Daily PRN, [place] Slight, DO  clopidogrel, 75 mg, Oral, Daily, [place] Slight, DO  digoxin, 125 mcg, Oral, Daily, CHANDRIKA Lindsay  ferrous sulfate, 325 mg, Oral, Daily With Breakfast, [place] Slight, DO  FLUoxetine, 20 mg, Oral, Daily, [place]a Slight, DO  folic acid, 3,213 mcg, Oral, Daily, [place]a Slight, DO  furosemide, 40 mg, Oral, Daily, CHANDRIKA Lindsay  insulin lispro, 1-5 Units, Subcutaneous, TID AC, CHANDRIKA Carvajal  insulin lispro, 1-6 Units, Subcutaneous, HS, CHANDRIKA Carvajal  lidocaine, 1 patch, Topical, Daily, [place] Slight, DO  lisinopril, 5 mg, Oral, Daily, [place] Slight, DO  melatonin, 6 mg, Oral, HS, [place] Slight, DO  methocarbamol, 250 mg, Oral, Q8H [address], CHANDRIKA Fonseca  metoprolol tartrate, 50 mg, Oral, Q12H KARELY, CHANDRIKA Lindsay  nortriptyline, 10 mg, Oral, HS, [place]a Slight, DO  ondansetron, 4 mg, Intravenous, Q6H PRN, [place] Slight, DO  oxyCODONE, 5 mg, Oral, Q6H PRN, CHANDRIKA Carvajal  pantoprazole, 40 mg, Oral, BID AC, [place]a Slight, DO  potassium chloride, 20 mEq, Oral, Daily, CHANDRIKA Lindsay  senna-docusate sodium, 1 tablet, Oral, HS, [place]a Slight, DO  thiamine, 100 mg, Oral, Daily, Kash Ventura, DO         Today, Patient Was Seen By: CHANDRIKA Fonseca    ** Please Note: Dictation voice to text software may have been used in the creation of this document   **

## 2020-08-06 NOTE — ASSESSMENT & PLAN NOTE
With associated muscle spasms  Patient has had an ongoing history of neck pain for 30 years  She was supposed to follow-up with neurosurgery as an outpatient in 2019, however, was unable to do so due to lack of transportation  Presently, she continues to have neck pain and decreased range of motion    She does have neck spasms on exam   She states that if we do not address her neck pain then she will discharge and return to the ED for neck pain evaluation  · Obtain MRI of cervical spine  · Continue scheduled Tylenol, add Robaxin 250 mg every 8 hours, continue Lidoderm patch  · PT/OT  · Oxycodone 5 mg IR PRN while hospitalized (patient aware she will not be discharged home on opioids)

## 2020-08-06 NOTE — PLAN OF CARE
Problem: OCCUPATIONAL THERAPY ADULT  Goal: Performs self-care activities at highest level of function for planned discharge setting  See evaluation for individualized goals  Description: Treatment Interventions: ADL retraining, Functional transfer training, UE strengthening/ROM, Endurance training, Cognitive reorientation, Patient/family training, Equipment evaluation/education, Compensatory technique education, Continued evaluation, Energy conservation, Activityengagement          See flowsheet documentation for full assessment, interventions and recommendations  Note: Limitation: Decreased ADL status, Decreased UE strength, Decreased UE ROM, Decreased Safe judgement during ADL, Decreased cognition, Decreased endurance, Decreased self-care trans, Decreased high-level ADLs  Prognosis: Fair  Assessment: Pt is a 78 y/o female seen for OT eval s/p adm to [facility] w/ rapid heart rate and SOB  Pt is dx'd w/ Afib w/ RVR  Pt  has a past medical history of [facility] ), Acute respiratory disease, Anemia, Arthritis, Atrial fibrillation ([facility] ), CHF (congestive heart failure) ([facility] ), Chronic pain, Heart failure ([facility] ), 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 [facility]), History of colon polyps, long term use of blood thinners, Hypertension, Irregular heart beat, Narcotic dependence ([facility] ), Rectal bleeding, Stroke ([facility] ), and Uses walker  Pt with active OT orders and up with assistance  orders  Pt lives alone in 2 , 2 [place], 1st floor setup  Pt was I w/ ADLS and IADLS (but recently has been unable to take care of herself and unable to complete all IADLS; landlord assists w/ cleaning; meds are delivered), does not drive, & required use of DME PTA including RW (occasional use); also owns s/c, tub-bench and SPC  Pt is currently demonstrating the following occupational deficits: Min A UB/LB ADLS, Min A transfers/functional mobility w/ RW, +VC for safety   These deficits that are impacting pt's baseline areas of occupation are a result of the following impairments: pain, endurance, activity tolerance, functional mobility, forward functional reach, functional standing tolerance, unsupportive home environment, decreased I w/ ADLS/IADLS, strength, ROM, cognitive impairments, decreased safety awareness and decreased insight into deficits  The following Occupational Performance Areas to address include: eating, grooming, bathing/shower, toilet hygiene, dressing, medication management, socialization, health maintenance, functional mobility, community mobility, clothing management, cleaning, meal prep, money management and household maintenance  Pt scored overall 55/100 on the Barthel Index  Based on the aforementioned OT evaluation, functional performance deficits, and assessments, pt has been identified as a moderate complexity evaluation  Recommend STR upon D/C, when medically stable   Pt to continue to benefit from acute immediate OT services to address the following goals 3-5x/week to  w/in 10-14 days:      OT Discharge Recommendation: Post-Acute Rehabilitation Services  OT - OK to Discharge: Yes(when medically stable)     Aruna Alfaro MS, OTR/L

## 2020-08-07 NOTE — ASSESSMENT & PLAN NOTE
· Urgency on admission likely secondary to medication noncompliance  Episode of hypotension on Thursday    · Most recent blood pressure reviewed, 110/67  · Per Cardiology, hold lisinopril  · Continue metoprolol XL 50 mg twice daily and Lasix 40 mg po daily  · Monitor

## 2020-08-07 NOTE — PROGRESS NOTES
Progress Note - Ethan Pass [birth year], 79 y o  female MRN: [id number]    Unit/Bed#: [facility] 408-01 Encounter: [id number]    Primary Care Provider: Viky Barrientos DO   Date and time admitted to hospital: 8/5/2020 12:20 AM        * Atrial fibrillation with RVR ([address] )  Assessment & Plan  · Patient with shortness of breath and slight chest pressure prompting call to EMS  Found in wide complex rhythm  · Placed on amiodarone GTT after bolus in ED with improvement in rate and without wide complex  · Patient remains in atrial fibrillation with RVR  Likely secondary to medication noncompliance for about 1 week  · Cardiology following, appreciate input  · Continue Amiodarone 200 mg po daily   · Continue digoxin 0 125 mcg po daily  · Continue Metoprolol succinate 50 mg q 12 hours   · Continue Eliquis   · Monitor on telemetry monitor   · TSH mildly elevated at 8 760 however T4 normal   No history thyroid disease  Acute on chronic diastolic congestive heart failure (HCC)  Assessment & Plan  Wt Readings from Last 3 Encounters:   08/07/20 74 8 kg (164 lb 14 5 oz)   08/06/20 86 4 kg (190 lb 7 6 oz)   03/02/20 73 9 kg (163 lb)     · Does have a history of tachy mediated cardiomyopathy with a recovered EF on last TTE in 2019  · Updated echo 8/5: "LVEF 45%, there was hypokinesis of the mid anteroseptal, apical septal, and apical wall(s), wall thickness was mildly to moderately increased  Concentric hypertrophy was present "  · Cardiology following, appreciate input  · Given Lasix 40 mg IV x1 on 8/5  · Now on Lasix 40 mg daily   · Holding Lisinopril 5 mg daily due to hypotension   · Resumed Metoprolol succinate 50 mg q 12 hours   · I&Os, daily weights, low-sodium diet    Essential hypertension  Assessment & Plan  · Urgency on admission likely secondary to medication noncompliance  Episode of hypotension on Thursday    · Most recent blood pressure reviewed, 110/67  · Per Cardiology, hold lisinopril  · Continue metoprolol XL 50 mg twice daily and Lasix 40 mg po daily  · Monitor    Pain of cervical spine  Assessment & Plan  With associated muscle spasms  Patient has had an ongoing history of neck pain for 30 years  She was supposed to follow-up with neurosurgery as an outpatient in 2019, however, was unable to do so due to lack of transportation  Presently, she continues to have neck pain and decreased range of motion  She does have neck spasms on exam   · MRI of cervical spine: multilevel DJD, disc bulges at C4 through C5  · Consulted Neurosurgery for evaluation  · Continue scheduled Tylenol, Lidoderm patch, an Aqua K-pad  · Trial oxycodone 2 5 mg IR PRN  (patient aware she will not be discharged home on opioids)  · PT/OT - recommending short-term rehab    DM2 (diabetes mellitus, type 2) ([facility] )  Assessment & Plan  Lab Results   Component Value Date    HGBA1C 7 6 (H) 08/05/2020     · No reported history of diabetes, A1c 7 6 and patient noted to be hyperglycemic  · Start SSI  · Diabetic diet  · Monitor Accu-Cheks and adjust as needed  · Would benefit from an oral agent on discharge and close outpatient follow-up with PCP  Would also recommend glucometer on discharge as patient should be checking BG at home  · Nutrition consult    Depression  Assessment & Plan  · With patient endorsing worsening mood and limited energy secondary to chronic pain  · Patient admitting to frequent thoughts of wishing she would be dead and did allude to some suicidal ideation if she had a gun at home (patient reports she does not have gun at home ) per admitting physician    · Patient does not recall making these statements - Appreciate psychiatry input, ok to discontinue 1:1     · Continue Zoloft 20 mg daily (concerned patient is not taking this at home)  · Continue Nortriptyline  · Recommend close follow-up with PCP and psychiatrist     Coronary artery disease  Assessment & Plan  · S/p PCI to RCA in 2018  · Will continue Plavix, beta-blocker, statin  · No longer on aspirin due to history of bleeding  · Presently, denies chest pain    Chronic pain disorder  Assessment & Plan  · PDMP reviewed, appears not to have received any opioids as outpatient since 01/2019  · Urine drug screen positive for opiates  · Longstanding history of chronic back and neck pain and frequently complaining of these throughout my evaluation  · Patient reports persistent neck tenderness and muscle spasms resulting in decreased range of motion and severe pain  · PT/OT evaluations - recommending STR   · Continue non opioid pain control with Tylenol, lidocaine patch, aqua K-pad  · Trial short course of Oxycodone IR PRN - patient aware she will not be discharge home with an Rx  · MRI cervical spine with multilevel DJD, disc bulges at C4 through C5  · Consulted Neurosurgery for evaluation    Elevated troponin  Assessment & Plan  · Non MI troponin elevation with peak troponin of 0 20  Likely secondary to AFib with RVR  EKG otherwise without ischemic changes  · No complaints of chest pain  VTE Pharmacologic Prophylaxis:   Pharmacologic: Apixaban (Eliquis)  Mechanical VTE Prophylaxis in Place: Yes    Patient Centered Rounds: I have performed bedside rounds with nursing staff today  Discussions with Specialists or Other Care Team Provider:  Nursing, case management, cardiology, neurosurgery    Education and Discussions with Family / Patient:  I have answered all questions the best my ability  Spoke with patient's landlord via telephone  Time Spent for Care: 30 minutes  More than 50% of total time spent on counseling and coordination of care as described above  Current Length of Stay: 2 day(s)    Current Patient Status: Inpatient   Certification Statement: The patient will continue to require additional inpatient hospital stay due to Atrial fibrillation requiring telemetry monitoring and medication adjustments    Severe neck pain requiring neurosurgery evaluation    Discharge Plan:  Patient is not medically stable for discharge today, likely on Saturday pending BP, heart rate, and Neurosurgery evaluation  Code Status: Level 1 - Full Code      Subjective:   Patient observed eating lunch in bed  She states that her neck pain feels better with a towel rolled up and placed around her neck for support  She continues to have intermittent neck spasms and decreased range of motion  She is pleased to know that neurosurgery will evaluate her while she is hospitalized  She denies any headache, dizziness, lightheadedness, chest pain or chest tightness, shortness of breath at rest, abdominal pain, nausea, vomiting, or diarrhea  Patient agrees that she is weak and deconditioned  She will think about going to short-term rehab but she ultimately prefers to be discharged home  Objective:     Vitals:   Temp (24hrs), Av 1 °F (36 7 °C), Min:97 5 °F (36 4 °C), Max:98 9 °F (37 2 °C)    Temp:  [97 5 °F (36 4 °C)-98 9 °F (37 2 °C)] 98 °F (36 7 °C)  HR:  [] 80  Resp:  [10-23] 18  BP: ()/(50-82) 110/67  SpO2:  [93 %-95 %] 95 %  Body mass index is 27 44 kg/m²  Input and Output Summary (last 24 hours): Intake/Output Summary (Last 24 hours) at 2020 1155  Last data filed at 2020 0900  Gross per 24 hour   Intake 880 ml   Output 525 ml   Net 355 ml       Physical Exam:     Physical Exam  Vitals signs and nursing note reviewed  Constitutional:       General: She is not in acute distress  Appearance: She is well-developed  She is not diaphoretic  Comments: Pleasant female, resting comfortably in bed, poor dentition, appears mildly disheveled, weak, deconditioned   HENT:      Head: Normocephalic  Neck:      Musculoskeletal: Muscular tenderness present  Comments: Decreased range of motion due to pain and tenderness  Cardiovascular:      Rate and Rhythm: Normal rate and regular rhythm  Heart sounds: Murmur present     Pulmonary: Effort: Pulmonary effort is normal  No tachypnea or respiratory distress  Breath sounds: Examination of the right-lower field reveals decreased breath sounds  Examination of the left-lower field reveals decreased breath sounds  Decreased breath sounds present  No wheezing, rhonchi or rales  Abdominal:      General: Bowel sounds are normal  There is no distension  Palpations: Abdomen is soft  Tenderness: There is no abdominal tenderness  Musculoskeletal: Normal range of motion  General: No tenderness  Skin:     General: Skin is warm and dry  Capillary Refill: Capillary refill takes less than 2 seconds  Findings: No rash  Neurological:      Mental Status: She is alert and oriented to person, place, and time  Deep Tendon Reflexes: Reflexes are normal and symmetric  Psychiatric:         Mood and Affect: Mood normal          Behavior: Behavior normal          Thought Content: Thought content normal          Judgment: Judgment normal       Comments: Seems to have much better spirits today compared to yesterday         Additional Data:     Labs:    Results from last 7 days   Lab Units 08/07/20  0458   WBC Thousand/uL 6 68   HEMOGLOBIN g/dL 11 5   HEMATOCRIT % 37 0   PLATELETS Thousands/uL 271   NEUTROS PCT % 76*   LYMPHS PCT % 11*   MONOS PCT % 7   EOS PCT % 4     Results from last 7 days   Lab Units 08/07/20  0458   POTASSIUM mmol/L 3 7   CHLORIDE mmol/L 105   CO2 mmol/L 25   BUN mg/dL 24   CREATININE mg/dL 1 19   CALCIUM mg/dL 8 5   ALK PHOS U/L 62   ALT U/L 24   AST U/L 20           * I Have Reviewed All Lab Data Listed Above  * Additional Pertinent Lab Tests Reviewed:  All Labs Within Last 24 Hours Reviewed    Imaging:     Imaging Reports Reviewed Today Include:  CXR, MRI cervical spine  Imaging Personally Reviewed by Myself Includes:  None    Recent Cultures (last 7 days):           Last 24 Hours Medication List:   acetaminophen, 975 mg, Oral, Q8H [address], Leti Rick MD  amiodarone, 200 mg, Oral, Daily With Breakfast, Olga Oneill MD  apixaban, 5 mg, Oral, BID, Olga Oneill MD  ascorbic acid, 500 mg, Oral, Daily, Olga Oneill MD  atorvastatin, 20 mg, Oral, Daily With Kirsten Baker MD  bisacodyl, 10 mg, Rectal, Daily PRN, Olga Oneill MD  clopidogrel, 75 mg, Oral, Daily, Olga Oneill MD  digoxin, 125 mcg, Oral, Daily, Olga Oneill MD  ferrous sulfate, 325 mg, Oral, Daily With Breakfast, Olga Oneill MD  FLUoxetine, 20 mg, Oral, Daily, Olga Oneill MD  folic acid, 1,216 mcg, Oral, Daily, Leti Cabrera MD  furosemide, 40 mg, Oral, Daily, Olga Oneill MD  insulin lispro, 1-5 Units, Subcutaneous, TID AC, Olga Oneill MD  insulin lispro, 1-6 Units, Subcutaneous, HS, Olga Oneill MD  lidocaine, 1 patch, Topical, Daily, Leti Cabrera MD  melatonin, 6 mg, Oral, HS, Olga Oneill MD  metoprolol succinate, 50 mg, Oral, Q12H, CHANDRIKA Reyes  nortriptyline, 10 mg, Oral, HS, Leti Cabrera MD  ondansetron, 4 mg, Intravenous, Q6H PRN, Olga Oneill MD  oxyCODONE, 2 5 mg, Oral, Q6H PRN, Olga Oneill MD  pantoprazole, 40 mg, Oral, BID AC, Olga Oneill MD  potassium chloride, 20 mEq, Oral, Daily, Olga Oneill MD  senna-docusate sodium, 1 tablet, Oral, HS, Olga Oneill MD  thiamine, 100 mg, Oral, Daily, Olga Oneill MD         Today, Patient Was Seen By: CHANDRIKA Mackenzie    ** Please Note: Dictation voice to text software may have been used in the creation of this document   **

## 2020-08-07 NOTE — ASSESSMENT & PLAN NOTE
· PDMP reviewed, appears not to have received any opioids as outpatient since 01/2019  · Urine drug screen positive for opiates  · Longstanding history of chronic back and neck pain and frequently complaining of these throughout my evaluation  · Patient reports persistent neck tenderness and muscle spasms resulting in decreased range of motion and severe pain  · PT/OT evaluations - recommending STR   · Continue non opioid pain control with Tylenol, lidocaine patch, aqua K-pad  · Trial short course of Oxycodone IR PRN - patient aware she will not be discharge home with an Rx  · MRI cervical spine with multilevel DJD, disc bulges at C4 through C5  · Consulted Neurosurgery for evaluation

## 2020-08-07 NOTE — ASSESSMENT & PLAN NOTE
· S/p PCI to RCA in 2018  · Will continue Plavix, beta-blocker, statin  · No longer on aspirin due to history of bleeding  · Presently, denies chest pain

## 2020-08-07 NOTE — ASSESSMENT & PLAN NOTE
· Patient with shortness of breath and slight chest pressure prompting call to EMS  Found in wide complex rhythm  · Placed on amiodarone GTT after bolus in ED with improvement in rate and without wide complex  · Patient remains in atrial fibrillation with RVR  Likely secondary to medication noncompliance for about 1 week  · Cardiology following, appreciate input  · Continue Amiodarone 200 mg po daily   · Continue digoxin 0 125 mcg po daily  · Continue Metoprolol succinate 50 mg q 12 hours   · Continue Eliquis   · Monitor on telemetry monitor   · TSH mildly elevated at 8 760 however T4 normal   No history thyroid disease

## 2020-08-07 NOTE — PLAN OF CARE
Problem: Prexisting or High Potential for Compromised Skin Integrity  Goal: Skin integrity is maintained or improved  Description: INTERVENTIONS:  - Identify patients at risk for skin breakdown  - Assess and monitor skin integrity  - Assess and monitor nutrition and hydration status  - Monitor labs   - Assess for incontinence   - Turn and reposition patient  - Assist with mobility/ambulation  - Relieve pressure over bony prominences  - Avoid friction and shearing  - Provide appropriate hygiene as needed including keeping skin clean and dry  - Evaluate need for skin moisturizer/barrier cream  - Collaborate with interdisciplinary team   - Patient/family teaching  - Consider wound care consult   Outcome: Progressing     Problem: PAIN - ADULT  Goal: Verbalizes/displays adequate comfort level or baseline comfort level  Description: Interventions:  - Encourage patient to monitor pain and request assistance  - Assess pain using appropriate pain scale  - Administer analgesics based on type and severity of pain and evaluate response  - Implement non-pharmacological measures as appropriate and evaluate response  - Consider cultural and social influences on pain and pain management  - Notify physician/advanced practitioner if interventions unsuccessful or patient reports new pain  Outcome: Progressing     Problem: INFECTION - ADULT  Goal: Absence or prevention of progression during hospitalization  Description: INTERVENTIONS:  - Assess and monitor for signs and symptoms of infection  - Monitor lab/diagnostic results  - Monitor all insertion sites, i e  indwelling lines, tubes, and drains  - Monitor endotracheal if appropriate and nasal secretions for changes in amount and color  - [place] appropriate cooling/warming therapies per order  - Administer medications as ordered  - Instruct and encourage patient and family to use good hand hygiene technique  - Identify and instruct in appropriate isolation precautions for identified infection/condition  Outcome: Progressing     Problem: SAFETY ADULT  Goal: Patient will remain free of falls  Description: INTERVENTIONS:  - Assess patient frequently for physical needs  -  Identify cognitive and physical deficits and behaviors that affect risk of falls    -  [place] fall precautions as indicated by assessment   - Educate patient/family on patient safety including physical limitations  - Instruct patient to call for assistance with activity based on assessment  - Modify environment to reduce risk of injury  - Consider OT/PT consult to assist with strengthening/mobility  Outcome: Progressing  Goal: Maintain or return to baseline ADL function  Description: INTERVENTIONS:  -  Assess patient's ability to carry out ADLs; assess patient's baseline for ADL function and identify physical deficits which impact ability to perform ADLs (bathing, care of mouth/teeth, toileting, grooming, dressing, etc )  - Assess/evaluate cause of self-care deficits   - Assess range of motion  - Assess patient's mobility; develop plan if impaired  - Assess patient's need for assistive devices and provide as appropriate  - Encourage maximum independence but intervene and supervise when necessary  - Involve family in performance of ADLs  - Assess for home care needs following discharge   - Consider OT consult to assist with ADL evaluation and planning for discharge  - Provide patient education as appropriate  Outcome: Progressing  Goal: Maintain or return mobility status to optimal level  Description: INTERVENTIONS:  - Assess patient's baseline mobility status (ambulation, transfers, stairs, etc )    - Identify cognitive and physical deficits and behaviors that affect mobility  - Identify mobility aids required to assist with transfers and/or ambulation (gait belt, sit-to-stand, lift, walker, cane, etc )  - [place] fall precautions as indicated by assessment  - Record patient progress and toleration of activity level on Mobility SBAR; progress patient to next Phase/Stage  - Instruct patient to call for assistance with activity based on assessment  - Consider rehabilitation consult to assist with strengthening/weightbearing, etc   Outcome: Progressing     Problem: DISCHARGE PLANNING  Goal: Discharge to home or other facility with appropriate resources  Description: INTERVENTIONS:  - Identify barriers to discharge w/patient and caregiver  - Arrange for needed discharge resources and transportation as appropriate  - Identify discharge learning needs (meds, wound care, etc )  - Arrange for interpretive services to assist at discharge as needed  - Refer to Case Management Department for coordinating discharge planning if the patient needs post-hospital services based on physician/advanced practitioner order or complex needs related to functional status, cognitive ability, or social support system  Outcome: Progressing     Problem: Knowledge Deficit  Goal: Patient/family/caregiver demonstrates understanding of disease process, treatment plan, medications, and discharge instructions  Description: Complete learning assessment and assess knowledge base  Interventions:  - Provide teaching at level of understanding  - Provide teaching via preferred learning methods  Outcome: Progressing     Problem: Nutrition/Hydration-ADULT  Goal: Nutrient/Hydration intake appropriate for improving, restoring or maintaining nutritional needs  Description: Monitor and assess patient's nutrition/hydration status for malnutrition  Collaborate with interdisciplinary team and initiate plan and interventions as ordered  Monitor patient's weight and dietary intake as ordered or per policy  Utilize nutrition screening tool and intervene as necessary  Determine patient's food preferences and provide high-protein, high-caloric foods as appropriate       INTERVENTIONS:  - Monitor oral intake, urinary output, labs, and treatment plans  - Assess nutrition and hydration status and recommend course of action  - Evaluate amount of meals eaten  - Assist patient with eating if necessary   - Allow adequate time for meals  - Recommend/ encourage appropriate diets, oral nutritional supplements, and vitamin/mineral supplements  - Order, calculate, and assess calorie counts as needed  - Recommend, monitor, and adjust tube feedings and TPN/PPN based on assessed needs  - Assess need for intravenous fluids  - Provide specific nutrition/hydration education as appropriate  - Include patient/family/caregiver in decisions related to nutrition  Outcome: Progressing     Problem: Potential for Falls  Goal: Patient will remain free of falls  Description: INTERVENTIONS:  - Assess patient frequently for physical needs  -  Identify cognitive and physical deficits and behaviors that affect risk of falls    -  [place] fall precautions as indicated by assessment   - Educate patient/family on patient safety including physical limitations  - Instruct patient to call for assistance with activity based on assessment  - Modify environment to reduce risk of injury  - Consider OT/PT consult to assist with strengthening/mobility  Outcome: Progressing

## 2020-08-07 NOTE — PLAN OF CARE
Problem: OCCUPATIONAL THERAPY ADULT  Goal: Performs self-care activities at highest level of function for planned discharge setting  See evaluation for individualized goals  Description: Treatment Interventions: ADL retraining, Functional transfer training, Endurance training, Patient/family training          See flowsheet documentation for full assessment, interventions and recommendations  Outcome: Progressing  Note: Limitation: Decreased ADL status, Decreased UE strength, Decreased UE ROM, Decreased Safe judgement during ADL, Decreased cognition, Decreased endurance, Decreased self-care trans, Decreased high-level ADLs  Prognosis: Fair  Assessment: Pt seen for OT tx session focusing on toileting, bed mobility, and transfers  Pt found supine in bed on bedpan  Pt agreeable to session  Pt educated on log roll technique to help protect back w/ little carryover during bed mobility  Pt required Mod A x1 for rolling and toileting on bedpan  Pt required Mod A x 2 for supine to sit due to reported pain in back  Pt w/ improved SOB since previous session  Pt remains limited 2* decreased activity tolerance, decreased endurance, mood limitation, and decreased ADL status  Pt tearful at times t/o the session reporting "i'm ready to die and be resurrected"- RN Mars Belle aware  Continue to recommend STR  OT will continue to follow to see as able  OT Discharge Recommendation: Post-Acute Rehabilitation Services  OT - OK to Discharge:  Yes

## 2020-08-07 NOTE — OCCUPATIONAL THERAPY NOTE
[address] [place] Progress Note     Patient Name: Susanna Kang  AMGPS'V Date: 8/7/2020  Problem List  Principal Problem:    Atrial fibrillation with RVR ([facility] )  Active Problems:    Elevated troponin    Chronic pain disorder    Essential hypertension    Coronary artery disease    Acute on chronic diastolic congestive heart failure (HCC)    Pain of cervical spine    Depression    DM2 (diabetes mellitus, type 2) ([facility] )          08/07/20 1104   Restrictions/Precautions   Weight Bearing Precautions Per Order No   Other Precautions Cognitive;Suicidal;Telemetry;Multiple lines; Bed Alarm; Chair Alarm  (bed alarm activated post session)   General   Response to Previous Treatment Patient with no complaints from previous session   Lifestyle   Autonomy I ADLS, assist IADLS (but has been unable to complete both but also has limited/no assistance except for landlord) and is Mod I vs  I w/ transfers/functional mobility PTA w/ RW   Reciprocal Relationships Pt lives alone; reports landlord assists w/ cleaning  All of pt's children recently passed  Has a elderly sister nearby but she is unable to assist   Service to Others Pt does not work   Intrinsic Gratification Unmotived, sedentary PTA; lacking interest in leisure activities at this time   Pain Assessment   Pain Assessment Tool 0-10   Pain Score 8   Pain Location/Orientation Location: Neck   Hospital Pain Intervention(s) Repositioned; Ambulation/increased activity; Emotional support   ADL   Where Assessed Chair   Toileting Assistance  3  Moderate Assistance   Toileting Deficit Perineal hygiene;Use of bedpan/urinal setup   Bed Mobility   Rolling R 3  Moderate assistance   Additional items Assist x 1; Increased time required   Rolling L 3  Moderate assistance   Additional items Assist x 1; Increased time required   Supine to Sit 3  Moderate assistance   Additional items Assist x 2; Increased time required; Impulsive;Verbal cues   Sit to Supine 3  Moderate assistance   Additional items Assist x 2; Increased time required; Impulsive;Verbal cues   Additional Comments increased assist due to back pain   Transfers   Sit to Stand 3  Moderate assistance   Additional items Assist x 2   Stand to Sit 3  Moderate assistance   Additional items Assist x 1; Increased time required;Verbal cues   Functional Mobility   Functional Mobility 3  Moderate assistance   Additional items Rolling walker   Cognition   Overall Cognitive Status Impaired   Arousal/Participation Responsive   Attention Attends with cues to redirect   Orientation Level Oriented X4   Memory Decreased recall of precautions;Decreased recall of recent events   Following Commands Follows one step commands without difficulty   Comments pt tearful t/o session, reports memory concerns   Activity Tolerance   Activity Tolerance Patient limited by pain   Medical Staff Made Aware SUSANNAH Gabriel   Assessment   Assessment Pt seen for OT tx session focusing on toileting, bed mobility, and transfers  Pt found supine in bed on bedpan  Pt agreeable to session  Pt educated on log roll technique to help protect back w/ little carryover during bed mobility  Pt required Mod A x1 for rolling and toileting on bedpan  Pt required Mod A x 2 for supine to sit due to reported pain in back  Pt w/ improved SOB since previous session  Pt remains limited 2* decreased activity tolerance, decreased endurance, mood limitation, and decreased ADL status  Pt tearful at times t/o the session reporting "i'm ready to die and be resurrected"- SUSANNAH Gabriel aware  Continue to recommend STR  OT will continue to follow to see as able  Plan   Treatment Interventions ADL retraining;Functional transfer training; Endurance training;Patient/family training   Goal Expiration Date 08/20/20   OT Treatment Day 1   OT Frequency 3-5x/wk   Recommendation   OT Discharge Recommendation Post-Acute Rehabilitation Services   OT - OK to Discharge Yes   Modified [place] Scale   Modified [place] Scale 4     1 [facility], MS, OTR/L

## 2020-08-07 NOTE — PROGRESS NOTES
General Cardiology   Progress Note -  Team One   Ethan Olivares 79 y o  female MRN: [id number]    Unit/Bed#: [facility] 408-01 Encounter: [id number]    Assessment  1  Permanent atrial fibrillation with RVR/chronic LBBB---in the setting of medication noncompliance  2  Hx wide complex tachycardia/AVNRT with aberrant conduction/s/p ablation (7/2018)  -12 lead ECG 8/5:  Atrial fibrillation with RVR, rate 142 ppm, chronic LBBB  -24 hour telemetry review; Afib, rates   -On amiodarone 200 mg daily, digoxin 0 125 mg daily, and metoprolol tartrate 50 mg q 12 hours  -On apixaban 5 mg b i d  -TSH 8 76; free T4 1 1     3  Non MI troponin elevation in the setting of #1/4    -No complaints CP  -No significant ischemic changes noted on 12 lead ECG  -Troponin elevation: 0 06--0 18--0 20     4  Acute diastolic CHF exacerbation  5  Cardiomyopathy; 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 (EF 45%, down from 60%)  -Euvolemic on exam  -Symptomatically denies SOB, MYERS or orthopnea  -proBNP 3,581  -Cxray--Lungs are clear, no pneumothorax or pleural effusion  -2D TTE 8/5:  LVEF 45%, hypokinesis of the mid anterior septal, apical septal, and apical wall, RV normal size; systolic function mildly reduced, LA and RA dilated, moderate MR, mild-to-moderate TR  -Received x1 dose of IV Lasix 40 mg (8/5)  -Restarted on oral furosemide 40 mg daily (8/6)  -24 hour I&O balance -795 ml; overall -1 6 L  Weights  -8/6: 190 lbs--bed scale     5  CAD s/p PCI/NGOZI x1 mRCA   -No complaints of CP  -LHC 7/2018:  D1 50% stenosis at the ostium, mid circumflex 50% stenosis just after OM1, OM1 40% stenosis, mid RCA 90% stenosis  -On clopidogrel 75 mg daily, atorvastatin 20 mg daily, and metoprolol tartrate 50 mg q 12 hours     6  Hypertension  -Average 94/61, last recorded 105/66, HR 79  -Current BP regimen: lisinopril 5 mg daily, metoprolol tartrate 50 mg q 12 hours, and furosemide 40 mg daily     7   Dyslipidemia  -Lipid profile 8/6/2020:  Cholesterol 202, triglycerides 422, HDL 36, LDL 114  -On atorvastatin 20 mg daily     Plan  -Continue oral Lasix 40 mg daily +K-Dur 20 mEq daily  -DC metoprolol tartrate, start Toprol XL 50 mg BID   -Hold lisinopril today   -Continue amiodarone 200 mg daily and digoxin 0 125 mg daily  -Continue apixaban 5 mg b i d and clopidogrel 75 mg daily  -Continue atorvastatin to 40 mg daily  -Monitor renal function and electrolytes closely  -Monitor closely on telemetry    Subjective  Review of Systems   Constitution: Positive for malaise/fatigue  Cardiovascular: Negative for chest pain, dyspnea on exertion, irregular heartbeat, orthopnea and palpitations  Respiratory: Negative for cough and shortness of breath  Musculoskeletal: Positive for back pain and neck pain  Gastrointestinal: Negative for abdominal pain  Genitourinary: Negative for dysuria  Neurological: Negative for dizziness, headaches and light-headedness  All other systems reviewed and are negative  Objective:   Physical Exam   Constitutional: She is oriented to person, place, and time  She does not appear ill  No distress  HENT:   Head: Normocephalic and atraumatic  Eyes: Pupils are equal, round, and reactive to light  No scleral icterus  Neck: Muscular tenderness present  Cardiovascular: Normal rate  An irregular rhythm present  Murmur heard  Pulmonary/Chest: Effort normal and breath sounds normal  She has no wheezes  She has no rales  Abdominal: Soft  Normal appearance and bowel sounds are normal    Musculoskeletal: Normal range of motion  General: No swelling  Neurological: She is alert and oriented to person, place, and time  Skin: Skin is warm and dry  Capillary refill takes less than 2 seconds  Psychiatric: Mood normal    Nursing note and vitals reviewed  Vitals: Blood pressure 105/66, pulse 79, temperature 97 5 °F (36 4 °C), temperature source Oral, resp  rate 18, height 5' 5" (1 651 m), weight 74 8 kg (164 lb 14 5 oz), SpO2 93 %  ,     Body mass index is 27 44 kg/m²  ,   Systolic (19SFG), RIL:03 , Min:73 , ASQ:736     Diastolic (84XSE), TJP:71, Min:50, Max:82      Intake/Output Summary (Last 24 hours) at 8/7/2020 0858  Last data filed at 8/7/2020 0700  Gross per 24 hour   Intake 480 ml   Output 975 ml   Net -495 ml     Weight (last 2 days)     Date/Time   Weight    08/07/20 0600   74 8 (164 9)    08/06/20 1451   86 4 (190 48)    08/06/20 0600   86 4 (190 48)              LABORATORY RESULTS  Results from last 7 days   Lab Units 08/05/20  0638 08/05/20  0441 08/05/20  0032   TROPONIN I ng/mL 0 20* 0 18* 0 06*     CBC with diff:   Results from last 7 days   Lab Units 08/07/20  0458 08/06/20  0517 08/05/20  0441 08/05/20  0032   WBC Thousand/uL 6 68 9 44 16 91* 19 25*   HEMOGLOBIN g/dL 11 5 10 8* 12 3 13 0   HEMATOCRIT % 37 0 33 8* 39 0 41 2   MCV fL 104* 104* 103* 104*   PLATELETS Thousands/uL 271 251 312 345   MCH pg 32 4 33 1 32 5 32 7   MCHC g/dL 31 1* 32 0 31 5 31 6   RDW % 15 8* 15 6* 15 3* 15 4*   MPV fL 9 5 9 5 9 3 9 5   NRBC AUTO /100 WBCs 0 0  --  0       CMP:  Results from last 7 days   Lab Units 08/07/20  0458 08/06/20  0530 08/05/20  0441 08/05/20  0032   POTASSIUM mmol/L 3 7 3 9 4 8 3 8   CHLORIDE mmol/L 105 107 103 102   CO2 mmol/L 25 25 24 22   BUN mg/dL 24 18 15 14   CREATININE mg/dL 1 19 0 98 1 12 1 23   CALCIUM mg/dL 8 5 8 0* 9 7 9 5   AST U/L 20  --   --  25   ALT U/L 24  --   --  32   ALK PHOS U/L 62  --   --  96   EGFR ml/min/1 73sq m 44 55 47 42       BMP:  Results from last 7 days   Lab Units 08/07/20  0458 08/06/20  0530 08/05/20  0441 08/05/20  0032   POTASSIUM mmol/L 3 7 3 9 4 8 3 8   CHLORIDE mmol/L 105 107 103 102   CO2 mmol/L 25 25 24 22   BUN mg/dL 24 18 15 14   CREATININE mg/dL 1 19 0 98 1 12 1 23   CALCIUM mg/dL 8 5 8 0* 9 7 9 5       Lab Results   Component Value Date    [facility] 08/[facility]2020    NTBN 3,451 (H) 01/03/2019    NTBN 860 (H) 07/25/2018        Results from last 7 days   Lab Units 08/07/20  0458 08/05/20  0441 20  0032   MAGNESIUM mg/dL 2 6 3 3* 2 1       Results from last 7 days   Lab Units 20  0443   HEMOGLOBIN A1C % 7 6*       Results from last 7 days   Lab Units 20  0107   TSH 3RD GENERATON uIU/mL 8 760*   FREE T4 ng/dL 1 11             Lipid Profile:   Lab Results   Component Value Date    CHOL 218 2015    CHOL 151 2014    CHOL 144 2013     Lab Results   Component Value Date    HDL 36 (L) 2020    HDL 33 (L) 2019    HDL 47 2015     Lab Results   Component Value Date    LDLCALC  2020      Comment:      Calculated LDL invalid, triglycerides >400 mg/dl    LDLCALC 139 (H) 2019    LDLCALC 140 (H) 2015     Lab Results   Component Value Date    TRIG 422 (H) 2020    TRIG 293 (H) 2019    TRIG 155 2015       Cardiac testing:   Results for orders placed during the hospital encounter of 20   Echo complete with contrast if indicated    [address]  [address] [facility], [address] [place] [address]  [phone]    Transthoracic Echocardiogram  2D, M-mode, Doppler, and Color Doppler    Study date:      Patient: Oscar Meraz  MR number: [id number]  Account number: [de-identified]  : 1940  Age: 79 years  Gender: Female  Status: Inpatient  Location: Medical Intensive Care Unit  Height: 65 in  Weight: 162 6 lb  BP: 121/ 86 mmHg    Indications: Dyspnea, shortness of breath, wheezing, tachypnea  Diagnoses: R06 00 - Dyspnea, unspecified    Sonographer:  Janee Lopez RDCS  Primary Physician:  Dami Lord DO  Referring Physician:  CHANDRIKA Desai  Group:  [facility]  Interpreting Physician:  Tony Adrian MD    SUMMARY    LEFT VENTRICLE:  Systolic function was mildly reduced  Ejection fraction was estimated to be 45 %  There was hypokinesis of the mid anteroseptal, apical septal, and apical wall(s)    Wall thickness was mildly to moderately increased  Concentric hypertrophy was present  RIGHT VENTRICLE:  The size was normal   Systolic function was mildly reduced  Wall thickness was normal     LEFT ATRIUM:  The atrium was dilated  RIGHT ATRIUM:  The atrium was dilated  MITRAL VALVE:  There was mild annular calcification  There was moderate regurgitation  TRICUSPID VALVE:  There was mild to moderate regurgitation  Estimated peak PA pressure was 40 mmHg  HISTORY: PRIOR HISTORY: Atrial fibrillation with rapid ventricular response, elevated troponin, congestive heart failure, hypertension, coronary artery disease, depression, diabetes mellitus type 2  PROCEDURE: The study was performed in the Medical Intensive Care Unit  This was a routine study  The transthoracic approach was used  The study included complete 2D imaging, M-mode, complete spectral Doppler, and color Doppler  The heart  rate was 102 bpm, at the start of the study  Echocardiographic views were limited due to restricted patient mobility  Image quality was adequate  LEFT VENTRICLE: Size was normal  Systolic function was mildly reduced  Ejection fraction was estimated to be 45 %  There was hypokinesis of the mid anteroseptal, apical septal, and apical wall(s)  Wall thickness was mildly to moderately  increased  Concentric hypertrophy was present  DOPPLER: Transmitral flow pattern: atrial fibrillation  RIGHT VENTRICLE: The size was normal  Systolic function was mildly reduced  Wall thickness was normal     LEFT ATRIUM: The atrium was dilated  RIGHT ATRIUM: The atrium was dilated  MITRAL VALVE: There was mild annular calcification  Valve structure was normal  There was mild-moderate diffuse thickening  There was normal leaflet separation  DOPPLER: The transmitral velocity was within the normal range  There was no  evidence for stenosis  There was moderate regurgitation  AORTIC VALVE: The valve was trileaflet   Leaflets exhibited mild calcification, normal cuspal separation, and sclerosis  DOPPLER: Transaortic velocity was within the normal range  There was no evidence for stenosis  There was no significant  regurgitation  TRICUSPID VALVE: The valve structure was normal  There was normal leaflet separation  DOPPLER: The transtricuspid velocity was within the normal range  There was no evidence for stenosis  There was mild to moderate regurgitation  Estimated  peak PA pressure was 40 mmHg  PULMONIC VALVE: Leaflets exhibited normal thickness, no calcification, and normal cuspal separation  DOPPLER: The transpulmonic velocity was within the normal range  There was trace regurgitation  PERICARDIUM: There was no pericardial effusion  AORTA: The root exhibited normal size  SYSTEMIC VEINS: IVC: The inferior vena cava was normal in size      SYSTEM MEASUREMENT TABLES    2D  Ao Diam: 3 33 cm  EDV(Teich): 84 74 ml  HR_4Ch_Q: 126 63 BPM  IVSd: 1 51 cm  LA Diam: 4 4 cm  LAAs A4C: 39 42 cm2  LAESV A-L A4C: 184 61 ml  LAESV MOD A4C: 169 43 ml  LALs A4C: 7 15 cm  LVCO_4Ch_Q: 2 79 L/min  LVEF_4Ch_Q: 35 87 %  LVIDd: 4 34 cm  LVLd_4Ch_Q: 7 08 cm  LVLs_4Ch_Q: 6 76 cm  LVPWd: 1 22 cm  LVSV_4Ch_Q: 22 02 ml  LVVED_4Ch_Q: 61 4 ml  LVVES_4Ch_Q: 39 37 ml  RAAs: 23 63 cm2  RAESV A-L: 80 6 ml  RAESV MOD: 79 12 ml  RALs: 5 88 cm  RVIDd: 2 45 cm    CW  MR VTI: 132 03 cm  MR Vmax: 4 95 m/s  MR Vmean: 3 69 m/s  MR maxP 2 mmHg  MR meanP 58 mmHg  TR Vmax: 2 75 m/s  TR maxP 18 mmHg    MM  TAPSE: 1 1 cm    PW  E': 0 05 m/s    [employer] Accredited Echocardiography Laboratory    Prepared and electronically signed by    Amy Rangel MD  Signed 05-Aug-2020 17:30:13       Results for orders placed during the hospital encounter of 18   TIFFANIE    Narrative [address]  [phone]    Transesophageal Echocardiogram  2D, Doppler, and Color Doppler    Study date:      Patient: Marshal Hendrickson  MR number: [id number]  Account number: [de-identified]  : 1940  Age: 69 years  Gender: Female  Status: Inpatient  Location: Cath lab  Height: 65 in  Weight: 149 lb  BP: 79/ 52 mmHg    Indications: Atrial fibrillation  Diagnoses: I48 0 - Atrial fibrillation    Sonographer:  Saúl Fink RDCS  Interpreting Physician:  Tena Gong MD  Primary Physician:  Elizabeth Cruz DO  Referring Physician:  Danial Santana MD  Group:  [id number]  Cardiology Fellow:  Danial Santana MD    SUMMARY    LEFT VENTRICLE:  Systolic function was markedly reduced  Ejection fraction was estimated to be 35 %  Beat to beat variability and tachycardia limit an accurate assessment of ejection fraction  There was severe diffuse hypokinesis with regional variations  RIGHT VENTRICLE:  The ventricle was mildly dilated  Systolic function was reduced  LEFT ATRIUM:  The atrium was moderately dilated  No thrombus was identified  There was evidence of spontaneous echo contrast ("smoke")  LEFT ATRIAL APPENDAGE:  The appendage was dilated  The function was moderately reduced (moderately reduced emptying velocity)  No thrombus was identified  There was evidence of spontaneous echo contrast ("smoke") in the appendage  ATRIAL SEPTUM:  No defect or patent foramen ovale was identified  RIGHT ATRIUM:  The atrium was moderately dilated  There was evidence of continuous spontaneous echo contrast ("smoke")  MITRAL VALVE:  There was moderate regurgitation  Regurgitation grade was 3+ on a scale of 0 to 4+  AORTIC VALVE:  There was trace regurgitation  TRICUSPID VALVE:  There was mild regurgitation  PULMONIC VALVE:  There was trace regurgitation  HISTORY: PRIOR HISTORY: Heart failure, Atrial fibrillation, Sepsis  PROCEDURE: The procedure was performed in the catheterization laboratory  This was a routine study   The risks and alternatives of the procedure were explained to the patient and informed consent was obtained  The transesophageal approach  was used  The study included complete 2D imaging, complete spectral Doppler, and color Doppler  The heart rate was 108 bpm, at the start of the study  An adult omniplane probe was inserted by the cardiology fellow under direct supervision  of the attending cardiologist  Intubated with ease  One intubation attempt(s)  There was no blood detected on the probe  Image quality was adequate  There were no complications during the procedure  MEDICATIONS: Benzocaine spray, topical  to oropharynx, prior to procedure  Anesthesia administered by anesthesia team     LEFT VENTRICLE: Size was normal  Systolic function was markedly reduced  Ejection fraction was estimated to be 35 %  Beat to beat variability and tachycardia limit an accurate assessment of ejection fraction  There was severe diffuse  hypokinesis with regional variations  Wall thickness was increased  DOPPLER: The study was not technically sufficient to allow evaluation of LV diastolic function  RIGHT VENTRICLE: The ventricle was mildly dilated  Systolic function was reduced  LEFT ATRIUM: The atrium was moderately dilated  No thrombus was identified  There was evidence of spontaneous echo contrast ("smoke")  APPENDAGE: The appendage was dilated  No thrombus was identified  There was evidence of spontaneous echo  contrast ("smoke") in the appendage  DOPPLER: The function was moderately reduced (moderately reduced emptying velocity)  ATRIAL SEPTUM: No defect or patent foramen ovale was identified  RIGHT ATRIUM: The atrium was moderately dilated  No thrombus was identified  There was evidence of continuous spontaneous echo contrast ("smoke")  MITRAL VALVE: There was mild annular calcification  There was mildly reduced leaflet separation  DOPPLER: There was moderate regurgitation  Regurgitation grade was 3+ on a scale of 0 to 4+   The regurgitant jet was centrally directed  AORTIC VALVE: The valve was trileaflet  Leaflets exhibited normal cuspal separation and sclerosis  DOPPLER: There was trace regurgitation  TRICUSPID VALVE: The valve structure was normal  There was normal leaflet separation  DOPPLER: There was mild regurgitation  PULMONIC VALVE: Not well visualized  DOPPLER: There was trace regurgitation  PERICARDIUM: There was no pericardial effusion  The pericardium was normal in appearance  AORTA: The root exhibited normal size  There was no atheroma  There was no evidence for dissection  There was no evidence for aneurysm  PULMONARY VEINS: DOPPLER: There was systolic blunting in the pulmonary vein(s)  [address] Accredited Echocardiography Laboratory    Prepared and electronically signed by    Norm Salinas MD  Signed 12-Jul-2018 13:57:07       No results found for this or any previous visit  No procedure found  No results found for this or any previous visit      Meds/Allergies   all current active meds have been reviewed, current meds:   Current Facility-Administered Medications   Medication Dose Route Frequency    acetaminophen (TYLENOL) tablet 975 mg  975 mg Oral Q8H [facility]    amiodarone tablet 200 mg  200 mg Oral Daily With Breakfast    apixaban (ELIQUIS) tablet 5 mg  5 mg Oral BID    ascorbic acid (VITAMIN C) tablet 500 mg  500 mg Oral Daily    atorvastatin (LIPITOR) tablet 20 mg  20 mg Oral Daily With Dinner    bisacodyl (DULCOLAX) rectal suppository 10 mg  10 mg Rectal Daily PRN    clopidogrel (PLAVIX) tablet 75 mg  75 mg Oral Daily    digoxin (LANOXIN) tablet 125 mcg  125 mcg Oral Daily    ferrous sulfate tablet 325 mg  325 mg Oral Daily With Breakfast    FLUoxetine (PROzac) capsule 20 mg  20 mg Oral Daily    folic acid (FOLVITE) tablet 1,000 mcg  1,000 mcg Oral Daily    furosemide (LASIX) tablet 40 mg  40 mg Oral Daily    insulin lispro (HumaLOG) 100 units/mL subcutaneous injection 1-5 Units  1-5 Units Subcutaneous TID AC    insulin lispro (HumaLOG) 100 units/mL subcutaneous injection 1-6 Units  1-6 Units Subcutaneous HS    lidocaine (LIDODERM) 5 % patch 1 patch  1 patch Topical Daily    melatonin tablet 6 mg  6 mg Oral HS    metoprolol succinate (TOPROL-XL) 24 hr tablet 50 mg  50 mg Oral Q12H    nortriptyline (PAMELOR) capsule 10 mg  10 mg Oral HS    ondansetron (ZOFRAN) injection 4 mg  4 mg Intravenous Q6H PRN    oxyCODONE (ROXICODONE) IR tablet 2 5 mg  2 5 mg Oral Q6H PRN    pantoprazole (PROTONIX) EC tablet 40 mg  40 mg Oral BID AC    potassium chloride (K-DUR,KLOR-CON) CR tablet 20 mEq  20 mEq Oral Daily    senna-docusate sodium (SENOKOT S) 8 6-50 mg per tablet 1 tablet  1 tablet Oral HS    thiamine (VITAMIN B1) tablet 100 mg  100 mg Oral Daily    and PTA meds:   Prior to Admission Medications   Prescriptions Last Dose Informant Patient Reported? Taking?    FEROSUL 325 (65 Fe) MG tablet Unknown at Unknown time  No No   Sig: TAKE ONE TABLET BY MOUTH DAILY WITH BREAKFAST   FLUoxetine (PROzac) 20 mg capsule Unknown at Unknown time  No No   Sig: TAKE 1 CAPSULE (20 MG TOTAL) BY MOUTH DAILY   Lidocaine (ASPERCREME LIDOCAINE) 4 % PTCH Unknown at Unknown time  No No   Sig: Apply 1 patch topically every 12 (twelve) hours   acetaminophen (TYLENOL) 325 mg tablet Not Taking at Unknown time  No No   Sig: Take 2 tablets (650 mg total) by mouth every 6 (six) hours   Patient not taking: Reported on 8/5/2020   amiodarone 200 mg tablet Unknown at Unknown time  No No   Sig: TAKE 1 TABLET (200 MG TOTAL) BY MOUTH DAILY   apixaban (ELIQUIS) 5 mg Unknown at Unknown time  No No   Sig: Take 1 tablet (5 mg total) by mouth 2 (two) times a day   ascorbic acid (VITAMIN C) 500 mg tablet Unknown at Unknown time  No No   Sig: TAKE ONE TABLET BY MOUTH DAILY   atorvastatin (LIPITOR) 20 mg tablet Unknown at Unknown time  No No   Sig: TAKE 1 TABLET (20 MG TOTAL) BY MOUTH DAILY WITH DINNER   bisacodyl (DULCOLAX) 10 mg suppository Unknown at Unknown time  Yes No   Sig: Insert 10 mg into the rectum daily as needed for constipation   clopidogrel (PLAVIX) 75 mg tablet Unknown at Unknown time  No No   Sig: TAKE 1 TABLET (75 MG TOTAL) BY MOUTH DAILY   digoxin (LANOXIN) 0 125 mg tablet Unknown at Unknown time  No No   Sig: TAKE 1 TABLET (0 125 MG TOTAL) BY MOUTH DAILY   folic acid (FOLVITE) 1 mg tablet Unknown at Unknown time  No No   Sig: TAKE ONE TABLET BY MOUTH DAILY   furosemide (LASIX) 40 mg tablet Unknown at Unknown time  No No   Sig: TAKE 1 TABLET (40 MG TOTAL) BY MOUTH DAILY   lisinopril (ZESTRIL) 5 mg tablet Unknown at Unknown time  No No   Sig: TAKE 1 TABLET (5 MG TOTAL) BY MOUTH DAILY   melatonin 3 mg Unknown at Unknown time  No No   Sig: Take 2 tablets (6 mg total) by mouth daily at bedtime   methocarbamol (ROBAXIN) 500 mg tablet Unknown at Unknown time  No No   Sig: Take 1 tablet (500 mg total) by mouth once as needed for muscle spasms for up to 1 dose   metoprolol succinate (TOPROL-XL) 50 mg 24 hr tablet Unknown at Unknown time  No No   Sig: TAKE 1 TABLET (50 MG TOTAL) BY MOUTH EVERY 12 (TWELVE) HOURS   nortriptyline (PAMELOR) 10 mg capsule Unknown at Unknown time  No No   Sig: TAKE ONE CAPSULE BY MOUTH AT BEDTIME   pantoprazole (PROTONIX) 40 mg tablet Unknown at Unknown time  No No   Sig: Take 1 tablet (40 mg total) by mouth 2 (two) times a day before meals   senna-docusate sodium (SENOKOT S) 8 6-50 mg per tablet Unknown at Unknown time  No No   Sig: Take 1 tablet by mouth daily at bedtime   thiamine 100 MG tablet Unknown at Unknown time  No No   Sig: Take 1 tablet (100 mg total) by mouth daily      Facility-Administered Medications: None          EKG personally reviewed by CHANDRIKA Hollins       Assessment:  Principal Problem:    Atrial fibrillation with RVR (HCC)  Active Problems:    Elevated troponin    Chronic pain disorder    Essential hypertension    Coronary artery disease    Acute on chronic diastolic congestive heart failure (HCC)    Pain of cervical spine    Depression    DM2 (diabetes mellitus, type 2) ([facility] [address] )    Counseling / Coordination of Care  Total floor / unit time spent today 20 minutes  Greater than 50% of total time was spent with the patient and / or family counseling and / or coordination of care  ** Please Note: Dragon 360 Dictation voice to text software may have been used in the creation of this document   **

## 2020-08-07 NOTE — ASSESSMENT & PLAN NOTE
Wt Readings from Last 3 Encounters:   08/07/20 74 8 kg (164 lb 14 5 oz)   08/06/20 86 4 kg (190 lb 7 6 oz)   03/02/20 73 9 kg (163 lb)     · Does have a history of tachy mediated cardiomyopathy with a recovered EF on last TTE in 2019  · Updated echo 8/5: "LVEF 45%, there was hypokinesis of the mid anteroseptal, apical septal, and apical wall(s), wall thickness was mildly to moderately increased   Concentric hypertrophy was present "  · Cardiology following, appreciate input  · Given Lasix 40 mg IV x1 on 8/5  · Now on Lasix 40 mg daily   · Holding Lisinopril 5 mg daily due to hypotension   · Resumed Metoprolol succinate 50 mg q 12 hours   · I&Os, daily weights, low-sodium diet

## 2020-08-07 NOTE — PROGRESS NOTES
Pastoral Care Progress Note    9960  Patient: Jt Manzano :   Admission Date & Time: 2020 0020  MRN: [id number] [facility]: [id number]                     Chaplaincy Interventions Utilized:   Empowerment: Encouraged assertiveness, Encouraged focus on present, Encouraged self-care, Provided anxiety containment and Provided grief counseling    Exploration: Explored hope, Explored emotional needs & resources, Explored relational needs & resources, Explored spiritual needs & resources and Facilitated story telling    Collaboration: Facilitated respect for spiritual/cultural practice during hospitalization    Relationship Building: Cultivated a relationship of care and support and Listened empathically      Chaplaincy Outcomes Achieved:  Distress reduced, Emotional resources utilized, Expressed gratitude, Expressed ultimate hope, Identified meaningful connections, Identified priorities and Verbally processed emotions    Spiritual Coping Strategies Utilized:   Spiritual comfort, Spiritual empowerment, Spiritual gratitude and Spiritual meaning       20 1100   Clinical Encounter Type   Visited With Patient   Routine Visit Introduction

## 2020-08-07 NOTE — ASSESSMENT & PLAN NOTE
With associated muscle spasms  Patient has had an ongoing history of neck pain for 30 years  She was supposed to follow-up with neurosurgery as an outpatient in 2019, however, was unable to do so due to lack of transportation  Presently, she continues to have neck pain and decreased range of motion    She does have neck spasms on exam   · MRI of cervical spine: multilevel DJD, disc bulges at C4 through C5  · Consulted Neurosurgery for evaluation  · Continue scheduled Tylenol, Lidoderm patch, an Aqua K-pad  · Trial oxycodone 2 5 mg IR PRN  (patient aware she will not be discharged home on opioids)  · PT/OT - recommending short-term rehab

## 2020-08-07 NOTE — PLAN OF CARE
Problem: PHYSICAL THERAPY ADULT  Goal: Performs mobility at highest level of function for planned discharge setting  See evaluation for individualized goals  Description: Treatment/Interventions: Functional transfer training, LE strengthening/ROM, Elevations, Therapeutic exercise, Endurance training, Cognitive reorientation, Bed mobility, Gait training, Spoke to nursing, OT  Equipment Recommended: Obie Castleman       See flowsheet documentation for full assessment, interventions and recommendations  Outcome: Progressing  Note: Prognosis: Fair  Problem List: Decreased strength, Decreased endurance, Impaired balance, Decreased mobility, Decreased cognition, Pain  Assessment: Pt cont to exhibit mod neck and back discomfort (reports not new) w/ associated guarding and decreased tolerance to positional changes; mod (A)x1 was required for the most part w/ all aspects of observed mobility at bedside; pt declined further mobilization/amb at that time; appeared to be more comfortable once repositioned back in bed; overall, cont to recommend rehab upon D/C at this time as pt lives alone and exhibits signif mobility deficits (reports she was mostly in bed at home); pt informed but ? If she will consider; also, r/o additional DI studies L-T spine and ? additional consults to address back discomfort; will follow  Barriers to Discharge: Inaccessible home environment, Decreased caregiver support     PT Discharge Recommendation: [id number]     PT - OK to Discharge: Yes    See flowsheet documentation for full assessment

## 2020-08-07 NOTE — PHYSICAL THERAPY NOTE
PHYSICAL THERAPY NOTE          Patient Name: Maryana Baldwin  [id number] Date: 8/7/2020 08/07/20 1105   Pain Assessment   Pain Assessment Tool FLACC   Pain Location/Orientation Location: Neck   Pain Onset/Description Onset: Ongoing;Frequency: Constant/Continuous; Descriptor: Aching;Descriptor: Discomfort   Effect of Pain on Daily Activities guarding w/ mvt and positional changes   Patient's Stated Pain Goal No pain   Hospital Pain Intervention(s) Repositioned; Emotional support   Multiple Pain Sites Yes   Pain Rating: FLACC (Rest) - Face 1   Pain Rating: FLACC (Rest) - Legs 0   Pain Rating: FLACC (Rest) - Activity 0   Pain Rating: FLACC (Rest) - Cry 1   Pain Rating: FLACC (Rest) - Consolability 1   Score: FLACC (Rest) 3   Pain Rating: FLACC (Activity) - Face 1   Pain Rating: FLACC (Activity) - Legs 1   Pain Rating: FLACC (Activity) - Activity 1   Pain Rating: FLACC (Activity) - Cry 1   Pain Rating: FLACC (Activity) - Consolability 1   Score: FLACC (Activity) 5   Pain 2   Pain Score 2   (4 per FLACC)   Pain Location/Orientation 2 Location: Back   Pain Onset/Description 2 Onset: Ongoing;Frequency: Intermittent; Descriptor: Aching;Descriptor: Discomfort   Patient's Stated Pain Goal 2 No pain   Hospital Pain Intervention(s) 2 Repositioned; Ambulation/increased activity; Emotional support   Restrictions/Precautions   Other Precautions Cognitive; Bed Alarm;Telemetry; Fall Risk;Pain  (bed alarm on at the end of session)   General   Chart Reviewed Yes   Additional Pertinent History cleared for Tx session (spoke to nsg)   Response to Previous Treatment Patient with no complaints from previous session  Cognition   Overall Cognitive Status Impaired   Arousal/Participation Responsive; Cooperative   Attention Attends with cues to redirect   Orientation Level Oriented to person;Oriented to place;Oriented to situation   Memory Decreased recall of recent events;Decreased recall of precautions   Following Commands Follows one step commands with increased time or repetition   Subjective   Subjective Pt is observed in bed; appears to be uncomfortable and low in bed; initially states she does not wish to get OOB at this time; indicates she was on the bed pan (ready to come off the bed pan); during bed pan removal w/ staff present, noted the bed linen was soiled; pt was then agreeable to mobilize to change the bed linen and reposition higher in bed  Bed Mobility   Rolling R 3  Moderate assistance  (2 trials)   Additional items Assist x 1; Increased time required;Verbal cues;LE management   Rolling L 3  Moderate assistance  (2 trials)   Additional items Assist x 1; Increased time required;Verbal cues;LE management   Supine to Sit 3  Moderate assistance  (via log rolling)   Additional items Assist x 2; Increased time required;Verbal cues;LE management   Sit to Supine 3  Moderate assistance   Additional items Assist x 2; Increased time required;Verbal cues;LE management   Transfers   Sit to Stand 3  Moderate assistance   Additional items Assist x 2; Increased time required;Verbal cues   Stand to Sit 3  Moderate assistance   Additional items Assist x 2; Increased time required;Verbal cues   Ambulation/Elevation   Gait pattern Excessively slow; Short stride; Foward flexed; Shuffling   Gait Assistance 3  Moderate assist   Additional items Assist x 2;Verbal cues; Tactile cues   Assistive Device Rolling walker   Distance 2 ft total distance at bedside (to reposition higher in bed);    Balance   Static Sitting Fair -   Dynamic Sitting Poor +   Static Standing Poor   Dynamic Standing Poor -   Ambulatory Poor -   Activity Tolerance   Activity Tolerance Patient limited by fatigue;Patient limited by pain   Nurse Made Aware spoke to Gabby Scott RN   Equipment Use   Comments (B) LE and UE elevated on the pillows at the end of session; SCDs on; call bell w/in reach; bed alarm on;   Assessment Prognosis Fair   Problem List Decreased strength;Decreased endurance; Impaired balance;Decreased mobility; Decreased cognition;Pain   Assessment Pt cont to exhibit mod neck and back discomfort (reports not new) w/ associated guarding and decreased tolerance to positional changes; mod (A)x1 was required for the most part w/ all aspects of observed mobility at bedside; pt declined further mobilization/amb at that time; appeared to be more comfortable once repositioned back in bed; overall, cont to recommend rehab upon D/C at this time as pt lives alone and exhibits signif mobility deficits (reports she was mostly in bed at home); pt informed but ? If she will consider; also, r/o additional DI studies L-T spine and ? additional consults to address back discomfort; will follow  Barriers to Discharge Inaccessible home environment;Decreased caregiver support   Goals   Patient Goals to get stronger and feel better   STG Expiration Date 08/16/20   PT Treatment Day 2   Plan   Treatment/Interventions Functional transfer training;LE strengthening/ROM; Elevations; Therapeutic exercise; Endurance training;Bed mobility;Gait training;Spoke to nursing;OT;Cognitive reorientation   Progress Slow progress, decreased activity tolerance   PT Frequency Other (Comment)  (3-5x/wk)   Recommendation   PT Discharge Recommendation Post-Acute Rehabilitation Services   Equipment Recommended Arturo Son  (w/ (A))       Gildardo Mendoza, PT

## 2020-08-08 NOTE — CONSULTS
Podiatry - Consultation    Patient Information:   Anali Julian 79 y o  female MRN: [id number]  Unit/Bed#: [facility] 408-01 Encounter: [id number]  PCP: Jaylon Ervin DO  Date of Admission:  8/5/2020  Date of Consultation: 08/08/20  Requesting Physician: Saray Craft MD      ASSESSMENT:    Anali Julian is a 79 y o  female with:    1  Onychomycosis reverse onychodystrophy      PLAN:     Toenails x10 were excisionally debrided using a large nipper to normal length and thickness without incident   Weight bearing as tolerated   Rest of Medical care per primary team    Podiatry signing off, thank you for the consult! Please contact with any questions or concerns  SUBJECTIVE:    History of Present Illness:    Anali Julian is a 79 y o  female who is originally admitted 8/5/2020 due to her rapid heart rate and shortness of breath with a past medical history of AFib, CHF, chronic pain disorder  We are consulted for painful, elongated toenails x 10  They state that they have difficulty applying their socks and shoes due to the elongation of the nails  They report pain with palpation and pressure within their shoe gear  They have been unable to cut their nails adequately  Patient has no further pedal complaints at this time  Review of Systems:    Constitutional: Negative  HENT: Negative  Eyes: Negative  Respiratory: Negative  Cardiovascular: Negative  Gastrointestinal: Negative  Musculoskeletal: negative  Skin: Thickened, elongated toenails  Neurological:  Negative  Psych: Negative       Past Medical and Surgical History:     Past Medical History:   Diagnosis Date    A-fib ([facility] )     Acute respiratory disease     Anemia     Arthritis     Atrial fibrillation ([facility] )     CHF (congestive heart failure) (HCC)     Chronic pain     Heart failure (HCC)     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[facility] )     History of colon polyps     Hx of long term use of blood thinners     Hypertension     Irregular heart beat     Narcotic dependence (HCC)     Rectal bleeding     Stroke (HCC)     mild no deficiets/ memory loss    Uses walker        Past Surgical History:   Procedure Laterality Date    COLONOSCOPY      COLONOSCOPY N/A 7/27/2018    Procedure: COLONOSCOPY;  Surgeon: Pj Newsome MD;  Location: [facility]; Service: Gastroenterology    CORONARY STENT PLACEMENT      ERCP N/A 5/14/2018    Procedure: ENDOSCOPIC RETROGRADE CHOLANGIOPANCREATOGRAPHY (ERCP); Surgeon: Pee Couch MD;  [facility];  Service: Gastroenterology    ERCP N/A 8/28/2018    Procedure: ENDOSCOPIC RETROGRADE CHOLANGIOPANCREATOGRAPHY (ERCP) w/ EGD;  Surgeon: Pee Couch MD;  [facility]; Service: Gastroenterology    ESOPHAGOGASTRODUODENOSCOPY N/A 7/26/2018    Procedure: ESOPHAGOGASTRODUODENOSCOPY (EGD); Surgeon: Pj Newsome MD;  [facility];   Service: Gastroenterology    JOINT REPLACEMENT Right     knee       Meds/Allergies:    Medications Prior to Admission   Medication    acetaminophen (TYLENOL) 325 mg tablet    amiodarone 200 mg tablet    apixaban (ELIQUIS) 5 mg    ascorbic acid (VITAMIN C) 500 mg tablet    atorvastatin (LIPITOR) 20 mg tablet    bisacodyl (DULCOLAX) 10 mg suppository    clopidogrel (PLAVIX) 75 mg tablet    digoxin (LANOXIN) 0 125 mg tablet    FEROSUL 325 (65 Fe) MG tablet    FLUoxetine (PROzac) 20 mg capsule    folic acid (FOLVITE) 1 mg tablet    furosemide (LASIX) 40 mg tablet    Lidocaine (ASPERCREME LIDOCAINE) 4 % PTCH    lisinopril (ZESTRIL) 5 mg tablet    melatonin 3 mg    methocarbamol (ROBAXIN) 500 mg tablet    metoprolol succinate (TOPROL-XL) 50 mg 24 hr tablet    nortriptyline (PAMELOR) 10 mg capsule    pantoprazole (PROTONIX) 40 mg tablet    senna-docusate sodium (SENOKOT S) 8 6-50 mg per tablet    thiamine 100 MG tablet       No Known Allergies    Social History:     Marital Status:     Substance Use History:   Social History     Substance and Sexual Activity   Alcohol Use Not Currently     Social History     Tobacco Use   Smoking Status Former Smoker    Packs/day: 0 50    Types: Cigarettes   Smokeless Tobacco Never Used     Social History     Substance and Sexual Activity   Drug Use Not Currently       Family History:    History reviewed  No pertinent family history  OBJECTIVE:    Vitals:   Blood Pressure: 136/68 (08/08/20 1521)  Pulse: 95 (08/08/20 1521)  Temperature: 97 5 °F (36 4 °C) (08/08/20 1521)  Temp Source: Oral (08/08/20 1521)  Respirations: 18 (08/08/20 1521)  Height: 5' 5" (165 1 cm) (08/06/20 1453)  Weight - Scale: 78 8 kg (173 lb 11 6 oz) (08/08/20 0558)  SpO2: 96 % (08/08/20 1521)    Physical Exam:    General Appearance: Alert, cooperative, no distress  HEENT: Head normocephalic, atraumatic, without obvious abnormality  Heart: Normal rate and rhythm  Lungs: Non-labored breathing  No respiratory distress  Abdomen: Without distension  Psychiatric: AAOx3  Lower Extremity:  Vascular:   Right DP and PT pulses are present  Left DP and PT pulses are present  CRT < 3 seconds at the digits  +0/4 edema noted at bilateral lower extremities  Pedal hair is absent  Skin temperature is cool bilaterally  Musculoskeletal:  MMT is 5/5 in all muscle compartments bilaterally  ROM at the ankle joint is decreased bilaterally with the leg extended  No Pain on palpation of any part of the foot or ankle bilaterally  Dermatological:  Elongated, discolored, dystrophic nails x 10 that are positive for subungual debris  Neurological:  Gross sensation is intact  Protective sensation is intact  Patient Reports numbness and/or paresthesias        Additional data:     Lab Results: I have personally reviewed pertinent labs including:    Results from last 7 days   Lab Units 08/08/20  0445   WBC Thousand/uL 6 38   HEMOGLOBIN g/dL 11 6   HEMATOCRIT % 37 1   PLATELETS Thousands/uL 275   NEUTROS PCT % 70   LYMPHS PCT % 13*   MONOS PCT % 11   EOS PCT % 4     Results from last 7 days   Lab Units 08/08/20  0445   POTASSIUM mmol/L 4 1   CHLORIDE mmol/L 107   CO2 mmol/L 28   BUN mg/dL 23   CREATININE mg/dL 1 08   CALCIUM mg/dL 8 9   ALK PHOS U/L 72   ALT U/L 23   AST U/L 22           Cultures: I have personally reviewed pertinent cultures including:              Imaging: I have personally reviewed pertinent reports in PACS  EKG, Pathology, and Other Studies: I have personally reviewed pertinent reports  ** Please Note: Portions of the record may have been created with voice recognition software  Occasional wrong word or "sound a like" substitutions may have occurred due to the inherent limitations of voice recognition software  Read the chart carefully and recognize, using context, where substitutions have occurred   **

## 2020-08-08 NOTE — PROGRESS NOTES
Progress Note - Ron Ruelas [birth year], 79 y o  female MRN: [id number]    Unit/Bed#: [facility] 408-01 Encounter: [id number]    Primary Care Provider: Sloane Ruvalcaba DO   Date and time admitted to hospital: 8/5/2020 12:20 AM        DM2 (diabetes mellitus, type 2) ([address] )  Assessment & Plan  Lab Results   Component Value Date    HGBA1C 7 6 (H) 08/05/2020     · No reported history of diabetes, A1c 7 6 and patient noted to be hyperglycemic  · Start SSI  · Diabetic diet  · Monitor Accu-Cheks and adjust as needed  · Would benefit from an oral agent on discharge and close outpatient follow-up with PCP  Would also recommend glucometer on discharge as patient should be checking BG at home  · Nutrition consult    Depression  Assessment & Plan  · With patient endorsing worsening mood and limited energy secondary to chronic pain  · Patient admitting to frequent thoughts of wishing she would be dead and did allude to some suicidal ideation if she had a gun at home (patient reports she does not have gun at home ) per admitting physician  · Patient does not recall making these statements - Appreciate psychiatry input, ok to discontinue 1:1     · Continue Zoloft 20 mg daily (concerned patient is not taking this at home)  · Continue Nortriptyline  · Recommend close follow-up with PCP and psychiatrist     Pain of cervical spine  Assessment & Plan  With associated muscle spasms  Patient has had an ongoing history of neck pain for 30 years  She was supposed to follow-up with neurosurgery as an outpatient in 2019, however, was unable to do so due to lack of transportation  Presently, she continues to have neck pain and decreased range of motion    She does have neck spasms on exam   · MRI of cervical spine: multilevel DJD, disc bulges at C4 through C5  · Consulted Neurosurgery for evaluation  · Continue scheduled Tylenol, Lidoderm patch, an Aqua K-pad  · Trial oxycodone 2 5 mg IR PRN  (patient aware she will not be discharged home on opioids)  · PT/OT - recommending short-term rehab    Acute on chronic diastolic congestive heart failure (HCC)  Assessment & Plan  Wt Readings from Last 3 Encounters:   08/07/20 74 8 kg (164 lb 14 5 oz)   08/06/20 86 4 kg (190 lb 7 6 oz)   03/02/20 73 9 kg (163 lb)     · Does have a history of tachy mediated cardiomyopathy with a recovered EF on last TTE in 2019  · Updated echo 8/5: "LVEF 45%, there was hypokinesis of the mid anteroseptal, apical septal, and apical wall(s), wall thickness was mildly to moderately increased  Concentric hypertrophy was present "  · Cardiology following, appreciate input  · Given Lasix 40 mg IV x1 on 8/5  · Now on Lasix 40 mg daily   · Holding Lisinopril 5 mg daily due to hypotension   · Resumed Metoprolol succinate 50 mg q 12 hours   · I&Os, daily weights, low-sodium diet    Goals of care, counseling/discussion  Assessment & Plan  I had a long discussion with patient today about goals of care  Patient clearly states she does not want to be resuscitated in case if her heart stops and she does not want to be intubated if she stops breathing  However she accepts all the other ongoing medical treatment  She does not want anything heroic measures or aggressive management  Updated code status to level 3 DNAR/DNI    Coronary artery disease  Assessment & Plan  · S/p PCI to RCA in 2018  · Will continue Plavix, beta-blocker, statin  · No longer on aspirin due to history of bleeding  · Presently, denies chest pain    Essential hypertension  Assessment & Plan  · Urgency on admission likely secondary to medication noncompliance  Episode of hypotension on Thursday    · Most recent blood pressure reviewed, 110/67  · Per Cardiology, hold lisinopril  · Continue metoprolol XL 50 mg twice daily and Lasix 40 mg po daily  · Monitor    Chronic pain disorder  Assessment & Plan  · PDMP reviewed, appears not to have received any opioids as outpatient since 01/2019  · Urine drug screen positive for opiates  · Longstanding history of chronic back and neck pain and frequently complaining of these throughout my evaluation  · Patient reports persistent neck tenderness and muscle spasms resulting in decreased range of motion and severe pain  · PT/OT evaluations - recommending STR   · Continue non opioid pain control with Tylenol, lidocaine patch, aqua K-pad  · Trial short course of Oxycodone IR PRN - patient aware she will not be discharge home with an Rx  · MRI cervical spine with multilevel DJD, disc bulges at C4 through C5  · Consulted Neurosurgery for evaluation    Elevated troponin  Assessment & Plan  · Non MI troponin elevation with peak troponin of 0 20  Likely secondary to AFib with RVR  EKG otherwise without ischemic changes  · No complaints of chest pain  * Atrial fibrillation with RVR (HCC)  Assessment & Plan  · Patient with shortness of breath and slight chest pressure prompting call to EMS  Found in wide complex rhythm  · Placed on amiodarone GTT after bolus in ED with improvement in rate and without wide complex  · Patient remains in atrial fibrillation with RVR  Likely secondary to medication noncompliance for about 1 week  · Cardiology following, appreciate input  · Continue Amiodarone 200 mg po daily   · Continue digoxin 0 125 mcg po daily  · Continue Metoprolol succinate 50 mg q 12 hours   · Continue Eliquis   · Monitor on telemetry monitor   · TSH mildly elevated at 8 760 however T4 normal   No history thyroid disease  VTE Pharmacologic Prophylaxis:   Pharmacologic: Apixaban (Eliquis)  Mechanical VTE Prophylaxis in Place: Yes    Patient Centered Rounds: I have performed bedside rounds with nursing staff today  Discussions with Specialists or Other Care Team Provider:      Education and Discussions with Family / Patient:  Patient at bedside    Time Spent for Care: 30 minutes    More than 50% of total time spent on counseling and coordination of care as described above     Current Length of Stay: 3 day(s)    Current Patient Status: Inpatient   Certification Statement: The patient will continue to require additional inpatient hospital stay due to Atrial fibrillation    Discharge Plan:  Post acute short-term rehab when medically clear    Code Status: Level 3 - DNAR and DNI      Subjective:   Patient denies any chest pain, short of breath  However before admission she had significant short of breath which is now completely resolved  She does complain of significant neck pain that is chronic in nature  Patient is to be on long-term narcotics which she wean herself off  Objective:     Vitals:   Temp (24hrs), Av 9 °F (36 6 °C), Min:97 4 °F (36 3 °C), Max:98 9 °F (37 2 °C)    Temp:  [97 4 °F (36 3 °C)-98 9 °F (37 2 °C)] 98 9 °F (37 2 °C)  HR:  [] 94  Resp:  [18] 18  BP: (102-138)/(54-71) 138/65  SpO2:  [90 %-98 %] 94 %  Body mass index is 28 91 kg/m²  Input and Output Summary (last 24 hours): Intake/Output Summary (Last 24 hours) at 2020 1409  Last data filed at 2020 0648  Gross per 24 hour   Intake 720 ml   Output 600 ml   Net 120 ml       Physical Exam:     Physical Exam  Constitutional:       Appearance: She is well-developed  Comments: Patient occasionally winces with pain on movement of the neck   HENT:      Head: Normocephalic and atraumatic  Neck:      Musculoskeletal: Normal range of motion  Pulmonary:      Effort: Pulmonary effort is normal  No respiratory distress  Breath sounds: Normal breath sounds  Skin:     General: Skin is warm and dry           Additional Data:     Labs:    Results from last 7 days   Lab Units 20  0445   WBC Thousand/uL 6 38   HEMOGLOBIN g/dL 11 6   HEMATOCRIT % 37 1   PLATELETS Thousands/uL 275   NEUTROS PCT % 70   LYMPHS PCT % 13*   MONOS PCT % 11   EOS PCT % 4     Results from last 7 days   Lab Units 20  0445   SODIUM mmol/L 140   POTASSIUM mmol/L 4 1   CHLORIDE mmol/L 107   CO2 mmol/L 28   BUN mg/dL 23   CREATININE mg/dL 1 08   ANION GAP mmol/L 5   CALCIUM mg/dL 8 9   ALBUMIN g/dL 2 8*   TOTAL BILIRUBIN mg/dL 0 47   ALK PHOS U/L 72   ALT U/L 23   AST U/L 22   GLUCOSE RANDOM mg/dL 136         Results from last 7 days   Lab Units 08/08/20  0541 08/07/20  2051 08/07/20  1557 08/07/20  1126 08/06/20  1645 08/06/20  1108 08/06/20  0700 08/05/20  2111 08/05/20  1655   POC GLUCOSE mg/dl 135 127 191* 145* 130 135 141* 141* 141*     Results from last 7 days   Lab Units 08/05/20  0443   HEMOGLOBIN A1C % 7 6*               * I Have Reviewed All Lab Data Listed Above  * Additional Pertinent Lab Tests Reviewed:  [address] Admission Reviewed        Recent Cultures (last 7 days):           Last 24 Hours Medication List:   Current Facility-Administered Medications   Medication Dose Route Frequency Provider Last Rate    acetaminophen  975 mg Oral Q8H Génesis Funez MD      amiodarone  200 mg Oral Daily With Breakfast Gregory Schmitt MD      apixaban  5 mg Oral BID Gregory Schmitt MD      ascorbic acid  500 mg Oral Daily Gregory Schmitt MD      atorvastatin  20 mg Oral Daily With Mindy Clayton MD      bisacodyl  10 mg Rectal Daily PRN Gregory Schmitt MD      clopidogrel  75 mg Oral Daily Gregory Schmitt MD      digoxin  125 mcg Oral Daily Gregory Schmitt MD      ferrous sulfate  325 mg Oral Daily With Breakfast Gregory Schmitt MD      FLUoxetine  20 mg Oral Daily Gregory Schmitt MD      folic acid  0,892 mcg Oral Daily Gregory Schmitt MD      furosemide  40 mg Oral Daily Gregory Schmitt MD      insulin lispro  1-5 Units Subcutaneous TID AC Leti Cabrera MD      insulin lispro  1-6 Units Subcutaneous HS Gregory Schmitt MD      lidocaine  1 patch Topical Daily Gregory Schmitt MD      melatonin  6 mg Oral HS Gregory Schmitt MD      metoprolol succinate  50 mg Oral Q12H CHANDRIKA Lau      nortriptyline  10 mg Oral HS Prosper Price MD      ondansetron  4 mg Intravenous Q6H PRN Prosper Price MD      oxyCODONE  2 5 mg Oral Q6H PRN Prosper Price MD      pantoprazole  40 mg Oral BID AC Prosper Price MD      potassium chloride  20 mEq Oral Daily Prosper Price MD      senna-docusate sodium  1 tablet Oral HS Prosper Price MD      thiamine  100 mg Oral Daily Prosper Price MD          Today, Patient Was Seen By: Tavo Martinez MD    ** Please Note: Dictation voice to text software may have been used in the creation of this document   **

## 2020-08-08 NOTE — PROGRESS NOTES
Advanced Heart Failure/Pulmonary Hypertension Service Progress Note - Leidy Allen 79 y o  female MRN: [id number]    Unit/Bed#: [facility] 408-01 Encounter: [id number]      Assessment:    Principal Problem:    Atrial fibrillation with RVR ([facility] [address] )  Active Problems:    Elevated troponin    Chronic pain disorder    Essential hypertension    Coronary artery disease    Goals of care, counseling/discussion    Acute on chronic diastolic congestive heart failure (HCC)    Pain of cervical spine    Depression    DM2 (diabetes mellitus, type 2) ([facility])    Plan:  --Continue amiodarone PO, digoxin, BB, and apixaban  --Continue lasix 40 mg PO dialy as has +JVD and AJR on exam  --Consider PSG as outpatient  --Rechallenge with lisinopril 2 5 mg PO daily    Assessment:  # Chronic Afib w/ RVR (LBBB): most likely 2/2 to noncompliance with meds  --Rates now controlled on amiodarone PO, digoxin, and BB    # Newly reduced BiV HFmEF, LVEF 45%, FC II/III, ACC/AHA Stage C:  Diag:  --TTE 8/5/2020: LVEF 45%  HK of mid anterior septal and apical septal walls  Mildly reduced RVSF  MELODY  Mod MR  Mild to mod TR  Dagger shaped RVOT  Impression: Most likely 2/2 to tachy-myopathy given off meds and BiV dysfunction without significant dilation  If it does not improve with HR control, then can consider further workup for etiologies, ie myocarditis, ischemia, etc     # Non MI troponin 2/2 to AFib w/ RVR and likely resultant volume overload  # CAD s/p NGOZI to [facility] 7/2018  # HTN  # HLD      Subjective:   Patient seen and examined  No significant events overnight  Objective:       [employer] (day, reason): Kenney catheter (day, reason):    Vitals: Blood pressure 138/65, pulse 94, temperature 98 9 °F (37 2 °C), temperature source Oral, resp  rate 18, height 5' 5" (1 651 m), weight 78 8 kg (173 lb 11 6 oz), SpO2 94 %  , Body mass index is 28 91 kg/m² , I/O last 3 completed shifts: In: 1120 [P O :1120]  Out: 1150 [Urine:1150]  I/O this shift:   In: 480 [P O :480]  Out: -   Wt Readings from Last 3 Encounters:   08/08/20 78 8 kg (173 lb 11 6 oz)   08/06/20 86 4 kg (190 lb 7 6 oz)   03/02/20 73 9 kg (163 lb)       Intake/Output Summary (Last 24 hours) at 8/8/2020 1400  Last data filed at 8/8/2020 0832  Gross per 24 hour   Intake 720 ml   Output 600 ml   Net 120 ml     I/O last 3 completed shifts: In: 1120 [P O :1120]  Out: 1150 [Urine:1150]    No significant arrhythmias seen on telemetry review       Physical Exam:  Vitals:    08/08/20 0244 08/08/20 0558 08/08/20 0720 08/08/20 1111   BP: 112/60  135/68 138/65   BP Location: Left arm  Left arm Left arm   Pulse: 83  77 94   Resp: 18  18 18   Temp: 97 9 °F (36 6 °C)  97 7 °F (36 5 °C) 98 9 °F (37 2 °C)   TempSrc: Oral  Oral Oral   SpO2: 90%  90% 94%   Weight:  78 8 kg (173 lb 11 6 oz)     Height:           GEN: Kika Holt appears well, alert and oriented x 3, pleasant and cooperative   HEENT: pupils equal, round, and reactive to light; extraocular muscles intact  NECK: supple, no carotid bruits   HEART: regular rhythm, normal S1 and S2, no murmurs, clicks, gallops or rubs, JVP is    LUNGS: clear to auscultation bilaterally; no wheezes, rales, or rhonchi   ABDOMEN: normal bowel sounds, soft, no tenderness, no distention  EXTREMITIES: peripheral pulses normal; no clubbing, cyanosis, or edema  NEURO: no focal findings   SKIN: normal without suspicious lesions on exposed skin      Current Facility-Administered Medications:     acetaminophen (TYLENOL) tablet 975 mg, 975 mg, Oral, Q8H [address], Leti Cabrera MD, 975 mg at 08/08/20 0601    amiodarone tablet 200 mg, 200 mg, Oral, Daily With Breakfast, Leti Cabrera MD, 200 mg at 08/08/20 0818    apixaban (ELIQUIS) tablet 5 mg, 5 mg, Oral, BID, Leti Cabrera MD, 5 mg at 08/08/20 0818    ascorbic acid (VITAMIN C) tablet 500 mg, 500 mg, Oral, Daily, Leti Cabrera MD, 500 mg at 08/08/20 0818    atorvastatin (LIPITOR) tablet 20 mg, 20 mg, Oral, Daily With Dinner, Polly Walter MD, 20 mg at 08/07/20 1757    bisacodyl (DULCOLAX) rectal suppository 10 mg, 10 mg, Rectal, Daily PRN, Polly Walter MD    clopidogrel (PLAVIX) tablet 75 mg, 75 mg, Oral, Daily, Polly Walter MD, 75 mg at 08/08/20 0817    digoxin (LANOXIN) tablet 125 mcg, 125 mcg, Oral, Daily, Polly Walter MD, 125 mcg at 08/08/20 8356    ferrous sulfate tablet 325 mg, 325 mg, Oral, Daily With Breakfast, Polly Walter MD, 325 mg at 08/08/20 0818    FLUoxetine (PROzac) capsule 20 mg, 20 mg, Oral, Daily, Polly Walter MD, 20 mg at 91/13/08 6921    folic acid (FOLVITE) tablet 1,000 mcg, 1,000 mcg, Oral, Daily, Polly Walter MD, 1,000 mcg at 08/08/20 0817    furosemide (LASIX) tablet 40 mg, 40 mg, Oral, Daily, Polly Walter MD, 40 mg at 08/08/20 0818    insulin lispro (HumaLOG) 100 units/mL subcutaneous injection 1-5 Units, 1-5 Units, Subcutaneous, TID AC **AND** Fingerstick Glucose (POCT), , , TID AC, Leti Cabrera MD    insulin lispro (HumaLOG) 100 units/mL subcutaneous injection 1-6 Units, 1-6 Units, Subcutaneous, HS, Polly Walter MD    lidocaine (LIDODERM) 5 % patch 1 patch, 1 patch, Topical, Daily, Polly Walter MD, 1 patch at 08/08/20 0818    melatonin tablet 6 mg, 6 mg, Oral, HS, Polly Walter MD, 6 mg at 08/07/20 2205    metoprolol succinate (TOPROL-XL) 24 hr tablet 50 mg, 50 mg, Oral, Q12H, Nicole Lomax, CHANDRIKA, 50 mg at 08/08/20 1047    nortriptyline (PAMELOR) capsule 10 mg, 10 mg, Oral, HS, Polly Walter MD, 10 mg at 08/07/20 2253    ondansetron (ZOFRAN) injection 4 mg, 4 mg, Intravenous, Q6H PRN, Polly Walter MD    oxyCODONE (ROXICODONE) IR tablet 2 5 mg, 2 5 mg, Oral, Q6H PRN, Polly Walter MD, 2 5 mg at 08/08/20 0601    pantoprazole (PROTONIX) EC tablet 40 mg, 40 mg, Oral, BID AC, Leti Cabrera MD, 40 mg at 08/08/20 0601    potassium chloride (K-DUR,KLOR-CON) CR tablet 20 mEq, 20 mEq, Oral, Daily, Leti Hemanth Taylor MD, 20 mEq at 08/08/20 0818    senna-docusate sodium (SENOKOT S) 8 6-50 mg per tablet 1 tablet, 1 tablet, Oral, HS, Andie Kline MD, 1 tablet at 08/07/20 2204    thiamine (VITAMIN B1) tablet 100 mg, 100 mg, Oral, Daily, Andie Kline MD, 100 mg at 08/08/20 0817      Labs & Results:    Results from last 7 days   Lab Units 08/05/20  0638 08/05/20  0441 08/05/20  0032   TROPONIN I ng/mL 0 20* 0 18* 0 06*     Results from last 7 days   Lab Units 08/08/20  0445 08/07/20  0458 08/06/20  0517   WBC Thousand/uL 6 38 6 68 9 44   HEMOGLOBIN g/dL 11 6 11 5 10 8*   HEMATOCRIT % 37 1 37 0 33 8*   PLATELETS Thousands/uL 275 271 251         Results from last 7 days   Lab Units 08/08/20 0445 08/07/20  0458 08/06/20  0530  08/05/20  0032   POTASSIUM mmol/L 4 1 3 7 3 9   < > 3 8   CHLORIDE mmol/L 107 105 107   < > 102   CO2 mmol/L 28 25 25   < > 22   BUN mg/dL 23 24 18   < > 14   CREATININE mg/dL 1 08 1 19 0 98   < > 1 23   CALCIUM mg/dL 8 9 8 5 8 0*   < > 9 5   ALK PHOS U/L 72 62  --   --  96   ALT U/L 23 24  --   --  32   AST U/L 22 20  --   --  25    < > = values in this interval not displayed  EKG personally reviewed by Risa Hurst MD      Counseling / Coordination of Care  Total floor / unit time spent today 20 minutes  Greater than 50% of total time was spent with the patient and / or family counseling and / or coordination of care  A description of the counseling / coordination of care: 10 min  Thank you for the opportunity to participate in the care of this patient      Risa Hurst MD, PhD   Sia Shafer

## 2020-08-08 NOTE — CONSULTS
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  4  Afib  5  Congestive heart failure      Plan:   · Exam: A&OX3, Communictates well, moves all extremities, slight  strength weakness right side more than left side 4+/5, sensation to light touch intact throughout  DTR 2+ without clonus  Mendoza's test positive on the right side  Limited range of motion of cervical spine with stiffness and tenderness on palpation posterior cervical spine noted  · Imagings  reviewed personally and by attending is as follows:  · MRI of cervical spine on 08/06/2020 demonstrates extensive multilevel degenerative disc disease resulting in mild cord signal at the C3-4 and C6 levels  Also degenerative anterolisthesis at C3-4 with prominent osteophyte take reading a along the inferior endplate resulting in moderate to severe canal stenosis and bilateral neural foraminal stenosis  Prominent posterior disc osteophyte complex at C5-6 and C6-7 is moderate to severe canal stenosis and bilateral neural foraminal stenosis  · Pain control:  Per primary team  · DVT ppx: SCDs bilateral legs  On Eliquis and Plavix  · Activity:  As tolerated  · PT/OT evaluation & treatment  · Brace:  None  · Medical Mx:  Per primary team  · Neurocheck:  Routine  · NSx evaluated the patient  Patient with chronic neck pain, and occasional dorsal forearm pain  No classical radiculopathy upper extremity pain  Gait instability  Image findings as described above  From Neurosurgery perspective, no emergency neurosurgery procedure anticipated    Discussed with the patient that she will follow-up at outpatient NSx clinics in 2 weeks, SNPx  Patient agreed  Recommend outpatient pain management and physical therapy  And of course fall precaution discussed  Neurosurgery will sign off  Call 4 question or concern  History of Present Illness     HPI: Nakia Salgado is a 79 y o  female with PMHx of stroke,  AFib, on Plavix arthritis, congestive heart failure, hypertension, chronic neck pain narcotic dependent complains of progressive posterior neck pain with occasional left more than right dorsal forearm pain, gait instability and fine motor dysfunction with weak  strengths and dropping objects  Denies classical shooting down upper extremity pain, numbness or tingling sensation  Has history of arthritis with pain in her hands, elbow and shoulders  Status post right shoulder surgery few years ago, with limited abduction of her right shoulder  Patient denies any bowel/bladder dysfunction  Denies BUFFY/PT  Denies fever, chills, rigors, cough or chest pain  Patient denies history of diabetes mellitus, seizure, reports easy bruising in the setting of AC  Patient lost 3 family members 2 of her children and 1 sister in recent past   She denies history of smoking cigarettes or drinking alcohol  Review of Systems   Constitutional: Negative for activity change, appetite change, diaphoresis and fever  HENT: Negative for trouble swallowing and voice change  Eyes: Negative for photophobia and visual disturbance  Respiratory: Positive for shortness of breath  Negative for cough, choking, chest tightness and wheezing  Cardiovascular: Positive for palpitations  Negative for chest pain and leg swelling  Gastrointestinal: Negative for nausea and vomiting  Genitourinary: Negative for difficulty urinating  Musculoskeletal: Positive for gait problem, neck pain and neck stiffness  Neurological: Positive for weakness   Negative for dizziness, tremors, seizures, syncope, facial asymmetry, speech difficulty, light-headedness, numbness and headaches  Hematological: Bruises/bleeds easily  Psychiatric/Behavioral: Negative for confusion and decreased concentration  Historical Information   Past Medical History:   Diagnosis Date    A-fib ([place] [address] )     Acute respiratory disease     Anemia     Arthritis     Atrial fibrillation (HCC)     CHF (congestive heart failure) (HCC)     Chronic pain     Heart failure (HCC)     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[place] [address] )     History of colon polyps     Hx of long term use of blood thinners     Hypertension     Irregular heart beat     Narcotic dependence ([place] [address] )     Rectal bleeding     Stroke [facility])     mild no deficiets/ memory loss    Uses walker      Past Surgical History:   Procedure Laterality Date    COLONOSCOPY      COLONOSCOPY N/A 7/27/2018    Procedure: COLONOSCOPY;  Surgeon: Eron Priest MD;  Location: [facility]; Service: Gastroenterology    CORONARY STENT PLACEMENT      ERCP N/A 5/14/2018    Procedure: ENDOSCOPIC RETROGRADE CHOLANGIOPANCREATOGRAPHY (ERCP); Surgeon: Carmen Ramirez MD;  [facility];  Service: Gastroenterology    ERCP N/A 8/28/2018    Procedure: ENDOSCOPIC RETROGRADE CHOLANGIOPANCREATOGRAPHY (ERCP) w/ EGD;  Surgeon: Carmen Ramirez MD;  [facility]; Service: Gastroenterology    ESOPHAGOGASTRODUODENOSCOPY N/A 7/26/2018    Procedure: ESOPHAGOGASTRODUODENOSCOPY (EGD); Surgeon: Eron Priest MD;  [facility]; Service: Gastroenterology    JOINT REPLACEMENT Right     knee     Social History     Substance and Sexual Activity   Alcohol Use Not Currently     Social History     Substance and Sexual Activity   Drug Use Not Currently     Social History     Tobacco Use   Smoking Status Former Smoker    Packs/day: 0 50    Types: Cigarettes   Smokeless Tobacco Never Used     History reviewed  No pertinent family history      Meds/Allergies   all current active meds have been reviewed, current meds:   Current Facility-Administered Medications   Medication Dose Route Frequency    acetaminophen (TYLENOL) tablet 975 mg  975 mg Oral Q8H [address]    amiodarone tablet 200 mg  200 mg Oral Daily With Breakfast    apixaban (ELIQUIS) tablet 5 mg  5 mg Oral BID    ascorbic acid (VITAMIN C) tablet 500 mg  500 mg Oral Daily    atorvastatin (LIPITOR) tablet 20 mg  20 mg Oral Daily With Dinner    bisacodyl (DULCOLAX) rectal suppository 10 mg  10 mg Rectal Daily PRN    clopidogrel (PLAVIX) tablet 75 mg  75 mg Oral Daily    digoxin (LANOXIN) tablet 125 mcg  125 mcg Oral Daily    ferrous sulfate tablet 325 mg  325 mg Oral Daily With Breakfast    FLUoxetine (PROzac) capsule 20 mg  20 mg Oral Daily    folic acid (FOLVITE) tablet 1,000 mcg  1,000 mcg Oral Daily    furosemide (LASIX) tablet 40 mg  40 mg Oral Daily    insulin lispro (HumaLOG) 100 units/mL subcutaneous injection 1-5 Units  1-5 Units Subcutaneous TID AC    insulin lispro (HumaLOG) 100 units/mL subcutaneous injection 1-6 Units  1-6 Units Subcutaneous HS    lidocaine (LIDODERM) 5 % patch 1 patch  1 patch Topical Daily    lisinopril (ZESTRIL) tablet 2 5 mg  2 5 mg Oral Daily    melatonin tablet 6 mg  6 mg Oral HS    metoprolol succinate (TOPROL-XL) 24 hr tablet 50 mg  50 mg Oral Q12H    nortriptyline (PAMELOR) capsule 10 mg  10 mg Oral HS    ondansetron (ZOFRAN) injection 4 mg  4 mg Intravenous Q6H PRN    oxyCODONE (ROXICODONE) IR tablet 2 5 mg  2 5 mg Oral Q6H PRN    pantoprazole (PROTONIX) EC tablet 40 mg  40 mg Oral BID AC    potassium chloride (K-DUR,KLOR-CON) CR tablet 20 mEq  20 mEq Oral Daily    senna-docusate sodium (SENOKOT S) 8 6-50 mg per tablet 1 tablet  1 tablet Oral HS    thiamine (VITAMIN B1) tablet 100 mg  100 mg Oral Daily    and PTA meds:   Prior to Admission Medications   Prescriptions Last Dose Informant Patient Reported? Taking?    FEROSUL 325 (65 Fe) MG tablet Unknown at Unknown time  No No   Sig: TAKE ONE TABLET BY MOUTH DAILY WITH BREAKFAST   FLUoxetine (PROzac) 20 mg capsule Unknown at Unknown time  No No   Sig: TAKE 1 CAPSULE (20 MG TOTAL) BY MOUTH DAILY   Lidocaine (ASPERCREME LIDOCAINE) 4 % PTCH Unknown at Unknown time  No No   Sig: Apply 1 patch topically every 12 (twelve) hours   acetaminophen (TYLENOL) 325 mg tablet Not Taking at Unknown time  No No   Sig: Take 2 tablets (650 mg total) by mouth every 6 (six) hours   Patient not taking: Reported on 8/5/2020   amiodarone 200 mg tablet Unknown at Unknown time  No No   Sig: TAKE 1 TABLET (200 MG TOTAL) BY MOUTH DAILY   apixaban (ELIQUIS) 5 mg Unknown at Unknown time  No No   Sig: Take 1 tablet (5 mg total) by mouth 2 (two) times a day   ascorbic acid (VITAMIN C) 500 mg tablet Unknown at Unknown time  No No   Sig: TAKE ONE TABLET BY MOUTH DAILY   atorvastatin (LIPITOR) 20 mg tablet Unknown at Unknown time  No No   Sig: TAKE 1 TABLET (20 MG TOTAL) BY MOUTH DAILY WITH DINNER   bisacodyl (DULCOLAX) 10 mg suppository Unknown at Unknown time  Yes No   Sig: Insert 10 mg into the rectum daily as needed for constipation   clopidogrel (PLAVIX) 75 mg tablet Unknown at Unknown time  No No   Sig: TAKE 1 TABLET (75 MG TOTAL) BY MOUTH DAILY   digoxin (LANOXIN) 0 125 mg tablet Unknown at Unknown time  No No   Sig: TAKE 1 TABLET (0 125 MG TOTAL) BY MOUTH DAILY   folic acid (FOLVITE) 1 mg tablet Unknown at Unknown time  No No   Sig: TAKE ONE TABLET BY MOUTH DAILY   furosemide (LASIX) 40 mg tablet Unknown at Unknown time  No No   Sig: TAKE 1 TABLET (40 MG TOTAL) BY MOUTH DAILY   lisinopril (ZESTRIL) 5 mg tablet Unknown at Unknown time  No No   Sig: TAKE 1 TABLET (5 MG TOTAL) BY MOUTH DAILY   melatonin 3 mg Unknown at Unknown time  No No   Sig: Take 2 tablets (6 mg total) by mouth daily at bedtime   methocarbamol (ROBAXIN) 500 mg tablet Unknown at Unknown time  No No   Sig: Take 1 tablet (500 mg total) by mouth once as needed for muscle spasms for up to 1 dose   metoprolol succinate (TOPROL-XL) 50 mg 24 hr tablet Unknown at Unknown time  No No   Sig: TAKE 1 TABLET (50 MG TOTAL) BY MOUTH EVERY 12 (TWELVE) HOURS   nortriptyline (PAMELOR) 10 mg capsule Unknown at Unknown time  No No   Sig: TAKE ONE CAPSULE BY MOUTH AT BEDTIME   pantoprazole (PROTONIX) 40 mg tablet Unknown at Unknown time  No No   Sig: Take 1 tablet (40 mg total) by mouth 2 (two) times a day before meals   senna-docusate sodium (SENOKOT S) 8 6-50 mg per tablet Unknown at Unknown time  No No   Sig: Take 1 tablet by mouth daily at bedtime   thiamine 100 MG tablet Unknown at Unknown time  No No   Sig: Take 1 tablet (100 mg total) by mouth daily      Facility-Administered Medications: None     No Known Allergies    Objective   I/O       08/06 0701 - 08/07 0700 08/07 0701 - 08/08 0700 08/08 0701 - 08/09 0700    P  O  480 640 480    I V  (mL/kg)       Total Intake(mL/kg) 480 (6 4) 640 (8 1) 480 (6 1)    Urine (mL/kg/hr) 1275 (0 7) 1150 (0 6)     Total Output 1275 1150     Net -795 -510 +480           Unmeasured Urine Occurrence 1 x            Physical Exam  Constitutional:       Appearance: Normal appearance  HENT:      Head: Normocephalic and atraumatic  Eyes:      Extraocular Movements: Extraocular movements intact  Neck:      Musculoskeletal: Normal range of motion  Pulmonary:      Effort: Pulmonary effort is normal    Musculoskeletal:         General: Tenderness present  Skin:     General: Skin is warm  Neurological:      Mental Status: She is alert  GCS: GCS eye subscore is 4  GCS verbal subscore is 5  GCS motor subscore is 6  Cranial Nerves: Cranial nerves are intact  Sensory: Sensation is intact  Motor: Weakness present  Coordination: Finger-Nose-Finger Test normal       Deep Tendon Reflexes: Reflexes are normal and symmetric  Babinski sign absent on the right side  Babinski sign absent on the left side        Reflex Scores:       Tricep reflexes are 2+ on the right side and 2+ on the left side        Bicep reflexes are 2+ on the right side and 2+ on the left side  Brachioradialis reflexes are 2+ on the right side and 2+ on the left side  Patellar reflexes are 2+ on the right side and 2+ on the left side  Achilles reflexes are 2+ on the right side and 2+ on the left side  Comments: Slight  strength weakness right side more than left side, limited range of motion of right shoulder abduction-chronic related to history of right shoulder surgery   Psychiatric:         Speech: Speech normal        Neurologic Exam     Mental Status   Speech: speech is normal   Level of consciousness: alert    Cranial Nerves     CN III, IV, VI   Nystagmus: none     CN XI   CN XI normal      Motor Exam   Muscle bulk: normal  Overall muscle tone: normal  Right arm tone: normal  Left arm tone: normal  Right arm pronator drift: absent  Left arm pronator drift: absent  Right leg tone: normal  Left leg tone: normal    Sensory Exam   Light touch normal      Gait, Coordination, and Reflexes     Coordination   Finger to nose coordination: normal    Reflexes   Right brachioradialis: 2+  Left brachioradialis: 2+  Right biceps: 2+  Left biceps: 2+  Right triceps: 2+  Left triceps: 2+  Right patellar: 2+  Left patellar: 2+  Right achilles: 2+  Left achilles: 2+  Right : 1+  Left : 1+ (Right side more than left side  weakness)  Right plantar: normal  Left plantar: normal  Right Mendoza: present  Left Mendoza: absent  Right ankle clonus: absent  Left ankle clonus: absent      Vitals:Blood pressure 138/65, pulse 94, temperature 98 9 °F (37 2 °C), temperature source Oral, resp  rate 18, height 5' 5" (1 651 m), weight 78 8 kg (173 lb 11 6 oz), SpO2 94 %  ,Body mass index is 28 91 kg/m²       Lab Results:   Results from last 7 days   Lab Units 08/08/20  0445 08/07/20  0458 08/06/20  0517   WBC Thousand/uL 6 38 6 68 9 44   HEMOGLOBIN g/dL 11 6 11 5 10 8*   HEMATOCRIT % 37 1 37 0 33 8*   PLATELETS Thousands/uL 275 271 251   NEUTROS PCT % 70 76* 77*   MONOS PCT % 11 7 8     Results from last 7 days   Lab Units 08/08/20 0445 08/07/20  0458 08/06/20  0530  08/05/20  0032   POTASSIUM mmol/L 4 1 3 7 3 9   < > 3 8   CHLORIDE mmol/L 107 105 107   < > 102   CO2 mmol/L 28 25 25   < > 22   BUN mg/dL 23 24 18   < > 14   CREATININE mg/dL 1 08 1 19 0 98   < > 1 23   CALCIUM mg/dL 8 9 8 5 8 0*   < > 9 5   ALK PHOS U/L 72 62  --   --  96   ALT U/L 23 24  --   --  32   AST U/L 22 20  --   --  25    < > = values in this interval not displayed  Results from last 7 days   Lab Units 08/08/20 0445 08/07/20 0458 08/05/20  0441   MAGNESIUM mg/dL 2 5 2 6 3 3*     Results from last 7 days   Lab Units 08/08/20 0445 08/07/20 0458 08/05/20  0032   PHOSPHORUS mg/dL 3 8 3 4 4 1         No results found for: TROPONINT  ABG:No results found for: PHART, YPW2CVJ, PO2ART, XBE7LWE, P5FTDJLS, BEART, SOURCE    Imaging Studies: I have personally reviewed pertinent reports   , I have personally reviewed pertinent films in PACS and I have personally reviewed pertinent films in PACS with a Radiologist     EKG, Pathology, and Other Studies: I have personally reviewed pertinent reports   , I have personally reviewed pertinent films in PACS and I have personally reviewed pertinent films in PACS with a Radiologist     VTE Prophylaxis: Sequential compression device (Venodyne)     Code Status: Level 1 - Full Code  Advance Directive and Living Will: Yes    Power of :    POLST:      Counseling / Coordination of Care  I spent 20 minutes with the patient

## 2020-08-08 NOTE — ASSESSMENT & PLAN NOTE
I had a long discussion with patient today about goals of care  Patient clearly states she does not want to be resuscitated in case if her heart stops and she does not want to be intubated if she stops breathing  However she accepts all the other ongoing medical treatment  She does not want anything heroic measures or aggressive management    Updated code status to level 3 DNAR/DNI

## 2020-08-09 NOTE — ASSESSMENT & PLAN NOTE
· With patient endorsing worsening mood and limited energy secondary to chronic pain  · Patient admitting to frequent thoughts of wishing she would be dead and did allude to some suicidal ideation if she had a gun at home (patient reports she does not have gun at home ) per admitting physician  · Patient does not recall making these statements - Appreciate psychiatry input, ok to discontinue 1:1     · Continue Zoloft 20 mg daily (concerned patient is not taking this at home)  · Continue Nortriptyline  · Inpatient psychiatry signed off, and does not need any further intervention    Recommend close follow-up with PCP and psychiatrist

## 2020-08-09 NOTE — ASSESSMENT & PLAN NOTE
· Urgency on admission likely secondary to medication noncompliance  Episode of hypotension on Thursday    · Most recent blood pressure reviewed, 120/65  · Per Cardiology, restarted lisinopril 2 5 mg PO daily  · Continue metoprolol XL 50 mg twice daily and Lasix 40 mg po daily  · Monitor

## 2020-08-09 NOTE — ASSESSMENT & PLAN NOTE
Wt Readings from Last 3 Encounters:   08/09/20 75 4 kg (166 lb 3 6 oz)   08/06/20 86 4 kg (190 lb 7 6 oz)   03/02/20 73 9 kg (163 lb)     · Does have a history of tachy mediated cardiomyopathy with a recovered EF on last TTE in 2019  · Updated echo 8/5: "LVEF 45%, there was hypokinesis of the mid anteroseptal, apical septal, and apical wall(s), wall thickness was mildly to moderately increased   Concentric hypertrophy was present "  · Cardiology following, appreciate input  · Given Lasix 40 mg IV x1 on 8/5  · Now on Lasix 40 mg daily   · Holding Lisinopril 5 mg daily due to hypotension   · Resumed Metoprolol succinate 50 mg q 12 hours   · I&Os, daily weights, low-sodium diet

## 2020-08-09 NOTE — PLAN OF CARE
Problem: Prexisting or High Potential for Compromised Skin Integrity  Goal: Skin integrity is maintained or improved  Description: INTERVENTIONS:  - Identify patients at risk for skin breakdown  - Assess and monitor skin integrity  - Assess and monitor nutrition and hydration status  - Monitor labs   - Assess for incontinence   - Turn and reposition patient  - Assist with mobility/ambulation  - Relieve pressure over bony prominences  - Avoid friction and shearing  - Provide appropriate hygiene as needed including keeping skin clean and dry  - Evaluate need for skin moisturizer/barrier cream  - Collaborate with interdisciplinary team   - Patient/family teaching  - Consider wound care consult   Outcome: Progressing     Problem: PAIN - ADULT  Goal: Verbalizes/displays adequate comfort level or baseline comfort level  Description: Interventions:  - Encourage patient to monitor pain and request assistance  - Assess pain using appropriate pain scale  - Administer analgesics based on type and severity of pain and evaluate response  - Implement non-pharmacological measures as appropriate and evaluate response  - Consider cultural and social influences on pain and pain management  - Notify physician/advanced practitioner if interventions unsuccessful or patient reports new pain  Outcome: Progressing     Problem: INFECTION - ADULT  Goal: Absence or prevention of progression during hospitalization  Description: INTERVENTIONS:  - Assess and monitor for signs and symptoms of infection  - Monitor lab/diagnostic results  - Monitor all insertion sites, i e  indwelling lines, tubes, and drains  - Monitor endotracheal if appropriate and nasal secretions for changes in amount and color  - [place] appropriate cooling/warming therapies per order  - Administer medications as ordered  - Instruct and encourage patient and family to use good hand hygiene technique  - Identify and instruct in appropriate isolation precautions for identified infection/condition  Outcome: Progressing     Problem: SAFETY ADULT  Goal: Patient will remain free of falls  Description: INTERVENTIONS:  - Assess patient frequently for physical needs  -  Identify cognitive and physical deficits and behaviors that affect risk of falls    -  [place] fall precautions as indicated by assessment   - Educate patient/family on patient safety including physical limitations  - Instruct patient to call for assistance with activity based on assessment  - Modify environment to reduce risk of injury  - Consider OT/PT consult to assist with strengthening/mobility  Outcome: Progressing  Goal: Maintain or return to baseline ADL function  Description: INTERVENTIONS:  -  Assess patient's ability to carry out ADLs; assess patient's baseline for ADL function and identify physical deficits which impact ability to perform ADLs (bathing, care of mouth/teeth, toileting, grooming, dressing, etc )  - Assess/evaluate cause of self-care deficits   - Assess range of motion  - Assess patient's mobility; develop plan if impaired  - Assess patient's need for assistive devices and provide as appropriate  - Encourage maximum independence but intervene and supervise when necessary  - Involve family in performance of ADLs  - Assess for home care needs following discharge   - Consider OT consult to assist with ADL evaluation and planning for discharge  - Provide patient education as appropriate  Outcome: Progressing  Goal: Maintain or return mobility status to optimal level  Description: INTERVENTIONS:  - Assess patient's baseline mobility status (ambulation, transfers, stairs, etc )    - Identify cognitive and physical deficits and behaviors that affect mobility  - Identify mobility aids required to assist with transfers and/or ambulation (gait belt, sit-to-stand, lift, walker, cane, etc )  - [place] fall precautions as indicated by assessment  - Record patient progress and toleration of activity level on Mobility SBAR; progress patient to next Phase/Stage  - Instruct patient to call for assistance with activity based on assessment  - Consider rehabilitation consult to assist with strengthening/weightbearing, etc   Outcome: Progressing     Problem: DISCHARGE PLANNING  Goal: Discharge to home or other facility with appropriate resources  Description: INTERVENTIONS:  - Identify barriers to discharge w/patient and caregiver  - Arrange for needed discharge resources and transportation as appropriate  - Identify discharge learning needs (meds, wound care, etc )  - Arrange for interpretive services to assist at discharge as needed  - Refer to Case Management Department for coordinating discharge planning if the patient needs post-hospital services based on physician/advanced practitioner order or complex needs related to functional status, cognitive ability, or social support system  Outcome: Progressing     Problem: Knowledge Deficit  Goal: Patient/family/caregiver demonstrates understanding of disease process, treatment plan, medications, and discharge instructions  Description: Complete learning assessment and assess knowledge base  Interventions:  - Provide teaching at level of understanding  - Provide teaching via preferred learning methods  Outcome: Progressing     Problem: Nutrition/Hydration-ADULT  Goal: Nutrient/Hydration intake appropriate for improving, restoring or maintaining nutritional needs  Description: Monitor and assess patient's nutrition/hydration status for malnutrition  Collaborate with interdisciplinary team and initiate plan and interventions as ordered  Monitor patient's weight and dietary intake as ordered or per policy  Utilize nutrition screening tool and intervene as necessary  Determine patient's food preferences and provide high-protein, high-caloric foods as appropriate       INTERVENTIONS:  - Monitor oral intake, urinary output, labs, and treatment plans  - Assess nutrition and hydration status and recommend course of action  - Evaluate amount of meals eaten  - Assist patient with eating if necessary   - Allow adequate time for meals  - Recommend/ encourage appropriate diets, oral nutritional supplements, and vitamin/mineral supplements  - Order, calculate, and assess calorie counts as needed  - Recommend, monitor, and adjust tube feedings and TPN/PPN based on assessed needs  - Assess need for intravenous fluids  - Provide specific nutrition/hydration education as appropriate  - Include patient/family/caregiver in decisions related to nutrition  Outcome: Progressing     Problem: Potential for Falls  Goal: Patient will remain free of falls  Description: INTERVENTIONS:  - Assess patient frequently for physical needs  -  Identify cognitive and physical deficits and behaviors that affect risk of falls    -  [place] fall precautions as indicated by assessment   - Educate patient/family on patient safety including physical limitations  - Instruct patient to call for assistance with activity based on assessment  - Modify environment to reduce risk of injury  - Consider OT/PT consult to assist with strengthening/mobility  Outcome: Progressing

## 2020-08-09 NOTE — ASSESSMENT & PLAN NOTE
With associated muscle spasms  Patient has had an ongoing history of neck pain for 30 years  She was supposed to follow-up with neurosurgery as an outpatient in 2019, however, was unable to do so due to lack of transportation  Presently, she continues to have neck pain and decreased range of motion    She does have neck spasms on exam   · MRI of cervical spine: multilevel DJD, disc bulges at C4 through C5  · Consulted Neurosurgery for evaluation  · Continue scheduled Tylenol, Lidoderm patch, an Aqua K-pad  · Trial oxycodone 5 mg IR PRN  (patient aware she will not be discharged home on opioids)  · PT/OT - recommending short-term rehab

## 2020-08-09 NOTE — ASSESSMENT & PLAN NOTE
· PDMP reviewed, appears not to have received any opioids as outpatient since 01/2019  · Urine drug screen positive for opiates  · Longstanding history of chronic back and neck pain and frequently complaining of these throughout my evaluation  · Patient reports persistent neck tenderness and muscle spasms resulting in decreased range of motion and severe pain  · PT/OT evaluations - recommending STR   · Continue non opioid pain control with Tylenol, lidocaine patch, aqua K-pad  · Trial short course of Oxycodone IR PRN - patient aware she will not be discharge home with an Rx  · MRI cervical spine with multilevel DJD, disc bulges at C4 through C5  · Consulted Neurosurgery for evaluation patient currently does not need emergent neurosurgery  patient to follow-up with outpatient pain management and physical therapy    Neuro surgery signed off

## 2020-08-09 NOTE — PROGRESS NOTES
Advanced Heart Failure/Pulmonary Hypertension Service Progress Note - Susanna Kang 79 y o  female MRN: [id number]    Unit/Bed#: [facility] 408-01 Encounter: [id number]      Assessment:    Principal Problem:    Atrial fibrillation with RVR ([facility] [address] )  Active Problems:    Elevated troponin    Chronic pain disorder    Essential hypertension    Coronary artery disease    Goals of care, counseling/discussion    Acute on chronic diastolic congestive heart failure (HCC)    Pain of cervical spine    Depression    DM2 (diabetes mellitus, type 2) ([facility])    Plan:  --Continue amiodarone PO, digoxin, BB, and apixaban  --Continue lasix 40 mg PO daily as has +JVD and AJR on exam  --Consider PSG as outpatient  --Resumed low dose lisinopril 2 5 mg PO daily on 8/8, BPs ok  Continue current dose  Assessment:  # Chronic Afib w/ RVR ([facility]): most likely 2/2 to noncompliance with meds  --Rates now controlled on amiodarone PO, digoxin, and BB    # Newly reduced BiV HFmEF, LVEF 45%, FC II/III, ACC/AHA Stage C:  Diag:  --TTE 8/5/2020: LVEF 45%  HK of mid anterior septal and apical septal walls  Mildly reduced RVSF  MELODY  Mod MR  Mild to mod TR  Dagger shaped RVOT  Impression: Most likely 2/2 to tachy-myopathy given off meds and BiV dysfunction without significant dilation  If it does not improve with HR control, then can consider further workup for etiologies, ie myocarditis, ischemia, etc     # Non MI troponin 2/2 to AFib w/ RVR and likely resultant volume overload  # CAD s/p NGOZI to [facility] 7/2018  # HTN  # HLD      Subjective:   Patient seen and examined  No significant events overnight  Objective:       [employer] (day, reason): [place] catheter (day, reason):    Vitals: Blood pressure 120/65, pulse 81, temperature (!) 97 4 °F (36 3 °C), temperature source Oral, resp  rate 14, height 5' 5" (1 651 m), weight 75 4 kg (166 lb 3 6 oz), SpO2 97 %  , Body mass index is 27 66 kg/m² , I/O last 3 completed shifts:   In: 780 [P O :780]  Out: 1225 [HGXBD:5587]  I/O this shift:  In: 320 [P O :320]  Out: -   Wt Readings from Last 3 Encounters:   08/09/20 75 4 kg (166 lb 3 6 oz)   08/06/20 86 4 kg (190 lb 7 6 oz)   03/02/20 73 9 kg (163 lb)       Intake/Output Summary (Last 24 hours) at 8/9/2020 0844  Last data filed at 8/9/2020 0734  Gross per 24 hour   Intake 620 ml   Output 925 ml   Net -305 ml     I/O last 3 completed shifts: In: 80 [P O :780]  Out: 1225 [Urine:1225]    No significant arrhythmias seen on telemetry review       Physical Exam:  Vitals:    08/08/20 2248 08/09/20 0306 08/09/20 0600 08/09/20 0734   BP: 125/58 107/55  120/65   BP Location: Left arm Left arm  Left arm   Pulse: 104 90  81   Resp: 17 18  14   Temp: 97 9 °F (36 6 °C) 97 5 °F (36 4 °C)  (!) 97 4 °F (36 3 °C)   TempSrc: Oral Oral  Oral   SpO2: 95% 96%  97%   Weight:   75 4 kg (166 lb 3 6 oz)    Height:           GEN: Ethan Olivares appears well, alert and oriented x 3, pleasant and cooperative   HEENT: pupils equal, round, and reactive to light; extraocular muscles intact  NECK: supple, no carotid bruits   HEART: regular rhythm, normal S1 and S2, no murmurs, clicks, gallops or rubs, JVP is    LUNGS: clear to auscultation bilaterally; no wheezes, rales, or rhonchi   ABDOMEN: normal bowel sounds, soft, no tenderness, no distention  EXTREMITIES: peripheral pulses normal; no clubbing, cyanosis, or edema  NEURO: no focal findings   SKIN: normal without suspicious lesions on exposed skin      Current Facility-Administered Medications:     acetaminophen (TYLENOL) tablet 975 mg, 975 mg, Oral, Q8H [address], Leti Cabrera MD, 975 mg at 08/09/20 6944    amiodarone tablet 200 mg, 200 mg, Oral, Daily With Breakfast, Leti Cabrera MD, 200 mg at 08/08/20 0818    apixaban (ELIQUIS) tablet 5 mg, 5 mg, Oral, BID, Leti Cabrera MD, 5 mg at 08/08/20 1800    ascorbic acid (VITAMIN C) tablet 500 mg, 500 mg, Oral, Daily, Leti Cabrera MD, 500 mg at 08/08/20 0818    atorvastatin (LIPITOR) tablet 20 mg, 20 mg, Oral, Daily With Dank Watson MD, 20 mg at 08/08/20 1757    bisacodyl (DULCOLAX) rectal suppository 10 mg, 10 mg, Rectal, Daily PRN, Lurdes Bear MD    clopidogrel (PLAVIX) tablet 75 mg, 75 mg, Oral, Daily, Lurdes Bear MD, 75 mg at 08/08/20 0817    digoxin (LANOXIN) tablet 125 mcg, 125 mcg, Oral, Daily, Lurdes Bear MD, 125 mcg at 08/08/20 3268    ferrous sulfate tablet 325 mg, 325 mg, Oral, Daily With Breakfast, Lurdes Bear MD, 325 mg at 08/08/20 0818    FLUoxetine (PROzac) capsule 20 mg, 20 mg, Oral, Daily, Lurdes Bear MD, 20 mg at 01/41/60 5320    folic acid (FOLVITE) tablet 1,000 mcg, 1,000 mcg, Oral, Daily, Lurdes Bear MD, 1,000 mcg at 08/08/20 0817    furosemide (LASIX) tablet 40 mg, 40 mg, Oral, Daily, Lurdes Bear MD, 40 mg at 08/08/20 0818    insulin lispro (HumaLOG) 100 units/mL subcutaneous injection 1-5 Units, 1-5 Units, Subcutaneous, TID AC, 1 Units at 08/09/20 0651 **AND** Fingerstick Glucose (POCT), , , TID AC, Leti Cabrera MD    insulin lispro (HumaLOG) 100 units/mL subcutaneous injection 1-6 Units, 1-6 Units, Subcutaneous, HS, Lurdes Bear MD    lidocaine (LIDODERM) 5 % patch 1 patch, 1 patch, Topical, Daily, Lurdes Bear MD, 1 patch at 08/08/20 0818    lisinopril (ZESTRIL) tablet 2 5 mg, 2 5 mg, Oral, Daily, Napoleon Treviño MD, 2 5 mg at 08/08/20 1433    melatonin tablet 6 mg, 6 mg, Oral, HS, Lurdes Bear MD, 6 mg at 08/08/20 2207    metoprolol succinate (TOPROL-XL) 24 hr tablet 50 mg, 50 mg, Oral, Q12H, Oscar Diaz CHANDRIKA Lomax, 50 mg at 08/08/20 2053    nortriptyline (PAMELOR) capsule 10 mg, 10 mg, Oral, HS, Lurdes Bear MD, 10 mg at 08/08/20 2208    ondansetron (ZOFRAN) injection 4 mg, 4 mg, Intravenous, Q6H PRN, Lurdes Bear MD    oxyCODONE (ROXICODONE) IR tablet 2 5 mg, 2 5 mg, Oral, Q6H PRN, Lurdes Bear MD, 2 5 mg at 08/09/20 0304    pantoprazole (PROTONIX) EC tablet 40 mg, 40 mg, Oral, BID AC, Leti Cabrera MD, 40 mg at 08/09/20 0648    potassium chloride (K-DUR,KLOR-CON) CR tablet 20 mEq, 20 mEq, Oral, Daily, Leti Cabrera MD, 20 mEq at 08/08/20 0818    senna-docusate sodium (SENOKOT S) 8 6-50 mg per tablet 1 tablet, 1 tablet, Oral, HS, Jim Carreon MD, 1 tablet at 08/08/20 2208    thiamine (VITAMIN B1) tablet 100 mg, 100 mg, Oral, Daily, Leti Cabrera MD, 100 mg at 08/08/20 0817    traMADol (ULTRAM) tablet 50 mg, 50 mg, Oral, Q6H PRN, Yana Meng PA-C, 50 mg at 08/08/20 2211      Labs & Results:    Results from last 7 days   Lab Units 08/05/20  0638 08/05/20  0441 08/05/20  0032   TROPONIN I ng/mL 0 20* 0 18* 0 06*     Results from last 7 days   Lab Units 08/08/20  0445 08/07/20  0458 08/06/20  0517   WBC Thousand/uL 6 38 6 68 9 44   HEMOGLOBIN g/dL 11 6 11 5 10 8*   HEMATOCRIT % 37 1 37 0 33 8*   PLATELETS Thousands/uL 275 271 251         Results from last 7 days   Lab Units 08/08/20  0445 08/07/20  0458 08/06/20  0530  08/05/20  0032   POTASSIUM mmol/L 4 1 3 7 3 9   < > 3 8   CHLORIDE mmol/L 107 105 107   < > 102   CO2 mmol/L 28 25 25   < > 22   BUN mg/dL 23 24 18   < > 14   CREATININE mg/dL 1 08 1 19 0 98   < > 1 23   CALCIUM mg/dL 8 9 8 5 8 0*   < > 9 5   ALK PHOS U/L 72 62  --   --  96   ALT U/L 23 24  --   --  32   AST U/L 22 20  --   --  25    < > = values in this interval not displayed  EKG personally reviewed by Elizabet Burk MD      Counseling / Coordination of Care  Total floor / unit time spent today 20 minutes  Greater than 50% of total time was spent with the patient and / or family counseling and / or coordination of care  A description of the counseling / coordination of care: 10 min  Thank you for the opportunity to participate in the care of this patient      Elizabet Burk MD, PhD   Nilay Maddox

## 2020-08-09 NOTE — PROGRESS NOTES
Progress Note - Blanquita Art [birth year], 79 y o  female MRN: [id number]    Unit/Bed#: [facility] 408-01 Encounter: [id number]    Primary Care Provider: Fabian Degroot DO   Date and time admitted to hospital: 8/5/2020 12:20 AM        * Atrial fibrillation with RVR ([facility] )  Assessment & Plan  · Patient with shortness of breath and slight chest pressure prompting call to EMS  Found in wide complex rhythm  · Placed on amiodarone GTT after bolus in ED with improvement in rate and without wide complex  · Patient remains in atrial fibrillation with RVR  Likely secondary to medication noncompliance for about 1 week  · Cardiology following, appreciate input  · Continue Amiodarone 200 mg po daily   · Continue digoxin 0 125 mcg po daily  · Continue Metoprolol succinate 50 mg q 12 hours   · Continue Eliquis   · Monitor on telemetry monitor   · TSH mildly elevated at 8 760 however T4 normal   No history thyroid disease  DM2 (diabetes mellitus, type 2) ([facility] )  Assessment & Plan  Lab Results   Component Value Date    HGBA1C 7 6 (H) 08/05/2020     · No reported history of diabetes, A1c 7 6 and patient noted to be hyperglycemic  · Start SSI  · Diabetic diet  · Monitor Accu-Cheks and adjust as needed  · Would benefit from an oral agent on discharge and close outpatient follow-up with PCP  Would also recommend glucometer on discharge as patient should be checking BG at home  · Nutrition consult    Depression  Assessment & Plan  · With patient endorsing worsening mood and limited energy secondary to chronic pain  · Patient admitting to frequent thoughts of wishing she would be dead and did allude to some suicidal ideation if she had a gun at home (patient reports she does not have gun at home ) per admitting physician    · Patient does not recall making these statements - Appreciate psychiatry input, ok to discontinue 1:1     · Continue Zoloft 20 mg daily (concerned patient is not taking this at home)  · Continue Nortriptyline  · Inpatient psychiatry signed off, and does not need any further intervention  Recommend close follow-up with PCP and psychiatrist     Pain of cervical spine  Assessment & Plan  With associated muscle spasms  Patient has had an ongoing history of neck pain for 30 years  She was supposed to follow-up with neurosurgery as an outpatient in 2019, however, was unable to do so due to lack of transportation  Presently, she continues to have neck pain and decreased range of motion  She does have neck spasms on exam   · MRI of cervical spine: multilevel DJD, disc bulges at C4 through C5  · Consulted Neurosurgery for evaluation  · Continue scheduled Tylenol, Lidoderm patch, an Aqua K-pad  · Trial oxycodone 5 mg IR PRN  (patient aware she will not be discharged home on opioids)  · PT/OT - recommending short-term rehab    Acute on chronic diastolic congestive heart failure (HCC)  Assessment & Plan  Wt Readings from Last 3 Encounters:   08/09/20 75 4 kg (166 lb 3 6 oz)   08/06/20 86 4 kg (190 lb 7 6 oz)   03/02/20 73 9 kg (163 lb)     · Does have a history of tachy mediated cardiomyopathy with a recovered EF on last TTE in 2019  · Updated echo 8/5: "LVEF 45%, there was hypokinesis of the mid anteroseptal, apical septal, and apical wall(s), wall thickness was mildly to moderately increased  Concentric hypertrophy was present "  · Cardiology following, appreciate input  · Given Lasix 40 mg IV x1 on 8/5  · Now on Lasix 40 mg daily   · Holding Lisinopril 5 mg daily due to hypotension   · Resumed Metoprolol succinate 50 mg q 12 hours   · I&Os, daily weights, low-sodium diet    Goals of care, counseling/discussion  Assessment & Plan  I had a long discussion with patient today about goals of care  Patient clearly states she does not want to be resuscitated in case if her heart stops and she does not want to be intubated if she stops breathing  However she accepts all the other ongoing medical treatment    She does not want anything heroic measures or aggressive management  Updated code status to level 3 DNAR/DNI    Coronary artery disease  Assessment & Plan  · S/p PCI to RCA in 2018  · Will continue Plavix, beta-blocker, statin  · No longer on aspirin due to history of bleeding  · Presently, denies chest pain    Essential hypertension  Assessment & Plan  · Urgency on admission likely secondary to medication noncompliance  Episode of hypotension on Thursday  · Most recent blood pressure reviewed, 120/65  · Per Cardiology, restarted lisinopril 2 5 mg PO daily  · Continue metoprolol XL 50 mg twice daily and Lasix 40 mg po daily  · Monitor    Chronic pain disorder  Assessment & Plan  · PDMP reviewed, appears not to have received any opioids as outpatient since 01/2019  · Urine drug screen positive for opiates  · Longstanding history of chronic back and neck pain and frequently complaining of these throughout my evaluation  · Patient reports persistent neck tenderness and muscle spasms resulting in decreased range of motion and severe pain  · PT/OT evaluations - recommending STR   · Continue non opioid pain control with Tylenol, lidocaine patch, aqua K-pad  · Trial short course of Oxycodone IR PRN - patient aware she will not be discharge home with an Rx  · MRI cervical spine with multilevel DJD, disc bulges at C4 through C5  · Consulted Neurosurgery for evaluation patient currently does not need emergent neurosurgery  patient to follow-up with outpatient pain management and physical therapy  Neuro surgery signed off    Elevated troponin  Assessment & Plan  · Non MI troponin elevation with peak troponin of 0 20  Likely secondary to AFib with RVR  EKG otherwise without ischemic changes  · No complaints of chest pain        VTE Pharmacologic Prophylaxis:   Pharmacologic: Apixaban (Eliquis)  Mechanical VTE Prophylaxis in Place: Yes    Patient Centered Rounds: I have performed bedside rounds with nursing staff today     Discussions with Specialists or Other Care Team Provider:      Education and Discussions with Family / Patient:  Patient bedside    Time Spent for Care: 30 minutes  More than 50% of total time spent on counseling and coordination of care as described above  Current Length of Stay: 4 day(s)    Current Patient Status: Inpatient   Certification Statement: The patient will continue to require additional inpatient hospital stay due to AFib    Discharge Plan: Will need to be discharged to short-term rehab once medically clear    Code Status: Level 3 - DNAR and DNI      Subjective:   No acute overnight events  Denies any chest pain or short of breath  Continues to have neck pain that is not relieved with oxycodone 2 5 mg     Objective:     Vitals:   Temp (24hrs), Av 7 °F (36 5 °C), Min:97 2 °F (36 2 °C), Max:98 9 °F (37 2 °C)    Temp:  [97 2 °F (36 2 °C)-98 9 °F (37 2 °C)] 97 4 °F (36 3 °C)  HR:  [] 81  Resp:  [14-18] 14  BP: (107-138)/(55-68) 120/65  SpO2:  [92 %-97 %] 97 %  Body mass index is 27 66 kg/m²  Input and Output Summary (last 24 hours): Intake/Output Summary (Last 24 hours) at 2020 0849  Last data filed at 2020 0734  Gross per 24 hour   Intake 620 ml   Output 925 ml   Net -305 ml       Physical Exam:     Physical Exam  Constitutional:       Appearance: She is well-developed  Comments: Occasional distress upon movement of neck   HENT:      Head: Normocephalic and atraumatic  Neck:      Musculoskeletal: Normal range of motion  Cardiovascular:      Rate and Rhythm: Normal rate  Rhythm irregular  Pulmonary:      Effort: Pulmonary effort is normal  No respiratory distress  Breath sounds: Normal breath sounds  Skin:     General: Skin is warm and dry              Additional Data:     Labs:    Results from last 7 days   Lab Units 20  0445   WBC Thousand/uL 6 38   HEMOGLOBIN g/dL 11 6   HEMATOCRIT % 37 1   PLATELETS Thousands/uL 275   NEUTROS PCT % 70 LYMPHS PCT % 13*   MONOS PCT % 11   EOS PCT % 4     Results from last 7 days   Lab Units 08/08/20  0445   SODIUM mmol/L 140   POTASSIUM mmol/L 4 1   CHLORIDE mmol/L 107   CO2 mmol/L 28   BUN mg/dL 23   CREATININE mg/dL 1 08   ANION GAP mmol/L 5   CALCIUM mg/dL 8 9   ALBUMIN g/dL 2 8*   TOTAL BILIRUBIN mg/dL 0 47   ALK PHOS U/L 72   ALT U/L 23   AST U/L 22   GLUCOSE RANDOM mg/dL 136         Results from last 7 days   Lab Units 08/09/20  0536 08/08/20  2050 08/08/20  1603 08/08/20  1111 08/08/20  0541 08/07/20  2051 08/07/20  1557 08/07/20  1126 08/06/20  1645 08/06/20  1108 08/06/20  0700 08/05/20  2111   POC GLUCOSE mg/dl 179* 129 118 114 135 127 191* 145* 130 135 141* 141*     Results from last 7 days   Lab Units 08/05/20  0443   HEMOGLOBIN A1C % 7 6*               * I Have Reviewed All Lab Data Listed Above  * Additional Pertinent Lab Tests Reviewed:  [facility] Admission Reviewed    Recent Cultures (last 7 days):           Last 24 Hours Medication List:   Current Facility-Administered Medications   Medication Dose Route Frequency Provider Last Rate    acetaminophen  975 mg Oral Q8H Ino Jacobs MD      amiodarone  200 mg Oral Daily With Breakfast Sandy Cueva MD      apixaban  5 mg Oral BID Sandy Cueva MD      ascorbic acid  500 mg Oral Daily Sandy Cueva MD      atorvastatin  20 mg Oral Daily With Rosa Fry MD      bisacodyl  10 mg Rectal Daily PRN Sandy Cueva MD      clopidogrel  75 mg Oral Daily Sandy Cueva MD      digoxin  125 mcg Oral Daily Sandy Cueva MD      ferrous sulfate  325 mg Oral Daily With Breakfast Sandy Cueva MD      FLUoxetine  20 mg Oral Daily Sandy Cueva MD      folic acid  5,681 mcg Oral Daily Sandy Cueva MD      furosemide  40 mg Oral Daily Sandy Cueva MD      insulin lispro  1-5 Units Subcutaneous TID AC Leti Cabrera MD      insulin lispro  1-6 Units Subcutaneous HS Lamona Angelucci, MD      lidocaine  1 patch Topical Daily Lamona Angelucci, MD      lisinopril  2 5 mg Oral Daily Angeles Albarado MD      melatonin  6 mg Oral HS Lamona Angelucci, MD      metoprolol succinate  50 mg Oral Q12H CHANDRIKA Stokes      nortriptyline  10 mg Oral HS Lamona Angelucci, MD      ondansetron  4 mg Intravenous Q6H PRN Lamona Angelucci, MD      oxyCODONE  2 5 mg Oral Q6H PRN Lamona Angelucci, MD      pantoprazole  40 mg Oral BID AC Lamona Angelucci, MD      potassium chloride  20 mEq Oral Daily Lamona Angelucci, MD      senna-docusate sodium  1 tablet Oral HS Lamona Angelucci, MD      thiamine  100 mg Oral Daily Lamona Angelucci, MD      traMADol  50 mg Oral Q6H PRN Ashley Delaney PA-C          Today, Patient Was Seen By: Елена Ellison MD    ** Please Note: Dictation voice to text software may have been used in the creation of this document   **

## 2020-08-10 PROBLEM — N18.30 CKD (CHRONIC KIDNEY DISEASE) STAGE 3, GFR 30-59 ML/MIN (HCC): Status: ACTIVE | Noted: 2020-01-01

## 2020-08-10 NOTE — DISCHARGE SUMMARY
Discharge- Anali Julian [birth year], 79 y o  female MRN: [id number]    Unit/Bed#: [facility] 408-01 Encounter: [id number]    Primary Care Provider: Jaylon Ervin DO   Date and time admitted to hospital: 8/5/2020 12:20 AM        * Atrial fibrillation with RVR ([place] [address] )  Assessment & Plan  · Patient with shortness of breath and slight chest pressure prompting call to EMS  Found in wide complex rhythm  · Placed on amiodarone GTT after bolus in ED with improvement in rate and without wide complex  · Patient remains in atrial fibrillation with RVR  Likely secondary to medication noncompliance for about 1 week  · Cardiology following, appreciate input  · Continue Amiodarone 200 mg po daily   · Continue digoxin 0 125 mcg po daily  · Continue Metoprolol succinate 50 mg q 12 hours   · Continue Eliquis   · TSH mildly elevated at 8 760 however T4 normal   No history thyroid disease  · Her heart rate is currently well controlled  Cleared by cardiology for discharge  Sent 1 month supply of all the medication to her pharmacy    CKD (chronic kidney disease) stage 3, GFR 30-59 ml/min ([facility])  Assessment & Plan  CKD3 in the setting of hypertension and CHF evidenced by GFR 43-49 with a steady state creatinine since September 2019 requiring monitoring of renal function  DM2 (diabetes mellitus, type 2) ([place] [address] )  Assessment & Plan  Lab Results   Component Value Date    HGBA1C 7 6 (H) 08/05/2020     · No reported history of diabetes, A1c 7 6 and patient noted to be hyperglycemic  · Start SSI  · Diabetic diet  · Monitor Accu-Cheks and adjust as needed  · Will discharge patient on metformin  · Nutrition consult    Depression  Assessment & Plan  · With patient endorsing worsening mood and limited energy secondary to chronic pain      · Patient admitting to frequent thoughts of wishing she would be dead and did allude to some suicidal ideation if she had a gun at home (patient reports she does not have gun at home ) per admitting physician  · Patient does not recall making these statements - Appreciate psychiatry input, ok to discontinue 1:1     · Continue Zoloft 20 mg daily (concerned patient is not taking this at home)  · Continue Nortriptyline  · Inpatient psychiatry signed off, and does not need any further intervention  Recommend close follow-up with PCP and psychiatrist     Pain of cervical spine  Assessment & Plan  With associated muscle spasms  Patient has had an ongoing history of neck pain for 30 years  She was supposed to follow-up with neurosurgery as an outpatient in 2019, however, was unable to do so due to lack of transportation  Presently, she continues to have neck pain and decreased range of motion  She does have neck spasms on exam   · MRI of cervical spine: multilevel DJD, disc bulges at C4 through C5  · Consulted Neurosurgery for evaluation  · Continue scheduled Tylenol, Lidoderm patch, an Aqua K-pad  · Trial oxycodone 5 mg IR PRN  (patient aware she will not be discharged home on opioids)  · PT/OT - recommending short-term rehab, however patient refuses  Agreeable to go home with home health aide    Acute on chronic diastolic congestive heart failure (HCC)  Assessment & Plan  Wt Readings from Last 3 Encounters:   08/09/20 75 4 kg (166 lb 3 6 oz)   08/06/20 86 4 kg (190 lb 7 6 oz)   03/02/20 73 9 kg (163 lb)     · Does have a history of tachy mediated cardiomyopathy with a recovered EF on last TTE in 2019  · Updated echo 8/5: "LVEF 45%, there was hypokinesis of the mid anteroseptal, apical septal, and apical wall(s), wall thickness was mildly to moderately increased   Concentric hypertrophy was present "  · Cardiology following, appreciate input  · Given Lasix 40 mg IV x1 on 8/5  · Now on Lasix 40 mg daily   · Holding Lisinopril 5 mg daily due to hypotension   · Resumed Metoprolol succinate 50 mg q 12 hours   · I&Os, daily weights, low-sodium diet    Goals of care, counseling/discussion  Assessment & Plan  I had a long discussion with patient today about goals of care  Patient clearly states she does not want to be resuscitated in case if her heart stops and she does not want to be intubated if she stops breathing  However she accepts all the other ongoing medical treatment  She does not want anything heroic measures or aggressive management  Updated code status to level 3 DNAR/DNI    Coronary artery disease  Assessment & Plan  · S/p PCI to RCA in 2018  · Will continue Plavix, beta-blocker, statin  · No longer on aspirin due to history of bleeding  · Presently, denies chest pain    Essential hypertension  Assessment & Plan  · Urgency on admission likely secondary to medication noncompliance  Episode of hypotension on Thursday  · Most recent blood pressure reviewed, 120/65  · Per Cardiology, restarted lisinopril 2 5 mg PO daily  · Continue metoprolol XL 50 mg twice daily and Lasix 40 mg po daily  · Monitor    Chronic pain disorder  Assessment & Plan  · PDMP reviewed, appears not to have received any opioids as outpatient since 01/2019  · Urine drug screen positive for opiates  · Longstanding history of chronic back and neck pain and frequently complaining of these throughout my evaluation  · Patient reports persistent neck tenderness and muscle spasms resulting in decreased range of motion and severe pain  · PT/OT evaluations - recommending STR   · Continue non opioid pain control with Tylenol, lidocaine patch, aqua K-pad  · Trial short course of Oxycodone IR PRN - patient aware she will not be discharge home with an Rx  · MRI cervical spine with multilevel DJD, disc bulges at C4 through C5  · Consulted Neurosurgery for evaluation patient currently does not need emergent neurosurgery  patient to follow-up with outpatient pain management and physical therapy  Neuro surgery signed off  · PT OT recommended short-term rehab  However patient strongly refuses to go to rehab  She states she has errands to run at home  Agreeable to go home with home health  Will discharge patient with home health aide  Elevated troponin  Assessment & Plan  · Non MI troponin elevation with peak troponin of 0 20  Likely secondary to AFib with RVR  EKG otherwise without ischemic changes  · No complaints of chest pain  Discharging Physician / Practitioner: Leticia Fry MD  PCP: Patsy Segura DO  Admission Date:   Admission Orders (From admission, onward)     Ordered        08/05/20 0242  Inpatient Admission  Once                   Discharge Date: 08/10/20    Resolved Problems  Date Reviewed: 8/9/2020          Resolved    Leukocytosis 8/6/2020     Resolved by  Ramiro Bustamante, [address] [place] [address] Stay:  · Cardiology  · Psychiatry  · Neurosurgeon    Procedures Performed:   · None    Significant Findings / Test Results:   MRI C-spine:Extensive multilevel degenerative disc disease resulting in mild hepatic cord signal at the C3-C4 and C6 levels      Degenerative anterolisthesis at C3-C4 resulting in prominent osteophytic ridging along the inferior endplate at this level results in moderate to severe canal stenosis and bilateral neural foraminal stenosis        Prominent posterior disc osteophyte complex at C5-C6 and C6-C7 resulting in moderate to severe canal stenosis and bilateral neural foraminal stenosis  ·      Echocardiogram:SUMMARY     LEFT VENTRICLE:  Systolic function was mildly reduced  Ejection fraction was estimated to be 45 %  There was hypokinesis of the mid anteroseptal, apical septal, and apical wall(s)  Wall thickness was mildly to moderately increased  Concentric hypertrophy was present      RIGHT VENTRICLE:  The size was normal   Systolic function was mildly reduced  Wall thickness was normal      LEFT ATRIUM:  The atrium was dilated      RIGHT ATRIUM:  The atrium was dilated      MITRAL VALVE:  There was mild annular calcification    There was moderate regurgitation      TRICUSPID VALVE:  There was mild to moderate regurgitation  Estimated peak PA pressure was 40 mmHg      HISTORY: PRIOR HISTORY: Atrial fibrillation with rapid ventricular response, elevated troponin, congestive heart failure, hypertension, coronary artery disease, depression, diabetes mellitus type 2      PROCEDURE: The study was performed in the Medical Intensive Care Unit  This was a routine study  The transthoracic approach was used  The study included complete 2D imaging, M-mode, complete spectral Doppler, and color Doppler  The heart  rate was 102 bpm, at the start of the study  Echocardiographic views were limited due to restricted patient mobility  Image quality was adequate      LEFT VENTRICLE: Size was normal  Systolic function was mildly reduced  Ejection fraction was estimated to be 45 %  There was hypokinesis of the mid anteroseptal, apical septal, and apical wall(s)  Wall thickness was mildly to moderately  increased  Concentric hypertrophy was present  DOPPLER: Transmitral flow pattern: atrial fibrillation      RIGHT VENTRICLE: The size was normal  Systolic function was mildly reduced  Wall thickness was normal      LEFT ATRIUM: The atrium was dilated      RIGHT ATRIUM: The atrium was dilated      MITRAL VALVE: There was mild annular calcification  Valve structure was normal  There was mild-moderate diffuse thickening  There was normal leaflet separation  DOPPLER: The transmitral velocity was within the normal range  There was no  evidence for stenosis  There was moderate regurgitation      AORTIC VALVE: The valve was trileaflet  Leaflets exhibited mild calcification, normal cuspal separation, and sclerosis  DOPPLER: Transaortic velocity was within the normal range  There was no evidence for stenosis  There was no significant  regurgitation      TRICUSPID VALVE: The valve structure was normal  There was normal leaflet separation  DOPPLER: The transtricuspid velocity was within the normal range   There was no evidence for stenosis  There was mild to moderate regurgitation  Estimated  peak PA pressure was 40 mmHg      PULMONIC VALVE: Leaflets exhibited normal thickness, no calcification, and normal cuspal separation  DOPPLER: The transpulmonic velocity was within the normal range  There was trace regurgitation      PERICARDIUM: There was no pericardial effusion      AORTA: The root exhibited normal size      SYSTEMIC VEINS: IVC: The inferior vena cava was normal in size  EKG showed atrial fibrillation with rapid ventricular response    Incidental Findings:   · Non     Test Results Pending at Discharge (will require follow up): · None     Outpatient Tests Requested:  · None    Complications:  None    Reason for Admission:  Chest pain and short of breath    Hospital Course:     Claudetta Coach is a 79 y o  female patient who originally presented to the hospital on 8/5/2020 due to chest pain and short of breath, was found to have atrial fibrillation with RVR  Patient has known history of AFib with RVR however she stopped taking her meds for past 1 week prior to symptoms started  She was started on amiodarone drip in the ER with improvement in rate and rhythm  Patient was resumed on her home medication amiodarone 200 mg, digoxin, metoprolol and Eliquis  Patient was observe on tele monitor  Her heart rate remained stable  During her stay, she also complained of significant neck pain which is chronic in nature  An MRI has been obtained, neurosurgery has been consulted  Neurosurgery did not recommend any procedure, recommended outpatient physical therapy and follow-up outpatient with Neurosurgery only  Psychiatry has been consulted for depression, her home medication has resumed  PT and OT has been consulted who recommended short-term rehab  However patient absolutely refuses short-term rehab, and insisted of going home  Will discharge patient home with home VNA    Will also refer patient with ambulatory pain management  Refilled all her medication to her local pharmacy for 1 month  Please see above list of diagnoses and related plan for additional information  Condition at Discharge: good     Discharge Day Visit / Exam:     Subjective:  Continues to complain of neck pain  States she is  in the past   on chronic narcotic in the past, she has been weaned off of it  However continues to complain of neck pain, requesting to increase pain medication does  Discussed in detail with patient that I cannot discharge patient on narcotics  Patient to follow-up with pain management  Vitals: Blood Pressure: 131/66 (08/10/20 1047)  Pulse: 100 (08/10/20 1047)  Temperature: 97 7 °F (36 5 °C) (08/10/20 1047)  Temp Source: Oral (08/10/20 1047)  Respirations: 20 (08/10/20 1047)  Height: 5' 5" (165 1 cm) (08/06/20 1453)  Weight - Scale: 75 4 kg (166 lb 3 6 oz) (08/09/20 0600)  SpO2: 92 % (08/10/20 1047)  Exam:   Physical Exam  Constitutional:       Appearance: She is well-developed  HENT:      Head: Normocephalic and atraumatic  Neck:      Musculoskeletal: Normal range of motion  Pulmonary:      Effort: Pulmonary effort is normal  No respiratory distress  Breath sounds: Normal breath sounds  Skin:     General: Skin is warm and dry  Discharge instructions/Information to patient and family:   See after visit summary for information provided to patient and family  Provisions for Follow-Up Care:  See after visit summary for information related to follow-up care and any pertinent home health orders  Disposition:     Home with VNA Services (Reminder: Complete face to face encounter)    For Discharges to [facility]:   · Not Applicable to this Patient - Not Applicable to this Patient    Planned Readmission:  No     Discharge Statement:  I spent 45 minutes discharging the patient  This time was spent on the day of discharge  I had direct contact with the patient on the day of discharge  Greater than 50% of the total time was spent examining patient, answering all patient questions, arranging and discussing plan of care with patient as well as directly providing post-discharge instructions  Additional time then spent on discharge activities  Discharge Medications:  See after visit summary for reconciled discharge medications provided to patient and family        ** Please Note: This note has been constructed using a voice recognition system **

## 2020-08-10 NOTE — ASSESSMENT & PLAN NOTE
Lab Results   Component Value Date    HGBA1C 7 6 (H) 08/05/2020     · No reported history of diabetes, A1c 7 6 and patient noted to be hyperglycemic    · Start SSI  · Diabetic diet  · Monitor Accu-Cheks and adjust as needed  · Will discharge patient on metformin  · Nutrition consult

## 2020-08-10 NOTE — ASSESSMENT & PLAN NOTE
· Patient with shortness of breath and slight chest pressure prompting call to EMS  Found in wide complex rhythm  · Placed on amiodarone GTT after bolus in ED with improvement in rate and without wide complex  · Patient remains in atrial fibrillation with RVR  Likely secondary to medication noncompliance for about 1 week  · Cardiology following, appreciate input  · Continue Amiodarone 200 mg po daily   · Continue digoxin 0 125 mcg po daily  · Continue Metoprolol succinate 50 mg q 12 hours   · Continue Eliquis   · TSH mildly elevated at 8 760 however T4 normal   No history thyroid disease  · Her heart rate is currently well controlled  Cleared by cardiology for discharge    Sent 1 month supply of all the medication to her pharmacy

## 2020-08-10 NOTE — PHYSICAL THERAPY NOTE
Physical Therapy Treatment Note       08/10/20 1030   Pain Assessment   Pain Assessment Tool 0-10   Pain Score Worst Possible Pain   Pain Location/Orientation Location: Neck   Patient's Stated Pain Goal No pain   Hospital Pain Intervention(s) Ambulation/increased activity   Restrictions/Precautions   Weight Bearing Precautions Per Order No   Other Precautions Pain; Fall Risk;Multiple lines   General   Chart Reviewed Yes   Family/Caregiver Present No   Cognition   Overall Cognitive Status Impaired   Arousal/Participation Responsive   Attention Attends with cues to redirect   Orientation Level Oriented X4   Memory Unable to assess   Following Commands Follows one step commands without difficulty   Subjective   Subjective states she feels OK  admits to depression over death of all of her children  c/o neck pain  cooperative w session   Bed Mobility   Supine to Sit 3  Moderate assistance   Additional items Assist x 1   Transfers   Sit to Stand 4  Minimal assistance   Additional items Assist x 1   Stand to Sit 4  Minimal assistance   Additional items Assist x 1   Ambulation/Elevation   Gait pattern   (slow, antalgic, short step length)   Gait Assistance 4  Minimal assist   Additional items Assist x 1   Assistive Device Rolling walker   Distance 80'x2, 2-3 min standing rest   time spent for setup, repositioning, and 10 min spent EOB prior to gait   Balance   Static Sitting Good   Dynamic Sitting Fair -   Static Standing Fair -   Dynamic Standing Poor +   Ambulatory Poor +   Endurance Deficit   Endurance Deficit Yes   Endurance Deficit Description fatigue, weakness, pain, depression   Activity Tolerance   Activity Tolerance Patient limited by fatigue;Patient limited by pain;Treatment limited secondary to medical complications (Comment)   Nurse Made Aware yes   Assessment   Prognosis Fair   Problem List Decreased strength;Decreased endurance; Impaired balance;Decreased mobility;Pain   Assessment Pt seen for session for setup, transfers, gait training w/ rest time, repositioning  Pt cooperative, but limited by pain and admitted depression  Needing less assist for mobility tasks  able to ambulate an increased distance, but limited by weakness, SOB and pain  Given that pt resides alone w/ limited to no support, continue to recommend rehab at d/c   Goals   Patient Goals to go home   STG Expiration Date 08/16/20   PT Treatment Day 1   Plan   Treatment/Interventions Functional transfer training;LE strengthening/ROM; Therapeutic exercise; Endurance training;Patient/family training;Equipment eval/education; Bed mobility;Gait training;Elevations   Progress Progressing toward goals   PT Frequency Other (Comment)  (3-5x/wk)   Recommendation   PT Discharge Recommendation Post-Acute Rehabilitation Services   Equipment Recommended Walker   PT - OK to Discharge Yes  (when stable to rehab)   Fernando Swan PT, DPT CSRS

## 2020-08-10 NOTE — DISCHARGE INSTRUCTIONS
Take your medications as directed, and keep your follow up appointments  Adhere to a heart healthy lifestyle, maintaining a low sodium diet  Daily weight and record  If your weight increases 2-3 lbs in one day, or 5 lbs in 2 days, you are short of breath or have lower extremity swelling, please call the heart failure team at  [facility] at [phone]

## 2020-08-10 NOTE — ASSESSMENT & PLAN NOTE
CKD3 in the setting of hypertension and CHF evidenced by GFR 43-49 with a steady state creatinine since September 2019 requiring monitoring of renal function

## 2020-08-10 NOTE — PLAN OF CARE
Problem: PHYSICAL THERAPY ADULT  Goal: Performs mobility at highest level of function for planned discharge setting  See evaluation for individualized goals  Description: Treatment/Interventions: Functional transfer training, LE strengthening/ROM, Elevations, Therapeutic exercise, Endurance training, Cognitive reorientation, Bed mobility, Gait training, Spoke to nursing, OT  Equipment Recommended: Dre Girard       See flowsheet documentation for full assessment, interventions and recommendations  Outcome: Progressing  Note: Prognosis: Fair  Problem List: Decreased strength, Decreased endurance, Impaired balance, Decreased mobility, Pain  Assessment: Pt seen for session for setup, transfers, gait training w/ rest time, repositioning  Pt cooperative, but limited by pain and admitted depression  Needing less assist for mobility tasks  able to ambulate an increased distance, but limited by weakness, SOB and pain  Given that pt resides alone w/ limited to no support, continue to recommend rehab at d/c  Barriers to Discharge: Inaccessible home environment, Decreased caregiver support     PT Discharge Recommendation: [id number]     PT - OK to Discharge: (S) Yes(when stable to rehab)    See flowsheet documentation for full assessment

## 2020-08-10 NOTE — SOCIAL WORK
Pt  Is recommended for STR but is refusing  She is agreeable to [facility], however [facility] not accepting patients, Discussed, pt chose [facility] for nsg, PT/OT  Home today if she can obtain ride  Risk of readmission score 26 but was referred to [facility]

## 2020-08-10 NOTE — OCCUPATIONAL THERAPY NOTE
[address]  Treatment Note     Patient Name: Romi MONZON Date: 8/10/2020  Problem List  Principal Problem:    Atrial fibrillation with RVR ([facility] [address] )  Active Problems:    Elevated troponin    Chronic pain disorder    Essential hypertension    Coronary artery disease    Goals of care, counseling/discussion    Acute on chronic diastolic congestive heart failure (HCC)    Pain of cervical spine    Depression    DM2 (diabetes mellitus, type 2) ([facility])    CKD (chronic kidney disease) stage 3, GFR 30-59 ml/min ([facility] [address] )         08/10/20 1025   Restrictions/Precautions   Weight Bearing Precautions Per Order No   Braces or Orthoses   (none)   Lifestyle   Autonomy I ADLS, assist IADLS (but has been unable to complete both but also has limited/no assistance except for landlord) and is Mod I vs  I w/ transfers/functional mobility PTA w/ RW   Reciprocal Relationships Pt lives alone; reports landlord assists w/ cleaning  All of pt's children recently passed   Has a elderly sister nearby but she is unable to assist   Service to Others Pt does not work   Intrinsic Gratification Unmotived, sedentary PTA; lacking interest in leisure activities at this time   Pain Assessment   Pain Assessment Tool FLACC   Pain Rating: FLACC (Rest) - Face 1   Pain Rating: FLACC (Rest) - Legs 0   Pain Rating: FLACC (Rest) - Activity 0   Pain Rating: FLACC (Rest) - Cry 0   Pain Rating: FLACC (Rest) - Consolability 0   Score: FLACC (Rest) 1   Pain Rating: FLACC (Activity) - Face 1   Pain Rating: FLACC (Activity) - Legs 0   Pain Rating: FLACC (Activity) - Activity 1   Pain Rating: FLACC (Activity) - Cry 1   Pain Rating: FLACC (Activity) - Consolability 0   Score: FLACC (Activity) 3   ADL   Eating Assistance 5  Supervision/Setup   Grooming Assistance 5  Supervision/Setup   UB Dressing Assistance 4  Minimal Assistance   LB Dressing Assistance 4  Minimal Assistance   Toileting Assistance  4  Minimal Assistance   Functional Standing Tolerance   Time 5 mins   Activity ambulating short household distances   Comments min A x1 with RW, progressed to mod A with fatigue  leans forward on RW  cues for safety and initiating rest breaks   Bed Mobility   Supine to Sit 3  Moderate assistance   Additional items Assist x 1;HOB elevated; Bedrails; Increased time required;Verbal cues;LE management   Transfers   Sit to Stand 4  Minimal assistance   Additional items Assist x 1; Increased time required;Verbal cues   Stand to Sit 4  Minimal assistance   Additional items Assist x 1; Increased time required;Verbal cues   Stand pivot 4  Minimal assistance   Additional items Assist x 1; Increased time required;Verbal cues   Additional Comments all transfers with RW  cues for safety, hand placement throughout  Functional Mobility   Functional Mobility 4  Minimal assistance   Additional Comments progressed to mod A with fatigue   Additional items Rolling walker   Cognition   Overall Cognitive Status Impaired   Arousal/Participation Responsive; Cooperative   Attention Attends with cues to redirect   Orientation Level Oriented X4   Memory Decreased recall of recent events;Decreased recall of precautions   Following Commands Follows one step commands with increased time or repetition   Comments depressing and despairing mood throughout  continues to present with some suicidal comments "it should've been me that  not my kids"  poor safety awareness and insight  Activity Tolerance   Activity Tolerance Patient limited by pain; Patient limited by fatigue   Medical Staff Made Aware PT DALE lee for dc planning, cleared to see per RN Deloris Gowers   Assessment   Assessment Arrived to patient supine in bed, agreeable for therapy session  Throughout, VERY anxious about going home and "not being able to care for myself like I used to" however does not appear very motivated d/t medical status, living situation and "losing her family"   Tearful at times, reporting memory concerns and repeating comments multiple times throughout "I haven't fallen in a year"; "my three kids all  before me, I should have gone before them"  Cues to redirect  Bed mobility completed with mod A x1  Functional transfers and mobility completed with min A x1 and cues for safety and sequencing throughout  Pain reported during session and cues required for activity pacing/energy conservation to maximize independence and safety with ADLS  Required cues to initiate standing rest breaks with activity, vitals stable throughout  Pt participated in various therapeutic exercises and functional activities with emphasis on BUE strengthening, and BUE use during functional tasks  Emphasis on shoulder flexion/extension, elbow flexion/extension, wrist and hand strength ( strength and manipulation), as well as core strengthening and whole body exercises to maximize functional independence and safety with ADLs such as dressing, bathing, toileting and grooming tasks  Continues to require min A for LB ADLs this date d/t back pain  Fair endurance noted with mod verbal cues for body mechanics and form  At end of session seated upright in bedside chair, all needs in reach  Pt would benefit from continued skilled OT while in the hospital to achieve POC goals and ensure safe d/c  Recommend post acute rehab services at end of session  Plan   Goal Expiration Date 20   OT Treatment Day 2   OT Frequency 3-5x/wk   Recommendation   OT Discharge Recommendation Post-Acute Rehabilitation Services   OT - OK to Discharge Yes  (TO [address]; NO TO HOME   IF D/C HOME, RECOMMENDING  SUPERVISION/ASSIST AS PATIENT WITH SUICIDAL IDEATION AND DECREASED INDEPENDENCE WITH ADLS, IADLS, AND MOBILITY)       Clara Sutherland MS, OTR/L

## 2020-08-10 NOTE — ASSESSMENT & PLAN NOTE
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

## 2020-08-10 NOTE — ASSESSMENT & PLAN NOTE
· PDMP reviewed, appears not to have received any opioids as outpatient since 01/2019  · Urine drug screen positive for opiates  · Longstanding history of chronic back and neck pain and frequently complaining of these throughout my evaluation  · Patient reports persistent neck tenderness and muscle spasms resulting in decreased range of motion and severe pain  · PT/OT evaluations - recommending STR   · Continue non opioid pain control with Tylenol, lidocaine patch, aqua K-pad  · Trial short course of Oxycodone IR PRN - patient aware she will not be discharge home with an Rx  · MRI cervical spine with multilevel DJD, disc bulges at C4 through C5  · Consulted Neurosurgery for evaluation patient currently does not need emergent neurosurgery  patient to follow-up with outpatient pain management and physical therapy  Neuro surgery signed off  · PT OT recommended short-term rehab  However patient strongly refuses to go to rehab  She states she has errands to run at home  Agreeable to go home with home health  Will discharge patient with home health aide

## 2020-08-10 NOTE — PROGRESS NOTES
General Cardiology   Progress Note -  Team One   Ethan Olivares 79 y o  female MRN: [id number]    Unit/Bed#: [facility] 408-01 Encounter: [id number]    Assessment/ Plan    1  Chronic Afib, with RVR  In setting of medication non-compliance  Rates now controlled 70s-80s on telemetry on amiodarone 200 mg daily, digoxin 125 mcg daily, and Toprol XL 50 mg q12 hours  Continue Eliquis 5 mg BID for anticoagulation  2  Newly reduced BiV HFmEF, LVEF 45%  Most 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 given rapid Afib on admit, off of meds  Stable from volume standpoint on exam   On Lasix 40 mg daily  GDMT- Toprol XL and low dose ACE-I     3  Non MI troponin elevation 2/2 afib with RVR and volume overload    4  CAD s/p [facility] to [facility] 07/2018  Does admit to [place] [address] but this is likely attributable to physical deconditioning  Continue Plavix, statin, and beta blocker  5  HTN- controlled, avg /66  6  HLD- continue statin therapy  7  Chronic back/neck pain- very focused on these concerns throughout exam  Management per primary team     Subjective  Short of breath while walking with PT this morning- she admits this is more activity then she normally does at home  No chest pain or palpitations and breathing still much improved from admission  Main complaint is inability to sleep and back/neck pain  Review of Systems   Constitution: Negative for diaphoresis and malaise/fatigue  Cardiovascular: Positive for dyspnea on exertion  Negative for chest pain, leg swelling, orthopnea and palpitations  Respiratory: Negative for cough and wheezing  Musculoskeletal: Positive for back pain and neck pain  Gastrointestinal: Negative for bloating  Neurological: Negative for dizziness and light-headedness  Psychiatric/Behavioral: The patient has insomnia  All other systems reviewed and are negative  Objective:   Vitals: Blood pressure 117/64, pulse 83, temperature 97 5 °F (36 4 °C), temperature source Oral, resp   rate 20, height 5' 5" (1 651 m), weight 75 4 kg (166 lb 3 6 oz), SpO2 95 %  , Body mass index is 27 66 kg/m²  ,     Systolic (30OGK), MWX:760 , Min:113 , MHQ:174     Diastolic (67QMJ), ZLV:35, Min:57, Max:77      Intake/Output Summary (Last 24 hours) at 8/10/2020 0932  Last data filed at 8/10/2020 0700  Gross per 24 hour   Intake 438 ml   Output 428 ml   Net 10 ml     Wt Readings from Last 3 Encounters:   08/09/20 75 4 kg (166 lb 3 6 oz)   08/06/20 86 4 kg (190 lb 7 6 oz)   03/02/20 73 9 kg (163 lb)     Telemetry Review: No significant arrhythmias seen on telemetry review  AFib, rates 70s-80s  Physical Exam  Constitutional:       General: She is not in acute distress  Neck:      Musculoskeletal: Neck supple  Vascular: No hepatojugular reflux or JVD  Cardiovascular:      Rate and Rhythm: Normal rate  Rhythm irregularly irregular  Heart sounds: Normal heart sounds  No murmur  No friction rub  No gallop  Pulmonary:      Effort: Pulmonary effort is normal  No respiratory distress  Breath sounds: Normal breath sounds  Comments: Room air  Abdominal:      General: Bowel sounds are normal  There is no distension  Palpations: Abdomen is soft  Musculoskeletal:         General: No tenderness  Right lower leg: No edema  Left lower leg: No edema  Skin:     General: Skin is warm and dry  Findings: No erythema  Neurological:      Mental Status: She is alert and oriented to person, place, and time       LABORATORY RESULTS  Results from last 7 days   Lab Units 08/05/20  0638 08/05/20  0441 08/05/20  0032   TROPONIN I ng/mL 0 20* 0 18* 0 06*     CBC with diff:   Results from last 7 days   Lab Units 08/10/20  0437 08/08/20  0445 08/07/20  0458 08/06/20  0517 08/05/20  0441 08/05/20  0032   WBC Thousand/uL 7 29 6 38 6 68 9 44 16 91* 19 25*   HEMOGLOBIN g/dL 11 3* 11 6 11 5 10 8* 12 3 13 0   HEMATOCRIT % 36 4 37 1 37 0 33 8* 39 0 41 2   MCV fL 106* 105* 104* 104* 103* 104*   PLATELETS Thousands/uL 289 275 271 251 312 345   MCH pg 32 8 32 8 32 4 33 1 32 5 32 7   MCHC g/dL 31 0* 31 3* 31 1* 32 0 31 5 31 6   RDW % 16 1* 15 8* 15 8* 15 6* 15 3* 15 4*   MPV fL 9 7 9 6 9 5 9 5 9 3 9 5   NRBC AUTO /100 WBCs  --  0 0 0  --  0       CMP:  Results from last 7 days   Lab Units 08/10/20  0437 08/08/20 0445 08/07/20  0458 08/06/20  0530 08/05/20  0441 08/05/20  0032   POTASSIUM mmol/L 3 8 4 1 3 7 3 9 4 8 3 8   CHLORIDE mmol/L 105 107 105 107 103 102   CO2 mmol/L 28 28 25 25 24 22   BUN mg/dL 24 23 24 18 15 14   CREATININE mg/dL 1 07 1 08 1 19 0 98 1 12 1 23   CALCIUM mg/dL 8 7 8 9 8 5 8 0* 9 7 9 5   AST U/L 29 22 20  --   --  25   ALT U/L 30 23 24  --   --  32   ALK PHOS U/L 79 72 62  --   --  96   EGFR ml/min/1 73sq m 49 49 44 55 47 42       BMP:  Results from last 7 days   Lab Units 08/10/20  0437 08/08/20 0445 08/07/20 0458 08/06/20  0530 08/05/20  0441 08/05/20  0032   POTASSIUM mmol/L 3 8 4 1 3 7 3 9 4 8 3 8   CHLORIDE mmol/L 105 107 105 107 103 102   CO2 mmol/L 28 28 25 25 24 22   BUN mg/dL 24 23 24 18 15 14   CREATININE mg/dL 1 07 1 08 1 19 0 98 1 12 1 23   CALCIUM mg/dL 8 7 8 9 8 5 8 0* 9 7 9 5       Lab Results   Component Value Date    CREATININE 1 07 08/10/2020    CREATININE 1 08 08/08/2020    CREATININE 1 19 08/07/2020       Lab Results   Component Value Date    NTBNP 3,581 (H) 08/05/2020    NTBNP 3,451 (H) 01/03/2019    NTBNP 860 (H) 07/25/2018           Results from last 7 days   Lab Units 08/08/20 0445 08/07/20 0458 08/05/20 0441 08/05/20  0032   MAGNESIUM mg/dL 2 5 2 6 3 3* 2 1          Results from last 7 days   Lab Units 08/05/20  0443   HEMOGLOBIN A1C % 7 6*          Results from last 7 days   Lab Units 08/05/20  0107   TSH 3RD GENERATON uIU/mL 8 760*   FREE T4 ng/dL 1 11             Lipid Profile:   Lab Results   Component Value Date    CHOL 218 06/22/2015    CHOL 151 03/17/2014    CHOL 144 08/22/2013     Lab Results   Component Value Date    HDL 36 (L) 08/06/2020    HDL 33 (L) 01/13/2019    HDL 47 06/22/2015 Lab Results   Component Value Date    [facility]  2020      Comment:      Calculated LDL invalid, triglycerides >400 mg/dl    LDLCALC 139 (H) 2019    LDLCALC 140 (H) 2015     Lab Results   Component Value Date    TRIG 422 (H) 2020    TRIG 293 (H) 2019    TRIG 155 2015       Cardiac testing:   Results for orders placed during the hospital encounter of 20   Echo complete with contrast if indicated    Narrative [address]  [address] [place] [address]  [phone]    Transthoracic Echocardiogram  2D, M-mode, Doppler, and Color Doppler    Study date:      Patient: Rosemary Colbert  MR number: [id number]  Account number: [de-identified]  : 1940  Age: 79 years  Gender: Female  Status: Inpatient  Location: Medical Intensive Care Unit  Height: 65 in  Weight: 162 6 lb  BP: 121/ 86 mmHg    Indications: Dyspnea, shortness of breath, wheezing, tachypnea  Diagnoses: R06 00 - Dyspnea, unspecified    Sonographer:  Clarence Richardson RDCS  Primary Physician:  John Paul Moyer DO  Referring Physician:  CHANDRIKA Streeter  Group:  [facility]  Interpreting Physician:  Carlitos Alcantar MD    SUMMARY    LEFT VENTRICLE:  Systolic function was mildly reduced  Ejection fraction was estimated to be 45 %  There was hypokinesis of the mid anteroseptal, apical septal, and apical wall(s)  Wall thickness was mildly to moderately increased  Concentric hypertrophy was present  RIGHT VENTRICLE:  The size was normal   Systolic function was mildly reduced  Wall thickness was normal     LEFT ATRIUM:  The atrium was dilated  RIGHT ATRIUM:  The atrium was dilated  MITRAL VALVE:  There was mild annular calcification  There was moderate regurgitation  TRICUSPID VALVE:  There was mild to moderate regurgitation  Estimated peak PA pressure was 40 mmHg      HISTORY: PRIOR HISTORY: Atrial fibrillation with rapid ventricular response, elevated troponin, congestive heart failure, hypertension, coronary artery disease, depression, diabetes mellitus type 2  PROCEDURE: The study was performed in the Medical Intensive Care Unit  This was a routine study  The transthoracic approach was used  The study included complete 2D imaging, M-mode, complete spectral Doppler, and color Doppler  The heart  rate was 102 bpm, at the start of the study  Echocardiographic views were limited due to restricted patient mobility  Image quality was adequate  LEFT VENTRICLE: Size was normal  Systolic function was mildly reduced  Ejection fraction was estimated to be 45 %  There was hypokinesis of the mid anteroseptal, apical septal, and apical wall(s)  Wall thickness was mildly to moderately  increased  Concentric hypertrophy was present  DOPPLER: Transmitral flow pattern: atrial fibrillation  RIGHT VENTRICLE: The size was normal  Systolic function was mildly reduced  Wall thickness was normal     LEFT ATRIUM: The atrium was dilated  RIGHT ATRIUM: The atrium was dilated  MITRAL VALVE: There was mild annular calcification  Valve structure was normal  There was mild-moderate diffuse thickening  There was normal leaflet separation  DOPPLER: The transmitral velocity was within the normal range  There was no  evidence for stenosis  There was moderate regurgitation  AORTIC VALVE: The valve was trileaflet  Leaflets exhibited mild calcification, normal cuspal separation, and sclerosis  DOPPLER: Transaortic velocity was within the normal range  There was no evidence for stenosis  There was no significant  regurgitation  TRICUSPID VALVE: The valve structure was normal  There was normal leaflet separation  DOPPLER: The transtricuspid velocity was within the normal range  There was no evidence for stenosis  There was mild to moderate regurgitation  Estimated  peak PA pressure was 40 mmHg      PULMONIC VALVE: Leaflets exhibited normal thickness, no calcification, and normal cuspal separation  DOPPLER: The transpulmonic velocity was within the normal range  There was trace regurgitation  PERICARDIUM: There was no pericardial effusion  AORTA: The root exhibited normal size  SYSTEMIC VEINS: IVC: The inferior vena cava was normal in size  SYSTEM MEASUREMENT TABLES    2D  Ao Diam: 3 33 cm  EDV(Teich): 84 74 ml  HR_4Ch_Q: 126 63 BPM  IVSd: 1 51 cm  LA Diam: 4 4 cm  LAAs A4C: 39 42 cm2  LAESV A-L A4C: 184 61 ml  LAESV MOD A4C: 169 43 ml  LALs A4C: 7 15 cm  LVCO_4Ch_Q: 2 79 L/min  LVEF_4Ch_Q: 35 87 %  LVIDd: 4 34 cm  LVLd_4Ch_Q: 7 08 cm  LVLs_4Ch_Q: 6 76 cm  LVPWd: 1 22 cm  LVSV_4Ch_Q: 22 02 ml  LVVED_4Ch_Q: 61 4 ml  LVVES_4Ch_Q: 39 37 ml  RAAs: 23 63 cm2  RAESV A-L: 80 6 ml  RAESV MOD: 79 12 ml  RALs: 5 88 cm  RVIDd: 2 45 cm    CW  MR VTI: 132 03 cm  MR Vmax: 4 95 m/s  MR Vmean: 3 69 m/s  MR maxP 2 mmHg  MR meanP 58 mmHg  TR Vmax: 2 75 m/s  TR maxP 18 mmHg    MM  TAPSE: 1 1 cm    PW  E': 0 05 m/s    [facility] Accredited [facility]ch[facility] Laboratory    Prepared and electronically signed by    Bruna Pierson MD  Signed 05-Aug-2020 17:30:13       Results for orders placed during the hospital encounter of 18   TIFFANIE    Narrative [address]  [facility], [address] [place] [address]  [phone]    Transesophageal Echocardiogram  2D, Doppler, and Color Doppler    Study date:      Patient: Muna Pizarro  MR number: [id number]  Account number: [de-identified]  : 1940  Age: 69 years  Gender: Female  Status: Inpatient  Location: Cath lab  Height: 65 in  Weight: 149 lb  BP: 79/ 52 mmHg    Indications: Atrial fibrillation      Diagnoses: I48 0 - Atrial fibrillation    Sonographer:  Sukhjinder Henry RDCS  Interpreting Physician:  Chloe Goodpasture, MD  Primary Physician:  Herminia Ng DO  Referring Physician:  Mariel Monsivais MD  Group:  [place] [id number] [facility]  Cardiology Fellow:  Adella Cranker, MD    SUMMARY    LEFT VENTRICLE:  Systolic function was markedly reduced  Ejection fraction was estimated to be 35 %  Beat to beat variability and tachycardia limit an accurate assessment of ejection fraction  There was severe diffuse hypokinesis with regional variations  RIGHT VENTRICLE:  The ventricle was mildly dilated  Systolic function was reduced  LEFT ATRIUM:  The atrium was moderately dilated  No thrombus was identified  There was evidence of spontaneous echo contrast ("smoke")  LEFT ATRIAL APPENDAGE:  The appendage was dilated  The function was moderately reduced (moderately reduced emptying velocity)  No thrombus was identified  There was evidence of spontaneous echo contrast ("smoke") in the appendage  ATRIAL SEPTUM:  No defect or patent foramen ovale was identified  RIGHT ATRIUM:  The atrium was moderately dilated  There was evidence of continuous spontaneous echo contrast ("smoke")  MITRAL VALVE:  There was moderate regurgitation  Regurgitation grade was 3+ on a scale of 0 to 4+  AORTIC VALVE:  There was trace regurgitation  TRICUSPID VALVE:  There was mild regurgitation  PULMONIC VALVE:  There was trace regurgitation  HISTORY: PRIOR HISTORY: Heart failure, Atrial fibrillation, Sepsis  PROCEDURE: The procedure was performed in the catheterization laboratory  This was a routine study  The risks and alternatives of the procedure were explained to the patient and informed consent was obtained  The transesophageal approach  was used  The study included complete 2D imaging, complete spectral Doppler, and color Doppler  The heart rate was 108 bpm, at the start of the study  An adult omniplane probe was inserted by the cardiology fellow under direct supervision  of the attending cardiologist  Intubated with ease  One intubation attempt(s)  There was no blood detected on the probe  Image quality was adequate  There were no complications during the procedure  MEDICATIONS: Benzocaine spray, topical  to oropharynx, prior to procedure  Anesthesia administered by anesthesia team     LEFT VENTRICLE: Size was normal  Systolic function was markedly reduced  Ejection fraction was estimated to be 35 %  Beat to beat variability and tachycardia limit an accurate assessment of ejection fraction  There was severe diffuse  hypokinesis with regional variations  Wall thickness was increased  DOPPLER: The study was not technically sufficient to allow evaluation of LV diastolic function  RIGHT VENTRICLE: The ventricle was mildly dilated  Systolic function was reduced  LEFT ATRIUM: The atrium was moderately dilated  No thrombus was identified  There was evidence of spontaneous echo contrast ("smoke")  APPENDAGE: The appendage was dilated  No thrombus was identified  There was evidence of spontaneous echo  contrast ("smoke") in the appendage  DOPPLER: The function was moderately reduced (moderately reduced emptying velocity)  ATRIAL SEPTUM: No defect or patent foramen ovale was identified  RIGHT ATRIUM: The atrium was moderately dilated  No thrombus was identified  There was evidence of continuous spontaneous echo contrast ("smoke")  MITRAL VALVE: There was mild annular calcification  There was mildly reduced leaflet separation  DOPPLER: There was moderate regurgitation  Regurgitation grade was 3+ on a scale of 0 to 4+  The regurgitant jet was centrally directed  AORTIC VALVE: The valve was trileaflet  Leaflets exhibited normal cuspal separation and sclerosis  DOPPLER: There was trace regurgitation  TRICUSPID VALVE: The valve structure was normal  There was normal leaflet separation  DOPPLER: There was mild regurgitation  PULMONIC VALVE: Not well visualized  DOPPLER: There was trace regurgitation  PERICARDIUM: There was no pericardial effusion  The pericardium was normal in appearance      AORTA: The root exhibited normal size  There was no atheroma  There was no evidence for dissection  There was no evidence for aneurysm  PULMONARY VEINS: DOPPLER: There was systolic blunting in the pulmonary vein(s)      [address] Accredited Echocardiography Laboratory    Prepared and electronically signed by    Shelly Ryder MD  Signed 12-Jul-2018 13:57:07     Meds/Allergies   all current active meds have been reviewed and current meds:   Current Facility-Administered Medications   Medication Dose Route Frequency    acetaminophen (TYLENOL) tablet 975 mg  975 mg Oral Q8H [facility]    amiodarone tablet 200 mg  200 mg Oral Daily With Breakfast    apixaban (ELIQUIS) tablet 5 mg  5 mg Oral BID    ascorbic acid (VITAMIN C) tablet 500 mg  500 mg Oral Daily    atorvastatin (LIPITOR) tablet 20 mg  20 mg Oral Daily With Dinner    bisacodyl (DULCOLAX) rectal suppository 10 mg  10 mg Rectal Daily PRN    clopidogrel (PLAVIX) tablet 75 mg  75 mg Oral Daily    digoxin (LANOXIN) tablet 125 mcg  125 mcg Oral Daily    ferrous sulfate tablet 325 mg  325 mg Oral Daily With Breakfast    FLUoxetine (PROzac) capsule 20 mg  20 mg Oral Daily    folic acid (FOLVITE) tablet 1,000 mcg  1,000 mcg Oral Daily    furosemide (LASIX) tablet 40 mg  40 mg Oral Daily    insulin lispro (HumaLOG) 100 units/mL subcutaneous injection 1-5 Units  1-5 Units Subcutaneous TID AC    insulin lispro (HumaLOG) 100 units/mL subcutaneous injection 1-6 Units  1-6 Units Subcutaneous HS    lidocaine (LIDODERM) 5 % patch 1 patch  1 patch Topical Daily    lisinopril (ZESTRIL) tablet 2 5 mg  2 5 mg Oral Daily    melatonin tablet 6 mg  6 mg Oral HS    metoprolol succinate (TOPROL-XL) 24 hr tablet 50 mg  50 mg Oral Q12H    nortriptyline (PAMELOR) capsule 10 mg  10 mg Oral HS    ondansetron (ZOFRAN) injection 4 mg  4 mg Intravenous Q6H PRN    oxyCODONE (ROXICODONE) IR tablet 5 mg  5 mg Oral Q6H PRN    pantoprazole (PROTONIX) EC tablet 40 mg  40 mg Oral BID AC    potassium chloride (K-DUR,KLOR-CON) CR tablet 20 mEq  20 mEq Oral Daily    senna-docusate sodium (SENOKOT S) 8 6-50 mg per tablet 1 tablet  1 tablet Oral HS    thiamine (VITAMIN B1) tablet 100 mg  100 mg Oral Daily    traMADol (ULTRAM) tablet 50 mg  50 mg Oral Q6H PRN     Medications Prior to Admission   Medication    acetaminophen (TYLENOL) 325 mg tablet    amiodarone 200 mg tablet    apixaban (ELIQUIS) 5 mg    ascorbic acid (VITAMIN C) 500 mg tablet    atorvastatin (LIPITOR) 20 mg tablet    bisacodyl (DULCOLAX) 10 mg suppository    clopidogrel (PLAVIX) 75 mg tablet    digoxin (LANOXIN) 0 125 mg tablet    FEROSUL 325 (65 Fe) MG tablet    FLUoxetine (PROzac) 20 mg capsule    folic acid (FOLVITE) 1 mg tablet    furosemide (LASIX) 40 mg tablet    Lidocaine (ASPERCREME LIDOCAINE) 4 % PTCH    lisinopril (ZESTRIL) 5 mg tablet    melatonin 3 mg    methocarbamol (ROBAXIN) 500 mg tablet    metoprolol succinate (TOPROL-XL) 50 mg 24 hr tablet    nortriptyline (PAMELOR) 10 mg capsule    pantoprazole (PROTONIX) 40 mg tablet    senna-docusate sodium (SENOKOT S) 8 6-50 mg per tablet    thiamine 100 MG tablet     Counseling / Coordination of Care  Total floor / unit time spent today 20 minutes  Greater than 50% of total time was spent with the patient and / or family counseling and / or coordination of care  ** Please Note: Dragon 360 Dictation voice to text software may have been used in the creation of this document   **

## 2020-08-10 NOTE — PLAN OF CARE
Problem: OCCUPATIONAL THERAPY ADULT  Goal: Performs self-care activities at highest level of function for planned discharge setting  See evaluation for individualized goals  Description: Treatment Interventions: ADL retraining, Functional transfer training, Endurance training, Patient/family training          See flowsheet documentation for full assessment, interventions and recommendations  Outcome: Progressing  Note: Limitation: Decreased ADL status, Decreased UE strength, Decreased UE ROM, Decreased Safe judgement during ADL, Decreased cognition, Decreased endurance, Decreased self-care trans, Decreased high-level ADLs  Prognosis: Fair  Assessment: Arrived to patient supine in bed, agreeable for therapy session  Throughout, VERY anxious about going home and "not being able to care for myself like I used to" however does not appear very motivated d/t medical status, living situation and "losing her family"  Tearful at times, reporting memory concerns and repeating comments multiple times throughout "I haven't fallen in a year"; "my three kids all  before me, I should have gone before them"  Cues to redirect  Bed mobility completed with mod A x1  Functional transfers and mobility completed with min A x1 and cues for safety and sequencing throughout  Pain reported during session and cues required for activity pacing/energy conservation to maximize independence and safety with ADLS  Required cues to initiate standing rest breaks with activity, vitals stable throughout  Pt participated in various therapeutic exercises and functional activities with emphasis on BUE strengthening, and BUE use during functional tasks  Emphasis on shoulder flexion/extension, elbow flexion/extension, wrist and hand strength ( strength and manipulation), as well as core strengthening and whole body exercises to maximize functional independence and safety with ADLs such as dressing, bathing, toileting and grooming tasks  Continues to require min A for LB ADLs this date d/t back pain  Fair endurance noted with mod verbal cues for body mechanics and form  At end of session seated upright in bedside chair, all needs in reach  Pt would benefit from continued skilled OT while in the hospital to achieve POC goals and ensure safe d/c  Recommend post acute rehab services at end of session       OT Discharge Recommendation: Post-Acute Rehabilitation Services  OT - OK to Discharge: (S) Yes(TO REHAB WHEN MEDICALLY STABLE; NO TO HOME)

## 2020-08-12 NOTE — TELEPHONE ENCOUNTER
08/12/2020-CALLED PT, LEFT MESSAGE ON MACHINE CONFIRMING 08/24/2020 APT IN [facility] OFFICE AND TO HAVE XRAY COMPLETED PRIOR TO APT  08/09/2020-([facility])PLAN:FOLLOW UP AT OUTPATIENT NEUROSURGERY CLINIC IN 2 WEEKS SNPX   SIGN OFF

## 2020-08-24 NOTE — TELEPHONE ENCOUNTER
[address] [facility] - [phone] - ON 8/19 PER Rylee Salazar I LEFT A VMM FOR PATIENT -[phone] TO CONTACT OFFICE EXT:6000 TO RESCHEDULE APPT - SHE NEEDS XRAY PRIOR TO THIS VISIT

## 2020-08-25 NOTE — PROGRESS NOTES
Received email from Tiffany Avalos with Aging regarding visit with patient  He informed patient has barricaded herself inside her apartment, locked the windows and refused to meet with him outside  He was able to get inside and the apartment was a mess  Patient has not bathed in 5 days and is not eating, only drinking Ensure  She is not taking medications properly, ran out of some but couldn't remember which ones  She continues to have memory issues  He is passing her case onto Adult Protective Services

## 2020-08-25 NOTE — PROGRESS NOTES
Received call from [facility] with Adult Protective Services to discuss patient  She is planning to visit patient today  She asked about patient's competency to make decisions  I informed her she was evaluated last year during one of her hospitalizations and she was deemed competent but it was recommended she have a follow up exam that she did not have  Relayed patient does not have any support, children and siblings have   Patient was interested in going to [facility] and application was filled out last year with someone from aging  Patient then backed out of wanting to go  She is noncompliant with follow up appointments, medications and diet  Informed physician is in favor of her being placed in nursing home  Asked her to contact me if she needs additional help or needs documents completed by physician

## 2020-08-26 NOTE — ED ATTENDING ATTESTATION
8/26/2020  I, Tori Brantley MD, saw and evaluated the patient  I have discussed the patient with the resident/non-physician practitioner and agree with the resident's/non-physician practitioner's findings, Plan of Care, and MDM as documented in the resident's/non-physician practitioner's note, except where noted  All available labs and Radiology studies were reviewed  I was present for key portions of any procedure(s) performed by the resident/non-physician practitioner and I was immediately available to provide assistance  At this point I agree with the current assessment done in the Emergency Department  I have conducted an independent evaluation of this patient a history and physical is as follows:    ED Course         Critical Care Time  Procedures     78 yo female with exertional dyspnea, no cp, no fever, no cough  Pt with recent admission for afib with rvr  Pt discharged one week ago  Pt did not want to go to rehab facility  Pt with some paranoia, but no hi, no si currently  Vss, afebrile, tachy, lungs cta, rrr, abdomen soft nontender, no pedal edema  Cardiac workup  Pt will require placement  Urine

## 2020-08-26 NOTE — ED PROVIDER NOTES
Final Diagnosis:  1  MALA (acute kidney injury) ([facility] )    2  CHF (congestive heart failure) ([facility] )    3  Weakness        Chief Complaint   Patient presents with    Psychiatric Evaluation     Pt tearful and states her landlord called EMS d t landlord not wanted her to live there anymore  Pt reports she wants "the lord to take her"  Pt reports SI without a plan  Pt denies HI/AH/VH   Medical Problem     Pt c o generalized weakness, neck pain, SOB     HPI  Patient presents for evaluation of shortness of breath, weakness, and overall poor social situation  She states that she was at home, feeling in her normal, in firm state when her landlord called EMS to have her evaluated  She tells me that she is short of breath whenever she takes a couple steps and is so weak that if she finds it hard to get out of bed at all  She realizes that she needs to stay in a care facility but tells me that she wanted to help get her affairs in order to before she moved  Patient denies any other complaints me  Unless otherwise specified above - No radiations, nausea/vomiting/diaphoresis, inciting/relieving/exacerbating factors  Denies any trauma to chest, recent heavy lifting, and history of prior events  Denies fever, chills, shortness of breath, hemoptysis, cough, exogenous steroid use, history of clots, recent long travel  No family history of early cardiac death  Otherwise patient states they have been at baseline health  - No language barrier    - History obtained from patient  - There are no limitations to the history obtained    - Previous charting was reviewed    PMH:   has a past medical history of A-fib ([facility] ), Acute respiratory disease, Anemia, Arthritis, Atrial fibrillation ([facility] [facility] ), CHF (congestive heart failure) ([facility] [address] ), Chronic pain, Heart failure ([facility] [address] ), 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 [facility]), History of colon polyps, long term use of blood thinners, Hypertension, Irregular heart beat, Narcotic dependence ([facility] ), Rectal bleeding, Stroke ([address] ), and Uses walker  PSH:   has a past surgical history that includes ERCP (N/A, 5/14/2018); Esophagogastroduodenoscopy (N/A, 7/26/2018); Colonoscopy; Joint replacement (Right); Coronary stent placement; Colonoscopy (N/A, 7/27/2018); and ERCP (N/A, 8/28/2018)  ROS:  Review of Systems   Constitutional: Positive for fatigue  Negative for chills, diaphoresis and fever  Respiratory: Positive for shortness of breath  Negative for cough  Cardiovascular: Negative for chest pain and palpitations  Gastrointestinal: Negative for abdominal distention, abdominal pain, constipation, diarrhea, nausea and vomiting  Genitourinary: Negative for dysuria, frequency and hematuria  Musculoskeletal: Negative for arthralgias, myalgias and neck pain  Neurological: Negative for dizziness, syncope, light-headedness and headaches  All other systems reviewed and are negative  PE:   Vitals:    08/26/20 1620 08/26/20 1816 08/26/20 1932   BP: 130/54 122/61 118/75   BP Location:  Left arm    Pulse: 56 98 88   Resp: 16 18 20   Temp: 97 8 °F (36 6 °C)     TempSrc: Oral     SpO2: 95% 94% 95%   Weight: 75 3 kg (166 lb)       Vitals reviewed by me  Physical Exam  Vitals signs reviewed  Constitutional:       General: She is not in acute distress  Appearance: She is not diaphoretic  HENT:      Head: Normocephalic and atraumatic  Right Ear: External ear normal       Left Ear: External ear normal    Eyes:      General: No scleral icterus  Right eye: No discharge  Left eye: No discharge  Conjunctiva/sclera: Conjunctivae normal       Pupils: Pupils are equal, round, and reactive to light  Neck:      Musculoskeletal: Normal range of motion and neck supple  Vascular: No JVD  Cardiovascular:      Rate and Rhythm: Regular rhythm  Tachycardia present  Heart sounds: Normal heart sounds  No murmur  No friction rub  No gallop  Pulmonary:      Effort: Pulmonary effort is normal  No respiratory distress  Breath sounds: Normal breath sounds  No wheezing or rales  Abdominal:      General: Bowel sounds are normal  There is no distension  Palpations: Abdomen is soft  There is no mass  Tenderness: There is no abdominal tenderness  There is no guarding  Musculoskeletal: Normal range of motion  General: No tenderness or deformity  Skin:     General: Skin is warm  Neurological:      Mental Status: She is alert and oriented to person, place, and time  Cranial Nerves: No cranial nerve deficit  Sensory: No sensory deficit  Motor: No abnormal muscle tone  Coordination: Coordination normal    Psychiatric:         Behavior: Behavior normal          Thought Content: Thought content normal          Judgment: Judgment normal           A:  - Nursing note reviewed  -    HEART Risk Score      Most Recent Value   Heart Score Risk Calculator   History  0 Filed at: 08/26/2020 1932   ECG  1 Filed at: 08/26/2020 1932   Age  2 Filed at: 08/26/2020 1932   Risk Factors  2 Filed at: 08/26/2020 1932   Troponin  2 Filed at: 08/26/2020 1932   HEART Score  7 Filed at: 08/26/2020 1932                     ED Course as of Aug 26 1932   Wed Aug 26, 2020   1825 Creatinine(!): 1 69     XR chest 1 view portable   Final Result      No acute cardiopulmonary disease              Workstation performed: [id number]           Orders Placed This Encounter   Procedures    XR chest 1 view portable    CBC and differential    Basic metabolic panel    Troponin I    D-Dimer    NT-BNP PRO    Insert peripheral IV    Continual Observation    ECG 12 lead    Inpatient Admission     Labs Reviewed   CBC AND DIFFERENTIAL - Abnormal       Result Value Ref Range Status    WBC 13 08 (*) 4 31 - 10 16 Thousand/uL Final    RBC 3 82  3 81 - 5 12 Million/uL Final    Hemoglobin 12 4  11 5 - 15 4 g/dL Final    Hematocrit 39 8  34 8 - 46 1 % Final  (*) 82 - 98 fL Final    MCH 32 5  26 8 - 34 3 pg Final    MCHC 31 2 (*) 31 4 - 37 4 g/dL Final    RDW 15 4 (*) 11 6 - 15 1 % Final    MPV 9 7  8 9 - 12 7 fL Final    Platelets 309  146 - 390 Thousands/uL Final    nRBC 0  /100 WBCs Final    Neutrophils Relative 81 (*) 43 - 75 % Final    Immat GRANS % 1  0 - 2 % Final    Lymphocytes Relative 8 (*) 14 - 44 % Final    Monocytes Relative 7  4 - 12 % Final    Eosinophils Relative 2  0 - 6 % Final    Basophils Relative 1  0 - 1 % Final    Neutrophils Absolute 10 67 (*) 1 85 - 7 62 Thousands/µL Final    Immature Grans Absolute 0 10  0 00 - 0 20 Thousand/uL Final    Lymphocytes Absolute 0 99  0 60 - 4 47 Thousands/µL Final    Monocytes Absolute 0 95  0 17 - 1 22 Thousand/µL Final    Eosinophils Absolute 0 30  0 00 - 0 61 Thousand/µL Final    Basophils Absolute 0 07  0 00 - 0 10 Thousands/µL Final   BASIC METABOLIC PANEL - Abnormal    Sodium 139  136 - 145 mmol/L Final    Potassium 4 1  3 5 - 5 3 mmol/L Final    Chloride 100  100 - 108 mmol/L Final    CO2 29  21 - 32 mmol/L Final    ANION GAP 10  4 - 13 mmol/L Final    BUN 32 (*) 5 - 25 mg/dL Final    Creatinine 1 69 (*) 0 60 - 1 30 mg/dL Final    Comment: Standardized to IDMS reference method    Glucose 161 (*) 65 - 140 mg/dL Final    Comment: If the patient is fasting, the ADA then defines impaired fasting glucose as > 100 mg/dL and diabetes as > or equal to 123 mg/dL  Specimen collection should occur prior to Sulfasalazine administration due to the potential for falsely depressed results  Specimen collection should occur prior to Sulfapyridine administration due to the potential for falsely elevated results      Calcium 10 1  8 3 - 10 1 mg/dL Final    eGFR 28  ml/min/1 73sq m Final    Narrative:     [place] guidelines for Chronic Kidney Disease (CKD):     Stage 1 with normal or high GFR (GFR > 90 mL/min/1 73 square meters)    Stage 2 Mild CKD (GFR = 60-89 mL/min/1 73 square meters)   Stage 3A Moderate CKD (GFR = 45-59 mL/min/1 73 square meters)    Stage 3B Moderate CKD (GFR = 30-44 mL/min/1 73 square meters)    Stage 4 Severe CKD (GFR = 15-29 mL/min/1 73 square meters)    Stage 5 End Stage CKD (GFR <15 mL/min/1 73 square meters)  Note: GFR calculation is accurate only with a steady state creatinine   TROPONIN I - Abnormal    Troponin I 0 06 (*) <=0 04 ng/mL Final    Comment: Siemens Chemistry analyzer 99% cutoff is > 0 04 ng/mL in network labs     o cTnI 99% cutoff is useful only when applied to patients in the clinical setting of myocardial ischemia   o cTnI 99% cutoff should be interpreted in the context of clinical history, ECG findings and possibly cardiac imaging to establish correct diagnosis  o cTnI 99% cutoff may be suggestive but clearly not indicative of a coronary event without the clinical setting of myocardial ischemia  Results indicate test should be repeated on new specimen collected within 4-6 hours of the original   NT-BNP PRO (BRAIN NATRIURETIC PEPTIDE) - Abnormal    NT-proBNP 1,326 (*) <450 pg/mL Final   D-DIMER, QUANTITATIVE - Normal    D-Dimer, Quant 0 27  <0 50 ug/ml FEU Final    Comment: Reference and upper limits to exclude DVT and PE are the same  Do not use to exclude if clinical symptoms are present  Pregnant women:  1st trimester:  <0 22 - 1 06 ug/ml FEU  2nd trimester:  <0 22 - 1 88 ug/ml FEU  3rd trimester:   0 24 - 3 28 ug/ml FEU    Note: Normal ranges may not apply to patients who are transgender, non-binary, or whose legal sex, sex at birth, and gender identity differ  Final Diagnosis:  1  MALA (acute kidney injury) ([address] )    2   CHF (congestive heart failure) ([facility])    3  Weakness        P:  - cardiac evaluation revealed a MALA without other concerning pathology  -I discussed with the patient her need to stay in the hospital as well as be placed at a care facility afterwards and she was agreeable to this  -patient was discussed with medicine and admitted for further treatment and ventral placement  Rate: 103, afib, left axis  normal QRS, QT interval within normal range  No ST elevations or depressions to indicate ischemia  No diffuse elevations to indicate pericarditis  No biphasic T waves in precordial leads to indicate [facility]  Possible ST depression in 1 in aVL      Medications   lidocaine (LIDODERM) 5 % patch 1 patch (1 patch Topical Medication Applied 8/26/20 1926)   HYDROmorphone (DILAUDID) injection 0 2 mg (has no administration in time range)   sodium chloride 0 9 % bolus 500 mL (0 mL Intravenous Stopped 8/26/20 1927)   acetaminophen (TYLENOL) tablet 975 mg (975 mg Oral Given 8/26/20 1926)     Time reflects when diagnosis was documented in both MDM as applicable and the Disposition within this note     Time User Action Codes Description Comment    8/26/2020  7:14 PM Duglas Lauren [N17 9] MALA (acute kidney injury) ([facility] )     8/26/2020  7:14 PM Duglas Lauren Add [I50 9] CHF (congestive heart failure) ([facility] )     8/26/2020  7:14 PM Duglas Lauren [R53 1] Weakness       ED Disposition     ED Disposition Condition Date/Time Comment    Admit Stable Wed Aug 26, 2020  7:14 PM Case was discussed with ELDER and the patient's admission status was agreed to be Admission Status: inpatient status to the service of Dr Carlos Trinh   Follow-up Information    None       Patient's Medications   Discharge Prescriptions    No medications on file     No discharge procedures on file  Prior to Admission Medications   Prescriptions Last Dose Informant Patient Reported? Taking?    FEROSUL 325 (65 Fe) MG tablet   No No   Sig: TAKE ONE TABLET BY MOUTH DAILY WITH BREAKFAST   FLUoxetine (PROzac) 20 mg capsule   No No   Sig: Take 1 capsule (20 mg total) by mouth daily   Lidocaine (ASPERCREME LIDOCAINE) 4 % PTCH   No No   Sig: Apply 1 patch topically every 12 (twelve) hours   acetaminophen (TYLENOL) 325 mg tablet   No No   Sig: Take 2 tablets (650 mg total) by mouth every 6 (six) hours   Patient not taking: Reported on 8/5/2020   amiodarone 200 mg tablet   No No   Sig: Take 1 tablet (200 mg total) by mouth daily   apixaban (ELIQUIS) 5 mg   No No   Sig: Take 1 tablet (5 mg total) by mouth 2 (two) times a day   ascorbic acid (VITAMIN C) 500 mg tablet   No No   Sig: TAKE ONE TABLET BY MOUTH DAILY   atorvastatin (LIPITOR) 20 mg tablet   No No   Sig: Take 1 tablet (20 mg total) by mouth daily with dinner   bisacodyl (DULCOLAX) 10 mg suppository   Yes No   Sig: Insert 10 mg into the rectum daily as needed for constipation   clopidogrel (PLAVIX) 75 mg tablet   No No   Sig: Take 1 tablet (75 mg total) by mouth daily   digoxin (LANOXIN) 0 125 mg tablet   No No   Sig: Take 1 tablet (125 mcg total) by mouth daily   folic acid (FOLVITE) 1 mg tablet   No No   Sig: TAKE ONE TABLET BY MOUTH DAILY   furosemide (LASIX) 40 mg tablet   No No   Sig: Take 1 tablet (40 mg total) by mouth daily   lisinopril (ZESTRIL) 2 5 mg tablet   No No   Sig: Take 1 tablet (2 5 mg total) by mouth daily   melatonin 3 mg   No No   Sig: Take 2 tablets (6 mg total) by mouth daily at bedtime   metFORMIN (GLUCOPHAGE) 500 mg tablet   No No   Sig: Take 1 tablet (500 mg total) by mouth 2 (two) times a day with meals   methocarbamol (ROBAXIN) 500 mg tablet   No No   Sig: Take 1 tablet (500 mg total) by mouth once as needed for muscle spasms for up to 1 dose   metoprolol succinate (TOPROL-XL) 50 mg 24 hr tablet   No No   Sig: Take 1 tablet (50 mg total) by mouth every 12 (twelve) hours   nortriptyline (PAMELOR) 10 mg capsule   No No   Sig: Take 1 capsule (10 mg total) by mouth daily at bedtime   pantoprazole (PROTONIX) 40 mg tablet   No No   Sig: Take 1 tablet (40 mg total) by mouth 2 (two) times a day before meals   potassium chloride (K-DUR,KLOR-CON) 20 mEq tablet   No No   Sig: Take 1 tablet (20 mEq total) by mouth daily   senna-docusate sodium (SENOKOT S) 8 6-50 mg per tablet   No No   Sig: Take 1 tablet by mouth daily at bedtime   thiamine 100 MG tablet   No No   Sig: Take 1 tablet (100 mg total) by mouth daily      Facility-Administered Medications: None       Portions of the record may have been created with voice recognition software  Occasional wrong word or "sound a like" substitutions may have occurred due to the inherent limitations of voice recognition software  Read the chart carefully and recognize, using context, where substitutions have occurred      Electronically signed by:  Mone Conti, PGY 3, MD Jerel Romeo MD  08/26/20 9600

## 2020-08-27 PROBLEM — R77.8 ELEVATED TROPONIN LEVEL NOT DUE MYOCARDIAL INFARCTION: Status: ACTIVE | Noted: 2019-01-05

## 2020-08-27 PROBLEM — I21.A1 TYPE 2 MI (MYOCARDIAL INFARCTION) (HCC): Status: ACTIVE | Noted: 2019-01-05

## 2020-08-27 NOTE — ASSESSMENT & PLAN NOTE
Patient tearful and depressed on examination; denies SI/HI, but did earlier in the ED-continue 1 1  Continue Prozac  Behavior Health consult

## 2020-08-27 NOTE — ASSESSMENT & PLAN NOTE
Lab Results   Component Value Date    HGBA1C 7 6 (H) 08/05/2020       No results for input(s): POCGLU in the last 72 hours      Blood Sugar Average: Last 72 hrs:     SSI  Blood Glucose checks TIDWM and QHS (Q6H for NPO patients)  Hold oral medications  Blood Glucose goal while inpatient is 140-180  Reduce basal insulin by 25-50% while inpatient

## 2020-08-27 NOTE — PLAN OF CARE
Problem: Potential for Falls  Goal: Patient will remain free of falls  Description: INTERVENTIONS:  - Assess patient frequently for physical needs  -  Identify cognitive and physical deficits and behaviors that affect risk of falls    -  [place] fall precautions as indicated by assessment   - Educate patient/family on patient safety including physical limitations  - Instruct patient to call for assistance with activity based on assessment  - Modify environment to reduce risk of injury  - Consider OT/PT consult to assist with strengthening/mobility  Outcome: Progressing

## 2020-08-27 NOTE — ASSESSMENT & PLAN NOTE
Patient rate controlled on admission; continue home medications  Anticoagulated with Eliquis 5 mg b i d
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
## 2020-08-27 NOTE — ASSESSMENT & PLAN NOTE
Lab Results   Component Value Date    HGBA1C 7 6 (H) 08/05/2020       Recent Labs     08/26/20  2141 08/27/20  0635 08/27/20  1114 08/27/20  1646   POCGLU 116 155* 132 115       Blood Sugar Average: Last 72 hrs:  (P) 129 5   SSI  Blood Glucose checks TIDWM and QHS (Q6H for NPO patients)  Hold oral medications  ISS

## 2020-08-27 NOTE — ASSESSMENT & PLAN NOTE
Estimated Creatinine Clearance: 27 6 mL/min (A) (by C-G formula based on SCr of 1 69 mg/dL (H))    POA  Patient noted to have serum creatinine 1 69 on admission; baseline 1-1 2  Pt has MYERS but likely this is more due to deconditioning and not CHF   Cr did not improve w/ diuresis  UA WNL  Urinary retention protocol ordered - has been straight cathed twice so far  Renal appreciated  Will give IV [email or web address] x 10h  F/u renal US  F/u CPK

## 2020-08-27 NOTE — ASSESSMENT & PLAN NOTE
Patient noted to have serum creatinine 1 69; baseline 1-1 2  Likely cardiorenal as patient also with signs of congestive heart failure  Will begin diuresis, monitor daily  Consider nephrology consult if does not improve with gentle diuresis  FEurea; UA

## 2020-08-27 NOTE — ASSESSMENT & PLAN NOTE
PT/OT  Recent admission or patient declined follow on rehabilitation  Neuropsych eval ordered as requested by APS

## 2020-08-27 NOTE — PHYSICAL THERAPY NOTE
Physical Therapy Evaluation    Patient Name: Roberto Mehta    CLLMO'Q Date: 8/27/2020     Problem List  Principal Problem:    Acute on chronic systolic congestive heart failure ([facility] )  Active Problems:    Dysthymic disorder    Weakness/physical deconditioning    Essential hypertension    Hyperlipidemia    Ambulatory dysfunction    Mild cognitive impairment    MALA (acute kidney injury) ([facility] )    Elevated troponin level not due myocardial infarction    A-fib ([facility])    DM2 (diabetes mellitus, type 2) ([facility])    CKD (chronic kidney disease) stage 3, GFR 30-59 ml/min ([facility])       Past Medical History  Past Medical History:   Diagnosis Date    A-fib ([place] [address] )     Acute respiratory disease     Anemia     Arthritis     Atrial fibrillation ([place] [address] )     CHF (congestive heart failure) ([facility])     Chronic pain     Heart failure ([facility])     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[place] [address] )     History of colon polyps     Hx of long term use of blood thinners     Hypertension     Irregular heart beat     Narcotic dependence ([place] [address] )     Rectal bleeding     Stroke ([place] [address] )     mild no deficiets/ memory loss    Uses walker         Past Surgical History  Past Surgical History:   Procedure Laterality Date    COLONOSCOPY      COLONOSCOPY N/A 7/27/2018    Procedure: COLONOSCOPY;  Surgeon: Wilbur Delgado MD;  [facility]; Service: Gastroenterology    CORONARY STENT PLACEMENT      ERCP N/A 5/14/2018    Procedure: ENDOSCOPIC RETROGRADE CHOLANGIOPANCREATOGRAPHY (ERCP); Surgeon: Loi Lyons MD;  [facility];  Service: Gastroenterology    ERCP N/A 8/28/2018    Procedure: ENDOSCOPIC RETROGRADE CHOLANGIOPANCREATOGRAPHY (ERCP) w/ EGD;  Surgeon: Loi Lyons MD;  [facility]; Service: Gastroenterology    ESOPHAGOGASTRODUODENOSCOPY N/A 7/26/2018    Procedure: ESOPHAGOGASTRODUODENOSCOPY (EGD); Surgeon: Wlibur Delgado MD;  [facility];   Service: Gastroenterology    JOINT REPLACEMENT Right     knee           08/27/20 1056   Note Type   Note type Eval only   Pain Assessment   Pain Assessment Tool 0-10   Pain Score Worst Possible Pain   Pain Location/Orientation Location: Back; Location: Neck   Home Living   Type of [address] Two level;Stairs to enter with rails; Able to live on main level with bedroom/bathroom  (2 CASSIA)   Bathroom Shower/Tub Tub/shower unit   Bathroom Toilet Raised   Bathroom Equipment Shower chair;Tub transfer bench   Home Equipment Walker;Cane   Prior Function   Level of [place] Needs assistance with IADLs; Needs assistance with ADLs and functional mobility   Lives With Alone   Receives Help From Other (Comment)  (landlord)   ADL Assistance Needs assistance   IADLs Needs assistance   Falls in the last 6 months 0   Vocational Retired   Restrictions/Precautions   [facility] Bearing Precautions Per Order No   Other Precautions Chair Alarm; Bed Alarm;Multiple lines;Telemetry; Fall Risk;Pain;Cognitive   General   Family/Caregiver Present No   Cognition   Overall Cognitive Status Impaired   Arousal/Participation Responsive   Attention Attends with cues to redirect   Orientation Level Oriented X4   Following Commands Follows one step commands with increased time or repetition   RLE Assessment   RLE Assessment X   Strength RLE   RLE Overall Strength 3+/5  (grossly)   LLE Assessment   LLE Assessment X   Strength LLE   LLE Overall Strength 3+/5  (grossly)   Coordination   Movements are Fluid and Coordinated 0   Coordination and Movement Description slow movements, guarded posture   Bed Mobility   Supine to Sit 3  Moderate assistance   Additional items Assist x 1; Increased time required;Verbal cues   Additional Comments Pt left seated in bedside chair at termination of session, 1:1 present   Transfers   Sit to Stand 4  Minimal assistance   Additional items Increased time required;Verbal cues   Stand to Sit 4  Minimal assistance   Additional items Increased time required;Verbal cues   Ambulation/Elevation   Gait pattern Forward Flexion;Decreased foot clearance; Inconsistent annie; Short stride; Excessively slow   Gait Assistance 4  Minimal assist   Additional items Verbal cues; Tactile cues   Assistive Device Rolling walker   Distance 15 feet   Balance   Static Sitting Good   Dynamic Sitting Fair   Static Standing Fair +  (RW)   Dynamic Standing Fair -  (RW)   Ambulatory Fair -  (RW)   Endurance Deficit   Endurance Deficit Yes   Endurance Deficit Description weakness, fatigue   Activity Tolerance   Activity Tolerance Patient tolerated treatment well;Patient limited by fatigue   Nurse Made Aware RN cleared patient for PT evaluation   Assessment   Prognosis Fair   Problem List Decreased strength;Decreased endurance; Impaired balance;Decreased mobility; Decreased coordination; Impaired judgement;Decreased safety awareness;Pain   Assessment Pt is a 79 y o  female seen for PT evaluation s/p admit to [facility] on 8/26/20  Two pt identifiers were used to confirm  Pt presented with complaints of weakness and MYERS  Pt was admitted with a primary dx of: dysthymic disorder  PT now consulted for assessment of mobility and d/c needs  Pts current co morbidities effecting treatment include: anxiety, gout, HLD, MALA, DM and personal factors including 2 CASSIA home environment  Pt currently lives in a two story home alone with a first floor setup  Pts current clinical presentation is Unstable/ Unpredictable (high complexity) due to Ongoing medical management for primary dx, decreased activity tolerance compared to baseline, fall risk, increased assistance needed from caregiver at current time, continuous telemetry monitoring, multiple lines, decline in overall functional mobility status  Prior to admission, pt was mod I with ambulation with the use of a RW as per pt   Upon evaluation, pt currently is requiring mod A x 1 for bed mobility; min A for transfers and min A for ambulation w/ RW  Pt displays decreased step and stride length, decreased gait speed, improper weight shift, forward flexed posture  Pt presents at PT eval functioning below baseline and currently w/ overall mobility deficits secondary to: decreased strength, decreased endurance, impaired balance, impaired cognition, impaired coordination  Pt currently at a fall risk secondary to impairments listed above  Based on PT evaluation, pt will continue to benefit from skilled acute PT interventions to address stated impairments; to maximize functional mobility; for ongoing pt/ family training; and DME needs  At conclusion of PT session pt was left seated in bedside chair, all needs within reach and 1:1 present  Provided pt education regarding PT plan to improve functional mobility status  PT is currently recommending post acute inpatient rehab  Pt agreeable to plan and goals as stated on evaluation  PT will continue to follow during hospital stay  Goals   Patient Goals go home and get my affairs in order   STG Expiration Date 09/10/20   Short Term Goal #1 1  Pt will increased strength by 1 grade in order to increase functional independence with transfers  2  Pt will increase balance grade by 1 in order to improve safety  3  Pt will improve transfer ability to mod I to increase functional independence and decrease caregiver burden  4  Pt will perform bed mobility independently to decrease caregiver burden  5  Pt will be able to amb 150 ft with RW and mod I to increase functional independence in home and community environments  6  Pt will be able to ascend and descend 2 stairs mod I to increase functional independence within the home environment  7  Pt will be independent with HEP  PT Treatment Day 0   Plan   Treatment/Interventions Functional transfer training;LE strengthening/ROM; Elevations; Therapeutic exercise; Endurance training;Cognitive reorientation;Patient/family training;Equipment eval/education; Bed mobility;Gait training; Compensatory technique education;Spoke to nursing;Spoke to case management;OT;Family   PT Frequency Other (Comment)  (3-5x/wk)   Recommendation   PT Discharge Recommendation Post-Acute Rehabilitation Services   PT - OK to Discharge Yes   Additional Comments to rehab when medically stable   Modified Gray Scale   Modified Gray Scale 4   Barthel Index   Feeding 10   Bathing 0   Grooming Score 5   Dressing Score 5   Bladder Score 10   Bowels Score 10   Toilet Use Score 5   Transfers (Bed/Chair) Score 10   Mobility (Level Surface) Score 0   Stairs Score 0   Barthel Index Score 55       Grace Watson PT, DPT

## 2020-08-27 NOTE — PLAN OF CARE
Problem: PHYSICAL THERAPY ADULT  Goal: Performs mobility at highest level of function for planned discharge setting  See evaluation for individualized goals  Description: Treatment/Interventions: Functional transfer training, LE strengthening/ROM, Elevations, Therapeutic exercise, Endurance training, Cognitive reorientation, Patient/family training, Equipment eval/education, Bed mobility, Gait training, Compensatory technique education, Spoke to nursing, Spoke to case management, OT, Family          See flowsheet documentation for full assessment, interventions and recommendations  Note: Prognosis: Fair  Problem List: Decreased strength, Decreased endurance, Impaired balance, Decreased mobility, Decreased coordination, Impaired judgement, Decreased safety awareness, Pain  Assessment: Pt is a 79 y o  female seen for PT evaluation s/p admit to [facility] on 8/26/20  Two pt identifiers were used to confirm  Pt presented with complaints of weakness and MYERS  Pt was admitted with a primary dx of: dysthymic disorder  PT now consulted for assessment of mobility and d/c needs  Pts current co morbidities effecting treatment include: anxiety, gout, HLD, MALA, DM and personal factors including 2 CASSIA home environment  Pt currently lives in a two story home alone with a first floor setup  Pts current clinical presentation is Unstable/ Unpredictable (high complexity) due to Ongoing medical management for primary dx, decreased activity tolerance compared to baseline, fall risk, increased assistance needed from caregiver at current time, continuous telemetry monitoring, multiple lines, decline in overall functional mobility status  Prior to admission, pt was mod I with ambulation with the use of a RW as per pt  Upon evaluation, pt currently is requiring mod A x 1 for bed mobility; min A for transfers and min A for ambulation w/ RW   Pt displays decreased step and stride length, decreased gait speed, improper weight shift, forward flexed posture  Pt presents at PT eval functioning below baseline and currently w/ overall mobility deficits secondary to: decreased strength, decreased endurance, impaired balance, impaired cognition, impaired coordination  Pt currently at a fall risk secondary to impairments listed above  Based on PT evaluation, pt will continue to benefit from skilled acute PT interventions to address stated impairments; to maximize functional mobility; for ongoing pt/ family training; and DME needs  At conclusion of PT session pt was left seated in bedside chair, all needs within reach and 1:1 present  Provided pt education regarding PT plan to improve functional mobility status  PT is currently recommending post acute inpatient rehab  Pt agreeable to plan and goals as stated on evaluation  PT will continue to follow during hospital stay  PT Discharge Recommendation: Post-Acute Rehabilitation Services     PT - OK to Discharge: Yes    See flowsheet documentation for full assessment

## 2020-08-27 NOTE — ASSESSMENT & PLAN NOTE
Patient with elevated troponin; denies chest pain  EKG in the ED negative for ischemia; shows rate controlled atrial fibrillation  Trop actually lower than earlier in month  No need for further w/u

## 2020-08-27 NOTE — PROGRESS NOTES
Progress Note - Yamil Leija 7/93/2022, 79 y o  female MRN: [id number]    Unit/Bed#: [facility] 525-01 Encounter: [id number]    Primary Care Provider: Patsy Segura DO   Date and time admitted to hospital: 8/26/2020  4:18 PM        * MALA (acute kidney injury) [facility])  Assessment & Plan  Estimated Creatinine Clearance: 27 6 mL/min (A) (by C-G formula based on SCr of 1 69 mg/dL (H))  POA  Patient noted to have serum creatinine 1 69 on admission; baseline 1-1 2  Pt has MYERS but likely this is more due to deconditioning and not CHF   Cr did not improve w/ diuresis  UA WNL  Urinary retention protocol ordered - has been straight cathed twice so far  Renal appreciated  Will give IV [email or web address] x 10h  F/u renal US  F/u CPK    Acute on chronic systolic congestive heart failure (HCC)  Assessment & Plan  Wt Readings from Last 3 Encounters:   08/27/20 76 4 kg (168 lb 6 9 oz)   08/09/20 75 4 kg (166 lb 3 6 oz)   08/06/20 86 4 kg (190 lb 7 6 oz)     Echo with EF 45%; continue home medications  Patient reports dyspnea on exertion; became winded talking to examiner  BNP elevated at 1326; distended abdomen  Denies night cough, orthopnea, proximal nocturnal dyspnea; a chest x-ray read as normal, but may have infiltrates  Pt appears to have improved w/ IV Lasix and at time of my exam 8/27 appears euvolemic  Start gentle IVF to tx MALA      CKD (chronic kidney disease) stage 3, GFR 30-59 ml/min ([facility])  Assessment & Plan  Estimated Creatinine Clearance: 27 6 mL/min (A) (by C-G formula based on SCr of 1 69 mg/dL (H))    B/l Cr 1 1  See MALA    DM2 (diabetes mellitus, type 2) [facility])  Assessment & Plan  Lab Results   Component Value Date    HGBA1C 7 6 (H) 08/05/2020       Recent Labs     08/26/20  2141 08/27/20  0635 08/27/20  1114 08/27/20  1646   POCGLU 116 155* 132 115       Blood Sugar Average: Last 72 hrs:  (P) 129 5   SSI  Blood Glucose checks TIDWM and QHS (Q6H for NPO patients)  Hold oral medications  ISS      A-fib [facility])  Assessment & Plan  Patient rate controlled on admission; continue BB  Anticoagulated with Eliquis 5 mg b i d  Elevated troponin level not due myocardial infarction  Assessment & Plan  Patient with elevated troponin; denies chest pain  EKG in the ED negative for ischemia; shows rate controlled atrial fibrillation  Trop actually lower than earlier in month  No need for further w/u    Mild cognitive impairment  Assessment & Plan  Noted on prior neuropsych eval in 2019  Neuropsych rec re-eval in 6-9 months which was not done  Per APS and prior neuropsych recs, will ask for re-eval    Coronary artery disease  Assessment & Plan  S/p PCI July 2019  C/w Plavix and BB and statin    Hyperlipidemia  Assessment & Plan  Continue statin    Essential hypertension  Assessment & Plan  C/w BB  Hold ACEi and Lasix in setting of MALA    Weakness/physical deconditioning  Assessment & Plan  PT/OT  Recent admission or patient declined follow on rehabilitation  Neuropsych eval ordered as requested by APS    Dysthymic disorder  Assessment & Plan  Patient tearful and depressed on examination; denies SI/HI, but did earlier in the ED-continue 1 1  Continue Prozac and Pamelor  Behavior Health consult appreciated - no 1 to 1 needed    VTE Pharmacologic Prophylaxis:   Pharmacologic: Apixaban (Eliquis)  Mechanical VTE Prophylaxis in Place: Yes    Patient Centered Rounds: I have performed bedside rounds with nursing staff today  Discussions with Specialists or Other Care Team Provider: renal    Education and Discussions with Family / Patient: pt   Declined call to family    Time Spent for Care: 30 minutes  More than 50% of total time spent on counseling and coordination of care as described above      Current Length of Stay: 1 day(s)    Current Patient Status: Inpatient   Certification Statement: The patient will continue to require additional inpatient hospital stay due to MALA    Discharge Plan: when MALA resolved will need to make sure pt has safe d/c plan    Code Status: Level 3 - DNAR and DNI      Subjective:   MYERS still present    Objective:     Vitals:   Temp (24hrs), Av 8 °F (36 6 °C), Min:97 4 °F (36 3 °C), Max:98 1 °F (36 7 °C)    Temp:  [97 4 °F (36 3 °C)-98 1 °F (36 7 °C)] 98 1 °F (36 7 °C)  HR:  [66-97] 73  Resp:  [16-20] 16  BP: ()/(57-75) 108/58  SpO2:  [94 %-97 %] 94 %  Body mass index is 28 03 kg/m²  Input and Output Summary (last 24 hours): Intake/Output Summary (Last 24 hours) at 2020 1840  Last data filed at 2020 1242  Gross per 24 hour   Intake 860 ml   Output 1351 ml   Net -491 ml       Physical Exam:     Physical Exam  Constitutional:       General: She is not in acute distress  HENT:      Head: Normocephalic and atraumatic  Nose: Nose normal       Mouth/Throat:      Mouth: Mucous membranes are moist    Eyes:      Extraocular Movements: Extraocular movements intact  Conjunctiva/sclera: Conjunctivae normal    Neck:      Musculoskeletal: Normal range of motion and neck supple  Cardiovascular:      Rate and Rhythm: Normal rate and regular rhythm  Pulmonary:      Effort: Pulmonary effort is normal       Breath sounds: Normal breath sounds  No wheezing or rales  Abdominal:      General: Bowel sounds are normal  There is no distension  Palpations: Abdomen is soft  Tenderness: There is no abdominal tenderness  Musculoskeletal: Normal range of motion  Right lower leg: No edema  Left lower leg: No edema  Skin:     General: Skin is warm and dry  Neurological:      General: No focal deficit present  Mental Status: She is alert and oriented to person, place, and time           Additional Data:     Labs:    Results from last 7 days   Lab Units 20  0430   WBC Thousand/uL 10 14   HEMOGLOBIN g/dL 11 7   HEMATOCRIT % 36 4   PLATELETS Thousands/uL 290   NEUTROS PCT % 74   LYMPHS PCT % 11*   MONOS PCT % 8   EOS PCT % 5     Results from last 7 days   Lab Units 20  2188 POTASSIUM mmol/L 3 5   CHLORIDE mmol/L 101   CO2 mmol/L 29   BUN mg/dL 31*   CREATININE mg/dL 1 69*   CALCIUM mg/dL 9 0   ALK PHOS U/L 64   ALT U/L 22   AST U/L 18           * I Have Reviewed All Lab Data Listed Above  * Additional Pertinent Lab Tests Reviewed: Stu Chau Admission Reviewed        Recent Cultures (last 7 days):           Last 24 Hours Medication List:   Current Facility-Administered Medications   Medication Dose Route Frequency Provider Last Rate    acetaminophen  975 mg Oral [facility] Deon Fish PA-C      amiodarone  200 mg Oral Daily Chavez Page MD      apixaban  5 mg Oral BID Chavez Page MD      atorvastatin  20 mg Oral Daily With Kolby Rodrigues MD      bisacodyl  10 mg Rectal Daily PRN Chavez Page MD      clopidogrel  75 mg Oral Daily Chavez Page MD      digoxin  125 mcg Oral Daily Chavez Page MD      FLUoxetine  20 mg Oral Daily Chavez Page MD      folic acid  6,034 mcg Oral Daily Chavez Page MD      insulin lispro  1-6 Units Subcutaneous TID [facility] Chavez Page MD      insulin lispro  1-6 Units Subcutaneous HS Chavez Page MD      lidocaine  1 patch Topical Daily Deon Fish PA-C      melatonin  3 mg Oral Once PRN Deon Fish PA-C      melatonin  6 mg Oral HS Chavez Page MD      methocarbamol  500 mg Oral Once PRN Chavez Page MD      metoprolol succinate  50 mg Oral Q12H Chavez Page MD      nortriptyline  10 mg Oral HS Chavez Page MD      pantoprazole  40 mg Oral Early Morning Chavez Page MD      senna-docusate sodium  1 tablet Oral HS Chavez Page MD      sodium chloride  50 mL/hr Intravenous Continuous Jose Cruz Chappell DO 50 mL/hr (08/27/20 1251)        Today, Patient Was Seen By: Jose Cruz Chappell DO    ** Please Note: Dictation voice to text software may have been used in the creation of this document   **

## 2020-08-27 NOTE — ASSESSMENT & PLAN NOTE
Wt Readings from Last 3 Encounters:   08/27/20 76 4 kg (168 lb 6 9 oz)   08/09/20 75 4 kg (166 lb 3 6 oz)   08/06/20 86 4 kg (190 lb 7 6 oz)     Echo with EF 45%; continue home medications  Patient reports dyspnea on exertion; became winded talking to examiner  BNP elevated at 1326; distended abdomen  Denies night cough, orthopnea, proximal nocturnal dyspnea; a chest x-ray read as normal, but may have infiltrates  Pt appears to have improved w/ IV Lasix and at time of my exam 8/27 appears euvolemic  Start gentle IVF to tx MALA

## 2020-08-27 NOTE — RESPIRATORY THERAPY NOTE
RT Protocol Note  Pushpa Mansfield 79 y o  female MRN: [id number]  Unit/Bed#: [facility] 525-01 Encounter: [id number]    Assessment    Active Problems:    Dysthymic disorder    Weakness/physical deconditioning    Acute on chronic systolic congestive heart failure ([facility])    Essential hypertension    Hyperlipidemia    Ambulatory dysfunction    Mild cognitive impairment    MALA (acute kidney injury) ([facility])    NSTEMI (non-ST elevated myocardial infarction) ([place] [address] ), type II    A-fib ([facility])    DM2 (diabetes mellitus, type 2) ([facility])      Home Pulmonary Medications:  None       Past Medical History:   Diagnosis Date    A-fib ([place] [address] )     Acute respiratory disease     Anemia     Arthritis     Atrial fibrillation ([facility])     CHF (congestive heart failure) ([facility])     Chronic pain     Heart failure ([facility])     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[place] [address] )     History of colon polyps     Hx of long term use of blood thinners     Hypertension     Irregular heart beat     Narcotic dependence ([facility])     Rectal bleeding     Stroke ([facility])     mild no deficiets/ memory loss    Uses walker      Social History     Socioeconomic History    Marital status:      Spouse name: None    Number of children: None    Years of education: None    Highest education level: None   Occupational History    None   Social Needs    Financial resource strain: None    Food insecurity     Worry: None     Inability: None    Transportation needs     Medical: None     Non-medical: None   Tobacco Use    Smoking status: Former Smoker     Packs/day: 0 50     Types: Cigarettes    Smokeless tobacco: Never Used   Substance and Sexual Activity    Alcohol use: Not Currently     Frequency: Never     Binge frequency: Never    Drug use: Not Currently    Sexual activity: None   Lifestyle    Physical activity     Days per week: None     Minutes per session: None    Stress: None   Relationships    Social connections     Talks on phone: None Gets together: None     Attends Islam service: None     Active member of club or organization: None     Attends meetings of clubs or organizations: None     Relationship status: None    Intimate partner violence     Fear of current or ex partner: None     Emotionally abused: None     Physically abused: None     Forced sexual activity: None   Other Topics Concern    None   Social History Narrative    ** Merged History Encounter **            Subjective         Objective    Physical Exam:   Assessment Type: Assess only  General Appearance: Awake, Alert  Respiratory Pattern: Spontaneous, Normal  Chest Assessment: Chest expansion symmetrical, Trachea midline  Bilateral Breath Sounds: Diminished, Clear  Cough: None  O2 Device: NC    Vitals:  Blood pressure 102/57, pulse 73, temperature 97 8 °F (36 6 °C), temperature source Oral, resp  rate 18, weight 75 3 kg (166 lb), SpO2 96 %  Imaging and other studies: I have personally reviewed pertinent reports  O2 Device: NC     Plan             Resp Comments: Pt assessed; she has no pulmonary hx according to self-report and chart  CXR noted for no acute pulmonary disease; admitting dx for heart failure  BBS clear/decreased with no reported cough  Respiratory intervention is not required at this time  Will re-evaluate in future if clinical symptoms change

## 2020-08-27 NOTE — ASSESSMENT & PLAN NOTE
Noted on prior neuropsych eval in 2019  Neuropsych rec re-eval in 6-9 months which was not done  Per APS and prior neuropsych recs, will ask for re-eval

## 2020-08-27 NOTE — ASSESSMENT & PLAN NOTE
Wt Readings from Last 3 Encounters:   08/26/20 75 3 kg (166 lb)   08/09/20 75 4 kg (166 lb 3 6 oz)   08/06/20 86 4 kg (190 lb 7 6 oz)     Echo with EF 45%; continue home medications  Patient reports dyspnea on exertion; became winded talking to examiner  BNP elevated at 13 26; distended abdomen  Denies night cough, orthopnea, proximal nocturnal dyspnea; a chest x-ray read as normal, but may have infiltrates  Initiate IV Lasix and monitor

## 2020-08-27 NOTE — ASSESSMENT & PLAN NOTE
Patient tearful and depressed on examination; denies SI/HI, but did earlier in the ED-continue 1 1  Continue Prozac and [address] consult appreciated - no 1 to 1 needed

## 2020-08-27 NOTE — ASSESSMENT & PLAN NOTE
Estimated Creatinine Clearance: 27 6 mL/min (A) (by C-G formula based on SCr of 1 69 mg/dL (H))    B/l Cr 1 1  See MALA
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
## 2020-08-27 NOTE — CONSULTS
Consultation - Nephrology   Donel Codcharlie 79 y o  female MRN: [id number]  Unit/Bed#: [facility] 525-01 Encounter: [id number]    ASSESSMENT and PLAN:  Acute kidney injury (POA):  Suspect prerenal with volume depletion with reported decreased oral intake in the setting of diuretic use, possible loss of autoregulation with lisinopril, relative hypotension, hemodynamic changes with atrial fibrillation with prior RVR, as well as urinary retention requiring straight catheterization  - After review of records it appears that the patient has a baseline Creatinine of 1 0-1 2 mg/dL  With EGFR 30-40  - patient was admitted with a creatinine of 1 69 mg/dL  - patient's creatinine today is at 1 69 mg/dL  -patient was receiving IV Lasix-now discontinued after today's dose  -chest x-ray reviewed without vascular congestion  -urinalysis with micro-essentially bland  -plan:  -will check CPK with recent fall to rule out rhabdomyolysis  -continue to hold lisinopril  -continue to hold Lasix  -continue with urinary retention protocol  -will check renal ultrasound to rule out obstruction  -will check orthostatic blood pressures with reports of dizziness with movement  -consider gentle course IV fluids  - Avoid nephrotoxins, adjust meds to appropriate GFR   - Acid base and lytes stable   - Clinically, patient is not uremic and there is no acute indication for renal replacement therapy (dialysis)  - Optimize hemodynamic status to avoid delay in renal recovery  - check BMP, magnesium, phosphorus in a m      Chronic kidney disease III:  Suspect secondary to hypertension, prior AK I insults, in the setting of decreased EF  -after review medical records baseline creatinine between 1 01 2 with EGFR 30 to 40s  -does not follow with Nephrology  -will need follow-up on discharge    Blood pressure:  BP appears to be on the lower side  -current medications-Toprol XL to 50 mg every 12 hours  -maximize hemodynamics to maintain MAP >65  -avoid hypotension or fluctuations in blood pressure  -will continue to trend    H&H/anemia:  Likely secondary to Chronic Kidney Disease  -current hemoglobin 11 7  -will check iron stores  -transfuse if hemoglobin less than 7 0    Acid-base:  Current bicarb 29    Diabetes:  Per primary team  -last A1c 7 6 on 08/05/2020  -need adequate blood sugar control    Congestive heart failure:  Echocardiogram 08/05/2020 reveals EF of 45% with concentric hypertrophy, peak PA pressure is 40 mmHG, IVC was normal size  -follows cardiology at [facility] as outpatient  -[id number] in the setting of Chronic Kidney Disease  -chest x-ray does not reveal vascular congestion  -patient with abdominal bloating-consider CT scan of abdomen and pelvis-defer to primary team    Mineral bone disease of Chronic Kidney Disease  -phosphorus 3 9 on 08/27/2020  -will monitor PTH and phosphorus as outpatient    Volume status:  - Clinically patient appears to be hypovolemic  -consider gentle IV fluids short time  Electrolytes:  Hypokalemia:  -suspect secondary to decreased oral intake  -status post replacement per primary team  Hypo magnesium:  -suspect secondary decreased oral intake  -status post replacement by primary team      HISTORY OF PRESENT ILLNESS:  Requesting Physician: Adri Amaya DO  Reason for Consult: MALA Ruelas is a 79 y o  female with past medical history of Chronic Kidney Disease III baseline creatinine 1 0-1 2, PAF with RVR with prior ablation on anticoagulation therapy,  hypertension, CHF, hyperlipidemia, CAD status post PCI to RCA in 2018, arthritis, chronic pain, cognitive dysfunction and prior stroke who presented to the emergency room via EMS with complaints of weakness and shortness of breath on exertion  Of note, she had a prior admission 8/5-8/10 with fibrillation with RVR with wide complex rhythm and was treated with amiodarone drip and was concern for medication noncompliance    Workup revealed elevated creatinine at 1 69  and renal consultation is requested today for assistance in the management of acute kidney injury  Urinary retention protocol in place and patient was straight cathed for 500 mL this a m  On discussion the patient is unaware of having any kidney issues and does not follow with Nephrology  She reports taking all her medications since they are packaged for her daily  She is aware she takes a water pill daily  She denies any NSAID use or extra medications  Currently she reports dizziness with movement and that she has fallen but can not remember when  Currently she is without chest pain, shortness of breath at rest, dizziness, lightheadedness, nausea, vomiting, known urinary issues that she can think of, known fevers or chills  She reports that she does not eat anything at home except for taking Ensure drinks tea  Denies water intake  PAST MEDICAL HISTORY:  Past Medical History:   Diagnosis Date    A-fib [facility])     Acute respiratory disease     Anemia     Arthritis     [facility]     [facility]est[facility]) [facility])     Chronic pain     Heart failure ([facility])     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[facility] )     History of colon polyps     Hx of long term use of blood thinners     Hypertension     Irregular heart beat     Narcotic dependence ([facility] )     Rectal bleeding     Stroke ([facility] )     mild no deficiets/ memory loss    Uses walker        PAST SURGICAL HISTORY:  Past Surgical History:   Procedure Laterality Date    COLONOSCOPY      COLONOSCOPY N/A 7/27/2018    Procedure: COLONOSCOPY;  Surgeon: Parviz Thomas MD;  Location: [facility]; Service: Gastroenterology    CORONARY STENT PLACEMENT      ERCP N/A 5/14/2018    Procedure: ENDOSCOPIC RETROGRADE CHOLANGIOPANCREATOGRAPHY (ERCP);   Surgeon: Danna Rose MD;  Location: [facility];  Service: Gastroenterology    ERCP N/A 8/28/2018    Procedure: ENDOSCOPIC RETROGRADE CHOLANGIOPANCREATOGRAPHY (ERCP) w/ EGD;  Surgeon: Victorino Bhatia MD;  Location:  GI LAB; Service: Gastroenterology    ESOPHAGOGASTRODUODENOSCOPY N/A 7/26/2018    Procedure: ESOPHAGOGASTRODUODENOSCOPY (EGD); Surgeon: Elle Thomas MD;  Location:  GI LAB; Service: Gastroenterology    JOINT REPLACEMENT Right     knee       ALLERGIES:  No Known Allergies    SOCIAL HISTORY:  Social History     Substance and Sexual Activity   Alcohol Use Not Currently    Frequency: Never    Binge frequency: Never     Social History     Substance and Sexual Activity   Drug Use Not Currently     Social History     Tobacco Use   Smoking Status Former Smoker    Packs/day: 0 50    Types: Cigarettes   Smokeless Tobacco Never Used       FAMILY HISTORY:  History reviewed  No pertinent family history      MEDICATIONS:    Current Facility-Administered Medications:     acetaminophen (TYLENOL) tablet 975 mg, 975 mg, Oral, Q8H [address], Alphia [place], PA-C, 975 mg at 08/27/20 6366    amiodarone tablet 200 mg, 200 mg, Oral, Daily, Stefanie Chaves MD, 200 mg at 08/27/20 0932    apixaban (ELIQUIS) tablet 5 mg, 5 mg, Oral, BID, Stefanie Chaves MD, 5 mg at 08/27/20 0932    atorvastatin (LIPITOR) tablet 20 mg, 20 mg, Oral, Daily With Dinner, Stefanie Chaves MD    bisacodyl (DULCOLAX) rectal suppository 10 mg, 10 mg, Rectal, Daily PRN, Stefanie Chaves MD    clopidogrel (PLAVIX) tablet 75 mg, 75 mg, Oral, Daily, Stefanie Chaves MD, 75 mg at 08/27/20 0932    digoxin (LANOXIN) tablet 125 mcg, 125 mcg, Oral, Daily, Stefanie Chaves MD, 125 mcg at 08/27/20 0932    FLUoxetine (PROzac) capsule 20 mg, 20 mg, Oral, Daily, Stefanie Chaves MD, 20 mg at 74/54/62 8169    folic acid (FOLVITE) tablet 1,000 mcg, 1,000 mcg, Oral, Daily, Stefanie Chaves MD, 1,000 mcg at 08/27/20 0932    insulin lispro (HumaLOG) 100 units/mL subcutaneous injection 1-6 Units, 1-6 Units, Subcutaneous, TID AC, 1 Units at 08/27/20 0924 **AND** Fingerstick Glucose (POCT), , , TID AC, Stefanie Chaves MD    insulin lispro (HumaLOG) 100 units/mL subcutaneous injection 1-6 Units, 1-6 Units, Subcutaneous, HS, Eden Dupere MD    lidocaine (LIDODERM) 5 % patch 1 patch, 1 patch, Topical, Daily, Clifton Glynn PA-C, 1 patch at 08/27/20 0357    magnesium sulfate 2 g/50 mL IVPB (premix) 2 g, 2 g, Intravenous, Once, Darlene Lopez DO    melatonin tablet 3 mg, 3 mg, Oral, Once PRN, Clifton Glynn PA-C    melatonin tablet 6 mg, 6 mg, Oral, HS, Eden Dupree MD, 6 mg at 08/26/20 2137    methocarbamol (ROBAXIN) tablet 500 mg, 500 mg, Oral, Once PRN, Eden Dupree MD, 500 mg at 08/27/20 0022    metoprolol succinate (TOPROL-XL) 24 hr tablet 50 mg, 50 mg, Oral, Q12H, Eden Dupree MD, 50 mg at 08/27/20 0931    nortriptyline (PAMELOR) capsule 10 mg, 10 mg, Oral, HS, Eden Dupree MD, 10 mg at 08/26/20 2149    pantoprazole (PROTONIX) EC tablet 40 mg, 40 mg, Oral, Early Morning, Eden Dupree MD, 40 mg at 08/27/20 8196    potassium chloride (K-DUR,KLOR-CON) CR tablet 40 mEq, 40 mEq, Oral, Once, Darlene Lopez DO    senna-docusate sodium (SENOKOT S) 8 6-50 mg per tablet 1 tablet, 1 tablet, Oral, HS, Eden Dupree MD    REVIEW OF SYSTEMS:  A complete 10 point review of systems was performed and found to be negative unless otherwise noted below or in the HPI  General: No fevers, chills, weakness  Reported dizziness with movement  Cardiovascular:  Denies chest pain, shortness of breath, palpitations, or leg edema  Respiratory:  Denies cough, sputum production, or current shortness of breath  Gastrointestinal:  Denies nausea, vomiting, abdominal pain, diarrhea or constipation    However feels bloated  Genitourinary: No dysuria, burning, hematuria or increased frequency or difficulty with stream     PHYSICAL EXAM:  Current Weight: Weight - Scale: 76 4 kg (168 lb 6 9 oz)  First Weight: Weight - Scale: 75 3 kg (166 lb)  Vitals:    08/27/20 0500 08/27/20 0700 08/27/20 0931 08/27/20 1100   BP:  106/71 110/66 94/73   BP Location:   Left arm    Pulse:  66 76 84 Resp:  18  18   Temp:  (!) 97 4 °F (36 3 °C)     TempSrc:  Oral     SpO2:  94%  97%   Weight: 76 4 kg (168 lb 6 9 oz)          Intake/Output Summary (Last 24 hours) at 8/27/2020 1148  Last data filed at 8/27/2020 1030  Gross per 24 hour   Intake 740 ml   Output 901 ml   Net -161 ml     General: conscious, out of bed, cooperative, not in acute distress, chronically ill-appearing  Skin: no rash, warm and dry  Eyes: pale conjunctivae, anicteric sclerae  ENT:  Fairly dry lips and mucous membranes, poor dentition  Neck: supple, no JVD noted  Chest:  Essentially clear breath sounds, no crackles rhonchi wheezes noted  CVS:  Irregular rate and rhythm, no rub  Abdomen: soft, slight distention noted, normoactive bowel sounds  Extremities: no edema of both legs  Neuro: awake, alert, oriented  Psych: appropriate affect       Lab Results:   Results from last 7 days   Lab Units 08/27/20  0430 08/27/20  0428 08/26/20  1725   WBC Thousand/uL 10 14  --  13 08*   HEMOGLOBIN g/dL 11 7  --  12 4   HEMATOCRIT % 36 4  --  39 8   PLATELETS Thousands/uL 290  --  323   SODIUM mmol/L  --  139 139   POTASSIUM mmol/L  --  3 5 4 1   CHLORIDE mmol/L  --  101 100   CO2 mmol/L  --  29 29   BUN mg/dL  --  31* 32*   CREATININE mg/dL  --  1 69* 1 69*   CALCIUM mg/dL  --  9 0 10 1   MAGNESIUM mg/dL  --  1 8  --    PHOSPHORUS mg/dL  --  3 9  --    ALK PHOS U/L  --  64  --    ALT U/L  --  22  --    AST U/L  --  18  --        Other Studies:
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
## 2020-08-28 PROBLEM — B02.9 HERPES ZOSTER WITHOUT COMPLICATION: Status: ACTIVE | Noted: 2020-01-01

## 2020-08-28 PROBLEM — R21 PAPULAR RASH: Status: ACTIVE | Noted: 2020-01-01

## 2020-08-28 NOTE — ASSESSMENT & PLAN NOTE
Wt Readings from Last 3 Encounters:   08/28/20 76 2 kg (167 lb 15 9 oz)   08/09/20 75 4 kg (166 lb 3 6 oz)   08/06/20 86 4 kg (190 lb 7 6 oz)     Echo with EF 45%; continue home medications  Patient reports dyspnea on exertion; became winded talking to examiner  BNP elevated at 1326; distended abdomen  Denies night cough, orthopnea, proximal nocturnal dyspnea; a chest x-ray read as normal, but may have infiltrates  Pt appeared to have improved w/ IV Lasix and at time of my exam 8/27 appears euvolemic  However, possible MYERS 2/2 deconditioning as pt appezrs dry on exam  gentle IVF to tx MALA  Per renal hold ACEi at d/c  Can d/c on Lasix 20 mg PO daily  Can have BMP OP or at rehab and if Cr stable, can be placed back on ACEi as OP

## 2020-08-28 NOTE — ASSESSMENT & PLAN NOTE
Of lumbar spine  No pain  Lesions appear crusted - asked for infxn control to eval to see if contact precautions needed  Valtrex for 7 days

## 2020-08-28 NOTE — TELEPHONE ENCOUNTER
----- Message from [address] sent at 8/28/2020 12:14 PM EDT -----  HI! Patient is still in hospital and unsure of discharge date, but please check periodically for discharge    Once discharge patient will require hospital follow-up for AK I/Chronic Kidney Disease    -  Renal function improved however Nephrology signing off as of today  Thank you  lucrecia

## 2020-08-28 NOTE — ASSESSMENT & PLAN NOTE
PT/OT  Recent admission or patient declined follow on rehabilitation  Neuropsych eval ordered as requested by APS - completed today and note pending

## 2020-08-28 NOTE — ASSESSMENT & PLAN NOTE
Estimated Creatinine Clearance: 34 5 mL/min (A) (by C-G formula based on SCr of 1 35 mg/dL (H))  POA  Patient noted to have serum creatinine 1 69 on admission; baseline 1-1 2  Pt has MYERS but likely this is more due to deconditioning and not CHF   Cr did not improve w/ diuresis  UA WNL  Urinary retention protocol ordered - has been straight cathed twice but appears resolved at this time  Renal appreciated  Cr improved w/ 500 mL NS   Ordered again IV Tunde[facility] x 10h  Renal US WNL  CPK WNL

## 2020-08-28 NOTE — ASSESSMENT & PLAN NOTE
Noted on prior neuropsych eval in 2019  Neuropsych rec re-eval in 6-9 months which was not done  Per APS and prior neuropsych recs, asked for re-eval

## 2020-08-28 NOTE — ASSESSMENT & PLAN NOTE
Estimated Creatinine Clearance: 34 5 mL/min (A) (by C-G formula based on SCr of 1 35 mg/dL (H))    B/l Cr 1 1  See MALA

## 2020-08-28 NOTE — PROGRESS NOTES
Progress Note - Jt Manzano 5/08/1960, 79 y o  female MRN: [id number]    Unit/Bed#: [facility] 525-01 Encounter: [id number]    Primary Care Provider: Shalom Swain DO   Date and time admitted to hospital: 8/26/2020  4:18 PM        * MALA (acute kidney injury) [facility])  Assessment & Plan  Estimated Creatinine Clearance: 34 5 mL/min (A) (by C-G formula based on SCr of 1 35 mg/dL (H))  POA  Patient noted to have serum creatinine 1 69 on admission; baseline 1-1 2  Pt has MYERS but likely this is more due to deconditioning and not CHF   Cr did not improve w/ diuresis  UA WNL  Urinary retention protocol ordered - has been straight cathed twice but appears resolved at this time  Renal appreciated  Cr improved w/ 500 mL NS  Ordered again IV [email or web address] [email or web address]  Renal US WNL  CPK WNL    Chronic systolic congestive heart failure (HCC)  Assessment & Plan  Wt Readings from Last 3 Encounters:   08/28/20 76 2 kg (167 lb 15 9 oz)   08/09/20 75 4 kg (166 lb 3 6 oz)   08/06/20 86 4 kg (190 lb 7 6 oz)     Echo with EF 45%; continue home medications  Patient reports dyspnea on exertion; became winded talking to examiner  BNP elevated at 1326; distended abdomen  Denies night cough, orthopnea, proximal nocturnal dyspnea; a chest x-ray read as normal, but may have infiltrates  Pt appeared to have improved w/ IV Lasix and at time of my exam 8/27 appears euvolemic  However, possible MYERS 2/2 deconditioning as pt appezrs dry on exam  gentle IVF to tx MALA  Per renal hold ACEi at d/c  Can d/c on Lasix 20 mg PO daily  Can have BMP OP or at rehab and if Cr stable, can be placed back on ACEi as OP        Herpes zoster without complication  Assessment & Plan  Of lumbar spine  No pain  Lesions appear crusted - asked for infxn control to eval to see if contact precautions needed  Valtrex for 7 days    CKD (chronic kidney disease) stage 3, GFR 30-59 ml/min ([facility])  Assessment & Plan  Estimated Creatinine Clearance: 34 5 mL/min (A) (by C-G formula based on SCr of 1 35 mg/dL (H))  B/l Cr 1 1  See MALA    DM2 (diabetes mellitus, type 2) [facility])  Assessment & Plan  Lab Results   Component Value Date    HGBA1C 7 6 (H) 08/05/2020       Recent Labs     08/27/20  2058 08/28/20  0625 08/28/20  1144 08/28/20  1631   POCGLU 153* 146* 124 117       Blood Sugar Average: Last 72 hrs:  (P) 132 25   SSI  Blood Glucose checks TIDWM and QHS (Q6H for NPO patients)  Hold oral medications  ISS      Pain of cervical spine  Assessment & Plan  Scheduled tylenol  Start Neurontin  MRI shows severe stenosis  OP f/u w/ nsurg    A-fib ([address] )  Assessment & Plan  Patient rate controlled on admission; continue BB  Anticoagulated with Eliquis 5 mg b i d      Elevated troponin level not due myocardial infarction  Assessment & Plan  Patient with elevated troponin; denies chest pain  EKG in the ED negative for ischemia; shows rate controlled atrial fibrillation  Trop actually lower than earlier in month  No need for further w/u    Mild cognitive impairment  Assessment & Plan  Noted on prior neuropsych eval in 2019  Neuropsych rec re-eval in 6-9 months which was not done  Per APS and prior neuropsych recs, asked for re-eval    Coronary artery disease  Assessment & Plan  S/p PCI July 2019  C/w Plavix and BB and statin    Hyperlipidemia  Assessment & Plan  Continue statin    Essential hypertension  Assessment & Plan  C/w BB  Hold ACEi and Lasix in setting of MALA    Weakness/physical deconditioning  Assessment & Plan  PT/OT  Recent admission or patient declined follow on rehabilitation  Neuropsych eval ordered as requested by APS - completed today and note pending    Dysthymic disorder  Assessment & Plan  Patient tearful and depressed on examination; denies SI/HI, but did earlier in the ED-continue 1 1  Continue Prozac and [address] consult appreciated - no 1 to 1 needed    VTE Pharmacologic Prophylaxis:   Pharmacologic: Apixaban (Eliquis)  Mechanical VTE Prophylaxis in Place: Yes    Patient Centered Rounds: I have performed bedside rounds with nursing staff today  Discussions with Specialists or Other Care Team Provider: renal and neuropsych    Education and Discussions with Family / Patient: pt    Time Spent for Care: 30 minutes  More than 50% of total time spent on counseling and coordination of care as described above  Current Length of Stay: 2 day(s)    Current Patient Status: Inpatient   Certification Statement: The patient will continue to require additional inpatient hospital stay due to need for IVF    Discharge Plan: depends on results of neuropsych eval    Code Status: Level 3 - DNAR and DNI      Subjective:   C/o neck pain    Objective:     Vitals:   Temp (24hrs), Av 8 °F (36 6 °C), Min:97 5 °F (36 4 °C), Max:98 °F (36 7 °C)    Temp:  [97 5 °F (36 4 °C)-98 °F (36 7 °C)] 97 9 °F (36 6 °C)  HR:  [66-74] 67  Resp:  [16-18] 18  BP: (108-128)/(58-96) 116/58  SpO2:  [93 %-98 %] 94 %  Body mass index is 27 96 kg/m²  Input and Output Summary (last 24 hours): Intake/Output Summary (Last 24 hours) at 2020 1837  Last data filed at 2020 1803  Gross per 24 hour   Intake 600 ml   Output 908 ml   Net -308 ml       Physical Exam:     Physical Exam  Constitutional:       General: She is not in acute distress  Appearance: She is not ill-appearing  HENT:      Head: Normocephalic and atraumatic  Nose: Nose normal       Mouth/Throat:      Mouth: Mucous membranes are moist    Eyes:      Extraocular Movements: Extraocular movements intact  Conjunctiva/sclera: Conjunctivae normal    Neck:      Musculoskeletal: Normal range of motion and neck supple  Cardiovascular:      Rate and Rhythm: Normal rate and regular rhythm  Pulmonary:      Effort: Pulmonary effort is normal       Breath sounds: Normal breath sounds  No wheezing or rales  Abdominal:      General: Bowel sounds are normal  There is no distension  Palpations: Abdomen is soft  Tenderness:  There is no abdominal tenderness  Musculoskeletal: Normal range of motion  General: No swelling  Skin:     General: Skin is warm and dry  Findings: Rash (lumbar papules crusted) present  Neurological:      Mental Status: She is oriented to person, place, and time  Mental status is at baseline  Additional Data:     Labs:    Results from last 7 days   Lab Units 08/28/20  0450 08/27/20  0430   WBC Thousand/uL 8 37 10 14   HEMOGLOBIN g/dL 11 0* 11 7   HEMATOCRIT % 35 2 36 4   PLATELETS Thousands/uL 277 290   NEUTROS PCT %  --  74   LYMPHS PCT %  --  11*   MONOS PCT %  --  8   EOS PCT %  --  5     Results from last 7 days   Lab Units 08/28/20  0450 08/27/20  0428   POTASSIUM mmol/L 3 5 3 5   CHLORIDE mmol/L 103 101   CO2 mmol/L 29 29   BUN mg/dL 25 31*   CREATININE mg/dL 1 35* 1 69*   CALCIUM mg/dL 8 8 9 0   ALK PHOS U/L  --  64   ALT U/L  --  22   AST U/L  --  18           * I Have Reviewed All Lab Data Listed Above  * Additional Pertinent Lab Tests Reviewed:  Stu [address] Admission Reviewed        Recent Cultures (last 7 days):           Last 24 Hours Medication List:   Current Facility-Administered Medications   Medication Dose Route Frequency Provider Last Rate    acetaminophen  975 mg Oral [facility] WILLIAM iY      amiodarone  200 mg Oral Daily Judge Douglas MD      apixaban  5 mg Oral BID Judge Douglas MD      atorvastatin  20 mg Oral Daily With Vania Sawyer MD      bisacodyl  10 mg Rectal Daily PRN Judge Douglas MD      clopidogrel  75 mg Oral Daily Judge Douglas MD      digoxin  125 mcg Oral Daily Judge Douglas MD      FLUoxetine  20 mg Oral Daily Judge Douglas MD      folic acid  8,077 mcg Oral Daily Judge Douglas MD      gabapentin  100 mg Oral BID Josh Otilia, DO      insulin lispro  1-6 Units Subcutaneous TID [facility] Judge Douglas MD      insulin lispro  1-6 Units Subcutaneous HS Judge Douglas MD      melatonin  3 mg Oral Once PRN dK, WILLIAM      melatonin  6 mg Oral HS Steven Nguyen MD      methocarbamol  500 mg Oral Once PRN Steven Nguyen MD      metoprolol succinate  50 mg Oral Q12H Steven Nguyen MD      nortriptyline  10 mg Oral HS Steven Nguyen MD      pantoprazole  40 mg Oral Early Morning Steven Nguyen MD      senna-docusate sodium  1 tablet Oral HS Steven Nguyen MD      sodium chloride  500 mL Intravenous Once CHANDRIKA Briseno 500 mL (08/28/20 1246)    valACYclovir  1,000 mg Oral Q12H [address] Milton Lawrence DO          Today, Patient Was Seen By: Kristen Krishnan DO    ** Please Note: Dictation voice to text software may have been used in the creation of this document   **

## 2020-08-28 NOTE — PROGRESS NOTES
Progress Note - Nephrology   Doris Graft 79 y o  female MRN: [id number]  Unit/Bed#: [facility] 525-01 Encounter: [id number]  ASSESSMENT and PLAN:  Acute kidney injury (POA):  Suspect prerenal with volume depletion with reported decreased oral intake in the setting of diuretic use Lasix 40 mg daily, possible loss of autoregulation with lisinopril, relative hypotension, hemodynamic changes with atrial fibrillation with prior RVR, as well as urinary retention requiring straight catheterization  - After review of records it appears that the patient has a baseline Creatinine of 1 0-1 2 mg/dL  With EGFR 30-40  - patient was admitted with a creatinine of 1 69 mg/dL    -status post 500 cc normal saline x1  -Lasix 40 mg and lisinopril 2 5 mg both on hold (status post two doses of IV Lasix since admission)  -chest x-ray reviewed without vascular congestion  -urinalysis with micro-essentially bland  -renal ultrasound unremarkable bilateral normal echogenicity and contour without hydronephrosis or obstruction  -[id number]  -status post straight catheterization for 700 cc yesterday-no further need  Plan:  -will give additional 500 cc normal saline x1 over 10 hours  -discussed with Dr Porter to resume Lasix half dose 20 mg daily on discharge  -continue to hold lisinopril and on discharge  -renal function improving current creatinine 1 35  -nephrology will sign off, please call if Nephrology can be of further assistance    Chronic kidney disease III:  Suspect secondary to hypertension, prior AK I insults, in the setting of decreased EF  -after review medical records baseline creatinine between 1 0-1 2 with EGFR 30 to 40s  -does not follow with Nephrology  -recommend follow-up on discharge     Blood pressure:  BP improving status post IV fluids and holding diuretics  -current medications-Toprol XL to 50 mg every 12 hours  -maximize hemodynamics to maintain MAP >65  -avoid hypotension or fluctuations in blood pressure  -will continue to trend     H&H/anemia:  Likely secondary to Chronic Kidney Disease  -current hemoglobin 11 7  -iron stores within normal limits  -transfuse if hemoglobin less than 7 0  -Per primary team     Acid-base:  Current bicarb 29     Diabetes:  Per primary team  -last A1c 7 6 on 08/05/2020  -need adequate blood sugar control     Congestive heart failure:  Echocardiogram 08/05/2020 reveals EF of 45% with concentric hypertrophy, peak PA pressure is 40 mmHG, IVC was normal size  -follows cardiology at [facility] as outpatient  -[id number] in the setting of Chronic Kidney Disease  -chest x-ray does not reveal vascular congestion  -patient examining euvolemic to dry side  -diuretics on hold  -as above recommend resuming at Lasix half dose at 20 mg daily on discharge     Mineral bone disease of Chronic Kidney Disease  -phosphorus 3 9 on 08/27/2020  -will monitor PTH and phosphorus as outpatient     Volume status:  - Clinically patient appears to be hypovolemic/euvolemic   -will give another 500 mL normal saline over 10 hours     Electrolytes:  Hypokalemia:  -suspect secondary to decreased oral intake  -will give 40 mEq potassium today for potassium of 3 5  Hypo magnesium:  Resolved at 2 6 status post IV replacement  -suspect secondary decreased oral intake      Disposition:  Renal function is improving with holding diuretic and gentle IV fluids  Recommend holding Lasix while admitted and okay to resume Lasix 20 mg daily on discharge  Discussed with Dr Tash Connolly  Will need follow-up with cardiology  Nephrology will sign off  Please call if Nephrology could be of further assistance  SUBJECTIVE:  Patient seen and examined  Denies chest pain shortness of breath  Denies nausea vomiting    Reports able to pass urine without difficulty    OBJECTIVE:  Current Weight: Weight - Scale: 76 2 kg (167 lb 15 9 oz)  Vitals:    08/28/20 0455 08/28/20 0738 08/28/20 1007 08/28/20 1147   BP:  120/60 128/96 117/64   BP Location:  Left arm  Left arm   Pulse:  68 68 74   Resp:  18  18   Temp:  97 5 °F (36 4 °C)  97 6 °F (36 4 °C)   TempSrc:  Oral  Oral   SpO2:  96%  98%   Weight: 76 2 kg (167 lb 15 9 oz)      Height:           Intake/Output Summary (Last 24 hours) at 8/28/2020 1201  Last data filed at 8/28/2020 0533  Gross per 24 hour   Intake 360 ml   Output 1008 ml   Net -648 ml     General: conscious,  cooperative, not in acute distress, chronically ill-appearing  Skin: no rash, warm and dry  Eyes: pale conjunctivae, anicteric sclerae  ENT:   fairly dry lips and mucous membranes, poor dentition  Neck: supple, no JVD   Chest:  Essentially clear breath sounds, no crackles rhonchi wheezes noted  CVS:  Irregular rate and rhythm, no rub  Abdomen: soft,  nontender, no distension normoactive bowel sounds  Extremities: no edema of both legs  Neuro: awake, alert, oriented  Psych: appropriate affect     Medications:    Current Facility-Administered Medications:     acetaminophen (TYLENOL) tablet 975 mg, 975 mg, Oral, Q8H [address], Xander Alves PA-C, 975 mg at 08/28/20 0194    amiodarone tablet 200 mg, 200 mg, Oral, Daily, Parisa Leon MD, 200 mg at 08/28/20 1007    apixaban (ELIQUIS) tablet 5 mg, 5 mg, Oral, BID, Parisa Leon MD, 5 mg at 08/28/20 1007    atorvastatin (LIPITOR) tablet 20 mg, 20 mg, Oral, Daily With Dinner, Parisa Leon MD, 20 mg at 08/27/20 1818    bisacodyl (DULCOLAX) rectal suppository 10 mg, 10 mg, Rectal, Daily PRN, Parisa Leon MD    clopidogrel (PLAVIX) tablet 75 mg, 75 mg, Oral, Daily, Parisa Leon MD, 75 mg at 08/28/20 1007    digoxin (LANOXIN) tablet 125 mcg, 125 mcg, Oral, Daily, Parisa Leon MD, 125 mcg at 08/28/20 1007    FLUoxetine (PROzac) capsule 20 mg, 20 mg, Oral, Daily, Parisa Leon MD, 20 mg at 54/59/65 1665    folic acid (FOLVITE) tablet 1,000 mcg, 1,000 mcg, Oral, Daily, Parisa Leon MD, 1,000 mcg at 08/28/20 1008    gabapentin (NEURONTIN) capsule 100 mg, 100 mg, Oral, BID, Robinson Cedeno DO    insulin lispro (HumaLOG) 100 units/mL subcutaneous injection 1-6 Units, 1-6 Units, Subcutaneous, TID AC, Stopped at 20 1242 **AND** Fingerstick Glucose (POCT), , , TID AC, Rupa Stoll MD    insulin lispro (HumaLOG) 100 units/mL subcutaneous injection 1-6 Units, 1-6 Units, Subcutaneous, HS, Rupa Stoll MD, 1 Units at 204    lidocaine (LIDODERM) 5 % patch 1 patch, 1 patch, Topical, Daily, Petar Wylie PA-C, 1 patch at 20 0357    melatonin tablet 3 mg, 3 mg, Oral, Once PRN, Petar Wylie PA-C    melatonin tablet 6 mg, 6 mg, Oral, HS, Rupa Stoll MD, 6 mg at 20    methocarbamol (ROBAXIN) tablet 500 mg, 500 mg, Oral, Once PRN, Rupa Stoll MD, 500 mg at 20 0022    metoprolol succinate (TOPROL-XL) 24 hr tablet 50 mg, 50 mg, Oral, Q12H, Rupa Stoll MD, 50 mg at 20 1007    nortriptyline (PAMELOR) capsule 10 mg, 10 mg, Oral, HS, Rupa Stoll MD, 10 mg at 202    pantoprazole (PROTONIX) EC tablet 40 mg, 40 mg, Oral, Early Morning, Rupa Stoll MD, 40 mg at 20 0506    senna-docusate sodium (SENOKOT S) 8 6-50 mg per tablet 1 tablet, 1 tablet, Oral, HS, Rupa Stoll MD    Laboratory Results:  Results from last 7 days   Lab Units 20  0450 20  0430 20  0428 20  1725   WBC Thousand/uL 8 37 10 14  --  13 08*   HEMOGLOBIN g/dL 11 0* 11 7  --  12 4   HEMATOCRIT % 35 2 36 4  --  39 8   PLATELETS Thousands/uL 277 290  --  323   SODIUM mmol/L 139  --  139 139   POTASSIUM mmol/L 3 5  --  3 5 4 1   CHLORIDE mmol/L 103  --  101 100   CO2 mmol/L 29  --  29 29   BUN mg/dL 25  --  31* 32*   CREATININE mg/dL 1 35*  --  1 69* 1 69*   CALCIUM mg/dL 8 8  --  9 0 10 1   MAGNESIUM mg/dL 2 6  --  1 8  --    PHOSPHORUS mg/dL  --   --  3 9  --

## 2020-08-28 NOTE — ASSESSMENT & PLAN NOTE
Lab Results   Component Value Date    HGBA1C 7 6 (H) 08/05/2020       Recent Labs     08/27/20 2058 08/28/20  0625 08/28/20  1144 08/28/20  1631   POCGLU 153* 146* 124 117       Blood Sugar Average: Last 72 hrs:  (P) 132 25   SSI  Blood Glucose checks TIDWM and QHS (Q6H for NPO patients)  Hold oral medications  ISS

## 2020-08-29 NOTE — ASSESSMENT & PLAN NOTE
Estimated Creatinine Clearance: 38 5 mL/min (by C-G formula based on SCr of 1 22 mg/dL)    B/l Cr 1 1  See MALA

## 2020-08-29 NOTE — ASSESSMENT & PLAN NOTE
Patient tearful and depressed on examination in ER; denies SI/HI, but did earlier in the ED  Continue Prozac and Pamelor  Behavior Health consult appreciated - no 1 to 1 needed
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
## 2020-08-29 NOTE — ASSESSMENT & PLAN NOTE
Estimated Creatinine Clearance: 38 5 mL/min (by C-G formula based on SCr of 1 22 mg/dL)    POA  Patient noted to have serum creatinine 1 69 on admission; baseline 1-1 2  Pt has MYERS but likely this is more due to deconditioning and not CHF   Cr did not improve w/ diuresis  UA WNL  Urinary retention protocol ordered - had been straight cathed twice but appears resolved at this time  Renal appreciated  Cr now at baseline after getting IVF  Renal US WNL  CPK WNL

## 2020-08-29 NOTE — ASSESSMENT & PLAN NOTE
In lumbar region  No pain  Lesions appear crusted - no need for contact precautions  Valtrex for 7 days

## 2020-08-29 NOTE — ASSESSMENT & PLAN NOTE
Scheduled tylenol  Start Neurontin  MRI shows severe stenosis  Pt was supposed to see nsurg OP on 8/24 but did not show up to appt  D/w Neurosurg    Will order 6 vw Cervical XR in AM Monday and have neurosurg consult placed Monday as pt likely to have prolonged admission while we find guardianship

## 2020-08-29 NOTE — ASSESSMENT & PLAN NOTE
PT/OT  Recent admission or patient declined follow on rehabilitation  Neuropsych eval ordered and pt not competent to make medical decisions    Will need guardian and then rehab placement

## 2020-08-29 NOTE — ASSESSMENT & PLAN NOTE
Wt Readings from Last 3 Encounters:   08/29/20 77 5 kg (170 lb 13 7 oz)   08/09/20 75 4 kg (166 lb 3 6 oz)   08/06/20 86 4 kg (190 lb 7 6 oz)     Echo with EF 45%; continue home medications  Patient reports dyspnea on exertion; became winded talking to examiner  BNP elevated at 1326; distended abdomen  Denies night cough, orthopnea, proximal nocturnal dyspnea; a chest x-ray read as normal, but may have infiltrates  Pt appeared to have improved w/ IV Lasix and at time of my exam 8/27 appears euvolemic  However, possible MYERS 2/2 deconditioning as pt appezrs dry on exam  gentle IVF to tx MALA  Per renal hold ACEi at d/c  Can d/c on Lasix 20 mg PO daily  Can have BMP at rehab and if Cr stable, can be placed back on ACEi

## 2020-08-29 NOTE — ASSESSMENT & PLAN NOTE
Lab Results   Component Value Date    HGBA1C 7 6 (H) 08/05/2020       Recent Labs     08/28/20  1631 08/28/20  2102 08/29/20  0635 08/29/20  1056   POCGLU 117 135 148* 189*       Blood Sugar Average: Last 72 hrs:  (P) 139 [id number]   SSI  Blood Glucose checks TIDWM and QHS (Q6H for NPO patients)  Hold oral medications  ISS

## 2020-08-29 NOTE — PROGRESS NOTES
Progress Note - Doris Graft [birth year], 79 y o  female MRN: [id number]    Unit/Bed#: [facility] 525-01 Encounter: [id number]    Primary Care Provider: Parker Decker DO   Date and time admitted to hospital: 8/26/2020  4:18 PM        * MALA (acute kidney injury) [facility])  Assessment & Plan  Estimated Creatinine Clearance: 38 5 mL/min (by C-G formula based on SCr of 1 22 mg/dL)  POA  Patient noted to have serum creatinine 1 69 on admission; baseline 1-1 2  Pt has MYERS but likely this is more due to deconditioning and not CHF   Cr did not improve w/ diuresis  UA WNL  Urinary retention protocol ordered - had been straight cathed twice but appears resolved at this time  Renal appreciated  Cr now at baseline after getting IVF  Renal US WNL  CPK WNL    Chronic systolic congestive heart failure (HCC)  Assessment & Plan  Wt Readings from Last 3 Encounters:   08/29/20 77 5 kg (170 lb 13 7 oz)   08/09/20 75 4 kg (166 lb 3 6 oz)   08/06/20 86 4 kg (190 lb 7 6 oz)     Echo with EF 45%; continue home medications  Patient reports dyspnea on exertion; became winded talking to examiner  BNP elevated at 1326; distended abdomen  Denies night cough, orthopnea, proximal nocturnal dyspnea; a chest x-ray read as normal, but may have infiltrates  Pt appeared to have improved w/ IV Lasix and at time of my exam 8/27 appears euvolemic  However, possible MYERS 2/2 deconditioning as pt appezrs dry on exam  gentle IVF to tx MALA  Per renal hold ACEi at d/c  Can d/c on Lasix 20 mg PO daily  Can have BMP at rehab and if Cr stable, can be placed back on ACEi  Herpes zoster without complication  Assessment & Plan  In lumbar region  No pain  Lesions appear crusted - no need for contact precautions  Valtrex for 7 days    CKD (chronic kidney disease) stage 3, GFR 30-59 ml/min ([facility])  Assessment & Plan  Estimated Creatinine Clearance: 38 5 mL/min (by C-G formula based on SCr of 1 22 mg/dL)    B/l Cr 1 1  See MALA    DM2 (diabetes mellitus, type 2) [facility])  Assessment & Plan  Lab Results   Component Value Date    HGBA1C 7 6 (H) 08/05/2020       Recent Labs     08/28/20  1631 08/28/20  2102 08/29/20  0635 08/29/20  1056   POCGLU 117 135 148* 189*       Blood Sugar Average: Last 72 hrs:  (P) 139 [id number]   SSI  Blood Glucose checks TIDWM and QHS (Q6H for NPO patients)  Hold oral medications  ISS      Pain of cervical spine  Assessment & Plan  Scheduled tylenol  Start Neurontin  MRI shows severe stenosis  Pt was supposed to see nsurg OP on 8/24 but did not show up to appt  D/w Neurosurg  Will order 6 vw Cervical XR in AM Monday and have neurosurg consult placed Monday as pt likely to have prolonged admission while we find guardianship     A-fib [facility])  Assessment & Plan  Patient rate controlled on admission; continue BB  Anticoagulated with Eliquis 5 mg b i d  Elevated troponin level not due myocardial infarction  Assessment & Plan  Patient with elevated troponin; denies chest pain  EKG in the ED negative for ischemia; shows rate controlled atrial fibrillation  Trop actually lower than earlier in month  No need for further w/u    Mild cognitive impairment  Assessment & Plan  Neurospsych eval pt 8/28 and deemed her incompetent to make medical decisions    Coronary artery disease  Assessment & Plan  S/p PCI July 2019  C/w Plavix and BB and statin    Hyperlipidemia  Assessment & Plan  Continue statin    Essential hypertension  Assessment & Plan  C/w BB  Hold ACEi and Lasix in setting of MALA    Weakness/physical deconditioning  Assessment & Plan  PT/OT  Recent admission or patient declined follow on rehabilitation  Neuropsych eval ordered and pt not competent to make medical decisions    Will need guardian and then rehab placement    Dysthymic disorder  Assessment & Plan  Patient tearful and depressed on examination in ER; denies SI/HI, but did earlier in the ED  Continue Prozac and Pamelor  Behavior Health consult appreciated - no 1 to 1 needed    VTE Pharmacologic Prophylaxis:   Pharmacologic: Apixaban (Eliquis)  Mechanical VTE Prophylaxis in Place: Yes    Patient Centered Rounds: I have performed bedside rounds with nursing staff today  Discussions with Specialists or Other Care Team Provider: neurosurg    Education and Discussions with Family / Patient: pt    Time Spent for Care: 30 minutes  More than 50% of total time spent on counseling and coordination of care as described above  Current Length of Stay: 3 day(s)    Current Patient Status: Inpatient   Certification Statement: The patient will continue to require additional inpatient hospital stay due to need for guardianship    Discharge Plan: will need guardianship before d/c    Code Status: Level 3 - DNAR and DNI      Subjective:   Neck pain improved but still there    Objective:     Vitals:   Temp (24hrs), Av 6 °F (36 4 °C), Min:97 4 °F (36 3 °C), Max:97 9 °F (36 6 °C)    Temp:  [97 4 °F (36 3 °C)-97 9 °F (36 6 °C)] 97 4 °F (36 3 °C)  HR:  [61-93] 61  Resp:  [16-18] 18  BP: (105-119)/(56-58) 112/58  SpO2:  [93 %-95 %] 93 %  Body mass index is 28 43 kg/m²  Input and Output Summary (last 24 hours): Intake/Output Summary (Last 24 hours) at 2020 1456  Last data filed at 2020 1255  Gross per 24 hour   Intake 1080 ml   Output 725 ml   Net 355 ml       Physical Exam:     Physical Exam  HENT:      Head: Normocephalic and atraumatic  Nose: Nose normal       Mouth/Throat:      Mouth: Mucous membranes are moist    Eyes:      Extraocular Movements: Extraocular movements intact  Conjunctiva/sclera: Conjunctivae normal    Neck:      Musculoskeletal: Normal range of motion and neck supple  Cardiovascular:      Rate and Rhythm: Normal rate and regular rhythm  Pulses: Normal pulses  Heart sounds: Normal heart sounds  Pulmonary:      Effort: Pulmonary effort is normal       Breath sounds: Normal breath sounds  No wheezing or rales     Abdominal:      General: Bowel sounds are normal  There is no distension  Palpations: Abdomen is soft  Tenderness: There is no abdominal tenderness  Musculoskeletal: Normal range of motion  Right lower leg: No edema  Left lower leg: No edema  Skin:     General: Skin is warm and dry  Neurological:      Mental Status: She is alert and oriented to person, place, and time  Mental status is at baseline  Additional Data:     Labs:    Results from last 7 days   Lab Units 08/29/20  0511  08/27/20  0430   WBC Thousand/uL 8 98   < > 10 14   HEMOGLOBIN g/dL 11 7   < > 11 7   HEMATOCRIT % 36 9   < > 36 4   PLATELETS Thousands/uL 259   < > 290   NEUTROS PCT %  --   --  74   LYMPHS PCT %  --   --  11*   MONOS PCT %  --   --  8   EOS PCT %  --   --  5    < > = values in this interval not displayed  Results from last 7 days   Lab Units 08/29/20  0511  08/27/20  0428   POTASSIUM mmol/L 3 8   < > 3 5   CHLORIDE mmol/L 107   < > 101   CO2 mmol/L 28   < > 29   BUN mg/dL 24   < > 31*   CREATININE mg/dL 1 22   < > 1 69*   CALCIUM mg/dL 8 2*   < > 9 0   ALK PHOS U/L  --   --  64   ALT U/L  --   --  22   AST U/L  --   --  18    < > = values in this interval not displayed  * I Have Reviewed All Lab Data Listed Above  * Additional Pertinent Lab Tests Reviewed:  [facility]        Recent Cultures (last 7 days):           Last 24 Hours Medication List:   Current Facility-Administered Medications   Medication Dose Route Frequency Provider Last Rate    acetaminophen  975 mg Oral [facility] Rosa Sanford PA-C      amiodarone  200 mg Oral Daily Zion Medina MD      apixaban  5 mg Oral BID Zion Medina MD      atorvastatin  20 mg Oral Daily With Henrique Moreno MD      bisacodyl  10 mg Rectal Daily PRN Zion Medina MD      clopidogrel  75 mg Oral Daily Zion Medina MD      digoxin  125 mcg Oral Daily Zion Medina MD      FLUoxetine  20 mg Oral Daily MD Amanda Albarran folic acid  9,579 mcg Oral Daily Xavi Lazar MD      gabapentin  100 mg Oral BID Marleni Munson DO      insulin lispro  1-6 Units Subcutaneous TID [facility] Xavi Lazar MD      insulin lispro  1-6 Units Subcutaneous HS Xavi Lazar MD      melatonin  3 mg Oral Once PRN Fran Ladd PA-C      melatonin  6 mg Oral HS Xavi Lazar MD      methocarbamol  500 mg Oral Once PRN Xavi Lazar MD      metoprolol succinate  50 mg Oral Q12H Xavi Lazar MD      nortriptyline  10 mg Oral HS Xavi Lazar MD      pantoprazole  40 mg Oral Early Morning Xavi Lazar MD      senna-docusate sodium  1 tablet Oral HS Xavi Lazar MD      valACYclovir  1,000 mg Oral Q12H [address] DO Melinda          Today, Patient Was Seen By: Marleni Munson DO    ** Please Note: Dictation voice to text software may have been used in the creation of this document   **

## 2020-08-30 NOTE — PLAN OF CARE
Problem: Potential for Falls  Goal: Patient will remain free of falls  Description: INTERVENTIONS:  - Assess patient frequently for physical needs  -  Identify cognitive and physical deficits and behaviors that affect risk of falls  -  [place] fall precautions as indicated by assessment   - Educate patient/family on patient safety including physical limitations  - Instruct patient to call for assistance with activity based on assessment  - Modify environment to reduce risk of injury  - Consider OT/PT consult to assist with strengthening/mobility  Outcome: Progressing     Problem: Nutrition/Hydration-ADULT  Goal: Nutrient/Hydration intake appropriate for improving, restoring or maintaining nutritional needs  Description: Monitor and assess patient's nutrition/hydration status for malnutrition  Collaborate with interdisciplinary team and initiate plan and interventions as ordered  Monitor patient's weight and dietary intake as ordered or per policy  Utilize nutrition screening tool and intervene as necessary  Determine patient's food preferences and provide high-protein, high-caloric foods as appropriate       INTERVENTIONS:  - Monitor oral intake, urinary output, labs, and treatment plans  - Assess nutrition and hydration status and recommend course of action  - Evaluate amount of meals eaten  - Assist patient with eating if necessary   - Allow adequate time for meals  - Recommend/ encourage appropriate diets, oral nutritional supplements, and vitamin/mineral supplements  - Order, calculate, and assess calorie counts as needed  - Recommend, monitor, and adjust tube feedings and TPN/PPN based on assessed needs  - Assess need for intravenous fluids  - Provide specific nutrition/hydration education as appropriate  - Include patient/family/caregiver in decisions related to nutrition  Outcome: Progressing     Problem: CARDIOVASCULAR - ADULT  Goal: Maintains optimal cardiac output and hemodynamic stability  Description: INTERVENTIONS:  - Monitor I/O, vital signs and rhythm  - Monitor for S/S and trends of decreased cardiac output  - Administer and titrate ordered vasoactive medications to optimize hemodynamic stability  - Assess quality of pulses, skin color and temperature  - Assess for signs of decreased coronary artery perfusion  - Instruct patient to report change in severity of symptoms  Outcome: Progressing  Goal: Absence of cardiac dysrhythmias or at baseline rhythm  Description: INTERVENTIONS:  - Continuous cardiac monitoring, vital signs, obtain 12 lead EKG if ordered  - Administer antiarrhythmic and heart rate control medications as ordered  - Monitor electrolytes and administer replacement therapy as ordered  Outcome: Progressing     Problem: RESPIRATORY - ADULT  Goal: Achieves optimal ventilation and oxygenation  Description: INTERVENTIONS:  - Assess for changes in respiratory status  - Assess for changes in mentation and behavior  - Position to facilitate oxygenation and minimize respiratory effort  - Oxygen administered by appropriate delivery if ordered  - Initiate smoking cessation education as indicated  - Encourage broncho-pulmonary hygiene including cough, deep breathe, Incentive Spirometry  - Assess the need for suctioning and aspirate as needed  - Assess and instruct to report SOB or any respiratory difficulty  - Respiratory Therapy support as indicated  Outcome: Progressing     Problem: MUSCULOSKELETAL - ADULT  Goal: Maintain or return mobility to safest level of function  Description: INTERVENTIONS:  - Assess patient's ability to carry out ADLs; assess patient's baseline for ADL function and identify physical deficits which impact ability to perform ADLs (bathing, care of mouth/teeth, toileting, grooming, dressing, etc )  - Assess/evaluate cause of self-care deficits   - Assess range of motion  - Assess patient's mobility  - Assess patient's need for assistive devices and provide as appropriate  - Encourage maximum independence but intervene and supervise when necessary  - Involve family in performance of ADLs  - Assess for home care needs following discharge   - Consider OT consult to assist with ADL evaluation and planning for discharge  - Provide patient education as appropriate  Outcome: Progressing  Goal: Maintain proper alignment of affected body part  Description: INTERVENTIONS:  - Support, maintain and protect limb and body alignment  - Provide patient/ family with appropriate education  Outcome: Progressing     Problem: Prexisting or High Potential for Compromised Skin Integrity  Goal: Skin integrity is maintained or improved  Description: INTERVENTIONS:  - Identify patients at risk for skin breakdown  - Assess and monitor skin integrity  - Assess and monitor nutrition and hydration status  - Monitor labs   - Assess for incontinence   - Turn and reposition patient  - Assist with mobility/ambulation  - Relieve pressure over bony prominences  - Avoid friction and shearing  - Provide appropriate hygiene as needed including keeping skin clean and dry  - Evaluate need for skin moisturizer/barrier cream  - Collaborate with interdisciplinary team   - Patient/family teaching  - Consider wound care consult   Outcome: Progressing

## 2020-08-30 NOTE — ASSESSMENT & PLAN NOTE
Estimated Creatinine Clearance: 40 7 mL/min (by C-G formula based on SCr of 1 16 mg/dL)    B/l Cr 1 1  See MALA

## 2020-08-30 NOTE — PLAN OF CARE
Problem: Potential for Falls  Goal: Patient will remain free of falls  Description: INTERVENTIONS:  - Assess patient frequently for physical needs  -  Identify cognitive and physical deficits and behaviors that affect risk of falls  -  [place] fall precautions as indicated by assessment   - Educate patient/family on patient safety including physical limitations  - Instruct patient to call for assistance with activity based on assessment  - Modify environment to reduce risk of injury  - Consider OT/PT consult to assist with strengthening/mobility  Outcome: Progressing     Problem: Nutrition/Hydration-ADULT  Goal: Nutrient/Hydration intake appropriate for improving, restoring or maintaining nutritional needs  Description: Monitor and assess patient's nutrition/hydration status for malnutrition  Collaborate with interdisciplinary team and initiate plan and interventions as ordered  Monitor patient's weight and dietary intake as ordered or per policy  Utilize nutrition screening tool and intervene as necessary  Determine patient's food preferences and provide high-protein, high-caloric foods as appropriate       INTERVENTIONS:  - Monitor oral intake, urinary output, labs, and treatment plans  - Assess nutrition and hydration status and recommend course of action  - Evaluate amount of meals eaten  - Assist patient with eating if necessary   - Allow adequate time for meals  - Recommend/ encourage appropriate diets, oral nutritional supplements, and vitamin/mineral supplements  - Order, calculate, and assess calorie counts as needed  - Recommend, monitor, and adjust tube feedings and TPN/PPN based on assessed needs  - Assess need for intravenous fluids  - Provide specific nutrition/hydration education as appropriate  - Include patient/family/caregiver in decisions related to nutrition  Outcome: Progressing     Problem: CARDIOVASCULAR - ADULT  Goal: Maintains optimal cardiac output and hemodynamic stability  Description: INTERVENTIONS:  - Monitor I/O, vital signs and rhythm  - Monitor for S/S and trends of decreased cardiac output  - Administer and titrate ordered vasoactive medications to optimize hemodynamic stability  - Assess quality of pulses, skin color and temperature  - Assess for signs of decreased coronary artery perfusion  - Instruct patient to report change in severity of symptoms  Outcome: Progressing  Goal: Absence of cardiac dysrhythmias or at baseline rhythm  Description: INTERVENTIONS:  - Continuous cardiac monitoring, vital signs, obtain 12 lead EKG if ordered  - Administer antiarrhythmic and heart rate control medications as ordered  - Monitor electrolytes and administer replacement therapy as ordered  Outcome: Progressing     Problem: RESPIRATORY - ADULT  Goal: Achieves optimal ventilation and oxygenation  Description: INTERVENTIONS:  - Assess for changes in respiratory status  - Assess for changes in mentation and behavior  - Position to facilitate oxygenation and minimize respiratory effort  - Oxygen administered by appropriate delivery if ordered  - Initiate smoking cessation education as indicated  - Encourage broncho-pulmonary hygiene including cough, deep breathe, Incentive Spirometry  - Assess the need for suctioning and aspirate as needed  - Assess and instruct to report SOB or any respiratory difficulty  - Respiratory Therapy support as indicated  Outcome: Progressing     Problem: MUSCULOSKELETAL - ADULT  Goal: Maintain or return mobility to safest level of function  Description: INTERVENTIONS:  - Assess patient's ability to carry out ADLs; assess patient's baseline for ADL function and identify physical deficits which impact ability to perform ADLs (bathing, care of mouth/teeth, toileting, grooming, dressing, etc )  - Assess/evaluate cause of self-care deficits   - Assess range of motion  - Assess patient's mobility  - Assess patient's need for assistive devices and provide as appropriate  - Encourage maximum independence but intervene and supervise when necessary  - Involve family in performance of ADLs  - Assess for home care needs following discharge   - Consider OT consult to assist with ADL evaluation and planning for discharge  - Provide patient education as appropriate  Outcome: Progressing  Goal: Maintain proper alignment of affected body part  Description: INTERVENTIONS:  - Support, maintain and protect limb and body alignment  - Provide patient/ family with appropriate education  Outcome: Progressing     Problem: Prexisting or High Potential for Compromised Skin Integrity  Goal: Skin integrity is maintained or improved  Description: INTERVENTIONS:  - Identify patients at risk for skin breakdown  - Assess and monitor skin integrity  - Assess and monitor nutrition and hydration status  - Monitor labs   - Assess for incontinence   - Turn and reposition patient  - Assist with mobility/ambulation  - Relieve pressure over bony prominences  - Avoid friction and shearing  - Provide appropriate hygiene as needed including keeping skin clean and dry  - Evaluate need for skin moisturizer/barrier cream  - Collaborate with interdisciplinary team   - Patient/family teaching  - Consider wound care consult   Outcome: Progressing

## 2020-08-30 NOTE — ASSESSMENT & PLAN NOTE
Patient rate controlled on admission; continue BB  Anticoagulated with Eliquis 5 mg b i d    If pt goes for surgery will need bridging

## 2020-08-30 NOTE — ASSESSMENT & PLAN NOTE
Lab Results   Component Value Date    HGBA1C 7 6 (H) 08/05/2020       Recent Labs     08/29/20  2127 08/30/20  0636 08/30/20  1129 08/30/20  1628   POCGLU 139 145* 114 116       Blood Sugar Average: Last 72 hrs:  (P) 542 [id number]   SSI  Blood Glucose checks TIDWM and QHS (Q6H for NPO patients)  Hold oral medications  ISS

## 2020-08-30 NOTE — ASSESSMENT & PLAN NOTE
Estimated Creatinine Clearance: 40 7 mL/min (by C-G formula based on SCr of 1 16 mg/dL)    POA  Patient noted to have serum creatinine 1 69 on admission; baseline 1-1 2  Pt has MYERS but likely this is more due to deconditioning and not CHF   Cr did not improve w/ diuresis  UA WNL  Urinary retention protocol ordered - had been straight cathed twice but appears resolved at this time  Renal appreciated  Cr now at baseline after getting IVF  Renal US WNL  CPK WNL

## 2020-08-30 NOTE — ASSESSMENT & PLAN NOTE
Wt Readings from Last 3 Encounters:   08/30/20 78 4 kg (172 lb 13 5 oz)   08/09/20 75 4 kg (166 lb 3 6 oz)   08/06/20 86 4 kg (190 lb 7 6 oz)     Echo with EF 45%; continue home medications  Patient reports dyspnea on exertion; became winded talking to examiner  BNP elevated at 1326; distended abdomen  Denies night cough, orthopnea, proximal nocturnal dyspnea; a chest x-ray read as normal, but may have infiltrates  Pt appeared to have improved w/ IV Lasix and at time of my exam 8/27 appears euvolemic  However, possible MYERS 2/2 deconditioning as pt appears dry on exam  gentle IVF to tx MALA  Per renal hold ACEi at d/c  Can d/c on Lasix 20 mg PO daily  Can have BMP at rehab and if Cr stable, can be placed back on ACEi

## 2020-08-30 NOTE — ASSESSMENT & PLAN NOTE
Scheduled tylenol  Start Neurontin  Oxy prn  MRI shows severe stenosis  Pt was supposed to see nsurg OP on 8/24 but did not show up to appt     Ordered 6 vw Cervical XR to be done AM Monday    Neurosurg consult placed and they will see pt after Xray  Pt will need guardian to consent if surgery needed

## 2020-08-30 NOTE — PROGRESS NOTES
Progress Note - Anu Mariscal [birth year], 79 y o  female MRN: [id number]    Unit/Bed#: [facility] 525-01 Encounter: [id number]    Primary Care Provider: Max Jimenez DO   Date and time admitted to hospital: 8/26/2020  4:18 PM        * MALA (acute kidney injury) [facility])  Assessment & Plan  Estimated Creatinine Clearance: 40 7 mL/min (by C-G formula based on SCr of 1 16 mg/dL)  POA  Patient noted to have serum creatinine 1 69 on admission; baseline 1-1 2  Pt has MYERS but likely this is more due to deconditioning and not CHF   Cr did not improve w/ diuresis  UA WNL  Urinary retention protocol ordered - had been straight cathed twice but appears resolved at this time  Renal appreciated  Cr now at baseline after getting IVF  Renal US WNL  CPK WNL    Chronic systolic congestive heart failure (HCC)  Assessment & Plan  Wt Readings from Last 3 Encounters:   08/30/20 78 4 kg (172 lb 13 5 oz)   08/09/20 75 4 kg (166 lb 3 6 oz)   08/06/20 86 4 kg (190 lb 7 6 oz)     Echo with EF 45%; continue home medications  Patient reports dyspnea on exertion; became winded talking to examiner  BNP elevated at 1326; distended abdomen  Denies night cough, orthopnea, proximal nocturnal dyspnea; a chest x-ray read as normal, but may have infiltrates  Pt appeared to have improved w/ IV Lasix and at time of my exam 8/27 appears euvolemic  However, possible MYERS 2/2 deconditioning as pt appears dry on exam  gentle IVF to tx MALA  Per renal hold ACEi at d/c  Can d/c on Lasix 20 mg PO daily  Can have BMP at rehab and if Cr stable, can be placed back on ACEi  Herpes zoster without complication  Assessment & Plan  In lumbar region  No pain  Lesions appear crusted - no need for contact precautions  Valtrex for 7 days    CKD (chronic kidney disease) stage 3, GFR 30-59 ml/min ([facility])  Assessment & Plan  Estimated Creatinine Clearance: 40 7 mL/min (by C-G formula based on SCr of 1 16 mg/dL)    B/l Cr 1 1  See MALA    DM2 (diabetes mellitus, type 2) [facility])  Assessment & Plan  Lab Results   Component Value Date    HGBA1C 7 6 (H) 08/05/2020       Recent Labs     08/29/20  2127 08/30/20  0636 08/30/20  1129 08/30/20  1628   POCGLU 139 145* 114 116       Blood Sugar Average: Last 72 hrs:  (P) 007 [id number]   SSI  Blood Glucose checks TIDWM and QHS (Q6H for NPO patients)  Hold oral medications  ISS      Pain of cervical spine  Assessment & Plan  Scheduled tylenol  Start Neurontin  Oxy prn  MRI shows severe stenosis  Pt was supposed to see nsurg OP on 8/24 but did not show up to appt  Ordered 6 vw Cervical XR to be done AM Monday    Neurosurg consult placed and they will see pt after Xray  Pt will need guardian to consent if surgery needed    A-fib [facility])  Assessment & Plan  Patient rate controlled on admission; continue BB  Anticoagulated with Eliquis 5 mg b i d  If pt goes for surgery will need bridging    Elevated troponin level not due myocardial infarction  Assessment & Plan  Patient with elevated troponin; denies chest pain  EKG in the ED negative for ischemia; shows rate controlled atrial fibrillation  Trop actually lower than earlier in month  No need for further w/u    Mild cognitive impairment  Assessment & Plan  Neurospsych eval pt 8/28 and deemed her incompetent to make medical decisions    Coronary artery disease  Assessment & Plan  S/p PCI July 2019  C/w Plavix and BB and statin    Hyperlipidemia  Assessment & Plan  Continue statin    Essential hypertension  Assessment & Plan  C/w BB  Hold ACEi and Lasix in setting of MALA    Weakness/physical deconditioning  Assessment & Plan  PT/OT  Recent admission or patient declined follow on rehabilitation  Neuropsych eval ordered and pt not competent to make medical decisions    Will need guardian and then rehab placement    Dysthymic disorder  Assessment & Plan  Patient tearful and depressed on examination in ER; denies SI/HI, but did earlier in the ED  Continue Prozac and Pamelor  Behavior Health consult appreciated - no 1 to 1 needed    VTE Pharmacologic Prophylaxis:   Pharmacologic: Apixaban (Eliquis)  Mechanical VTE Prophylaxis in Place: Yes    Patient Centered Rounds: I have performed bedside rounds with nursing staff today  Discussions with Specialists or Other Care Team Provider: nsurg    Education and Discussions with Family / Patient: pt    Time Spent for Care: 30 minutes  More than 50% of total time spent on counseling and coordination of care as described above  Current Length of Stay: 4 day(s)    Current Patient Status: Inpatient   Certification Statement: The patient will continue to require additional inpatient hospital stay due to need to find guardian    Discharge Plan: pending guardianship and then possible plan for neck surgery    Code Status: Level 3 - DNAR and DNI      Subjective:   C/o severe neck pain    Objective:     Vitals:   Temp (24hrs), Av °F (36 7 °C), Min:97 7 °F (36 5 °C), Max:98 5 °F (36 9 °C)    Temp:  [97 7 °F (36 5 °C)-98 5 °F (36 9 °C)] 97 8 °F (36 6 °C)  HR:  [63-99] 72  Resp:  [17-18] 18  BP: (102-118)/(56-65) 118/58  SpO2:  [95 %-96 %] 95 %  Body mass index is 28 76 kg/m²  Input and Output Summary (last 24 hours): Intake/Output Summary (Last 24 hours) at 2020 1726  Last data filed at 2020 1701  Gross per 24 hour   Intake 660 ml   Output 1150 ml   Net -490 ml       Physical Exam:     Physical Exam  HENT:      Head: Normocephalic and atraumatic  Nose: Nose normal       Mouth/Throat:      Mouth: Mucous membranes are moist    Eyes:      Extraocular Movements: Extraocular movements intact  Conjunctiva/sclera: Conjunctivae normal    Neck:      Musculoskeletal: Normal range of motion and neck supple  Cardiovascular:      Rate and Rhythm: Normal rate and regular rhythm  Pulmonary:      Effort: Pulmonary effort is normal       Breath sounds: Normal breath sounds  No wheezing or rales     Abdominal:      General: Bowel sounds are normal  There is no distension  Palpations: Abdomen is soft  Tenderness: There is no abdominal tenderness  Musculoskeletal:      Right lower leg: No edema  Left lower leg: No edema  Comments: Decreased  strength   Skin:     General: Skin is warm and dry  Neurological:      Mental Status: She is alert and oriented to person, place, and time  Additional Data:     Labs:    Results from last 7 days   Lab Units 08/29/20  0511  08/27/20  0430   WBC Thousand/uL 8 98   < > 10 14   HEMOGLOBIN g/dL 11 7   < > 11 7   HEMATOCRIT % 36 9   < > 36 4   PLATELETS Thousands/uL 259   < > 290   NEUTROS PCT %  --   --  74   LYMPHS PCT %  --   --  11*   MONOS PCT %  --   --  8   EOS PCT %  --   --  5    < > = values in this interval not displayed  Results from last 7 days   Lab Units 08/30/20  0524  08/27/20  0428   POTASSIUM mmol/L 4 0   < > 3 5   CHLORIDE mmol/L 107   < > 101   CO2 mmol/L 27   < > 29   BUN mg/dL 22   < > 31*   CREATININE mg/dL 1 16   < > 1 69*   CALCIUM mg/dL 8 1*   < > 9 0   ALK PHOS U/L  --   --  64   ALT U/L  --   --  22   AST U/L  --   --  18    < > = values in this interval not displayed  * I Have Reviewed All Lab Data Listed Above  * Additional Pertinent Lab Tests Reviewed:  [facility]        Recent Cultures (last 7 days):           Last 24 Hours Medication List:   Current Facility-Administered Medications   Medication Dose Route Frequency Provider Last Rate    acetaminophen  975 mg Oral formerly [facility] Dax Monsivais PA-C      amiodarone  200 mg Oral Daily Sven Black MD      apixaban  5 mg Oral BID Sven Black MD      atorvastatin  20 mg Oral Daily With Debra Feldman MD      bisacodyl  10 mg Rectal Daily PRN Sven Black MD      clopidogrel  75 mg Oral Daily Sven Black MD      digoxin  125 mcg Oral Daily Sven Black MD      FLUoxetine  20 mg Oral Daily Sven Black MD      folic acid  1,522 mcg Oral Daily Haile Harvey Carina Oakley MD      gabapentin  100 mg Oral BID Kristen Krishnan DO      insulin lispro  1-6 Units Subcutaneous TID [facility] Steven Nguyen MD      insulin lispro  1-6 Units Subcutaneous HS Steven Nguyen MD      melatonin  6 mg Oral HS Steven Nguyen MD      metoprolol succinate  50 mg Oral Q12H Steven Nguyen MD      nortriptyline  10 mg Oral HS Steven Nguyen MD      oxyCODONE  2 5 mg Oral Q4H PRN Kristen Krishnan DO      oxyCODONE  5 mg Oral Q4H PRN Kristen Krishnan DO      pantoprazole  40 mg Oral Early Morning Steven Nguyen MD      senna-docusate sodium  1 tablet Oral HS Steven Nguyen MD      valACYclovir  1,000 mg Oral Q12H [address] DO Melinda          Today, Patient Was Seen By: Kristen Krishnan DO    ** Please Note: Dictation voice to text software may have been used in the creation of this document   **

## 2020-08-31 PROBLEM — R10.32 LLQ PAIN: Status: ACTIVE | Noted: 2020-01-01

## 2020-08-31 NOTE — ASSESSMENT & PLAN NOTE
Today c/o severe pain  CTAP showed no acute finding  Incidentally shows transverse colon nodule    Can get repeat CTAP in 3 months if desired by guardian

## 2020-08-31 NOTE — PROGRESS NOTES
Progress Note - Isaac Kim 1/81/1824, 79 y o  female MRN: [id number]    Unit/Bed#: [facility] 525-01 Encounter: [id number]    Primary Care Provider: Chanelle Obrien DO   Date and time admitted to hospital: 8/26/2020  4:18 PM        * MALA (acute kidney injury) [facility])  Assessment & Plan  Estimated Creatinine Clearance: 40 4 mL/min (by C-G formula based on SCr of 1 16 mg/dL)  POA  Patient noted to have serum creatinine 1 69 on admission; baseline 1-1 2  Pt has MYERS but likely this is more due to deconditioning and not CHF   Cr did not improve w/ diuresis  UA WNL  Urinary retention protocol ordered - had been straight cathed twice but appears resolved at this time  Renal appreciated  Cr now at baseline after getting IVF  Today provide gentle IV NS to prevent NABOR  Renal US WNL  CPK WNL    Chronic systolic congestive heart failure (HCC)  Assessment & Plan  Wt Readings from Last 3 Encounters:   08/31/20 77 1 kg (169 lb 15 6 oz)   08/09/20 75 4 kg (166 lb 3 6 oz)   08/06/20 86 4 kg (190 lb 7 6 oz)     Echo with EF 45%; continue home medications  Patient reports dyspnea on exertion; became winded talking to examiner  BNP elevated at 1326; distended abdomen  Denies night cough, orthopnea, proximal nocturnal dyspnea; a chest x-ray read as normal, but may have infiltrates  Pt appeared to have improved w/ IV Lasix and at time of my exam 8/27 appears euvolemic  However, possible MYERS 2/2 deconditioning as pt appears dry on exam  gentle IVF to tx MALA  Per renal hold ACEi at d/c  Can d/c on Lasix 20 mg PO daily  Can have BMP at rehab and if Cr stable, can be placed back on ACEi  LLQ pain  Assessment & Plan  Today c/o severe pain  CTAP showed no acute finding  Incidentally shows transverse colon nodule    Can get repeat CTAP in 3 months if desired by guardian     Herpes zoster without complication  Assessment & Plan  In lumbar region  No pain  Lesions appear crusted - no need for contact precautions  Valtrex for 7 days    CKD (chronic kidney disease) stage 3, GFR 30-59 ml/min ([facility])  Assessment & Plan  Estimated Creatinine Clearance: 40 4 mL/min (by C-G formula based on SCr of 1 16 mg/dL)  B/l Cr 1 1  See MALA    DM2 (diabetes mellitus, type 2) [facility])  Assessment & Plan  Lab Results   Component Value Date    HGBA1C 7 6 (H) 08/05/2020       Recent Labs     08/30/20  2050 08/31/20  0627 08/31/20  1059 08/31/20  1648   POCGLU 173* 119 175* 156*       Blood Sugar Average: Last 72 hrs:  (P) 139   SSI  Blood Glucose checks TIDWM and QHS (Q6H for NPO patients)  Hold oral medications  ISS      Pain of cervical spine  Assessment & Plan  Scheduled tylenol  Neurontin changed to qhs  Oxy prn  MRI shows severe stenosis  Pt was supposed to see nsurg OP on 8/24 but did not show up to appt  Six vw Cervical XR today shows multilevel degenerative disc disease status post fusion with stable grade 1-2 anterolisthesis of C3-4  Neurosurg consult appreciated - no plan for surgery at this time  Palliative consult for pain control appreciated - start on scheduled oxy    A-fib [facility])  Assessment & Plan  Patient rate controlled on admission; continue BB  Anticoagulated with Eliquis 5 mg b i d      Elevated troponin level not due myocardial infarction  Assessment & Plan  Patient with elevated troponin; denies chest pain  EKG in the ED negative for ischemia; shows rate controlled atrial fibrillation  Trop actually lower than earlier in month  No need for further w/u    Mild cognitive impairment  Assessment & Plan  Neurospsych eval pt 8/28 and deemed her incompetent to make medical decisions    Coronary artery disease  Assessment & Plan  S/p PCI July 2019  C/w Plavix and BB and statin    Hyperlipidemia  Assessment & Plan  Continue statin    Essential hypertension  Assessment & Plan  C/w BB  Hold ACEi and Lasix in setting of MALA    Weakness/physical deconditioning  Assessment & Plan  PT/OT  Recent admission or patient declined follow on rehabilitation  Neuropsych eval ordered and pt not competent to make medical decisions  Will need guardian and then rehab placement    Dysthymic disorder  Assessment & Plan  Patient tearful and depressed on examination in ER; denies SI/HI, but did earlier in the ED  Continue Prozac and Pamelor  Behavior Health consult appreciated - no 1 to 1 needed    VTE Pharmacologic Prophylaxis:   Pharmacologic: Apixaban (Eliquis)  Mechanical VTE Prophylaxis in Place: Yes    Patient Centered Rounds: I have performed bedside rounds with nursing staff today  Discussions with Specialists or Other Care Team Provider: nsurg and palliative    Education and Discussions with Family / Patient: pt    Time Spent for Care: 30 minutes  More than 50% of total time spent on counseling and coordination of care as described above  Current Length of Stay: 5 day(s)    Current Patient Status: Inpatient   Certification Statement: The patient will continue to require additional inpatient hospital stay due to need for pain control and for guardian    Discharge Plan: will need guardian and then rehab    Code Status: Level 3 - DNAR and DNI      Subjective:   C/o severe LLQ pain    Objective:     Vitals:   Temp (24hrs), Av 8 °F (36 6 °C), Min:97 7 °F (36 5 °C), Max:97 9 °F (36 6 °C)    Temp:  [97 7 °F (36 5 °C)-97 9 °F (36 6 °C)] 97 7 °F (36 5 °C)  HR:  [61-84] 84  Resp:  [16-20] 18  BP: (107-127)/(54-76) 113/54  SpO2:  [94 %-97 %] 96 %  Body mass index is 28 29 kg/m²  Input and Output Summary (last 24 hours): Intake/Output Summary (Last 24 hours) at 2020 1833  Last data filed at 2020 1758  Gross per 24 hour   Intake 560 ml   Output 300 ml   Net 260 ml       Physical Exam:     Physical Exam  HENT:      Head: Normocephalic and atraumatic  Nose: Nose normal       Mouth/Throat:      Mouth: Mucous membranes are moist    Eyes:      Extraocular Movements: Extraocular movements intact        Conjunctiva/sclera: Conjunctivae normal    Neck:      Musculoskeletal: Normal range of motion and neck supple  Cardiovascular:      Rate and Rhythm: Normal rate and regular rhythm  Pulmonary:      Effort: Pulmonary effort is normal       Breath sounds: Normal breath sounds  No wheezing or rales  Abdominal:      General: Bowel sounds are normal  There is no distension  Palpations: Abdomen is soft  Tenderness: There is no abdominal tenderness  Musculoskeletal: Normal range of motion  Right lower leg: No edema  Left lower leg: No edema  Skin:     General: Skin is warm and dry  Neurological:      Mental Status: She is alert and oriented to person, place, and time  Additional Data:      Labs:    Results from last 7 days   Lab Units 08/29/20  0511  08/27/20  0430   WBC Thousand/uL 8 98   < > 10 14   HEMOGLOBIN g/dL 11 7   < > 11 7   HEMATOCRIT % 36 9   < > 36 4   PLATELETS Thousands/uL 259   < > 290   NEUTROS PCT %  --   --  74   LYMPHS PCT %  --   --  11*   MONOS PCT %  --   --  8   EOS PCT %  --   --  5    < > = values in this interval not displayed  Results from last 7 days   Lab Units 08/30/20  0524  08/27/20  0428   POTASSIUM mmol/L 4 0   < > 3 5   CHLORIDE mmol/L 107   < > 101   CO2 mmol/L 27   < > 29   BUN mg/dL 22   < > 31*   CREATININE mg/dL 1 16   < > 1 69*   CALCIUM mg/dL 8 1*   < > 9 0   ALK PHOS U/L  --   --  64   ALT U/L  --   --  22   AST U/L  --   --  18    < > = values in this interval not displayed  * I Have Reviewed All Lab Data Listed Above  * Additional Pertinent Lab Tests Reviewed:  [facility]        Recent Cultures (last 7 days):           Last 24 Hours Medication List:   Current Facility-Administered Medications   Medication Dose Route Frequency Provider Last Rate    acetaminophen  975 mg Oral [facility] Danitza Nichols PA-C      amiodarone  200 mg Oral Daily Justine Mendoza MD      apixaban  5 mg Oral BID MD Abdiaziz Fletcher Ra atorvastatin  20 mg Oral Daily With Rula Evans MD      bisacodyl  10 mg Rectal Daily PRN Twyla Gaona MD      clopidogrel  75 mg Oral Daily Twyla Gaona MD      digoxin  125 mcg Oral Daily Twyla Gaona MD      FLUoxetine  20 mg Oral Daily Twyla Gaona MD      folic acid  2,636 mcg Oral Daily Twyla Gaona MD      gabapentin  200 mg Oral HS Alyse Martinez DO      insulin lispro  1-6 Units Subcutaneous TID [facility] Twyla Gaona MD      insulin lispro  1-6 Units Subcutaneous HS Twyla Gaona MD      melatonin  6 mg Oral HS Twyla Gaona MD      metoprolol succinate  50 mg Oral Q12H Twyla Gaona MD      nortriptyline  10 mg Oral HS Twyla Gaona MD      oxyCODONE  5 mg Oral Q3H PRN Willa Irving DO      oxyCODONE  5 mg Oral Q6H Alyse Martinez DO      pantoprazole  40 mg Oral Early Morning Twyla Gaona MD      senna-docusate sodium  1 tablet Oral HS Twyla Gaona MD      valACYclovir  1,000 mg Oral Q12H [address] DO Melinda          Today, Patient Was Seen By: Margarito Enriquez DO    ** Please Note: Dictation voice to text software may have been used in the creation of this document   **

## 2020-08-31 NOTE — ASSESSMENT & PLAN NOTE
Wt Readings from Last 3 Encounters:   08/31/20 77 1 kg (169 lb 15 6 oz)   08/09/20 75 4 kg (166 lb 3 6 oz)   08/06/20 86 4 kg (190 lb 7 6 oz)     Echo with EF 45%; continue home medications  Patient reports dyspnea on exertion; became winded talking to examiner  BNP elevated at 1326; distended abdomen  Denies night cough, orthopnea, proximal nocturnal dyspnea; a chest x-ray read as normal, but may have infiltrates  Pt appeared to have improved w/ IV Lasix and at time of my exam 8/27 appears euvolemic  However, possible MYERS 2/2 deconditioning as pt appears dry on exam  gentle IVF to tx MALA  Per renal hold ACEi at d/c  Can d/c on Lasix 20 mg PO daily  Can have BMP at rehab and if Cr stable, can be placed back on ACEi

## 2020-08-31 NOTE — PHYSICAL THERAPY NOTE
PHYSICAL THERAPY TREATMENT NOTE          Patient Name: Arya Alves  TBIAH'G Date: 8/31/2020 08/31/20 1203   Pain Assessment   Pain Assessment Tool 0-10   Pain Score Worst Possible Pain   Pain Location/Orientation Orientation: Left; Location: Groin   Patient's Stated Pain Goal No pain   Hospital Pain Intervention(s) Repositioned; Ambulation/increased activity; Rest   Restrictions/Precautions   Weight Bearing Precautions Per Order No   Other Precautions Cognitive; Chair Alarm; Bed Alarm; Fall Risk;Multiple lines;Telemetry;Pain   General   Chart Reviewed Yes   Response to Previous Treatment Patient with no complaints from previous session  Family/Caregiver Present No   Cognition   Overall Cognitive Status Impaired   Arousal/Participation Alert   Attention Attends with cues to redirect   Orientation Level Oriented X4   Memory Decreased recall of precautions;Decreased recall of recent events   Following Commands Follows one step commands with increased time or repetition   Comments Pt with decreased safety awareness and insight, requires cues for safety throughout session  Pt pleasant, but confused  Pt often repeating self  Subjective   Subjective Pt pleasant and agreeable to participate in therapy session  Bed Mobility   Supine to Sit 3  Moderate assistance   Additional items Assist x 1; Increased time required;Verbal cues;LE management   Sit to Supine 4  Minimal assistance   Additional items Assist x 1; Increased time required;Verbal cues;LE management   Additional Comments Supine in bed upon PT arrival   Pt left supine in bed with bed alarm intact and all needs in reach at end of therapy session  Transfers   Sit to Stand 3  Moderate assistance   Additional items Assist x 1; Increased time required;Verbal cues   Stand to Sit 3  Moderate assistance   Additional items Assist x 1; Increased time required;Verbal cues   Additional Comments Transfers with RW   VC for hand placement and safety     Ambulation/Elevation Gait pattern Excessively slow; Short stride; Foward flexed;Decreased foot clearance  (unsteady)   Gait Assistance 4  Minimal assist  (CGA)   Additional items Assist x 1;Verbal cues; Tactile cues   Assistive Device Rolling walker   Distance 50 ft x 2   Balance   Static Sitting Fair   Dynamic Sitting Fair -   Static Standing Fair -   Dynamic Standing Poor +   Ambulatory Poor +   Endurance Deficit   Endurance Deficit Yes   Endurance Deficit Description waekness, fatigue   Activity Tolerance   Activity Tolerance Patient limited by fatigue;Patient limited by pain   Nurse Made Aware RN cleared pt to be seen by PT   Assessment   Prognosis Fair   Problem List Decreased strength;Decreased endurance; Impaired balance;Decreased mobility; Decreased cognition;Decreased safety awareness;Pain   Assessment Pt seen for PT treatment session with focus on bed mobility, functional transfers, functional mobility  Pt making good progress toward goals this session  Pt able to ambulate increased distance as a result of improved endurance and stability, however, pt continues to require assist for STS and bed mobility due to remaining strength deficits  Pt requires re-direction and cues for safety throughout session 2/2 cog impairment  Pt left supine in bed with bed alarm intact and with all needs in reach  Pt will benefit from skilled therapy in order to address current impairments and functional limitations  PT to follow pt and recommending rehab once medically cleared  Barriers to Discharge Inaccessible home environment;Decreased caregiver support   Goals   Patient Goals to have less pain   STG Expiration Date 09/10/20   Plan   Treatment/Interventions Functional transfer training;LE strengthening/ROM; Therapeutic exercise; Endurance training;Patient/family training;Equipment eval/education; Bed mobility;Gait training;Spoke to nursing   Progress Progressing toward goals   PT Frequency Other (Comment)  (3-5x/wk)   Recommendation   PT Discharge Recommendation Post-Acute Rehabilitation Services   Equipment Recommended Walker   PT - OK to Discharge Yes  (to rehab once medically cleared)     Guera Hurst, PT, DPT
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
## 2020-08-31 NOTE — ASSESSMENT & PLAN NOTE
Lab Results   Component Value Date    HGBA1C 7 6 (H) 08/05/2020       Recent Labs     08/30/20 2050 08/31/20  0627 08/31/20  1059 08/31/20  1648   POCGLU 173* 119 175* 156*       Blood Sugar Average: Last 72 hrs:  (P) 139   SSI  Blood Glucose checks TIDWM and QHS (Q6H for NPO patients)  Hold oral medications  ISS

## 2020-08-31 NOTE — ASSESSMENT & PLAN NOTE
Scheduled tylenol  Neurontin changed to qhs  Oxy prn  MRI shows severe stenosis  Pt was supposed to see nsurg OP on 8/24 but did not show up to appt  [age] vw Cervical XR today shows multilevel degenerative disc disease status post fusion with stable grade 1-2 anterolisthesis of C3-4    Neurosurg consult appreciated - no plan for surgery at this time  Palliative consult for pain control appreciated - start on scheduled oxy

## 2020-08-31 NOTE — CONSULTS
Consult- Nakia Salgado [birth year], 79 y o  female MRN: [id number]    Unit/Bed#: [facility] 525-01 Encounter: [id number]    Primary Care Provider: Altagracia Garcia DO   Date and time admitted to hospital: 8/26/2020  4:18 PM      Inpatient consult to Neurosurgery  Consult performed by: Neno Rao PA-C  Consult ordered by: Kerry Wellington DO      Consult completed 8/31/2020 at 3:25pm    Pain of cervical spine  Assessment & Plan  Chronic neck pain without radiculopathy; patient has never been evaluated or treated for it  · No focal weakness or sensory deficits on exam  · No myelopathic signs or symptoms  · Patient with generalized weakness and deconditioning, memory loss  · Patient poor historian with poor memory, states she has not has surgery on her neck in the past    Imaging:  · MRI cervical spine w/o, 8/30/2020: Extensive multilevel degenerative disc disease resulting in mild hepatic cord signal at the C3-C4 and C6 levels  Degenerative anterolisthesis at C3-C4 resulting in prominent osteophytic ridging along the inferior endplate at this level results in moderate to severe canal stenosis and bilateral neural foraminal stenosis  Prominent posterior disc osteophyte complex at C5-C6 and C6-C7 resulting in moderate to severe canal stenosis and bilateral neural foraminal stenosis  · Xray cervical spine, 8/31/2020: Multilevel degenerative disc disease status post fusion with stable grade 1-2 anterolisthesis of C3-4      Plan:  · Discussed imaging and options at length with the patient; she adamantly does not want any type of surgical intervention for her cervical spine  · She denies trying any conservative therapies  · Pain control per primary team and palliative care   Recommend adding muscle relaxants, lidoderm patches, aqua k for muscle spasms  · PT/OT evaluation  · DVT ppx: SCDs, pharm ppx per primary team  · No neurosurgical intervention is anticipated at this time    Neurosurgery will sign off at this time; patient can follow up in our office on an as needed basis  Recommend outpatient physical therapy and possible consult to pain management for BUFFY  Please call with questions or concerns  History of Present Illness     HPI: Tanisha Byers is a 79y o  year old female with PMH including atrial fibrillation on Eliquis, CHF with EF 45%, DM2, HTN, HLD, cognitive dysfunction who presented to the hospital on 8/26/2020 for generalized weakness and MYERS  However, the patient does not seem to know why she is here  Presumably her landlord called the police and EMS to bring her for evaluation so that "I don't die in her house " She is a very poor historian and admits that her memory is very poor  Neurosurgery was consulted to evaluate her cervical spinal stenosis  She states that she has neck pain most of the day and night and can't sleep at night, although it is unclear if this is due to neck pain  She denies any shooting pains, numbness, tingling down her BUE  She does have poor motor function in her right shoulder secondary to an old shoulder injury  Her biggest complaint today is her left groin pain; she is unable to lift her leg because of it and she thinks that it is from her colon  She has a largely nonfocal exam, sensation remains intact throughout, with no long tract signs  Imaging was reviewed with the patient today  She has no desire for surgical intervention at this time and would like to try outpatient therapies  Review of Systems   Constitutional: Positive for fatigue  HENT: Negative  Eyes: Negative  Respiratory: Negative  Negative for chest tightness and shortness of breath  Cardiovascular: Negative  Negative for chest pain  Gastrointestinal: Positive for abdominal distention and abdominal pain  Genitourinary: Negative  Musculoskeletal: Positive for neck pain  Neurological: Positive for weakness  Negative for dizziness and headaches     Psychiatric/Behavioral: Positive for confusion and dysphoric mood  All other systems reviewed and are negative  Historical Information   Past Medical History:   Diagnosis Date    A-fib ([place] [address] )     Acute respiratory disease     Anemia     Arthritis     Atrial fibrillation (HCC)     CHF (congestive heart failure) (HCC)     Chronic pain     Heart failure (HCC)     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[place] [address] )     History of colon polyps     Hx of long term use of blood thinners     Hypertension     Irregular heart beat     Narcotic dependence ([place] [address] )     Rectal bleeding     Stroke (HCC)     mild no deficiets/ memory loss    Uses walker      Past Surgical History:   Procedure Laterality Date    COLONOSCOPY      COLONOSCOPY N/A 7/27/2018    Procedure: COLONOSCOPY;  Surgeon: Wilbur Delgado MD;  Location: [facility]; Service: Gastroenterology    CORONARY STENT PLACEMENT      ERCP N/A 5/14/2018    Procedure: ENDOSCOPIC RETROGRADE CHOLANGIOPANCREATOGRAPHY (ERCP); Surgeon: Loi Lyons MD;  [facility]: [facility];  Service: Gastroenterology    ERCP N/A 8/28/2018    Procedure: ENDOSCOPIC RETROGRADE CHOLANGIOPANCREATOGRAPHY (ERCP) w/ EGD;  Surgeon: Loi Lyons MD;  [facility]; Service: Gastroenterology    ESOPHAGOGASTRODUODENOSCOPY N/A 7/26/2018    Procedure: ESOPHAGOGASTRODUODENOSCOPY (EGD); Surgeon: Wilbur Delgado MD;  [facility]: [facility]; Service: Gastroenterology    JOINT REPLACEMENT Right     knee     Social History     Substance and Sexual Activity   Alcohol Use Not Currently    Frequency: Never    Binge frequency: Never     Social History     Substance and Sexual Activity   Drug Use Not Currently     Social History     Tobacco Use   Smoking Status Former Smoker    Packs/day: 0 50    Types: Cigarettes   Smokeless Tobacco Never Used     History reviewed  No pertinent family history      Meds/Allergies   all current active meds have been reviewed, current meds:   Current Facility-Administered Medications   Medication Dose Route Frequency    acetaminophen (TYLENOL) tablet 975 mg  975 mg Oral Q8H [address]    amiodarone tablet 200 mg  200 mg Oral Daily    apixaban (ELIQUIS) tablet 5 mg  5 mg Oral BID    atorvastatin (LIPITOR) tablet 20 mg  20 mg Oral Daily With Dinner    bisacodyl (DULCOLAX) rectal suppository 10 mg  10 mg Rectal Daily PRN    clopidogrel (PLAVIX) tablet 75 mg  75 mg Oral Daily    digoxin (LANOXIN) tablet 125 mcg  125 mcg Oral Daily    FLUoxetine (PROzac) capsule 20 mg  20 mg Oral Daily    folic acid (FOLVITE) tablet 1,000 mcg  1,000 mcg Oral Daily    gabapentin (NEURONTIN) capsule 200 mg  200 mg Oral HS    insulin lispro (HumaLOG) 100 units/mL subcutaneous injection 1-6 Units  1-6 Units Subcutaneous TID AC    insulin lispro (HumaLOG) 100 units/mL subcutaneous injection 1-6 Units  1-6 Units Subcutaneous HS    melatonin tablet 6 mg  6 mg Oral HS    metoprolol succinate (TOPROL-XL) 24 hr tablet 50 mg  50 mg Oral Q12H    nortriptyline (PAMELOR) capsule 10 mg  10 mg Oral HS    oxyCODONE (ROXICODONE) IR tablet 5 mg  5 mg Oral Q3H PRN    oxyCODONE (ROXICODONE) IR tablet 5 mg  5 mg Oral Q6H    pantoprazole (PROTONIX) EC tablet 40 mg  40 mg Oral Early Morning    senna-docusate sodium (SENOKOT S) 8 6-50 mg per tablet 1 tablet  1 tablet Oral HS    sodium chloride 0 9 % infusion  50 mL/hr Intravenous Continuous    valACYclovir (VALTREX) tablet 1,000 mg  1,000 mg Oral Q12H [address]    and PTA meds:   Prior to Admission Medications   Prescriptions Last Dose Informant Patient Reported? Taking?    FEROSUL 325 (65 Fe) MG tablet Unknown at Unknown time  No No   Sig: TAKE ONE TABLET BY MOUTH DAILY WITH BREAKFAST   FLUoxetine (PROzac) 20 mg capsule Unknown at Unknown time  No No   Sig: Take 1 capsule (20 mg total) by mouth daily   Lidocaine (ASPERCREME LIDOCAINE) 4 % PTCH Unknown at Unknown time  No No   Sig: Apply 1 patch topically every 12 (twelve) hours   acetaminophen (TYLENOL) 325 mg tablet Unknown at Unknown time  No No   Sig: Take 2 tablets (650 mg total) by mouth every 6 (six) hours   Patient not taking: Reported on 8/5/2020   amiodarone 200 mg tablet Unknown at Unknown time  No No   Sig: Take 1 tablet (200 mg total) by mouth daily   apixaban (ELIQUIS) 5 mg Unknown at Unknown time  No No   Sig: Take 1 tablet (5 mg total) by mouth 2 (two) times a day   ascorbic acid (VITAMIN C) 500 mg tablet Unknown at Unknown time  No No   Sig: TAKE ONE TABLET BY MOUTH DAILY   atorvastatin (LIPITOR) 20 mg tablet Unknown at Unknown time  No No   Sig: Take 1 tablet (20 mg total) by mouth daily with dinner   bisacodyl (DULCOLAX) 10 mg suppository Unknown at Unknown time  Yes No   Sig: Insert 10 mg into the rectum daily as needed for constipation   clopidogrel (PLAVIX) 75 mg tablet Unknown at Unknown time  No No   Sig: Take 1 tablet (75 mg total) by mouth daily   digoxin (LANOXIN) 0 125 mg tablet Unknown at Unknown time  No No   Sig: Take 1 tablet (125 mcg total) by mouth daily   folic acid (FOLVITE) 1 mg tablet Unknown at Unknown time  No No   Sig: TAKE ONE TABLET BY MOUTH DAILY   furosemide (LASIX) 40 mg tablet Unknown at Unknown time  No No   Sig: Take 1 tablet (40 mg total) by mouth daily   lisinopril (ZESTRIL) 2 5 mg tablet Unknown at Unknown time  No No   Sig: Take 1 tablet (2 5 mg total) by mouth daily   melatonin 3 mg Unknown at Unknown time  No No   Sig: Take 2 tablets (6 mg total) by mouth daily at bedtime   metFORMIN (GLUCOPHAGE) 500 mg tablet Unknown at Unknown time  No No   Sig: Take 1 tablet (500 mg total) by mouth 2 (two) times a day with meals   methocarbamol (ROBAXIN) 500 mg tablet Unknown at Unknown time  No No   Sig: Take 1 tablet (500 mg total) by mouth once as needed for muscle spasms for up to 1 dose   metoprolol succinate (TOPROL-XL) 50 mg 24 hr tablet Unknown at Unknown time  No No   Sig: Take 1 tablet (50 mg total) by mouth every 12 (twelve) hours   nortriptyline (PAMELOR) 10 mg capsule Unknown at Unknown time  No No Sig: Take 1 capsule (10 mg total) by mouth daily at bedtime   pantoprazole (PROTONIX) 40 mg tablet Unknown at Unknown time  No No   Sig: Take 1 tablet (40 mg total) by mouth 2 (two) times a day before meals   potassium chloride (K-DUR,KLOR-CON) 20 mEq tablet Unknown at Unknown time  No No   Sig: Take 1 tablet (20 mEq total) by mouth daily   senna-docusate sodium (SENOKOT S) 8 6-50 mg per tablet Unknown at Unknown time  No No   Sig: Take 1 tablet by mouth daily at bedtime   thiamine 100 MG tablet Unknown at Unknown time  No No   Sig: Take 1 tablet (100 mg total) by mouth daily      Facility-Administered Medications: None     No Known Allergies    Objective   I/O       08/29 0701 - 08/30 0700 08/30 0701 - 08/31 0700 08/31 0701 - 09/01 0700    P  O  1320 900 320    Total Intake(mL/kg) 1320 (16 8) 900 (11 7) 320 (4 2)    Urine (mL/kg/hr) 1025 (0 5) 550 (0 3)     Stool  0     Total Output 1025 550     Net +295 +350 +320           Unmeasured Stool Occurrence  1 x           Physical Exam  Vitals signs and nursing note reviewed  Constitutional:       Appearance: She is well-developed  HENT:      Head: Normocephalic and atraumatic  Eyes:      Extraocular Movements: Extraocular movements intact  Pupils: Pupils are equal, round, and reactive to light  Neck:      Comments: TTP bilateral paraspinal muscles  Pain with cervical ROM  Cardiovascular:      Rate and Rhythm: Normal rate  Pulmonary:      Effort: Pulmonary effort is normal  No respiratory distress  Abdominal:      Palpations: Abdomen is soft  Musculoskeletal: Normal range of motion  Skin:     General: Skin is warm and dry  Neurological:      Mental Status: She is alert and oriented to person, place, and time  Deep Tendon Reflexes:      Reflex Scores:       Tricep reflexes are 1+ on the right side and 1+ on the left side  Bicep reflexes are 1+ on the right side and 1+ on the left side         Brachioradialis reflexes are 1+ on the right side and 1+ on the left side  Patellar reflexes are 1+ on the right side and 1+ on the left side  Achilles reflexes are 1+ on the right side and 1+ on the left side  Psychiatric:         Speech: Speech normal          Behavior: Behavior normal          Thought Content: Thought content normal          Judgment: Judgment normal       Comments: Depressed mood, mentioned wanting to die  No plan       Neurologic Exam     Mental Status   Oriented to person, place, and time  Follows 1 step commands  Attention: decreased  Concentration: decreased  Speech: speech is normal   Level of consciousness: alert  Knowledge: good  Able to repeat  Normal comprehension  Cranial Nerves   Cranial nerves II through XII intact  CN III, IV, VI   Pupils are equal, round, and reactive to light  Motor Exam   Muscle bulk: normal  Overall muscle tone: normal  4+/5 throughout 2/2 deconditioning except2/5 right delt (2/2 shoulder injury) and 1/5 left HF (2/2 groin pain)       Sensory Exam   Light touch normal    DST and JPS intact bilaterally     Gait, Coordination, and Reflexes     Tremor   Resting tremor: absent    Reflexes   Right brachioradialis: 1+  Left brachioradialis: 1+  Right biceps: 1+  Left biceps: 1+  Right triceps: 1+  Left triceps: 1+  Right patellar: 1+  Left patellar: 1+  Right achilles: 1+  Left achilles: 1+  Right Mendoza: absent  Left Mendoza: absent  Right ankle clonus: absent  Left ankle clonus: absent        Vitals:Blood pressure 113/54, pulse 84, temperature 97 7 °F (36 5 °C), temperature source Oral, resp  rate 18, height 5' 5" (1 651 m), weight 77 1 kg (169 lb 15 6 oz), SpO2 96 %  ,Body mass index is 28 29 kg/m²       Lab Results:   Results from last 7 days   Lab Units 08/29/20  0511 08/28/20  0450 08/27/20  0430 08/26/20  1725   WBC Thousand/uL 8 98 8 37 10 14 13 08*   HEMOGLOBIN g/dL 11 7 11 0* 11 7 12 4   HEMATOCRIT % 36 9 35 2 36 4 39 8   PLATELETS Thousands/uL 259 277 290 323   NEUTROS PCT %  --   --  74 81*   MONOS PCT %  --   --  8 7     Results from last 7 days   Lab Units 08/30/20  0524 08/29/20  0511 08/28/20  0450 08/27/20  0428   POTASSIUM mmol/L 4 0 3 8 3 5 3 5   CHLORIDE mmol/L 107 107 103 101   CO2 mmol/L 27 28 29 29   BUN mg/dL 22 24 25 31*   CREATININE mg/dL 1 16 1 22 1 35* 1 69*   CALCIUM mg/dL 8 1* 8 2* 8 8 9 0   ALK PHOS U/L  --   --   --  64   ALT U/L  --   --   --  22   AST U/L  --   --   --  18     Results from last 7 days   Lab Units 08/28/20  0450 08/27/20  0428   MAGNESIUM mg/dL 2 6 1 8     Results from last 7 days   Lab Units 08/27/20  0428   PHOSPHORUS mg/dL 3 9         No results found for: TROPONINT  ABG:No results found for: PHART, YQT3NOM, PO2ART, ENN6YOB, M2DOGLDH, BEART, SOURCE    Imaging Studies: I have personally reviewed pertinent reports  and I have personally reviewed pertinent films in PACS     Xr Chest 1 View Portable    Result Date: 8/26/2020  Narrative: CHEST INDICATION:   sob  COMPARISON:  Chest radiograph from 8/5/2020 and chest CT from 5/10/2019  EXAM PERFORMED/VIEWS:  XR CHEST PORTABLE FINDINGS: Cardiomediastinal silhouette appears unremarkable  The lungs are clear  No pneumothorax or pleural effusion  Suture anchors in the right humeral head  Impression: No acute cardiopulmonary disease  Workstation performed: [id number]     Xr Chest 1 View Portable    Result Date: 8/5/2020  Narrative: CHEST INDICATION:   cp  COMPARISON:  1/12/2019 EXAM PERFORMED/VIEWS:  XR CHEST PORTABLE  AP semierect Images: 1 FINDINGS: Cardiomediastinal silhouette appears unremarkable  The lungs are clear  No pneumothorax or pleural effusion  Osseous structures appear within normal limits for patient age  Impression: No acute cardiopulmonary disease  Workstation performed: [id number]     Xr Spine Cervical Complete 6+ Vw Flex/ext/obl    Result Date: 8/31/2020  Narrative: CERVICAL SPINE INDICATION:   severe cevical spinal stenosis seen on MRI    Will have XRay done so nsurg can eval  COMPARISON:  July 11, 2019 VIEWS:  XR SPINE CERVICAL COMPLETE 6+ VW FLEX /EXT /OBL FINDINGS: No fracture  Reversal of the normal cervical lordosis at C3-4 with grade 1/2 anterolisthesis of C3 on C4  Postoperative changes from fusion of the posterior elements of C3-4 and C4-5  Loss of the disc spaces from C3-4 through C6-7 with osteophyte formation  Mild neural foraminal stenosis most prominent at C6-7  The prevertebral soft tissues are within normal limits  The lung apices are clear  No evidence of dynamic instability seen with flexion or extension  Impression: Multilevel degenerative disc disease status post fusion with stable grade 1-2 anterolisthesis of C3-4  Workstation performed: [id number]     Mri Cervical Spine Wo Contrast    Result Date: 8/6/2020  Narrative: MRI CERVICAL SPINE WITHOUT CONTRAST INDICATION: intractable neck pain with decreased ROM; pain and limited ROM   COMPARISON:  None  TECHNIQUE:  Sagittal T1, sagittal T2, sagittal inversion recovery, axial T2, axial  2D merge IMAGE QUALITY:  Diagnostic FINDINGS: ALIGNMENT:  Degenerative kyphosis centered at the C4 vertebral body     No compression fracture  Grade 1 anterolisthesis of C3 on C4     No scoliosis  MARROW SIGNAL:  Normal marrow signal is identified within the visualized bony structures  No discrete marrow lesion  CERVICAL AND VISUALIZED THORACIC CORD:  Myelopathic cord signal at the C3-C4 level and at the C6 level  PREVERTEBRAL AND PARASPINAL SOFT TISSUES:  Normal  VISUALIZED POSTERIOR FOSSA:  The visualized posterior fossa demonstrates no abnormal signal  CERVICAL DISC SPACES: C2-C3:  Broad-based disc annular bulge with left uncovertebral joint arthrosis and facet arthrosis resulting in moderate left neural foraminal stenosis    C3-C4:  Loss of disc space height as well as prominent osteophytic ridging along the inferior endplate results in moderate to severe canal stenosis as well as subtle myelopathic cord signal at this level C4-C5:  Complete loss of disc space height  No evidence of canal or neural foraminal stenosis  C5-C6:  Complete loss of disc space height with prominent posterior disc osteophyte complex resulting in moderate to severe canal stenosis at this level  Superior and inferior endplate degenerative marrow signal   Moderate to severe canal stenosis with bilateral neural foraminal stenosis  C6-C7:  Complete loss of disc space height with prominent posterior disc osteophyte complex resulting in moderate to severe canal stenosis at this level  Superior and inferior endplate degenerative marrow signal   Moderate to severe canal stenosis with bilateral neural foraminal stenosis    C7-T1:  Mild diffuse annular disc bulge  No evidence of canal or neural foraminal stenosis  Prudence Skipper UPPER THORACIC DISC SPACES:  Normal      Impression: Extensive multilevel degenerative disc disease resulting in mild hepatic cord signal at the C3-C4 and C6 levels  Degenerative anterolisthesis at C3-C4 resulting in prominent osteophytic ridging along the inferior endplate at this level results in moderate to severe canal stenosis and bilateral neural foraminal stenosis  Prominent posterior disc osteophyte complex at C5-C6 and C6-C7 resulting in moderate to severe canal stenosis and bilateral neural foraminal stenosis  Workstation performed: [id number]    EKG, Pathology, and Other Studies: I have personally reviewed pertinent reports  VTE Prophylaxis: Sequential compression device Kilo Rajan     Code Status: Level 3 - DNAR and DNI  Advance Directive and Living Will: Yes    Power of :    POLST:      Counseling / Coordination of Care  I spent 45 minutes with the patient

## 2020-08-31 NOTE — ASSESSMENT & PLAN NOTE
Estimated Creatinine Clearance: 40 4 mL/min (by C-G formula based on SCr of 1 16 mg/dL)    B/l Cr 1 1  See MALA

## 2020-08-31 NOTE — ASSESSMENT & PLAN NOTE
Chronic neck pain without radiculopathy; patient has never been evaluated or treated for it  · No focal weakness or sensory deficits on exam  · No myelopathic signs or symptoms  · Patient with generalized weakness and deconditioning, memory loss  · Patient poor historian with poor memory, states she has not has surgery on her neck in the past    Imaging:  · MRI cervical spine w/o, 8/30/2020: Extensive multilevel degenerative disc disease resulting in mild hepatic cord signal at the C3-C4 and C6 levels  Degenerative anterolisthesis at C3-C4 resulting in prominent osteophytic ridging along the inferior endplate at this level results in moderate to severe canal stenosis and bilateral neural foraminal stenosis  Prominent posterior disc osteophyte complex at C5-C6 and C6-C7 resulting in moderate to severe canal stenosis and bilateral neural foraminal stenosis  · Xray cervical spine, 8/31/2020: Multilevel degenerative disc disease status post fusion with stable grade 1-2 anterolisthesis of C3-4  Plan:  · Discussed imaging and options at length with the patient; she adamantly does not want any type of surgical intervention for her cervical spine  · She denies trying any conservative therapies  · Pain control per primary team and palliative care   Recommend adding muscle relaxants, lidoderm patches, aqua k for muscle spasms  · PT/OT evaluation  · DVT ppx: SCDs, pharm ppx per primary team  · No neurosurgical intervention is anticipated at this time    Neurosurgery will sign off at this time; patient can follow up in our office on an as needed basis  Recommend outpatient physical therapy and possible consult to pain management for BUFFY  Please call with questions or concerns

## 2020-08-31 NOTE — ASSESSMENT & PLAN NOTE
Estimated Creatinine Clearance: 40 4 mL/min (by C-G formula based on SCr of 1 16 mg/dL)    POA  Patient noted to have serum creatinine 1 69 on admission; baseline 1-1 2  Pt has MYERS but likely this is more due to deconditioning and not CHF   Cr did not improve w/ diuresis  UA WNL  Urinary retention protocol ordered - had been straight cathed twice but appears resolved at this time  Renal appreciated  Cr now at baseline after getting IVF  Today provide gentle IV NS to prevent NABOR  Renal US WNL  CPK WNL

## 2020-08-31 NOTE — PLAN OF CARE
Problem: PHYSICAL THERAPY ADULT  Goal: Performs mobility at highest level of function for planned discharge setting  See evaluation for individualized goals  Description: Treatment/Interventions: Functional transfer training, LE strengthening/ROM, Elevations, Therapeutic exercise, Endurance training, Cognitive reorientation, Patient/family training, Equipment eval/education, Bed mobility, Gait training, Compensatory technique education, Spoke to nursing, Spoke to case management, OT, Family          See flowsheet documentation for full assessment, interventions and recommendations  Outcome: Progressing  Note: Prognosis: Fair  Problem List: Decreased strength, Decreased endurance, Impaired balance, Decreased mobility, Decreased cognition, Decreased safety awareness, Pain  Assessment: Pt seen for PT treatment session with focus on bed mobility, functional transfers, functional mobility  Pt making good progress toward goals this session  Pt able to ambulate increased distance as a result of improved endurance and stability, however, pt continues to require assist for STS and bed mobility due to remaining strength deficits  Pt with difficulty performing LE therex with L LE due to pain in L groin  Pt requires re-direction and cues for safety throughout session 2/2 cog impairment  Pt left supine in bed with bed alarm intact and with all needs in reach  Pt will benefit from skilled therapy in order to address current impairments and functional limitations  PT to follow pt and recommending rehab once medically cleared  Barriers to Discharge: Inaccessible home environment, Decreased caregiver support     PT Discharge Recommendation: [id number]     PT - OK to Discharge: Yes(to rehab once medically cleared)    See flowsheet documentation for full assessment

## 2020-08-31 NOTE — PHYSICAL THERAPY NOTE
PHYSICAL THERAPY TREATMENT NOTE          Patient Name: Anali Julian  [id number] Date: 8/31/2020 08/31/20 1203   Pain Assessment   Pain Assessment Tool 0-10   Pain Score Worst Possible Pain   Pain Location/Orientation Orientation: Left; Location: Groin   Patient's Stated Pain Goal No pain   Hospital Pain Intervention(s) Repositioned; Ambulation/increased activity; Rest   Restrictions/Precautions   Weight Bearing Precautions Per Order No   Other Precautions Cognitive; Chair Alarm; Bed Alarm; Fall Risk;Multiple lines;Telemetry;Pain   General   Chart Reviewed Yes   Response to Previous Treatment Patient with no complaints from previous session  Family/Caregiver Present No   Cognition   Overall Cognitive Status Impaired   Arousal/Participation Alert   Attention Attends with cues to redirect   Orientation Level Oriented X4   Memory Decreased recall of precautions;Decreased recall of recent events   Following Commands Follows one step commands with increased time or repetition   Comments Pt with decreased safety awareness and insight, requires cues for safety throughout session  Pt pleasant, but confused  Pt often repeating self  Subjective   Subjective Pt pleasant and agreeable to participate in therapy session  Bed Mobility   Supine to Sit 3  Moderate assistance   Additional items Assist x 1; Increased time required;Verbal cues;LE management   Sit to Supine 4  Minimal assistance   Additional items Assist x 1; Increased time required;Verbal cues;LE management   Additional Comments Supine in bed upon PT arrival   Pt left supine in bed with bed alarm intact and all needs in reach at end of therapy session  Transfers   Sit to Stand 3  Moderate assistance   Additional items Assist x 1; Increased time required;Verbal cues   Stand to Sit 3  Moderate assistance   Additional items Assist x 1; Increased time required;Verbal cues   Additional Comments Transfers with RW   VC for hand placement and safety     Ambulation/Elevation Gait pattern Excessively slow; Short stride; Foward flexed;Decreased foot clearance  (unsteady)   Gait Assistance 4  Minimal assist  (CGA)   Additional items Assist x 1;Verbal cues; Tactile cues   Assistive Device Rolling walker   Distance 50 ft x 2   Balance   Static Sitting Fair   Dynamic Sitting Fair -   Static Standing Fair -   Dynamic Standing Poor +   Ambulatory Poor +   Endurance Deficit   Endurance Deficit Yes   Endurance Deficit Description waekness, fatigue   Activity Tolerance   Activity Tolerance Patient limited by fatigue;Patient limited by pain   Nurse Made Aware RN cleared pt to be seen by PT   Exercises   Hip Flexion Sitting;10 reps;Right  (x2)   Knee AROM Long Arc Quad Sitting;10 reps;Right  (x2)   Ankle Pumps Sitting;10 reps;Bilateral  (x2)   Assessment   Prognosis Fair   Problem List Decreased strength;Decreased endurance; Impaired balance;Decreased mobility; Decreased cognition;Decreased safety awareness;Pain   Assessment Pt seen for PT treatment session with focus on bed mobility, functional transfers, functional mobility  Pt making good progress toward goals this session  Pt able to ambulate increased distance as a result of improved endurance and stability, however, pt continues to require assist for STS and bed mobility due to remaining strength deficits  Pt with difficulty performing LE therex with L LE due to pain in L groin  Pt requires re-direction and cues for safety throughout session 2/2 cog impairment  Pt left supine in bed with bed alarm intact and with all needs in reach  Pt will benefit from skilled therapy in order to address current impairments and functional limitations  PT to follow pt and recommending rehab once medically cleared     Barriers to Discharge Inaccessible home environment;Decreased caregiver support   Goals   Patient Goals to have less pain   STG Expiration Date 09/10/20   Plan   Treatment/Interventions Functional transfer training;LE strengthening/ROM; Therapeutic exercise; Endurance training;Patient/family training;Equipment eval/education; Bed mobility;Gait training;Spoke to nursing   Progress Progressing toward goals   PT Frequency Other (Comment)  (3-5x/wk)   Recommendation   PT Discharge Recommendation Post-Acute Rehabilitation Services   Equipment Recommended Walker   PT - OK to Discharge Yes  (to rehab once medically cleared)     Sherrill Scott, PT, DPT

## 2020-08-31 NOTE — RESTORATIVE TECHNICIAN NOTE
Restorative Specialist Mobility Note       Activity: Ambulate in room, Bathroom privileges     Assistive Device: Front wheel walker

## 2020-08-31 NOTE — OCCUPATIONAL THERAPY NOTE
Occupational Therapy Cancellation Note    Orders received and chart reviewed  OT attempted to see, however Pt currently off of the unit at x-ray  Will continue to follow to be seen for OT treatment as appropriate/when medically cleared         Owen Xavier MS, OTR/L

## 2020-09-01 NOTE — ASSESSMENT & PLAN NOTE
Estimated Creatinine Clearance: 46 mL/min (by C-G formula based on SCr of 1 04 mg/dL)    B/l Cr 1 1  See MALA

## 2020-09-01 NOTE — ASSESSMENT & PLAN NOTE
Estimated Creatinine Clearance: 46 mL/min (by C-G formula based on SCr of 1 04 mg/dL)    POA  Patient noted to have serum creatinine 1 69 on admission; baseline 1-1 2  Pt has MYERS but likely this is more due to deconditioning and not CHF   Cr did not improve w/ diuresis  UA WNL  Urinary retention protocol ordered - had been straight cathed twice but appears resolved at this time  Renal appreciated  Cr now at baseline after getting IVF  Renal US WNL  CPK WNL

## 2020-09-01 NOTE — CASE MANAGEMENT
Spoke to [place], [facility] [phone] and informed her that patient had neuropsych eval and does not have capacity  She will check with her supervisor  But since they had the case before she was admitted to [facility] she thinks they petition for guardianship  Records were faxed to [place] at [facility]  OPTIONS paperwork was faxed to [facility]

## 2020-09-01 NOTE — PROGRESS NOTES
Progress Note - Gwendolyn Cruise [birth year], 79 y o  female MRN: [id number]    Unit/Bed#: [facility] 525-01 Encounter: [id number]    Primary Care Provider: Zoey Domingo DO   Date and time admitted to hospital: 8/26/2020  4:18 PM        Coronary artery disease  Assessment & Plan  S/p PCI July 2019  C/w Plavix and BB and statin    LLQ pain  Assessment & Plan  c/o severe pain  CTAP showed no acute finding  Incidentally shows transverse colon nodule  Can get repeat CTAP in 3 months if desired by guardian     Herpes zoster without complication  Assessment & Plan  In lumbar region  No pain  Lesions appear crusted - no need for contact precautions  Valtrex for 7 days    CKD (chronic kidney disease) stage 3, GFR 30-59 ml/min ([facility])  Assessment & Plan  Estimated Creatinine Clearance: 46 mL/min (by C-G formula based on SCr of 1 04 mg/dL)  B/l Cr 1 1  See MALA    DM2 (diabetes mellitus, type 2) [facility])  Assessment & Plan  Lab Results   Component Value Date    HGBA1C 7 6 (H) 08/05/2020       Recent Labs     08/31/20  2043 09/01/20  0625 09/01/20  1128 09/01/20  1621   POCGLU 125 135 171* 143*       Blood Sugar Average: Last 72 hrs:  (P) 283 [id number]   SSI  Blood Glucose checks TIDWM and QHS (Q6H for NPO patients)  Hold oral medications  ISS      Pain of cervical spine  Assessment & Plan  Scheduled tylenol  Neurontin changed to qhs  Oxy prn  MRI shows severe stenosis  Pt was supposed to see nsurg OP on 8/24 but did not show up to appt  Six vw Cervical XR today shows multilevel degenerative disc disease status post fusion with stable grade 1-2 anterolisthesis of C3-4  Neurosurg consult appreciated - no plan for surgery at this time  Palliative consult for pain control appreciated - start on scheduled oxy    A-fib [facility])  Assessment & Plan  Patient rate controlled on admission; continue BB  Anticoagulated with Eliquis 5 mg b i d      Anemia  Assessment & Plan  · Hb dropped from 11-12 to 9  · Check stool occult, iron panel  · Patients states she has intermittent black stools  · CBC in am or earlier if maame bleeding, if so hold eliquis  · CT AP had no evidence of bleed    Elevated troponin level not due myocardial infarction  Assessment & Plan  Patient with elevated troponin; denies chest pain  EKG in the ED negative for ischemia; shows rate controlled atrial fibrillation  Trop actually lower than earlier in month  No need for further w/u    Mild cognitive impairment  Assessment & Plan  Neurospsych eval pt 8/28 and deemed her incompetent to make medical decisions    Hyperlipidemia  Assessment & Plan  Continue statin    Essential hypertension  Assessment & Plan  C/w BB  Hold ACEi and Lasix in setting of MALA    Chronic systolic congestive heart failure (HCC)  Assessment & Plan  Wt Readings from Last 3 Encounters:   09/01/20 80 8 kg (178 lb 2 1 oz)   08/09/20 75 4 kg (166 lb 3 6 oz)   08/06/20 86 4 kg (190 lb 7 6 oz)     Echo with EF 45%; continue home medications  Patient reports dyspnea on exertion; became winded talking to examiner  BNP elevated at 1326; distended abdomen  Denies night cough, orthopnea, proximal nocturnal dyspnea; a chest x-ray read as normal, but may have infiltrates  at time of exam on 8/27 appears euvolemic  However, possible MYERS 2/2 deconditioning as pt appears dry on exam  gentle IVF to tx MALA & it improved  Acute CHF exacerbation is ruled out  Per renal hold ACEi at d/c  Can d/c on Lasix 20 mg PO daily  Can have BMP at rehab and if Cr stable, can be placed back on ACEi  Weakness/physical deconditioning  Assessment & Plan  PT/OT  Recent admission or patient declined follow on rehabilitation  Neuropsych eval ordered and pt not competent to make medical decisions    Will need guardian and then rehab placement    Dysthymic disorder  Assessment & Plan  Patient tearful and depressed on examination in ER; denies SI/HI, but did earlier in the ED  Continue Prozac and Pamelor  Behavior Health consult appreciated - no 1 to 1 needed    * MALA (acute kidney injury) ([facility] )  Assessment & Plan  Estimated Creatinine Clearance: 46 mL/min (by C-G formula based on SCr of 1 04 mg/dL)  POA  Patient noted to have serum creatinine 1 69 on admission; baseline 1-1 2  Pt has MYERS but likely this is more due to deconditioning and not CHF   Cr did not improve w/ diuresis  UA WNL  Urinary retention protocol ordered - had been straight cathed twice but appears resolved at this time  Renal appreciated  Cr now at baseline after getting IVF  Renal US WNL  [facility] Internal Medicine Progress Note  Patient: Doris Ford 79 y o  female   MRN: [id number]  PCP: Parker Decker DO  Unit/Bed#: [facility] 525-01 Encounter: [id number]  Date Of Visit: 09/01/20    Assessment:    Principal Problem:    MALA (acute kidney injury) ([place] [address] )  Active Problems:    Coronary artery disease    Dysthymic disorder    Weakness/physical deconditioning    Chronic systolic congestive heart failure ([place] [address] )    Essential hypertension    Hyperlipidemia    Mild cognitive impairment    Elevated troponin level not due myocardial infarction    Anemia    A-fib (HCC)    Pain of cervical spine    DM2 (diabetes mellitus, type 2) ([facility])    CKD (chronic kidney disease) stage 3, GFR 30-59 ml/min ([facility])    Herpes zoster without complication    LLQ pain      Plan:           VTE Pharmacologic Prophylaxis:   Pharmacologic: Apixaban (Eliquis)  Mechanical VTE Prophylaxis in Place: Yes    Patient Centered Rounds: I have performed bedside rounds with nursing staff today  Discussions with Specialists or Other Care Team Provider:     Education and Discussions with Family / Patient: patient    Time Spent for Care: 30 minutes  More than 50% of total time spent on counseling and coordination of care as described above      Current Length of Stay: 6 day(s)    Current Patient Status: Inpatient   Certification Statement: The patient will continue to require additional inpatient hospital stay due to monitor hb    Discharge Plan / Estimated Discharge Date:  Needs a guardian set up followed by rehab    Code Status: Level 3 - DNAR and DNI      Subjective:   C/o neck pain, LLQ abdo pain, she thinks she has intermittent black stools    Objective:     Vitals:   Temp (24hrs), Av 7 °F (36 5 °C), Min:97 5 °F (36 4 °C), Max:98 1 °F (36 7 °C)    Temp:  [97 5 °F (36 4 °C)-98 1 °F (36 7 °C)] 97 5 °F (36 4 °C)  HR:  [79-93] 83  Resp:  [18-20] 20  BP: (119-150)/(66-77) 119/69  SpO2:  [92 %-97 %] 92 %  Body mass index is 29 64 kg/m²  Input and Output Summary (last 24 hours): Intake/Output Summary (Last 24 hours) at 2020 1909  Last data filed at 2020 1501  Gross per 24 hour   Intake 927 ml   Output 970 ml   Net -43 ml       Physical Exam:     Physical Exam  Vitals signs reviewed  HENT:      Head: Normocephalic and atraumatic  Cardiovascular:      Rate and Rhythm: Normal rate and regular rhythm  Heart sounds: Normal heart sounds  No murmur  Pulmonary:      Effort: Pulmonary effort is normal  No respiratory distress  Breath sounds: Normal breath sounds  No wheezing  Abdominal:      General: There is no distension  Palpations: Abdomen is soft  Tenderness: There is abdominal tenderness  Comments: LLQ tender   Neurological:      Mental Status: She is alert  Additional Data:     Labs:    Results from last 7 days   Lab Units 20  0501  20  0430   WBC Thousand/uL 9 28   < > 10 14   HEMOGLOBIN g/dL 9 0*   < > 11 7   HEMATOCRIT % 29 1*   < > 36 4   PLATELETS Thousands/uL 224   < > 290   NEUTROS PCT %  --   --  74   LYMPHS PCT %  --   --  11*   MONOS PCT %  --   --  8   EOS PCT %  --   --  5    < > = values in this interval not displayed       Results from last 7 days   Lab Units 20  0501  20  0428   POTASSIUM mmol/L 4 2   < > 3 5   CHLORIDE mmol/L 109*   < > 101   CO2 mmol/L 24   < > 29   BUN mg/dL 19   < > 31*   CREATININE mg/dL 1 04   < > 1 69*   CALCIUM mg/dL 7 9*   < > 9 0   ALK PHOS U/L  --   --  64   ALT U/L  --   --  22   AST U/L  --   --  18    < > = values in this interval not displayed  * I Have Reviewed All Lab Data Listed Above  * Additional Pertinent Lab Tests Reviewed: All Labs Within Last 24 Hours Reviewed    Imaging:    Imaging Reports Reviewed Today Include:   Imaging Personally Reviewed by Myself Includes:      Recent Cultures (last 7 days):           Last 24 Hours Medication List:   Current Facility-Administered Medications   Medication Dose Route Frequency Provider Last Rate    acetaminophen  975 mg Oral [facility] Rachel Ayers PA-C      amiodarone  200 mg Oral Daily Alan Guerrero MD      apixaban  5 mg Oral BID Alan Guerrero MD      atorvastatin  20 mg Oral Daily With Héctor Albarado MD      bisacodyl  10 mg Rectal Daily PRN Alan Guerrero MD      clopidogrel  75 mg Oral Daily Alan Guerrero MD      digoxin  125 mcg Oral Daily Alan Guerrero MD      FLUoxetine  20 mg Oral Daily Alan Guerrero MD      folic acid  8,381 mcg Oral Daily Alan Guerrero MD      gabapentin  200 mg Oral HS Alyse Martinez DO      insulin lispro  1-6 Units Subcutaneous TID [facility] Alan Guerrero MD      insulin lispro  1-6 Units Subcutaneous HS Alan Guerrero MD      melatonin  6 mg Oral HS Alan Guerrero MD      metoprolol succinate  50 mg Oral Q12H Alan Guerrero MD      nortriptyline  10 mg Oral HS Alan Guerrero MD      oxyCODONE  5 mg Oral Q3H PRN Vladislav Rand DO      oxyCODONE  5 mg Oral Q6H Alyse Martinez DO      pantoprazole  40 mg Oral Early Morning Alan Guerrero MD      senna-docusate sodium  1 tablet Oral HS Alan Guerrero MD      valACYclovir  1,000 mg Oral Q12H [address] Melinda, DO          Today, Patient Was Seen By: Sepideh Garcia MD    ** Please Note: This note has been constructed using a voice recognition system   **

## 2020-09-01 NOTE — ASSESSMENT & PLAN NOTE
c/o severe pain  CTAP showed no acute finding  Incidentally shows transverse colon nodule    Can get repeat CTAP in 3 months if desired by guardian

## 2020-09-01 NOTE — ASSESSMENT & PLAN NOTE
· Hb dropped from 11-12 to 9  · Check stool occult, iron panel  · Patients states she has intermittent black stools  · CBC in am or earlier if maame bleeding, if so hold eliquis  · CT AP had no evidence of bleed

## 2020-09-01 NOTE — ASSESSMENT & PLAN NOTE
Wt Readings from Last 3 Encounters:   09/01/20 80 8 kg (178 lb 2 1 oz)   08/09/20 75 4 kg (166 lb 3 6 oz)   08/06/20 86 4 kg (190 lb 7 6 oz)     Echo with EF 45%; continue home medications  Patient reports dyspnea on exertion; became winded talking to examiner  BNP elevated at 1326; distended abdomen  Denies night cough, orthopnea, proximal nocturnal dyspnea; a chest x-ray read as normal, but may have infiltrates  at time of exam on 8/27 appears euvolemic  However, possible MYERS 2/2 deconditioning as pt appears dry on exam  gentle IVF to tx MALA & it improved  Acute CHF exacerbation is ruled out  Per renal hold ACEi at d/c  Can d/c on Lasix 20 mg PO daily  Can have BMP at rehab and if Cr stable, can be placed back on ACEi

## 2020-09-01 NOTE — PLAN OF CARE
Problem: Potential for Falls  Goal: Patient will remain free of falls  Description: INTERVENTIONS:  - Assess patient frequently for physical needs  -  Identify cognitive and physical deficits and behaviors that affect risk of falls  -  [place] fall precautions as indicated by assessment   - Educate patient/family on patient safety including physical limitations  - Instruct patient to call for assistance with activity based on assessment  - Modify environment to reduce risk of injury  - Consider OT/PT consult to assist with strengthening/mobility  Outcome: Progressing     Problem: Nutrition/Hydration-ADULT  Goal: Nutrient/Hydration intake appropriate for improving, restoring or maintaining nutritional needs  Description: Monitor and assess patient's nutrition/hydration status for malnutrition  Collaborate with interdisciplinary team and initiate plan and interventions as ordered  Monitor patient's weight and dietary intake as ordered or per policy  Utilize nutrition screening tool and intervene as necessary  Determine patient's food preferences and provide high-protein, high-caloric foods as appropriate       INTERVENTIONS:  - Monitor oral intake, urinary output, labs, and treatment plans  - Assess nutrition and hydration status and recommend course of action  - Evaluate amount of meals eaten  - Assist patient with eating if necessary   - Allow adequate time for meals  - Recommend/ encourage appropriate diets, oral nutritional supplements, and vitamin/mineral supplements  - Order, calculate, and assess calorie counts as needed  - Recommend, monitor, and adjust tube feedings and TPN/PPN based on assessed needs  - Assess need for intravenous fluids  - Provide specific nutrition/hydration education as appropriate  - Include patient/family/caregiver in decisions related to nutrition  Outcome: Progressing     Problem: CARDIOVASCULAR - ADULT  Goal: Maintains optimal cardiac output and hemodynamic stability  Description: INTERVENTIONS:  - Monitor I/O, vital signs and rhythm  - Monitor for S/S and trends of decreased cardiac output  - Administer and titrate ordered vasoactive medications to optimize hemodynamic stability  - Assess quality of pulses, skin color and temperature  - Assess for signs of decreased coronary artery perfusion  - Instruct patient to report change in severity of symptoms  Outcome: Progressing  Goal: Absence of cardiac dysrhythmias or at baseline rhythm  Description: INTERVENTIONS:  - Continuous cardiac monitoring, vital signs, obtain 12 lead EKG if ordered  - Administer antiarrhythmic and heart rate control medications as ordered  - Monitor electrolytes and administer replacement therapy as ordered  Outcome: Progressing     Problem: RESPIRATORY - ADULT  Goal: Achieves optimal ventilation and oxygenation  Description: INTERVENTIONS:  - Assess for changes in respiratory status  - Assess for changes in mentation and behavior  - Position to facilitate oxygenation and minimize respiratory effort  - Oxygen administered by appropriate delivery if ordered  - Initiate smoking cessation education as indicated  - Encourage broncho-pulmonary hygiene including cough, deep breathe, Incentive Spirometry  - Assess the need for suctioning and aspirate as needed  - Assess and instruct to report SOB or any respiratory difficulty  - Respiratory Therapy support as indicated  Outcome: Progressing     Problem: MUSCULOSKELETAL - ADULT  Goal: Maintain or return mobility to safest level of function  Description: INTERVENTIONS:  - Assess patient's ability to carry out ADLs; assess patient's baseline for ADL function and identify physical deficits which impact ability to perform ADLs (bathing, care of mouth/teeth, toileting, grooming, dressing, etc )  - Assess/evaluate cause of self-care deficits   - Assess range of motion  - Assess patient's mobility  - Assess patient's need for assistive devices and provide as appropriate  - Encourage maximum independence but intervene and supervise when necessary  - Involve family in performance of ADLs  - Assess for home care needs following discharge   - Consider OT consult to assist with ADL evaluation and planning for discharge  - Provide patient education as appropriate  Outcome: Progressing  Goal: Maintain proper alignment of affected body part  Description: INTERVENTIONS:  - Support, maintain and protect limb and body alignment  - Provide patient/ family with appropriate education  Outcome: Progressing     Problem: Prexisting or High Potential for Compromised Skin Integrity  Goal: Skin integrity is maintained or improved  Description: INTERVENTIONS:  - Identify patients at risk for skin breakdown  - Assess and monitor skin integrity  - Assess and monitor nutrition and hydration status  - Monitor labs   - Assess for incontinence   - Turn and reposition patient  - Assist with mobility/ambulation  - Relieve pressure over bony prominences  - Avoid friction and shearing  - Provide appropriate hygiene as needed including keeping skin clean and dry  - Evaluate need for skin moisturizer/barrier cream  - Collaborate with interdisciplinary team   - Patient/family teaching  - Consider wound care consult   Outcome: Progressing

## 2020-09-01 NOTE — ASSESSMENT & PLAN NOTE
Lab Results   Component Value Date    HGBA1C 7 6 (H) 08/05/2020       Recent Labs     08/31/20  2043 09/01/20  0625 09/01/20  1128 09/01/20  1621   POCGLU 125 135 171* 143*       Blood Sugar Average: Last 72 hrs:  (P) 818 [id number]   SSI  Blood Glucose checks TIDWM and QHS (Q6H for NPO patients)  Hold oral medications  ISS

## 2020-09-02 NOTE — PLAN OF CARE
Problem: Potential for Falls  Goal: Patient will remain free of falls  Description: INTERVENTIONS:  - Assess patient frequently for physical needs  -  Identify cognitive and physical deficits and behaviors that affect risk of falls  -  [place] fall precautions as indicated by assessment   - Educate patient/family on patient safety including physical limitations  - Instruct patient to call for assistance with activity based on assessment  - Modify environment to reduce risk of injury  - Consider OT/PT consult to assist with strengthening/mobility  Outcome: Progressing     Problem: Nutrition/Hydration-ADULT  Goal: Nutrient/Hydration intake appropriate for improving, restoring or maintaining nutritional needs  Description: Monitor and assess patient's nutrition/hydration status for malnutrition  Collaborate with interdisciplinary team and initiate plan and interventions as ordered  Monitor patient's weight and dietary intake as ordered or per policy  Utilize nutrition screening tool and intervene as necessary  Determine patient's food preferences and provide high-protein, high-caloric foods as appropriate       INTERVENTIONS:  - Monitor oral intake, urinary output, labs, and treatment plans  - Assess nutrition and hydration status and recommend course of action  - Evaluate amount of meals eaten  - Assist patient with eating if necessary   - Allow adequate time for meals  - Recommend/ encourage appropriate diets, oral nutritional supplements, and vitamin/mineral supplements  - Order, calculate, and assess calorie counts as needed  - Recommend, monitor, and adjust tube feedings and TPN/PPN based on assessed needs  - Assess need for intravenous fluids  - Provide specific nutrition/hydration education as appropriate  - Include patient/family/caregiver in decisions related to nutrition  Outcome: Progressing     Problem: CARDIOVASCULAR - ADULT  Goal: Maintains optimal cardiac output and hemodynamic stability  Description: INTERVENTIONS:  - Monitor I/O, vital signs and rhythm  - Monitor for S/S and trends of decreased cardiac output  - Administer and titrate ordered vasoactive medications to optimize hemodynamic stability  - Assess quality of pulses, skin color and temperature  - Assess for signs of decreased coronary artery perfusion  - Instruct patient to report change in severity of symptoms  Outcome: Progressing  Goal: Absence of cardiac dysrhythmias or at baseline rhythm  Description: INTERVENTIONS:  - Continuous cardiac monitoring, vital signs, obtain 12 lead EKG if ordered  - Administer antiarrhythmic and heart rate control medications as ordered  - Monitor electrolytes and administer replacement therapy as ordered  Outcome: Progressing     Problem: RESPIRATORY - ADULT  Goal: Achieves optimal ventilation and oxygenation  Description: INTERVENTIONS:  - Assess for changes in respiratory status  - Assess for changes in mentation and behavior  - Position to facilitate oxygenation and minimize respiratory effort  - Oxygen administered by appropriate delivery if ordered  - Initiate smoking cessation education as indicated  - Encourage broncho-pulmonary hygiene including cough, deep breathe, Incentive Spirometry  - Assess the need for suctioning and aspirate as needed  - Assess and instruct to report SOB or any respiratory difficulty  - Respiratory Therapy support as indicated  Outcome: Progressing     Problem: MUSCULOSKELETAL - ADULT  Goal: Maintain or return mobility to safest level of function  Description: INTERVENTIONS:  - Assess patient's ability to carry out ADLs; assess patient's baseline for ADL function and identify physical deficits which impact ability to perform ADLs (bathing, care of mouth/teeth, toileting, grooming, dressing, etc )  - Assess/evaluate cause of self-care deficits   - Assess range of motion  - Assess patient's mobility  - Assess patient's need for assistive devices and provide as appropriate  - Encourage maximum independence but intervene and supervise when necessary  - Involve family in performance of ADLs  - Assess for home care needs following discharge   - Consider OT consult to assist with ADL evaluation and planning for discharge  - Provide patient education as appropriate  Outcome: Progressing  Goal: Maintain proper alignment of affected body part  Description: INTERVENTIONS:  - Support, maintain and protect limb and body alignment  - Provide patient/ family with appropriate education  Outcome: Progressing     Problem: Prexisting or High Potential for Compromised Skin Integrity  Goal: Skin integrity is maintained or improved  Description: INTERVENTIONS:  - Identify patients at risk for skin breakdown  - Assess and monitor skin integrity  - Assess and monitor nutrition and hydration status  - Monitor labs   - Assess for incontinence   - Turn and reposition patient  - Assist with mobility/ambulation  - Relieve pressure over bony prominences  - Avoid friction and shearing  - Provide appropriate hygiene as needed including keeping skin clean and dry  - Evaluate need for skin moisturizer/barrier cream  - Collaborate with interdisciplinary team   - Patient/family teaching  - Consider wound care consult   Outcome: Progressing

## 2020-09-02 NOTE — ASSESSMENT & PLAN NOTE
Lab Results   Component Value Date    HGBA1C 7 6 (H) 08/05/2020       Recent Labs     09/01/20 2028 09/02/20  0700 09/02/20  1040 09/02/20  1636   POCGLU 169* 146* 157* 133       Blood Sugar Average: Last 72 hrs:  (P) 200 [id number]   SSI  Blood Glucose checks TIDWM and QHS (Q6H for NPO patients)  Hold oral medications  ISS

## 2020-09-02 NOTE — ASSESSMENT & PLAN NOTE
Estimated Creatinine Clearance: 45 mL/min (by C-G formula based on SCr of 1 07 mg/dL)    B/l Cr 1 1  See MALA

## 2020-09-02 NOTE — ASSESSMENT & PLAN NOTE
Estimated Creatinine Clearance: 45 mL/min (by C-G formula based on SCr of 1 07 mg/dL)    POA  Patient noted to have serum creatinine 1 69 on admission; baseline 1-1 2  Pt has MYERS but likely this is more due to deconditioning and not CHF   Cr did not improve w/ diuresis  UA WNL  Urinary retention protocol ordered - had been straight cathed twice but appears resolved at this time  Renal appreciated  Cr now at baseline after getting IVF  Renal US WNL  CPK WNL

## 2020-09-02 NOTE — ASSESSMENT & PLAN NOTE
· Hb dropped from 11-12 to 9  · Check stool occult, iron panel  · Patients states she has intermittent black stools  · HB this morning appears stable at 9 5 from 9 0  · Continue eliquis  · CT AP had no evidence of bleed

## 2020-09-02 NOTE — ASSESSMENT & PLAN NOTE
Wt Readings from Last 3 Encounters:   09/02/20 81 7 kg (180 lb 1 9 oz)   08/09/20 75 4 kg (166 lb 3 6 oz)   08/06/20 86 4 kg (190 lb 7 6 oz)     Echo with EF 45%; continue home medications  Patient reports dyspnea on exertion; became winded talking to examiner  BNP elevated at 1326; distended abdomen  Denies night cough, orthopnea, proximal nocturnal dyspnea; a chest x-ray read as normal, but may have infiltrates  at time of exam on 8/27 appears euvolemic  However, possible MYERS 2/2 deconditioning as pt appears dry on exam  gentle IVF to tx MALA & it improved  Acute CHF exacerbation is ruled out  Per renal hold ACEi at d/c  Can d/c on Lasix 20 mg PO daily  Can have BMP at rehab and if Cr stable, can be placed back on ACEi      Since she will be here pending placement, will restart her on to Lasix 20 mg daily and observe

## 2020-09-02 NOTE — PROGRESS NOTES
Progress Note - Romi Jacome [birth year], 79 y o  female MRN: [id number]    Unit/Bed#: [facility] 525-01 Encounter: [id number]    Primary Care Provider: Zonia Monteiro DO   Date and time admitted to hospital: 8/26/2020  4:18 PM        Coronary artery disease  Assessment & Plan  S/p PCI July 2019  C/w Plavix and BB and statin    LLQ pain  Assessment & Plan  c/o severe pain  CTAP showed no acute finding  Incidentally shows transverse colon nodule  Can get repeat CTAP in 3 months if desired by guardian     Herpes zoster without complication  Assessment & Plan  In lumbar region  No pain  Lesions appear crusted - no need for contact precautions  Valtrex for 7 days    CKD (chronic kidney disease) stage 3, GFR 30-59 ml/min ([facility])  Assessment & Plan  Estimated Creatinine Clearance: 45 mL/min (by C-G formula based on SCr of 1 07 mg/dL)  B/l Cr 1 1  See MALA    DM2 (diabetes mellitus, type 2) [facility])  Assessment & Plan  Lab Results   Component Value Date    HGBA1C 7 6 (H) 08/05/2020       Recent Labs     09/01/20 2028 09/02/20  0700 09/02/20  1040 09/02/20  1636   POCGLU 169* 146* 157* 133       Blood Sugar Average: Last 72 hrs:  (P) 683 [id number]   SSI  Blood Glucose checks TIDWM and QHS (Q6H for NPO patients)  Hold oral medications  ISS      Pain of cervical spine  Assessment & Plan  Scheduled tylenol  Neurontin changed to qhs  Oxy prn  MRI shows severe stenosis  Pt was supposed to see nsurg OP on 8/24 but did not show up to appt  Six vw Cervical XR today shows multilevel degenerative disc disease status post fusion with stable grade 1-2 anterolisthesis of C3-4  Neurosurg consult appreciated - no plan for surgery at this time  Palliative consult for pain control appreciated - start on scheduled oxy    A-fib [facility])  Assessment & Plan  Patient rate controlled on admission; continue BB  Anticoagulated with Eliquis 5 mg b i d      Anemia  Assessment & Plan  · Hb dropped from 11-12 to 9  · Check stool occult, iron panel  · Patients states she has intermittent black stools  · HB this morning appears stable at 9 5 from 9 0  · Continue eliquis  · CT AP had no evidence of bleed    Elevated troponin level not due myocardial infarction  Assessment & Plan  Patient with elevated troponin; denies chest pain  EKG in the ED negative for ischemia; shows rate controlled atrial fibrillation  Trop actually lower than earlier in month  No need for further w/u    Mild cognitive impairment  Assessment & Plan  Neurospsych eval pt 8/28 and deemed her incompetent to make medical decisions    Hyperlipidemia  Assessment & Plan  Continue statin    Essential hypertension  Assessment & Plan  C/w BB  Hold ACEi and Lasix in setting of MALA    Chronic systolic congestive heart failure ([facility] [address] )  Assessment & Plan  Wt Readings from Last 3 Encounters:   09/02/20 81 7 kg (180 lb 1 9 oz)   08/09/20 75 4 kg (166 lb 3 6 oz)   08/06/20 86 4 kg (190 lb 7 6 oz)     Echo with EF 45%; continue home medications  Patient reports dyspnea on exertion; became winded talking to examiner  BNP elevated at 1326; distended abdomen  Denies night cough, orthopnea, proximal nocturnal dyspnea; a chest x-ray read as normal, but may have infiltrates  at time of exam on 8/27 appears euvolemic  However, possible MYERS 2/2 deconditioning as pt appears dry on exam  gentle IVF to tx MALA & it improved  Acute CHF exacerbation is ruled out  Per renal hold ACEi at d/c  Can d/c on Lasix 20 mg PO daily  Can have BMP at rehab and if Cr stable, can be placed back on ACEi  Since she will be here pending placement, will restart her on to Lasix 20 mg daily and observe      Weakness/physical deconditioning  Assessment & Plan  PT/OT  Recent admission or patient declined follow on rehabilitation  Neuropsych eval ordered and pt not competent to make medical decisions    Will need guardian and then rehab placement    Dysthymic disorder  Assessment & Plan  Patient tearful and depressed on examination in ER; denies SI/HI, but did earlier in the ED  Continue Prozac and Pamelor  Behavior Health consult appreciated - no 1 to 1 needed    * MALA (acute kidney injury) ([place] [address] )  Assessment & Plan  Estimated Creatinine Clearance: 45 mL/min (by C-G formula based on SCr of 1 07 mg/dL)  POA  Patient noted to have serum creatinine 1 69 on admission; baseline 1-1 2  Pt has MYERS but likely this is more due to deconditioning and not CHF   Cr did not improve w/ diuresis  UA WNL  Urinary retention protocol ordered - had been straight cathed twice but appears resolved at this time  Renal appreciated  Cr now at baseline after getting IVF  Renal US WNL  [facility] Internal Medicine Progress Note  Patient: Allegra Lima 79 y o  female   MRN: [id number]  PCP: Vikki Corea DO  Unit/Bed#: [facility] 525-01 Encounter: [id number]  Date Of Visit: 09/02/20    Assessment:    Principal Problem:    MALA (acute kidney injury) ([place] [address] )  Active Problems:    Coronary artery disease    Dysthymic disorder    Weakness/physical deconditioning    Chronic systolic congestive heart failure ([place] [address] )    Essential hypertension    Hyperlipidemia    Mild cognitive impairment    Elevated troponin level not due myocardial infarction    Anemia    A-fib ([facility])    Pain of cervical spine    DM2 (diabetes mellitus, type 2) ([facility])    CKD (chronic kidney disease) stage 3, GFR 30-59 ml/min ([facility])    Herpes zoster without complication    LLQ pain      Plan:       VTE Pharmacologic Prophylaxis:   Pharmacologic: Apixaban (Eliquis)  Mechanical VTE Prophylaxis in Place: Yes    Patient Centered Rounds: I have performed bedside rounds with nursing staff today  Discussions with Specialists or Other Care Team Provider:     Education and Discussions with Family / Patient: patient    Time Spent for Care: 30 minutes  More than 50% of total time spent on counseling and coordination of care as described above      Current Length of Stay: 7 day(s)    Current Patient Status: Inpatient Certification Statement: The patient will continue to require additional inpatient hospital stay due to medically seems stable at present, needs placment    Discharge Plan / Estimated Discharge Date:     Code Status: Level 3 - DNAR and DNI      Subjective:   She is just overall not feeling good today because of pains  She did not remember our conversation and discussions about low hemoglobin yesterday  She states she forgets things    Objective:     Vitals:   Temp (24hrs), Av 6 °F (36 4 °C), Min:97 3 °F (36 3 °C), Max:98 °F (36 7 °C)    Temp:  [97 3 °F (36 3 °C)-98 °F (36 7 °C)] 97 3 °F (36 3 °C)  HR:  [60-84] 77  Resp:  [18-20] 18  BP: (107-124)/(58-64) 123/58  SpO2:  [93 %-96 %] 96 %  Body mass index is 29 97 kg/m²  Input and Output Summary (last 24 hours): Intake/Output Summary (Last 24 hours) at 2020 1720  Last data filed at 2020 1643  Gross per 24 hour   Intake 878 ml   Output 600 ml   Net 278 ml       Physical Exam:     Physical Exam  Vitals signs reviewed  HENT:      Head: Normocephalic and atraumatic  Cardiovascular:      Rate and Rhythm: Normal rate and regular rhythm  Heart sounds: Normal heart sounds  No murmur  No gallop  Pulmonary:      Effort: Pulmonary effort is normal       Breath sounds: Normal breath sounds  Abdominal:      General: There is no distension  Palpations: Abdomen is soft  Tenderness: There is no abdominal tenderness  Neurological:      Mental Status: She is alert and oriented to person, place, and time           Additional Data:     Labs:    Results from last 7 days   Lab Units 20  0504   WBC Thousand/uL 9 40   HEMOGLOBIN g/dL 9 5*   HEMATOCRIT % 30 7*   PLATELETS Thousands/uL 248   NEUTROS PCT % 76*   LYMPHS PCT % 10*   MONOS PCT % 7   EOS PCT % 5     Results from last 7 days   Lab Units 20  0504  20  0428   POTASSIUM mmol/L 4 4   < > 3 5   CHLORIDE mmol/L 106   < > 101   CO2 mmol/L 26   < > 29   BUN mg/dL 18   < > 31*   CREATININE mg/dL 1 07   < > 1 69*   CALCIUM mg/dL 8 3   < > 9 0   ALK PHOS U/L  --   --  64   ALT U/L  --   --  22   AST U/L  --   --  18    < > = values in this interval not displayed  * I Have Reviewed All Lab Data Listed Above  * Additional Pertinent Lab Tests Reviewed:  All Labs Within Last 24 Hours Reviewed    Imaging:    Imaging Reports Reviewed Today Include:   Imaging Personally Reviewed by Myself Includes:      Recent Cultures (last 7 days):           Last 24 Hours Medication List:   Current Facility-Administered Medications   Medication Dose Route Frequency Provider Last Rate    acetaminophen  975 mg Oral [facility] Corine Sandifer, PA-C      amiodarone  200 mg Oral Daily Yoav Harding MD      apixaban  5 mg Oral BID Yoav Harding MD      atorvastatin  20 mg Oral Daily With Luis Armando Ward MD      bisacodyl  10 mg Rectal Daily PRN Yoav Harding MD      clopidogrel  75 mg Oral Daily Yoav Harding MD      digoxin  125 mcg Oral Daily Yoav Harding MD      FLUoxetine  20 mg Oral Daily Yoav Harding MD      folic acid  3,249 mcg Oral Daily Yoav Harding MD      [START ON 9/3/2020] furosemide  20 mg Oral Daily Vickey Hernández MD      gabapentin  200 mg Oral HS Alyse Martinez DO      insulin lispro  1-6 Units Subcutaneous TID [facility] Yoav Harding MD      insulin lispro  1-6 Units Subcutaneous HS Yoav Harding MD      melatonin  6 mg Oral HS Yoav Harding MD      metoprolol succinate  50 mg Oral Q12H Yoav Harding MD      nortriptyline  10 mg Oral HS Yoav Harding MD      oxyCODONE  5 mg Oral Q3H PRN Pulido Needle, DO      oxyCODONE  5 mg Oral Q6H Alyse Martinez DO      pantoprazole  40 mg Oral Early Morning Yoav Harding MD      senna-docusate sodium  1 tablet Oral HS Yoav Harding MD      valACYclovir  1,000 mg Oral Q12H [address] DO Melinda          Today, Patient Was Seen By: Vickey Hernández MD    ** Please Note: This note has been constructed using a voice recognition system   **

## 2020-09-02 NOTE — RESTORATIVE TECHNICIAN NOTE
Restorative Specialist Mobility Note       Activity: Other (Comment)(Sit EOB)     Assistive Device: Front wheel walker        Repositioned:  Other (Comment)(Bed Alarm) bed. Tolerating small amount of food. Continues to deny nausea. Right fem tender, no oozing or hematoma noted. Will monitor    1500- patient repositioned to 30 degrees, tolerated well. Right groin remains intact, no hematoma or oozing. 1615- Patient assisted to chair, groin tenderness with standing, otherwise tolerated well. No oozing or hematoma noted. Vital signs stable.

## 2020-09-03 NOTE — PROGRESS NOTES
Progress Note - Karma Dean [birth year], 79 y o  female MRN: [id number]    Unit/Bed#: [facility] 525-01 Encounter: [id number]    Primary Care Provider: Nate Ramirez DO   Date and time admitted to hospital: 8/26/2020  4:18 PM        Coronary artery disease  Assessment & Plan  S/p PCI July 2019  C/w Plavix and BB and statin    LLQ pain  Assessment & Plan  c/o severe pain  CTAP showed no acute finding  Incidentally shows transverse colon nodule  Can get repeat CTAP in 3 months if desired by guardian   Suspect musculoskeletal pain    Herpes zoster without complication  Assessment & Plan  In lumbar region  No pain  Lesions appear crusted - no need for contact precautions  Valtrex for 7 days    CKD (chronic kidney disease) stage 3, GFR 30-59 ml/min ([facility])  Assessment & Plan  Estimated Creatinine Clearance: 45 mL/min (by C-G formula based on SCr of 1 07 mg/dL)  B/l Cr 1 1  See MALA    DM2 (diabetes mellitus, type 2) [facility])  Assessment & Plan  Lab Results   Component Value Date    HGBA1C 7 6 (H) 08/05/2020       Recent Labs     09/02/20  2143 09/03/20  0616 09/03/20  1032 09/03/20  1644   POCGLU 148* 136 142* 148*       Blood Sugar Average: Last 72 hrs:  (P) 231 [id number]   SSI  Blood Glucose checks TIDWM and QHS  Hold oral medications  ISS      Pain of cervical spine  Assessment & Plan  Scheduled tylenol  Neurontin changed to qhs  Oxy prn  MRI shows severe stenosis  Pt was supposed to see nsurg OP on 8/24 but did not show up to appt  Six vw Cervical XR today shows multilevel degenerative disc disease status post fusion with stable grade 1-2 anterolisthesis of C3-4  Neurosurg consult appreciated - no plan for surgery at this time  Palliative consult for pain control appreciated - start on scheduled oxy    A-fib [facility])  Assessment & Plan  Patient rate controlled on admission; continue BB  Anticoagulated with Eliquis 5 mg b i d      Anemia  Assessment & Plan  · Hb dropped from 11-12 to 9  · Check stool occult, iron panel  · Patients states she has intermittent black stools  · HB this morning appears stable at 9 5 from 9 0  · Continue eliquis  · CT AP had no evidence of bleed    Elevated troponin level not due myocardial infarction  Assessment & Plan  Patient with elevated troponin; denies chest pain  EKG in the ED negative for ischemia; shows rate controlled atrial fibrillation  Trop actually lower than earlier in month  No need for further w/u    Mild cognitive impairment  Assessment & Plan  Neurospsych eval pt 8/28 and deemed her incompetent to make medical decisions    Essential hypertension  Assessment & Plan  C/w BB  Hold ACEi and Lasix in setting of MALA    Chronic systolic congestive heart failure ([facility] [address] )  Assessment & Plan  Wt Readings from Last 3 Encounters:   09/03/20 81 7 kg (180 lb 1 9 oz)   08/09/20 75 4 kg (166 lb 3 6 oz)   08/06/20 86 4 kg (190 lb 7 6 oz)     Echo with EF 45%; continue home medications  Patient reports dyspnea on exertion; became winded talking to examiner  BNP elevated at 1326; distended abdomen  Denies night cough, orthopnea, proximal nocturnal dyspnea; a chest x-ray read as normal, but may have infiltrates  at time of exam on 8/27 appears euvolemic  However, possible MYERS 2/2 deconditioning as pt appears dry on exam  gentle IVF to tx MALA & it improved  Acute CHF exacerbation is ruled out  Per renal hold ACEi at d/c  Can d/c on Lasix 20 mg PO daily  Can have BMP at rehab and if Cr stable, can be placed back on ACEi  Since she will be here pending placement, will restart her on to Lasix 20 mg daily and observe      Weakness/physical deconditioning  Assessment & Plan  PT/OT  Recent admission or patient declined follow on rehabilitation  Neuropsych eval ordered and pt not competent to make medical decisions    Will need guardian and then rehab placement    Dysthymic disorder  Assessment & Plan  Patient tearful and depressed on examination in ER; denies SI/HI, but did earlier in the ED  Continue Prozac and Pamelor  Behavior Health consult appreciated - no 1 to 1 needed    * MALA (acute kidney injury) ([facility] )  Assessment & Plan  Estimated Creatinine Clearance: 45 mL/min (by C-G formula based on SCr of 1 07 mg/dL)  POA  Patient noted to have serum creatinine 1 69 on admission; baseline 1-1 2  Pt has MYERS but likely this is more due to deconditioning and not CHF   Cr did not improve w/ diuresis  UA WNL  Urinary retention protocol ordered - had been straight cathed twice but appears resolved at this time  Renal appreciated  Cr now at baseline after getting IVF  Renal US WNL  [facility] Internal Medicine Progress Note  Patient: Isaac Kim 79 y o  female   MRN: [id number]  PCP: Chanelle Obrien DO  Unit/Bed#: [facility] 525-01 Encounter: [id number]  Date Of Visit: 09/03/20    Assessment:    Principal Problem:    MALA (acute kidney injury) ([facility] )  Active Problems:    Coronary artery disease    Dysthymic disorder    Weakness/physical deconditioning    Chronic systolic congestive heart failure ([place] [id number] )    Essential hypertension    Hyperlipidemia    Mild cognitive impairment    Elevated troponin level not due myocardial infarction    Anemia    A-fib (HCC)    Pain of cervical spine    DM2 (diabetes mellitus, type 2) ([facility])    CKD (chronic kidney disease) stage 3, GFR 30-59 ml/min ([facility])    Herpes zoster without complication    LLQ pain      Plan:           VTE Pharmacologic Prophylaxis:   Pharmacologic: Apixaban (Eliquis)  Mechanical VTE Prophylaxis in Place: Yes    Patient Centered Rounds: I have performed bedside rounds with nursing staff today  Discussions with Specialists or Other Care Team Provider:     Education and Discussions with Family / Patient: patient    Time Spent for Care: 30 minutes  More than 50% of total time spent on counseling and coordination of care as described above      Current Length of Stay: 8 day(s)    Current Patient Status: Inpatient   Certification Statement: The patient will continue to require additional inpatient hospital stay due to needs guardian    Discharge Plan / Estimated Discharge Date:     Code Status: Level 3 - DNAR and DNI      Subjective:   Not feeling well due to pain at multiple sites    Objective:     Vitals:   Temp (24hrs), Av 8 °F (36 6 °C), Min:97 5 °F (36 4 °C), Max:98 2 °F (36 8 °C)    Temp:  [97 5 °F (36 4 °C)-98 2 °F (36 8 °C)] 97 6 °F (36 4 °C)  HR:  [65-84] 84  Resp:  [16-18] 18  BP: (102-135)/(46-74) 125/57  SpO2:  [95 %-98 %] 95 %  Body mass index is 29 97 kg/m²  Input and Output Summary (last 24 hours): Intake/Output Summary (Last 24 hours) at 9/3/2020 1858  Last data filed at 9/3/2020 1500  Gross per 24 hour   Intake 700 ml   Output 801 ml   Net -101 ml       Physical Exam:     Physical Exam  Vitals signs reviewed  HENT:      Head: Normocephalic  Cardiovascular:      Rate and Rhythm: Normal rate and regular rhythm  Heart sounds: Normal heart sounds  No murmur  Pulmonary:      Effort: Pulmonary effort is normal  No respiratory distress  Breath sounds: Normal breath sounds  Abdominal:      General: There is no distension  Palpations: Abdomen is soft  Tenderness: There is no abdominal tenderness  Neurological:      Mental Status: She is alert  Additional Data:     Labs:    Results from last 7 days   Lab Units 20  0504   WBC Thousand/uL 9 40   HEMOGLOBIN g/dL 9 5*   HEMATOCRIT % 30 7*   PLATELETS Thousands/uL 248   NEUTROS PCT % 76*   LYMPHS PCT % 10*   MONOS PCT % 7   EOS PCT % 5     Results from last 7 days   Lab Units 20  0504   POTASSIUM mmol/L 4 4   CHLORIDE mmol/L 106   CO2 mmol/L 26   BUN mg/dL 18   CREATININE mg/dL 1 07   CALCIUM mg/dL 8 3           * I Have Reviewed All Lab Data Listed Above    * Additional Pertinent Lab Tests Reviewed: No New Labs Available For Today    Imaging:    Imaging Reports Reviewed Today Include:   Imaging Personally Reviewed by Myself Includes:      Recent Cultures (last 7 days): Last 24 Hours Medication List:   Current Facility-Administered Medications   Medication Dose Route Frequency Provider Last Rate    acetaminophen  975 mg Oral [facility] Corine Sandifer, PA-C      amiodarone  200 mg Oral Daily Yoav Harding MD      apixaban  5 mg Oral BID Yoav Harding MD      atorvastatin  20 mg Oral Daily With Luis Armando Ward MD      bisacodyl  10 mg Rectal Daily PRN Yoav Harding MD      clopidogrel  75 mg Oral Daily Yoav Harding MD      digoxin  125 mcg Oral Daily Yoav Harding MD      FLUoxetine  20 mg Oral Daily Yoav Harding MD      folic acid  0,761 mcg Oral Daily Yoav Harding MD      furosemide  20 mg Oral Daily Vickey Hernández MD      gabapentin  200 mg Oral HS Alyse Martinez DO      insulin lispro  1-6 Units Subcutaneous TID [facility] Yoav Harding MD      insulin lispro  1-6 Units Subcutaneous HS Yoav Harding MD      lidocaine  1 patch Topical Daily Corine Sandifer, PA-C      melatonin  6 mg Oral HS Yoav Harding MD      metoprolol succinate  50 mg Oral Q12H Yoav Harding MD      nortriptyline  10 mg Oral HS Yoav Harding MD      oxyCODONE  5 mg Oral Q3H PRN Pulido Needle, DO      oxyCODONE  5 mg Oral Q6H Alyse Martinez DO      pantoprazole  40 mg Oral Early Morning Yoav Harding MD      senna-docusate sodium  1 tablet Oral HS Yoav Harding MD      valACYclovir  1,000 mg Oral Q12H [address] Melinda, DO          Today, Patient Was Seen By: Vickey Hernández MD    ** Please Note: This note has been constructed using a voice recognition system   **

## 2020-09-03 NOTE — ASSESSMENT & PLAN NOTE
Wt Readings from Last 3 Encounters:   09/03/20 81 7 kg (180 lb 1 9 oz)   08/09/20 75 4 kg (166 lb 3 6 oz)   08/06/20 86 4 kg (190 lb 7 6 oz)     Echo with EF 45%; continue home medications  Patient reports dyspnea on exertion; became winded talking to examiner  BNP elevated at 1326; distended abdomen  Denies night cough, orthopnea, proximal nocturnal dyspnea; a chest x-ray read as normal, but may have infiltrates  at time of exam on 8/27 appears euvolemic  However, possible MYERS 2/2 deconditioning as pt appears dry on exam  gentle IVF to tx MALA & it improved  Acute CHF exacerbation is ruled out  Per renal hold ACEi at d/c  Can d/c on Lasix 20 mg PO daily  Can have BMP at rehab and if Cr stable, can be placed back on ACEi      Since she will be here pending placement, will restart her on to Lasix 20 mg daily and observe

## 2020-09-03 NOTE — PLAN OF CARE
Problem: Potential for Falls  Goal: Patient will remain free of falls  Description: INTERVENTIONS:  - Assess patient frequently for physical needs  -  Identify cognitive and physical deficits and behaviors that affect risk of falls  -  [place] fall precautions as indicated by assessment   - Educate patient/family on patient safety including physical limitations  - Instruct patient to call for assistance with activity based on assessment  - Modify environment to reduce risk of injury  - Consider OT/PT consult to assist with strengthening/mobility  Outcome: Progressing     Problem: Nutrition/Hydration-ADULT  Goal: Nutrient/Hydration intake appropriate for improving, restoring or maintaining nutritional needs  Description: Monitor and assess patient's nutrition/hydration status for malnutrition  Collaborate with interdisciplinary team and initiate plan and interventions as ordered  Monitor patient's weight and dietary intake as ordered or per policy  Utilize nutrition screening tool and intervene as necessary  Determine patient's food preferences and provide high-protein, high-caloric foods as appropriate       INTERVENTIONS:  - Monitor oral intake, urinary output, labs, and treatment plans  - Assess nutrition and hydration status and recommend course of action  - Evaluate amount of meals eaten  - Assist patient with eating if necessary   - Allow adequate time for meals  - Recommend/ encourage appropriate diets, oral nutritional supplements, and vitamin/mineral supplements  - Order, calculate, and assess calorie counts as needed  - Recommend, monitor, and adjust tube feedings and TPN/PPN based on assessed needs  - Assess need for intravenous fluids  - Provide specific nutrition/hydration education as appropriate  - Include patient/family/caregiver in decisions related to nutrition  Outcome: Progressing     Problem: CARDIOVASCULAR - ADULT  Goal: Maintains optimal cardiac output and hemodynamic stability  Description: INTERVENTIONS:  - Monitor I/O, vital signs and rhythm  - Monitor for S/S and trends of decreased cardiac output  - Administer and titrate ordered vasoactive medications to optimize hemodynamic stability  - Assess quality of pulses, skin color and temperature  - Assess for signs of decreased coronary artery perfusion  - Instruct patient to report change in severity of symptoms  Outcome: Progressing  Goal: Absence of cardiac dysrhythmias or at baseline rhythm  Description: INTERVENTIONS:  - Continuous cardiac monitoring, vital signs, obtain 12 lead EKG if ordered  - Administer antiarrhythmic and heart rate control medications as ordered  - Monitor electrolytes and administer replacement therapy as ordered  Outcome: Progressing     Problem: RESPIRATORY - ADULT  Goal: Achieves optimal ventilation and oxygenation  Description: INTERVENTIONS:  - Assess for changes in respiratory status  - Assess for changes in mentation and behavior  - Position to facilitate oxygenation and minimize respiratory effort  - Oxygen administered by appropriate delivery if ordered  - Initiate smoking cessation education as indicated  - Encourage broncho-pulmonary hygiene including cough, deep breathe, Incentive Spirometry  - Assess the need for suctioning and aspirate as needed  - Assess and instruct to report SOB or any respiratory difficulty  - Respiratory Therapy support as indicated  Outcome: Progressing     Problem: MUSCULOSKELETAL - ADULT  Goal: Maintain or return mobility to safest level of function  Description: INTERVENTIONS:  - Assess patient's ability to carry out ADLs; assess patient's baseline for ADL function and identify physical deficits which impact ability to perform ADLs (bathing, care of mouth/teeth, toileting, grooming, dressing, etc )  - Assess/evaluate cause of self-care deficits   - Assess range of motion  - Assess patient's mobility  - Assess patient's need for assistive devices and provide as appropriate  - Encourage maximum independence but intervene and supervise when necessary  - Involve family in performance of ADLs  - Assess for home care needs following discharge   - Consider OT consult to assist with ADL evaluation and planning for discharge  - Provide patient education as appropriate  Outcome: Progressing  Goal: Maintain proper alignment of affected body part  Description: INTERVENTIONS:  - Support, maintain and protect limb and body alignment  - Provide patient/ family with appropriate education  Outcome: Progressing     Problem: Prexisting or High Potential for Compromised Skin Integrity  Goal: Skin integrity is maintained or improved  Description: INTERVENTIONS:  - Identify patients at risk for skin breakdown  - Assess and monitor skin integrity  - Assess and monitor nutrition and hydration status  - Monitor labs   - Assess for incontinence   - Turn and reposition patient  - Assist with mobility/ambulation  - Relieve pressure over bony prominences  - Avoid friction and shearing  - Provide appropriate hygiene as needed including keeping skin clean and dry  - Evaluate need for skin moisturizer/barrier cream  - Collaborate with interdisciplinary team   - Patient/family teaching  - Consider wound care consult   Outcome: Progressing

## 2020-09-03 NOTE — ASSESSMENT & PLAN NOTE
c/o severe pain  CTAP showed no acute finding  Incidentally shows transverse colon nodule    Can get repeat CTAP in 3 months if desired by guardian   Suspect musculoskeletal pain

## 2020-09-03 NOTE — ASSESSMENT & PLAN NOTE
Lab Results   Component Value Date    HGBA1C 7 6 (H) 08/05/2020       Recent Labs     09/02/20  2143 09/03/20  0616 09/03/20  1032 09/03/20  1644   POCGLU 148* 136 142* 148*       Blood Sugar Average: Last 72 hrs:  (P) 732 [id number]   SSI  Blood Glucose checks TIDWM and QHS  Hold oral medications  ISS

## 2020-09-03 NOTE — CASE MANAGEMENT
Call received from [id number] [place]  She provided name and phone # for miranda: Sameera Johnson, [phone]  CM called Francoise Cleary who reports she has known patient 30 years and has been helping her in recent years with errands and  Finances  She is not willing to serve as guardian and feels she can't manage patient's care any longer  She says she is trying to clean up patient's place and CM advised her to leave all belongings intact until guardian assigned  She reports patient has a sister, age 80, Kevin Dominguez and Sherley Sethi, [phone]  CM called sister and left request to call CM  The Guardianship Data Form was completed and sent to [employer]  CM received call from Tita Moore  Clinical records were faxed to his office at fax # [phone] and he will work on petition for [employer]

## 2020-09-04 NOTE — ASSESSMENT & PLAN NOTE
Lab Results   Component Value Date    HGBA1C 7 6 (H) 08/05/2020       Recent Labs     09/03/20 2052 09/04/20  0612 09/04/20  1042 09/04/20  1616   POCGLU 144* 134 158* 185*       Blood Sugar Average: Last 72 hrs:  (P) 605 [id number]   SSI  Blood Glucose checks TIDWM and QHS  Hold oral medications  ISS

## 2020-09-04 NOTE — PLAN OF CARE
Problem: Potential for Falls  Goal: Patient will remain free of falls  Description: INTERVENTIONS:  - Assess patient frequently for physical needs  -  Identify cognitive and physical deficits and behaviors that affect risk of falls    -  [place] fall precautions as indicated by assessment   - Educate patient/family on patient safety including physical limitations  - Instruct patient to call for assistance with activity based on assessment  - Modify environment to reduce risk of injury  - Consider OT/PT consult to assist with strengthening/mobility  Outcome: Progressing

## 2020-09-04 NOTE — ASSESSMENT & PLAN NOTE
Estimated Creatinine Clearance: 50 4 mL/min (by C-G formula based on SCr of 0 96 mg/dL)    POA  Patient noted to have serum creatinine 1 69 on admission; baseline 1-1 2  Pt has MYERS but likely this is more due to deconditioning and not CHF   Cr did not improve w/ diuresis  UA WNL  Urinary retention protocol ordered - had been straight cathed twice but appears resolved at this time  Renal appreciated  Cr now at baseline after getting IVF  Renal US WNL  CPK WNL
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
## 2020-09-04 NOTE — ASSESSMENT & PLAN NOTE
Wt Readings from Last 3 Encounters:   09/04/20 82 5 kg (181 lb 14 1 oz)   08/09/20 75 4 kg (166 lb 3 6 oz)   08/06/20 86 4 kg (190 lb 7 6 oz)     Echo with EF 45%; continue home medications  Patient reports dyspnea on exertion; became winded talking to examiner  BNP elevated at 1326; distended abdomen  Denies night cough, orthopnea, proximal nocturnal dyspnea; a chest x-ray read as normal, but may have infiltrates  at time of exam on 8/27 appears euvolemic  However, possible MYERS 2/2 deconditioning as pt appears dry on exam  gentle IVF to tx MALA & it improved  Acute CHF exacerbation is ruled out  Per renal hold ACEi at d/c  Can d/c on Lasix 20 mg PO daily  Can have BMP at rehab and if Cr stable, can be placed back on ACEi      Since she will be here pending placement, will restarted her on Lasix 20 mg daily and observe

## 2020-09-04 NOTE — PLAN OF CARE
Problem: Potential for Falls  Goal: Patient will remain free of falls  Description: INTERVENTIONS:  - Assess patient frequently for physical needs  -  Identify cognitive and physical deficits and behaviors that affect risk of falls    -  [place] fall precautions as indicated by assessment   - Educate patient/family on patient safety including physical limitations  - Instruct patient to call for assistance with activity based on assessment  - Modify environment to reduce risk of injury  - Consider OT/PT consult to assist with strengthening/mobility  Outcome: Progressing

## 2020-09-04 NOTE — ASSESSMENT & PLAN NOTE
Estimated Creatinine Clearance: 50 4 mL/min (by C-G formula based on SCr of 0 96 mg/dL)    B/l Cr 1 1  See MALA

## 2020-09-04 NOTE — PHYSICAL THERAPY NOTE
Physical Therapy Progress Note     09/04/20 1220   Pain Assessment   Pain Assessment Tool 0-10   Pain Score Worst Possible Pain   Pain Location/Orientation Location: Generalized   Hospital Pain Intervention(s) Repositioned; Ambulation/increased activity; Emotional support   Restrictions/Precautions   Other Precautions Pain; Fall Risk;Bed Alarm; Chair Alarm;Cognitive  (Alarm active post session )   Subjective   Subjective The pt  states that she is suprised how weak she is as she used to be able to walk all over the place  Transfers   Sit to Stand 5  Supervision   Additional items Impulsive;Verbal cues   Stand to Sit 5  Supervision   Additional items Verbal cues   Ambulation/Elevation   Gait pattern Excessively slow; Short stride; Inconsistent nanie;Decreased foot clearance; Forward Flexion   Gait Assistance 4  Minimal assist   Additional items Assist x 1;Verbal cues; Tactile cues   Assistive Device Rolling walker   Distance 180 feet  Balance   Static Sitting Good   Dynamic Sitting Fair +   Static Standing Fair   Ambulatory Poor +   Activity Tolerance   Activity Tolerance Patient tolerated treatment well;Patient limited by fatigue   Nurse Made Aware Yes  Assessment   Prognosis Fair   Problem List Decreased strength;Decreased endurance; Impaired balance;Decreased mobility; Decreased cognition;Decreased safety awareness;Pain   Assessment The pt  was able to progress her ambulatory distance, but she did quickly fatigue after 100 feet  The pt  was advised to take a seated rest multiple times, but she declined  The pt  then required several standing rests with the last 50 feet  She does continue to require instruction for safety due to her cognitive deficits  Will continue to follow  Barriers to Discharge Inaccessible home environment;Decreased caregiver support   Goals   Patient Goals To regain her strength     STG Expiration Date 09/10/20   PT Treatment Day 1   Plan   Treatment/Interventions Functional transfer training;LE strengthening/ROM; Therapeutic exercise; Endurance training;Patient/family training;Bed mobility;Gait training   Progress Progressing toward goals   PT Frequency   (3-5x a week )   Recommendation   PT Discharge Recommendation Post-Acute Rehabilitation Services   Equipment Recommended Pito Adames, MATEUS

## 2020-09-04 NOTE — ASSESSMENT & PLAN NOTE
Scheduled tylenol  Neurontin changed to qhs  Oxy prn  MRI shows severe stenosis  Pt was supposed to see nsurg OP on 8/24 but did not show up to appt  Six vw Cervical XR today shows multilevel degenerative disc disease status post fusion with stable grade 1-2 anterolisthesis of C3-4  Neurosurg consult appreciated - no plan for surgery at this time  Palliative consult for pain control appreciated - start on scheduled oxy  prob has some pain at her zoster healing site as well   Will increase neurontin from 200 HS to add 100 mg QAM

## 2020-09-04 NOTE — RESTORATIVE TECHNICIAN NOTE
Restorative Specialist Mobility Note       Activity: Ambulate in room, Bathroom privileges     Assistive Device: Front wheel walker        Repositioned:  Other (Comment)(Bed Alarm)

## 2020-09-04 NOTE — PROGRESS NOTES
Progress Note - Drew Singh [birth year], 79 y o  female MRN: [id number]    Unit/Bed#: [facility] 525-01 Encounter: [id number]    Primary Care Provider: Barron Reaves DO   Date and time admitted to hospital: 8/26/2020  4:18 PM        Coronary artery disease  Assessment & Plan  S/p PCI July 2019  C/w Plavix and BB and statin    LLQ pain  Assessment & Plan  c/o severe pain  CTAP showed no acute finding  Incidentally shows transverse colon nodule  Can get repeat CTAP in 3 months if desired by guardian   Suspect musculoskeletal pain    Herpes zoster without complication  Assessment & Plan  In lumbar region  Lesions appear crusted - no need for contact precautions  Completed Valtrex for 7 days    CKD (chronic kidney disease) stage 3, GFR 30-59 ml/min ([facility])  Assessment & Plan  Estimated Creatinine Clearance: 50 4 mL/min (by C-G formula based on SCr of 0 96 mg/dL)  B/l Cr 1 1  See MALA    DM2 (diabetes mellitus, type 2) [facility])  Assessment & Plan  Lab Results   Component Value Date    HGBA1C 7 6 (H) 08/05/2020       Recent Labs     09/03/20  2052 09/04/20  0612 09/04/20  1042 09/04/20  1616   POCGLU 144* 134 158* 185*       Blood Sugar Average: Last 72 hrs:  (P) 954 [id number]   SSI  Blood Glucose checks TIDWM and QHS  Hold oral medications  ISS      Pain of cervical spine  Assessment & Plan  Scheduled tylenol  Neurontin changed to qhs  Oxy prn  MRI shows severe stenosis  Pt was supposed to see nsurg OP on 8/24 but did not show up to appt  Six vw Cervical XR today shows multilevel degenerative disc disease status post fusion with stable grade 1-2 anterolisthesis of C3-4  Neurosurg consult appreciated - no plan for surgery at this time  Palliative consult for pain control appreciated - start on scheduled oxy  prob has some pain at her zoster healing site as well   Will increase neurontin from 200 HS to add 100 mg QAM    A-fib [facility])  Assessment & Plan  Patient rate controlled on admission; continue BB  Anticoagulated with Eliquis 5 mg b i d  Anemia  Assessment & Plan  · Hb dropped from 11-12 to 9  · Check stool occult, iron panel  · Patients states she has intermittent black stools  · HB this morning appears stable at 9 5 from 9 0  · Continue eliquis  · CT AP had no evidence of bleed    Elevated troponin level not due myocardial infarction  Assessment & Plan  Patient with elevated troponin; denies chest pain  EKG in the ED negative for ischemia; shows rate controlled atrial fibrillation  Trop actually lower than earlier in month  No need for further w/u    Mild cognitive impairment  Assessment & Plan  Neurospsych eval pt 8/28 and deemed her incompetent to make medical decisions    Essential hypertension  Assessment & Plan  C/w BB  Hold ACEi in setting of MALA    Chronic systolic congestive heart failure (HCC)  Assessment & Plan  Wt Readings from Last 3 Encounters:   09/04/20 82 5 kg (181 lb 14 1 oz)   08/09/20 75 4 kg (166 lb 3 6 oz)   08/06/20 86 4 kg (190 lb 7 6 oz)     Echo with EF 45%; continue home medications  Patient reports dyspnea on exertion; became winded talking to examiner  BNP elevated at 1326; distended abdomen  Denies night cough, orthopnea, proximal nocturnal dyspnea; a chest x-ray read as normal, but may have infiltrates  at time of exam on 8/27 appears euvolemic  However, possible MYERS 2/2 deconditioning as pt appears dry on exam  gentle IVF to tx MALA & it improved  Acute CHF exacerbation is ruled out  Per renal hold ACEi at d/c  Can d/c on Lasix 20 mg PO daily  Can have BMP at rehab and if Cr stable, can be placed back on ACEi  Since she will be here pending placement, will restarted her on Lasix 20 mg daily and observe      Weakness/physical deconditioning  Assessment & Plan  PT/OT  Recent admission or patient declined follow on rehabilitation  Neuropsych eval ordered and pt not competent to make medical decisions    Will need guardian and then rehab placement    Dysthymic disorder  Assessment & Plan  Patient tearful and depressed on examination in ER; denies SI/HI, but did earlier in the ED  Continue Prozac and Pamelor  Behavior Health consult appreciated - no 1 to 1 needed    * MALA (acute kidney injury) ([facility] )  Assessment & Plan  Estimated Creatinine Clearance: 50 4 mL/min (by C-G formula based on SCr of 0 96 mg/dL)  POA  Patient noted to have serum creatinine 1 69 on admission; baseline 1-1 2  Pt has MYERS but likely this is more due to deconditioning and not CHF   Cr did not improve w/ diuresis  UA WNL  Urinary retention protocol ordered - had been straight cathed twice but appears resolved at this time  Renal appreciated  Cr now at baseline after getting IVF  Renal US WNL  [facility] Internal Medicine Progress Note  Patient: Osman Langley 79 y o  female   MRN: [id number]  PCP: Melina Love DO  Unit/Bed#: [facility] 525-01 Encounter: [id number]  Date Of Visit: 09/04/20    Assessment:    Principal Problem:    MALA (acute kidney injury) ([facility] )  Active Problems:    Coronary artery disease    Dysthymic disorder    Weakness/physical deconditioning    Chronic systolic congestive heart failure ([facility] )    Essential hypertension    Hyperlipidemia    Mild cognitive impairment    Elevated troponin level not due myocardial infarction    Anemia    A-fib ([facility])    Pain of cervical spine    DM2 (diabetes mellitus, type 2) ([facility])    CKD (chronic kidney disease) stage 3, GFR 30-59 ml/min ([facility])    Herpes zoster without complication    LLQ pain      Plan:       VTE Pharmacologic Prophylaxis:   Pharmacologic: Apixaban (Eliquis)  Mechanical VTE Prophylaxis in Place: Yes    Patient Centered Rounds: I have performed bedside rounds with nursing staff today  Discussions with Specialists or Other Care Team Provider:     Education and Discussions with Family / Patient: patient    Time Spent for Care: 30 minutes  More than 50% of total time spent on counseling and coordination of care as described above      Current Length of Stay: 9 day(s)    Current Patient Status: Inpatient   Certification Statement: The patient will continue to require additional inpatient hospital stay due to pending guardian    Discharge Plan / Estimated Discharge Date:     Code Status: Level 3 - DNAR and DNI      Subjective:   Continues to c/o pain all over more in neck & back & left thigh    Objective:     Vitals:   Temp (24hrs), Av 8 °F (36 6 °C), Min:97 4 °F (36 3 °C), Max:98 2 °F (36 8 °C)    Temp:  [97 4 °F (36 3 °C)-98 2 °F (36 8 °C)] 98 2 °F (36 8 °C)  HR:  [80-99] 99  Resp:  [16-20] 18  BP: (111-129)/(58-66) 113/66  SpO2:  [93 %-96 %] 95 %  Body mass index is 30 27 kg/m²  Input and Output Summary (last 24 hours): Intake/Output Summary (Last 24 hours) at 2020 1730  Last data filed at 2020 1300  Gross per 24 hour   Intake 1210 ml   Output 1575 ml   Net -365 ml       Physical Exam:     Physical Exam  Vitals signs reviewed  HENT:      Head: Normocephalic and atraumatic  Cardiovascular:      Rate and Rhythm: Normal rate and regular rhythm  Heart sounds: Normal heart sounds  Pulmonary:      Effort: Pulmonary effort is normal       Breath sounds: Normal breath sounds  Abdominal:      General: There is no distension  Palpations: Abdomen is soft  Neurological:      Mental Status: She is alert  She is disoriented  Additional Data:     Labs:    Results from last 7 days   Lab Units 20  0440   WBC Thousand/uL 7 47   HEMOGLOBIN g/dL 8 7*   HEMATOCRIT % 28 1*   PLATELETS Thousands/uL 233   NEUTROS PCT % 75   LYMPHS PCT % 10*   MONOS PCT % 8   EOS PCT % 5     Results from last 7 days   Lab Units 20  0440   POTASSIUM mmol/L 4 3   CHLORIDE mmol/L 107   CO2 mmol/L 24   BUN mg/dL 16   CREATININE mg/dL 0 96   CALCIUM mg/dL 8 3           * I Have Reviewed All Lab Data Listed Above    * Additional Pertinent Lab Tests Reviewed: No New Labs Available For Today    Imaging:    Imaging Reports Reviewed Today Include: Imaging Personally Reviewed by Myself Includes:      Recent Cultures (last 7 days):           Last 24 Hours Medication List:   Current Facility-Administered Medications   Medication Dose Route Frequency Provider Last Rate    acetaminophen  650 mg Oral 4x Daily (with meals and at bedtime) Derick Colunga MD      amiodarone  200 mg Oral Daily Valentin Burgos MD      apixaban  5 mg Oral BID Valentin Burgos MD      atorvastatin  20 mg Oral Daily With Ephraim Gresham MD      bisacodyl  10 mg Rectal Daily PRN Valentin Burgos MD      clopidogrel  75 mg Oral Daily Valentin Burgos MD      digoxin  125 mcg Oral Daily Valentin Burgos MD      FLUoxetine  20 mg Oral Daily aVlentin Burgos MD      folic acid  3,709 mcg Oral Daily Valentin Burgos MD      furosemide  20 mg Oral Daily Arnulfo Garcia MD     [facility] [START ON 9/5/2020] gabapentin  100 mg Oral Daily Arnulfo Garcia MD      gabapentin  200 mg Oral HS Alyse Martinez DO      HYDROmorphone  0 2 mg Intravenous Q6H PRN Brandi Harding PA-C      insulin lispro  1-6 Units Subcutaneous TID [facility] Valentin Burgos MD      insulin lispro  1-6 Units Subcutaneous HS Valentin Burgos MD      melatonin  6 mg Oral HS Valentin Burgos MD      metoprolol succinate  50 mg Oral Q12H Valentin Burgos MD      nortriptyline  10 mg Oral HS Valentin Burgos MD      oxyCODONE  5 mg Oral Q3H PRN Cabrera Salmon DO      oxyCODONE  7 5 mg Oral 4x Daily (with meals and at bedtime) Derick Colunga MD      pantoprazole  40 mg Oral Early Morning Valentin Burgos MD      senna-docusate sodium  1 tablet Oral HS Valentin Burgos MD          Today, Patient Was Seen By: Arnulfo Garcia MD    ** Please Note: This note has been constructed using a voice recognition system   **

## 2020-09-04 NOTE — PLAN OF CARE
Problem: PHYSICAL THERAPY ADULT  Goal: Performs mobility at highest level of function for planned discharge setting  See evaluation for individualized goals  Description: Treatment/Interventions: Functional transfer training, LE strengthening/ROM, Elevations, Therapeutic exercise, Endurance training, Cognitive reorientation, Patient/family training, Equipment eval/education, Bed mobility, Gait training, Compensatory technique education, Spoke to nursing, Spoke to case management, OT, Family          See flowsheet documentation for full assessment, interventions and recommendations  Outcome: Progressing  Note: Prognosis: Fair  Problem List: Decreased strength, Decreased endurance, Impaired balance, Decreased mobility, Decreased cognition, Decreased safety awareness, Pain  Assessment: The pt  was able to progress her ambulatory distance, but she did quickly fatigue after 100 feet  The pt  was advised to take a seated rest multiple times, but she declined  The pt  then required several standing rests with the last 50 feet  She does continue to require instruction for safety due to her cognitive deficits  Will continue to follow  Barriers to Discharge: Inaccessible home environment, Decreased caregiver support     PT Discharge Recommendation: [id number]     PT - OK to Discharge: Yes(to rehab once medically cleared)    See flowsheet documentation for full assessment

## 2020-09-04 NOTE — ASSESSMENT & PLAN NOTE
In lumbar region  Lesions appear crusted - no need for contact precautions  Completed Valtrex for 7 days

## 2020-09-05 NOTE — ASSESSMENT & PLAN NOTE
Scheduled tylenol  Neurontin changed to qhs  Oxy prn  MRI shows severe stenosis  Pt was supposed to see nsurg OP on 8/24 but did not show up to appt  Six vw Cervical XR today shows multilevel degenerative disc disease status post fusion with stable grade 1-2 anterolisthesis of C3-4  Neurosurg consult appreciated - no plan for surgery at this time  Palliative consult for pain control appreciated - start on scheduled oxy - increased to 7 5mg q6 by palliative care on 9/4  prob has some pain at her zoster healing site as well   Will increase neurontin from 200 HS to add 100 mg QAM & 200 qPM on 9/5  Add ambien for sleep

## 2020-09-05 NOTE — ASSESSMENT & PLAN NOTE
Lab Results   Component Value Date    HGBA1C 7 6 (H) 08/05/2020       Recent Labs     09/04/20  1616 09/04/20 2052 09/05/20  0647 09/05/20  1044   POCGLU 185* 163* 134 132       Blood Sugar Average: Last 72 hrs:  (P) 147 [id number]   SSI  Blood Glucose checks TIDWM and QHS  Hold oral medications  ISS

## 2020-09-05 NOTE — ASSESSMENT & PLAN NOTE
Wt Readings from Last 3 Encounters:   09/05/20 82 3 kg (181 lb 7 oz)   08/09/20 75 4 kg (166 lb 3 6 oz)   08/06/20 86 4 kg (190 lb 7 6 oz)     Echo with EF 45%; continue home medications  Patient reports dyspnea on exertion; became winded talking to examiner  BNP elevated at 1326; distended abdomen  Denies night cough, orthopnea, proximal nocturnal dyspnea; a chest x-ray read as normal, but may have infiltrates  at time of exam on 8/27 appears euvolemic  However, possible MYERS 2/2 deconditioning as pt appears dry on exam  gentle IVF to tx MALA & it improved  Acute CHF exacerbation is ruled out  Per renal hold ACEi at d/c  Can d/c on Lasix 20 mg PO daily  Can have BMP at rehab and if Cr stable, can be placed back on ACEi      Since she will be here pending placement, restarted her on Lasix 20 mg daily

## 2020-09-05 NOTE — PLAN OF CARE
Problem: Potential for Falls  Goal: Patient will remain free of falls  Description: INTERVENTIONS:  - Assess patient frequently for physical needs  -  Identify cognitive and physical deficits and behaviors that affect risk of falls  -  [place] fall precautions as indicated by assessment   - Educate patient/family on patient safety including physical limitations  - Instruct patient to call for assistance with activity based on assessment  - Modify environment to reduce risk of injury  - Consider OT/PT consult to assist with strengthening/mobility  Outcome: Progressing     Problem: Nutrition/Hydration-ADULT  Goal: Nutrient/Hydration intake appropriate for improving, restoring or maintaining nutritional needs  Description: Monitor and assess patient's nutrition/hydration status for malnutrition  Collaborate with interdisciplinary team and initiate plan and interventions as ordered  Monitor patient's weight and dietary intake as ordered or per policy  Utilize nutrition screening tool and intervene as necessary  Determine patient's food preferences and provide high-protein, high-caloric foods as appropriate       INTERVENTIONS:  - Monitor oral intake, urinary output, labs, and treatment plans  - Assess nutrition and hydration status and recommend course of action  - Evaluate amount of meals eaten  - Assist patient with eating if necessary   - Allow adequate time for meals  - Recommend/ encourage appropriate diets, oral nutritional supplements, and vitamin/mineral supplements  - Order, calculate, and assess calorie counts as needed  - Recommend, monitor, and adjust tube feedings and TPN/PPN based on assessed needs  - Assess need for intravenous fluids  - Provide specific nutrition/hydration education as appropriate  - Include patient/family/caregiver in decisions related to nutrition  Outcome: Progressing     Problem: CARDIOVASCULAR - ADULT  Goal: Maintains optimal cardiac output and hemodynamic stability  Description: INTERVENTIONS:  - Monitor I/O, vital signs and rhythm  - Monitor for S/S and trends of decreased cardiac output  - Administer and titrate ordered vasoactive medications to optimize hemodynamic stability  - Assess quality of pulses, skin color and temperature  - Assess for signs of decreased coronary artery perfusion  - Instruct patient to report change in severity of symptoms  Outcome: Progressing  Goal: Absence of cardiac dysrhythmias or at baseline rhythm  Description: INTERVENTIONS:  - Continuous cardiac monitoring, vital signs, obtain 12 lead EKG if ordered  - Administer antiarrhythmic and heart rate control medications as ordered  - Monitor electrolytes and administer replacement therapy as ordered  Outcome: Progressing     Problem: RESPIRATORY - ADULT  Goal: Achieves optimal ventilation and oxygenation  Description: INTERVENTIONS:  - Assess for changes in respiratory status  - Assess for changes in mentation and behavior  - Position to facilitate oxygenation and minimize respiratory effort  - Oxygen administered by appropriate delivery if ordered  - Initiate smoking cessation education as indicated  - Encourage broncho-pulmonary hygiene including cough, deep breathe, Incentive Spirometry  - Assess the need for suctioning and aspirate as needed  - Assess and instruct to report SOB or any respiratory difficulty  - Respiratory Therapy support as indicated  Outcome: Progressing     Problem: MUSCULOSKELETAL - ADULT  Goal: Maintain or return mobility to safest level of function  Description: INTERVENTIONS:  - Assess patient's ability to carry out ADLs; assess patient's baseline for ADL function and identify physical deficits which impact ability to perform ADLs (bathing, care of mouth/teeth, toileting, grooming, dressing, etc )  - Assess/evaluate cause of self-care deficits   - Assess range of motion  - Assess patient's mobility  - Assess patient's need for assistive devices and provide as appropriate  - Encourage maximum independence but intervene and supervise when necessary  - Involve family in performance of ADLs  - Assess for home care needs following discharge   - Consider OT consult to assist with ADL evaluation and planning for discharge  - Provide patient education as appropriate  Outcome: Progressing  Goal: Maintain proper alignment of affected body part  Description: INTERVENTIONS:  - Support, maintain and protect limb and body alignment  - Provide patient/ family with appropriate education  Outcome: Progressing     Problem: Prexisting or High Potential for Compromised Skin Integrity  Goal: Skin integrity is maintained or improved  Description: INTERVENTIONS:  - Identify patients at risk for skin breakdown  - Assess and monitor skin integrity  - Assess and monitor nutrition and hydration status  - Monitor labs   - Assess for incontinence   - Turn and reposition patient  - Assist with mobility/ambulation  - Relieve pressure over bony prominences  - Avoid friction and shearing  - Provide appropriate hygiene as needed including keeping skin clean and dry  - Evaluate need for skin moisturizer/barrier cream  - Collaborate with interdisciplinary team   - Patient/family teaching  - Consider wound care consult   Outcome: Progressing

## 2020-09-05 NOTE — ASSESSMENT & PLAN NOTE
Estimated Creatinine Clearance: 50 3 mL/min (by C-G formula based on SCr of 0 96 mg/dL)    B/l Cr 1 1  See MALA

## 2020-09-06 NOTE — ASSESSMENT & PLAN NOTE
Estimated Creatinine Clearance: 45 2 mL/min (by C-G formula based on SCr of 1 08 mg/dL)    B/l Cr 1 1  See MALA

## 2020-09-06 NOTE — ASSESSMENT & PLAN NOTE
Wt Readings from Last 3 Encounters:   09/06/20 84 kg (185 lb 3 oz)   08/09/20 75 4 kg (166 lb 3 6 oz)   08/06/20 86 4 kg (190 lb 7 6 oz)     Echo with EF 45%; continue home medications  Patient reports dyspnea on exertion; became winded talking to examiner  BNP elevated at 1326; distended abdomen  Denies night cough, orthopnea, proximal nocturnal dyspnea; a chest x-ray read as normal, but may have infiltrates  at time of exam on 8/27 appears euvolemic  However, possible MYERS 2/2 deconditioning as pt appears dry on exam  gentle IVF to tx MALA & it improved  Acute CHF exacerbation is ruled out  Per renal hold ACEi at d/c  Can d/c on Lasix 20 mg PO daily  Can have BMP at rehab and if Cr stable, can be placed back on ACEi      Since she will be here pending placement, restarted her on Lasix 20 mg daily

## 2020-09-06 NOTE — PROGRESS NOTES
Progress Note - Osman Langley [birth year], 79 y o  female MRN: [id number]    Unit/Bed#: [facility] 525-01 Encounter: [id number]    Primary Care Provider: Melina Love DO   Date and time admitted to hospital: 8/26/2020  4:18 PM        Coronary artery disease  Assessment & Plan  S/p PCI July 2019  C/w Plavix and BB and statin    LLQ pain  Assessment & Plan  c/o severe pain  CTAP showed no acute finding  Incidentally shows transverse colon nodule  Can get repeat CTAP in 3 months if desired by guardian   Suspect musculoskeletal pain    Herpes zoster without complication  Assessment & Plan  In lumbar region  Lesions appear crusted - no need for contact precautions  Completed Valtrex for 7 days    CKD (chronic kidney disease) stage 3, GFR 30-59 ml/min ([facility])  Assessment & Plan  Estimated Creatinine Clearance: 45 2 mL/min (by C-G formula based on SCr of 1 08 mg/dL)  B/l Cr 1 1  See MALA    DM2 (diabetes mellitus, type 2) [facility])  Assessment & Plan  Lab Results   Component Value Date    HGBA1C 7 6 (H) 08/05/2020       Recent Labs     09/05/20  1642 09/05/20  2127 09/06/20  1115 09/06/20  1609   POCGLU 124 160* 138 167*       Blood Sugar Average: Last 72 hrs:  (P) 147 5   SSI  Blood Glucose checks TIDWM and QHS  Hold oral medications  ISS      Pain of cervical spine  Assessment & Plan  Scheduled tylenol  Neurontin changed to qhs  Oxy prn  MRI shows severe stenosis  Pt was supposed to see nsurg OP on 8/24 but did not show up to appt  Six vw Cervical XR today shows multilevel degenerative disc disease status post fusion with stable grade 1-2 anterolisthesis of C3-4  Neurosurg consult appreciated - no plan for surgery at this time  Palliative consult for pain control appreciated - start on scheduled oxy - increased to 7 5mg q6 by palliative care on 9/4  prob has some pain at her zoster healing site as well   Will increase neurontin from 200 HS to add 100 mg QAM & 200 qPM on 9/5  Add ambien for sleep    A-fib [facility])  Assessment & Plan  Patient rate controlled on admission; continue BB  Anticoagulated with Eliquis 5 mg b i d  Anemia  Assessment & Plan  · Hb dropped from 11-12 to 9  · Check stool occult, iron panel  · Patients states she has intermittent black stools  · HB appears stable at 9 5 from 9 0  · Continue eliquis  · CT AP had no evidence of bleed    Elevated troponin level not due myocardial infarction  Assessment & Plan  Patient with elevated troponin; denies chest pain  EKG in the ED negative for ischemia; shows rate controlled atrial fibrillation  Trop actually lower than earlier in month  No need for further w/u    Mild cognitive impairment  Assessment & Plan  Neurospsych eval pt 8/28 and deemed her incompetent to make medical decisions    Hyperlipidemia  Assessment & Plan  Continue statin    Essential hypertension  Assessment & Plan  C/w BB  Hold ACEi in setting of MALA    Chronic systolic congestive heart failure (HCC)  Assessment & Plan  Wt Readings from Last 3 Encounters:   09/06/20 84 kg (185 lb 3 oz)   08/09/20 75 4 kg (166 lb 3 6 oz)   08/06/20 86 4 kg (190 lb 7 6 oz)     Echo with EF 45%; continue home medications  Patient reports dyspnea on exertion; became winded talking to examiner  BNP elevated at 1326; distended abdomen  Denies night cough, orthopnea, proximal nocturnal dyspnea; a chest x-ray read as normal, but may have infiltrates  at time of exam on 8/27 appears euvolemic  However, possible MYERS 2/2 deconditioning as pt appears dry on exam  gentle IVF to tx MALA & it improved  Acute CHF exacerbation is ruled out  Per renal hold ACEi at d/c  Can d/c on Lasix 20 mg PO daily  Can have BMP at rehab and if Cr stable, can be placed back on ACEi      Since she will be here pending placement, restarted her on Lasix 20 mg daily      Weakness/physical deconditioning  Assessment & Plan  PT/OT  Recent admission or patient declined follow on rehabilitation  Neuropsych eval ordered and pt not competent to make medical decisions  Will need guardian and then rehab placement    Dysthymic disorder  Assessment & Plan  Patient tearful and depressed on examination in ER; denies SI/HI, but did earlier in the ED  Continue Prozac and Pamelor  Behavior Health consult appreciated - no 1 to 1 needed    * MALA (acute kidney injury) ([facility] )  Assessment & Plan  Estimated Creatinine Clearance: 45 2 mL/min (by C-G formula based on SCr of 1 08 mg/dL)  POA  Patient noted to have serum creatinine 1 69 on admission; baseline 1-1 2  Pt has MYERS but likely this is more due to deconditioning and not CHF   Cr did not improve w/ diuresis  UA WNL  Urinary retention protocol ordered - had been straight cathed twice but appears resolved at this time  Renal appreciated  Cr now at baseline after getting IVF  Renal US WNL  [facility] Internal Medicine Progress Note  Patient: Susanna Kang 79 y o  female   MRN: [id number]  PCP: Lloyd Jaeger DO  Unit/Bed#: [facility] 525-01 Encounter: [id number]  Date Of Visit: 09/06/20    Assessment:    Principal Problem:    MALA (acute kidney injury) ([facility] )  Active Problems:    Coronary artery disease    Dysthymic disorder    Weakness/physical deconditioning    Chronic systolic congestive heart failure ([facility] )    Essential hypertension    Hyperlipidemia    Mild cognitive impairment    Elevated troponin level not due myocardial infarction    Anemia    A-fib ([facility])    Pain of cervical spine    DM2 (diabetes mellitus, type 2) ([facility])    CKD (chronic kidney disease) stage 3, GFR 30-59 ml/min ([facility])    Herpes zoster without complication    LLQ pain      Plan:       VTE Pharmacologic Prophylaxis:   Pharmacologic: Apixaban (Eliquis)  Mechanical VTE Prophylaxis in Place: Yes    Patient Centered Rounds: I have performed bedside rounds with nursing staff today  Discussions with Specialists or Other Care Team Provider:     Education and Discussions with Family / Patient: adilene    Time Spent for Care: 30 minutes    More than 50% of total time spent on counseling and coordination of care as described above  Current Length of Stay: 11 day(s)    Current Patient Status: Inpatient   Certification Statement: The patient will continue to require additional inpatient hospital stay due to pending placement    Discharge Plan / Estimated Discharge Date:     Code Status: Level 3 - DNAR and DNI      Subjective:   little better, slept little better but had nightmares    Objective:     Vitals:   Temp (24hrs), Av 8 °F (36 6 °C), Min:97 6 °F (36 4 °C), Max:98 1 °F (36 7 °C)    Temp:  [97 6 °F (36 4 °C)-98 1 °F (36 7 °C)] 97 6 °F (36 4 °C)  HR:  [] 83  Resp:  [18-20] 18  BP: (128-138)/(58-81) 128/59  SpO2:  [95 %-96 %] 95 %  Body mass index is 30 82 kg/m²  Input and Output Summary (last 24 hours): Intake/Output Summary (Last 24 hours) at 2020 1628  Last data filed at 2020 0908  Gross per 24 hour   Intake 510 ml   Output 700 ml   Net -190 ml       Physical Exam:     Physical Exam  Vitals signs reviewed  HENT:      Head: Normocephalic and atraumatic  Cardiovascular:      Rate and Rhythm: Normal rate and regular rhythm  Heart sounds: Normal heart sounds  No murmur  No gallop  Pulmonary:      Effort: Pulmonary effort is normal  No respiratory distress  Breath sounds: Normal breath sounds  No wheezing  Abdominal:      General: There is no distension  Palpations: Abdomen is soft  Tenderness: There is no abdominal tenderness  Neurological:      Mental Status: She is alert           Additional Data:     Labs:    Results from last 7 days   Lab Units 20  0500   WBC Thousand/uL 8 98   HEMOGLOBIN g/dL 10 5*   HEMATOCRIT % 34 1*   PLATELETS Thousands/uL 271   NEUTROS PCT % 67   LYMPHS PCT % 15   MONOS PCT % 9   EOS PCT % 6     Results from last 7 days   Lab Units 20  0500   POTASSIUM mmol/L 4 4   CHLORIDE mmol/L 107   CO2 mmol/L 24   BUN mg/dL 13   CREATININE mg/dL 1 08   CALCIUM mg/dL 8 3           * I Have Reviewed All Lab Data Listed Above  * Additional Pertinent Lab Tests Reviewed: All Labs Within Last 24 Hours Reviewed    Imaging:    Imaging Reports Reviewed Today Include:   Imaging Personally Reviewed by Myself Includes:      Recent Cultures (last 7 days):           Last 24 Hours Medication List:   Current Facility-Administered Medications   Medication Dose Route Frequency Provider Last Rate    acetaminophen  650 mg Oral 4x Daily (with meals and at bedtime) Sindy Watkins MD      amiodarone  200 mg Oral Daily Bay Galvez MD      apixaban  5 mg Oral BID Bay Galvez MD      atorvastatin  20 mg Oral Daily With Sharon Stone MD      bisacodyl  10 mg Rectal Daily PRN Bay Galvez MD      clopidogrel  75 mg Oral Daily Bay Galvez MD      digoxin  125 mcg Oral Daily Bay Galvez MD      FLUoxetine  20 mg Oral Daily Bay Galvez MD      folic acid  3,180 mcg Oral Daily Bay Galvez MD      furosemide  20 mg Oral Daily Shawn Carrera MD      gabapentin  100 mg Oral Daily Shawn Carrera MD      gabapentin  200 mg Oral HS Alyse Martinez DO      insulin lispro  1-6 Units Subcutaneous TID [facility] Bay Galvez MD      insulin lispro  1-6 Units Subcutaneous HS Bay Galvez MD      melatonin  6 mg Oral HS Bay Galvez MD      methocarbamol  250 mg Oral BID PRN Shawn Carrera MD      metoprolol succinate  50 mg Oral Q12H Bay Galvez MD      nortriptyline  10 mg Oral HS Bay Galvez MD      oxyCODONE  5 mg Oral Q3H PRN Ally Davin, DO      oxyCODONE  7 5 mg Oral 4x Daily (with meals and at bedtime) Sindy Watkins MD      pantoprazole  40 mg Oral Early Morning Bay Galvez MD      senna-docusate sodium  1 tablet Oral HS Bay Galvez MD      zolpidem  5 mg Oral HS Shawn Carrera MD          Today, Patient Was Seen By: Shawn Carrera MD    ** Please Note: This note has been constructed using a voice recognition system   **

## 2020-09-06 NOTE — ASSESSMENT & PLAN NOTE
Lab Results   Component Value Date    HGBA1C 7 6 (H) 08/05/2020       Recent Labs     09/05/20  1642 09/05/20  2127 09/06/20  1115 09/06/20  1609   POCGLU 124 160* 138 167*       Blood Sugar Average: Last 72 hrs:  (P) 147 5   SSI  Blood Glucose checks TIDWM and QHS  Hold oral medications  ISS

## 2020-09-06 NOTE — ASSESSMENT & PLAN NOTE
Estimated Creatinine Clearance: 45 2 mL/min (by C-G formula based on SCr of 1 08 mg/dL)    POA  Patient noted to have serum creatinine 1 69 on admission; baseline 1-1 2  Pt has MYERS but likely this is more due to deconditioning and not CHF   Cr did not improve w/ diuresis  UA WNL  Urinary retention protocol ordered - had been straight cathed twice but appears resolved at this time  Renal appreciated  Cr now at baseline after getting IVF  Renal US WNL  CPK WNL

## 2020-09-06 NOTE — ASSESSMENT & PLAN NOTE
· Hb dropped from 11-12 to 9  · Check stool occult, iron panel  · Patients states she has intermittent black stools  · HB appears stable at 9 5 from 9 0  · Continue eliquis  · CT AP had no evidence of bleed

## 2020-09-07 NOTE — PLAN OF CARE
Problem: Potential for Falls  Goal: Patient will remain free of falls  Description: INTERVENTIONS:  - Assess patient frequently for physical needs  -  Identify cognitive and physical deficits and behaviors that affect risk of falls  -  [place] fall precautions as indicated by assessment   - Educate patient/family on patient safety including physical limitations  - Instruct patient to call for assistance with activity based on assessment  - Modify environment to reduce risk of injury  - Consider OT/PT consult to assist with strengthening/mobility  Outcome: Progressing     Problem: Nutrition/Hydration-ADULT  Goal: Nutrient/Hydration intake appropriate for improving, restoring or maintaining nutritional needs  Description: Monitor and assess patient's nutrition/hydration status for malnutrition  Collaborate with interdisciplinary team and initiate plan and interventions as ordered  Monitor patient's weight and dietary intake as ordered or per policy  Utilize nutrition screening tool and intervene as necessary  Determine patient's food preferences and provide high-protein, high-caloric foods as appropriate       INTERVENTIONS:  - Monitor oral intake, urinary output, labs, and treatment plans  - Assess nutrition and hydration status and recommend course of action  - Evaluate amount of meals eaten  - Assist patient with eating if necessary   - Allow adequate time for meals  - Recommend/ encourage appropriate diets, oral nutritional supplements, and vitamin/mineral supplements  - Order, calculate, and assess calorie counts as needed  - Recommend, monitor, and adjust tube feedings and TPN/PPN based on assessed needs  - Assess need for intravenous fluids  - Provide specific nutrition/hydration education as appropriate  - Include patient/family/caregiver in decisions related to nutrition  Outcome: Progressing     Problem: CARDIOVASCULAR - ADULT  Goal: Maintains optimal cardiac output and hemodynamic stability  Description: INTERVENTIONS:  - Monitor I/O, vital signs and rhythm  - Monitor for S/S and trends of decreased cardiac output  - Administer and titrate ordered vasoactive medications to optimize hemodynamic stability  - Assess quality of pulses, skin color and temperature  - Assess for signs of decreased coronary artery perfusion  - Instruct patient to report change in severity of symptoms  Outcome: Progressing  Goal: Absence of cardiac dysrhythmias or at baseline rhythm  Description: INTERVENTIONS:  - Continuous cardiac monitoring, vital signs, obtain 12 lead EKG if ordered  - Administer antiarrhythmic and heart rate control medications as ordered  - Monitor electrolytes and administer replacement therapy as ordered  Outcome: Progressing     Problem: RESPIRATORY - ADULT  Goal: Achieves optimal ventilation and oxygenation  Description: INTERVENTIONS:  - Assess for changes in respiratory status  - Assess for changes in mentation and behavior  - Position to facilitate oxygenation and minimize respiratory effort  - Oxygen administered by appropriate delivery if ordered  - Initiate smoking cessation education as indicated  - Encourage broncho-pulmonary hygiene including cough, deep breathe, Incentive Spirometry  - Assess the need for suctioning and aspirate as needed  - Assess and instruct to report SOB or any respiratory difficulty  - Respiratory Therapy support as indicated  Outcome: Progressing     Problem: MUSCULOSKELETAL - ADULT  Goal: Maintain or return mobility to safest level of function  Description: INTERVENTIONS:  - Assess patient's ability to carry out ADLs; assess patient's baseline for ADL function and identify physical deficits which impact ability to perform ADLs (bathing, care of mouth/teeth, toileting, grooming, dressing, etc )  - Assess/evaluate cause of self-care deficits   - Assess range of motion  - Assess patient's mobility  - Assess patient's need for assistive devices and provide as appropriate  - Encourage maximum independence but intervene and supervise when necessary  - Involve family in performance of ADLs  - Assess for home care needs following discharge   - Consider OT consult to assist with ADL evaluation and planning for discharge  - Provide patient education as appropriate  Outcome: Progressing  Goal: Maintain proper alignment of affected body part  Description: INTERVENTIONS:  - Support, maintain and protect limb and body alignment  - Provide patient/ family with appropriate education  Outcome: Progressing     Problem: Prexisting or High Potential for Compromised Skin Integrity  Goal: Skin integrity is maintained or improved  Description: INTERVENTIONS:  - Identify patients at risk for skin breakdown  - Assess and monitor skin integrity  - Assess and monitor nutrition and hydration status  - Monitor labs   - Assess for incontinence   - Turn and reposition patient  - Assist with mobility/ambulation  - Relieve pressure over bony prominences  - Avoid friction and shearing  - Provide appropriate hygiene as needed including keeping skin clean and dry  - Evaluate need for skin moisturizer/barrier cream  - Collaborate with interdisciplinary team   - Patient/family teaching  - Consider wound care consult   Outcome: Progressing

## 2020-09-07 NOTE — ASSESSMENT & PLAN NOTE
Lab Results   Component Value Date    HGBA1C 7 6 (H) 08/05/2020       Recent Labs     09/06/20  1609 09/06/20  2109 09/07/20  0623 09/07/20  1046   POCGLU 167* 143* 132 153*       Blood Sugar Average: Last 72 hrs:  (P) 147 [id number]   SSI  Blood Glucose checks TIDWM and QHS  Hold oral medications  ISS

## 2020-09-07 NOTE — PLAN OF CARE
Problem: PHYSICAL THERAPY ADULT  Goal: Performs mobility at highest level of function for planned discharge setting  See evaluation for individualized goals  Description: Treatment/Interventions: Functional transfer training, LE strengthening/ROM, Elevations, Therapeutic exercise, Endurance training, Cognitive reorientation, Patient/family training, Equipment eval/education, Bed mobility, Gait training, Compensatory technique education, Spoke to nursing, Spoke to case management, OT, Family          See flowsheet documentation for full assessment, interventions and recommendations  Outcome: Progressing  Note: Prognosis: Fair  Problem List: Decreased strength, Decreased endurance, Impaired balance, Decreased mobility, Decreased cognition, Decreased safety awareness, Pain  Assessment: The pt  was more limited due to pain today, and she was unable to ambulate as far as her last session  She also required prolonged rests due to her pain  She continues to remain limited from her baseline, and inpatient rehab is recommended  Barriers to Discharge: Inaccessible home environment, Decreased caregiver support     PT Discharge Recommendation: [id number]     PT - OK to Discharge: Yes(to rehab once medically cleared)    See flowsheet documentation for full assessment

## 2020-09-07 NOTE — ASSESSMENT & PLAN NOTE
Estimated Creatinine Clearance: 45 3 mL/min (by C-G formula based on SCr of 1 08 mg/dL)    B/l Cr 1 1  See MALA

## 2020-09-07 NOTE — ASSESSMENT & PLAN NOTE
Wt Readings from Last 3 Encounters:   09/07/20 84 5 kg (186 lb 4 6 oz)   08/09/20 75 4 kg (166 lb 3 6 oz)   08/06/20 86 4 kg (190 lb 7 6 oz)     Echo with EF 45%; continue home medications  Patient reports dyspnea on exertion; became winded talking to examiner  BNP elevated at 1326; distended abdomen  Denies night cough, orthopnea, proximal nocturnal dyspnea; a chest x-ray read as normal, but may have infiltrates  at time of exam on 8/27 appears euvolemic  However, possible MYERS 2/2 deconditioning as pt appears dry on exam  gentle IVF to tx MALA & it improved  Acute CHF exacerbation is ruled out  Per renal hold ACEi at d/c  Can d/c on Lasix 20 mg PO daily  Can have BMP at rehab and if Cr stable, can be placed back on ACEi      Since she will be here pending placement, restarted her on Lasix 20 mg daily

## 2020-09-07 NOTE — ASSESSMENT & PLAN NOTE
Scheduled tylenol  Neurontin changed to qhs  Oxy prn  MRI shows severe stenosis  Pt was supposed to see nsurg OP on 8/24 but did not show up to appt  Six vw Cervical XR today shows multilevel degenerative disc disease status post fusion with stable grade 1-2 anterolisthesis of C3-4  Neurosurg consult appreciated - no plan for surgery at this time  Palliative consult for pain control appreciated - start on scheduled oxy - increased to 7 5mg q6 by palliative care on 9/4  prob has some pain at her zoster healing site as well  Will increase neurontin from 200 HS to add 100 mg QAM & 200 qPM on 9/5  Add ambien for sleep    Was good yesterday, today c/o pain all over, wants her pain meds adjusted   Will await palliative care evaluation

## 2020-09-07 NOTE — PHYSICAL THERAPY NOTE
Physical Therapy Progress Note     09/07/20 1230   Pain Assessment   Pain Assessment Tool 0-10   Pain Score 7   Pain Location/Orientation Location: Back;Orientation: Lower   Hospital Pain Intervention(s) Repositioned; Ambulation/increased activity; Emotional support   Restrictions/Precautions   Other Precautions Cognitive; Chair Alarm; Bed Alarm; Fall Risk;Pain  (Alarm active post session )   Subjective   Subjective The pt  is requesting ot return to bed, but she is agreeable to ambulate first  She notes limiting back pain today  Bed Mobility   Sit to Supine 4  Minimal assistance   Additional items Assist x 1; Increased time required;Verbal cues;LE management   Transfers   Sit to Stand 5  Supervision   Additional items Verbal cues   Stand to Sit 5  Supervision   Additional items Verbal cues; Increased time required   Ambulation/Elevation   Gait pattern Excessively slow; Step to;Short stride; Inconsistent annie;Decreased foot clearance; Forward Flexion   Gait Assistance 4  Minimal assist   Additional items Assist x 1;Verbal cues   Assistive Device Rolling walker   Distance 60 feet x 2  Balance   Static Sitting Good   Dynamic Sitting Fair +   Static Standing Fair   Ambulatory Poor +   Activity Tolerance   Activity Tolerance Patient tolerated treatment well;Patient limited by fatigue;Patient limited by pain   Exercises   TKR Sitting;Bilateral;AROM;15 reps   Assessment   Prognosis Fair   Problem List Decreased strength;Decreased endurance; Impaired balance;Decreased mobility; Decreased cognition;Decreased safety awareness;Pain   Assessment The pt  was more limited due to pain today, and she was unable to ambulate as far as her last session  She also required prolonged rests due to her pain  She continues to remain limited from her baseline, and inpatient rehab is recommended  Barriers to Discharge Inaccessible home environment;Decreased caregiver support   Goals   Patient Goals To have less pain     STG Expiration Date 09/10/20   PT Treatment Day 2   Plan   Treatment/Interventions Functional transfer training;LE strengthening/ROM; Therapeutic exercise; Endurance training;Cognitive reorientation;Patient/family training;Bed mobility;Gait training   Progress Progressing toward goals   PT Frequency   (3-5x a week )   Recommendation   PT Discharge Recommendation Post-Acute Rehabilitation Services   Equipment Recommended Pito Adames, [facility]

## 2020-09-07 NOTE — ASSESSMENT & PLAN NOTE
Estimated Creatinine Clearance: 45 3 mL/min (by C-G formula based on SCr of 1 08 mg/dL)    POA  Patient noted to have serum creatinine 1 69 on admission; baseline 1-1 2  Pt has MYERS but likely this is more due to deconditioning and not CHF   Cr did not improve w/ diuresis  UA WNL  Urinary retention protocol ordered - had been straight cathed twice but appears resolved at this time  Renal appreciated  Cr now at baseline after getting IVF  Renal US WNL  CPK WNL

## 2020-09-07 NOTE — PROGRESS NOTES
Progress Note - Mary Riddle [birth year], 79 y o  female MRN: [id number]    Unit/Bed#: [facility] 525-01 Encounter: [id number]    Primary Care Provider: Laina Garrett DO   Date and time admitted to hospital: 8/26/2020  4:18 PM        Coronary artery disease  Assessment & Plan  S/p PCI July 2019  C/w Plavix and BB and statin    LLQ pain  Assessment & Plan  c/o severe pain  CTAP showed no acute finding  Incidentally shows transverse colon nodule  Can get repeat CTAP in 3 months if desired by guardian   Suspect musculoskeletal pain    Herpes zoster without complication  Assessment & Plan  In lumbar region  Lesions appear crusted - no need for contact precautions  Completed Valtrex for 7 days    CKD (chronic kidney disease) stage 3, GFR 30-59 ml/min ([facility])  Assessment & Plan  Estimated Creatinine Clearance: 45 3 mL/min (by C-G formula based on SCr of 1 08 mg/dL)  B/l Cr 1 1  See MALA    DM2 (diabetes mellitus, type 2) [facility])  Assessment & Plan  Lab Results   Component Value Date    HGBA1C 7 6 (H) 08/05/2020       Recent Labs     09/06/20  1609 09/06/20  2109 09/07/20  0623 09/07/20  1046   POCGLU 167* 143* 132 153*       Blood Sugar Average: Last 72 hrs:  (P) 419 [id number]   SSI  Blood Glucose checks TIDWM and QHS  Hold oral medications  ISS      Pain of cervical spine  Assessment & Plan  Scheduled tylenol  Neurontin changed to qhs  Oxy prn  MRI shows severe stenosis  Pt was supposed to see nsurg OP on 8/24 but did not show up to appt  Six vw Cervical XR today shows multilevel degenerative disc disease status post fusion with stable grade 1-2 anterolisthesis of C3-4  Neurosurg consult appreciated - no plan for surgery at this time  Palliative consult for pain control appreciated - start on scheduled oxy - increased to 7 5mg q6 by palliative care on 9/4  prob has some pain at her zoster healing site as well   Will increase neurontin from 200 HS to add 100 mg QAM & 200 qPM on 9/5  Add ambien for sleep    Was good yesterday, today c/o pain all over, wants her pain meds adjusted  Will await palliative care evaluation    A-fib [facility])  Assessment & Plan  Patient rate controlled on admission; continue BB  Anticoagulated with Eliquis 5 mg b i d  Anemia  Assessment & Plan  · Hb dropped from 11-12 to 9  · Check stool occult, iron panel  · Patients states she has intermittent black stools  · HB appears stable at 9 5 from 9 0  · Continue eliquis  · CT AP had no evidence of bleed    Elevated troponin level not due myocardial infarction  Assessment & Plan  Patient with elevated troponin; denies chest pain  EKG in the ED negative for ischemia; shows rate controlled atrial fibrillation  Trop actually lower than earlier in month  No need for further w/u    Mild cognitive impairment  Assessment & Plan  Neurospsych eval pt 8/28 and deemed her incompetent to make medical decisions    Essential hypertension  Assessment & Plan  C/w BB  Hold ACEi in setting of MALA    Chronic systolic congestive heart failure ([facility] )  Assessment & Plan  Wt Readings from Last 3 Encounters:   09/07/20 84 5 kg (186 lb 4 6 oz)   08/09/20 75 4 kg (166 lb 3 6 oz)   08/06/20 86 4 kg (190 lb 7 6 oz)     Echo with EF 45%; continue home medications  Patient reports dyspnea on exertion; became winded talking to examiner  BNP elevated at 1326; distended abdomen  Denies night cough, orthopnea, proximal nocturnal dyspnea; a chest x-ray read as normal, but may have infiltrates  at time of exam on 8/27 appears euvolemic  However, possible MYERS 2/2 deconditioning as pt appears dry on exam  gentle IVF to tx MALA & it improved  Acute CHF exacerbation is ruled out  Per renal hold ACEi at d/c  Can d/c on Lasix 20 mg PO daily  Can have BMP at rehab and if Cr stable, can be placed back on ACEi      Since she will be here pending placement, restarted her on Lasix 20 mg daily      Weakness/physical deconditioning  Assessment & Plan  PT/OT  Recent admission or patient declined follow on rehabilitation  Neuropsych eval ordered and pt not competent to make medical decisions  Will need guardian and then rehab placement    Dysthymic disorder  Assessment & Plan  Patient tearful and depressed on examination in ER; denies SI/HI, but did earlier in the ED  Continue Prozac and Pamelor  Behavior Health consult appreciated - no 1 to 1 needed    * MALA (acute kidney injury) ([facility] )  Assessment & Plan  Estimated Creatinine Clearance: 45 3 mL/min (by C-G formula based on SCr of 1 08 mg/dL)  POA  Patient noted to have serum creatinine 1 69 on admission; baseline 1-1 2  Pt has MYERS but likely this is more due to deconditioning and not CHF   Cr did not improve w/ diuresis  UA WNL  Urinary retention protocol ordered - had been straight cathed twice but appears resolved at this time  Renal appreciated  Cr now at baseline after getting IVF  Renal US WNL  [facility] Internal Medicine Progress Note  Patient: Mary Riddle 79 y o  female   MRN: [id number]  PCP: Laina Garrett DO  Unit/Bed#: [facility] 525-01 Encounter: [id number]  Date Of Visit: 09/07/20    Assessment:    Principal Problem:    MALA (acute kidney injury) ([facility] )  Active Problems:    Coronary artery disease    Dysthymic disorder    Weakness/physical deconditioning    Chronic systolic congestive heart failure ([facility] )    Essential hypertension    Hyperlipidemia    Mild cognitive impairment    Elevated troponin level not due myocardial infarction    Anemia    A-fib (HCC)    Pain of cervical spine    DM2 (diabetes mellitus, type 2) ([facility])    CKD (chronic kidney disease) stage 3, GFR 30-59 ml/min ([facility])    Herpes zoster without complication    LLQ pain      Plan:         VTE Pharmacologic Prophylaxis:   Pharmacologic: Apixaban (Eliquis)  Mechanical VTE Prophylaxis in Place: Yes    Patient Centered Rounds: I have performed bedside rounds with nursing staff today      Discussions with Specialists or Other Care Team Provider:     Education and Discussions with Family / Patient: patient    Time Spent for Care: 30 minutes  More than 50% of total time spent on counseling and coordination of care as described above  Current Length of Stay: 12 day(s)    Current Patient Status: Inpatient   Certification Statement: The patient will continue to require additional inpatient hospital stay due to pending guardian appointment    Discharge Plan / Estimated Discharge Date:     Code Status: Level 3 - DNAR and DNI      Subjective:   Not feeling good today, having a lot of pain  States she used to eb on higher dose of pain meds in past but once she found out about addiction potential worked with doctor on tapering them off but she repents that due to her pain & inability to function 2/2 that    Objective:     Vitals:   Temp (24hrs), Av 6 °F (36 4 °C), Min:97 5 °F (36 4 °C), Max:97 7 °F (36 5 °C)    Temp:  [97 5 °F (36 4 °C)-97 7 °F (36 5 °C)] 97 7 °F (36 5 °C)  HR:  [82-95] 82  Resp:  [18] 18  BP: (110-137)/(56-75) 120/56  SpO2:  [94 %-95 %] 94 %  Body mass index is 31 kg/m²  Input and Output Summary (last 24 hours): Intake/Output Summary (Last 24 hours) at 2020 1615  Last data filed at 2020 1026  Gross per 24 hour   Intake 900 ml   Output 1425 ml   Net -525 ml       Physical Exam:     Physical Exam  Vitals signs reviewed  HENT:      Head: Normocephalic and atraumatic  Eyes:      Conjunctiva/sclera: Conjunctivae normal    Cardiovascular:      Rate and Rhythm: Normal rate and regular rhythm  Heart sounds: Normal heart sounds  No murmur  No gallop  Pulmonary:      Effort: Pulmonary effort is normal  No respiratory distress  Breath sounds: Normal breath sounds  No wheezing  Abdominal:      General: There is no distension  Palpations: Abdomen is soft  Tenderness: There is no abdominal tenderness  Neurological:      Mental Status: She is alert           Additional Data:     Labs:    Results from last 7 days   Lab Units 20  0500 WBC Thousand/uL 8 98   HEMOGLOBIN g/dL 10 5*   HEMATOCRIT % 34 1*   PLATELETS Thousands/uL 271   NEUTROS PCT % 67   LYMPHS PCT % 15   MONOS PCT % 9   EOS PCT % 6     Results from last 7 days   Lab Units 09/06/20  0500   POTASSIUM mmol/L 4 4   CHLORIDE mmol/L 107   CO2 mmol/L 24   BUN mg/dL 13   CREATININE mg/dL 1 08   CALCIUM mg/dL 8 3           * I Have Reviewed All Lab Data Listed Above    * Additional Pertinent Lab Tests Reviewed: No New Labs Available For Today    Imaging:    Imaging Reports Reviewed Today Include:   Imaging Personally Reviewed by Myself Includes:      Recent Cultures (last 7 days):           Last 24 Hours Medication List:   Current Facility-Administered Medications   Medication Dose Route Frequency Provider Last Rate    acetaminophen  650 mg Oral 4x Daily (with meals and at bedtime) Amanda Ramirez MD      amiodarone  200 mg Oral Daily Ann Pruitt MD      apixaban  5 mg Oral BID Ann Pruitt MD      atorvastatin  20 mg Oral Daily With Julia Morgan MD      bisacodyl  10 mg Rectal Daily PRN Ann Pruitt MD      clopidogrel  75 mg Oral Daily Senora MD Sonal      digoxin  125 mcg Oral Daily Senora MD Sonal      FLUoxetine  20 mg Oral Daily Senvictor hugo Pruitt MD      folic acid  7,018 mcg Oral Daily Senora Sonal, MD      furosemide  20 mg Oral Daily Karrie Velasco MD      gabapentin  100 mg Oral Daily Karrie Velasco MD      gabapentin  200 mg Oral HS Alyse Martinez, DO      insulin lispro  1-6 Units Subcutaneous TID [facility] Ann Pruitt MD      insulin lispro  1-6 Units Subcutaneous HS Ann Pruitt MD      melatonin  6 mg Oral HS Ann Pruitt MD      methocarbamol  250 mg Oral BID PRN Karrie Velasco MD      metoprolol succinate  50 mg Oral Q12H Ann Pruitt MD      nortriptyline  10 mg Oral HS Ann Pruitt MD      oxyCODONE  5 mg Oral Q3H PRN Gali Tineo,       oxyCODONE  7 5 mg Oral 4x Daily (with meals and at bedtime) Amanda Ramirez MD     [address] [place] [address] pantoprazole  40 mg Oral Early Morning Leonardo Sanchez MD      senna-docusate sodium  1 tablet Oral HS Leonardo Sanchez MD      zolpidem  5 mg Oral HS Kary Woodall MD          Today, Patient Was Seen By: Kary Woodall MD    ** Please Note: This note has been constructed using a voice recognition system   **

## 2020-09-08 NOTE — PHYSICAL THERAPY NOTE
Physical Therapy Progress Note     09/08/20 1245   PT Last Visit   PT Visit Date 09/08/20   Pain Assessment   Pain Assessment Tool 0-10   Pain Score Worst Possible Pain   Pain Location/Orientation Location: Back; Location: Generalized   Restrictions/Precautions   Other Precautions Cognitive; Chair Alarm; Bed Alarm; Fall Risk;Telemetry  (Alarm active post session )   Subjective   Subjective The pt  states that she has crippling pain today  Bed Mobility   Supine to Sit 3  Moderate assistance   Additional items Assist x 1; Increased time required;Verbal cues;LE management   Sit to Supine 4  Minimal assistance   Additional items Assist x 1; Increased time required;Verbal cues;LE management   Transfers   Sit to Stand 4  Minimal assistance   Additional items Assist x 1; Increased time required   Stand to Sit 5  Supervision   Additional items Increased time required;Verbal cues   Ambulation/Elevation   Gait pattern Excessively slow; Step to;Short stride; Inconsistent annie;Decreased foot clearance; Forward Flexion   Gait Assistance 4  Minimal assist   Additional items Assist x 1;Verbal cues   Assistive Device Rolling walker   Distance 25 feet  Balance   Static Sitting Good   Dynamic Sitting Fair +   Static Standing Fair   Ambulatory Poor +   Activity Tolerance   Activity Tolerance Patient tolerated treatment well;Patient limited by pain   Exercises   THR Supine;Bilateral;AROM;AAROM;10 reps   Assessment   Prognosis Fair   Problem List Decreased strength;Decreased endurance; Impaired balance;Decreased mobility; Decreased cognition;Decreased safety awareness;Pain   Assessment The pt  was more limited today due to pain  She declined further ambulation at this time, but she was able to ambulate to the bathroom and back  She required increased assistance for mobility today due to her increased pain  The pt  was returned to bed per her request post session and alarm active     Barriers to Discharge Inaccessible home environment;Decreased caregiver support   Goals   Patient Goals To have less pain  STG Expiration Date 09/10/20   PT Treatment Day 3   Plan   Treatment/Interventions Functional transfer training;LE strengthening/ROM; Therapeutic exercise; Endurance training;Cognitive reorientation;Patient/family training;Bed mobility;Gait training   Progress Slow progress, decreased activity tolerance   PT Frequency   (3-5x a week )   Recommendation   PT Discharge Recommendation [id number]

## 2020-09-08 NOTE — ASSESSMENT & PLAN NOTE
Estimated Creatinine Clearance: 45 3 mL/min (by C-G formula based on SCr of 1 08 mg/dL)    POA  Patient noted to have serum creatinine 1 69 on admission; baseline 1-1 2  Pt has MYERS but likely this is more due to deconditioning and not CHF   Cr did not improve w/ diuresis  UA WNL  Urinary retention protocol ordered - had been straight cathed twice but appears resolved at this time  Renal appreciated  Cr now at baseline after getting IVF  Renal US WNL  CPK WNL    9/8-resolved

## 2020-09-08 NOTE — PROGRESS NOTES
Progress Note - Marquez Sanders [birth year], 79 y o  female MRN: [id number]    Unit/Bed#: [facility] 525-01 Encounter: [id number]    Primary Care Provider: Judith Dowell,    Date and time admitted to hospital: 8/26/2020  4:18 PM        LLQ pain  Assessment & Plan  c/o severe pain  CTAP showed no acute finding  Incidentally shows transverse colon nodule  Can get repeat CTAP in 3 months if desired by guardian   Suspect musculoskeletal pain    Herpes zoster without complication  Assessment & Plan  In lumbar region  Lesions appear crusted - no need for contact precautions  Completed Valtrex for 7 days    CKD (chronic kidney disease) stage 3, GFR 30-59 ml/min ([facility])  Assessment & Plan  Estimated Creatinine Clearance: 45 3 mL/min (by C-G formula based on SCr of 1 08 mg/dL)  B/l Cr 1 1  See MALA    DM2 (diabetes mellitus, type 2) [facility])  Assessment & Plan  Lab Results   Component Value Date    HGBA1C 7 6 (H) 08/05/2020       Recent Labs     09/07/20  2120 09/08/20  0641 09/08/20  1132 09/08/20  1549   POCGLU 134 161* 157* 167*       Blood Sugar Average: Last 72 hrs:  (P) 389 [id number]   SSI  Blood Glucose checks TIDWM and QHS  Hold oral medications  ISS      A-fib [facility])  Assessment & Plan  Patient rate controlled on admission; continue BB  Anticoagulated with Eliquis 5 mg b i d      Anemia  Assessment & Plan  · Hb dropped from 11-12 to 9  · Check stool occult, iron panel  · Patients states she has intermittent black stools  · HB appears stable at 9 5 from 9 0  · Continue eliquis  · CT AP had no evidence of bleed    Elevated troponin level not due myocardial infarction  Assessment & Plan  Patient with elevated troponin; denies chest pain  EKG in the ED negative for ischemia; shows rate controlled atrial fibrillation  Trop actually lower than earlier in month  No need for further w/u    Mild cognitive impairment  Assessment & Plan  Neurospsych eval pt 8/28 and deemed her incompetent to make medical decisions    Coronary artery disease  Assessment & Plan  S/p PCI July 2019  C/w Plavix and BB and statin    Hyperlipidemia  Assessment & Plan  Continue statin    Essential hypertension  Assessment & Plan  C/w BB; BP well controlled      Chronic systolic congestive heart failure (HCC)  Assessment & Plan  Wt Readings from Last 3 Encounters:   09/08/20 84 4 kg (186 lb 1 1 oz)   08/09/20 75 4 kg (166 lb 3 6 oz)   08/06/20 86 4 kg (190 lb 7 6 oz)     Echo with EF 45%; continue home medications  Patient reports dyspnea on exertion; became winded talking to examiner  BNP elevated at 1326; distended abdomen  Denies night cough, orthopnea, proximal nocturnal dyspnea; a chest x-ray read as normal, but may have infiltrates  at time of exam on 8/27 appears euvolemic  However, possible MYERS 2/2 deconditioning as pt appears dry on exam  gentle IVF to tx MALA & it improved  Acute CHF exacerbation is ruled out  Per renal hold ACEi at d/c  Can d/c on Lasix 20 mg PO daily  Can have BMP at rehab and if Cr stable, can be placed back on ACEi  Since she will be here pending placement, restarted her on Lasix 20 mg daily    9/8-patient in 20 mg Lasix daily; net -1 0 5 L diuresed  -resting comfortably on room air, monitor      Weakness/physical deconditioning  Assessment & Plan  PT/OT  Recent admission or patient declined follow on rehabilitation  Neuropsych eval ordered and pt not competent to make medical decisions  Will need guardian and then rehab placement    Dysthymic disorder  Assessment & Plan  Patient tearful and depressed on examination in ER; denies SI/HI, but did earlier in the ED  Continue Prozac and Pamelor  Behavior Health consult appreciated - no 1 to 1 needed    * MALA (acute kidney injury) ([facility] [address] )  Assessment & Plan  Estimated Creatinine Clearance: 45 3 mL/min (by C-G formula based on SCr of 1 08 mg/dL)    POA  Patient noted to have serum creatinine 1 69 on admission; baseline 1-1 2  Pt has MYERS but likely this is more due to deconditioning and not CHF   Cr did not improve w/ diuresis  UA WNL  Urinary retention protocol ordered - had been straight cathed twice but appears resolved at this time  Renal appreciated  Cr now at baseline after getting IVF  Renal US WNL  CPK WNL    -resolved      VTE Pharmacologic Prophylaxis:   Pharmacologic: Apixaban (Eliquis)  Mechanical VTE Prophylaxis in Place: Yes    Patient Centered Rounds: I have performed bedside rounds with nursing staff today  Discussions with Specialists or Other Care Team Provider:     Education and Discussions with Family / Patient: Care plan discussed with patient who voiced understanding and agrees with recommendations  Time Spent for Care: 30 minutes  More than 50% of total time spent on counseling and coordination of care as described above  Current Length of Stay: 13 day(s)    Current Patient Status: Inpatient   Certification Statement: The patient will continue to require additional inpatient hospital stay due to Awaiting guardianship and rehab placement    Discharge Plan: To be determined    Code Status: Level 3 - DNAR and DNI      Subjective:   Patient seen examined bedside, vital signs stable, no labs obtained  Patient complaining of diffuse pain and receiving 7 5 mg oxy Contin q 6 hours  Complaining of left lower quadrant pain; CT negative for any acute causes Demadex plan this pain  Metastatic polyp and diverticulosis; no acute disease  Will consider rescanning if not improved overnight  Patient awaiting guardianship as she is not competent for medical decision; discussed with case management and formal application has been forwarded  Objective:     Vitals:   Temp (24hrs), Av 9 °F (36 6 °C), Min:97 7 °F (36 5 °C), Max:98 °F (36 7 °C)    Temp:  [97 7 °F (36 5 °C)-98 °F (36 7 °C)] 98 °F (36 7 °C)  HR:  [] 66  Resp:  [18] 18  BP: (101-134)/(58-78) 134/78  SpO2:  [93 %-98 %] 98 %  Body mass index is 30 96 kg/m²       Input and Output Summary (last 24 hours): Intake/Output Summary (Last 24 hours) at 9/8/2020 1703  Last data filed at 9/8/2020 1300  Gross per 24 hour   Intake 630 ml   Output 940 ml   Net -310 ml       Physical Exam:     Physical Exam  Vitals signs and nursing note reviewed  Constitutional:       Appearance: She is obese  HENT:      Head: Normocephalic and atraumatic  Right Ear: External ear normal       Left Ear: External ear normal       Nose: Nose normal       Mouth/Throat:      Mouth: Mucous membranes are moist    Eyes:      Extraocular Movements: Extraocular movements intact  Conjunctiva/sclera: Conjunctivae normal       Pupils: Pupils are equal, round, and reactive to light  Neck:      Musculoskeletal: Normal range of motion and neck supple  Cardiovascular:      Rate and Rhythm: Normal rate  Rhythm irregular  Heart sounds: Normal heart sounds  Pulmonary:      Effort: Pulmonary effort is normal       Breath sounds: Normal breath sounds  Abdominal:      General: There is distension  Palpations: Abdomen is soft  Musculoskeletal: Normal range of motion  Skin:     General: Skin is warm and dry  Comments: Ecchymotic lesions bilateral upper extremities   Neurological:      Mental Status: She is alert     Psychiatric:      Comments: Dysphoric  mood           Additional Data:     Labs:    Results from last 7 days   Lab Units 09/06/20  0500   WBC Thousand/uL 8 98   HEMOGLOBIN g/dL 10 5*   HEMATOCRIT % 34 1*   PLATELETS Thousands/uL 271   NEUTROS PCT % 67   LYMPHS PCT % 15   MONOS PCT % 9   EOS PCT % 6     Results from last 7 days   Lab Units 09/06/20  0500   SODIUM mmol/L 138   POTASSIUM mmol/L 4 4   CHLORIDE mmol/L 107   CO2 mmol/L 24   BUN mg/dL 13   CREATININE mg/dL 1 08   ANION GAP mmol/L 7   CALCIUM mg/dL 8 3   GLUCOSE RANDOM mg/dL 155*         Results from last 7 days   Lab Units 09/08/20  1549 09/08/20  1132 09/08/20  0641 09/07/20  2120 09/07/20  1636 09/07/20  1046 09/07/20  0623 09/06/20  2109 09/06/20  1609 09/06/20  1115 09/05/20  2127 09/05/20  1642   POC GLUCOSE mg/dl 167* 157* 161* 134 115 153* 132 143* 167* 138 160* 124                   * I Have Reviewed All Lab Data Listed Above    * Additional Pertinent Lab Tests Reviewed: No New Labs Available For Today    Imaging:    Imaging Reports Reviewed Today Include:  CT abdomen pelvis  Imaging Personally Reviewed by Myself Includes:      Recent Cultures (last 7 days):           Last 24 Hours Medication List:   Current Facility-Administered Medications   Medication Dose Route Frequency Provider Last Rate    acetaminophen  650 mg Oral 4x Daily (with meals and at bedtime) Valerio Johnson MD      amiodarone  200 mg Oral Daily Janina Larsen MD      apixaban  5 mg Oral BID Janina Larsen MD      atorvastatin  20 mg Oral Daily With Tyson Tan MD      bisacodyl  10 mg Rectal Daily PRN Janina Lrasen MD      clopidogrel  75 mg Oral Daily Janina Larsen MD      digoxin  125 mcg Oral Daily Janina Larsen MD      FLUoxetine  20 mg Oral Daily Janina Larsen MD      folic acid  5,726 mcg Oral Daily Janina Larsen MD      furosemide  20 mg Oral Daily Migdalia Askew MD      gabapentin  100 mg Oral Daily Migdalia Askew MD      gabapentin  200 mg Oral HS Alyse Martinez DO      insulin lispro  1-6 Units Subcutaneous TID [facility] Janina Larsen MD      insulin lispro  1-6 Units Subcutaneous HS Janina Larsen MD      melatonin  6 mg Oral HS Janina Larsen MD      methocarbamol  250 mg Oral BID PRN Migdalia Askew MD      metoprolol succinate  50 mg Oral Q12H Janina Larsen MD      nortriptyline  10 mg Oral HS Janina Larsen MD      oxyCODONE  5 mg Oral Q3H PRN Wicho Marley,       oxyCODONE  7 5 mg Oral 4x Daily (with meals and at bedtime) Valerio Johnson MD      pantoprazole  40 mg Oral Early Morning Janina Larsen MD      senna-docusate sodium  1 tablet Oral HS Janina Larsen MD      zolpidem  5 mg Oral HS Migdalia Askew MD Today, Patient Was Seen By: Pj Way MD    ** Please Note: Dictation voice to text software may have been used in the creation of this document   **

## 2020-09-08 NOTE — ASSESSMENT & PLAN NOTE
Lab Results   Component Value Date    HGBA1C 7 6 (H) 08/05/2020       Recent Labs     09/07/20  2120 09/08/20  0641 09/08/20  1132 09/08/20  1549   POCGLU 134 161* 157* 167*       Blood Sugar Average: Last 72 hrs:  (P) 144 [id number]   SSI  Blood Glucose checks TIDWM and QHS  Hold oral medications  ISS

## 2020-09-08 NOTE — ASSESSMENT & PLAN NOTE
Wt Readings from Last 3 Encounters:   09/08/20 84 4 kg (186 lb 1 1 oz)   08/09/20 75 4 kg (166 lb 3 6 oz)   08/06/20 86 4 kg (190 lb 7 6 oz)     Echo with EF 45%; continue home medications  Patient reports dyspnea on exertion; became winded talking to examiner  BNP elevated at 1326; distended abdomen  Denies night cough, orthopnea, proximal nocturnal dyspnea; a chest x-ray read as normal, but may have infiltrates  at time of exam on 8/27 appears euvolemic  However, possible MYERS 2/2 deconditioning as pt appears dry on exam  gentle IVF to tx MALA & it improved  Acute CHF exacerbation is ruled out  Per renal hold ACEi at d/c  Can d/c on Lasix 20 mg PO daily  Can have BMP at rehab and if Cr stable, can be placed back on ACEi      Since she will be here pending placement, restarted her on Lasix 20 mg daily    9/8-patient in 20 mg Lasix daily; net -1 0 5 L diuresed  -resting comfortably on room air, monitor

## 2020-09-08 NOTE — PLAN OF CARE
Problem: PHYSICAL THERAPY ADULT  Goal: Performs mobility at highest level of function for planned discharge setting  See evaluation for individualized goals  Description: Treatment/Interventions: Functional transfer training, LE strengthening/ROM, Elevations, Therapeutic exercise, Endurance training, Cognitive reorientation, Patient/family training, Equipment eval/education, Bed mobility, Gait training, Compensatory technique education, Spoke to nursing, Spoke to case management, OT, Family          See flowsheet documentation for full assessment, interventions and recommendations  Outcome: Progressing  Note: Prognosis: Fair  Problem List: Decreased strength, Decreased endurance, Impaired balance, Decreased mobility, Decreased cognition, Decreased safety awareness, Pain  Assessment: The pt  was more limited today due to pain  She declined further ambulation at this time, but she was able to ambulate to the bathroom and back  She required increased assistance for mobility today due to her increased pain  The pt  was returned to bed per her request post session and alarm active  Barriers to Discharge: Inaccessible home environment, Decreased caregiver support     PT Discharge Recommendation: [address]     PT - OK to Discharge: Yes(to rehab once medically cleared)    See flowsheet documentation for full assessment

## 2020-09-08 NOTE — PLAN OF CARE
Problem: Potential for Falls  Goal: Patient will remain free of falls  Description: INTERVENTIONS:  - Assess patient frequently for physical needs  -  Identify cognitive and physical deficits and behaviors that affect risk of falls  -  [place] fall precautions as indicated by assessment   - Educate patient/family on patient safety including physical limitations  - Instruct patient to call for assistance with activity based on assessment  - Modify environment to reduce risk of injury  - Consider OT/PT consult to assist with strengthening/mobility  Outcome: Progressing     Problem: Nutrition/Hydration-ADULT  Goal: Nutrient/Hydration intake appropriate for improving, restoring or maintaining nutritional needs  Description: Monitor and assess patient's nutrition/hydration status for malnutrition  Collaborate with interdisciplinary team and initiate plan and interventions as ordered  Monitor patient's weight and dietary intake as ordered or per policy  Utilize nutrition screening tool and intervene as necessary  Determine patient's food preferences and provide high-protein, high-caloric foods as appropriate       INTERVENTIONS:  - Monitor oral intake, urinary output, labs, and treatment plans  - Assess nutrition and hydration status and recommend course of action  - Evaluate amount of meals eaten  - Assist patient with eating if necessary   - Allow adequate time for meals  - Recommend/ encourage appropriate diets, oral nutritional supplements, and vitamin/mineral supplements  - Order, calculate, and assess calorie counts as needed  - Recommend, monitor, and adjust tube feedings and TPN/PPN based on assessed needs  - Assess need for intravenous fluids  - Provide specific nutrition/hydration education as appropriate  - Include patient/family/caregiver in decisions related to nutrition  Outcome: Progressing     Problem: CARDIOVASCULAR - ADULT  Goal: Maintains optimal cardiac output and hemodynamic stability  Description: INTERVENTIONS:  - Monitor I/O, vital signs and rhythm  - Monitor for S/S and trends of decreased cardiac output  - Administer and titrate ordered vasoactive medications to optimize hemodynamic stability  - Assess quality of pulses, skin color and temperature  - Assess for signs of decreased coronary artery perfusion  - Instruct patient to report change in severity of symptoms  Outcome: Progressing  Goal: Absence of cardiac dysrhythmias or at baseline rhythm  Description: INTERVENTIONS:  - Continuous cardiac monitoring, vital signs, obtain 12 lead EKG if ordered  - Administer antiarrhythmic and heart rate control medications as ordered  - Monitor electrolytes and administer replacement therapy as ordered  Outcome: Progressing     Problem: RESPIRATORY - ADULT  Goal: Achieves optimal ventilation and oxygenation  Description: INTERVENTIONS:  - Assess for changes in respiratory status  - Assess for changes in mentation and behavior  - Position to facilitate oxygenation and minimize respiratory effort  - Oxygen administered by appropriate delivery if ordered  - Initiate smoking cessation education as indicated  - Encourage broncho-pulmonary hygiene including cough, deep breathe, Incentive Spirometry  - Assess the need for suctioning and aspirate as needed  - Assess and instruct to report SOB or any respiratory difficulty  - Respiratory Therapy support as indicated  Outcome: Progressing     Problem: MUSCULOSKELETAL - ADULT  Goal: Maintain or return mobility to safest level of function  Description: INTERVENTIONS:  - Assess patient's ability to carry out ADLs; assess patient's baseline for ADL function and identify physical deficits which impact ability to perform ADLs (bathing, care of mouth/teeth, toileting, grooming, dressing, etc )  - Assess/evaluate cause of self-care deficits   - Assess range of motion  - Assess patient's mobility  - Assess patient's need for assistive devices and provide as appropriate  - Encourage maximum independence but intervene and supervise when necessary  - Involve family in performance of ADLs  - Assess for home care needs following discharge   - Consider OT consult to assist with ADL evaluation and planning for discharge  - Provide patient education as appropriate  Outcome: Progressing  Goal: Maintain proper alignment of affected body part  Description: INTERVENTIONS:  - Support, maintain and protect limb and body alignment  - Provide patient/ family with appropriate education  Outcome: Progressing     Problem: Prexisting or High Potential for Compromised Skin Integrity  Goal: Skin integrity is maintained or improved  Description: INTERVENTIONS:  - Identify patients at risk for skin breakdown  - Assess and monitor skin integrity  - Assess and monitor nutrition and hydration status  - Monitor labs   - Assess for incontinence   - Turn and reposition patient  - Assist with mobility/ambulation  - Relieve pressure over bony prominences  - Avoid friction and shearing  - Provide appropriate hygiene as needed including keeping skin clean and dry  - Evaluate need for skin moisturizer/barrier cream  - Collaborate with interdisciplinary team   - Patient/family teaching  - Consider wound care consult   Outcome: Progressing     Problem: PAIN - ADULT  Goal: Verbalizes/displays adequate comfort level or baseline comfort level  Description: Interventions:  - Encourage patient to monitor pain and request assistance  - Assess pain using appropriate pain scale  - Administer analgesics based on type and severity of pain and evaluate response  - Implement non-pharmacological measures as appropriate and evaluate response  - Consider cultural and social influences on pain and pain management  - Notify physician/advanced practitioner if interventions unsuccessful or patient reports new pain  Outcome: Progressing

## 2020-09-09 NOTE — PROGRESS NOTES
Progress Note - Romi Jacome [birth year], 79 y o  female MRN: [id number]    Unit/Bed#: [facility] 525-01 Encounter: [id number]    Primary Care Provider: Zonia Monteiro DO   Date and time admitted to hospital: 8/26/2020  4:18 PM        LLQ pain  Assessment & Plan  c/o severe pain  CTAP showed no acute finding  Incidentally shows transverse colon nodule  Can get repeat CTAP in 3 months if desired by guardian   Suspect musculoskeletal pain    9/9-patient still complaining of left lower quadrant pain largely in the groin section; CT reviewed and shows no acute pathology  May consider repeating imaging and possible muscle relaxer  Herpes zoster without complication  Assessment & Plan  In lumbar region  Lesions appear crusted - no need for contact precautions  Completed Valtrex for 7 days    CKD (chronic kidney disease) stage 3, GFR 30-59 ml/min ([facility])  Assessment & Plan  Estimated Creatinine Clearance: 44 8 mL/min (by C-G formula based on SCr of 1 1 mg/dL)  B/l Cr 1 1  See MALA    DM2 (diabetes mellitus, type 2) [facility])  Assessment & Plan  Lab Results   Component Value Date    HGBA1C 7 6 (H) 08/05/2020       Recent Labs     09/08/20  1549 09/08/20  2115 09/09/20  0655 09/09/20  1111   POCGLU 167* 140 134 140       Blood Sugar Average: Last 72 hrs:  (P) 327 [id number]   SSI  Blood Glucose checks TIDWM and QHS  Hold oral medications  ISS      A-fib [facility])  Assessment & Plan  Patient rate controlled on admission; continue BB  Anticoagulated with Eliquis 5 mg b i d      Anemia  Assessment & Plan  · Hb dropped from 11-12 to 9  · Check stool occult, iron panel  · Patients states she has intermittent black stools  · HB appears stable at 9 5 from 9 0  · Continue eliquis  · CT AP had no evidence of bleed    Elevated troponin level not due myocardial infarction  Assessment & Plan  Patient with elevated troponin; denies chest pain  EKG in the ED negative for ischemia; shows rate controlled atrial fibrillation  Trop actually lower than earlier in month  No need for further w/u    Mild cognitive impairment  Assessment & Plan  Neurospsych eval pt 8/28 and deemed her incompetent to make medical decisions    Coronary artery disease  Assessment & Plan  S/p PCI July 2019  C/w Plavix and BB and statin    Hyperlipidemia  Assessment & Plan  Continue statin    Essential hypertension  Assessment & Plan  C/w BB; BP well controlled      Chronic systolic congestive heart failure (HCC)  Assessment & Plan  Wt Readings from Last 3 Encounters:   09/09/20 85 5 kg (188 lb 7 9 oz)   08/09/20 75 4 kg (166 lb 3 6 oz)   08/06/20 86 4 kg (190 lb 7 6 oz)     Echo with EF 45%; continue home medications  Patient reports dyspnea on exertion; became winded talking to examiner  BNP elevated at 1326; distended abdomen  Denies night cough, orthopnea, proximal nocturnal dyspnea; a chest x-ray read as normal, but may have infiltrates  at time of exam on 8/27 appears euvolemic  However, possible MYERS 2/2 deconditioning as pt appears dry on exam  gentle IVF to tx MALA & it improved  Acute CHF exacerbation is ruled out  Per renal hold ACEi at d/c  Can d/c on Lasix 20 mg PO daily  Can have BMP at rehab and if Cr stable, can be placed back on ACEi  Since she will be here pending placement, restarted her on Lasix 20 mg daily    9/8-patient in 20 mg Lasix daily; net -1 5 L diuresed  -resting comfortably on room air, monitor    9/9-net -2 0 5 L diuresed; asymptomatic  -creatinine at baseline; will start lisinopril 2 5 mg as part of heart failure package      Weakness/physical deconditioning  Assessment & Plan  PT/OT  Recent admission or patient declined follow on rehabilitation  Neuropsych eval ordered and pt not competent to make medical decisions    Will need guardian and then rehab placement    Dysthymic disorder  Assessment & Plan  Patient tearful and depressed on examination in ER; denies SI/HI, but did earlier in the ED  Continue Prozac and [address] consult appreciated - no 1 to 1 needed    9/9-patient appears overall improved; awaiting guardianship; continue present therapy    * MALA (acute kidney injury) ([facility] [address] )  Assessment & Plan  Estimated Creatinine Clearance: 44 8 mL/min (by C-G formula based on SCr of 1 1 mg/dL)  POA  Patient noted to have serum creatinine 1 69 on admission; baseline 1-1 2  Pt has MYERS but likely this is more due to deconditioning and not CHF   Cr did not improve w/ diuresis  UA WNL  Urinary retention protocol ordered - had been straight cathed twice but appears resolved at this time  Renal appreciated  Cr now at baseline after getting IVF  Renal US WNL  CPK WNL    9/8-resolved      VTE Pharmacologic Prophylaxis:   Pharmacologic: Apixaban (Eliquis)  Mechanical VTE Prophylaxis in Place: Yes    Patient Centered Rounds: I have performed bedside rounds with nursing staff today  Discussions with Specialists or Other Care Team Provider:     Education and Discussions with Family / Patient: Care plan discussed with patient who voiced understanding and agrees with recommendations  Time Spent for Care: 30 minutes  More than 50% of total time spent on counseling and coordination of care as described above  Current Length of Stay: 14 day(s)    Current Patient Status: Inpatient   Certification Statement: The patient will continue to require additional inpatient hospital stay due to Awaiting guardianship placement    Discharge Plan: To be determined    Code Status: Level 3 - DNAR and DNI      Subjective:   Patient seen examined bedside, reports left lower quadrant/left groin pain; will order x-rays of the hip ins pain increased on movement and appears to be related to possible left hip osteoarthritis  Patient also complaining cervical neck pain; but has had serial imaging performed along with instrumentation in the past   Likely degenerative changes with no acute process    CMP largely normal and hemoglobin down to 9 2 from 10 5 on yesterday's labs  Will monitor on morning labs tomorrow  0 case management working closely with legal to identify guardian  Patient will permanent placement as she lacks capacity to make informed medical decisions  Objective:     Vitals:   Temp (24hrs), Av °F (36 7 °C), Min:97 6 °F (36 4 °C), Max:98 3 °F (36 8 °C)    Temp:  [97 6 °F (36 4 °C)-98 3 °F (36 8 °C)] 97 6 °F (36 4 °C)  HR:  [66-97] 72  Resp:  [17-18] 17  BP: (100-134)/(58-96) 126/96  SpO2:  [93 %-98 %] 94 %  Body mass index is 31 37 kg/m²  Input and Output Summary (last 24 hours): Intake/Output Summary (Last 24 hours) at 2020 1342  Last data filed at 2020 1300  Gross per 24 hour   Intake 1060 ml   Output 1450 ml   Net -390 ml       Physical Exam:     Physical Exam  Vitals signs and nursing note reviewed  Constitutional:       Appearance: She is obese  HENT:      Head: Normocephalic and atraumatic  Right Ear: External ear normal       Left Ear: External ear normal       Nose: Nose normal       Mouth/Throat:      Mouth: Mucous membranes are moist    Eyes:      Extraocular Movements: Extraocular movements intact  Conjunctiva/sclera: Conjunctivae normal       Pupils: Pupils are equal, round, and reactive to light  Neck:      Musculoskeletal: Normal range of motion and neck supple  Cardiovascular:      Rate and Rhythm: Normal rate  Rhythm irregular  Heart sounds: Normal heart sounds  Pulmonary:      Effort: Pulmonary effort is normal       Breath sounds: Normal breath sounds  Abdominal:      General: There is distension  Palpations: Abdomen is soft  Musculoskeletal: Normal range of motion  Skin:     General: Skin is warm and dry  Comments: Ecchymotic lesions bilateral upper extremities   Neurological:      Mental Status: She is alert     Psychiatric:      Comments: Dysphoric  mood           Additional Data:     Labs:    Results from last 7 days   Lab Units 20  0313   WBC Thousand/uL 9 18 HEMOGLOBIN g/dL 9 2*   HEMATOCRIT % 30 2*   PLATELETS Thousands/uL 275   NEUTROS PCT % 71   LYMPHS PCT % 14   MONOS PCT % 9   EOS PCT % 4     Results from last 7 days   Lab Units 09/09/20  0313   SODIUM mmol/L 138   POTASSIUM mmol/L 4 4   CHLORIDE mmol/L 104   CO2 mmol/L 30   BUN mg/dL 17   CREATININE mg/dL 1 10   ANION GAP mmol/L 4   CALCIUM mg/dL 9 0   ALBUMIN g/dL 3 1*   TOTAL BILIRUBIN mg/dL 0 40   ALK PHOS U/L 88   ALT U/L 21   AST U/L 21   GLUCOSE RANDOM mg/dL 125         Results from last 7 days   Lab Units 09/09/20  1111 09/09/20  0655 09/08/20  2115 09/08/20  1549 09/08/20  1132 09/08/20  0641 09/07/20  2120 09/07/20  1636 09/07/20  1046 09/07/20  0623 09/06/20  2109 09/06/20  1609   POC GLUCOSE mg/dl 140 134 140 167* 157* 161* 134 115 153* 132 143* 167*                   * I Have Reviewed All Lab Data Listed Above  * Additional Pertinent Lab Tests Reviewed:  All Labs Within Last 24 Hours Reviewed    Imaging:    Imaging Reports Reviewed Today Include:  CT abdomen pelvis; CT cervical spine, MRI cervical spine, x-ray cervical spine  Imaging Personally Reviewed by Myself Includes:  None    Recent Cultures (last 7 days):           Last 24 Hours Medication List:   Current Facility-Administered Medications   Medication Dose Route Frequency Provider Last Rate    acetaminophen  650 mg Oral 4x Daily (with meals and at bedtime) Loida Montano MD      amiodarone  200 mg Oral Daily Royal Tay MD      apixaban  5 mg Oral BID Royal Tay MD      atorvastatin  20 mg Oral Daily With Jerel Shah MD      bisacodyl  10 mg Rectal Daily PRN Royal Tay MD      clopidogrel  75 mg Oral Daily Royal Tay MD      digoxin  125 mcg Oral Daily Royal Tay MD      docusate sodium  100 mg Oral BID Royal Tay MD      FLUoxetine  20 mg Oral Daily Royal Tay MD      folic acid  7,992 mcg Oral Daily Royal Tay MD      furosemide  20 mg Oral Daily Delana Aase, MD      gabapentin  100 mg Oral Daily Vickey Cuevas MD      gabapentin  200 mg Oral HS Alyse Martinez,       insulin lispro  1-6 Units Subcutaneous TID [facility] Margoth Brandt MD      insulin lispro  1-6 Units Subcutaneous HS Margoth Brandt MD      lisinopril  2 5 mg Oral HS Margoth Brandt MD      melatonin  6 mg Oral HS Margoth Brandt MD      methocarbamol  250 mg Oral BID PRN Vickey Cuevas MD      metoprolol succinate  50 mg Oral Q12H Margoth Brandt MD      nortriptyline  10 mg Oral HS Margoth Brandt MD      oxyCODONE  5 mg Oral Q3H PRN Rozina Sawyer DO      oxyCODONE  7 5 mg Oral 4x Daily (with meals and at bedtime) Farida Alves MD      pantoprazole  40 mg Oral Early Morning Margoth Brandt MD      senna-docusate sodium  1 tablet Oral HS Margoth Brandt MD      zolpidem  5 mg Oral HS Vickey Cuevas MD          Today, Patient Was Seen By: Casey Johnson MD    ** Please Note: Dictation voice to text software may have been used in the creation of this document   **

## 2020-09-09 NOTE — PLAN OF CARE
Problem: Potential for Falls  Goal: Patient will remain free of falls  Description: INTERVENTIONS:  - Assess patient frequently for physical needs  -  Identify cognitive and physical deficits and behaviors that affect risk of falls  -  [place] fall precautions as indicated by assessment   - Educate patient/family on patient safety including physical limitations  - Instruct patient to call for assistance with activity based on assessment  - Modify environment to reduce risk of injury  - Consider OT/PT consult to assist with strengthening/mobility  Outcome: Progressing     Problem: Nutrition/Hydration-ADULT  Goal: Nutrient/Hydration intake appropriate for improving, restoring or maintaining nutritional needs  Description: Monitor and assess patient's nutrition/hydration status for malnutrition  Collaborate with interdisciplinary team and initiate plan and interventions as ordered  Monitor patient's weight and dietary intake as ordered or per policy  Utilize nutrition screening tool and intervene as necessary  Determine patient's food preferences and provide high-protein, high-caloric foods as appropriate       INTERVENTIONS:  - Monitor oral intake, urinary output, labs, and treatment plans  - Assess nutrition and hydration status and recommend course of action  - Evaluate amount of meals eaten  - Assist patient with eating if necessary   - Allow adequate time for meals  - Recommend/ encourage appropriate diets, oral nutritional supplements, and vitamin/mineral supplements  - Order, calculate, and assess calorie counts as needed  - Recommend, monitor, and adjust tube feedings and TPN/PPN based on assessed needs  - Assess need for intravenous fluids  - Provide specific nutrition/hydration education as appropriate  - Include patient/family/caregiver in decisions related to nutrition  Outcome: Progressing     Problem: CARDIOVASCULAR - ADULT  Goal: Maintains optimal cardiac output and hemodynamic stability  Description: INTERVENTIONS:  - Monitor I/O, vital signs and rhythm  - Monitor for S/S and trends of decreased cardiac output  - Administer and titrate ordered vasoactive medications to optimize hemodynamic stability  - Assess quality of pulses, skin color and temperature  - Assess for signs of decreased coronary artery perfusion  - Instruct patient to report change in severity of symptoms  Outcome: Progressing  Goal: Absence of cardiac dysrhythmias or at baseline rhythm  Description: INTERVENTIONS:  - Continuous cardiac monitoring, vital signs, obtain 12 lead EKG if ordered  - Administer antiarrhythmic and heart rate control medications as ordered  - Monitor electrolytes and administer replacement therapy as ordered  Outcome: Progressing     Problem: RESPIRATORY - ADULT  Goal: Achieves optimal ventilation and oxygenation  Description: INTERVENTIONS:  - Assess for changes in respiratory status  - Assess for changes in mentation and behavior  - Position to facilitate oxygenation and minimize respiratory effort  - Oxygen administered by appropriate delivery if ordered  - Initiate smoking cessation education as indicated  - Encourage broncho-pulmonary hygiene including cough, deep breathe, Incentive Spirometry  - Assess the need for suctioning and aspirate as needed  - Assess and instruct to report SOB or any respiratory difficulty  - Respiratory Therapy support as indicated  Outcome: Progressing     Problem: MUSCULOSKELETAL - ADULT  Goal: Maintain or return mobility to safest level of function  Description: INTERVENTIONS:  - Assess patient's ability to carry out ADLs; assess patient's baseline for ADL function and identify physical deficits which impact ability to perform ADLs (bathing, care of mouth/teeth, toileting, grooming, dressing, etc )  - Assess/evaluate cause of self-care deficits   - Assess range of motion  - Assess patient's mobility  - Assess patient's need for assistive devices and provide as appropriate  - Encourage maximum independence but intervene and supervise when necessary  - Involve family in performance of ADLs  - Assess for home care needs following discharge   - Consider OT consult to assist with ADL evaluation and planning for discharge  - Provide patient education as appropriate  Outcome: Progressing  Goal: Maintain proper alignment of affected body part  Description: INTERVENTIONS:  - Support, maintain and protect limb and body alignment  - Provide patient/ family with appropriate education  Outcome: Progressing     Problem: Prexisting or High Potential for Compromised Skin Integrity  Goal: Skin integrity is maintained or improved  Description: INTERVENTIONS:  - Identify patients at risk for skin breakdown  - Assess and monitor skin integrity  - Assess and monitor nutrition and hydration status  - Monitor labs   - Assess for incontinence   - Turn and reposition patient  - Assist with mobility/ambulation  - Relieve pressure over bony prominences  - Avoid friction and shearing  - Provide appropriate hygiene as needed including keeping skin clean and dry  - Evaluate need for skin moisturizer/barrier cream  - Collaborate with interdisciplinary team   - Patient/family teaching  - Consider wound care consult   Outcome: Progressing     Problem: PAIN - ADULT  Goal: Verbalizes/displays adequate comfort level or baseline comfort level  Description: Interventions:  - Encourage patient to monitor pain and request assistance  - Assess pain using appropriate pain scale  - Administer analgesics based on type and severity of pain and evaluate response  - Implement non-pharmacological measures as appropriate and evaluate response  - Consider cultural and social influences on pain and pain management  - Notify physician/advanced practitioner if interventions unsuccessful or patient reports new pain  Outcome: Progressing

## 2020-09-09 NOTE — ASSESSMENT & PLAN NOTE
Estimated Creatinine Clearance: 44 8 mL/min (by C-G formula based on SCr of 1 1 mg/dL)    POA  Patient noted to have serum creatinine 1 69 on admission; baseline 1-1 2  Pt has MYERS but likely this is more due to deconditioning and not CHF   Cr did not improve w/ diuresis  UA WNL  Urinary retention protocol ordered - had been straight cathed twice but appears resolved at this time  Renal appreciated  Cr now at baseline after getting IVF  Renal US WNL  CPK WNL    9/8-resolved

## 2020-09-09 NOTE — ASSESSMENT & PLAN NOTE
Lab Results   Component Value Date    HGBA1C 7 6 (H) 08/05/2020       Recent Labs     09/08/20  1549 09/08/20  2115 09/09/20  0655 09/09/20  1111   POCGLU 167* 140 134 140       Blood Sugar Average: Last 72 hrs:  (P) 144 [id number]   SSI  Blood Glucose checks TIDWM and QHS  Hold oral medications  ISS

## 2020-09-09 NOTE — ASSESSMENT & PLAN NOTE
Patient tearful and depressed on examination in ER; denies SI/HI, but did earlier in the ED  Continue Prozac and [address] consult appreciated - no 1 to 1 needed    9/9-patient appears overall improved; awaiting guardianship; continue present therapy

## 2020-09-09 NOTE — PLAN OF CARE
Problem: Potential for Falls  Goal: Patient will remain free of falls  Description: INTERVENTIONS:  - Assess patient frequently for physical needs  -  Identify cognitive and physical deficits and behaviors that affect risk of falls  -  [place] fall precautions as indicated by assessment   - Educate patient/family on patient safety including physical limitations  - Instruct patient to call for assistance with activity based on assessment  - Modify environment to reduce risk of injury  - Consider OT/PT consult to assist with strengthening/mobility  Outcome: Progressing     Problem: Nutrition/Hydration-ADULT  Goal: Nutrient/Hydration intake appropriate for improving, restoring or maintaining nutritional needs  Description: Monitor and assess patient's nutrition/hydration status for malnutrition  Collaborate with interdisciplinary team and initiate plan and interventions as ordered  Monitor patient's weight and dietary intake as ordered or per policy  Utilize nutrition screening tool and intervene as necessary  Determine patient's food preferences and provide high-protein, high-caloric foods as appropriate       INTERVENTIONS:  - Monitor oral intake, urinary output, labs, and treatment plans  - Assess nutrition and hydration status and recommend course of action  - Evaluate amount of meals eaten  - Assist patient with eating if necessary   - Allow adequate time for meals  - Recommend/ encourage appropriate diets, oral nutritional supplements, and vitamin/mineral supplements  - Order, calculate, and assess calorie counts as needed  - Recommend, monitor, and adjust tube feedings and TPN/PPN based on assessed needs  - Assess need for intravenous fluids  - Provide specific nutrition/hydration education as appropriate  - Include patient/family/caregiver in decisions related to nutrition  Outcome: Progressing     Problem: CARDIOVASCULAR - ADULT  Goal: Maintains optimal cardiac output and hemodynamic stability  Description: INTERVENTIONS:  - Monitor I/O, vital signs and rhythm  - Monitor for S/S and trends of decreased cardiac output  - Administer and titrate ordered vasoactive medications to optimize hemodynamic stability  - Assess quality of pulses, skin color and temperature  - Assess for signs of decreased coronary artery perfusion  - Instruct patient to report change in severity of symptoms  Outcome: Progressing  Goal: Absence of cardiac dysrhythmias or at baseline rhythm  Description: INTERVENTIONS:  - Continuous cardiac monitoring, vital signs, obtain 12 lead EKG if ordered  - Administer antiarrhythmic and heart rate control medications as ordered  - Monitor electrolytes and administer replacement therapy as ordered  Outcome: Progressing     Problem: RESPIRATORY - ADULT  Goal: Achieves optimal ventilation and oxygenation  Description: INTERVENTIONS:  - Assess for changes in respiratory status  - Assess for changes in mentation and behavior  - Position to facilitate oxygenation and minimize respiratory effort  - Oxygen administered by appropriate delivery if ordered  - Initiate smoking cessation education as indicated  - Encourage broncho-pulmonary hygiene including cough, deep breathe, Incentive Spirometry  - Assess the need for suctioning and aspirate as needed  - Assess and instruct to report SOB or any respiratory difficulty  - Respiratory Therapy support as indicated  Outcome: Progressing     Problem: MUSCULOSKELETAL - ADULT  Goal: Maintain or return mobility to safest level of function  Description: INTERVENTIONS:  - Assess patient's ability to carry out ADLs; assess patient's baseline for ADL function and identify physical deficits which impact ability to perform ADLs (bathing, care of mouth/teeth, toileting, grooming, dressing, etc )  - Assess/evaluate cause of self-care deficits   - Assess range of motion  - Assess patient's mobility  - Assess patient's need for assistive devices and provide as appropriate  - Encourage maximum independence but intervene and supervise when necessary  - Involve family in performance of ADLs  - Assess for home care needs following discharge   - Consider OT consult to assist with ADL evaluation and planning for discharge  - Provide patient education as appropriate  Outcome: Progressing  Goal: Maintain proper alignment of affected body part  Description: INTERVENTIONS:  - Support, maintain and protect limb and body alignment  - Provide patient/ family with appropriate education  Outcome: Progressing     Problem: Prexisting or High Potential for Compromised Skin Integrity  Goal: Skin integrity is maintained or improved  Description: INTERVENTIONS:  - Identify patients at risk for skin breakdown  - Assess and monitor skin integrity  - Assess and monitor nutrition and hydration status  - Monitor labs   - Assess for incontinence   - Turn and reposition patient  - Assist with mobility/ambulation  - Relieve pressure over bony prominences  - Avoid friction and shearing  - Provide appropriate hygiene as needed including keeping skin clean and dry  - Evaluate need for skin moisturizer/barrier cream  - Collaborate with interdisciplinary team   - Patient/family teaching  - Consider wound care consult   Outcome: Progressing     Problem: PAIN - ADULT  Goal: Verbalizes/displays adequate comfort level or baseline comfort level  Description: Interventions:  - Encourage patient to monitor pain and request assistance  - Assess pain using appropriate pain scale  - Administer analgesics based on type and severity of pain and evaluate response  - Implement non-pharmacological measures as appropriate and evaluate response  - Consider cultural and social influences on pain and pain management  - Notify physician/advanced practitioner if interventions unsuccessful or patient reports new pain  Outcome: Progressing

## 2020-09-09 NOTE — ASSESSMENT & PLAN NOTE
c/o severe pain  CTAP showed no acute finding  Incidentally shows transverse colon nodule  Can get repeat CTAP in 3 months if desired by guardian   Suspect musculoskeletal pain    9/9-patient still complaining of left lower quadrant pain largely in the groin section; CT reviewed and shows no acute pathology  May consider repeating imaging and possible muscle relaxer

## 2020-09-09 NOTE — ASSESSMENT & PLAN NOTE
Wt Readings from Last 3 Encounters:   09/09/20 85 5 kg (188 lb 7 9 oz)   08/09/20 75 4 kg (166 lb 3 6 oz)   08/06/20 86 4 kg (190 lb 7 6 oz)     Echo with EF 45%; continue home medications  Patient reports dyspnea on exertion; became winded talking to examiner  BNP elevated at 1326; distended abdomen  Denies night cough, orthopnea, proximal nocturnal dyspnea; a chest x-ray read as normal, but may have infiltrates  at time of exam on 8/27 appears euvolemic  However, possible MYERS 2/2 deconditioning as pt appears dry on exam  gentle IVF to tx MALA & it improved  Acute CHF exacerbation is ruled out  Per renal hold ACEi at d/c  Can d/c on Lasix 20 mg PO daily  Can have BMP at rehab and if Cr stable, can be placed back on ACEi      Since she will be here pending placement, restarted her on Lasix 20 mg daily    9/8-patient in 20 mg Lasix daily; net -1 5 L diuresed  -resting comfortably on room air, monitor    9/9-net -2 0 5 L diuresed; asymptomatic  -creatinine at baseline; will start lisinopril 2 5 mg as part of heart failure package

## 2020-09-09 NOTE — QUICK NOTE
Progress Note - Palliative & Supportive Care     Patient awaiting guardianship for placement  Patient's symptoms are controlled and she is a Level 3, appropriately, and has an advanced directive that confirms this  PSC will sign off  Please notify on-call provider with further questions/concerns      Cabrera Salmon DO  Palliative and Supportive Care  [phone]

## 2020-09-09 NOTE — PLAN OF CARE
Problem: PHYSICAL THERAPY ADULT  Goal: Performs mobility at highest level of function for planned discharge setting  See evaluation for individualized goals  Description: Treatment/Interventions: Functional transfer training, LE strengthening/ROM, Elevations, Therapeutic exercise, Endurance training, Cognitive reorientation, Patient/family training, Equipment eval/education, Bed mobility, Gait training, Compensatory technique education, Spoke to nursing, Spoke to case management, OT, Family          See flowsheet documentation for full assessment, interventions and recommendations  9/9/2020 1514 by Rosibel Garcia PTA  Outcome: Progressing  Note: Prognosis: Fair  Problem List: Decreased strength, Decreased endurance, Impaired balance, Decreased mobility, Decreased cognition, Decreased safety awareness, Pain  Assessment: The pt  was found getting out of bed without assistance and she was requesting to go to the bathroom  She was able to ambulate farther than earlier today, but she continues to remain limited due to pain  She continues to require instruction for safety and technique  Alarm active post session  Barriers to Discharge: Inaccessible home environment, Decreased caregiver support     PT Discharge Recommendation: [address]     PT - OK to Discharge: Yes(to rehab once medically cleared)    See flowsheet documentation for full assessment  9/9/2020 1501 by Rosibel Garcia PTA  Outcome: Progressing  Note: Prognosis: Fair  Problem List: Decreased strength, Decreased endurance, Impaired balance, Decreased mobility, Decreased cognition, Decreased safety awareness, Pain  Assessment: The pt  deferred ambulation, but she was agreeable to ambulate to the bathroom  She continues to remain limited due to reports of severe back pain, and she fatigued easily when ambulating   She demonstrates decreased safety awareness, and she continues to require instruction for proper hand placement for transfers  She was able to complete therapeutic exercise with instruction, but was limited due to pain  Pt  declined sitting in the chair, and was returned to bed with the alarm active post session  Barriers to Discharge: Inaccessible home environment, Decreased caregiver support     PT Discharge Recommendation: [address]     PT - OK to Discharge: Yes(to rehab once medically cleared)    See flowsheet documentation for full assessment

## 2020-09-09 NOTE — PLAN OF CARE
Problem: PHYSICAL THERAPY ADULT  Goal: Performs mobility at highest level of function for planned discharge setting  See evaluation for individualized goals  Description: Treatment/Interventions: Functional transfer training, LE strengthening/ROM, Elevations, Therapeutic exercise, Endurance training, Cognitive reorientation, Patient/family training, Equipment eval/education, Bed mobility, Gait training, Compensatory technique education, Spoke to nursing, Spoke to case management, OT, Family          See flowsheet documentation for full assessment, interventions and recommendations  Outcome: Progressing  Note: Prognosis: Fair  Problem List: Decreased strength, Decreased endurance, Impaired balance, Decreased mobility, Decreased cognition, Decreased safety awareness, Pain  Assessment: The pt  deferred ambulation, but she was agreeable to ambulate to the bathroom  She continues to remain limited due to reports of severe back pain, and she fatigued easily when ambulating  She demonstrates decreased safety awareness, and she continues to require instruction for proper hand placement for transfers  She was able to complete therapeutic exercise with instruction, but was limited due to pain  Pt  declined sitting in the chair, and was returned to bed with the alarm active post session  Barriers to Discharge: Inaccessible home environment, Decreased caregiver support     PT Discharge Recommendation: [address]     PT - OK to Discharge: Yes(to rehab once medically cleared)    See flowsheet documentation for full assessment

## 2020-09-09 NOTE — PHYSICAL THERAPY NOTE
Physical Therapy Progress Note     09/09/20 5579   Pain Assessment   Pain Assessment Tool 0-10   Pain Score Worst Possible Pain   Pain Location/Orientation Location: Back   Hospital Pain Intervention(s) Repositioned; Ambulation/increased activity; Emotional support   Restrictions/Precautions   Other Precautions Cognitive; Chair Alarm; Bed Alarm; Fall Risk;Pain; Impulsive   Subjective   Subjective The pt  declined sitting up in the chair, but she was agreeable to ambulate to the bathroom  Bed Mobility   Supine to Sit 3  Moderate assistance   Additional items Assist x 1; Increased time required;Verbal cues;LE management   Sit to Supine 4  Minimal assistance   Additional items Assist x 1; Increased time required;LE management;Verbal cues   Transfers   Sit to Stand 4  Minimal assistance   Additional items Assist x 1; Increased time required;Verbal cues   Stand to Sit 4  Minimal assistance   Additional items Assist x 1; Increased time required   Ambulation/Elevation   Gait pattern Excessively slow; Step to;Short stride; Inconsistent annie;Decreased foot clearance; Forward Flexion   Gait Assistance 4  Minimal assist   Additional items Assist x 1;Verbal cues   Assistive Device Rolling walker   Distance 10 feet x 2  Balance   Static Sitting Good   Dynamic Sitting Fair +   Static Standing Fair   Ambulatory Poor +   Activity Tolerance   Activity Tolerance Patient tolerated treatment well;Patient limited by pain; Patient limited by fatigue   Exercises   THR Supine;Bilateral;AAROM;10 reps  (x2 sets )   Assessment   Prognosis Fair   Problem List Decreased strength;Decreased endurance; Impaired balance;Decreased mobility; Decreased cognition;Decreased safety awareness;Pain   Assessment The pt  deferred ambulation, but she was agreeable to ambulate to the bathroom  She continues to remain limited due to reports of severe back pain, and she fatigued easily when ambulating   She demonstrates decreased safety awareness, and she continues to require instruction for proper hand placement for transfers  She was able to complete therapeutic exercise with instruction, but was limited due to pain  Pt  declined sitting in the chair, and was returned to bed with the alarm active post session  Barriers to Discharge Inaccessible home environment;Decreased caregiver support   Goals   Patient Goals To have less pain  STG Expiration Date 09/10/20   PT Treatment Day 4   Plan   Treatment/Interventions Functional transfer training;LE strengthening/ROM; Therapeutic exercise; Endurance training;Patient/family training;Gait training;Bed mobility;Cognitive reorientation   Progress Slow progress, decreased activity tolerance   PT Frequency   (3-5x a week )   Recommendation   PT Discharge Recommendation Post-Acute Rehabilitation Services   Equipment Recommended [place] T Asher alyx, PTA

## 2020-09-09 NOTE — ASSESSMENT & PLAN NOTE
Estimated Creatinine Clearance: 44 8 mL/min (by C-G formula based on SCr of 1 1 mg/dL)    B/l Cr 1 1  See MALA

## 2020-09-09 NOTE — PHYSICAL THERAPY NOTE
Physical Therapy Progress Note     09/09/20 1030   Pain Assessment   Pain Assessment Tool 0-10   Pain Score Worst Possible Pain   Pain Location/Orientation Orientation: Lower; Location: Back   Hospital Pain Intervention(s) Repositioned; Ambulation/increased activity; Emotional support   Restrictions/Precautions   Other Precautions Cognitive; Chair Alarm; Bed Alarm; Fall Risk;Pain; Impulsive   Subjective   Subjective The pt  was found attempting to get out of bed without assistance and the alarm sounding  She reports needing to go to the bathroom  Bed Mobility   Sit to Supine 4  Minimal assistance   Additional items Assist x 1;Verbal cues   Transfers   Sit to Stand 3  Moderate assistance   Additional items Assist x 1; Increased time required; Other;Verbal cues  (from the low toilet )   Stand to Sit 4  Minimal assistance   Additional items Assist x 1;Verbal cues   Ambulation/Elevation   Gait pattern Excessively slow; Step to;Short stride; Inconsistent annie;Decreased foot clearance; Forward Flexion   Gait Assistance 4  Minimal assist   Additional items Assist x 1;Verbal cues   Assistive Device Rolling walker   Distance 40 feet  Balance   Static Sitting Good   Dynamic Sitting Fair +   Static Standing Fair   Ambulatory Poor +   Activity Tolerance   Activity Tolerance Patient tolerated treatment well;Patient limited by pain   Assessment   Prognosis Fair   Problem List Decreased strength;Decreased endurance; Impaired balance;Decreased mobility; Decreased cognition;Decreased safety awareness;Pain   Assessment The pt  was found getting out of bed without assistance and she was requesting to go to the bathroom  She was able to ambulate farther than earlier today, but she continues to remain limited due to pain  She continues to require instruction for safety and technique  Alarm active post session  Barriers to Discharge Inaccessible home environment;Decreased caregiver support   Goals   Patient Goals To feel better     STG Expiration Date 09/10/20   PT Treatment Day 5   Plan   Treatment/Interventions Functional transfer training;LE strengthening/ROM; Therapeutic exercise; Endurance training;Patient/family training;Bed mobility;Gait training   Progress Slow progress, decreased activity tolerance   PT Frequency   (3-5x a week )   Recommendation   PT Discharge Recommendation [facility]

## 2020-09-09 NOTE — OCCUPATIONAL THERAPY NOTE
Occupational Therapy Re-Evaluation     Roberto Mehta    [birth year]    Principal Problem:    MALA (acute kidney injury) ([facility] [address] )  Active Problems:    Dysthymic disorder    Weakness/physical deconditioning    Chronic systolic congestive heart failure ([facility] )    Essential hypertension    Hyperlipidemia    Coronary artery disease    Mild cognitive impairment    Elevated troponin level not due myocardial infarction    Anemia    A-fib ([facility])    DM2 (diabetes mellitus, type 2) ([facility])    CKD (chronic kidney disease) stage 3, GFR 30-59 ml/min ([facility])    Herpes zoster without complication    LLQ pain      @[facility]@    Past Surgical History:   Procedure Laterality Date    COLONOSCOPY      COLONOSCOPY N/A 7/27/2018    Procedure: COLONOSCOPY;  Surgeon: Wilbur Delgado MD;  [facility]: [facility]; Service: Gastroenterology    CORONARY STENT PLACEMENT      ERCP N/A 5/14/2018    Procedure: ENDOSCOPIC RETROGRADE CHOLANGIOPANCREATOGRAPHY (ERCP); Surgeon: Loi Lyons MD;  [facility]: [facility];  Service: Gastroenterology    ERCP N/A 8/28/2018    Procedure: ENDOSCOPIC RETROGRADE CHOLANGIOPANCREATOGRAPHY (ERCP) w/ EGD;  Surgeon: Loi Lyons MD;  [facility]: [facility]; Service: Gastroenterology    ESOPHAGOGASTRODUODENOSCOPY N/A 7/26/2018    Procedure: ESOPHAGOGASTRODUODENOSCOPY (EGD); Surgeon: Wilbur Delgado MD;  [facility]: [facility]; Service: Gastroenterology    JOINT REPLACEMENT Right     knee        09/09/20 1345   OT Last Visit   OT Visit Date 09/09/20   Restrictions/Precautions   Weight Bearing Precautions Per Order No   Other Precautions Impulsive;Cognitive; Bed Alarm; Chair Alarm;Multiple lines;Suicidal;Hard of hearing; Fall Risk;Pain   Lifestyle   Autonomy Pt reports that since D/C from recent hospital admit on 8/10 that she has been in bed most of the time, has not bathed, and only gets up for toileting  Pt reports she has not been eating- has Ensure for all meals   Pt reports prior to recent admission, that she was IND w/ ADLS and required A from landlord for IADLs  Was Mod I w RW for mobility    Reciprocal Relationships Pt reports she lives alone, but her landlord often helps her as needed  Reports limited social support otherwise   Service to Others retired   Intrinsic Gratification Pt reports she does not enjoy doing anything anymore d/t depression   Pain Assessment   Pain Assessment Tool 0-10   Pain Score 5   Pain Location/Orientation Location: Generalized   Pain Onset/Description Onset: Ongoing  ("I'm always in so much pain")   ADL   Toileting Assistance  2  Maximal Assistance   Toileting Deficit Setup;Verbal cueing;Supervison/safety; Increased time to complete;Clothing management up;Clothing management down;Perineal hygiene;Grab bar use   Toileting Comments Pt required use of toilet to urinate  Pt expressed that it has become increasingly difficult for her to urinate  Pt requested that therapist turn on the faucet to hopefully assist with toileting  Pt required A for all clothing management and chas/buttock hygiene while in stance w/ unilat support of RW  Bed Mobility   Supine to Sit 3  Moderate assistance   Additional items Assist x 1; Increased time required;LE management;Verbal cues; Bedrails;HOB elevated   Sit to Supine Unable to assess   Additional Comments Pt laying supine in bed upon OT arrival  Pt seated OOB in chair w/ chair alarm activated and all needs in reach s/p OT session  Transfers   Sit to Stand 4  Minimal assistance   Additional items Assist x 1; Increased time required;Verbal cues;Armrests   Stand to Sit 3  Moderate assistance   Additional items Assist x 1; Increased time required;Verbal cues;Armrests; Impulsive   Toilet transfer 3  Moderate assistance   Additional items Assist x 1; Increased time required;Standard toilet;Verbal cues;Armrests; Impulsive   Additional Comments Transfers w/ RW  VC and TC required for safety and hand placement - Pt w/ difficulty carrying-over education      Functional Mobility   Functional Mobility 4 Minimal assistance   Additional Comments Pt demonstrated short distance household mobility in room EOB>toilet>chair w/ RW at [facility] A level  Pt presents w/ x1 mild LOB laterally and required Mod A to correct  Pt required VC and TC for all safety, sequencing, and redirection to task  Additional items Rolling walker   Cognition   Overall Cognitive Status Impaired   Arousal/Participation Alert; Cooperative   Attention Attends with cues to redirect   Orientation Level Oriented to person;Oriented to place;Oriented to time;Oriented to situation  ("my landlord called the police so I didn't die in her apt ")   Memory Decreased recall of precautions;Decreased recall of recent events;Decreased short term memory   Following Commands Follows one step commands with increased time or repetition   Comments Pt continues to verbalize depressive thoughts, stating "I just want to die" and "I pray every night that God takes me to heaven " Pt reports she does not have any intent to harm herself, stating "I definitely won't hurt myself because then I will not be resurrected " Pt continues to present w/ STM deficits, decreased insight into deficits, and decreased safety awareness  Pt continues to require VC and TC for all safety, sequencing, and redirection to task  Pt required increased time for processing and frequent repeat of commands for increased follow through of functional tasks  Activity Tolerance   Activity Tolerance Patient limited by fatigue;Patient limited by pain   Medical Staff Made Aware RN cleared Pt for OT session   Assessment   Assessment Pt is a 80 yo female seen for OT re-eval s/p goals expiring  Pt initially adm to [facility] on "8/26/2020 w/ a primary diagnosis of acute on chronic CHF s/p rapid HR and SOB   Pt  has a past medical history of A-fib ([address] ), Acute respiratory disease, Anemia, Arthritis, Atrial fibrillation ([address] ), CHF (congestive heart failure) ([address] ), Chronic pain, Heart failure ([address] ), 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 [facility]), History of colon polyps, long term use of blood thinners, Hypertension, Irregular heart beat, Narcotic dependence ([address] ), Rectal bleeding, Stroke ([facility] [address] ), and Uses walker  Pt seen for initial OT evaluation w/ active orders  Pt lives alone in a [facility] w/ 2STE  Pt reports that since D/C from recent hospital admit on 8/10 that she has been in bed most of the time, has not bathed, and only gets up for toileting  Pt reports she has not been eating- has Ensure for all meals  Pt reports prior to recent admission, that she was IND w/ ADLS and required A from landlord for IADLs  Was Mod I w RW for mobility  Pt has limited social support other than landlord, who she reports often steals from her " Current level of function: Min A UB bathing/dressing, Max A LB bathing/dressing/toileting  Mod A x1 bed mobility, MinAx1 STS transfers (Mod A toilet transfers) and functional mobility w/ RW  Pt presents w/ limitations in ADL status, functional mobility, functional transfers, activity tolerance, cognition, and endurace which are affecting occupational performance  Pt seen by neuropsychology 8/28/2020 and deemed her incompetent to make medical decisions  Following evaluation, Pt will benefit from continued OT treatment during hospital stay to address barriers to occupational performance defined above and to maximize functional IND  OT plan of care 3-5x/week for 10-14 days  Post-acute rehab recommended following D/C  Plan   Treatment Interventions ADL retraining;Functional transfer training;UE strengthening/ROM; Endurance training;Cognitive reorientation;Patient/family training; Compensatory technique education; Activityengagement; Energy conservation;Equipment evaluation/education; Fine motor coordination activities;Continued evaluation   Goal Expiration Date 09/23/20  (goals extended +14 days)   OT Treatment Day 2   OT Frequency 3-5x/wk   Recommendation   OT Discharge Recommendation Post-Acute Rehabilitation Services   OT - OK to Discharge Yes  (when medically cleared)   Modified Culberson Scale   Modified Culberson Scale 4           Amaury Solis MS, OTR/L

## 2020-09-09 NOTE — PLAN OF CARE
Problem: OCCUPATIONAL THERAPY ADULT  Goal: Performs self-care activities at highest level of function for planned discharge setting  See evaluation for individualized goals  Description: Treatment Interventions: ADL retraining, Functional transfer training, UE strengthening/ROM, Endurance training, Cognitive reorientation, Patient/family training, Equipment evaluation/education, Compensatory technique education, Continued evaluation, Energy conservation, Activityengagement          See flowsheet documentation for full assessment, interventions and recommendations  Outcome: Progressing  Note: Limitation: Decreased ADL status, Decreased UE ROM, Decreased UE strength, Decreased Safe judgement during ADL, Decreased cognition, Decreased endurance, Decreased self-care trans, Decreased high-level ADLs, Mood limitation  Prognosis: Fair  Assessment: Pt is a 80 yo female seen for OT re-eval s/p goals expiring  Pt initially adm to [facility] on "8/26/2020 w/ a primary diagnosis of acute on chronic CHF s/p rapid HR and SOB  Pt  has a past medical history of A-fib ([facility] [address] ), Acute respiratory disease, Anemia, Arthritis, Atrial fibrillation ([facility] [address] ), CHF (congestive heart failure) ([facility] [address] ), Chronic pain, Heart failure ([address] ), 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 [facility]), History of colon polyps, long term use of blood thinners, Hypertension, Irregular heart beat, Narcotic dependence ([address] ), Rectal bleeding, Stroke ([facility] [address] ), and Uses walker  Pt seen for initial OT evaluation w/ active orders  Pt lives alone in a [facility] w/ 2STE  Pt reports that since D/C from recent hospital admit on 8/10 that she has been in bed most of the time, has not bathed, and only gets up for toileting  Pt reports she has not been eating- has Ensure for all meals  Pt reports prior to recent admission, that she was IND w/ ADLS and required A from miranda for IADLs  Was Mod I w RW for mobility   Pt has limited social support other than miranda, who she reports often steals from her " Current level of function: Min A UB bathing/dressing, Max A LB bathing/dressing/toileting  Mod A x1 bed mobility, MinAx1 STS transfers (Mod A toilet transfers) and functional mobility w/ RW  Pt presents w/ limitations in ADL status, functional mobility, functional transfers, activity tolerance, cognition, and endurace which are affecting occupational performance  Pt seen by neuropsychology 8/28/2020 and deemed her incompetent to make medical decisions  Following evaluation, Pt will benefit from continued OT treatment during hospital stay to address barriers to occupational performance defined above and to maximize functional IND  OT plan of care 3-5x/week for 10-14 days  Post-acute rehab recommended following D/C       OT Discharge Recommendation: Post-Acute Rehabilitation Services  OT - OK to Discharge: Yes(when medically cleared)

## 2020-09-10 NOTE — ASSESSMENT & PLAN NOTE
Lab Results   Component Value Date    HGBA1C 7 6 (H) 08/05/2020       Recent Labs     09/09/20  1616 09/09/20 2056 09/10/20  0604 09/10/20  1048   POCGLU 119 130 132 163*       Blood Sugar Average: Last 72 hrs:  (P) 141 [id number]   SSI  Blood Glucose checks TIDWM and QHS  Hold oral medications  ISS

## 2020-09-10 NOTE — ASSESSMENT & PLAN NOTE
· Hb dropped from 11-12 to 9  · Check stool occult, iron panel  · Patients states she has intermittent black stools  · Continue eliquis  · CT AP had no evidence of bleed

## 2020-09-10 NOTE — PROGRESS NOTES
Pt unable to ambulate back to bed from bathroom  Held in place and then assisted to chair then to bed by 3-4 people  Pt states her knees just gave out on her  Pt currently safely in bed with alarm on

## 2020-09-10 NOTE — ASSESSMENT & PLAN NOTE
Estimated Creatinine Clearance: 44 9 mL/min (by C-G formula based on SCr of 1 1 mg/dL)    B/l Cr 1 1  See MALA

## 2020-09-10 NOTE — ASSESSMENT & PLAN NOTE
Wt Readings from Last 3 Encounters:   09/10/20 86 kg (189 lb 9 5 oz)   08/09/20 75 4 kg (166 lb 3 6 oz)   08/06/20 86 4 kg (190 lb 7 6 oz)     Echo with EF 45%; continue home medications  Patient reports dyspnea on exertion; became winded talking to examiner  BNP elevated at 1326; distended abdomen  Denies night cough, orthopnea, proximal nocturnal dyspnea; a chest x-ray read as normal, but may have infiltrates  at time of exam on 8/27 appears euvolemic  However, possible MYERS 2/2 deconditioning as pt appears dry on exam  gentle IVF to tx MALA & it improved  Acute CHF exacerbation is ruled out  Per renal hold ACEi at d/c  Can d/c on Lasix 20 mg PO daily  Can have BMP at rehab and if Cr stable, can be placed back on ACEi      Since she will be here pending placement, restarted her on Lasix 20 mg daily    9/8-patient in 20 mg Lasix daily; net -1 5 L diuresed  -resting comfortably on room air, monitor    9/9-net -2 0 5 L diuresed; asymptomatic  -creatinine at baseline; will start lisinopril 2 5 mg as part of heart failure package

## 2020-09-10 NOTE — ASSESSMENT & PLAN NOTE
c/o severe pain  CTAP showed no acute finding  Incidentally shows transverse colon nodule  Can get repeat CTAP in 3 months if desired by guardian   Suspect musculoskeletal pain    9/9-patient still complaining of left lower quadrant pain largely in the groin section; CT reviewed and shows no acute pathology  May consider repeating imaging and possible muscle relaxer  9/10-chest x-rays of left hip with no acute fracture or bony abnormalities; moderate degenerative change  Does not appear to correlate patient's severe pain; consider repeating CT abdomen pelvis  -patient currently on 7 5 mg of oxycodone q i d   -have increased gabapentin 300 mg b i d

## 2020-09-10 NOTE — PLAN OF CARE
Problem: Potential for Falls  Goal: Patient will remain free of falls  Description: INTERVENTIONS:  - Assess patient frequently for physical needs  -  Identify cognitive and physical deficits and behaviors that affect risk of falls  -  [place] fall precautions as indicated by assessment   - Educate patient/family on patient safety including physical limitations  - Instruct patient to call for assistance with activity based on assessment  - Modify environment to reduce risk of injury  - Consider OT/PT consult to assist with strengthening/mobility  Outcome: Progressing     Problem: Nutrition/Hydration-ADULT  Goal: Nutrient/Hydration intake appropriate for improving, restoring or maintaining nutritional needs  Description: Monitor and assess patient's nutrition/hydration status for malnutrition  Collaborate with interdisciplinary team and initiate plan and interventions as ordered  Monitor patient's weight and dietary intake as ordered or per policy  Utilize nutrition screening tool and intervene as necessary  Determine patient's food preferences and provide high-protein, high-caloric foods as appropriate       INTERVENTIONS:  - Monitor oral intake, urinary output, labs, and treatment plans  - Assess nutrition and hydration status and recommend course of action  - Evaluate amount of meals eaten  - Assist patient with eating if necessary   - Allow adequate time for meals  - Recommend/ encourage appropriate diets, oral nutritional supplements, and vitamin/mineral supplements  - Order, calculate, and assess calorie counts as needed  - Recommend, monitor, and adjust tube feedings and TPN/PPN based on assessed needs  - Assess need for intravenous fluids  - Provide specific nutrition/hydration education as appropriate  - Include patient/family/caregiver in decisions related to nutrition  Outcome: Progressing     Problem: CARDIOVASCULAR - ADULT  Goal: Maintains optimal cardiac output and hemodynamic stability  Description: INTERVENTIONS:  - Monitor I/O, vital signs and rhythm  - Monitor for S/S and trends of decreased cardiac output  - Administer and titrate ordered vasoactive medications to optimize hemodynamic stability  - Assess quality of pulses, skin color and temperature  - Assess for signs of decreased coronary artery perfusion  - Instruct patient to report change in severity of symptoms  Outcome: Progressing  Goal: Absence of cardiac dysrhythmias or at baseline rhythm  Description: INTERVENTIONS:  - Continuous cardiac monitoring, vital signs, obtain 12 lead EKG if ordered  - Administer antiarrhythmic and heart rate control medications as ordered  - Monitor electrolytes and administer replacement therapy as ordered  Outcome: Progressing     Problem: RESPIRATORY - ADULT  Goal: Achieves optimal ventilation and oxygenation  Description: INTERVENTIONS:  - Assess for changes in respiratory status  - Assess for changes in mentation and behavior  - Position to facilitate oxygenation and minimize respiratory effort  - Oxygen administered by appropriate delivery if ordered  - Initiate smoking cessation education as indicated  - Encourage broncho-pulmonary hygiene including cough, deep breathe, Incentive Spirometry  - Assess the need for suctioning and aspirate as needed  - Assess and instruct to report SOB or any respiratory difficulty  - Respiratory Therapy support as indicated  Outcome: Progressing     Problem: MUSCULOSKELETAL - ADULT  Goal: Maintain or return mobility to safest level of function  Description: INTERVENTIONS:  - Assess patient's ability to carry out ADLs; assess patient's baseline for ADL function and identify physical deficits which impact ability to perform ADLs (bathing, care of mouth/teeth, toileting, grooming, dressing, etc )  - Assess/evaluate cause of self-care deficits   - Assess range of motion  - Assess patient's mobility  - Assess patient's need for assistive devices and provide as appropriate  - Encourage maximum independence but intervene and supervise when necessary  - Involve family in performance of ADLs  - Assess for home care needs following discharge   - Consider OT consult to assist with ADL evaluation and planning for discharge  - Provide patient education as appropriate  Outcome: Progressing  Goal: Maintain proper alignment of affected body part  Description: INTERVENTIONS:  - Support, maintain and protect limb and body alignment  - Provide patient/ family with appropriate education  Outcome: Progressing     Problem: Prexisting or High Potential for Compromised Skin Integrity  Goal: Skin integrity is maintained or improved  Description: INTERVENTIONS:  - Identify patients at risk for skin breakdown  - Assess and monitor skin integrity  - Assess and monitor nutrition and hydration status  - Monitor labs   - Assess for incontinence   - Turn and reposition patient  - Assist with mobility/ambulation  - Relieve pressure over bony prominences  - Avoid friction and shearing  - Provide appropriate hygiene as needed including keeping skin clean and dry  - Evaluate need for skin moisturizer/barrier cream  - Collaborate with interdisciplinary team   - Patient/family teaching  - Consider wound care consult   Outcome: Progressing     Problem: PAIN - ADULT  Goal: Verbalizes/displays adequate comfort level or baseline comfort level  Description: Interventions:  - Encourage patient to monitor pain and request assistance  - Assess pain using appropriate pain scale  - Administer analgesics based on type and severity of pain and evaluate response  - Implement non-pharmacological measures as appropriate and evaluate response  - Consider cultural and social influences on pain and pain management  - Notify physician/advanced practitioner if interventions unsuccessful or patient reports new pain  Outcome: Progressing

## 2020-09-10 NOTE — PROGRESS NOTES
Progress Note - Sonam Koenig [birth year], 79 y o  female MRN: [id number]    Unit/Bed#: [facility] 525-01 Encounter: [id number]    Primary Care Provider: Ivory Gabriel DO   Date and time admitted to hospital: 8/26/2020  4:18 PM        LLQ pain  Assessment & Plan  c/o severe pain  CTAP showed no acute finding  Incidentally shows transverse colon nodule  Can get repeat CTAP in 3 months if desired by guardian   Suspect musculoskeletal pain    9/9-patient still complaining of left lower quadrant pain largely in the groin section; CT reviewed and shows no acute pathology  May consider repeating imaging and possible muscle relaxer  9/10-chest x-rays of left hip with no acute fracture or bony abnormalities; moderate degenerative change  Does not appear to correlate patient's severe pain; consider repeating CT abdomen pelvis  -patient currently on 7 5 mg of oxycodone q i d   -have increased gabapentin 300 mg b i d  Herpes zoster without complication  Assessment & Plan  In lumbar region  Lesions appear crusted - no need for contact precautions  Completed Valtrex for 7 days    CKD (chronic kidney disease) stage 3, GFR 30-59 ml/min ([facility])  Assessment & Plan  Estimated Creatinine Clearance: 44 9 mL/min (by C-G formula based on SCr of 1 1 mg/dL)  B/l Cr 1 1  See MALA    DM2 (diabetes mellitus, type 2) [facility])  Assessment & Plan  Lab Results   Component Value Date    HGBA1C 7 6 (H) 08/05/2020       Recent Labs     09/09/20  1616 09/09/20  2056 09/10/20  0604 09/10/20  1048   POCGLU 119 130 132 163*       Blood Sugar Average: Last 72 hrs:  (P) 141 [id number]   SSI  Blood Glucose checks TIDWM and QHS  Hold oral medications  ISS      A-fib [facility])  Assessment & Plan  Patient rate controlled on admission; continue BB  Anticoagulated with Eliquis 5 mg b i d      Anemia  Assessment & Plan  · Hb dropped from 11-12 to 9  · Check stool occult, iron panel  · Patients states she has intermittent black stools  · Continue eliquis  · CT AP had no evidence of bleed    Elevated troponin level not due myocardial infarction  Assessment & Plan  Patient with elevated troponin; denies chest pain  EKG in the ED negative for ischemia; shows rate controlled atrial fibrillation  Trop actually lower than earlier in month  No need for further w/u    Mild cognitive impairment  Assessment & Plan  Neurospsych eval pt 8/28 and deemed her incompetent to make medical decisions    Coronary artery disease  Assessment & Plan  S/p PCI July 2019  C/w Plavix and BB and statin    Hyperlipidemia  Assessment & Plan  Continue statin    Essential hypertension  Assessment & Plan  C/w BB, Ace, diuretic; BP well controlled      Chronic systolic congestive heart failure (HCC)  Assessment & Plan  Wt Readings from Last 3 Encounters:   09/10/20 86 kg (189 lb 9 5 oz)   08/09/20 75 4 kg (166 lb 3 6 oz)   08/06/20 86 4 kg (190 lb 7 6 oz)     Echo with EF 45%; continue home medications  Patient reports dyspnea on exertion; became winded talking to examiner  BNP elevated at 1326; distended abdomen  Denies night cough, orthopnea, proximal nocturnal dyspnea; a chest x-ray read as normal, but may have infiltrates  at time of exam on 8/27 appears euvolemic  However, possible MYERS 2/2 deconditioning as pt appears dry on exam  gentle IVF to tx MALA & it improved  Acute CHF exacerbation is ruled out  Per renal hold ACEi at d/c  Can d/c on Lasix 20 mg PO daily  Can have BMP at rehab and if Cr stable, can be placed back on ACEi      Since she will be here pending placement, restarted her on Lasix 20 mg daily    9/8-patient in 20 mg Lasix daily; net -1 5 L diuresed  -resting comfortably on room air, monitor    9/9-net -2 0 5 L diuresed; asymptomatic  -creatinine at baseline; will start lisinopril 2 5 mg as part of heart failure package      Weakness/physical deconditioning  Assessment & Plan  PT/OT  Recent admission or patient declined follow on rehabilitation  Neuropsych eval ordered and pt not competent to make medical decisions  Will need guardian and then rehab placement    Dysthymic disorder  Assessment & Plan  Patient tearful and depressed on examination in ER; denies SI/HI, but did earlier in the ED  Continue Prozac and Pamelor  Behavior Health consult appreciated - no 1 to 1 needed    9/9-patient appears overall improved; awaiting guardianship; continue present therapy    * MALA (acute kidney injury) ([facility] [address] )  Assessment & Plan  Estimated Creatinine Clearance: 44 9 mL/min (by C-G formula based on SCr of 1 1 mg/dL)  POA  Patient noted to have serum creatinine 1 69 on admission; baseline 1-1 2  Pt has MYERS but likely this is more due to deconditioning and not CHF   Cr did not improve w/ diuresis  UA WNL  Urinary retention protocol ordered - had been straight cathed twice but appears resolved at this time  Renal appreciated  Cr now at baseline after getting IVF  Renal US WNL  CPK WNL    9/8-resolved        VTE Pharmacologic Prophylaxis:   Pharmacologic: Apixaban (Eliquis)  Mechanical VTE Prophylaxis in Place: Yes    Patient Centered Rounds: I have performed bedside rounds with nursing staff today  Discussions with Specialists or Other Care Team Provider:     Education and Discussions with Family / Patient: Care plan discussed with patient who voiced understanding and agrees with recommendations  Time Spent for Care: 30 minutes  More than 50% of total time spent on counseling and coordination of care as described above  Current Length of Stay: 15 day(s)    Current Patient Status: Inpatient   Certification Statement: The patient will continue to require additional inpatient hospital stay due to Awaiting guardianship and  placement    Discharge Plan: To be determined    Code Status: Level 3 - DNAR and DNI      Subjective:   Patient seen examined bedside; still complains of left lower quadrant pain  X-rays of hip yesterday showed moderate osteoarthritis; will repeat CT abdomen pelvis  Increased gabapentin 300 mg b i d  Still awaiting guardianship and SNF placement  Objective:     Vitals:   Temp (24hrs), Av °F (36 7 °C), Min:97 8 °F (36 6 °C), Max:98 1 °F (36 7 °C)    Temp:  [97 8 °F (36 6 °C)-98 1 °F (36 7 °C)] 97 8 °F (36 6 °C)  HR:  [65-87] 65  Resp:  [18] 18  BP: (103-145)/(50-68) 103/50  SpO2:  [92 %-94 %] 92 %  Body mass index is 31 55 kg/m²  Input and Output Summary (last 24 hours): Intake/Output Summary (Last 24 hours) at 9/10/2020 1346  Last data filed at 9/10/2020 0826  Gross per 24 hour   Intake 598 ml   Output 300 ml   Net 298 ml       Physical Exam:     Physical Exam  Vitals signs and nursing note reviewed  Constitutional:       Appearance: She is obese  HENT:      Head: Normocephalic and atraumatic  Right Ear: External ear normal       Left Ear: External ear normal       Nose: Nose normal       Mouth/Throat:      Mouth: Mucous membranes are moist    Eyes:      Extraocular Movements: Extraocular movements intact  Conjunctiva/sclera: Conjunctivae normal       Pupils: Pupils are equal, round, and reactive to light  Neck:      Musculoskeletal: Normal range of motion and neck supple  Cardiovascular:      Rate and Rhythm: Normal rate  Rhythm irregular  Heart sounds: Normal heart sounds  Pulmonary:      Effort: Pulmonary effort is normal       Breath sounds: Normal breath sounds  Abdominal:      General: There is distension  Palpations: Abdomen is soft  Musculoskeletal: Normal range of motion  Skin:     General: Skin is warm and dry  Comments: Ecchymotic lesions bilateral upper extremities   Neurological:      Mental Status: She is alert     Psychiatric:      Comments: Dysphoric  mood           Additional Data:     Labs:    Results from last 7 days   Lab Units 09/10/20  0513   WBC Thousand/uL 6 94   HEMOGLOBIN g/dL 8 5*   HEMATOCRIT % 27 5*   PLATELETS Thousands/uL 282   NEUTROS PCT % 69   LYMPHS PCT % 13*   MONOS PCT % 10   EOS PCT % 6     Results from last 7 days   Lab Units 09/09/20  0313   SODIUM mmol/L 138   POTASSIUM mmol/L 4 4   CHLORIDE mmol/L 104   CO2 mmol/L 30   BUN mg/dL 17   CREATININE mg/dL 1 10   ANION GAP mmol/L 4   CALCIUM mg/dL 9 0   ALBUMIN g/dL 3 1*   TOTAL BILIRUBIN mg/dL 0 40   ALK PHOS U/L 88   ALT U/L 21   AST U/L 21   GLUCOSE RANDOM mg/dL 125         Results from last 7 days   Lab Units 09/10/20  1048 09/10/20  0604 09/09/20  2056 09/09/20  1616 09/09/20  1111 09/09/20  0655 09/08/20  2115 09/08/20  1549 09/08/20  1132 09/08/20  0641 09/07/20  2120 09/07/20  1636   POC GLUCOSE mg/dl 163* 132 130 119 140 134 140 167* 157* 161* 134 115                   * I Have Reviewed All Lab Data Listed Above  * Additional Pertinent Lab Tests Reviewed:  All Labs Within Last 24 Hours Reviewed    Imaging:    Imaging Reports Reviewed Today Include:  Hip x-ray  Imaging Personally Reviewed by Myself Includes:  None    Recent Cultures (last 7 days):           Last 24 Hours Medication List:   Current Facility-Administered Medications   Medication Dose Route Frequency Provider Last Rate    acetaminophen  650 mg Oral 4x Daily (with meals and at bedtime) Amanda Ramirez MD      amiodarone  200 mg Oral Daily Ann Pruitt MD      apixaban  5 mg Oral BID Ann Pruitt MD      atorvastatin  20 mg Oral Daily With Julia Morgan MD      bisacodyl  10 mg Rectal Daily PRN Ann Pruitt MD      clopidogrel  75 mg Oral Daily Ann Pruitt MD      digoxin  125 mcg Oral Daily Ann Pruitt MD      docusate sodium  100 mg Oral BID Ann Pruitt MD      FLUoxetine  20 mg Oral Daily Ann Pruitt MD      folic acid  1,328 mcg Oral Daily Ann Pruitt MD      furosemide  20 mg Oral Daily Karrie Velasco MD      [START ON 9/11/2020] gabapentin  300 mg Oral Daily Ann Pruitt MD      gabapentin  300 mg Oral HS Ann Pruitt MD      insulin lispro  1-6 Units Subcutaneous TID [facility] Ann Pruitt MD      insulin lispro  1-6 Units Subcutaneous HS Parisa Leon MD      lisinopril  2 5 mg Oral HS Parisa Leon MD      melatonin  6 mg Oral HS Parisa Leon MD      methocarbamol  250 mg Oral BID PRN Myra Wells MD      metoprolol succinate  50 mg Oral Q12H Parisa Leon MD      nortriptyline  10 mg Oral HS Parisa Leon MD      oxyCODONE  5 mg Oral Q3H PRN Roma Joyce DO      oxyCODONE  7 5 mg Oral 4x Daily (with meals and at bedtime) Gui Guevara MD      pantoprazole  40 mg Oral Early Morning Parisa Leon MD      senna-docusate sodium  1 tablet Oral HS Parisa Leon MD      zolpidem  5 mg Oral HS Myra Wells MD          Today, Patient Was Seen By: Des Yung MD    ** Please Note: Dictation voice to text software may have been used in the creation of this document   **

## 2020-09-10 NOTE — ASSESSMENT & PLAN NOTE
Estimated Creatinine Clearance: 44 9 mL/min (by C-G formula based on SCr of 1 1 mg/dL)    POA  Patient noted to have serum creatinine 1 69 on admission; baseline 1-1 2  Pt has MYERS but likely this is more due to deconditioning and not CHF   Cr did not improve w/ diuresis  UA WNL  Urinary retention protocol ordered - had been straight cathed twice but appears resolved at this time  Renal appreciated  Cr now at baseline after getting IVF  Renal US WNL  CPK WNL    9/8-resolved

## 2020-09-10 NOTE — PLAN OF CARE
Problem: PHYSICAL THERAPY ADULT  Goal: Performs mobility at highest level of function for planned discharge setting  See evaluation for individualized goals  Description: Treatment/Interventions: Functional transfer training, LE strengthening/ROM, Elevations, Therapeutic exercise, Endurance training, Cognitive reorientation, Patient/family training, Equipment eval/education, Bed mobility, Gait training, Compensatory technique education, Spoke to nursing, Spoke to case management, OT, Family          See flowsheet documentation for full assessment, interventions and recommendations  Outcome: Progressing  Note: Prognosis: Fair  Problem List: Decreased strength, Decreased endurance, Impaired balance, Decreased mobility, Decreased cognition, Decreased safety awareness, Pain  Assessment: The pt  was fearful of falling after the events of this morning when she was unable to walk back to the bed  She ascribes to more pain than normal, and she expressed frustration with her situation  She was able to ambulate to the bathroom and back after much encouragement and assurances that the chair will be nearby if she needed to sit  She continues to remain notably limited from her baseline, and she will benefit from continued therapy  Barriers to Discharge: Inaccessible home environment, Decreased caregiver support     PT Discharge Recommendation: [id number]     PT - OK to Discharge: Yes(to rehab once medically cleared)    See flowsheet documentation for full assessment

## 2020-09-10 NOTE — PHYSICAL THERAPY NOTE
Physical Therapy Progress Note     09/10/20 1355   PT Last Visit   PT Visit Date 09/10/20   Pain Assessment   Pain Assessment Tool 0-10   Pain Score Worst Possible Pain   Pain Location/Orientation Location: Generalized   Restrictions/Precautions   Other Precautions Cognitive; Impulsive; Bed Alarm; Chair Alarm   Subjective   Subjective The pt  reports that she continues to have a lot of pain, and that she was unable to walk back from the bathroom this morning  She expresses frustration with her continued situation  Bed Mobility   Supine to Sit 3  Moderate assistance   Additional items Assist x 1; Increased time required;Verbal cues;LE management   Sit to Supine 3  Moderate assistance   Additional items Assist x 1; Increased time required;Verbal cues;LE management   Transfers   Sit to Stand 4  Minimal assistance   Additional items Assist x 1; Increased time required;Verbal cues   Stand to Sit 4  Minimal assistance   Additional items Assist x 1; Increased time required;Verbal cues   Ambulation/Elevation   Gait pattern Excessively slow; Step to;Short stride; Inconsistent annie;Decreased foot clearance   Gait Assistance 4  Minimal assist   Additional items Assist x 1;Verbal cues   Assistive Device Rolling walker   Distance 10 feet x 2  Balance   Static Sitting Good   Dynamic Sitting Fair +   Static Standing Fair   Ambulatory Poor +   Activity Tolerance   Activity Tolerance Patient tolerated treatment well;Patient limited by fatigue;Patient limited by pain   Assessment   Prognosis Fair   Problem List Decreased strength;Decreased endurance; Impaired balance;Decreased mobility; Decreased cognition;Decreased safety awareness;Pain   Assessment The pt  was fearful of falling after the events of this morning when she was unable to walk back to the bed  She ascribes to more pain than normal, and she expressed frustration with her situation   She was able to ambulate to the bathroom and back after much encouragement and assurances that the chair will be nearby if she needed to sit  She continues to remain notably limited from her baseline, and she will benefit from continued therapy  Barriers to Discharge Inaccessible home environment;Decreased caregiver support   Goals   Patient Goals To have less pain  STG Expiration Date 09/10/20   PT Treatment Day 6   Plan   Treatment/Interventions Functional transfer training;LE strengthening/ROM; Therapeutic exercise; Endurance training;Cognitive reorientation;Patient/family training;Bed mobility;Gait training   Progress Slow progress, decreased activity tolerance   PT Frequency   (3-5x a week )   Recommendation   PT Discharge Recommendation [id number] [facility]

## 2020-09-11 NOTE — CASE MANAGEMENT
CM spoke to Geovanni at [facility] [employer] [facility] to confirm they received the records and guardianship data form which was faxed to the office on 9/3/2020  Records were received and Consuelo Diana is working on [facility] [employer] to be filed with [facility]  CM received call from patient's Zonia Claire, [phone] yesterday and explained we are stiil working on [employer] for guardianship  Keesha Coffey reports she is cleaning patient's apartment and was again advised not to review any belongings until guardian is assigned  Keesha Erlinda now reports to CM that the patient is being evicted from the apartment and says the rental is a [address] [place] [address] 8 voucher 8 program  Patient did not respond to repeated attempts from [facility] to contact them and now must be out of apartment 10/1/2020  Sherice provided contact for [employer], [phone], ext [phone]  Called and left message requesting Ashley Davis call [facility]

## 2020-09-11 NOTE — RESTORATIVE TECHNICIAN NOTE
Restorative Specialist Mobility Note       Activity: Ambulate in agee, Chair     Assistive Device: Front wheel walker        Repositioned: Sitting, Up in chair, Pillow support

## 2020-09-11 NOTE — ASSESSMENT & PLAN NOTE
Wt Readings from Last 3 Encounters:   09/11/20 85 4 kg (188 lb 4 4 oz)   08/09/20 75 4 kg (166 lb 3 6 oz)   08/06/20 86 4 kg (190 lb 7 6 oz)     Echo with EF 45%; continue home medications  Patient reports dyspnea on exertion; became winded talking to examiner  BNP elevated at 1326; distended abdomen  Denies night cough, orthopnea, proximal nocturnal dyspnea; a chest x-ray read as normal, but may have infiltrates  at time of exam on 8/27 appears euvolemic  However, possible MYERS 2/2 deconditioning as pt appears dry on exam  gentle IVF to tx MALA & it improved  Acute CHF exacerbation is ruled out  Per renal hold ACEi at d/c  Can d/c on Lasix 20 mg PO daily  Can have BMP at rehab and if Cr stable, can be placed back on ACEi      Since she will be here pending placement, restarted her on Lasix 20 mg daily    9/8-patient in 20 mg Lasix daily; net -1 5 L diuresed  -resting comfortably on room air, monitor    9/9-net -2 5 L diuresed; asymptomatic  -creatinine at baseline; will start lisinopril 2 5 mg as part of heart failure package

## 2020-09-11 NOTE — PROGRESS NOTES
Progress Note - Luigi Rush [birth year], 79 y o  female MRN: [id number]    Unit/Bed#: [facility] 525-01 Encounter: [id number]    Primary Care Provider: Brennan Badillo DO   Date and time admitted to hospital: 8/26/2020  4:18 PM        LLQ pain  Assessment & Plan  c/o severe pain  CTAP showed no acute finding  Incidentally shows transverse colon nodule  Can get repeat CTAP in 3 months if desired by guardian   Suspect musculoskeletal pain    9/9-patient still complaining of left lower quadrant pain largely in the groin section; CT reviewed and shows no acute pathology  May consider repeating imaging and possible muscle relaxer  9/10-chest x-rays of left hip with no acute fracture or bony abnormalities; moderate degenerative change  Does not appear to correlate patient's severe pain; consider repeating CT abdomen pelvis  -patient currently on 7 5 mg of oxycodone q i d   -have increased gabapentin 300 mg b i d     9/11-CT abdomen pelvis repeated yesterday; again, no acute findings that would explain patient's left groin/left lower quadrant pain  At this point, given location of the pain in the left anterior groin and raising leg exacerbates pain; believe this is musculoskeletal secondary to moderate degenerative changes in the left hip  Patient also noted to have multiple other degenerative changes to include right shoulder surgery and spinal changes; left hip osteoarthritis consistent with other areas of musculoskeletal pain  Herpes zoster without complication  Assessment & Plan  In lumbar region  Lesions appear crusted - no need for contact precautions  Completed Valtrex for 7 days    CKD (chronic kidney disease) stage 3, GFR 30-59 ml/min ([facility])  Assessment & Plan  Estimated Creatinine Clearance: 44 8 mL/min (by C-G formula based on SCr of 1 1 mg/dL)    B/l Cr 1 1  See MALA    DM2 (diabetes mellitus, type 2) [facility])  Assessment & Plan  Lab Results   Component Value Date    HGBA1C 7 6 (H) 08/05/2020       Recent Labs     09/10/20  1048 09/10/20  1607 09/10/20  2058 09/11/20  0638   POCGLU 163* 112 178* 154*       Blood Sugar Average: Last 72 hrs:  (P) 375 [id number]   SSI  Blood Glucose checks TIDWM and QHS  Hold oral medications  ISS      A-fib [facility])  Assessment & Plan  Patient rate controlled on admission; continue BB  Anticoagulated with Eliquis 5 mg b i d      Anemia  Assessment & Plan  · Hb dropped from 11-12 to 9  · Check stool occult, iron panel  · Patients states she has intermittent black stools  · Continue eliquis  · CT AP had no evidence of bleed    9/11-review of chart shows patient had GI bleeding in the past; hemoglobin continues to drop steadily  -on this admission, FOBT has been ordered twice and canceled twice for unclear reasons  -will perform DEXTER; likely consult GI  -will continue Eliquis at this time; however pending further results may need to hold secondary to GI B    Elevated troponin level not due myocardial infarction  Assessment & Plan  Patient with elevated troponin; denies chest pain  EKG in the ED negative for ischemia; shows rate controlled atrial fibrillation  Trop actually lower than earlier in month  No need for further w/u    Mild cognitive impairment  Assessment & Plan  Neurospsych eval pt 8/28 and deemed her incompetent to make medical decisions    Coronary artery disease  Assessment & Plan  S/p PCI July 2019  C/w Plavix and BB and statin    Hyperlipidemia  Assessment & Plan  Continue statin    Essential hypertension  Assessment & Plan  C/w BB, Ace, diuretic; BP well controlled      Chronic systolic congestive heart failure (HCC)  Assessment & Plan  Wt Readings from Last 3 Encounters:   09/11/20 85 4 kg (188 lb 4 4 oz)   08/09/20 75 4 kg (166 lb 3 6 oz)   08/06/20 86 4 kg (190 lb 7 6 oz)     Echo with EF 45%; continue home medications  Patient reports dyspnea on exertion; became winded talking to examiner  BNP elevated at 1326; distended abdomen  Denies night cough, orthopnea, proximal nocturnal dyspnea; a chest x-ray read as normal, but may have infiltrates  at time of exam on 8/27 appears euvolemic  However, possible MYERS 2/2 deconditioning as pt appears dry on exam  gentle IVF to tx MALA & it improved  Acute CHF exacerbation is ruled out  Per renal hold ACEi at d/c  Can d/c on Lasix 20 mg PO daily  Can have BMP at rehab and if Cr stable, can be placed back on ACEi  Since she will be here pending placement, restarted her on Lasix 20 mg daily    9/8-patient in 20 mg Lasix daily; net -1 5 L diuresed  -resting comfortably on room air, monitor    9/9-net -2 5 L diuresed; asymptomatic  -creatinine at baseline; will start lisinopril 2 5 mg as part of heart failure package      Weakness/physical deconditioning  Assessment & Plan  PT/OT  Recent admission or patient declined follow on rehabilitation  Neuropsych eval ordered and pt not competent to make medical decisions  Will need guardian and then rehab placement    Dysthymic disorder  Assessment & Plan  Patient tearful and depressed on examination in ER; denies SI/HI, but did earlier in the ED  Continue Prozac and Pamelor  Behavior Health consult appreciated - no 1 to 1 needed    9/9-patient appears overall improved; awaiting guardianship; continue present therapy    * MALA (acute kidney injury) ([facility] [address] )  Assessment & Plan  Estimated Creatinine Clearance: 44 8 mL/min (by C-G formula based on SCr of 1 1 mg/dL)    POA  Patient noted to have serum creatinine 1 69 on admission; baseline 1-1 2  Pt has MYERS but likely this is more due to deconditioning and not CHF   Cr did not improve w/ diuresis  UA WNL  Urinary retention protocol ordered - had been straight cathed twice but appears resolved at this time  Renal appreciated  Cr now at baseline after getting IVF  Renal US WNL  CPK WNL    9/8-resolved        VTE Pharmacologic Prophylaxis:   Pharmacologic: Apixaban (Eliquis)  Mechanical VTE Prophylaxis in Place: Yes    Patient Centered Rounds: I have performed bedside rounds with nursing staff today  Discussions with Specialists or Other Care Team Provider:     Education and Discussions with Family / Patient: Care plan discussed with patient who voiced understanding and agrees with recommendations  Time Spent for Care: 30 minutes  More than 50% of total time spent on counseling and coordination of care as described above  Current Length of Stay: 16 day(s)    Current Patient Status: Inpatient   Certification Statement: The patient will continue to require additional inpatient hospital stay due to Awaiting guardianship    Discharge Plan: To be determined    Code Status: Level 3 - DNAR and DNI      Subjective:   Patient seen examined bedside, no acute distress or discomfort noted  Appears that left groin pain is improved with increase gabapentin; patient reports better sleep at night  CT abdomen pelvis yesterday negative for acute pathology to cause that pain; believe this is related to left hip osteoarthritis  Patient with hemoglobin noted to be dropping; FOBT ordered but canceled for unclear reasons  Will reorder today  CBC tomorrow if hemoglobin continues to drop; consider consult GI  Otherwise waiting for guardianship and long-term placement  Objective:     Vitals:   Temp (24hrs), Av 9 °F (36 6 °C), Min:97 8 °F (36 6 °C), Max:98 1 °F (36 7 °C)    Temp:  [97 8 °F (36 6 °C)-98 1 °F (36 7 °C)] 97 9 °F (36 6 °C)  HR:  [68-77] 68  Resp:  [16-20] 16  BP: (100-127)/(53-63) 100/56  SpO2:  [93 %-96 %] 95 %  Body mass index is 31 33 kg/m²  Input and Output Summary (last 24 hours): Intake/Output Summary (Last 24 hours) at 2020 1153  Last data filed at 2020 1101  Gross per 24 hour   Intake 1098 ml   Output 1100 ml   Net -2 ml       Physical Exam:     Physical Exam  Vitals signs and nursing note reviewed  Constitutional:       Appearance: She is obese  HENT:      Head: Normocephalic and atraumatic        Right Ear: External ear normal       Left Ear: External ear normal       Nose: Nose normal       Mouth/Throat:      Mouth: Mucous membranes are moist    Eyes:      Extraocular Movements: Extraocular movements intact  Conjunctiva/sclera: Conjunctivae normal       Pupils: Pupils are equal, round, and reactive to light  Neck:      Musculoskeletal: Normal range of motion and neck supple  Cardiovascular:      Rate and Rhythm: Normal rate  Rhythm irregular  Heart sounds: Normal heart sounds  Pulmonary:      Effort: Pulmonary effort is normal       Breath sounds: Normal breath sounds  Abdominal:      General: There is distension  Palpations: Abdomen is soft  Musculoskeletal: Normal range of motion  Skin:     General: Skin is warm and dry  Comments: Ecchymotic lesions bilateral upper extremities   Neurological:      Mental Status: She is alert  Psychiatric:      Comments: Dysphoric  mood           Additional Data:     Labs:    Results from last 7 days   Lab Units 09/10/20  0513   WBC Thousand/uL 6 94   HEMOGLOBIN g/dL 8 5*   HEMATOCRIT % 27 5*   PLATELETS Thousands/uL 282   NEUTROS PCT % 69   LYMPHS PCT % 13*   MONOS PCT % 10   EOS PCT % 6     Results from last 7 days   Lab Units 09/09/20  0313   SODIUM mmol/L 138   POTASSIUM mmol/L 4 4   CHLORIDE mmol/L 104   CO2 mmol/L 30   BUN mg/dL 17   CREATININE mg/dL 1 10   ANION GAP mmol/L 4   CALCIUM mg/dL 9 0   ALBUMIN g/dL 3 1*   TOTAL BILIRUBIN mg/dL 0 40   ALK PHOS U/L 88   ALT U/L 21   AST U/L 21   GLUCOSE RANDOM mg/dL 125         Results from last 7 days   Lab Units 09/11/20  1113 09/11/20  0638 09/10/20  2058 09/10/20  1607 09/10/20  1048 09/10/20  0604 09/09/20 2056 09/09/20  1616 09/09/20  1111 09/09/20  0655 09/08/20  2115 09/08/20  1549   POC GLUCOSE mg/dl 136 154* 178* 112 163* 132 130 119 140 134 140 167*                   * I Have Reviewed All Lab Data Listed Above  * Additional Pertinent Lab Tests Reviewed:  All Labs Within Last 24 Hours Reviewed    Imaging:    Imaging Reports Reviewed Today Include:  CT abdomen pelvis  Imaging Personally Reviewed by Myself Includes:  None    Recent Cultures (last 7 days):           Last 24 Hours Medication List:   Current Facility-Administered Medications   Medication Dose Route Frequency Provider Last Rate    acetaminophen  650 mg Oral 4x Daily (with meals and at bedtime) Nuris Soni MD      amiodarone  200 mg Oral Daily Alan Guerrero MD      apixaban  5 mg Oral BID Alan Guerrero MD      atorvastatin  20 mg Oral Daily With Héctor Albarado MD      bisacodyl  10 mg Rectal Daily PRN Alan Guerrero MD      clopidogrel  75 mg Oral Daily Alan Guerrero MD      digoxin  125 mcg Oral Daily Alan Guerrero MD      docusate sodium  100 mg Oral BID Alan Guerrero MD      FLUoxetine  20 mg Oral Daily Alan Guerrero MD      folic acid  5,101 mcg Oral Daily Alan Guerrero MD      furosemide  20 mg Oral Daily Sepideh Garcia MD      gabapentin  300 mg Oral Daily Alan Guerrero MD      gabapentin  300 mg Oral HS Alan Guerrero MD      insulin lispro  1-6 Units Subcutaneous TID [facility] Alan Guerrero MD      insulin lispro  1-6 Units Subcutaneous HS Alan Guerrero MD      lisinopril  2 5 mg Oral HS Alan Guerrero MD      melatonin  6 mg Oral HS Alan Guerrero MD      methocarbamol  250 mg Oral BID PRN Sepideh Garcia MD      metoprolol succinate  50 mg Oral Q12H Alan Guerrero MD      nortriptyline  10 mg Oral HS Alan Guerrero MD      oxyCODONE  5 mg Oral Q3H PRN Vladislav Rand DO      oxyCODONE  7 5 mg Oral 4x Daily (with meals and at bedtime) Nuris Soni MD      pantoprazole  40 mg Oral Early Morning Alan Guerrero MD      senna-docusate sodium  1 tablet Oral HS Alan Guerrero MD      zolpidem  5 mg Oral HS Sepideh Garcia MD          Today, Patient Was Seen By: José Miguel Delacruz MD    ** Please Note: Dictation voice to text software may have been used in the creation of this document  **

## 2020-09-11 NOTE — ASSESSMENT & PLAN NOTE
· Hb dropped from 11-12 to 9  · Check stool occult, iron panel  · Patients states she has intermittent black stools  · Continue eliquis  · CT AP had no evidence of bleed    9/11-review of chart shows patient had GI bleeding in the past; hemoglobin continues to drop steadily  -on this admission, FOBT has been ordered twice and canceled twice for unclear reasons  -will perform DEXTER; likely consult GI  -will continue Eliquis at this time; however pending further results may need to hold secondary to GI B

## 2020-09-11 NOTE — ASSESSMENT & PLAN NOTE
Lab Results   Component Value Date    HGBA1C 7 6 (H) 08/05/2020       Recent Labs     09/10/20  1048 09/10/20  1607 09/10/20  2058 09/11/20  0638   POCGLU 163* 112 178* 154*       Blood Sugar Average: Last 72 hrs:  (P) 687 [id number]   SSI  Blood Glucose checks TIDWM and QHS  Hold oral medications  ISS

## 2020-09-11 NOTE — ASSESSMENT & PLAN NOTE
c/o severe pain  CTAP showed no acute finding  Incidentally shows transverse colon nodule  Can get repeat CTAP in 3 months if desired by guardian   Suspect musculoskeletal pain    9/9-patient still complaining of left lower quadrant pain largely in the groin section; CT reviewed and shows no acute pathology  May consider repeating imaging and possible muscle relaxer  9/10-chest x-rays of left hip with no acute fracture or bony abnormalities; moderate degenerative change  Does not appear to correlate patient's severe pain; consider repeating CT abdomen pelvis  -patient currently on 7 5 mg of oxycodone q i d   -have increased gabapentin 300 mg b i d     9/11-CT abdomen pelvis repeated yesterday; again, no acute findings that would explain patient's left groin/left lower quadrant pain  At this point, given location of the pain in the left anterior groin and raising leg exacerbates pain; believe this is musculoskeletal secondary to moderate degenerative changes in the left hip  Patient also noted to have multiple other degenerative changes to include right shoulder surgery and spinal changes; left hip osteoarthritis consistent with other areas of musculoskeletal pain

## 2020-09-12 NOTE — ASSESSMENT & PLAN NOTE
Lab Results   Component Value Date    HGBA1C 7 6 (H) 08/05/2020       Recent Labs     09/11/20  1553 09/11/20  2102 09/12/20  0630 09/12/20  1119   POCGLU 152* 134 126 107       Blood Sugar Average: Last 72 hrs:  (P) 136 [id number]   SSI  Blood Glucose checks TIDWM and QHS  Hold oral medications  ISS

## 2020-09-12 NOTE — PROGRESS NOTES
Progress Note - Sonam Koenig [birth year], 79 y o  female MRN: [id number]    Unit/Bed#: [facility] 525-01 Encounter: [id number]    Primary Care Provider: Ivory Gabriel DO   Date and time admitted to hospital: 8/26/2020  4:18 PM        DM2 (diabetes mellitus, type 2) [facility])  Assessment & Plan  Lab Results   Component Value Date    HGBA1C 7 6 (H) 08/05/2020       Recent Labs     09/11/20  1553 09/11/20  2102 09/12/20  0630 09/12/20  1119   POCGLU 152* 134 126 107       Blood Sugar Average: Last 72 hrs:  (P) 136 [id number]   SSI  Blood Glucose checks TIDWM and QHS  Hold oral medications  ISS      A-fib [facility])  Assessment & Plan  Patient rate controlled on admission; continue BB  Anticoagulated with Eliquis 5 mg b i d      Anemia  Assessment & Plan  · Hb dropped from 11-12 to 9  · Check stool occult, iron panel  · Patients states she has intermittent black stools  · Continue eliquis  · CT AP had no evidence of bleed    9/11-review of chart shows patient had GI bleeding in the past; hemoglobin continues to drop steadily  -on this admission, FOBT has been ordered twice and canceled twice for unclear reasons  -will perform DEXTER; likely consult GI  -will continue Eliquis at this time; however pending further results may need to hold secondary to GI B    Elevated troponin level not due myocardial infarction  Assessment & Plan  Patient with elevated troponin; denies chest pain  EKG in the ED negative for ischemia; shows rate controlled atrial fibrillation  Trop actually lower than earlier in month  No need for further w/u    Mild cognitive impairment  Assessment & Plan  Neurospsych eval pt 8/28 and deemed her incompetent to make medical decisions    Coronary artery disease  Assessment & Plan  S/p PCI July 2019  C/w Plavix and BB and statin    Hyperlipidemia  Assessment & Plan  Continue statin    Essential hypertension  Assessment & Plan  C/w BB, Ace, diuretic; BP well controlled      Chronic systolic congestive heart failure [facility])  Assessment & Plan  Wt Readings from Last 3 Encounters:   09/12/20 85 6 kg (188 lb 11 4 oz)   08/09/20 75 4 kg (166 lb 3 6 oz)   08/06/20 86 4 kg (190 lb 7 6 oz)     Echo with EF 45%; continue home medications  Patient reports dyspnea on exertion; became winded talking to examiner  BNP elevated at 1326; distended abdomen  Denies night cough, orthopnea, proximal nocturnal dyspnea; a chest x-ray read as normal, but may have infiltrates  at time of exam on 8/27 appears euvolemic  However, possible MYERS 2/2 deconditioning as pt appears dry on exam  gentle IVF to tx MALA & it improved  Acute CHF exacerbation is ruled out  Per renal hold ACEi at d/c  Can d/c on Lasix 20 mg PO daily  Can have BMP at rehab and if Cr stable, can be placed back on ACEi  Since she will be here pending placement, restarted her on Lasix 20 mg daily    9/8-patient in 20 mg Lasix daily; net -1 5 L diuresed  -resting comfortably on room air, monitor    9/9-net -2 5 L diuresed; asymptomatic  -creatinine at baseline; will start lisinopril 2 5 mg as part of heart failure package    9/12-patient 95% SpO2 on room air; blood pressure remains well controlled  Overall, improved and stable  Weakness/physical deconditioning  Assessment & Plan  PT/OT  Recent admission or patient declined follow on rehabilitation  Neuropsych eval ordered and pt not competent to make medical decisions  Will need guardian and then rehab placement    Dysthymic disorder  Assessment & Plan  Patient tearful and depressed on examination in ER; denies SI/HI, but did earlier in the ED  Continue Prozac and Pamelor  Behavior Health consult appreciated - no 1 to 1 needed    9/9-patient appears overall improved; awaiting guardianship; continue present therapy    * MALA (acute kidney injury) ([facility] [address] )  Assessment & Plan  Estimated Creatinine Clearance: 44 8 mL/min (by C-G formula based on SCr of 1 1 mg/dL)    POA  Patient noted to have serum creatinine 1 69 on admission; baseline 1-1 2  Pt has MYERS but likely this is more due to deconditioning and not CHF   Cr did not improve w/ diuresis  UA WNL  Urinary retention protocol ordered - had been straight cathed twice but appears resolved at this time  Renal appreciated  Cr now at baseline after getting IVF  Renal US WNL  CPK WNL    -resolved      VTE Pharmacologic Prophylaxis:   Pharmacologic: Apixaban (Eliquis)  Mechanical VTE Prophylaxis in Place: Yes    Patient Centered Rounds: I have performed bedside rounds with nursing staff today  Discussions with Specialists or Other Care Team Provider:     Education and Discussions with Family / Patient: Care plan discussed with patient who voiced understanding and agrees with recommendations  Time Spent for Care: 30 minutes  More than 50% of total time spent on counseling and coordination of care as described above  Current Length of Stay: 17 day(s)    Current Patient Status: Inpatient   Certification Statement: The patient will continue to require additional inpatient hospital stay due to Awaiting guardianship hearing    Discharge Plan: To be determined    Code Status: Level 3 - DNAR and DNI      Subjective:   Patient seen examined bedside, no acute events overnight  Objective:     Vitals:   Temp (24hrs), Av °F (36 7 °C), Min:97 9 °F (36 6 °C), Max:98 2 °F (36 8 °C)    Temp:  [97 9 °F (36 6 °C)-98 2 °F (36 8 °C)] 98 2 °F (36 8 °C)  HR:  [58-86] 86  Resp:  [16-20] 18  BP: ()/(54-65) 145/65  SpO2:  [93 %-95 %] 93 %  Body mass index is 31 4 kg/m²  Input and Output Summary (last 24 hours): Intake/Output Summary (Last 24 hours) at 2020 1251  Last data filed at 2020 0801  Gross per 24 hour   Intake 822 ml   Output 1350 ml   Net -528 ml       Physical Exam:     Physical Exam  Vitals signs and nursing note reviewed  Constitutional:       Appearance: She is obese  HENT:      Head: Normocephalic and atraumatic        Right Ear: External ear normal       Left Ear: External ear normal       Nose: Nose normal       Mouth/Throat:      Mouth: Mucous membranes are moist    Eyes:      Extraocular Movements: Extraocular movements intact  Conjunctiva/sclera: Conjunctivae normal       Pupils: Pupils are equal, round, and reactive to light  Neck:      Musculoskeletal: Normal range of motion and neck supple  Cardiovascular:      Rate and Rhythm: Normal rate  Rhythm irregular  Heart sounds: Normal heart sounds  Pulmonary:      Effort: Pulmonary effort is normal       Breath sounds: Normal breath sounds  Abdominal:      General: There is distension  Palpations: Abdomen is soft  Musculoskeletal: Normal range of motion  Skin:     General: Skin is warm and dry  Comments: Ecchymotic lesions bilateral upper extremities   Neurological:      Mental Status: She is alert  Psychiatric:      Comments: Dysphoric  mood           Additional Data:     Labs:    Results from last 7 days   Lab Units 09/12/20  0522   WBC Thousand/uL 7 37   HEMOGLOBIN g/dL 8 5*   HEMATOCRIT % 28 8*   PLATELETS Thousands/uL 317   NEUTROS PCT % 72   LYMPHS PCT % 12*   MONOS PCT % 10   EOS PCT % 5     Results from last 7 days   Lab Units 09/09/20  0313   SODIUM mmol/L 138   POTASSIUM mmol/L 4 4   CHLORIDE mmol/L 104   CO2 mmol/L 30   BUN mg/dL 17   CREATININE mg/dL 1 10   ANION GAP mmol/L 4   CALCIUM mg/dL 9 0   ALBUMIN g/dL 3 1*   TOTAL BILIRUBIN mg/dL 0 40   ALK PHOS U/L 88   ALT U/L 21   AST U/L 21   GLUCOSE RANDOM mg/dL 125         Results from last 7 days   Lab Units 09/12/20  1119 09/12/20  0630 09/11/20  2102 09/11/20  1553 09/11/20  1113 09/11/20  0638 09/10/20  2058 09/10/20  1607 09/10/20  1048 09/10/20  0604 09/09/20  2056 09/09/20  1616   POC GLUCOSE mg/dl 107 126 134 152* 136 154* 178* 112 163* 132 130 119                   * I Have Reviewed All Lab Data Listed Above  * Additional Pertinent Lab Tests Reviewed:  All Labs Within Last 24 Hours Reviewed    Imaging:    Imaging Reports Reviewed Today Include:   Imaging Personally Reviewed by Myself Includes:      Recent Cultures (last 7 days):           Last 24 Hours Medication List:   Current Facility-Administered Medications   Medication Dose Route Frequency Provider Last Rate    acetaminophen  650 mg Oral 4x Daily (with meals and at bedtime) Lucien Love MD      amiodarone  200 mg Oral Daily Sahra Caro MD      apixaban  5 mg Oral BID Sahra Caro MD      atorvastatin  20 mg Oral Daily With Lethea Shone, MD      bisacodyl  10 mg Rectal Daily PRN Sahra Caro MD      clopidogrel  75 mg Oral Daily Sahra Caro MD      digoxin  125 mcg Oral Daily Sahra Caro MD      docusate sodium  100 mg Oral BID Sahra Caro MD      FLUoxetine  20 mg Oral Daily Sahra Caro MD      folic acid  7,785 mcg Oral Daily Sahra Caro MD      furosemide  20 mg Oral Daily Ary Bhagat MD      gabapentin  300 mg Oral Daily Sahra Caro MD      gabapentin  300 mg Oral HS Sahra Caro MD      insulin lispro  1-6 Units Subcutaneous TID [facility] Sahra Caro MD      insulin lispro  1-6 Units Subcutaneous HS Sahra Caro MD      lisinopril  2 5 mg Oral HS Sahra Caro MD      melatonin  6 mg Oral HS Sahra Caro MD      methocarbamol  250 mg Oral BID PRN Ary Bhagat MD      metoprolol succinate  50 mg Oral Q12H Sahra Caro MD      nortriptyline  10 mg Oral HS Sahra Caro MD      oxyCODONE  5 mg Oral Q3H PRN Jocelin Garcia DO      oxyCODONE  7 5 mg Oral 4x Daily (with meals and at bedtime) Lucien Love MD      pantoprazole  40 mg Oral Early Morning Sahra Caro MD      senna-docusate sodium  1 tablet Oral HS Sahra Caro MD      zolpidem  5 mg Oral HS Ary Bhagat MD          Today, Patient Was Seen By: Sahra Mesa MD    ** Please Note: Dictation voice to text software may have been used in the creation of this document   **

## 2020-09-12 NOTE — PLAN OF CARE
Problem: Potential for Falls  Goal: Patient will remain free of falls  Description: INTERVENTIONS:  - Assess patient frequently for physical needs  -  Identify cognitive and physical deficits and behaviors that affect risk of falls  -  [place] fall precautions as indicated by assessment   - Educate patient/family on patient safety including physical limitations  - Instruct patient to call for assistance with activity based on assessment  - Modify environment to reduce risk of injury  - Consider OT/PT consult to assist with strengthening/mobility  Outcome: Progressing     Problem: Nutrition/Hydration-ADULT  Goal: Nutrient/Hydration intake appropriate for improving, restoring or maintaining nutritional needs  Description: Monitor and assess patient's nutrition/hydration status for malnutrition  Collaborate with interdisciplinary team and initiate plan and interventions as ordered  Monitor patient's weight and dietary intake as ordered or per policy  Utilize nutrition screening tool and intervene as necessary  Determine patient's food preferences and provide high-protein, high-caloric foods as appropriate       INTERVENTIONS:  - Monitor oral intake, urinary output, labs, and treatment plans  - Assess nutrition and hydration status and recommend course of action  - Evaluate amount of meals eaten  - Assist patient with eating if necessary   - Allow adequate time for meals  - Recommend/ encourage appropriate diets, oral nutritional supplements, and vitamin/mineral supplements  - Order, calculate, and assess calorie counts as needed  - Recommend, monitor, and adjust tube feedings and TPN/PPN based on assessed needs  - Assess need for intravenous fluids  - Provide specific nutrition/hydration education as appropriate  - Include patient/family/caregiver in decisions related to nutrition  Outcome: Progressing     Problem: CARDIOVASCULAR - ADULT  Goal: Maintains optimal cardiac output and hemodynamic stability  Description: INTERVENTIONS:  - Monitor I/O, vital signs and rhythm  - Monitor for S/S and trends of decreased cardiac output  - Administer and titrate ordered vasoactive medications to optimize hemodynamic stability  - Assess quality of pulses, skin color and temperature  - Assess for signs of decreased coronary artery perfusion  - Instruct patient to report change in severity of symptoms  Outcome: Progressing  Goal: Absence of cardiac dysrhythmias or at baseline rhythm  Description: INTERVENTIONS:  - Continuous cardiac monitoring, vital signs, obtain 12 lead EKG if ordered  - Administer antiarrhythmic and heart rate control medications as ordered  - Monitor electrolytes and administer replacement therapy as ordered  Outcome: Progressing     Problem: RESPIRATORY - ADULT  Goal: Achieves optimal ventilation and oxygenation  Description: INTERVENTIONS:  - Assess for changes in respiratory status  - Assess for changes in mentation and behavior  - Position to facilitate oxygenation and minimize respiratory effort  - Oxygen administered by appropriate delivery if ordered  - Initiate smoking cessation education as indicated  - Encourage broncho-pulmonary hygiene including cough, deep breathe, Incentive Spirometry  - Assess the need for suctioning and aspirate as needed  - Assess and instruct to report SOB or any respiratory difficulty  - Respiratory Therapy support as indicated  Outcome: Progressing     Problem: MUSCULOSKELETAL - ADULT  Goal: Maintain proper alignment of affected body part  Description: INTERVENTIONS:  - Support, maintain and protect limb and body alignment  - Provide patient/ family with appropriate education  Outcome: Progressing     Problem: Prexisting or High Potential for Compromised Skin Integrity  Goal: Skin integrity is maintained or improved  Description: INTERVENTIONS:  - Identify patients at risk for skin breakdown  - Assess and monitor skin integrity  - Assess and monitor nutrition and hydration status  - Monitor labs   - Assess for incontinence   - Turn and reposition patient  - Assist with mobility/ambulation  - Relieve pressure over bony prominences  - Avoid friction and shearing  - Provide appropriate hygiene as needed including keeping skin clean and dry  - Evaluate need for skin moisturizer/barrier cream  - Collaborate with interdisciplinary team   - Patient/family teaching  - Consider wound care consult   Outcome: Progressing     Problem: MUSCULOSKELETAL - ADULT  Goal: Maintain or return mobility to safest level of function  Description: INTERVENTIONS:  - Assess patient's ability to carry out ADLs; assess patient's baseline for ADL function and identify physical deficits which impact ability to perform ADLs (bathing, care of mouth/teeth, toileting, grooming, dressing, etc )  - Assess/evaluate cause of self-care deficits   - Assess range of motion  - Assess patient's mobility  - Assess patient's need for assistive devices and provide as appropriate  - Encourage maximum independence but intervene and supervise when necessary  - Involve family in performance of ADLs  - Assess for home care needs following discharge   - Consider OT consult to assist with ADL evaluation and planning for discharge  - Provide patient education as appropriate  Outcome: Not Progressing     Problem: PAIN - ADULT  Goal: Verbalizes/displays adequate comfort level or baseline comfort level  Description: Interventions:  - Encourage patient to monitor pain and request assistance  - Assess pain using appropriate pain scale  - Administer analgesics based on type and severity of pain and evaluate response  - Implement non-pharmacological measures as appropriate and evaluate response  - Consider cultural and social influences on pain and pain management  - Notify physician/advanced practitioner if interventions unsuccessful or patient reports new pain  Outcome: Not Progressing

## 2020-09-12 NOTE — ASSESSMENT & PLAN NOTE
Wt Readings from Last 3 Encounters:   09/12/20 85 6 kg (188 lb 11 4 oz)   08/09/20 75 4 kg (166 lb 3 6 oz)   08/06/20 86 4 kg (190 lb 7 6 oz)     Echo with EF 45%; continue home medications  Patient reports dyspnea on exertion; became winded talking to examiner  BNP elevated at 1326; distended abdomen  Denies night cough, orthopnea, proximal nocturnal dyspnea; a chest x-ray read as normal, but may have infiltrates  at time of exam on 8/27 appears euvolemic  However, possible MYERS 2/2 deconditioning as pt appears dry on exam  gentle IVF to tx MALA & it improved  Acute CHF exacerbation is ruled out  Per renal hold ACEi at d/c  Can d/c on Lasix 20 mg PO daily  Can have BMP at rehab and if Cr stable, can be placed back on ACEi  Since she will be here pending placement, restarted her on Lasix 20 mg daily    9/8-patient in 20 mg Lasix daily; net -1 5 L diuresed  -resting comfortably on room air, monitor    9/9-net -2 5 L diuresed; asymptomatic  -creatinine at baseline; will start lisinopril 2 5 mg as part of heart failure package    9/12-patient 95% SpO2 on room air; blood pressure remains well controlled  Overall, improved and stable

## 2020-09-13 NOTE — PROGRESS NOTES
Vancomycin Assessment  Susanna Kang is a 79 y o  female who is currently receiving Vancomycin IV with management by the Pharmacy Consult service  Relevant clinical data and objective history reviewed:  Creatinine   Date Value Ref Range Status   09/13/2020 1 34 (H) 0 60 - 1 30 mg/dL Final     Comment:     Standardized to IDMS reference method   09/09/2020 1 10 0 60 - 1 30 mg/dL Final     Comment:     Standardized to IDMS reference method   09/06/2020 1 08 0 60 - 1 30 mg/dL Final     Comment:     Standardized to IDMS reference method   06/22/2015 0 51 (L) 0 60 - 1 30 mg/dL Final     Comment:     Standardized to IDMS reference method   03/17/2014 0 67 0 60 - 1 30 mg/dL Final     Comment:     Standardized to IDMS reference method     /86   Pulse (!) 113   Temp (!) 104 1 °F (40 1 °C)   Resp 21   Ht 5' 5" (1 651 m) Comment: per 3/2/20 encounter  Wt 86 2 kg (190 lb 0 6 oz)   SpO2 94%   BMI 31 62 kg/m²   I/O last 3 completed shifts:   In: 5543 [P O :2222]  Out: 2125 [Urine:2125]  Lab Results   Component Value Date/Time    BUN 17 09/13/2020 06:48 AM    BUN 11 06/22/2015 06:12 PM    WBC 9 89 09/13/2020 06:48 AM    WBC 9 11 06/22/2015 06:12 PM    HGB 10 1 (L) 09/13/2020 06:48 AM    HGB 10 5 (L) 09/13/2020 06:48 AM    HGB 15 0 06/22/2015 06:12 PM    HCT 34 4 (L) 09/13/2020 06:48 AM    HCT 31 (L) 09/13/2020 06:48 AM    HCT 44 8 06/22/2015 06:12 PM     (H) 09/13/2020 06:48 AM    MCV 95 06/22/2015 06:12 PM     09/13/2020 06:48 AM     06/22/2015 06:12 PM     Temp Readings from Last 3 Encounters:   09/13/20 (!) 104 1 °F (40 1 °C)   08/10/20 97 5 °F (36 4 °C) (Oral)   03/02/20 99 3 °F (37 4 °C) (Tympanic)     Vancomycin Assessment:  Indication: Sepsis  Status: Patient was a rapid response this morning, found to be tachycardic, hypoxic, febrile to 103 orally (105 6 rectally), and minimally responsive  Cultures:  9/13 blood cultures x2: in process   Procalcitonin:   9/13: 3 13  Renal Function: Baseline Scr 0 9-1; now with an MALA with a Scr at 1 34 this AM  UOP over the last 24 hours has been appropriate at 0 6 ml/kg/hr  Potential Nephrotoxicity Factors:  Medications: diuretics  Patient Factors: elderly, renal dysfunction  Days of Therapy: 1  Current Dose: No dose was ordered  Goal Trough: 15-20     Vancomycin Plan:  Dosing: Will give a loading dose of 1,250 mg once STAT (~20 mg/kg adjBW)  Next Level: Will check random level on 9/14 with AM labs  Renal Function Monitoring: Will continue to follow BMP and UOP daily  Pharmacy will continue to follow closely for s/sx of nephrotoxicity, infusion reactions and appropriateness of therapy  BMP and CBC will be ordered per protocol  We will continue to follow the patients culture results and clinical progress daily      Gabi Carpenter, PharmD, [address] and Internal Medicine Clinical Pharmacist  [phone] or via [facility]

## 2020-09-13 NOTE — ASSESSMENT & PLAN NOTE
9/13-patient noted be in septic shock this morning; started on antibiotic therapy and fluid resuscitation  -previously had no SIRS criteria and was saturating well on room air  -initially, felt this may be a GI source; however CT of chest abdomen pelvis revealed multiple areas of consolidation not present on admission    -septic shock likely secondary to H AP; patient made comfort care measures only

## 2020-09-13 NOTE — ASSESSMENT & PLAN NOTE
Patient tearful and depressed on examination in ER; denies SI/HI, but did earlier in the ED  Continue Prozac and [address] consult appreciated - no 1 to 1 needed    9/9-patient appears overall improved; awaiting guardianship; continue present therapy    9/13-patient difficult to arouse; unable to examine mood and mental status  Continue present therapy and monitor for improvement

## 2020-09-13 NOTE — H&P
On arrival to floor, was notified that patient had rapid response overnight and found to be non-responsive, febrile, tachycardic, and hypoxic  Patient had pan-scan ordered and fluids and broad spectrum abx given  Medication and ice bags used for treatment of fever and patient became more responsive  Unfortunately, this morning, patient's BP dropped despite fluid resuscitation (SIRs + likely GI source + Low BP s/pf IV fluids); likely in septic shock  Discussed with critical care and will take patient  Lactic acid - 7 4; Pro kt - 3 13; FOBT from yesterday is positive  In patient's chart, it states patient does not want her sister called  At bedside, patient told me, 'do not call my sister '  On admission, patient reported that she wanted to be DNR/DNI; as per chart, this was her status for multiple prior admissions  However, patient deemed incompetent to make health care decisions by neuropsych provider and is awaiting guardianship at this time  Given this, we must confirm with family, prior to ICU admission, that patient is indeed a DNR/DNI candidate  Was able to reach Mrs William Waddell, patient's sister, who reports that she was aware that Abbie Smith was in the hospital and they have spoken several times since her admission  Mrs Doe Body stated that the patient has said numerous times that she does not wish to have heroic measures taken incase of medical emergency and verified to me that patient is indeed DNR/DNI  Will communicate this to ICU staff

## 2020-09-13 NOTE — PLAN OF CARE
Problem: Potential for Falls  Goal: Patient will remain free of falls  Description: INTERVENTIONS:  - Assess patient frequently for physical needs  -  Identify cognitive and physical deficits and behaviors that affect risk of falls  -  [place] fall precautions as indicated by assessment   - Educate patient/family on patient safety including physical limitations  - Instruct patient to call for assistance with activity based on assessment  - Modify environment to reduce risk of injury  - Consider OT/PT consult to assist with strengthening/mobility  Outcome: Progressing     Problem: Nutrition/Hydration-ADULT  Goal: Nutrient/Hydration intake appropriate for improving, restoring or maintaining nutritional needs  Description: Monitor and assess patient's nutrition/hydration status for malnutrition  Collaborate with interdisciplinary team and initiate plan and interventions as ordered  Monitor patient's weight and dietary intake as ordered or per policy  Utilize nutrition screening tool and intervene as necessary  Determine patient's food preferences and provide high-protein, high-caloric foods as appropriate       INTERVENTIONS:  - Monitor oral intake, urinary output, labs, and treatment plans  - Assess nutrition and hydration status and recommend course of action  - Evaluate amount of meals eaten  - Assist patient with eating if necessary   - Allow adequate time for meals  - Recommend/ encourage appropriate diets, oral nutritional supplements, and vitamin/mineral supplements  - Order, calculate, and assess calorie counts as needed  - Recommend, monitor, and adjust tube feedings and TPN/PPN based on assessed needs  - Assess need for intravenous fluids  - Provide specific nutrition/hydration education as appropriate  - Include patient/family/caregiver in decisions related to nutrition  Outcome: Progressing     Problem: CARDIOVASCULAR - ADULT  Goal: Maintains optimal cardiac output and hemodynamic stability  Description: INTERVENTIONS:  - Monitor I/O, vital signs and rhythm  - Monitor for S/S and trends of decreased cardiac output  - Administer and titrate ordered vasoactive medications to optimize hemodynamic stability  - Assess quality of pulses, skin color and temperature  - Assess for signs of decreased coronary artery perfusion  - Instruct patient to report change in severity of symptoms  Outcome: Progressing     Problem: RESPIRATORY - ADULT  Goal: Achieves optimal ventilation and oxygenation  Description: INTERVENTIONS:  - Assess for changes in respiratory status  - Assess for changes in mentation and behavior  - Position to facilitate oxygenation and minimize respiratory effort  - Oxygen administered by appropriate delivery if ordered  - Initiate smoking cessation education as indicated  - Encourage broncho-pulmonary hygiene including cough, deep breathe, Incentive Spirometry  - Assess the need for suctioning and aspirate as needed  - Assess and instruct to report SOB or any respiratory difficulty  - Respiratory Therapy support as indicated  Outcome: Progressing     Problem: MUSCULOSKELETAL - ADULT  Goal: Maintain or return mobility to safest level of function  Description: INTERVENTIONS:  - Assess patient's ability to carry out ADLs; assess patient's baseline for ADL function and identify physical deficits which impact ability to perform ADLs (bathing, care of mouth/teeth, toileting, grooming, dressing, etc )  - Assess/evaluate cause of self-care deficits   - Assess range of motion  - Assess patient's mobility  - Assess patient's need for assistive devices and provide as appropriate  - Encourage maximum independence but intervene and supervise when necessary  - Involve family in performance of ADLs  - Assess for home care needs following discharge   - Consider OT consult to assist with ADL evaluation and planning for discharge  - Provide patient education as appropriate  Outcome: Progressing Problem: Prexisting or High Potential for Compromised Skin Integrity  Goal: Skin integrity is maintained or improved  Description: INTERVENTIONS:  - Identify patients at risk for skin breakdown  - Assess and monitor skin integrity  - Assess and monitor nutrition and hydration status  - Monitor labs   - Assess for incontinence   - Turn and reposition patient  - Assist with mobility/ambulation  - Relieve pressure over bony prominences  - Avoid friction and shearing  - Provide appropriate hygiene as needed including keeping skin clean and dry  - Evaluate need for skin moisturizer/barrier cream  - Collaborate with interdisciplinary team   - Patient/family teaching  - Consider wound care consult   Outcome: Progressing     Problem: PAIN - ADULT  Goal: Verbalizes/displays adequate comfort level or baseline comfort level  Description: Interventions:  - Encourage patient to monitor pain and request assistance  - Assess pain using appropriate pain scale  - Administer analgesics based on type and severity of pain and evaluate response  - Implement non-pharmacological measures as appropriate and evaluate response  - Consider cultural and social influences on pain and pain management  - Notify physician/advanced practitioner if interventions unsuccessful or patient reports new pain  Outcome: Progressing

## 2020-09-13 NOTE — ASSESSMENT & PLAN NOTE
Patient rate controlled on admission; continue BB  Anticoagulated with Eliquis 5 mg b i d     9/13-comfort care measures only, Eliquis held

## 2020-09-13 NOTE — PROGRESS NOTES
Progress Note - Allegra Lima [birth year], 79 y o  female MRN: [id number]    Unit/Bed#: [facility] 525-01 Encounter: [id number]    Primary Care Provider: Vikki Corea DO   Date and time admitted to hospital: 8/26/2020  4:18 PM        LLQ pain  Assessment & Plan  c/o severe pain  CTAP showed no acute finding  Incidentally shows transverse colon nodule  Can get repeat CTAP in 3 months if desired by guardian   Suspect musculoskeletal pain    9/9-patient still complaining of left lower quadrant pain largely in the groin section; CT reviewed and shows no acute pathology  May consider repeating imaging and possible muscle relaxer  9/10-chest x-rays of left hip with no acute fracture or bony abnormalities; moderate degenerative change  Does not appear to correlate patient's severe pain; consider repeating CT abdomen pelvis  -patient currently on 7 5 mg of oxycodone q i d   -have increased gabapentin 300 mg b i d     9/11-CT abdomen pelvis repeated yesterday; again, no acute findings that would explain patient's left groin/left lower quadrant pain  At this point, given location of the pain in the left anterior groin and raising leg exacerbates pain; believe this is musculoskeletal secondary to moderate degenerative changes in the left hip  Patient also noted to have multiple other degenerative changes to include right shoulder surgery and spinal changes; left hip osteoarthritis consistent with other areas of musculoskeletal pain      DM2 (diabetes mellitus, type 2) [facility])  Assessment & Plan  Lab Results   Component Value Date    HGBA1C 7 6 (H) 08/05/2020       Recent Labs     09/12/20  1119 09/12/20  1617 09/12/20 2052 09/13/20  0635   POCGLU 107 128 162* 183*       Blood Sugar Average: Last 72 hrs:  (P) 066 [id number]   SSI  Blood Glucose checks TIDWM and QHS  Hold oral medications  ISS  9/13-will hold blood glucose checks    A-fib ([address] )  Assessment & Plan  Patient rate controlled on admission; continue BB  Anticoagulated with Eliquis 5 mg b i d     9/13-comfort care measures only, Eliquis held    Anemia  Assessment & Plan  · Hb dropped from 11-12 to 9  · Check stool occult, iron panel  · Patients states she has intermittent black stools  · Continue eliquis  · CT AP had no evidence of bleed    9/11-review of chart shows patient had GI bleeding in the past; hemoglobin continues to drop steadily  -on this admission, FOBT has been ordered twice and canceled twice for unclear reasons  -will perform DEXTER; likely consult GI  -will continue Eliquis at this time; however pending further results may need to hold secondary to GI B    9/13-no further monitoring at this time    Elevated troponin level not due myocardial infarction  Assessment & Plan  Patient with elevated troponin; denies chest pain  EKG in the ED negative for ischemia; shows rate controlled atrial fibrillation  Trop actually lower than earlier in month  No need for further w/u    Mild cognitive impairment  Assessment & Plan  Neurospsych eval pt 8/28 and deemed her incompetent to make medical decisions    Coronary artery disease  Assessment & Plan  S/p PCI July 2019  C/w Plavix and BB and statin    9/13-comfort care measures only, Plavix, beta-blocker, and statin held    Hyperlipidemia  Assessment & Plan  Continue statin    9/13-comfort care measures only; statin held    Hospital-acquired pneumonia  Assessment & Plan  9/13-patient noted be in septic shock this morning; started on antibiotic therapy and fluid resuscitation  -previously had no SIRS criteria and was saturating well on room air  -initially, felt this may be a GI source; however CT of chest abdomen pelvis revealed multiple areas of consolidation not present on admission    -septic shock likely secondary to H AP; patient made comfort care measures only    Essential hypertension  Assessment & Plan  C/w BB, Ace, diuretic; BP well controlled    9/13-patient transition to comfort care only measures after noted to be in septic shock this morning  Antihypertensive stopped; no further blood pressure monitoring  If improvs; may restart antihypertensives in the future  Chronic systolic congestive heart failure (HCC)  Assessment & Plan  Wt Readings from Last 3 Encounters:   09/13/20 86 2 kg (190 lb 0 6 oz)   08/09/20 75 4 kg (166 lb 3 6 oz)   08/06/20 86 4 kg (190 lb 7 6 oz)     Echo with EF 45%; continue home medications  Patient reports dyspnea on exertion; became winded talking to examiner  BNP elevated at 1326; distended abdomen  Denies night cough, orthopnea, proximal nocturnal dyspnea; a chest x-ray read as normal, but may have infiltrates  at time of exam on 8/27 appears euvolemic  However, possible MYERS 2/2 deconditioning as pt appears dry on exam  gentle IVF to tx MALA & it improved  Acute CHF exacerbation is ruled out  Per renal hold ACEi at d/c  Can d/c on Lasix 20 mg PO daily  Can have BMP at rehab and if Cr stable, can be placed back on ACEi  Since she will be here pending placement, restarted her on Lasix 20 mg daily    9/8-patient in 20 mg Lasix daily; net -1 5 L diuresed  -resting comfortably on room air, monitor    9/9-net -2 5 L diuresed; asymptomatic  -creatinine at baseline; will start lisinopril 2 5 mg as part of heart failure package    9/12-patient 95% SpO2 on room air; blood pressure remains well controlled  Overall, improved and stable  9/13-patient requiring 15 L oxygen status post emergent fluid resuscitation for septic shock  Now comfort care measures only  Weakness/physical deconditioning  Assessment & Plan  PT/OT  Recent admission or patient declined follow on rehabilitation  Neuropsych eval ordered and pt not competent to make medical decisions    Will need guardian and then rehab placement    Dysthymic disorder  Assessment & Plan  Patient tearful and depressed on examination in ER; denies SI/HI, but did earlier in the ED  Continue Prozac and [address] consult appreciated - no 1 to 1 needed    9/9-patient appears overall improved; awaiting guardianship; continue present therapy    9/13-patient difficult to arouse; unable to examine mood and mental status  Continue present therapy and monitor for improvement  * MALA (acute kidney injury) ([facility] [address] )  Assessment & Plan  Estimated Creatinine Clearance: 36 9 mL/min (A) (by C-G formula based on SCr of 1 34 mg/dL (H))  POA  Patient noted to have serum creatinine 1 69 on admission; baseline 1-1 2  Pt has MYERS but likely this is more due to deconditioning and not CHF   Cr did not improve w/ diuresis  UA WNL  Urinary retention protocol ordered - had been straight cathed twice but appears resolved at this time  Renal appreciated  Cr now at baseline after getting IVF  Renal US WNL  CPK WNL    9/8-resolved      VTE Pharmacologic Prophylaxis:   Pharmacologic: Pharmacologic VTE Prophylaxis contraindicated due to Comfort care measures  Mechanical VTE Prophylaxis in Place: No    Patient Centered Rounds: I have performed bedside rounds with nursing staff today  Discussions with Specialists or Other Care Team Provider:  Palliative Care, Critical Care    Education and Discussions with Family / Patient: Discussed treatment plan with family and patient who agree with current plan; encouraged to ask questions and participate  Time Spent for Care: 30 minutes  More than 50% of total time spent on counseling and coordination of care as described above  Current Length of Stay: 18 day(s)    Current Patient Status: Inpatient   Certification Statement: The patient will continue to require additional inpatient hospital stay due to Terminal prognosis    Discharge Plan: To be determined    Code Status: Level 4 - Comfort Care      Subjective:   Patient seen examined bedside, not responsive; blood pressure 64/40    Noted to be in septic shock this morning; despite being DNI/DNR, critical care was consulted and patient started on empiric broad-spectrum antibiotics with fluid resuscitation  Patient was to be transferred to ICU; however family reported that it was against her wishes to receive central line and pressors  At that point, patient made level for comfort care only  Primary source still unclear; however after pan CT scans, appears to be multifocal pneumonia  Again, patient hypotensive despite 2 L IV fluids and antibiotics; at this point palliative care consulted and active therapy retracted  Cheyne-Hogan breathing and place; patient on heart rate monitoring  Have discussed with family and nursing  Objective:     Vitals:   Temp (24hrs), Av 7 °F (39 3 °C), Min:98 7 °F (37 1 °C), Max:105 6 °F (40 9 °C)    Temp:  [98 7 °F (37 1 °C)-105 6 °F (40 9 °C)] 102 4 °F (39 1 °C)  HR:  [] 114  Resp:  [20-21] 21  BP: ()/(40-91) 96/55  SpO2:  [75 %-98 %] 94 %  Body mass index is 31 62 kg/m²  Input and Output Summary (last 24 hours): Intake/Output Summary (Last 24 hours) at 2020 1545  Last data filed at 2020 1136  Gross per 24 hour   Intake 1400 ml   Output 775 ml   Net 625 ml       Physical Exam:     Physical Exam  Vitals signs and nursing note reviewed  Constitutional:       Appearance: She is obese  HENT:      Head: Normocephalic and atraumatic  Right Ear: External ear normal       Left Ear: External ear normal       Nose: Nose normal       Mouth/Throat:      Mouth: Mucous membranes are moist    Eyes:      Extraocular Movements: Extraocular movements intact  Conjunctiva/sclera: Conjunctivae normal       Pupils: Pupils are equal, round, and reactive to light  Neck:      Musculoskeletal: Normal range of motion and neck supple  Cardiovascular:      Rate and Rhythm: Normal rate  Rhythm irregular  Heart sounds: Normal heart sounds  Pulmonary:      Comments: Cheyne-Hogan breathing  Abdominal:      General: There is distension  Palpations: Abdomen is soft     Skin:     General: Skin is warm and dry  Comments: Ecchymotic lesions bilateral upper extremities   Neurological:      Mental Status: She is alert  Comments: Nonresponsive           Additional Data:     Labs:    Results from last 7 days   Lab Units 09/13/20  0648 09/12/20  0522   WBC Thousand/uL 9 89 7 37   HEMOGLOBIN g/dL 10 1* 8 5*   I STAT HEMOGLOBIN g/dl 10 5*  --    HEMATOCRIT % 34 4* 28 8*   HEMATOCRIT, ISTAT % 31*  --    PLATELETS Thousands/uL 339 317   BANDS PCT % 15*  --    NEUTROS PCT %  --  72   LYMPHS PCT %  --  12*   LYMPHO PCT % 5*  --    MONOS PCT %  --  10   MONO PCT % 0*  --    EOS PCT % 0 5     Results from last 7 days   Lab Units 09/13/20  0648   SODIUM mmol/L 138   POTASSIUM mmol/L 4 2   CHLORIDE mmol/L 103   CO2 mmol/L 21   CO2, I-STAT mmol/L 22   BUN mg/dL 17   CREATININE mg/dL 1 34*   ANION GAP mmol/L 14*   CALCIUM mg/dL 9 0   ALBUMIN g/dL 3 2*   TOTAL BILIRUBIN mg/dL 0 85   ALK PHOS U/L 86   ALT U/L 30   AST U/L 38   GLUCOSE RANDOM mg/dL 182*     Results from last 7 days   Lab Units 09/13/20  0648   INR  1 39*     Results from last 7 days   Lab Units 09/13/20  0635 09/12/20  2052 09/12/20  1617 09/12/20  1119 09/12/20  0630 09/11/20  2102 09/11/20  1553 09/11/20  1113 09/11/20  0638 09/10/20  2058 09/10/20  1607 09/10/20  1048   POC GLUCOSE mg/dl 183* 162* 128 107 126 134 152* 136 154* 178* 112 163*         Results from last 7 days   Lab Units 09/13/20  0710   LACTIC ACID mmol/L 7 4*   PROCALCITONIN ng/ml 3 13*           * I Have Reviewed All Lab Data Listed Above  * Additional Pertinent Lab Tests Reviewed: All Labs Within Last 24 Hours Reviewed    Imaging:    Imaging Reports Reviewed Today Include:  CT head, CT chest abdomen pelvis  Imaging Personally Reviewed by Myself Includes:      Recent Cultures (last 7 days):     Results from last 7 days   Lab Units 09/13/20  0712   BLOOD CULTURE  Received in Microbiology Lab  Culture in Progress  Received in Microbiology Lab  Culture in Progress         Last 24 Hours Medication List:   Current Facility-Administered Medications   Medication Dose Route Frequency Provider Last Rate    bisacodyl  10 mg Rectal Daily PRN Eden Dupree MD      DOPamine in dextrose 5%           glycopyrrolate  0 1 mg Intravenous Q1H PRN Alyse Martinez, DO      haloperidol lactate  1 mg Intravenous Q6H PRN Alyse LOPEZ Martinez, DO      HYDROmorphone  0 5 mg/hr Intravenous Continuous Eden Dupree MD 0 5 mg/hr (09/13/20 1136)    HYDROmorphone  0 5 mg Intravenous Q10 Min PRN Alyse Martinez DO      LORazepam  1 mg Intravenous Q4H PRN Lauren Cueva DO          Today, Patient Was Seen By: Enrique Solis MD    ** Please Note: Dictation voice to text software may have been used in the creation of this document   **
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
## 2020-09-13 NOTE — NURSING NOTE
Pt with minimal response to calling her name; vitals taken with O2 sat of 75%; heart rate 160--170; bp stable ; nonrebreather applied  SLIM paged and rapid response called   See code documentation

## 2020-09-13 NOTE — SEPSIS NOTE
Sepsis Note   Nakia Salgado 79 y o  female MRN: [id number]  Unit/Bed#: [facility] 525-01 Encounter: [id number]      qSOFA     Row Name 09/13/20 0725 09/13/20 06:47:42 09/13/20 06:42:55 09/12/20 2300 09/12/20 2122    Altered mental status GCS < 15              Respiratory Rate > / =22  0      0      Systolic BP < / =785    0  0  0  0    Q Sofa Score  0      0      Row Name 09/12/20 1529 09/12/20 0800 09/12/20 0750 09/11/20 2323 09/11/20 1500    Altered mental status GCS < 15    0          Respiratory Rate > / =22      0  0  0    Systolic BP < / =258  0    0  0  1    Q Sofa Score    0  0  0  1    Row Name 09/11/20 0945 09/11/20 0800 09/11/20 0736          Altered mental status GCS < 15    0        Respiratory Rate > / =22      0      Systolic BP < / =302  1    0      Q Sofa Score  1  0  0          Initial Sepsis Screening     Row Name 09/13/20 0725 09/13/20 0723             Is the patient's history suggestive of a new or worsening infection? (!) Yes (Proceed)  -MH  (!) Yes (Proceed)  -       Suspected source of infection           Are two or more of the following signs & symptoms of infection both present and new to the patient? (!) Yes (Proceed)  -         Indicate SIRS criteria  Hyperthemia > 38 3C (100 9F); Tachycardia > 90 bpm;Altered mental status  -MH         If the answer is yes to both questions, suspicion of sepsis is present  [de-identified]         If severe sepsis is present AND tissue hypoperfusion perists in the hour after fluid resuscitation or lactate > 4, the patient meets criteria for SEPTIC SHOCK           Are any of the following organ dysfunction criteria present within 6 hours of suspected infection and SIRS criteria that are NOT considered to be chronic conditions?            Organ dysfunction           Date of presentation of severe sepsis           Time of presentation of severe sepsis           Tissue hypoperfusion persists in the hour after crystalloid fluid administration, evidenced, by either:  Lesa Severs       Was hypotension present within one hour of the conclusion of crystalloid fluid administration?   Lesa Severs       Date of presentation of septic shock           Time of presentation of septic shock             User Key  (r) = Recorded By, (t) = Taken By, (c) = Cosigned By    [address] Name Provider Type    Juanjose George PA-C Physician Assistant

## 2020-09-13 NOTE — ASSESSMENT & PLAN NOTE
C/w BB, Ace, diuretic; BP well controlled    9/13-patient transition to comfort care only measures after noted to be in septic shock this morning  Antihypertensive stopped; no further blood pressure monitoring  If improvs; may restart antihypertensives in the future

## 2020-09-13 NOTE — QUICK NOTE
On arrival to floor, was notified that patient had rapid response overnight and found to be non-responsive, febrile, tachycardic, and hypoxic  Patient had pan-scan ordered and fluids and broad spectrum abx given  Medication and ice bags used for treatment of fever and patient became more responsive  Unfortunately, this morning, patient's BP dropped despite fluid resuscitation (SIRs + likely GI source + Low BP s/pf IV fluids); likely in septic shock  Discussed with critical care and will take patient  Lactic acid - 7 4; Pro kt - 3 13; FOBT from yesterday is positive        In patient's chart, it states patient does not want her sister called  At bedside, patient told me, 'do not call my sister '  On admission, patient reported that she wanted to be DNR/DNI; as per chart, this was her status for multiple prior admissions  However, patient deemed incompetent to make health care decisions by neuropsych provider and is awaiting guardianship at this time  Given this, we must confirm with family, prior to ICU admission, that patient is indeed a DNR/DNI candidate      Was able to reach Mrs Marcela Cowden, patient's sister, who reports that she was aware that Cathay Crigler was in the hospital and they have spoken several times since her admission  Mrs Elijah Garrido stated that the patient has said numerous times that she does not wish to have heroic measures taken incase of medical emergency and verified to me that patient is indeed DNR/DNI    Will communicate this to ICU staff

## 2020-09-13 NOTE — ASSESSMENT & PLAN NOTE
S/p PCI July 2019  C/w Plavix and BB and statin    9/13-comfort care measures only, Plavix, beta-blocker, and statin held

## 2020-09-13 NOTE — CONSULTS
Vancomycin therapy has been discontinued  Pharmacy will sign off  Thank you for this consult  Please do not hesitate to call us with questions or re-consult us if the need arises       Moody Biswas, PharmD, [address] [facility] Clinical Pharmacist  [phone] or via [employer]

## 2020-09-13 NOTE — ASSESSMENT & PLAN NOTE
Estimated Creatinine Clearance: 36 9 mL/min (A) (by C-G formula based on SCr of 1 34 mg/dL (H))    POA  Patient noted to have serum creatinine 1 69 on admission; baseline 1-1 2  Pt has MYERS but likely this is more due to deconditioning and not CHF   Cr did not improve w/ diuresis  UA WNL  Urinary retention protocol ordered - had been straight cathed twice but appears resolved at this time  Renal appreciated  Cr now at baseline after getting IVF  Renal US WNL  CPK WNL    9/8-resolved

## 2020-09-13 NOTE — QUICK NOTE
Spoke with Alfredo Mccollum (sister for Polo Pascual and only living family) at [phone] regarding advanced directives further than DNR/DNI such as a central line for pressure support and surgery if indicated  Deidra mentions Polo Pascual has been saying she has had multiple hospitalizations and she's tired of prolonging everything for the last 5 months  Abby Jerry felt placing an invasive line even though it could help her sister, this would prolong her life and would not want that  Abby Jerry felt it was best not to be aggressive and let her pass without further measures  I recommended our palliative care team to help with any pain she may have and Deidra agreed  Discussed with Dr Marco Antonio Benitez regarding our conversation above  Will place on comfort care and recommend Palliative Care consult       Also discussed with floor RN

## 2020-09-13 NOTE — ASSESSMENT & PLAN NOTE
Wt Readings from Last 3 Encounters:   09/13/20 86 2 kg (190 lb 0 6 oz)   08/09/20 75 4 kg (166 lb 3 6 oz)   08/06/20 86 4 kg (190 lb 7 6 oz)     Echo with EF 45%; continue home medications  Patient reports dyspnea on exertion; became winded talking to examiner  BNP elevated at 1326; distended abdomen  Denies night cough, orthopnea, proximal nocturnal dyspnea; a chest x-ray read as normal, but may have infiltrates  at time of exam on 8/27 appears euvolemic  However, possible MYERS 2/2 deconditioning as pt appears dry on exam  gentle IVF to tx MALA & it improved  Acute CHF exacerbation is ruled out  Per renal hold ACEi at d/c  Can d/c on Lasix 20 mg PO daily  Can have BMP at rehab and if Cr stable, can be placed back on ACEi  Since she will be here pending placement, restarted her on Lasix 20 mg daily    9/8-patient in 20 mg Lasix daily; net -1 5 L diuresed  -resting comfortably on room air, monitor    9/9-net -2 5 L diuresed; asymptomatic  -creatinine at baseline; will start lisinopril 2 5 mg as part of heart failure package    9/12-patient 95% SpO2 on room air; blood pressure remains well controlled  Overall, improved and stable  9/13-patient requiring 15 L oxygen status post emergent fluid resuscitation for septic shock  Now comfort care measures only

## 2020-09-13 NOTE — ASSESSMENT & PLAN NOTE
Lab Results   Component Value Date    HGBA1C 7 6 (H) 08/05/2020       Recent Labs     09/12/20  1119 09/12/20  1617 09/12/20 2052 09/13/20  0635   POCGLU 107 128 162* 183*       Blood Sugar Average: Last 72 hrs:  (P) 533 [id number]   SSI  Blood Glucose checks TIDWM and QHS  Hold oral medications  ISS  9/13-will hold blood glucose checks

## 2020-09-13 NOTE — QUICK NOTE
Pt was a rapid response for decline in mental status, hypoxia and tachycardia  Last known normal at 0330 this am when she was complaining of pain and taken to BR  Pt unable to be awoken this am  Vitals revealing tachycardia at 160s and hypoxia at 70%, nonrebreather placed and rapid response called  Amio given at [facility] for afib RVR  STAT [facility] ordered  Temperature found to be 103 oral, 105 6 rectal  Rapid ended, sepsis workup initiated  Pt had 1 episode of nausea following, appeared to have swallowed vomit, possible aspiration  General: Pt laying in bed with nonrebreather in place   Skin: warm and dry  CV: Tachycardic   Pulm: Diminished  Ab: Distended   PV: No peripheral edema  LE cool b/l  Neuro: Awakens to voice and says "what" does not answer any more questions  Does not open eyes  Does not follow commands    PERLLA  Withdraws to pain  No hyperreflexia or clonus noted  Rectal tylenol administered and pt packed with ice  Additional blood work, blood and urine cultures obtained  IVF and broad spectrum abx given for acute decompensation  O2 continued to drop when transitioned to NC, pt placed on midflow  CT c/a/p ordered to evaluate sepsis  Unclear etiology of hyperthermia at this time, continue with sepsis workup  Can consider medication induced hyperthermia/serotonin syndrome, though only on nortriptyline  Upon reassessment, Pt HR improving 90s-110s with temp 104  1  Pt more awake, opening eyes to voice and following  commands  Neuro exam non-focal  Will continue with frequent neuro checks this am  Hold sedating medications this morning  Pt upgraded to SD2, continue to monitor closely

## 2020-09-13 NOTE — ASSESSMENT & PLAN NOTE
· Hb dropped from 11-12 to 9  · Check stool occult, iron panel  · Patients states she has intermittent black stools  · Continue eliquis  · CT AP had no evidence of bleed    9/11-review of chart shows patient had GI bleeding in the past; hemoglobin continues to drop steadily  -on this admission, FOBT has been ordered twice and canceled twice for unclear reasons  -will perform DEXTER; likely consult GI  -will continue Eliquis at this time; however pending further results may need to hold secondary to GI B    9/13-no further monitoring at this time

## 2020-09-14 NOTE — PHYSICIAN ADVISOR
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 1]  Shortness of breath [R06 02]  CHF (congestive heart failure) ([facility]) [I50 9]  Weakness [R53 1]  MALA (acute kidney injury) ([facility] ) [N17 9]     CMS OUTLIER STAY REVIEW    After review of the relevant documentation, labs, vital signs and test results, the patient is appropriate for CONTINUED INPATIENT ADMISSION  The patient continues to remain hospitalized receiving acute medical care  The patient has surpassed the expected duration of stay, however given the clinical condition, need for further acute care management, the patient is appropriate to remain in an inpatient status  The patient still being actively managed, and does have unresolved medical issues requiring further hospitalization  This review is conducted at 20 days, to help satisfy the requirements for significant outlier stay review as per CMS  Given the current condition of this patient, the patient satisfies this review was determination for continued inpatient stay  Rationale is as follows: Had extended stay after stabilization of medical conditions  Found to lack competency and needed guardianship  Rapid response called yesterday after being found unresponsive and hypotensive  Critical care consulted and aggressive management started  Etiology was not clear  Today is comfort measures and receiving end of life care, "imminent passing" anticipated  On Dilaudid infusion per note        The patients vitals on arrival were   ED Triage Vitals   Temperature Pulse Respirations Blood Pressure SpO2   08/26/20 1620 08/26/20 1620 08/26/20 1620 08/26/20 1620 08/26/20 1620   97 8 °F (36 6 °C) 56 16 130/54 95 %      Temp Source Heart Rate Source Patient Position - Orthostatic VS BP Location FiO2 (%)   08/26/20 1620 08/26/20 1620 08/26/20 1816 08/26/20 1816 --   Oral Monitor Sitting Left arm       Pain Score       08/26/20 1620       8           Past Medical History:   Diagnosis Date    A-fib ([place] [address] )     Acute respiratory disease     Anemia     Arthritis     Atrial fibrillation (HCC)     CHF (congestive heart failure) (HCC)     Chronic pain     Heart failure (HCC)     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[place] [address] )     History of colon polyps     Hx of long term use of blood thinners     Hypertension     Irregular heart beat     Narcotic dependence ([place] [address] )     Rectal bleeding     Stroke ([place] [address] )     mild no deficiets/ memory loss    Uses walker      Past Surgical History:   Procedure Laterality Date    COLONOSCOPY      COLONOSCOPY N/A 7/27/2018    Procedure: COLONOSCOPY;  Surgeon: Berny Griffith MD;  [facility]; Service: Gastroenterology    CORONARY STENT PLACEMENT      ERCP N/A 5/14/2018    Procedure: ENDOSCOPIC RETROGRADE CHOLANGIOPANCREATOGRAPHY (ERCP); Surgeon: Liliya Lr MD;  [facility];  Service: Gastroenterology    ERCP N/A 8/28/2018    Procedure: ENDOSCOPIC RETROGRADE CHOLANGIOPANCREATOGRAPHY (ERCP) w/ EGD;  Surgeon: Liliya Lr MD;  [facility]; Service: Gastroenterology    ESOPHAGOGASTRODUODENOSCOPY N/A 7/26/2018    Procedure: ESOPHAGOGASTRODUODENOSCOPY (EGD); Surgeon: Berny Griffith MD;  [facility];   Service: Gastroenterology    JOINT REPLACEMENT Right     knee           Consults have been placed to:   IP CONSULT TO CASE MANAGEMENT  IP CONSULT TO PSYCHIATRY  IP CONSULT TO NEPHROLOGY  IP CONSULT TO NEUROSURGERY  IP CONSULT TO PALLIATIVE CARE  IP CONSULT TO PALLIATIVE CARE    Vitals:    09/13/20 0745 09/13/20 0800 09/13/20 0830 09/13/20 0915   BP: (!) 68/42 (!) 62/40 (!) 72/54 96/55   Pulse:    (!) 114   Resp:       Temp: (!) 104 1 °F (40 1 °C)   (!) 102 4 °F (39 1 °C)   TempSrc:       SpO2:       Weight:       Height:           Most recent labs:    Recent Labs     09/13/20  0648 WBC 9 89   HGB 10 1*  10 5*   HCT 34 4*  31*      K 4 2   CALCIUM 9 0   BUN 17   CREATININE 1 34*   INR 1 39*   TROPONINI 0 04   AST 38   ALT 30   ALKPHOS 86       Scheduled Meds:  Current Facility-Administered Medications   Medication Dose Route Frequency Provider Last Rate    bisacodyl  10 mg Rectal Daily PRN Ann Pruitt MD      glycopyrrolate  0 1 mg Intravenous Q1H PRN Alyse M Bendas, DO      haloperidol lactate  1 mg Intravenous Q6H PRN Alyse M Bendas, DO      HYDROmorphone  0 5 mg/hr Intravenous Continuous Ann Pruitt MD 0 5 mg/hr (09/13/20 1136)    HYDROmorphone  0 5 mg Intravenous Q10 Min PRN Alyse M Bendas, DO      LORazepam  1 mg Intravenous Q4H PRN Alyse M Bendas, DO       Continuous Infusions:HYDROmorphone, 0 5 mg/hr, Last Rate: 0 5 mg/hr (09/13/20 1136)      PRN Meds:   bisacodyl    glycopyrrolate    haloperidol lactate    HYDROmorphone    LORazepam    Surgical procedures (if appropriate):
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
## 2020-09-14 NOTE — ASSESSMENT & PLAN NOTE
Lab Results   Component Value Date    HGBA1C 7 6 (H) 08/05/2020       Recent Labs     09/12/20  1119 09/12/20  1617 09/12/20 2052 09/13/20  0635   POCGLU 107 128 162* 183*       Blood Sugar Average: Last 72 hrs:  (P) 611 [id number]   SSI  Blood Glucose checks TIDWM and QHS  Hold oral medications  ISS  9/13-will hold blood glucose checks

## 2020-09-14 NOTE — CASE MANAGEMENT
Call received from [facility], [address], email: [email or web address]  In June 2020 the patient was due to renew her housing authority voucher but she never completed the paperwork  Patient was sent termination letter-is not being evicted but could possibly lose her HUD voucher  Explined pending guardianship petition and [facility] will keep patient's file pending  Discussed patient with Dr Gagandeep Marcus in care coordination rounds  Patient had decline on weekend  PCM contacted patient's sister after speaking to legal dept at [facility] and sister made care decision for Level 4 Comfort Care  CM received call from [facility] with Ashley Herrera office, [phone]  She sent Guardianship petition and Interogatories to [employer] by E-mail but documents were never received  Informed her of patient's condition and sister now making care decisions  [facility] will discuss with Ryan Valles

## 2020-09-14 NOTE — PROGRESS NOTES
Pt not really responsive but on comfort care, , RR 20, sister called and updated, sister attempted to talk to her, will continue to monitor pt

## 2020-09-14 NOTE — PROGRESS NOTES
Progress Note - Jt Manzano [birth year], 79 y o  female MRN: [id number]    Unit/Bed#: [facility] 525-01 Encounter: [id number]    Primary Care Provider: Shalom Swain DO   Date and time admitted to hospital: 8/26/2020  4:18 PM        LLQ pain  Assessment & Plan  c/o severe pain  CTAP showed no acute finding  Incidentally shows transverse colon nodule  Can get repeat CTAP in 3 months if desired by guardian   Suspect musculoskeletal pain    9/9-patient still complaining of left lower quadrant pain largely in the groin section; CT reviewed and shows no acute pathology  May consider repeating imaging and possible muscle relaxer  9/10-chest x-rays of left hip with no acute fracture or bony abnormalities; moderate degenerative change  Does not appear to correlate patient's severe pain; consider repeating CT abdomen pelvis  -patient currently on 7 5 mg of oxycodone q i d   -have increased gabapentin 300 mg b i d     9/11-CT abdomen pelvis repeated yesterday; again, no acute findings that would explain patient's left groin/left lower quadrant pain  At this point, given location of the pain in the left anterior groin and raising leg exacerbates pain; believe this is musculoskeletal secondary to moderate degenerative changes in the left hip  Patient also noted to have multiple other degenerative changes to include right shoulder surgery and spinal changes; left hip osteoarthritis consistent with other areas of musculoskeletal pain      DM2 (diabetes mellitus, type 2) [facility])  Assessment & Plan  Lab Results   Component Value Date    HGBA1C 7 6 (H) 08/05/2020       Recent Labs     09/12/20  1119 09/12/20  1617 09/12/20 2052 09/13/20  0635   POCGLU 107 128 162* 183*       Blood Sugar Average: Last 72 hrs:  (P) 198 [id number]   SSI  Blood Glucose checks TIDWM and QHS  Hold oral medications  ISS  9/13-will hold blood glucose checks    A-fib ([address] )  Assessment & Plan  Patient rate controlled on admission; continue BB  Anticoagulated with Eliquis 5 mg b i d     9/13-comfort care measures only, Eliquis held    Anemia  Assessment & Plan  · Hb dropped from 11-12 to 9  · Check stool occult, iron panel  · Patients states she has intermittent black stools  · Continue eliquis  · CT AP had no evidence of bleed    9/11-review of chart shows patient had GI bleeding in the past; hemoglobin continues to drop steadily  -on this admission, FOBT has been ordered twice and canceled twice for unclear reasons  -will perform DEXTER; likely consult GI  -will continue Eliquis at this time; however pending further results may need to hold secondary to GI B    9/13-no further monitoring at this time    Elevated troponin level not due myocardial infarction  Assessment & Plan  Patient with elevated troponin; denies chest pain  EKG in the ED negative for ischemia; shows rate controlled atrial fibrillation  Trop actually lower than earlier in month  No need for further w/u    Mild cognitive impairment  Assessment & Plan  Neurospsych eval pt 8/28 and deemed her incompetent to make medical decisions    Coronary artery disease  Assessment & Plan  S/p PCI July 2019  C/w Plavix and BB and statin    9/13-comfort care measures only, Plavix, beta-blocker, and statin held    Hyperlipidemia  Assessment & Plan  Continue statin    9/13-comfort care measures only; statin held    Hospital-acquired pneumonia  Assessment & Plan  9/13-patient noted be in septic shock this morning; started on antibiotic therapy and fluid resuscitation  -previously had no SIRS criteria and was saturating well on room air  -initially, felt this may be a GI source; however CT of chest abdomen pelvis revealed multiple areas of consolidation not present on admission    -septic shock likely secondary to H AP; patient made comfort care measures only    Essential hypertension  Assessment & Plan  C/w BB, Ace, diuretic; BP well controlled    9/13-patient transition to comfort care only measures after noted to be in septic shock this morning  Antihypertensive stopped; no further blood pressure monitoring  If improvs; may restart antihypertensives in the future  Chronic systolic congestive heart failure (HCC)  Assessment & Plan  Wt Readings from Last 3 Encounters:   09/13/20 86 2 kg (190 lb 0 6 oz)   08/09/20 75 4 kg (166 lb 3 6 oz)   08/06/20 86 4 kg (190 lb 7 6 oz)     Echo with EF 45%; continue home medications  Patient reports dyspnea on exertion; became winded talking to examiner  BNP elevated at 1326; distended abdomen  Denies night cough, orthopnea, proximal nocturnal dyspnea; a chest x-ray read as normal, but may have infiltrates  at time of exam on 8/27 appears euvolemic  However, possible MYERS 2/2 deconditioning as pt appears dry on exam  gentle IVF to tx MALA & it improved  Acute CHF exacerbation is ruled out  Per renal hold ACEi at d/c  Can d/c on Lasix 20 mg PO daily  Can have BMP at rehab and if Cr stable, can be placed back on ACEi  Since she will be here pending placement, restarted her on Lasix 20 mg daily    9/8-patient in 20 mg Lasix daily; net -1 5 L diuresed  -resting comfortably on room air, monitor    9/9-net -2 5 L diuresed; asymptomatic  -creatinine at baseline; will start lisinopril 2 5 mg as part of heart failure package    9/12-patient 95% SpO2 on room air; blood pressure remains well controlled  Overall, improved and stable  9/13-patient requiring 15 L oxygen status post emergent fluid resuscitation for septic shock  Now comfort care measures only  Weakness/physical deconditioning  Assessment & Plan  PT/OT  Recent admission or patient declined follow on rehabilitation  Neuropsych eval ordered and pt not competent to make medical decisions    Will need guardian and then rehab placement    Dysthymic disorder  Assessment & Plan  Patient tearful and depressed on examination in ER; denies SI/HI, but did earlier in the ED  Continue Prozac and [address] consult appreciated - no 1 to 1 needed    9/9-patient appears overall improved; awaiting guardianship; continue present therapy    9/13-patient difficult to arouse; unable to examine mood and mental status  Continue present therapy and monitor for improvement  * MALA (acute kidney injury) ([facility] [address] )  Assessment & Plan  Estimated Creatinine Clearance: 36 9 mL/min (A) (by C-G formula based on SCr of 1 34 mg/dL (H))  POA  Patient noted to have serum creatinine 1 69 on admission; baseline 1-1 2  Pt has MYERS but likely this is more due to deconditioning and not CHF   Cr did not improve w/ diuresis  UA WNL  Urinary retention protocol ordered - had been straight cathed twice but appears resolved at this time  Renal appreciated  Cr now at baseline after getting IVF  Renal US WNL  CPK WNL    9/8-resolved      VTE Pharmacologic Prophylaxis:   Pharmacologic: Pharmacologic VTE Prophylaxis contraindicated due to Comfort care measures only  Mechanical VTE Prophylaxis in Place: No    Patient Centered Rounds: I have performed bedside rounds with nursing staff today  Discussions with Specialists or Other Care Team Provider:  Palliative care    Education and Discussions with Family / Patient: Discussed treatment plan with family and patient who agree with current plan; encouraged to ask questions and participate  Time Spent for Care: 30 minutes  More than 50% of total time spent on counseling and coordination of care as described above  Current Length of Stay: 19 day(s)    Current Patient Status: Inpatient   Certification Statement: The patient will continue to require additional inpatient hospital stay due to Terminally ill    Discharge Plan: To be determined    Code Status: Level 4 - Comfort Care      Subjective:   Patient seen examined bedside, no distress noted  Currently on Dilaudid drip and made comfort care measures only  Unfortunate case, however anticipate patient's imminent passing    Will discuss with family further today  Objective:     Vitals:   No data recorded  Body mass index is 31 62 kg/m²  Input and Output Summary (last 24 hours): Intake/Output Summary (Last 24 hours) at 9/14/2020 1211  Last data filed at 9/13/2020 1700  Gross per 24 hour   Intake 0 ml   Output    Net 0 ml       Physical Exam:     Physical Exam  Vitals signs and nursing note reviewed  Constitutional:       Appearance: She is obese  HENT:      Head: Normocephalic and atraumatic  Right Ear: External ear normal       Left Ear: External ear normal       Nose: Nose normal       Mouth/Throat:      Mouth: Mucous membranes are moist    Eyes:      Extraocular Movements: Extraocular movements intact  Conjunctiva/sclera: Conjunctivae normal       Pupils: Pupils are equal, round, and reactive to light  Neck:      Musculoskeletal: Normal range of motion and neck supple  Cardiovascular:      Rate and Rhythm: Normal rate  Rhythm irregular  Heart sounds: Normal heart sounds  Pulmonary:      Comments: Cheyne-Hogan breathing  Abdominal:      General: There is distension  Palpations: Abdomen is soft  Skin:     General: Skin is warm and dry  Comments: Ecchymotic lesions bilateral upper extremities   Neurological:      Mental Status: She is alert        Comments: Nonresponsive           Additional Data:     Labs:    Results from last 7 days   Lab Units 09/13/20  0648 09/12/20  0522   WBC Thousand/uL 9 89 7 37   HEMOGLOBIN g/dL 10 1* 8 5*   I STAT HEMOGLOBIN g/dl 10 5*  --    HEMATOCRIT % 34 4* 28 8*   HEMATOCRIT, ISTAT % 31*  --    PLATELETS Thousands/uL 339 317   BANDS PCT % 15*  --    NEUTROS PCT %  --  72   LYMPHS PCT %  --  12*   LYMPHO PCT % 5*  --    MONOS PCT %  --  10   MONO PCT % 0*  --    EOS PCT % 0 5     Results from last 7 days   Lab Units 09/13/20  0648   SODIUM mmol/L 138   POTASSIUM mmol/L 4 2   CHLORIDE mmol/L 103   CO2 mmol/L 21   CO2, I-STAT mmol/L 22   BUN mg/dL 17   CREATININE mg/dL 1 34* ANION GAP mmol/L 14*   CALCIUM mg/dL 9 0   ALBUMIN g/dL 3 2*   TOTAL BILIRUBIN mg/dL 0 85   ALK PHOS U/L 86   ALT U/L 30   AST U/L 38   GLUCOSE RANDOM mg/dL 182*     Results from last 7 days   Lab Units 09/13/20  0648   INR  1 39*     Results from last 7 days   Lab Units 09/13/20  0635 09/12/20  2052 09/12/20  1617 09/12/20  1119 09/12/20  0630 09/11/20  2102 09/11/20  1553 09/11/20  1113 09/11/20  0638 09/10/20  2058 09/10/20  1607 09/10/20  1048   POC GLUCOSE mg/dl 183* 162* 128 107 126 134 152* 136 154* 178* 112 163*         Results from last 7 days   Lab Units 09/13/20  0710   LACTIC ACID mmol/L 7 4*   PROCALCITONIN ng/ml 3 13*           * I Have Reviewed All Lab Data Listed Above  * Additional Pertinent Lab Tests Reviewed: All Labs Within Last 24 Hours Reviewed    Imaging:    Imaging Reports Reviewed Today Include:   Imaging Personally Reviewed by Myself Includes:      Recent Cultures (last 7 days):     Results from last 7 days   Lab Units 09/13/20  0712   BLOOD CULTURE  No Growth at 24 hrs  No Growth at 24 hrs  Last 24 Hours Medication List:   Current Facility-Administered Medications   Medication Dose Route Frequency Provider Last Rate    bisacodyl  10 mg Rectal Daily PRN Steven Nguyen MD      glycopyrrolate  0 1 mg Intravenous Q1H PRN Kayode Martinez DO      haloperidol lactate  1 mg Intravenous Q6H PRN Alyse Martinez DO      HYDROmorphone  0 5 mg/hr Intravenous Continuous Steven Nguyen MD 0 5 mg/hr (09/13/20 1136)    HYDROmorphone  0 5 mg Intravenous Q10 Min PRN Alyse Martinez DO      LORazepam  1 mg Intravenous Q4H PRN Patito Shanks DO          Today, Patient Was Seen By: Cayetano Guillen MD    ** Please Note: Dictation voice to text software may have been used in the creation of this document   **

## 2020-09-15 NOTE — PROGRESS NOTES
[facility] Progress Note  Patient: Chris Hernadez 79 y o  female   MRN: [id number]  PCP: Mayra Brunson DO  [facility] Encounter: [id number]  Date Of Visit: 09/15/20    Assessment:    Principal Problem:    MALA (acute kidney injury) ([facility] )  Active Problems:    Chronic systolic congestive heart failure ([facility] )    Dysthymic disorder    Weakness/physical deconditioning    Essential hypertension    Hospital-acquired pneumonia    Hyperlipidemia    Coronary artery disease    Mild cognitive impairment    Elevated troponin level not due myocardial infarction    Anemia    A-fib ([facility])    DM2 (diabetes mellitus, type 2) ([facility])    LLQ pain      Plan:    · Septic shock, Gram-negative bacteremia acute kidney injury, diabetes mellitus type 2, atrial fibrillation, cognitive impairment  Patient with overall guarded prognosis has been transition to comfort measures only  Discussed with palliative care, hospice consult placed  Discussed with case management       VTE Pharmacologic Prophylaxis:   Pharmacologic: Comfort measures only  Mechanical VTE Prophylaxis in Place: No    Patient Centered Rounds: I have performed bedside rounds with nursing staff today  Discussions with Specialists or Other Care Team Provider:  Palliative care    Education and Discussions with Family / Patient:  Patient, called and left a message for sister over phone    Time Spent for Care: 30 minutes  More than 50% of total time spent on counseling and coordination of care as described above      Current Length of Stay: 20 day(s)    Current Patient Status: Inpatient   Certification Statement: The patient will continue to require additional inpatient hospital stay due to as outlined    Discharge Plan / Estimated Discharge Date:  Patient is comfort status only, hospice evaluation pending    Code Status: Level 4 - Comfort Care      Subjective:     Comfortably in bed  History chart labs medications reviewed    Objective:     Vitals: Temp (24hrs), Av °F (37 2 °C), Min:99 °F (37 2 °C), Max:99 °F (37 2 °C)    Temp:  [99 °F (37 2 °C)] 99 °F (37 2 °C)  Body mass index is 31 62 kg/m²  Input and Output Summary (last 24 hours): Intake/Output Summary (Last 24 hours) at 9/15/2020 1531  Last data filed at 9/15/2020 0615  Gross per 24 hour   Intake 0 ml   Output    Net 0 ml       Physical Exam:     Physical Exam    Comfortably lying in bed  Neck supple  Lungs diminished breath sounds bilaterally  Heart sounds S1-S2 noted  Abdomen soft nontender  Awake moves extremities  Pulses noted  No rash    Additional Data:     Labs:    Results from last 7 days   Lab Units 20  0648 20  0522   WBC Thousand/uL 9 89 7 37   HEMOGLOBIN g/dL 10 1* 8 5*   I STAT HEMOGLOBIN g/dl 10 5*  --    HEMATOCRIT % 34 4* 28 8*   HEMATOCRIT, ISTAT % 31*  --    PLATELETS Thousands/uL 339 317   NEUTROS PCT %  --  72   LYMPHS PCT %  --  12*   LYMPHO PCT % 5*  --    MONOS PCT %  --  10   MONO PCT % 0*  --    EOS PCT % 0 5     Results from last 7 days   Lab Units 20  0648   POTASSIUM mmol/L 4 2   CHLORIDE mmol/L 103   CO2 mmol/L 21   CO2, I-STAT mmol/L 22   BUN mg/dL 17   CREATININE mg/dL 1 34*   CALCIUM mg/dL 9 0   ALK PHOS U/L 86   ALT U/L 30   AST U/L 38   GLUCOSE, ISTAT mg/dl 189*     Results from last 7 days   Lab Units 20  0648   INR  1 39*       * I Have Reviewed All Lab Data Listed Above  * Additional Pertinent Lab Tests Reviewed: All Labs Within Last 24 Hours Reviewed    Imaging:    Imaging Reports Reviewed Today Include:  Imaging studies noted  Imaging Personally Reviewed by Myself Includes:      Recent Cultures (last 7 days):     Results from last 7 days   Lab Units 20  0712   BLOOD CULTURE  No Growth at 48 hrs     GRAM STAIN RESULT  Gram negative rods*       Last 24 Hours Medication List:   Current Facility-Administered Medications   Medication Dose Route Frequency Provider Last Rate    acetaminophen  650 mg Rectal Q6H ROSIE REYNOLDS WILLIAM Huntley      bisacodyl  10 mg Rectal Daily PRN Flora Brand MD      glycopyrrolate  0 1 mg Intravenous Q1H PRN Ravi Martinez DO      haloperidol lactate  1 mg Intravenous Q6H PRN Alyse Martinez DO      HYDROmorphone  0 5 mg/hr Intravenous Continuous Flora Brand MD 0 5 mg/hr (09/14/20 2054)    HYDROmorphone  0 5 mg Intravenous Q10 Min PRN Alyse Martinez DO      LORazepam  1 mg Intravenous Q4H PRN Melissa Kaiser DO          Today, Patient Was Seen By: Jean Araya MD    ** Please Note: This note has been constructed using a voice recognition system   **

## 2020-09-15 NOTE — CASE MANAGEMENT
Spoke to [employer] and made him aware of patient's medical decline  We will continue to proceed with guardianship  CM referred petition and expert report via email  Contacted Dr Rosemarie Huggins who will complete the expert report and return it to CM  Document was emailed to him today  CM called patient's sister, Anupama Alfaro, [phone]  She is not willing to sign consents for hospice admission or to serve s guardian  She cites age-she is 81, her spouse is 80 and both are in poor health  She does want to be notified of patient's death  Sister reports patient has burial arrangements with [place], [place], and it was explained to her that family would need to notify  home at time of death

## 2020-09-15 NOTE — PROGRESS NOTES
Pastoral Care Progress Note    [id number]  Patient: Jt Manzano :   Admission Date & Time: 2020 1618  MRN: [id number] CSN: [id number]               Chaplaincy Interventions Utilized:   Empowerment: Clarified, confirmed, or reviewed information from treatment team    Spoke with nurse who provided update on Pt's situation  Collaboration: Consulted with interdisciplinary team and Facilitated respect for spiritual/cultural practice during hospitalization  Nurse, Pt and I joined in prayer together at Pt's bedside  Relationship Building: Cultivated a relationship of care and support, Listened empathically and Provided silent and supportive presence   Listened as nurse provided background on family situation  Ritual: Provided prayer  Prayed with Pt one on one and also prayed together with Pt and nurse  Chaplaincy Outcomes Achieved:  Catharsis, Spiritual resources utilized and Unknown outcome      Spiritual Coping Strategies Utilized:   Spiritual practices, Spiritual comfort and Spiritual meaning       20 2100   Clinical Encounter Type   Visited With Patient; Health care provider   Routine Visit   (Personal Page)   Crisis Visit Patient actively dying   Restorationism Encounters   Restorationism Needs Prayer   Sacramental Encounters   Sacrament Other   (Prayer with Pt and Nurse)
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
## 2020-09-15 NOTE — PROGRESS NOTES
Pt is resting comfortably after receiving a chlorahexadine bath, lotioned & given b/l massage of hands, feet, &  head

## 2020-09-16 NOTE — TREATMENT PLAN
Spoke with Brianna Rios RN, via phone  Patient required PRN dose of dilaudid and ativan with persistent increased work of breathing  Still with intermittent break through agitation/moaning  Hydromorphone gtt increased to 1 5mg/hr and ativan scheduled 1mg PO Q4H  Please page palliative with any unmanaged symptoms

## 2020-09-16 NOTE — PLAN OF CARE
Problem: Potential for Falls  Goal: Patient will remain free of falls  Description: INTERVENTIONS:  - Assess patient frequently for physical needs  -  Identify cognitive and physical deficits and behaviors that affect risk of falls    -  Clio fall precautions as indicated by assessment   - Educate patient/family on patient safety including physical limitations  - Instruct patient to call for assistance with activity based on assessment  - Modify environment to reduce risk of injury  - Consider OT/PT consult to assist with strengthening/mobility  Outcome: Progressing     Problem: CARDIOVASCULAR - ADULT  Goal: Maintains optimal cardiac output and hemodynamic stability  Description: INTERVENTIONS:  - Monitor I/O, vital signs and rhythm  - Monitor for S/S and trends of decreased cardiac output  - Administer and titrate ordered vasoactive medications to optimize hemodynamic stability  - Assess quality of pulses, skin color and temperature  - Assess for signs of decreased coronary artery perfusion  - Instruct patient to report change in severity of symptoms  Outcome: Progressing  Goal: Absence of cardiac dysrhythmias or at baseline rhythm  Description: INTERVENTIONS:  - Continuous cardiac monitoring, vital signs, obtain 12 lead EKG if ordered  - Administer antiarrhythmic and heart rate control medications as ordered  - Monitor electrolytes and administer replacement therapy as ordered  Outcome: Progressing     Problem: RESPIRATORY - ADULT  Goal: Achieves optimal ventilation and oxygenation  Description: INTERVENTIONS:  - Assess for changes in respiratory status  - Assess for changes in mentation and behavior  - Position to facilitate oxygenation and minimize respiratory effort  - Oxygen administered by appropriate delivery if ordered  - Initiate smoking cessation education as indicated  - Encourage broncho-pulmonary hygiene including cough, deep breathe, Incentive Spirometry  - Assess the need for suctioning and aspirate as needed  - Assess and instruct to report SOB or any respiratory difficulty  - Respiratory Therapy support as indicated  Outcome: Progressing     Problem: MUSCULOSKELETAL - ADULT  Goal: Maintain or return mobility to safest level of function  Description: INTERVENTIONS:  - Assess patient's ability to carry out ADLs; assess patient's baseline for ADL function and identify physical deficits which impact ability to perform ADLs (bathing, care of mouth/teeth, toileting, grooming, dressing, etc )  - Assess/evaluate cause of self-care deficits   - Assess range of motion  - Assess patient's mobility  - Assess patient's need for assistive devices and provide as appropriate  - Encourage maximum independence but intervene and supervise when necessary  - Involve family in performance of ADLs  - Assess for home care needs following discharge   - Consider OT consult to assist with ADL evaluation and planning for discharge  - Provide patient education as appropriate  Outcome: Progressing  Goal: Maintain proper alignment of affected body part  Description: INTERVENTIONS:  - Support, maintain and protect limb and body alignment  - Provide patient/ family with appropriate education  Outcome: Progressing     Problem: Prexisting or High Potential for Compromised Skin Integrity  Goal: Skin integrity is maintained or improved  Description: INTERVENTIONS:  - Identify patients at risk for skin breakdown  - Assess and monitor skin integrity  - Assess and monitor nutrition and hydration status  - Monitor labs   - Assess for incontinence   - Turn and reposition patient  - Assist with mobility/ambulation  - Relieve pressure over bony prominences  - Avoid friction and shearing  - Provide appropriate hygiene as needed including keeping skin clean and dry  - Evaluate need for skin moisturizer/barrier cream  - Collaborate with interdisciplinary team   - Patient/family teaching  - Consider wound care consult   Outcome: Progressing Problem: DISCHARGE PLANNING  Goal: Discharge to home or other facility with appropriate resources  Description: INTERVENTIONS:  - Identify barriers to discharge w/patient and caregiver  - Arrange for needed discharge resources and transportation as appropriate  - Identify discharge learning needs (meds, wound care, etc )  - Arrange for interpretive services to assist at discharge as needed  - Refer to Case Management Department for coordinating discharge planning if the patient needs post-hospital services based on physician/advanced practitioner order or complex needs related to functional status, cognitive ability, or social support system  Outcome: Progressing     Problem: Knowledge Deficit  Goal: Patient/family/caregiver demonstrates understanding of disease process, treatment plan, medications, and discharge instructions  Description: Complete learning assessment and assess knowledge base  Interventions:  - Provide teaching at level of understanding  - Provide teaching via preferred learning methods  Outcome: Progressing     Problem: Nutrition/Hydration-ADULT  Goal: Nutrient/Hydration intake appropriate for improving, restoring or maintaining nutritional needs  Description: Monitor and assess patient's nutrition/hydration status for malnutrition  Collaborate with interdisciplinary team and initiate plan and interventions as ordered  Monitor patient's weight and dietary intake as ordered or per policy  Utilize nutrition screening tool and intervene as necessary  Determine patient's food preferences and provide high-protein, high-caloric foods as appropriate       INTERVENTIONS:  - Monitor oral intake, urinary output, labs, and treatment plans  - Assess nutrition and hydration status and recommend course of action  - Evaluate amount of meals eaten  - Assist patient with eating if necessary   - Allow adequate time for meals  - Recommend/ encourage appropriate diets, oral nutritional supplements, and vitamin/mineral supplements  - Order, calculate, and assess calorie counts as needed  - Recommend, monitor, and adjust tube feedings and TPN/PPN based on assessed needs  - Assess need for intravenous fluids  - Provide specific nutrition/hydration education as appropriate  - Include patient/family/caregiver in decisions related to nutrition  Outcome: Not Progressing     Problem: PAIN - ADULT  Goal: Verbalizes/displays adequate comfort level or baseline comfort level  Description: Interventions:  - Encourage patient to monitor pain and request assistance  - Assess pain using appropriate pain scale  - Administer analgesics based on type and severity of pain and evaluate response  - Implement non-pharmacological measures as appropriate and evaluate response  - Consider cultural and social influences on pain and pain management  - Notify physician/advanced practitioner if interventions unsuccessful or patient reports new pain  Outcome: Not Progressing

## 2020-09-16 NOTE — CASE MANAGEMENT
Call received from [facility], [place],  and provided her with update  Support provided as she states she visited yesterday and the patient did not seem to know her or respond to her  The Petition for Guardian and  Verification Form were signed by ADRYAN and returned by email to Oriana Peralta with the Expert Report which was completed by Dr Lisa Lindo  Original paperwork will be sent to Shira Lewis by Priscilla Burden

## 2020-09-16 NOTE — PROGRESS NOTES
[facility] Progress Note  Patient: Jt Manzano 79 y o  female   MRN: [id number]  PCP: Shalom Swain DO  Unit/Bed#: [facility] 525-01 Encounter: [id number]  Date Of Visit: 20    Assessment:    Principal Problem:    MALA (acute kidney injury) ([facility] [address] )  Active Problems:    Chronic systolic congestive heart failure ([facility] [address] )    Dysthymic disorder    Weakness/physical deconditioning    Essential hypertension    Hospital-acquired pneumonia    Hyperlipidemia    Coronary artery disease    Mild cognitive impairment    Elevated troponin level not due myocardial infarction    Anemia    A-fib ([facility])    DM2 (diabetes mellitus, type 2) ([facility])    LLQ pain      Plan:    · Septic shock, Klebsiella pneumoniae bacteremia, lactic acidosis, acute kidney injury, diabetes mellitus type 2, atrial fibrillation, cognitive impairment  Patient with overall guarded prognosis has been transitioned to comfort measures status  Palliative care input noted  Discussed with case management       VTE Pharmacologic Prophylaxis:   Pharmacologic: Comfort measures only status  Mechanical VTE Prophylaxis in Place: No    Patient Centered Rounds: I have performed bedside rounds with nursing staff today  Discussions with Specialists or Other Care Team Provider:     Education and Discussions with Family / Patient:  Patient, updated sister Mason Genao, overall guarded prognosis explained, questions answered    Time Spent for Care: 30 minutes  More than 50% of total time spent on counseling and coordination of care as described above      Current Length of Stay: 21 day(s)    Current Patient Status: Inpatient   Certification Statement: The patient will continue to require additional inpatient hospital stay due to as outlined    Discharge Plan / Estimated Discharge Date: comfort measures status    Code Status: Level 4 - Comfort Care      Subjective:     Comfortably lying in bed    Objective:     Vitals:   Temp (24hrs), Av 5 °F (36 9 °C), Min:98 5 °F (36 9 °C), Max:98 5 °F (36 9 °C)    Temp:  [98 5 °F (36 9 °C)] 98 5 °F (36 9 °C)  HR:  [140] 140  Resp:  [21] 21  BP: (176)/(90) 176/90  SpO2:  [91 %] 91 %  Body mass index is 31 62 kg/m²  Input and Output Summary (last 24 hours): Intake/Output Summary (Last 24 hours) at 9/16/2020 1511  Last data filed at 9/16/2020 0537  Gross per 24 hour   Intake 33 01 ml   Output 758 ml   Net -724 99 ml       Physical Exam:     Physical Exam    Comfortably lying in bed  Neck supple   Lungs diminished breath sounds bilateral  Heart sounds S1-S2 noted  Tachycardia noted  Abdomen soft  Awake, nonverbal  Pulses noted  No rash    Additional Data:     Labs:    Results from last 7 days   Lab Units 09/13/20  0648 09/12/20  0522   WBC Thousand/uL 9 89 7 37   HEMOGLOBIN g/dL 10 1* 8 5*   I STAT HEMOGLOBIN g/dl 10 5*  --    HEMATOCRIT % 34 4* 28 8*   HEMATOCRIT, ISTAT % 31*  --    PLATELETS Thousands/uL 339 317   NEUTROS PCT %  --  72   LYMPHS PCT %  --  12*   LYMPHO PCT % 5*  --    MONOS PCT %  --  10   MONO PCT % 0*  --    EOS PCT % 0 5     Results from last 7 days   Lab Units 09/13/20  0648   POTASSIUM mmol/L 4 2   CHLORIDE mmol/L 103   CO2 mmol/L 21   CO2, I-STAT mmol/L 22   BUN mg/dL 17   CREATININE mg/dL 1 34*   CALCIUM mg/dL 9 0   ALK PHOS U/L 86   ALT U/L 30   AST U/L 38   GLUCOSE, ISTAT mg/dl 189*     Results from last 7 days   Lab Units 09/13/20  0648   INR  1 39*       * I Have Reviewed All Lab Data Listed Above  * Additional Pertinent Lab Tests Reviewed: All Labs Within Last 24 Hours Reviewed    Imaging:    Imaging Reports Reviewed Today Include:   Imaging Personally Reviewed by Myself Includes:     Recent Cultures (last 7 days):     Results from last 7 days   Lab Units 09/13/20  0712   BLOOD CULTURE  Klebsiella pneumoniae*  No Growth at 72 hrs     GRAM STAIN RESULT  Gram negative rods*       Last 24 Hours Medication List:   Current Facility-Administered Medications   Medication Dose Route Frequency Provider Last Rate    acetaminophen  650 mg Rectal Q6H PRN Rjnanette Dowd PA-C      bisacodyl  10 mg Rectal Daily PRN Parisa Leon MD      glycopyrrolate  0 1 mg Intravenous Q1H PRN Amador Maritnez DO      haloperidol lactate  1 mg Intravenous Q6H PRN Alyse Martinez DO      HYDROmorphone  1 mg/hr Intravenous Continuous Rj ServPAWEL barahonaC 1 mg/hr (09/16/20 1229)    HYDROmorphone  1 mg Intravenous Q10 Min PRN Yana Milligan PA-C      LORazepam  1 mg Intravenous Q4H PRN Roma Joyce DO          Today, Patient Was Seen By: Cherise Bar MD    ** Please Note: This note has been constructed using a voice recognition system   **
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
## 2020-09-17 NOTE — DISCHARGE SUMMARY
Death summary    [facility]    Patient Information: Pushpa Mansfield 79 y o  female MRN: [id number]  Unit/Bed#: [facility] 525-01 Encounter: [id number]    Discharging Physician / Practitioner: Mat Garcia MD  PCP: Kevyn Chadwick DO  Admission Date: 2020  Discharge Date: 20    Disposition:     Other:  2020, 7:20 a m  Reason for Admission:  Shortness of breath    Discharge Diagnoses:     Principal Problem:    MALA (acute kidney injury) ([facility] [address] )  Active Problems:    Chronic systolic congestive heart failure (HCC)    Dysthymic disorder    Weakness/physical deconditioning    Essential hypertension    Hospital-acquired pneumonia    Hyperlipidemia    Coronary artery disease    Mild cognitive impairment    Elevated troponin level not due myocardial infarction    Anemia    A-fib (HCC)    DM2 (diabetes mellitus, type 2) (HCC)    LLQ pain  Resolved Problems:    * No resolved hospital problems  *      Consultations During Hospital Stay:  · Palliative care  · Neuropsychology  · Nephrology  · Psychiatry  · Podiatry  · Neurosurgery  · Cardiology    Procedures Performed:     CT chest abdomen multifocal airspace ground-glass opacities, cardiomegalyThere is a curvilinear area of increased attenuation in the region of the pancreatic head /distal common bile duct  measuring 11 x 5 mm (series 5 image 64)  Differential considerations include common bile duct stone versus retained debris in a duodenal diverticulum or pancreatic head calcification  Hospital Course:     Pushpa Mansfield is a 79 y o  female patient who originally presented to the hospital on 2020 due to present shortness of breath  She was noted to AFib RVR, elevated troponins due to AFib RVR, acute on chronic diastolic congestive heart failure, newly noted reduced ejection fraction of 45%  During the patient's hospital course patient developed sepsis with septic shock, Mala I    Patient with history of cognitive impairment  Patient was evaluated by neuropsychology and deemed to be medically incompetent  Patient's prognosis overall guarded, she was able by palliative Care, the treatment teams as well as the patient's sister have opted for hospice comfort care  She was placed on comfort care measures only  Patient  at 7:20 a m  On 2020 kindly review the chart for details    Condition at Discharge:      Please review the death pronouncement note       Discharge Statement:  I spent 45 minutes discharging the patient  This time was spent on the day of discharge  I had direct contact with the patient on the day of discharge  Greater than 50% of the total time was spent examining patient, answering all patient questions, arranging and discussing plan of care with patient as well as directly providing post-discharge instructions  Additional time then spent on discharge activities  Discharge Medications:  See after visit summary for reconciled discharge medications provided to patient and family        ** Please Note: This note has been constructed using a voice recognition system **

## 2020-09-17 NOTE — CASE MANAGEMENT
Called andrew Denny to inform him of patient's death today  Called Rubén Guzman, who had received call from sister  Advised her she will need to speak to family about how to proceed with patient's belongings  Called Natacha Eisenberg at [facility] and left message about patient's death
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
## 2020-09-18 LAB — BACTERIA BLD CULT: NORMAL

## 2021-02-10 NOTE — PROGRESS NOTES
Arterial Line    Date/Time: 2/10/2021 6:22 AM  Performed by: Raúl Lu MD  Authorized by: Raúl Lu MD     Patient Location:  OR  Indication*:  Continuous blood pressure monitoring  Site*:  Radial  Max Sterile Barrier Technique*:  Sterile gloves, Sterile dressing applied, Cap/Mask, Hand Washing, Sterile drape, Sterile towel drapes and Biopatch  Local Anesthetic:  None  Prep*:  Chlorhexidine gluconate w/ alcohol  Catheter Size*:  20 G  Catheter Length*:  10 cm  Catheter Type:  Arrow  Ultrasound-Guided*: No    Line Secured*:  Tape and Transparent dressing  Events: patient tolerated procedure well with no complications    Performed By:  Anesthesiologist  Anesthesiologist:  Raúl Lu MD  Start Time:  2/10/2021 6:20 AM         Progress Note - Jon Harrison [birth year], 69 y o  female MRN: [id number]    Unit/Bed#: [facility] 210-02 Encounter: [id number]    Primary Care Provider: Manuel Fritz DO   Date and time admitted to hospital: 5/22/2018  2:36 PM        * Weakness/physical deconditioning   Assessment & Plan    · Suspect patient is deconditioned from recent 8 day hospital stay where she spent time being treated for sepsis due to presumed cholangitis with biliary obstruction status post ERCP with stent removal, stone removal and stent replacement  · She was recommended ST are at time of discharge but declined and returned with goal to be discharged to a short-term rehab  · PT OT consulted current evaluations are recommending short-term rehab  · Patient has history of psychiatric diagnosis of depression therefore will be and options/target patient case management aware and is working on the proper paperwork and referral   · Given that she is a target/option and will require rehab will change patient to inpatient admission as she is unsafe to discharge home  Atrial fibrillation (HCC)   Assessment & Plan    · Chronic  · Clinical improvement noted  · Noted to be in Afib with RVR noted on arrival to ED  Suspected secondary to 2 missed doses of Lopressor after discharged from hospital yesterday  States forgot to retrieve her home meds from [address] on discharge, so had no meds available at home  · Continue Lopressor and Eliquis        Recent history of Cholangitis due to bile duct calculus with obstruction   Assessment & Plan    · Patient recently hospitalized for 8 days due to sepsis secondary to presumed cholangitis with biliary obstruction, status post antibiotic course  Also status post ERCP with stent removal, stone removal and stent replacement    · Needs outpatient follow-up with GI after discharge needs ERCP with stent removal in 4-6 weeks        Other chronic pain   Assessment & Plan    · Patient states she was taking Oxy IR 15 mg every 6 hr p r n  at home, she states she is in the process along with collaboration with her PCP of tapering this medication with goal to discontinue it  · She states she is not taking extended-release oxycodone therefore will discontinue this, and maintain the 15 mg every 8 hr for today given that she received a 10 mg extended release and will change to every 6 hr p r n  tomorrow        Depression   Assessment & Plan    · Appears stable with no acute symptoms  · Continue Elavil and Cymbalta  · Continue p r n  Xanax        Chronic anticoagulation   Assessment & Plan    · 2/2 chronic atrial fibrillation  · Continue home dose Eliquis        Chronic systolic heart failure (HCC)   Assessment & Plan    · Without acute exacerbation,  Appears euvolemic on exam  · Continue home dose Lasix          VTE Pharmacologic Prophylaxis:   Pharmacologic: Apixaban (Eliquis)  Mechanical VTE Prophylaxis in Place: No    Patient Centered Rounds: I have performed bedside rounds with nursing staff today  Discussions with Specialists or Other Care Team Provider: case mgmt    Education and Discussions with Family / Patient: patient    Time Spent for Care: 30 minutes  More than 50% of total time spent on counseling and coordination of care as described above  Current Length of Stay: 0 day(s)    Current Patient Status: Observation   Certification Statement: The patient, admitted on an observation basis, will now require > 2 midnight hospital stay due to unsafe discharge plan, requires rehab and further referrals due to depression hx  Discharge Plan: STR when approved    Code Status: Level 3 - DNAR and DNI      Subjective:   Denies headache, dizziness, CP, SOB  OOB ad katharine but reports weakness and fatigue     Objective:     Vitals:   Temp (24hrs), Av 3 °F (36 8 °C), Min:98 °F (36 7 °C), Max:98 8 °F (37 1 °C)    HR:  [] 99  Resp:  [18] 18  BP: (128-151)/(74-96) 151/95  SpO2:  [92 %-97 %] 92 %  Body mass index is 27 79 kg/m²  Input and Output Summary (last 24 hours): Intake/Output Summary (Last 24 hours) at 05/23/18 1541  Last data filed at 05/23/18 1314   Gross per 24 hour   Intake             1470 ml   Output              425 ml   Net             1045 ml       Physical Exam:     Physical Exam   Constitutional: She is oriented to person, place, and time  She appears well-developed  No distress  Thin appearing   HENT:   Head: Normocephalic  Neck: Neck supple  Cardiovascular: Normal rate  An irregular rhythm present  Pulmonary/Chest: Effort normal and breath sounds normal  No respiratory distress  Abdominal: Soft  Bowel sounds are normal  She exhibits no distension  There is no tenderness  Musculoskeletal: Normal range of motion  She exhibits no edema  Neurological: She is alert and oriented to person, place, and time  Skin: Skin is warm and dry  Psychiatric: She has a normal mood and affect  Her behavior is normal    Vitals reviewed  Additional Data:     Labs:      Results from last 7 days  Lab Units 05/23/18  0427   WBC Thousand/uL 7 02   HEMOGLOBIN g/dL 13 5   HEMATOCRIT % 43 3   PLATELETS Thousands/uL 253   NEUTROS PCT % 62   LYMPHS PCT % 25   MONOS PCT % 9   EOS PCT % 3       Results from last 7 days  Lab Units 05/23/18  0427 05/22/18  1539   SODIUM mmol/L 140 138   POTASSIUM mmol/L 4 4 4 0   CHLORIDE mmol/L 107 104   CO2 mmol/L 27 27   BUN mg/dL 12 14   CREATININE mg/dL 0 83 0 91   CALCIUM mg/dL 9 3 9 1   TOTAL PROTEIN g/dL  --  6 8   BILIRUBIN TOTAL mg/dL  --  0 92   ALK PHOS U/L  --  119*   ALT U/L  --  56   AST U/L  --  18   GLUCOSE RANDOM mg/dL 94 95           * I Have Reviewed All Lab Data Listed Above  * Additional Pertinent Lab Tests Reviewed:  Stu 66 Admission Reviewed    Imaging:    Imaging Reports Reviewed Today Include: none  Imaging Personally Reviewed by Myself Includes:  none    Recent Cultures (last 7 days):           Last 24 Hours Medication List: Current Facility-Administered Medications:  acetaminophen 650 mg Oral Q6H PRN Hereford Neither, PA-C   ALPRAZolam 0 25 mg Oral HS PRN Malena Neither, PA-C   amitriptyline 25 mg Oral HS Malena Neither, PA-C   apixaban 5 mg Oral BID Kylie Casper, PA-C   aspirin 81 mg Oral Daily Hereford Neither, PA-C   calcium carbonate 1,000 mg Oral Daily PRN Hereford Neither, PA-C   DULoxetine 30 mg Oral Daily Malena Neither, PA-C   furosemide 40 mg Oral Daily Hereford Neither, PA-C   lisinopril 5 mg Oral Daily Malena Neither, PA-C   methocarbamol 500 mg Oral HS PRN Hereford Neither, PA-C   metoprolol tartrate 100 mg Oral Q12H [address] Malena Neither, PA-C   ondansetron 4 mg Intravenous Q6H PRN Hereford Neither, PA-C   oxyCODONE 15 mg Oral Q8H PRN CHANDRIKA Rueda   polyethylene glycol 17 g Oral Daily Hereford Neither, PA-C   potassium chloride 10 mEq Oral BID Malena Neither, PA-C        Today, Patient Was Seen By: CHANDRIKA Rueda    ** Please Note: Dictation voice to text software may have been used in the creation of this document   **

## 2021-05-12 NOTE — PROGRESS NOTES
Yudith King is receiving home care services of SN, PT and MSW  She lives alone and receives support from her [de-identified]  Her  from Aging did see her last week for initial assessment and patient would like to receive caregiver services at home  She does not have scale so is not weighing  She denies chest pain, Le edema  She does c/o sob with exertion and is interested in building up her endurance with therapy  She ambulates with walker and has no reported falls  Nutritional intake adequate, she is trying to follow a low sodium diet  She is taking all medications as prescribed  She needs to schedule f/u with cardiology, states she needs to work around her elizabeth's schedule  Encouraged her to schedule follow up soon  She c/o chronic back pain  She relays she is depressed due to the the recent deaths of her brother, sister and daughter  She states all of her children are now  and she only has one family member left, a sister  She denies suicidial ideations  Support and encouragement given  She has my contact information, encouraged to call with any questions or concerns  She is agreeable to further outreach  Complex Repair And Burow's Graft Text: The defect edges were debeveled with a #15 scalpel blade.  The primary defect was closed partially with a complex linear closure.  Given the location of the defect, shape of the defect, the proximity to free margins and the presence of a standing cone deformity a Burow's graft was deemed most appropriate to repair the remaining defect.  The graft was trimmed to fit the size of the remaining defect.  The graft was then placed in the primary defect, oriented appropriately, and sutured into place.

## 2021-06-15 NOTE — RAPID RESPONSE
Progress Note - Rapid Response   Doris Ford 79 y o  female MRN: [id number]    Time Called ( Time):630  Date Called:  09/13/2020  Level of Care: MS  Room#: 216  [id number] Time ( Time): 340  Event End Time ( Time): 021  Primary reason for call: Acute change in mental status  Interventions:  Airway/Breathing:  No Intervention  Circulation: EKG  Other Treatments: N/A       Assessment:   1  Altered mental status likely secondary to the sepsis, as evidenced by fever of 103    Plan:   · CT head to rule out intracranial pathology  · Sepsis workup including blood cultures, CBC, lactic acid  · antibiotics per primary team  · signout given to primary team who took over care of the patient       HPI/Chief Complaint (Background/Situation):   Doris Ford is a 79y o  year old female who presents with MALA and ambulatory dysfunction for which a rapid response was called 9/13 for altered mental status, last known normal 330  No focal deficits    Historical Information   Past Medical History:   Diagnosis Date    A-fib ([facility] [address] )     Acute respiratory disease     Anemia     Arthritis     Atrial fibrillation (HCC)     CHF (congestive heart failure) (HCC)     Chronic pain     Heart failure (HCC)     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[facility] [address] )     History of colon polyps     Hx of long term use of blood thinners     Hypertension     Irregular heart beat     Narcotic dependence ([facility] [address] )     Rectal bleeding     Stroke (HCC)     mild no deficiets/ memory loss    Uses walker      Past Surgical History:   Procedure Laterality Date    COLONOSCOPY      COLONOSCOPY N/A 7/27/2018    Procedure: COLONOSCOPY;  Surgeon: Cristiane Pablo MD;  [facility]: [facility]; Service: Gastroenterology    CORONARY STENT PLACEMENT      ERCP N/A 5/14/2018    Procedure: ENDOSCOPIC RETROGRADE CHOLANGIOPANCREATOGRAPHY (ERCP);   Surgeon: Viraj Tadeo MD;  [facility];  Service: Gastroenterology    ERCP N/A 8/28/2018    Procedure: ENDOSCOPIC RETROGRADE CHOLANGIOPANCREATOGRAPHY (ERCP) w/ EGD;  Surgeon: Jose Miguel Heard MD;  [facility]; Service: Gastroenterology    ESOPHAGOGASTRODUODENOSCOPY N/A 7/26/2018    Procedure: ESOPHAGOGASTRODUODENOSCOPY (EGD); Surgeon: Anali Cochran MD;  [facility];   Service: Gastroenterology    JOINT REPLACEMENT Right     knee     Social History   Social History     Substance and Sexual Activity   Alcohol Use Not Currently    Frequency: Never    Binge frequency: Never     Social History     Substance and Sexual Activity   Drug Use Not Currently     Social History     Tobacco Use   Smoking Status Former Smoker    Packs/day: 0 50    Types: Cigarettes   Smokeless Tobacco Never Used     Family History: non-contributory    Meds/Allergies   Current Facility-Administered Medications   Medication Dose Route Frequency Provider Last Rate    acetaminophen  650 mg Oral 4x Daily (with meals and at bedtime) Leo Mesa MD      amiodarone  200 mg Oral Daily Chavez Page MD      apixaban  5 mg Oral BID Chavez Page MD      atorvastatin  20 mg Oral Daily With Kolby Rodrigues MD      bisacodyl  10 mg Rectal Daily PRN Chavez Page MD      clopidogrel  75 mg Oral Daily Chavez Page MD      digoxin  125 mcg Oral Daily Chavez Page MD      docusate sodium  100 mg Oral BID Chavez Page MD      FLUoxetine  20 mg Oral Daily Chavez Page MD      folic acid  4,162 mcg Oral Daily Chavez Page MD      furosemide  20 mg Oral Daily Miguelito Lopez MD      gabapentin  300 mg Oral Daily Chavez Page MD      gabapentin  300 mg Oral HS Chavez Page MD      insulin lispro  1-6 Units Subcutaneous TID [facility] Chavez Page MD      insulin lispro  1-6 Units Subcutaneous HS Chavez Page MD      lisinopril  2 5 mg Oral HS Chavez Page MD      melatonin  6 mg Oral HS Chavez Page MD      methocarbamol  250 mg Oral BID PRN Miguelito Lopez MD      metoprolol succinate  50 mg Oral Q12H Valentin Burgos MD      nortriptyline  10 mg Oral HS Valentin Burgos MD      oxyCODONE  5 mg Oral Q3H PRN Cabrera Salmon DO      oxyCODONE  7 5 mg Oral 4x Daily (with meals and at bedtime) Derick Colunga MD      pantoprazole  40 mg Oral Early Morning Valentin Burgos MD      senna-docusate sodium  1 tablet Oral HS Valentin Burgos MD      zolpidem  5 mg Oral HS Unique Celaya MD              No Known Allergies    ROS:  Patient nonparticipatory     Vitals: T 103 /86   Pulse (!) 131   Temp 98 7 °F (37 1 °C) (Oral)   Resp 20   Ht 5' 5" (1 651 m) Comment: per 3/2/20 encounter  Wt 86 2 kg (190 lb 0 6 oz)   SpO2 97%   BMI 31 62 kg/m²       Physical Exam:  Gen:altered, unable to respond  HEENT:PERLLA  Chest:no obvious deformities, lung CTAB  Cor:tachycardic  Abd:soft, nontender, nondistended  Ext:no lateralizing deficits, unable to participate in exam for muscle strength  Neuro:moving extremities but not participating in exam  PERLLA      Intake/Output Summary (Last 24 hours) at 9/13/2020 0654  Last data filed at 9/13/2020 0201  Gross per 24 hour   Intake 1760 ml   Output 1175 ml   Net 585 ml       Respiratory    Lab Data (Last 4 hours)    None         O2/Vent Data (Last 4 hours)    None              Invasive Devices     Peripheral Intravenous Line            Peripheral IV 09/09/20 Proximal;Right;Ventral (anterior) Forearm 4 days                DIAGNOSTIC DATA:    Lab: I have personally reviewed pertinent lab results     CBC:   Results from last 7 days   Lab Units 09/13/20  0648 09/12/20  0522   WBC Thousand/uL  --  7 37   HEMOGLOBIN g/dL  --  8 5*   I STAT HEMOGLOBIN g/dl 10 5*  --    HEMATOCRIT %  --  28 8*   HEMATOCRIT, ISTAT % 31*  --    PLATELETS Thousands/uL  --  317     CMP:   Results from last 7 days   Lab Units 09/13/20  0648 09/09/20  0313   POTASSIUM mmol/L  --  4 4   CHLORIDE mmol/L  --  104   CO2 mmol/L  --  30   CO2, I-STAT mmol/L 22  --    BUN mg/dL  --  17   CREATININE mg/dL  -- 1 10   CALCIUM mg/dL  --  9 0   ALK PHOS U/L  --  88   ALT U/L  --  21   AST U/L  --  21   GLUCOSE, ISTAT mg/dl 189*  --      PT/INR:   No results found for: PT, INR,   Magnesium: No components found for: MAG,   Phosphorous: No results found for: PHOS    Microbiology:  Lab Results   Component Value Date    BLOODCX No Growth After 5 Days  01/03/2019    BLOODCX No Growth After 5 Days  01/03/2019    BLOODCX No Growth After 5 Days  05/13/2018    BLOODCX Staphylococcus coagulase negative (A) 05/13/2018         OUTCOME:   Attending service notified  Family notified of transfer: no  Family member contacted: no  Code Status: Level 3 - DNAR and DNI  Critical Care Time: Total Critical Care time spent 20 minutes excluding procedures, teaching and family updates  Dermal Autograft Text: The defect edges were debeveled with a #15 scalpel blade.  Given the location of the defect, shape of the defect and the proximity to free margins a dermal autograft was deemed most appropriate.  Using a sterile surgical marker, the primary defect shape was transferred to the donor site. The area thus outlined was incised deep to adipose tissue with a #15 scalpel blade.  The harvested graft was then trimmed of adipose and epidermal tissue until only dermis was left.  The skin graft was then placed in the primary defect and oriented appropriately.

## 2022-06-03 NOTE — TELEPHONE ENCOUNTER
Patient has been discharged from [address] 5/21/2018  Joselito Robbins is a nurse she will be going out to patients home for about six weeks, Patient was in for congestive heart failure, patient has no cardiologist, her heart rate is between 90 and 134, irregular, patient has not been taking any medications since the hospital but has been taking Cipro and Augmentin with pain all over pain rate 9 out of ten  she was admitted for Sever sepsis, Afib   Patient will call to make a MELA visit when she get transportation  [FreeTextEntry1] : Follow-up in 3 to 4 months

## 2022-06-27 NOTE — PHYSICAL THERAPY NOTE
PT TREATMENT       08/30/18 1039   Pain Assessment   Pain Assessment 0-10   Pain Score 4   Pain Type Chronic pain   Pain Location Back;Neck   Restrictions/Precautions   LLE Weight Bearing Per Order WBAT   Other Precautions Pain   General   Chart Reviewed Yes   Response to Previous Treatment Patient with no complaints from previous session  Family/Caregiver Present No   Cognition   Overall Cognitive Status WFL   Subjective   Subjective "I WANT TO GO HOME"   Bed Mobility   Supine to Sit 5  Supervision   Additional items Increased time required   Sit to Supine 5  Supervision   Additional items Increased time required   Transfers   Sit to Stand 5  Supervision   Additional items Increased time required   Stand to Sit 5  Supervision   Additional items Increased time required   Stand pivot 5  Supervision   Additional items Increased time required   Sliding Board transfer 5  Supervision   Additional items Increased time required   Ambulation/Elevation   Gait pattern Excessively slow; Short stride; Shuffling;Decreased foot clearance   Gait Assistance 5  Supervision   Assistive Device None   Distance 2 FEET   Balance   Static Sitting Fair +   Static Standing Fair   Ambulatory Fair -   Endurance Deficit   Endurance Deficit Yes   Endurance Deficit Description FATIGUE    Activity Tolerance   Activity Tolerance Patient limited by fatigue   Medical Staff Made Aware PEPE JIMÉNEZ   Nurse Made Aware RN ENRIQUE    Exercises   Heelslides Sitting;10 reps;Bilateral;AROM   Hip Abduction Sitting;Bilateral;AROM;10 reps   Knee AROM Long Arc Quad Sitting;Bilateral;AROM;10 reps   Ankle Pumps Sitting;20 reps;Bilateral;AROM   Assessment   Prognosis Good   Problem List Decreased strength;Decreased endurance; Impaired balance;Decreased mobility;Pain   Assessment PT INITIATED TREATMENT SESSION IN ORDER TO ASSIST PATIENT IN ACHIEVING GOALS TO IMPROVE TRANSFERS, AMBULATION, STRENGTH, AND OVERALL ACTIVITY TOLERANCE   PATIENT'S SELF REPORTED GOAL IS TO "NOT DO A LOT WITH THERAPY BECAUSE SHE NEEDS TO EAT"  PATIENT RECENTLY FINISHED COLONOSCOPY PREPARATION AND HAS BEEN NPO- PRESENTS WITH REDUCED ENERGY  SHE REQUIRED S FOR PERFORMANCE OF BED MOBILITY, SIT<-->STAND TRANSFERS, STAND PIVOT TRANSFER, AND SHORT DISTANCE AMBULATION  PATIENT AMBULATED 2 FEET W/O USE OF AD (BED <-->CHAIR)  SHE DEMONSTRATED GOOD TOLERANCE FOR ACTIVE PERFORMANCE OF B/L LE THERE-EX SEATED IN CHAIR  NEXT SESSION PLAN TO PROGRESS GAIT TO AT LEAST HOUSEHOLD DISTANCES W/ USE OF RW  PT D/C RECOMMENDATION IS FOR HOME WITH HHPT, USE OF RW, AND FAMILY ASSIST PRN  SHE WILL BENEFIT FROM CONTINUED SKILLED PT THIS ADMISSION TO ACHIEVE MAXIMAL FUNCTION AND SAFETY  Goals   Patient Goals TO EAT SOMETHING AT 11    STG Expiration Date 09/13/18   Short Term Goal #1 1) PERFORM BED MOBILITY MOD-I TO PARTICIPATE IN FREQUENT REPOSITIONING AND IMPROVE SKIN INTEGRITY; 2) PERFORM SIT<-->STAND TRANSFER MOD-I TO PROMOTE I WITH TOILETING AND OOB MOBILITY; 3) AMBULATE 200 FEET MOD-I W/ LEAST RESTRICTIVE DEVICE TO PARTICIPATE IN HOUSEHOLD AND COMMUNITY LEVEL AMBULATION; 4) IMPROVE B/L LE STRENGTH BY 1/2 GRADE TO IMPROVE EFFICIENCY OF TRANSFERS; 5) IMPROVE BALANCE BY 1 GRADE TO REDUCE RISK FOR FALLS; 6) NAVIGATE 2 STEPS S LEVEL IN ORDER TO SAFELY NAVIGATE IN/OUT OF HOME    Treatment Day 2   Plan   Treatment/Interventions Functional transfer training;LE strengthening/ROM; Elevations; Therapeutic exercise;Patient/family training;Equipment eval/education; Bed mobility;Gait training;OT;Spoke to nursing   Progress Progressing toward goals   PT Frequency Other (Comment)  (3-5X/WK)   Recommendation   Recommendation Home PT; Home with family support   Equipment Recommended Walker  ([address] )   PT - OK to Discharge No  (DEMONSTRATE IMPROVED MOBILITY WITH PT )   Gabriela Huerta, PT Muscle Hinge Flap Text: The defect edges were debeveled with a #15 scalpel blade.  Given the size, depth and location of the defect and the proximity to free margins a muscle hinge flap was deemed most appropriate.  Using a sterile surgical marker, an appropriate hinge flap was drawn incorporating the defect. The area thus outlined was incised with a #15 scalpel blade.  The skin margins were undermined to an appropriate distance in all directions utilizing iris scissors.

## 2022-08-24 NOTE — ASSESSMENT & PLAN NOTE
A: Vtach due to uncertain etiology  R on T seen but does not have the appearance of torsades       P:    - Will order LHC to r/o CAD/scar causing tachycardia  - Hold Eliquis for LHC tomorrow  - NPO PM  - Keep Mg>2, K>4  - Continue amiodarone  - Discontinue amitriptyline Progress Note - Mariajose Brothers 35 y o  male MRN: [id number]    Unit/Bed#: [address] [place] [address] [address] Encounter: [id number]        Assessment/Plan: This is a 76-year-old male who has a history of ulcerative colitis status post ileoanal anastomosis with J-pouch formation with subsequent anastomotic stricture requiring dilatation in the past   Patient have flex-sig in May of this year which had shown some yellowish exudate the biopsies were consistent with chronic active enterocolitis with severe activity   Patient is no longer on immunosuppression, was previously on Morgan Constable for ankylosing spondylitis   Infectious workup for diarrhea was negative   Await blood cultures, no growth thus far   Continue antibiotics for treatment of pouchitis, no need for IV steroids  Leukocytosis now has normalized, patient reports that he was not taking antibiotics or steroids at home although he was discharged on 1 month of Flagyl in 6 weeks of steroids last month   Continue oral vanco for C diff prophylaxis while on antibiotics   Patient will need follow-up as outpatient with colorectal  Patient did have EUA 6/14 showing long cuff and dilated pouch concerning for a twist   Patient had barium enema that day as well which had shown no significant abnormality and contrast reached ileum  Patient planning for pelvic MRI and possible revision of J-pouch  We will tentatively plan pouchoscopy  for tomorrow for further evaluation  Advised patient to call surgeon to update him of his status  Subjective:   Patient is lying in bed  Reports that he tolerated liquids yesterday but today is feeling nauseous  Had 3-4 bowel movements overnight  Leukocytosis has improved  He otherwise feels as though stools are inadequate      Objective:     Vitals: /68   Pulse 74   Temp (!) 97 4 °F (36 3 °C)   Resp 18   Ht 5' 9" (1 753 m)   Wt 83 kg (183 lb)   SpO2 96%   BMI 27 02 kg/m²       Physical Exam:  Gen-alert no acute distress  Abd-positive bowel sounds, diffusely tender to palpation, nondistended, no rebound rigidity guarding       Lab, Imaging and other studies:   Recent Results (from the past 72 hour(s))   CBC and differential    Collection Time: 08/21/22 11:28 AM   Result Value Ref Range    WBC 26 61 (H) 4 31 - 10 16 Thousand/uL    RBC 6 68 (H) 3 88 - 5 62 Million/uL    Hemoglobin 12 8 12 0 - 17 0 g/dL    Hematocrit 41 9 36 5 - 49 3 %    MCV 63 (L) 82 - 98 fL    MCH 19 2 (L) 26 8 - 34 3 pg    MCHC 30 5 (L) 31 4 - 37 4 g/dL    RDW 18 7 (H) 11 6 - 15 1 %    MPV 9 1 8 9 - 12 7 fL    Platelets 218 (H) 608 - 390 Thousands/uL    nRBC 0 /100 WBCs    Neutrophils Relative 88 (H) 43 - 75 %    Immat GRANS % 1 0 - 2 %    Lymphocytes Relative 4 (L) 14 - 44 %    Monocytes Relative 7 4 - 12 %    Eosinophils Relative 0 0 - 6 %    Basophils Relative 0 0 - 1 %    Neutrophils Absolute 23 31 (H) 1 85 - 7 62 Thousands/µL    Immature Grans Absolute 0 15 0 00 - 0 20 Thousand/uL    Lymphocytes Absolute 1 11 0 60 - 4 47 Thousands/µL    Monocytes Absolute 1 93 (H) 0 17 - 1 22 Thousand/µL    Eosinophils Absolute 0 01 0 00 - 0 61 Thousand/µL    Basophils Absolute 0 10 0 00 - 0 10 Thousands/µL   Comprehensive metabolic panel    Collection Time: 08/21/22 11:28 AM   Result Value Ref Range    Sodium 132 (L) 135 - 147 mmol/L    Potassium 3 3 (L) 3 5 - 5 3 mmol/L    Chloride 92 (L) 96 - 108 mmol/L    CO2 28 21 - 32 mmol/L    ANION GAP 12 4 - 13 mmol/L    BUN 12 5 - 25 mg/dL    Creatinine 1 27 0 60 - 1 30 mg/dL    Glucose 92 65 - 140 mg/dL    Calcium 9 4 8 3 - 10 1 mg/dL    AST 16 5 - 45 U/L    ALT 29 12 - 78 U/L    Alkaline Phosphatase 89 46 - 116 U/L    Total Protein 8 2 6 4 - 8 4 g/dL    Albumin 4 2 3 5 - 5 0 g/dL    Total Bilirubin 1 63 (H) 0 20 - 1 00 mg/dL    eGFR 75 ml/min/1 73sq m   Blood culture #1    Collection Time: 08/21/22 11:28 AM    Specimen: Arm, Right; Blood   Result Value Ref Range    Blood Culture No Growth at 48 hrs      Blood culture #2    Collection Time: 08/21/22 11:28 AM    Specimen: Arm, Left; Blood   Result Value Ref Range    Blood Culture No Growth at 48 hrs      Lactic acid    Collection Time: 08/21/22 11:28 AM   Result Value Ref Range    LACTIC ACID 0 8 0 5 - 2 0 mmol/L   Lipase    Collection Time: 08/21/22 11:28 AM   Result Value Ref Range    Lipase 60 (L) 73 - 393 u/L   COVID only    Collection Time: 08/21/22 11:28 AM    Specimen: Nose; Nares   Result Value Ref Range    SARS-CoV-2 Negative Negative   C-reactive protein    Collection Time: 08/21/22 11:28 AM   Result Value Ref Range    CRP 89 0 (H) <3 0 mg/L   UA (URINE) with reflex to Scope    Collection Time: 08/21/22  1:03 PM   Result Value Ref Range    Color, UA Yellow     Clarity, UA Clear     Specific Gravity, UA 1 025 1 000 - 1 030    pH, UA 6 0 5 0, 5 5, 6 0, 6 5, 7 0, 7 5, 8 0, 8 5, 9 0    Leukocytes, UA Negative Negative    Nitrite, UA Negative Negative    Protein, UA 30 (1+) (A) Negative mg/dl    Glucose, UA Negative Negative mg/dl    Ketones, UA 40 (2+) (A) Negative mg/dl    Urobilinogen, UA 0 2 0 2, 1 0 E U /dl E U /dl    Bilirubin, UA Interference- unable to analyze (A) Negative    Occult Blood, UA Small (A) Negative   Urine Microscopic    Collection Time: 08/21/22  1:03 PM   Result Value Ref Range    RBC, UA 2-4 None Seen, 0-1, 1-2, 2-4, 0-5 /hpf    WBC, UA 2-4 None Seen, 0-1, 1-2, 0-5, 2-4 /hpf    Epithelial Cells None Seen None Seen, Occasional /hpf    Bacteria, UA Occasional None Seen, Occasional /hpf   Clostridium difficile toxin by PCR with EIA    Collection Time: 08/21/22  7:59 PM    Specimen: Rectum; Stool   Result Value Ref Range    C difficile toxin by PCR Negative Negative   Stool Enteric Bacterial Panel by PCR    Collection Time: 08/21/22  7:59 PM    Specimen: Rectum; Stool   Result Value Ref Range    Salmonella sp PCR None Detected None Detected    Shigella sp/Enteroinvasive E  coli (EIEC) PCR None Detected None Detected    Campylobacter sp (jejuni and coli) PCR None Detected None Detected Shiga toxin 1/Shiga toxin 2 genes PCR None Detected None Detected   Comprehensive metabolic panel    Collection Time: 08/22/22  5:38 AM   Result Value Ref Range    Sodium 134 (L) 135 - 147 mmol/L    Potassium 4 6 3 5 - 5 3 mmol/L    Chloride 99 96 - 108 mmol/L    CO2 26 21 - 32 mmol/L    ANION GAP 9 4 - 13 mmol/L    BUN 12 5 - 25 mg/dL    Creatinine 1 09 0 60 - 1 30 mg/dL    Glucose 135 65 - 140 mg/dL    Calcium 9 0 8 3 - 10 1 mg/dL    AST 10 5 - 45 U/L    ALT 21 12 - 78 U/L    Alkaline Phosphatase 76 46 - 116 U/L    Total Protein 7 4 6 4 - 8 4 g/dL    Albumin 3 5 3 5 - 5 0 g/dL    Total Bilirubin 0 72 0 20 - 1 00 mg/dL    eGFR 91 ml/min/1 73sq m   CBC and differential    Collection Time: 08/22/22  5:38 AM   Result Value Ref Range    WBC 19 11 (H) 4 31 - 10 16 Thousand/uL    RBC 5 86 (H) 3 88 - 5 62 Million/uL    Hemoglobin 11 2 (L) 12 0 - 17 0 g/dL    Hematocrit 37 4 36 5 - 49 3 %    MCV 64 (L) 82 - 98 fL    MCH 19 1 (L) 26 8 - 34 3 pg    MCHC 29 9 (L) 31 4 - 37 4 g/dL    RDW 18 0 (H) 11 6 - 15 1 %    MPV 8 6 (L) 8 9 - 12 7 fL    Platelets 643 385 - 869 Thousands/uL    nRBC 0 /100 WBCs    Neutrophils Relative 94 (H) 43 - 75 %    Immat GRANS % 1 0 - 2 %    Lymphocytes Relative 3 (L) 14 - 44 %    Monocytes Relative 2 (L) 4 - 12 %    Eosinophils Relative 0 0 - 6 %    Basophils Relative 0 0 - 1 %    Neutrophils Absolute 18 08 (H) 1 85 - 7 62 Thousands/µL    Immature Grans Absolute 0 09 0 00 - 0 20 Thousand/uL    Lymphocytes Absolute 0 56 (L) 0 60 - 4 47 Thousands/µL    Monocytes Absolute 0 36 0 17 - 1 22 Thousand/µL    Eosinophils Absolute 0 00 0 00 - 0 61 Thousand/µL    Basophils Absolute 0 02 0 00 - 0 10 Thousands/µL   Magnesium    Collection Time: 08/22/22  5:38 AM   Result Value Ref Range    Magnesium 2 3 1 6 - 2 6 mg/dL   C-reactive protein    Collection Time: 08/22/22  5:38 AM   Result Value Ref Range     7 (H) <3 0 mg/L   Comprehensive metabolic panel    Collection Time: 08/23/22  5:33 AM   Result Value Ref Range    Sodium 136 135 - 147 mmol/L    Potassium 4 1 3 5 - 5 3 mmol/L    Chloride 100 96 - 108 mmol/L    CO2 28 21 - 32 mmol/L    ANION GAP 8 4 - 13 mmol/L    BUN 12 5 - 25 mg/dL    Creatinine 0 91 0 60 - 1 30 mg/dL    Glucose 102 65 - 140 mg/dL    Calcium 8 4 8 3 - 10 1 mg/dL    Corrected Calcium 9 2 8 3 - 10 1 mg/dL    AST 8 5 - 45 U/L    ALT 18 12 - 78 U/L    Alkaline Phosphatase 54 46 - 116 U/L    Total Protein 6 6 6 4 - 8 4 g/dL    Albumin 3 0 (L) 3 5 - 5 0 g/dL    Total Bilirubin 0 55 0 20 - 1 00 mg/dL    eGFR 113 ml/min/1 73sq m   CBC and differential    Collection Time: 08/23/22  5:33 AM   Result Value Ref Range    WBC 17 66 (H) 4 31 - 10 16 Thousand/uL    RBC 5 45 3 88 - 5 62 Million/uL    Hemoglobin 10 4 (L) 12 0 - 17 0 g/dL    Hematocrit 35 0 (L) 36 5 - 49 3 %    MCV 64 (L) 82 - 98 fL    MCH 19 1 (L) 26 8 - 34 3 pg    MCHC 29 7 (L) 31 4 - 37 4 g/dL    RDW 17 8 (H) 11 6 - 15 1 %    MPV 8 8 (L) 8 9 - 12 7 fL    Platelets 088 395 - 874 Thousands/uL    nRBC 0 /100 WBCs    Neutrophils Relative 80 (H) 43 - 75 %    Immat GRANS % 1 0 - 2 %    Lymphocytes Relative 8 (L) 14 - 44 %    Monocytes Relative 11 4 - 12 %    Eosinophils Relative 0 0 - 6 %    Basophils Relative 0 0 - 1 %    Neutrophils Absolute 14 20 (H) 1 85 - 7 62 Thousands/µL    Immature Grans Absolute 0 13 0 00 - 0 20 Thousand/uL    Lymphocytes Absolute 1 33 0 60 - 4 47 Thousands/µL    Monocytes Absolute 1 98 (H) 0 17 - 1 22 Thousand/µL    Eosinophils Absolute 0 00 0 00 - 0 61 Thousand/µL    Basophils Absolute 0 02 0 00 - 0 10 Thousands/µL   Magnesium    Collection Time: 08/23/22  5:33 AM   Result Value Ref Range    Magnesium 2 4 1 6 - 2 6 mg/dL   Basic metabolic panel    Collection Time: 08/24/22  6:15 AM   Result Value Ref Range    Sodium 138 135 - 147 mmol/L    Potassium 3 7 3 5 - 5 3 mmol/L    Chloride 103 96 - 108 mmol/L    CO2 26 21 - 32 mmol/L    ANION GAP 9 4 - 13 mmol/L    BUN 11 5 - 25 mg/dL    Creatinine 1 09 0 60 - 1 30 mg/dL    Glucose 89 65 - 140 mg/dL    Calcium 8 1 (L) 8 3 - 10 1 mg/dL    eGFR 91 ml/min/1 73sq m   CBC    Collection Time: 08/24/22  6:15 AM   Result Value Ref Range    WBC 7 66 4 31 - 10 16 Thousand/uL    RBC 5 06 3 88 - 5 62 Million/uL    Hemoglobin 9 6 (L) 12 0 - 17 0 g/dL    Hematocrit 32 4 (L) 36 5 - 49 3 %    MCV 64 (L) 82 - 98 fL    MCH 19 0 (L) 26 8 - 34 3 pg    MCHC 29 6 (L) 31 4 - 37 4 g/dL    RDW 17 1 (H) 11 6 - 15 1 %    Platelets 696 698 - 156 Thousands/uL    MPV 8 6 (L) 8 9 - 12 7 fL

## 2022-09-17 NOTE — ASSESSMENT & PLAN NOTE
Patient with elevated troponin; denies chest pain  EKG in the ED negative for ischemia; shows rate controlled atrial fibrillation  Troponin likely elevated secondary to acute decompensated heart failure  Will obtain follow on tropes; consider cardiology consult  Telemetry Female
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
## 2022-10-04 NOTE — ASSESSMENT & PLAN NOTE
Patient presented with frequent falls which has been going on more often over the course of last several months,  Per patient daughter , patient has been started on hydrochlorothiazide  several months ago  Patient with low sodium of 125 and also low chloride and potassium most likely due to hydrochlorothiazide.  Patient also is on Lasix but is not taking on a regular basis only for lower extremity edema if needed   Will discontinue hydrochlorothiazide, TSH was within normal limit  Sodium level up to 126 with IV normal saline  We will check urine electrolytes, urine sodium, urine osmolality, serum osmolality  Patient to have PT/OT evaluation   c/o severe pain  CTAP showed no acute finding  Incidentally shows transverse colon nodule  Can get repeat CTAP in 3 months if desired by guardian   Suspect musculoskeletal pain    9/9-patient still complaining of left lower quadrant pain largely in the groin section; CT reviewed and shows no acute pathology  May consider repeating imaging and possible muscle relaxer  9/10-chest x-rays of left hip with no acute fracture or bony abnormalities; moderate degenerative change  Does not appear to correlate patient's severe pain; consider repeating CT abdomen pelvis  -patient currently on 7 5 mg of oxycodone q i d   -have increased gabapentin 300 mg b i d     9/11-CT abdomen pelvis repeated yesterday; again, no acute findings that would explain patient's left groin/left lower quadrant pain  At this point, given location of the pain in the left anterior groin and raising leg exacerbates pain; believe this is musculoskeletal secondary to moderate degenerative changes in the left hip  Patient also noted to have multiple other degenerative changes to include right shoulder surgery and spinal changes; left hip osteoarthritis consistent with other areas of musculoskeletal pain

## 2023-02-24 NOTE — PROGRESS NOTES
From: Nat Cnadelaria  To: Mello Ronquillo  Sent: 2/20/2023 8:15 PM EST  Subject: Thyroid bloodwork    Hegarima Doc, are you going to call the new dose of thyroid medicine in?   Rounding completed with Dr Qing Urban  Plan of care discussed with patient  Will remain NPO for potential EP intervention

## 2023-05-05 NOTE — ASSESSMENT & PLAN NOTE
[employer] cardiac event monitor placed per Dr. Marroquin's orders.  Patient instructed on use of monitor verbally as well as given [employer] Patient Guide.  Patient instructed that [employer] customer service is available 24/7 and that they are able to contact them at anytime to troubleshoot the monitor, obtain more supplies, and/or request sensitive patches.  Patient will return monitor via the [employer] or [employer] per their discretion.      MCOT    Time to be monitored: 30 days      Discussed with prior authorizations team member Diana Pederson and verified insurance pre-authorization.   · Patient with possible withdrawal versus biliary symptoms  Symptoms completely abated with 1 time dose of IV Dilaudid in the ED  Patient notes that she has a history of narcotic withdrawal in the past   She reports that she ran out of her Oxy IR pills approximately 5 days ago  She presents with decreased oral intake with associated abdominal discomfort and nausea vomiting symptoms with mild acute renal insufficiency     Choledocholithiasis  Status post ERCP and stent exchange  She will require repeat ERCP once she is able to come off the anti-platelet/anticoagulation  patient was scheduled originally after recent discharge to go follow up with GI for scheduling of repeat ERCP in 3 months  · LFTs appear currently normal   · No signs of transaminitis on labs  · Will need close follow through with GI  If pain recurs or if there is suspicion of biliary symptoms may need to consider GI consultation  Continue with gentle hydration  Monitor creatinine    Will hold ACE-inhibitor for the time being

## 2023-09-06 NOTE — ASSESSMENT & PLAN NOTE
Would also check CPK  Patient has elevated BUN and creatinine compared to previous  Would give fluids  Monitor metabolic profile  Detail Level: Detailed Depth Of Biopsy: dermis Was A Bandage Applied: Yes Size Of Lesion In Cm: 0 Biopsy Type: H and E Biopsy Method: Dermablade Anesthesia Type: 1% lidocaine with 1:100,000 epinephrine Anesthesia Volume In Cc (Will Not Render If 0): 0.5 Hemostasis: Aluminum Chloride Wound Care: Vaseline Dressing: bandage Destruction After The Procedure: No Type Of Destruction Used: Curettage Curettage Text: The wound bed was treated with curettage after the biopsy was performed. Cryotherapy Text: The wound bed was treated with cryotherapy after the biopsy was performed. Electrodesiccation Text: The wound bed was treated with electrodesiccation after the biopsy was performed. Electrodesiccation And Curettage Text: The wound bed was treated with electrodesiccation and curettage after the biopsy was performed. Silver Nitrate Text: The wound bed was treated with silver nitrate after the biopsy was performed. Lab: 6 Lab Facility: 3 Consent: Written consent was obtained and risks were reviewed including but not limited to scarring, infection, bleeding, scabbing, incomplete removal, nerve damage and allergy to anesthesia. Post-Care Instructions: I reviewed with the patient in detail post-care instructions. Patient is to keep the biopsy site dry overnight, and then apply bacitracin twice daily until healed. Notification Instructions: Patient will be notified of biopsy results. However, patient instructed to call the office if not contacted within 2 weeks. Billing Type: Third-Party Bill Information: Selecting Yes will display possible errors in your note based on the variables you have selected. This validation is only offered as a suggestion for you. PLEASE NOTE THAT THE VALIDATION TEXT WILL BE REMOVED WHEN YOU FINALIZE YOUR NOTE. IF YOU WANT TO FAX A PRELIMINARY NOTE YOU WILL NEED TO TOGGLE THIS TO 'NO' IF YOU DO NOT WANT IT IN YOUR FAXED NOTE.

## 2024-02-16 NOTE — PHYSICAL THERAPY NOTE
Physical Therapy Progress Note     07/18/18 9243   Pain Assessment   Pain Assessment No/denies pain   Restrictions/Precautions   Other Precautions Cardiac/sternal;Cognitive;Telemetry; Fall Risk   General   Chart Reviewed Yes   Family/Caregiver Present No   Subjective   Subjective Pt presented to therapy seated at EOB, pleasant, agreeable to treat  Stated she felt more energized today, but also stated she has had memory troubles recently, and could not remember things that happened this morning  Pt stated that if she would return home, she would not have support outside her landlord calling twice a day & visiting occasionally throughout the week  Transfers   Sit to Stand 5  Supervision   Additional items Assist x 1; Armrests; Verbal cues   Stand to Sit 5  Supervision   Additional items Assist x 1; Increased time required;Verbal cues   Stand pivot 5  Supervision   Additional items Assist x 1; Increased time required;Verbal cues   Ambulation/Elevation   Gait pattern Forward Flexion; Inconsistent annie; Short stride  (L gluteus medius lurch)   Gait Assistance 4  Minimal assist   Additional items Assist x 1;Verbal cues   Assistive Device Rolling walker   Distance 40', 230', 100', 130'   Stair Management Assistance 5  Supervision   Additional items Assist x 1;Verbal cues   Stair Management Technique One rail R;Alternating pattern; Foreward   Number of Stairs 7   Balance   Static Sitting Good   Static Standing Fair   Ambulatory Fair -   Endurance Deficit   Endurance Deficit Yes   Endurance Deficit Description slight dyspnea observed with ambulation & on steps   Activity Tolerance   Activity Tolerance Patient tolerated treatment well;Patient limited by fatigue   Nurse Made Aware yes   Exercises   THR Sitting;20 reps;AROM; Bilateral   Assessment   Prognosis Good   Problem List Decreased strength;Decreased endurance; Impaired balance;Decreased mobility; Decreased cognition; Impaired judgement;Decreased safety awareness;Orthopedic restrictions   Assessment Pt demonstrated improved functional mobility today, performing all transfers without assist and ambulating beyond household distances without increased pain or LOB  Pt did demonstrate poor safety awareness with transfers & required instructions for proper hand placement and sequencing for all transfers, and was unable to recall later in session without further instruction  Pt ambulated on steps safely without assist, but stated it hurt her feet, and she spontaneously chainged gait pattern from reciprocal to step-to pattern during descent  Pt also demonstrated decreased safety awareness with RW managment during ambulation and frequently abandons it during transfers & when grabbing railings at steps  Pt instructed in & performed HEP exercises for LE strength to improve functional mobility and activity tolerance  Due to these impairments, cognitive deficits, and the pt living alone, pt will continue to benefit from inpatient therapy services to maximize mobility, strength, safety awareness and activity tolerance to ensure safe return home  Barriers to Discharge Decreased caregiver support   Goals   Patient Goals to get home safely   STG Expiration Date 07/27/18   Treatment Day 3   Plan   Treatment/Interventions Functional transfer training;LE strengthening/ROM; Therapeutic exercise;Elevations; Endurance training;Cognitive reorientation;Patient/family training;Equipment eval/education; Bed mobility;Gait training   Progress Progressing toward goals   PT Frequency (3-5x/week)   Recommendation   Recommendation Post acute IP rehab   Equipment Recommended Nestor Cruz PTA Negative
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
## 2024-08-01 NOTE — PROGRESS NOTES
Progress Note - Ryder Stapleton 69 y o  female MRN: [id number]    Unit/Bed#: [facility] 509-01 Encounter: [id number]      Assessment/Plan:  1  Social isolation - patient alone at home, sad, but doesn't want to live in nursing home  Needs social work to help with options- [facility] vs home health care aides - does pt qualify for waver program for assist?  OT to check ACLS if pt dc's home  Would recommend life alert button if discharging home  ?would [facility] referal be appropriate for this patient if she goes home?      2   Memory loss- chronic per patient  Patient will need formal cognitive assessment, this be done at [facility] for positive aging upon discharge  Consider writing down instructions for patient to review later so she is not overwhelmed with forgetfulness      3   Difficulty with mobility/performing ADLs -Patient is essentially homebound, has recurrent falls  [facility]oke with [facility] about setting up home visits as outpatient, will need to set up after patient goes to rehab  Continue supportive care, PT, OT recommending rehab, agree  Ideally patient would be an assisted living facility, as patient has difficulty cooking for herself as well, etc     4   Insomnia-- chronic  Remeron possibly helping per pt - was able to sleep in hospital  ?sleep apnea - consider outpt eval to see if this is contributing  ? psych consult - consider to see if this is contributing      5   Ambulatory dysfunction- chronic d/t weakness  Pt/ot rec inpt therapy - agreed  Patient agrees, wants to be stronger  Goal to return home if stronger and able to care for self  Otherwise considering options  Fall precautions     6   Delirium precautions- none present  Continue precautions as written      7   Lumbago - worsening  Add lidoderm patch  Change position frequently      8   V-tach/afib- Cards following  Patient unsure of plan    Consider palliative care consult as patient is unsure of goals for health care options    9  Constipation -doing ok  Continue with MiraLax daily  Will add Senokot S daily    Subjective:   Patient seen for geriatrics follow up  Initially she was sad, saying she did not feel good overall, but she did brighten up and became more conversant and decisive as conversation continued  Patient reports she has chronic lumbago which is worsening with bed  She denies sob/cough/cp  She c/o continued insomnia  She denies confusions  She is unsure of cardiac or discharge plan at this time and feels overwhelmed  She does not know how aggressive she wants care to become  Objective:     Vitals: Blood pressure 161/65, pulse 65, temperature 98 2 °F (36 8 °C), temperature source Oral, resp  rate 20, height 5' 5" (1 651 m), weight 68 9 kg (151 lb 14 4 oz), SpO2 98 %  ,Body mass index is 25 28 kg/m²  Intake/Output Summary (Last 24 hours) at 07/13/18 1511  Last data filed at 07/13/18 1141   Gross per 24 hour   Intake              240 ml   Output             1975 ml   Net            -1735 ml       Physical Exam:   General:  A&O x2 (self/place)  Partially to time, situation, no distress  Cards:  Reg rhythm, norm heart sounds  Pulm:  Norm effort/no distress, cta, no w/r/r  Abd:  Soft, nt, nd, +bs  MS:  Norm ROM, no focal weakness  Ext:  W/d, no edema       Invasive Devices     Peripheral Intravenous Line            Peripheral IV 07/12/18 Left Arm 1 day                Lab, Imaging and other studies: I have personally reviewed pertinent reports      VTE Pharmacologic Prophylaxis: Sequential compression device (Venodyne)   VTE Mechanical Prophylaxis: sequential compression device No

## 2025-02-25 NOTE — PLAN OF CARE
----- Message from Sanna sent at 2/25/2025  8:28 AM CST -----  Contact: self [phone]  Type:  Needs Medical AdviceWho Called: Misti Symptoms (please be specific): UTI  Pharmacy name and phone #:  [id number] - [place] - [address] AT [facility] [id number] [id number]: [phone] Fax: [phone] the patient rather a call back or a response via [facility]? Call back Best Call Back Number: [phone] Additional Information: requesting medication or urine lab   DISCHARGE PLANNING     Discharge to home or other facility with appropriate resources Progressing        DISCHARGE PLANNING - CARE MANAGEMENT     Discharge to post-acute care or home with appropriate resources Progressing        INFECTION - ADULT     Absence or prevention of progression during hospitalization Progressing     Absence of fever/infection during neutropenic period Progressing        PAIN - ADULT     Verbalizes/displays adequate comfort level or baseline comfort level Progressing        Potential for Falls     Patient will remain free of falls Progressing        Prexisting or High Potential for Compromised Skin Integrity     Skin integrity is maintained or improved Progressing        SAFETY ADULT     Maintain or return to baseline ADL function Progressing     Maintain or return mobility status to optimal level Progressing

## 2025-05-01 NOTE — PROGRESS NOTES
Patient is comfort care at this time  PRN medication given for pain and agitation  Mouth care preformed  Patient repositioned  73

## 2025-07-15 NOTE — PROGRESS NOTES
Margareth Messer from [facility] up here to evaluate pts rhythm and get in touch with cardiology  Labs sent  EKG done  Pt remains in NSR  VSS  Plan: Discussed Accutane patient would like to try Spironolactone again for right now, can revisit discussion at next visit. Plan to follow up in 3 months. Render In Strict Bullet Format?: No Detail Level: Zone Initiate Treatment: spironolactone 50 mg tablet \\nQuantity: 30.0 Tablet  Days Supply: 30\\nSig: Take one by mouth daily. Initiate Treatment: fluconazole 150 mg tablet : Take 1 tablet by mouth once a week

## (undated) DEVICE — SPHINCTEROTOME: Brand: DREAMTOME™ RX 44

## (undated) DEVICE — PANCREATIC STENT DELIVERY SYSTEM: Brand: NAVIFLEX™ RX DELIVERY SYSTEM

## (undated) DEVICE — RETRIEVAL BALLOON CATHETER: Brand: EXTRACTOR™ PRO RX

## (undated) DEVICE — BALLOON DILATATION CATHETER: Brand: HURRICANE™ RX

## (undated) DEVICE — DELIVERY SYSTEM NAVFLX 10FR X 202.5CM

## (undated) DEVICE — HIGH PERFORMANCE GUIDEWIRE: Brand: DREAMWIRE